# Patient Record
Sex: MALE | Race: BLACK OR AFRICAN AMERICAN | NOT HISPANIC OR LATINO | Employment: OTHER | URBAN - METROPOLITAN AREA
[De-identification: names, ages, dates, MRNs, and addresses within clinical notes are randomized per-mention and may not be internally consistent; named-entity substitution may affect disease eponyms.]

---

## 2017-01-11 DIAGNOSIS — N18.9 CHRONIC KIDNEY DISEASE: ICD-10-CM

## 2017-01-12 DIAGNOSIS — M10.059 IDIOPATHIC GOUT OF HIP, UNSPECIFIED CHRONICITY, UNSPECIFIED LATERALITY: ICD-10-CM

## 2017-01-13 DIAGNOSIS — E11.9 TYPE 2 DIABETES MELLITUS WITHOUT COMPLICATION: ICD-10-CM

## 2017-01-13 DIAGNOSIS — N18.9 CHRONIC KIDNEY DISEASE: ICD-10-CM

## 2017-01-13 RX ORDER — SODIUM BICARBONATE 650 MG/1
TABLET ORAL
Qty: 90 TABLET | Refills: 0 | OUTPATIENT
Start: 2017-01-13

## 2017-01-13 RX ORDER — MAGNESIUM OXIDE 400 MG
TABLET ORAL
Qty: 90 TABLET | Refills: 0 | Status: SHIPPED | OUTPATIENT
Start: 2017-01-13 | End: 2017-01-18 | Stop reason: SDUPTHER

## 2017-01-13 RX ORDER — SODIUM BICARBONATE 650 MG/1
TABLET ORAL
Qty: 90 TABLET | Refills: 0 | Status: SHIPPED | OUTPATIENT
Start: 2017-01-13 | End: 2017-01-18 | Stop reason: SDUPTHER

## 2017-01-13 RX ORDER — ALLOPURINOL 100 MG/1
TABLET ORAL
Qty: 90 TABLET | Refills: 0 | Status: SHIPPED | OUTPATIENT
Start: 2017-01-13 | End: 2017-01-24

## 2017-01-16 ENCOUNTER — TELEPHONE (OUTPATIENT)
Dept: NEPHROLOGY | Facility: CLINIC | Age: 72
End: 2017-01-16

## 2017-01-16 RX ORDER — TAMSULOSIN HYDROCHLORIDE 0.4 MG/1
CAPSULE ORAL
Qty: 90 CAPSULE | Refills: 0 | OUTPATIENT
Start: 2017-01-16

## 2017-01-16 RX ORDER — BENAZEPRIL HYDROCHLORIDE 40 MG/1
TABLET ORAL
Qty: 30 TABLET | Refills: 0 | Status: SHIPPED | OUTPATIENT
Start: 2017-01-16 | End: 2017-02-06 | Stop reason: DRUGHIGH

## 2017-01-16 RX ORDER — TAMSULOSIN HYDROCHLORIDE 0.4 MG/1
CAPSULE ORAL
Qty: 30 CAPSULE | Refills: 0 | Status: SHIPPED | OUTPATIENT
Start: 2017-01-16 | End: 2017-01-24 | Stop reason: SDUPTHER

## 2017-01-16 RX ORDER — BENAZEPRIL HYDROCHLORIDE 40 MG/1
TABLET ORAL
Qty: 90 TABLET | Refills: 0 | OUTPATIENT
Start: 2017-01-16

## 2017-01-16 NOTE — TELEPHONE ENCOUNTER
Pt states that he needs to check his schedule and does not wish to make an appointment at this time. Pt has no further questions or concerns at this time.

## 2017-01-18 ENCOUNTER — LAB VISIT (OUTPATIENT)
Dept: LAB | Facility: HOSPITAL | Age: 72
End: 2017-01-18
Payer: MEDICARE

## 2017-01-18 ENCOUNTER — OFFICE VISIT (OUTPATIENT)
Dept: NEPHROLOGY | Facility: CLINIC | Age: 72
End: 2017-01-18
Payer: MEDICARE

## 2017-01-18 VITALS
DIASTOLIC BLOOD PRESSURE: 80 MMHG | HEART RATE: 66 BPM | HEIGHT: 74 IN | BODY MASS INDEX: 38.51 KG/M2 | OXYGEN SATURATION: 95 % | WEIGHT: 300.06 LBS | SYSTOLIC BLOOD PRESSURE: 158 MMHG

## 2017-01-18 DIAGNOSIS — N18.30 CHRONIC KIDNEY DISEASE, STAGE 3 (MODERATE): ICD-10-CM

## 2017-01-18 DIAGNOSIS — E11.8 TYPE 2 DIABETES MELLITUS WITH COMPLICATION, WITHOUT LONG-TERM CURRENT USE OF INSULIN: ICD-10-CM

## 2017-01-18 DIAGNOSIS — R80.9 PROTEINURIA: ICD-10-CM

## 2017-01-18 DIAGNOSIS — R80.9 PROTEINURIA, UNSPECIFIED TYPE: ICD-10-CM

## 2017-01-18 DIAGNOSIS — M10.059 IDIOPATHIC GOUT OF HIP, UNSPECIFIED CHRONICITY, UNSPECIFIED LATERALITY: ICD-10-CM

## 2017-01-18 DIAGNOSIS — N18.30 CKD (CHRONIC KIDNEY DISEASE) STAGE 3, GFR 30-59 ML/MIN: Primary | ICD-10-CM

## 2017-01-18 DIAGNOSIS — N20.0 KIDNEY CALCULI: ICD-10-CM

## 2017-01-18 DIAGNOSIS — I10 ESSENTIAL HYPERTENSION: ICD-10-CM

## 2017-01-18 LAB
CREAT 24H UR-MRATE: 71.5 MG/HR
CREAT UR-MCNC: 78 MG/DL
CREATININE, URINE (MG/SPEC): 1716 MG/SPEC
PROT 24H UR-MRATE: ABNORMAL MG/SPEC
PROT UR-MCNC: 478 MG/DL
URINE COLLECTION DURATION: 24 HR
URINE COLLECTION DURATION: 24 HR
URINE VOLUME: 2200 ML
URINE VOLUME: 2200 ML

## 2017-01-18 PROCEDURE — 99215 OFFICE O/P EST HI 40 MIN: CPT | Mod: S$PBB,,, | Performed by: INTERNAL MEDICINE

## 2017-01-18 PROCEDURE — 99213 OFFICE O/P EST LOW 20 MIN: CPT | Mod: PBBFAC | Performed by: INTERNAL MEDICINE

## 2017-01-18 PROCEDURE — 99999 PR PBB SHADOW E&M-EST. PATIENT-LVL III: CPT | Mod: PBBFAC,,, | Performed by: INTERNAL MEDICINE

## 2017-01-18 RX ORDER — SPIRONOLACTONE 25 MG/1
25 TABLET ORAL DAILY
Qty: 30 TABLET | Refills: 6 | Status: SHIPPED | OUTPATIENT
Start: 2017-01-18 | End: 2017-02-23 | Stop reason: SDUPTHER

## 2017-01-18 RX ORDER — LANOLIN ALCOHOL/MO/W.PET/CERES
1 CREAM (GRAM) TOPICAL EVERY MORNING
Qty: 90 TABLET | Refills: 5 | Status: SHIPPED | OUTPATIENT
Start: 2017-01-18 | End: 2017-02-23 | Stop reason: SDUPTHER

## 2017-01-18 RX ORDER — SODIUM BICARBONATE 650 MG/1
650 TABLET ORAL 2 TIMES DAILY
Qty: 90 TABLET | Refills: 3 | Status: SHIPPED | OUTPATIENT
Start: 2017-01-18 | End: 2017-01-24

## 2017-01-18 RX ORDER — ALLOPURINOL 100 MG/1
100 TABLET ORAL DAILY
Qty: 90 TABLET | Refills: 5 | Status: SHIPPED | OUTPATIENT
Start: 2017-01-18 | End: 2017-02-23 | Stop reason: SDUPTHER

## 2017-01-18 RX ORDER — SPIRONOLACTONE 25 MG/1
25 TABLET ORAL DAILY
Qty: 30 TABLET | Refills: 6 | Status: SHIPPED | OUTPATIENT
Start: 2017-01-18 | End: 2017-01-18 | Stop reason: SDUPTHER

## 2017-01-18 NOTE — PROGRESS NOTES
Subjective:       Patient ID: Vinnie Siegel is a 71 y.o. Black or  male who presents for follow-up evaluation of No chief complaint on file.    HPI This is a 71 -year-old -American male with   nephrolithiasis, hypertension and diabetes,ckd, proteinuria who is coming in for f/u of   Nephrolithiasis, ckd and proteinuria. No recent stones and denies any hematuria, dysuria, or flank pain. DM not well controlled. Bp's in the 140's-150's/ 80's but does not check regularly.    Creatinine stable.  Proteinuria high.     PAST MEDICAL HISTORY: Significant for hypertension, diabetes for several years,   and nephrolithiasis. The patient had first episode about three years ago and had to   have a subsequent procedure and second stone was in August 2012 and he   had left ureteroscopy for that. He states that he was never symptomatic with   either of the stones and was found on imaging.     SOCIAL HISTORY: He smokes less than a pack a day. Does not drink. He is a   retired .     FAMILY HISTORY: No family history of kidney stones or kidney problems.     Review of Systems   Constitutional: Negative for fatigue.   Eyes: Negative for discharge.   Respiratory: Negative for cough, shortness of breath and wheezing.    Cardiovascular: Negative for chest pain and palpitations.   Gastrointestinal: Negative for abdominal pain, diarrhea, nausea and vomiting.   Genitourinary: Negative for dysuria, frequency, hematuria and urgency.   Skin: Negative for color change and rash.   Psychiatric/Behavioral: Negative for confusion.   All other systems reviewed and are negative.      Objective:      Physical Exam   Constitutional: He is oriented to person, place, and time. He appears well-developed and well-nourished.   HENT:   Right Ear: External ear normal.   Left Ear: External ear normal.   Nose: Nose normal.   Mouth/Throat: Normal dentition.   Eyes: Conjunctivae, EOM and lids are normal. Pupils are equal, round,  and reactive to light.   Neck: Trachea normal. No thyroid mass present.   Cardiovascular: Normal rate and regular rhythm.  Exam reveals no friction rub.    No murmur heard.  No lower extremity edema   Pulmonary/Chest: Effort normal and breath sounds normal. No respiratory distress. He has no decreased breath sounds. He has no rales.   Abdominal: Soft. Bowel sounds are normal. He exhibits no mass. There is no tenderness. There is no rebound. No hernia.   Musculoskeletal: He exhibits edema.   Trace edema jose cruz.   Neurological: He is alert and oriented to person, place, and time.   Skin: Skin is warm and dry. No rash noted. No erythema.   Psychiatric: He has a normal mood and affect. Judgment normal. His mood appears not anxious. He does not exhibit a depressed mood.   Oriented to time, place and person.   Vitals reviewed.      Assessment:       No diagnosis found.    Plan:     Labs pending.     This is a 71 -year-old -American male with history of nephrolithiasis,   hypertension, diabetes, is coming in for f/u of nephrolithiasis, ckd, proteinuria.     2. Nephrolithiasis: His last 24-hour urine collection shows low volume but otherwise stable. He has calcium oxalate stones 100% in the shell consist of uric acid stones 75%, calcium oxalate 25%. He is instructed on conservative measures to decrease the risk of stone formation. He is instructed to hydrate well along with decreasing the animal protein intake and low-salt intake. On mag supplements. His citrate levels are good. On allopurinol and sodium bicarbonate for low PH. Last  US shows left 0.5 cm stone.  Seen in  Urology.  Has been hydrating better per the pt.  3. Hypertension. Blood pressures are stable for the most part.  Check it at home.  Call within a week with readings. On lasix.   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 CKD sec to DM and HTN.  Proteinuria sec to Dm and  HTN.  On benazepril.  Stressed importance of good control.  Sig  increase recently-over 8 gms and rechecked 24 hr urine.  Check serologies but a1c uncontrolled-f/u with pcp. Start spironolactone 25 mg qday. Aware of sideeffects.  Discussed possible biopsy and pt would like to hold off.   5. Acidosis:  sodium bicarb.     Return in 1-2 months with rfp, urine protein and urine ceratinine

## 2017-01-20 ENCOUNTER — TELEPHONE (OUTPATIENT)
Dept: NEPHROLOGY | Facility: CLINIC | Age: 72
End: 2017-01-20

## 2017-01-20 DIAGNOSIS — R80.9 PROTEINURIA, UNSPECIFIED TYPE: Primary | ICD-10-CM

## 2017-01-20 NOTE — TELEPHONE ENCOUNTER
Please let him know taht his creatinien is higher-he should hydrate well and repeat rfp later next week.  He cont's to leak 7 gms of protein in urine.  He should start taking the spironolactone I gave him this week and control his DM.

## 2017-01-23 ENCOUNTER — TELEPHONE (OUTPATIENT)
Dept: NEPHROLOGY | Facility: CLINIC | Age: 72
End: 2017-01-23

## 2017-01-24 ENCOUNTER — OFFICE VISIT (OUTPATIENT)
Dept: INTERNAL MEDICINE | Facility: CLINIC | Age: 72
End: 2017-01-24
Attending: FAMILY MEDICINE
Payer: MEDICARE

## 2017-01-24 VITALS
WEIGHT: 301.13 LBS | BODY MASS INDEX: 39.91 KG/M2 | HEART RATE: 66 BPM | HEIGHT: 73 IN | SYSTOLIC BLOOD PRESSURE: 210 MMHG | DIASTOLIC BLOOD PRESSURE: 98 MMHG

## 2017-01-24 DIAGNOSIS — E11.65 UNCONTROLLED TYPE 2 DIABETES MELLITUS WITH COMPLICATION, UNSPECIFIED LONG TERM INSULIN USE STATUS: Primary | ICD-10-CM

## 2017-01-24 DIAGNOSIS — E11.8 UNCONTROLLED TYPE 2 DIABETES MELLITUS WITH COMPLICATION, UNSPECIFIED LONG TERM INSULIN USE STATUS: Primary | ICD-10-CM

## 2017-01-24 DIAGNOSIS — E78.5 HYPERLIPIDEMIA, UNSPECIFIED HYPERLIPIDEMIA TYPE: ICD-10-CM

## 2017-01-24 DIAGNOSIS — E87.20 ACIDOSIS: ICD-10-CM

## 2017-01-24 DIAGNOSIS — E11.65 TYPE 2 DIABETES MELLITUS WITH HYPERGLYCEMIA, UNSPECIFIED LONG TERM INSULIN USE STATUS: ICD-10-CM

## 2017-01-24 DIAGNOSIS — I10 ESSENTIAL HYPERTENSION: ICD-10-CM

## 2017-01-24 DIAGNOSIS — M10.059 IDIOPATHIC GOUT OF HIP, UNSPECIFIED CHRONICITY, UNSPECIFIED LATERALITY: ICD-10-CM

## 2017-01-24 PROCEDURE — 99214 OFFICE O/P EST MOD 30 MIN: CPT | Mod: PBBFAC | Performed by: FAMILY MEDICINE

## 2017-01-24 PROCEDURE — 99214 OFFICE O/P EST MOD 30 MIN: CPT | Mod: S$PBB,,, | Performed by: FAMILY MEDICINE

## 2017-01-24 PROCEDURE — 99999 PR PBB SHADOW E&M-EST. PATIENT-LVL IV: CPT | Mod: PBBFAC,,, | Performed by: FAMILY MEDICINE

## 2017-01-24 RX ORDER — ALBUTEROL SULFATE 90 UG/1
1-2 AEROSOL, METERED RESPIRATORY (INHALATION) EVERY 6 HOURS PRN
Qty: 1 INHALER | Refills: 3 | Status: CANCELLED | OUTPATIENT
Start: 2017-01-24

## 2017-01-24 RX ORDER — TAMSULOSIN HYDROCHLORIDE 0.4 MG/1
1 CAPSULE ORAL EVERY MORNING
Qty: 90 CAPSULE | Refills: 1 | Status: SHIPPED | OUTPATIENT
Start: 2017-01-24 | End: 2017-08-13 | Stop reason: SDUPTHER

## 2017-01-24 RX ORDER — BENAZEPRIL HYDROCHLORIDE 40 MG/1
TABLET ORAL
Qty: 90 TABLET | Refills: 1 | Status: SHIPPED | OUTPATIENT
Start: 2017-01-24 | End: 2017-02-06 | Stop reason: DRUGHIGH

## 2017-01-24 RX ORDER — SPIRONOLACTONE 25 MG/1
25 TABLET ORAL DAILY
Qty: 30 TABLET | Refills: 6 | Status: CANCELLED | OUTPATIENT
Start: 2017-01-24 | End: 2018-01-24

## 2017-01-24 RX ORDER — ALLOPURINOL 100 MG/1
100 TABLET ORAL DAILY
Qty: 90 TABLET | Refills: 5 | Status: CANCELLED | OUTPATIENT
Start: 2017-01-24

## 2017-01-24 RX ORDER — SIMVASTATIN 40 MG/1
40 TABLET, FILM COATED ORAL NIGHTLY
Qty: 90 TABLET | Refills: 1 | Status: CANCELLED | OUTPATIENT
Start: 2017-01-24

## 2017-01-24 RX ORDER — CLONIDINE HYDROCHLORIDE 0.2 MG/1
0.2 TABLET ORAL
Status: COMPLETED | OUTPATIENT
Start: 2017-01-24 | End: 2017-01-24

## 2017-01-24 RX ORDER — AMLODIPINE BESYLATE 5 MG/1
5 TABLET ORAL DAILY
Qty: 90 TABLET | Refills: 0 | Status: SHIPPED | OUTPATIENT
Start: 2017-01-24 | End: 2017-07-20 | Stop reason: SDUPTHER

## 2017-01-24 RX ORDER — SODIUM BICARBONATE 650 MG/1
650 TABLET ORAL 2 TIMES DAILY
Qty: 90 TABLET | Refills: 1 | Status: CANCELLED | OUTPATIENT
Start: 2017-01-24

## 2017-01-24 RX ORDER — AMLODIPINE BESYLATE 5 MG/1
5 TABLET ORAL DAILY
Qty: 90 TABLET | Refills: 0 | Status: SHIPPED | OUTPATIENT
Start: 2017-01-24 | End: 2017-01-24 | Stop reason: SDUPTHER

## 2017-01-24 RX ORDER — LANOLIN ALCOHOL/MO/W.PET/CERES
1 CREAM (GRAM) TOPICAL EVERY MORNING
Qty: 90 TABLET | Refills: 1 | Status: CANCELLED | OUTPATIENT
Start: 2017-01-24

## 2017-01-24 RX ORDER — GLIPIZIDE 10 MG/1
10 TABLET ORAL
Qty: 270 TABLET | Refills: 1 | Status: CANCELLED | OUTPATIENT
Start: 2017-01-24 | End: 2018-01-24

## 2017-01-24 RX ADMIN — CLONIDINE HYDROCHLORIDE 0.2 MG: 0.2 TABLET ORAL at 11:01

## 2017-01-24 NOTE — MR AVS SNAPSHOT
[facility]  [address]  [place] [address]  Phone: [phone]  Fax: [phone]                  Vinnie Siegel   2017 9:40 AM   Office Visit    Description:  Male : 1945   Provider:  Janeth Walter MD   Department:  [facility]           Reason for Visit     Hypertension     Diabetes     Medication Refill           Diagnoses this Visit        Comments    Uncontrolled type 2 diabetes mellitus with complication, unspecified long term insulin use status    -  Primary     Acidosis         Hyperlipidemia, unspecified hyperlipidemia type         Type 2 diabetes mellitus with hyperglycemia, unspecified long term insulin use status         Idiopathic gout of hip, unspecified chronicity, unspecified laterality         Essential hypertension                To Do List           Future Appointments        Provider Department Dept Phone    2017 10:00 AM LAB, APPOINTMENT [place] [facility] [phone]    2017 10:00 AM LAB, [facility]POINT[place] [facility] [phone]    2017 10:05 AM SPECIMEN, MA[facility] [phone]    2017 10:30 AM Yisel Ramírez DO [facility] - Nephrology [phone]      Goals (5 Years of Data)     None       These Medications        Disp Refills Start End    tamsulosin (FLOMAX) 0.4 mg Cp24 90 capsule 1 2017     Take 1 capsule (0.4 mg total) by mouth every morning. - Oral    Pharmacy: [facility] [id number] [facility]  AT [facility] [address] Ph #: [phone]       benazepril (LOTENSIN) 40 MG tablet 90 tablet 1 2017     TK 1 T PO QD    [facility] [id number]  [address]  AT [facility] [address] Ph #: [phone]       amlodipine (NORVASC) 5 MG tablet 90 tablet 0 2017    Take 1 tablet (5 mg total) by mouth once daily. - Oral    Pharmacy:  "[facility] [id number]  [address] [place] [address] AT [facility] [address] Ph #: [phone]         [facility] On [facility]     [facility] On Call Nurse Care Line - 24/7 Assistance  Registered nurses in the [facility] On Call Center provide clinical advisement, health education, appointment booking, and other advisory services.  Call for this free service at [phone].             Medications           Message regarding Medications     Verify the changes and/or additions to your medication regime listed below are the same as discussed with your clinician today.  If any of these changes or additions are incorrect, please notify your healthcare provider.        START taking these NEW medications        Refills    amlodipine (NORVASC) 5 MG tablet 0    Sig: Take 1 tablet (5 mg total) by mouth once daily.    Class: Print    Route: Oral      CHANGE how you are taking these medications     Start Taking Instead of    tamsulosin (FLOMAX) 0.4 mg Cp24 tamsulosin (FLOMAX) 0.4 mg Cp24    Dosage:  Take 1 capsule (0.4 mg total) by mouth every morning. Dosage:  TAKE 1 CAPSULE BY MOUTH EVERY MORNING    Reason for Change:  Reorder            Verify that the below list of medications is an accurate representation of the medications you are currently taking.  If none reported, the list may be blank. If incorrect, please contact your healthcare provider. Carry this list with you in case of emergency.           Current Medications     albuterol 90 mcg/actuation inhaler Inhale 1-2 puffs into the lungs every 6 (six) hours as needed for Wheezing or Shortness of Breath.    allopurinol (ZYLOPRIM) 100 MG tablet Take 1 tablet (100 mg total) by mouth once daily.    amlodipine (NORVASC) 5 MG tablet Take 1 tablet (5 mg total) by mouth once daily.    BD ULTRA-FINE VANDANA PEN NEEDLES 32 gauge x 5/32" Ndle 1 each by Misc.(Non-Drug; Combo Route) route once daily.    benazepril (LOTENSIN) 40 MG tablet TAKE 1 TABLET BY MOUTH EVERY DAY    " "benazepril (LOTENSIN) 40 MG tablet TK 1 T PO QD    blood-glucose meter kit Use as instructed    fluticasone (FLONASE) 50 mcg/actuation nasal spray 2 sprays by Each Nare route once daily.    FLUZONE HIGH-DOSE 2016-17, PF, 180 mcg/0.5 mL Syrg ADM 0.5ML IM UTD    glipiZIDE (GLUCOTROL) 10 MG tablet Take 1 tablet (10 mg total) by mouth 2 (two) times daily before meals.    INVOKANA 100 mg Tab TAKE 1 TABLET BY MOUTH EVERY DAY    liraglutide 0.6 mg/0.1 mL, 18 mg/3 mL, subq PNIJ (VICTOZA 2-ALBERT) 0.6 mg/0.1 mL (18 mg/3 mL) PnIj Inject 0.6 mg into the skin once daily.    magnesium oxide (MAGOX) 400 mg tablet Take 1 tablet (400 mg total) by mouth every morning.    prednisoLONE acetate (PRED FORTE) 1 % DrpS Place 1 drop into the right eye 3 (three) times daily.    simvastatin (ZOCOR) 40 MG tablet Take 1 tablet (40 mg total) by mouth every evening.    sodium bicarbonate 650 MG tablet Take 1 tablet (650 mg total) by mouth 2 (two) times daily.    sodium bicarbonate 650 MG tablet TAKE 1 TABLET BY MOUTH TWICE DAILY    spironolactone (ALDACTONE) 25 MG tablet Take 1 tablet (25 mg total) by mouth once daily.    tamsulosin (FLOMAX) 0.4 mg Cp24 Take 1 capsule (0.4 mg total) by mouth every morning.    timolol maleate 0.25% (TIMOPTIC) 0.25 % Drop Place 1 drop into the right eye 2 (two) times daily.           Clinical Reference Information           Vital Signs - Last Recorded  Most recent update: 1/24/2017 10:16 AM by Jessica Quiroga MA    BP Pulse Ht Wt BMI    (!) 210/98 (BP Location: Left arm, Patient Position: Sitting, BP Method: Manual) 66 6' 1" (1.854 m) (!) 136.6 kg (301 lb 2.4 oz) 39.73 kg/m2      Blood Pressure          Most Recent Value    BP  (!)  210/98      Allergies as of 1/24/2017     Metformin      Immunizations Administered on Date of Encounter - 1/24/2017     None      Orders Placed During Today's Visit      Normal Orders This Visit    Ambulatory Referral to Cardiology     Future Labs/Procedures Expected by Expires    " Hemoglobin A1c  1/24/2017 3/18/2018    Lipid panel  1/24/2017 3/18/2018    TSH  1/24/2017 3/25/2018      [facility] Sign-Up     Activating your [facility] account is as easy as 1-2-3!     1) Visit [email or web address], select Sign Up Now, enter this activation code and your date of birth, then select Next.  [email or web address]  Expires: 3/10/2017 10:54 AM      2) Create a username and password to use when you visit [facility] in the future and select a security question in case you lose your password and select Next.    3) Enter your e-mail address and click Sign Up!    Additional Information  If you have questions, please e-mail [email or web address] or call [phone] to talk to our [facility] staff. Remember, [facility] is NOT to be used for urgent needs. For medical emergencies, dial 911.         Instructions    Your test results will be communicated to you via: [facility], Telephone or Letter.  If you have not received your test results within one week. Please contact the clinic.         Smoking Cessation     If you would like to quit smoking:   You may be eligible for free services if you are a [place] resident and started smoking cigarettes before September 1, 1988.  Call the Smoking Cessation Trust (SCT) toll free at (562) 457-5706 or (680) 010-6366.   Call 2-800-QUIT-NOW if you do not meet the above criteria.

## 2017-01-24 NOTE — PROGRESS NOTES
Patient given two 0.1mg tablets of clonidine PO. Patient tolerated well. Patient discharged by PCP with no other questions or concerns.

## 2017-01-25 ENCOUNTER — TELEPHONE (OUTPATIENT)
Dept: NEPHROLOGY | Facility: CLINIC | Age: 72
End: 2017-01-25

## 2017-01-25 ENCOUNTER — LAB VISIT (OUTPATIENT)
Dept: LAB | Facility: HOSPITAL | Age: 72
End: 2017-01-25
Payer: MEDICARE

## 2017-01-25 DIAGNOSIS — E11.65 UNCONTROLLED TYPE 2 DIABETES MELLITUS WITH COMPLICATION, UNSPECIFIED LONG TERM INSULIN USE STATUS: ICD-10-CM

## 2017-01-25 DIAGNOSIS — R80.9 PROTEINURIA, UNSPECIFIED TYPE: ICD-10-CM

## 2017-01-25 DIAGNOSIS — E11.65 TYPE 2 DIABETES MELLITUS WITH HYPERGLYCEMIA, UNSPECIFIED LONG TERM INSULIN USE STATUS: ICD-10-CM

## 2017-01-25 DIAGNOSIS — E11.8 UNCONTROLLED TYPE 2 DIABETES MELLITUS WITH COMPLICATION, UNSPECIFIED LONG TERM INSULIN USE STATUS: ICD-10-CM

## 2017-01-25 LAB
ALBUMIN SERPL BCP-MCNC: 3.1 G/DL
ANION GAP SERPL CALC-SCNC: 9 MMOL/L
BUN SERPL-MCNC: 13 MG/DL
CALCIUM SERPL-MCNC: 9 MG/DL
CHLORIDE SERPL-SCNC: 103 MMOL/L
CHOLEST/HDLC SERPL: 2.7 {RATIO}
CO2 SERPL-SCNC: 27 MMOL/L
CREAT SERPL-MCNC: 1.8 MG/DL
EST. GFR  (AFRICAN AMERICAN): 43.1 ML/MIN/1.73 M^2
EST. GFR  (NON AFRICAN AMERICAN): 37.3 ML/MIN/1.73 M^2
ESTIMATED AVG GLUCOSE: 260 MG/DL
GLUCOSE SERPL-MCNC: 240 MG/DL
HBA1C MFR BLD HPLC: 10.7 %
HDL/CHOLESTEROL RATIO: 37.2 %
HDLC SERPL-MCNC: 164 MG/DL
HDLC SERPL-MCNC: 61 MG/DL
LDLC SERPL CALC-MCNC: 84.8 MG/DL
NONHDLC SERPL-MCNC: 103 MG/DL
PHOSPHATE SERPL-MCNC: 3 MG/DL
POTASSIUM SERPL-SCNC: 3.5 MMOL/L
SODIUM SERPL-SCNC: 139 MMOL/L
TRIGL SERPL-MCNC: 91 MG/DL
TSH SERPL DL<=0.005 MIU/L-ACNC: 1.09 UIU/ML

## 2017-01-25 PROCEDURE — 80061 LIPID PANEL: CPT

## 2017-01-25 PROCEDURE — 84443 ASSAY THYROID STIM HORMONE: CPT

## 2017-01-25 PROCEDURE — 36415 COLL VENOUS BLD VENIPUNCTURE: CPT

## 2017-01-25 PROCEDURE — 83036 HEMOGLOBIN GLYCOSYLATED A1C: CPT

## 2017-01-25 PROCEDURE — 80069 RENAL FUNCTION PANEL: CPT

## 2017-01-27 ENCOUNTER — TELEPHONE (OUTPATIENT)
Dept: INTERNAL MEDICINE | Facility: CLINIC | Age: 72
End: 2017-01-27

## 2017-01-27 NOTE — TELEPHONE ENCOUNTER
----- Message from Janeht Walter MD sent at 1/25/2017  2:02 PM CST -----  Improvement in a1c from 12 to 10, but still elevated and uncontrolled. Will need to see his endocrine doctor.  PV

## 2017-01-27 NOTE — TELEPHONE ENCOUNTER
Pt was notified of his test results and advised that per Dr. Walter needs to f/u with endocrine , unable to schedule anything sooner that march pt was place on the hold list. Spoke with endocrine  and the soonest was in march do to the high value nothing with the NP as well as I informed to the pt. Will mail the appointment for March. Will call pt back and inform.

## 2017-01-29 ENCOUNTER — TELEPHONE (OUTPATIENT)
Dept: NEPHROLOGY | Facility: CLINIC | Age: 72
End: 2017-01-29

## 2017-01-31 NOTE — PROGRESS NOTES
Subjective:       Patient ID: Vinnie Siegel is a 71 y.o. male.    Chief Complaint: Hypertension (elevated blood pressures reading); Diabetes; and Medication Refill    HPI Comments: Pt presents today for DM, HTN f/u. Per pt, he is trying his best to keep up with his DM. Feels that it is well controlled but aware that his glucose levels and a1c indicate otherwise. Pt also states that he needs to have meds refilled.  States that he took his BP meds this AM, but BP's are very high here in office. Amenable to med adjustments to better control his BP's.       Hypertension   Pertinent negatives include no chest pain, palpitations or shortness of breath.   Diabetes   Pertinent negatives for hypoglycemia include no dizziness. Pertinent negatives for diabetes include no chest pain and no weakness.   Medication Refill   Pertinent negatives include no chest pain or weakness.     Review of Systems   Constitutional: Negative for activity change and appetite change.   HENT: Negative.    Eyes: Negative for photophobia and visual disturbance.   Respiratory: Negative for chest tightness and shortness of breath.    Cardiovascular: Negative for chest pain, palpitations and leg swelling.   Neurological: Negative for dizziness, weakness and light-headedness.       Objective:      Physical Exam   Constitutional: He is oriented to person, place, and time. He appears well-developed and well-nourished.   HENT:   Head: Normocephalic and atraumatic.   Eyes: Conjunctivae and EOM are normal. Pupils are equal, round, and reactive to light. Right eye exhibits no discharge. Left eye exhibits no discharge. No scleral icterus.   Neck: Normal range of motion. Neck supple. No JVD present.   Cardiovascular: Normal rate, regular rhythm, normal heart sounds and intact distal pulses.    Pulmonary/Chest: Effort normal and breath sounds normal.   Musculoskeletal: Normal range of motion. He exhibits no edema or tenderness.   Neurological: He is alert and  oriented to person, place, and time. He has normal reflexes. No cranial nerve deficit. He exhibits normal muscle tone. Coordination normal.   Skin: Skin is warm and dry.   Psychiatric: He has a normal mood and affect. His behavior is normal. Judgment and thought content normal.       Assessment:       1. Uncontrolled type 2 diabetes mellitus with complication, unspecified long term insulin use status    2. Acidosis    3. Hyperlipidemia, unspecified hyperlipidemia type    4. Type 2 diabetes mellitus with hyperglycemia, unspecified long term insulin use status    5. Idiopathic gout of hip, unspecified chronicity, unspecified laterality    6. Essential hypertension        Plan:       HTN: uncontrolled. Clonidine one time although pt is aymptomatic  DM: followed by endocrine, uncontrolled  Uncontrolled type 2 diabetes mellitus with complication, unspecified long term insulin use status  -     Hemoglobin A1c; Future; Expected date: 1/24/17  -     Lipid panel; Future; Expected date: 1/24/17    Acidosis  -     tamsulosin (FLOMAX) 0.4 mg Cp24; Take 1 capsule (0.4 mg total) by mouth every morning.  Dispense: 90 capsule; Refill: 1    Hyperlipidemia, unspecified hyperlipidemia type    Type 2 diabetes mellitus with hyperglycemia, unspecified long term insulin use status  -     Hemoglobin A1c; Future; Expected date: 1/24/17  -     Lipid panel; Future; Expected date: 1/24/17  -     TSH; Future; Expected date: 1/24/17  -     Ambulatory Referral to Cardiology    Idiopathic gout of hip, unspecified chronicity, unspecified laterality    Essential hypertension  -     benazepril (LOTENSIN) 40 MG tablet; TK 1 T PO QD  Dispense: 90 tablet; Refill: 1  -     Ambulatory Referral to Cardiology  -     Discontinue: amlodipine (NORVASC) 5 MG tablet; Take 1 tablet (5 mg total) by mouth once daily.  Dispense: 90 tablet; Refill: 0  -     amlodipine (NORVASC) 5 MG tablet; Take 1 tablet (5 mg total) by mouth once daily.  Dispense: 90 tablet; Refill:  0  -     cloNIDine tablet 0.2 mg; Take 1 tablet (0.2 mg total) by mouth one time.    Other orders  -     Cancel: spironolactone (ALDACTONE) 25 MG tablet; Take 1 tablet (25 mg total) by mouth once daily.  Dispense: 30 tablet; Refill: 6  -     Cancel: sodium bicarbonate 650 MG tablet; Take 1 tablet (650 mg total) by mouth 2 (two) times daily.  Dispense: 90 tablet; Refill: 1  -     Cancel: simvastatin (ZOCOR) 40 MG tablet; Take 1 tablet (40 mg total) by mouth every evening.  Dispense: 90 tablet; Refill: 1  -     Cancel: magnesium oxide (MAGOX) 400 mg tablet; Take 1 tablet (400 mg total) by mouth every morning.  Dispense: 90 tablet; Refill: 1  -     Cancel: glipiZIDE (GLUCOTROL) 10 MG tablet; Take 1 tablet (10 mg total) by mouth 2 (two) times daily before meals.  Dispense: 270 tablet; Refill: 1  -     Cancel: allopurinol (ZYLOPRIM) 100 MG tablet; Take 1 tablet (100 mg total) by mouth once daily.  Dispense: 90 tablet; Refill: 5  -     Cancel: albuterol 90 mcg/actuation inhaler; Inhale 1-2 puffs into the lungs every 6 (six) hours as needed for Wheezing or Shortness of Breath.  Dispense: 1 Inhaler; Refill: 3  -     Cancel: Lipid panel; Future; Expected date: 1/24/17      RTC prn symptoms or q 4 weeks for BP follow up. Unclear if pt has been taking all of his meds. Have advised to resume all meds and followup with cards  ED prompts d/w pt  RTC after seeing cards for BP check

## 2017-02-06 ENCOUNTER — OFFICE VISIT (OUTPATIENT)
Dept: CARDIOLOGY | Facility: CLINIC | Age: 72
End: 2017-02-06
Payer: MEDICARE

## 2017-02-06 ENCOUNTER — HOSPITAL ENCOUNTER (OUTPATIENT)
Dept: CARDIOLOGY | Facility: CLINIC | Age: 72
Discharge: HOME | End: 2017-02-06
Payer: MEDICARE

## 2017-02-06 VITALS
SYSTOLIC BLOOD PRESSURE: 188 MMHG | HEART RATE: 80 BPM | WEIGHT: 296.94 LBS | DIASTOLIC BLOOD PRESSURE: 100 MMHG | BODY MASS INDEX: 38.11 KG/M2 | HEIGHT: 74 IN

## 2017-02-06 DIAGNOSIS — N18.30 CKD (CHRONIC KIDNEY DISEASE) STAGE 3, GFR 30-59 ML/MIN: ICD-10-CM

## 2017-02-06 DIAGNOSIS — E11.65 UNCONTROLLED TYPE 2 DIABETES MELLITUS WITH COMPLICATION, UNSPECIFIED LONG TERM INSULIN USE STATUS: ICD-10-CM

## 2017-02-06 DIAGNOSIS — E78.5 HYPERLIPIDEMIA, UNSPECIFIED HYPERLIPIDEMIA TYPE: ICD-10-CM

## 2017-02-06 DIAGNOSIS — I10 ESSENTIAL HYPERTENSION: Primary | ICD-10-CM

## 2017-02-06 DIAGNOSIS — Z87.898 HISTORY OF SNORING: ICD-10-CM

## 2017-02-06 DIAGNOSIS — R06.02 SHORTNESS OF BREATH ON EXERTION: ICD-10-CM

## 2017-02-06 DIAGNOSIS — E11.8 UNCONTROLLED TYPE 2 DIABETES MELLITUS WITH COMPLICATION, UNSPECIFIED LONG TERM INSULIN USE STATUS: ICD-10-CM

## 2017-02-06 DIAGNOSIS — I10 ESSENTIAL HYPERTENSION: ICD-10-CM

## 2017-02-06 DIAGNOSIS — Z72.0 TOBACCO ABUSE: Chronic | ICD-10-CM

## 2017-02-06 DIAGNOSIS — E66.9 OBESITY (BMI 30-39.9): ICD-10-CM

## 2017-02-06 PROCEDURE — 99999 PR PBB SHADOW E&M-EST. PATIENT-LVL V: CPT | Mod: PBBFAC,,, | Performed by: INTERNAL MEDICINE

## 2017-02-06 PROCEDURE — 99406 BEHAV CHNG SMOKING 3-10 MIN: CPT | Mod: S$PBB,,, | Performed by: INTERNAL MEDICINE

## 2017-02-06 PROCEDURE — 93010 ELECTROCARDIOGRAM REPORT: CPT | Mod: S$PBB,,, | Performed by: INTERNAL MEDICINE

## 2017-02-06 PROCEDURE — 93005 ELECTROCARDIOGRAM TRACING: CPT | Mod: PBBFAC | Performed by: INTERNAL MEDICINE

## 2017-02-06 PROCEDURE — 99204 OFFICE O/P NEW MOD 45 MIN: CPT | Mod: S$PBB,,, | Performed by: INTERNAL MEDICINE

## 2017-02-06 RX ORDER — ATORVASTATIN CALCIUM 40 MG/1
40 TABLET, FILM COATED ORAL DAILY
Qty: 90 TABLET | Refills: 3 | Status: SHIPPED | OUTPATIENT
Start: 2017-02-06 | End: 2017-03-30

## 2017-02-06 RX ORDER — CARVEDILOL 6.25 MG/1
6.25 TABLET ORAL 2 TIMES DAILY WITH MEALS
Qty: 60 TABLET | Refills: 11 | Status: SHIPPED | OUTPATIENT
Start: 2017-02-06 | End: 2017-04-06 | Stop reason: SDUPTHER

## 2017-02-06 RX ORDER — BENAZEPRIL HYDROCHLORIDE 20 MG/1
TABLET ORAL
Qty: 180 TABLET | Refills: 3 | Status: SHIPPED | OUTPATIENT
Start: 2017-02-06 | End: 2018-02-19

## 2017-02-06 NOTE — LETTER
February 6, 2017      Janeth Walter MD  [address]  [facility] [address]           [facility] - Cardiology  [address]  [place] [address]  Phone: [phone]          Patient: Vinnie Siegel   MR Number: [id number]   YOB: 1945   Date of Visit: 2/6/2017       Dear Dr. Janeth Walter:    Thank you for referring Vinnie Siegel to me for evaluation. Attached you will find relevant portions of my assessment and plan of care.    If you have questions, please do not hesitate to call me. I look forward to following Vinnie Siegel along with you.    Sincerely,    Jovany Ricardo MD    Enclosure  CC:  No Recipients    If you would like to receive this communication electronically, please contact [email or web address] or [phone] to request more information on [employer] access.    For providers and/or their staff who would like to refer a patient to [facility], please contact us through our one-stop-shop provider referral line, [facility] , at [phone].    If you feel you have received this communication in error or would no longer like to receive these types of communications, please e-mail [email or web address]

## 2017-02-06 NOTE — PROGRESS NOTES
Tobacco Use/Cessation:  I assessed Vinnie Siegel and discussed smoking cessation with him for 3-10 minutes. He  History   Smoking Status    Current Every Day Smoker    Packs/day: 0.50    Years: 45.00   Smokeless Tobacco    Never Used     Comment: says he could quit and will try

## 2017-02-06 NOTE — MR AVS SNAPSHOT
[facility] - Cardiology  [address]  [place] [address]  Phone: [phone]                  Vinnie Siegel   2017 10:00 AM   Office Visit    Description:  Male : 1945   Provider:  Jovany Ricardo MD   Department:  [facility]           Reason for Visit     Hyperlipidemia     Hypertension           Diagnoses this Visit        Comments    Essential hypertension    -  Primary     Hyperlipidemia, unspecified hyperlipidemia type         Shortness of breath on exertion         CKD (chronic kidney disease) stage 3, GFR 30-59 ml/min         Obesity (BMI 30-39.9)         Uncontrolled type 2 diabetes mellitus with complication, unspecified long term insulin use status         Tobacco abuse         History of snoring                To Do List           Future Appointments        Provider Department Dept Phone    2017 10:00 AM LAB, APPOINTMENT [place] [facility] [phone]    2017 10:05 AM SPECIMEN, MAIN [facility]AMPUS [facility] [phone]    2017 10:30 AM Yisel Ramírez DO [facility] Nephrology [phone]    3/30/2017 10:30 AM Nicole Cerda, NINO, NP [facility] - Endocrinology [phone]      Goals (5 Years of Data)     None      Follow-Up and Disposition     Return in about 6 weeks (around 3/20/2017).       These Medications        Disp Refills Start End    carvedilol (COREG) 6.25 MG tablet 60 tablet 11 2017    Take 1 tablet (6.25 mg total) by mouth 2 (two) times daily with meals. - Oral    Pharmacy: [email or web address] [address] [place] [address] Y[address]  [address]  AT [facility] [address] Ph #: [phone]       benazepril (LOTENSIN) 20 MG tablet 180 tablet 3 2017     TK 1  PO twice daily    Pharmacy: [id number]  Y[address]EZ  [address]  AT [facility] [address] Ph #: [phone]         [facility]     [facility] Care [facility] -  Assistance  Registered  "nurses in the [facility] provide clinical advisement, health education, appointment booking, and other advisory services.  Call for this free service at [phone].             Medications           Message regarding Medications     Verify the changes and/or additions to your medication regime listed below are the same as discussed with your clinician today.  If any of these changes or additions are incorrect, please notify your healthcare provider.        START taking these NEW medications        Refills    carvedilol (COREG) 6.25 MG tablet 11    Sig: Take 1 tablet (6.25 mg total) by mouth 2 (two) times daily with meals.    Class: Normal    Route: Oral      CHANGE how you are taking these medications     Start Taking Instead of    benazepril (LOTENSIN) 20 MG tablet benazepril (LOTENSIN) 40 MG tablet    Dosage:  TK 1  PO twice daily Dosage:  TK 1 T PO QD    Reason for Change:  Dose adjustment       STOP taking these medications     simvastatin (ZOCOR) 40 MG tablet Take 1 tablet (40 mg total) by mouth every evening.           Verify that the below list of medications is an accurate representation of the medications you are currently taking.  If none reported, the list may be blank. If incorrect, please contact your healthcare provider. Carry this list with you in case of emergency.           Current Medications     albuterol 90 mcg/actuation inhaler Inhale 1-2 puffs into the lungs every 6 (six) hours as needed for Wheezing or Shortness of Breath.    allopurinol (ZYLOPRIM) 100 MG tablet Take 1 tablet (100 mg total) by mouth once daily.    amlodipine (NORVASC) 5 MG tablet Take 1 tablet (5 mg total) by mouth once daily.    BD ULTRA-FINE VANDANA PEN NEEDLES 32 gauge x 5/32" Ndle 1 each by Misc.(Non-Drug; Combo Route) route once daily.    benazepril (LOTENSIN) 20 MG tablet TK 1  PO twice daily    blood-glucose meter kit Use as instructed    fluticasone (FLONASE) 50 mcg/actuation nasal spray 2 sprays by Each " "Nare route once daily.    FLUZONE HIGH-DOSE 2016-17, PF, 180 mcg/0.5 mL Syrg ADM 0.5ML IM UTD    glipiZIDE (GLUCOTROL) 10 MG tablet Take 1 tablet (10 mg total) by mouth 2 (two) times daily before meals.    INVOKANA 100 mg Tab TAKE 1 TABLET BY MOUTH EVERY DAY    magnesium oxide (MAGOX) 400 mg tablet Take 1 tablet (400 mg total) by mouth every morning.    prednisoLONE acetate (PRED FORTE) 1 % DrpS Place 1 drop into the right eye 3 (three) times daily.    sodium bicarbonate 650 MG tablet Take 1 tablet (650 mg total) by mouth 2 (two) times daily.    sodium bicarbonate 650 MG tablet TAKE 1 TABLET BY MOUTH TWICE DAILY    spironolactone (ALDACTONE) 25 MG tablet Take 1 tablet (25 mg total) by mouth once daily.    tamsulosin (FLOMAX) 0.4 mg Cp24 Take 1 capsule (0.4 mg total) by mouth every morning.    timolol maleate 0.25% (TIMOPTIC) 0.25 % Drop Place 1 drop into the right eye 2 (two) times daily.    carvedilol (COREG) 6.25 MG tablet Take 1 tablet (6.25 mg total) by mouth 2 (two) times daily with meals.    liraglutide 0.6 mg/0.1 mL, 18 mg/3 mL, subq PNIJ (VICTOZA 2-ALBERT) 0.6 mg/0.1 mL (18 mg/3 mL) PnIj Inject 0.6 mg into the skin once daily.           Clinical Reference Information           Your Vitals Were     BP Pulse Height Weight BMI    188/100 80 6' 2" (1.88 m) 134.7 kg (296 lb 15.4 oz) 38.13 kg/m2      Blood Pressure          Most Recent Value    Right Arm BP - Sitting  190/94    Left Arm BP - Sitting  188/100    BP  (!)  188/100      Allergies as of 2/6/2017     Metformin      Immunizations Administered on Date of Encounter - 2/6/2017     None      Orders Placed During Today's Visit      Normal Orders This Visit    Ambulatory consult for Sleep Study     [20226] [place] TOBACCO USE CESSATION INTERMEDIATE 3-10 MIN     Future Labs/Procedures Expected by Expires    Comprehensive metabolic panel  7/14/2017 (Approximate) 2/6/2018    Lipid panel  7/14/2017 4/7/2018    2D Echo w/ Color Flow Doppler  As directed 2/6/2018    EKG " 12-lead  As directed 2/6/2018      [facility] Sign-Up     Activating your [facility] account is as easy as 1-2-3!     1) Visit [email or web address], select Sign Up Now, enter this activation code and your date of birth, then select Next.  [email or web address]  Expires: 3/10/2017 10:54 AM      2) Create a username and password to use when you visit [facility] in the future and select a security question in case you lose your password and select Next.    3) Enter your e-mail address and click Sign Up!    Additional Information  If you have questions, please e-mail [email or web address] or call [phone] to talk to our [facility] staff. Remember, [facility] is NOT to be used for urgent needs. For medical emergencies, dial 911.         Instructions    Change Benazepril to 20 mg twice daily  Add carvedilol 6.25 mg twice daily to your regimen  Once out of Simvastatin, change to Atorvastatin 40 mg daily  Keep Sleep Center appointment once made   Strongly suggest you contact the Free Smoking Cessation Program.         Smoking Cessation     If you would like to quit smoking:   You may be eligible for free services if you are a [place] resident and started smoking cigarettes before September 1, 1988.  Call the Smoking Cessation Trust ([facility]) toll free at [phone] or ([phone].   Call 1-800-QUIT-NOW if you do not meet the above criteria.            Language Assistance Services     ATTENTION: Language assistance services are available, free of charge. Please call [phone].      ATENCIÓN: Si habla español, tiene a coello disposición servicios gratuitos de asistencia lingüística. Llame al [phone].     CHÚ Ý: N?u b?n nói Ti?ng Vi?t, có các d?ch v? h? tr? ngôn ng? mi?n phí dành cho b?n. G?i s? [phone].         [facility] - Card[facility] complies with applicable Federal civil rights laws and does not discriminate on the basis of race, color, national origin, age, disability, or sex.

## 2017-02-06 NOTE — PATIENT INSTRUCTIONS
Change Benazepril to 20 mg twice daily  Add carvedilol 6.25 mg twice daily to your regimen  Once out of Simvastatin, change to Atorvastatin 40 mg daily  Keep Sleep Center appointment once made   Strongly suggest you contact the Free Smoking Cessation Program.

## 2017-02-06 NOTE — PROGRESS NOTES
"Subjective:   Patient ID:  Vinnie Siegel is a 71 y.o. male who presents for evaluation of Hyperlipidemia and Hypertension      HPI:   Vinnie Siegel presents for assistance with difficult to control hypertension.  For the past few months the patient's hypertension has been more difficult to control. He has also noted ZEPEDA after walking 2 blocks. He has been on thiazide diuretics in the past and they get discontinued apparently for mild hyponatermia or worsening renal function. " Tries to watch sodium intake. Snores with some daytime sleepiness. Smokes a Pkg/day cigarettes. Vinnie Siegel has dyslipidemia  on moderate intensity statin therapy. Vinnie Siegel denies chest pain,  palpitations, presyncope , or syncope. .  Review of Systems   Constitution: Negative for weakness, malaise/fatigue, weight gain and weight loss.   HENT: Negative for headaches.    Eyes: Negative for blurred vision.   Cardiovascular: Positive for dyspnea on exertion and leg swelling. Negative for chest pain, claudication, cyanosis, irregular heartbeat, near-syncope, orthopnea, palpitations, paroxysmal nocturnal dyspnea and syncope.        Recent worsening edema   Respiratory: Positive for snoring. Negative for cough, shortness of breath and wheezing.    Musculoskeletal: Positive for gout and joint pain. Negative for falls and myalgias.   Gastrointestinal: Negative for abdominal pain, heartburn, nausea and vomiting.   Genitourinary: Positive for frequency. Negative for nocturia.   Neurological: Negative for brief paralysis, dizziness, focal weakness, numbness and paresthesias.   Psychiatric/Behavioral: Negative for altered mental status.       Current Outpatient Prescriptions   Medication Sig    albuterol 90 mcg/actuation inhaler Inhale 1-2 puffs into the lungs every 6 (six) hours as needed for Wheezing or Shortness of Breath.    allopurinol (ZYLOPRIM) 100 MG tablet Take 1 tablet (100 mg total) by mouth once daily.    amlodipine " "(NORVASC) 5 MG tablet Take 1 tablet (5 mg total) by mouth once daily.    BD ULTRA-FINE VANDANA PEN NEEDLES 32 gauge x 5/32" Ndle 1 each by Misc.(Non-Drug; Combo Route) route once daily.    benazepril (LOTENSIN) 20 MG tablet TK 1  PO twice daily    blood-glucose meter kit Use as instructed (Patient taking differently: 1 each by Misc.(Non-Drug; Combo Route) route once daily. Use as instructed)    fluticasone (FLONASE) 50 mcg/actuation nasal spray 2 sprays by Each Nare route once daily.    FLUZONE HIGH-DOSE 2016-17, PF, 180 mcg/0.5 mL Syrg ADM 0.5ML IM UTD    glipiZIDE (GLUCOTROL) 10 MG tablet Take 1 tablet (10 mg total) by mouth 2 (two) times daily before meals.    INVOKANA 100 mg Tab TAKE 1 TABLET BY MOUTH EVERY DAY    magnesium oxide (MAGOX) 400 mg tablet Take 1 tablet (400 mg total) by mouth every morning.    prednisoLONE acetate (PRED FORTE) 1 % DrpS Place 1 drop into the right eye 3 (three) times daily.    sodium bicarbonate 650 MG tablet Take 1 tablet (650 mg total) by mouth 2 (two) times daily.    sodium bicarbonate 650 MG tablet TAKE 1 TABLET BY MOUTH TWICE DAILY    spironolactone (ALDACTONE) 25 MG tablet Take 1 tablet (25 mg total) by mouth once daily.    tamsulosin (FLOMAX) 0.4 mg Cp24 Take 1 capsule (0.4 mg total) by mouth every morning.    timolol maleate 0.25% (TIMOPTIC) 0.25 % Drop Place 1 drop into the right eye 2 (two) times daily.    carvedilol (COREG) 6.25 MG tablet Take 1 tablet (6.25 mg total) by mouth 2 (two) times daily with meals.    liraglutide 0.6 mg/0.1 mL, 18 mg/3 mL, subq PNIJ (VICTOZA 2-ALBERT) 0.6 mg/0.1 mL (18 mg/3 mL) PnIj Inject 0.6 mg into the skin once daily.     No current facility-administered medications for this visit.      Objective:   Physical Exam   Constitutional: He is oriented to person, place, and time. He appears well-developed. No distress.   BP (!) 188/100  Pulse 80  Ht 6' 2" (1.88 m)  Wt 134.7 kg (296 lb 15.4 oz)  BMI 38.13 kg/m2  Central Obesity   HENT: "   Head: Normocephalic.   Right Ear: External ear normal.   Left Ear: External ear normal.   Eyes: EOM are normal. Pupils are equal, round, and reactive to light. No scleral icterus.   Neck: Neck supple. No JVD present. No thyromegaly present.   Cardiovascular: Normal rate, regular rhythm, normal heart sounds and intact distal pulses.  PMI is not displaced.  Exam reveals no gallop and no friction rub.    No murmur heard.  2+ bilateral pedal edema   Pulmonary/Chest: Effort normal and breath sounds normal. No respiratory distress. He has no wheezes. He has no rales.   Abdominal: Soft. He exhibits no distension. There is no hepatosplenomegaly. There is no tenderness.   Musculoskeletal: He exhibits no edema or tenderness.   Gait normal   Neurological: He is alert and oriented to person, place, and time.   Skin: Skin is warm and dry. No rash noted.   Psychiatric: He has a normal mood and affect. His behavior is normal.       Lab Results   Component Value Date     01/25/2017    K 3.5 01/25/2017     01/25/2017    CO2 27 01/25/2017    BUN 13 01/25/2017    CREATININE 1.8 (H) 01/25/2017     (H) 01/25/2017    HGBA1C 10.7 (H) 01/25/2017    AST 17 12/23/2015    ALT 22 12/23/2015    ALBUMIN 3.1 (L) 01/25/2017    PROT 6.4 12/23/2015    BILITOT 0.5 12/23/2015    WBC 8.22 12/23/2015    HGB 13.8 (L) 12/23/2015    HCT 41.8 12/23/2015    MCV 91 12/23/2015     12/23/2015    TSH 1.091 01/25/2017    CHOL 164 01/25/2017    HDL 61 01/25/2017    LDLCALC 84.8 01/25/2017    TRIG 91 01/25/2017       Assessment:     1. Essential hypertension : Not well controlled- multifactorial   2. Hyperlipidemia, unspecified hyperlipidemia type :  on moderate intensity statin therapy.   3. Shortness of breath on exertion : possible new systolic or diastolic dysfunction   4. CKD (chronic kidney disease) stage 3, GFR 30-59 ml/min    5. Obesity (BMI 30-39.9) : Central   6. Uncontrolled type 2 diabetes mellitus with complication,  unspecified long term insulin use status    7. Tobacco abuse : Ongoing contributing to #1   8. History of snoring : Possible TIM contributing to # 1       Plan:     Vinnie was seen today for hyperlipidemia and hypertension.    Diagnoses and all orders for this visit:    Essential hypertension  -     EKG 12-lead; Future  -     carvedilol (COREG) 6.25 MG tablet; Take 1 tablet (6.25 mg total) by mouth 2 (two) times daily with meals with planned titration.  -     benazepril (LOTENSIN) 20 MG tablet; TK 1  PO twice daily in place of one 40 mg daily  Has had negative renal artery scans in the past with one scheduled  Difficult to titrate up or switch calcium antagonist with edema  May need to add low dose Chlorthalidone in the future.  Hyperlipidemia, unspecified hyperlipidemia type  -     Comprehensive metabolic panel; Future; Expected date: 7/14/17  -     Lipid panel; Future; Expected date: 7/14/17  Switch Simvastatin to High intensity Atorvastatin   Shortness of breath on exertion  -     2D Echo w/ Color Flow Doppler; Future    CKD (chronic kidney disease) stage 3, GFR 30-59 ml/min    Obesity (BMI 30-39.9)    Uncontrolled type 2 diabetes mellitus with complication, unspecified long term insulin use status  Per PCP  Tobacco abuse  -     [85552] [place] TOBACCO USE CESSATION INTERMEDIATE 3-10 MIN  Patient counseled and given pamphlet for smoking cessation program  History of snoring  -     Ambulatory consult for Sleep Study

## 2017-02-07 ENCOUNTER — TELEPHONE (OUTPATIENT)
Dept: CARDIOLOGY | Facility: CLINIC | Age: 72
End: 2017-02-07

## 2017-02-07 ENCOUNTER — HOSPITAL ENCOUNTER (OUTPATIENT)
Dept: CARDIOLOGY | Facility: CLINIC | Age: 72
Discharge: HOME | End: 2017-02-07
Payer: MEDICARE

## 2017-02-07 DIAGNOSIS — I42.0 DILATED CARDIOMYOPATHY: ICD-10-CM

## 2017-02-07 DIAGNOSIS — R06.02 SHORTNESS OF BREATH ON EXERTION: ICD-10-CM

## 2017-02-07 LAB
AORTIC VALVE REGURGITATION: ABNORMAL
DIASTOLIC DYSFUNCTION: YES
RETIRED EF AND QEF - SEE NOTES: 43 (ref 55–65)

## 2017-02-07 PROCEDURE — 93306 TTE W/DOPPLER COMPLETE: CPT | Mod: PBBFAC | Performed by: INTERNAL MEDICINE

## 2017-02-07 RX ORDER — FUROSEMIDE 20 MG/1
TABLET ORAL
Qty: 80 TABLET | Refills: 11 | Status: SHIPPED | OUTPATIENT
Start: 2017-02-07 | End: 2018-01-23 | Stop reason: SDUPTHER

## 2017-02-07 NOTE — TELEPHONE ENCOUNTER
Vinnie Siegel called and the echocardiogram results discussed. EF has fallen since last echo. States he is on low dose furosemide therefore will increase until the patient loses 4-5 lbs then increase as needed for 3-4 lb gain .

## 2017-02-14 ENCOUNTER — LAB VISIT (OUTPATIENT)
Dept: LAB | Facility: HOSPITAL | Age: 72
End: 2017-02-14
Attending: INTERNAL MEDICINE
Payer: MEDICARE

## 2017-02-14 DIAGNOSIS — I10 ESSENTIAL HYPERTENSION: ICD-10-CM

## 2017-02-14 DIAGNOSIS — N18.30 CKD (CHRONIC KIDNEY DISEASE) STAGE 3, GFR 30-59 ML/MIN: ICD-10-CM

## 2017-02-14 DIAGNOSIS — E11.8 TYPE 2 DIABETES MELLITUS WITH COMPLICATION: ICD-10-CM

## 2017-02-14 DIAGNOSIS — E11.8 DIABETES MELLITUS TYPE 2 WITH COMPLICATIONS: ICD-10-CM

## 2017-02-14 DIAGNOSIS — R80.9 PROTEINURIA: ICD-10-CM

## 2017-02-14 LAB
25(OH)D3+25(OH)D2 SERPL-MCNC: 9 NG/ML
ALBUMIN SERPL BCP-MCNC: 3.2 G/DL
ANION GAP SERPL CALC-SCNC: 12 MMOL/L
BUN SERPL-MCNC: 15 MG/DL
CALCIUM SERPL-MCNC: 9.1 MG/DL
CHLORIDE SERPL-SCNC: 104 MMOL/L
CO2 SERPL-SCNC: 23 MMOL/L
CREAT SERPL-MCNC: 1.9 MG/DL
EST. GFR  (AFRICAN AMERICAN): 40.4 ML/MIN/1.73 M^2
EST. GFR  (NON AFRICAN AMERICAN): 34.9 ML/MIN/1.73 M^2
GLUCOSE SERPL-MCNC: 268 MG/DL
PHOSPHATE SERPL-MCNC: 2.9 MG/DL
POTASSIUM SERPL-SCNC: 4.2 MMOL/L
PTH-INTACT SERPL-MCNC: 139 PG/ML
SODIUM SERPL-SCNC: 139 MMOL/L

## 2017-02-14 PROCEDURE — 80069 RENAL FUNCTION PANEL: CPT

## 2017-02-14 PROCEDURE — 82306 VITAMIN D 25 HYDROXY: CPT

## 2017-02-14 PROCEDURE — 36415 COLL VENOUS BLD VENIPUNCTURE: CPT

## 2017-02-14 PROCEDURE — 83970 ASSAY OF PARATHORMONE: CPT

## 2017-02-16 ENCOUNTER — TELEPHONE (OUTPATIENT)
Dept: CARDIOLOGY | Facility: CLINIC | Age: 72
End: 2017-02-16

## 2017-02-16 NOTE — TELEPHONE ENCOUNTER
----- Message from Aliez Diggs sent at 2/16/2017  9:51 AM CST -----  Contact: Self   Pt would like to speak with nurse regarding clarity on apt for 07/14/17 pt stated he would like to speak with someone soon if possible no other information was given.    Pt can be reached at [phone] thanks

## 2017-02-21 ENCOUNTER — LAB VISIT (OUTPATIENT)
Dept: LAB | Facility: HOSPITAL | Age: 72
End: 2017-02-21
Attending: INTERNAL MEDICINE
Payer: MEDICARE

## 2017-02-21 DIAGNOSIS — I42.0 DILATED CARDIOMYOPATHY: ICD-10-CM

## 2017-02-21 LAB
ANION GAP SERPL CALC-SCNC: 10 MMOL/L
BUN SERPL-MCNC: 15 MG/DL
CALCIUM SERPL-MCNC: 9.2 MG/DL
CHLORIDE SERPL-SCNC: 104 MMOL/L
CO2 SERPL-SCNC: 24 MMOL/L
CREAT SERPL-MCNC: 2 MG/DL
EST. GFR  (AFRICAN AMERICAN): 38 ML/MIN/1.73 M^2
EST. GFR  (NON AFRICAN AMERICAN): 32.8 ML/MIN/1.73 M^2
GLUCOSE SERPL-MCNC: 278 MG/DL
POTASSIUM SERPL-SCNC: 4 MMOL/L
SODIUM SERPL-SCNC: 138 MMOL/L

## 2017-02-21 PROCEDURE — 36415 COLL VENOUS BLD VENIPUNCTURE: CPT

## 2017-02-21 PROCEDURE — 80048 BASIC METABOLIC PNL TOTAL CA: CPT

## 2017-02-22 ENCOUNTER — TELEPHONE (OUTPATIENT)
Dept: CARDIOLOGY | Facility: CLINIC | Age: 72
End: 2017-02-22

## 2017-02-22 NOTE — TELEPHONE ENCOUNTER
----- Message from Jovany Ricardo MD sent at 2/21/2017 12:36 PM CST -----  Please inform the patient that his lab is little changed from a week ago with kidney function near the same and blood sugar still elevated.

## 2017-02-23 ENCOUNTER — OFFICE VISIT (OUTPATIENT)
Dept: NEPHROLOGY | Facility: CLINIC | Age: 72
End: 2017-02-23
Payer: MEDICARE

## 2017-02-23 VITALS
HEIGHT: 74 IN | OXYGEN SATURATION: 98 % | HEART RATE: 75 BPM | BODY MASS INDEX: 36.9 KG/M2 | WEIGHT: 287.5 LBS | SYSTOLIC BLOOD PRESSURE: 152 MMHG | DIASTOLIC BLOOD PRESSURE: 90 MMHG

## 2017-02-23 DIAGNOSIS — M10.059 IDIOPATHIC GOUT OF HIP, UNSPECIFIED CHRONICITY, UNSPECIFIED LATERALITY: ICD-10-CM

## 2017-02-23 DIAGNOSIS — N25.0 RENAL OSTEODYSTROPHY: ICD-10-CM

## 2017-02-23 DIAGNOSIS — N18.30 CHRONIC KIDNEY DISEASE, STAGE 3 (MODERATE): ICD-10-CM

## 2017-02-23 DIAGNOSIS — R80.9 PROTEINURIA, UNSPECIFIED TYPE: Primary | ICD-10-CM

## 2017-02-23 DIAGNOSIS — D64.9 ANEMIA, UNSPECIFIED TYPE: ICD-10-CM

## 2017-02-23 PROCEDURE — 99215 OFFICE O/P EST HI 40 MIN: CPT | Mod: S$PBB,,, | Performed by: INTERNAL MEDICINE

## 2017-02-23 PROCEDURE — 99213 OFFICE O/P EST LOW 20 MIN: CPT | Mod: PBBFAC | Performed by: INTERNAL MEDICINE

## 2017-02-23 PROCEDURE — 99999 PR PBB SHADOW E&M-EST. PATIENT-LVL III: CPT | Mod: PBBFAC,,, | Performed by: INTERNAL MEDICINE

## 2017-02-23 RX ORDER — SPIRONOLACTONE 50 MG/1
50 TABLET, FILM COATED ORAL DAILY
Qty: 30 TABLET | Refills: 6 | Status: SHIPPED | OUTPATIENT
Start: 2017-02-23 | End: 2017-08-03 | Stop reason: SDUPTHER

## 2017-02-23 RX ORDER — SODIUM BICARBONATE 650 MG/1
650 TABLET ORAL 2 TIMES DAILY
Qty: 90 TABLET | Refills: 3 | Status: SHIPPED | OUTPATIENT
Start: 2017-02-23 | End: 2017-02-23 | Stop reason: SDUPTHER

## 2017-02-23 RX ORDER — LANOLIN ALCOHOL/MO/W.PET/CERES
1 CREAM (GRAM) TOPICAL EVERY MORNING
Qty: 90 TABLET | Refills: 5 | Status: SHIPPED | OUTPATIENT
Start: 2017-02-23 | End: 2018-07-30 | Stop reason: SDUPTHER

## 2017-02-23 RX ORDER — ALLOPURINOL 100 MG/1
100 TABLET ORAL DAILY
Qty: 90 TABLET | Refills: 5 | Status: SHIPPED | OUTPATIENT
Start: 2017-02-23 | End: 2018-05-26 | Stop reason: SDUPTHER

## 2017-02-23 RX ORDER — ERGOCALCIFEROL 1.25 MG/1
50000 CAPSULE ORAL
Qty: 10 CAPSULE | Refills: 0 | Status: SHIPPED | OUTPATIENT
Start: 2017-02-23 | End: 2017-07-06

## 2017-02-23 NOTE — PROGRESS NOTES
Subjective:       Patient ID: Vinnie Siegel is a 71 y.o. Black or  male who presents for follow-up evaluation of No chief complaint on file.    HPI This is a 71 -year-old -American male with   nephrolithiasis, hypertension and diabetes,ckd, proteinuria who is coming in for f/u of   Nephrolithiasis, ckd, HTN and proteinuria. No recent stones and denies any hematuria, dysuria, or flank pain. DM not well controlled. Bp's -does not check regularly.    Creatinine stable.  Proteinuria high.     PAST MEDICAL HISTORY: Significant for hypertension, diabetes for several years,   and nephrolithiasis. The patient had first episode about three years ago and had to   have a subsequent procedure and second stone was in August 2012 and he   had left ureteroscopy for that. He states that he was never symptomatic with   either of the stones and was found on imaging.     SOCIAL HISTORY: He smokes less than a pack a day. Does not drink. He is a   retired .     FAMILY HISTORY: No family history of kidney stones or kidney problems.     Review of Systems   Constitutional: Negative for fatigue.   Eyes: Negative for discharge.   Respiratory: Negative for cough, shortness of breath and wheezing.    Cardiovascular: Negative for chest pain and palpitations.   Gastrointestinal: Negative for abdominal pain, diarrhea, nausea and vomiting.   Genitourinary: Negative for dysuria, frequency, hematuria and urgency.   Skin: Negative for color change and rash.   Psychiatric/Behavioral: Negative for confusion.   All other systems reviewed and are negative.      Objective:      Physical Exam   Constitutional: He is oriented to person, place, and time. He appears well-developed and well-nourished.   HENT:   Right Ear: External ear normal.   Left Ear: External ear normal.   Nose: Nose normal.   Mouth/Throat: Normal dentition.   Eyes: Conjunctivae, EOM and lids are normal. Pupils are equal, round, and reactive to light.    Neck: Trachea normal. No thyroid mass present.   Cardiovascular: Normal rate and regular rhythm.  Exam reveals no friction rub.    No murmur heard.  No lower extremity edema   Pulmonary/Chest: Effort normal and breath sounds normal. No respiratory distress. He has no decreased breath sounds. He has no rales.   Abdominal: Soft. Bowel sounds are normal. He exhibits no mass. There is no tenderness. There is no rebound. No hernia.   Musculoskeletal: He exhibits edema.   Trace edema jose cruz.   Neurological: He is alert and oriented to person, place, and time.   Skin: Skin is warm and dry. No rash noted. No erythema.   Psychiatric: He has a normal mood and affect. Judgment normal. His mood appears not anxious. He does not exhibit a depressed mood.   Oriented to time, place and person.   Vitals reviewed.      Assessment:       No diagnosis found.    Plan:     Labs pending.     This is a 71 -year-old -American male with history of nephrolithiasis,   hypertension, diabetes, is coming in for f/u of nephrolithiasis, ckd, proteinuria.     1. CKD 3: creatinine of 2.0 with MDRD GFR of 38ml/min-with slow progression over the last year.  CKD sec to DM and HTN.   2.  Proteinuria:  sec to DM and  HTN.  On benazepril.  Stressed importance of good control.  Sig increase recently-over 7 gms and rechecked 24 hr urine.  Check serologies but a1c uncontrolled-f/u with pcp. On  spironolactone 25 mg qday. Aware of sideeffects.  Discussed possible biopsy and pt would like to hold off. UA with only 1-2 rbc's blood. Increase spironolactone to 50 mg for proteinuroa-low K potassium diet.   2. Nephrolithiasis: His last 24-hour urine collection shows low volume but otherwise stable. He has calcium oxalate stones 100% in the shell consist of uric acid stones 75%, calcium oxalate 25%. He is instructed on conservative measures to decrease the risk of stone formation. He is instructed to hydrate well along with decreasing the animal protein intake  and low-salt intake. On mag supplements. His citrate levels are good. On allopurinol and sodium bicarbonate for low PH. Last  US shows left 0.5 cm stone.  Seen in  Urology.  Has been hydrating better per the pt.  3. Hypertension. Blood pressures are stable for the most part.  Check it at home.  Call within a week with readings. On lasix.   4. Renal osteodystrophy:  Ca/phos stable along with PTH.  Low vit D -start ergocalcigerol.  5. Acidosis:  On sodium bicarb.     RTc in 2-3 months.

## 2017-03-21 ENCOUNTER — CLINICAL SUPPORT (OUTPATIENT)
Dept: CARDIOLOGY | Facility: CLINIC | Age: 72
End: 2017-03-21
Payer: MEDICARE

## 2017-03-21 DIAGNOSIS — I42.0 DILATED CARDIOMYOPATHY: ICD-10-CM

## 2017-03-21 LAB — DIASTOLIC DYSFUNCTION: NO

## 2017-03-21 PROCEDURE — 78492 MYOCRD IMG PET MLT RST&STRS: CPT | Mod: 26,S$PBB,, | Performed by: INTERNAL MEDICINE

## 2017-03-21 PROCEDURE — 99213 OFFICE O/P EST LOW 20 MIN: CPT | Mod: PBBFAC

## 2017-03-21 PROCEDURE — 93016 CV STRESS TEST SUPVJ ONLY: CPT | Mod: S$PBB,,, | Performed by: INTERNAL MEDICINE

## 2017-03-21 PROCEDURE — 93017 CV STRESS TEST TRACING ONLY: CPT | Mod: PBBFAC | Performed by: INTERNAL MEDICINE

## 2017-03-21 PROCEDURE — 99999 PR PBB SHADOW E&M-EST. PATIENT-LVL III: CPT | Mod: PBBFAC,,,

## 2017-03-21 PROCEDURE — 93018 CV STRESS TEST I&R ONLY: CPT | Mod: S$PBB,,, | Performed by: INTERNAL MEDICINE

## 2017-03-24 ENCOUNTER — TELEPHONE (OUTPATIENT)
Dept: CARDIOLOGY | Facility: CLINIC | Age: 72
End: 2017-03-24

## 2017-03-24 NOTE — TELEPHONE ENCOUNTER
----- Message from Jovany Ricardo MD sent at 3/21/2017 10:53 PM CDT -----  Please inform the patient the stress test is negative indicating he does not have significant heart artery blockage causing the decrease in heart function.

## 2017-03-30 ENCOUNTER — OFFICE VISIT (OUTPATIENT)
Dept: ENDOCRINOLOGY | Facility: CLINIC | Age: 72
End: 2017-03-30
Payer: MEDICARE

## 2017-03-30 VITALS
HEIGHT: 74 IN | DIASTOLIC BLOOD PRESSURE: 80 MMHG | HEART RATE: 66 BPM | BODY MASS INDEX: 36.24 KG/M2 | WEIGHT: 282.38 LBS | SYSTOLIC BLOOD PRESSURE: 140 MMHG

## 2017-03-30 DIAGNOSIS — E78.5 HYPERLIPIDEMIA, UNSPECIFIED HYPERLIPIDEMIA TYPE: ICD-10-CM

## 2017-03-30 DIAGNOSIS — E11.22 TYPE 2 DIABETES MELLITUS WITH STAGE 3 CHRONIC KIDNEY DISEASE, WITH LONG-TERM CURRENT USE OF INSULIN: ICD-10-CM

## 2017-03-30 DIAGNOSIS — N18.30 TYPE 2 DIABETES MELLITUS WITH STAGE 3 CHRONIC KIDNEY DISEASE, WITH LONG-TERM CURRENT USE OF INSULIN: ICD-10-CM

## 2017-03-30 DIAGNOSIS — I42.0 DILATED CARDIOMYOPATHY: ICD-10-CM

## 2017-03-30 DIAGNOSIS — Z79.4 TYPE 2 DIABETES MELLITUS WITH STAGE 3 CHRONIC KIDNEY DISEASE, WITH LONG-TERM CURRENT USE OF INSULIN: ICD-10-CM

## 2017-03-30 DIAGNOSIS — I10 ESSENTIAL HYPERTENSION: Primary | ICD-10-CM

## 2017-03-30 PROCEDURE — 99214 OFFICE O/P EST MOD 30 MIN: CPT | Mod: PBBFAC | Performed by: NURSE PRACTITIONER

## 2017-03-30 PROCEDURE — 99214 OFFICE O/P EST MOD 30 MIN: CPT | Mod: S$PBB,,, | Performed by: NURSE PRACTITIONER

## 2017-03-30 PROCEDURE — 99999 PR PBB SHADOW E&M-EST. PATIENT-LVL IV: CPT | Mod: PBBFAC,,, | Performed by: NURSE PRACTITIONER

## 2017-03-30 RX ORDER — INSULIN LISPRO 100 [IU]/ML
INJECTION, SOLUTION INTRAVENOUS; SUBCUTANEOUS
Qty: 15 ML | Refills: 12 | Status: SHIPPED | OUTPATIENT
Start: 2017-03-30 | End: 2017-04-19 | Stop reason: SDUPTHER

## 2017-03-30 RX ORDER — PEN NEEDLE, DIABETIC 30 GX3/16"
NEEDLE, DISPOSABLE MISCELLANEOUS
Qty: 150 EACH | Refills: 12 | Status: SHIPPED | OUTPATIENT
Start: 2017-03-30 | End: 2017-06-30 | Stop reason: SDUPTHER

## 2017-03-30 RX ORDER — INSULIN GLARGINE 100 [IU]/ML
18 INJECTION, SOLUTION SUBCUTANEOUS NIGHTLY
Qty: 15 ML | Refills: 12 | Status: SHIPPED | OUTPATIENT
Start: 2017-03-30 | End: 2017-04-19 | Stop reason: SDUPTHER

## 2017-03-30 NOTE — PROGRESS NOTES
"CC: This 71 y.o. male presents for management of Diabetes Mellitus     HPI: Mr. Siegel is a 70 year old gentleman with type 2 diabetes diagnosed 2010. He has never been hospitalized for his diabetes. Denies missed doses of medication. He is a poor historian.    No hypoglycemia    SMBG: He checks in the am "sometimes" 170-200   Exercise: no formal exercise, related to disk in his back      Eats 2 meals a day - has a snack in the afternoon " a sandwich"  Snacks "moderately" per patient on potato chips, sandwiches    Retired:  since 2008          CURRENT DM MEDS:  Glipizide 10 mg once daily.  Standards of Care:  Eye exam:  12/2016    ROS:   Gen: Appetite good,  weight stable, denies fatigue and weakness.  Skin: Skin is intact and heals well, no rashes, no hair changes  Eyes: Denies visual disturbances  Resp: no SOB or ZEPEDA, no cough  Cardiac: No palpitations, chest pain, no edema   GI: No nausea or vomiting, diarrhea, constipation, or abdominal pain.  /GYN: No nocturia, burning or pain.   MS/Neuro: Denies numbness/ tingling in BLE; Gait steady, speech clear  Psych: Denies drug/ETOH abuse, no hx of depression.  Other systems: negative.    PE:  GENERAL: Well developed, well nourished.  PSYCH: AAOx3, appropriate mood and affect, pleasant expression, conversant, appears relaxed, well groomed.   EYES: Conjunctiva, corneas clear  NECK: Supple, trachea midline,no thyromegaly or nodules .  CHEST: Resp even and unlabored, CTA bilateral.  CARDIAC: RRR, S1, S2 heard, no murmurs  ABDOMEN: Soft, non-tender, non-distended;    VASCULAR: DP pulses +2/4 bilaterally, no edema.  NEURO: Gait steady  SKINSkin warm and dry no acanthosis nigracans.  FEET:  Footware appropriate .     Hemoglobin A1C   Date Value Ref Range Status   01/25/2017 10.7 (H) 4.5 - 6.2 % Final     Comment:     According to ADA guidelines, hemoglobin A1C <7.0% represents  optimal control in non-pregnant diabetic patients.  Different  metrics may apply to " specific populations.   Standards of Medical Care in Diabetes - 2016.  For the purpose of screening for the presence of diabetes:  <5.7%     Consistent with the absence of diabetes  5.7-6.4%  Consistent with increasing risk for diabetes   (prediabetes)  >or=6.5%  Consistent with diabetes  Currently no consensus exists for use of hemoglobin A1C  for diagnosis of diabetes for children.     04/15/2016 12.1 (H) 4.5 - 6.2 % Final   01/08/2016 9.4 (H) 4.5 - 6.2 % Final      ASSESSMENT and PLAN:       1. T2DM with hyperglycemia, CKD 3 -Discussed A1c and BG goals.  Dm eduation for insulin start  Start lantus 18 u qhs, Humalog 5 u AC  Continue glipiizde   Check bg ac/hs  Needs new meter. Instructed to bring Rxs, current meter to DM edu appt  Also given logs   Instructed to monitor BG and bring meter/ log to every clinic visit.   - takes  ACEi, statin    2. HTN - controlled, continue meds as previously prescribed and monitor.     3. HLP - on statin therapy, he believes he is on simvastatin, LFTs WNL    4. Dialted CM  EF 40-45%- avoid metformin, follows w Dr Ricardo, stress test negative    5. Obesity-  Body mass index is 36.26 kg/(m^2).  Needs to cut back on snacking, discussed that a sandwich is a meal

## 2017-03-30 NOTE — MR AVS SNAPSHOT
"    [facility]  [address]  [facility] [address]  Phone: [phone]                  Vinnie Siegel   3/30/2017 10:30 AM   Office Visit    Description:  Male : 1945   Provider:  Nicole Cerda DNP, NP   Department:  [facility]           Reason for Visit     Diabetes Mellitus           Diagnoses this Visit        Comments    Type 2 diabetes mellitus with stage 3 chronic kidney disease, with long-term current use of insulin                To Do List           Future Appointments        Provider Department Dept Phone    4/3/2017 10:00 AM Deanna Capone RD [facility] - Diabetes Management [phone]    2017 10:30 AM Jovany Ricardo MD [facility] - Cardiology [phone]    2017 10:20 AM LAB, [place]POINT[place] [facility] [phone]    2017 10:30 AM SPECIMEN, MAIN CAMPUS [facility] [phone]    5/15/2017 9:30 AM Yisel Ramírez DO [facility] - Nephrology [phone]      Goals (5 Years of Data)     None       These Medications        Disp Refills Start End    insulin lispro (HUMALOG KWIKPEN) 100 unit/mL InPn pen 15 mL 12 3/30/2017     Takes 5 units with each meal    Pharmacy: [facility] [address] [place] [address] [address]  AT [facility] Ph #: [phone]       insulin glargine (LANTUS SOLOSTAR) 100 unit/mL (3 mL) InPn pen 15 mL 12 3/30/2017     Inject 18 Units into the skin every evening. - Subcutaneous    Pharmacy: [facility] [id number] AT [facility] [address] Ph #: [phone]       pen needle, diabetic 31 gauge x 5/16" Ndle 150 each 12 3/30/2017     Uses 4 daily    Pharmacy: [facility] [address] [place] [address] [address]  AT [facility] [address] Ph #: [phone]         [facility]     [facility] Care [facility] -  Assistance  Unless otherwise directed by your provider, please contact " "[facility], our nurse care line that is available for 24/7 assistance.     Registered nurses in the [facility] provide: appointment scheduling, clinical advisement, health education, and other advisory services.  Call: [phone] (toll free)               Medications           Message regarding Medications     Verify the changes and/or additions to your medication regime listed below are the same as discussed with your clinician today.  If any of these changes or additions are incorrect, please notify your healthcare provider.        START taking these NEW medications        Refills    insulin lispro (HUMALOG KWIKPEN) 100 unit/mL InPn pen 12    Sig: Takes 5 units with each meal    Class: Normal    insulin glargine (LANTUS SOLOSTAR) 100 unit/mL (3 mL) InPn pen 12    Sig: Inject 18 Units into the skin every evening.    Class: Normal    Route: Subcutaneous    pen needle, diabetic 31 gauge x 5/16" Ndle 12    Sig: Uses 4 daily    Class: Normal      STOP taking these medications     FLUZONE HIGH-DOSE 2016-17, PF, 180 mcg/0.5 mL Syrg ADM 0.5ML IM UTD    liraglutide 0.6 mg/0.1 mL, 18 mg/3 mL, subq PNIJ (VICTOZA 2-ALBERT) 0.6 mg/0.1 mL (18 mg/3 mL) PnIj Inject 0.6 mg into the skin once daily.    INVOKANA 100 mg Tab TAKE 1 TABLET BY MOUTH EVERY DAY    atorvastatin (LIPITOR) 40 MG tablet Take 1 tablet (40 mg total) by mouth once daily.    BD ULTRA-FINE VANDANA PEN NEEDLES 32 gauge x 5/32" Ndle 1 each by Misc.(Non-Drug; Combo Route) route once daily.           Verify that the below list of medications is an accurate representation of the medications you are currently taking.  If none reported, the list may be blank. If incorrect, please contact your healthcare provider. Carry this list with you in case of emergency.           Current Medications     albuterol 90 mcg/actuation inhaler Inhale 1-2 puffs into the lungs every 6 (six) hours as needed for Wheezing or Shortness of Breath.    allopurinol (ZYLOPRIM) 100 MG " "tablet Take 1 tablet (100 mg total) by mouth once daily.    amlodipine (NORVASC) 5 MG tablet Take 1 tablet (5 mg total) by mouth once daily.    benazepril (LOTENSIN) 20 MG tablet TK 1  PO twice daily    blood-glucose meter kit Use as instructed    carvedilol (COREG) 6.25 MG tablet Take 1 tablet (6.25 mg total) by mouth 2 (two) times daily with meals.    ergocalciferol (ERGOCALCIFEROL) 50,000 unit Cap Take 1 capsule (50,000 Units total) by mouth every 7 days.    fluticasone (FLONASE) 50 mcg/actuation nasal spray 2 sprays by Each Nare route once daily.    furosemide (LASIX) 20 MG tablet Take one tablet twice daily increasing to 2 tablets twice daily for 3-4 lb weight gain until resolved. ( Begin with 2 tablets twice daily until 3-4 lbs lost then back to 1 twice daily )    glipiZIDE (GLUCOTROL) 10 MG tablet Take 1 tablet (10 mg total) by mouth 2 (two) times daily before meals.    magnesium oxide (MAGOX) 400 mg tablet Take 1 tablet (400 mg total) by mouth every morning.    prednisoLONE acetate (PRED FORTE) 1 % DrpS Place 1 drop into the right eye 3 (three) times daily.    sodium bicarbonate 650 MG tablet Take 1 tablet (650 mg total) by mouth 2 (two) times daily.    spironolactone (ALDACTONE) 50 MG tablet Take 1 tablet (50 mg total) by mouth once daily.    tamsulosin (FLOMAX) 0.4 mg Cp24 Take 1 capsule (0.4 mg total) by mouth every morning.    timolol maleate 0.25% (TIMOPTIC) 0.25 % Drop Place 1 drop into the right eye 2 (two) times daily.    insulin glargine (LANTUS SOLOSTAR) 100 unit/mL (3 mL) InPn pen Inject 18 Units into the skin every evening.    insulin lispro (HUMALOG KWIKPEN) 100 unit/mL InPn pen Takes 5 units with each meal    pen needle, diabetic 31 gauge x 5/16" Ndle Uses 4 daily           Clinical Reference Information           Your Vitals Were     BP Pulse Height Weight BMI    140/80 (BP Location: Right arm, Patient Position: Sitting) 66 6' 2" (1.88 m) 128.1 kg (282 lb 6.4 oz) 36.26 kg/m2      Blood " Pressure          Most Recent Value    BP  (!)  140/80      Allergies as of 3/30/2017     Metformin      Immunizations Administered on Date of Encounter - 3/30/2017     None      Orders Placed During Today's Visit      Normal Orders This Visit    Ambulatory Referral to Diabetes Education     Future Labs/Procedures Expected by Expires    Hemoglobin A1c  6/30/2017 3/30/2018    Comprehensive metabolic panel  7/30/2017 3/30/2018      Smoking Cessation     If you would like to quit smoking:   You may be eligible for free services if you are a [place] resident and started smoking cigarettes before September 1, 1988.  Call the Smoking Cessation Trust (SCT) toll free at ([phone] [phone] or [phone] [phone].   Call 2-740-QUIT-NOW if you do not meet the above criteria.   Contact us via email: [email or web address]   View our website for more information: [email or web address]        Language Assistance Services     ATTENTION: Language assistance services are available, free of charge. Please call [phone].      ATENCIÓN: Si habla neeru, tiene a coello disposición servicios gratuitos de asistencia lingüística. Llame al [phone].     CHÚ Ý: N?u b?n nói Ti?ng Vi?t, có các d?ch v? h? tr? ngôn ng? mi?n phí dành cho b?n. G?i s? [phone].         [facility] complies with applicable Federal civil rights laws and does not discriminate on the basis of race, color, national origin, age, disability, or sex.

## 2017-03-31 ENCOUNTER — TELEPHONE (OUTPATIENT)
Dept: ENDOCRINOLOGY | Facility: CLINIC | Age: 72
End: 2017-03-31

## 2017-04-03 ENCOUNTER — TELEPHONE (OUTPATIENT)
Dept: PHARMACY | Facility: CLINIC | Age: 72
End: 2017-04-03

## 2017-04-03 ENCOUNTER — CLINICAL SUPPORT (OUTPATIENT)
Dept: DIABETES | Facility: CLINIC | Age: 72
End: 2017-04-03
Payer: MEDICARE

## 2017-04-03 DIAGNOSIS — Z79.4 TYPE 2 DIABETES MELLITUS WITH STAGE 3 CHRONIC KIDNEY DISEASE, WITH LONG-TERM CURRENT USE OF INSULIN: ICD-10-CM

## 2017-04-03 DIAGNOSIS — N18.30 TYPE 2 DIABETES MELLITUS WITH STAGE 3 CHRONIC KIDNEY DISEASE, WITH LONG-TERM CURRENT USE OF INSULIN: ICD-10-CM

## 2017-04-03 DIAGNOSIS — E11.22 TYPE 2 DIABETES MELLITUS WITH STAGE 3 CHRONIC KIDNEY DISEASE, WITHOUT LONG-TERM CURRENT USE OF INSULIN: Primary | ICD-10-CM

## 2017-04-03 DIAGNOSIS — N18.30 TYPE 2 DIABETES MELLITUS WITH STAGE 3 CHRONIC KIDNEY DISEASE, WITHOUT LONG-TERM CURRENT USE OF INSULIN: Primary | ICD-10-CM

## 2017-04-03 DIAGNOSIS — E11.22 TYPE 2 DIABETES MELLITUS WITH STAGE 3 CHRONIC KIDNEY DISEASE, WITH LONG-TERM CURRENT USE OF INSULIN: ICD-10-CM

## 2017-04-03 PROCEDURE — G0108 DIAB MANAGE TRN  PER INDIV: HCPCS | Mod: PBBFAC | Performed by: DIETITIAN, REGISTERED

## 2017-04-03 RX ORDER — LANCETS
1 EACH MISCELLANEOUS 4 TIMES DAILY
Qty: 150 EACH | Refills: 6 | Status: SHIPPED | OUTPATIENT
Start: 2017-04-03 | End: 2018-01-02 | Stop reason: SDUPTHER

## 2017-04-03 RX ORDER — INSULIN PUMP SYRINGE, 3 ML
EACH MISCELLANEOUS
Qty: 1 EACH | Refills: 0 | Status: SHIPPED | OUTPATIENT
Start: 2017-04-03 | End: 2019-06-13

## 2017-04-03 NOTE — PROGRESS NOTES
"Diabetes Education  Author: Deanna Capone RD  Date: 4/3/2017    Diabetes Education Visit  Diabetes Education Record Assessment/Progress: Initial    Diabetes Type  Diabetes Type : Type II    Diabetes History  Diabetes Diagnosis: 5-10 years    Nutrition  Meal Planning:  (briefly assessed today - will address more at later visit. Pt states that he usually skips lunch, but in further discussion he reveals that he has a "snack" of a snandwich or junk food sometimes - explained to his that this would be considered at meal in terms of giving meal time insulin)    Monitoring   Blood Glucose Logs: No (Not currently SMBG, pt states he was not aware that he was given an Rx for meter and testing supplies at last provider visit and meter at home is old and does not work - will send in new Rx for meter and testing supplies covered by pt insurance)      Current Diabetes Treatment   Current Treatment: Oral Medication, Insulin (Pt currently taking Glipizide 10 mg BID and will start Humalog 5 units ac and Lantus 18 units daily after training today)    Social History  Preferred Learning Method: Face to Face, Demonstration, Hands On, Reading Materials  Primary Support: Self      Barriers to Change  Barriers to Change: None  Learning Challenges : None    Readiness to Learn   Readiness to Learn : Acceptance      Diabetes Education Assessment/Progress  -Acute Complications (preventing, detecting, and treating acute complications): Discussion, Individual Session, Written Materials Provided, Instructed, Competent (verbalizes/demonstrates) (instructed on causes, s/s and proper tx of hypo with "rule of 15")  -Nutrition (Incorporating nutritional management into one's lifestyle): Discussion, Individual Session, Instructed, Competent (verbalizes/demonstrates) (Briefly discussed what is considered a meal and that some of his snacks are more like a meal and would require him to take his Humalog. Pt is scheduled for further nutrition " education at a later date)  -Medications (states correct name, dose, onset, peak, duration, side effects & timing of meds): Demonstration, Discussion, Return Demonstration, Individual Session, Written Materials Provided, Instructed, Competent (verbalizes/demonstrates) (trained on use of Humalog and Lantus pen. Discussed timing and MOA of both types of insulin and importance of taking Humalog with meals only  DIABETES EDUCATOR NOTE  INSULIN PEN INSTRUCTIONS    Patient is here in clinic for teaching on the Lantus Solostar Pen and Novolog FlexPen. Patient will be taking 18 units of Lantus Solostar Pen and 5 units ac of Novolog FlexPen. Discussed mechanism of action Lantus Solostar Pen and Novolog FlexPen, storage, site rotation, and disposal of pen needles. Demonstrated how to attach a new pen needle. Patient performed successful return demonstration, conduct a safety test, dial the prescribed dose, and remove the used needle. Patient declined to return demonstration.    Discussed signs and symptoms, causes and treatment of hypoglycemia. Covered blood sugar goals. Patient verbalized understanding of all instructions.)  -Monitoring (monitoring blood glucose/other parameters & using results): Discussion, Individual Session, Written Materials Provided, Instructed, Competent (verbalizes/demonstrates) (re-sent Rx for meter and testing supplies that is preferred by pt insurance to NP. Provided BG logs and instructed pt to SMBG ac/hs and to bring logs to all DM visits. Also provided addressed envalopes and asked pt to mail logs to provider in 2 weeks for)  -Goal Setting and Problem Solving (verbalizes behavior change strategies & sets realistic goals): Discussion, Individual Session, Competent (verbalizes/demonstrates)  -Behavior Change (developing personal strategies to health & behavior change): Discussion, Individual Session, Comnpetent (verbalizes/demonstrates)    Goals  Monitoring: Set (Pt will SMBG ac/hs and will send  logs as instructed and bring to nutrition edu visit)  Start Date: 04/03/17  Target Date: 04/03/17  Medications: Set (Pt will take insulin as prescribed)  Start Date: 04/03/17  Target Date: 07/03/17      Diabetes Care Plan/Intervention  Education Plan/Intervention: Individual Follow-Up DSMT, Endocrine Provider Visit Set Up      Education Units of Time   Time Spent: 45 min      Health Maintenance Topics with due status: Not Due       Topic Last Completion Date    Colonoscopy 08/23/2011    TETANUS VACCINE 04/13/2016    Eye Exam 12/13/2016    Foot Exam 01/24/2017    Lipid Panel 01/25/2017    Hemoglobin A1c 01/25/2017     Health Maintenance Due   Topic Date Due    Zoster Vaccine  08/16/2006

## 2017-04-06 ENCOUNTER — OFFICE VISIT (OUTPATIENT)
Dept: CARDIOLOGY | Facility: CLINIC | Age: 72
End: 2017-04-06
Payer: MEDICARE

## 2017-04-06 VITALS
SYSTOLIC BLOOD PRESSURE: 141 MMHG | WEIGHT: 283.94 LBS | BODY MASS INDEX: 36.44 KG/M2 | DIASTOLIC BLOOD PRESSURE: 69 MMHG | HEIGHT: 74 IN | HEART RATE: 72 BPM

## 2017-04-06 DIAGNOSIS — E78.5 HYPERLIPIDEMIA, UNSPECIFIED HYPERLIPIDEMIA TYPE: ICD-10-CM

## 2017-04-06 DIAGNOSIS — I42.0 DILATED CARDIOMYOPATHY: ICD-10-CM

## 2017-04-06 DIAGNOSIS — Z87.898 HISTORY OF SNORING: ICD-10-CM

## 2017-04-06 DIAGNOSIS — E66.9 OBESITY (BMI 30-39.9): ICD-10-CM

## 2017-04-06 DIAGNOSIS — Z72.0 TOBACCO ABUSE: Chronic | ICD-10-CM

## 2017-04-06 DIAGNOSIS — I10 ESSENTIAL HYPERTENSION: Primary | ICD-10-CM

## 2017-04-06 PROCEDURE — 99213 OFFICE O/P EST LOW 20 MIN: CPT | Mod: PBBFAC | Performed by: INTERNAL MEDICINE

## 2017-04-06 PROCEDURE — 99214 OFFICE O/P EST MOD 30 MIN: CPT | Mod: S$PBB,,, | Performed by: INTERNAL MEDICINE

## 2017-04-06 PROCEDURE — 99999 PR PBB SHADOW E&M-EST. PATIENT-LVL III: CPT | Mod: PBBFAC,,, | Performed by: INTERNAL MEDICINE

## 2017-04-06 RX ORDER — CARVEDILOL 12.5 MG/1
12.5 TABLET ORAL 2 TIMES DAILY WITH MEALS
Qty: 60 TABLET | Refills: 11 | Status: SHIPPED | OUTPATIENT
Start: 2017-04-06 | End: 2017-08-03 | Stop reason: SDUPTHER

## 2017-04-06 NOTE — PATIENT INSTRUCTIONS
Increase Carvedilol to 12.5 mg twice daily (n 2 of your current pills twice a day until gone then new prescription )

## 2017-04-06 NOTE — MR AVS SNAPSHOT
[facility]  [address]  [facility] [address]  Phone: [phone]                  Vinnie Siegel   2017 10:30 AM   Office Visit    Description:  Male : 1945   Provider:  Jovany Ricardo MD   Department:  [facility]           Reason for Visit     Hypertension                To Do List           Future Appointments        Provider Department Dept Phone    2017 10:20 AM LAB, APPOINT[place] [facility] [phone]    2017 10:30 AM SPECIMEN, MAIN CAMPUS [facility] [phone]    5/15/2017 9:30 AM Yisel Ramírez DO [facility] - Nephrology [phone]    2017 10:00 AM Morena Flores RD [facility] - Diabetes Management [phone]    2017 10:00 AM LAB, APPOINT[place] [facility] [phone]      Goals (5 Years of Data)     None      [facility]     [facility] Care [facility] -  Assistance  Unless otherwise directed by your provider, please contact [facility], our nurse care line that is available for  assistance.     Registered nurses in the [facility] provide: appointment scheduling, clinical advisement, health education, and other advisory services.  Call: [phone] (toll free)               Medications           Message regarding Medications     Verify the changes and/or additions to your medication regime listed below are the same as discussed with your clinician today.  If any of these changes or additions are incorrect, please notify your healthcare provider.             Verify that the below list of medications is an accurate representation of the medications you are currently taking.  If none reported, the list may be blank. If incorrect, please contact your healthcare provider. Carry this list with you in case of emergency.           Current Medications     albuterol 90 mcg/actuation inhaler Inhale 1-2 puffs into the lungs every  "6 (six) hours as needed for Wheezing or Shortness of Breath.    allopurinol (ZYLOPRIM) 100 MG tablet Take 1 tablet (100 mg total) by mouth once daily.    amlodipine (NORVASC) 5 MG tablet Take 1 tablet (5 mg total) by mouth once daily.    benazepril (LOTENSIN) 20 MG tablet TK 1  PO twice daily    blood sugar diagnostic Strp 1 strip by Misc.(Non-Drug; Combo Route) route 4 (four) times daily.    blood-glucose meter kit Use as instructed    carvedilol (COREG) 6.25 MG tablet Take 1 tablet (6.25 mg total) by mouth 2 (two) times daily with meals.    ergocalciferol (ERGOCALCIFEROL) 50,000 unit Cap Take 1 capsule (50,000 Units total) by mouth every 7 days.    fluticasone (FLONASE) 50 mcg/actuation nasal spray 2 sprays by Each Nare route once daily.    furosemide (LASIX) 20 MG tablet Take one tablet twice daily increasing to 2 tablets twice daily for 3-4 lb weight gain until resolved. ( Begin with 2 tablets twice daily until 3-4 lbs lost then back to 1 twice daily )    glipiZIDE (GLUCOTROL) 10 MG tablet Take 1 tablet (10 mg total) by mouth 2 (two) times daily before meals.    insulin glargine (LANTUS SOLOSTAR) 100 unit/mL (3 mL) InPn pen Inject 18 Units into the skin every evening.    insulin lispro (HUMALOG KWIKPEN) 100 unit/mL InPn pen Takes 5 units with each meal    lancets Misc 1 each by Misc.(Non-Drug; Combo Route) route 4 (four) times daily.    magnesium oxide (MAGOX) 400 mg tablet Take 1 tablet (400 mg total) by mouth every morning.    pen needle, diabetic 31 gauge x 5/16" Ndle Uses 4 daily    prednisoLONE acetate (PRED FORTE) 1 % DrpS Place 1 drop into the right eye 3 (three) times daily.    sodium bicarbonate 650 MG tablet Take 1 tablet (650 mg total) by mouth 2 (two) times daily.    spironolactone (ALDACTONE) 50 MG tablet Take 1 tablet (50 mg total) by mouth once daily.    tamsulosin (FLOMAX) 0.4 mg Cp24 Take 1 capsule (0.4 mg total) by mouth every morning.    timolol maleate 0.25% (TIMOPTIC) 0.25 % Drop Place 1 " "drop into the right eye 2 (two) times daily.           Clinical Reference Information           Your Vitals Were     BP Pulse Height Weight BMI    141/69 (BP Location: Left arm, Patient Position: Sitting, BP Method: Automatic) 72 6' 2" (1.88 m) 128.8 kg (283 lb 15.2 oz) 36.46 kg/m2      Blood Pressure          Most Recent Value    Right Arm BP - Sitting  139/70    Left Arm BP - Sitting  141/69    BP  (!)  141/69      Allergies as of 4/6/2017     Metformin      Immunizations Administered on Date of Encounter - 4/6/2017     None      Smoking Cessation     If you would like to quit smoking:   You may be eligible for free services if you are a [place] resident and started smoking cigarettes before September 1, 1988.  Call the Smoking Cessation Trust ([facility]) toll free at ([phone] or [phone].   Call 1-800-QUIT-NOW if you do not meet the above criteria.   Contact us via email: [email or web address]   View our website for more information: [email or web address]        Language Assistance Services     ATTENTION: Language assistance services are available, free of charge. Please call [phone].      ATENCIÓN: Si habla español, tiene a coello disposición servicios gratuitos de asistencia lingüística. Llame al [phone].     CHÚ Ý: N?u b?n nói Ti?ng Vi?t, có các d?ch v? h? tr? ngôn ng? mi?n phí dành cho b?n. G?i s? [phone].         [facility] complies with applicable Federal civil rights laws and does not discriminate on the basis of race, color, national origin, age, disability, or sex.        "

## 2017-04-06 NOTE — PROGRESS NOTES
Subjective:   Patient ID:  Vinnie Siegel is a 71 y.o. male who presents for follow-up of Hypertension (6 week f/u )      HPI:   Vinnie Siegel presents for follow up of Hypertension with BP better controlled on recent Clinic visits but still 140-150s/.  Recently found t have a NICM with EF 40s. Vinnie Siegel denies chest pain, shortness of breath, palpitations, presyncope , or syncope. Vinnie Siegel has dyslipidemia and is reported as being on moderate intensity statin therapy but currently not listed ( pt will be contacted having left ). He declined having a sleep evaluation. Vinnie Siegel continues to smoke not wishing to participate in the smoking cessation program..     Review of Systems   Constitution: Negative for weakness, malaise/fatigue, weight gain and weight loss.   HENT: Negative for headaches.    Eyes: Negative for blurred vision.   Cardiovascular: Negative for chest pain, claudication, cyanosis, dyspnea on exertion, irregular heartbeat, leg swelling, near-syncope, orthopnea, palpitations, paroxysmal nocturnal dyspnea and syncope.   Respiratory: Negative for cough, shortness of breath and wheezing.    Musculoskeletal: Positive for gout and joint pain. Negative for falls and myalgias.   Gastrointestinal: Negative for abdominal pain, heartburn, nausea and vomiting.   Genitourinary: Positive for frequency. Negative for nocturia.        CKD with recent creatinine 2   Neurological: Negative for brief paralysis, dizziness, focal weakness, numbness and paresthesias.   Psychiatric/Behavioral: Negative for altered mental status.       Current Outpatient Prescriptions   Medication Sig    albuterol 90 mcg/actuation inhaler Inhale 1-2 puffs into the lungs every 6 (six) hours as needed for Wheezing or Shortness of Breath.    allopurinol (ZYLOPRIM) 100 MG tablet Take 1 tablet (100 mg total) by mouth once daily.    amlodipine (NORVASC) 5 MG tablet Take 1 tablet (5 mg total) by mouth once daily.     "benazepril (LOTENSIN) 20 MG tablet TK 1  PO twice daily    blood sugar diagnostic Strp 1 strip by Misc.(Non-Drug; Combo Route) route 4 (four) times daily.    blood-glucose meter kit Use as instructed    carvedilol (COREG) 6.25 MG tablet Take 1 tablet (6.25 mg total) by mouth 2 (two) times daily with meals.    ergocalciferol (ERGOCALCIFEROL) 50,000 unit Cap Take 1 capsule (50,000 Units total) by mouth every 7 days.    fluticasone (FLONASE) 50 mcg/actuation nasal spray 2 sprays by Each Nare route once daily.    furosemide (LASIX) 20 MG tablet Take one tablet twice daily increasing to 2 tablets twice daily for 3-4 lb weight gain until resolved. ( Begin with 2 tablets twice daily until 3-4 lbs lost then back to 1 twice daily )    glipiZIDE (GLUCOTROL) 10 MG tablet Take 1 tablet (10 mg total) by mouth 2 (two) times daily before meals.    insulin glargine (LANTUS SOLOSTAR) 100 unit/mL (3 mL) InPn pen Inject 18 Units into the skin every evening.    insulin lispro (HUMALOG KWIKPEN) 100 unit/mL InPn pen Takes 5 units with each meal    lancets Misc 1 each by Misc.(Non-Drug; Combo Route) route 4 (four) times daily.    magnesium oxide (MAGOX) 400 mg tablet Take 1 tablet (400 mg total) by mouth every morning.    pen needle, diabetic 31 gauge x 5/16" Ndle Uses 4 daily    prednisoLONE acetate (PRED FORTE) 1 % DrpS Place 1 drop into the right eye 3 (three) times daily.    sodium bicarbonate 650 MG tablet Take 1 tablet (650 mg total) by mouth 2 (two) times daily.    spironolactone (ALDACTONE) 50 MG tablet Take 1 tablet (50 mg total) by mouth once daily.    tamsulosin (FLOMAX) 0.4 mg Cp24 Take 1 capsule (0.4 mg total) by mouth every morning.    timolol maleate 0.25% (TIMOPTIC) 0.25 % Drop Place 1 drop into the right eye 2 (two) times daily.     No current facility-administered medications for this visit.      Objective:   Physical Exam   Constitutional: He is oriented to person, place, and time. He appears " "well-developed. No distress.   BP (!) 141/69 (BP Location: Left arm, Patient Position: Sitting, BP Method: Automatic)  Pulse 72  Ht 6' 2" (1.88 m)  Wt 128.8 kg (283 lb 15.2 oz)  BMI 36.46 kg/m2   HENT:   Head: Normocephalic.   Right Ear: External ear normal.   Left Ear: External ear normal.   Eyes: EOM are normal. Pupils are equal, round, and reactive to light. No scleral icterus.   Neck: Neck supple. No JVD present. No thyromegaly present.   Cardiovascular: Normal rate, regular rhythm, normal heart sounds and intact distal pulses.  PMI is not displaced.  Exam reveals no gallop and no friction rub.    No murmur heard.  Trace pedal edema bilateral   Pulmonary/Chest: Effort normal and breath sounds normal. No respiratory distress. He has no wheezes. He has no rales.   Abdominal: Soft. He exhibits no distension. There is no hepatosplenomegaly. There is no tenderness.   Musculoskeletal: He exhibits no edema or tenderness.   Gait normal   Neurological: He is alert and oriented to person, place, and time.   Skin: Skin is warm and dry. No rash noted.   Psychiatric: He has a normal mood and affect. His behavior is normal.       Lab Results   Component Value Date     02/21/2017    K 4.0 02/21/2017     02/21/2017    CO2 24 02/21/2017    BUN 15 02/21/2017    CREATININE 2.0 (H) 02/21/2017     (H) 02/21/2017    HGBA1C 10.7 (H) 01/25/2017    AST 17 12/23/2015    ALT 22 12/23/2015    ALBUMIN 3.2 (L) 02/14/2017    PROT 6.4 12/23/2015    BILITOT 0.5 12/23/2015    WBC 8.22 12/23/2015    HGB 13.8 (L) 12/23/2015    HCT 41.8 12/23/2015    MCV 91 12/23/2015     12/23/2015    TSH 1.091 01/25/2017    CHOL 164 01/25/2017    HDL 61 01/25/2017    LDLCALC 84.8 01/25/2017    TRIG 91 01/25/2017       Assessment:     1. Essential hypertension : Better control   2. Dilated cardiomyopathy : mild   3. Hyperlipidemia, unspecified hyperlipidemia type : ? Taking a statin   4. History of snoring    5. Obesity (BMI 30-39.9) " : improved   6. Tobacco abuse : ongoing       Plan:     Vinnie was seen today for hypertension.    Diagnoses and all orders for this visit:    Essential hypertension  -     carvedilol (COREG) 12.5 MG tablet; Take 1 tablet (12.5 mg total) by mouth 2 (two) times daily with meals ( an increase ).    Dilated cardiomyopathy  See above  Hyperlipidemia, unspecified hyperlipidemia type  Will contact the patient r.e. medication  History of snoring  Does not wish sleep evaluation  Obesity (BMI 30-39.9)  Continue efforts  Tobacco abuse  Not ready to quit

## 2017-04-07 RX ORDER — ATORVASTATIN CALCIUM 40 MG/1
40 TABLET, FILM COATED ORAL DAILY
COMMUNITY
End: 2017-04-07 | Stop reason: SDUPTHER

## 2017-04-07 RX ORDER — ATORVASTATIN CALCIUM 40 MG/1
40 TABLET, FILM COATED ORAL DAILY
Qty: 30 TABLET | Refills: 6 | Status: SHIPPED | OUTPATIENT
Start: 2017-04-07 | End: 2018-04-04 | Stop reason: SDUPTHER

## 2017-04-11 ENCOUNTER — TELEPHONE (OUTPATIENT)
Dept: DIABETES | Facility: CLINIC | Age: 72
End: 2017-04-11

## 2017-04-19 ENCOUNTER — TELEPHONE (OUTPATIENT)
Dept: ENDOCRINOLOGY | Facility: CLINIC | Age: 72
End: 2017-04-19

## 2017-04-19 DIAGNOSIS — N18.30 TYPE 2 DIABETES MELLITUS WITH STAGE 3 CHRONIC KIDNEY DISEASE, WITH LONG-TERM CURRENT USE OF INSULIN: Primary | ICD-10-CM

## 2017-04-19 DIAGNOSIS — Z79.4 TYPE 2 DIABETES MELLITUS WITH STAGE 3 CHRONIC KIDNEY DISEASE, WITH LONG-TERM CURRENT USE OF INSULIN: Primary | ICD-10-CM

## 2017-04-19 DIAGNOSIS — E11.22 TYPE 2 DIABETES MELLITUS WITH STAGE 3 CHRONIC KIDNEY DISEASE, WITH LONG-TERM CURRENT USE OF INSULIN: Primary | ICD-10-CM

## 2017-04-19 RX ORDER — INSULIN GLARGINE 100 [IU]/ML
30 INJECTION, SOLUTION SUBCUTANEOUS NIGHTLY
Qty: 15 ML | Refills: 12
Start: 2017-04-19 | End: 2017-05-03 | Stop reason: SDUPTHER

## 2017-04-19 RX ORDER — INSULIN LISPRO 100 [IU]/ML
INJECTION, SOLUTION INTRAVENOUS; SUBCUTANEOUS
Qty: 15 ML | Refills: 12
Start: 2017-04-19 | End: 2017-05-03 | Stop reason: SDUPTHER

## 2017-04-19 NOTE — TELEPHONE ENCOUNTER
Spoke to patient and let him know that bg log received and Nicole Cerda looked over you BG and said  Bg globally elevated  Stop glipizide  Increase Lantus to 30 units qam, Increase Humalog to 10 units AC  BG log in 1 week for further insulin titration

## 2017-04-19 NOTE — TELEPHONE ENCOUNTER
bg log received  Bg globally elevated  Stop glipizide  Increase Lantus to 30 units qam, Increase Humalog to 10 units AC  BG log in 1 week for further insulin titration

## 2017-05-03 ENCOUNTER — TELEPHONE (OUTPATIENT)
Dept: NEPHROLOGY | Facility: CLINIC | Age: 72
End: 2017-05-03

## 2017-05-03 ENCOUNTER — TELEPHONE (OUTPATIENT)
Dept: ENDOCRINOLOGY | Facility: HOSPITAL | Age: 72
End: 2017-05-03

## 2017-05-03 DIAGNOSIS — N18.30 CHRONIC KIDNEY DISEASE, STAGE 3 (MODERATE): Primary | ICD-10-CM

## 2017-05-03 RX ORDER — INSULIN GLARGINE 100 [IU]/ML
36 INJECTION, SOLUTION SUBCUTANEOUS NIGHTLY
Qty: 15 ML | Refills: 12 | Status: SHIPPED | OUTPATIENT
Start: 2017-05-03 | End: 2017-05-31 | Stop reason: SDUPTHER

## 2017-05-03 RX ORDER — INSULIN LISPRO 100 [IU]/ML
INJECTION, SOLUTION INTRAVENOUS; SUBCUTANEOUS
Qty: 15 ML | Refills: 12 | Status: SHIPPED | OUTPATIENT
Start: 2017-05-03 | End: 2017-05-31 | Stop reason: SDUPTHER

## 2017-05-03 NOTE — TELEPHONE ENCOUNTER
bg log received  bg continues to improve   Increase lantus to 36 units qhs, Humalog to 12 u AC

## 2017-05-04 NOTE — TELEPHONE ENCOUNTER
Spoke to patient and let him know that Shaheed has received BG logs and,  bg continues to improve   Increase lantus to 36 units qhs, Humalog to 12 u AC

## 2017-05-08 ENCOUNTER — TELEPHONE (OUTPATIENT)
Dept: NEPHROLOGY | Facility: CLINIC | Age: 72
End: 2017-05-08

## 2017-05-08 ENCOUNTER — LAB VISIT (OUTPATIENT)
Dept: LAB | Facility: HOSPITAL | Age: 72
End: 2017-05-08
Payer: MEDICARE

## 2017-05-08 DIAGNOSIS — R80.9 PROTEINURIA: ICD-10-CM

## 2017-05-08 DIAGNOSIS — R80.9 PROTEINURIA, UNSPECIFIED TYPE: Primary | ICD-10-CM

## 2017-05-08 DIAGNOSIS — E11.8 TYPE 2 DIABETES MELLITUS WITH COMPLICATION: ICD-10-CM

## 2017-05-08 DIAGNOSIS — I10 ESSENTIAL HYPERTENSION: ICD-10-CM

## 2017-05-08 DIAGNOSIS — N18.30 CKD (CHRONIC KIDNEY DISEASE) STAGE 3, GFR 30-59 ML/MIN: ICD-10-CM

## 2017-05-08 LAB
CREAT UR-MCNC: 111 MG/DL
PROT UR-MCNC: 244 MG/DL
PROT/CREAT RATIO, UR: 2.2

## 2017-05-08 PROCEDURE — 84156 ASSAY OF PROTEIN URINE: CPT

## 2017-05-08 NOTE — TELEPHONE ENCOUNTER
Please ask him to hydrate well if he is not as his creatinine is  slightly higher  Urine protein looks much better-i would like to repeat it again before his appoint to see if it's accurate.

## 2017-05-12 RX ORDER — SIMVASTATIN 40 MG/1
TABLET, FILM COATED ORAL
Qty: 90 TABLET | Refills: 0 | Status: SHIPPED | OUTPATIENT
Start: 2017-05-12 | End: 2017-08-03 | Stop reason: SDUPTHER

## 2017-05-15 ENCOUNTER — LAB VISIT (OUTPATIENT)
Dept: LAB | Facility: HOSPITAL | Age: 72
End: 2017-05-15
Attending: INTERNAL MEDICINE
Payer: MEDICARE

## 2017-05-15 ENCOUNTER — TELEPHONE (OUTPATIENT)
Dept: NEPHROLOGY | Facility: CLINIC | Age: 72
End: 2017-05-15

## 2017-05-15 ENCOUNTER — OFFICE VISIT (OUTPATIENT)
Dept: NEPHROLOGY | Facility: CLINIC | Age: 72
End: 2017-05-15
Payer: MEDICARE

## 2017-05-15 ENCOUNTER — TELEPHONE (OUTPATIENT)
Dept: ENDOCRINOLOGY | Facility: CLINIC | Age: 72
End: 2017-05-15

## 2017-05-15 VITALS
BODY MASS INDEX: 37.12 KG/M2 | WEIGHT: 289.25 LBS | HEART RATE: 56 BPM | HEIGHT: 74 IN | DIASTOLIC BLOOD PRESSURE: 70 MMHG | OXYGEN SATURATION: 98 % | SYSTOLIC BLOOD PRESSURE: 136 MMHG

## 2017-05-15 DIAGNOSIS — N18.30 CHRONIC KIDNEY DISEASE, STAGE 3 (MODERATE): ICD-10-CM

## 2017-05-15 DIAGNOSIS — R80.9 PROTEINURIA, UNSPECIFIED TYPE: ICD-10-CM

## 2017-05-15 DIAGNOSIS — E11.8 TYPE 2 DIABETES MELLITUS WITH COMPLICATION, UNSPECIFIED LONG TERM INSULIN USE STATUS: ICD-10-CM

## 2017-05-15 DIAGNOSIS — I10 ESSENTIAL HYPERTENSION: ICD-10-CM

## 2017-05-15 DIAGNOSIS — N18.30 CHRONIC KIDNEY DISEASE, STAGE 3 (MODERATE): Primary | ICD-10-CM

## 2017-05-15 LAB
CREAT UR-MCNC: 94 MG/DL
PROT UR-MCNC: 222 MG/DL
PROT/CREAT RATIO, UR: 2.36

## 2017-05-15 PROCEDURE — 99214 OFFICE O/P EST MOD 30 MIN: CPT | Mod: S$PBB,,, | Performed by: INTERNAL MEDICINE

## 2017-05-15 PROCEDURE — 99999 PR PBB SHADOW E&M-EST. PATIENT-LVL III: CPT | Mod: PBBFAC,,, | Performed by: INTERNAL MEDICINE

## 2017-05-15 PROCEDURE — 82570 ASSAY OF URINE CREATININE: CPT

## 2017-05-15 NOTE — TELEPHONE ENCOUNTER
----- Message from Sara Velásquez sent at 5/12/2017  4:27 PM CDT -----  Contact: [facility] [phone]  Bhavya is requesting a progress note or lab results to send to Medicare.     [facility] [address] [place] [address] [address] AT [facility] OF [address]   [phone] (Phone)  [phone] (Fax)

## 2017-05-15 NOTE — PROGRESS NOTES
Subjective:       Patient ID: Vinnie Siegel is a 71 y.o. Black or  male who presents for follow-up evaluation of No chief complaint on file.    HPI This is a 71 -year-old -American male with   nephrolithiasis, hypertension and diabetes,ckd, proteinuria who is coming in for f/u of   Nephrolithiasis, ckd, HTN and proteinuria. No recent stones and denies any hematuria, dysuria, or flank pain. DM not well controlled-recently started on insulin 2 months ago. Bp's -does not check regularly but stable today.    Creatinine stable.  Has Proteinuria.     PAST MEDICAL HISTORY: Significant for hypertension, diabetes for several years,   and nephrolithiasis. The patient had first episode about three years ago and had to   have a subsequent procedure and second stone was in August 2012 and he   had left ureteroscopy for that. He states that he was never symptomatic with   either of the stones and was found on imaging.     SOCIAL HISTORY: He smokes less than a pack a day. Does not drink. He is a   retired .     FAMILY HISTORY: No family history of kidney stones or kidney problems.     Review of Systems   Constitutional: Negative for fatigue.   Eyes: Negative for discharge.   Respiratory: Negative for cough, shortness of breath and wheezing.    Cardiovascular: Negative for chest pain and palpitations.   Gastrointestinal: Negative for abdominal pain, diarrhea, nausea and vomiting.   Genitourinary: Negative for dysuria, frequency, hematuria and urgency.   Skin: Negative for color change and rash.   Psychiatric/Behavioral: Negative for confusion.   All other systems reviewed and are negative.      Objective:      Physical Exam   Constitutional: He is oriented to person, place, and time. He appears well-developed and well-nourished.   HENT:   Right Ear: External ear normal.   Left Ear: External ear normal.   Nose: Nose normal.   Mouth/Throat: Normal dentition.   Eyes: Conjunctivae, EOM and lids are  normal. Pupils are equal, round, and reactive to light.   Neck: Trachea normal. No thyroid mass present.   Cardiovascular: Normal rate and regular rhythm.  Exam reveals no friction rub.    No murmur heard.  No lower extremity edema   Pulmonary/Chest: Effort normal and breath sounds normal. No respiratory distress. He has no decreased breath sounds. He has no rales.   Abdominal: Soft. Bowel sounds are normal. He exhibits no mass. There is no tenderness. There is no rebound. No hernia.   Musculoskeletal: He exhibits no edema.   Neurological: He is alert and oriented to person, place, and time.   Skin: Skin is warm and dry. No rash noted. No erythema.   Psychiatric: He has a normal mood and affect. Judgment normal. His mood appears not anxious. He does not exhibit a depressed mood.   Oriented to time, place and person.   Vitals reviewed.      Assessment:       No diagnosis found.    Plan:         This is a 71 -year-old -American male with history of nephrolithiasis,   hypertension, diabetes, is coming in for f/u of nephrolithiasis, ckd, proteinuria.     1. CKD 3: creatinine of 2.3 with MDRD GFR of 32ml/min-with slow progression over the last year.  CKD sec to DM and HTN. Hydrate well.   2.  Proteinuria:  sec to DM and  HTN.  On benazepril.  Stressed importance of good control.  Sig increase recently-over 7 gms and rechecked 24 hr urine. Recent decrease to 2+ -repeat.  Serologies neg  but a1c uncontrolled-f/u with pcp. On  spironolactone 25 mg qday. Aware of sideeffects.  Discussed possible biopsy and pt would like to hold off. UA with only 1-2 rbc's blood. On spironolactone to 50 mg for proteinuria-low K potassium diet emphasized.   2. Nephrolithiasis: His last 24-hour urine collection shows low volume but otherwise stable. He has calcium oxalate stones 100% in the shell consist of uric acid stones 75%, calcium oxalate 25%. He is instructed on conservative measures to decrease the risk of stone formation. He is  instructed to hydrate well along with decreasing the animal protein intake and low-salt intake. On mag supplements. His citrate levels are good. On allopurinol and sodium bicarbonate for low PH. Last  US shows left 0.5 cm stone.  Seen in  Urology.  Has been hydrating better per the pt. Repeat urorisk.  3. Hypertension. Blood pressures are stable for the most part.  Check it at home.  Call within a week with readings.   4. Renal osteodystrophy:  Ca/phos stable along with PTH.   5. Acidosis:  On sodium bicarb.   6. Anemia: hgb and iron stable.     RTc in 2-3 months.

## 2017-05-15 NOTE — TELEPHONE ENCOUNTER
Please let him that his kidney function did improve. His creatinine is 2.0 from 2.3. Urine pending. Thanks.          Informed pt of results and pt verbalized understanding

## 2017-05-15 NOTE — TELEPHONE ENCOUNTER
Protein better at 2 grams same as last time-please inform pt.    Informed pt of results/pt verbalized understanding

## 2017-05-15 NOTE — TELEPHONE ENCOUNTER
Please let him that his kidney function did improve.  His creatinine is 2.0 from 2.3.  Urine pending. Thanks.

## 2017-05-16 ENCOUNTER — TELEPHONE (OUTPATIENT)
Dept: NEPHROLOGY | Facility: CLINIC | Age: 72
End: 2017-05-16

## 2017-05-16 NOTE — TELEPHONE ENCOUNTER
Please let the pharmacy know that he has been on the spironolactone 50mg and his potassium's have been stable and he has been instructed to follow low K diet.

## 2017-05-16 NOTE — TELEPHONE ENCOUNTER
----- Message from Shilpa Wolf MA sent at 5/16/2017  2:16 PM CDT -----  Bhavya called & stated that spironolactone 50mg elevates pt's potassium. Please advise.    Bhavya [phone]

## 2017-05-18 ENCOUNTER — OFFICE VISIT (OUTPATIENT)
Dept: OPTOMETRY | Facility: CLINIC | Age: 72
End: 2017-05-18
Payer: MEDICARE

## 2017-05-18 DIAGNOSIS — H40.022 OPEN ANGLE WITH BORDERLINE FINDINGS, HIGH RISK, LEFT: ICD-10-CM

## 2017-05-18 DIAGNOSIS — H40.1113 PRIMARY OPEN ANGLE GLAUCOMA OF RIGHT EYE, SEVERE STAGE: Primary | ICD-10-CM

## 2017-05-18 PROCEDURE — 92012 INTRM OPH EXAM EST PATIENT: CPT | Mod: S$PBB,,, | Performed by: OPTOMETRIST

## 2017-05-18 PROCEDURE — 99212 OFFICE O/P EST SF 10 MIN: CPT | Mod: PBBFAC | Performed by: OPTOMETRIST

## 2017-05-18 PROCEDURE — 99999 PR PBB SHADOW E&M-EST. PATIENT-LVL II: CPT | Mod: PBBFAC,,, | Performed by: OPTOMETRIST

## 2017-05-18 RX ORDER — TIMOLOL MALEATE 2.5 MG/ML
1 SOLUTION/ DROPS OPHTHALMIC 2 TIMES DAILY
Qty: 15 ML | Refills: 6 | Status: SHIPPED | OUTPATIENT
Start: 2017-05-18 | End: 2017-09-19 | Stop reason: SDUPTHER

## 2017-05-18 NOTE — PROGRESS NOTES
HPI     Last Eye Exam 12/13/2016 [facility]  Patient here for 4 month IOP Check OU.  Pt states no change in vision since last visit.    Eye Drops:Timolol 0.25% BID OD        Last edited by Stephanie Kaur MA on 5/18/2017  9:29 AM. HPI     Last Eye Exam 12/13/2016 [facility]  Patient here for 4 month IOP Check OU.  Pt states no change in vision since last visit.    Eye Drops:Timolol 0.25% BID OD        Last edited by Stephanie Karu MA on 5/18/2017  9:29 AM.         Assessment /Plan     For exam results, see Encounter Report.    Primary open angle glaucoma of right eye, severe stage  -     timolol maleate 0.25% (TIMOPTIC) 0.25 % Drop; Place 1 drop into the right eye 2 (two) times daily.  Dispense: 15 mL; Refill: 6    Open angle with borderline findings, high risk, left  -     timolol maleate 0.25% (TIMOPTIC) 0.25 % Drop; Place 1 drop into the right eye 2 (two) times daily.  Dispense: 15 mL; Refill: 6            1-2.  Continue Timolol twice daily OD only.  Eye pressure stable OU.    HVF: 9/18/16  OCT: 9/18/16  DFE: 5/20/16  Photos: 9/18/12  Gonio: 9/8/16  Pachy: 564 OD, 566 OS  Initial IOPs: 16 OD, 18 OS  MHx: DM, HTN  FHx: none          RTC 4 months iop check, dfe, and photos.

## 2017-05-25 ENCOUNTER — CLINICAL SUPPORT (OUTPATIENT)
Dept: DIABETES | Facility: CLINIC | Age: 72
End: 2017-05-25
Payer: MEDICARE

## 2017-05-25 ENCOUNTER — LAB VISIT (OUTPATIENT)
Dept: LAB | Facility: HOSPITAL | Age: 72
End: 2017-05-25
Payer: MEDICARE

## 2017-05-25 DIAGNOSIS — R80.9 PROTEINURIA, UNSPECIFIED TYPE: ICD-10-CM

## 2017-05-25 DIAGNOSIS — E11.8 TYPE 2 DIABETES MELLITUS WITH COMPLICATION, UNSPECIFIED LONG TERM INSULIN USE STATUS: ICD-10-CM

## 2017-05-25 DIAGNOSIS — I10 ESSENTIAL HYPERTENSION: ICD-10-CM

## 2017-05-25 DIAGNOSIS — Z79.4 TYPE 2 DIABETES MELLITUS WITH STAGE 3 CHRONIC KIDNEY DISEASE, WITH LONG-TERM CURRENT USE OF INSULIN: ICD-10-CM

## 2017-05-25 DIAGNOSIS — N18.30 TYPE 2 DIABETES MELLITUS WITH STAGE 3 CHRONIC KIDNEY DISEASE, WITH LONG-TERM CURRENT USE OF INSULIN: ICD-10-CM

## 2017-05-25 DIAGNOSIS — E11.22 TYPE 2 DIABETES MELLITUS WITH STAGE 3 CHRONIC KIDNEY DISEASE, WITH LONG-TERM CURRENT USE OF INSULIN: ICD-10-CM

## 2017-05-25 DIAGNOSIS — N18.30 CHRONIC KIDNEY DISEASE, STAGE 3 (MODERATE): ICD-10-CM

## 2017-05-25 PROCEDURE — G0108 DIAB MANAGE TRN  PER INDIV: HCPCS | Mod: PBBFAC | Performed by: DIETITIAN, REGISTERED

## 2017-05-25 NOTE — PROGRESS NOTES
"Diabetes Education  Author: Morena Flores RD  Date: 5/25/2017    Diabetes Education Visit  Diabetes Education Record Assessment/Progress: Comprehensive/Group    Diabetes Type  Diabetes Type : Type II         Nutrition  Meal Planning: 3 meals per day, drinks regular soda (3 regular sodas daily)  Meal Plan 24 Hour Recall - Breakfast: grits, eggs, ibrahim OR sausage biscuit  Meal Plan 24 Hour Recall - Lunch: sometimes skips, sometimes meat, starch, vegetables  Meal Plan 24 Hour Recall - Dinner: spaghetti, meatballs, garlic [place]yan bread    Monitoring   Self Monitoring : SMBG 4 times daily, AC/HS - did not bring log today - reports BG mostly in 150s-170s, once or twice in 200s  Blood Glucose Logs: No    Exercise   Exercise Type:  (limited d/t back pain)    Current Diabetes Treatment   Current Treatment: Insulin (Humalog 12 units TID before each meal, Lantus 36 units every evening)    Social History  Preferred Learning Method: Face to Face, Reading Materials  Primary Support: Self                             Barriers to Change  Barriers to Change: None  Learning Challenges : None    Readiness to Learn   Readiness to Learn : Acceptance    Cultural Influences  Cultural Influences: No    Diabetes Education Assessment/Progress    Acute Complications (preventing, detecting, and treating acute complications): Discussion, Instructed, Written Materials Provided, Competent (verbalizes/demonstrates), Individual Session (Reviewed causes, s/s, and treatment of hypoglycemia with "rule of 15." Pt denies any hypos but asked what would happen if takes Humalog without eating.)    Nutrition (Incorporating nutritional management into one's lifestyle): Discussion, Instructed, Written Materials Provided, Individual Session, Competent (verbalizes/demonstrates) (Reviewed sources of CHO, portion control and serving sizes, reading labels, timing/spacing of meals, and rec'd eating pattern with 45-60g CHO/meal, 3 meals daily. Stressed avoiding " sugary beverages and discussed non-CHO alternatives. )    Medications (states correct name, dose, onset, peak, duration, side effects & timing of meds): Discussion, Instructed, Competent (verbalizes/demonstrates), Written Materials Provided, Individual Session (Reviewed timing, dosage, and MOA of DM meds. Pt is fairly new to MDI. Verbalized appropriate timing of prandial and basal insulin, site rotation and pen use. Is sometimes injecting into front of shoulder or front thigh. Explained insulin absorbs properly in fat tissue - back of arm (not front) and outer thigh. Verbalized understanding and acceptance. )    Monitoring (monitoring blood glucose/other parameters & using results): Discussion, Instructed, Competent (verbalizes/demonstrates), Written Materials Provided, Individual Session (Reviewed SMBG 4 times daily AC/HS, keep log and mail log in 2 weeks for NP to review and titration of insulin if needed. Provided logs and envelops. Reviewed goal BG readings. Encouraged always bring log to DM appts.)    Goal Setting and Problem Solving (verbalizes behavior change strategies & sets realistic goals): Discussion, Instructed    Behavior Change (developing personal strategies to health & behavior change): Discussion, Instructed    Goals  Healthy Eating: Set  Start Date: 05/25/17  Target Date: 08/25/17  Monitoring: % Met  Met Percentage : 50% (SMBG but did not bring log to visit today)  Medications: % Met  Met Percentage : 100%         Diabetes Care Plan/Intervention  Education Plan/Intervention: Individual Follow-Up DSMT, Other (Discussed scheduling next DE appt. Pt verbalized needs to check schedule at home. Provided contact information to call when ready to schedule. )    Diabetes Meal Plan  Carbohydrate Per Meal: 45-60g  Carbohydrate Per Snack :  (0-5g CHO)    Education Units of Time   Time Spent: 60 min      Health Maintenance Topics with due status: Not Due       Topic Last Completion Date    Colonoscopy  08/23/2011    TETANUS VACCINE 04/13/2016    Influenza Vaccine 12/13/2016    Foot Exam 01/24/2017    Lipid Panel 01/25/2017    Eye Exam 05/18/2017     Health Maintenance Due   Topic Date Due    Zoster Vaccine  08/16/2006    Hemoglobin A1c  04/25/2017

## 2017-05-31 ENCOUNTER — TELEPHONE (OUTPATIENT)
Dept: ENDOCRINOLOGY | Facility: CLINIC | Age: 72
End: 2017-05-31

## 2017-05-31 LAB — STONE ANALYSIS-IMP: NORMAL

## 2017-05-31 RX ORDER — INSULIN GLARGINE 100 [IU]/ML
40 INJECTION, SOLUTION SUBCUTANEOUS NIGHTLY
Qty: 15 ML | Refills: 12
Start: 2017-05-31 | End: 2017-07-06 | Stop reason: SDUPTHER

## 2017-05-31 RX ORDER — INSULIN LISPRO 100 [IU]/ML
INJECTION, SOLUTION INTRAVENOUS; SUBCUTANEOUS
Qty: 15 ML | Refills: 12
Start: 2017-05-31 | End: 2017-07-06 | Stop reason: SDUPTHER

## 2017-05-31 NOTE — TELEPHONE ENCOUNTER
Spoke to patient and let him know that MACY Davise has  Review his BG log   Overall better but has room for improvement  Change Humalog to 14-12-12  Having after dinner snack? He said yes sometimes,   If having after dinner snack Administer 3 units of Humalog with the snack  Increase lantus to 40 u qhs  Check a few bedtime blood sugars  Log in 2 weeks for review

## 2017-05-31 NOTE — TELEPHONE ENCOUNTER
BG log received  Overall better but has room for improvement  Change Humalog to 14-12-12  Having after dinner snack???  If having after dinner snack Administer 3 units of Humalog with the snack  Increase lantus to 40 u qhs  Check a few bedtime blood sugars  Log in 2 weeks for review

## 2017-06-16 ENCOUNTER — TELEPHONE (OUTPATIENT)
Dept: NEPHROLOGY | Facility: CLINIC | Age: 72
End: 2017-06-16

## 2017-06-16 NOTE — TELEPHONE ENCOUNTER
Please let him know that his 24 hr stone collection showed high sodium and high oxalate.  Please ask him to eat low salt diet and to follow low oxalate diet.  Please go over low oxalate diet with him and mail handout.  Thanks.

## 2017-06-20 ENCOUNTER — TELEPHONE (OUTPATIENT)
Dept: NEPHROLOGY | Facility: CLINIC | Age: 72
End: 2017-06-20

## 2017-06-30 RX ORDER — PEN NEEDLE, DIABETIC 30 GX3/16"
NEEDLE, DISPOSABLE MISCELLANEOUS
Qty: 350 EACH | Refills: 3 | Status: SHIPPED | OUTPATIENT
Start: 2017-06-30 | End: 2018-06-05

## 2017-07-06 ENCOUNTER — OFFICE VISIT (OUTPATIENT)
Dept: ENDOCRINOLOGY | Facility: CLINIC | Age: 72
End: 2017-07-06
Payer: MEDICARE

## 2017-07-06 ENCOUNTER — LAB VISIT (OUTPATIENT)
Dept: LAB | Facility: HOSPITAL | Age: 72
End: 2017-07-06
Attending: INTERNAL MEDICINE
Payer: MEDICARE

## 2017-07-06 VITALS
SYSTOLIC BLOOD PRESSURE: 128 MMHG | DIASTOLIC BLOOD PRESSURE: 64 MMHG | WEIGHT: 294 LBS | BODY MASS INDEX: 37.73 KG/M2 | HEIGHT: 74 IN | HEART RATE: 69 BPM

## 2017-07-06 DIAGNOSIS — E66.9 OBESITY (BMI 30-39.9): ICD-10-CM

## 2017-07-06 DIAGNOSIS — E11.22 TYPE 2 DIABETES MELLITUS WITH STAGE 3 CHRONIC KIDNEY DISEASE, WITH LONG-TERM CURRENT USE OF INSULIN: ICD-10-CM

## 2017-07-06 DIAGNOSIS — I42.0 DILATED CARDIOMYOPATHY: ICD-10-CM

## 2017-07-06 DIAGNOSIS — Z79.4 TYPE 2 DIABETES MELLITUS WITH STAGE 3 CHRONIC KIDNEY DISEASE, WITH LONG-TERM CURRENT USE OF INSULIN: Primary | ICD-10-CM

## 2017-07-06 DIAGNOSIS — E11.22 TYPE 2 DIABETES MELLITUS WITH STAGE 3 CHRONIC KIDNEY DISEASE, WITH LONG-TERM CURRENT USE OF INSULIN: Primary | ICD-10-CM

## 2017-07-06 DIAGNOSIS — N18.30 TYPE 2 DIABETES MELLITUS WITH STAGE 3 CHRONIC KIDNEY DISEASE, WITH LONG-TERM CURRENT USE OF INSULIN: ICD-10-CM

## 2017-07-06 DIAGNOSIS — I10 ESSENTIAL HYPERTENSION: ICD-10-CM

## 2017-07-06 DIAGNOSIS — N18.30 TYPE 2 DIABETES MELLITUS WITH STAGE 3 CHRONIC KIDNEY DISEASE, WITH LONG-TERM CURRENT USE OF INSULIN: Primary | ICD-10-CM

## 2017-07-06 DIAGNOSIS — E78.5 HYPERLIPIDEMIA, UNSPECIFIED HYPERLIPIDEMIA TYPE: ICD-10-CM

## 2017-07-06 DIAGNOSIS — Z79.4 TYPE 2 DIABETES MELLITUS WITH STAGE 3 CHRONIC KIDNEY DISEASE, WITH LONG-TERM CURRENT USE OF INSULIN: ICD-10-CM

## 2017-07-06 LAB
ALBUMIN SERPL BCP-MCNC: 3.4 G/DL
ALBUMIN SERPL BCP-MCNC: 3.4 G/DL
ALP SERPL-CCNC: 83 U/L
ALP SERPL-CCNC: 83 U/L
ALT SERPL W/O P-5'-P-CCNC: 36 U/L
ALT SERPL W/O P-5'-P-CCNC: 36 U/L
ANION GAP SERPL CALC-SCNC: 14 MMOL/L
ANION GAP SERPL CALC-SCNC: 14 MMOL/L
AST SERPL-CCNC: 21 U/L
AST SERPL-CCNC: 21 U/L
BILIRUB SERPL-MCNC: 0.8 MG/DL
BILIRUB SERPL-MCNC: 0.8 MG/DL
BUN SERPL-MCNC: 27 MG/DL
BUN SERPL-MCNC: 27 MG/DL
CALCIUM SERPL-MCNC: 9.1 MG/DL
CALCIUM SERPL-MCNC: 9.1 MG/DL
CHLORIDE SERPL-SCNC: 105 MMOL/L
CHLORIDE SERPL-SCNC: 105 MMOL/L
CHOLEST/HDLC SERPL: 2.8 {RATIO}
CO2 SERPL-SCNC: 19 MMOL/L
CO2 SERPL-SCNC: 19 MMOL/L
CREAT SERPL-MCNC: 2.1 MG/DL
CREAT SERPL-MCNC: 2.1 MG/DL
EST. GFR  (AFRICAN AMERICAN): 35.8 ML/MIN/1.73 M^2
EST. GFR  (AFRICAN AMERICAN): 35.8 ML/MIN/1.73 M^2
EST. GFR  (NON AFRICAN AMERICAN): 31 ML/MIN/1.73 M^2
EST. GFR  (NON AFRICAN AMERICAN): 31 ML/MIN/1.73 M^2
ESTIMATED AVG GLUCOSE: 220 MG/DL
GLUCOSE SERPL-MCNC: 197 MG/DL
GLUCOSE SERPL-MCNC: 197 MG/DL
HBA1C MFR BLD HPLC: 9.3 %
HDL/CHOLESTEROL RATIO: 35.6 %
HDLC SERPL-MCNC: 135 MG/DL
HDLC SERPL-MCNC: 48 MG/DL
LDLC SERPL CALC-MCNC: 69.2 MG/DL
NONHDLC SERPL-MCNC: 87 MG/DL
POTASSIUM SERPL-SCNC: 4.4 MMOL/L
POTASSIUM SERPL-SCNC: 4.4 MMOL/L
PROT SERPL-MCNC: 6.9 G/DL
PROT SERPL-MCNC: 6.9 G/DL
SODIUM SERPL-SCNC: 138 MMOL/L
SODIUM SERPL-SCNC: 138 MMOL/L
TRIGL SERPL-MCNC: 89 MG/DL

## 2017-07-06 PROCEDURE — 99214 OFFICE O/P EST MOD 30 MIN: CPT | Mod: S$PBB,,, | Performed by: NURSE PRACTITIONER

## 2017-07-06 PROCEDURE — 3066F NEPHROPATHY DOC TX: CPT | Mod: ,,, | Performed by: NURSE PRACTITIONER

## 2017-07-06 PROCEDURE — 1126F AMNT PAIN NOTED NONE PRSNT: CPT | Mod: ,,, | Performed by: NURSE PRACTITIONER

## 2017-07-06 PROCEDURE — 1159F MED LIST DOCD IN RCRD: CPT | Mod: ,,, | Performed by: NURSE PRACTITIONER

## 2017-07-06 PROCEDURE — 3046F HEMOGLOBIN A1C LEVEL >9.0%: CPT | Mod: ,,, | Performed by: NURSE PRACTITIONER

## 2017-07-06 PROCEDURE — 99999 PR PBB SHADOW E&M-EST. PATIENT-LVL III: CPT | Mod: PBBFAC,,, | Performed by: NURSE PRACTITIONER

## 2017-07-06 PROCEDURE — 99213 OFFICE O/P EST LOW 20 MIN: CPT | Mod: PBBFAC | Performed by: NURSE PRACTITIONER

## 2017-07-06 RX ORDER — SPIRONOLACTONE 25 MG/1
TABLET ORAL
COMMUNITY
Start: 2017-04-14 | End: 2017-08-03

## 2017-07-06 RX ORDER — INSULIN GLARGINE 100 [IU]/ML
44 INJECTION, SOLUTION SUBCUTANEOUS NIGHTLY
Qty: 15 ML | Refills: 12 | Status: SHIPPED | OUTPATIENT
Start: 2017-07-06 | End: 2018-02-21 | Stop reason: SDUPTHER

## 2017-07-06 RX ORDER — CHLORZOXAZONE 500 MG/1
500 TABLET ORAL 3 TIMES DAILY
Refills: 1 | COMMUNITY
Start: 2017-04-03 | End: 2018-10-03 | Stop reason: SDUPTHER

## 2017-07-06 RX ORDER — INSULIN LISPRO 100 [IU]/ML
INJECTION, SOLUTION INTRAVENOUS; SUBCUTANEOUS
Qty: 15 ML | Refills: 12 | Status: SHIPPED | OUTPATIENT
Start: 2017-07-06 | End: 2017-12-28 | Stop reason: SDUPTHER

## 2017-07-06 NOTE — PROGRESS NOTES
CC: This 71 y.o. male presents for management of Diabetes Mellitus     HPI: Mr. Siegel is a 70 year old gentleman with type 2 diabetes diagnosed 2010. He has never been hospitalized for his diabetes. Denies missed doses of medication.       Since last visit he has mailed log and insulin has been titrated frequently  SMBG:  Checks bg ac  Brings complete logs          No hypoglycemia  Exercise: no formal exercise, related to disk in his back      Eats 3 meals a day, will sometimes snack in-between meals- rare chips and candy  Bedtime snack- yogurt    Retired:  since 2008        [employer] True Metrix meter 2017  DM education completed     CURRENT DM MEDS:  Humalog 14-12-12, Lantus 40 u qhs, glipizde     Standards of Care:  Eye exam:  12/2016    ROS:   Gen: Appetite good,  weight stable, denies fatigue and weakness.  Skin: Skin is intact and heals well, no rashes, no hair changes  Eyes: Denies visual disturbances  Resp: no SOB or ZEPEDA, no cough  Cardiac: No palpitations, chest pain, no edema   GI: No nausea or vomiting, diarrhea, constipation, or abdominal pain.  /GYN: No nocturia, burning or pain.   MS/Neuro: Denies numbness/ tingling in BLE; Gait steady, speech clear  Psych: Denies drug/ETOH abuse, no hx of depression.  Other systems: negative.    PE:  GENERAL: Well developed, well nourished.  PSYCH: AAOx3, appropriate mood and affect, pleasant expression, conversant, appears relaxed, well groomed.   EYES: Conjunctiva, corneas clear  NECK: Supple, trachea midline,no thyromegaly or nodules .  CHEST: Resp even and unlabored, CTA bilateral.  CARDIAC: RRR, S1, S2 heard, no murmurs  ABDOMEN: Soft, non-tender, non-distended    VASCULAR: DP pulses +2/4 bilaterally, no edema.  NEURO: Gait steady  SKIN: Skin warm and dry no acanthosis nigracans.  FEET:  Footware appropriate .     Hemoglobin A1C   Date Value Ref Range Status   01/25/2017 10.7 (H) 4.5 - 6.2 % Final     Comment:     According to ADA guidelines,  hemoglobin A1C <7.0% represents  optimal control in non-pregnant diabetic patients.  Different  metrics may apply to specific populations.   Standards of Medical Care in Diabetes - 2016.  For the purpose of screening for the presence of diabetes:  <5.7%     Consistent with the absence of diabetes  5.7-6.4%  Consistent with increasing risk for diabetes   (prediabetes)  >or=6.5%  Consistent with diabetes  Currently no consensus exists for use of hemoglobin A1C  for diagnosis of diabetes for children.     04/15/2016 12.1 (H) 4.5 - 6.2 % Final   01/08/2016 9.4 (H) 4.5 - 6.2 % Final      ASSESSMENT and PLAN:     1. T2DM with hyperglycemia, CKD 3  Discussed A1C goal of 7.5% related to age and renal function  Labs pending  Increase lantus 44 u qhs; Humalog 14 u AC Check bg ac/hs  D/c glipzide       Instructed to monitor BG and bring meter/ log to every clinic visit.   - takes  ACEi, statin    2. HTN - controlled, continue meds as previously prescribed and monitor.     3. HLP - on statin therapy, labs pending     4. Dialted CM  EF 40-45%- avoid metformin, follows w Dr Ricardo    5. Obesity-  Body mass index is 37.75 kg/m².  Needs to cut back on snacking

## 2017-07-07 ENCOUNTER — TELEPHONE (OUTPATIENT)
Dept: CARDIOLOGY | Facility: CLINIC | Age: 72
End: 2017-07-07

## 2017-07-07 NOTE — TELEPHONE ENCOUNTER
----- Message from Jovany Ricardo MD sent at 7/6/2017 11:38 PM CDT -----  Please inform the patient that his kidney function is little changed and his cholesterol levels is better. Continue current regimen

## 2017-07-11 ENCOUNTER — TELEPHONE (OUTPATIENT)
Dept: UROLOGY | Facility: CLINIC | Age: 72
End: 2017-07-11

## 2017-07-11 NOTE — TELEPHONE ENCOUNTER
----- Message from Elma Cavazos sent at 7/11/2017  3:55 PM CDT -----  Contact: Self  Good afternoon,     Pt is requesting an appt due to recall (first available 09/05/17). I informed the pt the appt needs to be on or around 08/30/17 and he stated he needed to be seen sooner.    Pt can be reached at [phone].    Thank you!

## 2017-07-18 ENCOUNTER — TELEPHONE (OUTPATIENT)
Dept: ENDOCRINOLOGY | Facility: HOSPITAL | Age: 72
End: 2017-07-18

## 2017-07-20 DIAGNOSIS — I10 ESSENTIAL HYPERTENSION: ICD-10-CM

## 2017-07-20 RX ORDER — AMLODIPINE BESYLATE 5 MG/1
TABLET ORAL
Qty: 90 TABLET | Refills: 0 | Status: SHIPPED | OUTPATIENT
Start: 2017-07-20 | End: 2017-10-24 | Stop reason: SDUPTHER

## 2017-07-24 RX ORDER — SPIRONOLACTONE 25 MG/1
TABLET ORAL
Qty: 90 TABLET | Refills: 3 | Status: SHIPPED | OUTPATIENT
Start: 2017-07-24 | End: 2018-02-16 | Stop reason: SDUPTHER

## 2017-07-25 ENCOUNTER — TELEPHONE (OUTPATIENT)
Dept: ENDOCRINOLOGY | Facility: HOSPITAL | Age: 72
End: 2017-07-25

## 2017-07-25 NOTE — TELEPHONE ENCOUNTER
bg log received  bg mostly at goal   If A1C and bg readings do not correlate at next visit will order CGMS    Please notify patient no changes to insulin doses   bg mostly at goal without hypoglycemia

## 2017-07-25 NOTE — TELEPHONE ENCOUNTER
Left message for patient that Nicole Cerda looked over his BG logs no changes to insulin doses   bg mostly at goal without hypoglycemia

## 2017-08-03 ENCOUNTER — OFFICE VISIT (OUTPATIENT)
Dept: CARDIOLOGY | Facility: CLINIC | Age: 72
End: 2017-08-03
Payer: MEDICARE

## 2017-08-03 VITALS
HEIGHT: 74 IN | SYSTOLIC BLOOD PRESSURE: 142 MMHG | BODY MASS INDEX: 38.45 KG/M2 | WEIGHT: 299.63 LBS | DIASTOLIC BLOOD PRESSURE: 60 MMHG | HEART RATE: 74 BPM

## 2017-08-03 DIAGNOSIS — E78.5 HYPERLIPIDEMIA, UNSPECIFIED HYPERLIPIDEMIA TYPE: ICD-10-CM

## 2017-08-03 DIAGNOSIS — E66.9 OBESITY (BMI 30-39.9): ICD-10-CM

## 2017-08-03 DIAGNOSIS — I10 ESSENTIAL HYPERTENSION: Primary | ICD-10-CM

## 2017-08-03 DIAGNOSIS — Z72.0 TOBACCO ABUSE: Chronic | ICD-10-CM

## 2017-08-03 DIAGNOSIS — N18.30 CKD (CHRONIC KIDNEY DISEASE) STAGE 3, GFR 30-59 ML/MIN: ICD-10-CM

## 2017-08-03 DIAGNOSIS — I42.0 DILATED CARDIOMYOPATHY: ICD-10-CM

## 2017-08-03 PROCEDURE — 3008F BODY MASS INDEX DOCD: CPT | Mod: ,,, | Performed by: INTERNAL MEDICINE

## 2017-08-03 PROCEDURE — 99214 OFFICE O/P EST MOD 30 MIN: CPT | Mod: PBBFAC | Performed by: INTERNAL MEDICINE

## 2017-08-03 PROCEDURE — 1126F AMNT PAIN NOTED NONE PRSNT: CPT | Mod: ,,, | Performed by: INTERNAL MEDICINE

## 2017-08-03 PROCEDURE — 99214 OFFICE O/P EST MOD 30 MIN: CPT | Mod: S$PBB,,, | Performed by: INTERNAL MEDICINE

## 2017-08-03 PROCEDURE — 1159F MED LIST DOCD IN RCRD: CPT | Mod: ,,, | Performed by: INTERNAL MEDICINE

## 2017-08-03 PROCEDURE — 99999 PR PBB SHADOW E&M-EST. PATIENT-LVL IV: CPT | Mod: PBBFAC,,, | Performed by: INTERNAL MEDICINE

## 2017-08-03 RX ORDER — CARVEDILOL 25 MG/1
25 TABLET ORAL 2 TIMES DAILY WITH MEALS
Qty: 60 TABLET | Refills: 11 | Status: SHIPPED | OUTPATIENT
Start: 2017-08-03 | End: 2018-09-04 | Stop reason: SDUPTHER

## 2017-08-03 NOTE — PROGRESS NOTES
Subjective:   Patient ID:  Vinnie Siegel is a 71 y.o. male who presents for follow-up of Hypertension      HPI:   Vinnie Siegel presents for follow up of hypertension and dilated NICM. His BP is mildly elevated with the patient not checking at home. He has a mild CM with . He is unable to exercise due to back pain. Vinnie Siegel denies chest pain, shortness of breath, palpitations, presyncope , or syncope. Vinnie Siegel has dyslipidemia at LDL goal but is raking both Simvastatin and Atorvastatin. Vinnie Siegel chronic kidney disease that has worsened. Vinnie Siegel continues to smoke but may be more receptive to attempting to quit.  Review of Systems   Constitution: Negative for weakness, malaise/fatigue, weight gain and weight loss.   HENT: Negative for headaches.    Eyes: Negative for blurred vision.   Cardiovascular: Negative for chest pain, claudication, cyanosis, dyspnea on exertion, irregular heartbeat, leg swelling, near-syncope, orthopnea, palpitations, paroxysmal nocturnal dyspnea and syncope.   Respiratory: Positive for snoring. Negative for cough, shortness of breath and wheezing.    Musculoskeletal: Positive for back pain. Negative for falls and myalgias.   Gastrointestinal: Negative for abdominal pain, heartburn, nausea and vomiting.   Genitourinary: Positive for nocturia.   Neurological: Negative for brief paralysis, dizziness, focal weakness, numbness and paresthesias.   Psychiatric/Behavioral: Negative for altered mental status.       Current Outpatient Prescriptions   Medication Sig    albuterol 90 mcg/actuation inhaler Inhale 1-2 puffs into the lungs every 6 (six) hours as needed for Wheezing or Shortness of Breath.    allopurinol (ZYLOPRIM) 100 MG tablet Take 1 tablet (100 mg total) by mouth once daily.    amlodipine (NORVASC) 5 MG tablet TAKE 1 TABLET BY MOUTH EVERY DAY    atorvastatin (LIPITOR) 40 MG tablet Take 1 tablet (40 mg total) by mouth once daily.     "benazepril (LOTENSIN) 20 MG tablet TK 1  PO twice daily    blood sugar diagnostic Strp 1 strip by Misc.(Non-Drug; Combo Route) route 4 (four) times daily.    blood-glucose meter kit Use as instructed    carvedilol (COREG) 25 MG tablet Take 1 tablet (25 mg total) by mouth 2 (two) times daily with meals.    chlorzoxazone (PARAFON FORTE) 500 mg Tab Take 500 mg by mouth 3 (three) times daily.    fluticasone (FLONASE) 50 mcg/actuation nasal spray 2 sprays by Each Nare route once daily.    furosemide (LASIX) 20 MG tablet Take one tablet twice daily increasing to 2 tablets twice daily for 3-4 lb weight gain until resolved. ( Begin with 2 tablets twice daily until 3-4 lbs lost then back to 1 twice daily )    insulin glargine (LANTUS SOLOSTAR) 100 unit/mL (3 mL) InPn pen Inject 44 Units into the skin every evening.    insulin lispro (HUMALOG KWIKPEN) 100 unit/mL InPn pen 14 u AC    lancets Misc 1 each by Misc.(Non-Drug; Combo Route) route 4 (four) times daily.    magnesium oxide (MAGOX) 400 mg tablet Take 1 tablet (400 mg total) by mouth every morning.    pen needle, diabetic (BD INSULIN PEN NEEDLE UF SHORT) 31 gauge x 5/16" Ndle To use 4 times daily with insulin injections    prednisoLONE acetate (PRED FORTE) 1 % DrpS Place 1 drop into the right eye 3 (three) times daily.    sodium bicarbonate 650 MG tablet Take 1 tablet (650 mg total) by mouth 2 (two) times daily.    spironolactone (ALDACTONE) 25 MG tablet TAKE 1 TABLET(25 MG) BY MOUTH EVERY DAY    tamsulosin (FLOMAX) 0.4 mg Cp24 Take 1 capsule (0.4 mg total) by mouth every morning.    timolol maleate 0.25% (TIMOPTIC) 0.25 % Drop Place 1 drop into the right eye 2 (two) times daily.     No current facility-administered medications for this visit.      Objective:   Physical Exam   Constitutional: He is oriented to person, place, and time. He appears well-developed. No distress.   BP (!) 142/60   Pulse 74   Ht 6' 2" (1.88 m)   Wt 135.9 kg (299 lb 9.7 oz)   " BMI 38.47 kg/m²    HENT:   Head: Normocephalic.   Right Ear: External ear normal.   Left Ear: External ear normal.   Eyes: EOM are normal. Pupils are equal, round, and reactive to light. No scleral icterus.   Neck: Neck supple. No JVD present. No thyromegaly present.   Cardiovascular: Normal rate, regular rhythm and intact distal pulses.  PMI is not displaced.  Exam reveals no gallop and no friction rub.    Murmur heard.   Harsh crescendo-decrescendo midsystolic murmur is present with a grade of 2/6   Pulmonary/Chest: Effort normal and breath sounds normal. No respiratory distress. He has no wheezes. He has no rales.   Abdominal: Soft. He exhibits no distension. There is no hepatosplenomegaly. There is no tenderness.   Musculoskeletal: He exhibits no edema or tenderness.   Gait normal   Neurological: He is alert and oriented to person, place, and time.   Skin: Skin is warm and dry. No rash noted.   Psychiatric: He has a normal mood and affect. His behavior is normal.       Lab Results   Component Value Date     07/06/2017     07/06/2017    K 4.4 07/06/2017    K 4.4 07/06/2017     07/06/2017     07/06/2017    CO2 19 (L) 07/06/2017    CO2 19 (L) 07/06/2017    BUN 27 (H) 07/06/2017    BUN 27 (H) 07/06/2017    CREATININE 2.1 (H) 07/06/2017    CREATININE 2.1 (H) 07/06/2017     (H) 07/06/2017     (H) 07/06/2017    HGBA1C 9.3 (H) 07/06/2017    AST 21 07/06/2017    AST 21 07/06/2017    ALT 36 07/06/2017    ALT 36 07/06/2017    ALBUMIN 3.4 (L) 07/06/2017    ALBUMIN 3.4 (L) 07/06/2017    PROT 6.9 07/06/2017    PROT 6.9 07/06/2017    BILITOT 0.8 07/06/2017    BILITOT 0.8 07/06/2017    WBC 8.22 12/23/2015    HGB 14.3 05/08/2017    HCT 41.2 05/08/2017    MCV 91 12/23/2015     12/23/2015    TSH 1.091 01/25/2017    CHOL 135 07/06/2017    HDL 48 07/06/2017    LDLCALC 69.2 07/06/2017    TRIG 89 07/06/2017       Assessment:     1. Essential hypertension : Mild elevation   2. Dilated  cardiomyopathy : Mild - compensated   3. Hyperlipidemia, unspecified hyperlipidemia type : At goal but on 2 statins   4. Tobacco abuse : ongoing   5. Obesity (BMI 30-39.9): worsening    6. CKD (chronic kidney disease) stage 3, GFR 30-59 ml/min : Worse       Plan:     Vinnie was seen today for hypertension.    Diagnoses and all orders for this visit:    Essential hypertension  -     carvedilol (COREG) 25 MG tablet; Take 1 tablet (25 mg total) by mouth 2 (two) times daily with meals ( an increase).    Dilated cardiomyopathy  -     carvedilol (COREG) 25 MG tablet; Take 1 tablet (25 mg total) by mouth 2 (two) times daily with meals.    Hyperlipidemia, unspecified hyperlipidemia type  -     Lipid panel; Future; Expected date: 10/02/2017  Stop Simvastatin  Tobacco abuse  -     Ambulatory referral to Smoking Cessation Program    Obesity (BMI 30-39.9)  To see Endo in the near future  CKD (chronic kidney disease) stage 3, GFR 30-59 ml/min  Will defer to Endo r.e. Nephrology referral.

## 2017-08-13 DIAGNOSIS — E87.20 ACIDOSIS: ICD-10-CM

## 2017-08-14 ENCOUNTER — HOSPITAL ENCOUNTER (OUTPATIENT)
Dept: RADIOLOGY | Facility: HOSPITAL | Age: 72
Discharge: HOME | End: 2017-08-14
Attending: UROLOGY
Payer: MEDICARE

## 2017-08-14 DIAGNOSIS — N20.0 CALCULUS OF BOTH KIDNEYS: ICD-10-CM

## 2017-08-14 PROCEDURE — 76770 US EXAM ABDO BACK WALL COMP: CPT | Mod: 26,GC,, | Performed by: RADIOLOGY

## 2017-08-14 PROCEDURE — 76770 US EXAM ABDO BACK WALL COMP: CPT | Mod: TC

## 2017-08-14 PROCEDURE — 74000 XR ABDOMEN AP 1 VIEW: CPT | Mod: 26,,, | Performed by: RADIOLOGY

## 2017-08-14 PROCEDURE — 74000 XR ABDOMEN AP 1 VIEW: CPT | Mod: TC

## 2017-08-14 RX ORDER — TAMSULOSIN HYDROCHLORIDE 0.4 MG/1
CAPSULE ORAL
Qty: 90 CAPSULE | Refills: 0 | Status: SHIPPED | OUTPATIENT
Start: 2017-08-14 | End: 2017-11-15 | Stop reason: SDUPTHER

## 2017-08-17 ENCOUNTER — TELEPHONE (OUTPATIENT)
Dept: ENDOCRINOLOGY | Facility: HOSPITAL | Age: 72
End: 2017-08-17

## 2017-08-17 ENCOUNTER — TELEPHONE (OUTPATIENT)
Dept: UROLOGY | Facility: CLINIC | Age: 72
End: 2017-08-17

## 2017-08-17 NOTE — TELEPHONE ENCOUNTER
Spoke to patient and let him know that Nicole Cerda looked over your BG logs and they look great , no changes needed.

## 2017-08-22 ENCOUNTER — OFFICE VISIT (OUTPATIENT)
Dept: UROLOGY | Facility: CLINIC | Age: 72
End: 2017-08-22
Payer: MEDICARE

## 2017-08-22 VITALS
HEART RATE: 55 BPM | HEIGHT: 74 IN | SYSTOLIC BLOOD PRESSURE: 150 MMHG | BODY MASS INDEX: 38.4 KG/M2 | WEIGHT: 299.19 LBS | DIASTOLIC BLOOD PRESSURE: 72 MMHG

## 2017-08-22 DIAGNOSIS — N52.9 ERECTILE DYSFUNCTION, UNSPECIFIED ERECTILE DYSFUNCTION TYPE: ICD-10-CM

## 2017-08-22 DIAGNOSIS — I10 ESSENTIAL HYPERTENSION: ICD-10-CM

## 2017-08-22 DIAGNOSIS — N18.30 TYPE 2 DIABETES MELLITUS WITH STAGE 3 CHRONIC KIDNEY DISEASE, WITH LONG-TERM CURRENT USE OF INSULIN: ICD-10-CM

## 2017-08-22 DIAGNOSIS — N18.30 CKD (CHRONIC KIDNEY DISEASE) STAGE 3, GFR 30-59 ML/MIN: ICD-10-CM

## 2017-08-22 DIAGNOSIS — Z79.4 TYPE 2 DIABETES MELLITUS WITH STAGE 3 CHRONIC KIDNEY DISEASE, WITH LONG-TERM CURRENT USE OF INSULIN: ICD-10-CM

## 2017-08-22 DIAGNOSIS — N20.0 CALCULUS OF BOTH KIDNEYS: Primary | ICD-10-CM

## 2017-08-22 DIAGNOSIS — R80.9 PROTEINURIA, UNSPECIFIED TYPE: ICD-10-CM

## 2017-08-22 DIAGNOSIS — E11.22 TYPE 2 DIABETES MELLITUS WITH STAGE 3 CHRONIC KIDNEY DISEASE, WITH LONG-TERM CURRENT USE OF INSULIN: ICD-10-CM

## 2017-08-22 DIAGNOSIS — I42.0 DILATED CARDIOMYOPATHY: ICD-10-CM

## 2017-08-22 LAB
BILIRUB SERPL-MCNC: NORMAL MG/DL
BLOOD URINE, POC: NORMAL
COLOR, POC UA: NORMAL
GLUCOSE UR QL STRIP: NORMAL
KETONES UR QL STRIP: NORMAL
LEUKOCYTE ESTERASE URINE, POC: NORMAL
NITRITE, POC UA: NORMAL
PH, POC UA: 5
PROTEIN, POC: NORMAL
SPECIFIC GRAVITY, POC UA: 1.01
UROBILINOGEN, POC UA: NORMAL

## 2017-08-22 PROCEDURE — 99999 PR PBB SHADOW E&M-EST. PATIENT-LVL III: CPT | Mod: PBBFAC,,, | Performed by: UROLOGY

## 2017-08-22 PROCEDURE — 99213 OFFICE O/P EST LOW 20 MIN: CPT | Mod: PBBFAC | Performed by: UROLOGY

## 2017-08-22 PROCEDURE — 3077F SYST BP >= 140 MM HG: CPT | Mod: ,,, | Performed by: UROLOGY

## 2017-08-22 PROCEDURE — 3078F DIAST BP <80 MM HG: CPT | Mod: ,,, | Performed by: UROLOGY

## 2017-08-22 PROCEDURE — 81001 URINALYSIS AUTO W/SCOPE: CPT | Mod: PBBFAC | Performed by: UROLOGY

## 2017-08-22 PROCEDURE — 3066F NEPHROPATHY DOC TX: CPT | Mod: ,,, | Performed by: UROLOGY

## 2017-08-22 PROCEDURE — 1159F MED LIST DOCD IN RCRD: CPT | Mod: ,,, | Performed by: UROLOGY

## 2017-08-22 PROCEDURE — 99214 OFFICE O/P EST MOD 30 MIN: CPT | Mod: S$PBB,,, | Performed by: UROLOGY

## 2017-08-22 PROCEDURE — 1126F AMNT PAIN NOTED NONE PRSNT: CPT | Mod: ,,, | Performed by: UROLOGY

## 2017-08-22 PROCEDURE — 3046F HEMOGLOBIN A1C LEVEL >9.0%: CPT | Mod: ,,, | Performed by: UROLOGY

## 2017-08-22 NOTE — PROGRESS NOTES
Subjective:       Patient ID: Vinnie Siegel is a 72 y.o. male.    Chief Complaint: Nephrolithiasis    HPI Comments: Vinnie Siegel is a 72 y.o. Male with kidney stones. He has a history of ureteroscopy and ESWL. It has been a couple of years since we have had active stones.   He had abnormal metabolic workup and was sent to nephrology.   He is here for follow up with a KUB. KUB shows no stones 8/11/15. Films personally reviewed.   He is on allopurinol, sodium bicard and mag oxide. All given by Dr. Ramírez.    Last 24 hour urine showed elevated sodium in the urine. 7/13    Ultrasound and kub 8/14/17 showed no stones.     Some urinary frequency, but he has increased his water intake and is on HCTZ.  Has urinary urgency once a day.  No LUTS.  Nocturia 1-2 times.  No gross hematuria.     He is followed by endocrine for his diabetes and in cardiology for his cardiomyopathy and hypertension.    He is still smoking.   He is not exercising because of back problems and he says he doesn't know how to swim.    Lab Results   Component Value Date    PSA 0.91 08/09/2016    PSA 1.1 08/25/2015    PSA 0.80 04/02/2014    PSA 0.85 08/20/2013    PSA 1.14 05/17/2012    PSA 1.1 05/26/2011    PSA 1.4 04/12/2010    PSA 0.97 12/01/2008     Past Medical History:   Diagnosis Date    Cataract     Chronic kidney disease     Colon polyp     Diabetes mellitus     Diabetes mellitus type II     Glaucoma 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 open angle     Hyperlipidemia     Hypertension     Kidney stone     Seasonal allergies 6/24/2014       Past Surgical History:   Procedure Laterality Date    CATARACT EXTRACTION W/  INTRAOCULAR LENS IMPLANT Right 04/04/16    Dr Meehan    COLONOSCOPY W/ BIOPSIES      EYE SURGERY      HEMORRHOID SURGERY      Dr. [facility]    lateral internal anal sphincterotomy  12/13/13    Dr. root [facility]    URETERAL STENT PLACEMENT         Family History   Problem Relation Age of Onset    Diabetes Brother      type 1    Hypertension  Brother     Stroke Brother        Social History     Social History    Marital status: Single     Spouse name: N/A    Number of children: N/A    Years of education: N/A     Occupational History    Not on file.     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       Allergies:  Review of patient's allergies indicates no known allergies.    Medications:    Current Outpatient Prescriptions:     albuterol 90 mcg/actuation inhaler, Inhale 1-2 puffs into the lungs every 6 (six) hours as needed for Wheezing or Shortness of Breath., Disp: 1 Inhaler, Rfl: 0    allopurinol (ZYLOPRIM) 100 MG tablet, Take 1 tablet (100 mg total) by mouth once daily., Disp: 90 tablet, Rfl: 5    amlodipine (NORVASC) 5 MG tablet, TAKE 1 TABLET BY MOUTH EVERY DAY, Disp: 90 tablet, Rfl: 0    atorvastatin (LIPITOR) 40 MG tablet, Take 1 tablet (40 mg total) by mouth once daily., Disp: 30 tablet, Rfl: 6    benazepril (LOTENSIN) 20 MG tablet, TK 1  PO twice daily, Disp: 180 tablet, Rfl: 3    blood sugar diagnostic Strp, 1 strip by Misc.(Non-Drug; Combo Route) route 4 (four) times daily., Disp: 150 strip, Rfl: 6    blood-glucose meter kit, Use as instructed, Disp: 1 each, Rfl: 0    carvedilol (COREG) 25 MG tablet, Take 1 tablet (25 mg total) by mouth 2 (two) times daily with meals., Disp: 60 tablet, Rfl: 11    chlorzoxazone (PARAFON FORTE) 500 mg Tab, Take 500 mg by mouth 3 (three) times daily., Disp: , Rfl: 1    fluticasone (FLONASE) 50 mcg/actuation nasal spray, 2 sprays by Each Nare route once daily., Disp: 1 Bottle, Rfl: 5     "furosemide (LASIX) 20 MG tablet, Take one tablet twice daily increasing to 2 tablets twice daily for 3-4 lb weight gain until resolved. ( Begin with 2 tablets twice daily until 3-4 lbs lost then back to 1 twice daily ), Disp: 80 tablet, Rfl: 11    insulin glargine (LANTUS SOLOSTAR) 100 unit/mL (3 mL) InPn pen, Inject 44 Units into the skin every evening., Disp: 15 mL, Rfl: 12    insulin lispro (HUMALOG KWIKPEN) 100 unit/mL InPn pen, 14 u AC, Disp: 15 mL, Rfl: 12    lancets Misc, 1 each by Misc.(Non-Drug; Combo Route) route 4 (four) times daily., Disp: 150 each, Rfl: 6    magnesium oxide (MAGOX) 400 mg tablet, Take 1 tablet (400 mg total) by mouth every morning., Disp: 90 tablet, Rfl: 5    pen needle, diabetic (BD INSULIN PEN NEEDLE UF SHORT) 31 gauge x 5/16" Ndle, To use 4 times daily with insulin injections, Disp: 350 each, Rfl: 3    prednisoLONE acetate (PRED FORTE) 1 % DrpS, Place 1 drop into the right eye 3 (three) times daily., Disp: 10 mL, Rfl: 3    sodium bicarbonate 650 MG tablet, Take 1 tablet (650 mg total) by mouth 2 (two) times daily., Disp: 90 tablet, Rfl: 6    spironolactone (ALDACTONE) 25 MG tablet, TAKE 1 TABLET(25 MG) BY MOUTH EVERY DAY, Disp: 90 tablet, Rfl: 3    tamsulosin (FLOMAX) 0.4 mg Cp24, TAKE 1 CAPSULE BY MOUTH EVERY MORNING, Disp: 90 capsule, Rfl: 0    timolol maleate 0.25% (TIMOPTIC) 0.25 % Drop, Place 1 drop into the right eye 2 (two) times daily., Disp: 15 mL, Rfl: 6      Review of Systems   Constitutional: Negative for fever, chills and unexpected weight change.   HENT: Negative for hearing loss.    Eyes: Negative for visual disturbance.   Respiratory: Negative for shortness of breath.    Cardiovascular: Negative for chest pain and leg swelling.   Gastrointestinal: Negative for nausea, vomiting, abdominal pain and diarrhea.   Genitourinary: Negative for dysuria, urgency, frequency and hematuria.   Musculoskeletal: Negative for arthralgias.   Skin: Negative for rash. "   Neurological: Negative for seizures and weakness.   Hematological: Does not bruise/bleed easily.   Psychiatric/Behavioral: Negative for confusion.       Objective:      Physical Exam   Constitutional: He is oriented to person, place, and time. He appears well-developed and well-nourished.   HENT:   Head: Normocephalic and atraumatic.   Eyes: No scleral icterus.   Neck: Neck supple.   Cardiovascular: Normal rate and regular rhythm.    Pulmonary/Chest: Effort normal. No respiratory distress.   Abdominal:  Hernia confirmed negative in the right inguinal area and confirmed negative in the left inguinal area.        obese   Genitourinary: Testes normal and penis normal. Rectal exam shows no hemorroids. . Uncircumcised. Some smegma. No phimosis. Normal perineum.       Prostate was smooth without nodularity. No rectal masses 40 grams. Normal SV. No rectal masses.  No external hemorrhoids.   Musculoskeletal: He exhibits no tenderness.   Neurological: He is alert and oriented to person, place, and time.   Skin: Skin is warm. No rash noted.   Psychiatric: He has a normal mood and affect.     urine dip clear. PH 5, protein 2+  Assessment:       1. Kidney stone     2. Obesity  3. CKD   4. Hypertension  5. Proteinuria  Plan:     F/u 1 year with ultrasound   No further psa testing.   Continue to follow up with nephrology for proteinuria, CKD.  Recommend smoking cessation.  Good diabetic control.   Continue flomax.   Push fluids and limit salt intake.  Weight loss. Patient really needs to work on this. We discussed getting in the pool for exercise with his back injury.  Imaging all personally reviewed.   Testosterone - patient wants to know. Some ED.

## 2017-08-31 ENCOUNTER — TELEPHONE (OUTPATIENT)
Dept: ENDOCRINOLOGY | Facility: CLINIC | Age: 72
End: 2017-08-31

## 2017-08-31 NOTE — TELEPHONE ENCOUNTER
bg log received  Mostly ay goal- no changes  He is welcome to send bg as needed- we will only call for changes in dosing moving forward

## 2017-09-19 ENCOUNTER — OFFICE VISIT (OUTPATIENT)
Dept: OPTOMETRY | Facility: CLINIC | Age: 72
End: 2017-09-19
Payer: MEDICARE

## 2017-09-19 DIAGNOSIS — H40.1113 PRIMARY OPEN ANGLE GLAUCOMA OF RIGHT EYE, SEVERE STAGE: Primary | ICD-10-CM

## 2017-09-19 DIAGNOSIS — H40.022 OPEN ANGLE WITH BORDERLINE FINDINGS, HIGH RISK, LEFT: ICD-10-CM

## 2017-09-19 DIAGNOSIS — E11.9 TYPE 2 DIABETES MELLITUS WITHOUT OPHTHALMIC MANIFESTATIONS: ICD-10-CM

## 2017-09-19 DIAGNOSIS — H52.4 PRESBYOPIA: ICD-10-CM

## 2017-09-19 DIAGNOSIS — H25.12 NUCLEAR SCLEROSIS, LEFT: ICD-10-CM

## 2017-09-19 PROCEDURE — 99212 OFFICE O/P EST SF 10 MIN: CPT | Mod: PBBFAC | Performed by: OPTOMETRIST

## 2017-09-19 PROCEDURE — 92250 FUNDUS PHOTOGRAPHY W/I&R: CPT | Mod: PBBFAC | Performed by: OPTOMETRIST

## 2017-09-19 PROCEDURE — 92014 COMPRE OPH EXAM EST PT 1/>: CPT | Mod: S$PBB,,, | Performed by: OPTOMETRIST

## 2017-09-19 PROCEDURE — 99999 PR PBB SHADOW E&M-EST. PATIENT-LVL II: CPT | Mod: PBBFAC,,, | Performed by: OPTOMETRIST

## 2017-09-19 RX ORDER — TIMOLOL MALEATE 2.5 MG/ML
1 SOLUTION/ DROPS OPHTHALMIC 2 TIMES DAILY
Qty: 15 ML | Refills: 6 | Status: SHIPPED | OUTPATIENT
Start: 2017-09-19 | End: 2018-01-23 | Stop reason: SDUPTHER

## 2017-09-19 NOTE — PROGRESS NOTES
HPI     Last eye exam was 5/18/17 with Dr. Manriquez.  Patient states no vision changes since last exam. Glasses seem to be   ok-wants to wait on updating them until next exam.  Patient denies diplopia, headaches, flashes/floaters, and pain.    Timolol BID OD    Last edited by Laura Linda on 9/19/2017  9:45 AM. (History)            Assessment /Plan     For exam results, see Encounter Report.    Primary open angle glaucoma of right eye, severe stage    Open angle with borderline findings, high risk, left            1-2.  Continue Timolol twice daily OD only.  Eye pressure stable OU.    HVF: 9/18/16  OCT: 9/18/16  DFE: 9/19/17  Photos: 9/19/17  Gonio: 9/8/16  Pachy: 564 OD, 566 OS  Initial IOPs: 16 OD, 18 OS  MHx: DM, HTN  FHx: none  3.  No retinopathy--monitor yearly.  BS control.  4.  Educated on cataracts and affects on vision.  Monitor.  5.  Continue w/ current rx                    RTC 4 months iop check, hvf, and oct.

## 2017-09-20 ENCOUNTER — TELEPHONE (OUTPATIENT)
Dept: ENDOCRINOLOGY | Facility: CLINIC | Age: 72
End: 2017-09-20

## 2017-10-03 ENCOUNTER — TELEPHONE (OUTPATIENT)
Dept: ENDOCRINOLOGY | Facility: CLINIC | Age: 72
End: 2017-10-03

## 2017-10-03 DIAGNOSIS — N18.30 TYPE 2 DIABETES MELLITUS WITH STAGE 3 CHRONIC KIDNEY DISEASE, WITHOUT LONG-TERM CURRENT USE OF INSULIN: ICD-10-CM

## 2017-10-03 DIAGNOSIS — E11.22 TYPE 2 DIABETES MELLITUS WITH STAGE 3 CHRONIC KIDNEY DISEASE, WITHOUT LONG-TERM CURRENT USE OF INSULIN: ICD-10-CM

## 2017-10-03 RX ORDER — CALCIUM CITRATE/VITAMIN D3 200MG-6.25
TABLET ORAL
Qty: 400 STRIP | Refills: 0 | Status: SHIPPED | OUTPATIENT
Start: 2017-10-03 | End: 2018-01-02

## 2017-10-03 NOTE — TELEPHONE ENCOUNTER
Spoke to patient and let him know that Nicole Cerda received your BG logs,   Readings at goal-  no changes

## 2017-10-24 DIAGNOSIS — I10 ESSENTIAL HYPERTENSION: ICD-10-CM

## 2017-10-24 RX ORDER — AMLODIPINE BESYLATE 5 MG/1
TABLET ORAL
Qty: 90 TABLET | Refills: 2 | Status: SHIPPED | OUTPATIENT
Start: 2017-10-24 | End: 2018-02-08 | Stop reason: ALTCHOICE

## 2017-10-25 ENCOUNTER — LAB VISIT (OUTPATIENT)
Dept: LAB | Facility: HOSPITAL | Age: 72
End: 2017-10-25
Payer: MEDICARE

## 2017-10-25 ENCOUNTER — OFFICE VISIT (OUTPATIENT)
Dept: ENDOCRINOLOGY | Facility: CLINIC | Age: 72
End: 2017-10-25
Payer: MEDICARE

## 2017-10-25 VITALS
WEIGHT: 300.5 LBS | HEIGHT: 74 IN | DIASTOLIC BLOOD PRESSURE: 84 MMHG | HEART RATE: 88 BPM | BODY MASS INDEX: 38.56 KG/M2 | SYSTOLIC BLOOD PRESSURE: 143 MMHG

## 2017-10-25 DIAGNOSIS — E66.9 OBESITY (BMI 30-39.9): ICD-10-CM

## 2017-10-25 DIAGNOSIS — I10 ESSENTIAL HYPERTENSION: ICD-10-CM

## 2017-10-25 DIAGNOSIS — N18.30 CKD (CHRONIC KIDNEY DISEASE) STAGE 3, GFR 30-59 ML/MIN: ICD-10-CM

## 2017-10-25 DIAGNOSIS — Z79.4 TYPE 2 DIABETES MELLITUS WITH STAGE 3 CHRONIC KIDNEY DISEASE, WITH LONG-TERM CURRENT USE OF INSULIN: Primary | ICD-10-CM

## 2017-10-25 DIAGNOSIS — E78.5 HYPERLIPIDEMIA, UNSPECIFIED HYPERLIPIDEMIA TYPE: ICD-10-CM

## 2017-10-25 DIAGNOSIS — Z79.4 TYPE 2 DIABETES MELLITUS WITH STAGE 3 CHRONIC KIDNEY DISEASE, WITH LONG-TERM CURRENT USE OF INSULIN: ICD-10-CM

## 2017-10-25 DIAGNOSIS — E11.22 TYPE 2 DIABETES MELLITUS WITH STAGE 3 CHRONIC KIDNEY DISEASE, WITH LONG-TERM CURRENT USE OF INSULIN: ICD-10-CM

## 2017-10-25 DIAGNOSIS — N18.30 TYPE 2 DIABETES MELLITUS WITH STAGE 3 CHRONIC KIDNEY DISEASE, WITH LONG-TERM CURRENT USE OF INSULIN: ICD-10-CM

## 2017-10-25 DIAGNOSIS — N18.30 TYPE 2 DIABETES MELLITUS WITH STAGE 3 CHRONIC KIDNEY DISEASE, WITH LONG-TERM CURRENT USE OF INSULIN: Primary | ICD-10-CM

## 2017-10-25 DIAGNOSIS — E11.22 TYPE 2 DIABETES MELLITUS WITH STAGE 3 CHRONIC KIDNEY DISEASE, WITH LONG-TERM CURRENT USE OF INSULIN: Primary | ICD-10-CM

## 2017-10-25 DIAGNOSIS — I42.0 DILATED CARDIOMYOPATHY: ICD-10-CM

## 2017-10-25 LAB
CHOLEST SERPL-MCNC: 131 MG/DL
CHOLEST/HDLC SERPL: 3 {RATIO}
ESTIMATED AVG GLUCOSE: 183 MG/DL
HBA1C MFR BLD HPLC: 8 %
HDLC SERPL-MCNC: 43 MG/DL
HDLC SERPL: 32.8 %
LDLC SERPL CALC-MCNC: 68.6 MG/DL
NONHDLC SERPL-MCNC: 88 MG/DL
TRIGL SERPL-MCNC: 97 MG/DL

## 2017-10-25 PROCEDURE — 36415 COLL VENOUS BLD VENIPUNCTURE: CPT

## 2017-10-25 PROCEDURE — 99214 OFFICE O/P EST MOD 30 MIN: CPT | Mod: S$PBB,,, | Performed by: NURSE PRACTITIONER

## 2017-10-25 PROCEDURE — 99214 OFFICE O/P EST MOD 30 MIN: CPT | Mod: PBBFAC | Performed by: NURSE PRACTITIONER

## 2017-10-25 PROCEDURE — 99999 PR PBB SHADOW E&M-EST. PATIENT-LVL IV: CPT | Mod: PBBFAC,,, | Performed by: NURSE PRACTITIONER

## 2017-10-25 PROCEDURE — 80061 LIPID PANEL: CPT

## 2017-10-25 PROCEDURE — 83036 HEMOGLOBIN GLYCOSYLATED A1C: CPT

## 2017-10-25 NOTE — PROGRESS NOTES
CC: This 72 y.o. male presents for management of diabetes mellitus     HPI: Mr. Siegel is a 70 year old gentleman with type 2 diabetes diagnosed 2010. He has never been hospitalized for his diabetes. Denies missed doses of medication.       No logs today but mails every 2 weeks  Bg have been great 100-140s mostly   No hypoglycemia  Exercise: no formal exercise, related to disk in his back      Eats 3 meals a day, will sometimes snack in-between meals- rare chips and candy  Bedtime snack- yogurt    Retired:  since 2008        [employer] True Met[employer] meter 2017  DM education completed     CURRENT DM MEDS:  Humalog 14u AC, Lantus 44 u [facility]      Standards of Care:  Eye exam:  9/2017 no retinopathy     ROS:   Gen: Appetite good,  weight stable, denies fatigue and weakness.  Skin: Skin is intact and heals well, no rashes   Eyes: Denies visual disturbances  Resp: no SOB or ZEPEDA, no cough  Cardiac: No palpitations, chest pain, no edema   GI: No nausea or vomiting, diarrhea, constipation, or abdominal pain.  /GYN: 1-2+ nocturia, burning or pain.   MS/Neuro: Denies numbness/ tingling in BLE; Gait steady, speech clear  Psych: Denies drug/ETOH abuse, no hx of depression.  Other systems: negative.    PE:  GENERAL: Well developed, well nourished.  PSYCH: AAOx3, appropriate mood and affect, pleasant expression, conversant, appears relaxed, well groomed.   EYES: Conjunctiva, corneas clear  NECK: Supple, trachea midline,no thyromegaly or nodules .  CHEST: Resp even and unlabored, CTA bilateral.  CARDIAC: RRR, S1, S2 heard, no murmurs  ABDOMEN: Soft, non-tender, non-distended    VASCULAR: DP pulses +2/4 bilaterally, no edema.  NEURO: Gait steady  SKIN: Skin warm and dry no acanthosis nigracans.  FEET:  Footware appropriate .     Hemoglobin A1C   Date Value Ref Range Status   07/06/2017 9.3 (H) 4.0 - 5.6 % Final     Comment:     According to ADA guidelines, hemoglobin A1c <7.0% represents  optimal control in non-pregnant  diabetic patients. Different  metrics may apply to specific patient populations.   Standards of Medical Care in Diabetes-2016.  For the purpose of screening for the presence of diabetes:  <5.7%     Consistent with the absence of diabetes  5.7-6.4%  Consistent with increasing risk for diabetes   (prediabetes)  >or=6.5%  Consistent with diabetes  Currently, no consensus exists for use of hemoglobin A1c  for diagnosis of diabetes for children.  This Hemoglobin A1c assay has significant interference with fetal   hemoglobin   (HbF). The results are invalid for patients with abnormal amounts of   HbF,   including those with known Hereditary Persistence   of Fetal Hemoglobin. Heterozygous hemoglobin variants (HbAS, HbAC,   HbAD, HbAE, HbA2) do not significantly interfere with this assay;   however, presence of multiple variants in a sample may impact the %   interference.     01/25/2017 10.7 (H) 4.5 - 6.2 % Final     Comment:     According to ADA guidelines, hemoglobin A1C <7.0% represents  optimal control in non-pregnant diabetic patients.  Different  metrics may apply to specific populations.   Standards of Medical Care in Diabetes - 2016.  For the purpose of screening for the presence of diabetes:  <5.7%     Consistent with the absence of diabetes  5.7-6.4%  Consistent with increasing risk for diabetes   (prediabetes)  >or=6.5%  Consistent with diabetes  Currently no consensus exists for use of hemoglobin A1C  for diagnosis of diabetes for children.     04/15/2016 12.1 (H) 4.5 - 6.2 % Final      ASSESSMENT and PLAN:     1. T2DM with hyperglycemia, CKD 3  Discussed A1C goal of 7.5% related to age and renal function  Labs pending  Continue lantus 44 u qhs; Humalog 14 u AC   Check bg ac/hs   Instructed to monitor BG and bring meter/ log to every clinic visit.   - takes  ACEi, statin    2. HTN - controlled, continue meds as previously prescribed and monitor.     3. HLP - on statin therapy, controlled    4. Dialted CM  EF  40-45%- avoid metformin, follows [facility] Ricardo    5. Obesity- Body mass index is 38.58 kg/m².   Watch portions sizes, 10-15% weight loss may help DM control

## 2017-10-30 ENCOUNTER — TELEPHONE (OUTPATIENT)
Dept: CARDIOLOGY | Facility: CLINIC | Age: 72
End: 2017-10-30

## 2017-10-30 NOTE — TELEPHONE ENCOUNTER
----- Message from Jovany Ricardo MD sent at 10/29/2017  9:58 AM CDT -----  Please inform the patient cholesterol readings are good. Continue current regimen

## 2017-11-15 DIAGNOSIS — E87.20 ACIDOSIS: ICD-10-CM

## 2017-11-15 RX ORDER — TAMSULOSIN HYDROCHLORIDE 0.4 MG/1
CAPSULE ORAL
Qty: 30 CAPSULE | Refills: 0 | Status: SHIPPED | OUTPATIENT
Start: 2017-11-15 | End: 2017-12-13 | Stop reason: SDUPTHER

## 2017-11-15 RX ORDER — TAMSULOSIN HYDROCHLORIDE 0.4 MG/1
CAPSULE ORAL
Qty: 90 CAPSULE | Refills: 0 | OUTPATIENT
Start: 2017-11-15

## 2017-12-07 ENCOUNTER — TELEPHONE (OUTPATIENT)
Dept: ENDOCRINOLOGY | Facility: CLINIC | Age: 72
End: 2017-12-07

## 2017-12-07 NOTE — TELEPHONE ENCOUNTER
Spoke to patient and let him know that he is going to [facility] now due to Nicole Cerda is going to move to the [place] [facility]. He wanted to make sure it was ok that he still send in his BG logs even though it has Nicole Cerda name on them, I told him yes that would still be ok we will still take care of him. He said ok then he will be happy to see [facility].

## 2017-12-07 NOTE — TELEPHONE ENCOUNTER
----- Message from Deanna Monica sent at 12/7/2017 12:18 PM CST -----  Contact: Vinnie   tel:   [phone]  Pt.says he would like to have a return call from your office telling him why and what is the reason you cancelled his appt. And moved him to February.

## 2017-12-13 DIAGNOSIS — E87.20 ACIDOSIS: ICD-10-CM

## 2017-12-13 RX ORDER — TAMSULOSIN HYDROCHLORIDE 0.4 MG/1
CAPSULE ORAL
Qty: 90 CAPSULE | Refills: 0 | Status: SHIPPED | OUTPATIENT
Start: 2017-12-13 | End: 2018-04-04 | Stop reason: SDUPTHER

## 2017-12-28 ENCOUNTER — TELEPHONE (OUTPATIENT)
Dept: ENDOCRINOLOGY | Facility: CLINIC | Age: 72
End: 2017-12-28

## 2017-12-28 DIAGNOSIS — Z79.4 TYPE 2 DIABETES MELLITUS WITH STAGE 3 CHRONIC KIDNEY DISEASE, WITH LONG-TERM CURRENT USE OF INSULIN: Primary | ICD-10-CM

## 2017-12-28 DIAGNOSIS — N18.30 TYPE 2 DIABETES MELLITUS WITH STAGE 3 CHRONIC KIDNEY DISEASE, WITH LONG-TERM CURRENT USE OF INSULIN: Primary | ICD-10-CM

## 2017-12-28 DIAGNOSIS — E11.22 TYPE 2 DIABETES MELLITUS WITH STAGE 3 CHRONIC KIDNEY DISEASE, WITH LONG-TERM CURRENT USE OF INSULIN: Primary | ICD-10-CM

## 2017-12-28 RX ORDER — INSULIN LISPRO 100 [IU]/ML
15 INJECTION, SOLUTION INTRAVENOUS; SUBCUTANEOUS
Qty: 15 ML | Refills: 12
Start: 2017-12-28 | End: 2018-02-21 | Stop reason: SDUPTHER

## 2017-12-28 NOTE — TELEPHONE ENCOUNTER
Please contact patient and notify him that I received his BG logs and recommend that he increase his Humalog to 15 units before meals. Send logs to jemma in 1 month    Thanks

## 2017-12-28 NOTE — TELEPHONE ENCOUNTER
Spoke to patient and let him know that Elli Lvey NP received his BG logs and recommend that he increase his Humalog to 15 units before meals. Send logs to jemma in 1 month

## 2018-01-02 DIAGNOSIS — E11.22 TYPE 2 DIABETES MELLITUS WITH STAGE 3 CHRONIC KIDNEY DISEASE, WITHOUT LONG-TERM CURRENT USE OF INSULIN: ICD-10-CM

## 2018-01-02 DIAGNOSIS — N18.30 TYPE 2 DIABETES MELLITUS WITH STAGE 3 CHRONIC KIDNEY DISEASE, WITHOUT LONG-TERM CURRENT USE OF INSULIN: ICD-10-CM

## 2018-01-02 RX ORDER — PEN NEEDLE, DIABETIC 30 GX3/16"
NEEDLE, DISPOSABLE MISCELLANEOUS
Qty: 400 EACH | Refills: 3 | Status: SHIPPED | OUTPATIENT
Start: 2018-01-02 | End: 2018-06-05 | Stop reason: SDUPTHER

## 2018-01-02 RX ORDER — CALCIUM CITRATE/VITAMIN D3 200MG-6.25
TABLET ORAL
Qty: 400 STRIP | Refills: 3 | Status: SHIPPED | OUTPATIENT
Start: 2018-01-02 | End: 2018-06-05 | Stop reason: SDUPTHER

## 2018-01-02 RX ORDER — LANCETS 33 GAUGE
EACH MISCELLANEOUS
Qty: 400 EACH | Refills: 3 | Status: SHIPPED | OUTPATIENT
Start: 2018-01-02 | End: 2018-06-05 | Stop reason: SDUPTHER

## 2018-01-05 ENCOUNTER — PATIENT OUTREACH (OUTPATIENT)
Dept: DIABETES | Facility: CLINIC | Age: 73
End: 2018-01-05

## 2018-01-08 DIAGNOSIS — J30.2 SEASONAL ALLERGIES: ICD-10-CM

## 2018-01-08 RX ORDER — FLUTICASONE PROPIONATE 50 MCG
SPRAY, SUSPENSION (ML) NASAL
Refills: 0 | OUTPATIENT
Start: 2018-01-08

## 2018-01-08 RX ORDER — FLUTICASONE PROPIONATE 50 MCG
SPRAY, SUSPENSION (ML) NASAL
Qty: 1 BOTTLE | Refills: 0 | Status: SHIPPED | OUTPATIENT
Start: 2018-01-08 | End: 2018-06-13 | Stop reason: SDUPTHER

## 2018-01-08 NOTE — PROGRESS NOTES
BG logs have been received by NP and dose adjustment made to prandial insulin. Will be following [facility] moving forward.

## 2018-01-23 ENCOUNTER — CLINICAL SUPPORT (OUTPATIENT)
Dept: OPHTHALMOLOGY | Facility: CLINIC | Age: 73
End: 2018-01-23
Payer: MEDICARE

## 2018-01-23 ENCOUNTER — OFFICE VISIT (OUTPATIENT)
Dept: OPTOMETRY | Facility: CLINIC | Age: 73
End: 2018-01-23
Payer: MEDICARE

## 2018-01-23 DIAGNOSIS — H40.022 OPEN ANGLE WITH BORDERLINE FINDINGS, HIGH RISK, LEFT: ICD-10-CM

## 2018-01-23 DIAGNOSIS — H40.1113 PRIMARY OPEN ANGLE GLAUCOMA OF RIGHT EYE, SEVERE STAGE: Primary | ICD-10-CM

## 2018-01-23 DIAGNOSIS — I42.0 DILATED CARDIOMYOPATHY: ICD-10-CM

## 2018-01-23 DIAGNOSIS — H40.1113 PRIMARY OPEN ANGLE GLAUCOMA OF RIGHT EYE, SEVERE STAGE: ICD-10-CM

## 2018-01-23 PROCEDURE — 99212 OFFICE O/P EST SF 10 MIN: CPT | Mod: PBBFAC | Performed by: OPTOMETRIST

## 2018-01-23 PROCEDURE — 92012 INTRM OPH EXAM EST PATIENT: CPT | Mod: S$PBB,,, | Performed by: OPTOMETRIST

## 2018-01-23 PROCEDURE — 99999 PR PBB SHADOW E&M-EST. PATIENT-LVL II: CPT | Mod: PBBFAC,,, | Performed by: OPTOMETRIST

## 2018-01-23 PROCEDURE — 92083 EXTENDED VISUAL FIELD XM: CPT | Mod: 26,S$PBB,, | Performed by: OPTOMETRIST

## 2018-01-23 PROCEDURE — 92133 CPTRZD OPH DX IMG PST SGM ON: CPT | Mod: PBBFAC | Performed by: OPTOMETRIST

## 2018-01-23 PROCEDURE — 92083 EXTENDED VISUAL FIELD XM: CPT | Mod: PBBFAC

## 2018-01-23 RX ORDER — TIMOLOL MALEATE 2.5 MG/ML
1 SOLUTION/ DROPS OPHTHALMIC 2 TIMES DAILY
Qty: 15 ML | Refills: 6 | Status: SHIPPED | OUTPATIENT
Start: 2018-01-23 | End: 2019-03-28 | Stop reason: SDUPTHER

## 2018-01-23 RX ORDER — FUROSEMIDE 20 MG/1
TABLET ORAL
Qty: 360 TABLET | Refills: 3 | Status: SHIPPED | OUTPATIENT
Start: 2018-01-23 | End: 2018-06-01 | Stop reason: SDUPTHER

## 2018-01-23 RX ORDER — HYDROCODONE BITARTRATE AND ACETAMINOPHEN 7.5; 325 MG/1; MG/1
TABLET ORAL
Refills: 0 | Status: ON HOLD | COMMUNITY
Start: 2018-01-10 | End: 2020-07-13 | Stop reason: SDUPTHER

## 2018-01-23 RX ORDER — AMOXICILLIN 500 MG/1
500 TABLET, FILM COATED ORAL EVERY 12 HOURS
Qty: 20 TABLET | Refills: 0 | Status: SHIPPED | OUTPATIENT
Start: 2018-01-23 | End: 2018-02-02

## 2018-01-23 NOTE — PROGRESS NOTES
HPI     Patient's last dilated exam was: 9/19/2017    Pt here for 3 month iop check, HVF and OCT. Pt denies changes in vision.   Patient denies flashes, floaters, pain and double vision. Does not check   blood sugar at home. Would like a written rx for his Timolol.    Pt gtts: Timolol OD BID  Hemoglobin A1C       Date                     Value               Ref Range             Status                10/25/2017               8.0 (H)             4.0 - 5.6 %           Final                 07/06/2017               9.3 (H)             4.0 - 5.6 %           Final                 01/25/2017               10.7 (H)            4.5 - 6.2 %           Final                     Last edited by Judy Manriquez, OD on 1/24/2018  8:25 AM. (History)            Assessment /Plan     For exam results, see Encounter Report.    Primary open angle glaucoma of right eye, severe stage  -     timolol maleate 0.25% (TIMOPTIC) 0.25 % Drop; Place 1 drop into the right eye 2 (two) times daily.  Dispense: 15 mL; Refill: 6  -     amoxicillin (AMOXIL) 500 MG Tab; Take 1 tablet (500 mg total) by mouth every 12 (twelve) hours.  Dispense: 20 tablet; Refill: 0  -     OCT - Optic Nerve    Open angle with borderline findings, high risk, left  -     timolol maleate 0.25% (TIMOPTIC) 0.25 % Drop; Place 1 drop into the right eye 2 (two) times daily.  Dispense: 15 mL; Refill: 6  -     amoxicillin (AMOXIL) 500 MG Tab; Take 1 tablet (500 mg total) by mouth every 12 (twelve) hours.  Dispense: 20 tablet; Refill: 0  -     OCT - Optic Nerve            1-2.  Continue Timolol twice daily OD only.  Eye pressure and testing stable OU.  Patient does not recall having trauma to right eye.  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 Started patient on Latanoprost in 2013 but patient had side effects.  HVF: 1/23/18  OCT: 1/23/18  DFE: 9/19/17  Photos: 9/19/17  Gonio: 9/8/16  Pachy: 564 OD, 566 OS  Initial IOPs: 19 OD, 15 OS(Exam 2012-no drops)  MHx: DM, HTN  FHx:  none                      RTC 4 months iop check.

## 2018-02-08 ENCOUNTER — OFFICE VISIT (OUTPATIENT)
Dept: CARDIOLOGY | Facility: CLINIC | Age: 73
End: 2018-02-08
Payer: MEDICARE

## 2018-02-08 VITALS
BODY MASS INDEX: 40.43 KG/M2 | HEART RATE: 60 BPM | SYSTOLIC BLOOD PRESSURE: 177 MMHG | DIASTOLIC BLOOD PRESSURE: 77 MMHG | HEIGHT: 74 IN | WEIGHT: 315 LBS

## 2018-02-08 DIAGNOSIS — I10 ESSENTIAL HYPERTENSION: Primary | ICD-10-CM

## 2018-02-08 DIAGNOSIS — E66.9 OBESITY (BMI 30-39.9): ICD-10-CM

## 2018-02-08 DIAGNOSIS — E78.5 HYPERLIPIDEMIA, UNSPECIFIED HYPERLIPIDEMIA TYPE: ICD-10-CM

## 2018-02-08 DIAGNOSIS — I42.0 DILATED CARDIOMYOPATHY: ICD-10-CM

## 2018-02-08 DIAGNOSIS — Z72.0 TOBACCO ABUSE: Chronic | ICD-10-CM

## 2018-02-08 DIAGNOSIS — N18.30 CKD (CHRONIC KIDNEY DISEASE) STAGE 3, GFR 30-59 ML/MIN: ICD-10-CM

## 2018-02-08 PROCEDURE — 99214 OFFICE O/P EST MOD 30 MIN: CPT | Mod: 25,S$PBB,, | Performed by: INTERNAL MEDICINE

## 2018-02-08 PROCEDURE — 1159F MED LIST DOCD IN RCRD: CPT | Mod: ,,, | Performed by: INTERNAL MEDICINE

## 2018-02-08 PROCEDURE — 99213 OFFICE O/P EST LOW 20 MIN: CPT | Mod: PBBFAC | Performed by: INTERNAL MEDICINE

## 2018-02-08 PROCEDURE — 1126F AMNT PAIN NOTED NONE PRSNT: CPT | Mod: ,,, | Performed by: INTERNAL MEDICINE

## 2018-02-08 PROCEDURE — 99999 PR PBB SHADOW E&M-EST. PATIENT-LVL III: CPT | Mod: PBBFAC,,, | Performed by: INTERNAL MEDICINE

## 2018-02-08 RX ORDER — NIFEDIPINE 90 MG/1
90 TABLET, EXTENDED RELEASE ORAL DAILY
Qty: 90 TABLET | Refills: 3 | Status: SHIPPED | OUTPATIENT
Start: 2018-02-08 | End: 2018-04-04 | Stop reason: SDUPTHER

## 2018-02-08 NOTE — PROGRESS NOTES
Subjective:   Patient ID:  Vinnie Siegel is a 72 y.o. male who presents for follow-up of Essential hypertension (3 months fu)      HPI:   Vinnie Siegel presents for follow up of hypertension and mild NICM. BP is elevated with the patient stating he has been taking his medications. Has had URI sxs for the past 8-9 days. Vinnie Siegel continues to smoke but less. He has gained considerable weight. Has deferred evaluation for TIM. Has some ZEPEDA but Vinnie Siegel denies chest pain, palpitations, presyncope , or syncope. Vinnie Siegel has dyslipidemia  on high intensity statin At LDL goal. Vinnie Siegel chronic kidney disease and did not see the Nephrologist..   Review of Systems   Constitution: Positive for weight gain. Negative for weakness, malaise/fatigue and weight loss.   HENT: Positive for congestion.    Eyes: Negative for blurred vision.   Cardiovascular: Negative for chest pain, claudication, cyanosis, dyspnea on exertion, irregular heartbeat, leg swelling, near-syncope, orthopnea, palpitations, paroxysmal nocturnal dyspnea and syncope.   Respiratory: Positive for cough and snoring. Negative for shortness of breath and wheezing.    Musculoskeletal: Positive for back pain. Negative for falls and myalgias.   Gastrointestinal: Negative for abdominal pain, heartburn, nausea and vomiting.   Genitourinary: Positive for nocturia.   Neurological: Negative for brief paralysis, dizziness, focal weakness, headaches, numbness and paresthesias.   Psychiatric/Behavioral: Negative for altered mental status.       Current Outpatient Prescriptions   Medication Sig    albuterol 90 mcg/actuation inhaler Inhale 1-2 puffs into the lungs every 6 (six) hours as needed for Wheezing or Shortness of Breath.    allopurinol (ZYLOPRIM) 100 MG tablet Take 1 tablet (100 mg total) by mouth once daily.    atorvastatin (LIPITOR) 40 MG tablet Take 1 tablet (40 mg total) by mouth once daily.    benazepril (LOTENSIN) 20 MG  "tablet TK 1  PO twice daily    blood-glucose meter kit Use as instructed    carvedilol (COREG) 25 MG tablet Take 1 tablet (25 mg total) by mouth 2 (two) times daily with meals.    chlorzoxazone (PARAFON FORTE) 500 mg Tab Take 500 mg by mouth 3 (three) times daily.    fluticasone (FLONASE) 50 mcg/actuation nasal spray SPRAY TWICE IN EACH NOSTRIL ONCE DAILY    furosemide (LASIX) 20 MG tablet TAKE 1 TABLET BY MOUTH TWICE DAILY INCREASING TO 2 TABLETS TWICE DAILY FOR 3-4 LBS WEIGHT GAIN UNTIL RESOLVED.    hydrocodone-acetaminophen 7.5-325mg (NORCO) 7.5-325 mg per tablet TK 1 T PO  TID PRN P    insulin glargine (LANTUS SOLOSTAR) 100 unit/mL (3 mL) InPn pen Inject 44 Units into the skin every evening.    insulin lispro (HUMALOG KWIKPEN) 100 unit/mL InPn pen Inject 15 Units into the skin 3 (three) times daily with meals.    magnesium oxide (MAGOX) 400 mg tablet Take 1 tablet (400 mg total) by mouth every morning.    NIFEdipine (PROCARDIA-XL) 90 MG (OSM) 24 hr tablet Take 1 tablet (90 mg total) by mouth once daily.    pen needle, diabetic (BD INSULIN PEN NEEDLE UF SHORT) 31 gauge x 5/16" Ndle To use 4 times daily with insulin injections    pen needle, diabetic (BD INSULIN PEN NEEDLE UF SHORT) 31 gauge x 5/16" Ndle Uses 4 daily    sodium bicarbonate 650 MG tablet Take 1 tablet (650 mg total) by mouth 2 (two) times daily.    spironolactone (ALDACTONE) 25 MG tablet TAKE 1 TABLET(25 MG) BY MOUTH EVERY DAY    tamsulosin (FLOMAX) 0.4 mg Cp24 TAKE 1 CAPSULE BY MOUTH EVERY MORNING    timolol maleate 0.25% (TIMOPTIC) 0.25 % Drop Place 1 drop into the right eye 2 (two) times daily.    TRUE METRIX GLUCOSE TEST STRIP Strp TEST BLOOD SUGAR FOUR TIMES DAILY    TRUEPLUS LANCETS 33 gauge Misc TEST BLOOD SUGAR FOUR TIMES DAILY     No current facility-administered medications for this visit.      Objective:   Physical Exam   Constitutional: He is oriented to person, place, and time. No distress.   BP (!) 177/77 (BP " "Location: Left arm, Patient Position: Sitting, BP Method: X-Large (Automatic))   Pulse 60   Ht 6' 2" (1.88 m)   Wt (!) 144.1 kg (317 lb 10.9 oz)   BMI 40.79 kg/m²    HENT:   Head: Normocephalic.   Right Ear: External ear normal.   Left Ear: External ear normal.   Nose: Nose normal.   Eyes: Conjunctivae are normal. Pupils are equal, round, and reactive to light. No scleral icterus.   Neck: Neck supple. No JVD present. No thyromegaly present.   Cardiovascular: Normal rate, regular rhythm and intact distal pulses.  PMI is not displaced.  Exam reveals no gallop and no friction rub.    Murmur heard.   Harsh crescendo-decrescendo systolic murmur is present with a grade of 2/6  at the upper right sternal border, upper left sternal border Trace bilateral edema.  Pulmonary/Chest: Effort normal and breath sounds normal. No respiratory distress. He has no wheezes. He has no rales.   Abdominal: Soft. He exhibits no distension. There is no hepatosplenomegaly. There is no tenderness.   Musculoskeletal: He exhibits no edema or tenderness.   Gait normal   Neurological: He is alert and oriented to person, place, and time. No cranial nerve deficit.   Skin: Skin is warm and dry. No rash noted. He is not diaphoretic.   Psychiatric: He has a normal mood and affect. His behavior is normal.       Lab Results   Component Value Date     07/06/2017     07/06/2017    K 4.4 07/06/2017    K 4.4 07/06/2017     07/06/2017     07/06/2017    CO2 19 (L) 07/06/2017    CO2 19 (L) 07/06/2017    BUN 27 (H) 07/06/2017    BUN 27 (H) 07/06/2017    CREATININE 2.1 (H) 07/06/2017    CREATININE 2.1 (H) 07/06/2017     (H) 07/06/2017     (H) 07/06/2017    HGBA1C 8.0 (H) 10/25/2017    AST 21 07/06/2017    AST 21 07/06/2017    ALT 36 07/06/2017    ALT 36 07/06/2017    ALBUMIN 3.4 (L) 07/06/2017    ALBUMIN 3.4 (L) 07/06/2017    PROT 6.9 07/06/2017    PROT 6.9 07/06/2017    BILITOT 0.8 07/06/2017    BILITOT 0.8 07/06/2017    " WBC 8.22 12/23/2015    HGB 14.3 05/08/2017    HCT 41.2 05/08/2017    MCV 91 12/23/2015     12/23/2015    TSH 1.091 01/25/2017    CHOL 131 10/25/2017    HDL 43 10/25/2017    LDLCALC 68.6 10/25/2017    TRIG 97 10/25/2017       Assessment:     1. Essential hypertension : Not well controlled   2. Dilated cardiomyopathy : Stable-compensated   3. Hyperlipidemia, unspecified hyperlipidemia type    4. Obesity (BMI 40.9): Worsening   5. Tobacco abuse : Ongoing   6. CKD (chronic kidney disease) stage 3, GFR 30-59 ml/min        Plan:     Vinnie was seen today for essential hypertension.    Diagnoses and all orders for this visit:    Essential hypertension  -     NIFEdipine (PROCARDIA-XL) 90 MG (OSM) 24 hr tablet; Take 1 tablet (90 mg total) by mouth once daily.    Dilated cardiomyopathy  Continue current regimen  Daily weights emphasized once again with loop diuretic adjustment per short term weight gain  Hyperlipidemia, unspecified hyperlipidemia type  .Mediteranian diet recommended with Na+ restriction  Obesity (BMI 30-39.9)  Discussed CHO restriction along with above  Tobacco abuse  Not interested in the smoking cessation program  CKD (chronic kidney disease) stage 3, GFR 30-59 ml/min  -     Basic metabolic panel; Future; Expected date: 03/08/2018

## 2018-02-09 NOTE — PROGRESS NOTES
Patient, Vinnie Siegel (MRN #[id number]), presented with a recorded BMI of 40.79 kg/m^2 consistent with the definition of morbid obesity (ICD-10 E66.01). The patient's morbid obesity was monitored, evaluated, addressed and/or treated. This addendum to the medical record is made on 02/08/2018.

## 2018-02-16 RX ORDER — SPIRONOLACTONE 25 MG/1
TABLET ORAL
Qty: 30 TABLET | Refills: 3 | Status: SHIPPED | OUTPATIENT
Start: 2018-02-16 | End: 2018-04-04 | Stop reason: SDUPTHER

## 2018-02-16 RX ORDER — BENAZEPRIL HYDROCHLORIDE 40 MG/1
TABLET ORAL
Qty: 30 TABLET | Refills: 0 | OUTPATIENT
Start: 2018-02-16

## 2018-02-19 ENCOUNTER — TELEPHONE (OUTPATIENT)
Dept: CARDIOLOGY | Facility: CLINIC | Age: 73
End: 2018-02-19

## 2018-02-19 ENCOUNTER — LAB VISIT (OUTPATIENT)
Dept: LAB | Facility: HOSPITAL | Age: 73
End: 2018-02-19
Payer: MEDICARE

## 2018-02-19 DIAGNOSIS — E11.22 TYPE 2 DIABETES MELLITUS WITH STAGE 3 CHRONIC KIDNEY DISEASE, WITH LONG-TERM CURRENT USE OF INSULIN: ICD-10-CM

## 2018-02-19 DIAGNOSIS — N18.30 TYPE 2 DIABETES MELLITUS WITH STAGE 3 CHRONIC KIDNEY DISEASE, WITH LONG-TERM CURRENT USE OF INSULIN: ICD-10-CM

## 2018-02-19 DIAGNOSIS — Z79.4 TYPE 2 DIABETES MELLITUS WITH STAGE 3 CHRONIC KIDNEY DISEASE, WITH LONG-TERM CURRENT USE OF INSULIN: ICD-10-CM

## 2018-02-19 DIAGNOSIS — I10 ESSENTIAL HYPERTENSION: ICD-10-CM

## 2018-02-19 LAB
ESTIMATED AVG GLUCOSE: 171 MG/DL
HBA1C MFR BLD HPLC: 7.6 %

## 2018-02-19 PROCEDURE — 36415 COLL VENOUS BLD VENIPUNCTURE: CPT

## 2018-02-19 PROCEDURE — 83036 HEMOGLOBIN GLYCOSYLATED A1C: CPT

## 2018-02-19 RX ORDER — BENAZEPRIL HYDROCHLORIDE 40 MG/1
TABLET ORAL
Qty: 90 TABLET | Refills: 0 | Status: SHIPPED | OUTPATIENT
Start: 2018-02-19 | End: 2018-02-19

## 2018-02-19 RX ORDER — BENAZEPRIL HYDROCHLORIDE 20 MG/1
TABLET ORAL
Qty: 180 TABLET | Refills: 0 | Status: SHIPPED | OUTPATIENT
Start: 2018-02-19 | End: 2018-04-04 | Stop reason: SDUPTHER

## 2018-02-20 NOTE — TELEPHONE ENCOUNTER
----- Message from Lucinda Gardiner MA sent at 2/19/2018  1:28 PM CST -----  Contact: Patient      ----- Message -----  From: Fiorella Naranjo  Sent: 2/19/2018  11:52 AM  To: [facility] Staff    The Pt is calling to speak to someone regarding the dosage of Benazepril (LOTENSIN) 20 MG tablet. Please call him back @ [phone]. Thanks, Fiorella

## 2018-02-20 NOTE — TELEPHONE ENCOUNTER
The pt said that he is confused b/c  sent in a Rx for Benazepril 40 mg TAKE 1 TAB QD and  sent one in for Benazepril 20 mg take 1 tab BID to [facility].The pt said he would rather stay on the Rx  had originally Rx'd,20 mg take 1 tablet BID.He asked htat I contact [facility] and let them know.Spoke to the [facility] Pharmacist and she said that they did receive both Rx's.Asked her to cancel the Rx for 40 mg take 1 tab QD and asked her to fill the one  sent as the pt requested.Pharmacist verbalized understanding of these instructions.

## 2018-02-21 ENCOUNTER — OFFICE VISIT (OUTPATIENT)
Dept: ENDOCRINOLOGY | Facility: CLINIC | Age: 73
End: 2018-02-21
Payer: MEDICARE

## 2018-02-21 VITALS
HEART RATE: 64 BPM | HEIGHT: 74 IN | BODY MASS INDEX: 40.23 KG/M2 | SYSTOLIC BLOOD PRESSURE: 138 MMHG | DIASTOLIC BLOOD PRESSURE: 68 MMHG | WEIGHT: 313.5 LBS

## 2018-02-21 DIAGNOSIS — E66.9 OBESITY (BMI 30-39.9): ICD-10-CM

## 2018-02-21 DIAGNOSIS — Z72.0 TOBACCO ABUSE: Chronic | ICD-10-CM

## 2018-02-21 DIAGNOSIS — E11.22 TYPE 2 DIABETES MELLITUS WITH STAGE 3 CHRONIC KIDNEY DISEASE, WITH LONG-TERM CURRENT USE OF INSULIN: Primary | ICD-10-CM

## 2018-02-21 DIAGNOSIS — E78.5 HYPERLIPIDEMIA, UNSPECIFIED HYPERLIPIDEMIA TYPE: ICD-10-CM

## 2018-02-21 DIAGNOSIS — N18.30 CKD (CHRONIC KIDNEY DISEASE) STAGE 3, GFR 30-59 ML/MIN: ICD-10-CM

## 2018-02-21 DIAGNOSIS — Z79.4 TYPE 2 DIABETES MELLITUS WITH STAGE 3 CHRONIC KIDNEY DISEASE, WITH LONG-TERM CURRENT USE OF INSULIN: Primary | ICD-10-CM

## 2018-02-21 DIAGNOSIS — I42.0 DILATED CARDIOMYOPATHY: ICD-10-CM

## 2018-02-21 DIAGNOSIS — I10 ESSENTIAL HYPERTENSION: ICD-10-CM

## 2018-02-21 DIAGNOSIS — N18.30 TYPE 2 DIABETES MELLITUS WITH STAGE 3 CHRONIC KIDNEY DISEASE, WITH LONG-TERM CURRENT USE OF INSULIN: Primary | ICD-10-CM

## 2018-02-21 PROCEDURE — 99214 OFFICE O/P EST MOD 30 MIN: CPT | Mod: PBBFAC | Performed by: NURSE PRACTITIONER

## 2018-02-21 PROCEDURE — 99214 OFFICE O/P EST MOD 30 MIN: CPT | Mod: S$PBB,,, | Performed by: NURSE PRACTITIONER

## 2018-02-21 PROCEDURE — 1159F MED LIST DOCD IN RCRD: CPT | Mod: ,,, | Performed by: NURSE PRACTITIONER

## 2018-02-21 PROCEDURE — 1126F AMNT PAIN NOTED NONE PRSNT: CPT | Mod: ,,, | Performed by: NURSE PRACTITIONER

## 2018-02-21 PROCEDURE — 99999 PR PBB SHADOW E&M-EST. PATIENT-LVL IV: CPT | Mod: PBBFAC,,, | Performed by: NURSE PRACTITIONER

## 2018-02-21 RX ORDER — INSULIN LISPRO 100 [IU]/ML
INJECTION, SOLUTION INTRAVENOUS; SUBCUTANEOUS
Qty: 15 ML | Refills: 12
Start: 2018-02-21 | End: 2018-06-05 | Stop reason: SDUPTHER

## 2018-02-21 RX ORDER — INSULIN GLARGINE 100 [IU]/ML
40 INJECTION, SOLUTION SUBCUTANEOUS NIGHTLY
Qty: 15 ML | Refills: 12
Start: 2018-02-21 | End: 2018-06-05 | Stop reason: SDUPTHER

## 2018-02-21 NOTE — PATIENT INSTRUCTIONS
Snacks can be an important part of a balanced, healthy meal plan. They allow you to eat more frequently, feeling full and satisfied throughout the day. Also, they allow you to spread carbohydrates evenly, which may stabilize blood sugars.  Plus, snacks are enjoyable!     The amount of carbohydrate needed at snacks varies. Generally, about 15-30 grams of carbohydrate per snack is recommended.  Below you will find some tasty treats.       0-5 gm carb   Crystal Light   Vitamin Water Zero   Herbal tea, unsweetened   2 tsp peanut butter on celery   1./2 cup sugar-free jell-o   1 sugar-free popsicle   ¼ cup blueberries   8oz Blue Norah unsweetened almond milk   5 baby carrots & celery sticks, cucumbers, bell peppers dipped in ¼ cup salsa, 2Tbsp light ranch dressing or 2Tbsp plain Greek yogurt   10 Goldfish crackers   ½ oz low-fat cheese or string cheese   1 closed handful of nuts, unsalted   1 Tbsp of sunflower seeds, unsalted   1 cup Smart Pop popcorn   1 whole grain brown rice cake        15 gm carb   1 small piece of fruit or ½ banana or 1/2 cup lite canned fruit   3 bibi cracker squares   3 cups Smart Pop popcorn, top spray butter, Ray lite salt or cinnamon and Truvia   5 Vanilla Wafers   ½ cup low fat, no added sugar ice cream or frozen yogurt (Blue bell, Blue Bunny, Weight Watchers, Skinny Cow)   ½ turkey, ham, or chicken sandwich   ½ c fruit with ½ c Cottage cheese   4-6 unsalted wheat crackers with 1 oz low fat cheese or 1 tbsp peanut butter    30-45 goldfish crackers (depending on flavor)    7-8 Orthodox mini brown rice cakes (caramel, apple cinnamon, chocolate)    12 Orthodox mini brown rice cakes (cheddar, bbq, ranch)    1/3 cup hummus dip with raw veg   1/2 whole wheat nehemiah, 1Tbsp hummus   Mini Pizza (1/2 whole wheat English muffin, low-fat  cheese, tomato sauce)   100 calorie snack pack (Oreo, Chips Ahoy, Ritz Mix, Baked Cheetos)   4-6 oz. light or Greek Style yogurt  (Alyse Bear, Davion, [facility])   ½ cup sugar-free pudding     6 in. wheat tortilla or nehemiah oven toasted chips (topped with spray butter flavoring, cinnamon, Truvia OR spray butter, garlic powder, chili powder)    18 BBQ Popchips (available at Target, Whole Foods, Fresh Market)                   Diabetes Support Group Meetings         Date: Topic:   February 8 Health Promotion/Cooking Demo   March 8 Taking Care of Your Kidneys   April 12 Taking Care of Your Feet   May 10 Ease Your Mind with Diabetes   Kayy 14 Summer Treats/Cooking Demo   July 12 Super Market Sweep   August 9 Taking Care of Your Eyes   Sept 13 Technology/ADA updates   October 11 Recipes & Treats/Cooking Demo   November 8 Heart Health/Pump it up!   December 13 Year-End Close Out                Meetings are held in the [facility] (A) of the [facility] located at [address] [place] [address]. Please call [phone] for additional information.    Free service, offered every 2nd Thursday of every month, except in August! Family members and/or friends are welcome as well!  Support group is for patients with type 1 or type 2 diabetes.    From 3:30p to 4:30p

## 2018-02-21 NOTE — PROGRESS NOTES
CC: This 72 y.o. male presents for management of diabetes mellitus     HPI: Mr. Siegel is a 70 year old gentleman with type 2 diabetes diagnosed 2010. He has never been hospitalized for his diabetes. Denies missed doses of medication.    Last seen by MACY Cerda DNP and is now being seen by me for the first time.  a1c has improved.  Lab Results   Component Value Date    HGBA1C 7.6 (H) 02/19/2018     FBG 130s-150s, occ 80-90s   Pre lunch 80s-100s (low)  Pre dinner 130s-150s    No hypoglycemia ---80s-90s lowest-rarely  Exercise: no formal exercise, related to disk in his back      Eats 3 meals a day, will sometimes snack in-between meals- rare chips and candy  Bedtime snack- yogurt    Retired:  since 2008        [employer] True Metrix meter 2017  DM education completed     CURRENT DM MEDS:  Humalog 15 u AC, Lantus 44 u [facility]      Standards of Care:  Eye exam:  9/2017 no retinopathy     ROS:   Gen: Appetite good,  weight stable, denies fatigue and weakness.  Skin: Skin is intact and heals well, no rashes   Eyes: Denies visual disturbances  Resp: no SOB or ZEPEDA, no cough  Cardiac: No palpitations, chest pain, no edema   GI: No nausea or vomiting, diarrhea, constipation, or abdominal pain.  /GYN: 1-2+ nocturia, burning or pain.   MS/Neuro: Denies numbness/ tingling in BLE; Gait steady, speech clear  Psych: Denies drug/ETOH abuse, no hx of depression.  Other systems: negative.    PE:  GENERAL: Well developed, well nourished.  PSYCH: AAOx3, appropriate mood and affect, pleasant expression, conversant, appears relaxed, well groomed.   EYES: Conjunctiva, corneas clear  NECK: Supple, trachea midline,no thyromegaly or nodules .  CHEST: Resp even and unlabored, CTA bilateral.  CARDIAC: RRR, S1, S2 heard, no murmurs  ABDOMEN: Soft, non-tender, non-distended    VASCULAR: DP pulses +2/4 bilaterally, no edema.  NEURO: Gait steady  SKIN: Skin warm and dry no acanthosis nigracans.  FEET:  Footware appropriate .      Hemoglobin A1C   Date Value Ref Range Status   02/19/2018 7.6 (H) 4.0 - 5.6 % Final     Comment:     According to ADA guidelines, hemoglobin A1c <7.0% represents  optimal control in non-pregnant diabetic patients. Different  metrics may apply to specific patient populations.   Standards of Medical Care in Diabetes-2016.  For the purpose of screening for the presence of diabetes:  <5.7%     Consistent with the absence of diabetes  5.7-6.4%  Consistent with increasing risk for diabetes   (prediabetes)  >or=6.5%  Consistent with diabetes  Currently, no consensus exists for use of hemoglobin A1c  for diagnosis of diabetes for children.  This Hemoglobin A1c assay has significant interference with fetal   hemoglobin   (HbF). The results are invalid for patients with abnormal amounts of   HbF,   including those with known Hereditary Persistence   of Fetal Hemoglobin. Heterozygous hemoglobin variants (HbAS, HbAC,   HbAD, HbAE, HbA2) do not significantly interfere with this assay;   however, presence of multiple variants in a sample may impact the %   interference.     10/25/2017 8.0 (H) 4.0 - 5.6 % Final     Comment:     According to ADA guidelines, hemoglobin A1c <7.0% represents  optimal control in non-pregnant diabetic patients. Different  metrics may apply to specific patient populations.   Standards of Medical Care in Diabetes-2016.  For the purpose of screening for the presence of diabetes:  <5.7%     Consistent with the absence of diabetes  5.7-6.4%  Consistent with increasing risk for diabetes   (prediabetes)  >or=6.5%  Consistent with diabetes  Currently, no consensus exists for use of hemoglobin A1c  for diagnosis of diabetes for children.  This Hemoglobin A1c assay has significant interference with fetal   hemoglobin   (HbF). The results are invalid for patients with abnormal amounts of   HbF,   including those with known Hereditary Persistence   of Fetal Hemoglobin. Heterozygous hemoglobin variants (HbAS,  HbAC,   HbAD, HbAE, HbA2) do not significantly interfere with this assay;   however, presence of multiple variants in a sample may impact the %   interference.     07/06/2017 9.3 (H) 4.0 - 5.6 % Final     Comment:     According to ADA guidelines, hemoglobin A1c <7.0% represents  optimal control in non-pregnant diabetic patients. Different  metrics may apply to specific patient populations.   Standards of Medical Care in Diabetes-2016.  For the purpose of screening for the presence of diabetes:  <5.7%     Consistent with the absence of diabetes  5.7-6.4%  Consistent with increasing risk for diabetes   (prediabetes)  >or=6.5%  Consistent with diabetes  Currently, no consensus exists for use of hemoglobin A1c  for diagnosis of diabetes for children.  This Hemoglobin A1c assay has significant interference with fetal   hemoglobin   (HbF). The results are invalid for patients with abnormal amounts of   HbF,   including those with known Hereditary Persistence   of Fetal Hemoglobin. Heterozygous hemoglobin variants (HbAS, HbAC,   HbAD, HbAE, HbA2) do not significantly interfere with this assay;   however, presence of multiple variants in a sample may impact the %   interference.        ASSESSMENT and PLAN:     1. T2DM with hyperglycemia, CKD 3  Discussed A1C goal of 7.5% related to age and renal function  Labs pending  Change lantus 40 u qhs; Humalog 14-14-12 w/ scale 180-230+2, 231-280+4, 281-330+6.   Check bg ac/hs   Instructed to monitor BG and bring meter/ log to every clinic visit.   - takes  ACEi, statin    -book given on carbs/recipes, support group, contact info given as well.    - discussed glp1a -victoza 1.2 mg daily  - no h/o pancreatitis or med thyroid ca     2. HTN - controlled, continue meds as previously prescribed and monitor.     3. HLP - on statin therapy, controlled    4. Dialted CM  EF 40-45%- avoid metformin, follows w Dr Ricardo    5. Obesity- Body mass index is 40.25 kg/m².   Watch portions sizes, 10-15%  weight loss may help DM control     6. Tobacco use- 3/4 pk a day, discussed smoking cessation

## 2018-03-07 DIAGNOSIS — E87.20 ACIDOSIS: ICD-10-CM

## 2018-03-08 ENCOUNTER — LAB VISIT (OUTPATIENT)
Dept: LAB | Facility: HOSPITAL | Age: 73
End: 2018-03-08
Attending: INTERNAL MEDICINE
Payer: MEDICARE

## 2018-03-08 ENCOUNTER — TELEPHONE (OUTPATIENT)
Dept: CARDIOLOGY | Facility: CLINIC | Age: 73
End: 2018-03-08

## 2018-03-08 DIAGNOSIS — N18.30 CKD (CHRONIC KIDNEY DISEASE) STAGE 3, GFR 30-59 ML/MIN: ICD-10-CM

## 2018-03-08 LAB
ANION GAP SERPL CALC-SCNC: 11 MMOL/L
BUN SERPL-MCNC: 27 MG/DL
CALCIUM SERPL-MCNC: 9.2 MG/DL
CHLORIDE SERPL-SCNC: 107 MMOL/L
CO2 SERPL-SCNC: 22 MMOL/L
CREAT SERPL-MCNC: 2.4 MG/DL
EST. GFR  (AFRICAN AMERICAN): 30.2 ML/MIN/1.73 M^2
EST. GFR  (NON AFRICAN AMERICAN): 26.2 ML/MIN/1.73 M^2
GLUCOSE SERPL-MCNC: 132 MG/DL
POTASSIUM SERPL-SCNC: 4.5 MMOL/L
SODIUM SERPL-SCNC: 140 MMOL/L

## 2018-03-08 PROCEDURE — 80048 BASIC METABOLIC PNL TOTAL CA: CPT

## 2018-03-08 PROCEDURE — 36415 COLL VENOUS BLD VENIPUNCTURE: CPT

## 2018-03-08 RX ORDER — SODIUM BICARBONATE 650 MG/1
TABLET ORAL
Qty: 360 TABLET | Refills: 0 | Status: SHIPPED | OUTPATIENT
Start: 2018-03-08 | End: 2018-09-04 | Stop reason: SDUPTHER

## 2018-03-08 NOTE — TELEPHONE ENCOUNTER
----- Message from Jovany Ricardo MD sent at 3/8/2018 11:27 AM CST -----  Please inform the patient that his kidney function is slightly worse since last check 8 months ago. Suggest follow up appointment with his Nephrologist. Let me know once patient contacted.

## 2018-03-14 ENCOUNTER — PATIENT OUTREACH (OUTPATIENT)
Dept: DIABETES | Facility: CLINIC | Age: 73
End: 2018-03-14

## 2018-03-14 DIAGNOSIS — J30.2 SEASONAL ALLERGIES: ICD-10-CM

## 2018-03-14 RX ORDER — FLUTICASONE PROPIONATE 50 MCG
SPRAY, SUSPENSION (ML) NASAL
Refills: 0 | OUTPATIENT
Start: 2018-03-14

## 2018-03-27 DIAGNOSIS — E87.20 ACIDOSIS: ICD-10-CM

## 2018-03-27 RX ORDER — TAMSULOSIN HYDROCHLORIDE 0.4 MG/1
CAPSULE ORAL
Qty: 90 CAPSULE | Refills: 0 | OUTPATIENT
Start: 2018-03-27

## 2018-03-28 NOTE — TELEPHONE ENCOUNTER
Spoke with  in regards to why his prescription request was denied . Mr. Verduzco was informed that it's been a year since last seen. And that he should have returned in 4 week for a blood pressure follow which was missed . Explained that he needs to be seen and that today was Dr. Walter's last day in office until next week or that he can be seen by any of the covering physician's. Mr. Siegel declined a sooner appointment this week request next week 4/4/18 will mail reminder. conversation was understood and it ended.

## 2018-03-28 NOTE — TELEPHONE ENCOUNTER
----- Message from Marilyn Gomez sent at 3/28/2018 11:19 AM CDT -----  Contact: self  _x  1st Request  _  2nd Request  _  3rd Request    Who: pt    Why: pt is calling to speak with a nurse in regards to medication.. Please advise    What Number to Call Back: [phone]    When to Expect a call back: (Before the end of the day)   -- if call after 3:00 call back will be tomorrow.

## 2018-04-04 ENCOUNTER — OFFICE VISIT (OUTPATIENT)
Dept: INTERNAL MEDICINE | Facility: CLINIC | Age: 73
End: 2018-04-04
Attending: FAMILY MEDICINE
Payer: MEDICARE

## 2018-04-04 VITALS
DIASTOLIC BLOOD PRESSURE: 70 MMHG | HEART RATE: 57 BPM | SYSTOLIC BLOOD PRESSURE: 138 MMHG | HEIGHT: 73 IN | BODY MASS INDEX: 41.61 KG/M2 | WEIGHT: 313.94 LBS

## 2018-04-04 DIAGNOSIS — E87.20 ACIDOSIS: ICD-10-CM

## 2018-04-04 DIAGNOSIS — Z00.00 ENCOUNTER FOR MEDICARE ANNUAL WELLNESS EXAM: ICD-10-CM

## 2018-04-04 DIAGNOSIS — I10 ESSENTIAL HYPERTENSION: Primary | ICD-10-CM

## 2018-04-04 DIAGNOSIS — Z12.5 ENCOUNTER FOR SCREENING FOR MALIGNANT NEOPLASM OF PROSTATE: ICD-10-CM

## 2018-04-04 DIAGNOSIS — I42.0 DILATED CARDIOMYOPATHY: ICD-10-CM

## 2018-04-04 DIAGNOSIS — E87.20 ACIDOSIS: Primary | ICD-10-CM

## 2018-04-04 PROCEDURE — 99213 OFFICE O/P EST LOW 20 MIN: CPT | Mod: PBBFAC | Performed by: FAMILY MEDICINE

## 2018-04-04 PROCEDURE — 99214 OFFICE O/P EST MOD 30 MIN: CPT | Mod: S$PBB,,, | Performed by: FAMILY MEDICINE

## 2018-04-04 PROCEDURE — 99999 PR PBB SHADOW E&M-EST. PATIENT-LVL III: CPT | Mod: PBBFAC,,, | Performed by: FAMILY MEDICINE

## 2018-04-04 RX ORDER — FUROSEMIDE 20 MG/1
TABLET ORAL
Qty: 360 TABLET | Refills: 1 | Status: CANCELLED | OUTPATIENT
Start: 2018-04-04

## 2018-04-04 RX ORDER — SPIRONOLACTONE 25 MG/1
25 TABLET ORAL DAILY
Qty: 30 TABLET | Refills: 3 | Status: SHIPPED | OUTPATIENT
Start: 2018-04-04 | End: 2018-10-03 | Stop reason: SDUPTHER

## 2018-04-04 RX ORDER — NIFEDIPINE 90 MG/1
90 TABLET, EXTENDED RELEASE ORAL DAILY
Qty: 90 TABLET | Refills: 1 | Status: SHIPPED | OUTPATIENT
Start: 2018-04-04 | End: 2018-10-03 | Stop reason: SDUPTHER

## 2018-04-04 RX ORDER — TAMSULOSIN HYDROCHLORIDE 0.4 MG/1
1 CAPSULE ORAL EVERY MORNING
Qty: 90 CAPSULE | Refills: 1 | Status: CANCELLED | OUTPATIENT
Start: 2018-04-04

## 2018-04-04 RX ORDER — BENAZEPRIL HYDROCHLORIDE 20 MG/1
20 TABLET ORAL 2 TIMES DAILY
Qty: 180 TABLET | Refills: 1 | Status: SHIPPED | OUTPATIENT
Start: 2018-04-04 | End: 2018-10-03 | Stop reason: SDUPTHER

## 2018-04-04 RX ORDER — ATORVASTATIN CALCIUM 40 MG/1
40 TABLET, FILM COATED ORAL DAILY
Qty: 90 TABLET | Refills: 1 | Status: SHIPPED | OUTPATIENT
Start: 2018-04-04 | End: 2018-06-05 | Stop reason: SDUPTHER

## 2018-04-04 NOTE — TELEPHONE ENCOUNTER
----- Message from Penny Latham sent at 4/4/2018 12:37 PM CDT -----  Contact: Self            Name of Who is Calling: IMER ISRAEL [[id number]]      What is the request in detail: Pt is requesting to speak to the clinical team and did not provide details.      Can the clinic reply by [facility]: No      What [facility] to [facility] Back [facility]: [phone]

## 2018-04-05 RX ORDER — TAMSULOSIN HYDROCHLORIDE 0.4 MG/1
1 CAPSULE ORAL EVERY MORNING
Qty: 90 CAPSULE | Refills: 0 | Status: SHIPPED | OUTPATIENT
Start: 2018-04-05 | End: 2018-07-05 | Stop reason: SDUPTHER

## 2018-04-09 NOTE — PROGRESS NOTES
Subjective:       Patient ID: Vinnie Siegel is a 72 y.o. male.    Chief Complaint: Medicare AWV and Medication Refill    Pt presents today for DM, HTN f/u. Per pt, he is trying his best to keep up with his DM. Feels that it is well controlled but aware that his glucose levels and a1c indicate otherwise. Pt also states that he needs to have meds refilled. Is followed by endo  States that he took his BP meds this AM. Being followed by cards as well       Hypertension   Pertinent negatives include no chest pain, palpitations or shortness of breath.   Diabetes   Pertinent negatives for hypoglycemia include no dizziness. Pertinent negatives for diabetes include no chest pain and no weakness.   Medication Refill   Pertinent negatives include no chest pain or weakness.     Review of Systems   Constitutional: Negative for activity change and appetite change.   HENT: Negative.    Eyes: Negative for photophobia and visual disturbance.   Respiratory: Negative for chest tightness and shortness of breath.    Cardiovascular: Negative for chest pain, palpitations and leg swelling.   Neurological: Negative for dizziness, weakness and light-headedness.       Objective:      Physical Exam   Constitutional: He is oriented to person, place, and time. He appears well-developed and well-nourished.   HENT:   Head: Normocephalic and atraumatic.   Eyes: Conjunctivae and EOM are normal. Pupils are equal, round, and reactive to light. Right eye exhibits no discharge. Left eye exhibits no discharge. No scleral icterus.   Neck: Normal range of motion. Neck supple. No JVD present.   Cardiovascular: Normal rate, regular rhythm, normal heart sounds and intact distal pulses.    Pulmonary/Chest: Effort normal and breath sounds normal.   Musculoskeletal: Normal range of motion. He exhibits no edema or tenderness.   Neurological: He is alert and oriented to person, place, and time. He has normal reflexes. No cranial nerve deficit. He exhibits  normal muscle tone. Coordination normal.   Skin: Skin is warm and dry.   Psychiatric: He has a normal mood and affect. His behavior is normal. Judgment and thought content normal.       Assessment:       1. Encounter for Medicare annual wellness exam    2. Acidosis    3. Essential hypertension    4. Dilated cardiomyopathy    5. Encounter for screening for malignant neoplasm of prostate         Plan:       HTN: controlled.   DM: followed by endocrine, uncontrolled  Encounter for Medicare annual wellness exam  -     CBC auto differential; Future; Expected date: 04/04/2018  -     Lipid panel; Future; Expected date: 04/04/2018  -     PSA, Screening; Future; Expected date: 04/04/2018  -     TSH; Future; Expected date: 04/04/2018    Acidosis    Essential hypertension  -     CBC auto differential; Future; Expected date: 04/04/2018  -     Lipid panel; Future; Expected date: 04/04/2018  -     PSA, Screening; Future; Expected date: 04/04/2018  -     TSH; Future; Expected date: 04/04/2018  -     NIFEdipine (PROCARDIA-XL) 90 MG (OSM) 24 hr tablet; Take 1 tablet (90 mg total) by mouth once daily.  Dispense: 90 tablet; Refill: 1  -     benazepril (LOTENSIN) 20 MG tablet; Take 1 tablet (20 mg total) by mouth 2 (two) times daily.  Dispense: 180 tablet; Refill: 1    Dilated cardiomyopathy  -     CBC auto differential; Future; Expected date: 04/04/2018  -     Lipid panel; Future; Expected date: 04/04/2018  -     PSA, Screening; Future; Expected date: 04/04/2018  -     TSH; Future; Expected date: 04/04/2018    Encounter for screening for malignant neoplasm of prostate   -     PSA, Screening; Future; Expected date: 04/04/2018    Other orders  -     Cancel: Comprehensive metabolic panel; Future; Expected date: 04/04/2018  -     Cancel: Hepatitis C antibody; Future; Expected date: 04/04/2018  -     Cancel: tamsulosin (FLOMAX) 0.4 mg Cp24; Take 1 capsule (0.4 mg total) by mouth every morning.  Dispense: 90 capsule; Refill: 1  -      spironolactone (ALDACTONE) 25 MG tablet; Take 1 tablet (25 mg total) by mouth once daily.  Dispense: 30 tablet; Refill: 3  -     Cancel: furosemide (LASIX) 20 MG tablet; TAKE 1 TABLET BY MOUTH TWICE DAILY INCREASING TO 2 TABLETS TWICE DAILY FOR 3-4 LBS WEIGHT GAIN UNTIL RESOLVED.  Dispense: 360 tablet; Refill: 1  -     atorvastatin (LIPITOR) 40 MG tablet; Take 1 tablet (40 mg total) by mouth once daily.  Dispense: 90 tablet; Refill: 1      RTC prn symptoms or6 mos

## 2018-04-11 ENCOUNTER — PATIENT OUTREACH (OUTPATIENT)
Dept: DIABETES | Facility: CLINIC | Age: 73
End: 2018-04-11

## 2018-04-11 NOTE — PROGRESS NOTES
Logs reviewed. BG look  Great. Spoke with patient encouraged to continue what he is doing. Does not need to send in additional logs but bring with him to his upcoming endo follow up appt in June.

## 2018-04-23 RX ORDER — ATORVASTATIN CALCIUM 40 MG/1
TABLET, FILM COATED ORAL
Qty: 30 TABLET | Refills: 4 | Status: SHIPPED | OUTPATIENT
Start: 2018-04-23 | End: 2018-06-05

## 2018-05-26 DIAGNOSIS — M10.059 IDIOPATHIC GOUT OF HIP, UNSPECIFIED CHRONICITY, UNSPECIFIED LATERALITY: ICD-10-CM

## 2018-05-28 RX ORDER — ALLOPURINOL 100 MG/1
TABLET ORAL
Qty: 90 TABLET | Refills: 0 | Status: SHIPPED | OUTPATIENT
Start: 2018-05-28 | End: 2018-09-04 | Stop reason: SDUPTHER

## 2018-05-29 ENCOUNTER — LAB VISIT (OUTPATIENT)
Dept: LAB | Facility: HOSPITAL | Age: 73
End: 2018-05-29
Payer: MEDICARE

## 2018-05-29 DIAGNOSIS — Z79.4 TYPE 2 DIABETES MELLITUS WITH STAGE 3 CHRONIC KIDNEY DISEASE, WITH LONG-TERM CURRENT USE OF INSULIN: ICD-10-CM

## 2018-05-29 DIAGNOSIS — E11.22 TYPE 2 DIABETES MELLITUS WITH STAGE 3 CHRONIC KIDNEY DISEASE, WITH LONG-TERM CURRENT USE OF INSULIN: ICD-10-CM

## 2018-05-29 DIAGNOSIS — N18.30 TYPE 2 DIABETES MELLITUS WITH STAGE 3 CHRONIC KIDNEY DISEASE, WITH LONG-TERM CURRENT USE OF INSULIN: ICD-10-CM

## 2018-05-29 LAB
ALBUMIN SERPL BCP-MCNC: 3.3 G/DL
ALP SERPL-CCNC: 84 U/L
ALT SERPL W/O P-5'-P-CCNC: 25 U/L
ANION GAP SERPL CALC-SCNC: 8 MMOL/L
AST SERPL-CCNC: 17 U/L
BILIRUB SERPL-MCNC: 0.5 MG/DL
BUN SERPL-MCNC: 22 MG/DL
CALCIUM SERPL-MCNC: 8.7 MG/DL
CHLORIDE SERPL-SCNC: 109 MMOL/L
CO2 SERPL-SCNC: 22 MMOL/L
CREAT SERPL-MCNC: 2.5 MG/DL
EST. GFR  (AFRICAN AMERICAN): 28.8 ML/MIN/1.73 M^2
EST. GFR  (NON AFRICAN AMERICAN): 24.9 ML/MIN/1.73 M^2
ESTIMATED AVG GLUCOSE: 160 MG/DL
GLUCOSE SERPL-MCNC: 162 MG/DL
HBA1C MFR BLD HPLC: 7.2 %
POTASSIUM SERPL-SCNC: 4.1 MMOL/L
PROT SERPL-MCNC: 6.7 G/DL
SODIUM SERPL-SCNC: 139 MMOL/L

## 2018-05-29 PROCEDURE — 83036 HEMOGLOBIN GLYCOSYLATED A1C: CPT

## 2018-05-29 PROCEDURE — 36415 COLL VENOUS BLD VENIPUNCTURE: CPT

## 2018-05-29 PROCEDURE — 80053 COMPREHEN METABOLIC PANEL: CPT

## 2018-06-01 DIAGNOSIS — I42.0 DILATED CARDIOMYOPATHY: ICD-10-CM

## 2018-06-01 RX ORDER — FUROSEMIDE 20 MG/1
TABLET ORAL
Qty: 360 TABLET | Refills: 3 | Status: SHIPPED | OUTPATIENT
Start: 2018-06-01 | End: 2018-10-03 | Stop reason: SDUPTHER

## 2018-06-04 NOTE — PROGRESS NOTES
CC: This 72 y.o. male presents for management of diabetes mellitus     HPI: Mr. Siegel is a 70 year old gentleman with type 2 diabetes diagnosed 2010. He has never been hospitalized for his diabetes. Denies missed doses of medication.    Pt was last seen by me in 2/2018 and is now being seen by me again today.  a1c has improved.  Lab Results   Component Value Date    HGBA1C 7.2 (H) 05/29/2018     No hypoglycemia ---80s-90s lowest-rarely                          Exercise: no formal exercise, related to disk in his back      Eats 3 meals a day, will sometimes snack in-between meals- rare chips and candy  Bedtime snack- yogurt    Retired:  since 2008        ShowEvidence True Metrix meter 2017  DM education completed     CURRENT DM MEDS:  Humalog 14-14-12 u w/ breakfast lunch dinner, Lantus 40 u \[facility] [place] [facility]\""      Standards of Care:  Eye exam:  9/2017 no retinopathy     ROS:   Gen: Appetite good,  weight stable, denies fatigue and weakness.  Skin: Skin is intact and heals well, no rashes   Eyes: Denies visual disturbances  Resp: no SOB or ZEPEDA, no cough  Cardiac: No palpitations, chest pain, no edema   GI: No nausea or vomiting, diarrhea, constipation, or abdominal pain.  /GYN: 1-2+ nocturia, burning or pain.   MS/Neuro: Denies numbness/ tingling in BLE; Gait steady, speech clear  Psych: Denies drug/ETOH abuse, no hx of depression.  Other systems: negative.    PE:  GENERAL: Well developed, well nourished.  PSYCH: AAOx3, appropriate mood and affect, pleasant expression, conversant, appears relaxed, well groomed.   EYES: Conjunctiva, corneas clear  NECK: Supple, trachea midline,no thyromegaly or nodules .  CHEST: Resp even and unlabored, CTA bilateral.  CARDIAC: RRR, S1, S2 heard, no murmurs  ABDOMEN: Soft, non-tender, non-distended    VASCULAR: DP pulses +2/4 bilaterally, no edema.  NEURO: Gait steady  SKIN: Skin warm and dry no acanthosis nigracans.  FEET:  Footware appropriate .     Hemoglobin A1C   Date Value Ref  Range Status   05/29/2018 7.2 (H) 4.0 - 5.6 % Final     Comment:     According to ADA guidelines, hemoglobin A1c <7.0% represents  optimal control in non-pregnant diabetic patients. Different  metrics may apply to specific patient populations.   Standards of Medical Care in Diabetes-2016.  For the purpose of screening for the presence of diabetes:  <5.7%     Consistent with the absence of diabetes  5.7-6.4%  Consistent with increasing risk for diabetes   (prediabetes)  >or=6.5%  Consistent with diabetes  Currently, no consensus exists for use of hemoglobin A1c  for diagnosis of diabetes for children.  This Hemoglobin A1c assay has significant interference with fetal   hemoglobin   (HbF). The results are invalid for patients with abnormal amounts of   HbF,   including those with known Hereditary Persistence   of Fetal Hemoglobin. Heterozygous hemoglobin variants (HbAS, HbAC,   HbAD, HbAE, HbA2) do not significantly interfere with this assay;   however, presence of multiple variants in a sample may impact the %   interference.     02/19/2018 7.6 (H) 4.0 - 5.6 % Final     Comment:     According to ADA guidelines, hemoglobin A1c <7.0% represents  optimal control in non-pregnant diabetic patients. Different  metrics may apply to specific patient populations.   Standards of Medical Care in Diabetes-2016.  For the purpose of screening for the presence of diabetes:  <5.7%     Consistent with the absence of diabetes  5.7-6.4%  Consistent with increasing risk for diabetes   (prediabetes)  >or=6.5%  Consistent with diabetes  Currently, no consensus exists for use of hemoglobin A1c  for diagnosis of diabetes for children.  This Hemoglobin A1c assay has significant interference with fetal   hemoglobin   (HbF). The results are invalid for patients with abnormal amounts of   HbF,   including those with known Hereditary Persistence   of Fetal Hemoglobin. Heterozygous hemoglobin variants (HbAS, HbAC,   HbAD, HbAE, HbA2) do not  significantly interfere with this assay;   however, presence of multiple variants in a sample may impact the %   interference.     10/25/2017 8.0 (H) 4.0 - 5.6 % Final     Comment:     According to ADA guidelines, hemoglobin A1c <7.0% represents  optimal control in non-pregnant diabetic patients. Different  metrics may apply to specific patient populations.   Standards of Medical Care in Diabetes-2016.  For the purpose of screening for the presence of diabetes:  <5.7%     Consistent with the absence of diabetes  5.7-6.4%  Consistent with increasing risk for diabetes   (prediabetes)  >or=6.5%  Consistent with diabetes  Currently, no consensus exists for use of hemoglobin A1c  for diagnosis of diabetes for children.  This Hemoglobin A1c assay has significant interference with fetal   hemoglobin   (HbF). The results are invalid for patients with abnormal amounts of   HbF,   including those with known Hereditary Persistence   of Fetal Hemoglobin. Heterozygous hemoglobin variants (HbAS, HbAC,   HbAD, HbAE, HbA2) do not significantly interfere with this assay;   however, presence of multiple variants in a sample may impact the %   interference.        ASSESSMENT and PLAN:  1. Type 2 diabetes mellitus with stage 3 chronic kidney disease, with long-term current use of insulin  Hemoglobin A1c next time  F/u in 3 mos  Continue regimen above.  a1c goal less than 7.5%       2. Obesity (BMI 30-39.9)  Body mass index is 42.03 kg/m². may increase insulin resistance.  Limited w/ exercise r/t back  Chair exercises for at least 15 mins x 2 for 3-4 days a week recommended   3. CKD (chronic kidney disease) stage 3, GFR 30-59 ml/min  Stable  Avoid hypoglycemia   4. Essential hypertension  Controlled, continue med(s)   5. Hyperlipidemia, unspecified hyperlipidemia type  Lab Results   Component Value Date    LDLCALC 68.6 10/25/2017     At goal   6. Tobacco abuse  Discussed cessation   7. Proteinuria, unspecified type  See above   8.  Nuclear sclerosis of left eye  F/u with ophthalmology   9. Dilated cardiomyopathy  Avoid hypoglycemia   10. Type 2 diabetes mellitus with stage 3 chronic kidney disease, without long-term current use of insulin  TRUE METRIX GLUCOSE TEST STRIP Strp-printed rx  See above    TRUEPLUS LANCETS 33 gauge Misc-printed rx

## 2018-06-05 ENCOUNTER — OFFICE VISIT (OUTPATIENT)
Dept: ENDOCRINOLOGY | Facility: CLINIC | Age: 73
End: 2018-06-05
Payer: MEDICARE

## 2018-06-05 VITALS
HEIGHT: 73 IN | HEART RATE: 66 BPM | BODY MASS INDEX: 41.75 KG/M2 | SYSTOLIC BLOOD PRESSURE: 140 MMHG | WEIGHT: 315 LBS | DIASTOLIC BLOOD PRESSURE: 78 MMHG | RESPIRATION RATE: 16 BRPM

## 2018-06-05 DIAGNOSIS — Z79.4 TYPE 2 DIABETES MELLITUS WITH STAGE 3 CHRONIC KIDNEY DISEASE, WITH LONG-TERM CURRENT USE OF INSULIN: Primary | ICD-10-CM

## 2018-06-05 DIAGNOSIS — H25.12 NUCLEAR SCLEROSIS OF LEFT EYE: ICD-10-CM

## 2018-06-05 DIAGNOSIS — N18.30 TYPE 2 DIABETES MELLITUS WITH STAGE 3 CHRONIC KIDNEY DISEASE, WITH LONG-TERM CURRENT USE OF INSULIN: Primary | ICD-10-CM

## 2018-06-05 DIAGNOSIS — I42.0 DILATED CARDIOMYOPATHY: ICD-10-CM

## 2018-06-05 DIAGNOSIS — E11.22 TYPE 2 DIABETES MELLITUS WITH STAGE 3 CHRONIC KIDNEY DISEASE, WITH LONG-TERM CURRENT USE OF INSULIN: Primary | ICD-10-CM

## 2018-06-05 DIAGNOSIS — Z72.0 TOBACCO ABUSE: Chronic | ICD-10-CM

## 2018-06-05 DIAGNOSIS — N18.30 TYPE 2 DIABETES MELLITUS WITH STAGE 3 CHRONIC KIDNEY DISEASE, WITHOUT LONG-TERM CURRENT USE OF INSULIN: ICD-10-CM

## 2018-06-05 DIAGNOSIS — E11.22 TYPE 2 DIABETES MELLITUS WITH STAGE 3 CHRONIC KIDNEY DISEASE, WITHOUT LONG-TERM CURRENT USE OF INSULIN: ICD-10-CM

## 2018-06-05 DIAGNOSIS — I10 ESSENTIAL HYPERTENSION: ICD-10-CM

## 2018-06-05 DIAGNOSIS — R80.9 PROTEINURIA, UNSPECIFIED TYPE: ICD-10-CM

## 2018-06-05 DIAGNOSIS — E78.5 HYPERLIPIDEMIA, UNSPECIFIED HYPERLIPIDEMIA TYPE: ICD-10-CM

## 2018-06-05 DIAGNOSIS — N18.30 CKD (CHRONIC KIDNEY DISEASE) STAGE 3, GFR 30-59 ML/MIN: ICD-10-CM

## 2018-06-05 DIAGNOSIS — E66.9 OBESITY (BMI 30-39.9): ICD-10-CM

## 2018-06-05 PROCEDURE — 99214 OFFICE O/P EST MOD 30 MIN: CPT | Mod: S$PBB,,, | Performed by: NURSE PRACTITIONER

## 2018-06-05 PROCEDURE — 99215 OFFICE O/P EST HI 40 MIN: CPT | Mod: PBBFAC | Performed by: NURSE PRACTITIONER

## 2018-06-05 PROCEDURE — 99999 PR PBB SHADOW E&M-EST. PATIENT-LVL V: CPT | Mod: PBBFAC,,, | Performed by: NURSE PRACTITIONER

## 2018-06-05 RX ORDER — INSULIN LISPRO 100 [IU]/ML
INJECTION, SOLUTION INTRAVENOUS; SUBCUTANEOUS
Qty: 15 ML | Refills: 12 | Status: SHIPPED | OUTPATIENT
Start: 2018-06-05 | End: 2018-09-11

## 2018-06-05 RX ORDER — INSULIN GLARGINE 100 [IU]/ML
40 INJECTION, SOLUTION SUBCUTANEOUS NIGHTLY
Qty: 15 ML | Refills: 12 | Status: SHIPPED | OUTPATIENT
Start: 2018-06-05 | End: 2018-09-11

## 2018-06-05 RX ORDER — ATORVASTATIN CALCIUM 40 MG/1
40 TABLET, FILM COATED ORAL DAILY
Qty: 90 TABLET | Refills: 3 | Status: SHIPPED | OUTPATIENT
Start: 2018-06-05 | End: 2018-10-03 | Stop reason: SDUPTHER

## 2018-06-05 RX ORDER — PEN NEEDLE, DIABETIC 30 GX3/16"
NEEDLE, DISPOSABLE MISCELLANEOUS
Qty: 400 EACH | Refills: 3 | Status: SHIPPED | OUTPATIENT
Start: 2018-06-05 | End: 2019-02-05

## 2018-06-05 RX ORDER — CALCIUM CITRATE/VITAMIN D3 200MG-6.25
TABLET ORAL
Qty: 400 STRIP | Refills: 3 | Status: SHIPPED | OUTPATIENT
Start: 2018-06-05 | End: 2019-05-24 | Stop reason: SDUPTHER

## 2018-06-05 RX ORDER — LANCETS 33 GAUGE
1 EACH MISCELLANEOUS 4 TIMES DAILY
Qty: 400 EACH | Refills: 3 | Status: SHIPPED | OUTPATIENT
Start: 2018-06-05 | End: 2019-06-13

## 2018-06-05 NOTE — PATIENT INSTRUCTIONS
Snacks can be an important part of a balanced, healthy meal plan. They allow you to eat more frequently, feeling full and satisfied throughout the day. Also, they allow you to spread carbohydrates evenly, which may stabilize blood sugars.  Plus, snacks are enjoyable!     The amount of carbohydrate needed at snacks varies. Generally, about 15-30 grams of carbohydrate per snack is recommended.  Below you will find some tasty treats.       0-5 gm carb   Crystal Light   Vitamin Water Zero   Herbal tea, unsweetened   2 tsp peanut butter on celery   1./2 cup sugar-free jell-o   1 sugar-free popsicle   ¼ cup blueberries   8oz Blue Norah unsweetened almond milk   5 baby carrots & celery sticks, cucumbers, bell peppers dipped in ¼ cup salsa, 2Tbsp light ranch dressing or 2Tbsp plain Greek yogurt   10 Goldfish crackers   ½ oz low-fat cheese or string cheese   1 closed handful of nuts, unsalted   1 Tbsp of sunflower seeds, unsalted   1 cup Smart Pop popcorn   1 whole grain brown rice cake        15 gm carb   1 small piece of fruit or ½ banana or 1/2 cup lite canned fruit   3 bibi cracker squares   3 cups Smart Pop popcorn, top spray butter, Ray lite salt or cinnamon and Truvia   5 Vanilla Wafers   ½ cup low fat, no added sugar ice cream or frozen yogurt (Blue bell, Blue Bunny, Weight Watchers, Skinny Cow)   ½ turkey, ham, or chicken sandwich   ½ c fruit with ½ c Cottage cheese   4-6 unsalted wheat crackers with 1 oz low fat cheese or 1 tbsp peanut butter    30-45 goldfish crackers (depending on flavor)    7-8 Zoroastrian mini brown rice cakes (caramel, apple cinnamon, chocolate)    12 Zoroastrian mini brown rice cakes (cheddar, bbq, ranch)    1/3 cup hummus dip with raw veg   1/2 whole wheat nehemiah, 1Tbsp hummus   Mini Pizza (1/2 whole wheat English muffin, low-fat  cheese, tomato sauce)   100 calorie snack pack (Oreo, Chips Ahoy, Ritz Mix, Baked Cheetos)   4-6 oz. light or Greek Style yogurt  (Alyse Bear, [place], [facility])   ½ cup sugar-free pudding     6 in. wheat tortilla or nehemiah oven toasted chips (topped with spray butter flavoring, cinnamon, Truvia OR spray butter, garlic powder, chili powder)    18 BBQ Popchips (available at [employer], Whole Foods, Fresh Market)                 Diabetes Support Group Meetings         Date: Topic:   February 8 Health Promotion/Cooking Demo   March 8 Taking Care of Your Kidneys   April 12 Taking Care of Your Feet   May 10 Ease Your Mind with Diabetes   Kyay 14 Summer Treats/Cooking Demo   July 12 Super Market Sweep   August 9 Taking Care of Your Eyes   Sept 13 Technology/ADA updates   October 11 Recipes & Treats/Cooking Demo   November 8 Heart Health/Pump it up!   December 13 Year-End Close Out        Meetings are held in the [facility] (A) of the [facility] located at [address] [place] [address]. Please call [phone] for additional information.    Free service, offered every 2nd Thursday of every month! Family members and/or friends are welcome as well!  Support group is for patients with type 1 or type 2 diabetes.    From 3:30p to 4:30p

## 2018-06-08 ENCOUNTER — OFFICE VISIT (OUTPATIENT)
Dept: OPTOMETRY | Facility: CLINIC | Age: 73
End: 2018-06-08
Payer: MEDICARE

## 2018-06-08 DIAGNOSIS — H40.022 OPEN ANGLE WITH BORDERLINE FINDINGS, HIGH RISK, LEFT: ICD-10-CM

## 2018-06-08 DIAGNOSIS — H40.1113 PRIMARY OPEN ANGLE GLAUCOMA OF RIGHT EYE, SEVERE STAGE: Primary | ICD-10-CM

## 2018-06-08 PROCEDURE — 99999 PR PBB SHADOW E&M-EST. PATIENT-LVL II: CPT | Mod: PBBFAC,,, | Performed by: OPTOMETRIST

## 2018-06-08 PROCEDURE — 99212 OFFICE O/P EST SF 10 MIN: CPT | Mod: PBBFAC | Performed by: OPTOMETRIST

## 2018-06-08 PROCEDURE — 92012 INTRM OPH EXAM EST PATIENT: CPT | Mod: S$PBB,,, | Performed by: OPTOMETRIST

## 2018-06-08 NOTE — PROGRESS NOTES
HPI     Glaucoma    Additional comments: 5 month IOP ck           Comments   Last eye exam was 1/23/18 with Dr. Manriquez.  Patient states no changes since last exam.  Patient denies diplopia, headaches, flashes/floaters, and pain.    Timolol BID OD       Last edited by Laura Linda on 6/8/2018 10:08 AM. (History)            Assessment /Plan     For exam results, see Encounter Report.    Primary open angle glaucoma of right eye, severe stage    Open angle with borderline findings, high risk, left            1-2.  Continue Timolol twice daily OD only.  Eye pressure and testing stable OU.  Patient does not recall having trauma to right eye.  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 Started patient on Latanoprost in 2013 but patient had side effects.  HVF: 1/23/18  OCT: 1/23/18  DFE: 9/19/17  Photos: 9/19/17  Gonio: 9/8/16  Pachy: 564 OD, 566 OS  Initial IOPs: 19 OD, 15 OS(Exam 2012-no drops)  MHx: DM, HTN  FHx: none                      RTC 4 months iop check and dfe.

## 2018-06-13 DIAGNOSIS — J30.2 SEASONAL ALLERGIES: ICD-10-CM

## 2018-06-13 RX ORDER — FLUTICASONE PROPIONATE 50 MCG
SPRAY, SUSPENSION (ML) NASAL
Qty: 16 ML | Refills: 0 | Status: SHIPPED | OUTPATIENT
Start: 2018-06-13 | End: 2018-07-30 | Stop reason: SDUPTHER

## 2018-07-03 ENCOUNTER — TELEPHONE (OUTPATIENT)
Dept: UROLOGY | Facility: CLINIC | Age: 73
End: 2018-07-03

## 2018-07-03 DIAGNOSIS — E87.20 ACIDOSIS: ICD-10-CM

## 2018-07-03 DIAGNOSIS — N20.0 KIDNEY STONES: Primary | ICD-10-CM

## 2018-07-03 RX ORDER — TAMSULOSIN HYDROCHLORIDE 0.4 MG/1
1 CAPSULE ORAL EVERY MORNING
Qty: 90 CAPSULE | Refills: 3 | Status: CANCELLED | OUTPATIENT
Start: 2018-07-03

## 2018-07-03 NOTE — TELEPHONE ENCOUNTER
----- Message from Lizy Baptiste LPN sent at 7/3/2018  7:53 AM CDT -----   Ultrasound order ?   ----- Message -----  From: Delma Phillip LPN  Sent: 7/2/2018   3:20 PM  To: Lizy Baptiste LPN        ----- Message -----  From: Megha Mathur  Sent: 7/2/2018   9:55 AM  To: [facility] Staff    Patient Requesting Order    Order Needed: u/s  Communication Preference: [phone]  Additional Information:  Pt's rtc states u/s needed. No orders are in.

## 2018-07-05 DIAGNOSIS — E87.20 ACIDOSIS: ICD-10-CM

## 2018-07-05 RX ORDER — TAMSULOSIN HYDROCHLORIDE 0.4 MG/1
1 CAPSULE ORAL EVERY MORNING
Qty: 90 CAPSULE | Refills: 0 | Status: SHIPPED | OUTPATIENT
Start: 2018-07-05 | End: 2018-10-01 | Stop reason: SDUPTHER

## 2018-07-05 NOTE — TELEPHONE ENCOUNTER
----- Message from Marilyn Gomez sent at 7/3/2018  3:45 PM CDT -----  Contact: pt  Can the clinic reply in [facility]: no    Please refill the medication(s) listed below. Please call the patient when the prescription(s) is ready for  at this phone number      [phone]    Medication #1 tamsulosin (FLOMAX) 0.4 mg Cp24  Medication #2    Preferred Pharmacy:[facility] [address] [place] [address] [place] [address] [address]  AT [facility]

## 2018-07-26 ENCOUNTER — OFFICE VISIT (OUTPATIENT)
Dept: CARDIOLOGY | Facility: CLINIC | Age: 73
End: 2018-07-26
Payer: MEDICARE

## 2018-07-26 VITALS
BODY MASS INDEX: 40.43 KG/M2 | WEIGHT: 315 LBS | HEIGHT: 74 IN | DIASTOLIC BLOOD PRESSURE: 73 MMHG | SYSTOLIC BLOOD PRESSURE: 155 MMHG | HEART RATE: 62 BPM

## 2018-07-26 DIAGNOSIS — E78.5 HYPERLIPIDEMIA, UNSPECIFIED HYPERLIPIDEMIA TYPE: ICD-10-CM

## 2018-07-26 DIAGNOSIS — E66.9 OBESITY (BMI 30-39.9): ICD-10-CM

## 2018-07-26 DIAGNOSIS — I10 ESSENTIAL HYPERTENSION: Primary | ICD-10-CM

## 2018-07-26 DIAGNOSIS — N18.30 CKD (CHRONIC KIDNEY DISEASE) STAGE 3, GFR 30-59 ML/MIN: ICD-10-CM

## 2018-07-26 DIAGNOSIS — Z72.0 TOBACCO ABUSE: Chronic | ICD-10-CM

## 2018-07-26 DIAGNOSIS — I42.0 DILATED CARDIOMYOPATHY: ICD-10-CM

## 2018-07-26 PROCEDURE — 99999 PR PBB SHADOW E&M-EST. PATIENT-LVL III: CPT | Mod: PBBFAC,,, | Performed by: INTERNAL MEDICINE

## 2018-07-26 PROCEDURE — 99214 OFFICE O/P EST MOD 30 MIN: CPT | Mod: S$PBB,,, | Performed by: INTERNAL MEDICINE

## 2018-07-26 PROCEDURE — 99213 OFFICE O/P EST LOW 20 MIN: CPT | Mod: PBBFAC | Performed by: INTERNAL MEDICINE

## 2018-07-26 NOTE — PROGRESS NOTES
Subjective:   Patient ID:  Vinnie Siegel is a 72 y.o. male who presents for follow-up of Essential hypertension (6 months fu)      HPI:   Vinnie Siegel presents for follow up of hypertension and mild dilated cardiomyopathy. Vinnie Siegel continues to smoke. He has had more ZEPEDA recently with a 6 lb weight gain since last seen. BP elevated but did not as yet take his medication. Vinnie Siegel denies chest pain, palpitations, presyncope , or syncope. Vinnie Siegel has dyslipidemia  on high intensity statin. Vinnie Siegel chronic kidney disease now stage IV.    Review of Systems   Constitution: Positive for weight gain. Negative for weakness, malaise/fatigue and weight loss.   Eyes: Negative for blurred vision.   Cardiovascular: Positive for dyspnea on exertion. Negative for chest pain, claudication, cyanosis, irregular heartbeat, leg swelling, near-syncope, orthopnea, palpitations, paroxysmal nocturnal dyspnea and syncope.   Respiratory: Negative for cough, shortness of breath and wheezing.    Musculoskeletal: Positive for back pain and joint pain. Negative for falls and myalgias.   Gastrointestinal: Negative for abdominal pain, heartburn, nausea and vomiting.   Genitourinary: Negative for nocturia.   Neurological: Negative for brief paralysis, dizziness, focal weakness, headaches, numbness and paresthesias.   Psychiatric/Behavioral: Negative for altered mental status.       Current Outpatient Prescriptions   Medication Sig    albuterol 90 mcg/actuation inhaler Inhale 1-2 puffs into the lungs every 6 (six) hours as needed for Wheezing or Shortness of Breath.    allopurinol (ZYLOPRIM) 100 MG tablet TAKE 1 TABLET BY MOUTH ONCE DAILY    atorvastatin (LIPITOR) 40 MG tablet Take 1 tablet (40 mg total) by mouth once daily.    benazepril (LOTENSIN) 20 MG tablet Take 1 tablet (20 mg total) by mouth 2 (two) times daily.    blood-glucose meter kit Use as instructed    carvedilol (COREG) 25 MG tablet Take  "1 tablet (25 mg total) by mouth 2 (two) times daily with meals.    chlorzoxazone (PARAFON FORTE) 500 mg Tab Take 500 mg by mouth 3 (three) times daily.    fluticasone (FLONASE) 50 mcg/actuation nasal spray SPRAY TWICE IN EACH NOSTRIL ONCE DAILY    furosemide (LASIX) 20 MG tablet TAKE 1 TABLET BY MOUTH TWICE DAILY INCREASING TO 2 TABLETS TWICE DAILY FOR 3-4 LBS WEIGHT GAIN UNTIL RESOLVED.    hydrocodone-acetaminophen 7.5-325mg (NORCO) 7.5-325 mg per tablet TK 1 T PO  TID PRN P    insulin glargine (LANTUS SOLOSTAR U-100 INSULIN) 100 unit/mL (3 mL) InPn pen Inject 40 Units into the skin every evening.    insulin lispro (HUMALOG KWIKPEN INSULIN) 100 unit/mL InPn pen Inject 14 units w/ breakfast, lunch, 12 units w/ dinner plus scale 150-200+2, 201-250+4, 251-300+6, 301-350+8.    magnesium oxide (MAGOX) 400 mg tablet Take 1 tablet (400 mg total) by mouth every morning.    NIFEdipine (PROCARDIA-XL) 90 MG (OSM) 24 hr tablet Take 1 tablet (90 mg total) by mouth once daily.    pen needle, diabetic (BD ULTRA-FINE SHORT PEN NEEDLE) 31 gauge x 5/16" Ndle Uses 4 daily, 90 day supply.    sodium bicarbonate 650 MG tablet TAKE 1 TABLET BY MOUTH TWICE DAILY    spironolactone (ALDACTONE) 25 MG tablet Take 1 tablet (25 mg total) by mouth once daily.    tamsulosin (FLOMAX) 0.4 mg Cp24 Take 1 capsule (0.4 mg total) by mouth every morning.    timolol maleate 0.25% (TIMOPTIC) 0.25 % Drop Place 1 drop into the right eye 2 (two) times daily.    TRUE METRIX GLUCOSE TEST STRIP Strp Blood glucose checks 4 times a day.    TRUEPLUS LANCETS 33 gauge Misc Inject 1 lancet into the skin 4 (four) times daily.     No current facility-administered medications for this visit.      Objective:   Physical Exam   Constitutional: He is oriented to person, place, and time. He appears well-developed. No distress.   BP (!) 155/73 (BP Location: Left arm, Patient Position: Sitting, BP Method: X-Large (Automatic))   Pulse 62   Ht 6' 2" (1.88 m)   " Wt (!) 146.6 kg (323 lb 3.1 oz)   BMI 41.50 kg/m²    HENT:   Head: Normocephalic.   Right Ear: External ear normal.   Left Ear: External ear normal.   Eyes: EOM are normal. Pupils are equal, round, and reactive to light. No scleral icterus.   Neck: Neck supple. No JVD present. No thyromegaly present.   Cardiovascular: Normal rate, regular rhythm and intact distal pulses.  PMI is not displaced.  Exam reveals no gallop and no friction rub.    Murmur heard.   Medium-pitched midsystolic murmur is present with a grade of 2/6  at the upper right sternal border, upper left sternal border 2+ bilateral pedal edema  Pulmonary/Chest: Effort normal and breath sounds normal. No respiratory distress. He has no wheezes. He has no rales.   Abdominal: Soft. He exhibits no distension. There is no hepatosplenomegaly. There is no tenderness.   Musculoskeletal: He exhibits no edema or tenderness.   Gait normal   Neurological: He is alert and oriented to person, place, and time.   Skin: Skin is warm and dry. No rash noted.   Psychiatric: He has a normal mood and affect. His behavior is normal.       Lab Results   Component Value Date     05/29/2018    K 4.1 05/29/2018     05/29/2018    CO2 22 (L) 05/29/2018    BUN 22 05/29/2018    CREATININE 2.5 (H) 05/29/2018     (H) 05/29/2018    HGBA1C 7.2 (H) 05/29/2018    AST 17 05/29/2018    ALT 25 05/29/2018    ALBUMIN 3.3 (L) 05/29/2018    PROT 6.7 05/29/2018    BILITOT 0.5 05/29/2018    WBC 8.22 12/23/2015    HGB 14.3 05/08/2017    HCT 41.2 05/08/2017    MCV 91 12/23/2015     12/23/2015    TSH 1.091 01/25/2017    CHOL 131 10/25/2017    HDL 43 10/25/2017    LDLCALC 68.6 10/25/2017    TRIG 97 10/25/2017       Assessment:     1. Essential hypertension : Elevation but has not taken medications as yet   2. Dilated cardiomyopathy: Evidence of mild decompensation    3. Hyperlipidemia, unspecified hyperlipidemia type :  on high intensity statin   4. Obesity (BMI 30-39.9) :  Worsening   5. Tobacco abuse : Ongoing   6. CKD (chronic kidney disease) stage 3, GFR 30-59 ml/min        Plan:     Vinnie was seen today for essential hypertension.    Diagnoses and all orders for this visit:    Essential hypertension  Continue current regimen    Dilated cardiomyopathy  To double furosemide until he has lost 4 lbs then back to original dose doubling for short term weight gain  Hyperlipidemia, unspecified hyperlipidemia type  Continue current regimen    Obesity (BMI 30-39.9)  Mediterranean Diet recommended with carbohydrate and sodium  restriction  Tobacco abuse  Given pamphlet for Smoking Cessation Program  CKD (chronic kidney disease) stage 3, GFR 30-59 ml/min

## 2018-07-30 DIAGNOSIS — J30.2 SEASONAL ALLERGIES: ICD-10-CM

## 2018-07-30 RX ORDER — FLUTICASONE PROPIONATE 50 MCG
SPRAY, SUSPENSION (ML) NASAL
Qty: 16 ML | Refills: 0 | Status: SHIPPED | OUTPATIENT
Start: 2018-07-30 | End: 2018-10-01 | Stop reason: SDUPTHER

## 2018-07-30 RX ORDER — LANOLIN ALCOHOL/MO/W.PET/CERES
CREAM (GRAM) TOPICAL
Qty: 90 TABLET | Refills: 0 | Status: SHIPPED | OUTPATIENT
Start: 2018-07-30 | End: 2018-10-03 | Stop reason: SDUPTHER

## 2018-08-23 ENCOUNTER — HOSPITAL ENCOUNTER (OUTPATIENT)
Dept: RADIOLOGY | Facility: HOSPITAL | Age: 73
Discharge: HOME | End: 2018-08-23
Attending: UROLOGY
Payer: MEDICARE

## 2018-08-23 DIAGNOSIS — N20.0 KIDNEY STONES: ICD-10-CM

## 2018-08-23 DIAGNOSIS — N18.4 CKD (CHRONIC KIDNEY DISEASE) STAGE 4, GFR 15-29 ML/MIN: Primary | ICD-10-CM

## 2018-08-23 PROCEDURE — 76770 US EXAM ABDO BACK WALL COMP: CPT | Mod: 26,,, | Performed by: RADIOLOGY

## 2018-08-23 PROCEDURE — 76770 US EXAM ABDO BACK WALL COMP: CPT | Mod: TC

## 2018-08-24 ENCOUNTER — TELEPHONE (OUTPATIENT)
Dept: UROLOGY | Facility: CLINIC | Age: 73
End: 2018-08-24

## 2018-08-24 NOTE — TELEPHONE ENCOUNTER
----- Message from Nara Thompson MD sent at 8/23/2018  4:21 PM CDT -----  No stones. Shows evidence of chronic kidney disease.

## 2018-08-24 NOTE — TELEPHONE ENCOUNTER
Pt notified of US and testosterone lab results. Verbalized understanding. Reminded of appt for 8/28.

## 2018-08-27 ENCOUNTER — TELEPHONE (OUTPATIENT)
Dept: NEPHROLOGY | Facility: CLINIC | Age: 73
End: 2018-08-27

## 2018-08-28 ENCOUNTER — OFFICE VISIT (OUTPATIENT)
Dept: UROLOGY | Facility: CLINIC | Age: 73
End: 2018-08-28
Payer: MEDICARE

## 2018-08-28 VITALS
DIASTOLIC BLOOD PRESSURE: 75 MMHG | WEIGHT: 315 LBS | BODY MASS INDEX: 40.43 KG/M2 | HEIGHT: 74 IN | HEART RATE: 64 BPM | SYSTOLIC BLOOD PRESSURE: 130 MMHG

## 2018-08-28 DIAGNOSIS — N18.4 CKD (CHRONIC KIDNEY DISEASE) STAGE 4, GFR 15-29 ML/MIN: ICD-10-CM

## 2018-08-28 DIAGNOSIS — E11.22 TYPE 2 DIABETES MELLITUS WITH STAGE 3 CHRONIC KIDNEY DISEASE, WITH LONG-TERM CURRENT USE OF INSULIN: ICD-10-CM

## 2018-08-28 DIAGNOSIS — Z72.0 TOBACCO ABUSE: Primary | Chronic | ICD-10-CM

## 2018-08-28 DIAGNOSIS — I42.0 DILATED CARDIOMYOPATHY: ICD-10-CM

## 2018-08-28 DIAGNOSIS — E66.9 OBESITY (BMI 30-39.9): ICD-10-CM

## 2018-08-28 DIAGNOSIS — N20.0 CALCULUS OF BOTH KIDNEYS: ICD-10-CM

## 2018-08-28 DIAGNOSIS — N18.30 TYPE 2 DIABETES MELLITUS WITH STAGE 3 CHRONIC KIDNEY DISEASE, WITH LONG-TERM CURRENT USE OF INSULIN: ICD-10-CM

## 2018-08-28 DIAGNOSIS — Z79.4 TYPE 2 DIABETES MELLITUS WITH STAGE 3 CHRONIC KIDNEY DISEASE, WITH LONG-TERM CURRENT USE OF INSULIN: ICD-10-CM

## 2018-08-28 DIAGNOSIS — I10 ESSENTIAL HYPERTENSION: ICD-10-CM

## 2018-08-28 PROCEDURE — 99999 PR PBB SHADOW E&M-EST. PATIENT-LVL III: CPT | Mod: PBBFAC,,, | Performed by: UROLOGY

## 2018-08-28 PROCEDURE — 99213 OFFICE O/P EST LOW 20 MIN: CPT | Mod: PBBFAC | Performed by: UROLOGY

## 2018-08-28 PROCEDURE — 99214 OFFICE O/P EST MOD 30 MIN: CPT | Mod: S$PBB,,, | Performed by: UROLOGY

## 2018-08-28 NOTE — PROGRESS NOTES
Subjective:       Patient ID: Vinnie Siegel is a 73 y.o. male.    Chief Complaint: Nephrolithiasis    HPI Comments: Vinnie Siegel is a 73 y.o. Male with kidney stones. He has a history of ureteroscopy and ESWL. It has been a couple of years since we have had active stones.   He had abnormal metabolic workup and was sent to nephrology.   He is here for follow up with an ultrasound.  Films personally reviewed.   He is on allopurinol, sodium bicard and mag oxide. All given by Dr. Ramírez.  He has stage IV CKD. He has a follow up appt coming up.    Last 24 hour urine showed elevated sodium in the urine. 7/13    Ultrasound and kub 8/14/17 showed no stones.     He has BPH. He on flomax.   Some urinary frequency, but he has increased his water intake and is on spironolactone and lasix x 2.   He has cardiomyopathy.   No LUTS.  Nocturia 1-2 times.  No gross hematuria.     He is followed by endocrine for his diabetes and in cardiology for his cardiomyopathy and hypertension.    He is still smoking.   He is not exercising because of back problems and he says he doesn't know how to swim.    Testosterone in 400s.    Lab Results   Component Value Date    PSA 0.91 08/09/2016    PSA 1.1 08/25/2015    PSA 0.80 04/02/2014    PSA 0.85 08/20/2013    PSA 1.14 05/17/2012    PSA 1.1 05/26/2011    PSA 1.4 04/12/2010    PSA 0.97 12/01/2008     Past Medical History:   Diagnosis Date    Cataract     Chronic kidney disease     Colon polyp     Diabetes mellitus     Diabetes mellitus type II     Glaucoma 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 open angle     Hyperlipidemia     Hypertension     Kidney stone     Seasonal allergies 6/24/2014       Past Surgical History:   Procedure Laterality Date    CATARACT EXTRACTION W/  INTRAOCULAR LENS IMPLANT Right 04/04/16    Dr Meehan    COLONOSCOPY W/ BIOPSIES      EYE SURGERY      [place] [facility] [place] sphincterotomy  12/13/13    [place] [facility] [place]    URETERAL STENT  PLACEMENT         Family History   Problem Relation Age of Onset    Diabetes Brother         type 1    Hypertension Brother     Stroke Brother        Social History     Socioeconomic History    Marital status: Single     Spouse name: Not on file    Number of children: Not on file    Years of education: Not on file    Highest education level: Not on file   Social Needs    Financial resource strain: Not on file    Food insecurity - worry: Not on file    Food insecurity - inability: Not on file    Transportation needs - medical: Not on file    Transportation needs - non-medical: Not on file   Occupational History    Not on file   Tobacco Use    Smoking status: Current Every Day Smoker     Packs/day: 0.50     Years: 45.00     Pack years: 22.50    Smokeless tobacco: Never Used    Tobacco comment: says he could quit and will try    Substance and Sexual Activity    Alcohol use: Yes     Comment: rarely    Drug use: No    Sexual activity: Yes     Comment: single, retired , no kids   Other Topics Concern    Not on file   Social History Narrative    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       Allergies:  Patient has no known allergies.    Medications:    Current Outpatient Medications:     albuterol 90 mcg/actuation inhaler, Inhale 1-2 puffs into the lungs every 6 (six) hours as needed for Wheezing or Shortness of Breath., Disp: 1 Inhaler, Rfl: 0    allopurinol (ZYLOPRIM) 100 MG tablet, TAKE 1 TABLET BY MOUTH ONCE DAILY, Disp: 90 tablet, Rfl: 0    atorvastatin (LIPITOR) 40 MG tablet, Take 1 tablet (40 mg total) by mouth once daily., Disp: 90 tablet, Rfl: 3    benazepril (LOTENSIN) 20 MG tablet, Take 1 tablet (20 mg total) by mouth 2 (two) times daily., Disp: 180 tablet, Rfl: 1    chlorzoxazone (PARAFON FORTE) 500 mg Tab, Take 500 mg by mouth 3 (three) times daily., Disp: , Rfl: 1    fluticasone (FLONASE) 50 mcg/actuation nasal spray, SPRAY TWICE IN EACH NOSTRIL ONCE  "DAILY, Disp: 16 mL, Rfl: 0    furosemide (LASIX) 20 MG tablet, TAKE 1 TABLET BY MOUTH TWICE DAILY INCREASING TO 2 TABLETS TWICE DAILY FOR 3-4 LBS WEIGHT GAIN UNTIL RESOLVED., Disp: 360 tablet, Rfl: 3    hydrocodone-acetaminophen 7.5-325mg (NORCO) 7.5-325 mg per tablet, TK 1 T PO  TID PRN P, Disp: , Rfl: 0    insulin glargine (LANTUS SOLOSTAR U-100 INSULIN) 100 unit/mL (3 mL) InPn pen, Inject 40 Units into the skin every evening., Disp: 15 mL, Rfl: 12    insulin lispro (HUMALOG KWIKPEN INSULIN) 100 unit/mL InPn pen, Inject 14 units w/ breakfast, lunch, 12 units w/ dinner plus scale 150-200+2, 201-250+4, 251-300+6, 301-350+8., Disp: 15 mL, Rfl: 12    magnesium oxide (MAG-OX) 400 mg tablet, TAKE 1 TABLET BY MOUTH EVERY MORNING, Disp: 90 tablet, Rfl: 0    NIFEdipine (PROCARDIA-XL) 90 MG (OSM) 24 hr tablet, Take 1 tablet (90 mg total) by mouth once daily., Disp: 90 tablet, Rfl: 1    pen needle, diabetic (BD ULTRA-FINE SHORT PEN NEEDLE) 31 gauge x 5/16" Ndle, Uses 4 daily, 90 day supply., Disp: 400 each, Rfl: 3    sodium bicarbonate 650 MG tablet, TAKE 1 TABLET BY MOUTH TWICE DAILY, Disp: 360 tablet, Rfl: 0    spironolactone (ALDACTONE) 25 MG tablet, Take 1 tablet (25 mg total) by mouth once daily., Disp: 30 tablet, Rfl: 3    tamsulosin (FLOMAX) 0.4 mg Cp24, Take 1 capsule (0.4 mg total) by mouth every morning., Disp: 90 capsule, Rfl: 0    timolol maleate 0.25% (TIMOPTIC) 0.25 % Drop, Place 1 drop into the right eye 2 (two) times daily., Disp: 15 mL, Rfl: 6    TRUE METRIX GLUCOSE TEST STRIP Strp, Blood glucose checks 4 times a day., Disp: 400 strip, Rfl: 3    TRUEPLUS LANCETS 33 gauge Misc, Inject 1 lancet into the skin 4 (four) times daily., Disp: 400 each, Rfl: 3    blood-glucose meter kit, Use as instructed, Disp: 1 each, Rfl: 0    carvedilol (COREG) 25 MG tablet, Take 1 tablet (25 mg total) by mouth 2 (two) times daily with meals., Disp: 60 tablet, Rfl: 11      Review of Systems   Constitutional: " Negative for fever, chills and unexpected weight change.   HENT: Negative for hearing loss.    Eyes: Negative for visual disturbance.   Respiratory: some shortness of breath.   Cardiovascular: Negative for chest pain and leg swelling.   Gastrointestinal: Negative for nausea, vomiting, abdominal pain and diarrhea.   Genitourinary: Negative for dysuria, urgency, frequency and hematuria.   Musculoskeletal: Negative for arthralgias.   Skin: Negative for rash.   Neurological: Negative for seizures and weakness.   Hematological: Does not bruise/bleed easily.   Psychiatric/Behavioral: Negative for confusion.       Objective:      Physical Exam   Constitutional: He is oriented to person, place, and time. He appears well-developed and well-nourished.   HENT:   Head: Normocephalic and atraumatic.   Eyes: No scleral icterus.   Neck: Neck supple.   Cardiovascular: Normal rate and regular rhythm.    Pulmonary/Chest: Effort normal. No respiratory distress.   Abdominal:  Hernia confirmed negative in the right inguinal area and confirmed negative in the left inguinal area.        obese   Genitourinary: Testes normal and penis normal. Rectal exam shows no hemorroids. . Uncircumcised. Some smegma. No phimosis. Normal perineum.       Prostate was smooth without nodularity. No rectal masses 40 grams. Normal SV. No rectal masses.  No external hemorrhoids.   Musculoskeletal: He exhibits no tenderness.   Neurological: He is alert and oriented to person, place, and time.   Skin: Skin is warm. No rash noted.   Psychiatric: He has a normal mood and affect.     urine dip clear except 3+ protein  Assessment:   Tobacco abuse    Dilated cardiomyopathy    Type 2 diabetes mellitus with stage 3 chronic kidney disease, with long-term current use of insulin    Essential hypertension    Obesity (BMI 30-39.9)    CKD (chronic kidney disease) stage 4, GFR 15-29 ml/min    Calculus of both kidneys  -     US Kidney; Future    Plan:     F/u 1 year with  ultrasound.   No further psa testing.   Continue to follow up with nephrology for proteinuria, CKD.  Recommend smoking cessation.  Good diabetic control.   Continue flomax.   Push fluids and limit salt intake.  Weight loss. Patient really needs to work on this. We discussed getting in the pool for exercise with his back injury.  Imaging all personally reviewed.

## 2018-09-04 ENCOUNTER — LAB VISIT (OUTPATIENT)
Dept: LAB | Facility: HOSPITAL | Age: 73
End: 2018-09-04
Attending: NURSE PRACTITIONER
Payer: MEDICARE

## 2018-09-04 DIAGNOSIS — I42.0 DILATED CARDIOMYOPATHY: ICD-10-CM

## 2018-09-04 DIAGNOSIS — I10 ESSENTIAL HYPERTENSION: ICD-10-CM

## 2018-09-04 DIAGNOSIS — Z79.4 TYPE 2 DIABETES MELLITUS WITH STAGE 3 CHRONIC KIDNEY DISEASE, WITH LONG-TERM CURRENT USE OF INSULIN: ICD-10-CM

## 2018-09-04 DIAGNOSIS — E11.22 TYPE 2 DIABETES MELLITUS WITH STAGE 3 CHRONIC KIDNEY DISEASE, WITH LONG-TERM CURRENT USE OF INSULIN: ICD-10-CM

## 2018-09-04 DIAGNOSIS — M10.059 IDIOPATHIC GOUT OF HIP, UNSPECIFIED CHRONICITY, UNSPECIFIED LATERALITY: ICD-10-CM

## 2018-09-04 DIAGNOSIS — N18.30 TYPE 2 DIABETES MELLITUS WITH STAGE 3 CHRONIC KIDNEY DISEASE, WITH LONG-TERM CURRENT USE OF INSULIN: ICD-10-CM

## 2018-09-04 DIAGNOSIS — E87.20 ACIDOSIS: ICD-10-CM

## 2018-09-04 LAB
ESTIMATED AVG GLUCOSE: 154 MG/DL
HBA1C MFR BLD HPLC: 7 %

## 2018-09-04 PROCEDURE — 36415 COLL VENOUS BLD VENIPUNCTURE: CPT

## 2018-09-04 PROCEDURE — 83036 HEMOGLOBIN GLYCOSYLATED A1C: CPT

## 2018-09-04 RX ORDER — CARVEDILOL 25 MG/1
TABLET ORAL
Qty: 60 TABLET | Refills: 6 | Status: SHIPPED | OUTPATIENT
Start: 2018-09-04 | End: 2018-10-03 | Stop reason: SDUPTHER

## 2018-09-04 RX ORDER — ALLOPURINOL 100 MG/1
TABLET ORAL
Qty: 90 TABLET | Refills: 0 | Status: SHIPPED | OUTPATIENT
Start: 2018-09-04 | End: 2018-10-03 | Stop reason: SDUPTHER

## 2018-09-04 RX ORDER — SODIUM BICARBONATE 650 MG/1
TABLET ORAL
Qty: 360 TABLET | Refills: 0 | Status: SHIPPED | OUTPATIENT
Start: 2018-09-04 | End: 2018-10-03 | Stop reason: SDUPTHER

## 2018-09-11 ENCOUNTER — OFFICE VISIT (OUTPATIENT)
Dept: ENDOCRINOLOGY | Facility: CLINIC | Age: 73
End: 2018-09-11
Payer: MEDICARE

## 2018-09-11 VITALS
HEIGHT: 74 IN | SYSTOLIC BLOOD PRESSURE: 130 MMHG | HEART RATE: 73 BPM | WEIGHT: 315 LBS | DIASTOLIC BLOOD PRESSURE: 68 MMHG | BODY MASS INDEX: 40.43 KG/M2

## 2018-09-11 DIAGNOSIS — N18.30 TYPE 2 DIABETES MELLITUS WITH STAGE 3 CHRONIC KIDNEY DISEASE, WITH LONG-TERM CURRENT USE OF INSULIN: Primary | ICD-10-CM

## 2018-09-11 DIAGNOSIS — I42.0 DILATED CARDIOMYOPATHY: ICD-10-CM

## 2018-09-11 DIAGNOSIS — E66.9 OBESITY (BMI 30-39.9): ICD-10-CM

## 2018-09-11 DIAGNOSIS — Z79.4 TYPE 2 DIABETES MELLITUS WITH STAGE 3 CHRONIC KIDNEY DISEASE, WITH LONG-TERM CURRENT USE OF INSULIN: Primary | ICD-10-CM

## 2018-09-11 DIAGNOSIS — E11.22 TYPE 2 DIABETES MELLITUS WITH STAGE 3 CHRONIC KIDNEY DISEASE, WITH LONG-TERM CURRENT USE OF INSULIN: Primary | ICD-10-CM

## 2018-09-11 DIAGNOSIS — I10 ESSENTIAL HYPERTENSION: ICD-10-CM

## 2018-09-11 DIAGNOSIS — E78.5 HYPERLIPIDEMIA, UNSPECIFIED HYPERLIPIDEMIA TYPE: ICD-10-CM

## 2018-09-11 DIAGNOSIS — N18.4 CKD (CHRONIC KIDNEY DISEASE) STAGE 4, GFR 15-29 ML/MIN: ICD-10-CM

## 2018-09-11 DIAGNOSIS — G62.9 PERIPHERAL POLYNEUROPATHY: ICD-10-CM

## 2018-09-11 PROCEDURE — 99215 OFFICE O/P EST HI 40 MIN: CPT | Mod: PBBFAC | Performed by: NURSE PRACTITIONER

## 2018-09-11 PROCEDURE — 99999 PR PBB SHADOW E&M-EST. PATIENT-LVL V: CPT | Mod: PBBFAC,,, | Performed by: NURSE PRACTITIONER

## 2018-09-11 PROCEDURE — 99214 OFFICE O/P EST MOD 30 MIN: CPT | Mod: S$PBB,,, | Performed by: NURSE PRACTITIONER

## 2018-09-11 RX ORDER — INSULIN GLARGINE 100 [IU]/ML
35 INJECTION, SOLUTION SUBCUTANEOUS NIGHTLY
Qty: 15 ML | Refills: 12
Start: 2018-09-11 | End: 2018-12-20

## 2018-09-11 RX ORDER — INSULIN LISPRO 100 [IU]/ML
INJECTION, SOLUTION INTRAVENOUS; SUBCUTANEOUS
Qty: 15 ML | Refills: 12
Start: 2018-09-11 | End: 2018-12-20

## 2018-09-11 NOTE — PROGRESS NOTES
CC: This 73 y.o. male presents for management of diabetes mellitus     HPI: Mr. Siegel is a 70 year old gentleman with type 2 diabetes diagnosed 2010. He has never been hospitalized for his diabetes. Denies missed doses of medication.    Pt was last seen by me in 6/2018 and is now being seen by me again today.  a1c has improved.  Lab Results   Component Value Date    HGBA1C 7.0 (H) 09/04/2018     No hypoglycemia ---80s-90s lowest-rarely  BG monitoring max 4 times a day.      Exercise: no formal exercise, related to disk in his back      Eats 3 meals a day, will sometimes snack in-between meals- rare chips and candy  Bedtime snack- yogurt    Retired:  since 2008        [employer] True Metrix meter 2017  DM education completed     CURRENT DM MEDS:  Humalog 14-14-12 u w/ breakfast lunch dinner, Lantus 40 u qhs      Lowest 77 mg/dl at lunch time   Breakfast---sausage biscuit, 1/2 cup of coffee or milk  Sometimes an omelet                                       Standards of Care:  Eye exam:  6/2018 no retinopathy     ROS:   Gen: Appetite good,  +weight gain #8 since summer 2018, denies fatigue and weakness.  Skin: Skin is intact and heals well, no rashes   Eyes: Denies visual disturbances  Resp: no SOB, + ZEPEDA, no cough  Cardiac: No palpitations, chest pain, no edema   GI: No nausea or vomiting, diarrhea, constipation, or abdominal pain.  /GYN: 1-2+ nocturia, burning or pain.   MS/Neuro: Denies numbness/ tingling in BLE; Gait steady, speech clear  Psych: Denies drug/ETOH abuse, no hx of depression.  Other systems: negative.    PE:  GENERAL: Well developed, well nourished.  PSYCH: AAOx3, appropriate mood and affect, pleasant expression, conversant, appears relaxed, well groomed.   EYES: Conjunctiva, corneas clear  NECK: Supple, trachea midline,no thyromegaly or nodules .  CHEST: Resp even and unlabored, CTA bilateral.  CARDIAC: RRR, S1, S2 heard, no murmurs  ABDOMEN: Soft, non-tender, non-distended     VASCULAR: DP pulses +2/4 bilaterally, no edema.  NEURO: Gait steady  SKIN: Skin warm and dry no acanthosis nigracans.  FEET:  Footware appropriate    Protective Sensation (w/ 10 gram monofilament):  Right: Decreased  Left: Decreased    Visual Inspection:  Callus -  Bilateral, Dry Skin -  Bilateral and Onychomycosis -  Bilateral   Great toes-calluses  Heels-calluses    Pedal Pulses:   Right: Diminshed  Left: Diminshed    Posterior tibialis:   Right:Diminshed  Left: Diminshed       Hemoglobin A1C   Date Value Ref Range Status   09/04/2018 7.0 (H) 4.0 - 5.6 % Final     Comment:     ADA Screening Guidelines:  5.7-6.4%  Consistent with prediabetes  >or=6.5%  Consistent with diabetes  High levels of fetal hemoglobin interfere with the HbA1C  assay. Heterozygous hemoglobin variants (HbS, HgC, etc)do  not significantly interfere with this assay.   However, presence of multiple variants may affect accuracy.     05/29/2018 7.2 (H) 4.0 - 5.6 % Final     Comment:     According to ADA guidelines, hemoglobin A1c <7.0% represents  optimal control in non-pregnant diabetic patients. Different  metrics may apply to specific patient populations.   Standards of Medical Care in Diabetes-2016.  For the purpose of screening for the presence of diabetes:  <5.7%     Consistent with the absence of diabetes  5.7-6.4%  Consistent with increasing risk for diabetes   (prediabetes)  >or=6.5%  Consistent with diabetes  Currently, no consensus exists for use of hemoglobin A1c  for diagnosis of diabetes for children.  This Hemoglobin A1c assay has significant interference with fetal   hemoglobin   (HbF). The results are invalid for patients with abnormal amounts of   HbF,   including those with known Hereditary Persistence   of Fetal Hemoglobin. Heterozygous hemoglobin variants (HbAS, HbAC,   HbAD, HbAE, HbA2) do not significantly interfere with this assay;   however, presence of multiple variants in a sample may impact the %   interference.      02/19/2018 7.6 (H) 4.0 - 5.6 % Final     Comment:     According to ADA guidelines, hemoglobin A1c <7.0% represents  optimal control in non-pregnant diabetic patients. Different  metrics may apply to specific patient populations.   Standards of Medical Care in Diabetes-2016.  For the purpose of screening for the presence of diabetes:  <5.7%     Consistent with the absence of diabetes  5.7-6.4%  Consistent with increasing risk for diabetes   (prediabetes)  >or=6.5%  Consistent with diabetes  Currently, no consensus exists for use of hemoglobin A1c  for diagnosis of diabetes for children.  This Hemoglobin A1c assay has significant interference with fetal   hemoglobin   (HbF). The results are invalid for patients with abnormal amounts of   HbF,   including those with known Hereditary Persistence   of Fetal Hemoglobin. Heterozygous hemoglobin variants (HbAS, HbAC,   HbAD, HbAE, HbA2) do not significantly interfere with this assay;   however, presence of multiple variants in a sample may impact the %   interference.        ASSESSMENT and PLAN:  1. Type 2 diabetes mellitus with stage 3 chronic kidney disease, with long-term current use of insulin  Hemoglobin A1c , lipid next time  F/u in 3 mos  Cut back lantus to 35 units at night  Decrease humalog to 10-12-12 units ac w/ scale 150-200+2, etc.  Add trulicity 0.75 mg weekly.   a1c goal less than 7.5%  a1c at goal  Freestyle elissa-CCS form  DE for start         2. Obesity (BMI 30-39.9)  Body mass index is 41.92 kg/m². may increase insulin resistance.  Limited w/ exercise r/t back  Chair exercises for at least 15 mins x 2 for 3-4 days a week recommended   3. CKD (chronic kidney disease) stage 3, GFR 30-59 ml/min  Stable  Avoid hypoglycemia   4. Essential hypertension  Controlled, continue med(s)   5. Hyperlipidemia, unspecified hyperlipidemia type  Lab Results   Component Value Date    LDLCALC 68.6 10/25/2017     At goal  Repeat next time   On atorvastatin 40 mg nightly    6. Tobacco abuse  Discussed cessation   7. Proteinuria, unspecified type  See above   8. Nuclear sclerosis of left eye  F/u with ophthalmology   9. Dilated cardiomyopathy  Avoid hypoglycemia   10. Type 2 diabetes mellitus with stage 3 chronic kidney disease, without long-term current use of insulin  Avoid insulin stacking, hypoglycemia

## 2018-09-11 NOTE — PATIENT INSTRUCTIONS
Snacks can be an important part of a balanced, healthy meal plan. They allow you to eat more frequently, feeling full and satisfied throughout the day. Also, they allow you to spread carbohydrates evenly, which may stabilize blood sugars.  Plus, snacks are enjoyable!     The amount of carbohydrate needed at snacks varies. Generally, about 15-30 grams of carbohydrate per snack is recommended.  Below you will find some tasty treats.       0-5 gm carb   Crystal Light   Vitamin Water Zero   Herbal tea, unsweetened   2 tsp peanut butter on celery   1./2 cup sugar-free jell-o   1 sugar-free popsicle   ¼ cup blueberries   8oz Blue Norah unsweetened almond milk   5 baby carrots & celery sticks, cucumbers, bell peppers dipped in ¼ cup salsa, 2Tbsp light ranch dressing or 2Tbsp plain Greek yogurt   10 Goldfish crackers   ½ oz low-fat cheese or string cheese   1 closed handful of nuts, unsalted   1 Tbsp of sunflower seeds, unsalted   1 cup Smart Pop popcorn   1 whole grain brown rice cake        15 gm carb   1 small piece of fruit or ½ banana or 1/2 cup lite canned fruit   3 bibi cracker squares   3 cups Smart Pop popcorn, top spray butter, Ray lite salt or cinnamon and Truvia   5 Vanilla Wafers   ½ cup low fat, no added sugar ice cream or frozen yogurt (Blue bell, Blue Bunny, Weight Watchers, Skinny Cow)   ½ turkey, ham, or chicken sandwich   ½ c fruit with ½ c Cottage cheese   4-6 unsalted wheat crackers with 1 oz low fat cheese or 1 tbsp peanut butter    30-45 goldfish crackers (depending on flavor)    7-8 Worship mini brown rice cakes (caramel, apple cinnamon, chocolate)    12 Worship mini brown rice cakes (cheddar, bbq, ranch)    1/3 cup hummus dip with raw veg   1/2 whole wheat nehemiah, 1Tbsp hummus   Mini Pizza (1/2 whole wheat English muffin, low-fat  cheese, tomato sauce)   100 calorie snack pack (Oreo, Chips Ahoy, Ritz Mix, Baked Cheetos)   4-6 oz. light or Greek Style yogurt  (Chema, Alyse, Davion, [facility])   ½ cup sugar-free pudding     6 in. wheat tortilla or nehemiah oven toasted chips (topped with spray butter flavoring, cinnamon, Truvia OR spray butter, garlic powder, chili powder)    18 BBQ Popchips (available at [employer], Whole Foods, Fresh Market)                   Diabetes Support Group Meetings         Date: Topic:   February 8 Health Promotion/Cooking Demo   March 8 Taking Care of Your Kidneys   April 12 Taking Care of Your Feet   May 10 Ease Your Mind with Diabetes   Kayy 14 Summer Treats/Cooking Demo   July 12 Super Market Sweep   August 9 Taking Care of Your Eyes   Sept 13 Technology/ADA updates   October 11 Recipes & Treats/Cooking Demo   November 8 Heart Health/Pump it up!   December 13 Year-End Close Out        Meetings are held in the [facility] (A) of the [facility] located at [address] [place] [address]. Please call [phone] for additional information.    Free service, offered every 2nd Thursday of every month! Family members and/or friends are welcome as well!  Support group is for patients with type 1 or type 2 diabetes.    From 3:30p to 4:30p        Dulaglutide injection  What is this medicine?  DULAGLUTIDE (DOO la GLOO tide) is used to improve blood sugar control in adults with type 2 diabetes. This medicine may be used with other oral diabetes medicines.  How should I use this medicine?  This medicine is for injection under the skin of your upper leg (thigh), stomach area, or upper arm. It is usually given once every week (every 7 days). You will be taught how to prepare and give this medicine. Use exactly as directed. Take your medicine at regular intervals. Do not take it more often than directed.  If you use this medicine with insulin, you should inject this medicine and the insulin separately. Do not mix them together. Do not give the injections right next to each other. Change (rotate) injection  sites with each injection.  It is important that you put your used needles and syringes in a special sharps container. Do not put them in a trash can. If you do not have a sharps container, call your pharmacist or healthcare provider to get one.  A special MedGuide will be given to you by the pharmacist with each prescription and refill. Be sure to read this information carefully each time.  Talk to your pediatrician regarding the use of this medicine in children. Special care may be needed.  What side effects may I notice from receiving this medicine?  Side effects that you should report to your doctor or health care professional as soon as possible:  · allergic reactions like skin rash, itching or hives, swelling of the face, lips, or tongue  · breathing problems  · signs and symptoms of low blood sugar such as feeling anxious, confusion, dizziness, increased hunger, unusually weak or tired, sweating, shakiness, cold, irritable, headache, blurred vision, fast heartbeat, loss of consciousness  · unusual stomach upset or pain  · vomiting  Side effects that usually do not require medical attention (Report these to your doctor or health care professional if they continue or are bothersome.):diarrhea  · heartburn  · loss of appetite  · nausea  · pain, redness, or irritation at site where injected  What may interact with this medicine?  Do not take this medicine with any of the following medications:  · gatifloxacin  Many medications may cause changes in blood sugar, these include:  · alcohol containing beverages  · aspirin and aspirin-like drugs  · chloramphenicol  · chromium  · diuretics  · female hormones, such as estrogens or progestins, birth control pills  · heart medicines  · isoniazid  · male hormones or anabolic steroids  · medications for weight loss  · medicines for allergies, asthma, cold, or cough  · medicines for mental problems  · medicines called MAO inhibitors - Nardil, Parnate, Marplan,  Eldepryl  · niacin  · NSAIDS, such as ibuprofen  · pentamidine  · phenytoin  · probenecid  · quinolone antibiotics such as ciprofloxacin, levofloxacin, ofloxacin  · some herbal dietary supplements  · steroid medicines such as prednisone or cortisone  · thyroid hormonesSome medications can hide the warning symptoms of low blood sugar (hypoglycemia). You may need to monitor your blood sugar more closely if you are taking one of these medications. These include:  · beta-blockers, often used for high blood pressure or heart problems (examples include atenolol, metoprolol, propranolol)  · clonidine  · guanethidine  · reserpine  What if I miss a dose?  If you miss a dose, take it as soon as you can within 3 days after the missed dose. Then take your next dose at your regular weekly time. If it has been longer than 3 days after the missed dose, do not take the missed dose. Take the next dose at your regular time. Do not take double or extra doses. If you have questions about a missed dose, contact your health care provider for advice.  Where should I keep my medicine?  Keep out of the reach of children.  Store this medicine in a refrigerator between 2 and 8 degrees C (36 and 46 degrees F). Do not freeze or use if the medicine has been frozen. Protect from light and excessive heat. Each single-dose pen or prefilled syringe can be kept at room temperature, not to exceed 30 degrees C (86 degrees F) for a total of 14 days, if needed. Store in the carton until use. Throw away any unused medicine after the expiration date.  What should I tell my health care provider before I take this medicine?  They need to know if you have any of these conditions:  · endocrine tumors (MEN 2) or if someone in your family had these tumors  · history of pancreatitis  · kidney disease  · liver disease  · stomach problems  · thyroid cancer or if someone in your family had thyroid cancer  · an unusual or allergic reaction to dulaglutide, other  medicines, foods, dyes, or preservatives  · pregnant or trying to get pregnant  · breast-feeding  What should I watch for while using this medicine?  Visit your doctor or health care professional for regular checks on your progress.  A test called the HbA1C (A1C) will be monitored. This is a simple blood test. It measures your blood sugar control over the last 2 to 3 months. You will receive this test every 3 to 6 months.  Learn how to check your blood sugar. Learn the symptoms of low and high blood sugar and how to manage them.  Always carry a quick-source of sugar with you in case you have symptoms of low blood sugar. Examples include hard sugar candy or glucose tablets. Make sure others know that you can choke if you eat or drink when you develop serious symptoms of low blood sugar, such as seizures or unconsciousness. They must get medical help at once.  Tell your doctor or health care professional if you have high blood sugar. You might need to change the dose of your medicine. If you are sick or exercising more than usual, you might need to change the dose of your medicine.  Do not skip meals. Ask your doctor or health care professional if you should avoid alcohol. Many nonprescription cough and cold products contain sugar or alcohol. These can affect blood sugar.  Wear a medical ID bracelet or chain, and carry a card that describes your disease and details of your medicine and dosage times.  NOTE:This sheet is a summary. It may not cover all possible information. If you have questions about this medicine, talk to your doctor, pharmacist, or health care provider. Copyright© 2017 [employer]

## 2018-09-17 ENCOUNTER — PATIENT OUTREACH (OUTPATIENT)
Dept: DIABETES | Facility: CLINIC | Age: 73
End: 2018-09-17

## 2018-09-17 NOTE — PROGRESS NOTES
Faxed signed CGM order, face sheet, insurance information and chart notes to [facility] at [phone].

## 2018-09-26 ENCOUNTER — OFFICE VISIT (OUTPATIENT)
Dept: CARDIOLOGY | Facility: CLINIC | Age: 73
End: 2018-09-26
Payer: MEDICARE

## 2018-09-26 VITALS
BODY MASS INDEX: 40.43 KG/M2 | HEART RATE: 60 BPM | WEIGHT: 315 LBS | HEIGHT: 74 IN | DIASTOLIC BLOOD PRESSURE: 68 MMHG | SYSTOLIC BLOOD PRESSURE: 140 MMHG

## 2018-09-26 DIAGNOSIS — E78.5 HYPERLIPIDEMIA, UNSPECIFIED HYPERLIPIDEMIA TYPE: ICD-10-CM

## 2018-09-26 DIAGNOSIS — I42.0 DILATED CARDIOMYOPATHY: ICD-10-CM

## 2018-09-26 DIAGNOSIS — E66.9 OBESITY (BMI 30-39.9): ICD-10-CM

## 2018-09-26 DIAGNOSIS — I10 ESSENTIAL HYPERTENSION: Primary | ICD-10-CM

## 2018-09-26 DIAGNOSIS — N18.4 CKD (CHRONIC KIDNEY DISEASE) STAGE 4, GFR 15-29 ML/MIN: ICD-10-CM

## 2018-09-26 DIAGNOSIS — Z72.0 TOBACCO ABUSE: Chronic | ICD-10-CM

## 2018-09-26 PROCEDURE — 99214 OFFICE O/P EST MOD 30 MIN: CPT | Mod: S$PBB,,, | Performed by: INTERNAL MEDICINE

## 2018-09-26 PROCEDURE — 99999 PR PBB SHADOW E&M-EST. PATIENT-LVL III: CPT | Mod: PBBFAC,,, | Performed by: INTERNAL MEDICINE

## 2018-09-26 PROCEDURE — 99213 OFFICE O/P EST LOW 20 MIN: CPT | Mod: PBBFAC | Performed by: INTERNAL MEDICINE

## 2018-09-26 NOTE — PROGRESS NOTES
Subjective:   Patient ID:  Vinnie Siegel is a 73 y.o. male who presents for follow-up of Essential hypertension (2 months fu)      HPI:   Vinnie Siegel presents for follow up of hypertension and mild dilated CM. He continues to weigh himself and understands taking additional diuretic for short term weight gain. Per other Clinic visits, BP is adequately controlled. Has usual ZEPEDA but stopped smoking 3 days ago. Vinnie Siegel denies chest pain,palpitations, presyncope , or syncope. Vinnie Siegel chronic kidney disease now stage IV awaiting a return appointment from Nephrology. Vinnie Siegel has dyslipidemia  on high intensity statin.  Review of Systems   Constitution: Negative for weakness, malaise/fatigue, weight gain and weight loss.   Eyes: Negative for blurred vision.   Cardiovascular: Positive for dyspnea on exertion. Negative for chest pain, claudication, cyanosis, irregular heartbeat, leg swelling, near-syncope, orthopnea, palpitations, paroxysmal nocturnal dyspnea and syncope.   Respiratory: Negative for cough, shortness of breath and wheezing.    Musculoskeletal: Positive for back pain and joint pain. Negative for falls and myalgias.   Gastrointestinal: Negative for abdominal pain, heartburn, nausea and vomiting.   Genitourinary: Negative for nocturia.   Neurological: Negative for brief paralysis, dizziness, focal weakness, headaches, numbness and paresthesias.   Psychiatric/Behavioral: Negative for altered mental status.       Current Outpatient Medications   Medication Sig    albuterol 90 mcg/actuation inhaler Inhale 1-2 puffs into the lungs every 6 (six) hours as needed for Wheezing or Shortness of Breath.    allopurinol (ZYLOPRIM) 100 MG tablet TAKE 1 TABLET BY MOUTH ONCE DAILY    atorvastatin (LIPITOR) 40 MG tablet Take 1 tablet (40 mg total) by mouth once daily.    benazepril (LOTENSIN) 20 MG tablet Take 1 tablet (20 mg total) by mouth 2 (two) times daily.    blood-glucose meter kit  "Use as instructed    carvedilol (COREG) 25 MG tablet TAKE 1 TABLET(25 MG) BY MOUTH TWICE DAILY WITH MEALS    chlorzoxazone (PARAFON FORTE) 500 mg Tab Take 500 mg by mouth 3 (three) times daily.    dulaglutide (TRULICITY) 0.75 mg/0.5 mL PnIj Inject 0.5 mLs (0.75 mg total) into the skin once a week.    fluticasone (FLONASE) 50 mcg/actuation nasal spray SPRAY TWICE IN EACH NOSTRIL ONCE DAILY    furosemide (LASIX) 20 MG tablet TAKE 1 TABLET BY MOUTH TWICE DAILY INCREASING TO 2 TABLETS TWICE DAILY FOR 3-4 LBS WEIGHT GAIN UNTIL RESOLVED.    hydrocodone-acetaminophen 7.5-325mg (NORCO) 7.5-325 mg per tablet TK 1 T PO  TID PRN P    insulin glargine (LANTUS SOLOSTAR U-100 INSULIN) 100 unit/mL (3 mL) InPn pen Inject 35 Units into the skin every evening.    insulin lispro (HUMALOG KWIKPEN INSULIN) 100 unit/mL InPn pen Inject 10 units w/ breakfast, 12 units w/ lunch and dinner plus scale 150-200+2, 201-250+4, 251-300+6, 301-350+8.    magnesium oxide (MAG-OX) 400 mg tablet TAKE 1 TABLET BY MOUTH EVERY MORNING    NIFEdipine (PROCARDIA-XL) 90 MG (OSM) 24 hr tablet Take 1 tablet (90 mg total) by mouth once daily.    pen needle, diabetic (BD ULTRA-FINE SHORT PEN NEEDLE) 31 gauge x 5/16" Ndle Uses 4 daily, 90 day supply.    sodium bicarbonate 650 MG tablet TAKE 1 TABLET BY MOUTH TWICE DAILY    spironolactone (ALDACTONE) 25 MG tablet Take 1 tablet (25 mg total) by mouth once daily.    tamsulosin (FLOMAX) 0.4 mg Cp24 Take 1 capsule (0.4 mg total) by mouth every morning.    timolol maleate 0.25% (TIMOPTIC) 0.25 % Drop Place 1 drop into the right eye 2 (two) times daily.    TRUE METRIX GLUCOSE TEST STRIP Strp Blood glucose checks 4 times a day.    TRUEPLUS LANCETS 33 gauge Misc Inject 1 lancet into the skin 4 (four) times daily.     No current facility-administered medications for this visit.      Objective:   Physical Exam   Constitutional: He is oriented to person, place, and time. He appears well-developed. No " "distress.   BP (!) 140/68 (BP Location: Left arm, Patient Position: Sitting, BP Method: X-Large (Automatic))   Pulse 60   Ht 6' 2" (1.88 m)   Wt (!) 146.2 kg (322 lb 5 oz)   BMI 41.38 kg/m²    HENT:   Head: Normocephalic.   Right Ear: External ear normal.   Left Ear: External ear normal.   Eyes: EOM are normal. Pupils are equal, round, and reactive to light. No scleral icterus.   Neck: Neck supple. No JVD present. No thyromegaly present.   Cardiovascular: Normal rate, regular rhythm and intact distal pulses. PMI is not displaced. Exam reveals no gallop and no friction rub.   Murmur heard.   Medium-pitched midsystolic murmur is present with a grade of 2/6 at the upper left sternal border.  1-2+ pedal edema with no JVD   Pulmonary/Chest: Effort normal and breath sounds normal. No respiratory distress. He has no wheezes. He has no rales.   Abdominal: Soft. He exhibits no distension. There is no hepatosplenomegaly. There is no tenderness.   Musculoskeletal: He exhibits no edema or tenderness.   Gait normal   Neurological: He is alert and oriented to person, place, and time.   Skin: Skin is warm and dry. No rash noted.   Psychiatric: He has a normal mood and affect. His behavior is normal.       Lab Results   Component Value Date     08/23/2018    K 4.4 08/23/2018     08/23/2018    CO2 24 08/23/2018    BUN 39 (H) 08/23/2018    CREATININE 2.9 (H) 08/23/2018     (H) 08/23/2018    HGBA1C 7.0 (H) 09/04/2018    AST 17 05/29/2018    ALT 25 05/29/2018    ALBUMIN 3.3 (L) 05/29/2018    PROT 6.7 05/29/2018    BILITOT 0.5 05/29/2018    WBC 8.22 12/23/2015    HGB 14.3 05/08/2017    HCT 41.2 05/08/2017    MCV 91 12/23/2015     12/23/2015    TSH 1.091 01/25/2017    CHOL 131 10/25/2017    HDL 43 10/25/2017    LDLCALC 68.6 10/25/2017    TRIG 97 10/25/2017       Assessment:     1. Essential hypertension : Adequate control   2. Dilated cardiomyopathy : Mild with pedal edema but no other evidence for volume " overload   3. Hyperlipidemia, unspecified hyperlipidemia type At LDL goal  on high intensity statin   4. Obesity (BMI 30-39.9) : Stable   5. Tobacco abuse : Quit 3 days ago   6. CKD (chronic kidney disease) stage 4, GFR 15-29 ml/min: Worsening        Plan:     Vinnie was seen today for essential hypertension.    Diagnoses and all orders for this visit:    Essential hypertension  Continue current regimen+    Dilated cardiomyopathy  Continue current regimen    Hyperlipidemia, unspecified hyperlipidemia type  Continue current regimen    Obesity (BMI 30-39.9)    Tobacco abuse  Encouraged permanent abstinence   CKD (chronic kidney disease) stage 4, GFR 15-29 ml/min  Pursue Nephrology follow up

## 2018-09-26 NOTE — PATIENT INSTRUCTIONS
Continue current regimen including daily weights and adjust Furosemide dose per weight gain as we discussed.  Make sure you gert an appointment with the Nephrologist. Call if you don't hear from them.

## 2018-10-01 DIAGNOSIS — J30.2 SEASONAL ALLERGIES: ICD-10-CM

## 2018-10-01 DIAGNOSIS — E87.20 ACIDOSIS: ICD-10-CM

## 2018-10-01 RX ORDER — FLUTICASONE PROPIONATE 50 MCG
SPRAY, SUSPENSION (ML) NASAL
Qty: 16 ML | Refills: 0 | Status: SHIPPED | OUTPATIENT
Start: 2018-10-01 | End: 2018-10-01 | Stop reason: SDUPTHER

## 2018-10-01 RX ORDER — TAMSULOSIN HYDROCHLORIDE 0.4 MG/1
CAPSULE ORAL
Qty: 90 CAPSULE | Refills: 0 | Status: SHIPPED | OUTPATIENT
Start: 2018-10-01 | End: 2018-10-03 | Stop reason: SDUPTHER

## 2018-10-02 RX ORDER — FLUTICASONE PROPIONATE 50 MCG
SPRAY, SUSPENSION (ML) NASAL
Qty: 48 ML | Refills: 0 | Status: SHIPPED | OUTPATIENT
Start: 2018-10-02 | End: 2018-10-03 | Stop reason: SDUPTHER

## 2018-10-03 ENCOUNTER — OFFICE VISIT (OUTPATIENT)
Dept: PRIMARY CARE CLINIC | Facility: CLINIC | Age: 73
End: 2018-10-03
Attending: FAMILY MEDICINE
Payer: MEDICARE

## 2018-10-03 VITALS
HEIGHT: 72 IN | WEIGHT: 315 LBS | DIASTOLIC BLOOD PRESSURE: 64 MMHG | SYSTOLIC BLOOD PRESSURE: 140 MMHG | HEART RATE: 57 BPM | BODY MASS INDEX: 42.66 KG/M2

## 2018-10-03 DIAGNOSIS — R06.02 SHORTNESS OF BREATH ON EXERTION: ICD-10-CM

## 2018-10-03 DIAGNOSIS — Z79.4 TYPE 2 DIABETES MELLITUS WITH STAGE 3 CHRONIC KIDNEY DISEASE, WITH LONG-TERM CURRENT USE OF INSULIN: ICD-10-CM

## 2018-10-03 DIAGNOSIS — I42.0 DILATED CARDIOMYOPATHY: ICD-10-CM

## 2018-10-03 DIAGNOSIS — E66.01 MORBID OBESITY: ICD-10-CM

## 2018-10-03 DIAGNOSIS — E87.20 ACIDOSIS: ICD-10-CM

## 2018-10-03 DIAGNOSIS — J30.2 SEASONAL ALLERGIES: ICD-10-CM

## 2018-10-03 DIAGNOSIS — M10.059 IDIOPATHIC GOUT OF HIP, UNSPECIFIED CHRONICITY, UNSPECIFIED LATERALITY: ICD-10-CM

## 2018-10-03 DIAGNOSIS — Z72.0 TOBACCO ABUSE: Chronic | ICD-10-CM

## 2018-10-03 DIAGNOSIS — E11.22 TYPE 2 DIABETES MELLITUS WITH STAGE 3 CHRONIC KIDNEY DISEASE, WITH LONG-TERM CURRENT USE OF INSULIN: ICD-10-CM

## 2018-10-03 DIAGNOSIS — I10 ESSENTIAL HYPERTENSION: ICD-10-CM

## 2018-10-03 DIAGNOSIS — E78.5 HYPERLIPIDEMIA, UNSPECIFIED HYPERLIPIDEMIA TYPE: ICD-10-CM

## 2018-10-03 DIAGNOSIS — F17.210 CIGARETTE SMOKER: Primary | ICD-10-CM

## 2018-10-03 DIAGNOSIS — N18.4 CKD (CHRONIC KIDNEY DISEASE) STAGE 4, GFR 15-29 ML/MIN: ICD-10-CM

## 2018-10-03 DIAGNOSIS — N18.30 TYPE 2 DIABETES MELLITUS WITH STAGE 3 CHRONIC KIDNEY DISEASE, WITH LONG-TERM CURRENT USE OF INSULIN: ICD-10-CM

## 2018-10-03 PROCEDURE — 99214 OFFICE O/P EST MOD 30 MIN: CPT | Mod: S$PBB,,, | Performed by: FAMILY MEDICINE

## 2018-10-03 PROCEDURE — 99214 OFFICE O/P EST MOD 30 MIN: CPT | Mod: PBBFAC,PN | Performed by: FAMILY MEDICINE

## 2018-10-03 PROCEDURE — 99999 PR PBB SHADOW E&M-EST. PATIENT-LVL IV: CPT | Mod: PBBFAC,,, | Performed by: FAMILY MEDICINE

## 2018-10-03 RX ORDER — SODIUM BICARBONATE 650 MG/1
650 TABLET ORAL 2 TIMES DAILY
Qty: 360 TABLET | Refills: 1 | Status: SHIPPED | OUTPATIENT
Start: 2018-10-03 | End: 2019-10-08

## 2018-10-03 RX ORDER — NIFEDIPINE 90 MG/1
90 TABLET, EXTENDED RELEASE ORAL DAILY
Qty: 90 TABLET | Refills: 1 | Status: SHIPPED | OUTPATIENT
Start: 2018-10-03 | End: 2019-04-09 | Stop reason: SDUPTHER

## 2018-10-03 RX ORDER — CARVEDILOL 25 MG/1
TABLET ORAL
Qty: 180 TABLET | Refills: 1 | Status: SHIPPED | OUTPATIENT
Start: 2018-10-03 | End: 2019-04-09 | Stop reason: SDUPTHER

## 2018-10-03 RX ORDER — TAMSULOSIN HYDROCHLORIDE 0.4 MG/1
1 CAPSULE ORAL EVERY MORNING
Qty: 90 CAPSULE | Refills: 1 | Status: SHIPPED | OUTPATIENT
Start: 2018-10-03 | End: 2019-04-09 | Stop reason: SDUPTHER

## 2018-10-03 RX ORDER — SPIRONOLACTONE 25 MG/1
25 TABLET ORAL DAILY
Qty: 90 TABLET | Refills: 3 | Status: SHIPPED | OUTPATIENT
Start: 2018-10-03 | End: 2019-04-09 | Stop reason: SDUPTHER

## 2018-10-03 RX ORDER — BENAZEPRIL HYDROCHLORIDE 20 MG/1
20 TABLET ORAL 2 TIMES DAILY
Qty: 180 TABLET | Refills: 1 | Status: SHIPPED | OUTPATIENT
Start: 2018-10-03 | End: 2019-03-25

## 2018-10-03 RX ORDER — FLUTICASONE PROPIONATE 50 MCG
2 SPRAY, SUSPENSION (ML) NASAL DAILY
Qty: 48 ML | Refills: 3 | Status: SHIPPED | OUTPATIENT
Start: 2018-10-03 | End: 2019-04-09 | Stop reason: SDUPTHER

## 2018-10-03 RX ORDER — LANOLIN ALCOHOL/MO/W.PET/CERES
1 CREAM (GRAM) TOPICAL EVERY MORNING
Qty: 90 TABLET | Refills: 1 | Status: SHIPPED | OUTPATIENT
Start: 2018-10-03 | End: 2019-04-15 | Stop reason: SDUPTHER

## 2018-10-03 RX ORDER — CHLORZOXAZONE 500 MG/1
500 TABLET ORAL 3 TIMES DAILY
Qty: 90 TABLET | Refills: 1 | Status: SHIPPED | OUTPATIENT
Start: 2018-10-03 | End: 2019-04-09 | Stop reason: SDUPTHER

## 2018-10-03 RX ORDER — ALLOPURINOL 100 MG/1
100 TABLET ORAL DAILY
Qty: 90 TABLET | Refills: 1 | Status: SHIPPED | OUTPATIENT
Start: 2018-10-03 | End: 2019-04-09 | Stop reason: SDUPTHER

## 2018-10-03 RX ORDER — FUROSEMIDE 20 MG/1
TABLET ORAL
Qty: 360 TABLET | Refills: 1 | Status: SHIPPED | OUTPATIENT
Start: 2018-10-03 | End: 2019-10-22 | Stop reason: SDUPTHER

## 2018-10-03 RX ORDER — ATORVASTATIN CALCIUM 40 MG/1
40 TABLET, FILM COATED ORAL DAILY
Qty: 90 TABLET | Refills: 3 | Status: SHIPPED | OUTPATIENT
Start: 2018-10-03 | End: 2019-04-09 | Stop reason: SDUPTHER

## 2018-10-03 RX ORDER — ALBUTEROL SULFATE 90 UG/1
1-2 AEROSOL, METERED RESPIRATORY (INHALATION) EVERY 6 HOURS PRN
Qty: 2 INHALER | Refills: 6 | Status: SHIPPED | OUTPATIENT
Start: 2018-10-03 | End: 2019-04-09 | Stop reason: SDUPTHER

## 2018-10-09 PROBLEM — E66.01 MORBID OBESITY: Status: ACTIVE | Noted: 2018-10-09

## 2018-10-09 NOTE — PROGRESS NOTES
Subjective:       Patient ID: Vinnie Siegel is a 73 y.o. male.    Chief Complaint: Hypertension; Hyperlipidemia; and Medication Refill    Pt presents today for DM, HTN f/u. Per pt, he is trying his best to keep up with his DM. Feels that it is well controlled but aware that his glucose levels and a1c indicate otherwise. Pt also states that he needs to have meds refilled. Is followed by endo  States that he took his BP meds this AM. Being followed by cards as well   Pt also needs shingles vaccine    Does need appt with nephro but states that the order in in progress      Medication Refill   Pertinent negatives include no chest pain or weakness.   Hypertension   Pertinent negatives include no chest pain, palpitations or shortness of breath.   Diabetes   Pertinent negatives for hypoglycemia include no dizziness. Pertinent negatives for diabetes include no chest pain and no weakness.   Hyperlipidemia   Pertinent negatives include no chest pain or shortness of breath.     Review of Systems   Constitutional: Negative for activity change and appetite change.   HENT: Negative.    Eyes: Negative for photophobia and visual disturbance.   Respiratory: Negative for chest tightness and shortness of breath.    Cardiovascular: Negative for chest pain, palpitations and leg swelling.   Neurological: Negative for dizziness, weakness and light-headedness.   All other systems reviewed and are negative.      Objective:      Physical Exam   Constitutional: He is oriented to person, place, and time. He appears well-developed and well-nourished.   HENT:   Head: Normocephalic and atraumatic.   Eyes: Conjunctivae and EOM are normal. Pupils are equal, round, and reactive to light. Right eye exhibits no discharge. Left eye exhibits no discharge. No scleral icterus.   Neck: Normal range of motion. Neck supple. No JVD present.   Cardiovascular: Normal rate, regular rhythm, normal heart sounds and intact distal pulses.   Pulmonary/Chest:  Effort normal and breath sounds normal.   Musculoskeletal: Normal range of motion. He exhibits no edema or tenderness.   Neurological: He is alert and oriented to person, place, and time. He has normal reflexes. No cranial nerve deficit. He exhibits normal muscle tone. Coordination normal.   Skin: Skin is warm and dry.   Psychiatric: He has a normal mood and affect. His behavior is normal. Judgment and thought content normal.       Assessment:       1. Cigarette smoker    2. Acidosis    3. Essential hypertension    4. Dilated cardiomyopathy    5. Seasonal allergies    6. Idiopathic gout of hip, unspecified chronicity, unspecified laterality        Plan:       HTN: controlled.   DM: followed by endocrine, uncontrolled  Pt advised to quit smoking, he will try again wants referral to [facility]  Cigarette smoker  -     Ambulatory referral to Smoking Cessation Program    Acidosis  -     tamsulosin (FLOMAX) 0.4 mg Cap; Take 1 capsule (0.4 mg total) by mouth every morning.  Dispense: 90 capsule; Refill: 1  -     sodium bicarbonate 650 MG tablet; Take 1 tablet (650 mg total) by mouth 2 (two) times daily.  Dispense: 360 tablet; Refill: 1  -     chlorzoxazone (PARAFON FORTE) 500 mg Tab; Take 1 tablet (500 mg total) by mouth 3 (three) times daily.  Dispense: 90 tablet; Refill: 1    Essential hypertension  -     NIFEdipine (PROCARDIA-XL) 90 MG (OSM) 24 hr tablet; Take 1 tablet (90 mg total) by mouth once daily.  Dispense: 90 tablet; Refill: 1  -     carvedilol (COREG) 25 MG tablet; TAKE 1 TABLET(25 MG) BY MOUTH TWICE DAILY WITH MEALS  Dispense: 180 tablet; Refill: 1  -     benazepril (LOTENSIN) 20 MG tablet; Take 1 tablet (20 mg total) by mouth 2 (two) times daily.  Dispense: 180 tablet; Refill: 1  -     atorvastatin (LIPITOR) 40 MG tablet; Take 1 tablet (40 mg total) by mouth once daily.  Dispense: 90 tablet; Refill: 3    Dilated cardiomyopathy  -     spironolactone (ALDACTONE) 25 MG tablet; Take 1 tablet (25 mg total) by mouth  once daily.  Dispense: 90 tablet; Refill: 3  -     furosemide (LASIX) 20 MG tablet; TAKE 1 TABLET BY MOUTH TWICE DAILY INCREASING TO 2 TABLETS TWICE DAILY FOR 3-4 LBS WEIGHT GAIN UNTIL RESOLVED.  Dispense: 360 tablet; Refill: 1  -     carvedilol (COREG) 25 MG tablet; TAKE 1 TABLET(25 MG) BY MOUTH TWICE DAILY WITH MEALS  Dispense: 180 tablet; Refill: 1  -     atorvastatin (LIPITOR) 40 MG tablet; Take 1 tablet (40 mg total) by mouth once daily.  Dispense: 90 tablet; Refill: 3    Seasonal allergies  -     magnesium oxide (MAG-OX) 400 mg tablet; Take 1 tablet (400 mg total) by mouth every morning.  Dispense: 90 tablet; Refill: 1  -     fluticasone (FLONASE) 50 mcg/actuation nasal spray; 2 sprays (100 mcg total) by Each Nare route once daily.  Dispense: 48 mL; Refill: 3  -     albuterol (PROVENTIL/VENTOLIN HFA) 90 mcg/actuation inhaler; Inhale 1-2 puffs into the lungs every 6 (six) hours as needed for Wheezing or Shortness of Breath.  Dispense: 2 Inhaler; Refill: 6    Idiopathic gout of hip, unspecified chronicity, unspecified laterality  -     allopurinol (ZYLOPRIM) 100 MG tablet; Take 1 tablet (100 mg total) by mouth once daily.  Dispense: 90 tablet; Refill: 1      RTC prn symptoms or 6 mos  Pt will obtain nephro appt as advised by all of his doctors.  ED prompts d/w pt

## 2018-10-26 ENCOUNTER — TELEPHONE (OUTPATIENT)
Dept: ENDOCRINOLOGY | Facility: CLINIC | Age: 73
End: 2018-10-26

## 2018-10-26 NOTE — TELEPHONE ENCOUNTER
----- Message from Yulisa Rich sent at 10/26/2018  1:13 PM CDT -----  Contact: Lesly with [employer] is faxing over a CMN form over to be filled out before the patient can get his testing supplies.  Call Back#[phone]  Fax#[phone]  Thanks

## 2018-11-01 ENCOUNTER — TELEPHONE (OUTPATIENT)
Dept: ENDOCRINOLOGY | Facility: CLINIC | Age: 73
End: 2018-11-01

## 2018-11-01 NOTE — TELEPHONE ENCOUNTER
----- Message from Sara Velásquez sent at 11/1/2018  9:20 AM CDT -----  Contact: Self [phone]  PT called to check status of CMN for test strips.     He is also requesting a call at [phone].    ..  [facility] [id number] - [place] - [address]  AT [facility] [address]  [address] [place] [address]  Phone: [phone] Fax: [phone]

## 2018-11-05 ENCOUNTER — TELEPHONE (OUTPATIENT)
Dept: ENDOCRINOLOGY | Facility: CLINIC | Age: 73
End: 2018-11-05

## 2018-11-05 NOTE — TELEPHONE ENCOUNTER
----- Message from Charissa Torres sent at 11/5/2018  1:06 PM CST -----  Contact: Self  He called wants to let you know [facility] did fill his prescription on Friday but I had left early.    Thanks

## 2018-12-13 ENCOUNTER — LAB VISIT (OUTPATIENT)
Dept: LAB | Facility: HOSPITAL | Age: 73
End: 2018-12-13
Payer: MEDICARE

## 2018-12-13 DIAGNOSIS — E11.22 TYPE 2 DIABETES MELLITUS WITH STAGE 3 CHRONIC KIDNEY DISEASE, WITH LONG-TERM CURRENT USE OF INSULIN: ICD-10-CM

## 2018-12-13 DIAGNOSIS — N18.30 TYPE 2 DIABETES MELLITUS WITH STAGE 3 CHRONIC KIDNEY DISEASE, WITH LONG-TERM CURRENT USE OF INSULIN: ICD-10-CM

## 2018-12-13 DIAGNOSIS — E78.5 HYPERLIPIDEMIA, UNSPECIFIED HYPERLIPIDEMIA TYPE: ICD-10-CM

## 2018-12-13 DIAGNOSIS — Z79.4 TYPE 2 DIABETES MELLITUS WITH STAGE 3 CHRONIC KIDNEY DISEASE, WITH LONG-TERM CURRENT USE OF INSULIN: ICD-10-CM

## 2018-12-13 LAB
CHOLEST SERPL-MCNC: 123 MG/DL
CHOLEST/HDLC SERPL: 3 {RATIO}
ESTIMATED AVG GLUCOSE: 140 MG/DL
HBA1C MFR BLD HPLC: 6.5 %
HDLC SERPL-MCNC: 41 MG/DL
HDLC SERPL: 33.3 %
LDLC SERPL CALC-MCNC: 52.6 MG/DL
NONHDLC SERPL-MCNC: 82 MG/DL
TRIGL SERPL-MCNC: 147 MG/DL

## 2018-12-13 PROCEDURE — 83036 HEMOGLOBIN GLYCOSYLATED A1C: CPT

## 2018-12-13 PROCEDURE — 80061 LIPID PANEL: CPT

## 2018-12-13 PROCEDURE — 36415 COLL VENOUS BLD VENIPUNCTURE: CPT

## 2018-12-20 ENCOUNTER — OFFICE VISIT (OUTPATIENT)
Dept: ENDOCRINOLOGY | Facility: CLINIC | Age: 73
End: 2018-12-20
Payer: MEDICARE

## 2018-12-20 VITALS
HEART RATE: 80 BPM | HEIGHT: 72 IN | WEIGHT: 315 LBS | DIASTOLIC BLOOD PRESSURE: 71 MMHG | SYSTOLIC BLOOD PRESSURE: 130 MMHG | BODY MASS INDEX: 42.66 KG/M2

## 2018-12-20 DIAGNOSIS — E66.01 MORBID OBESITY: ICD-10-CM

## 2018-12-20 DIAGNOSIS — E11.22 TYPE 2 DIABETES MELLITUS WITH STAGE 3 CHRONIC KIDNEY DISEASE, WITH LONG-TERM CURRENT USE OF INSULIN: Primary | ICD-10-CM

## 2018-12-20 DIAGNOSIS — I10 ESSENTIAL HYPERTENSION: ICD-10-CM

## 2018-12-20 DIAGNOSIS — Z72.0 TOBACCO ABUSE: Chronic | ICD-10-CM

## 2018-12-20 DIAGNOSIS — E78.5 HYPERLIPIDEMIA, UNSPECIFIED HYPERLIPIDEMIA TYPE: ICD-10-CM

## 2018-12-20 DIAGNOSIS — Z79.4 TYPE 2 DIABETES MELLITUS WITH STAGE 3 CHRONIC KIDNEY DISEASE, WITH LONG-TERM CURRENT USE OF INSULIN: Primary | ICD-10-CM

## 2018-12-20 DIAGNOSIS — N18.30 TYPE 2 DIABETES MELLITUS WITH STAGE 3 CHRONIC KIDNEY DISEASE, WITH LONG-TERM CURRENT USE OF INSULIN: Primary | ICD-10-CM

## 2018-12-20 PROCEDURE — 99214 OFFICE O/P EST MOD 30 MIN: CPT | Mod: S$PBB,,, | Performed by: NURSE PRACTITIONER

## 2018-12-20 PROCEDURE — 99999 PR PBB SHADOW E&M-EST. PATIENT-LVL V: CPT | Mod: PBBFAC,,, | Performed by: NURSE PRACTITIONER

## 2018-12-20 PROCEDURE — 99215 OFFICE O/P EST HI 40 MIN: CPT | Mod: PBBFAC | Performed by: NURSE PRACTITIONER

## 2018-12-20 RX ORDER — INSULIN GLARGINE 100 [IU]/ML
32 INJECTION, SOLUTION SUBCUTANEOUS NIGHTLY
Qty: 15 ML | Refills: 12
Start: 2018-12-20 | End: 2019-05-24 | Stop reason: SDUPTHER

## 2018-12-20 RX ORDER — INSULIN LISPRO 100 [IU]/ML
INJECTION, SOLUTION INTRAVENOUS; SUBCUTANEOUS
Qty: 15 ML | Refills: 12
Start: 2018-12-20 | End: 2019-06-27 | Stop reason: SDUPTHER

## 2018-12-20 NOTE — PROGRESS NOTES
CC: This 73 y.o. male presents for management of diabetes mellitus     HPI: Mr. Siegel is a 70 year old gentleman with type 2 diabetes diagnosed 2010. He has never been hospitalized for his diabetes. Denies missed doses of medication.    Pt was last seen by me in Sept 2018 and is now being seen by me again today.  a1c has improved. Went from 7% to 6.5%.  Pt received freestyle elissa, but was skeptical about using device.  He sent it back, continues to write on his logs.     Lab Results   Component Value Date    HGBA1C 6.5 (H) 12/13/2018     Mild hypoglycemia 70s here and there  BG monitoring max 4 times a day.                                    Exercise: no formal exercise, related to disk in his back      Eats 3 meals a day, will sometimes snack in-between meals- rare chips and candy  Bedtime snack- yogurt    Retired:  since 2008        [employer] True Metrix meter 2017  DM education completed     CURRENT DM MEDS:  Humalog 14-14-12 u w/ breakfast lunch dinner, Lantus 40 u qhs      Lowest 77 mg/dl at lunch time   Breakfast---sausage biscuit, 1/2 cup of coffee or milk  Sometimes an omelet                                       Standards of Care:  Eye exam:  6/2018 no retinopathy   Diabetes Management Status    Statin: Taking  ACE/ARB: Taking    Screening or Prevention Patient's value Goal Complete/Controlled?   HgA1C Testing and Control   Lab Results   Component Value Date    HGBA1C 6.5 (H) 12/13/2018      Annually/Less than 8% Yes   Lipid profile : 12/13/2018 Annually Yes   LDL control Lab Results   Component Value Date    LDLCALC 52.6 (L) 12/13/2018    Annually/Less than 100 mg/dl  Yes   Nephropathy screening Lab Results   Component Value Date    LABMICR 133.0 09/24/2014     Lab Results   Component Value Date    PROTEINUA 3+ (A) 02/14/2017    Annually No   Blood pressure BP Readings from Last 1 Encounters:   12/20/18 130/71    Less than 140/90 Yes   Dilated retinal exam : 06/08/2018 Annually Yes   Foot  exam   : 09/11/2018 Annually Yes         ROS:   Gen: Appetite good,  +weight loss #4 since sept 2018, denies fatigue and weakness.  Skin: Skin is intact and heals well, no rashes   Eyes: Denies visual disturbances  Resp: no SOB, + ZEPEDA, no cough  Cardiac: No palpitations, chest pain, no edema   GI: No nausea or vomiting, diarrhea, constipation, or abdominal pain.  /GYN: 1-2+ nocturia, burning or pain.   MS/Neuro: Denies numbness/ tingling in BLE; Gait steady, speech clear  Psych: Denies drug/ETOH abuse, no hx of depression.  Other systems: negative.    PE:  GENERAL: Well developed, well nourished.  PSYCH: AAOx3, appropriate mood and affect, pleasant expression, conversant, appears relaxed, well groomed.   EYES: Conjunctiva, corneas clear  NECK: Supple, trachea midline,no thyromegaly or nodules .  CHEST: Resp even and unlabored  CARDIAC: RRR  ABDOMEN: Soft, non-tender, non-distended    VASCULAR: no edema.  NEURO: Gait steady  SKIN: Skin warm and dry no acanthosis nigracans.  FEET:  Footware appropriate     Hemoglobin A1C   Date Value Ref Range Status   12/13/2018 6.5 (H) 4.0 - 5.6 % Final     Comment:     ADA Screening Guidelines:  5.7-6.4%  Consistent with prediabetes  >or=6.5%  Consistent with diabetes  High levels of fetal hemoglobin interfere with the HbA1C  assay. Heterozygous hemoglobin variants (HbS, HgC, etc)do  not significantly interfere with this assay.   However, presence of multiple variants may affect accuracy.     09/04/2018 7.0 (H) 4.0 - 5.6 % Final     Comment:     ADA Screening Guidelines:  5.7-6.4%  Consistent with prediabetes  >or=6.5%  Consistent with diabetes  High levels of fetal hemoglobin interfere with the HbA1C  assay. Heterozygous hemoglobin variants (HbS, HgC, etc)do  not significantly interfere with this assay.   However, presence of multiple variants may affect accuracy.     05/29/2018 7.2 (H) 4.0 - 5.6 % Final     Comment:     According to ADA guidelines, hemoglobin A1c <7.0%  represents  optimal control in non-pregnant diabetic patients. Different  metrics may apply to specific patient populations.   Standards of Medical Care in Diabetes-2016.  For the purpose of screening for the presence of diabetes:  <5.7%     Consistent with the absence of diabetes  5.7-6.4%  Consistent with increasing risk for diabetes   (prediabetes)  >or=6.5%  Consistent with diabetes  Currently, no consensus exists for use of hemoglobin A1c  for diagnosis of diabetes for children.  This Hemoglobin A1c assay has significant interference with fetal   hemoglobin   (HbF). The results are invalid for patients with abnormal amounts of   HbF,   including those with known Hereditary Persistence   of Fetal Hemoglobin. Heterozygous hemoglobin variants (HbAS, HbAC,   HbAD, HbAE, HbA2) do not significantly interfere with this assay;   however, presence of multiple variants in a sample may impact the %   interference.        ASSESSMENT and PLAN:  1. Type 2 diabetes mellitus with stage 3 chronic kidney disease, with long-term current use of insulin  Hemoglobin A1c next time  F/u in 4 mos      TSH next time     Microalbumin/creatinine urine ratio next time     Comprehensive metabolic panel next time   a1c goal less than 7%  Increase trulicity 1.5 mg weekly    2. Essential hypertension  Microalbumin/creatinine urine ratio next time   Controlled, continue med(s)   3. Hyperlipidemia, unspecified hyperlipidemia type  Lab Results   Component Value Date    LDLCALC 52.6 (L) 12/13/2018     At goal    4. Morbid obesity  Body mass index is 43.68 kg/m². may increase insulin resistance.   Encourage exercise 3 times a week  30 mins per session    5. Tobacco abuse  Discussed cessation

## 2018-12-20 NOTE — PATIENT INSTRUCTIONS
Snacks can be an important part of a balanced, healthy meal plan. They allow you to eat more frequently, feeling full and satisfied throughout the day. Also, they allow you to spread carbohydrates evenly, which may stabilize blood sugars.  Plus, snacks are enjoyable!     The amount of carbohydrate needed at snacks varies. Generally, about 15-30 grams of carbohydrate per snack is recommended.  Below you will find some tasty treats.       0-5 gm carb   Crystal Light   Vitamin Water Zero   Herbal tea, unsweetened   2 tsp peanut butter on celery   1./2 cup sugar-free jell-o   1 sugar-free popsicle   ¼ cup blueberries   8oz Blue Norah unsweetened almond milk   5 baby carrots & celery sticks, cucumbers, bell peppers dipped in ¼ cup salsa, 2Tbsp light ranch dressing or 2Tbsp plain Greek yogurt   10 Goldfish crackers   ½ oz low-fat cheese or string cheese   1 closed handful of nuts, unsalted   1 Tbsp of sunflower seeds, unsalted   1 cup Smart Pop popcorn   1 whole grain brown rice cake        15 gm carb   1 small piece of fruit or ½ banana or 1/2 cup lite canned fruit   3 bibi cracker squares   3 cups Smart Pop popcorn, top spray butter, Ray lite salt or cinnamon and Truvia   5 Vanilla Wafers   ½ cup low fat, no added sugar ice cream or frozen yogurt (Blue bell, Blue Bunny, Weight Watchers, Skinny Cow)   ½ turkey, ham, or chicken sandwich   ½ c fruit with ½ c Cottage cheese   4-6 unsalted wheat crackers with 1 oz low fat cheese or 1 tbsp peanut butter    30-45 goldfish crackers (depending on flavor)    7-8 Faith mini brown rice cakes (caramel, apple cinnamon, chocolate)    12 Faith mini brown rice cakes (cheddar, bbq, ranch)    1/3 cup hummus dip with raw veg   1/2 whole wheat nehemiah, 1Tbsp hummus   Mini Pizza (1/2 whole wheat English muffin, low-fat  cheese, tomato sauce)   100 calorie snack pack (Oreo, Chips Ahoy, Ritz Mix, Baked Cheetos)   4-6 oz. light or Greek Style yogurt  (Chema, Alyse, Davion, [facility] [place])   ½ cup sugar-free pudding     6 in. wheat tortilla or nehemiah oven toasted chips (topped with spray butter flavoring, cinnamon, Truvia OR spray butter, garlic powder, chili powder)    18 BBQ Popchips (available at Target, Whole Foods, Fresh Market)                   Diabetes Support Group Meetings         Date: Topic:   February 14 Eat Fit JUAN/Health Promotion   March 14 Taking Care of Your Smile   April 11 Spring into Healthy Eating/Cooking Demo   May 9 Ease Your Mind with Diabetes   Kayy 13 Summer Treats/Cooking Demo   July 11 Eat Fit JUAN/Super Market Sweep   August 8 Taking Care of Your Eyes and Feet   Sept 12 Technology/ADA updates   October 10 Recipes & Treats/Cooking Demo   November 14 Heart Health/Pump it up!   December 12 Year-End Close Out        Meetings are held in the [facility] (A) of the [facility] located at [address] [place] [address]. Please call [phone] for additional information.    Free service, offered every 2nd Thursday of every month! Family members and/or friends are welcome as well!  Support group is for patients with type 1 or type 2 diabetes.    From 3:30p to 4:30p

## 2019-01-30 DIAGNOSIS — E87.20 ACIDOSIS: ICD-10-CM

## 2019-01-30 RX ORDER — SODIUM BICARBONATE 650 MG/1
TABLET ORAL
Qty: 360 TABLET | Refills: 0 | Status: SHIPPED | OUTPATIENT
Start: 2019-01-30 | End: 2019-10-22 | Stop reason: SDUPTHER

## 2019-02-04 ENCOUNTER — TELEPHONE (OUTPATIENT)
Dept: ENDOCRINOLOGY | Facility: CLINIC | Age: 74
End: 2019-02-04

## 2019-02-04 NOTE — TELEPHONE ENCOUNTER
"----- Message from Sara Velásquez sent at 2/4/2019  1:35 PM CST -----  Contact: Self [phone]  .Medication question / problems.  PT needs a prior authorization completed for pen needle, diabetic (BD ULTRA-FINE SHORT PEN NEEDLE) 31 gauge x 5/16" Ndle    ..  [facility] [address] [place] [address] [place] [facility] [address]  AT [facility] [address]  [address] [place] [address]  Phone: [phone] Fax: [phone]  PT is requesting a call once done.      "

## 2019-02-05 RX ORDER — PEN NEEDLE, DIABETIC 30 GX3/16"
NEEDLE, DISPOSABLE MISCELLANEOUS
Qty: 150 EACH | Refills: 4 | Status: SHIPPED | OUTPATIENT
Start: 2019-02-05 | End: 2019-06-13 | Stop reason: SDUPTHER

## 2019-03-25 ENCOUNTER — OFFICE VISIT (OUTPATIENT)
Dept: CARDIOLOGY | Facility: CLINIC | Age: 74
End: 2019-03-25
Payer: MEDICARE

## 2019-03-25 VITALS
SYSTOLIC BLOOD PRESSURE: 119 MMHG | WEIGHT: 310.44 LBS | HEART RATE: 74 BPM | DIASTOLIC BLOOD PRESSURE: 60 MMHG | HEIGHT: 74 IN | BODY MASS INDEX: 39.84 KG/M2

## 2019-03-25 DIAGNOSIS — E66.01 MORBID OBESITY: ICD-10-CM

## 2019-03-25 DIAGNOSIS — E78.5 HYPERLIPIDEMIA, UNSPECIFIED HYPERLIPIDEMIA TYPE: ICD-10-CM

## 2019-03-25 DIAGNOSIS — I42.0 DILATED CARDIOMYOPATHY: Primary | ICD-10-CM

## 2019-03-25 DIAGNOSIS — Z72.0 TOBACCO ABUSE: Chronic | ICD-10-CM

## 2019-03-25 DIAGNOSIS — N18.4 CKD (CHRONIC KIDNEY DISEASE) STAGE 4, GFR 15-29 ML/MIN: ICD-10-CM

## 2019-03-25 DIAGNOSIS — I10 ESSENTIAL HYPERTENSION: ICD-10-CM

## 2019-03-25 PROCEDURE — 99214 OFFICE O/P EST MOD 30 MIN: CPT | Mod: S$PBB,,, | Performed by: INTERNAL MEDICINE

## 2019-03-25 PROCEDURE — 99214 PR OFFICE/OUTPT VISIT, EST, LEVL IV, 30-39 MIN: ICD-10-PCS | Mod: S$PBB,,, | Performed by: INTERNAL MEDICINE

## 2019-03-25 PROCEDURE — 99213 OFFICE O/P EST LOW 20 MIN: CPT | Mod: PBBFAC | Performed by: INTERNAL MEDICINE

## 2019-03-25 PROCEDURE — 99999 PR PBB SHADOW E&M-EST. PATIENT-LVL III: ICD-10-PCS | Mod: PBBFAC,,, | Performed by: INTERNAL MEDICINE

## 2019-03-25 PROCEDURE — 99999 PR PBB SHADOW E&M-EST. PATIENT-LVL III: CPT | Mod: PBBFAC,,, | Performed by: INTERNAL MEDICINE

## 2019-03-25 RX ORDER — IRBESARTAN 150 MG/1
150 TABLET ORAL NIGHTLY
Qty: 90 TABLET | Refills: 3 | Status: SHIPPED | OUTPATIENT
Start: 2019-03-25 | End: 2019-10-22 | Stop reason: SDUPTHER

## 2019-03-25 NOTE — PATIENT INSTRUCTIONS
Once out, change Benazepril to Irbesartan 150 mg daily  Suggest you contact the Smoking Cessation Program  Contact me if you decide on the Digital Blood Pressure Monitoring program.

## 2019-03-25 NOTE — PROGRESS NOTES
Subjective:   Patient ID:  Vinnie Siegel is a 73 y.o. male who presents for follow-up of Essential hypertension      HPI:   Vinnie Siegel presents for follow up of a dilated cardiomyopathy.Vinnie Siegel has hypertension with good control. Has no more than his usual ZEPEDA but admits he is very sedentary due to back pain. Has lost weight. Vinnie Siegel denies chest pain, palpitations, presyncope , or syncope. Vinnie Siegel has dyslipidemia  on high intensity statin. He resumed smoking after stopping 2 weeks. Vinnie Siegel chronic kidney disease that has been stable.  Review of Systems   Constitution: Positive for weight loss. Negative for malaise/fatigue and weight gain.   Eyes: Negative for blurred vision.   Cardiovascular: Positive for dyspnea on exertion. Negative for chest pain, claudication, cyanosis, irregular heartbeat, leg swelling, near-syncope, orthopnea, palpitations, paroxysmal nocturnal dyspnea and syncope.   Respiratory: Negative for cough, shortness of breath and wheezing.    Musculoskeletal: Positive for back pain. Negative for falls and myalgias.   Gastrointestinal: Positive for constipation. Negative for abdominal pain, heartburn, nausea and vomiting.   Genitourinary: Negative for nocturia.   Neurological: Negative for brief paralysis, dizziness, focal weakness, headaches, numbness, paresthesias and weakness.   Psychiatric/Behavioral: Negative for altered mental status.       Current Outpatient Medications   Medication Sig    albuterol (PROVENTIL/VENTOLIN HFA) 90 mcg/actuation inhaler Inhale 1-2 puffs into the lungs every 6 (six) hours as needed for Wheezing or Shortness of Breath.    allopurinol (ZYLOPRIM) 100 MG tablet Take 1 tablet (100 mg total) by mouth once daily.    atorvastatin (LIPITOR) 40 MG tablet Take 1 tablet (40 mg total) by mouth once daily.    carvedilol (COREG) 25 MG tablet TAKE 1 TABLET(25 MG) BY MOUTH TWICE DAILY WITH MEALS    chlorzoxazone (PARAFON FORTE) 500  "mg Tab Take 1 tablet (500 mg total) by mouth 3 (three) times daily.    dulaglutide (TRULICITY) 1.5 mg/0.5 mL PnIj Inject 1.5 mg weekly.    fluticasone (FLONASE) 50 mcg/actuation nasal spray 2 sprays (100 mcg total) by Each Nare route once daily.    furosemide (LASIX) 20 MG tablet TAKE 1 TABLET BY MOUTH TWICE DAILY INCREASING TO 2 TABLETS TWICE DAILY FOR 3-4 LBS WEIGHT GAIN UNTIL RESOLVED.    hydrocodone-acetaminophen 7.5-325mg (NORCO) 7.5-325 mg per tablet TK 1 T PO  TID PRN P    insulin (LANTUS SOLOSTAR U-100 INSULIN) glargine 100 units/mL (3mL) SubQ pen Inject 32 Units into the skin every evening.    insulin lispro (HUMALOG KWIKPEN INSULIN) 100 unit/mL pen Inject 10 units w/ meals plus scale 150-200+2, 201-250+4, 251-300+6, 301-350+8.    magnesium oxide (MAG-OX) 400 mg tablet Take 1 tablet (400 mg total) by mouth every morning.    NIFEdipine (PROCARDIA-XL) 90 MG (OSM) 24 hr tablet Take 1 tablet (90 mg total) by mouth once daily.    pen needle, diabetic 31 gauge x 5/16" Ndle Generic pen needles needed. Uses 4 times a day.    sodium bicarbonate 650 MG tablet Take 1 tablet (650 mg total) by mouth 2 (two) times daily.    spironolactone (ALDACTONE) 25 MG tablet Take 1 tablet (25 mg total) by mouth once daily.    tamsulosin (FLOMAX) 0.4 mg Cap Take 1 capsule (0.4 mg total) by mouth every morning.    TRUE METRIX GLUCOSE TEST STRIP Strp Blood glucose checks 4 times a day.    TRUEPLUS LANCETS 33 gauge Misc Inject 1 lancet into the skin 4 (four) times daily.    blood-glucose meter kit Use as instructed    irbesartan (AVAPRO) 150 MG tablet Take 1 tablet (150 mg total) by mouth every evening.    sodium bicarbonate 650 MG tablet TAKE 1 TABLET BY MOUTH TWICE DAILY    timolol maleate 0.25% (TIMOPTIC) 0.25 % Drop Place 1 drop into the right eye 2 (two) times daily.     No current facility-administered medications for this visit.      Objective:   Physical Exam   Constitutional: He is oriented to person, place, " "and time. He appears well-developed. No distress.   /60 (BP Location: Left arm, Patient Position: Sitting, BP Method: X-Large (Automatic))   Pulse 74   Ht 6' 2" (1.88 m)   Wt (!) 140.8 kg (310 lb 6.5 oz)   BMI 39.85 kg/m²    HENT:   Head: Normocephalic.   Right Ear: External ear normal.   Left Ear: External ear normal.   Eyes: Pupils are equal, round, and reactive to light. EOM are normal. No scleral icterus.   Neck: Neck supple. No JVD present. No thyromegaly present.   Cardiovascular: Normal rate, regular rhythm and intact distal pulses. PMI is not displaced. Exam reveals no gallop and no friction rub.   Murmur heard.   Medium-pitched midsystolic murmur is present with a grade of 1/6 at the upper left sternal border.  1+ bilateral pedal edema   Pulmonary/Chest: Effort normal and breath sounds normal. No respiratory distress. He has no wheezes. He has no rales.   Abdominal: Soft. He exhibits no distension. There is no hepatosplenomegaly. There is no tenderness.   Musculoskeletal: He exhibits no edema or tenderness.   Gait normal   Neurological: He is alert and oriented to person, place, and time.   Skin: Skin is warm and dry. No rash noted.   Psychiatric: He has a normal mood and affect. His behavior is normal.       Lab Results   Component Value Date     08/23/2018    K 4.4 08/23/2018     08/23/2018    CO2 24 08/23/2018    BUN 39 (H) 08/23/2018    CREATININE 2.9 (H) 08/23/2018     (H) 08/23/2018    HGBA1C 6.5 (H) 12/13/2018    AST 17 05/29/2018    ALT 25 05/29/2018    ALBUMIN 3.3 (L) 05/29/2018    PROT 6.7 05/29/2018    BILITOT 0.5 05/29/2018    WBC 8.22 12/23/2015    HGB 14.3 05/08/2017    HCT 41.2 05/08/2017    MCV 91 12/23/2015     12/23/2015    TSH 1.091 01/25/2017    CHOL 123 12/13/2018    HDL 41 12/13/2018    LDLCALC 52.6 (L) 12/13/2018    TRIG 147 12/13/2018       Assessment:     1. Dilated cardiomyopathy : Stable- compendated   2. Essential hypertension : Good control. "   3. Hyperlipidemia, unspecified hyperlipidemia type:  on high intensity statin At LDL goal    4. Tobacco abuse: resumed     5. CKD (chronic kidney disease) stage 4, GFR 15-29 ml/min    6. Morbid obesity : lost weight       Plan:     Vinnie was seen today for essential hypertension.    Diagnoses and all orders for this visit:    Dilated cardiomyopathy    Essential hypertension  -     irbesartan (AVAPRO) 150 MG tablet; Take 1 tablet (150 mg total) by mouth every evening in place of Benazepril .  Offered Digital monitoring program but he deferred at this time  Hyperlipidemia, unspecified hyperlipidemia type  Continue current regimen    Tobacco abuse  Offered Smoking Cessation program  CKD (chronic kidney disease) stage 4, GFR 15-29 ml/min    Morbid obesity  Encouraged continues weight loss

## 2019-03-26 ENCOUNTER — PATIENT OUTREACH (OUTPATIENT)
Dept: ADMINISTRATIVE | Facility: HOSPITAL | Age: 74
End: 2019-03-26

## 2019-03-28 DIAGNOSIS — H40.022 OPEN ANGLE WITH BORDERLINE FINDINGS, HIGH RISK, LEFT: ICD-10-CM

## 2019-03-28 DIAGNOSIS — H40.1113 PRIMARY OPEN ANGLE GLAUCOMA OF RIGHT EYE, SEVERE STAGE: ICD-10-CM

## 2019-03-29 RX ORDER — TIMOLOL MALEATE 2.5 MG/ML
SOLUTION/ DROPS OPHTHALMIC
Qty: 10 ML | Refills: 0 | Status: SHIPPED | OUTPATIENT
Start: 2019-03-29 | End: 2019-08-11 | Stop reason: SDUPTHER

## 2019-04-09 ENCOUNTER — OFFICE VISIT (OUTPATIENT)
Dept: PRIMARY CARE CLINIC | Facility: CLINIC | Age: 74
End: 2019-04-09
Attending: FAMILY MEDICINE
Payer: MEDICARE

## 2019-04-09 VITALS
DIASTOLIC BLOOD PRESSURE: 62 MMHG | HEART RATE: 70 BPM | WEIGHT: 315 LBS | SYSTOLIC BLOOD PRESSURE: 123 MMHG | BODY MASS INDEX: 40.43 KG/M2 | OXYGEN SATURATION: 97 % | HEIGHT: 74 IN

## 2019-04-09 DIAGNOSIS — E11.22 TYPE 2 DIABETES MELLITUS WITH STAGE 3 CHRONIC KIDNEY DISEASE, WITH LONG-TERM CURRENT USE OF INSULIN: Primary | ICD-10-CM

## 2019-04-09 DIAGNOSIS — J30.2 SEASONAL ALLERGIES: ICD-10-CM

## 2019-04-09 DIAGNOSIS — I42.0 DILATED CARDIOMYOPATHY: ICD-10-CM

## 2019-04-09 DIAGNOSIS — I10 ESSENTIAL HYPERTENSION: ICD-10-CM

## 2019-04-09 DIAGNOSIS — M10.059 IDIOPATHIC GOUT OF HIP, UNSPECIFIED CHRONICITY, UNSPECIFIED LATERALITY: ICD-10-CM

## 2019-04-09 DIAGNOSIS — R80.9 PROTEINURIA, UNSPECIFIED TYPE: ICD-10-CM

## 2019-04-09 DIAGNOSIS — Z79.4 TYPE 2 DIABETES MELLITUS WITH STAGE 3 CHRONIC KIDNEY DISEASE, WITH LONG-TERM CURRENT USE OF INSULIN: Primary | ICD-10-CM

## 2019-04-09 DIAGNOSIS — E78.5 HYPERLIPIDEMIA, UNSPECIFIED HYPERLIPIDEMIA TYPE: ICD-10-CM

## 2019-04-09 DIAGNOSIS — N18.4 CKD (CHRONIC KIDNEY DISEASE) STAGE 4, GFR 15-29 ML/MIN: ICD-10-CM

## 2019-04-09 DIAGNOSIS — N18.30 TYPE 2 DIABETES MELLITUS WITH STAGE 3 CHRONIC KIDNEY DISEASE, WITH LONG-TERM CURRENT USE OF INSULIN: Primary | ICD-10-CM

## 2019-04-09 DIAGNOSIS — E87.20 ACIDOSIS: ICD-10-CM

## 2019-04-09 PROCEDURE — 99999 PR PBB SHADOW E&M-EST. PATIENT-LVL III: ICD-10-PCS | Mod: PBBFAC,,, | Performed by: FAMILY MEDICINE

## 2019-04-09 PROCEDURE — 99999 PR PBB SHADOW E&M-EST. PATIENT-LVL III: CPT | Mod: PBBFAC,,, | Performed by: FAMILY MEDICINE

## 2019-04-09 PROCEDURE — 99213 OFFICE O/P EST LOW 20 MIN: CPT | Mod: PBBFAC,PN | Performed by: FAMILY MEDICINE

## 2019-04-09 PROCEDURE — 99214 OFFICE O/P EST MOD 30 MIN: CPT | Mod: S$PBB,,, | Performed by: FAMILY MEDICINE

## 2019-04-09 PROCEDURE — 99214 PR OFFICE/OUTPT VISIT, EST, LEVL IV, 30-39 MIN: ICD-10-PCS | Mod: S$PBB,,, | Performed by: FAMILY MEDICINE

## 2019-04-09 RX ORDER — CARVEDILOL 25 MG/1
TABLET ORAL
Qty: 180 TABLET | Refills: 1 | Status: SHIPPED | OUTPATIENT
Start: 2019-04-09 | End: 2019-10-08

## 2019-04-09 RX ORDER — TAMSULOSIN HYDROCHLORIDE 0.4 MG/1
1 CAPSULE ORAL EVERY MORNING
Qty: 90 CAPSULE | Refills: 1 | Status: SHIPPED | OUTPATIENT
Start: 2019-04-09 | End: 2019-11-12 | Stop reason: SDUPTHER

## 2019-04-09 RX ORDER — CHLORZOXAZONE 500 MG/1
500 TABLET ORAL 3 TIMES DAILY
Qty: 90 TABLET | Refills: 1 | Status: ON HOLD | OUTPATIENT
Start: 2019-04-09 | End: 2020-07-13 | Stop reason: HOSPADM

## 2019-04-09 RX ORDER — ALBUTEROL SULFATE 90 UG/1
1-2 AEROSOL, METERED RESPIRATORY (INHALATION) EVERY 6 HOURS PRN
Qty: 2 INHALER | Refills: 6 | Status: ON HOLD | OUTPATIENT
Start: 2019-04-09 | End: 2020-07-13 | Stop reason: HOSPADM

## 2019-04-09 RX ORDER — NIFEDIPINE 90 MG/1
90 TABLET, EXTENDED RELEASE ORAL DAILY
Qty: 90 TABLET | Refills: 1 | Status: SHIPPED | OUTPATIENT
Start: 2019-04-09 | End: 2019-10-22 | Stop reason: SDUPTHER

## 2019-04-09 RX ORDER — SPIRONOLACTONE 25 MG/1
25 TABLET ORAL DAILY
Qty: 90 TABLET | Refills: 3 | Status: SHIPPED | OUTPATIENT
Start: 2019-04-09 | End: 2019-10-22 | Stop reason: SDUPTHER

## 2019-04-09 RX ORDER — FLUTICASONE PROPIONATE 50 MCG
2 SPRAY, SUSPENSION (ML) NASAL DAILY
Qty: 48 ML | Refills: 3 | Status: SHIPPED | OUTPATIENT
Start: 2019-04-09 | End: 2019-11-12 | Stop reason: SDUPTHER

## 2019-04-09 RX ORDER — ALLOPURINOL 100 MG/1
100 TABLET ORAL DAILY
Qty: 90 TABLET | Refills: 1 | Status: SHIPPED | OUTPATIENT
Start: 2019-04-09 | End: 2019-10-21 | Stop reason: SDUPTHER

## 2019-04-09 RX ORDER — ATORVASTATIN CALCIUM 40 MG/1
40 TABLET, FILM COATED ORAL DAILY
Qty: 90 TABLET | Refills: 3 | Status: SHIPPED | OUTPATIENT
Start: 2019-04-09 | End: 2019-10-22 | Stop reason: SDUPTHER

## 2019-04-09 NOTE — PROGRESS NOTES
Subjective:       Patient ID: Vinnie Siegel is a 73 y.o. male.    Chief Complaint: Annual Exam    Pt presents today for DM, HTN f/u and general medical wellness exam. Per pt, he is trying his best to keep up with his DM. Feels that it is well controlled but aware that his glucose levels and a1c indicate otherwise. Pt also states that he needs to have meds refilled. Is followed by endo  States that he took his BP meds this AM. Being followed by [facility] as well. Had appt recently with [facility]    cscope last done 2011. Pt states that they will call him when ready for repeat    Medication Refill   Pertinent negatives include no chest pain or weakness.   Hypertension   Pertinent negatives include no chest pain, palpitations or shortness of breath.   Diabetes   Pertinent negatives for hypoglycemia include no dizziness. Pertinent negatives for diabetes include no chest pain and no weakness.     Review of Systems   Constitutional: Negative for activity change and appetite change.   HENT: Negative.    Eyes: Negative for photophobia and visual disturbance.   Respiratory: Negative for chest tightness and shortness of breath.    Cardiovascular: Negative for chest pain, palpitations and leg swelling.   Neurological: Negative for dizziness, weakness and light-headedness.   All other systems reviewed and are negative.      Objective:      Physical Exam   Constitutional: He is oriented to person, place, and time. He appears well-developed and well-nourished. No distress.   HENT:   Head: Normocephalic and atraumatic.   Right Ear: External ear normal.   Left Ear: External ear normal.   Mouth/Throat: Oropharynx is clear and moist. No oropharyngeal exudate.   Eyes: Pupils are equal, round, and reactive to light. Conjunctivae and EOM are normal. Right eye exhibits no discharge. Left eye exhibits no discharge. No scleral icterus.   Neck: Normal range of motion. Neck supple. No JVD present. No thyromegaly present.   Cardiovascular: Normal  rate, regular rhythm, normal heart sounds and intact distal pulses.   No murmur heard.  Pulmonary/Chest: Effort normal and breath sounds normal. No respiratory distress. He has no wheezes. He has no rales. He exhibits no tenderness.   Abdominal: Soft. Bowel sounds are normal. He exhibits no distension and no mass. There is no tenderness. There is no rebound and no guarding.   Musculoskeletal: Normal range of motion. He exhibits no edema or tenderness.   Lymphadenopathy:     He has no cervical adenopathy.   Neurological: He is alert and oriented to person, place, and time. He has normal reflexes. He displays normal reflexes. No cranial nerve deficit. He exhibits normal muscle tone. Coordination normal.   Skin: Skin is warm and dry. He is not diaphoretic. No erythema. No pallor.   Psychiatric: He has a normal mood and affect. His behavior is normal. Judgment and thought content normal.       Assessment:       1. Type 2 diabetes mellitus with stage 3 chronic kidney disease, with long-term current use of insulin    2. Essential hypertension    3. Dilated cardiomyopathy    4. Acidosis    5. Idiopathic gout of hip, unspecified chronicity, unspecified laterality    6. Seasonal allergies    7. Proteinuria, unspecified type    8. CKD (chronic kidney disease) stage 4, GFR 15-29 ml/min    9. Hyperlipidemia, unspecified hyperlipidemia type        Plan:       HTN: controlled.   DM: followed by endocrine, uncontrolled  Type 2 diabetes mellitus with stage 3 chronic kidney disease, with long-term current use of insulin    Essential hypertension  -     carvedilol (COREG) 25 MG tablet; TAKE 1 TABLET(25 MG) BY MOUTH TWICE DAILY WITH MEALS  Dispense: 180 tablet; Refill: 1  -     atorvastatin (LIPITOR) 40 MG tablet; Take 1 tablet (40 mg total) by mouth once daily.  Dispense: 90 tablet; Refill: 3  -     NIFEdipine (PROCARDIA-XL) 90 MG (OSM) 24 hr tablet; Take 1 tablet (90 mg total) by mouth once daily.  Dispense: 90 tablet; Refill:  1    Dilated cardiomyopathy  -     carvedilol (COREG) 25 MG tablet; TAKE 1 TABLET(25 MG) BY MOUTH TWICE DAILY WITH MEALS  Dispense: 180 tablet; Refill: 1  -     albuterol (PROVENTIL/VENTOLIN HFA) 90 mcg/actuation inhaler; Inhale 1-2 puffs into the lungs every 6 (six) hours as needed for Wheezing or Shortness of Breath.  Dispense: 2 Inhaler; Refill: 6  -     fluticasone (FLONASE) 50 mcg/actuation nasal spray; 2 sprays (100 mcg total) by Each Nare route once daily.  Dispense: 48 mL; Refill: 3  -     spironolactone (ALDACTONE) 25 MG tablet; Take 1 tablet (25 mg total) by mouth once daily.  Dispense: 90 tablet; Refill: 3  -     atorvastatin (LIPITOR) 40 MG tablet; Take 1 tablet (40 mg total) by mouth once daily.  Dispense: 90 tablet; Refill: 3    Acidosis  -     tamsulosin (FLOMAX) 0.4 mg Cap; Take 1 capsule (0.4 mg total) by mouth every morning.  Dispense: 90 capsule; Refill: 1  -     chlorzoxazone (PARAFON FORTE) 500 mg Tab; Take 1 tablet (500 mg total) by mouth 3 (three) times daily.  Dispense: 90 tablet; Refill: 1    Idiopathic gout of hip, unspecified chronicity, unspecified laterality  -     allopurinol (ZYLOPRIM) 100 MG tablet; Take 1 tablet (100 mg total) by mouth once daily.  Dispense: 90 tablet; Refill: 1    Seasonal allergies  -     albuterol (PROVENTIL/VENTOLIN HFA) 90 mcg/actuation inhaler; Inhale 1-2 puffs into the lungs every 6 (six) hours as needed for Wheezing or Shortness of Breath.  Dispense: 2 Inhaler; Refill: 6  -     fluticasone (FLONASE) 50 mcg/actuation nasal spray; 2 sprays (100 mcg total) by Each Nare route once daily.  Dispense: 48 mL; Refill: 3    Proteinuria, unspecified type    CKD (chronic kidney disease) stage 4, GFR 15-29 ml/min    Hyperlipidemia, unspecified hyperlipidemia type  -     atorvastatin (LIPITOR) 40 MG tablet; Take 1 tablet (40 mg total) by mouth once daily.  Dispense: 90 tablet; Refill: 3      RTC prn symptoms or 6 mos

## 2019-04-15 ENCOUNTER — TELEPHONE (OUTPATIENT)
Dept: PRIMARY CARE CLINIC | Facility: CLINIC | Age: 74
End: 2019-04-15

## 2019-04-15 DIAGNOSIS — J30.2 SEASONAL ALLERGIES: ICD-10-CM

## 2019-04-15 RX ORDER — LANOLIN ALCOHOL/MO/W.PET/CERES
1 CREAM (GRAM) TOPICAL EVERY MORNING
Qty: 90 TABLET | Refills: 1 | Status: SHIPPED | OUTPATIENT
Start: 2019-04-15 | End: 2019-10-23 | Stop reason: SDUPTHER

## 2019-04-15 NOTE — TELEPHONE ENCOUNTER
Questions addressed for pt. Will RF magox and parafon only to be taken as needed(muscle relaxant)  PV

## 2019-04-15 NOTE — TELEPHONE ENCOUNTER
----- Message from Alana Mancini sent at 4/15/2019  8:35 AM CDT -----  Contact: IMER ISRAEL [[id number]]  Name of Who is Calling: IMER ISRAEL [[id number]]      What is the request in detail:   Patient called requesting a call.  Patient refused to disclose as to what his call pertains to.   Please give a call back at your earliest convenience.     THANKS!      Can the clinic reply by [facility]: NO      Number to Call Back: IMER ISRAEL / ph# [phone]

## 2019-04-29 DIAGNOSIS — I10 ESSENTIAL HYPERTENSION: ICD-10-CM

## 2019-04-29 DIAGNOSIS — I42.0 DILATED CARDIOMYOPATHY: ICD-10-CM

## 2019-04-30 RX ORDER — CARVEDILOL 25 MG/1
TABLET ORAL
Qty: 60 TABLET | Refills: 0 | Status: SHIPPED | OUTPATIENT
Start: 2019-04-30 | End: 2019-10-22 | Stop reason: SDUPTHER

## 2019-05-23 ENCOUNTER — TELEPHONE (OUTPATIENT)
Dept: ENDOCRINOLOGY | Facility: CLINIC | Age: 74
End: 2019-05-23

## 2019-05-23 NOTE — TELEPHONE ENCOUNTER
----- Message from Abel Santiago MA sent at 5/23/2019 10:28 AM CDT -----  Contact: [facility]  Pharmacy called regarding filling out a Cmn form  for the above pt.    [facility] can be reached at [phone].      Thanks

## 2019-05-24 DIAGNOSIS — E11.22 TYPE 2 DIABETES MELLITUS WITH STAGE 3 CHRONIC KIDNEY DISEASE, WITHOUT LONG-TERM CURRENT USE OF INSULIN: ICD-10-CM

## 2019-05-24 DIAGNOSIS — N18.30 TYPE 2 DIABETES MELLITUS WITH STAGE 3 CHRONIC KIDNEY DISEASE, WITHOUT LONG-TERM CURRENT USE OF INSULIN: ICD-10-CM

## 2019-05-24 RX ORDER — INSULIN GLARGINE 100 [IU]/ML
32 INJECTION, SOLUTION SUBCUTANEOUS NIGHTLY
Qty: 15 ML | Refills: 5
Start: 2019-05-24 | End: 2019-06-27 | Stop reason: SDUPTHER

## 2019-05-24 RX ORDER — CALCIUM CITRATE/VITAMIN D3 200MG-6.25
TABLET ORAL
Qty: 400 STRIP | Refills: 3 | Status: SHIPPED | OUTPATIENT
Start: 2019-05-24 | End: 2019-06-13

## 2019-05-24 NOTE — TELEPHONE ENCOUNTER
----- Message from Isidra Katz sent at 5/24/2019 10:20 AM CDT -----  Contact: PT Refill Request  insulin (LANTUS SOLOSTAR U-100 INSULIN) glargine 100 units/mL (3mL) SubQ pen  Inject 32 Units into the skin every evening. - Subcutaneous  15 mL    dulaglutide (TRULICITY) 1.5 mg/0.5 mL PnIj   Inject 1.5 mg weekly.  4 Syringe    TRUE METRIX GLUCOSE TEST STRIP Strp  Blood glucose checks 4 times a day.  400 strip    [facility] [address] [place] [address] [address]  AT [facility] [address] [phone] (Phone)     Callback: [phone]

## 2019-05-27 ENCOUNTER — TELEPHONE (OUTPATIENT)
Dept: ENDOCRINOLOGY | Facility: CLINIC | Age: 74
End: 2019-05-27

## 2019-05-27 NOTE — TELEPHONE ENCOUNTER
----- Message from Shantelle Hameed sent at 5/27/2019  8:52 AM CDT -----  Contact:   Patient   [phone]  Needs Advice    Reason for call:   Authorization          Communication Preference:  Phone     Additional Information:  Pt called stating that he still has not received his medication TRULICITY) 1.5 mg/0.5 mL PnIj /   insulin (LANTUS SOLOSTAR  ...   Pharmacy  Requesting an prior authorization

## 2019-05-28 ENCOUNTER — TELEPHONE (OUTPATIENT)
Dept: ENDOCRINOLOGY | Facility: CLINIC | Age: 74
End: 2019-05-28

## 2019-05-28 NOTE — TELEPHONE ENCOUNTER
----- Message from Lili Silva sent at 5/28/2019  9:32 AM CDT -----  Contact: self [phone]  ..Needs Advice    Reason for call:        Communication Preference:phone     Additional Information:pt states he haven't heard anything regarding his P.A its been 5 days now please call back to discuss

## 2019-05-31 ENCOUNTER — TELEPHONE (OUTPATIENT)
Dept: ENDOCRINOLOGY | Facility: CLINIC | Age: 74
End: 2019-05-31

## 2019-06-13 ENCOUNTER — OFFICE VISIT (OUTPATIENT)
Dept: ENDOCRINOLOGY | Facility: CLINIC | Age: 74
End: 2019-06-13
Payer: MEDICARE

## 2019-06-13 ENCOUNTER — TELEPHONE (OUTPATIENT)
Dept: ENDOCRINOLOGY | Facility: CLINIC | Age: 74
End: 2019-06-13

## 2019-06-13 VITALS
BODY MASS INDEX: 39.1 KG/M2 | SYSTOLIC BLOOD PRESSURE: 133 MMHG | WEIGHT: 304.69 LBS | HEIGHT: 74 IN | DIASTOLIC BLOOD PRESSURE: 68 MMHG | HEART RATE: 76 BPM

## 2019-06-13 DIAGNOSIS — E11.22 TYPE 2 DIABETES MELLITUS WITH STAGE 3 CHRONIC KIDNEY DISEASE, WITHOUT LONG-TERM CURRENT USE OF INSULIN: Primary | ICD-10-CM

## 2019-06-13 DIAGNOSIS — N18.4 CKD (CHRONIC KIDNEY DISEASE) STAGE 4, GFR 15-29 ML/MIN: ICD-10-CM

## 2019-06-13 DIAGNOSIS — E11.22 TYPE 2 DIABETES MELLITUS WITH STAGE 3 CHRONIC KIDNEY DISEASE, WITH LONG-TERM CURRENT USE OF INSULIN: ICD-10-CM

## 2019-06-13 DIAGNOSIS — Z72.0 TOBACCO ABUSE: Chronic | ICD-10-CM

## 2019-06-13 DIAGNOSIS — I42.0 DILATED CARDIOMYOPATHY: ICD-10-CM

## 2019-06-13 DIAGNOSIS — I10 ESSENTIAL HYPERTENSION: ICD-10-CM

## 2019-06-13 DIAGNOSIS — E66.01 MORBID OBESITY: ICD-10-CM

## 2019-06-13 DIAGNOSIS — N18.30 TYPE 2 DIABETES MELLITUS WITH STAGE 3 CHRONIC KIDNEY DISEASE, WITH LONG-TERM CURRENT USE OF INSULIN: ICD-10-CM

## 2019-06-13 DIAGNOSIS — H25.12 NUCLEAR SCLEROSIS OF LEFT EYE: ICD-10-CM

## 2019-06-13 DIAGNOSIS — N18.30 TYPE 2 DIABETES MELLITUS WITH STAGE 3 CHRONIC KIDNEY DISEASE, WITHOUT LONG-TERM CURRENT USE OF INSULIN: Primary | ICD-10-CM

## 2019-06-13 DIAGNOSIS — Z79.4 TYPE 2 DIABETES MELLITUS WITH STAGE 3 CHRONIC KIDNEY DISEASE, WITH LONG-TERM CURRENT USE OF INSULIN: ICD-10-CM

## 2019-06-13 DIAGNOSIS — E78.5 HYPERLIPIDEMIA, UNSPECIFIED HYPERLIPIDEMIA TYPE: ICD-10-CM

## 2019-06-13 PROCEDURE — 99215 OFFICE O/P EST HI 40 MIN: CPT | Mod: PBBFAC | Performed by: NURSE PRACTITIONER

## 2019-06-13 PROCEDURE — 99999 PR PBB SHADOW E&M-EST. PATIENT-LVL V: ICD-10-PCS | Mod: PBBFAC,,, | Performed by: NURSE PRACTITIONER

## 2019-06-13 PROCEDURE — 99999 PR PBB SHADOW E&M-EST. PATIENT-LVL V: CPT | Mod: PBBFAC,,, | Performed by: NURSE PRACTITIONER

## 2019-06-13 PROCEDURE — 99214 PR OFFICE/OUTPT VISIT, EST, LEVL IV, 30-39 MIN: ICD-10-PCS | Mod: S$PBB,,, | Performed by: NURSE PRACTITIONER

## 2019-06-13 PROCEDURE — 99214 OFFICE O/P EST MOD 30 MIN: CPT | Mod: S$PBB,,, | Performed by: NURSE PRACTITIONER

## 2019-06-13 RX ORDER — INSULIN PUMP SYRINGE, 3 ML
EACH MISCELLANEOUS
Qty: 1 EACH | Refills: 0 | Status: SHIPPED | OUTPATIENT
Start: 2019-06-13 | End: 2019-06-13 | Stop reason: SDUPTHER

## 2019-06-13 RX ORDER — PEN NEEDLE, DIABETIC 30 GX3/16"
NEEDLE, DISPOSABLE MISCELLANEOUS
Qty: 400 EACH | Refills: 3 | Status: SHIPPED | OUTPATIENT
Start: 2019-06-13 | End: 2020-06-04

## 2019-06-13 RX ORDER — LANCETS
EACH MISCELLANEOUS
Qty: 400 EACH | Refills: 3 | Status: SHIPPED | OUTPATIENT
Start: 2019-06-13 | End: 2020-10-14

## 2019-06-13 RX ORDER — LANCETS
EACH MISCELLANEOUS
Qty: 300 EACH | Refills: 3 | Status: SHIPPED | OUTPATIENT
Start: 2019-06-13 | End: 2019-06-13 | Stop reason: SDUPTHER

## 2019-06-13 RX ORDER — INSULIN PUMP SYRINGE, 3 ML
EACH MISCELLANEOUS
Qty: 1 EACH | Refills: 0 | Status: SHIPPED | OUTPATIENT
Start: 2019-06-13 | End: 2020-10-14

## 2019-06-13 NOTE — PROGRESS NOTES
CC: This 73 y.o. male presents for management of diabetes mellitus     HPI: Mr. Siegel is a 70 year old gentleman with type 2 diabetes diagnosed 2010. He has never been hospitalized for his diabetes. Denies missed doses of medication.    Pt was last seen by me in December 2018 and is now being seen by me again today.  a1c needed today     Lab Results   Component Value Date    HGBA1C 6.5 (H) 12/13/2018     Mild hypoglycemia 70s here and there  BG monitoring max 4 times a day.  Out of testing supplies r/t CMN issue w/ [facility]    Exercise: no formal exercise, related to disk in his back      Eats 3 meals a day, will sometimes snack in-between meals- rare chips and candy  Bedtime snack- yogurt    Retired:  since 2008        CURRENT DM MEDS:  Humalog 10 units w/ breakfast lunch dinner, Lantus 32 u qhs, trulicity 1.5 mg weekly   BG 74-150s    Standards of Care:  Diabetes Management Status    Statin: Taking  ACE/ARB: Taking    Screening or Prevention Patient's value Goal Complete/Controlled?   HgA1C Testing and Control   Lab Results   Component Value Date    HGBA1C 6.5 (H) 12/13/2018      Annually/Less than 8% Yes   Lipid profile : 12/13/2018 Annually Yes   LDL control Lab Results   Component Value Date    LDLCALC 52.6 (L) 12/13/2018    Annually/Less than 100 mg/dl  Yes   Nephropathy screening Lab Results   Component Value Date    LABMICR 133.0 09/24/2014     Lab Results   Component Value Date    PROTEINUA 3+ (A) 02/14/2017    Annually No   Blood pressure BP Readings from Last 1 Encounters:   06/13/19 133/68    Less than 140/90 Yes   Dilated retinal exam : 06/08/2018 Annually Yes   Foot exam   : 09/11/2018 Annually Yes         ROS:   Gen: Appetite good,  +weight loss #4 since sept 2018, denies fatigue and weakness.  Skin: Skin is intact and heals well, no rashes   Eyes: Denies visual disturbances  Resp: no SOB, + ZEPEDA, no cough  Cardiac: No palpitations, chest pain, no edema   GI: No nausea or vomiting,  diarrhea, constipation, or abdominal pain.  /GYN: 1-2+ nocturia, burning or pain.   MS/Neuro: Denies numbness/ tingling in BLE; Gait steady, speech clear  Psych: Denies drug/ETOH abuse, no hx of depression.  Other systems: negative.    PE:  GENERAL: Well developed, well nourished.  PSYCH: AAOx3, appropriate mood and affect, pleasant expression, conversant, appears relaxed, well groomed.   EYES: Conjunctiva, corneas clear  NECK: Supple, trachea midline,no thyromegaly or nodules .  CHEST: Resp even and unlabored  CARDIAC: RRR  ABDOMEN: Soft, non-tender, non-distended    VASCULAR: no edema.  NEURO: Gait steady  SKIN: Skin warm and dry no acanthosis nigracans.  FEET:  Footware appropriate     Hemoglobin A1C   Date Value Ref Range Status   12/13/2018 6.5 (H) 4.0 - 5.6 % Final     Comment:     ADA Screening Guidelines:  5.7-6.4%  Consistent with prediabetes  >or=6.5%  Consistent with diabetes  High levels of fetal hemoglobin interfere with the HbA1C  assay. Heterozygous hemoglobin variants (HbS, HgC, etc)do  not significantly interfere with this assay.   However, presence of multiple variants may affect accuracy.     09/04/2018 7.0 (H) 4.0 - 5.6 % Final     Comment:     ADA Screening Guidelines:  5.7-6.4%  Consistent with prediabetes  >or=6.5%  Consistent with diabetes  High levels of fetal hemoglobin interfere with the HbA1C  assay. Heterozygous hemoglobin variants (HbS, HgC, etc)do  not significantly interfere with this assay.   However, presence of multiple variants may affect accuracy.     05/29/2018 7.2 (H) 4.0 - 5.6 % Final     Comment:     According to ADA guidelines, hemoglobin A1c <7.0% represents  optimal control in non-pregnant diabetic patients. Different  metrics may apply to specific patient populations.   Standards of Medical Care in Diabetes-2016.  For the purpose of screening for the presence of diabetes:  <5.7%     Consistent with the absence of diabetes  5.7-6.4%  Consistent with increasing risk for  diabetes   (prediabetes)  >or=6.5%  Consistent with diabetes  Currently, no consensus exists for use of hemoglobin A1c  for diagnosis of diabetes for children.  This Hemoglobin A1c assay has significant interference with fetal   hemoglobin   (HbF). The results are invalid for patients with abnormal amounts of   HbF,   including those with known Hereditary Persistence   of Fetal Hemoglobin. Heterozygous hemoglobin variants (HbAS, HbAC,   HbAD, HbAE, HbA2) do not significantly interfere with this assay;   however, presence of multiple variants in a sample may impact the %   interference.        ASSESSMENT and PLAN:  1. Type 2 diabetes mellitus with stage 3 chronic kidney disease, without long-term current use of insulin  Hemoglobin A1c    Hemoglobin A1c    Comprehensive metabolic panel    Microalbumin/creatinine urine ratio    TSH   2. CKD (chronic kidney disease) stage 4, GFR 15-29 ml/min     3. Dilated cardiomyopathy     4. Hyperlipidemia, unspecified hyperlipidemia type     5. Essential hypertension     6. Morbid obesity     7. Nuclear sclerosis of left eye     8. Tobacco abuse     9. Type 2 diabetes mellitus with stage 3 chronic kidney disease, with long-term current use of insulin         1. F/u in 3 mos  Continue regimen  Pt is having issues w/ cost of trulicity   Sent message to pt assistance  Pt will omit trulicity r/t cost  May need to increase by 2-4 units for both insulins  [facility]  2. Avoid hypoglycemia and insulin stacking  3. Avoid hypoglycemia  4. On statin  Lab Results   Component Value Date    LDLCALC 52.6 (L) 12/13/2018     At goal  5. Controlled, continue med(s)  6. Body mass index is 39.12 kg/m². may increase insulin resistance  7. F/u with ophthalmology   8. Discussed cessation   9. See above

## 2019-06-13 NOTE — PATIENT INSTRUCTIONS
Snacks can be an important part of a balanced, healthy meal plan. They allow you to eat more frequently, feeling full and satisfied throughout the day. Also, they allow you to spread carbohydrates evenly, which may stabilize blood sugars.  Plus, snacks are enjoyable!     The amount of carbohydrate needed at snacks varies. Generally, about 15-30 grams of carbohydrate per snack is recommended.  Below you will find some tasty treats.       0-5 gm carb   Crystal Light   Vitamin Water Zero   Herbal tea, unsweetened   2 tsp peanut butter on celery   1./2 cup sugar-free jell-o   1 sugar-free popsicle   ¼ cup blueberries   8oz Blue Norah unsweetened almond milk   5 baby carrots & celery sticks, cucumbers, bell peppers dipped in ¼ cup salsa, 2Tbsp light ranch dressing or 2Tbsp plain Greek yogurt   10 Goldfish crackers   ½ oz low-fat cheese or string cheese   1 closed handful of nuts, unsalted   1 Tbsp of sunflower seeds, unsalted   1 cup Smart Pop popcorn   1 whole grain brown rice cake        15 gm carb   1 small piece of fruit or ½ banana or 1/2 cup lite canned fruit   3 bibi cracker squares   3 cups Smart Pop popcorn, top spray butter, Ray lite salt or cinnamon and Truvia   5 Vanilla Wafers   ½ cup low fat, no added sugar ice cream or frozen yogurt (Blue bell, Blue Bunny, Weight Watchers, Skinny Cow)   ½ turkey, ham, or chicken sandwich   ½ c fruit with ½ c Cottage cheese   4-6 unsalted wheat crackers with 1 oz low fat cheese or 1 tbsp peanut butter    30-45 goldfish crackers (depending on flavor)    7-8 Sabianism mini brown rice cakes (caramel, apple cinnamon, chocolate)    12 Sabianism mini brown rice cakes (cheddar, bbq, ranch)    1/3 cup hummus dip with raw veg   1/2 whole wheat nehemiah, 1Tbsp hummus   Mini Pizza (1/2 whole wheat English muffin, low-fat  cheese, tomato sauce)   100 calorie snack pack (Oreo, Chips Ahoy, Ritz Mix, Baked Cheetos)   4-6 oz. light or Greek Style yogurt  (Chema, Alyse, Davion, [facility] [place])   ½ cup sugar-free pudding     6 in. wheat tortilla or nehemiah oven toasted chips (topped with spray butter flavoring, cinnamon, Truvia OR spray butter, garlic powder, chili powder)    18 BBQ Popchips (available at [employer], Whole Foods, Fresh Market)                   Diabetes Support Group Meetings         Date: Topic:   February 14 Eat Fit JUAN/Health Promotion   March 14 Taking Care of Your Smile   April 11 Spring into Healthy Eating/Cooking Demo   May 9 Ease Your Mind with Diabetes   Kayy 13 Summer Treats/Cooking Demo   July 11 Eat Fit JUAN/Super Market Sweep   August 8 Taking Care of Your Eyes and Feet   Sept 12 Technology/ADA updates   October 10 Recipes & Treats/Cooking Demo   November 14 Heart Health/Pump it up!   December 12 Year-End Close Out        Meetings are held in the [facility] (A) of the [facility] located at [address] [place] [address]. Please call [phone] for additional information.    Free service, offered every 2nd Thursday of every month! Family members and/or friends are welcome as well!  Support group is for patients with type 1 or type 2 diabetes.    From 3:30p to 4:30p

## 2019-06-18 ENCOUNTER — LAB VISIT (OUTPATIENT)
Dept: LAB | Facility: HOSPITAL | Age: 74
End: 2019-06-18
Attending: NURSE PRACTITIONER
Payer: MEDICARE

## 2019-06-18 ENCOUNTER — TELEPHONE (OUTPATIENT)
Dept: ENDOCRINOLOGY | Facility: CLINIC | Age: 74
End: 2019-06-18

## 2019-06-18 DIAGNOSIS — N18.30 TYPE 2 DIABETES MELLITUS WITH STAGE 3 CHRONIC KIDNEY DISEASE, WITHOUT LONG-TERM CURRENT USE OF INSULIN: ICD-10-CM

## 2019-06-18 DIAGNOSIS — E11.22 TYPE 2 DIABETES MELLITUS WITH STAGE 3 CHRONIC KIDNEY DISEASE, WITHOUT LONG-TERM CURRENT USE OF INSULIN: ICD-10-CM

## 2019-06-18 DIAGNOSIS — N18.4 CKD (CHRONIC KIDNEY DISEASE) STAGE 4, GFR 15-29 ML/MIN: Primary | ICD-10-CM

## 2019-06-18 LAB
ALBUMIN SERPL BCP-MCNC: 3.8 G/DL (ref 3.5–5.2)
ALP SERPL-CCNC: 94 U/L (ref 55–135)
ALT SERPL W/O P-5'-P-CCNC: 38 U/L (ref 10–44)
ANION GAP SERPL CALC-SCNC: 13 MMOL/L (ref 8–16)
AST SERPL-CCNC: 20 U/L (ref 10–40)
BILIRUB SERPL-MCNC: 0.6 MG/DL (ref 0.1–1)
BUN SERPL-MCNC: 26 MG/DL (ref 8–23)
CALCIUM SERPL-MCNC: 9.3 MG/DL (ref 8.7–10.5)
CHLORIDE SERPL-SCNC: 108 MMOL/L (ref 95–110)
CO2 SERPL-SCNC: 20 MMOL/L (ref 23–29)
CREAT SERPL-MCNC: 2.7 MG/DL (ref 0.5–1.4)
EST. GFR  (AFRICAN AMERICAN): 26 ML/MIN/1.73 M^2
EST. GFR  (NON AFRICAN AMERICAN): 23 ML/MIN/1.73 M^2
ESTIMATED AVG GLUCOSE: 134 MG/DL (ref 68–131)
GLUCOSE SERPL-MCNC: 107 MG/DL (ref 70–110)
HBA1C MFR BLD HPLC: 6.3 % (ref 4–5.6)
POTASSIUM SERPL-SCNC: 3.9 MMOL/L (ref 3.5–5.1)
PROT SERPL-MCNC: 7.6 G/DL (ref 6–8.4)
SODIUM SERPL-SCNC: 141 MMOL/L (ref 136–145)
TSH SERPL DL<=0.005 MIU/L-ACNC: 1.14 UIU/ML (ref 0.4–4)

## 2019-06-18 PROCEDURE — 83036 HEMOGLOBIN GLYCOSYLATED A1C: CPT

## 2019-06-18 PROCEDURE — 84443 ASSAY THYROID STIM HORMONE: CPT

## 2019-06-18 PROCEDURE — 36415 COLL VENOUS BLD VENIPUNCTURE: CPT

## 2019-06-18 PROCEDURE — 80053 COMPREHEN METABOLIC PANEL: CPT

## 2019-06-27 RX ORDER — INSULIN GLARGINE 100 [IU]/ML
INJECTION, SOLUTION SUBCUTANEOUS
Qty: 15 ML | Refills: 6 | Status: SHIPPED | OUTPATIENT
Start: 2019-06-27 | End: 2019-12-05

## 2019-06-27 RX ORDER — INSULIN LISPRO 100 [IU]/ML
INJECTION, SOLUTION INTRAVENOUS; SUBCUTANEOUS
Qty: 15 ML | Refills: 6 | Status: SHIPPED | OUTPATIENT
Start: 2019-06-27 | End: 2019-12-05

## 2019-08-11 DIAGNOSIS — H40.022 OPEN ANGLE WITH BORDERLINE FINDINGS, HIGH RISK, LEFT: ICD-10-CM

## 2019-08-11 DIAGNOSIS — H40.1113 PRIMARY OPEN ANGLE GLAUCOMA OF RIGHT EYE, SEVERE STAGE: ICD-10-CM

## 2019-08-12 RX ORDER — TIMOLOL MALEATE 2.5 MG/ML
SOLUTION/ DROPS OPHTHALMIC
Qty: 10 ML | Refills: 0 | Status: SHIPPED | OUTPATIENT
Start: 2019-08-12 | End: 2019-10-21 | Stop reason: SDUPTHER

## 2019-08-27 ENCOUNTER — HOSPITAL ENCOUNTER (OUTPATIENT)
Dept: RADIOLOGY | Facility: HOSPITAL | Age: 74
Discharge: HOME | End: 2019-08-27
Attending: UROLOGY
Payer: MEDICARE

## 2019-08-27 ENCOUNTER — OFFICE VISIT (OUTPATIENT)
Dept: UROLOGY | Facility: CLINIC | Age: 74
End: 2019-08-27
Payer: MEDICARE

## 2019-08-27 VITALS
HEIGHT: 74 IN | WEIGHT: 312.63 LBS | SYSTOLIC BLOOD PRESSURE: 141 MMHG | HEART RATE: 60 BPM | BODY MASS INDEX: 40.12 KG/M2 | DIASTOLIC BLOOD PRESSURE: 68 MMHG

## 2019-08-27 DIAGNOSIS — Z72.0 TOBACCO ABUSE: Chronic | ICD-10-CM

## 2019-08-27 DIAGNOSIS — N20.0 CALCULUS OF BOTH KIDNEYS: ICD-10-CM

## 2019-08-27 DIAGNOSIS — Z79.4 TYPE 2 DIABETES MELLITUS WITH STAGE 3 CHRONIC KIDNEY DISEASE, WITH LONG-TERM CURRENT USE OF INSULIN: ICD-10-CM

## 2019-08-27 DIAGNOSIS — J30.2 SEASONAL ALLERGIES: ICD-10-CM

## 2019-08-27 DIAGNOSIS — E66.01 MORBID OBESITY: ICD-10-CM

## 2019-08-27 DIAGNOSIS — N20.0 CALCULUS OF BOTH KIDNEYS: Primary | ICD-10-CM

## 2019-08-27 DIAGNOSIS — N18.30 TYPE 2 DIABETES MELLITUS WITH STAGE 3 CHRONIC KIDNEY DISEASE, WITH LONG-TERM CURRENT USE OF INSULIN: ICD-10-CM

## 2019-08-27 DIAGNOSIS — E11.22 TYPE 2 DIABETES MELLITUS WITH STAGE 3 CHRONIC KIDNEY DISEASE, WITH LONG-TERM CURRENT USE OF INSULIN: ICD-10-CM

## 2019-08-27 DIAGNOSIS — N18.4 CKD (CHRONIC KIDNEY DISEASE) STAGE 4, GFR 15-29 ML/MIN: ICD-10-CM

## 2019-08-27 PROCEDURE — 76770 US EXAM ABDO BACK WALL COMP: CPT | Mod: TC

## 2019-08-27 PROCEDURE — 76770 US KIDNEY: ICD-10-PCS | Mod: 26,,, | Performed by: RADIOLOGY

## 2019-08-27 PROCEDURE — 99214 PR OFFICE/OUTPT VISIT, EST, LEVL IV, 30-39 MIN: ICD-10-PCS | Mod: S$PBB,,, | Performed by: UROLOGY

## 2019-08-27 PROCEDURE — 99214 OFFICE O/P EST MOD 30 MIN: CPT | Mod: S$PBB,,, | Performed by: UROLOGY

## 2019-08-27 PROCEDURE — 99213 OFFICE O/P EST LOW 20 MIN: CPT | Mod: PBBFAC,25 | Performed by: UROLOGY

## 2019-08-27 PROCEDURE — 76770 US EXAM ABDO BACK WALL COMP: CPT | Mod: 26,,, | Performed by: RADIOLOGY

## 2019-08-27 PROCEDURE — 99999 PR PBB SHADOW E&M-EST. PATIENT-LVL III: ICD-10-PCS | Mod: PBBFAC,,, | Performed by: UROLOGY

## 2019-08-27 PROCEDURE — 99999 PR PBB SHADOW E&M-EST. PATIENT-LVL III: CPT | Mod: PBBFAC,,, | Performed by: UROLOGY

## 2019-08-27 NOTE — PROGRESS NOTES
Subjective:       Patient ID: Vinnie Siegel is a 74 y.o. male.    Chief Complaint: Nephrolithiasis    HPI Comments: Vinnie Siegel is a 74 y.o. Male with kidney stones. He has a history of ureteroscopy and ESWL. It has been a couple of years since we have had active stones.     Renal ultrasound 8/27/19 - possible 3mm stone, simple renal cysts, personally reviewed.     He had abnormal metabolic workup and was sent to nephrology.   He is here for follow up with an ultrasound.  Films personally reviewed.   He is on allopurinol, sodium bicard and mag oxide. All given by Dr. Ramírez.  He has stage IV CKD. He has a follow up appt coming up.    Last 24 hour urine showed elevated sodium in the urine. 7/13    He has BPH. He on flomax.   Some urinary frequency, but he has increased his water intake and is on spironolactone and lasix x 2.  3-4 times a day.   He has cardiomyopathy.   No LUTS.  Nocturia 1-2 times.  No gross hematuria.     He is followed by endocrine for his diabetes and in cardiology for his cardiomyopathy and hypertension.    He is still smoking.  He is not trying to quit.   He is not exercising because of back problems and he says he doesn't know how to swim.    Testosterone in 400s.      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 OR 2ND FLR    HEMORRHOID SURGERY      Dr. Root [facility] [place]    INSERTION-INTRAOCULAR LENS (IOL) Right 4/4/2016    Per[facility] at [facility] OR    lateral internal anal sphincterotomy  12/13/13    Dr. root [facility] [place]    PHACOEMULSIFICATION-ASPIRATION-CATARACT Right 4/4/2016    Per[facility] at [facility] OR    SPHINCTEROTOMY, ANAL, INTERNAL, LATERAL N/A 12/13/2013    Performed by Lizandro [facility] at [facility] OR 2ND FLR    URETERAL STENT PLACEMENT         Family History   Problem Relation Age of Onset    Diabetes Brother         type 1    Hypertension Brother     Stroke Brother        Social History     Socioeconomic History    Marital status: Single     Spouse name: Not on file    Number of children: Not on file    Years of education: Not on file    Highest education level: Not on file   Occupational History    Not on file   Social Needs    Financial resource strain: Not on file    Food insecurity:     Worry: Not on file     Inability: Not on file    Transportation needs:     Medical: Not on file     Non-medical: Not on file   Tobacco Use    Smoking status: Current Every Day Smoker     Packs/day: 0.50     Years: 45.00     Pack years: 22.50    Smokeless tobacco: Never Used    Tobacco comment: says he could quit and will try    Substance and Sexual Activity    Alcohol use: Yes     Comment: rarely    Drug use: No    Sexual activity: Yes     Comment: single, retired , no kids   Lifestyle    Physical activity:     Days per week: Not on file     Minutes per session: Not on file    Stress: Not on file   Relationships    Social connections:     Talks on phone: Not on file     Gets together: Not on file     Attends Orthodox service: Not on file     Active member of club or organization: Not on file     Attends meetings of clubs or organizations: Not on file     Relationship status: Not on file   Other Topics Concern    Not on file   Social  History [employer]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       Allergies:  Patient has no known allergies.    Medications:    Current Outpatient Medications:     albuterol (PROVENTIL/VENTOLIN HFA) 90 mcg/actuation inhaler, Inhale 1-2 puffs into the lungs every 6 (six) hours as needed for Wheezing or Shortness of Breath., Disp: 2 Inhaler, Rfl: 6    allopurinol (ZYLOPRIM) 100 MG tablet, Take 1 tablet (100 mg total) by mouth once daily., Disp: 90 tablet, Rfl: 1    atorvastatin (LIPITOR) 40 MG tablet, Take 1 tablet (40 mg total) by mouth once daily., Disp: 90 tablet, Rfl: 3    blood sugar diagnostic Strp, To check BG 4  times daily, to use with insurance preferred meter, e 11.65, Disp: 400 each, Rfl: 3    blood-glucose meter kit, To check BG 3 times daily, to use with insurance preferred meter, e 11.65, Disp: 1 each, Rfl: 0    carvedilol (COREG) 25 MG tablet, TAKE 1 TABLET(25 MG) BY MOUTH TWICE DAILY WITH MEALS, Disp: 180 tablet, Rfl: 1    carvedilol (COREG) 25 MG tablet, TAKE 1 TABLET(25 MG) BY MOUTH TWICE DAILY WITH MEALS, Disp: 60 tablet, Rfl: 0    chlorzoxazone (PARAFON FORTE) 500 mg Tab, Take 1 tablet (500 mg total) by mouth 3 (three) times daily., Disp: 90 tablet, Rfl: 1    dulaglutide (TRULICITY) 1.5 mg/0.5 mL PnIj, Inject 1.5 mg weekly., Disp: 4 Syringe, Rfl: 6    fluticasone (FLONASE) 50 mcg/actuation nasal spray, 2 sprays (100 mcg total) by Each Nare route once daily., Disp: 48 mL, Rfl: 3    furosemide (LASIX) 20 MG tablet, TAKE 1 TABLET BY MOUTH TWICE DAILY INCREASING TO 2 TABLETS TWICE DAILY FOR 3-4 LBS WEIGHT GAIN UNTIL RESOLVED., Disp: 360 tablet, Rfl: 1    hydrocodone-acetaminophen 7.5-325mg (NORCO) 7.5-325 mg per tablet, TK 1 T PO  TID PRN P, Disp: , Rfl: 0    insulin lispro (HUMALOG KWIKPEN INSULIN) 100 unit/mL pen, INJECT 14 UNITS UNDER THE SKIN WITH BREAKFAST AND LUNCH AND 12 UNITS UNDER THE SKIN WITH DINNER PLUS SCALE, Disp: 15 mL, Rfl: 6    irbesartan (AVAPRO)  "150 MG tablet, Take 1 tablet (150 mg total) by mouth every evening., Disp: 90 tablet, Rfl: 3    lancets Misc, To check BG 4  times daily, to use with insurance preferred meter, e 11 .65, Disp: 400 each, Rfl: 3    LANTUS SOLOSTAR U-100 INSULIN glargine 100 units/mL (3mL) SubQ pen, INJECT 40 UNITS UNDER THE SKIN EVERY EVENING, Disp: 15 mL, Rfl: 6    magnesium oxide (MAG-OX) 400 mg (241.3 mg magnesium) tablet, Take 1 tablet (400 mg total) by mouth every morning., Disp: 90 tablet, Rfl: 1    NIFEdipine (PROCARDIA-XL) 90 MG (OSM) 24 hr tablet, Take 1 tablet (90 mg total) by mouth once daily., Disp: 90 tablet, Rfl: 1    pen needle, diabetic 31 gauge x 5/16" Ndle, Generic pen needles needed. Uses 4 times a day., Disp: 400 each, Rfl: 3    sodium bicarbonate 650 MG tablet, TAKE 1 TABLET BY MOUTH TWICE DAILY, Disp: 360 tablet, Rfl: 0    spironolactone (ALDACTONE) 25 MG tablet, Take 1 tablet (25 mg total) by mouth once daily., Disp: 90 tablet, Rfl: 3    tamsulosin (FLOMAX) 0.4 mg Cap, Take 1 capsule (0.4 mg total) by mouth every morning., Disp: 90 capsule, Rfl: 1    timolol maleate 0.25% (TIMOPTIC) 0.25 % Drop, INSTILL ONE DROP INTO THE RIGHT EYE TWICE DAILY, Disp: 10 mL, Rfl: 0    sodium bicarbonate 650 MG tablet, Take 1 tablet (650 mg total) by mouth 2 (two) times daily., Disp: 360 tablet, Rfl: 1      Review of Systems   Constitutional: Negative for fever, chills and unexpected weight change.   HENT: Negative for hearing loss.    Eyes: Negative for visual disturbance.   Respiratory: some shortness of breath.   Cardiovascular: Negative for chest pain and leg swelling.   Gastrointestinal: Negative for nausea, vomiting, abdominal pain and diarrhea.   Genitourinary: Negative for dysuria, urgency, frequency and hematuria.   Musculoskeletal: Negative for arthralgias.   Skin: Negative for rash.   Neurological: Negative for seizures and weakness.   Hematological: Does not bruise/bleed easily.   Psychiatric/Behavioral: " Negative for confusion.       Objective:      Physical Exam   Constitutional: He is oriented to person, place, and time. He appears well-developed and well-nourished.   HENT:   Head: Normocephalic and atraumatic.   Eyes: No scleral icterus.   Neck: Neck supple.   Cardiovascular: Normal rate and regular rhythm.    Pulmonary/Chest: Effort normal. No respiratory distress.   Abdominal:  Hernia confirmed negative in the right inguinal area and confirmed negative in the left inguinal area.        obese   Genitourinary: Testes normal and penis normal. Rectal exam shows no hemorroids. . Uncircumcised. Some smegma. No phimosis. Normal perineum.       Prostate was smooth without nodularity. No rectal masses 40 grams. Normal SV. No rectal masses.  No external hemorrhoids.   Musculoskeletal: He exhibits no tenderness.   Neurological: He is alert and oriented to person, place, and time.   Skin: Skin is warm. No rash noted.   Psychiatric: He has a normal mood and affect.     urine dip clear except 3+ protein  Assessment:   Tobacco abuse    Dilated cardiomyopathy    Type 2 diabetes mellitus with stage 3 chronic kidney disease, with long-term current use of insulin    Essential hypertension    Obesity (BMI 30-39.9)    CKD (chronic kidney disease) stage 4, GFR 15-29 ml/min    Calculus of both kidneys  -     US Kidney; Future    Plan:     F/u with renal ultrasound and kub in 6 months.   kub today.   Needs f/u with Yang.  No further psa testing.   Continue to follow up with nephrology for proteinuria, CKD.  Last seen in 2017.   Recommend smoking cessation.  Good diabetic control.   Continue flomax.   Push fluids and limit salt intake.  Weight loss. Patient really needs to work on this. We discussed getting in the pool for exercise with his back injury.  Imaging all personally reviewed.

## 2019-08-29 ENCOUNTER — HOSPITAL ENCOUNTER (OUTPATIENT)
Dept: RADIOLOGY | Facility: HOSPITAL | Age: 74
Discharge: HOME | End: 2019-08-29
Attending: UROLOGY
Payer: MEDICARE

## 2019-08-29 DIAGNOSIS — N20.0 CALCULUS OF BOTH KIDNEYS: ICD-10-CM

## 2019-08-29 PROCEDURE — 74018 RADEX ABDOMEN 1 VIEW: CPT | Mod: 26,,, | Performed by: RADIOLOGY

## 2019-08-29 PROCEDURE — 74018 XR ABDOMEN AP 1 VIEW: ICD-10-PCS | Mod: 26,,, | Performed by: RADIOLOGY

## 2019-08-29 PROCEDURE — 74018 RADEX ABDOMEN 1 VIEW: CPT | Mod: TC

## 2019-09-06 ENCOUNTER — TELEPHONE (OUTPATIENT)
Dept: UROLOGY | Facility: CLINIC | Age: 74
End: 2019-09-06

## 2019-10-07 ENCOUNTER — PATIENT OUTREACH (OUTPATIENT)
Dept: ADMINISTRATIVE | Facility: OTHER | Age: 74
End: 2019-10-07

## 2019-10-08 ENCOUNTER — OFFICE VISIT (OUTPATIENT)
Dept: CARDIOLOGY | Facility: CLINIC | Age: 74
End: 2019-10-08
Payer: MEDICARE

## 2019-10-08 VITALS
HEIGHT: 74 IN | DIASTOLIC BLOOD PRESSURE: 64 MMHG | HEART RATE: 57 BPM | SYSTOLIC BLOOD PRESSURE: 132 MMHG | BODY MASS INDEX: 40.43 KG/M2 | WEIGHT: 315 LBS

## 2019-10-08 DIAGNOSIS — E66.01 MORBID OBESITY: ICD-10-CM

## 2019-10-08 DIAGNOSIS — Z72.0 TOBACCO ABUSE: Chronic | ICD-10-CM

## 2019-10-08 DIAGNOSIS — I10 ESSENTIAL HYPERTENSION: ICD-10-CM

## 2019-10-08 DIAGNOSIS — N18.4 CKD (CHRONIC KIDNEY DISEASE) STAGE 4, GFR 15-29 ML/MIN: ICD-10-CM

## 2019-10-08 DIAGNOSIS — E78.5 HYPERLIPIDEMIA, UNSPECIFIED HYPERLIPIDEMIA TYPE: ICD-10-CM

## 2019-10-08 DIAGNOSIS — I42.0 DILATED CARDIOMYOPATHY: Primary | ICD-10-CM

## 2019-10-08 PROCEDURE — 99213 OFFICE O/P EST LOW 20 MIN: CPT | Mod: PBBFAC,25 | Performed by: INTERNAL MEDICINE

## 2019-10-08 PROCEDURE — 99999 PR PBB SHADOW E&M-EST. PATIENT-LVL III: ICD-10-PCS | Mod: PBBFAC,,, | Performed by: INTERNAL MEDICINE

## 2019-10-08 PROCEDURE — 99214 PR OFFICE/OUTPT VISIT, EST, LEVL IV, 30-39 MIN: ICD-10-PCS | Mod: S$PBB,,, | Performed by: INTERNAL MEDICINE

## 2019-10-08 PROCEDURE — 99999 PR PBB SHADOW E&M-EST. PATIENT-LVL III: CPT | Mod: PBBFAC,,, | Performed by: INTERNAL MEDICINE

## 2019-10-08 PROCEDURE — 93010 EKG 12-LEAD: ICD-10-PCS | Mod: S$PBB,,, | Performed by: INTERNAL MEDICINE

## 2019-10-08 PROCEDURE — 99214 OFFICE O/P EST MOD 30 MIN: CPT | Mod: S$PBB,,, | Performed by: INTERNAL MEDICINE

## 2019-10-08 PROCEDURE — 93005 ELECTROCARDIOGRAM TRACING: CPT | Mod: PBBFAC | Performed by: INTERNAL MEDICINE

## 2019-10-08 PROCEDURE — 93010 ELECTROCARDIOGRAM REPORT: CPT | Mod: S$PBB,,, | Performed by: INTERNAL MEDICINE

## 2019-10-08 NOTE — PROGRESS NOTES
Subjective:   Patient ID:  Vinnie Siegel is a 74 y.o. male who presents for follow-up of Essential hypertension (6 months fu)      HPI:   Vinnie Siegel presents for follow up of a dilated cardiomyopathy.Vinnie Siegel has hypertension with adequate control. Continues to have the usual amount of SOB being very sedentary due to back issues. Vinnie Siegel continues to smoke but less. Has gained weight. .Vinnie Siegel denies chest pain,  palpitations, presyncope , or syncope. Vinnie Siegel has dyslipidemia  on high intensity statin At LDL goal.Vinnie Siegel chronic kidney disease that has worsened last check  Review of Systems   Constitution: Positive for weight gain. Negative for malaise/fatigue and weight loss.   Eyes: Negative for blurred vision.   Cardiovascular: Positive for dyspnea on exertion. Negative for chest pain, claudication, cyanosis, irregular heartbeat, leg swelling, near-syncope, orthopnea, palpitations, paroxysmal nocturnal dyspnea and syncope.   Respiratory: Negative for cough, shortness of breath and wheezing.    Musculoskeletal: Positive for back pain. Negative for falls and myalgias.   Gastrointestinal: Positive for constipation. Negative for abdominal pain, heartburn, nausea and vomiting.   Genitourinary: Negative for nocturia.   Neurological: Negative for brief paralysis, dizziness, focal weakness, headaches, numbness, paresthesias and weakness.   Psychiatric/Behavioral: Negative for altered mental status.       Current Outpatient Medications   Medication Sig    albuterol (PROVENTIL/VENTOLIN HFA) 90 mcg/actuation inhaler Inhale 1-2 puffs into the lungs every 6 (six) hours as needed for Wheezing or Shortness of Breath.    allopurinol (ZYLOPRIM) 100 MG tablet Take 1 tablet (100 mg total) by mouth once daily.    atorvastatin (LIPITOR) 40 MG tablet Take 1 tablet (40 mg total) by mouth once daily.    blood sugar diagnostic Strp To check BG 4  times daily, to use with insurance  "preferred meter, e 11.65    blood-glucose meter kit To check BG 3 times daily, to use with insurance preferred meter, e 11.65    carvedilol (COREG) 25 MG tablet TAKE 1 TABLET(25 MG) BY MOUTH TWICE DAILY WITH MEALS    chlorzoxazone (PARAFON FORTE) 500 mg Tab Take 1 tablet (500 mg total) by mouth 3 (three) times daily.    dulaglutide (TRULICITY) 1.5 mg/0.5 mL PnIj Inject 1.5 mg weekly.    fluticasone (FLONASE) 50 mcg/actuation nasal spray 2 sprays (100 mcg total) by Each Nare route once daily.    furosemide (LASIX) 20 MG tablet TAKE 1 TABLET BY MOUTH TWICE DAILY INCREASING TO 2 TABLETS TWICE DAILY FOR 3-4 LBS WEIGHT GAIN UNTIL RESOLVED.    hydrocodone-acetaminophen 7.5-325mg (NORCO) 7.5-325 mg per tablet TK 1 T PO  TID PRN P    insulin lispro (HUMALOG KWIKPEN INSULIN) 100 unit/mL pen INJECT 14 UNITS UNDER THE SKIN WITH BREAKFAST AND LUNCH AND 12 UNITS UNDER THE SKIN WITH DINNER PLUS SCALE    irbesartan (AVAPRO) 150 MG tablet Take 1 tablet (150 mg total) by mouth every evening.    lancets Misc To check BG 4  times daily, to use with insurance preferred meter, e 11 .65    LANTUS SOLOSTAR U-100 INSULIN glargine 100 units/mL (3mL) SubQ pen INJECT 40 UNITS UNDER THE SKIN EVERY EVENING    magnesium oxide (MAG-OX) 400 mg (241.3 mg magnesium) tablet Take 1 tablet (400 mg total) by mouth every morning.    NIFEdipine (PROCARDIA-XL) 90 MG (OSM) 24 hr tablet Take 1 tablet (90 mg total) by mouth once daily.    pen needle, diabetic 31 gauge x 5/16" Ndle Generic pen needles needed. Uses 4 times a day.    sodium bicarbonate 650 MG tablet TAKE 1 TABLET BY MOUTH TWICE DAILY    spironolactone (ALDACTONE) 25 MG tablet Take 1 tablet (25 mg total) by mouth once daily.    tamsulosin (FLOMAX) 0.4 mg Cap Take 1 capsule (0.4 mg total) by mouth every morning.    timolol maleate 0.25% (TIMOPTIC) 0.25 % Drop INSTILL ONE DROP INTO THE RIGHT EYE TWICE DAILY     No current facility-administered medications for this visit.  " "    Objective:   Physical Exam   Constitutional: He is oriented to person, place, and time. He appears well-developed. No distress.   /64 (BP Location: Left arm, Patient Position: Sitting, BP Method: X-Large (Automatic))   Pulse (!) 57   Ht 6' 2" (1.88 m)   Wt (!) 144.3 kg (318 lb 2 oz)   BMI 40.84 kg/m²    HENT:   Head: Normocephalic.   Right Ear: External ear normal.   Left Ear: External ear normal.   Eyes: Pupils are equal, round, and reactive to light. EOM are normal. No scleral icterus.   Neck: Neck supple. No JVD present. No thyromegaly present.   Cardiovascular: Normal rate, regular rhythm and intact distal pulses. PMI is not displaced. Exam reveals no gallop and no friction rub.   Murmur heard.   Medium-pitched midsystolic murmur is present with a grade of 2/6 at the upper right sternal border, upper left sternal border and lower left sternal border.  1+bilateral pedal  No JVD   Pulmonary/Chest: Effort normal and breath sounds normal. No respiratory distress. He has no wheezes. He has no rales.   Abdominal: Soft. He exhibits no distension. There is no hepatosplenomegaly. There is no tenderness.   Musculoskeletal: He exhibits no edema or tenderness.   Gait normal   Neurological: He is alert and oriented to person, place, and time.   Skin: Skin is warm and dry. No rash noted.   Psychiatric: He has a normal mood and affect. His behavior is normal.       Lab Results   Component Value Date     06/18/2019    K 3.9 06/18/2019     06/18/2019    CO2 20 (L) 06/18/2019    BUN 26 (H) 06/18/2019    CREATININE 2.7 (H) 06/18/2019     06/18/2019    HGBA1C 6.3 (H) 06/18/2019    AST 20 06/18/2019    ALT 38 06/18/2019    ALBUMIN 3.8 06/18/2019    PROT 7.6 06/18/2019    BILITOT 0.6 06/18/2019    WBC 8.22 12/23/2015    HGB 14.3 05/08/2017    HCT 41.2 05/08/2017    MCV 91 12/23/2015     12/23/2015    TSH 1.138 06/18/2019    CHOL 123 12/13/2018    HDL 41 12/13/2018    LDLCALC 52.6 (L) 12/13/2018 "    TRIG 147 12/13/2018       Assessment:     1. Dilated cardiomyopathy : Currently compensated   2. Essential hypertension : Adequate control   3. Hyperlipidemia, unspecified hyperlipidemia type : At LDL goal   4. Morbid obesity : Worsening   5. Tobacco abuse : Ongoing   6. CKD (chronic kidney disease) stage 4, GFR 15-29 ml/min : Worsening       Plan:     Vinnie was seen today for essential hypertension.    Diagnoses and all orders for this visit:    Dilated cardiomyopathy  -     EKG 12-lead; Future  Daily weights emphasized with adjustment per short term weight gain  Essential hypertension  Continue current regimen    Hyperlipidemia, unspecified hyperlipidemia type  Continue current regimen    Morbid obesity  Made aware of weight gain  Tobacco abuse  Suggest again total abstinence   CKD (chronic kidney disease) stage 4, GFR 15-29 ml/min

## 2019-10-08 NOTE — PATIENT INSTRUCTIONS
Weigh daily and double your Furosemide dose  for 3-4 lb weight gain over a 2-3 day period or 5 pounds in a week until the weight has resolved then return to original dose. Repeat as necessary..

## 2019-10-21 DIAGNOSIS — H40.022 OPEN ANGLE WITH BORDERLINE FINDINGS, HIGH RISK, LEFT: ICD-10-CM

## 2019-10-21 DIAGNOSIS — H40.1113 PRIMARY OPEN ANGLE GLAUCOMA OF RIGHT EYE, SEVERE STAGE: ICD-10-CM

## 2019-10-21 DIAGNOSIS — M10.059 IDIOPATHIC GOUT OF HIP, UNSPECIFIED CHRONICITY, UNSPECIFIED LATERALITY: Primary | ICD-10-CM

## 2019-10-21 RX ORDER — ALLOPURINOL 100 MG/1
100 TABLET ORAL DAILY
Qty: 30 TABLET | Refills: 0 | Status: SHIPPED | OUTPATIENT
Start: 2019-10-21 | End: 2019-10-22 | Stop reason: SDUPTHER

## 2019-10-22 DIAGNOSIS — E78.5 HYPERLIPIDEMIA, UNSPECIFIED HYPERLIPIDEMIA TYPE: ICD-10-CM

## 2019-10-22 DIAGNOSIS — I42.0 DILATED CARDIOMYOPATHY: ICD-10-CM

## 2019-10-22 DIAGNOSIS — M10.059 IDIOPATHIC GOUT OF HIP, UNSPECIFIED CHRONICITY, UNSPECIFIED LATERALITY: ICD-10-CM

## 2019-10-22 DIAGNOSIS — E87.20 ACIDOSIS: ICD-10-CM

## 2019-10-22 DIAGNOSIS — I10 ESSENTIAL HYPERTENSION: ICD-10-CM

## 2019-10-22 DIAGNOSIS — J30.2 SEASONAL ALLERGIES: ICD-10-CM

## 2019-10-22 RX ORDER — ATORVASTATIN CALCIUM 40 MG/1
40 TABLET, FILM COATED ORAL DAILY
Qty: 90 TABLET | Refills: 0 | Status: SHIPPED | OUTPATIENT
Start: 2019-10-22 | End: 2019-11-12 | Stop reason: SDUPTHER

## 2019-10-22 RX ORDER — FUROSEMIDE 20 MG/1
TABLET ORAL
Qty: 180 TABLET | Refills: 0 | Status: SHIPPED | OUTPATIENT
Start: 2019-10-22 | End: 2019-11-12 | Stop reason: SDUPTHER

## 2019-10-22 RX ORDER — IRBESARTAN 150 MG/1
150 TABLET ORAL NIGHTLY
Qty: 90 TABLET | Refills: 0 | Status: SHIPPED | OUTPATIENT
Start: 2019-10-22 | End: 2019-11-12 | Stop reason: SDUPTHER

## 2019-10-22 RX ORDER — ALLOPURINOL 100 MG/1
100 TABLET ORAL DAILY
Qty: 90 TABLET | Refills: 0 | Status: SHIPPED | OUTPATIENT
Start: 2019-10-22 | End: 2019-11-12 | Stop reason: SDUPTHER

## 2019-10-22 RX ORDER — ALLOPURINOL 100 MG/1
TABLET ORAL
Qty: 90 TABLET | Refills: 0 | Status: SHIPPED | OUTPATIENT
Start: 2019-10-22 | End: 2019-10-22 | Stop reason: SDUPTHER

## 2019-10-22 RX ORDER — NIFEDIPINE 90 MG/1
90 TABLET, EXTENDED RELEASE ORAL DAILY
Qty: 90 TABLET | Refills: 0 | Status: SHIPPED | OUTPATIENT
Start: 2019-10-22 | End: 2019-11-12 | Stop reason: SDUPTHER

## 2019-10-22 RX ORDER — SPIRONOLACTONE 25 MG/1
25 TABLET ORAL DAILY
Qty: 90 TABLET | Refills: 0 | Status: SHIPPED | OUTPATIENT
Start: 2019-10-22 | End: 2019-11-12 | Stop reason: SDUPTHER

## 2019-10-22 RX ORDER — CARVEDILOL 25 MG/1
TABLET ORAL
Qty: 180 TABLET | Refills: 0 | Status: SHIPPED | OUTPATIENT
Start: 2019-10-22 | End: 2019-11-12 | Stop reason: SDUPTHER

## 2019-10-22 NOTE — TELEPHONE ENCOUNTER
Spoke with  and scheduled a sooner appointment than December. Last appointment was in April and was due back in October . Scheduled for November 12, 9:20. I will mail a reminder.conversation was understood and it ended.

## 2019-10-23 RX ORDER — LANOLIN ALCOHOL/MO/W.PET/CERES
1 CREAM (GRAM) TOPICAL EVERY MORNING
Qty: 90 TABLET | Refills: 0 | Status: SHIPPED | OUTPATIENT
Start: 2019-10-23 | End: 2019-11-12 | Stop reason: SDUPTHER

## 2019-10-23 RX ORDER — SODIUM BICARBONATE 650 MG/1
TABLET ORAL
Qty: 360 TABLET | Refills: 0 | Status: SHIPPED | OUTPATIENT
Start: 2019-10-23 | End: 2019-10-23 | Stop reason: SDUPTHER

## 2019-10-23 RX ORDER — SODIUM BICARBONATE 650 MG/1
650 TABLET ORAL 2 TIMES DAILY
Qty: 180 TABLET | Refills: 0 | Status: SHIPPED | OUTPATIENT
Start: 2019-10-23 | End: 2019-11-12 | Stop reason: SDUPTHER

## 2019-10-24 RX ORDER — TIMOLOL MALEATE 2.5 MG/ML
SOLUTION/ DROPS OPHTHALMIC
Qty: 10 ML | Refills: 0 | Status: SHIPPED | OUTPATIENT
Start: 2019-10-24 | End: 2019-11-29 | Stop reason: SDUPTHER

## 2019-10-29 ENCOUNTER — PATIENT OUTREACH (OUTPATIENT)
Dept: ADMINISTRATIVE | Facility: HOSPITAL | Age: 74
End: 2019-10-29

## 2019-10-29 DIAGNOSIS — Z79.4 TYPE 2 DIABETES MELLITUS WITH OTHER DIABETIC KIDNEY COMPLICATION, WITH LONG-TERM CURRENT USE OF INSULIN: Primary | ICD-10-CM

## 2019-10-29 DIAGNOSIS — E11.29 TYPE 2 DIABETES MELLITUS WITH OTHER DIABETIC KIDNEY COMPLICATION, WITH LONG-TERM CURRENT USE OF INSULIN: Primary | ICD-10-CM

## 2019-11-12 ENCOUNTER — OFFICE VISIT (OUTPATIENT)
Dept: PRIMARY CARE CLINIC | Facility: CLINIC | Age: 74
End: 2019-11-12
Attending: FAMILY MEDICINE
Payer: MEDICARE

## 2019-11-12 VITALS
HEART RATE: 60 BPM | BODY MASS INDEX: 40.43 KG/M2 | DIASTOLIC BLOOD PRESSURE: 66 MMHG | SYSTOLIC BLOOD PRESSURE: 134 MMHG | WEIGHT: 315 LBS | HEIGHT: 74 IN

## 2019-11-12 DIAGNOSIS — E11.22 TYPE 2 DIABETES MELLITUS WITH STAGE 3 CHRONIC KIDNEY DISEASE, WITH LONG-TERM CURRENT USE OF INSULIN: Primary | ICD-10-CM

## 2019-11-12 DIAGNOSIS — N18.30 TYPE 2 DIABETES MELLITUS WITH STAGE 3 CHRONIC KIDNEY DISEASE, WITH LONG-TERM CURRENT USE OF INSULIN: Primary | ICD-10-CM

## 2019-11-12 DIAGNOSIS — Z72.0 TOBACCO ABUSE: Chronic | ICD-10-CM

## 2019-11-12 DIAGNOSIS — I42.0 DILATED CARDIOMYOPATHY: ICD-10-CM

## 2019-11-12 DIAGNOSIS — I10 ESSENTIAL HYPERTENSION: ICD-10-CM

## 2019-11-12 DIAGNOSIS — M10.059 IDIOPATHIC GOUT OF HIP, UNSPECIFIED CHRONICITY, UNSPECIFIED LATERALITY: ICD-10-CM

## 2019-11-12 DIAGNOSIS — E78.5 HYPERLIPIDEMIA, UNSPECIFIED HYPERLIPIDEMIA TYPE: ICD-10-CM

## 2019-11-12 DIAGNOSIS — E87.20 ACIDOSIS: ICD-10-CM

## 2019-11-12 DIAGNOSIS — J30.2 SEASONAL ALLERGIES: ICD-10-CM

## 2019-11-12 DIAGNOSIS — Z79.4 TYPE 2 DIABETES MELLITUS WITH STAGE 3 CHRONIC KIDNEY DISEASE, WITH LONG-TERM CURRENT USE OF INSULIN: Primary | ICD-10-CM

## 2019-11-12 PROCEDURE — 99999 PR PBB SHADOW E&M-EST. PATIENT-LVL III: CPT | Mod: PBBFAC,,, | Performed by: FAMILY MEDICINE

## 2019-11-12 PROCEDURE — 99999 PR PBB SHADOW E&M-EST. PATIENT-LVL III: ICD-10-PCS | Mod: PBBFAC,,, | Performed by: FAMILY MEDICINE

## 2019-11-12 PROCEDURE — 99214 OFFICE O/P EST MOD 30 MIN: CPT | Mod: S$PBB,,, | Performed by: FAMILY MEDICINE

## 2019-11-12 PROCEDURE — 99214 PR OFFICE/OUTPT VISIT, EST, LEVL IV, 30-39 MIN: ICD-10-PCS | Mod: S$PBB,,, | Performed by: FAMILY MEDICINE

## 2019-11-12 PROCEDURE — 99213 OFFICE O/P EST LOW 20 MIN: CPT | Mod: PBBFAC,PN | Performed by: FAMILY MEDICINE

## 2019-11-12 RX ORDER — ATORVASTATIN CALCIUM 40 MG/1
40 TABLET, FILM COATED ORAL DAILY
Qty: 90 TABLET | Refills: 1 | Status: SHIPPED | OUTPATIENT
Start: 2019-11-12 | End: 2020-08-18 | Stop reason: SDUPTHER

## 2019-11-12 RX ORDER — SPIRONOLACTONE 25 MG/1
25 TABLET ORAL DAILY
Qty: 90 TABLET | Refills: 1 | Status: SHIPPED | OUTPATIENT
Start: 2019-11-12 | End: 2020-04-22 | Stop reason: SDUPTHER

## 2019-11-12 RX ORDER — FLUTICASONE PROPIONATE 50 MCG
2 SPRAY, SUSPENSION (ML) NASAL DAILY
Qty: 48 ML | Refills: 3 | Status: SHIPPED | OUTPATIENT
Start: 2019-11-12 | End: 2020-11-17 | Stop reason: SDUPTHER

## 2019-11-12 RX ORDER — CARVEDILOL 25 MG/1
TABLET ORAL
Qty: 180 TABLET | Refills: 1 | Status: SHIPPED | OUTPATIENT
Start: 2019-11-12 | End: 2020-08-18 | Stop reason: SDUPTHER

## 2019-11-12 RX ORDER — NIFEDIPINE 90 MG/1
90 TABLET, EXTENDED RELEASE ORAL DAILY
Qty: 90 TABLET | Refills: 1 | Status: ON HOLD | OUTPATIENT
Start: 2019-11-12 | End: 2020-07-13 | Stop reason: HOSPADM

## 2019-11-12 RX ORDER — SODIUM BICARBONATE 650 MG/1
650 TABLET ORAL 2 TIMES DAILY
Qty: 180 TABLET | Refills: 1 | Status: ON HOLD | OUTPATIENT
Start: 2019-11-12 | End: 2020-07-13 | Stop reason: HOSPADM

## 2019-11-12 RX ORDER — LANOLIN ALCOHOL/MO/W.PET/CERES
1 CREAM (GRAM) TOPICAL EVERY MORNING
Qty: 90 TABLET | Refills: 1 | Status: SHIPPED | OUTPATIENT
Start: 2019-11-12 | End: 2020-04-22 | Stop reason: SDUPTHER

## 2019-11-12 RX ORDER — ALLOPURINOL 100 MG/1
100 TABLET ORAL DAILY
Qty: 90 TABLET | Refills: 1 | Status: ON HOLD | OUTPATIENT
Start: 2019-11-12 | End: 2020-07-15 | Stop reason: HOSPADM

## 2019-11-12 RX ORDER — IRBESARTAN 150 MG/1
75 TABLET ORAL NIGHTLY
Qty: 90 TABLET | Refills: 1 | Status: ON HOLD | OUTPATIENT
Start: 2019-11-12 | End: 2020-07-13 | Stop reason: HOSPADM

## 2019-11-12 RX ORDER — FUROSEMIDE 20 MG/1
TABLET ORAL
Qty: 180 TABLET | Refills: 1 | Status: ON HOLD | OUTPATIENT
Start: 2019-11-12 | End: 2020-07-13 | Stop reason: HOSPADM

## 2019-11-12 RX ORDER — TAMSULOSIN HYDROCHLORIDE 0.4 MG/1
1 CAPSULE ORAL EVERY MORNING
Qty: 90 CAPSULE | Refills: 1 | Status: SHIPPED | OUTPATIENT
Start: 2019-11-12 | End: 2019-11-22 | Stop reason: SDUPTHER

## 2019-11-12 NOTE — PATIENT INSTRUCTIONS
Your test results will be communicated to you via: [facility], Telephone or Letter.  If you have not received your test results within one week. Please contact the clinic.    [facility] policy,requires the patient to take your blood pressure medication two hours prior to coming to your scheduled appointment.  If at that time your blood pressure is elevated you will have to return within 2 -4 weeks for a nurse blood pressure follow up until blood pressure is control. And the provider clear you.    Thanks for choosing [facility].

## 2019-11-12 NOTE — PROGRESS NOTES
Subjective:       Patient ID: Vinnie Siegel is a 74 y.o. male.    Chief Complaint: Hypertension; Hyperlipidemia; and Medication Refill    Pt presents today for DM, HTN f/u. Per pt, he is trying his best to keep up with his DM. Feels that it is well controlled but aware that his glucose levels and a1c indicate otherwise. Pt also states that he needs to have meds refilled. Is followed by endo  States that he took his BP meds this AM. Being followed by cards as well   States that he cannot exercise due to his back and DJD  Also states that he will do eye appt in Dec and has endo appt next week    Medication Refill   Pertinent negatives include no chest pain or weakness.   Hypertension   Pertinent negatives include no chest pain, palpitations or shortness of breath.   Diabetes   Pertinent negatives for hypoglycemia include no dizziness. Pertinent negatives for diabetes include no chest pain and no weakness.   Hyperlipidemia   Pertinent negatives include no chest pain or shortness of breath.     Review of Systems   Constitutional: Negative for activity change and appetite change.   HENT: Negative.    Eyes: Negative for photophobia and visual disturbance.   Respiratory: Negative for chest tightness and shortness of breath.    Cardiovascular: Negative for chest pain, palpitations and leg swelling.   Neurological: Negative for dizziness, weakness and light-headedness.   All other systems reviewed and are negative.      Objective:      Physical Exam   Constitutional: He is oriented to person, place, and time. He appears well-developed and well-nourished.   HENT:   Head: Normocephalic and atraumatic.   Eyes: Pupils are equal, round, and reactive to light. Conjunctivae and EOM are normal. Right eye exhibits no discharge. Left eye exhibits no discharge. No scleral icterus.   Neck: Normal range of motion. Neck supple. No JVD present.   Cardiovascular: Normal rate, regular rhythm, normal heart sounds and intact distal pulses.    Pulmonary/Chest: Effort normal and breath sounds normal. No stridor. No respiratory distress. He has no wheezes. He has no rales. He exhibits no tenderness.   Abdominal: Soft. Bowel sounds are normal. He exhibits no distension and no mass. There is no tenderness. There is no rebound and no guarding. No hernia.   Musculoskeletal: Normal range of motion. He exhibits no edema or tenderness.   Neurological: He is alert and oriented to person, place, and time. He has normal reflexes. No cranial nerve deficit. He exhibits normal muscle tone. Coordination normal.   Skin: Skin is warm and dry.   Psychiatric: He has a normal mood and affect. His behavior is normal. Judgment and thought content normal.       Assessment:       1. Type 2 diabetes mellitus with stage 3 chronic kidney disease, with long-term current use of insulin    2. Acidosis    3. Dilated cardiomyopathy    4. Essential hypertension    5. Seasonal allergies    6. Hyperlipidemia, unspecified hyperlipidemia type    7. Idiopathic gout of hip, unspecified chronicity, unspecified laterality    8. Tobacco abuse        Plan:       HTN: controlled.   DM: followed by endocrine, uncontrolled  Type 2 diabetes mellitus with stage 3 chronic kidney disease, with long-term current use of insulin    Acidosis  -     tamsulosin (FLOMAX) 0.4 mg Cap; Take 1 capsule (0.4 mg total) by mouth every morning.  Dispense: 90 capsule; Refill: 1  -     sodium bicarbonate 650 MG tablet; Take 1 tablet (650 mg total) by mouth 2 (two) times daily.  Dispense: 180 tablet; Refill: 1    Dilated cardiomyopathy  -     spironolactone (ALDACTONE) 25 MG tablet; Take 1 tablet (25 mg total) by mouth once daily.  Dispense: 90 tablet; Refill: 1  -     furosemide (LASIX) 20 MG tablet; TAKE 1 TABLET BY MOUTH TWICE DAILY INCREASING TO 2 TABLETS TWICE DAILY FOR 3-4 LBS WEIGHT GAIN UNTIL RESOLVED.  Dispense: 180 tablet; Refill: 1  -     fluticasone propionate (FLONASE) 50 mcg/actuation nasal spray; 2 sprays  (100 mcg total) by Each Nostril route once daily.  Dispense: 48 mL; Refill: 3  -     carvedilol (COREG) 25 MG tablet; TAKE 1 TABLET(25 MG) BY MOUTH TWICE DAILY WITH MEALS  Dispense: 180 tablet; Refill: 1  -     atorvastatin (LIPITOR) 40 MG tablet; Take 1 tablet (40 mg total) by mouth once daily.  Dispense: 90 tablet; Refill: 1    Essential hypertension  -     NIFEdipine (PROCARDIA-XL) 90 MG (OSM) 24 hr tablet; Take 1 tablet (90 mg total) by mouth once daily.  Dispense: 90 tablet; Refill: 1  -     irbesartan (AVAPRO) 150 MG tablet; Take 0.5 tablets (75 mg total) by mouth every evening.  Dispense: 90 tablet; Refill: 1  -     carvedilol (COREG) 25 MG tablet; TAKE 1 TABLET(25 MG) BY MOUTH TWICE DAILY WITH MEALS  Dispense: 180 tablet; Refill: 1  -     atorvastatin (LIPITOR) 40 MG tablet; Take 1 tablet (40 mg total) by mouth once daily.  Dispense: 90 tablet; Refill: 1    Seasonal allergies  -     magnesium oxide (MAG-OX) 400 mg (241.3 mg magnesium) tablet; Take 1 tablet (400 mg total) by mouth every morning.  Dispense: 90 tablet; Refill: 1  -     fluticasone propionate (FLONASE) 50 mcg/actuation nasal spray; 2 sprays (100 mcg total) by Each Nostril route once daily.  Dispense: 48 mL; Refill: 3    Hyperlipidemia, unspecified hyperlipidemia type  -     atorvastatin (LIPITOR) 40 MG tablet; Take 1 tablet (40 mg total) by mouth once daily.  Dispense: 90 tablet; Refill: 1    Idiopathic gout of hip, unspecified chronicity, unspecified laterality  -     allopurinol (ZYLOPRIM) 100 MG tablet; Take 1 tablet (100 mg total) by mouth once daily.  Dispense: 90 tablet; Refill: 1    Tobacco abuse      RTC prn symptoms or 6 mos

## 2019-11-22 DIAGNOSIS — E87.20 ACIDOSIS: ICD-10-CM

## 2019-11-22 RX ORDER — TAMSULOSIN HYDROCHLORIDE 0.4 MG/1
1 CAPSULE ORAL EVERY MORNING
Qty: 90 CAPSULE | Refills: 1 | Status: SHIPPED | OUTPATIENT
Start: 2019-11-22 | End: 2020-11-17 | Stop reason: SDUPTHER

## 2019-11-27 ENCOUNTER — PATIENT OUTREACH (OUTPATIENT)
Dept: ADMINISTRATIVE | Facility: OTHER | Age: 74
End: 2019-11-27

## 2019-11-29 ENCOUNTER — OFFICE VISIT (OUTPATIENT)
Dept: OPTOMETRY | Facility: CLINIC | Age: 74
End: 2019-11-29
Payer: MEDICARE

## 2019-11-29 ENCOUNTER — CLINICAL SUPPORT (OUTPATIENT)
Dept: OPHTHALMOLOGY | Facility: CLINIC | Age: 74
End: 2019-11-29
Payer: MEDICARE

## 2019-11-29 DIAGNOSIS — H25.12 NUCLEAR SCLEROSIS OF LEFT EYE: ICD-10-CM

## 2019-11-29 DIAGNOSIS — H40.1113 PRIMARY OPEN ANGLE GLAUCOMA OF RIGHT EYE, SEVERE STAGE: Primary | ICD-10-CM

## 2019-11-29 DIAGNOSIS — E11.9 TYPE 2 DIABETES MELLITUS WITHOUT OPHTHALMIC MANIFESTATIONS: ICD-10-CM

## 2019-11-29 DIAGNOSIS — H40.022 OPEN ANGLE WITH BORDERLINE FINDINGS, HIGH RISK, LEFT: ICD-10-CM

## 2019-11-29 DIAGNOSIS — H52.4 PRESBYOPIA: ICD-10-CM

## 2019-11-29 PROCEDURE — 92133 POSTERIOR SEGMENT OCT OPTIC NERVE(OCULAR COHERENCE TOMOGRAPHY) - OU - BOTH EYES: ICD-10-PCS | Mod: 26,S$PBB,, | Performed by: OPTOMETRIST

## 2019-11-29 PROCEDURE — 99999 PR PBB SHADOW E&M-EST. PATIENT-LVL II: CPT | Mod: PBBFAC,,, | Performed by: OPTOMETRIST

## 2019-11-29 PROCEDURE — 92014 PR EYE EXAM, EST PATIENT,COMPREHESV: ICD-10-PCS | Mod: S$PBB,,, | Performed by: OPTOMETRIST

## 2019-11-29 PROCEDURE — 99212 OFFICE O/P EST SF 10 MIN: CPT | Mod: PBBFAC,25 | Performed by: OPTOMETRIST

## 2019-11-29 PROCEDURE — 99999 PR PBB SHADOW E&M-EST. PATIENT-LVL II: ICD-10-PCS | Mod: PBBFAC,,, | Performed by: OPTOMETRIST

## 2019-11-29 PROCEDURE — 92083 EXTENDED VISUAL FIELD XM: CPT | Mod: PBBFAC | Performed by: OPTOMETRIST

## 2019-11-29 PROCEDURE — 92083 HUMPHREY VISUAL FIELD - OU - BOTH EYES: ICD-10-PCS | Mod: 26,S$PBB,, | Performed by: OPTOMETRIST

## 2019-11-29 PROCEDURE — 92014 COMPRE OPH EXAM EST PT 1/>: CPT | Mod: S$PBB,,, | Performed by: OPTOMETRIST

## 2019-11-29 PROCEDURE — 92133 CPTRZD OPH DX IMG PST SGM ON: CPT | Mod: PBBFAC | Performed by: OPTOMETRIST

## 2019-11-29 RX ORDER — TIMOLOL MALEATE 2.5 MG/ML
1 SOLUTION/ DROPS OPHTHALMIC 2 TIMES DAILY
Qty: 10 ML | Refills: 11 | Status: SHIPPED | OUTPATIENT
Start: 2019-11-29 | End: 2020-09-02 | Stop reason: RX

## 2019-11-29 NOTE — PROGRESS NOTES
HPI     TATI:06/2018  Glasses? Yes   Contacts? no  H/o eye surgery, injections or laser: Cat sx OD   H/o eye injury: no  Known eye conditions? Glaucoma OU   Family h/o eye conditions? no  Eye gtts?Timolol BID OU pt uses drops 7/7days     (-) Flashes (-) Floaters (-) Mucous   (-) Tearing (-) Itching (-) Burning   (-) Headaches (-) Eye Pain/discomfort (-) Irritation   (-) Redness (-) Double vision (-) Blurry vision    Diabetic? (+)  A1c?  (Hemoglobin A1C       Date                     Value               Ref Range             Status                06/18/2019               6.3 (H)             4.0 - 5.6 %           Final              Comment:    ADA Screening Guidelines:  5.7-6.4%  Consistent with   prediabetes  >or=6.5%  Consistent with diabetes  High levels of fetal   hemoglobin interfere with the HbA1C  assay. Heterozygous hemoglobin   variants (HbS, HgC, etc)do  not significantly interfere with this assay.     However, presence of multiple variants may affect accuracy.         12/13/2018               6.5 (H)             4.0 - 5.6 %           Final              Comment:    ADA Screening Guidelines:  5.7-6.4%  Consistent with   prediabetes  >or=6.5%  Consistent with diabetes  High levels of fetal   hemoglobin interfere with the HbA1C  assay. Heterozygous hemoglobin   variants (HbS, HgC, etc)do  not significantly interfere with this assay.     However, presence of multiple variants may affect accuracy.         09/04/2018               7.0 (H)             4.0 - 5.6 %           Final              Comment:    ADA Screening Guidelines:  5.7-6.4%  Consistent with   prediabetes  >or=6.5%  Consistent with diabetes  High levels of fetal   hemoglobin interfere with the HbA1C  assay. Heterozygous hemoglobin   variants (HbS, HgC, etc)do  not significantly interfere with this assay.     However, presence of multiple variants may affect accuracy.    ----------)        Last edited by Luz Maria Cash on 11/29/2019   9:55 AM. (History)            Assessment /Plan     For exam results, see Encounter Report.    Primary open angle glaucoma of right eye, severe stage  -     [place] Visual Field - OU - Extended - Both Eyes  -     OCT - Optic Nerve  -     timolol maleate 0.25% (TIMOPTIC) 0.25 % Drop; Place 1 drop into both eyes 2 (two) times daily.  Dispense: 10 mL; Refill: 11    Open angle with borderline findings, high risk, left  -     [place] Visual [place] - OU - Extended - Both Eyes  -     OCT - Optic Nerve  -     timolol maleate 0.25% (TIMOPTIC) 0.25 % Drop; Place 1 drop into both eyes 2 (two) times daily.  Dispense: 10 mL; Refill: 11    Type 2 diabetes mellitus without ophthalmic manifestations    Nuclear sclerosis of left eye    Presbyopia            1-2.  Continue Timolol twice daily OD-refills given.  Eye pressure and testing stable OU.  Patient does not recall having trauma to right eye.  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 Started patient on Latanoprost in 2013 but patient had side effects.  HVF: 11/29/19  OCT: 11/29/19  DFE: 11/29/19  Photos: 9/19/17  Gonio: 9/8/16  Pachy: 564 OD, 566 OS  Initial IOPs: 19 OD, 15 OS(Exam 2012-no drops)  MHx: DM, HTN  FHx: none  3.  No retinopathy--monitor yearly.  BS control.  4.  Educated on cataracts and affects on vision.  Patient ok with vision for now.  Monitor.  5.  Continue w/ current rx                          RTC 4 months iop check.

## 2019-12-05 ENCOUNTER — LAB VISIT (OUTPATIENT)
Dept: LAB | Facility: HOSPITAL | Age: 74
End: 2019-12-05
Payer: MEDICARE

## 2019-12-05 ENCOUNTER — OFFICE VISIT (OUTPATIENT)
Dept: INTERNAL MEDICINE | Facility: CLINIC | Age: 74
End: 2019-12-05
Payer: MEDICARE

## 2019-12-05 VITALS
BODY MASS INDEX: 40.43 KG/M2 | HEIGHT: 74 IN | WEIGHT: 315 LBS | SYSTOLIC BLOOD PRESSURE: 138 MMHG | DIASTOLIC BLOOD PRESSURE: 76 MMHG

## 2019-12-05 DIAGNOSIS — E11.22 TYPE 2 DIABETES MELLITUS WITH STAGE 3 CHRONIC KIDNEY DISEASE, WITH LONG-TERM CURRENT USE OF INSULIN: Primary | ICD-10-CM

## 2019-12-05 DIAGNOSIS — N18.4 CKD (CHRONIC KIDNEY DISEASE) STAGE 4, GFR 15-29 ML/MIN: ICD-10-CM

## 2019-12-05 DIAGNOSIS — Z79.4 TYPE 2 DIABETES MELLITUS WITH STAGE 3 CHRONIC KIDNEY DISEASE, WITH LONG-TERM CURRENT USE OF INSULIN: Primary | ICD-10-CM

## 2019-12-05 DIAGNOSIS — E78.5 HYPERLIPIDEMIA, UNSPECIFIED HYPERLIPIDEMIA TYPE: ICD-10-CM

## 2019-12-05 DIAGNOSIS — E11.22 TYPE 2 DIABETES MELLITUS WITH STAGE 3 CHRONIC KIDNEY DISEASE, WITH LONG-TERM CURRENT USE OF INSULIN: ICD-10-CM

## 2019-12-05 DIAGNOSIS — I42.0 DILATED CARDIOMYOPATHY: ICD-10-CM

## 2019-12-05 DIAGNOSIS — Z72.0 TOBACCO ABUSE: Chronic | ICD-10-CM

## 2019-12-05 DIAGNOSIS — I10 ESSENTIAL HYPERTENSION: ICD-10-CM

## 2019-12-05 DIAGNOSIS — Z79.4 TYPE 2 DIABETES MELLITUS WITH STAGE 3 CHRONIC KIDNEY DISEASE, WITH LONG-TERM CURRENT USE OF INSULIN: ICD-10-CM

## 2019-12-05 DIAGNOSIS — N18.30 TYPE 2 DIABETES MELLITUS WITH STAGE 3 CHRONIC KIDNEY DISEASE, WITH LONG-TERM CURRENT USE OF INSULIN: Primary | ICD-10-CM

## 2019-12-05 DIAGNOSIS — N18.30 TYPE 2 DIABETES MELLITUS WITH STAGE 3 CHRONIC KIDNEY DISEASE, WITH LONG-TERM CURRENT USE OF INSULIN: ICD-10-CM

## 2019-12-05 PROBLEM — E66.813 OBESITY, CLASS III, BMI 40-49.9 (MORBID OBESITY): Status: ACTIVE | Noted: 2018-10-09

## 2019-12-05 LAB
ESTIMATED AVG GLUCOSE: 174 MG/DL (ref 68–131)
HBA1C MFR BLD HPLC: 7.7 % (ref 4–5.6)

## 2019-12-05 PROCEDURE — 1159F MED LIST DOCD IN RCRD: CPT | Mod: ,,, | Performed by: NURSE PRACTITIONER

## 2019-12-05 PROCEDURE — 1159F PR MEDICATION LIST DOCUMENTED IN MEDICAL RECORD: ICD-10-PCS | Mod: ,,, | Performed by: NURSE PRACTITIONER

## 2019-12-05 PROCEDURE — 36415 COLL VENOUS BLD VENIPUNCTURE: CPT

## 2019-12-05 PROCEDURE — 99999 PR PBB SHADOW E&M-EST. PATIENT-LVL IV: ICD-10-PCS | Mod: PBBFAC,,, | Performed by: NURSE PRACTITIONER

## 2019-12-05 PROCEDURE — 99214 PR OFFICE/OUTPT VISIT, EST, LEVL IV, 30-39 MIN: ICD-10-PCS | Mod: S$PBB,,, | Performed by: NURSE PRACTITIONER

## 2019-12-05 PROCEDURE — 99214 OFFICE O/P EST MOD 30 MIN: CPT | Mod: PBBFAC | Performed by: NURSE PRACTITIONER

## 2019-12-05 PROCEDURE — 99999 PR PBB SHADOW E&M-EST. PATIENT-LVL IV: CPT | Mod: PBBFAC,,, | Performed by: NURSE PRACTITIONER

## 2019-12-05 PROCEDURE — 99214 OFFICE O/P EST MOD 30 MIN: CPT | Mod: S$PBB,,, | Performed by: NURSE PRACTITIONER

## 2019-12-05 PROCEDURE — 83036 HEMOGLOBIN GLYCOSYLATED A1C: CPT

## 2019-12-05 RX ORDER — INSULIN LISPRO 100 [IU]/ML
INJECTION, SOLUTION INTRAVENOUS; SUBCUTANEOUS
Qty: 15 ML | Refills: 6
Start: 2019-12-05 | End: 2020-03-27 | Stop reason: SDUPTHER

## 2019-12-05 RX ORDER — INSULIN GLARGINE 100 [IU]/ML
32 INJECTION, SOLUTION SUBCUTANEOUS NIGHTLY
Qty: 15 ML | Refills: 6
Start: 2019-12-05 | End: 2020-03-27 | Stop reason: SDUPTHER

## 2019-12-05 NOTE — PATIENT INSTRUCTIONS
Snacks can be an important part of a balanced, healthy meal plan. They allow you to eat more frequently, feeling full and satisfied throughout the day. Also, they allow you to spread carbohydrates evenly, which may stabilize blood sugars.  Plus, snacks are enjoyable!     The amount of carbohydrate needed at snacks varies. Generally, about 15-30 grams of carbohydrate per snack is recommended.  Below you will find some tasty treats.       0-5 gm carb   Crystal Light   Vitamin Water Zero   Herbal tea, unsweetened   2 tsp peanut butter on celery   1./2 cup sugar-free jell-o   1 sugar-free popsicle   ¼ cup blueberries   8oz Blue Norah unsweetened almond milk   5 baby carrots & celery sticks, cucumbers, bell peppers dipped in ¼ cup salsa, 2Tbsp light ranch dressing or 2Tbsp plain Greek yogurt   10 Goldfish crackers   ½ oz low-fat cheese or string cheese   1 closed handful of nuts, unsalted   1 Tbsp of sunflower seeds, unsalted   1 cup Smart Pop popcorn   1 whole grain brown rice cake        15 gm carb   1 small piece of fruit or ½ banana or 1/2 cup lite canned fruit   3 bibi cracker squares   3 cups Smart Pop popcorn, top spray butter, Ray lite salt or cinnamon and Truvia   5 Vanilla Wafers   ½ cup low fat, no added sugar ice cream or frozen yogurt (Blue bell, Blue Bunny, Weight Watchers, Skinny Cow)   ½ turkey, ham, or chicken sandwich   ½ c fruit with ½ c Cottage cheese   4-6 unsalted wheat crackers with 1 oz low fat cheese or 1 tbsp peanut butter    30-45 goldfish crackers (depending on flavor)    7-8 [employer] mini brown rice cakes (caramel, apple cinnamon, chocolate)    12 [employer] mini brown rice cakes (cheddar, bbq, ranch)    1/3 cup hummus dip with raw veg   1/2 whole wheat nehemiah, 1Tbsp hummus   Mini Pizza (1/2 whole wheat English muffin, low-fat  cheese, tomato sauce)   100 calorie snack pack (Oreo, Chips Ahoy, Ritz Mix, Baked Cheetos)   4-6 oz. light or Greek Style yogurt  (Chema, Alyse, Davion, [facility])   ½ cup sugar-free pudding     6 in. wheat tortilla or nehemiah oven toasted chips (topped with spray butter flavoring, cinnamon, Truvia OR spray butter, garlic powder, chili powder)    18 BBQ Popchips (available at Target, Whole Foods, Fresh Market)                   Diabetes Support Group Meetings         Date: Topic:   February 14 Eat Fit JUAN/Health Promotion   March 14 Taking Care of Your Smile   April 11 Spring into Healthy Eating/Cooking Demo   May 9 Ease Your Mind with Diabetes   Kayy 13 Summer Treats/Cooking Demo   July 11 Eat Fit JUAN/Super Market Sweep   August 8 Taking Care of Your Eyes and Feet   Sept 12 Technology/ADA updates   October 10 Recipes & Treats/Cooking Demo   November 14 Heart Health/Pump it up!   December 12 Year-End Close Out        Meetings are held in the [facility] (A) of the [facility] located at [address] [place] [address]. Please call [phone] for additional information.    Free service, offered every 2nd Thursday of every month! Family members and/or friends are welcome as well!  Support group is for patients with type 1 or type 2 diabetes.    From 3:30p to 4:30p

## 2019-12-05 NOTE — PROGRESS NOTES
CC: This 74 y.o. male presents for management of diabetes mellitus     HPI: Mr. Siegel is a 70 year old gentleman with type 2 diabetes diagnosed 2010. He has never been hospitalized for his diabetes. Denies missed doses of medication.   Has h/o PAC, RBBB, NS, obesity, CKD4, tobacco use    Pt was last seen by me in June 2019 and is now being seen by me again today.  a1c needed today      Lab Results   Component Value Date    HGBA1C 6.3 (H) 06/18/2019     Mild hypoglycemia 70s here and there  BG monitoring max 4 times a day.  Out of testing supplies r/t CMN issue w/ [facility]    Exercise: no formal exercise, related to disk in his back      Eats 3 meals a day, will sometimes snack in-between meals- rare chips and candy  Bedtime snack- yogurt    Retired:  since 2008        CURRENT DM MEDS:  Humalog 10 units w/ breakfast lunch dinner, Lantus 32 u qhs, trulicity 1.5 mg weekly -off did not see a big difference with numbers/worried about price point                                                     Standards of Care:  Diabetes Management Status    Statin: Taking  ACE/ARB: Taking    Screening or Prevention Patient's value Goal Complete/Controlled?   HgA1C Testing and Control   Lab Results   Component Value Date    HGBA1C 6.3 (H) 06/18/2019      Annually/Less than 8% Yes   Lipid profile : 12/13/2018 Annually Yes   LDL control Lab Results   Component Value Date    LDLCALC 52.6 (L) 12/13/2018    Annually/Less than 100 mg/dl  Yes   Nephropathy screening Lab Results   Component Value Date    LABMICR 155.0 06/18/2019     Lab Results   Component Value Date    PROTEINUA 3+ (A) 02/14/2017    Annually No   Blood pressure BP Readings from Last 1 Encounters:   12/05/19 138/76    Less than 140/90 Yes   Dilated retinal exam : 11/29/2019 Annually Yes   Foot exam   : 09/11/2018 Annually Yes     ROS:   Gen: Appetite good,  +weight loss 11# since 10/2018, denies fatigue and weakness.  Skin: Skin is intact and heals well, no  rashes   Eyes: Denies visual disturbances  Resp: no SOB, + ZEPEDA, no cough  Cardiac: No palpitations, chest pain, no edema   GI: No nausea or vomiting, diarrhea, constipation, or abdominal pain.  /GYN: 1-2+ nocturia, burning or pain.   MS/Neuro: Denies numbness/ tingling in BLE; Gait steady, speech clear  Psych: Denies drug/ETOH abuse, no hx of depression.  Other systems: negative.    PE:  GENERAL: Well developed, well nourished.  PSYCH: AAOx3, appropriate mood and affect, pleasant expression, conversant, appears relaxed, well groomed.   EYES: Conjunctiva, corneas clear  NECK: Supple, trachea midline,no thyromegaly or nodules .  CHEST: Resp even and unlabored  CARDIAC: RRR  ABDOMEN: Soft, non-tender, non-distended    VASCULAR: no edema.  NEURO: Gait steady  SKIN: Skin warm and dry no acanthosis nigracans.  FEET:  Footware appropriate     Hemoglobin A1C   Date Value Ref Range Status   06/18/2019 6.3 (H) 4.0 - 5.6 % Final     Comment:     ADA Screening Guidelines:  5.7-6.4%  Consistent with prediabetes  >or=6.5%  Consistent with diabetes  High levels of fetal hemoglobin interfere with the HbA1C  assay. Heterozygous hemoglobin variants (HbS, HgC, etc)do  not significantly interfere with this assay.   However, presence of multiple variants may affect accuracy.     12/13/2018 6.5 (H) 4.0 - 5.6 % Final     Comment:     ADA Screening Guidelines:  5.7-6.4%  Consistent with prediabetes  >or=6.5%  Consistent with diabetes  High levels of fetal hemoglobin interfere with the HbA1C  assay. Heterozygous hemoglobin variants (HbS, HgC, etc)do  not significantly interfere with this assay.   However, presence of multiple variants may affect accuracy.     09/04/2018 7.0 (H) 4.0 - 5.6 % Final     Comment:     ADA Screening Guidelines:  5.7-6.4%  Consistent with prediabetes  >or=6.5%  Consistent with diabetes  High levels of fetal hemoglobin interfere with the HbA1C  assay. Heterozygous hemoglobin variants (HbS, HgC, etc)do  not  significantly interfere with this assay.   However, presence of multiple variants may affect accuracy.        ASSESSMENT and PLAN:    1. Type 2 diabetes mellitus with stage 3 chronic kidney disease, with long-term current use of insulin  Hemoglobin A1c    Hemoglobin A1c   2. CKD (chronic kidney disease) stage 4, GFR 15-29 ml/min     3. Dilated cardiomyopathy     4. Essential hypertension     5. Hyperlipidemia, unspecified hyperlipidemia type  Lipid panel   6. Tobacco abuse       1. F/u in 4 mos w/ me   New contact info given  a1c goal less than 7%  Continue regimen  Logs given   2. F/u with nephrology   3. Avoid hypoglycemia   4. Controlled, continue med (s)   5.   Lab Results   Component Value Date    LDLCALC 52.6 (L) 12/13/2018     At goal   6. Discussed cessation

## 2019-12-06 ENCOUNTER — TELEPHONE (OUTPATIENT)
Dept: INTERNAL MEDICINE | Facility: CLINIC | Age: 74
End: 2019-12-06

## 2019-12-06 NOTE — TELEPHONE ENCOUNTER
Let pt know about insulin changes. Pt verbalized understanding    . ----- Message from MACK Wood FNP sent at 12/5/2019  5:20 PM CST -----  a1c 7.7  Go up with meal insulin 12 units  Long acting to 36 units at night  Please call and let pt know  Thanks  IB

## 2020-03-27 RX ORDER — INSULIN GLARGINE 100 [IU]/ML
32 INJECTION, SOLUTION SUBCUTANEOUS NIGHTLY
Qty: 15 ML | Refills: 6 | Status: SHIPPED | OUTPATIENT
Start: 2020-03-27 | End: 2020-04-06 | Stop reason: SDUPTHER

## 2020-03-27 RX ORDER — INSULIN LISPRO 100 [IU]/ML
INJECTION, SOLUTION INTRAVENOUS; SUBCUTANEOUS
Qty: 15 ML | Refills: 6 | Status: SHIPPED | OUTPATIENT
Start: 2020-03-27 | End: 2020-04-06 | Stop reason: SDUPTHER

## 2020-04-02 ENCOUNTER — TELEPHONE (OUTPATIENT)
Dept: INTERNAL MEDICINE | Facility: CLINIC | Age: 75
End: 2020-04-02

## 2020-04-02 NOTE — TELEPHONE ENCOUNTER
----- Message from Jocelin Amezcua sent at 4/2/2020 12:21 PM CDT -----  Contact: [phone]  Patient has questiions about his medciation Lantus and Humalog

## 2020-04-02 NOTE — TELEPHONE ENCOUNTER
Spoke to patient and states that the instructions on Lantus and Humalog is incorrect---he states that the Lantus was increased to 36 units and Humalog was increased to 12 units---pls correct and mail another script to patient---thanks

## 2020-04-06 RX ORDER — INSULIN GLARGINE 100 [IU]/ML
36 INJECTION, SOLUTION SUBCUTANEOUS NIGHTLY
Qty: 15 ML | Refills: 6 | Status: SHIPPED | OUTPATIENT
Start: 2020-04-06 | End: 2020-04-09 | Stop reason: SDUPTHER

## 2020-04-06 RX ORDER — INSULIN LISPRO 100 [IU]/ML
INJECTION, SOLUTION INTRAVENOUS; SUBCUTANEOUS
Qty: 15 ML | Refills: 6 | Status: SHIPPED | OUTPATIENT
Start: 2020-04-06 | End: 2020-04-09 | Stop reason: SDUPTHER

## 2020-04-09 ENCOUNTER — OFFICE VISIT (OUTPATIENT)
Dept: INTERNAL MEDICINE | Facility: CLINIC | Age: 75
End: 2020-04-09
Payer: MEDICARE

## 2020-04-09 DIAGNOSIS — N18.4 CKD (CHRONIC KIDNEY DISEASE) STAGE 4, GFR 15-29 ML/MIN: ICD-10-CM

## 2020-04-09 DIAGNOSIS — N18.30 TYPE 2 DIABETES MELLITUS WITH STAGE 3 CHRONIC KIDNEY DISEASE, WITH LONG-TERM CURRENT USE OF INSULIN: Primary | ICD-10-CM

## 2020-04-09 DIAGNOSIS — Z79.4 TYPE 2 DIABETES MELLITUS WITH STAGE 3 CHRONIC KIDNEY DISEASE, WITH LONG-TERM CURRENT USE OF INSULIN: Primary | ICD-10-CM

## 2020-04-09 DIAGNOSIS — E66.01 OBESITY, CLASS III, BMI 40-49.9 (MORBID OBESITY): ICD-10-CM

## 2020-04-09 DIAGNOSIS — E11.22 TYPE 2 DIABETES MELLITUS WITH STAGE 3 CHRONIC KIDNEY DISEASE, WITH LONG-TERM CURRENT USE OF INSULIN: Primary | ICD-10-CM

## 2020-04-09 PROCEDURE — 99442 PR PHYSICIAN TELEPHONE EVALUATION 11-20 MIN: ICD-10-PCS | Mod: 95,,, | Performed by: NURSE PRACTITIONER

## 2020-04-09 PROCEDURE — 99442 PR PHYSICIAN TELEPHONE EVALUATION 11-20 MIN: CPT | Mod: 95,,, | Performed by: NURSE PRACTITIONER

## 2020-04-09 RX ORDER — INSULIN GLARGINE 100 [IU]/ML
36 INJECTION, SOLUTION SUBCUTANEOUS NIGHTLY
Qty: 15 ML | Refills: 6 | Status: SHIPPED | OUTPATIENT
Start: 2020-04-09 | End: 2020-10-27

## 2020-04-09 RX ORDER — INSULIN LISPRO 100 [IU]/ML
INJECTION, SOLUTION INTRAVENOUS; SUBCUTANEOUS
Qty: 15 ML | Refills: 6 | Status: SHIPPED | OUTPATIENT
Start: 2020-04-09 | End: 2020-04-27

## 2020-04-09 NOTE — PROGRESS NOTES
The patient location is: home  The chief complaint leading to consultation is: type 2 dm   Visit type: Virtual visit with synchronous audio  Total time spent with patient: 15 mins   Each patient to whom he or she provides medical services by telemedicine is:  (1) informed of the relationship between the physician and patient and the respective role of any other health care provider with respect to management of the patient; and (2) notified that he or she may decline to receive medical services by telemedicine and may withdraw from such care at any time.    Notes:   Lab Results   Component Value Date    HGBA1C 7.7 (H) 12/05/2019     a1c needed in 6 weeks  a1c in 4 mos     Doing well with sugars   BG mostly under 180 mg/dl   Highest 200 mg/dl     Needs more logs     Staying home mostly     Send prescriptions via mail

## 2020-04-09 NOTE — PATIENT INSTRUCTIONS
Snacks can be an important part of a balanced, healthy meal plan. They allow you to eat more frequently, feeling full and satisfied throughout the day. Also, they allow you to spread carbohydrates evenly, which may stabilize blood sugars.  Plus, snacks are enjoyable!     The amount of carbohydrate needed at snacks varies. Generally, about 15-30 grams of carbohydrate per snack is recommended.  Below you will find some tasty treats.       0-5 gm carb   Crystal Light   Vitamin Water Zero   Herbal tea, unsweetened   2 tsp peanut butter on celery   1./2 cup sugar-free jell-o   1 sugar-free popsicle   ¼ cup blueberries   8oz Blue Norah unsweetened almond milk   5 baby carrots & celery sticks, cucumbers, bell peppers dipped in ¼ cup salsa, 2Tbsp light ranch dressing or 2Tbsp plain Greek yogurt   10 Goldfish crackers   ½ oz low-fat cheese or string cheese   1 closed handful of nuts, unsalted   1 Tbsp of sunflower seeds, unsalted   1 cup Smart Pop popcorn   1 whole grain brown rice cake        15 gm carb   1 small piece of fruit or ½ banana or 1/2 cup lite canned fruit   3 bibi cracker squares   3 cups Smart Pop popcorn, top spray butter, Ray lite salt or cinnamon and Truvia   5 Vanilla Wafers   ½ cup low fat, no added sugar ice cream or frozen yogurt (Blue bell, Blue Bunny, Weight Watchers, Skinny Cow)   ½ turkey, ham, or chicken sandwich   ½ c fruit with ½ c Cottage cheese   4-6 unsalted wheat crackers with 1 oz low fat cheese or 1 tbsp peanut butter    30-45 goldfish crackers (depending on flavor)    7-8 [employer]ian mini brown rice cakes (caramel, apple cinnamon, chocolate)    12 [employer]ian mini brown rice cakes (cheddar, bbq, ranch)    1/3 cup hummus dip with raw veg   1/2 whole wheat nehemiah, 1Tbsp hummus   Mini Pizza (1/2 whole wheat English muffin, low-fat  cheese, tomato sauce)   100 calorie snack pack (Oreo, Chips Ahoy, Ritz Mix, Baked Cheetos)   4-6 oz. light or Greek Style yogurt  (Chema, Alyse, [facility])   ½ cup sugar-free pudding     6 in. wheat tortilla or nehemiah oven toasted chips (topped with spray butter flavoring, cinnamon, Truvia OR spray butter, garlic powder, chili powder)    18 BBQ Popchips (available at Target, Whole Foods, Fresh Market)

## 2020-04-22 DIAGNOSIS — I42.0 DILATED CARDIOMYOPATHY: ICD-10-CM

## 2020-04-22 DIAGNOSIS — J30.2 SEASONAL ALLERGIES: ICD-10-CM

## 2020-04-22 DIAGNOSIS — I10 ESSENTIAL HYPERTENSION: Primary | ICD-10-CM

## 2020-04-22 RX ORDER — LANOLIN ALCOHOL/MO/W.PET/CERES
1 CREAM (GRAM) TOPICAL EVERY MORNING
Qty: 90 TABLET | Refills: 0 | Status: SHIPPED | OUTPATIENT
Start: 2020-04-22 | End: 2020-11-17 | Stop reason: SDUPTHER

## 2020-04-22 RX ORDER — SPIRONOLACTONE 25 MG/1
25 TABLET ORAL DAILY
Qty: 90 TABLET | Refills: 0 | Status: ON HOLD | OUTPATIENT
Start: 2020-04-22 | End: 2020-07-13 | Stop reason: HOSPADM

## 2020-04-22 NOTE — TELEPHONE ENCOUNTER
Informed pt that rx was sent to be filled at: [id number] - [place] - [address]  AT [facility] [address]. Informed pt that he will need to be seen in the office before future refills can be sent in. Pt verbalized understanding and states that he will call back to make an appt.

## 2020-04-27 RX ORDER — INSULIN LISPRO 100 [IU]/ML
INJECTION, SOLUTION INTRAVENOUS; SUBCUTANEOUS
Qty: 15 ML | Refills: 6 | Status: ON HOLD | OUTPATIENT
Start: 2020-04-27 | End: 2020-07-13 | Stop reason: SDUPTHER

## 2020-04-29 ENCOUNTER — TELEPHONE (OUTPATIENT)
Dept: INTERNAL MEDICINE | Facility: CLINIC | Age: 75
End: 2020-04-29

## 2020-04-29 ENCOUNTER — TELEPHONE (OUTPATIENT)
Dept: PRIMARY CARE CLINIC | Facility: CLINIC | Age: 75
End: 2020-04-29

## 2020-04-29 NOTE — TELEPHONE ENCOUNTER
----- Message from Anaid Lora sent at 4/29/2020  1:58 PM CDT -----  Contact: IMER ISRAEL [[id number]]  Type:  Patient Returning Call    Who Called: IMER ISRAEL [[id number]]    Who Left Message for Patient: Gregorio    Does the patient know what this is regarding?: no    Best Call Back Number: [phone]    Additional Information:

## 2020-04-29 NOTE — TELEPHONE ENCOUNTER
----- Message from Bettina Loja sent at 4/29/2020  1:43 PM CDT -----  Contact: Pt    Name of Who is Calling:IEMR ISRAEL [[id number]]    What is the request in detail: Pt would like a call back regarding scheduling an appointment Please contact to further discuss and advise      Can the clinic reply by [facility]: no    What Number to Call Back if not in [place]: [phone]

## 2020-04-29 NOTE — TELEPHONE ENCOUNTER
----- Message from Anaid Lora sent at 4/29/2020  2:23 PM CDT -----  Contact: IMER ISRAEL [[id number]]  Type:  Patient Returning Call    Who Called:  IMER ISRAEL [[id number]]    Who Left Message for Patient: Gregorio    Does the patient know what this is regarding?: yes    Best Call Back Number: [phone]    Additional Information:

## 2020-05-21 ENCOUNTER — LAB VISIT (OUTPATIENT)
Dept: LAB | Facility: HOSPITAL | Age: 75
End: 2020-05-21
Attending: NURSE PRACTITIONER
Payer: MEDICARE

## 2020-05-21 DIAGNOSIS — Z79.4 TYPE 2 DIABETES MELLITUS WITH STAGE 3 CHRONIC KIDNEY DISEASE, WITH LONG-TERM CURRENT USE OF INSULIN: ICD-10-CM

## 2020-05-21 DIAGNOSIS — N18.30 TYPE 2 DIABETES MELLITUS WITH STAGE 3 CHRONIC KIDNEY DISEASE, WITH LONG-TERM CURRENT USE OF INSULIN: ICD-10-CM

## 2020-05-21 DIAGNOSIS — E11.22 TYPE 2 DIABETES MELLITUS WITH STAGE 3 CHRONIC KIDNEY DISEASE, WITH LONG-TERM CURRENT USE OF INSULIN: ICD-10-CM

## 2020-05-21 LAB
ESTIMATED AVG GLUCOSE: 166 MG/DL (ref 68–131)
HBA1C MFR BLD HPLC: 7.4 % (ref 4–5.6)

## 2020-05-21 PROCEDURE — 36415 COLL VENOUS BLD VENIPUNCTURE: CPT

## 2020-05-21 PROCEDURE — 83036 HEMOGLOBIN GLYCOSYLATED A1C: CPT

## 2020-06-01 ENCOUNTER — OFFICE VISIT (OUTPATIENT)
Dept: PRIMARY CARE CLINIC | Facility: CLINIC | Age: 75
End: 2020-06-01
Attending: FAMILY MEDICINE
Payer: MEDICARE

## 2020-06-01 DIAGNOSIS — E11.22 TYPE 2 DIABETES MELLITUS WITH STAGE 3 CHRONIC KIDNEY DISEASE, WITH LONG-TERM CURRENT USE OF INSULIN: Primary | ICD-10-CM

## 2020-06-01 DIAGNOSIS — R06.02 SOB (SHORTNESS OF BREATH): ICD-10-CM

## 2020-06-01 DIAGNOSIS — N18.4 CKD (CHRONIC KIDNEY DISEASE) STAGE 4, GFR 15-29 ML/MIN: ICD-10-CM

## 2020-06-01 DIAGNOSIS — R06.02 SHORTNESS OF BREATH ON EXERTION: ICD-10-CM

## 2020-06-01 DIAGNOSIS — Z72.0 TOBACCO ABUSE: Chronic | ICD-10-CM

## 2020-06-01 DIAGNOSIS — Z79.4 TYPE 2 DIABETES MELLITUS WITH STAGE 3 CHRONIC KIDNEY DISEASE, WITH LONG-TERM CURRENT USE OF INSULIN: Primary | ICD-10-CM

## 2020-06-01 DIAGNOSIS — I10 ESSENTIAL HYPERTENSION: ICD-10-CM

## 2020-06-01 DIAGNOSIS — N18.30 TYPE 2 DIABETES MELLITUS WITH STAGE 3 CHRONIC KIDNEY DISEASE, WITH LONG-TERM CURRENT USE OF INSULIN: Primary | ICD-10-CM

## 2020-06-01 DIAGNOSIS — I42.0 DILATED CARDIOMYOPATHY: ICD-10-CM

## 2020-06-01 PROCEDURE — 99442 PR PHYSICIAN TELEPHONE EVALUATION 11-20 MIN: ICD-10-PCS | Mod: 95,,, | Performed by: FAMILY MEDICINE

## 2020-06-01 PROCEDURE — 99442 PR PHYSICIAN TELEPHONE EVALUATION 11-20 MIN: CPT | Mod: 95,,, | Performed by: FAMILY MEDICINE

## 2020-06-02 ENCOUNTER — TELEPHONE (OUTPATIENT)
Dept: PRIMARY CARE CLINIC | Facility: CLINIC | Age: 75
End: 2020-06-02

## 2020-06-02 DIAGNOSIS — R06.02 SOB (SHORTNESS OF BREATH): ICD-10-CM

## 2020-06-02 DIAGNOSIS — Z01.84 IMMUNITY STATUS TESTING: Primary | ICD-10-CM

## 2020-06-02 NOTE — TELEPHONE ENCOUNTER
----- Message from Janeth Walter MD sent at 6/1/2020  5:14 PM CDT -----  Please help pt make pulm appt, micah  PV

## 2020-06-02 NOTE — TELEPHONE ENCOUNTER
Spoke with Mr. Siegel and schedule his COVID lab test for June 26,2020 10:00 . Will mail a reminder. Conversation was understood and it ended.

## 2020-06-02 NOTE — TELEPHONE ENCOUNTER
----- Message from Goldy Silva sent at 6/2/2020 11:52 AM CDT -----  Contact: Patient    Who: Vinnie Siegel     Why: Patient is schedule for consultation with Pulmonary per referral.  Pulmonary is requiring COVID test 48 hours prior to appointment.  Please call the patient and help him schedule required testing.     What Number to Call Back: [phone]

## 2020-06-02 NOTE — TELEPHONE ENCOUNTER
Spoke with Mr. Siegel and rescheduled lab appointment for June 29, 2020 10:00 instead of June 26. Conversation was understood and it ended.

## 2020-06-02 NOTE — TELEPHONE ENCOUNTER
----- Message from Stephani Oconnor sent at 6/2/2020  3:20 PM CDT -----  Contact: IMER ISRAEL [[id number]]  Name of Who is Calling: IMER ISRAEL [[id number]]      What is the request in detail: Pt is calling to speak to staff in regards to a phone call.... Please call to further assist .       Can the clinic reply by [facility]: N      What Number to Call Back if not in [place]: [phone]

## 2020-06-22 NOTE — PROGRESS NOTES
Established Patient - Audio Only Telehealth Visit     The patient location is: HOME  The chief complaint leading to consultation is: HTN, DM, MED REFILLS  Visit type: Virtual visit with audio only (telephone)  Total time spent with patient: 15 mins       The reason for the audio only service rather than synchronous audio and video virtual visit was related to technical difficulties or patient preference/necessity.     Each patient to whom I provide medical services by telemedicine is:  (1) informed of the relationship between the physician and patient and the respective role of any other health care provider with respect to management of the patient; and (2) notified that they may decline to receive medical services by telemedicine and may withdraw from such care at any time. Patient verbally consented to receive this service via voice-only telephone call.       HPI: pt presents today for f/u on DM, HTN, CHF, COPD. Pt overall feels well and is staying away from most places due to COVID     Assessment and plan:    SOB (shortness of breath)  -     Ambulatory referral/consult to Pulmonology; Future; Expected date: 06/08/2020    all meds needs to be printed for pt when he needs and pt will    Is supposed to f/u with pulm and has not, referral placed    RTC with me 6 mos                            This service was not originating from a related E/M service provided within the previous 7 days nor will  to an E/M service or procedure within the next 24 hours or my soonest available appointment.  Prevailing standard of care was able to be met in this audio-only visit.

## 2020-06-26 ENCOUNTER — TELEPHONE (OUTPATIENT)
Dept: PULMONOLOGY | Facility: CLINIC | Age: 75
End: 2020-06-26

## 2020-06-26 DIAGNOSIS — R06.02 SOB (SHORTNESS OF BREATH): Primary | ICD-10-CM

## 2020-06-27 ENCOUNTER — HOSPITAL ENCOUNTER (INPATIENT)
Facility: OTHER | Age: 75
LOS: 19 days | Discharge: NURSING, REHAB OR LONG-TERM CARE | DRG: 853 | End: 2020-07-16
Attending: EMERGENCY MEDICINE | Admitting: INTERNAL MEDICINE
Payer: MEDICARE

## 2020-06-27 DIAGNOSIS — E87.20 METABOLIC ACIDOSIS, NORMAL ANION GAP (NAG): ICD-10-CM

## 2020-06-27 DIAGNOSIS — E87.70 VOLUME OVERLOAD: ICD-10-CM

## 2020-06-27 DIAGNOSIS — I42.0 DILATED CARDIOMYOPATHY: ICD-10-CM

## 2020-06-27 DIAGNOSIS — E11.00 TYPE 2 DIABETES MELLITUS WITH HYPEROSMOLAR NONKETOTIC HYPERGLYCEMIA: ICD-10-CM

## 2020-06-27 DIAGNOSIS — K92.2 GIB (GASTROINTESTINAL BLEEDING): ICD-10-CM

## 2020-06-27 DIAGNOSIS — J96.90 RESPIRATORY FAILURE, UNSPECIFIED CHRONICITY, UNSPECIFIED WHETHER WITH HYPOXIA OR HYPERCAPNIA: ICD-10-CM

## 2020-06-27 DIAGNOSIS — I10 ESSENTIAL HYPERTENSION: ICD-10-CM

## 2020-06-27 DIAGNOSIS — G93.41 ENCEPHALOPATHY, METABOLIC: ICD-10-CM

## 2020-06-27 DIAGNOSIS — N18.4 TYPE 2 DIABETES MELLITUS WITH STAGE 4 CHRONIC KIDNEY DISEASE, WITH LONG-TERM CURRENT USE OF INSULIN: Chronic | ICD-10-CM

## 2020-06-27 DIAGNOSIS — J96.01 ACUTE RESPIRATORY FAILURE WITH HYPOXIA: ICD-10-CM

## 2020-06-27 DIAGNOSIS — Z79.4 TYPE 2 DIABETES MELLITUS WITH STAGE 4 CHRONIC KIDNEY DISEASE, WITH LONG-TERM CURRENT USE OF INSULIN: Chronic | ICD-10-CM

## 2020-06-27 DIAGNOSIS — Z97.8 ENDOTRACHEAL TUBE PRESENT: ICD-10-CM

## 2020-06-27 DIAGNOSIS — N18.9 ACUTE RENAL FAILURE SUPERIMPOSED ON CHRONIC KIDNEY DISEASE, UNSPECIFIED CKD STAGE, UNSPECIFIED ACUTE RENAL FAILURE TYPE: ICD-10-CM

## 2020-06-27 DIAGNOSIS — J30.2 SEASONAL ALLERGIES: ICD-10-CM

## 2020-06-27 DIAGNOSIS — E11.11 DIABETIC KETOACIDOSIS WITH COMA ASSOCIATED WITH TYPE 2 DIABETES MELLITUS: ICD-10-CM

## 2020-06-27 DIAGNOSIS — D62 ACUTE BLOOD LOSS ANEMIA: Primary | ICD-10-CM

## 2020-06-27 DIAGNOSIS — N17.9 ACUTE RENAL FAILURE SUPERIMPOSED ON CHRONIC KIDNEY DISEASE, UNSPECIFIED CKD STAGE, UNSPECIFIED ACUTE RENAL FAILURE TYPE: ICD-10-CM

## 2020-06-27 DIAGNOSIS — E11.65 TYPE 2 DIABETES MELLITUS WITH HYPERGLYCEMIA: ICD-10-CM

## 2020-06-27 DIAGNOSIS — K62.5 RECTAL BLEEDING: ICD-10-CM

## 2020-06-27 DIAGNOSIS — E11.22 TYPE 2 DIABETES MELLITUS WITH STAGE 4 CHRONIC KIDNEY DISEASE, WITH LONG-TERM CURRENT USE OF INSULIN: Chronic | ICD-10-CM

## 2020-06-27 DIAGNOSIS — R42 DIZZINESS: ICD-10-CM

## 2020-06-27 PROBLEM — R31.9 HEMATURIA: Status: ACTIVE | Noted: 2020-06-27

## 2020-06-27 PROBLEM — E11.10 DIABETIC KETOACIDOSIS WITHOUT COMA ASSOCIATED WITH TYPE 2 DIABETES MELLITUS: Status: ACTIVE | Noted: 2020-06-27

## 2020-06-27 PROBLEM — A41.9 SEPSIS: Status: ACTIVE | Noted: 2020-06-27

## 2020-06-27 PROBLEM — R79.89 ELEVATED TROPONIN: Status: ACTIVE | Noted: 2020-06-27

## 2020-06-27 PROBLEM — N39.0 UTI (URINARY TRACT INFECTION): Status: ACTIVE | Noted: 2020-06-27

## 2020-06-27 PROBLEM — I16.1 HYPERTENSIVE EMERGENCY: Status: ACTIVE | Noted: 2020-06-27

## 2020-06-27 PROBLEM — J96.00 ACUTE RESPIRATORY FAILURE: Status: ACTIVE | Noted: 2020-06-27

## 2020-06-27 LAB
ABO + RH BLD: NORMAL
ALBUMIN SERPL BCP-MCNC: 1.7 G/DL (ref 3.5–5.2)
ALLENS TEST: ABNORMAL
ALP SERPL-CCNC: 81 U/L (ref 55–135)
ALT SERPL W/O P-5'-P-CCNC: 56 U/L (ref 10–44)
ANION GAP SERPL CALC-SCNC: 14 MMOL/L (ref 8–16)
AST SERPL-CCNC: 26 U/L (ref 10–40)
BACTERIA #/AREA URNS HPF: ABNORMAL /HPF
BASOPHILS # BLD AUTO: ABNORMAL K/UL (ref 0–0.2)
BASOPHILS NFR BLD: 0 % (ref 0–1.9)
BILIRUB SERPL-MCNC: 0.3 MG/DL (ref 0.1–1)
BILIRUB UR QL STRIP: NEGATIVE
BLD GP AB SCN CELLS X3 SERPL QL: NORMAL
BUN SERPL-MCNC: 107 MG/DL (ref 8–23)
CALCIUM SERPL-MCNC: 7.3 MG/DL (ref 8.7–10.5)
CHLORIDE SERPL-SCNC: 105 MMOL/L (ref 95–110)
CLARITY UR: ABNORMAL
CO2 SERPL-SCNC: 8 MMOL/L (ref 23–29)
COLOR UR: YELLOW
CREAT SERPL-MCNC: 4.2 MG/DL (ref 0.5–1.4)
DELSYS: ABNORMAL
DIFFERENTIAL METHOD: ABNORMAL
EOSINOPHIL # BLD AUTO: ABNORMAL K/UL (ref 0–0.5)
EOSINOPHIL NFR BLD: 0 % (ref 0–8)
ERYTHROCYTE [DISTWIDTH] IN BLOOD BY AUTOMATED COUNT: 15.4 % (ref 11.5–14.5)
ERYTHROCYTE [SEDIMENTATION RATE] IN BLOOD BY WESTERGREN METHOD: 30 MM/H
EST. GFR  (AFRICAN AMERICAN): 15 ML/MIN/1.73 M^2
EST. GFR  (NON AFRICAN AMERICAN): 13 ML/MIN/1.73 M^2
FIO2: 100
GLUCOSE SERPL-MCNC: 795 MG/DL (ref 70–110)
GLUCOSE UR QL STRIP: ABNORMAL
HCO3 UR-SCNC: 11.7 MMOL/L (ref 24–28)
HCT VFR BLD AUTO: 19.4 % (ref 40–54)
HGB BLD-MCNC: 6.4 G/DL (ref 14–18)
HGB UR QL STRIP: ABNORMAL
HYALINE CASTS #/AREA URNS LPF: 0 /LPF
IMM GRANULOCYTES # BLD AUTO: ABNORMAL K/UL (ref 0–0.04)
IMM GRANULOCYTES NFR BLD AUTO: ABNORMAL % (ref 0–0.5)
KETONES UR QL STRIP: NEGATIVE
LACTATE SERPL-SCNC: 2.4 MMOL/L (ref 0.5–2.2)
LEUKOCYTE ESTERASE UR QL STRIP: ABNORMAL
LIPASE SERPL-CCNC: 120 U/L (ref 4–60)
LYMPHOCYTES # BLD AUTO: ABNORMAL K/UL (ref 1–4.8)
LYMPHOCYTES NFR BLD: 4 % (ref 18–48)
MAGNESIUM SERPL-MCNC: 2.1 MG/DL (ref 1.6–2.6)
MCH RBC QN AUTO: 30.5 PG (ref 27–31)
MCHC RBC AUTO-ENTMCNC: 33 G/DL (ref 32–36)
MCV RBC AUTO: 92 FL (ref 82–98)
MICROSCOPIC COMMENT: ABNORMAL
MODE: ABNORMAL
MONOCYTES # BLD AUTO: ABNORMAL K/UL (ref 0.3–1)
MONOCYTES NFR BLD: 2 % (ref 4–15)
NEUTROPHILS NFR BLD: 94 % (ref 38–73)
NITRITE UR QL STRIP: NEGATIVE
NRBC BLD-RTO: 0 /100 WBC
PCO2 BLDA: 31.3 MMHG (ref 35–45)
PEEP: 5
PH SMN: 7.18 [PH] (ref 7.35–7.45)
PH UR STRIP: 6 [PH] (ref 5–8)
PLATELET # BLD AUTO: 268 K/UL (ref 150–350)
PMV BLD AUTO: 10.6 FL (ref 9.2–12.9)
PO2 BLDA: 377 MMHG (ref 80–100)
POC BE: -17 MMOL/L
POC SATURATED O2: 100 % (ref 95–100)
POTASSIUM SERPL-SCNC: 5.2 MMOL/L (ref 3.5–5.1)
PROT SERPL-MCNC: 5.1 G/DL (ref 6–8.4)
PROT UR QL STRIP: ABNORMAL
RBC # BLD AUTO: 2.1 M/UL (ref 4.6–6.2)
RBC #/AREA URNS HPF: >100 /HPF (ref 0–4)
SAMPLE: ABNORMAL
SARS-COV-2 RDRP RESP QL NAA+PROBE: NEGATIVE
SITE: ABNORMAL
SODIUM SERPL-SCNC: 127 MMOL/L (ref 136–145)
SP GR UR STRIP: 1.01 (ref 1–1.03)
SP02: 100
TROPONIN I SERPL DL<=0.01 NG/ML-MCNC: 0.04 NG/ML (ref 0–0.03)
URN SPEC COLLECT METH UR: ABNORMAL
UROBILINOGEN UR STRIP-ACNC: NEGATIVE EU/DL
VT: 400
WBC # BLD AUTO: 35.04 K/UL (ref 3.9–12.7)
WBC #/AREA URNS HPF: >100 /HPF (ref 0–5)
YEAST URNS QL MICRO: ABNORMAL

## 2020-06-27 PROCEDURE — 82272 OCCULT BLD FECES 1-3 TESTS: CPT

## 2020-06-27 PROCEDURE — 99223 1ST HOSP IP/OBS HIGH 75: CPT | Mod: ,,, | Performed by: PHYSICIAN ASSISTANT

## 2020-06-27 PROCEDURE — 87186 SC STD MICRODIL/AGAR DIL: CPT

## 2020-06-27 PROCEDURE — 83540 ASSAY OF IRON: CPT

## 2020-06-27 PROCEDURE — 87088 URINE BACTERIA CULTURE: CPT

## 2020-06-27 PROCEDURE — 86901 BLOOD TYPING SEROLOGIC RH(D): CPT

## 2020-06-27 PROCEDURE — 80053 COMPREHEN METABOLIC PANEL: CPT

## 2020-06-27 PROCEDURE — 36430 TRANSFUSION BLD/BLD COMPNT: CPT

## 2020-06-27 PROCEDURE — 96361 HYDRATE IV INFUSION ADD-ON: CPT

## 2020-06-27 PROCEDURE — 83690 ASSAY OF LIPASE: CPT

## 2020-06-27 PROCEDURE — 31500 INSERT EMERGENCY AIRWAY: CPT

## 2020-06-27 PROCEDURE — 36600 WITHDRAWAL OF ARTERIAL BLOOD: CPT

## 2020-06-27 PROCEDURE — 36415 COLL VENOUS BLD VENIPUNCTURE: CPT

## 2020-06-27 PROCEDURE — 93010 EKG 12-LEAD: ICD-10-PCS | Mod: ,,, | Performed by: INTERNAL MEDICINE

## 2020-06-27 PROCEDURE — C9113 INJ PANTOPRAZOLE SODIUM, VIA: HCPCS | Performed by: EMERGENCY MEDICINE

## 2020-06-27 PROCEDURE — 85027 COMPLETE CBC AUTOMATED: CPT

## 2020-06-27 PROCEDURE — 86920 COMPATIBILITY TEST SPIN: CPT

## 2020-06-27 PROCEDURE — 36556 INSERT NON-TUNNEL CV CATH: CPT

## 2020-06-27 PROCEDURE — 82803 BLOOD GASES ANY COMBINATION: CPT

## 2020-06-27 PROCEDURE — P9016 RBC LEUKOCYTES REDUCED: HCPCS

## 2020-06-27 PROCEDURE — 83605 ASSAY OF LACTIC ACID: CPT

## 2020-06-27 PROCEDURE — 99900035 HC TECH TIME PER 15 MIN (STAT)

## 2020-06-27 PROCEDURE — 87086 URINE CULTURE/COLONY COUNT: CPT

## 2020-06-27 PROCEDURE — 96375 TX/PRO/DX INJ NEW DRUG ADDON: CPT

## 2020-06-27 PROCEDURE — 96374 THER/PROPH/DIAG INJ IV PUSH: CPT

## 2020-06-27 PROCEDURE — 82746 ASSAY OF FOLIC ACID SERUM: CPT

## 2020-06-27 PROCEDURE — 93010 ELECTROCARDIOGRAM REPORT: CPT | Mod: ,,, | Performed by: INTERNAL MEDICINE

## 2020-06-27 PROCEDURE — 81000 URINALYSIS NONAUTO W/SCOPE: CPT

## 2020-06-27 PROCEDURE — U0002 COVID-19 LAB TEST NON-CDC: HCPCS

## 2020-06-27 PROCEDURE — 63600175 PHARM REV CODE 636 W HCPCS: Performed by: EMERGENCY MEDICINE

## 2020-06-27 PROCEDURE — 80048 BASIC METABOLIC PNL TOTAL CA: CPT

## 2020-06-27 PROCEDURE — 82607 VITAMIN B-12: CPT

## 2020-06-27 PROCEDURE — 82728 ASSAY OF FERRITIN: CPT

## 2020-06-27 PROCEDURE — 12000002 HC ACUTE/MED SURGE SEMI-PRIVATE ROOM

## 2020-06-27 PROCEDURE — 84484 ASSAY OF TROPONIN QUANT: CPT

## 2020-06-27 PROCEDURE — 94761 N-INVAS EAR/PLS OXIMETRY MLT: CPT

## 2020-06-27 PROCEDURE — 85007 BL SMEAR W/DIFF WBC COUNT: CPT

## 2020-06-27 PROCEDURE — 99223 PR INITIAL HOSPITAL CARE,LEVL III: ICD-10-PCS | Mod: ,,, | Performed by: PHYSICIAN ASSISTANT

## 2020-06-27 PROCEDURE — 94002 VENT MGMT INPAT INIT DAY: CPT

## 2020-06-27 PROCEDURE — 83735 ASSAY OF MAGNESIUM: CPT

## 2020-06-27 PROCEDURE — 27000221 HC OXYGEN, UP TO 24 HOURS

## 2020-06-27 PROCEDURE — 87077 CULTURE AEROBIC IDENTIFY: CPT

## 2020-06-27 PROCEDURE — 99291 CRITICAL CARE FIRST HOUR: CPT | Mod: 25

## 2020-06-27 PROCEDURE — 25000003 PHARM REV CODE 250: Performed by: EMERGENCY MEDICINE

## 2020-06-27 PROCEDURE — 93005 ELECTROCARDIOGRAM TRACING: CPT

## 2020-06-27 RX ORDER — LABETALOL HYDROCHLORIDE 5 MG/ML
10 INJECTION, SOLUTION INTRAVENOUS
Status: DISCONTINUED | OUTPATIENT
Start: 2020-06-27 | End: 2020-06-28

## 2020-06-27 RX ORDER — INDOMETHACIN 25 MG/1
50 CAPSULE ORAL
Status: COMPLETED | OUTPATIENT
Start: 2020-06-27 | End: 2020-06-27

## 2020-06-27 RX ORDER — HYDROCODONE BITARTRATE AND ACETAMINOPHEN 500; 5 MG/1; MG/1
TABLET ORAL
Status: DISCONTINUED | OUTPATIENT
Start: 2020-06-27 | End: 2020-07-07

## 2020-06-27 RX ORDER — ROCURONIUM BROMIDE 10 MG/ML
100 INJECTION, SOLUTION INTRAVENOUS ONCE
Status: COMPLETED | OUTPATIENT
Start: 2020-06-27 | End: 2020-06-27

## 2020-06-27 RX ORDER — PANTOPRAZOLE SODIUM 40 MG/10ML
40 INJECTION, POWDER, LYOPHILIZED, FOR SOLUTION INTRAVENOUS
Status: COMPLETED | OUTPATIENT
Start: 2020-06-27 | End: 2020-06-27

## 2020-06-27 RX ORDER — ETOMIDATE 2 MG/ML
20 INJECTION INTRAVENOUS
Status: COMPLETED | OUTPATIENT
Start: 2020-06-27 | End: 2020-06-27

## 2020-06-27 RX ORDER — PROPOFOL 10 MG/ML
10 INJECTION, EMULSION INTRAVENOUS ONCE
Status: COMPLETED | OUTPATIENT
Start: 2020-06-27 | End: 2020-06-27

## 2020-06-27 RX ORDER — CEFEPIME HYDROCHLORIDE 1 G/50ML
2 INJECTION, SOLUTION INTRAVENOUS
Status: COMPLETED | OUTPATIENT
Start: 2020-06-27 | End: 2020-06-27

## 2020-06-27 RX ADMIN — SODIUM CHLORIDE 1000 ML: 0.9 INJECTION, SOLUTION INTRAVENOUS at 09:06

## 2020-06-27 RX ADMIN — CEFEPIME HYDROCHLORIDE 2 G: 2 INJECTION, SOLUTION INTRAVENOUS at 10:06

## 2020-06-27 RX ADMIN — SODIUM CHLORIDE 1000 ML: 0.9 INJECTION, SOLUTION INTRAVENOUS at 11:06

## 2020-06-27 RX ADMIN — ROCURONIUM BROMIDE 100 MG: 10 INJECTION, SOLUTION INTRAVENOUS at 10:06

## 2020-06-27 RX ADMIN — PANTOPRAZOLE SODIUM 40 MG: 40 INJECTION, POWDER, LYOPHILIZED, FOR SOLUTION INTRAVENOUS at 11:06

## 2020-06-27 RX ADMIN — DEXTROSE 8 MG/HR: 50 INJECTION, SOLUTION INTRAVENOUS at 11:06

## 2020-06-27 RX ADMIN — SODIUM CHLORIDE 8 UNITS/HR: 9 INJECTION, SOLUTION INTRAVENOUS at 10:06

## 2020-06-27 RX ADMIN — ETOMIDATE 20 MG: 2 INJECTION INTRAVENOUS at 09:06

## 2020-06-27 RX ADMIN — SODIUM BICARBONATE 50 MEQ: 84 INJECTION, SOLUTION INTRAVENOUS at 10:06

## 2020-06-27 RX ADMIN — PROPOFOL 10 MCG/KG/MIN: 10 INJECTION, EMULSION INTRAVENOUS at 10:06

## 2020-06-28 PROBLEM — E66.813 OBESITY, CLASS III, BMI 40-49.9 (MORBID OBESITY): Chronic | Status: ACTIVE | Noted: 2018-10-09

## 2020-06-28 PROBLEM — N18.30 TYPE 2 DIABETES MELLITUS WITH STAGE 3 CHRONIC KIDNEY DISEASE, WITH LONG-TERM CURRENT USE OF INSULIN: Chronic | Status: ACTIVE | Noted: 2017-03-30

## 2020-06-28 PROBLEM — E87.20 METABOLIC ACIDOSIS, NORMAL ANION GAP (NAG): Status: ACTIVE | Noted: 2020-06-28

## 2020-06-28 PROBLEM — E11.22 TYPE 2 DIABETES MELLITUS WITH STAGE 3 CHRONIC KIDNEY DISEASE, WITH LONG-TERM CURRENT USE OF INSULIN: Chronic | Status: ACTIVE | Noted: 2017-03-30

## 2020-06-28 PROBLEM — Z79.4 TYPE 2 DIABETES MELLITUS WITH STAGE 3 CHRONIC KIDNEY DISEASE, WITH LONG-TERM CURRENT USE OF INSULIN: Chronic | Status: ACTIVE | Noted: 2017-03-30

## 2020-06-28 PROBLEM — E66.01 OBESITY, CLASS III, BMI 40-49.9 (MORBID OBESITY): Chronic | Status: ACTIVE | Noted: 2018-10-09

## 2020-06-28 PROBLEM — E11.00 TYPE 2 DIABETES MELLITUS WITH HYPEROSMOLAR NONKETOTIC HYPERGLYCEMIA: Status: ACTIVE | Noted: 2020-06-27

## 2020-06-28 PROBLEM — G93.41 ENCEPHALOPATHY, METABOLIC: Status: ACTIVE | Noted: 2020-06-28

## 2020-06-28 PROBLEM — N18.4 TYPE 2 DIABETES MELLITUS WITH STAGE 4 CHRONIC KIDNEY DISEASE, WITH LONG-TERM CURRENT USE OF INSULIN: Chronic | Status: ACTIVE | Noted: 2017-03-30

## 2020-06-28 PROBLEM — I42.0 DILATED CARDIOMYOPATHY: Chronic | Status: ACTIVE | Noted: 2017-02-07

## 2020-06-28 PROBLEM — E11.65 TYPE 2 DIABETES MELLITUS WITH HYPERGLYCEMIA: Chronic | Status: ACTIVE | Noted: 2020-06-27

## 2020-06-28 LAB
ALLENS TEST: ABNORMAL
ALLENS TEST: ABNORMAL
ANION GAP SERPL CALC-SCNC: 10 MMOL/L (ref 8–16)
ANION GAP SERPL CALC-SCNC: 10 MMOL/L (ref 8–16)
ANION GAP SERPL CALC-SCNC: 11 MMOL/L (ref 8–16)
ANION GAP SERPL CALC-SCNC: 11 MMOL/L (ref 8–16)
ANION GAP SERPL CALC-SCNC: 12 MMOL/L (ref 8–16)
ANION GAP SERPL CALC-SCNC: 12 MMOL/L (ref 8–16)
ANION GAP SERPL CALC-SCNC: 9 MMOL/L (ref 8–16)
B-OH-BUTYR BLD STRIP-SCNC: 0.7 MMOL/L (ref 0–0.5)
BASOPHILS # BLD AUTO: ABNORMAL K/UL (ref 0–0.2)
BASOPHILS # BLD AUTO: ABNORMAL K/UL (ref 0–0.2)
BASOPHILS NFR BLD: 0 % (ref 0–1.9)
BLD PROD TYP BPU: NORMAL
BLOOD UNIT EXPIRATION DATE: NORMAL
BLOOD UNIT TYPE CODE: 6200
BLOOD UNIT TYPE: NORMAL
BUN SERPL-MCNC: 105 MG/DL (ref 8–23)
BUN SERPL-MCNC: 111 MG/DL (ref 8–23)
BUN SERPL-MCNC: 111 MG/DL (ref 8–23)
BUN SERPL-MCNC: 114 MG/DL (ref 8–23)
BUN SERPL-MCNC: 120 MG/DL (ref 8–23)
BUN SERPL-MCNC: 124 MG/DL (ref 8–23)
BUN SERPL-MCNC: 127 MG/DL (ref 8–23)
CA-I BLDV-SCNC: 1.07 MMOL/L (ref 1.06–1.42)
CALCIUM SERPL-MCNC: 6.2 MG/DL (ref 8.7–10.5)
CALCIUM SERPL-MCNC: 6.4 MG/DL (ref 8.7–10.5)
CALCIUM SERPL-MCNC: 6.9 MG/DL (ref 8.7–10.5)
CALCIUM SERPL-MCNC: 7 MG/DL (ref 8.7–10.5)
CALCIUM SERPL-MCNC: 7 MG/DL (ref 8.7–10.5)
CALCIUM SERPL-MCNC: 7.1 MG/DL (ref 8.7–10.5)
CALCIUM SERPL-MCNC: 7.1 MG/DL (ref 8.7–10.5)
CHLORIDE SERPL-SCNC: 106 MMOL/L (ref 95–110)
CHLORIDE SERPL-SCNC: 107 MMOL/L (ref 95–110)
CHLORIDE SERPL-SCNC: 109 MMOL/L (ref 95–110)
CHLORIDE SERPL-SCNC: 110 MMOL/L (ref 95–110)
CHLORIDE SERPL-SCNC: 110 MMOL/L (ref 95–110)
CHLORIDE SERPL-SCNC: 111 MMOL/L (ref 95–110)
CHLORIDE SERPL-SCNC: 113 MMOL/L (ref 95–110)
CHLORIDE UR-SCNC: <20 MMOL/L (ref 25–200)
CO2 SERPL-SCNC: 11 MMOL/L (ref 23–29)
CO2 SERPL-SCNC: 11 MMOL/L (ref 23–29)
CO2 SERPL-SCNC: 12 MMOL/L (ref 23–29)
CO2 SERPL-SCNC: 14 MMOL/L (ref 23–29)
CO2 SERPL-SCNC: 9 MMOL/L (ref 23–29)
CODING SYSTEM: NORMAL
CREAT SERPL-MCNC: 4 MG/DL (ref 0.5–1.4)
CREAT SERPL-MCNC: 4.4 MG/DL (ref 0.5–1.4)
CREAT SERPL-MCNC: 4.6 MG/DL (ref 0.5–1.4)
CREAT UR-MCNC: 63.2 MG/DL (ref 23–375)
DELSYS: ABNORMAL
DELSYS: ABNORMAL
DIFFERENTIAL METHOD: ABNORMAL
DISPENSE STATUS: NORMAL
DOHLE BOD BLD QL SMEAR: PRESENT
EOSINOPHIL # BLD AUTO: ABNORMAL K/UL (ref 0–0.5)
EOSINOPHIL # BLD AUTO: ABNORMAL K/UL (ref 0–0.5)
EOSINOPHIL NFR BLD: 0 % (ref 0–8)
ERYTHROCYTE [DISTWIDTH] IN BLOOD BY AUTOMATED COUNT: 15.1 % (ref 11.5–14.5)
ERYTHROCYTE [DISTWIDTH] IN BLOOD BY AUTOMATED COUNT: 15.5 % (ref 11.5–14.5)
ERYTHROCYTE [DISTWIDTH] IN BLOOD BY AUTOMATED COUNT: 15.5 % (ref 11.5–14.5)
ERYTHROCYTE [SEDIMENTATION RATE] IN BLOOD BY WESTERGREN METHOD: 26 MM/H
ERYTHROCYTE [SEDIMENTATION RATE] IN BLOOD BY WESTERGREN METHOD: 30 MM/H
EST. GFR  (AFRICAN AMERICAN): 14 ML/MIN/1.73 M^2
EST. GFR  (AFRICAN AMERICAN): 16 ML/MIN/1.73 M^2
EST. GFR  (NON AFRICAN AMERICAN): 12 ML/MIN/1.73 M^2
EST. GFR  (NON AFRICAN AMERICAN): 14 ML/MIN/1.73 M^2
FERRITIN SERPL-MCNC: 757 NG/ML (ref 20–300)
FIO2: 21
FIO2: 30
FOLATE SERPL-MCNC: 4.9 NG/ML (ref 4–24)
GLUCOSE SERPL-MCNC: 430 MG/DL (ref 70–110)
GLUCOSE SERPL-MCNC: 434 MG/DL (ref 70–110)
GLUCOSE SERPL-MCNC: 454 MG/DL (ref 70–110)
GLUCOSE SERPL-MCNC: 486 MG/DL (ref 70–110)
GLUCOSE SERPL-MCNC: 741 MG/DL (ref 70–110)
GLUCOSE SERPL-MCNC: 761 MG/DL (ref 70–110)
GLUCOSE SERPL-MCNC: 790 MG/DL (ref 70–110)
HCO3 UR-SCNC: 11.9 MMOL/L (ref 24–28)
HCO3 UR-SCNC: 12.2 MMOL/L (ref 24–28)
HCT VFR BLD AUTO: 18.5 % (ref 40–54)
HCT VFR BLD AUTO: 20.3 % (ref 40–54)
HCT VFR BLD AUTO: 22.8 % (ref 40–54)
HGB BLD-MCNC: 6.4 G/DL (ref 14–18)
HGB BLD-MCNC: 7 G/DL (ref 14–18)
HGB BLD-MCNC: 7.6 G/DL (ref 14–18)
IMM GRANULOCYTES # BLD AUTO: ABNORMAL K/UL (ref 0–0.04)
IMM GRANULOCYTES NFR BLD AUTO: ABNORMAL % (ref 0–0.5)
INR PPP: 1 (ref 0.8–1.2)
IRON SERPL-MCNC: 41 UG/DL (ref 45–160)
LACTATE SERPL-SCNC: 1.7 MMOL/L (ref 0.5–2.2)
LYMPHOCYTES # BLD AUTO: ABNORMAL K/UL (ref 1–4.8)
LYMPHOCYTES # BLD AUTO: ABNORMAL K/UL (ref 1–4.8)
LYMPHOCYTES NFR BLD: 1 % (ref 18–48)
LYMPHOCYTES NFR BLD: 4 % (ref 18–48)
LYMPHOCYTES NFR BLD: 7 % (ref 18–48)
MCH RBC QN AUTO: 30 PG (ref 27–31)
MCH RBC QN AUTO: 30.3 PG (ref 27–31)
MCH RBC QN AUTO: 30.4 PG (ref 27–31)
MCHC RBC AUTO-ENTMCNC: 33.3 G/DL (ref 32–36)
MCHC RBC AUTO-ENTMCNC: 34.5 G/DL (ref 32–36)
MCHC RBC AUTO-ENTMCNC: 34.6 G/DL (ref 32–36)
MCV RBC AUTO: 88 FL (ref 82–98)
MCV RBC AUTO: 88 FL (ref 82–98)
MCV RBC AUTO: 90 FL (ref 82–98)
MIN VOL: 13.6
MIN VOL: 14.2
MODE: ABNORMAL
MODE: ABNORMAL
MONOCYTES # BLD AUTO: ABNORMAL K/UL (ref 0.3–1)
MONOCYTES # BLD AUTO: ABNORMAL K/UL (ref 0.3–1)
MONOCYTES NFR BLD: 0 % (ref 4–15)
MONOCYTES NFR BLD: 2 % (ref 4–15)
MONOCYTES NFR BLD: 4 % (ref 4–15)
NEUTROPHILS NFR BLD: 89 % (ref 38–73)
NEUTROPHILS NFR BLD: 94 % (ref 38–73)
NEUTROPHILS NFR BLD: 97 % (ref 38–73)
NEUTS BAND NFR BLD MANUAL: 2 %
NRBC BLD-RTO: 0 /100 WBC
NUM UNITS TRANS PACKED RBC: NORMAL
OSMOLALITY UR: 355 MOSM/KG (ref 50–1200)
OVALOCYTES BLD QL SMEAR: ABNORMAL
PCO2 BLDA: 24.4 MMHG (ref 35–45)
PCO2 BLDA: 29.6 MMHG (ref 35–45)
PEEP: 5
PEEP: 5
PH SMN: 7.21 [PH] (ref 7.35–7.45)
PH SMN: 7.31 [PH] (ref 7.35–7.45)
PHOSPHATE SERPL-MCNC: 4 MG/DL (ref 2.7–4.5)
PIP: 14
PIP: 20
PLATELET # BLD AUTO: 191 K/UL (ref 150–350)
PLATELET # BLD AUTO: 197 K/UL (ref 150–350)
PLATELET # BLD AUTO: 206 K/UL (ref 150–350)
PLATELET BLD QL SMEAR: ABNORMAL
PLATELET BLD QL SMEAR: ABNORMAL
PMV BLD AUTO: 10.4 FL (ref 9.2–12.9)
PMV BLD AUTO: 10.5 FL (ref 9.2–12.9)
PMV BLD AUTO: 10.6 FL (ref 9.2–12.9)
PO2 BLDA: 104 MMHG (ref 80–100)
PO2 BLDA: 78 MMHG (ref 80–100)
POC BE: -14 MMOL/L
POC BE: -16 MMOL/L
POC SATURATED O2: 95 % (ref 95–100)
POC SATURATED O2: 97 % (ref 95–100)
POCT GLUCOSE: 341 MG/DL (ref 70–110)
POCT GLUCOSE: 352 MG/DL (ref 70–110)
POCT GLUCOSE: 356 MG/DL (ref 70–110)
POCT GLUCOSE: 370 MG/DL (ref 70–110)
POCT GLUCOSE: 383 MG/DL (ref 70–110)
POCT GLUCOSE: 388 MG/DL (ref 70–110)
POCT GLUCOSE: 392 MG/DL (ref 70–110)
POCT GLUCOSE: 427 MG/DL (ref 70–110)
POCT GLUCOSE: 444 MG/DL (ref 70–110)
POCT GLUCOSE: 463 MG/DL (ref 70–110)
POCT GLUCOSE: 471 MG/DL (ref 70–110)
POCT GLUCOSE: 497 MG/DL (ref 70–110)
POIKILOCYTOSIS BLD QL SMEAR: SLIGHT
POTASSIUM SERPL-SCNC: 4.2 MMOL/L (ref 3.5–5.1)
POTASSIUM SERPL-SCNC: 4.2 MMOL/L (ref 3.5–5.1)
POTASSIUM SERPL-SCNC: 4.3 MMOL/L (ref 3.5–5.1)
POTASSIUM SERPL-SCNC: 4.5 MMOL/L (ref 3.5–5.1)
POTASSIUM SERPL-SCNC: 4.8 MMOL/L (ref 3.5–5.1)
POTASSIUM SERPL-SCNC: 5.2 MMOL/L (ref 3.5–5.1)
POTASSIUM SERPL-SCNC: 5.7 MMOL/L (ref 3.5–5.1)
POTASSIUM UR-SCNC: 17 MMOL/L (ref 15–95)
PROT UR-MCNC: 73 MG/DL (ref 0–15)
PROT/CREAT UR: 1.16 MG/G{CREAT} (ref 0–0.2)
PROTHROMBIN TIME: 11.1 SEC (ref 9–12.5)
RBC # BLD AUTO: 2.11 M/UL (ref 4.6–6.2)
RBC # BLD AUTO: 2.3 M/UL (ref 4.6–6.2)
RBC # BLD AUTO: 2.53 M/UL (ref 4.6–6.2)
SAMPLE: ABNORMAL
SAMPLE: ABNORMAL
SATURATED IRON: 23 % (ref 20–50)
SITE: ABNORMAL
SITE: ABNORMAL
SODIUM SERPL-SCNC: 129 MMOL/L (ref 136–145)
SODIUM SERPL-SCNC: 133 MMOL/L (ref 136–145)
SODIUM SERPL-SCNC: 133 MMOL/L (ref 136–145)
SODIUM SERPL-SCNC: 134 MMOL/L (ref 136–145)
SODIUM SERPL-SCNC: 135 MMOL/L (ref 136–145)
SODIUM UR-SCNC: 25 MMOL/L (ref 20–250)
SODIUM UR-SCNC: 29 MMOL/L (ref 20–250)
SP02: 100
SP02: 99
TOTAL IRON BINDING CAPACITY: 175 UG/DL (ref 250–450)
TOXIC GRANULES BLD QL SMEAR: PRESENT
TOXIC GRANULES BLD QL SMEAR: PRESENT
TRANSFERRIN SERPL-MCNC: 118 MG/DL (ref 200–375)
TROPONIN I SERPL DL<=0.01 NG/ML-MCNC: 0.05 NG/ML (ref 0–0.03)
TROPONIN I SERPL DL<=0.01 NG/ML-MCNC: 0.06 NG/ML (ref 0–0.03)
URATE SERPL-MCNC: 8 MG/DL (ref 3.4–7)
UUN UR-MCNC: 521 MG/DL (ref 140–1050)
VIT B12 SERPL-MCNC: 651 PG/ML (ref 210–950)
VT: 400
VT: 500
WBC # BLD AUTO: 28.33 K/UL (ref 3.9–12.7)
WBC # BLD AUTO: 30.89 K/UL (ref 3.9–12.7)
WBC # BLD AUTO: 34.66 K/UL (ref 3.9–12.7)

## 2020-06-28 PROCEDURE — 25000242 PHARM REV CODE 250 ALT 637 W/ HCPCS: Performed by: HOSPITALIST

## 2020-06-28 PROCEDURE — 84484 ASSAY OF TROPONIN QUANT: CPT

## 2020-06-28 PROCEDURE — C9113 INJ PANTOPRAZOLE SODIUM, VIA: HCPCS | Performed by: HOSPITALIST

## 2020-06-28 PROCEDURE — 82010 KETONE BODYS QUAN: CPT

## 2020-06-28 PROCEDURE — 84300 ASSAY OF URINE SODIUM: CPT | Mod: 91

## 2020-06-28 PROCEDURE — 25000003 PHARM REV CODE 250: Performed by: PHYSICIAN ASSISTANT

## 2020-06-28 PROCEDURE — 82803 BLOOD GASES ANY COMBINATION: CPT

## 2020-06-28 PROCEDURE — 87147 CULTURE TYPE IMMUNOLOGIC: CPT

## 2020-06-28 PROCEDURE — P9016 RBC LEUKOCYTES REDUCED: HCPCS

## 2020-06-28 PROCEDURE — 99292 PR CRITICAL CARE, ADDL 30 MIN: ICD-10-PCS | Mod: ,,, | Performed by: INTERNAL MEDICINE

## 2020-06-28 PROCEDURE — 84540 ASSAY OF URINE/UREA-N: CPT

## 2020-06-28 PROCEDURE — 83605 ASSAY OF LACTIC ACID: CPT

## 2020-06-28 PROCEDURE — 63600175 PHARM REV CODE 636 W HCPCS: Performed by: HOSPITALIST

## 2020-06-28 PROCEDURE — 36430 TRANSFUSION BLD/BLD COMPNT: CPT

## 2020-06-28 PROCEDURE — 82330 ASSAY OF CALCIUM: CPT

## 2020-06-28 PROCEDURE — 84300 ASSAY OF URINE SODIUM: CPT

## 2020-06-28 PROCEDURE — 63600175 PHARM REV CODE 636 W HCPCS: Performed by: PHYSICIAN ASSISTANT

## 2020-06-28 PROCEDURE — 20000000 HC ICU ROOM

## 2020-06-28 PROCEDURE — 84550 ASSAY OF BLOOD/URIC ACID: CPT

## 2020-06-28 PROCEDURE — 87070 CULTURE OTHR SPECIMN AEROBIC: CPT

## 2020-06-28 PROCEDURE — 99900035 HC TECH TIME PER 15 MIN (STAT)

## 2020-06-28 PROCEDURE — 85027 COMPLETE CBC AUTOMATED: CPT | Mod: 91

## 2020-06-28 PROCEDURE — 85610 PROTHROMBIN TIME: CPT

## 2020-06-28 PROCEDURE — C9113 INJ PANTOPRAZOLE SODIUM, VIA: HCPCS | Performed by: PHYSICIAN ASSISTANT

## 2020-06-28 PROCEDURE — 99291 PR CRITICAL CARE, E/M 30-74 MINUTES: ICD-10-PCS | Mod: ,,, | Performed by: INTERNAL MEDICINE

## 2020-06-28 PROCEDURE — 84133 ASSAY OF URINE POTASSIUM: CPT

## 2020-06-28 PROCEDURE — 36600 WITHDRAWAL OF ARTERIAL BLOOD: CPT

## 2020-06-28 PROCEDURE — 25000003 PHARM REV CODE 250: Performed by: INTERNAL MEDICINE

## 2020-06-28 PROCEDURE — 87205 SMEAR GRAM STAIN: CPT

## 2020-06-28 PROCEDURE — 87491 CHLMYD TRACH DNA AMP PROBE: CPT

## 2020-06-28 PROCEDURE — 99291 PR CRITICAL CARE, E/M 30-74 MINUTES: ICD-10-PCS | Mod: GC,,, | Performed by: INTERNAL MEDICINE

## 2020-06-28 PROCEDURE — 99900026 HC AIRWAY MAINTENANCE (STAT)

## 2020-06-28 PROCEDURE — 94640 AIRWAY INHALATION TREATMENT: CPT

## 2020-06-28 PROCEDURE — 99291 CRITICAL CARE FIRST HOUR: CPT | Mod: ,,, | Performed by: INTERNAL MEDICINE

## 2020-06-28 PROCEDURE — 27000221 HC OXYGEN, UP TO 24 HOURS

## 2020-06-28 PROCEDURE — 84100 ASSAY OF PHOSPHORUS: CPT

## 2020-06-28 PROCEDURE — 99292 CRITICAL CARE ADDL 30 MIN: CPT | Mod: ,,, | Performed by: INTERNAL MEDICINE

## 2020-06-28 PROCEDURE — 80048 BASIC METABOLIC PNL TOTAL CA: CPT | Mod: 91

## 2020-06-28 PROCEDURE — 99291 CRITICAL CARE FIRST HOUR: CPT | Mod: GC,,, | Performed by: INTERNAL MEDICINE

## 2020-06-28 PROCEDURE — 85007 BL SMEAR W/DIFF WBC COUNT: CPT | Mod: 91

## 2020-06-28 PROCEDURE — 82436 ASSAY OF URINE CHLORIDE: CPT

## 2020-06-28 PROCEDURE — 63600175 PHARM REV CODE 636 W HCPCS: Performed by: INTERNAL MEDICINE

## 2020-06-28 PROCEDURE — 94761 N-INVAS EAR/PLS OXIMETRY MLT: CPT

## 2020-06-28 PROCEDURE — 82570 ASSAY OF URINE CREATININE: CPT

## 2020-06-28 PROCEDURE — 83935 ASSAY OF URINE OSMOLALITY: CPT

## 2020-06-28 PROCEDURE — 25000003 PHARM REV CODE 250: Performed by: HOSPITALIST

## 2020-06-28 RX ORDER — CARVEDILOL 12.5 MG/1
25 TABLET ORAL 2 TIMES DAILY WITH MEALS
Status: DISCONTINUED | OUTPATIENT
Start: 2020-06-29 | End: 2020-06-28

## 2020-06-28 RX ORDER — IRBESARTAN 75 MG/1
75 TABLET ORAL NIGHTLY
Status: DISCONTINUED | OUTPATIENT
Start: 2020-06-28 | End: 2020-06-28

## 2020-06-28 RX ORDER — CEFEPIME HYDROCHLORIDE 1 G/50ML
1 INJECTION, SOLUTION INTRAVENOUS
Status: DISCONTINUED | OUTPATIENT
Start: 2020-06-28 | End: 2020-06-28

## 2020-06-28 RX ORDER — PROPOFOL 10 MG/ML
5 INJECTION, EMULSION INTRAVENOUS CONTINUOUS
Status: DISCONTINUED | OUTPATIENT
Start: 2020-06-28 | End: 2020-07-02

## 2020-06-28 RX ORDER — PANTOPRAZOLE SODIUM 40 MG/10ML
40 INJECTION, POWDER, LYOPHILIZED, FOR SOLUTION INTRAVENOUS 2 TIMES DAILY
Status: DISCONTINUED | OUTPATIENT
Start: 2020-06-28 | End: 2020-06-28

## 2020-06-28 RX ORDER — DEXTROSE MONOHYDRATE AND SODIUM CHLORIDE 5; .9 G/100ML; G/100ML
INJECTION, SOLUTION INTRAVENOUS CONTINUOUS
Status: DISCONTINUED | OUTPATIENT
Start: 2020-06-28 | End: 2020-06-28

## 2020-06-28 RX ORDER — SODIUM CHLORIDE 0.9 % (FLUSH) 0.9 %
10 SYRINGE (ML) INJECTION
Status: DISCONTINUED | OUTPATIENT
Start: 2020-06-28 | End: 2020-07-16 | Stop reason: HOSPADM

## 2020-06-28 RX ORDER — DEXMEDETOMIDINE HYDROCHLORIDE 4 UG/ML
0.2 INJECTION, SOLUTION INTRAVENOUS CONTINUOUS
Status: DISCONTINUED | OUTPATIENT
Start: 2020-06-28 | End: 2020-07-02

## 2020-06-28 RX ORDER — NIFEDIPINE 30 MG/1
90 TABLET, EXTENDED RELEASE ORAL DAILY
Status: DISCONTINUED | OUTPATIENT
Start: 2020-06-28 | End: 2020-06-28

## 2020-06-28 RX ORDER — IPRATROPIUM BROMIDE AND ALBUTEROL SULFATE 2.5; .5 MG/3ML; MG/3ML
3 SOLUTION RESPIRATORY (INHALATION) EVERY 6 HOURS
Status: DISCONTINUED | OUTPATIENT
Start: 2020-06-28 | End: 2020-07-02

## 2020-06-28 RX ORDER — SODIUM CHLORIDE 0.9 % (FLUSH) 0.9 %
10 SYRINGE (ML) INJECTION
Status: DISCONTINUED | OUTPATIENT
Start: 2020-06-28 | End: 2020-06-28

## 2020-06-28 RX ORDER — PANTOPRAZOLE SODIUM 40 MG/10ML
40 INJECTION, POWDER, LYOPHILIZED, FOR SOLUTION INTRAVENOUS EVERY 12 HOURS
Status: DISCONTINUED | OUTPATIENT
Start: 2020-06-28 | End: 2020-07-04

## 2020-06-28 RX ORDER — TIMOLOL MALEATE 2.5 MG/ML
1 SOLUTION/ DROPS OPHTHALMIC 2 TIMES DAILY
Status: DISCONTINUED | OUTPATIENT
Start: 2020-06-28 | End: 2020-07-16 | Stop reason: HOSPADM

## 2020-06-28 RX ORDER — ACETAMINOPHEN 325 MG/1
650 TABLET ORAL EVERY 4 HOURS PRN
Status: DISCONTINUED | OUTPATIENT
Start: 2020-06-28 | End: 2020-07-16 | Stop reason: HOSPADM

## 2020-06-28 RX ORDER — SODIUM CHLORIDE 9 MG/ML
INJECTION, SOLUTION INTRAVENOUS CONTINUOUS
Status: DISCONTINUED | OUTPATIENT
Start: 2020-06-28 | End: 2020-06-28

## 2020-06-28 RX ORDER — ONDANSETRON 2 MG/ML
4 INJECTION INTRAMUSCULAR; INTRAVENOUS EVERY 6 HOURS PRN
Status: DISCONTINUED | OUTPATIENT
Start: 2020-06-28 | End: 2020-07-16 | Stop reason: HOSPADM

## 2020-06-28 RX ADMIN — IPRATROPIUM BROMIDE AND ALBUTEROL SULFATE 3 ML: .5; 3 SOLUTION RESPIRATORY (INHALATION) at 12:06

## 2020-06-28 RX ADMIN — DEXMEDETOMIDINE HYDROCHLORIDE 0.1 MCG/KG/HR: 4 INJECTION, SOLUTION INTRAVENOUS at 07:06

## 2020-06-28 RX ADMIN — PANTOPRAZOLE SODIUM 40 MG: 40 INJECTION, POWDER, LYOPHILIZED, FOR SOLUTION INTRAVENOUS at 08:06

## 2020-06-28 RX ADMIN — SODIUM BICARBONATE: 84 INJECTION, SOLUTION INTRAVENOUS at 07:06

## 2020-06-28 RX ADMIN — PROPOFOL 20 MCG/KG/MIN: 10 INJECTION, EMULSION INTRAVENOUS at 01:06

## 2020-06-28 RX ADMIN — DEXTROSE 8 MG/HR: 50 INJECTION, SOLUTION INTRAVENOUS at 01:06

## 2020-06-28 RX ADMIN — CEFEPIME 1 G: 1 INJECTION, POWDER, FOR SOLUTION INTRAMUSCULAR; INTRAVENOUS at 09:06

## 2020-06-28 RX ADMIN — TIMOLOL MALEATE 1 DROP: 2.5 SOLUTION/ DROPS OPHTHALMIC at 09:06

## 2020-06-28 RX ADMIN — PANTOPRAZOLE SODIUM 40 MG: 40 INJECTION, POWDER, LYOPHILIZED, FOR SOLUTION INTRAVENOUS at 11:06

## 2020-06-28 RX ADMIN — SODIUM CHLORIDE: 0.9 INJECTION, SOLUTION INTRAVENOUS at 01:06

## 2020-06-28 RX ADMIN — INSULIN HUMAN 10 UNITS: 100 INJECTION, SOLUTION PARENTERAL at 06:06

## 2020-06-28 RX ADMIN — DEXMEDETOMIDINE HYDROCHLORIDE 0.2 MCG/KG/HR: 4 INJECTION, SOLUTION INTRAVENOUS at 11:06

## 2020-06-28 RX ADMIN — IPRATROPIUM BROMIDE AND ALBUTEROL SULFATE 3 ML: .5; 3 SOLUTION RESPIRATORY (INHALATION) at 07:06

## 2020-06-28 RX ADMIN — SODIUM CHLORIDE 8 UNITS/HR: 9 INJECTION, SOLUTION INTRAVENOUS at 12:06

## 2020-06-28 RX ADMIN — SODIUM CHLORIDE 10 UNITS/HR: 9 INJECTION, SOLUTION INTRAVENOUS at 12:06

## 2020-06-28 RX ADMIN — VANCOMYCIN HYDROCHLORIDE 2500 MG: 1 INJECTION, POWDER, LYOPHILIZED, FOR SOLUTION INTRAVENOUS at 09:06

## 2020-06-28 RX ADMIN — SODIUM BICARBONATE: 84 INJECTION, SOLUTION INTRAVENOUS at 11:06

## 2020-06-28 RX ADMIN — SODIUM CHLORIDE 10 UNITS/HR: 9 INJECTION, SOLUTION INTRAVENOUS at 06:06

## 2020-06-28 RX ADMIN — DEXTROSE 8 MG/HR: 50 INJECTION, SOLUTION INTRAVENOUS at 07:06

## 2020-06-28 RX ADMIN — PROPOFOL 10 MCG/KG/MIN: 10 INJECTION, EMULSION INTRAVENOUS at 07:06

## 2020-06-28 NOTE — ASSESSMENT & PLAN NOTE
- Initial presentation most consistent with HHS with significant glucose elevation, no significant anion gap, minimal beta hydroxybutyrate elevation.  - Metabolic encephalopathy likely related to the same with concurrent acute hypoxemic respiratory failure.  - Continuing insulin gtt for time being.  - Treat for potential etiologies as under sepsis.

## 2020-06-28 NOTE — ASSESSMENT & PLAN NOTE
- holding atorvastatin 2/2 ADONAY   - last lipid panel:     Results for IMER ISRAEL (MRN [id number]) as of 6/27/2020 23:22   Ref. Range 12/13/2018 08:40   Cholesterol Latest Ref Range: 120 - 199 mg/dL 123   HDL Latest Ref Range: 40 - 75 mg/dL 41   Hdl/Cholesterol Ratio Latest Ref Range: 20.0 - 50.0 % 33.3   LDL Cholesterol External Latest Ref Range: 63.0 - 159.0 mg/dL 52.6 (L)   Non-HDL Cholesterol Latest Units: mg/dL 82   Total Cholesterol/HDL Ratio Latest Ref Range: 2.0 - 5.0  3.0   Triglycerides Latest Ref Range: 30 - 150 mg/dL 147

## 2020-06-28 NOTE — ASSESSMENT & PLAN NOTE
- No reported chest discomfort at home, EKG in ED with sinus tach / RBBB.  - Suspect likely demand ischemia with acute bleed, HHS; ADONAY likely contributing as well.  - Monitor.

## 2020-06-28 NOTE — NURSING
Spoke with eICU physician, will continue insulin rate at 8 units/H due to two consecutive glucose readings of >500 since patient arrived in ICU and unable to follow actual attached DKA algorithm based on these readings.

## 2020-06-28 NOTE — ASSESSMENT & PLAN NOTE
- Suspect HHS with encephalopathy and GI losses causing acidosis contributing.  - Wean vent support as feasible and potential SBTs after improvement in mental status.  - Appreciate critical care assistance.

## 2020-06-28 NOTE — PROGRESS NOTES
Patient received on charted settings.  All alarms are set and audible.  Ambu bag and mask @ [facility].  Will continue to monitor.

## 2020-06-28 NOTE — CONSULTS
[facility]  Pulmonology  Consult Note    Patient Name: Vinnie Siegel  MRN: [id number]  Admission Date: 6/27/2020  Hospital Length of Stay: 1 days  Code Status: Full Code  Attending Physician: NYDIA Gomez MD  Primary Care Provider: Janeth Walter MD   Principal Problem: Sepsis    Inpatient consult to Pulmonary Critical Care  Consult performed by: Phu Justice MD  Consult ordered by: Sofi Shah PA-C        Subjective:     HPI:  74yoM with hx of HFrEF, tobacco use, CKD, DM2 who presents with lethargy and melena/hematochezia.  Patient is presently intubated so history is obtained from his fiance.  She reports that a week ago he began to report feeling weak and suffered from nausea and watery diarrhea. Minimal vomiting.  A few days later she noticed that his stools had blood in them and then progressed to being black in color.  He became progressively weak and lost his appetite.  No history of GIB.  No OAC at home, just 81mg of ASA.  He was not taking his insulin at home because he was not eating.  In the ED he was encephalopathic and exhibited Kussmaul respirations.  Diagnosed w/ DKA and was intubated for progressive encephalopathy and respiratory distress.  He is markedly hyperglycemic and his pH was 7.17 but his BHB was only 0.7.      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 Root [facility]    lateral internal anal sphincterotomy  12/13/13    Dr. root [facility]    URETERAL STENT PLACEMENT         Review of patient's allergies indicates:  No  Known Allergies    Family History     Problem Relation (Age of Onset)    Diabetes Brother    Hypertension Brother    Stroke Brother        Tobacco Use    Smoking status: Current Every Day Smoker     Packs/day: 0.50     Years: 45.00     Pack years: 22.50    Smokeless tobacco: Never Used    Tobacco comment: says he could quit and will try    Substance and Sexual Activity    Alcohol use: Yes     Comment: rarely    Drug use: No    Sexual activity: Yes     Comment: single, retired , no kids         Review of Systems   Unable to obtain.    Objective:     Vital Signs (Most Recent):  Temp: 97.8 °F (36.6 °C) (06/28/20 0715)  Pulse: 84 (06/28/20 0915)  Resp: (!) 24 (06/28/20 0915)  BP: (!) 97/55 (06/28/20 0915)  SpO2: 100 % (06/28/20 0915) Vital Signs (24h Range):  Temp:  [97.7 °F (36.5 °C)-98.7 °F (37.1 °C)] 97.8 °F (36.6 °C)  Pulse:  [] 84  Resp:  [0-37] 24  SpO2:  [100 %] 100 %  BP: ()/(51-93) 97/55     Weight: (!) 144.5 kg (318 lb 9 oz)  Body mass index is 40.9 kg/m².      Intake/Output Summary (Last 24 hours) at 6/28/2020 1035  Last data filed at 6/28/2020 0614  Gross per 24 hour   Intake 1710.7 ml   Output 140 ml   Net 1570.7 ml       Physical Exam  General: Intubated, sedated  HEENT: ETT in place, NC/AT  CV: RRR, no MRG  Pulm: Rhonchi bilaterally, no wheeze  Abd: Soft, NT  Ext: Pitting edema of BLE    Vents:  Vent Mode: A/C (06/28/20 0812)  Ventilator Initiated: Yes (06/27/20 2216)  Set Rate: 30 BPM (06/28/20 0812)  Vt Set: 400 mL (06/28/20 0812)  Pressure Support: 0 cmH20 (06/28/20 0812)  PEEP/CPAP: 5 cmH20 (06/28/20 0812)  Oxygen Concentration (%): 21 (06/28/20 0915)  Peak Airway Pressure: 21 cmH2O (06/28/20 0812)  Plateau Pressure: 0 cmH20 (06/28/20 0812)  Total Ve: 12.6 mL (06/28/20 0812)  F/VT Ratio<105 (RSBI): (!) 67.72 (06/28/20 0812)    Lines/Drains/Airways     Central Venous Catheter Line            Percutaneous Central Line Insertion/Assessment - Triple Lumen  06/27/20 2300 right  femoral less than 1 day          Drain                 NG/OG Tube 06/27/20 2258 orogastric 16 Fr. Center mouth less than 1 day         Urethral Catheter 06/27/20 2258 Non-latex 16 Fr. less than 1 day          Airway                 Airway - Non-Surgical 06/27/20 2205 Endotracheal Tube less than 1 day          Peripheral Intravenous Line                 Peripheral IV - Single Lumen 06/27/20 2047 18 G Right Antecubital less than 1 day         Peripheral IV - Single Lumen 06/27/20 2127 20 G Left Forearm less than 1 day                Significant Labs:    CBC/Anemia Profile:  Recent Labs   Lab 06/27/20 2111 06/27/20  2331 06/28/20  0506   WBC 35.04*  --  30.89*   HGB 6.4*  --  7.6*   HCT 19.4*  --  22.8*     --  206   MCV 92  --  90   RDW 15.4*  --  15.1*   IRON 41*  --   --    FERRITIN 757*  --   --    FOLATE  --  4.9  --    [id number]  --  651  --         Chemistries:  Recent Labs   Lab 06/27/20 2111 06/28/20  0058 06/28/20  0506 06/28/20  0927   *   < > 129* 129* 133*   K 5.2*   < > 5.7* 4.8 4.3      < > 106 107 113*   CO2 8*   < > 11* 12* 11*   *   < > 111* 114* 111*   CREATININE 4.2*   < > 4.4* 4.4* 4.4*   CALCIUM 7.3*   < > 7.0* 7.0* 6.4*   ALBUMIN 1.7*  --   --   --   --    PROT 5.1*  --   --   --   --    BILITOT 0.3  --   --   --   --    ALKPHOS 81  --   --   --   --    ALT 56*  --   --   --   --    AST 26  --   --   --   --    MG 2.1  --   --   --   --    PHOS  --   --   --  4.0  --     < > = values in this interval not displayed.        All pertinent labs within the past 24 hours have been reviewed.    Significant Imaging:   I have reviewed and interpreted all pertinent imaging results/findings within the past 24 hours.    Assessment/Plan:     74yoM with hx of HFrEF, tobacco use, CKD, DM2 who presents with lethargy and melena/hematochezia.  DKA initially thought to be the etiology for his acidemia but review of the chart makes this less likely.  Minimally elevated BHB, minimally  elevated lactate and a near normal AG.  Patient has a largely non-gap acidosis that is likely driven his GI bicarbonate losses and worsening renal failure.  Markedly hyperglycemic and encephalopathic so HHS would be a consistent diagnosis.  Not on any oral AC but presents w/ GIB.  Unclear hx of use of NSAIDs chronically.      1. Neuro:  · Encephalopathy, Metabolic  · Sedation   · Intubated for worsening mentation, likely multifactorial from possible HHS, potential septic encephalopathy, and progressive acidemia  · Propofol and Precedex for sedation, titrate for a RASS of 0 to -2  · Daily SAT/SBT  2. Cardiovascular:  · Dilated CM w/ HFrEF  · Holding BB/ARB given borderline BP w/ GIB  · Has some evidence of total body fluid excess but in the context of hemorrhage diuretic therapy is not presently indicated (he is on 21% FiO2) and otherwise does not appear to be suffering from any meaningful consequences from his slight volume excess  3. Pulmonary:  · Acute Hypoxic Respiratory Failure  · Chronic Cough   · Likely chronic bronchitis  · Weaned down to room air on the ventilator, work of breathing likely a consequence of profound NG acidemia  · Continue to support w/ LPV @ 6cc/kg w/ goal Pplat of < 30  · Daily SAT/SBT  · Can likely be liberated from the ventilator once acidemia corrected  · Purulent sputum, no obvious infiltrate on CXR, on broad spectrum abx that can be de-escalated based on culture data (blood cultures were not collected prior to initiation of abx last night, have elected not to pursue collecting them now)  4.  FEN/GI:  · Acute GIB  · Acute Gastroenteritis  · Nutrition   · Has both melena and hematochezia - Hgb was > 13 three years ago and was < 7 at the time of presentation  · Transfuse to maintain Hgb > 7    · GI consulted - recommend PPI BID  · Checking INR to assess for any contribution from coagulopathy  · Unclear etiology for his diarrhea, no diarrhea here yet - if he has episodes of diarrhea here  would check C diff - IBD is on the differential, but history not strongly suggestive of this, GI following  · If he can't be liberated from the ventilator tmw, would favor initiation of TF (at least trophic)  ·   5. Renal:  · ADONAY on CKD  · Non-gap Metabolic Acidosis  · UOP fair thus far - Cr > 4 from baseline of ~2.5  · NGMA is likely secondary to GI bicarb loss and subsequent renal failure - bicarb gtt started - monitor I/Os closely as he can easily become hypervolemic  · Minimize extra IVF  6. Heme/ID:  · Bronchitis (Likely chronic)  · UTI  · Acute Blood Loss Anemia  · On broad spectrum abx, continue and follow cultures  · BCx not collected prior to initiation of abx and likely to be low yield at this juncture  · De-escalate abx based on sputum and urine culture  7. Endocrine:  · HHS  · IDDM-2  · Marked hyperglycemia w/ encephalopathy  · Bicarb gtt will be in D5, titrate insulin gtt down  · Transition to subcutaneous insulin most likely best served when he is getting enteral nutrition - caution with dosing given ADONAY on CKD  8. PPx/Dispo:  ? Nutrition: NPO   ? Stress Ulcer Pxx: PPI BID  ? DVT Ppx: SCDs  ? Head of Bed: @ 30 degrees  ? Daily Sedation Interruption  ? Dispo:Continue ICU care      Thank you for your consult. I will follow-up with patient. Please contact us if you have any additional questions.     Phu Justice MD  Pulmonology  [facility]

## 2020-06-28 NOTE — ASSESSMENT & PLAN NOTE
- Sepsis with concurrent HHS with several potential sources: UA suggestive of UTI with >100 RBCs and WBCs and purulent appearance, CXR with LLL infiltrate and copious sputum production.  - Continuing broad spectrum antibiotics with cefepime 1g IV q24hr, vancomycin IV with pharmacy dosing consult.  - Urine, blood, sputum cultures in process. Check urine gonorrhea/chlamydia.

## 2020-06-28 NOTE — PROGRESS NOTES
[facility] Hospital Medicine  Progress Note    Patient Name: Vinnie Siegel  MRN: [id number]  Patient Class: IP- Inpatient   Admission Date: 6/27/2020  Length of Stay: 1 days  Attending Physician: NYDIA Gomez MD  Primary Care Provider: Janeth Walter MD        Subjective:     Principal Problem:Type 2 diabetes mellitus with hyperosmolar nonketotic hyperglycemia        HPI:  Mr. Vinnie Siegel is a 74 y.o. male, with PMH of IDDM-2, CKD-IV, dilated cardiomyopathy, HTN, HLD, gout, obesity, who presented to [facility] ED via EMS on 6/27/20 with rectal bleeding x 2 days. His family called EMS after he became lethargic earlier this evening. Per his fiancee, he has been passing dark blood stools, and has been noting generalized weakness x 1 week. Additionally, he has been having elevated blood glucose readings x 1 day. In the ED, he was found to be in DKA, with a GI bleed. H&H were 6.4 & 19.4 and he was transfused 2 units PRBCs. He was started on an insulin drip and a Protonix drip. He had worsening respiratory and mental status while in the ED, and was intubated for airway protection. He was admitted to inpatient status to the ICU.     Overview/Hospital Course:  No notes on file    Interval History: No acute events since admission. Discussed with Vaibhav Nair/Vinh.    Review of Systems   Unable to perform ROS: Intubated     Objective:     Vital Signs (Most Recent):  Temp: 97.6 °F (36.4 °C) (06/28/20 1345)  Pulse: 72 (06/28/20 1345)  Resp: (!) 27 (06/28/20 1345)  BP: (!) 100/59 (06/28/20 1345)  SpO2: 99 % (06/28/20 1345) Vital Signs (24h Range):  Temp:  [97.6 °F (36.4 °C)-98.7 °F (37.1 °C)] 97.6 °F (36.4 °C)  Pulse:  [] 72  Resp:  [0-37] 27  SpO2:  [99 %-100 %] 99 %  BP: ()/(51-93) 100/59     Weight: (!) 144.5 kg (318 lb 9 oz)  Body mass index is 40.9 kg/m².    Intake/Output Summary (Last 24 hours) at 6/28/2020 1501  Last data filed at 6/28/2020 1356  Gross per 24 hour   Intake 2819.53 ml    Output 460 ml   Net 2359.53 ml      Physical Exam  HENT:      Head: Normocephalic and atraumatic.   Eyes:      General:         Right eye: No discharge.         Left eye: No discharge.      Conjunctiva/sclera: Conjunctivae normal.   Cardiovascular:      Rate and Rhythm: Normal rate.      Pulses: Normal pulses.   Pulmonary:      Comments: Intubated. Crackles, mechanical breath sounds bilaterally.  Abdominal:      Palpations: Abdomen is soft.      Tenderness: There is no abdominal tenderness.   Musculoskeletal:         General: No tenderness.      Right lower leg: Edema (2+) present.      Left lower leg: Edema (2+) present.   Skin:     General: Skin is warm and dry.   Neurological:      Comments: RASS -3, CAM-ICU N/A       Significant Labs:   CBC:  Recent Labs   Lab 06/27/20 2111 06/28/20  0506 06/28/20  1253   WBC 35.04* 30.89* 28.33*   HGB 6.4* 7.6* 7.0*   HCT 19.4* 22.8* 20.3*    206 197   GRAN 94.0* 89.0* 94.0*   LYMPH 4.0*  CANCELED 7.0* 4.0*  CANCELED   MONO 2.0*  CANCELED 4.0 2.0*  CANCELED   EOS CANCELED  --  CANCELED   BASO CANCELED  --  CANCELED      CMP:  Recent Labs   Lab 06/27/20 2111 06/28/20  0506 06/28/20  0927 06/28/20  1253   *   < > 129* 133* 133*   K 5.2*   < > 4.8 4.3 4.5      < > 107 113* 111*   CO2 8*   < > 12* 11* 12*   *   < > 114* 111* 120*   CREATININE 4.2*   < > 4.4* 4.4* 4.6*   *   < > 741* 486* 454*   CALCIUM 7.3*   < > 7.0* 6.4* 7.1*   MG 2.1  --   --   --   --    PHOS  --   --  4.0  --   --    ALKPHOS 81  --   --   --   --    AST 26  --   --   --   --    ALT 56*  --   --   --   --    BILITOT 0.3  --   --   --   --    PROT 5.1*  --   --   --   --    ALBUMIN 1.7*  --   --   --   --    ANIONGAP 14   < > 10 9 10    < > = values in this interval not displayed.      Significant Imaging:   No new imaging this morning.      Assessment/Plan:      * Type 2 diabetes mellitus with hyperosmolar nonketotic hyperglycemia  - Initial presentation most consistent  with HHS with significant glucose elevation, no significant anion gap, minimal beta hydroxybutyrate elevation.  - Metabolic encephalopathy likely related to the same with concurrent acute hypoxemic respiratory failure.  - Continuing insulin gtt for time being.  - Treat for potential etiologies as under sepsis.    Encephalopathy, metabolic  - As under HHS.    Hypertensive emergency  - Initial BPs significantly elevated; now running low to normal with sedation on vent.  - Monitor.    Elevated troponin  - No reported chest discomfort at home, EKG in ED with sinus tach / RBBB.  - Suspect likely demand ischemia with acute bleed, HHS; ADONAY likely contributing as well.  - Monitor.    Acute renal failure superimposed on stage 4 chronic kidney disease  - Multifactorial with likely acute blood loss anemia, GI losses contributing.  - Baseline Cr ~2.4-2.7.  - FENa suggestive of intrinsic source, concurrent non-gap metabolic acidosis.  - Bicarbonate gtt, consult nephrology for further evaluation/management recommendations.    Sepsis  - Sepsis with concurrent HHS with several potential sources: UA suggestive of UTI with >100 RBCs and WBCs and purulent appearance, CXR with LLL infiltrate and copious sputum production.  - Continuing broad spectrum antibiotics with cefepime 1g IV q24hr, vancomycin IV with pharmacy dosing consult.  - Urine, blood, sputum cultures in process. Check urine gonorrhea/chlamydia.    Hematuria  - Likely related to suspected infection.  - Defer urology consult for time being.    UTI (urinary tract infection)  - As under sepsis.    Acute blood loss anemia  - As above.    Acute respiratory failure  - Suspect HHS with encephalopathy and GI losses causing acidosis contributing.  - Wean vent support as feasible and potential SBTs after improvement in mental status.  - Appreciate critical care assistance.    GIB (gastrointestinal bleeding)  - Two day history of dark blood per rectum and patient's fiancee reports  progressive loose stool at home with eventual blood noted.  - s/p 2U pRBCs in ED; trend Hgb and transfuse PRN Hgb < 7.  - Continuing pantoprazole as 40mg IV BID.  - GI consulted; appreciate assistance.    Obesity, Class III, BMI 40-49.9 (morbid obesity)  - Dietary counseling after improvement from critical condition.    Dilated cardiomyopathy  - Volume overloaded; diuretics held with ADONAY.  - As under ADONAY.    CKD (chronic kidney disease) stage 4, GFR 15-29 ml/min  - As under ADONAY.    Hyperlipidemia  - Atorvastatin held with ADONAY.    Essential hypertension  - Hold home antihypertensives in setting of lower blood pressures, ADONAY.    VTE Risk Mitigation (From admission, onward)         Ordered     IP VTE HIGH RISK PATIENT  Once      06/28/20 0139     Reason for No Pharmacological VTE Prophylaxis  Once     Question:  Reasons:  Answer:  Active Bleeding    06/28/20 0139     Place sequential compression device  Until discontinued      06/28/20 0036              Critical due to GI bleed, HHS.    Critical care time spent on the evaluation and treatment of severe organ dysfunction, review of pertinent labs and imaging studies, discussions with consulting providers and discussions with patient/family: 105 minutes.     KRISTEN Gomez MD  Department of Hospital Medicine   [facility]

## 2020-06-28 NOTE — ASSESSMENT & PLAN NOTE
- suspect 2/2 combined effects of GI blood losses causing acute anemia and acidosis from DKA   - intubated in ED  - continue on mechanical vent  - PCC consulted

## 2020-06-28 NOTE — SUBJECTIVE & OBJECTIVE
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 Root [facility] [place]    lateral internal anal sphincterotomy  12/13/13    Dr. root [facility] [place]    URETERAL STENT PLACEMENT         Review of patient's allergies indicates:  No Known Allergies    No current facility-administered medications on file prior to encounter.      Current Outpatient Medications on File Prior to Encounter   Medication Sig    albuterol (PROVENTIL/VENTOLIN HFA) 90 mcg/actuation inhaler Inhale 1-2 puffs into the lungs every 6 (six) hours as needed for Wheezing or Shortness of Breath.    allopurinol (ZYLOPRIM) 100 MG tablet Take 1 tablet (100 mg total) by mouth once daily.    atorvastatin (LIPITOR) 40 MG tablet Take 1 tablet (40 mg total) by mouth once daily.    blood sugar diagnostic Strp To check BG 4  times daily, to use with insurance preferred meter, e 11.65    blood-glucose meter kit To check BG 3 times daily, to use with insurance preferred meter, e 11.65    carvedilol (COREG) 25 MG tablet TAKE 1 TABLET(25 MG) BY MOUTH TWICE DAILY WITH MEALS    chlorzoxazone (PARAFON FORTE) 500 mg Tab Take 1 tablet (500 mg total) by mouth 3 (three) times daily.    fluticasone propionate (FLONASE) 50 mcg/actuation nasal spray 2 sprays (100 mcg total) by Each Nostril route once daily.    furosemide (LASIX) 20 MG tablet TAKE 1 TABLET BY MOUTH TWICE DAILY INCREASING TO 2 TABLETS TWICE DAILY FOR 3-4 LBS WEIGHT GAIN UNTIL RESOLVED.    hydrocodone-acetaminophen 7.5-325mg (NORCO) 7.5-325 mg per tablet TK 1 T PO  TID PRN P     "insulin (LANTUS SOLOSTAR U-100 INSULIN) glargine 100 units/mL (3mL) SubQ pen Inject 36 Units into the skin every evening.    insulin lispro (HUMALOG KWIKPEN INSULIN) 100 unit/mL pen INJECT 14 UNITS UNDER THE SKIN WITH BREAKFAST AND LUNCH AND 12 UNITS UNDER THE SKIN WITH DINNER PLUS SCALE    irbesartan (AVAPRO) 150 MG tablet Take 0.5 tablets (75 mg total) by mouth every evening.    lancets Misc To check BG 4  times daily, to use with insurance preferred meter, e 11 .65    magnesium oxide (MAG-OX) 400 mg (241.3 mg magnesium) tablet Take 1 tablet (400 mg total) by mouth every morning.    NIFEdipine (PROCARDIA-XL) 90 MG (OSM) 24 hr tablet Take 1 tablet (90 mg total) by mouth once daily.    pen needle, diabetic (BD ULTRA-FINE SHORT PEN NEEDLE) 31 gauge x 5/16" Ndle USE FOUR TIMES DAILY    sodium bicarbonate 650 MG tablet Take 1 tablet (650 mg total) by mouth 2 (two) times daily.    spironolactone (ALDACTONE) 25 MG tablet Take 1 tablet (25 mg total) by mouth once daily.    tamsulosin (FLOMAX) 0.4 mg Cap Take 1 capsule (0.4 mg total) by mouth every morning.    timolol maleate 0.25% (TIMOPTIC) 0.25 % Drop Place 1 drop into both eyes 2 (two) times daily.    TRUEPLUS LANCETS 33 gauge Misc TEST FOUR TIMES DAILY     Family History     Problem Relation (Age of Onset)    Diabetes Brother    Hypertension Brother    Stroke Brother        Tobacco Use    Smoking status: Current Every Day Smoker     Packs/day: 0.50     Years: 45.00     Pack years: 22.50    Smokeless tobacco: Never Used    Tobacco comment: says he could quit and will try    Substance and Sexual Activity    Alcohol use: Yes     Comment: rarely    Drug use: No    Sexual activity: Yes     Comment: single, retired , no kids     Review of Systems   Unable to perform ROS: Intubated     Objective:     Vital Signs (Most Recent):  Temp: 98.2 °F (36.8 °C) (06/28/20 0055)  Pulse: (!) 122 (06/28/20 0015)  Resp: (!) 25 (06/28/20 0015)  BP: (!) 128/59 " (06/28/20 0015)  SpO2: 100 % (06/28/20 0015) Vital Signs (24h Range):  Temp:  [97.7 °F (36.5 °C)-98.7 °F (37.1 °C)] 98.2 °F (36.8 °C)  Pulse:  [115-131] 122  Resp:  [19-37] 25  SpO2:  [100 %] 100 %  BP: (128-226)/(58-93) 128/59     Weight: (!) 140.6 kg (310 lb)  Body mass index is 39.8 kg/m².    Physical Exam  Vitals signs and nursing note reviewed.   Constitutional:       General: He is not in acute distress.     Appearance: He is well-developed. He is obese. He is not ill-appearing, toxic-appearing or diaphoretic.   HENT:      Head: Normocephalic and atraumatic.      Right Ear: External ear normal.      Left Ear: External ear normal.   Eyes:      General: No scleral icterus.     Conjunctiva/sclera: Conjunctivae normal.      Pupils: Pupils are equal, round, and reactive to light.   Neck:      Vascular: No JVD.      Trachea: No tracheal deviation.   Cardiovascular:      Rate and Rhythm: Regular rhythm. Tachycardia present.      Heart sounds: Normal heart sounds. No murmur. No gallop.    Pulmonary:      Effort: Pulmonary effort is normal. No respiratory distress.      Breath sounds: Normal breath sounds. No stridor. No wheezing or rales.      Comments: Intubated during exam. Mechanical breath sounds heard.   Abdominal:      General: Bowel sounds are normal. There is no distension.      Palpations: Abdomen is soft. There is no mass.      Tenderness: There is no abdominal tenderness. There is no guarding.      Comments: Obese abdomen. Hypoactive bowel sounds.    Skin:     General: Skin is warm and dry.      Capillary Refill: Capillary refill takes 2 to 3 seconds.   Neurological:      Mental Status: He is alert.      Motor: No abnormal muscle tone.      Comments: Sedated on propofol. Responds to pain.            CRANIAL NERVES     CN III, IV, VI   Pupils are equal, round, and reactive to light.       Significant Labs:   BMP:   Recent Labs   Lab 06/27/20  2111   *   *   K 5.2*      CO2 8*   *    CREATININE 4.2*   CALCIUM 7.3*   MG 2.1     CBC:   Recent Labs   Lab 06/27/20 2111   WBC 35.04*   HGB 6.4*   HCT 19.4*        CMP:   Recent Labs   Lab 06/27/20 2111   *   K 5.2*      CO2 8*   *   *   CREATININE 4.2*   CALCIUM 7.3*   PROT 5.1*   ALBUMIN 1.7*   BILITOT 0.3   ALKPHOS 81   AST 26   ALT 56*   ANIONGAP 14   EGFRNONAA 13*     Urine Culture: No results for input(s): LABURIN in the last 48 hours.  Urine Studies:   Recent Labs   Lab 06/27/20 2114   COLORU Yellow   APPEARANCEUA Cloudy*   PHUR 6.0   SPECGRAV 1.015   PROTEINUA 1+*   GLUCUA 3+*   KETONESU Negative   BILIRUBINUA Negative   OCCULTUA 3+*   NITRITE Negative   UROBILINOGEN Negative   LEUKOCYTESUR 2+*   RBCUA >100*   WBCUA >100*   BACTERIA Moderate*   HYALINECASTS 0     All pertinent labs within the past 24 hours have been reviewed.    Significant Imaging: I have reviewed all pertinent imaging results/findings within the past 24 hours.   Imaging Results          X-Ray Chest AP Portable (Final result)  Result time 06/27/20 22:38:02    Final result by Scott Felix MD (06/27/20 22:38:02)                 Impression:      Satisfactory position of endotracheal tube and enteric tube below the left hemidiaphragm.    Airspace opacity in the left lung base, suggestive of pneumonitis.      Electronically signed by: Scott Felix MD  Date:    06/27/2020  Time:    22:38             Narrative:    EXAMINATION:  XR CHEST AP PORTABLE    CLINICAL HISTORY:  Presence of other specified devices    TECHNIQUE:  Single frontal view of the chest was performed.    COMPARISON:  12/23/2015.    FINDINGS:  There is satisfactory position of the tracheal tube tip 6.5 cm from the jazmyn.  The enteric tube extends below the hemidiaphragm with the tip not visualized. Monitoring EKG leads are present.    There is stable enlargement of the cardiomediastinal silhouette.  The hemidiaphragms are unremarkable.  There is no evidence of free air the  hemidiaphragms.  There are no pleural effusions.  There is no evidence of a pneumothorax.  There is no evidence of pneumomediastinum.  There is an airspace opacity left lung base.  There are degenerative changes in the osseous structures.

## 2020-06-28 NOTE — H&P
[facility] Hospital Medicine  History & Physical    Patient Name: Vinnie Siegel  MRN: [id number]  Admission Date: 6/27/2020  Attending Physician: NYDIA Gomez MD   Primary Care Provider: Janeth Walter MD         Patient information was obtained from patient, past medical records and ER records.     Subjective:     Principal Problem:GIB (gastrointestinal bleeding)    Chief Complaint:   Chief Complaint   Patient presents with    Rectal Bleeding     x 2 days, lethargic x 1 week but dark blood noted to stool per wife.         HPI: Mr. Vinnie Siegel is a 74 y.o. male, with PMH of IDDM-2, CKD-IV, dilated cardiomyopathy, HTN, HLD, gout, obesity, who presented to [facility] ED via EMS on 6/27/20 with rectal bleeding x 2 days. His family called EMS after he became lethargic earlier this evening. Per his fiancee, he has been passing dark blood stools, and has been noting generalized weakness x 1 week. Additionally, he has been having elevated blood glucose readings x 1 day. In the ED, he was found to be in DKA, with a GI bleed. H&H were 6.4 & 19.4 and he was transfused 2 units PRBCs. He was started on an insulin drip and a Protonix drip. He had worsening respiratory and mental status while in the ED, and was intubated for airway protection. He was admitted to inpatient status to the ICU.     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 Root [facility] [place]    lateral internal anal sphincterotomy  12/13/13    Dr. root [facility] [place]    URETERAL STENT PLACEMENT         Review of  patient's allergies indicates:  No Known Allergies    No current facility-administered medications on file prior to encounter.      Current Outpatient Medications on File Prior to Encounter   Medication Sig    albuterol (PROVENTIL/VENTOLIN HFA) 90 mcg/actuation inhaler Inhale 1-2 puffs into the lungs every 6 (six) hours as needed for Wheezing or Shortness of Breath.    allopurinol (ZYLOPRIM) 100 MG tablet Take 1 tablet (100 mg total) by mouth once daily.    atorvastatin (LIPITOR) 40 MG tablet Take 1 tablet (40 mg total) by mouth once daily.    blood sugar diagnostic Strp To check BG 4  times daily, to use with insurance preferred meter, e 11.65    blood-glucose meter kit To check BG 3 times daily, to use with insurance preferred meter, e 11.65    carvedilol (COREG) 25 MG tablet TAKE 1 TABLET(25 MG) BY MOUTH TWICE DAILY WITH MEALS    chlorzoxazone (PARAFON FORTE) 500 mg Tab Take 1 tablet (500 mg total) by mouth 3 (three) times daily.    fluticasone propionate (FLONASE) 50 mcg/actuation nasal spray 2 sprays (100 mcg total) by Each Nostril route once daily.    furosemide (LASIX) 20 MG tablet TAKE 1 TABLET BY MOUTH TWICE DAILY INCREASING TO 2 TABLETS TWICE DAILY FOR 3-4 LBS WEIGHT GAIN UNTIL RESOLVED.    hydrocodone-acetaminophen 7.5-325mg (NORCO) 7.5-325 mg per tablet TK 1 T PO  TID PRN P    insulin (LANTUS SOLOSTAR U-100 INSULIN) glargine 100 units/mL (3mL) SubQ pen Inject 36 Units into the skin every evening.    insulin lispro (HUMALOG KWIKPEN INSULIN) 100 unit/mL pen INJECT 14 UNITS UNDER THE SKIN WITH BREAKFAST AND LUNCH AND 12 UNITS UNDER THE SKIN WITH DINNER PLUS SCALE    irbesartan (AVAPRO) 150 MG tablet Take 0.5 tablets (75 mg total) by mouth every evening.    lancets Misc To check BG 4  times daily, to use with insurance preferred meter, e 11 .65    magnesium oxide (MAG-OX) 400 mg (241.3 mg magnesium) tablet Take 1 tablet (400 mg total) by mouth every morning.    NIFEdipine (PROCARDIA-XL) 90 MG  "(OSM) 24 hr tablet Take 1 tablet (90 mg total) by mouth once daily.    pen needle, diabetic (BD ULTRA-FINE SHORT PEN NEEDLE) 31 gauge x 5/16" Ndle USE FOUR TIMES DAILY    sodium bicarbonate 650 MG tablet Take 1 tablet (650 mg total) by mouth 2 (two) times daily.    spironolactone (ALDACTONE) 25 MG tablet Take 1 tablet (25 mg total) by mouth once daily.    tamsulosin (FLOMAX) 0.4 mg Cap Take 1 capsule (0.4 mg total) by mouth every morning.    timolol maleate 0.25% (TIMOPTIC) 0.25 % Drop Place 1 drop into both eyes 2 (two) times daily.    TRUEPLUS LANCETS 33 gauge Misc TEST FOUR TIMES DAILY     Family History     Problem Relation (Age of Onset)    Diabetes Brother    Hypertension Brother    Stroke Brother        Tobacco Use    Smoking status: Current Every Day Smoker     Packs/day: 0.50     Years: 45.00     Pack years: 22.50    Smokeless tobacco: Never Used    Tobacco comment: says he could quit and will try    Substance and Sexual Activity    Alcohol use: Yes     Comment: rarely    Drug use: No    Sexual activity: Yes     Comment: single, retired , no kids     Review of Systems   Unable to perform ROS: Intubated     Objective:     Vital Signs (Most Recent):  Temp: 98.2 °F (36.8 °C) (06/28/20 0055)  Pulse: (!) 122 (06/28/20 0015)  Resp: (!) 25 (06/28/20 0015)  BP: (!) 128/59 (06/28/20 0015)  SpO2: 100 % (06/28/20 0015) Vital Signs (24h Range):  Temp:  [97.7 °F (36.5 °C)-98.7 °F (37.1 °C)] 98.2 °F (36.8 °C)  Pulse:  [115-131] 122  Resp:  [19-37] 25  SpO2:  [100 %] 100 %  BP: (128-226)/(58-93) 128/59     Weight: (!) 140.6 kg (310 lb)  Body mass index is 39.8 kg/m².    Physical Exam  Vitals signs and nursing note reviewed.   Constitutional:       General: He is not in acute distress.     Appearance: He is well-developed. He is obese. He is not ill-appearing, toxic-appearing or diaphoretic.   HENT:      Head: Normocephalic and atraumatic.      Right Ear: External ear normal.      Left Ear: External ear " normal.   Eyes:      General: No scleral icterus.     Conjunctiva/sclera: Conjunctivae normal.      Pupils: Pupils are equal, round, and reactive to light.   Neck:      Vascular: No JVD.      Trachea: No tracheal deviation.   Cardiovascular:      Rate and Rhythm: Regular rhythm. Tachycardia present.      Heart sounds: Normal heart sounds. No murmur. No gallop.    Pulmonary:      Effort: Pulmonary effort is normal. No respiratory distress.      Breath sounds: Normal breath sounds. No stridor. No wheezing or rales.      Comments: Intubated during exam. Mechanical breath sounds heard.   Abdominal:      General: Bowel sounds are normal. There is no distension.      Palpations: Abdomen is soft. There is no mass.      Tenderness: There is no abdominal tenderness. There is no guarding.      Comments: Obese abdomen. Hypoactive bowel sounds.    Skin:     General: Skin is warm and dry.      Capillary Refill: Capillary refill takes 2 to 3 seconds.   Neurological:      Mental Status: He is alert.      Motor: No abnormal muscle tone.      Comments: Sedated on propofol. Responds to pain.            CRANIAL NERVES     CN III, IV, VI   Pupils are equal, round, and reactive to light.       Significant Labs:   BMP:   Recent Labs   Lab 06/27/20 2111   *   *   K 5.2*      CO2 8*   *   CREATININE 4.2*   CALCIUM 7.3*   MG 2.1     CBC:   Recent Labs   Lab 06/27/20 2111   WBC 35.04*   HGB 6.4*   HCT 19.4*        CMP:   Recent Labs   Lab 06/27/20 2111   *   K 5.2*      CO2 8*   *   *   CREATININE 4.2*   CALCIUM 7.3*   PROT 5.1*   ALBUMIN 1.7*   BILITOT 0.3   ALKPHOS 81   AST 26   ALT 56*   ANIONGAP 14   EGFRNONAA 13*     Urine Culture: No results for input(s): LABURIN in the last 48 hours.  Urine Studies:   Recent Labs   Lab 06/27/20 2114   COLORU Yellow   APPEARANCEUA Cloudy*   PHUR 6.0   SPECGRAV 1.015   PROTEINUA 1+*   GLUCUA 3+*   KETONESU Negative   BILIRUBINUA Negative    OCCULTUA 3+*   NITRITE Negative   UROBILINOGEN Negative   LEUKOCYTESUR 2+*   RBCUA >100*   WBCUA >100*   BACTERIA Moderate*   HYALINECASTS 0     All pertinent labs within the past 24 hours have been reviewed.    Significant Imaging: I have reviewed all pertinent imaging results/findings within the past 24 hours.   Imaging Results          X-Ray Chest AP Portable (Final result)  Result time 06/27/20 22:38:02    Final result by Scott Felix MD (06/27/20 22:38:02)                 Impression:      Satisfactory position of endotracheal tube and enteric tube below the left hemidiaphragm.    Airspace opacity in the left lung base, suggestive of pneumonitis.      Electronically signed by: Scott Felix MD  Date:    06/27/2020  Time:    22:38             Narrative:    EXAMINATION:  XR CHEST AP PORTABLE    CLINICAL HISTORY:  Presence of other specified devices    TECHNIQUE:  Single frontal view of the chest was performed.    COMPARISON:  12/23/2015.    FINDINGS:  There is satisfactory position of the tracheal tube tip 6.5 cm from the jazmyn.  The enteric tube extends below the hemidiaphragm with the tip not visualized. Monitoring EKG leads are present.    There is stable enlargement of the cardiomediastinal silhouette.  The hemidiaphragms are unremarkable.  There is no evidence of free air the hemidiaphragms.  There are no pleural effusions.  There is no evidence of a pneumothorax.  There is no evidence of pneumomediastinum.  There is an airspace opacity left lung base.  There are degenerative changes in the osseous structures.                                 Assessment/Plan:     * GIB (gastrointestinal bleeding)  - Mr. Vinnie Siegel presents with dark blood per rectum x 2 days   - he is found to have GI bleed upon exam  - H&H low at 6.4 & 19.4, and transfusion of 2 units ordered in ED   - GI bleed pathways initiated   - GI consulted   - continue PPI drip      Sepsis  - 2/2 UTI   - started on cefepime in ED, continue   -  UC pending   - perera in place       Acute respiratory failure  - suspect 2/2 combined effects of GI blood losses causing acute anemia and acidosis from DKA   - intubated in ED  - continue on mechanical vent  - PCC consulted       Acute blood loss anemia  - 2/2 GI bleed combined with likely anemia of chronic disease as patient has CKD-IV   - iron studies ordered to eval for further causes   - transfusion of 2 units PRBCs in progress   - monitor CBC per GI bleed pathway order set     Diabetic ketoacidosis without coma associated with type 2 diabetes mellitus  - elevated glucose readings per family   - DKA without elevated anion gap in ED   - started on insulin drip, continue   - last A1C:   Lab Results   Component Value Date    HGBA1C 7.4 (H) 05/21/2020    - A1C pending for AM  - SSI with [id number] [id number]         Acute renal failure superimposed on stage 4 chronic kidney disease  - suspect multi factorial and 2/2 GI blood losses, dehydration  - urine studies ordered   - continue fluids per DKA pathways   - avoid nephrotoxins, renally dose meds   - monitor AM labs     Hypertensive emergency  - s/p labetalol in ED with good BP control   - highest , now 86  - no further anti-hypertensive meds at present  - monitor       Elevated troponin  - suspect 2/2 worsening renal function, blood losses,   - no reports of chest pain per ED note   - monitor [id number]       UTI (urinary tract infection)  - no reports of UTI symptoms per ED note  - started on cefepime in ED, continue w/ renal dosing   - UC pending       Hematuria  - unclear duration  - Urology consulted       Dilated cardiomyopathy  - diuretics held 2/2 ADONAY      Hyperlipidemia  - holding atorvastatin 2/2 ADONAY   - last lipid panel:     Results for [facility] (MRN [id number]) as of 6/27/2020 23:22   Ref. Range 12/13/2018 08:40   Cholesterol Latest Ref Range: 120 - 199 mg/dL 123   HDL Latest Ref Range: 40 - 75 mg/dL 41   Hdl/Cholesterol Ratio Latest Ref Range:  20.0 - 50.0 % 33.3   LDL Cholesterol External Latest Ref Range: 63.0 - 159.0 mg/dL 52.6 (L)   Non-HDL Cholesterol Latest Units: mg/dL 82   Total Cholesterol/HDL Ratio Latest Ref Range: 2.0 - 5.0  3.0   Triglycerides Latest Ref Range: 30 - 150 mg/dL 147     VTE Risk Mitigation (From admission, onward)         Ordered     IP VTE HIGH RISK PATIENT  Once      06/28/20 0139     Reason for No Pharmacological VTE Prophylaxis  Once     Question:  Reasons:  Answer:  Active Bleeding    06/28/20 0139     Place sequential compression device  Until discontinued      06/28/20 0036                   Sofi Shah PA-C  Department of Hospital Medicine   [facility]

## 2020-06-28 NOTE — ASSESSMENT & PLAN NOTE
- no reports of UTI symptoms per ED note  - started on cefepime in ED, continue w/ renal dosing   - UC pending

## 2020-06-28 NOTE — ASSESSMENT & PLAN NOTE
- suspect multi factorial and 2/2 GI blood losses, dehydration  - urine studies ordered   - continue fluids per DKA pathways   - avoid nephrotoxins, renally dose meds   - monitor AM labs

## 2020-06-28 NOTE — CONSULTS
Gastroenterology Consult    6/28/2020 11:06 AM    Patient Name: Vinnie Siegel  MRN: [id number]  Admission Date: 6/27/2020  Hospital Length of Stay: 1 days  Code Status: Full Code   Primary Care Physician: Janeth Walter MD  Principal Problem:Sepsis  Consulting Physician: Inpatient consult to Gastroenterology  Consult performed by: Yara Sher MD  Consult ordered by: Sofi Shah PA-C        Reason for consultation: GI bleed    HPI:  Vinnie Siegel is a 74 y.o. male with a history of HTN, DM2, HLD, kidney stones, CKD, cataracts and glaucoma who presented with weakness and lethargy over the last week.  He has also had dark red blood in his stool for the last 2 days.  Patient was intubated while in the ED due to increased work of breathing, so history was obtained from chart review and discussion with ICU nurse.  He was found to be in DKA and was put on an insulin gtt in the ICU.  He was anemic and given 2 units pRBCs with appropriate response.  Per chart review, he had a colonoscopy in 2011, but results are not available.  He has not had any blood or stool since admission to the ICU.  OG tube was hooked up to suction while I was at the bedside with only a small amount of clear yellow fluid that came out.    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 [facility] [place]    lateral internal anal sphincterotomy  12/13/13    Dr. [facility] [place]    URETERAL STENT PLACEMENT         Social History     Tobacco Use    Smoking status: Current Every Day Smoker     Packs/day: 0.50      Years: 45.00     Pack years: 22.50    Smokeless tobacco: Never Used    Tobacco comment: says he could quit and will try    Substance Use Topics    Alcohol use: Yes     Comment: rarely    Drug use: No        Family History   Problem Relation Age of Onset    Diabetes Brother         type 1    Hypertension Brother     Stroke Brother          Review of patient's allergies indicates:  No Known Allergies      Current Facility-Administered Medications:     0.9%  NaCl infusion (for blood administration), , Intravenous, Q24H PRN, Sofi Shah PA-C    acetaminophen tablet 650 mg, 650 mg, Oral, Q4H PRN, Sofi Shah PA-C    albuterol-ipratropium 2.5 mg-0.5 mg/3 mL nebulizer solution 3 mL, 3 mL, Nebulization, Q6H, Phu Justice MD    ceFEPIme (MAXIPIME) 1 g in sodium chloride 0.9% 50 mL IVPB, 1 g, Intravenous, Q24H, NYDIA Gomez MD    dexmedetomidine (PRECEDEX) 400mcg/100mL 0.9% NaCL infusion, 0.2 mcg/kg/hr, Intravenous, Continuous, Phu Justice MD, Last Rate: 7.2 mL/hr at 06/28/20 1100, 0.2 mcg/kg/hr at 06/28/20 1100    dextrose 50% injection 12.5 g, 12.5 g, Intravenous, PRN, Sofi Shah PA-C    dextrose 50% injection 25 g, 25 g, Intravenous, PRN, Sofi Shah PA-C    insulin regular 100 Units in sodium chloride 0.9% 100 mL infusion, 8 Units/hr, Intravenous, Continuous, Sofi Shah PA-C, Last Rate: 10 mL/hr at 06/28/20 0653, 10 Units/hr at 06/28/20 0653    ondansetron injection 4 mg, 4 mg, Intravenous, Q6H PRN, Sofi Shah PA-C    pantoprazole injection 40 mg, 40 mg, Intravenous, Q12H, Phu Justice MD, 40 mg at 06/28/20 1101    propofol (DIPRIVAN) 10 mg/mL infusion, 5 mcg/kg/min, Intravenous, Continuous, Sofi M. Pablo, PA-C, Last Rate: 8.4 mL/hr at 06/28/20 0750, 10 mcg/kg/min at 06/28/20 0750    sodium bicarbonate 1 mEq/mL (8.4 %) 150 mEq in dextrose 5 % 1,000 mL infusion, , Intravenous, Continuous, D. Darrius Gomez MD, Last Rate: 100  "mL/hr at 06/28/20 1100    sodium chloride 0.9% flush 10 mL, 10 mL, Intravenous, PRN, Sofi Shah PA-C    timolol maleate 0.25% ophthalmic solution 1 drop, 1 drop, Both Eyes, BID, Sofi Shah PA-C    vancomycin (VANCOCIN) 2,500 mg in sodium chloride 0.9% 500 mL IVPB, 2,500 mg, Intravenous, Once, NYDIA Gomez MD, 2,500 mg at 06/28/20 0917    Pharmacy to dose Vancomycin consult, , , Once **AND** vancomycin - pharmacy to dose, , Intravenous, pharmacy to manage frequency, NYDIA Gomez MD    Review of Systems   Unable to perform ROS: Intubated       BP (!) 97/55   Pulse 84   Temp 97.8 °F (36.6 °C) (Axillary)   Resp (!) 24   Ht 6' 2" (1.88 m)   Wt (!) 144.5 kg (318 lb 9 oz)   SpO2 100%   BMI 40.90 kg/m²   Physical Exam  Vitals signs reviewed.   Constitutional:       General: He is not in acute distress.     Appearance: He is well-developed. He is obese. He is ill-appearing.   HENT:      Head: Normocephalic and atraumatic.      Right Ear: External ear normal.      Left Ear: External ear normal.      Nose: Nose normal.      Mouth/Throat:      Mouth: Mucous membranes are moist.      Pharynx: Oropharynx is clear.   Eyes:      General: No scleral icterus.     Conjunctiva/sclera: Conjunctivae normal.   Cardiovascular:      Rate and Rhythm: Normal rate and regular rhythm.      Heart sounds: Normal heart sounds. No murmur.   Pulmonary:      Effort: No respiratory distress.      Breath sounds: Rhonchi present.      Comments: intubated  Abdominal:      General: Bowel sounds are normal. There is no distension.      Palpations: Abdomen is soft.      Tenderness: There is no abdominal tenderness. There is no guarding or rebound.      Comments: OG tube with clear yellow fluid - no blood   Musculoskeletal: Normal range of motion.      Right lower leg: Edema present.      Left lower leg: Edema present.   Skin:     General: Skin is warm and dry.      Findings: No rash.   Neurological:      General: No " focal deficit present.      Comments: Intubated, sedated but able to squeeze my hand, follow some commands         Labs:  Lab Results   Component Value Date/Time    WBC 30.89 (H) 06/28/2020 05:06 AM    HGB 7.6 (L) 06/28/2020 05:06 AM    HCT 22.8 (L) 06/28/2020 05:06 AM     06/28/2020 05:06 AM    MCV 90 06/28/2020 05:06 AM     (L) 06/28/2020 09:27 AM    K 4.3 06/28/2020 09:27 AM     (H) 06/28/2020 09:27 AM    CO2 11 (L) 06/28/2020 09:27 AM     (H) 06/28/2020 09:27 AM    CREATININE 4.4 (H) 06/28/2020 09:27 AM     (HH) 06/28/2020 09:27 AM    CALCIUM 6.4 (LL) 06/28/2020 09:27 AM    MG 2.1 06/27/2020 09:11 PM    PHOS 4.0 06/28/2020 05:06 AM   ]  Lab Results   Component Value Date/Time    PROT 5.1 (L) 06/27/2020 09:11 PM    ALBUMIN 1.7 (L) 06/27/2020 09:11 PM    BILITOT 0.3 06/27/2020 09:11 PM    BILIDIR 0.3 05/17/2012 10:02 AM    AST 26 06/27/2020 09:11 PM    ALT 56 (H) 06/27/2020 09:11 PM    ALKPHOS 81 06/27/2020 09:11 PM   ]    Imaging and Procedures:  I personally reviewed the imaging/procedures below.  CXR 6/27/20:  Satisfactory position of endotracheal tube and enteric tube below the left hemidiaphragm.  Airspace opacity in the left lung base, suggestive of pneumonitis.    Assessment:  Vinnie Siegel is a 74 y.o. male with a history of HTN, DM2, HLD, kidney stones, CKD, cataracts and glaucoma who presented with weakness and lethargy over the last week. He was found to be in DKA and was intubated for increased work of breathing.  GI consulted for dark red stools over the last 2 days.    Plan:  - NPO  - PPI gtt  - serial H/H, transfuse if < 7  - possible EGD tomorrow if DKA improving/stable for anesthesia - will reassess in AM (may be easier to get it done prior to extubation)    Yara Sher MD

## 2020-06-28 NOTE — NURSING
Sofi TITUS updated regarding pt glucose still remains >500, instructed to continue current insulin rate.

## 2020-06-28 NOTE — SUBJECTIVE & OBJECTIVE
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 [facility] [place]    lateral internal anal sphincterotomy  12/13/13    Dr. root [facility] [place]    URETERAL STENT PLACEMENT         Review of patient's allergies indicates:  No Known Allergies    Family History     Problem Relation (Age of Onset)    Diabetes Brother    Hypertension Brother    Stroke Brother        Tobacco Use    Smoking status: Current Every Day Smoker     Packs/day: 0.50     Years: 45.00     Pack years: 22.50    Smokeless tobacco: Never Used    Tobacco comment: says he could quit and will try    Substance and Sexual Activity    Alcohol use: Yes     Comment: rarely    Drug use: No    Sexual activity: Yes     Comment: single, retired , no kids         Review of Systems   Unable to obtain.    Objective:     Vital Signs (Most Recent):  Temp: 97.8 °F (36.6 °C) (06/28/20 0715)  Pulse: 84 (06/28/20 0915)  Resp: (!) 24 (06/28/20 0915)  BP: (!) 97/55 (06/28/20 0915)  SpO2: 100 % (06/28/20 0915) Vital Signs (24h Range):  Temp:  [97.7 °F (36.5 °C)-98.7 °F (37.1 °C)] 97.8 °F (36.6 °C)  Pulse:  [] 84  Resp:  [0-37] 24  SpO2:  [100 %] 100 %  BP: ()/(51-93) 97/55     Weight: (!) 144.5 kg (318 lb 9 oz)  Body mass index is 40.9 kg/m².      Intake/Output Summary (Last 24 hours) at 6/28/2020 1035  Last data filed at 6/28/2020 0614  Gross per 24 hour   Intake 1710.7 ml   Output 140 ml   Net 1570.7 ml       Physical Exam  General: Intubated, sedated  HEENT: ETT in place, NC/AT  CV: RRR, no MRG  Pulm: Rhonchi bilaterally,  no wheeze  Abd: Soft, NT  Ext: Pitting edema of BLE    Vents:  Vent Mode: A/C (06/28/20 0812)  Ventilator Initiated: Yes (06/27/20 2216)  Set Rate: 30 BPM (06/28/20 0812)  Vt Set: 400 mL (06/28/20 0812)  Pressure Support: 0 cmH20 (06/28/20 0812)  PEEP/CPAP: 5 cmH20 (06/28/20 0812)  Oxygen Concentration (%): 21 (06/28/20 0915)  Peak Airway Pressure: 21 cmH2O (06/28/20 0812)  Plateau Pressure: 0 cmH20 (06/28/20 0812)  Total Ve: 12.6 mL (06/28/20 0812)  F/VT Ratio<105 (RSBI): (!) 67.72 (06/28/20 0812)    Lines/Drains/Airways     Central Venous Catheter Line            Percutaneous Central Line Insertion/Assessment - Triple Lumen  06/27/20 2300 right femoral less than 1 day          Drain                 NG/OG Tube 06/27/20 2258 orogastric 16 Fr. Center mouth less than 1 day         Urethral Catheter 06/27/20 2258 Non-latex 16 Fr. less than 1 day          Airway                 Airway - Non-Surgical 06/27/20 2205 Endotracheal Tube less than 1 day          Peripheral Intravenous Line                 Peripheral IV - Single Lumen 06/27/20 2047 18 G Right Antecubital less than 1 day         Peripheral IV - Single Lumen 06/27/20 2127 20 G Left Forearm less than 1 day                Significant Labs:    CBC/Anemia Profile:  Recent Labs   Lab 06/27/20 2111 06/27/20  2331 06/28/20  0506   WBC 35.04*  --  30.89*   HGB 6.4*  --  7.6*   HCT 19.4*  --  22.8*     --  206   MCV 92  --  90   RDW 15.4*  --  15.1*   IRON 41*  --   --    FERRITIN 757*  --   --    FOLATE  --  4.9  --    [id number]  --  651  --         Chemistries:  Recent Labs   Lab 06/27/20 2111 06/28/20  0058 06/28/20  0506 06/28/20  0927   *   < > 129* 129* 133*   K 5.2*   < > 5.7* 4.8 4.3      < > 106 107 113*   CO2 8*   < > 11* 12* 11*   *   < > 111* 114* 111*   CREATININE 4.2*   < > 4.4* 4.4* 4.4*   CALCIUM 7.3*   < > 7.0* 7.0* 6.4*   ALBUMIN 1.7*  --   --   --   --    PROT 5.1*  --   --   --   --    BILITOT 0.3  --   --   --   --     ALKPHOS 81  --   --   --   --    ALT 56*  --   --   --   --    AST 26  --   --   --   --    MG 2.1  --   --   --   --    PHOS  --   --   --  4.0  --     < > = values in this interval not displayed.        All pertinent labs within the past 24 hours have been reviewed.    Significant Imaging:   I have reviewed and interpreted all pertinent imaging results/findings within the past 24 hours.

## 2020-06-28 NOTE — ASSESSMENT & PLAN NOTE
- Multifactorial with likely acute blood loss anemia, GI losses contributing.  - Baseline Cr ~2.4-2.7.  - FENa suggestive of intrinsic source, concurrent non-gap metabolic acidosis.  - Bicarbonate gtt, consult nephrology for further evaluation/management recommendations.

## 2020-06-28 NOTE — EICU
BF > 500 x 2 consecutive readings. Continue DKA protocol with same Insulin infusion rate at 8 Units/h now.   D/w RN    6 am  Called about 3 last FSBG> 700, pt still on 8 Units of Insulin/h per protocol.  10 Units of Regular Insulin iv x 1, increased qtt rate to 10 Uniths/h.   D/w RN

## 2020-06-28 NOTE — HPI
Mr. Vinnie Siegel is a 74 y.o. male, with PMH of IDDM-2, CKD-IV, dilated cardiomyopathy, HTN, HLD, gout, obesity, who [place] [facility] [place]27[place]20 with rectal bleeding x 2 days. His family called EMS after he became lethargic earlier this evening. Per his fiancee, he has been passing dark blood stools, and has been noting generalized weakness x 1 week. Additionally, he has been having elevated blood glucose readings x 1 day. In the ED, he was found to be in DKA, with a GI bleed. H&H were 6.4 & 19.4 and he was transfused 2 units PRBCs. He was started on an insulin drip and a Protonix drip. He had worsening respiratory and mental status while in the ED, and was intubated for airway protection. He was admitted to inpatient status to the ICU.

## 2020-06-28 NOTE — SUBJECTIVE & OBJECTIVE
Interval History: No acute events since admission. Discussed with Vaibhav Nair/Vinh.    Review of Systems   Unable to perform ROS: Intubated     Objective:     Vital Signs (Most Recent):  Temp: 97.6 °F (36.4 °C) (06/28/20 1345)  Pulse: 72 (06/28/20 1345)  Resp: (!) 27 (06/28/20 1345)  BP: (!) 100/59 (06/28/20 1345)  SpO2: 99 % (06/28/20 1345) Vital Signs (24h Range):  Temp:  [97.6 °F (36.4 °C)-98.7 °F (37.1 °C)] 97.6 °F (36.4 °C)  Pulse:  [] 72  Resp:  [0-37] 27  SpO2:  [99 %-100 %] 99 %  BP: ()/(51-93) 100/59     Weight: (!) 144.5 kg (318 lb 9 oz)  Body mass index is 40.9 kg/m².    Intake/Output Summary (Last 24 hours) at 6/28/2020 1501  Last data filed at 6/28/2020 1356  Gross per 24 hour   Intake 2819.53 ml   Output 460 ml   Net 2359.53 ml      Physical Exam  HENT:      Head: Normocephalic and atraumatic.   Eyes:      General:         Right eye: No discharge.         Left eye: No discharge.      Conjunctiva/sclera: Conjunctivae normal.   Cardiovascular:      Rate and Rhythm: Normal rate.      Pulses: Normal pulses.   Pulmonary:      Comments: Intubated. Crackles, mechanical breath sounds bilaterally.  Abdominal:      Palpations: Abdomen is soft.      Tenderness: There is no abdominal tenderness.   Musculoskeletal:         General: No tenderness.      Right lower leg: Edema (2+) present.      Left lower leg: Edema (2+) present.   Skin:     General: Skin is warm and dry.   Neurological:      Comments: RASS -3, CAM-ICU N/A       Significant Labs:   CBC:  Recent Labs   Lab 06/27/20  2111 06/28/20  0506 06/28/20  1253   WBC 35.04* 30.89* 28.33*   HGB 6.4* 7.6* 7.0*   HCT 19.4* 22.8* 20.3*    206 197   GRAN 94.0* 89.0* 94.0*   LYMPH 4.0*  CANCELED 7.0* 4.0*  CANCELED   MONO 2.0*  CANCELED 4.0 2.0*  CANCELED   EOS CANCELED  --  CANCELED   BASO CANCELED  --  CANCELED      CMP:  Recent Labs   Lab 06/27/20  2111  06/28/20  0506 06/28/20  0927 06/28/20  1253   *   < > 129* 133* 133*   K 5.2*   <  > 4.8 4.3 4.5      < > 107 113* 111*   CO2 8*   < > 12* 11* 12*   *   < > 114* 111* 120*   CREATININE 4.2*   < > 4.4* 4.4* 4.6*   *   < > 741* 486* 454*   CALCIUM 7.3*   < > 7.0* 6.4* 7.1*   MG 2.1  --   --   --   --    PHOS  --   --  4.0  --   --    ALKPHOS 81  --   --   --   --    AST 26  --   --   --   --    ALT 56*  --   --   --   --    BILITOT 0.3  --   --   --   --    PROT 5.1*  --   --   --   --    ALBUMIN 1.7*  --   --   --   --    ANIONGAP 14   < > 10 9 10    < > = values in this interval not displayed.      Significant Imaging:   No new imaging this morning.

## 2020-06-28 NOTE — ED PROVIDER NOTES
Encounter Date: 6/27/2020    SCRIBE #1 NOTE: I, Lucia Escobar, am scribing for, and in the presence of, Dr. Garcia.       History     Chief Complaint   Patient presents with    Rectal Bleeding     x 2 days, lethargic x 1 week but dark blood noted to stool per wife.      Time seen by provider: 9:02 PM    This is a 74 y.o. male who presents with complaint of generalized weakness for the past week. Per EMS, patient has had dark stools for the past two days and lethargy for the past week. History is limited due to patient condition.    Additional history obtained in speaking with the patient's brother and fiancee, he has had elevated blood sugars since yesterday, and had onset of dark rectal bleeding since last night.    The history is provided by the EMS personnel and medical records. The history is limited by the condition of the patient.     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 Root [facility] [place]    lateral internal anal sphincterotomy  12/13/13    Dr. root [facility] [place]    URETERAL STENT PLACEMENT       Family History   Problem Relation Age of Onset    Diabetes Brother         type 1    Hypertension Brother     Stroke Brother      Social History     Tobacco Use    Smoking status: Current Every Day Smoker     Packs/day: 0.50     Years: 45.00     Pack years: 22.50    Smokeless tobacco: Never Used    Tobacco comment: says he could quit and will try    Substance Use Topics    Alcohol use: Yes     Comment:  rarely    Drug use: No     Review of Systems   Unable to perform ROS: Acuity of condition       Physical Exam     Initial Vitals [06/27/20 2047]   BP Pulse Resp Temp SpO2   136/63 (!) 122 19 98.7 °F (37.1 °C) 100 %      MAP       --         Physical Exam    Nursing note and vitals reviewed.  Constitutional: He appears well-developed and well-nourished. He appears distressed.   Ill appearing. Obese.   HENT:   Head: Normocephalic and atraumatic.   Dry mucous membranes.   Eyes: EOM are normal. Pupils are equal, round, and reactive to light.   Pale conjunctivae.   Neck: Normal range of motion.   Cardiovascular: Regular rhythm and normal heart sounds. Tachycardia present.    Pulmonary/Chest: Effort normal. Tachypnea noted. No respiratory distress.   Increased work of breathing.    Abdominal: Normal appearance. He exhibits distension. There is no guarding.   Genitourinary: Rectum:      Guaiac result positive.   Guaiac positive stool. : Acceptable.   Genitourinary Comments: Dark red blood per rectum.     Musculoskeletal: Normal range of motion. Edema present.      Comments: 1+ pitting edema to bilateral LE.   Neurological: He is alert. No sensory deficit.   Skin: No rash noted.   Psychiatric: He has a normal mood and affect. His behavior is normal. Thought content normal.         ED Course   Critical Care    Date/Time: 6/27/2020 9:39 PM  Performed by: Haylie Garcia MD  Authorized by: Haylie Garcia MD   Total critical care time (exclusive of procedural time) : 60 minutes  Critical care was necessary to treat or prevent imminent or life-threatening deterioration of the following conditions: shock.  Critical care was time spent personally by me on the following activities: evaluation of patient's response to treatment, examination of patient, obtaining history from patient or surrogate, ordering and performing treatments and interventions, ordering and review of laboratory studies, ordering and review  of radiographic studies, pulse oximetry, re-evaluation of patient's condition and review of old charts.    Central Line    Date/Time: 7/1/2020 12:35 AM  Performed by: Haylie Garcia MD  Consent Done: Emergent Situation  Indications: med administration and vascular access  Preparation: skin prepped with ChloraPrep  Skin prep agent dried: skin prep agent completely dried prior to procedure  Hand hygiene: hand hygiene performed prior to central venous catheter insertion  Location details: right femoral  Site selection rationale: obese neck, emergent need  Catheter type: triple lumen  Ultrasound guidance: no  Number of attempts: 1  Post-procedure: line sutured,  chlorhexidine patch,  sterile dressing applied and blood return through all ports    Intubation    Date/Time: 7/1/2020 12:36 AM  Performed by: Haylie Garcia MD  Authorized by: Triny Dill MD   Indications: respiratory distress  Intubation method: fiberoptic oral  Patient status: paralyzed (RSI)  Preoxygenation: nasal cannula and BVM  Sedatives: etomidate  Paralytic: rocuronium  Laryngoscope size: Mac 4  Tube size: 7.5 mm  Tube type: cuffed  Number of attempts: 1  Cricoid pressure: no  Cords visualized: yes  Post-procedure assessment: chest rise and CO2 detector  Breath sounds: equal and equal and absent over the epigastrium  Cuff inflated: yes  Tube secured with: ETT crews  Chest x-ray interpreted by me and radiologist.  Chest x-ray findings: endotracheal tube in appropriate position  Complications: No        Labs Reviewed   CBC W/ AUTO DIFFERENTIAL - Abnormal; Notable for the following components:       Result Value    WBC 35.04 (*)     RBC 2.10 (*)     Hemoglobin 6.4 (*)     Hematocrit 19.4 (*)     RDW 15.4 (*)     Gran% 94.0 (*)     Lymph% 4.0 (*)     Mono% 2.0 (*)     All other components within normal limits    Narrative:      HCT  critical result(s) called and verbal readback obtained from   Romie Lopez RN by WALTER 06/27/2020 21:30   COMPREHENSIVE  METABOLIC PANEL - Abnormal; Notable for the following components:    Sodium 127 (*)     Potassium 5.2 (*)     CO2 8 (*)     Glucose 795 (*)     BUN, Bld 107 (*)     Creatinine 4.2 (*)     Calcium 7.3 (*)     Total Protein 5.1 (*)     Albumin 1.7 (*)     ALT 56 (*)     eGFR if  15 (*)     eGFR if non  13 (*)     All other components within normal limits    Narrative:        critical result(s) called and verbal readback obtained from   Alexandra Lockhatr RN by [facility] 06/27/2020 22:12   LIPASE - Abnormal; Notable for the following components:    Lipase 120 (*)     All other components within normal limits   TROPONIN I - Abnormal; Notable for the following components:    Troponin I 0.043 (*)     All other components within normal limits   URINALYSIS, REFLEX TO URINE CULTURE - Abnormal; Notable for the following components:    Appearance, UA Cloudy (*)     Protein, UA 1+ (*)     Glucose, UA 3+ (*)     Occult Blood UA 3+ (*)     Leukocytes, UA 2+ (*)     All other components within normal limits    Narrative:     Preferred Collection Type->Urine, Clean Catch  Specimen Source->Urine   URINALYSIS MICROSCOPIC - Abnormal; Notable for the following components:    RBC, UA >100 (*)     WBC, UA >100 (*)     Bacteria Moderate (*)     All other components within normal limits    Narrative:     Preferred Collection Type->Urine, Clean Catch  Specimen Source->Urine   CULTURE, URINE   MAGNESIUM   SARS-COV-2 RNA AMPLIFICATION, QUAL   FERRITIN   FOLATE   IRON AND TIBC   VITAMIN B12   FERRITIN   IRON AND TIBC   TYPE & SCREEN   POCT GLUCOSE MONITORING CONTINUOUS   PREPARE RBC SOFT     EKG Readings: (Independently Interpreted)   Initial Reading: No STEMI.   Sinus tachycardia, heart rate 121, incomplete right bundle branch block, no STEMI       Imaging Results          X-Ray Chest AP Portable (In process)  Result time 06/27/20 22:38:04                 Medical Decision Making:   History:   Old Medical Records: I decided  to obtain old medical records.  Initial Assessment:   Urgent evaluation of 74-year-old gentleman with diabetes brought in given concern for rectal bleeding, reportedly confused.  On examination patient is obviously ill, tachycardic, pale conjunctiva, melena present on rectal exam.  Glucose greater than 500.  Will plan to perform thorough evaluation, likely concern for need of blood transfusion for given lower GI bleed.  Will admit IV fluids, labs, admit to the ICU.  Independently Interpreted Test(s):   I have ordered and independently interpreted X-rays - see prior notes.  I have ordered and independently interpreted EKG Reading(s) - see prior notes  Clinical Tests:   Lab Tests: Ordered and Reviewed  Radiological Study: Ordered and Reviewed  Medical Tests: Ordered and Reviewed  ED Management:  Labs notable for a UTI, acute anemia, leukocytosis, severe hyper glycemia with concern for ADONAY, DKA, elevated trop- asa held given gi bleed.     Decision made to intubate the patient as he was tachypneic, and becoming more lethargic, concern for fatigue, and impending respiratory failure.  Will treat as dka with insulin drip, transfuse given acute anemia in setting of gi bleed- placed on ppi drip, and admit to the ICU for above issues/ multisystem organ failure.   Discussed with family- brother and fiance made aware of critical condition and admission to the ICU.             Scribe Attestation:   Scribe #1: I performed the above scribed service and the documentation accurately describes the services I performed. I attest to the accuracy of the note.    Attending Attestation:           Physician Attestation for Scribe:  Physician Attestation Statement for Scribe #1: I, Dr. Garcia, reviewed documentation, as scribed by Lucia Escobar in my presence, and it is both accurate and complete.                 ED Course as of Jul 01 0034   Sat Jun 27, 2020   2133 Call made to patient's wife- notes recently elevated blood sugar, and  rectal bleeding since last night. Verbal consent obtained after Steven Michelle     [DM]   2310 WBC, UA(!): >100 [DM]   2310 Potassium(!): 5.2 [DM]   2310 Glucose(!!): 795 [DM]   2310 Creatinine(!): 4.2 [DM]   2310 Hemoglobin(!): 6.4 [DM]   2310 WBC(!): 35.04 [DM]      ED Course User Index  [DM] Haylie Garcia MD                Clinical Impression:     1. Acute blood loss anemia    2. Dizziness    3. Rectal bleeding    4. Endotracheal tube present    5. GIB (gastrointestinal bleeding)    6. Diabetic ketoacidosis with coma associated with type 2 diabetes mellitus    7. Acute renal failure superimposed on chronic kidney disease, unspecified CKD stage, unspecified acute renal failure type    8. Respiratory failure, unspecified chronicity, unspecified whether with hypoxia or hypercapnia            Disposition:   Disposition: Admitted  Condition: Critical     ED Disposition Condition    Admit                           Haylie Garcia MD  06/27/20 2240       Haylie Garcia MD  07/01/20 0038

## 2020-06-28 NOTE — ASSESSMENT & PLAN NOTE
- elevated glucose readings per family   - DKA without elevated anion gap in ED   - started on insulin drip, continue   - last A1C:   Lab Results   Component Value Date    HGBA1C 7.4 (H) 05/21/2020    - A1C pending for AM  - SSI with accuchekcs [id number]

## 2020-06-28 NOTE — PLAN OF CARE
Pt received from ED intubated and on mechanical ventilation. ETT secured at 24cm at the teeth. Pt placed on ventilator, settings per flow sheet. Suction and Ambu at bedside. Will continue to monitor.

## 2020-06-28 NOTE — ASSESSMENT & PLAN NOTE
- 2/2 GI bleed combined with likely anemia of chronic disease as patient has CKD-IV   - iron studies ordered to eval for further causes   - transfusion of 2 units PRBCs in progress   - monitor CBC per GI bleed pathway order set

## 2020-06-28 NOTE — PROGRESS NOTES
Pharmacokinetic Initial Assessment: IV Vancomycin    Assessment/Plan:    Initiate intravenous vancomycin with loading dose of 2500 mg once with subsequent doses when random concentrations are less than 20 mcg/mL  Desired empiric serum trough concentration is 10 to 20 mcg/mL  Draw vancomycin random level on 6/29 at 430.  Pharmacy will continue to follow and monitor vancomycin.      Please contact pharmacy at extension [phone] with any questions regarding this assessment.     Thank you for the consult,   Karolyn Morales       Patient brief summary:  Vinnie Siegel is a 74 y.o. male initiated on antimicrobial therapy with IV Vancomycin for treatment of suspected bacteremia    Drug Allergies:   Review of patient's allergies indicates:  No Known Allergies    Actual Body Weight:   144.5kg    Renal Function:   Estimated Creatinine Clearance: 22.7 mL/min (A) (based on SCr of 4.4 mg/dL (H)).,     Dialysis Method (if applicable):  N/A    CBC (last 72 hours):  Recent Labs   Lab Result Units 06/27/20 2111 06/28/20  0506   WBC K/uL 35.04* 30.89*   Hemoglobin g/dL 6.4* 7.6*   Hematocrit % 19.4* 22.8*   Platelets K/uL 268 206   Gran% % 94.0* 89.0*   Lymph% % 4.0* 7.0*   Mono% % 2.0* 4.0   Eosinophil% % 0.0 0.0   Basophil% % 0.0 0.0   Differential Method  Manual Manual       Metabolic Panel (last 72 hours):  Recent Labs   Lab Result Units 06/27/20 2111 06/27/20 2114 06/27/20  2331 06/28/20  0020 06/28/20  0058 06/28/20  0506   Sodium mmol/L 127*  --  129*  --  129* 129*   Sodium Urine Random mmol/L  --   --   --  25  --   --    Potassium mmol/L 5.2*  --  5.2*  --  5.7* 4.8   Chloride mmol/L 105  --  109  --  106 107   CO2 mmol/L 8*  --  9*  --  11* 12*   Glucose mg/dL 795*  --  790*  --  761* 741*   Glucose, UA   --  3+*  --   --   --   --    BUN, Bld mg/dL 107*  --  105*  --  111* 114*   Creatinine mg/dL 4.2*  --  4.0*  --  4.4* 4.4*   Creatinine, Random Ur mg/dL  --   --   --  63.2  --   --    Albumin g/dL 1.7*  --   --   --   --    --    Total Bilirubin mg/dL 0.3  --   --   --   --   --    Alkaline Phosphatase U/L 81  --   --   --   --   --    AST U/L 26  --   --   --   --   --    ALT U/L 56*  --   --   --   --   --    Magnesium mg/dL 2.1  --   --   --   --   --    Phosphorus mg/dL  --   --   --   --   --  4.0       Drug levels (last 3 results):  No results for input(s): VANCOMYCINRA, VANCOMYCINPE, VANCOMYCINTR in the last 72 hours.    Microbiologic Results:  Microbiology Results (last 7 days)       Procedure Component Value Units Date/Time    Urine culture [[id number]] Collected: 06/27/20 2114    Order Status: No result Specimen: Urine Updated: 06/27/20 2134

## 2020-06-28 NOTE — ASSESSMENT & PLAN NOTE
- Initial BPs significantly elevated; now running low to normal with sedation on vent.  - Monitor.

## 2020-06-28 NOTE — ASSESSMENT & PLAN NOTE
- Mr. Vinnie Siegel presents with dark blood per rectum x 2 days   - he is found to have GI bleed upon exam  - H&H low at 6.4 & 19.4, and transfusion of 2 units ordered in ED   - GI bleed pathways initiated   - GI consulted   - continue PPI drip

## 2020-06-28 NOTE — ASSESSMENT & PLAN NOTE
- Two day history of dark blood per rectum and patient's fiancee reports progressive loose stool at home with eventual blood noted.  - s/p 2U pRBCs in ED; trend Hgb and transfuse PRN Hgb < 7.  - Continuing pantoprazole as 40mg IV BID.  - GI consulted; appreciate assistance.

## 2020-06-28 NOTE — HPI
74yoM with hx of HFrEF, tobacco use, CKD, DM2 who presents with lethargy and melena/hematochezia.  Patient is presently intubated so history is obtained from his fiance.  She reports that a week ago he began to report feeling weak and suffered from nausea and watery diarrhea. Minimal vomiting.  A few days later she noticed that his stools had blood in them and then progressed to being black in color.  He became progressively weak and lost his appetite.  No history of GIB.  No OAC at home, just 81mg of ASA.  He was not taking his insulin at home because he was not eating.  In the ED he was encephalopathic and exhibited Kussmaul respirations.  Diagnosed w/ DKA and was intubated for progressive encephalopathy and respiratory distress.  He is markedly hyperglycemic and his pH was 7.17 but his BHB was only 0.7.

## 2020-06-28 NOTE — ASSESSMENT & PLAN NOTE
- s/p labetalol in ED with good BP control   - highest , now 86  - no further anti-hypertensive meds at present  - monitor

## 2020-06-29 ENCOUNTER — ANESTHESIA (OUTPATIENT)
Dept: ENDOSCOPY | Facility: OTHER | Age: 75
DRG: 853 | End: 2020-06-29
Payer: MEDICARE

## 2020-06-29 ENCOUNTER — ANESTHESIA EVENT (OUTPATIENT)
Dept: ENDOSCOPY | Facility: OTHER | Age: 75
DRG: 853 | End: 2020-06-29
Payer: MEDICARE

## 2020-06-29 LAB
25(OH)D3+25(OH)D2 SERPL-MCNC: 7 NG/ML (ref 30–96)
ALLENS TEST: ABNORMAL
ANION GAP SERPL CALC-SCNC: 13 MMOL/L (ref 8–16)
ANION GAP SERPL CALC-SCNC: 13 MMOL/L (ref 8–16)
ANION GAP SERPL CALC-SCNC: 14 MMOL/L (ref 8–16)
ANION GAP SERPL CALC-SCNC: 14 MMOL/L (ref 8–16)
BASOPHILS # BLD AUTO: 0.09 K/UL (ref 0–0.2)
BASOPHILS NFR BLD: 0.2 % (ref 0–1.9)
BUN SERPL-MCNC: 113 MG/DL (ref 8–23)
BUN SERPL-MCNC: 113 MG/DL (ref 8–23)
BUN SERPL-MCNC: 118 MG/DL (ref 8–23)
BUN SERPL-MCNC: 119 MG/DL (ref 8–23)
C TRACH DNA SPEC QL NAA+PROBE: NOT DETECTED
C3 SERPL-MCNC: 99 MG/DL (ref 50–180)
C4 SERPL-MCNC: 28 MG/DL (ref 11–44)
CALCIUM SERPL-MCNC: 7.2 MG/DL (ref 8.7–10.5)
CALCIUM SERPL-MCNC: 7.3 MG/DL (ref 8.7–10.5)
CALCIUM SERPL-MCNC: 7.4 MG/DL (ref 8.7–10.5)
CALCIUM SERPL-MCNC: 7.5 MG/DL (ref 8.7–10.5)
CHLORIDE SERPL-SCNC: 110 MMOL/L (ref 95–110)
CHLORIDE SERPL-SCNC: 111 MMOL/L (ref 95–110)
CO2 SERPL-SCNC: 13 MMOL/L (ref 23–29)
CO2 SERPL-SCNC: 14 MMOL/L (ref 23–29)
CO2 SERPL-SCNC: 16 MMOL/L (ref 23–29)
CO2 SERPL-SCNC: 18 MMOL/L (ref 23–29)
CREAT SERPL-MCNC: 4.1 MG/DL (ref 0.5–1.4)
CREAT SERPL-MCNC: 4.3 MG/DL (ref 0.5–1.4)
CREAT SERPL-MCNC: 4.5 MG/DL (ref 0.5–1.4)
CREAT SERPL-MCNC: 4.6 MG/DL (ref 0.5–1.4)
CREAT UR-MCNC: 60.7 MG/DL (ref 23–375)
CREAT UR-MCNC: 60.7 MG/DL (ref 23–375)
DELSYS: ABNORMAL
DIFFERENTIAL METHOD: ABNORMAL
EOSINOPHIL # BLD AUTO: 0 K/UL (ref 0–0.5)
EOSINOPHIL NFR BLD: 0 % (ref 0–8)
EOSINOPHIL URNS QL WRIGHT STN: NORMAL
ERYTHROCYTE [DISTWIDTH] IN BLOOD BY AUTOMATED COUNT: 15 % (ref 11.5–14.5)
ERYTHROCYTE [SEDIMENTATION RATE] IN BLOOD BY WESTERGREN METHOD: 26 MM/H
EST. GFR  (AFRICAN AMERICAN): 14 ML/MIN/1.73 M^2
EST. GFR  (AFRICAN AMERICAN): 14 ML/MIN/1.73 M^2
EST. GFR  (AFRICAN AMERICAN): 15 ML/MIN/1.73 M^2
EST. GFR  (AFRICAN AMERICAN): 16 ML/MIN/1.73 M^2
EST. GFR  (NON AFRICAN AMERICAN): 12 ML/MIN/1.73 M^2
EST. GFR  (NON AFRICAN AMERICAN): 12 ML/MIN/1.73 M^2
EST. GFR  (NON AFRICAN AMERICAN): 13 ML/MIN/1.73 M^2
EST. GFR  (NON AFRICAN AMERICAN): 14 ML/MIN/1.73 M^2
ESTIMATED AVG GLUCOSE: 177 MG/DL (ref 68–131)
FIO2: 21
GLUCOSE SERPL-MCNC: 179 MG/DL (ref 70–110)
GLUCOSE SERPL-MCNC: 200 MG/DL (ref 70–110)
GLUCOSE SERPL-MCNC: 216 MG/DL (ref 70–110)
GLUCOSE SERPL-MCNC: 247 MG/DL (ref 70–110)
HBA1C MFR BLD HPLC: 7.8 % (ref 4–5.6)
HCO3 UR-SCNC: 18 MMOL/L (ref 24–28)
HCT VFR BLD AUTO: 24.4 % (ref 40–54)
HGB BLD-MCNC: 8.9 G/DL (ref 14–18)
HIV 1+2 AB+HIV1 P24 AG SERPL QL IA: NEGATIVE
IMM GRANULOCYTES # BLD AUTO: 1.43 K/UL (ref 0–0.04)
IMM GRANULOCYTES NFR BLD AUTO: 3.5 % (ref 0–0.5)
LACTATE SERPL-SCNC: 3.5 MMOL/L (ref 0.5–2.2)
LYMPHOCYTES # BLD AUTO: 2 K/UL (ref 1–4.8)
LYMPHOCYTES NFR BLD: 4.8 % (ref 18–48)
MCH RBC QN AUTO: 31.2 PG (ref 27–31)
MCHC RBC AUTO-ENTMCNC: 36.5 G/DL (ref 32–36)
MCV RBC AUTO: 86 FL (ref 82–98)
MIN VOL: 13.3
MODE: ABNORMAL
MONOCYTES # BLD AUTO: 2.5 K/UL (ref 0.3–1)
MONOCYTES NFR BLD: 6 % (ref 4–15)
N GONORRHOEA DNA SPEC QL NAA+PROBE: NOT DETECTED
NEUTROPHILS # BLD AUTO: 34.6 K/UL (ref 1.8–7.7)
NEUTROPHILS NFR BLD: 85.5 % (ref 38–73)
NRBC BLD-RTO: 0 /100 WBC
OVALOCYTES BLD QL SMEAR: ABNORMAL
PCO2 BLDA: 25.1 MMHG (ref 35–45)
PEEP: 5
PH SMN: 7.46 [PH] (ref 7.35–7.45)
PHOSPHATE SERPL-MCNC: 2.9 MG/DL (ref 2.7–4.5)
PIP: 24
PLATELET # BLD AUTO: 220 K/UL (ref 150–350)
PMV BLD AUTO: 10.9 FL (ref 9.2–12.9)
PO2 BLDA: 66 MMHG (ref 80–100)
POC BE: -6 MMOL/L
POC SATURATED O2: 94 % (ref 95–100)
POCT GLUCOSE: 172 MG/DL (ref 70–110)
POCT GLUCOSE: 174 MG/DL (ref 70–110)
POCT GLUCOSE: 175 MG/DL (ref 70–110)
POCT GLUCOSE: 177 MG/DL (ref 70–110)
POCT GLUCOSE: 177 MG/DL (ref 70–110)
POCT GLUCOSE: 182 MG/DL (ref 70–110)
POCT GLUCOSE: 182 MG/DL (ref 70–110)
POCT GLUCOSE: 186 MG/DL (ref 70–110)
POCT GLUCOSE: 190 MG/DL (ref 70–110)
POCT GLUCOSE: 190 MG/DL (ref 70–110)
POCT GLUCOSE: 192 MG/DL (ref 70–110)
POCT GLUCOSE: 192 MG/DL (ref 70–110)
POCT GLUCOSE: 194 MG/DL (ref 70–110)
POCT GLUCOSE: 196 MG/DL (ref 70–110)
POCT GLUCOSE: 197 MG/DL (ref 70–110)
POCT GLUCOSE: 202 MG/DL (ref 70–110)
POCT GLUCOSE: 221 MG/DL (ref 70–110)
POCT GLUCOSE: 240 MG/DL (ref 70–110)
POCT GLUCOSE: 255 MG/DL (ref 70–110)
POCT GLUCOSE: 280 MG/DL (ref 70–110)
POCT GLUCOSE: 285 MG/DL (ref 70–110)
POCT GLUCOSE: 354 MG/DL (ref 70–110)
POCT GLUCOSE: 386 MG/DL (ref 70–110)
POTASSIUM SERPL-SCNC: 3.4 MMOL/L (ref 3.5–5.1)
POTASSIUM SERPL-SCNC: 3.4 MMOL/L (ref 3.5–5.1)
POTASSIUM SERPL-SCNC: 3.5 MMOL/L (ref 3.5–5.1)
POTASSIUM SERPL-SCNC: 3.8 MMOL/L (ref 3.5–5.1)
PROCALCITONIN SERPL IA-MCNC: 10.79 NG/ML
PROT UR-MCNC: 41 MG/DL (ref 0–15)
PROT/CREAT UR: 0.68 MG/G{CREAT} (ref 0–0.2)
PTH-INTACT SERPL-MCNC: 162.5 PG/ML (ref 9–77)
RBC # BLD AUTO: 2.85 M/UL (ref 4.6–6.2)
RPR SER QL: NORMAL
SAMPLE: ABNORMAL
SITE: ABNORMAL
SODIUM SERPL-SCNC: 137 MMOL/L (ref 136–145)
SODIUM SERPL-SCNC: 138 MMOL/L (ref 136–145)
SODIUM SERPL-SCNC: 140 MMOL/L (ref 136–145)
SODIUM SERPL-SCNC: 141 MMOL/L (ref 136–145)
SODIUM UR-SCNC: 24 MMOL/L (ref 20–250)
SP02: 99
TOXIC GRANULES BLD QL SMEAR: PRESENT
URATE SERPL-MCNC: 8.6 MG/DL (ref 3.4–7)
VANCOMYCIN SERPL-MCNC: 20.2 UG/ML
VT: 500
WBC # BLD AUTO: 40.51 K/UL (ref 3.9–12.7)

## 2020-06-29 PROCEDURE — 86160 COMPLEMENT ANTIGEN: CPT

## 2020-06-29 PROCEDURE — 99291 CRITICAL CARE FIRST HOUR: CPT | Mod: GC,,, | Performed by: INTERNAL MEDICINE

## 2020-06-29 PROCEDURE — A4216 STERILE WATER/SALINE, 10 ML: HCPCS | Performed by: NURSE ANESTHETIST, CERTIFIED REGISTERED

## 2020-06-29 PROCEDURE — 36430 TRANSFUSION BLD/BLD COMPNT: CPT

## 2020-06-29 PROCEDURE — 99900035 HC TECH TIME PER 15 MIN (STAT)

## 2020-06-29 PROCEDURE — 83605 ASSAY OF LACTIC ACID: CPT

## 2020-06-29 PROCEDURE — 83036 HEMOGLOBIN GLYCOSYLATED A1C: CPT

## 2020-06-29 PROCEDURE — 99900026 HC AIRWAY MAINTENANCE (STAT)

## 2020-06-29 PROCEDURE — 86060 ANTISTREPTOLYSIN O TITER: CPT

## 2020-06-29 PROCEDURE — 80048 BASIC METABOLIC PNL TOTAL CA: CPT | Mod: 91

## 2020-06-29 PROCEDURE — 25000242 PHARM REV CODE 250 ALT 637 W/ HCPCS: Performed by: HOSPITALIST

## 2020-06-29 PROCEDURE — 87205 SMEAR GRAM STAIN: CPT

## 2020-06-29 PROCEDURE — 84300 ASSAY OF URINE SODIUM: CPT

## 2020-06-29 PROCEDURE — 84550 ASSAY OF BLOOD/URIC ACID: CPT

## 2020-06-29 PROCEDURE — 83970 ASSAY OF PARATHORMONE: CPT

## 2020-06-29 PROCEDURE — 25000003 PHARM REV CODE 250: Performed by: HOSPITALIST

## 2020-06-29 PROCEDURE — 84165 PROTEIN E-PHORESIS SERUM: CPT

## 2020-06-29 PROCEDURE — 25000003 PHARM REV CODE 250: Performed by: NURSE PRACTITIONER

## 2020-06-29 PROCEDURE — 37000008 HC ANESTHESIA 1ST 15 MINUTES: Performed by: INTERNAL MEDICINE

## 2020-06-29 PROCEDURE — 97802 MEDICAL NUTRITION INDIV IN: CPT

## 2020-06-29 PROCEDURE — 94003 VENT MGMT INPAT SUBQ DAY: CPT

## 2020-06-29 PROCEDURE — 63600175 PHARM REV CODE 636 W HCPCS: Performed by: NURSE PRACTITIONER

## 2020-06-29 PROCEDURE — 36600 WITHDRAWAL OF ARTERIAL BLOOD: CPT

## 2020-06-29 PROCEDURE — 86039 ANTINUCLEAR ANTIBODIES (ANA): CPT

## 2020-06-29 PROCEDURE — 20000000 HC ICU ROOM

## 2020-06-29 PROCEDURE — 86235 NUCLEAR ANTIGEN ANTIBODY: CPT | Mod: 59

## 2020-06-29 PROCEDURE — 99223 PR INITIAL HOSPITAL CARE,LEVL III: ICD-10-PCS | Mod: ,,, | Performed by: INTERNAL MEDICINE

## 2020-06-29 PROCEDURE — 63600175 PHARM REV CODE 636 W HCPCS: Performed by: INTERNAL MEDICINE

## 2020-06-29 PROCEDURE — 63600175 PHARM REV CODE 636 W HCPCS: Performed by: PHYSICIAN ASSISTANT

## 2020-06-29 PROCEDURE — 63600175 PHARM REV CODE 636 W HCPCS: Performed by: HOSPITALIST

## 2020-06-29 PROCEDURE — 87040 BLOOD CULTURE FOR BACTERIA: CPT

## 2020-06-29 PROCEDURE — 84100 ASSAY OF PHOSPHORUS: CPT

## 2020-06-29 PROCEDURE — 84165 PROTEIN E-PHORESIS SERUM: CPT | Mod: 26,,, | Performed by: PATHOLOGY

## 2020-06-29 PROCEDURE — 80074 ACUTE HEPATITIS PANEL: CPT

## 2020-06-29 PROCEDURE — 86592 SYPHILIS TEST NON-TREP QUAL: CPT

## 2020-06-29 PROCEDURE — 25000003 PHARM REV CODE 250: Performed by: PHYSICIAN ASSISTANT

## 2020-06-29 PROCEDURE — C9113 INJ PANTOPRAZOLE SODIUM, VIA: HCPCS | Performed by: HOSPITALIST

## 2020-06-29 PROCEDURE — 25000003 PHARM REV CODE 250: Performed by: INTERNAL MEDICINE

## 2020-06-29 PROCEDURE — 99291 PR CRITICAL CARE, E/M 30-74 MINUTES: ICD-10-PCS | Mod: ,,, | Performed by: INTERNAL MEDICINE

## 2020-06-29 PROCEDURE — 84156 ASSAY OF PROTEIN URINE: CPT

## 2020-06-29 PROCEDURE — 84145 PROCALCITONIN (PCT): CPT

## 2020-06-29 PROCEDURE — 86255 FLUORESCENT ANTIBODY SCREEN: CPT | Mod: 91

## 2020-06-29 PROCEDURE — 43235 EGD DIAGNOSTIC BRUSH WASH: CPT | Performed by: INTERNAL MEDICINE

## 2020-06-29 PROCEDURE — 80048 BASIC METABOLIC PNL TOTAL CA: CPT

## 2020-06-29 PROCEDURE — 82803 BLOOD GASES ANY COMBINATION: CPT

## 2020-06-29 PROCEDURE — 86703 HIV-1/HIV-2 1 RESULT ANTBDY: CPT

## 2020-06-29 PROCEDURE — 94640 AIRWAY INHALATION TREATMENT: CPT

## 2020-06-29 PROCEDURE — 99223 1ST HOSP IP/OBS HIGH 75: CPT | Mod: ,,, | Performed by: INTERNAL MEDICINE

## 2020-06-29 PROCEDURE — 82306 VITAMIN D 25 HYDROXY: CPT

## 2020-06-29 PROCEDURE — 25000003 PHARM REV CODE 250: Performed by: NURSE ANESTHETIST, CERTIFIED REGISTERED

## 2020-06-29 PROCEDURE — 85025 COMPLETE CBC W/AUTO DIFF WBC: CPT

## 2020-06-29 PROCEDURE — 99291 CRITICAL CARE FIRST HOUR: CPT | Mod: ,,, | Performed by: INTERNAL MEDICINE

## 2020-06-29 PROCEDURE — 84165 PATHOLOGIST INTERPRETATION SPE: ICD-10-PCS | Mod: 26,,, | Performed by: PATHOLOGY

## 2020-06-29 PROCEDURE — 80202 ASSAY OF VANCOMYCIN: CPT

## 2020-06-29 PROCEDURE — 86038 ANTINUCLEAR ANTIBODIES: CPT

## 2020-06-29 PROCEDURE — 86160 COMPLEMENT ANTIGEN: CPT | Mod: 59

## 2020-06-29 PROCEDURE — 36415 COLL VENOUS BLD VENIPUNCTURE: CPT

## 2020-06-29 PROCEDURE — 99291 PR CRITICAL CARE, E/M 30-74 MINUTES: ICD-10-PCS | Mod: GC,,, | Performed by: INTERNAL MEDICINE

## 2020-06-29 PROCEDURE — 37000009 HC ANESTHESIA EA ADD 15 MINS: Performed by: INTERNAL MEDICINE

## 2020-06-29 RX ORDER — POTASSIUM CHLORIDE 7.45 MG/ML
10 INJECTION INTRAVENOUS ONCE
Status: COMPLETED | OUTPATIENT
Start: 2020-06-29 | End: 2020-06-29

## 2020-06-29 RX ADMIN — SODIUM CHLORIDE 6.25 UNITS/HR: 9 INJECTION, SOLUTION INTRAVENOUS at 10:06

## 2020-06-29 RX ADMIN — IPRATROPIUM BROMIDE AND ALBUTEROL SULFATE 3 ML: .5; 3 SOLUTION RESPIRATORY (INHALATION) at 06:06

## 2020-06-29 RX ADMIN — IPRATROPIUM BROMIDE AND ALBUTEROL SULFATE 3 ML: .5; 3 SOLUTION RESPIRATORY (INHALATION) at 01:06

## 2020-06-29 RX ADMIN — SODIUM CHLORIDE 0.2 MCG/KG/HR: 9 INJECTION, SOLUTION INTRAMUSCULAR; INTRAVENOUS; SUBCUTANEOUS at 09:06

## 2020-06-29 RX ADMIN — SODIUM BICARBONATE: 84 INJECTION, SOLUTION INTRAVENOUS at 09:06

## 2020-06-29 RX ADMIN — CEFEPIME 2 G: 2 INJECTION, POWDER, FOR SOLUTION INTRAVENOUS at 09:06

## 2020-06-29 RX ADMIN — VANCOMYCIN HYDROCHLORIDE 2000 MG: 1 INJECTION, POWDER, LYOPHILIZED, FOR SOLUTION INTRAVENOUS at 07:06

## 2020-06-29 RX ADMIN — DEXMEDETOMIDINE HYDROCHLORIDE 0.4 MCG/KG/HR: 4 INJECTION, SOLUTION INTRAVENOUS at 09:06

## 2020-06-29 RX ADMIN — PANTOPRAZOLE SODIUM 40 MG: 40 INJECTION, POWDER, LYOPHILIZED, FOR SOLUTION INTRAVENOUS at 09:06

## 2020-06-29 RX ADMIN — SODIUM BICARBONATE: 84 INJECTION, SOLUTION INTRAVENOUS at 08:06

## 2020-06-29 RX ADMIN — PANTOPRAZOLE SODIUM 40 MG: 40 INJECTION, POWDER, LYOPHILIZED, FOR SOLUTION INTRAVENOUS at 10:06

## 2020-06-29 RX ADMIN — SODIUM BICARBONATE: 84 INJECTION, SOLUTION INTRAVENOUS at 01:06

## 2020-06-29 RX ADMIN — SODIUM CHLORIDE 27 UNITS/HR: 9 INJECTION, SOLUTION INTRAVENOUS at 01:06

## 2020-06-29 RX ADMIN — ALTEPLASE 2 MG: 2.2 INJECTION, POWDER, LYOPHILIZED, FOR SOLUTION INTRAVENOUS at 04:06

## 2020-06-29 RX ADMIN — IPRATROPIUM BROMIDE AND ALBUTEROL SULFATE 3 ML: .5; 3 SOLUTION RESPIRATORY (INHALATION) at 07:06

## 2020-06-29 RX ADMIN — TIMOLOL MALEATE 1 DROP: 2.5 SOLUTION/ DROPS OPHTHALMIC at 09:06

## 2020-06-29 RX ADMIN — POTASSIUM CHLORIDE 10 MEQ: 7.46 INJECTION, SOLUTION INTRAVENOUS at 10:06

## 2020-06-29 RX ADMIN — DEXMEDETOMIDINE HYDROCHLORIDE 0.2 MCG/KG/HR: 4 INJECTION, SOLUTION INTRAVENOUS at 06:06

## 2020-06-29 RX ADMIN — TIMOLOL MALEATE 1 DROP: 2.5 SOLUTION/ DROPS OPHTHALMIC at 10:06

## 2020-06-29 NOTE — SUBJECTIVE & OBJECTIVE
Interval History: No acute events overnight. Glucoses improving; still significant gap and low HCO3.    Review of Systems   Unable to perform ROS: Intubated     Objective:     Vital Signs (Most Recent):  Temp: 98.6 °F (37 °C) (06/29/20 0745)  Pulse: 66 (06/29/20 1331)  Resp: (!) 22 (06/29/20 1331)  BP: 129/60 (06/29/20 1331)  SpO2: 97 % (06/29/20 1331) Vital Signs (24h Range):  Temp:  [98 °F (36.7 °C)-98.8 °F (37.1 °C)] 98.6 °F (37 °C)  Pulse:  [66-82] 66  Resp:  [21-33] 22  SpO2:  [97 %-100 %] 97 %  BP: ()/(50-67) 129/60     Weight: (!) 144.5 kg (318 lb 9 oz)  Body mass index is 40.9 kg/m².    Intake/Output Summary (Last 24 hours) at 6/29/2020 1417  Last data filed at 6/29/2020 1309  Gross per 24 hour   Intake 2350.2 ml   Output 2120 ml   Net 230.2 ml      Physical Exam  Vitals signs and nursing note reviewed.   HENT:      Head: Normocephalic and atraumatic.   Eyes:      General:         Right eye: No discharge.         Left eye: No discharge.      Conjunctiva/sclera: Conjunctivae normal.   Cardiovascular:      Rate and Rhythm: Normal rate.      Pulses: Normal pulses.   Pulmonary:      Comments: Intubated. Crackles, mechanical breath sounds bilaterally.  Abdominal:      Palpations: Abdomen is soft.      Tenderness: There is no abdominal tenderness.   Musculoskeletal:         General: No tenderness.      Right lower leg: Edema (3+) present.      Left lower leg: Edema (3+) present.   Skin:     General: Skin is warm and dry.   Neurological:      Comments: RASS -3, CAM-ICU N/A       Significant Labs:   CBC:  Recent Labs   Lab 06/28/20  1253 06/28/20  2045 06/29/20  0450   WBC 28.33* 34.66* 40.51*   HGB 7.0* 6.4* 8.9*   HCT 20.3* 18.5* 24.4*    191 220   GRAN 94.0* 97.0* 85.5*  34.6*   LYMPH 4.0*  CANCELED 1.0*  CANCELED 4.8*  2.0   MONO 2.0*  CANCELED 0.0*  CANCELED 6.0  2.5*   EOS CANCELED CANCELED 0.0   BASO CANCELED CANCELED 0.09      CMP:  Recent Labs   Lab 06/27/20  2111  06/28/20  0502   06/29/20  0307 06/29/20  0450 06/29/20  0843 06/29/20  1205   *   < > 129*   < > 137 138 140 141   K 5.2*   < > 4.8   < > 3.8 3.4* 3.4* 3.5      < > 107   < > 111* 110 110 110   CO2 8*   < > 12*   < > 13* 14* 16* 18*   *   < > 114*   < > 113* 113* 118* 119*   CREATININE 4.2*   < > 4.4*   < > 4.6* 4.5* 4.3* 4.1*   *   < > 741*   < > 247* 216* 200* 179*   CALCIUM 7.3*   < > 7.0*   < > 7.3* 7.4* 7.2* 7.5*   MG 2.1  --   --   --   --   --   --   --    PHOS  --   --  4.0  --  2.9  --   --   --    ALKPHOS 81  --   --   --   --   --   --   --    AST 26  --   --   --   --   --   --   --    ALT 56*  --   --   --   --   --   --   --    BILITOT 0.3  --   --   --   --   --   --   --    PROT 5.1*  --   --   --   --   --   --   --    ALBUMIN 1.7*  --   --   --   --   --   --   --    ANIONGAP 14   < > 10   < > 13 14 14 13    < > = values in this interval not displayed.      Significant Imaging:   US Retroperitoneal 06/28/20:  Right kidney: The right kidney measures 11.4 cm. Cortical thinning is present.  There is loss of corticomedullary distinction. Resistive index measures 0.87.  No mass. No renal stone. No hydronephrosis. Left kidney: The left kidney measures 12 cm. There is significant cortical atrophy.  There is loss of corticomedullary distinction. Resistive index could not obtain secondary to patient's inability to breath hold.  No mass. No renal stone. No hydronephrosis. The bladder is partially distended at the time of scanning and has an unremarkable appearance. Findings are compatible with chronic medical renal disease and not significantly changed from the previous exam

## 2020-06-29 NOTE — NURSING
Patient had one dark bloody BM. Flexiseal now in place. Patient stable. PRBC on hand incase drop in H&H.

## 2020-06-29 NOTE — NURSING
eicu md updated regarding catheter status, still unable to get blood return after 1 dose of cath yani. instructed to leave it for now and wait for morning md if want to do another dose of cathflo.

## 2020-06-29 NOTE — PLAN OF CARE
Pt on mechanical vent with documented settings.  No wean at this time pt having procedure today.  Will continue to monitor pt.

## 2020-06-29 NOTE — PLAN OF CARE
Patient received and remained on the mechanical ventilator with no change in respiratory status, will continue to monitor.

## 2020-06-29 NOTE — PROGRESS NOTES
"Nephrology  Progress Note    Admit Date: 6/27/2020   LOS: 2 days     SUBJECTIVE:     Follow-up For:  Type 2 diabetes mellitus with hyperosmolar nonketotic hyperglycemia    Interval History:     Remains sedated on vent.  Insulin drip/bicarb drip continue.  Discussed with team at bedside.  Labs reviewed.      Review of Systems:    Unable due to vent    OBJECTIVE:     Vital Signs Range (Last 24H):  BP (!) 161/67   Pulse 72   Temp 98.6 °F (37 °C) (Axillary)   Resp (!) 25   Ht 6' 2" (1.88 m)   Wt (!) 144.5 kg (318 lb 9 oz)   SpO2 100%   BMI 40.90 kg/m²     Temp:  [97.6 °F (36.4 °C)-98.8 °F (37.1 °C)]   Pulse:  [66-82]   Resp:  [24-33]   BP: ()/(50-67)   SpO2:  [97 %-100 %]     I & O (Last 24H):    Intake/Output Summary (Last 24 hours) at 6/29/2020 0917  Last data filed at 6/29/2020 0859  Gross per 24 hour   Intake 2748.61 ml   Output 1990 ml   Net 758.61 ml       Physical Exam:  General appearance: Well developed, well nourished, intubated, sedated, obese  Eyes:  Conjunctivae/corneas clear. PERRL.  Lungs: vented with few rales.   Heart: Regular rate and rhythm, S1, S2 normal, no murmur, rub or beatrice.  Abdomen: Soft, non-tender non-distended; bowel sounds normal; no masses,  no organomegaly, obese, rectal tube.   Extremities: No cyanosis or clubbing. 3+ edema.    Skin: Skin color, texture, turgor normal. No rashes or lesions  Neurologic: No focal numbness or weakness   Mario  Right groin TLC (positional)    Laboratory Data:  Recent Labs   Lab 06/29/20  0450   WBC 40.51*   RBC 2.85*   HGB 8.9*   HCT 24.4*      MCV 86   MCH 31.2*   MCHC 36.5*       BMP:   Recent Labs   Lab 06/27/20  2111  06/29/20  0450   *   < > 216*   *   < > 138   K 5.2*   < > 3.4*      < > 110   CO2 8*   < > 14*   *   < > 113*   CREATININE 4.2*   < > 4.5*   CALCIUM 7.3*   < > 7.4*   MG 2.1  --   --     < > = values in this interval not displayed.     Lab Results   Component Value Date    CALCIUM 7.4 (L) " 06/29/2020    PHOS 2.9 06/29/2020       Lab Results   Component Value Date    .5 (H) 06/29/2020    CALCIUM 7.4 (L) 06/29/2020    CAION 1.07 06/28/2020    PHOS 2.9 06/29/2020       Lab Results   Component Value Date    URICACID 8.6 (H) 06/29/2020       BNP  No results for input(s): BNP, BNPTRIAGEBLO in the last 168 hours.    Medications:  Medication list was reviewed and changes noted under Assessment/Plan.    Diagnostic Results:        ASSESSMENT/PLAN:     75 YO male with DM, HTN, CHF with Dilated CM, COPD, CKD 4, now with GIB and Hyperosmolar nonketotic hyperglycemia, resp failure prompting intubation to protect airway, severe anemia with Hgb 6.4, elevated BUN due to GIB, hypotension, and ADONAY     HHS  -Agree with bicarb drip for acidosis and volume depletion resulting from hyperglycemia but cut down on volume due to edema  -Insulin drip as now  -Poss UTI versus Pna driving this - agree with Vanc and Cefepime  -defer     Non-oliguric ADONAY on CKD 4  -Baseline creat over past year 2.5-2.9 with history of proteinuria  -Creat 4.4 on admit and remains 4.4-4.6  -UOP picking up but lacking clearance  -Prot/creat ratio 1 gram  -Expect that severe anemia and hypotension etiology for ADONAY  -Doesn't appear he has seen nephrologist so ordered full workup  -Normally prefer no PPI but with GIB this okay  -No nephrotoxins.  -Holding all BP meds given hypotension  -with current trends and volume overload suspect he will need RRT in the next day or so.    -discussed with CCT and will need trialysis placed.   -Renally dose meds, avoid nephrotoxins, and monitor I/O's closely.       GIB with severe anemia  -Agree with transfusions  -mildly iron deficient but would not give IV iron for now  -GI consulted - appears to be UGIB and EGD this am.  -Protonix drip as now     DM  -Last HgA1c in December 2019 7.7  -Defer to primary team     Sepsis:  -cultured and continue antibx for now.   -lactic acid noted.     Total critical care time  (exclusive of procedural time) : 60 minutes  Critical care was necessary to treat or prevent imminent or life-threatening deterioration of the following conditions: HNNK, ADONAY.     Critical care was time spent personally by me on the following activities: development of treatment plan with patient or surrogate, discussions with consultants, interpretation of cardiac output measurements, examination of patient, ordering and performing treatments and interventions, evaluation of patient's response to treatment, obtaining history from patient or surrogate, ordering and review of laboratory studies, ordering and review of radiographic studies, re-evaluation of patient's condition, pulse oximetry and blood draw for specimens

## 2020-06-29 NOTE — PLAN OF CARE
Recommendations    1. Recommend Novasource @ 45ml/hr. +200 ml FWF q4hrs.   To provide 2160kcals(95%EEN), 98g protein(85%EPN), and 744 ml water.    2. If TPN is needed, recommend Clinimix 5/15 @70 ml/hr  To provide 1693 kcals(75% EEN), 84 g AA(73% EPN), and 252 g dextrose.    3. Daily weighs.     4. When medically able ADAT to Renal, Diabetic(texture per SLP.)    Goals: 1. Initiate Nutrition by RD follow up.  Nutrition Goal Status: new  Communication of RD Recs: reviewed with RN(POC)

## 2020-06-29 NOTE — CONSULTS
[facility]  Infectious Disease  Consult Note    Patient Name: Vinnie Siegel  MRN: [id number]  Admission Date: 6/27/2020  Hospital Length of Stay: 2 days  Attending Physician: NYDIA Gomez MD  Primary Care Provider: Janeth Walter MD     Isolation Status: No active isolations    Patient information was obtained from patient and records    Inpatient consult to Infectious Diseases  Consult performed by: Samanta Cesar MD  Consult ordered by: NYDIA Gomez MD        Assessment/Plan:     Sepsis  74M with h/o DM, CKD, HTN, obesity admitted 6/27 with rectal bleeding and lethargy. found to be in DKA, with a GI bleed. He had worsening respiratory and mental status while in the ED, and was intubated for airway protection. noted to have cloudy urine with some purulence from urethra. Also with thick greenish secretions. Afebrile. WBC 40k. UA >100wbc, culture + e.coli, sensitivities pending. procalcitonin elevated 11. G/C, RPR, HIV negative. Renal ultrasound- medical renal disease. resp culture rare GPC, mod wbc, in process. Clinically improving.     Agree with current antibiotics vanc/cefepime for complicated UTI/pyleonephritis and pneumonia. F/u final cultures. If MRSA doesn't end up growing from sputum culture, would stop vancomycin in next day or two when extubated. Would wait to de-escalate the cefepime when the e.coli susceptibilities return. Would plan on treating for 14 days total antibiotics. Hopefully can de-escalate to ceftriaxone while inpatient and po cipro on discharge.           Thank you for your consult. I will continue to follow    Samanta Cesar MD  Infectious Disease  [facility]    Subjective:     Principal Problem: Type 2 diabetes mellitus with hyperosmolar nonketotic hyperglycemia    HPI: 74M with h/o DM, CKD, HTN, obesity admitted 6/27 with rectal bleeding and lethargy. Pt currently intubated and no family at bedside so history obtained by chart review. Pt  reportedly was diffusely lethargic prior to admit. Also reported some lower abdominal pain and frequency.     In the ED, he was found to be in DKA, with a GI bleed. He had worsening respiratory and mental status while in the ED, and was intubated for airway protection. He was admitted to inpatient status to the ICU. noted to have cloudy urine with some purulence from urethra. Also with thick greenish secretions. Afebrile. Sedation being weened and pt alert and awake on ventilator    UA >100wbc, culture + e.coli, sensitivities pending  procalcitonin elevated 11  G/C, RPR, HIV negative  Renal ultrasound- medical renal disease  resp culture rare GPC, mod wbc, in process    Currently on vanc/cefepime. ID c/f abx recs        Review of Systems   Unable to perform ROS: Intubated     Objective:     Vital Signs (Most Recent):  Temp: 98.2 °F (36.8 °C) (06/29/20 1415)  Pulse: 72 (06/29/20 1710)  Resp: 20 (06/29/20 1710)  BP: (!) 141/65 (06/29/20 1415)  SpO2: 99 % (06/29/20 1710) Vital Signs (24h Range):  Temp:  [98 °F (36.7 °C)-98.8 °F (37.1 °C)] 98.2 °F (36.8 °C)  Pulse:  [60-80] 72  Resp:  [20-33] 20  SpO2:  [97 %-100 %] 99 %  BP: ()/(53-67) 141/65     Weight: (!) 144.5 kg (318 lb 9 oz)  Body mass index is 40.9 kg/m².    Intake/Output Summary (Last 24 hours) at 6/29/2020 1710  Last data filed at 6/29/2020 1445  Gross per 24 hour   Intake 2350.2 ml   Output 2280 ml   Net 70.2 ml      Physical Exam  Vitals signs and nursing note reviewed.   HENT:      Head: Normocephalic and atraumatic.   Eyes:      General:         Right eye: No discharge.         Left eye: No discharge.      Conjunctiva/sclera: Conjunctivae normal.   Cardiovascular:      Rate and Rhythm: Normal rate.      Pulses: Normal pulses.   Pulmonary:      Comments: Intubated. Crackles, mechanical breath sounds bilaterally.  Abdominal:      Palpations: Abdomen is soft.      Tenderness: There is no abdominal tenderness.   Musculoskeletal:         General: No  tenderness.      Right lower leg: Edema (3+) present.      Left lower leg: Edema (3+) present.      Comments: No ulcers on feet   Skin:     General: Skin is warm and dry.   Neurological:      Comments: Intubated on vent. Awake and alert       Significant Labs:   CBC:  Recent Labs   Lab 06/28/20  1253 06/28/20  2045 06/29/20  0450   WBC 28.33* 34.66* 40.51*   HGB 7.0* 6.4* 8.9*   HCT 20.3* 18.5* 24.4*    191 220   GRAN 94.0* 97.0* 85.5*  34.6*   LYMPH 4.0*  CANCELED 1.0*  CANCELED 4.8*  2.0   MONO 2.0*  CANCELED 0.0*  CANCELED 6.0  2.5*   EOS CANCELED CANCELED 0.0   BASO CANCELED CANCELED 0.09      CMP:  Recent Labs   Lab 06/27/20  2111  06/28/20  0506  06/29/20  0307 06/29/20  0450 06/29/20  0843 06/29/20  1205   *   < > 129*   < > 137 138 140 141   K 5.2*   < > 4.8   < > 3.8 3.4* 3.4* 3.5      < > 107   < > 111* 110 110 110   CO2 8*   < > 12*   < > 13* 14* 16* 18*   *   < > 114*   < > 113* 113* 118* 119*   CREATININE 4.2*   < > 4.4*   < > 4.6* 4.5* 4.3* 4.1*   *   < > 741*   < > 247* 216* 200* 179*   CALCIUM 7.3*   < > 7.0*   < > 7.3* 7.4* 7.2* 7.5*   MG 2.1  --   --   --   --   --   --   --    PHOS  --   --  4.0  --  2.9  --   --   --    ALKPHOS 81  --   --   --   --   --   --   --    AST 26  --   --   --   --   --   --   --    ALT 56*  --   --   --   --   --   --   --    BILITOT 0.3  --   --   --   --   --   --   --    PROT 5.1*  --   --   --   --   --   --   --    ALBUMIN 1.7*  --   --   --   --   --   --   --    ANIONGAP 14   < > 10   < > 13 14 14 13    < > = values in this interval not displayed.      Significant Imaging:   US Retroperitoneal 06/28/20:  Right kidney: The right kidney measures 11.4 cm. Cortical thinning is present.  There is loss of corticomedullary distinction. Resistive index measures 0.87.  No mass. No renal stone. No hydronephrosis. Left kidney: The left kidney measures 12 cm. There is significant cortical atrophy.  There is loss of corticomedullary  distinction. Resistive index could not obtain secondary to patient's inability to breath hold.  No mass. No renal stone. No hydronephrosis. The bladder is partially distended at the time of scanning and has an unremarkable appearance. Findings are compatible with chronic medical renal disease and not significantly changed from the previous exam

## 2020-06-29 NOTE — ASSESSMENT & PLAN NOTE
- Two day history of dark blood per rectum and patient's fiancee reports progressive loose stool at home with eventual blood noted.  - s/p 2U pRBCs in ED 06/27 and 1U pRBCs 06/28; trend Hgb and transfuse PRN Hgb < 7.  - Continuing pantoprazole 40mg IV BID.  - GI consulted; to EGD today.

## 2020-06-29 NOTE — SUBJECTIVE & OBJECTIVE
Interval History: remains hemodynamically stable on ventilator    Objective:     Vital Signs (Most Recent):  Temp: 98.6 °F (37 °C) (06/29/20 0745)  Pulse: 70 (06/29/20 1115)  Resp: (!) 21 (06/29/20 1115)  BP: (!) 156/67 (06/29/20 0845)  SpO2: 98 % (06/29/20 1115) Vital Signs (24h Range):  Temp:  [97.6 °F (36.4 °C)-98.8 °F (37.1 °C)] 98.6 °F (37 °C)  Pulse:  [66-82] 70  Resp:  [21-33] 21  SpO2:  [97 %-100 %] 98 %  BP: ()/(50-67) 156/67     Weight: (!) 144.5 kg (318 lb 9 oz)  Body mass index is 40.9 kg/m².      Intake/Output Summary (Last 24 hours) at 6/29/2020 1233  Last data filed at 6/29/2020 1059  Gross per 24 hour   Intake 2748.61 ml   Output 1990 ml   Net 758.61 ml       Physical Exam  Vitals signs and nursing note reviewed.   Constitutional:       General: He is not in acute distress.     Appearance: He is obese. He is not ill-appearing, toxic-appearing or diaphoretic.   HENT:      Head: Normocephalic and atraumatic.      Nose: No congestion or rhinorrhea.      Mouth/Throat:      Mouth: Mucous membranes are moist.      Pharynx: Oropharynx is clear.      Comments: ETT in place  Eyes:      General: No scleral icterus.     Conjunctiva/sclera: Conjunctivae normal.   Cardiovascular:      Rate and Rhythm: Normal rate and regular rhythm.      Heart sounds: No murmur.   Pulmonary:      Effort: No respiratory distress.      Breath sounds: No wheezing, rhonchi or rales.      Comments: Mechanical breath sounds bilaterally  Abdominal:      General: There is no distension.      Palpations: Abdomen is soft.   Musculoskeletal:      Right lower leg: Edema present.      Left lower leg: Edema present.   Skin:     General: Skin is warm and dry.      Findings: No rash.      Comments: Right groin CVC in place         Vents:  Vent Mode: A/C (06/29/20 1115)  Ventilator Initiated: Yes (06/27/20 2216)  Set Rate: 22 BPM (06/29/20 1115)  Vt Set: 500 mL (06/29/20 1115)  Pressure Support: 0 cmH20 (06/29/20 1115)  PEEP/CPAP: 5 cmH20  (06/29/20 1115)  Oxygen Concentration (%): 21 (06/29/20 1115)  Peak Airway Pressure: 19 cmH2O (06/29/20 1115)  Plateau Pressure: 0 cmH20 (06/29/20 1115)  Total Ve: 12.5 mL (06/29/20 1115)  F/VT Ratio<105 (RSBI): (!) 54.55 (06/29/20 1115)    Lines/Drains/Airways     Central Venous Catheter Line            Percutaneous Central Line Insertion/Assessment - Triple Lumen  06/27/20 2300 right femoral 1 day          Drain                 NG/OG Tube 06/27/20 2258 orogastric 16 Fr. Center mouth 1 day         Urethral Catheter 06/27/20 2258 Non-latex 16 Fr. 1 day         Rectal Tube 06/28/20 1808 less than 1 day          Airway                 Airway - Non-Surgical 06/27/20 2205 Endotracheal Tube 1 day          Peripheral Intravenous Line                 Peripheral IV - Single Lumen 06/27/20 2047 18 G Right Antecubital 1 day         Peripheral IV - Single Lumen 06/27/20 2127 20 G Left Forearm 1 day                Significant Labs:    CBC/Anemia Profile:  Recent Labs   Lab 06/27/20  2111 06/27/20  2331  06/28/20  1253 06/28/20  2045 06/29/20  0450   WBC 35.04*  --    < > 28.33* 34.66* 40.51*   HGB 6.4*  --    < > 7.0* 6.4* 8.9*   HCT 19.4*  --    < > 20.3* 18.5* 24.4*     --    < > 197 191 220   MCV 92  --    < > 88 88 86   RDW 15.4*  --    < > 15.5* 15.5* 15.0*   IRON 41*  --   --   --   --   --    FERRITIN 757*  --   --   --   --   --    FOLATE  --  4.9  --   --   --   --    HQDXYSAB94  --  651  --   --   --   --     < > = values in this interval not displayed.        Chemistries:  Recent Labs   Lab 06/27/20  2111  06/28/20  0506  06/29/20  0307 06/29/20  0450 06/29/20  0843   *   < > 129*   < > 137 138 140   K 5.2*   < > 4.8   < > 3.8 3.4* 3.4*      < > 107   < > 111* 110 110   CO2 8*   < > 12*   < > 13* 14* 16*   *   < > 114*   < > 113* 113* 118*   CREATININE 4.2*   < > 4.4*   < > 4.6* 4.5* 4.3*   CALCIUM 7.3*   < > 7.0*   < > 7.3* 7.4* 7.2*   ALBUMIN 1.7*  --   --   --   --   --   --    PROT 5.1*   --   --   --   --   --   --    BILITOT 0.3  --   --   --   --   --   --    ALKPHOS 81  --   --   --   --   --   --    ALT 56*  --   --   --   --   --   --    AST 26  --   --   --   --   --   --    MG 2.1  --   --   --   --   --   --    PHOS  --   --  4.0  --  2.9  --   --     < > = values in this interval not displayed.       All pertinent labs within the past 24 hours have been reviewed.    Significant Imaging:  I have reviewed and interpreted all pertinent imaging results/findings within the past 24 hours.

## 2020-06-29 NOTE — EICU
Hgb 6.4 < 7.0, no active bleeding per RN report.  On Vent PEEP 5, fio2 21%, creatinine 4.6, pt is on making urine, UO picked up, /63 Transfuse 1 Unit of PRBC.    3.56 am  Cathflow 2 mg x 1 ordered for clogged CVL port.

## 2020-06-29 NOTE — PROGRESS NOTES
Pharmacokinetic Assessment Follow Up: IV Vancomycin    Vancomycin serum concentration assessment(s):    The trough level was drawn correctly and can be used to guide therapy at this time. The measurement is within the desired definitive target range of 10 to 20 mcg/mL.    Vancomycin Regimen Plan:  Dose Vancomycin 15 mg/kg IVPB x1 in NS.   Re-dose when the random level is less than 20 mcg/mL, next level to be drawn at 0430 on 6/30/2020    Drug levels (last 3 results):  Recent Labs   Lab Result Units 06/29/20  0307   Vancomycin, Random ug/mL 20.2       Pharmacy will continue to follow and monitor vancomycin.    Please contact pharmacy at extension [phone] for questions regarding this assessment.    Thank you for the consult,   Ny Proctor       Patient brief summary:  Vinnie Siegel is a 74 y.o. male initiated on antimicrobial therapy with IV Vancomycin for treatment of bacteremia    The patient's current regimen is Pulse Dosing.    Drug Allergies:   Review of patient's allergies indicates:  No Known Allergies    Actual Body Weight:   144.5 kg    Renal Function:   Estimated Creatinine Clearance: 22.1 mL/min (A) (based on SCr of 4.5 mg/dL (H)).,     Dialysis Method (if applicable):  N/A    CBC (last 72 hours):  Recent Labs   Lab Result Units 06/27/20  2111 06/28/20  0506 06/28/20  1253 06/28/20  2045 06/29/20  0450   WBC K/uL 35.04* 30.89* 28.33* 34.66* 40.51*   Hemoglobin g/dL 6.4* 7.6* 7.0* 6.4* 8.9*   Hematocrit % 19.4* 22.8* 20.3* 18.5* 24.4*   Platelets K/uL 268 206 197 191 220   Gran% % 94.0* 89.0* 94.0* 97.0* 85.5*   Lymph% % 4.0* 7.0* 4.0* 1.0* 4.8*   Mono% % 2.0* 4.0 2.0* 0.0* 6.0   Eosinophil% % 0.0 0.0 0.0 0.0 0.0   Basophil% % 0.0 0.0 0.0 0.0 0.2   Differential Method  Manual Manual Manual Manual Automated       Metabolic Panel (last 72 hours):  Recent Labs   Lab Result Units 06/27/20  2111 06/27/20  2114 06/27/20  2331 06/28/20  0020 06/28/20  0058 06/28/20  0506 06/28/20  0847 06/28/20  0927  06/28/20  1253 06/28/20  1726 06/28/20  2045 06/29/20  0010 06/29/20  0307 06/29/20  0450   Sodium mmol/L 127*  --  129*  --  129* 129*  --  133* 133* 135* 134*  --  137 138   Sodium Urine Random mmol/L  --   --   --  25  --   --  29  --   --   --   --  24  --   --    Potassium mmol/L 5.2*  --  5.2*  --  5.7* 4.8  --  4.3 4.5 4.2 4.2  --  3.8 3.4*   Potassium Urine Random mmol/L  --   --   --   --   --   --  17  --   --   --   --   --   --   --    Chloride mmol/L 105  --  109  --  106 107  --  113* 111* 110 110  --  111* 110   Chloride, Rand Ur mmol/L  --   --   --   --   --   --  <20*  --   --   --   --   --   --   --    CO2 mmol/L 8*  --  9*  --  11* 12*  --  11* 12* 14* 12*  --  13* 14*   Glucose mg/dL 795*  --  790*  --  761* 741*  --  486* 454* 434* 430*  --  247* 216*   Glucose, UA   --  3+*  --   --   --   --   --   --   --   --   --   --   --   --    BUN, Bld mg/dL 107*  --  105*  --  111* 114*  --  111* 120* 124* 127*  --  113* 113*   Creatinine mg/dL 4.2*  --  4.0*  --  4.4* 4.4*  --  4.4* 4.6* 4.6* 4.6*  --  4.6* 4.5*   Creatinine, Random Ur mg/dL  --   --   --  63.2  --   --   --   --   --   --   --  60.7  60.7  --   --    Albumin g/dL 1.7*  --   --   --   --   --   --   --   --   --   --   --   --   --    Total Bilirubin mg/dL 0.3  --   --   --   --   --   --   --   --   --   --   --   --   --    Alkaline Phosphatase U/L 81  --   --   --   --   --   --   --   --   --   --   --   --   --    AST U/L 26  --   --   --   --   --   --   --   --   --   --   --   --   --    ALT U/L 56*  --   --   --   --   --   --   --   --   --   --   --   --   --    Magnesium mg/dL 2.1  --   --   --   --   --   --   --   --   --   --   --   --   --    Phosphorus mg/dL  --   --   --   --   --  4.0  --   --   --   --   --   --  2.9  --        Vancomycin Administrations:  vancomycin given in the last 96 hours                     vancomycin (VANCOCIN) 2,500 mg in sodium chloride 0.9% 500 mL IVPB (mg) 2,500 mg New Bag 06/28/20  0917                    Microbiologic Results:  Microbiology Results (last 7 days)       Procedure Component Value Units Date/Time    Culture, Respiratory with Gram Stain [[id number]] Collected: 06/28/20 0830    Order Status: Completed Specimen: Respiratory from Tracheal Aspirate Updated: 06/29/20 0421     Gram Stain (Respiratory) <10 epithelial cells per low power field.     Gram Stain (Respiratory) Moderate WBC's     Gram Stain (Respiratory) Rare Gram positive cocci    C. trachomatis/N. gonorrhoeae by AMP DNA [facility]; Urine [[id number]] Collected: 06/28/20 0847    Order Status: Sent Specimen: Genital Updated: 06/28/20 2241    Urine culture [[id number]] Collected: 06/27/20 2114    Order Status: No result Specimen: Urine Updated: 06/27/20 2134

## 2020-06-29 NOTE — TRANSFER OF CARE
"Anesthesia Transfer of Care Note    Patient: Vinnie Siegel    Procedure(s) Performed: Procedure(s) (LRB):  EGD (ESOPHAGOGASTRODUODENOSCOPY) (N/A)    Patient location: ICU    Anesthesia Type: MAC    Transport from OR: Upon arrival to PACU/ICU, patient attached to ventilator and auscultated to confirm bilateral breath sounds and adequate TV    Post pain: adequate analgesia    Post assessment: no apparent anesthetic complications    Post vital signs: stable    Level of consciousness: sedated    Nausea/Vomiting: no nausea/vomiting    Complications: none    Transfer of care protocol was followedComments: Pt remaining in ICU intubated following procedure      Last vitals:   Visit Vitals  BP (!) 156/67   Pulse 70   Temp 37 °C (98.6 °F) (Axillary)   Resp (!) 25   Ht 6' 2" (1.88 m)   Wt (!) 144.5 kg (318 lb 9 oz)   SpO2 99%   BMI 40.90 kg/m²     "

## 2020-06-29 NOTE — ANESTHESIA PREPROCEDURE EVALUATION
06/29/2020  Vinnie Siegel is a 74 y.o., male.    Anesthesia Evaluation    I have reviewed the Patient Summary Reports.    I have reviewed the Nursing Notes.    I have reviewed the Medications.     Review of Systems  Anesthesia Hx:  No problems with previous Anesthesia  Denies Family Hx of Anesthesia complications.   Denies Personal Hx of Anesthesia complications.   Hematology/Oncology:         -- Anemia: Hematology Comments: Received 2 units yesterday   Cardiovascular:   Hypertension Dilated cardiomyopathy   Pulmonary:   On Vent   Renal/:   Chronic Renal Disease, CRI    Endocrine:   Diabetes        Physical Exam  General:  Morbid Obesity    Airway/Jaw/Neck:  Airway Findings: Pre-Existing Airway Tube(s): Oral Endotracheal tube     Dental:  Dental Findings: In tact        Mental Status:  Mental Status Findings:  Lethargic         Anesthesia Plan  Type of Anesthesia, risks & benefits discussed:  Anesthesia Type:  general  Patient's Preference:   Intra-op Monitoring Plan: standard ASA monitors  Intra-op Monitoring Plan Comments:   Post Op Pain Control Plan:   Post Op Pain Control Plan Comments:   Induction:   IV  Beta Blocker:         Informed Consent: Patient understands risks and agrees with Anesthesia plan.  Questions answered. Anesthesia consent signed with patient representative.  ASA Score: 3     Day of Surgery Review of History & Physical:    H&P update referred to the surgeon.     Anesthesia Plan Notes: Plan to use Precedex drip while pt is intubated    Consent over phone with brother-nurse witnessed        Ready For Surgery From Anesthesia Perspective.

## 2020-06-29 NOTE — PROGRESS NOTES
[facility] Hospital Medicine  Progress Note    Patient Name: Vinnie Siegel  MRN: [id number]  Patient Class: IP- Inpatient   Admission Date: 6/27/2020  Length of Stay: 2 days  Attending Physician: NYDIA Gomez MD  Primary Care Provider: Janeth Walter MD        Subjective:     Principal Problem:Type 2 diabetes mellitus with hyperosmolar nonketotic hyperglycemia        HPI:  Mr. Vinnie Siegel is a 74 y.o. male, with PMH of IDDM-2, CKD-IV, dilated cardiomyopathy, HTN, HLD, gout, obesity, who presented to [facility] ED via EMS on 6/27/20 with rectal bleeding x 2 days. His family called EMS after he became lethargic earlier this evening. Per his fiancee, he has been passing dark blood stools, and has been noting generalized weakness x 1 week. Additionally, he has been having elevated blood glucose readings x 1 day. In the ED, he was found to be in DKA, with a GI bleed. H&H were 6.4 & 19.4 and he was transfused 2 units PRBCs. He was started on an insulin drip and a Protonix drip. He had worsening respiratory and mental status while in the ED, and was intubated for airway protection. He was admitted to inpatient status to the ICU.     Overview/Hospital Course:  Admitted to ICU with HHS, encephalopathy, GI bleed, ADONAY, respiratory failure and sepsis. Suspected infection from urinary vs lung sources. Critical care and nephrology consulted and broad antibiotics continued.    Interval History: No acute events overnight. Glucoses improving; still significant gap and low HCO3.    Review of Systems   Unable to perform ROS: Intubated     Objective:     Vital Signs (Most Recent):  Temp: 98.6 °F (37 °C) (06/29/20 0745)  Pulse: 66 (06/29/20 1331)  Resp: (!) 22 (06/29/20 1331)  BP: 129/60 (06/29/20 1331)  SpO2: 97 % (06/29/20 1331) Vital Signs (24h Range):  Temp:  [98 °F (36.7 °C)-98.8 °F (37.1 °C)] 98.6 °F (37 °C)  Pulse:  [66-82] 66  Resp:  [21-33] 22  SpO2:  [97 %-100 %] 97 %  BP: ()/(50-67) 129/60      Weight: (!) 144.5 kg (318 lb 9 oz)  Body mass index is 40.9 kg/m².    Intake/Output Summary (Last 24 hours) at 6/29/2020 1417  Last data filed at 6/29/2020 1309  Gross per 24 hour   Intake 2350.2 ml   Output 2120 ml   Net 230.2 ml      Physical Exam  Vitals signs and nursing note reviewed.   HENT:      Head: Normocephalic and atraumatic.   Eyes:      General:         Right eye: No discharge.         Left eye: No discharge.      Conjunctiva/sclera: Conjunctivae normal.   Cardiovascular:      Rate and Rhythm: Normal rate.      Pulses: Normal pulses.   Pulmonary:      Comments: Intubated. Crackles, mechanical breath sounds bilaterally.  Abdominal:      Palpations: Abdomen is soft.      Tenderness: There is no abdominal tenderness.   Musculoskeletal:         General: No tenderness.      Right lower leg: Edema (3+) present.      Left lower leg: Edema (3+) present.   Skin:     General: Skin is warm and dry.   Neurological:      Comments: RASS -3, CAM-ICU N/A       Significant Labs:   CBC:  Recent Labs   Lab 06/28/20  1253 06/28/20  2045 06/29/20  0450   WBC 28.33* 34.66* 40.51*   HGB 7.0* 6.4* 8.9*   HCT 20.3* 18.5* 24.4*    191 220   GRAN 94.0* 97.0* 85.5*  34.6*   LYMPH 4.0*  CANCELED 1.0*  CANCELED 4.8*  2.0   MONO 2.0*  CANCELED 0.0*  CANCELED 6.0  2.5*   EOS CANCELED CANCELED 0.0   BASO CANCELED CANCELED 0.09      CMP:  Recent Labs   Lab 06/27/20  2111  06/28/20  0506  06/29/20  0307 06/29/20  0450 06/29/20  0843 06/29/20  1205   *   < > 129*   < > 137 138 140 141   K 5.2*   < > 4.8   < > 3.8 3.4* 3.4* 3.5      < > 107   < > 111* 110 110 110   CO2 8*   < > 12*   < > 13* 14* 16* 18*   *   < > 114*   < > 113* 113* 118* 119*   CREATININE 4.2*   < > 4.4*   < > 4.6* 4.5* 4.3* 4.1*   *   < > 741*   < > 247* 216* 200* 179*   CALCIUM 7.3*   < > 7.0*   < > 7.3* 7.4* 7.2* 7.5*   MG 2.1  --   --   --   --   --   --   --    PHOS  --   --  4.0  --  2.9  --   --   --    ALKPHOS 81  --    --   --   --   --   --   --    AST 26  --   --   --   --   --   --   --    ALT 56*  --   --   --   --   --   --   --    BILITOT 0.3  --   --   --   --   --   --   --    PROT 5.1*  --   --   --   --   --   --   --    ALBUMIN 1.7*  --   --   --   --   --   --   --    ANIONGAP 14   < > 10   < > 13 14 14 13    < > = values in this interval not displayed.      Significant Imaging:   US Retroperitoneal 06/28/20:  Right kidney: The right kidney measures 11.4 cm. Cortical thinning is present.  There is loss of corticomedullary distinction. Resistive index measures 0.87.  No mass. No renal stone. No hydronephrosis. Left kidney: The left kidney measures 12 cm. There is significant cortical atrophy.  There is loss of corticomedullary distinction. Resistive index could not obtain secondary to patient's inability to breath hold.  No mass. No renal stone. No hydronephrosis. The bladder is partially distended at the time of scanning and has an unremarkable appearance. Findings are compatible with chronic medical renal disease and not significantly changed from the previous exam      Assessment/Plan:      * Type 2 diabetes mellitus with hyperosmolar nonketotic hyperglycemia  - Initial presentation most consistent with HHS with significant glucose elevation, no significant anion gap, minimal beta hydroxybutyrate elevation.  - Metabolic encephalopathy likely related to the same with concurrent acute hypoxemic respiratory failure.  - Continuing insulin gtt for time being.  - Treat for potential etiologies as under sepsis.    Encephalopathy, metabolic  - As under HHS.    Hypertensive emergency  - Initial BPs significantly elevated; now running low to normal with sedation on vent.  - Monitor.    Elevated troponin  - No reported chest discomfort at home, EKG in ED with sinus tach / RBBB.  - Suspect likely demand ischemia with acute bleed, HHS; ADONAY likely contributing as well.  - Monitor.    Acute renal failure superimposed on stage 4  chronic kidney disease  - Multifactorial with likely acute blood loss anemia, GI losses contributing.  - Baseline Cr ~2.4-2.7.  - FENa suggestive of intrinsic source, concurrent non-gap metabolic acidosis.  - Continuing bicarb gtt.  - Appreciate nephrology assistance.    Sepsis  - Sepsis with concurrent HHS with several potential sources: UA suggestive of UTI with >100 RBCs and WBCs and purulent appearance, CXR with LLL infiltrate and copious sputum production.  - Continuing broad spectrum antibiotics with cefepime at 2g IV q24hr, vancomycin IV with pharmacy dosing consult.  - Urine, blood, sputum, gonorrhea/chlamydia cultures in process.    Hematuria  - Likely related to suspected infection.  - Appears improved.    UTI (urinary tract infection)  - As under sepsis.    Acute blood loss anemia  - As above.    Acute respiratory failure  - Suspect HHS with encephalopathy and GI losses causing acidosis contributing.  - Wean vent support as feasible and potential SBTs after improvement in mental status.  - Appreciate critical care assistance.    GIB (gastrointestinal bleeding)  - Two day history of dark blood per rectum and patient's fiancee reports progressive loose stool at home with eventual blood noted.  - s/p 2U pRBCs in ED 06/27 and 1U pRBCs 06/28; trend Hgb and transfuse PRN Hgb < 7.  - Continuing pantoprazole 40mg IV BID.  - GI consulted; to EGD today.    Obesity, Class III, BMI 40-49.9 (morbid obesity)  - Dietary counseling after improvement from critical condition.    Dilated cardiomyopathy  - Volume overloaded; diuretics held with ADONAY.  - As under ADONAY.    CKD (chronic kidney disease) stage 4, GFR 15-29 ml/min  - As under ADONAY.    Hyperlipidemia  - Atorvastatin held with ADONAY.    Essential hypertension  - Hold home antihypertensives in setting of lower blood pressures, ADONAY.    VTE Risk Mitigation (From admission, onward)         Ordered     IP VTE HIGH RISK PATIENT  Once      06/28/20 3443     Reason for No  Pharmacological VTE Prophylaxis  Once     Question:  Reasons:  Answer:  Active Bleeding    06/28/20 0139     Place sequential compression device  Until discontinued      06/28/20 0036              Critical due to HHS, ADONAY.    Critical care time spent on the evaluation and treatment of severe organ dysfunction, review of pertinent labs and imaging studies, discussions with consulting providers and discussions with patient/family: 45 minutes.     KRISTEN Gomez MD  Department of Hospital Medicine   [facility]

## 2020-06-29 NOTE — ASSESSMENT & PLAN NOTE
74M with h/o DM, CKD, HTN, obesity admitted 6/27 with rectal bleeding and lethargy. found to be in DKA, with a GI bleed. He had worsening respiratory and mental status while in the ED, and was intubated for airway protection. noted to have cloudy urine with some purulence from urethra. Also with thick greenish secretions. Afebrile. WBC 40k. UA >100wbc, culture + e.coli, sensitivities pending. procalcitonin elevated 11. G/C, RPR, HIV negative. Renal ultrasound- medical renal disease. resp culture rare GPC, mod wbc, in process. Clinically improving.     Agree with current antibiotics vanc/cefepime for complicated UTI/pyleonephritis and pneumonia. F/u final cultures. If MRSA doesn't end up growing from sputum culture, would stop vancomycin in next day or two when extubated. Would wait to de-escalate the cefepime when the e.coli susceptibilities return. Would plan on treating for 14 days total antibiotics. Hopefully can de-escalate to ceftriaxone while inpatient and po cipro on discharge.

## 2020-06-29 NOTE — CONSULTS
REASON FOR CONSULTATION: ADONAY on CKD4       HPI:74 y.o. male retired  admitted 6/27/2020 with 2 day history of rectal bleeding, history of HTN, gout, nephrolithiasis, severe morbid obesity, IDDM, CKD 4.  Worsening resp status and mental status changes in ED and was subsequently intubated.  Labs revealed severe acidosis,  hyperosmolar nonketotic hyperglycemia with no AG, minimal BHB elevation.  BP's also significantly elevated prior to intubation but are now normotensive.  Labs revealed probable UTI with some hematuria, anemia likely of CKD with concurrent Acute blood loss from GIB.  Case discussed at length with Dr. Gomez and ICU team.     REVIEW OF SYSTEMS:  Unable to obtain given patient is intubated.    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 Root [facility] [place] [facility]    lateral internal anal sphincterotomy  12/13/13    Dr. root [facility] [place] [facility]    URETERAL STENT PLACEMENT         Review of patient's allergies indicates:  No Known Allergies    Medications Prior to Admission   Medication Sig Dispense Refill Last Dose    albuterol (PROVENTIL/VENTOLIN HFA) 90 mcg/actuation inhaler Inhale 1-2 puffs into the lungs every 6 (six) hours as needed for Wheezing or Shortness of Breath. 2 Inhaler 6     allopurinol (ZYLOPRIM) 100 MG tablet Take 1 tablet (100 mg total) by mouth once daily. 90 tablet 1     atorvastatin (LIPITOR) 40 MG tablet Take 1 tablet (40 mg total) by mouth once daily. 90 tablet 1     blood sugar diagnostic Strp To check BG 4  times daily, to use with insurance  "preferred meter, e 11.65 400 each 3     blood-glucose meter kit To check BG 3 times daily, to use with insurance preferred meter, e 11.65 1 each 0     carvedilol (COREG) 25 MG tablet TAKE 1 TABLET(25 MG) BY MOUTH TWICE DAILY WITH MEALS 180 tablet 1     chlorzoxazone (PARAFON FORTE) 500 mg Tab Take 1 tablet (500 mg total) by mouth 3 (three) times daily. 90 tablet 1     fluticasone propionate (FLONASE) 50 mcg/actuation nasal spray 2 sprays (100 mcg total) by Each Nostril route once daily. 48 mL 3     furosemide (LASIX) 20 MG tablet TAKE 1 TABLET BY MOUTH TWICE DAILY INCREASING TO 2 TABLETS TWICE DAILY FOR 3-4 LBS WEIGHT GAIN UNTIL RESOLVED. 180 tablet 1     hydrocodone-acetaminophen 7.5-325mg (NORCO) 7.5-325 mg per tablet TK 1 T PO  TID PRN P  0     insulin (LANTUS SOLOSTAR U-100 INSULIN) glargine 100 units/mL (3mL) SubQ pen Inject 36 Units into the skin every evening. 15 mL 6     insulin lispro (HUMALOG KWIKPEN INSULIN) 100 unit/mL pen INJECT 14 UNITS UNDER THE SKIN WITH BREAKFAST AND LUNCH AND 12 UNITS UNDER THE SKIN WITH DINNER PLUS SCALE 15 mL 6     irbesartan (AVAPRO) 150 MG tablet Take 0.5 tablets (75 mg total) by mouth every evening. 90 tablet 1     lancets Misc To check BG 4  times daily, to use with insurance preferred meter, e 11 .65 400 each 3     magnesium oxide (MAG-OX) 400 mg (241.3 mg magnesium) tablet Take 1 tablet (400 mg total) by mouth every morning. 90 tablet 0     NIFEdipine (PROCARDIA-XL) 90 MG (OSM) 24 hr tablet Take 1 tablet (90 mg total) by mouth once daily. 90 tablet 1     pen needle, diabetic (BD ULTRA-FINE SHORT PEN NEEDLE) 31 gauge x 5/16" Ndle USE FOUR TIMES DAILY 400 each 3     sodium bicarbonate 650 MG tablet Take 1 tablet (650 mg total) by mouth 2 (two) times daily. 180 tablet 1     spironolactone (ALDACTONE) 25 MG tablet Take 1 tablet (25 mg total) by mouth once daily. 90 tablet 0     tamsulosin (FLOMAX) 0.4 mg Cap Take 1 capsule (0.4 mg total) by mouth every morning. " 90 capsule 1     timolol maleate 0.25% (TIMOPTIC) 0.25 % Drop Place 1 drop into both eyes 2 (two) times daily. 10 mL 11     TRUEPLUS LANCETS 33 gauge Misc TEST FOUR TIMES DAILY 400 each 3        Current Facility-Administered Medications   Medication Dose Route Frequency Provider Last Rate Last Dose    0.9%  NaCl infusion (for blood administration)   Intravenous Q24H PRN Sofi Shah PA-C        acetaminophen tablet 650 mg  650 mg Oral Q4H PRN Sofi Shah PA-C        albuterol-ipratropium 2.5 mg-0.5 mg/3 mL nebulizer solution 3 mL  3 mL Nebulization Q6H Phu Justice MD   3 mL at 06/28/20 1955    ceFEPIme (MAXIPIME) 1 g in sodium chloride 0.9% 50 mL IVPB  1 g Intravenous Q24H NYDIA Gomez  mL/hr at 06/28/20 2111 1 g at 06/28/20 2111    dexmedetomidine (PRECEDEX) 400mcg/100mL 0.9% NaCL infusion  0.2 mcg/kg/hr Intravenous Continuous Phu Justice MD 10.8 mL/hr at 06/28/20 2158 0.3 mcg/kg/hr at 06/28/20 2158    dextrose 50% injection 12.5 g  12.5 g Intravenous PRN Sofi Shah PA-C        dextrose 50% injection 25 g  25 g Intravenous PRN Sofi Shah PA-C        insulin regular 100 Units in sodium chloride 0.9% 100 mL infusion  8 Units/hr Intravenous Continuous Sofi Shah PA-C 5 mL/hr at 06/28/20 1908 5 Units/hr at 06/28/20 1908    ondansetron injection 4 mg  4 mg Intravenous Q6H PRN Sofi Shah PA-C        pantoprazole injection 40 mg  40 mg Intravenous Q12H Phu Justice MD   40 mg at 06/28/20 2045    propofol (DIPRIVAN) 10 mg/mL infusion  5 mcg/kg/min Intravenous Continuous Sofi Shah PA-C   Stopped at 06/28/20 1100    sodium bicarbonate 1 mEq/mL (8.4 %) 150 mEq in dextrose 5 % 1,000 mL infusion   Intravenous Continuous NYDIA Gomez  mL/hr at 06/28/20 1911      sodium chloride 0.9% flush 10 mL  10 mL Intravenous PRN Sofi Shah PA-C        timolol maleate 0.25% ophthalmic solution 1 drop  1 drop Both  Eyes BID Sofi Shah PA-C   1 drop at 06/28/20 2111    vancomycin - pharmacy to dose   Intravenous pharmacy to manage frequency NYDIA Gomez MD           Social History     Socioeconomic History    Marital status: Single     Spouse name: Not on file    Number of children: Not on file    Years of education: Not on file    Highest education level: Not on file   Occupational History    Not on file   Social Needs    Financial resource strain: Not on file    Food insecurity     Worry: Not on file     Inability: Not on file    Transportation needs     Medical: Not on file     Non-medical: Not on file   Tobacco Use    Smoking status: Current Every Day Smoker     Packs/day: 0.50     Years: 45.00     Pack years: 22.50    Smokeless tobacco: Never Used    Tobacco comment: says he could quit and will try    Substance and Sexual Activity    Alcohol use: Yes     Comment: rarely    Drug use: No    Sexual activity: Yes     Comment: single, retired , no kids   Lifestyle    Physical activity     Days per week: Not on file     Minutes per session: Not on file    Stress: Not on file   Relationships    Social connections     Talks on phone: Not on file     Gets together: Not on file     Attends Oriental orthodox service: Not on file     Active member of club or organization: Not on file     Attends meetings of clubs or organizations: Not on file     Relationship status: Not on file   Other Topics Concern    Not on file   Social History Narrative    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       Family History   Problem Relation Age of Onset    Diabetes Brother         type 1    Hypertension Brother     Stroke Brother        Temp:  [97.6 °F (36.4 °C)-98.3 °F (36.8 °C)] 98.1 °F (36.7 °C)  Pulse:  [] 72  Resp:  [0-33] 26  SpO2:  [98 %-100 %] 100 %  BP: ()/(50-72) 117/58      PHYSICAL EXAM:    GEN:  Intubated, sedated, morbidly obese BM  HEENT:  NCAT, BETHEL, No  conjunctivitis, normal scler, No LAD, Nml JVD, no carotid bruits  CV:  RRR no MRG  Lungs:  CTAb, mild anterior rhonchi,no  wheeze, crackles, normal excursion  Abdomen: Obese, soft, NTND, no fluid wave, no HSM, NABS, large black volume of stool noted  Ext:  No CC, normal DP pulses bilat 2+ LE edema bilaterally to knees  Neuro:  Non-Focal, EOMI, gait not assessed  Skin:  No rash, excoriation, petchiae    Labs:Reviewed  EKG RESULTS: Reviewed  CXR: Reviewed    ASSESSMENT AND PLAN:    73 YO male with DM, HTN, CHF with Dilated CM, COPD, CKD 4, now with GIB and Hyperosmolar nonketotic hyperglycemia, resp failure prompting intubation to protect airway, severe anemia with Hgb 6.4, elevated BUN due to GIB, hypotension, and ADONAY    HHS  -Agree with bicarb drip for acidosis and volume depletion resulting from hyperglycemia  -would not treat mild hyperkalemia given will resolve with tx of acidosis  -Insulin drip as now  -Poss UTI versus Pna driving this - agree with Vanc and Cefepime    Non-oliguric ADONAY on CKD 4  -Baseline creat over past year 2.5-2.9 with history of proteinuria  -Creat 4.4 on admit and remains 4.4-4.6  - ml last 24 hours  -Prot/creat ratio 1 gram  -Expect that severe anemia and hypotension etiology for ADONAY  -Doesn't appear he has seen nephrologist will order full workup  -Normally prefer no PPI but with GIB this okay  -No nephrotoxins.  -Holding all BP meds given hypotension    GIB with severe anemia  -Agree with transfusion  -mildly iron deficient but would not give IV iron for now  -GI consulted - appears to be UGIB  -Protonix drip as now    DM  -Last HgA1c in December 2019 7.7  -Defer to primary team    Thanks for consult will follow closely

## 2020-06-29 NOTE — ASSESSMENT & PLAN NOTE
- Sepsis with concurrent HHS with several potential sources: UA suggestive of UTI with >100 RBCs and WBCs and purulent appearance, CXR with LLL infiltrate and copious sputum production.  - Continuing broad spectrum antibiotics with cefepime at 2g IV q24hr, vancomycin IV with pharmacy dosing consult.  - Urine, blood, sputum, gonorrhea/chlamydia cultures in process.

## 2020-06-29 NOTE — HPI
74M with h/o DM, CKD, HTN, obesity admitted 6/27 with rectal bleeding and lethargy. Pt currently intubated and no family at bedside so history obtained by chart review. Pt reportedly was diffusely lethargic prior to admit. Also reported some lower abdominal pain and frequency.     In the ED, he was found to be in DKA, with a GI bleed. He had worsening respiratory and mental status while in the ED, and was intubated for airway protection. He was admitted to inpatient status to the ICU. noted to have cloudy urine with some purulence from urethra. Also with thick greenish secretions. Afebrile. Sedation being weened and pt alert and awake on ventilator    UA >100wbc, culture + e.coli, sensitivities pending  procalcitonin elevated 11  G/C, RPR, HIV negative  Renal ultrasound- medical renal disease  resp culture rare GPC, mod wbc, in process    Currently on vanc/cefepime. ID c/f abx recs

## 2020-06-29 NOTE — ASSESSMENT & PLAN NOTE
- Multifactorial with likely acute blood loss anemia, GI losses contributing.  - Baseline Cr ~2.4-2.7.  - FENa suggestive of intrinsic source, concurrent non-gap metabolic acidosis.  - Continuing bicarb gtt.  - Appreciate nephrology assistance.

## 2020-06-29 NOTE — EICU
Intervention Initiated From:  Bedside via phone call     Char Communicated with Bedside Nurse regarding:  Other --Able to flush but unable to obtain any blood return from Rt femoral TLC, order for cathflo requested.      Doctor Notified:  Yes--Dr Maldonado via jabber and then MD discussed issue w/ bedside over the phone.

## 2020-06-29 NOTE — HOSPITAL COURSE
Admitted to ICU with HHS, encephalopathy, GI bleed, ADONAY, respiratory failure and sepsis. Sepsis was due to E. Coli UTI and Strep agalactiae pneumonia. Critical care and nephrology consulted and broad antibiotics continued. GI was consulted and he had EGD on 6/29 which showed normal esophagus and duodenum with OG tube trauma to mid gastric body but no source of bleeding was identified. He was extubated on 6/30. He attempted bowel prep for colonoscopy but was unable to tolerate; colonoscopy was pushed back. He had slow improvement in ADONAY as well as improvement in sepsis with antibiotics. ID was consulted and recommended cefazolin. He was found to have a large posterior neck abscess so general surgery was consulted and he had I&D. He was transferred out of ICU on 7/3. He was scheduled to have colonoscopy on 7/6 but was unable to tolerate bowel prep again so it was canceled. Cultures from neck wound demonstrated finegoldia and he was started on a course of metronidazole as he still had drainage; discussed with general surgery, but given draining adequately they did not pursue further I&D. PT/OT continued to evaluate him and recommended SNF placement. He is stable and ready for discharge to SNF today.

## 2020-06-29 NOTE — SUBJECTIVE & OBJECTIVE
Review of Systems   Unable to perform ROS: Intubated     Objective:     Vital Signs (Most Recent):  Temp: 98.2 °F (36.8 °C) (06/29/20 1415)  Pulse: 72 (06/29/20 1710)  Resp: 20 (06/29/20 1710)  BP: (!) 141/65 (06/29/20 1415)  SpO2: 99 % (06/29/20 1710) Vital Signs (24h Range):  Temp:  [98 °F (36.7 °C)-98.8 °F (37.1 °C)] 98.2 °F (36.8 °C)  Pulse:  [60-80] 72  Resp:  [20-33] 20  SpO2:  [97 %-100 %] 99 %  BP: ()/(53-67) 141/65     Weight: (!) 144.5 kg (318 lb 9 oz)  Body mass index is 40.9 kg/m².    Intake/Output Summary (Last 24 hours) at 6/29/2020 1710  Last data filed at 6/29/2020 1445  Gross per 24 hour   Intake 2350.2 ml   Output 2280 ml   Net 70.2 ml      Physical Exam  Vitals signs and nursing note reviewed.   HENT:      Head: Normocephalic and atraumatic.   Eyes:      General:         Right eye: No discharge.         Left eye: No discharge.      Conjunctiva/sclera: Conjunctivae normal.   Cardiovascular:      Rate and Rhythm: Normal rate.      Pulses: Normal pulses.   Pulmonary:      Comments: Intubated. Crackles, mechanical breath sounds bilaterally.  Abdominal:      Palpations: Abdomen is soft.      Tenderness: There is no abdominal tenderness.   Musculoskeletal:         General: No tenderness.      Right lower leg: Edema (3+) present.      Left lower leg: Edema (3+) present.      Comments: No ulcers on feet   Skin:     General: Skin is warm and dry.   Neurological:      Comments: Intubated on vent. Awake and alert       Significant Labs:   CBC:  Recent Labs   Lab 06/28/20  1253 06/28/20  2045 06/29/20  0450   WBC 28.33* 34.66* 40.51*   HGB 7.0* 6.4* 8.9*   HCT 20.3* 18.5* 24.4*    191 220   GRAN 94.0* 97.0* 85.5*  34.6*   LYMPH 4.0*  CANCELED 1.0*  CANCELED 4.8*  2.0   MONO 2.0*  CANCELED 0.0*  CANCELED 6.0  2.5*   EOS CANCELED CANCELED 0.0   BASO CANCELED CANCELED 0.09      CMP:  Recent Labs   Lab 06/27/20  2111  06/28/20  0506  06/29/20  0307 06/29/20  0450 06/29/20  0843  06/29/20  1205   *   < > 129*   < > 137 138 140 141   K 5.2*   < > 4.8   < > 3.8 3.4* 3.4* 3.5      < > 107   < > 111* 110 110 110   CO2 8*   < > 12*   < > 13* 14* 16* 18*   *   < > 114*   < > 113* 113* 118* 119*   CREATININE 4.2*   < > 4.4*   < > 4.6* 4.5* 4.3* 4.1*   *   < > 741*   < > 247* 216* 200* 179*   CALCIUM 7.3*   < > 7.0*   < > 7.3* 7.4* 7.2* 7.5*   MG 2.1  --   --   --   --   --   --   --    PHOS  --   --  4.0  --  2.9  --   --   --    ALKPHOS 81  --   --   --   --   --   --   --    AST 26  --   --   --   --   --   --   --    ALT 56*  --   --   --   --   --   --   --    BILITOT 0.3  --   --   --   --   --   --   --    PROT 5.1*  --   --   --   --   --   --   --    ALBUMIN 1.7*  --   --   --   --   --   --   --    ANIONGAP 14   < > 10   < > 13 14 14 13    < > = values in this interval not displayed.      Significant Imaging:   US Retroperitoneal 06/28/20:  Right kidney: The right kidney measures 11.4 cm. Cortical thinning is present.  There is loss of corticomedullary distinction. Resistive index measures 0.87.  No mass. No renal stone. No hydronephrosis. Left kidney: The left kidney measures 12 cm. There is significant cortical atrophy.  There is loss of corticomedullary distinction. Resistive index could not obtain secondary to patient's inability to breath hold.  No mass. No renal stone. No hydronephrosis. The bladder is partially distended at the time of scanning and has an unremarkable appearance. Findings are compatible with chronic medical renal disease and not significantly changed from the previous exam

## 2020-06-29 NOTE — NURSING
Unable to get blood return from femoral tlc for labs, but was able to flush without difficulty. Will switch to lab collect for now. 1 u prbc transfused without blood transfusion reaction.   0300 was only able to get cbc and bmp.  0315 lab called that cbc got hemolyzed and have to recollect.  eicu updated regarding femoral tlc. Awaiting orders.

## 2020-06-29 NOTE — PROGRESS NOTES
EGD today.  See Provation for full report.    Findings:  -Normal esophagus  -OG tube trauma to the mid body  -Normal duodenum    Plan:  -BID Pantoprazole  -Monitor for ongoing GI blood loss.  Nothing since admission    Will follow.

## 2020-06-29 NOTE — ANESTHESIA POSTPROCEDURE EVALUATION
Anesthesia Post Evaluation    Patient: Vinnie Siegel    Procedure(s) Performed: Procedure(s) (LRB):  EGD (ESOPHAGOGASTRODUODENOSCOPY) (N/A)    Final Anesthesia Type: general    Patient location during evaluation: ICU  Patient participation: No - Unable to Participate, Intubation  Level of consciousness: sedated  Post-procedure vital signs: reviewed and stable  Pain management: adequate  Airway patency: patent    PONV status at discharge: No PONV  Anesthetic complications: no      Cardiovascular status: blood pressure returned to baseline  Respiratory status: room air and intubated  Hydration status: euvolemic  Follow-up not needed.          Vitals Value Taken Time   /65 06/29/20 0946   Temp 37 °C (98.6 °F) 06/29/20 0745   Pulse 74 06/29/20 0955   Resp 20 06/29/20 0955   SpO2 98 % 06/29/20 0955   Vitals shown include unvalidated device data.      No case tracking events are documented in the log.      Pain/Felicity Score: No data recorded

## 2020-06-29 NOTE — CONSULTS
"  [facility]  Adult Nutrition  Consult Note    SUMMARY     Recommendations    1. Recommend Novasource @ 45ml/hr. +200 ml FWF q4hrs.   To provide 2160kcals(95%EEN), 98g protein(85%EPN), and 744 ml water.    2. If TPN is needed, recommend Clinimix 5/15 @70 ml/hr  To provide 1693 kcals(75% EEN), 84 g AA(73% EPN), and 252 g dextrose.    3. Daily weighs.     4. When medically able ADAT to Renal, Diabetic(texture per SLP.)    Goals: 1. Initiate Nutrition by RD follow up.  Nutrition Goal Status: new  Communication of RD Recs: reviewed with RN(POC)    Reason for Assessment    Reason For Assessment: consult  Diagnosis: (Type 2 diabetes mellitus with hyperosmolar nonketotic hyperglycemia)  Relevant Medical History: DM, HTN, CKD, HLD  Interdisciplinary Rounds: attended  General Information Comments: 6.29.20- Pt is a 74 year old male with GIB (gastrointestinal bleeding). Pt currently NPO. Pt intubated and sedated at this time. Diabetic diet education handouts left at bedside. Will give diabetic education when medially able.   lbs, no significant change in weight. Pt going for EGD today. Will continue to monitor.   NFPE 6.29.20- Pt nourished.   Nutrition Discharge Planning: Adequate nutrition to meet needs via Renal, diabetic diet.    Nutrition Risk Screen    Nutrition Risk Screen: other (see comments)(pt intubated and sedated)    Nutrition/Diet History    Spiritual, Cultural Beliefs, Sabianism Practices, Values that Affect Care: no    Anthropometrics    Temp: 98.6 °F (37 °C)  Height Method: Stated  Height: 6' 2" (188 cm)  Height (inches): 74 in  Weight Method: Bed Scale  Weight: (!) 144.5 kg (318 lb 9 oz)  Weight (lb): (!) 318.57 lb  Ideal Body Weight (IBW), Male: 190 lb  % Ideal Body Weight, Male (lb): 167.67 %  BMI (Calculated): 40.9     Lab/Procedures/Meds    Pertinent Labs Reviewed: reviewed  Pertinent Labs Comments: K 3.4, , Cr 4.5  Pertinent Medications Reviewed: reviewed  Pertinent " Medications Comments: Pantoprazole, Insulin, Vanc    Estimated/Assessed Needs    Weight Used For Calorie Calculations: (!) 144.5 kg (318 lb 9 oz)  Energy Calorie Requirements (kcal): 2,259.75 kcals/day  Energy Need Method: Mario Alberto state  Protein Requirements: 115.84 g/day(0.8 g/kg)  Weight Used For Protein Calculations: (!) 144.5 kg (318 lb 9 oz)  Fluid Requirements (mL): 1mL/kcal or per MD  Estimated Fluid Requirement Method: RDA Method  CHO Requirement: 282 g CHO    Nutrition Prescription Ordered    Current Diet Order: NPO    Evaluation of Received Nutrient/Fluid Intake    Kcals from Profofol: 446 kcals  Energy Calories Required: not meeting needs  Protein Required: not meeting needs  Fluid Required: not meeting needs  Comments: LBM 6.28.20  % Intake of Estimated Energy Needs: 0 - 25 %  % Meal Intake: NPO    Nutrition Risk    Level of Risk/Frequency of Follow-up: moderate     Assessment and Plan    GIB (gastrointestinal bleeding)  Nutrition Problem  Inadequate energy intake     Related to (etiology):   Inability to consume sufficient energy     Signs and Symptoms (as evidenced by):   NPO     Interventions/Recommendations (treatment strategy):  Collaboration of nutrition care w/ other providers      Nutrition Diagnosis Status:   New    Monitor and Evaluation    Food and Nutrient Intake: food and beverage intake, energy intake  Food and Nutrient Adminstration: diet order  Knowledge/Beliefs/Attitudes: food and nutrition knowledge/skill  Physical Activity and Function: nutrition-related ADLs and IADLs  Anthropometric Measurements: weight  Biochemical Data, Medical Tests and Procedures: electrolyte and renal panel, glucose/endocrine profile  Nutrition-Focused Physical Findings: overall appearance     Malnutrition Assessment     Dakota Score: 13  Orbital Region (Subcutaneous Fat Loss): well nourished  Upper Arm Region (Subcutaneous Fat Loss): well nourished  Thoracic and Lumbar Region: well nourished   Temple Region  (Muscle Loss): well nourished  Clavicle Bone Region (Muscle Loss): well nourished  Clavicle and Acromion Bone Region (Muscle Loss): well nourished  Scapular Bone Region (Muscle Loss): well nourished  Dorsal Hand (Muscle Loss): well nourished  Patellar Region (Muscle Loss): well nourished  Anterior Thigh Region (Muscle Loss): well nourished  Posterior Calf Region (Muscle Loss): well nourished   Edema (Fluid Accumulation): 1-->trace        Nutrition Follow-Up    RD Follow-up?: Yes

## 2020-06-29 NOTE — ASSESSMENT & PLAN NOTE
Nutrition Problem  Inadequate energy intake     Related to (etiology):   Inability to consume sufficient energy     Signs and Symptoms (as evidenced by):   NPO     Interventions/Recommendations (treatment strategy):  Collaboration of nutrition care w/ other providers      Nutrition Diagnosis Status:   New

## 2020-06-29 NOTE — PROGRESS NOTES
[facility]  Pulmonology  Progress Note    Patient Name: Vinnie Siegel  MRN: [id number]  Admission Date: 6/27/2020  Hospital Length of Stay: 2 days  Code Status: Full Code  Attending Provider: NYDIA Gomez MD  Primary Care Provider: Janeth Walter MD   Principal Problem: Type 2 diabetes mellitus with hyperosmolar nonketotic hyperglycemia    Subjective:     Interval History: remains hemodynamically stable on ventilator    Objective:     Vital Signs (Most Recent):  Temp: 98.6 °F (37 °C) (06/29/20 0745)  Pulse: 70 (06/29/20 1115)  Resp: (!) 21 (06/29/20 1115)  BP: (!) 156/67 (06/29/20 0845)  SpO2: 98 % (06/29/20 1115) Vital Signs (24h Range):  Temp:  [97.6 °F (36.4 °C)-98.8 °F (37.1 °C)] 98.6 °F (37 °C)  Pulse:  [66-82] 70  Resp:  [21-33] 21  SpO2:  [97 %-100 %] 98 %  BP: ()/(50-67) 156/67     Weight: (!) 144.5 kg (318 lb 9 oz)  Body mass index is 40.9 kg/m².      Intake/Output Summary (Last 24 hours) at 6/29/2020 1233  Last data filed at 6/29/2020 1059  Gross per 24 hour   Intake 2748.61 ml   Output 1990 ml   Net 758.61 ml       Physical Exam  Vitals signs and nursing note reviewed.   Constitutional:       General: He is not in acute distress.     Appearance: He is obese. He is not ill-appearing, toxic-appearing or diaphoretic.   HENT:      Head: Normocephalic and atraumatic.      Nose: No congestion or rhinorrhea.      Mouth/Throat:      Mouth: Mucous membranes are moist.      Pharynx: Oropharynx is clear.      Comments: ETT in place  Eyes:      General: No scleral icterus.     Conjunctiva/sclera: Conjunctivae normal.   Cardiovascular:      Rate and Rhythm: Normal rate and regular rhythm.      Heart sounds: No murmur.   Pulmonary:      Effort: No respiratory distress.      Breath sounds: No wheezing, rhonchi or rales.      Comments: Mechanical breath sounds bilaterally  Abdominal:      General: There is no distension.      Palpations: Abdomen is soft.   Musculoskeletal:      Right lower  leg: Edema present.      Left lower leg: Edema present.   Skin:     General: Skin is warm and dry.      Findings: No rash.      Comments: Right groin CVC in place         Vents:  Vent Mode: A/C (06/29/20 1115)  Ventilator Initiated: Yes (06/27/20 2216)  Set Rate: 22 BPM (06/29/20 1115)  Vt Set: 500 mL (06/29/20 1115)  Pressure Support: 0 cmH20 (06/29/20 1115)  PEEP/CPAP: 5 cmH20 (06/29/20 1115)  Oxygen Concentration (%): 21 (06/29/20 1115)  Peak Airway Pressure: 19 cmH2O (06/29/20 1115)  Plateau Pressure: 0 cmH20 (06/29/20 1115)  Total Ve: 12.5 mL (06/29/20 1115)  F/VT Ratio<105 (RSBI): (!) 54.55 (06/29/20 1115)    Lines/Drains/Airways     Central Venous Catheter Line            Percutaneous Central Line Insertion/Assessment - Triple Lumen  06/27/20 2300 right femoral 1 day          Drain                 NG/OG Tube 06/27/20 2258 orogastric 16 Fr. Center mouth 1 day         Urethral Catheter 06/27/20 2258 Non-latex 16 Fr. 1 day         Rectal Tube 06/28/20 1808 less than 1 day          Airway                 Airway - Non-Surgical 06/27/20 2205 Endotracheal Tube 1 day          Peripheral Intravenous Line                 Peripheral IV - Single Lumen 06/27/20 2047 18 G Right Antecubital 1 day         Peripheral IV - Single Lumen 06/27/20 2127 20 G Left Forearm 1 day                Significant Labs:    CBC/Anemia Profile:  Recent Labs   Lab 06/27/20  2111 06/27/20  2331  06/28/20  1253 06/28/20  2045 06/29/20  0450   WBC 35.04*  --    < > 28.33* 34.66* 40.51*   HGB 6.4*  --    < > 7.0* 6.4* 8.9*   HCT 19.4*  --    < > 20.3* 18.5* 24.4*     --    < > 197 191 220   MCV 92  --    < > 88 88 86   RDW 15.4*  --    < > 15.5* 15.5* 15.0*   IRON 41*  --   --   --   --   --    FERRITIN 757*  --   --   --   --   --    FOLATE  --  4.9  --   --   --   --    [id number]  --  651  --   --   --   --     < > = values in this interval not displayed.        Chemistries:  Recent Labs   Lab 06/27/20  7026  06/28/20  3856   06/29/20  0307 06/29/20  0450 06/29/20  0843   *   < > 129*   < > 137 138 140   K 5.2*   < > 4.8   < > 3.8 3.4* 3.4*      < > 107   < > 111* 110 110   CO2 8*   < > 12*   < > 13* 14* 16*   *   < > 114*   < > 113* 113* 118*   CREATININE 4.2*   < > 4.4*   < > 4.6* 4.5* 4.3*   CALCIUM 7.3*   < > 7.0*   < > 7.3* 7.4* 7.2*   ALBUMIN 1.7*  --   --   --   --   --   --    PROT 5.1*  --   --   --   --   --   --    BILITOT 0.3  --   --   --   --   --   --    ALKPHOS 81  --   --   --   --   --   --    ALT 56*  --   --   --   --   --   --    AST 26  --   --   --   --   --   --    MG 2.1  --   --   --   --   --   --    PHOS  --   --  4.0  --  2.9  --   --     < > = values in this interval not displayed.       All pertinent labs within the past 24 hours have been reviewed.    Significant Imaging:  I have reviewed and interpreted all pertinent imaging results/findings within the past 24 hours.    Assessment/Plan:     Neurologic:  · Metabolic Encephalopathy  · Sedation  · Intubated for worsening mentation, likely multifactorial 2/2  HHS, potential septic encephalopathy, and progressive acidemia  · Now off Propofol and on Precedex for sedation, titrate for a RASS of 0 to -2.  Daily SAT/SBT    Cardiovascular:  · Chronic Systolic Heart Failure  · Holding BB/ARB given borderline BP in the setting of acute GIB. Resume when appropriate   · Appears somewhat hypervolemic but in the setting of acute GIB, defer diuretic therapy at this time, as this does not appear to be meaningfully affecting his clinical trajectory    Pulmonary:  · Acute Hypoxemic Respiratory Failure  · Likely Chronic Bronchitis  · Remains on 21% FiO2 on vent. Continue LPV (6cc/kg w/ goal Pplat of < 30). Daily SAT/SBT  · Defer extubation this morning due to planned EGD and persistent acidemia  · Purulent sputum but no obvious infiltrate on CXR. Currently on broad spectrum abx but can likely be de-escalated. See below    FEN/GI:  · Acute GIB  · Acute  Gastroenteritis  · Both melena and hematochezia with baseline Hgb >13 (x3y ago). Hgb <7 at time of presentation, transfuse for Hgb <7    · GI consulted. S/p EGD with no obvious source of bleeding  · Unclear etiology for his diarrhea with no diarrhea here. If diarrhea persists, would check C diff  · Nutrition  · Defer TF pending likely extubation tomorrow    Renal:  · ADONAY on CKD  · Non-gap Metabolic Acidosis  · UOP improving but poor quality. Cr continuing to rise  · NGMA 2/2 GI bicarb loss and subsequent renal failure. Now on bicarb gtt  · Nephology consulted. Will likely need RRT. Maintain strict I/O    Heme:  · Acute Blood Loss Anemia  · GI studies and recs as noted above. Transfuse Hgb <7    Infectious:  · UTI  · Currently on broad spectrum ABx. E coli growing in UCx. BCx pending    Endocrine:  · HHS  · IDDM-2  · Marked hyperglycemia w/ encephalopathy. Now on insulin and bicarb gtt  · Transition to SQ insulin once tolerating feeds. Renally dose insulin     GI PPx: BID PPI per GI  VTE PPx: SCDs    Disposition: continue ICU care pending clinical improvement         Erik Campa MD  Pulmonology  [facility]

## 2020-06-30 PROBLEM — E11.22 TYPE 2 DIABETES MELLITUS WITH STAGE 4 CHRONIC KIDNEY DISEASE, WITH LONG-TERM CURRENT USE OF INSULIN: Chronic | Status: RESOLVED | Noted: 2017-03-30 | Resolved: 2020-06-30

## 2020-06-30 PROBLEM — Z79.4 TYPE 2 DIABETES MELLITUS WITH STAGE 4 CHRONIC KIDNEY DISEASE, WITH LONG-TERM CURRENT USE OF INSULIN: Chronic | Status: RESOLVED | Noted: 2017-03-30 | Resolved: 2020-06-30

## 2020-06-30 PROBLEM — R31.9 HEMATURIA: Status: RESOLVED | Noted: 2020-06-27 | Resolved: 2020-06-30

## 2020-06-30 PROBLEM — N18.4 TYPE 2 DIABETES MELLITUS WITH STAGE 4 CHRONIC KIDNEY DISEASE, WITH LONG-TERM CURRENT USE OF INSULIN: Chronic | Status: RESOLVED | Noted: 2017-03-30 | Resolved: 2020-06-30

## 2020-06-30 PROBLEM — R65.20 SEVERE SEPSIS: Status: ACTIVE | Noted: 2020-06-27

## 2020-06-30 PROBLEM — I16.1 HYPERTENSIVE EMERGENCY: Status: RESOLVED | Noted: 2020-06-27 | Resolved: 2020-06-30

## 2020-06-30 PROBLEM — E87.20 METABOLIC ACIDOSIS, NORMAL ANION GAP (NAG): Status: RESOLVED | Noted: 2020-06-28 | Resolved: 2020-06-30

## 2020-06-30 PROBLEM — J18.9 LLL PNEUMONIA: Status: ACTIVE | Noted: 2020-06-30

## 2020-06-30 LAB
ALBUMIN SERPL ELPH-MCNC: 2.11 G/DL (ref 3.35–5.55)
ALPHA1 GLOB SERPL ELPH-MCNC: 0.64 G/DL (ref 0.17–0.41)
ALPHA2 GLOB SERPL ELPH-MCNC: 0.84 G/DL (ref 0.43–0.99)
ANA PATTERN 1: NORMAL
ANA SER QL IF: POSITIVE
ANA TITR SER IF: NORMAL {TITER}
ANION GAP SERPL CALC-SCNC: 11 MMOL/L (ref 8–16)
ANION GAP SERPL CALC-SCNC: 13 MMOL/L (ref 8–16)
ANION GAP SERPL CALC-SCNC: 14 MMOL/L (ref 8–16)
ANISOCYTOSIS BLD QL SMEAR: SLIGHT
AORTIC ROOT ANNULUS: 2.79 CM
AV INDEX (PROSTH): 0.87
AV MEAN GRADIENT: 2 MMHG
AV PEAK GRADIENT: 6 MMHG
AV VALVE AREA: 3.91 CM2
AV VELOCITY RATIO: 0.74
B-GLOBULIN SERPL ELPH-MCNC: 0.67 G/DL (ref 0.5–1.1)
BACTERIA UR CULT: ABNORMAL
BASOPHILS # BLD AUTO: 0.03 K/UL (ref 0–0.2)
BASOPHILS # BLD AUTO: ABNORMAL K/UL (ref 0–0.2)
BASOPHILS NFR BLD: 0 % (ref 0–1.9)
BASOPHILS NFR BLD: 0.1 % (ref 0–1.9)
BLD PROD TYP BPU: NORMAL
BLOOD UNIT EXPIRATION DATE: NORMAL
BLOOD UNIT TYPE CODE: 6200
BLOOD UNIT TYPE: NORMAL
BSA FOR ECHO PROCEDURE: 2.74 M2
BUN SERPL-MCNC: 102 MG/DL (ref 8–23)
BUN SERPL-MCNC: 102 MG/DL (ref 8–23)
BUN SERPL-MCNC: 109 MG/DL (ref 8–23)
CALCIUM SERPL-MCNC: 7.2 MG/DL (ref 8.7–10.5)
CALCIUM SERPL-MCNC: 7.3 MG/DL (ref 8.7–10.5)
CALCIUM SERPL-MCNC: 7.3 MG/DL (ref 8.7–10.5)
CHLORIDE SERPL-SCNC: 109 MMOL/L (ref 95–110)
CHLORIDE SERPL-SCNC: 110 MMOL/L (ref 95–110)
CHLORIDE SERPL-SCNC: 111 MMOL/L (ref 95–110)
CO2 SERPL-SCNC: 21 MMOL/L (ref 23–29)
CODING SYSTEM: NORMAL
CREAT SERPL-MCNC: 3.5 MG/DL (ref 0.5–1.4)
CREAT SERPL-MCNC: 3.7 MG/DL (ref 0.5–1.4)
CREAT SERPL-MCNC: 3.7 MG/DL (ref 0.5–1.4)
CV ECHO LV RWT: 0.49 CM
DIFFERENTIAL METHOD: ABNORMAL
DIFFERENTIAL METHOD: ABNORMAL
DISPENSE STATUS: NORMAL
DOP CALC AO PEAK VEL: 1.21 M/S
DOP CALC AO VTI: 22.62 CM
DOP CALC LVOT AREA: 4.5 CM2
DOP CALC LVOT DIAMETER: 2.4 CM
DOP CALC LVOT PEAK VEL: 0.9 M/S
DOP CALC LVOT STROKE VOLUME: 88.53 CM3
DOP CALCLVOT PEAK VEL VTI: 19.58 CM
E WAVE DECELERATION TIME: 331.13 MSEC
E/A RATIO: 1.34
ECHO LV POSTERIOR WALL: 1.42 CM (ref 0.6–1.1)
EOSINOPHIL # BLD AUTO: 0 K/UL (ref 0–0.5)
EOSINOPHIL # BLD AUTO: ABNORMAL K/UL (ref 0–0.5)
EOSINOPHIL NFR BLD: 0 % (ref 0–8)
EOSINOPHIL NFR BLD: 0.1 % (ref 0–8)
ERYTHROCYTE [DISTWIDTH] IN BLOOD BY AUTOMATED COUNT: 14.9 % (ref 11.5–14.5)
ERYTHROCYTE [DISTWIDTH] IN BLOOD BY AUTOMATED COUNT: 15.2 % (ref 11.5–14.5)
EST. GFR  (AFRICAN AMERICAN): 18 ML/MIN/1.73 M^2
EST. GFR  (AFRICAN AMERICAN): 18 ML/MIN/1.73 M^2
EST. GFR  (AFRICAN AMERICAN): 19 ML/MIN/1.73 M^2
EST. GFR  (NON AFRICAN AMERICAN): 15 ML/MIN/1.73 M^2
EST. GFR  (NON AFRICAN AMERICAN): 15 ML/MIN/1.73 M^2
EST. GFR  (NON AFRICAN AMERICAN): 16 ML/MIN/1.73 M^2
FRACTIONAL SHORTENING: 35 % (ref 28–44)
GAMMA GLOB SERPL ELPH-MCNC: 0.84 G/DL (ref 0.67–1.58)
GLUCOSE SERPL-MCNC: 152 MG/DL (ref 70–110)
GLUCOSE SERPL-MCNC: 171 MG/DL (ref 70–110)
GLUCOSE SERPL-MCNC: 184 MG/DL (ref 70–110)
HCT VFR BLD AUTO: 19.2 % (ref 40–54)
HCT VFR BLD AUTO: 19.4 % (ref 40–54)
HGB BLD-MCNC: 6.9 G/DL (ref 14–18)
HGB BLD-MCNC: 7 G/DL (ref 14–18)
HYPOCHROMIA BLD QL SMEAR: ABNORMAL
IMM GRANULOCYTES # BLD AUTO: 0.84 K/UL (ref 0–0.04)
IMM GRANULOCYTES # BLD AUTO: ABNORMAL K/UL (ref 0–0.04)
IMM GRANULOCYTES NFR BLD AUTO: 3 % (ref 0–0.5)
IMM GRANULOCYTES NFR BLD AUTO: ABNORMAL % (ref 0–0.5)
INTERVENTRICULAR SEPTUM: 1.45 CM (ref 0.6–1.1)
LA MAJOR: 6.49 CM
LA MINOR: 6.74 CM
LA WIDTH: 3.8 CM
LACTATE SERPL-SCNC: 2.7 MMOL/L (ref 0.5–2.2)
LACTATE SERPL-SCNC: 2.9 MMOL/L (ref 0.5–2.2)
LEFT ATRIUM SIZE: 3.15 CM
LEFT ATRIUM VOLUME INDEX: 25.4 ML/M2
LEFT ATRIUM VOLUME: 67.28 CM3
LEFT INTERNAL DIMENSION IN SYSTOLE: 3.79 CM (ref 2.1–4)
LEFT VENTRICLE DIASTOLIC VOLUME INDEX: 63.71 ML/M2
LEFT VENTRICLE DIASTOLIC VOLUME: 168.6 ML
LEFT VENTRICLE MASS INDEX: 145 G/M2
LEFT VENTRICLE SYSTOLIC VOLUME INDEX: 23.3 ML/M2
LEFT VENTRICLE SYSTOLIC VOLUME: 61.63 ML
LEFT VENTRICULAR INTERNAL DIMENSION IN DIASTOLE: 5.83 CM (ref 3.5–6)
LEFT VENTRICULAR MASS: 383.61 G
LYMPHOCYTES # BLD AUTO: 1.7 K/UL (ref 1–4.8)
LYMPHOCYTES # BLD AUTO: ABNORMAL K/UL (ref 1–4.8)
LYMPHOCYTES NFR BLD: 6 % (ref 18–48)
LYMPHOCYTES NFR BLD: 6.1 % (ref 18–48)
MCH RBC QN AUTO: 30.8 PG (ref 27–31)
MCH RBC QN AUTO: 31.3 PG (ref 27–31)
MCHC RBC AUTO-ENTMCNC: 35.9 G/DL (ref 32–36)
MCHC RBC AUTO-ENTMCNC: 36.1 G/DL (ref 32–36)
MCV RBC AUTO: 86 FL (ref 82–98)
MCV RBC AUTO: 87 FL (ref 82–98)
MONOCYTES # BLD AUTO: 1.4 K/UL (ref 0.3–1)
MONOCYTES # BLD AUTO: ABNORMAL K/UL (ref 0.3–1)
MONOCYTES NFR BLD: 4 % (ref 4–15)
MONOCYTES NFR BLD: 4.9 % (ref 4–15)
MV PEAK A VEL: 0.58 M/S
MV PEAK E VEL: 0.78 M/S
MV STENOSIS PRESSURE HALF TIME: 96.03 MS
MV VALVE AREA P 1/2 METHOD: 2.29 CM2
NEUTROPHILS # BLD AUTO: 23.9 K/UL (ref 1.8–7.7)
NEUTROPHILS NFR BLD: 85.8 % (ref 38–73)
NEUTROPHILS NFR BLD: 87 % (ref 38–73)
NEUTS BAND NFR BLD MANUAL: 3 %
NRBC BLD-RTO: 0 /100 WBC
NRBC BLD-RTO: 0 /100 WBC
PATHOLOGIST INTERPRETATION SPE: NORMAL
PHOSPHATE SERPL-MCNC: 2.5 MG/DL (ref 2.7–4.5)
PHOSPHATE SERPL-MCNC: 3.3 MG/DL (ref 2.7–4.5)
PISA TR MAX VEL: 1.77 M/S
PLATELET # BLD AUTO: 214 K/UL (ref 150–350)
PLATELET # BLD AUTO: 219 K/UL (ref 150–350)
PLATELET BLD QL SMEAR: ABNORMAL
PMV BLD AUTO: 10.2 FL (ref 9.2–12.9)
PMV BLD AUTO: 10.4 FL (ref 9.2–12.9)
POCT GLUCOSE: 147 MG/DL (ref 70–110)
POCT GLUCOSE: 150 MG/DL (ref 70–110)
POCT GLUCOSE: 160 MG/DL (ref 70–110)
POCT GLUCOSE: 167 MG/DL (ref 70–110)
POCT GLUCOSE: 171 MG/DL (ref 70–110)
POCT GLUCOSE: 175 MG/DL (ref 70–110)
POCT GLUCOSE: 180 MG/DL (ref 70–110)
POCT GLUCOSE: 180 MG/DL (ref 70–110)
POCT GLUCOSE: 181 MG/DL (ref 70–110)
POCT GLUCOSE: 184 MG/DL (ref 70–110)
POCT GLUCOSE: 190 MG/DL (ref 70–110)
POCT GLUCOSE: 203 MG/DL (ref 70–110)
POCT GLUCOSE: 208 MG/DL (ref 70–110)
POCT GLUCOSE: 232 MG/DL (ref 70–110)
POCT GLUCOSE: 233 MG/DL (ref 70–110)
POCT GLUCOSE: 243 MG/DL (ref 70–110)
POCT GLUCOSE: 249 MG/DL (ref 70–110)
POCT GLUCOSE: 256 MG/DL (ref 70–110)
POCT GLUCOSE: 263 MG/DL (ref 70–110)
POCT GLUCOSE: 263 MG/DL (ref 70–110)
POCT GLUCOSE: 272 MG/DL (ref 70–110)
POCT GLUCOSE: 285 MG/DL (ref 70–110)
POCT GLUCOSE: 293 MG/DL (ref 70–110)
POCT GLUCOSE: 362 MG/DL (ref 70–110)
POCT GLUCOSE: >500 MG/DL (ref 70–110)
POIKILOCYTOSIS BLD QL SMEAR: SLIGHT
POLYCHROMASIA BLD QL SMEAR: ABNORMAL
POTASSIUM SERPL-SCNC: 3.3 MMOL/L (ref 3.5–5.1)
POTASSIUM SERPL-SCNC: 3.5 MMOL/L (ref 3.5–5.1)
POTASSIUM SERPL-SCNC: 3.5 MMOL/L (ref 3.5–5.1)
PROCALCITONIN SERPL IA-MCNC: 5.67 NG/ML
PROT 24H UR-MRATE: 768 MG/SPEC (ref 0–100)
PROT SERPL-MCNC: 5.1 G/DL (ref 6–8.4)
PROT UR-MCNC: 32 MG/DL (ref 0–15)
PV PEAK VELOCITY: 1.02 CM/S
RA MAJOR: 7.16 CM
RA PRESSURE: 3 MMHG
RA WIDTH: 4.68 CM
RBC # BLD AUTO: 2.24 M/UL (ref 4.6–6.2)
RBC # BLD AUTO: 2.24 M/UL (ref 4.6–6.2)
SINUS: 3.15 CM
SODIUM SERPL-SCNC: 142 MMOL/L (ref 136–145)
SODIUM SERPL-SCNC: 144 MMOL/L (ref 136–145)
SODIUM SERPL-SCNC: 145 MMOL/L (ref 136–145)
STJ: 2.55 CM
TOXIC GRANULES BLD QL SMEAR: PRESENT
TOXIC GRANULES BLD QL SMEAR: PRESENT
TR MAX PG: 13 MMHG
TRANS ERYTHROCYTES VOL PATIENT: NORMAL ML
TV REST PULMONARY ARTERY PRESSURE: 16 MMHG
URINE COLLECTION DURATION: 24 HR
URINE VOLUME: 2400 ML
VANCOMYCIN SERPL-MCNC: 31.3 UG/ML
WBC # BLD AUTO: 27.9 K/UL (ref 3.9–12.7)
WBC # BLD AUTO: 30.26 K/UL (ref 3.9–12.7)

## 2020-06-30 PROCEDURE — 94761 N-INVAS EAR/PLS OXIMETRY MLT: CPT

## 2020-06-30 PROCEDURE — 63600175 PHARM REV CODE 636 W HCPCS: Performed by: PHYSICIAN ASSISTANT

## 2020-06-30 PROCEDURE — 25000003 PHARM REV CODE 250: Performed by: HOSPITALIST

## 2020-06-30 PROCEDURE — 84166 PATHOLOGIST INTERPRETATION UPE: ICD-10-PCS | Mod: 26,,, | Performed by: PATHOLOGY

## 2020-06-30 PROCEDURE — 99233 SBSQ HOSP IP/OBS HIGH 50: CPT | Mod: GC,,, | Performed by: INTERNAL MEDICINE

## 2020-06-30 PROCEDURE — 36415 COLL VENOUS BLD VENIPUNCTURE: CPT

## 2020-06-30 PROCEDURE — 84166 PROTEIN E-PHORESIS/URINE/CSF: CPT

## 2020-06-30 PROCEDURE — P9021 RED BLOOD CELLS UNIT: HCPCS

## 2020-06-30 PROCEDURE — C9113 INJ PANTOPRAZOLE SODIUM, VIA: HCPCS | Performed by: HOSPITALIST

## 2020-06-30 PROCEDURE — 99900035 HC TECH TIME PER 15 MIN (STAT)

## 2020-06-30 PROCEDURE — 84166 PROTEIN E-PHORESIS/URINE/CSF: CPT | Mod: 26,,, | Performed by: PATHOLOGY

## 2020-06-30 PROCEDURE — 27000221 HC OXYGEN, UP TO 24 HOURS

## 2020-06-30 PROCEDURE — 99900026 HC AIRWAY MAINTENANCE (STAT)

## 2020-06-30 PROCEDURE — 85025 COMPLETE CBC W/AUTO DIFF WBC: CPT

## 2020-06-30 PROCEDURE — 80048 BASIC METABOLIC PNL TOTAL CA: CPT | Mod: 91

## 2020-06-30 PROCEDURE — 25000003 PHARM REV CODE 250: Performed by: NURSE PRACTITIONER

## 2020-06-30 PROCEDURE — 25000003 PHARM REV CODE 250: Performed by: INTERNAL MEDICINE

## 2020-06-30 PROCEDURE — 63600175 PHARM REV CODE 636 W HCPCS: Performed by: INTERNAL MEDICINE

## 2020-06-30 PROCEDURE — 80202 ASSAY OF VANCOMYCIN: CPT

## 2020-06-30 PROCEDURE — 94640 AIRWAY INHALATION TREATMENT: CPT

## 2020-06-30 PROCEDURE — 27201040 HC RC 50 FILTER

## 2020-06-30 PROCEDURE — 85027 COMPLETE CBC AUTOMATED: CPT

## 2020-06-30 PROCEDURE — 83605 ASSAY OF LACTIC ACID: CPT

## 2020-06-30 PROCEDURE — 84100 ASSAY OF PHOSPHORUS: CPT

## 2020-06-30 PROCEDURE — 85007 BL SMEAR W/DIFF WBC COUNT: CPT

## 2020-06-30 PROCEDURE — 25000242 PHARM REV CODE 250 ALT 637 W/ HCPCS: Performed by: HOSPITALIST

## 2020-06-30 PROCEDURE — 84156 ASSAY OF PROTEIN URINE: CPT

## 2020-06-30 PROCEDURE — 99233 PR SUBSEQUENT HOSPITAL CARE,LEVL III: ICD-10-PCS | Mod: ,,, | Performed by: INTERNAL MEDICINE

## 2020-06-30 PROCEDURE — 63600175 PHARM REV CODE 636 W HCPCS: Performed by: CLINIC/CENTER

## 2020-06-30 PROCEDURE — 99233 PR SUBSEQUENT HOSPITAL CARE,LEVL III: ICD-10-PCS | Mod: GC,,, | Performed by: INTERNAL MEDICINE

## 2020-06-30 PROCEDURE — 63600175 PHARM REV CODE 636 W HCPCS: Performed by: HOSPITALIST

## 2020-06-30 PROCEDURE — 94003 VENT MGMT INPAT SUBQ DAY: CPT

## 2020-06-30 PROCEDURE — 83605 ASSAY OF LACTIC ACID: CPT | Mod: 91

## 2020-06-30 PROCEDURE — 84145 PROCALCITONIN (PCT): CPT

## 2020-06-30 PROCEDURE — 25000003 PHARM REV CODE 250: Performed by: PHYSICIAN ASSISTANT

## 2020-06-30 PROCEDURE — 20000000 HC ICU ROOM

## 2020-06-30 PROCEDURE — 63600175 PHARM REV CODE 636 W HCPCS: Performed by: NURSE PRACTITIONER

## 2020-06-30 PROCEDURE — 84100 ASSAY OF PHOSPHORUS: CPT | Mod: 91

## 2020-06-30 PROCEDURE — 80048 BASIC METABOLIC PNL TOTAL CA: CPT

## 2020-06-30 PROCEDURE — 99233 SBSQ HOSP IP/OBS HIGH 50: CPT | Mod: ,,, | Performed by: INTERNAL MEDICINE

## 2020-06-30 RX ORDER — POTASSIUM CHLORIDE 7.45 MG/ML
10 INJECTION INTRAVENOUS ONCE
Status: COMPLETED | OUTPATIENT
Start: 2020-06-30 | End: 2020-06-30

## 2020-06-30 RX ORDER — PARICALCITOL 5 UG/ML
2 INJECTION, SOLUTION INTRAVENOUS
Status: DISCONTINUED | OUTPATIENT
Start: 2020-06-30 | End: 2020-07-01

## 2020-06-30 RX ORDER — CEFAZOLIN SODIUM 1 G/3ML
2 INJECTION, POWDER, FOR SOLUTION INTRAMUSCULAR; INTRAVENOUS
Status: DISCONTINUED | OUTPATIENT
Start: 2020-06-30 | End: 2020-07-01

## 2020-06-30 RX ORDER — CEFAZOLIN SODIUM 2 G/50ML
2 SOLUTION INTRAVENOUS
Status: DISCONTINUED | OUTPATIENT
Start: 2020-06-30 | End: 2020-06-30

## 2020-06-30 RX ORDER — FENTANYL CITRATE 50 UG/ML
25 INJECTION, SOLUTION INTRAMUSCULAR; INTRAVENOUS EVERY 8 HOURS PRN
Status: DISCONTINUED | OUTPATIENT
Start: 2020-06-30 | End: 2020-06-30

## 2020-06-30 RX ORDER — HYDROCODONE BITARTRATE AND ACETAMINOPHEN 500; 5 MG/1; MG/1
TABLET ORAL
Status: DISCONTINUED | OUTPATIENT
Start: 2020-06-30 | End: 2020-07-07

## 2020-06-30 RX ORDER — METOPROLOL TARTRATE 1 MG/ML
5 INJECTION, SOLUTION INTRAVENOUS ONCE
Status: COMPLETED | OUTPATIENT
Start: 2020-06-30 | End: 2020-06-30

## 2020-06-30 RX ORDER — FENTANYL CITRATE 50 UG/ML
25 INJECTION, SOLUTION INTRAMUSCULAR; INTRAVENOUS EVERY 8 HOURS PRN
Status: DISCONTINUED | OUTPATIENT
Start: 2020-06-30 | End: 2020-07-01

## 2020-06-30 RX ORDER — HYDRALAZINE HYDROCHLORIDE 20 MG/ML
10 INJECTION INTRAMUSCULAR; INTRAVENOUS EVERY 6 HOURS PRN
Status: DISCONTINUED | OUTPATIENT
Start: 2020-06-30 | End: 2020-06-30

## 2020-06-30 RX ORDER — HYDRALAZINE HYDROCHLORIDE 20 MG/ML
10 INJECTION INTRAMUSCULAR; INTRAVENOUS EVERY 4 HOURS PRN
Status: DISCONTINUED | OUTPATIENT
Start: 2020-06-30 | End: 2020-07-16 | Stop reason: HOSPADM

## 2020-06-30 RX ADMIN — IPRATROPIUM BROMIDE AND ALBUTEROL SULFATE 3 ML: .5; 3 SOLUTION RESPIRATORY (INHALATION) at 07:06

## 2020-06-30 RX ADMIN — HYDRALAZINE HYDROCHLORIDE 10 MG: 20 INJECTION INTRAMUSCULAR; INTRAVENOUS at 11:06

## 2020-06-30 RX ADMIN — IPRATROPIUM BROMIDE AND ALBUTEROL SULFATE 3 ML: .5; 3 SOLUTION RESPIRATORY (INHALATION) at 01:06

## 2020-06-30 RX ADMIN — SODIUM CHLORIDE 6.25 UNITS/HR: 9 INJECTION, SOLUTION INTRAVENOUS at 01:06

## 2020-06-30 RX ADMIN — CEFAZOLIN 2 G: 330 INJECTION, POWDER, FOR SOLUTION INTRAMUSCULAR; INTRAVENOUS at 05:06

## 2020-06-30 RX ADMIN — FENTANYL CITRATE 25 MCG: 50 INJECTION, SOLUTION INTRAMUSCULAR; INTRAVENOUS at 09:06

## 2020-06-30 RX ADMIN — IPRATROPIUM BROMIDE AND ALBUTEROL SULFATE 3 ML: .5; 3 SOLUTION RESPIRATORY (INHALATION) at 06:06

## 2020-06-30 RX ADMIN — POTASSIUM CHLORIDE 10 MEQ: 7.46 INJECTION, SOLUTION INTRAVENOUS at 04:06

## 2020-06-30 RX ADMIN — PARICALCITOL 2 MCG: 5 INJECTION, SOLUTION INTRAVENOUS at 08:06

## 2020-06-30 RX ADMIN — PANTOPRAZOLE SODIUM 40 MG: 40 INJECTION, POWDER, LYOPHILIZED, FOR SOLUTION INTRAVENOUS at 08:06

## 2020-06-30 RX ADMIN — PANTOPRAZOLE SODIUM 40 MG: 40 INJECTION, POWDER, LYOPHILIZED, FOR SOLUTION INTRAVENOUS at 09:06

## 2020-06-30 RX ADMIN — TIMOLOL MALEATE 1 DROP: 2.5 SOLUTION/ DROPS OPHTHALMIC at 09:06

## 2020-06-30 RX ADMIN — TIMOLOL MALEATE 1 DROP: 2.5 SOLUTION/ DROPS OPHTHALMIC at 08:06

## 2020-06-30 RX ADMIN — DEXMEDETOMIDINE HYDROCHLORIDE 0.4 MCG/KG/HR: 4 INJECTION, SOLUTION INTRAVENOUS at 03:06

## 2020-06-30 RX ADMIN — POTASSIUM CHLORIDE 10 MEQ: 7.46 INJECTION, SOLUTION INTRAVENOUS at 08:06

## 2020-06-30 RX ADMIN — SODIUM CHLORIDE 6.25 UNITS/HR: 9 INJECTION, SOLUTION INTRAVENOUS at 05:06

## 2020-06-30 RX ADMIN — POTASSIUM PHOSPHATE, MONOBASIC POTASSIUM PHOSPHATE, DIBASIC: 224; 236 INJECTION, SOLUTION, CONCENTRATE INTRAVENOUS at 05:06

## 2020-06-30 RX ADMIN — METOPROLOL TARTRATE 5 MG: 1 INJECTION, SOLUTION INTRAVENOUS at 08:06

## 2020-06-30 RX ADMIN — HYDRALAZINE HYDROCHLORIDE 10 MG: 20 INJECTION INTRAMUSCULAR; INTRAVENOUS at 06:06

## 2020-06-30 RX ADMIN — SODIUM BICARBONATE: 84 INJECTION, SOLUTION INTRAVENOUS at 11:06

## 2020-06-30 NOTE — PLAN OF CARE
Pts VS stable throughout night. Afebrile. Normotensive. HR NSR to sinus shekhar when asleep. OETT to vent; no resp issues. Pt remains sedated via precedex, but will awake to voice/stimulation; follows commands and nods appropriately to questions. Bilateral soft wrist restraints with current order; no injuries noted. Mario in place; good UOP. Flexiseal rectal tube in place; minimal output; brown, liquid stool with slight blood tinge. H/H low this AM, will transfuse 1 unit PRBC per eICU MD order once unit ready. Will continue to monitor.

## 2020-06-30 NOTE — PROGRESS NOTES
Pharmacist Renal Dose Adjustment Note    Vinnie Siegel is a 74 y.o. male being treated with the medication Cefazolin.    Patient Data:    Vital Signs (Most Recent):  Temp: 98.9 °F (37.2 °C) (06/30/20 1505)  Pulse: 68 (06/30/20 1505)  Resp: 18 (06/30/20 1505)  BP: (!) 177/74 (06/30/20 1505)  SpO2: 97 % (06/30/20 1505)   Vital Signs (72h Range):  Temp:  [97.6 °F (36.4 °C)-98.9 °F (37.2 °C)]   Pulse:  []   Resp:  [0-37]   BP: ()/(50-93)   SpO2:  [94 %-100 %]      Recent Labs   Lab 06/30/20  0147 06/30/20  0558 06/30/20  1155   CREATININE 3.7* 3.7* 3.5*     Serum creatinine: 3.5 mg/dL (H) 06/30/20 1155  Estimated creatinine clearance: 28.5 mL/min (A)    Medication:Cefazolin dose: 2 g frequency q8 hours will be changed to medication:Cefazolin dose:2 g frequency:q12 hours.     Pharmacist's Name: Ny Proctor  Pharmacist's Extension: [phone]

## 2020-06-30 NOTE — NURSING
Hospital medicine notified regarding Tube feed order clarification. Order for NG insertion and to start trickle feeds if able to pass NGT. Will attempt and monitor.

## 2020-06-30 NOTE — PLAN OF CARE
Brief ID note:     Pt not seen today,but chart reviewed. S/p extubation. UCx with pan-susceptible e.coli. resp culture with strep. De-escalate to cefazolin. Stop vancomycin. Plan for 2 weeks total antibiotics. Planning on transitioning to po antibiotics on discharge.     Samanta Cesar MD  Infectious disease

## 2020-06-30 NOTE — NURSING
Attempts to pass NGT unsuccessful. Pt became extremely agitated; easily arousable despite precedex gtt. Will notify hospital medicine and continue to monitor.

## 2020-06-30 NOTE — PROGRESS NOTES
[facility]  Pulmonology  Progress Note    Patient Name: Vinnie Siegel  MRN: [id number]  Admission Date: 6/27/2020  Hospital Length of Stay: 3 days  Code Status: Full Code  Attending Provider: Triyn Dill MD  Primary Care Provider: Janeth Walter MD   Principal Problem: Type 2 diabetes mellitus with hyperosmolar nonketotic hyperglycemia    Subjective:     Interval History: Doing well since extubation with improved renal function    Objective:     Vital Signs (Most Recent):  Temp: 98.3 °F (36.8 °C) (06/30/20 0900)  Pulse: 68 (06/30/20 1121)  Resp: 18 (06/30/20 1121)  BP: (!) 186/75 (06/30/20 1121)  SpO2: 99 % (06/30/20 1121) Vital Signs (24h Range):  Temp:  [98 °F (36.7 °C)-98.6 °F (37 °C)] 98.3 °F (36.8 °C)  Pulse:  [54-88] 68  Resp:  [15-25] 18  SpO2:  [94 %-100 %] 99 %  BP: (129-186)/(58-76) 186/75     Weight: (!) 144.5 kg (318 lb 9 oz)  Body mass index is 40.9 kg/m².      Intake/Output Summary (Last 24 hours) at 6/30/2020 1136  Last data filed at 6/30/2020 1000  Gross per 24 hour   Intake 2987.8 ml   Output 2735 ml   Net 252.8 ml       Physical Exam  Vitals signs and nursing note reviewed.   Constitutional:       General: He is not in acute distress.     Appearance: He is obese. He is not ill-appearing, toxic-appearing or diaphoretic.   HENT:      Head: Normocephalic and atraumatic.      Nose: No congestion or rhinorrhea.      Mouth/Throat:      Mouth: Mucous membranes are moist.      Pharynx: Oropharynx is clear.      Comments: NC in place  Eyes:      General: No scleral icterus.     Conjunctiva/sclera: Conjunctivae normal.   Cardiovascular:      Rate and Rhythm: Normal rate and regular rhythm.      Heart sounds: No murmur.   Pulmonary:      Effort: No respiratory distress.      Breath sounds: No wheezing, rhonchi or rales.   Abdominal:      General: There is no distension.      Palpations: Abdomen is soft.   Musculoskeletal:      Right lower leg: Edema present.      Left lower leg: Edema  present.   Skin:     General: Skin is warm and dry.      Findings: No rash.      Comments: Right groin CVC in place         Vents:  Vent Mode: Spont (06/30/20 0855)  Ventilator Initiated: Yes (06/27/20 2216)  Set Rate: 0 BPM (06/30/20 0855)  Vt Set: 500 mL (06/30/20 0855)  Pressure Support: 5 cmH20 (06/30/20 0855)  PEEP/CPAP: 5 cmH20 (06/30/20 0855)  Oxygen Concentration (%): 21 (06/30/20 1000)  Peak Airway Pressure: 10 cmH2O (06/30/20 0855)  Plateau Pressure: 0 cmH20 (06/30/20 0855)  Total Ve: 12.3 mL (06/30/20 0855)  F/VT Ratio<105 (RSBI): (!) 23.66 (06/30/20 0737)    Lines/Drains/Airways     Central Venous Catheter Line            Percutaneous Central Line Insertion/Assessment - Triple Lumen  06/27/20 2300 right femoral 2 days          Drain                 Urethral Catheter 06/27/20 2258 Non-latex 16 Fr. 2 days         Rectal Tube 06/28/20 1808 1 day          Airway                 Airway - Non-Surgical 06/27/20 2205 Endotracheal Tube 2 days          Peripheral Intravenous Line                 Peripheral IV - Single Lumen 06/27/20 2047 18 G Right Antecubital 2 days         Peripheral IV - Single Lumen 06/27/20 2127 20 G Left Forearm 2 days                Significant Labs:    CBC/Anemia Profile:  Recent Labs   Lab 06/29/20  0450 06/30/20  0147 06/30/20  0336   WBC 40.51* 30.26* 27.90*   HGB 8.9* 7.0* 6.9*   HCT 24.4* 19.4* 19.2*    214 219   MCV 86 87 86   RDW 15.0* 15.2* 14.9*        Chemistries:  Recent Labs   Lab 06/29/20  0307  06/29/20  1205 06/30/20  0147 06/30/20  0558      < > 141 142 145   K 3.8   < > 3.5 3.3* 3.5   *   < > 110 110 111*   CO2 13*   < > 18* 21* 21*   *   < > 119* 109* 102*   CREATININE 4.6*   < > 4.1* 3.7* 3.7*   CALCIUM 7.3*   < > 7.5* 7.3* 7.2*   PHOS 2.9  --   --  2.5*  --     < > = values in this interval not displayed.       All pertinent labs within the past 24 hours have been reviewed.    Significant Imaging:  I have reviewed and interpreted all pertinent  imaging results/findings within the past 24 hours.    Assessment/Plan:     Neurologic:  · Metabolic Encephalopathy  · Intubated for worsening mentation, likely multifactorial 2/2 HHS, potential septic encephalopathy, and progressive acidemia  · Now extubated and following simple commands  · Critical Illness Myopathy  · Needs aggressive PT/OT     Cardiovascular:  · Chronic Systolic Heart Failure  · Holding BB/ARB given borderline BP in the setting of acute GIB. Resume when appropriate   · Appears somewhat hypervolemic but in the setting of acute GIB, defer diuretic therapy at this time, as this does not appear to be meaningfully affecting his clinical trajectory     Pulmonary:  · Acute Hypoxemic Respiratory Failure  · Likely Chronic Bronchitis  · Now extubated to NC. RCx now growing GBS. On Vanc and Cefepime as noted below     FEN/GI:  · Acute GIB  · Acute Gastroenteritis  · Both melena and hematochezia with baseline Hgb >13 (x3y ago). Hgb <7 at time of presentation, transfuse for Hgb <7    · GI consulted. S/p EGD with no obvious source of bleeding  · Unclear etiology for his diarrhea with no diarrhea here. If diarrhea persists, would check C diff  · Nutrition  · Bedside swallow eval pending     Renal:  · ADONAY on CKD  · Non-gap Metabolic Acidosis  · UOP now dramatically improved with interval decrease in Cr  · NGMA 2/2 GI bicarb loss and subsequent renal failure. Now on bicarb gtt  · Nephology consulted. Defer CRRT in light of improved UOP.  Maintain strict I/O     Hematology:  · Acute Blood Loss Anemia  · GI studies and recs as noted above. Would like hemolysis labs but transfused this AM. Transfuse Hgb <7     Infectious:  · E coli Urinary tract Infection  · GBS pneumonia  · ID consulted.  Currently on Vanc and Cefepime. Blood Cx NGTD  · De-escalate ABx regimen once BCx data returns     Endocrine:  · HHS  · IDDM-2  · Marked hyperglycemia w/ encephalopathy. Now on insulin and bicarb gtt  · Transition to SQ insulin  once tolerating diet. Renally dose insulin      GI PPx: BID PPI per GI  VTE PPx: SCDs     Disposition: Improved clinical trajectory. Continue ICU level care       Erik Campa MD  Pulmonology  [facility]

## 2020-06-30 NOTE — PROGRESS NOTES
"Nephrology  Progress Note    Admit Date: 6/27/2020   LOS: 3 days     SUBJECTIVE:     Follow-up For:  Type 2 diabetes mellitus with hyperosmolar nonketotic hyperglycemia    Interval History:     Dark stool noted in rectal tube.  H/H dropping and getting PRBC's this am.  More alert on vent but not following commands.    Creat and UOP improving.      Review of Systems:    Unable due to vent    OBJECTIVE:     Vital Signs Range (Last 24H):  BP (!) 162/67   Pulse 71   Temp 98.3 °F (36.8 °C) (Axillary)   Resp (!) 22   Ht 6' 2" (1.88 m)   Wt (!) 144.5 kg (318 lb 9 oz)   SpO2 97%   BMI 40.90 kg/m²     Temp:  [98 °F (36.7 °C)-98.6 °F (37 °C)]   Pulse:  [54-84]   Resp:  [19-25]   BP: (128-166)/(58-72)   SpO2:  [94 %-100 %]     I & O (Last 24H):    Intake/Output Summary (Last 24 hours) at 6/30/2020 0758  Last data filed at 6/30/2020 0600  Gross per 24 hour   Intake 2490.22 ml   Output 2660 ml   Net -169.78 ml       Physical Exam:  General appearance: Well developed, well nourished, intubated, obese  Eyes:  Conjunctivae/corneas clear. PERRL.  Lungs: vented with few rales.   Heart: Regular rate and rhythm, S1, S2 normal, no murmur, rub or beatrice.  Abdomen: Soft, non-tender non-distended; bowel sounds normal; no masses,  no organomegaly, obese, rectal tube dark stool.   Extremities: No cyanosis or clubbing. 3+ edema.    Skin: Skin color, texture, turgor normal. No rashes or lesions  Neurologic: No focal numbness or weakness   Mario  Right groin TLC (positional)    Laboratory Data:  Recent Labs   Lab 06/30/20  0336   WBC 27.90*   RBC 2.24*   HGB 6.9*   HCT 19.2*      MCV 86   MCH 30.8   MCHC 35.9       BMP:   Recent Labs   Lab 06/27/20  2111  06/30/20  0558   *   < > 152*   *   < > 145   K 5.2*   < > 3.5      < > 111*   CO2 8*   < > 21*   *   < > 102*   CREATININE 4.2*   < > 3.7*   CALCIUM 7.3*   < > 7.2*   MG 2.1  --   --     < > = values in this interval not displayed.     Lab Results "   Component Value Date    CALCIUM 7.2 (L) 06/30/2020    PHOS 2.5 (L) 06/30/2020       Lab Results   Component Value Date    .5 (H) 06/29/2020    CALCIUM 7.2 (L) 06/30/2020    CAION 1.07 06/28/2020    PHOS 2.5 (L) 06/30/2020       Lab Results   Component Value Date    URICACID 8.6 (H) 06/29/2020       BNP  No results for input(s): BNP, BNPTRIAGEBLO in the last 168 hours.    Medications:  Medication list was reviewed and changes noted under Assessment/Plan.    Diagnostic Results:        ASSESSMENT/PLAN:     73 YO male with DM, HTN, CHF with Dilated CM, COPD, CKD 4, now with GIB and Hyperosmolar nonketotic hyperglycemia, resp failure prompting intubation to protect airway, severe anemia with Hgb 6.4, elevated BUN due to GIB, hypotension, and ADONAY     HHS  -Insulin drip as now  -UTI noted.  -defer     Non-oliguric ADONAY on CKD 4  -Baseline creat over past year 2.5-2.9 with history of proteinuria  -Creat 4.4 on admit and slowly improving to 3.7  -UOP picking up but lacking clearance with GI bleed  -Prot/creat ratio 1 gram  -Expect that severe anemia and hypotension etiology for ADONAY  -Doesn't appear he has seen nephrologist so ordered full workup  -Normally prefer no PPI but with GIB this okay  -No nephrotoxins.  -no immediate need for RRT as UOP picking up and lytes stabilizing.   -replace lytes PRN.  -wean off bicarb drip.     -Renally dose meds, avoid nephrotoxins, and monitor I/O's closely.       GIB with severe anemia  -Agree with transfusions  -mildly iron deficient but would not give IV iron for now  -GI consulted - EGD noted.   -Protonix as now     DM  -Last HgA1c in December 2019 7.7  -Defer to primary team     Sepsis from UTI +/- other:  -cultured and continue antibx for now.   -lactic acid, procal, and WBC's improving.     Vit D Def with mild HPTH:  -start ergo once able to use gut.  -zemplar Q48 IV for now.     Total critical care time (exclusive of procedural time) : 45 minutes  Critical care was  necessary to treat or prevent imminent or life-threatening deterioration of the following conditions: HNNK, ADONAY, sepsis, GIB.     Critical care was time spent personally by me on the following activities: development of treatment plan with patient or surrogate, discussions with consultants, interpretation of cardiac output measurements, examination of patient, ordering and performing treatments and interventions, evaluation of patient's response to treatment, obtaining history from patient or surrogate, ordering and review of laboratory studies, ordering and review of radiographic studies, re-evaluation of patient's condition, pulse oximetry and blood draw for specimens

## 2020-06-30 NOTE — NURSING
Pt extubated to NC, tolerated well. Coughing frequently, productive. Good urine output. Hydralazine given for elevated BP. No output from rectal tube this shift. Remains on Insulin and Na Bicarb infusions. Will continue to monitor.

## 2020-06-30 NOTE — SUBJECTIVE & OBJECTIVE
Interval History: No acute events overnight. Glucoses stable, bicarb improving. Currently on SBT, following commands. Dark blood noted in rectal tube. PRBC ordered today.    Review of Systems   Unable to perform ROS: Intubated     Objective:     Vital Signs (Most Recent):  Temp: 98.3 °F (36.8 °C) (06/30/20 0900)  Pulse: 60 (06/30/20 0900)  Resp: 15 (06/30/20 0900)  BP: (!) 169/74 (06/30/20 0900)  SpO2: 99 % (06/30/20 0900) Vital Signs (24h Range):  Temp:  [98 °F (36.7 °C)-98.6 °F (37 °C)] 98.3 °F (36.8 °C)  Pulse:  [54-84] 60  Resp:  [15-25] 15  SpO2:  [94 %-100 %] 99 %  BP: (128-176)/(58-76) 169/74     Weight: (!) 144.5 kg (318 lb 9 oz)  Body mass index is 40.9 kg/m².    Intake/Output Summary (Last 24 hours) at 6/30/2020 0941  Last data filed at 6/30/2020 0900  Gross per 24 hour   Intake 2829.8 ml   Output 2710 ml   Net 119.8 ml      Physical Exam  Vitals signs and nursing note reviewed.   Constitutional:       General: He is not in acute distress.     Appearance: Normal appearance.   Cardiovascular:      Rate and Rhythm: Normal rate and regular rhythm.      Pulses: Normal pulses.   Pulmonary:      Effort: No respiratory distress.      Breath sounds: Rales present.      Comments: Intubated. Crackles, mechanical breath sounds bilaterally.  Abdominal:      Palpations: Abdomen is soft.      Tenderness: There is no abdominal tenderness.   Musculoskeletal:         General: No tenderness.      Right lower leg: Edema (3+) present.      Left lower leg: Edema (3+) present.      Comments: LUE edema   Skin:     General: Skin is warm and dry.   Neurological:      Comments: Awakens to voice. Following commands       Significant Labs:   CBC:  Recent Labs   Lab 06/29/20  0450 06/30/20  0147 06/30/20  0336   WBC 40.51* 30.26* 27.90*   HGB 8.9* 7.0* 6.9*   HCT 24.4* 19.4* 19.2*    214 219   GRAN 85.5*  34.6* 87.0* 85.8*  23.9*   LYMPH 4.8*  2.0 6.0*  CANCELED 6.1*  1.7   MONO 6.0  2.5* 4.0  CANCELED 4.9  1.4*   EOS  0.0 CANCELED 0.0   BASO 0.09 CANCELED 0.03      CMP:  Recent Labs   Lab 06/27/20  2111  06/28/20  0506  06/29/20  0307  06/29/20  1205 06/30/20  0147 06/30/20  0558   *   < > 129*   < > 137   < > 141 142 145   K 5.2*   < > 4.8   < > 3.8   < > 3.5 3.3* 3.5      < > 107   < > 111*   < > 110 110 111*   CO2 8*   < > 12*   < > 13*   < > 18* 21* 21*   *   < > 114*   < > 113*   < > 119* 109* 102*   CREATININE 4.2*   < > 4.4*   < > 4.6*   < > 4.1* 3.7* 3.7*   *   < > 741*   < > 247*   < > 179* 171* 152*   CALCIUM 7.3*   < > 7.0*   < > 7.3*   < > 7.5* 7.3* 7.2*   MG 2.1  --   --   --   --   --   --   --   --    PHOS  --   --  4.0  --  2.9  --   --  2.5*  --    ALKPHOS 81  --   --   --   --   --   --   --   --    AST 26  --   --   --   --   --   --   --   --    ALT 56*  --   --   --   --   --   --   --   --    BILITOT 0.3  --   --   --   --   --   --   --   --    PROT 5.1*  --   --   --   --   --   --   --   --    ALBUMIN 1.7*  --   --   --   --   --   --   --   --    ANIONGAP 14   < > 10   < > 13   < > 13 11 13    < > = values in this interval not displayed.      Significant Imaging:   No new imaging today

## 2020-06-30 NOTE — EICU
Rounding (Video Assessment):  No    Intervention Initiated From:  Bedside    Char Communicated with Bedside Nurse regarding:  Other    Nurse Notified:  Yes    Doctor Notified:  Yes    Comments: bedside nurse called for restraint order renewal. Request passed to Dr. Hedrick, who states he will check on patient, and renew restraints.

## 2020-06-30 NOTE — PROGRESS NOTES
Bilateral soft wrist restraints ordered for pt safety per RN request. Video review done; d/w RN   LA 3.5 - recheck ordered    0217 Labs pending ;Hb 7- recheck; no obvious bleeding per RN      0356 Hb pending  K, Phos low  replete- CKD - watch levels closely   LA still high but lower- recheck later  D/w RN    0566 Recheck Hb low- give PRBC

## 2020-06-30 NOTE — PROGRESS NOTES
Pharmacokinetic Assessment Follow Up: IV Vancomycin    Vancomycin serum concentration assessment(s):    The random level was drawn ~3 hours early but can be used to guide therapy at this time. The measurement is above the desired definitive target range of 10 to 20 mcg/mL.    Vancomycin Regimen Plan:    Re-dose when the random level is less than 20 mcg/mL, next level to be drawn at 0430 on 7/1/2020    Drug levels (last 3 results):  Recent Labs   Lab Result Units 06/29/20  0307 06/30/20  0147   Vancomycin, Random ug/mL 20.2 31.3       Pharmacy will continue to follow and monitor vancomycin.    Please contact pharmacy at [phone] for questions regarding this assessment.    Thank you for the consult,   Debra North       Patient brief summary:  Vinnie Siegel is a 74 y.o. male initiated on antimicrobial therapy with IV Vancomycin for treatment of bacteremia    The patient's current regimen is pulse dosing    Drug Allergies:   Review of patient's allergies indicates:  No Known Allergies    Actual Body Weight:   144.5 kg    Renal Function:   Estimated Creatinine Clearance: 26.9 mL/min (A) (based on SCr of 3.7 mg/dL (H)).,     Dialysis Method (if applicable):  N/A    CBC (last 72 hours):  Recent Labs   Lab Result Units 06/27/20  2111 06/28/20  0506 06/28/20  1253 06/28/20  2045 06/29/20  0450 06/29/20  0843 06/30/20  0147 06/30/20  0336   WBC K/uL 35.04* 30.89* 28.33* 34.66* 40.51*  --  30.26* 27.90*   Hemoglobin g/dL 6.4* 7.6* 7.0* 6.4* 8.9*  --  7.0* 6.9*   Hemoglobin A1C %  --   --   --   --   --  7.8*  --   --    Hematocrit % 19.4* 22.8* 20.3* 18.5* 24.4*  --  19.4* 19.2*   Platelets K/uL 268 206 197 191 220  --  214 219   Gran% % 94.0* 89.0* 94.0* 97.0* 85.5*  --  87.0* 85.8*   Lymph% % 4.0* 7.0* 4.0* 1.0* 4.8*  --  6.0* 6.1*   Mono% % 2.0* 4.0 2.0* 0.0* 6.0  --  4.0 4.9   Eosinophil% % 0.0 0.0 0.0 0.0 0.0  --  0.0 0.1   Basophil% % 0.0 0.0 0.0 0.0 0.2  --  0.0 0.1   Differential Method  Manual Manual Manual  Manual Automated  --  Manual Automated       Metabolic Panel (last 72 hours):  Recent Labs   Lab Result Units 06/27/20  2111 06/27/20  2114 06/27/20  2331 06/28/20  0020 06/28/20  0058 06/28/20  0506 06/28/20  0847 06/28/20  0927 06/28/20  1253 06/28/20  1726 06/28/20  2045 06/29/20  0010 06/29/20  0307 06/29/20  0450 06/29/20  0843 06/29/20  1205 06/30/20  0147 06/30/20  0558   Sodium mmol/L 127*  --  129*  --  129* 129*  --  133* 133* 135* 134*  --  137 138 140 141 142 145   Sodium Urine Random mmol/L  --   --   --  25  --   --  29  --   --   --   --  24  --   --   --   --   --   --    Potassium mmol/L 5.2*  --  5.2*  --  5.7* 4.8  --  4.3 4.5 4.2 4.2  --  3.8 3.4* 3.4* 3.5 3.3* 3.5   Potassium Urine Random mmol/L  --   --   --   --   --   --  17  --   --   --   --   --   --   --   --   --   --   --    Chloride mmol/L 105  --  109  --  106 107  --  113* 111* 110 110  --  111* 110 110 110 110 111*   Chloride, Rand Ur mmol/L  --   --   --   --   --   --  <20*  --   --   --   --   --   --   --   --   --   --   --    CO2 mmol/L 8*  --  9*  --  11* 12*  --  11* 12* 14* 12*  --  13* 14* 16* 18* 21* 21*   Glucose mg/dL 795*  --  790*  --  761* 741*  --  486* 454* 434* 430*  --  247* 216* 200* 179* 171* 152*   Glucose, UA   --  3+*  --   --   --   --   --   --   --   --   --   --   --   --   --   --   --   --    BUN, Bld mg/dL 107*  --  105*  --  111* 114*  --  111* 120* 124* 127*  --  113* 113* 118* 119* 109* 102*   Creatinine mg/dL 4.2*  --  4.0*  --  4.4* 4.4*  --  4.4* 4.6* 4.6* 4.6*  --  4.6* 4.5* 4.3* 4.1* 3.7* 3.7*   Creatinine, Random Ur mg/dL  --   --   --  63.2  --   --   --   --   --   --   --  60.7  60.7  --   --   --   --   --   --    Albumin g/dL 1.7*  --   --   --   --   --   --   --   --   --   --   --   --   --   --   --   --   --    Total Bilirubin mg/dL 0.3  --   --   --   --   --   --   --   --   --   --   --   --   --   --   --   --   --    Alkaline Phosphatase U/L 81  --   --   --   --   --   --    --   --   --   --   --   --   --   --   --   --   --    AST U/L 26  --   --   --   --   --   --   --   --   --   --   --   --   --   --   --   --   --    ALT U/L 56*  --   --   --   --   --   --   --   --   --   --   --   --   --   --   --   --   --    Magnesium mg/dL 2.1  --   --   --   --   --   --   --   --   --   --   --   --   --   --   --   --   --    Phosphorus mg/dL  --   --   --   --   --  4.0  --   --   --   --   --   --  2.9  --   --   --  2.5*  --        Vancomycin Administrations:  vancomycin given in the last 96 hours                     vancomycin (VANCOCIN) 2,000 mg in sodium chloride 0.9% 500 mL IVPB (mg) 2,000 mg New Bag 06/29/20 0748    vancomycin (VANCOCIN) 2,500 mg in sodium chloride 0.9% 500 mL IVPB (mg) 2,500 mg New Bag 06/28/20 0917                    Microbiologic Results:  Microbiology Results (last 7 days)       Procedure Component Value Units Date/Time    Culture, Respiratory with Gram Stain [[id number]]  (Abnormal) Collected: 06/28/20 0830    Order Status: Completed Specimen: Respiratory from Tracheal Aspirate Updated: 06/30/20 0900     Respiratory Culture STREPTOCOCCUS AGALACTIAE (GROUP B)  Moderate  Beta-hemolytic streptococci are routinely susceptible to   penicillins,cephalosporins and carbapenems.  Susceptibility testing not routinely performed       Gram Stain (Respiratory) <10 epithelial cells per low power field.     Gram Stain (Respiratory) Moderate WBC's     Gram Stain (Respiratory) Rare Gram positive cocci    Blood culture [[id number]] Collected: 06/29/20 0843    Order Status: Completed Specimen: Blood from Antecubital, Left Updated: 06/29/20 1915     Blood Culture, Routine No Growth to date    Blood culture [[id number]] Collected: 06/29/20 0843    Order Status: Completed Specimen: Blood from Antecubital, Right Updated: 06/29/20 1915     Blood Culture, Routine No Growth to date    C. trachomatis/N. gonorrhoeae by AMP DNA [facility]; Urine [[id number]] Collected: 06/28/20 0816     Order Status: Completed Specimen: Genital Updated: 06/29/20 1634     Chlamydia, Amplified DNA Not Detected     N gonorrhoeae, amplified DNA Not Detected    Narrative:      Sources by Resulting Lab:->[facility]    Urine culture [[id number]]  (Abnormal) Collected: 06/27/20 2114    Order Status: Completed Specimen: Urine Updated: 06/29/20 0924     Urine Culture, Routine PRESUMPTIVE E COLI  >100,000 cfu/ml  Identification and susceptibility pending      Narrative:      Preferred Collection Type->Urine, Clean Catch  Specimen Source->Urine

## 2020-06-30 NOTE — ASSESSMENT & PLAN NOTE
- Suspect HHS with encephalopathy and GI losses causing acidosis contributing.  - Appreciate critical care assistance. Undergoing SBT today

## 2020-06-30 NOTE — PHYSICIAN QUERY
"PT Name: Vinnie Siegel  MR #: [id number]     ELECTROLYTE CLARIFICATION      CDS/: Jake Wu RN               Contact information:   [email or web address]      This form is a permanent document in the medical record.    Query Date: June 30, 2020    By submitting this query, we are merely seeking further clarification of documentation to reflect the severity of illness of your patient. Please utilize your independent clinical judgment when addressing the question(s) below.    The Medical Record reflects the following:     Indicators   Supporting Clinical Findings Location in Medical Record   x Lab Value(s)         6/29 [place] 3.4  6/30 [place] 3.3         Labs                Labs    "     x Treatment/Medication -sodium chloride 0.9% 250 mL with potassium phosphate 15 mmol infusion; given 6/30    - sodium chloride 0.9% bolus 1,000 mL; given 6/27@ 2134, @2313       - potassium chloride 10 mEq in 100 mL IVPB; given 6/29, 6/30 (2 doses)    Insulin regular  IV; given 6/28   MAR      MAR    MAR    MAR     x Other    Principal Problem:Type 2 diabetes mellitus with hyperosmolar nonketotic hyperglycemia   6/28 PN, Hospital medicine     Provider, please specify the diagnosis or diagnoses that correspond(s) to the above indicators. Cody all that apply:    [   ] Hypokalemia   [ x  ] Hyperkalemia   [   ] Hyponatremia   [   ] Pseudohyponatremia   [   ] Other electrolyte disturbance (please specify): __________   [   ]  Clinically Undetermined       Please document in your progress notes daily for the duration of treatment until resolved, and include in your discharge summary.  "

## 2020-06-30 NOTE — ASSESSMENT & PLAN NOTE
- Two day history of dark blood per rectum and patient's fiancee reports progressive loose stool at home with blood noted.  - s/p 2U pRBCs in ED 06/27 and 1U pRBCs 06/28; trend Hgb and transfuse PRN Hgb < 7.  - Continuing pantoprazole 40mg IV BID.  - GI following  - EGD 6/29 with normal esophagus, OG tube trauma to the mid body and normal duodenum  - Dark blood noted in rectal tube (6/30). Transfusing 1 unit PRBC today  - May need colonoscopy for further evaluation of GIB

## 2020-06-30 NOTE — NURSING
Minal HERNANDEZ notified of low H/H lab values on recheck; order to transfuse 1 unit PRBCs. Notified blood bank of order, blood bank will notify when blood is ready. Pts VS otherwise stable, will continue to monitor.

## 2020-06-30 NOTE — PROGRESS NOTES
Pt received intubated and on the ventilator this AM. Pt placed on SBT @ 0835;pt did well. Vitals WNL. No distress noted. Pt was extubated to 2LNC;SPO2 100%. Pt tolerated it well. Will continue to monitor.

## 2020-06-30 NOTE — ASSESSMENT & PLAN NOTE
- Multifactorial with likely acute blood loss anemia, GI losses contributing.  - Baseline Cr ~2.4-2.7.  - FENa suggestive of intrinsic source, concurrent non-gap metabolic acidosis.  - Continuing bicarb gtt  - Good UOP. Cr stable overnight  - Appreciate nephrology assistance.

## 2020-06-30 NOTE — ASSESSMENT & PLAN NOTE
- Presentation most consistent with HHS with significant glucose elevation, no significant anion gap, minimal BHB elevation.  - Metabolic encephalopathy likely related to the same with concurrent acute hypoxemic respiratory failure.  - Continuing insulin gtt for time being - NGT unable to be placed for feeds, possible extubation today  - Treat for potential etiologies as under sepsis.

## 2020-06-30 NOTE — PROGRESS NOTES
Therapy with Vancomycin complete and/or consult discontinued by provider.  Pharmacy will sign off, please re-consult as needed.      Thank you, Ny Proctor, Pharm.D.

## 2020-06-30 NOTE — [employer] PATIENT INSTRUCTIONS
Discharge Summary/Instructions after an Endoscopic Procedure  Patient Name: Vinnie Siegel  Patient MRN: [id number]  Patient YOB: 1945 Monday, June 29, 2020  Ravi Leone MD  RESTRICTIONS:  During your procedure today, you received medications for sedation.  These   medications may affect your judgment, balance and coordination.  Therefore,   for 24 hours, you have the following restrictions:   - DO NOT drive a car, operate machinery, make legal/financial decisions,   sign important papers or drink alcohol.    ACTIVITY:  Today: no heavy lifting, straining or running due to procedural   sedation/anesthesia.  The following day: return to full activity including work.  DIET:  Eat and drink normally unless instructed otherwise.     TREATMENT FOR COMMON SIDE EFFECTS:  - Mild abdominal pain, nausea, belching, bloating or excessive gas:  rest,   eat lightly and use a heating pad.  - Sore Throat: treat with throat lozenges and/or gargle with warm salt   water.  - Because air was used during the procedure, expelling large amounts of air   from your rectum or belching is normal.  - If a bowel prep was taken, you may not have a bowel movement for 1-3 days.    This is normal.  SYMPTOMS TO WATCH FOR AND REPORT TO YOUR PHYSICIAN:  1. Abdominal pain or bloating, other than gas cramps.  2. Chest pain.  3. Back pain.  4. Signs of infection such as: chills or fever occurring within 24 hours   after the procedure.  5. Rectal bleeding, which would show as bright red, maroon, or black stools.   (A tablespoon of blood from the rectum is not serious, especially if   hemorrhoids are present.)  6. Vomiting.  7. Weakness or dizziness.  GO DIRECTLY TO THE NEAREST EMERGENCY ROOM IF YOU HAVE ANY OF THE FOLLOWING:      Difficulty breathing              Chills and/or fever over 101 F   Persistent vomiting and/or vomiting blood   Severe abdominal pain   Severe chest pain   Black, tarry stools   Bleeding- more than one  tablespoon   Any other symptom or condition that you feel may need urgent attention  Your doctor recommends these additional instructions:  If any biopsies were taken, your doctors clinic will contact you in 1 to 2   weeks with any results.  - Return patient to ICU for ongoing care.   - NPO.   - Continue present medications.   - The findings and recommendations were discussed with the patient's primary   physician.  For questions, problems or results please call your physician - Ravi Leone MD at Work:  [phone].  [facility], EMERGENCY ROOM PHONE NUMBER: [phone], [facility] [place]   [phone].  IF A COMPLICATION OR EMERGENCY SITUATION ARISES AND YOU ARE UNABLE TO REACH   YOUR PHYSICIAN - GO DIRECTLY TO THE EMERGENCY ROOM.  MD Ravi Tao MD  6/29/2020 10:13:12 AM  This report has been verified and signed electronically.  [employer]

## 2020-06-30 NOTE — ASSESSMENT & PLAN NOTE
- Sepsis with concurrent HHS with several potential sources: UA suggestive of UTI with >100 RBCs and WBCs and purulent appearance, CXR with LLL infiltrate and copious sputum production.  - Urine culture with presumptive E. Coli. Respiratory culture pending, gram stain with GPC   - Continuing broad spectrum antibiotics with cefepime at 2g IV q24hr, vancomycin IV with pharmacy dosing consult.  - LA improving 3.5 -> 2.7. WBC down trending 40 -> 27

## 2020-06-30 NOTE — PLAN OF CARE
Pt remains intubated and on mechanical ventilation. Ambu and mask at bedside. Tx given Q6 and oral care done.

## 2020-06-30 NOTE — ASSESSMENT & PLAN NOTE
- Urine culture with presumptive E. Coli  - Continue empiric Cefepime pending sensitivities   - Appreciate ID input

## 2020-06-30 NOTE — PROGRESS NOTES
Gastroenterology Progress Note    Active Hospital Problems    LLL pneumonia      Encephalopathy, metabolic      GIB (gastrointestinal bleeding)      Acute respiratory failure      *Type 2 diabetes mellitus with hyperosmolar nonketotic hyperglycemia      Acute blood loss anemia      UTI (urinary tract infection)      Severe sepsis      Acute renal failure superimposed on stage 4 chronic kidney disease      Elevated troponin      Obesity, Class III, BMI 40-49.9 (morbid obesity)      Dilated cardiomyopathy      CKD (chronic kidney disease) stage 4, GFR 15-29 ml/min      Hyperlipidemia      Essential hypertension        Subjective:  Patient seen and examined.  The patient continued to need transfusion overnight with evidence of dark blood in the rectal tube this AM.  He was extubated after passing his SBT.    Reviews of Systems:  General:  Negative for fever or chills  Cardiovascular:  Negative for chest pain, shortness of breath, palpitations    Physical Exam    Vitals:  Vital Signs (Most Recent):  Temp: 98.8 °F (37.1 °C) (06/30/20 1105)  Pulse: 70 (06/30/20 1358)  Resp: 18 (06/30/20 1358)  BP: (!) 166/72 (06/30/20 1300)  SpO2: 98 % (06/30/20 1358) Vital Signs (24h Range):  Temp:  [98 °F (36.7 °C)-98.8 °F (37.1 °C)] 98.8 °F (37.1 °C)  Pulse:  [54-88] 70  Resp:  [15-25] 18  SpO2:  [94 %-100 %] 98 %  BP: (131-186)/(58-76) 166/72     GEN: Well developed, well nourished in no apparent distress   HENT: Normocephalic, anicteric sclera   Cardiovascular: Regular rate and rhythm. No murmurs appreciated.   Chest: Non-labored respirations. Breath sounds equal   Abdomen: soft, NTND, no masses, bowel sounds present  Psych: Appropriate mood and affect.   Extermities: No C/C/E. 2+ dorsalis pedis pulses bilaterally  Skin: No new visible or palpable lesions.      Medications/Infusions:  Current Facility-Administered Medications   Medication Dose Route Frequency Provider Last Rate Last Dose    0.9%  NaCl infusion (for blood  administration)   Intravenous Q24H PRN Sofi Shah PA-C        0.9%  NaCl infusion (for blood administration)   Intravenous Q24H PRN Peyman Hedrick MD        acetaminophen tablet 650 mg  650 mg Oral Q4H PRN Sofi Shah PA-C        albuterol-ipratropium 2.5 mg-0.5 mg/3 mL nebulizer solution 3 mL  3 mL Nebulization Q6H Phu Justice MD   3 mL at 06/30/20 1358    ceFEPIme (MAXIPIME) 2 g in sodium chloride 0.9% 50 mL IVPB  2 g Intravenous Q24H NYDIA Gomez  mL/hr at 06/29/20 2117 2 g at 06/29/20 2117    dexmedetomidine (PRECEDEX) 400mcg/100mL 0.9% NaCL infusion  0.2 mcg/kg/hr Intravenous Continuous Phu Justice MD   Stopped at 06/30/20 1000    dextrose 50% injection 12.5 g  12.5 g Intravenous PRN Sofi Shah PA-C        dextrose 50% injection 25 g  25 g Intravenous PRN Sofi Shah PA-C        insulin regular 100 Units in sodium chloride 0.9% 100 mL infusion  8 Units/hr Intravenous Continuous Sofi Shah PA-C 8 mL/hr at 06/30/20 1406 8 Units/hr at 06/30/20 1406    ondansetron injection 4 mg  4 mg Intravenous Q6H PRN Sofi Shah PA-C        pantoprazole injection 40 mg  40 mg Intravenous Q12H Phu Justice MD   40 mg at 06/30/20 0849    paricalcitoL injection 2 mcg  2 mcg Intravenous Q48H Lizandro Pierce NP   2 mcg at 06/30/20 0849    propofol (DIPRIVAN) 10 mg/mL infusion  5 mcg/kg/min Intravenous Continuous Sofi Shah PA-C   Stopped at 06/28/20 1100    sodium bicarbonate 1 mEq/mL (8.4 %) 150 mEq in dextrose 5 % 1,000 mL infusion   Intravenous Continuous Lizandro Pierce NP 50 mL/hr at 06/30/20 1156      sodium chloride 0.9% flush 10 mL  10 mL Intravenous PRN Sofi Shah PA-C        timolol maleate 0.25% ophthalmic solution 1 drop  1 drop Both Eyes BID Sofi Shah PA-C   1 drop at 06/30/20 0849    vancomycin - pharmacy to dose   Intravenous pharmacy to manage frequency NYDIA Gomez MD            Intake and Output:    Intake/Output Summary (Last 24 hours) at 6/30/2020 1408  Last data filed at 6/30/2020 1105  Gross per 24 hour   Intake 2987.8 ml   Output 2720 ml   Net 267.8 ml       Labs:    Results for VINNIE SIEGEL (MRN [id number]) as of 6/30/2020 14:11   Ref. Range 6/28/2020 20:45 6/29/2020 04:50 6/30/2020 01:47 6/30/2020 03:36   Hemoglobin Latest Ref Range: 14.0 - 18.0 g/dL 6.4 (L) 8.9 (L) 7.0 (L) 6.9 (L)   Hematocrit Latest Ref Range: 40.0 - 54.0 % 18.5 (LL) 24.4 (L) 19.4 (LL) 19.2 (LL)   Results for VINNIE SIEGEL (MRN [id number]) as of 6/30/2020 14:11   Ref. Range 6/30/2020 11:55   Sodium Latest Ref Range: 136 - 145 mmol/L 144   Potassium Latest Ref Range: 3.5 - 5.1 mmol/L 3.5   Chloride Latest Ref Range: 95 - 110 mmol/L 109   CO2 Latest Ref Range: 23 - 29 mmol/L 21 (L)   Anion Gap Latest Ref Range: 8 - 16 mmol/L 14   BUN, Bld Latest Ref Range: 8 - 23 mg/dL 102 (H)   Creatinine Latest Ref Range: 0.5 - 1.4 mg/dL 3.5 (H)   eGFR if non African American Latest Ref Range: >60 mL/min/1.73 m^2 16 (A)   eGFR if African American Latest Ref Range: >60 mL/min/1.73 m^2 19 (A)   Glucose Latest Ref Range: 70 - 110 mg/dL 184 (H)   Calcium Latest Ref Range: 8.7 - 10.5 mg/dL 7.3 (L)   Phosphorus Latest Ref Range: 2.7 - 4.5 mg/dL 3.3       Imaging and other studies:    No new    Assessment:    Vinnie Siegel is a 74 y.o. male with a history of HTN, DM2, HLD, kidney stones, CKD, cataracts and glaucoma who presented with weakness and lethargy over the last week. He was found to be in DKA and was intubated for increased work of breathing.  GI consulted for dark red stools over the last 2 days.  EGD 6/29/20 with evidence of NG tube trauma, no active bleeding.  Continued to require transfusion overnight.    Plan:    1.  Daily Pantoprazole  2.  Trend Hb, plan to transfuse per primary team.  3.  Would like to plan colonoscopy on Thursday.  Just extubated this afternoon, unsure of his ability to prep appropriately at  this time.    Case discussed with Dr. Dill

## 2020-06-30 NOTE — PROGRESS NOTES
[facility] Hospital Medicine  Progress Note    Patient Name: Vinnie Siegel  MRN: [id number]  Patient Class: IP- Inpatient   Admission Date: 6/27/2020  Length of Stay: 3 days  Attending Physician: Triny Dill MD  Primary Care Provider: Janeth Walter MD        Subjective:     Principal Problem:Type 2 diabetes mellitus with hyperosmolar nonketotic hyperglycemia        HPI:  Mr. Vinnie Siegel is a 74 y.o. male, with PMH of IDDM-2, CKD-IV, dilated cardiomyopathy, HTN, HLD, gout, obesity, who presented to [facility] ED via EMS on 6/27/20 with rectal bleeding x 2 days. His family called EMS after he became lethargic earlier this evening. Per his fiancee, he has been passing dark blood stools, and has been noting generalized weakness x 1 week. Additionally, he has been having elevated blood glucose readings x 1 day. In the ED, he was found to be in DKA, with a GI bleed. H&H were 6.4 & 19.4 and he was transfused 2 units PRBCs. He was started on an insulin drip and a Protonix drip. He had worsening respiratory and mental status while in the ED, and was intubated for airway protection. He was admitted to inpatient status to the ICU.     Overview/Hospital Course:  Admitted to ICU with HHS, encephalopathy, GI bleed, ADONAY, respiratory failure and sepsis. Suspected infection from urinary plus lung sources. Critical care and nephrology consulted and broad antibiotics continued. He had EGD on 6/29 which showed normal esophagus and duodenum with OG tube trauma to mid gastric body but no source of bleeding was identified.    Interval History: No acute events overnight. Glucoses stable, bicarb improving. Currently on SBT, following commands. Dark blood noted in rectal tube. PRBC ordered today.    Review of Systems   Unable to perform ROS: Intubated     Objective:     Vital Signs (Most Recent):  Temp: 98.3 °F (36.8 °C) (06/30/20 0900)  Pulse: 60 (06/30/20 0900)  Resp: 15 (06/30/20 0900)  BP: (!) 169/74 (06/30/20  0900)  SpO2: 99 % (06/30/20 0900) Vital Signs (24h Range):  Temp:  [98 °F (36.7 °C)-98.6 °F (37 °C)] 98.3 °F (36.8 °C)  Pulse:  [54-84] 60  Resp:  [15-25] 15  SpO2:  [94 %-100 %] 99 %  BP: (128-176)/(58-76) 169/74     Weight: (!) 144.5 kg (318 lb 9 oz)  Body mass index is 40.9 kg/m².    Intake/Output Summary (Last 24 hours) at 6/30/2020 0941  Last data filed at 6/30/2020 0900  Gross per 24 hour   Intake 2829.8 ml   Output 2710 ml   Net 119.8 ml      Physical Exam  Vitals signs and nursing note reviewed.   Constitutional:       General: He is not in acute distress.     Appearance: Normal appearance.   Cardiovascular:      Rate and Rhythm: Normal rate and regular rhythm.      Pulses: Normal pulses.   Pulmonary:      Effort: No respiratory distress.      Breath sounds: Rales present.      Comments: Intubated. Crackles, mechanical breath sounds bilaterally.  Abdominal:      Palpations: Abdomen is soft.      Tenderness: There is no abdominal tenderness.   Musculoskeletal:         General: No tenderness.      Right lower leg: Edema (3+) present.      Left lower leg: Edema (3+) present.      Comments: LUE edema   Skin:     General: Skin is warm and dry.   Neurological:      Comments: Awakens to voice. Following commands       Significant Labs:   CBC:  Recent Labs   Lab 06/29/20  0450 06/30/20  0147 06/30/20  0336   WBC 40.51* 30.26* 27.90*   HGB 8.9* 7.0* 6.9*   HCT 24.4* 19.4* 19.2*    214 219   GRAN 85.5*  34.6* 87.0* 85.8*  23.9*   LYMPH 4.8*  2.0 6.0*  CANCELED 6.1*  1.7   MONO 6.0  2.5* 4.0  CANCELED 4.9  1.4*   EOS 0.0 CANCELED 0.0   BASO 0.09 CANCELED 0.03      CMP:  Recent Labs   Lab 06/27/20  2111  06/28/20  0506  06/29/20  0307  06/29/20  1205 06/30/20  0147 06/30/20  0558   *   < > 129*   < > 137   < > 141 142 145   K 5.2*   < > 4.8   < > 3.8   < > 3.5 3.3* 3.5      < > 107   < > 111*   < > 110 110 111*   CO2 8*   < > 12*   < > 13*   < > 18* 21* 21*   *   < > 114*   < > 113*   <  > 119* 109* 102*   CREATININE 4.2*   < > 4.4*   < > 4.6*   < > 4.1* 3.7* 3.7*   *   < > 741*   < > 247*   < > 179* 171* 152*   CALCIUM 7.3*   < > 7.0*   < > 7.3*   < > 7.5* 7.3* 7.2*   MG 2.1  --   --   --   --   --   --   --   --    PHOS  --   --  4.0  --  2.9  --   --  2.5*  --    ALKPHOS 81  --   --   --   --   --   --   --   --    AST 26  --   --   --   --   --   --   --   --    ALT 56*  --   --   --   --   --   --   --   --    BILITOT 0.3  --   --   --   --   --   --   --   --    PROT 5.1*  --   --   --   --   --   --   --   --    ALBUMIN 1.7*  --   --   --   --   --   --   --   --    ANIONGAP 14   < > 10   < > 13   < > 13 11 13    < > = values in this interval not displayed.      Significant Imaging:   No new imaging today      Assessment/Plan:      * Type 2 diabetes mellitus with hyperosmolar nonketotic hyperglycemia  - Presentation most consistent with HHS with significant glucose elevation, no significant anion gap, minimal BHB elevation.  - Metabolic encephalopathy likely related to the same with concurrent acute hypoxemic respiratory failure.  - Continuing insulin gtt for time being - NGT unable to be placed for feeds, possible extubation today  - Treat for potential etiologies as under sepsis.    LLL pneumonia  - CXR with LLL opacity   - Respiratory culture pending. Continue broad spectrum abx  - Will stop Vanc if no Staph isolated      Encephalopathy, metabolic  - Seems to be improving  - As under HHS.    Elevated troponin  - No reported chest discomfort at home, EKG in ED with sinus tach / RBBB.  - Suspect likely demand ischemia with acute bleed, HHS; ADONAY likely contributing as well.  - Monitor.    Acute renal failure superimposed on stage 4 chronic kidney disease  - Multifactorial with likely acute blood loss anemia, GI losses contributing.  - Baseline Cr ~2.4-2.7.  - FENa suggestive of intrinsic source, concurrent non-gap metabolic acidosis.  - Continuing bicarb gtt  - Good UOP. Cr stable  overnight  - Appreciate nephrology assistance.    Severe sepsis  - Sepsis with concurrent HHS with several potential sources: UA suggestive of UTI with >100 RBCs and WBCs and purulent appearance, CXR with LLL infiltrate and copious sputum production.  - Urine culture with presumptive E. Coli. Respiratory culture pending, gram stain with GPC   - Continuing broad spectrum antibiotics with cefepime at 2g IV q24hr, vancomycin IV with pharmacy dosing consult.  - LA improving 3.5 -> 2.7. WBC down trending 40 -> 27    UTI (urinary tract infection)  - Urine culture with presumptive E. Coli  - Continue empiric Cefepime pending sensitivities   - Appreciate ID input    Acute blood loss anemia  - Hb 7 -> 6.9  - s/p 3 units PRBC so far. Getting 4th unit today for drop in Hb    Acute respiratory failure  - Suspect HHS with encephalopathy and GI losses causing acidosis contributing.  - Appreciate critical care assistance. Undergoing SBT today    GIB (gastrointestinal bleeding)  - Two day history of dark blood per rectum and patient's fiancee reports progressive loose stool at home with blood noted.  - s/p 2U pRBCs in ED 06/27 and 1U pRBCs 06/28; trend Hgb and transfuse PRN Hgb < 7.  - Continuing pantoprazole 40mg IV BID.  - GI following  - EGD 6/29 with normal esophagus, OG tube trauma to the mid body and normal duodenum  - Dark blood noted in rectal tube (6/30). Transfusing 1 unit PRBC today  - May need colonoscopy for further evaluation of GIB    Obesity, Class III, BMI 40-49.9 (morbid obesity)  - Dietary counseling after improvement from critical condition.    Dilated cardiomyopathy  - Volume overloaded; diuretics held with ADONAY.  - As under ADONAY.    CKD (chronic kidney disease) stage 4, GFR 15-29 ml/min  - As under ADONAY.    Hyperlipidemia  - Atorvastatin held with ADONAY.    Essential hypertension  - Low BP has improved  - Home meds held for now      VTE Risk Mitigation (From admission, onward)         Ordered     IP VTE HIGH RISK  PATIENT  Once      06/28/20 0139     Reason for No Pharmacological VTE Prophylaxis  Once     Question:  Reasons:  Answer:  Active Bleeding    06/28/20 0139     Place sequential compression device  Until discontinued      06/28/20 0036                      Triny Dill MD  Department of Hospital Medicine   [facility]

## 2020-06-30 NOTE — SUBJECTIVE & OBJECTIVE
Interval History: remains hemodynamically stable on ventilator    Objective:     Vital Signs (Most Recent):  Temp: 98.3 °F (36.8 °C) (06/30/20 0900)  Pulse: 68 (06/30/20 1121)  Resp: 18 (06/30/20 1121)  BP: (!) 186/75 (06/30/20 1121)  SpO2: 99 % (06/30/20 1121) Vital Signs (24h Range):  Temp:  [98 °F (36.7 °C)-98.6 °F (37 °C)] 98.3 °F (36.8 °C)  Pulse:  [54-88] 68  Resp:  [15-25] 18  SpO2:  [94 %-100 %] 99 %  BP: (129-186)/(58-76) 186/75     Weight: (!) 144.5 kg (318 lb 9 oz)  Body mass index is 40.9 kg/m².      Intake/Output Summary (Last 24 hours) at 6/30/2020 1136  Last data filed at 6/30/2020 1000  Gross per 24 hour   Intake 2987.8 ml   Output 2735 ml   Net 252.8 ml       Physical Exam  Vitals signs and nursing note reviewed.   Constitutional:       General: He is not in acute distress.     Appearance: He is obese. He is not ill-appearing, toxic-appearing or diaphoretic.   HENT:      Head: Normocephalic and atraumatic.      Nose: No congestion or rhinorrhea.      Mouth/Throat:      Mouth: Mucous membranes are moist.      Pharynx: Oropharynx is clear.      Comments: NC in place  Eyes:      General: No scleral icterus.     Conjunctiva/sclera: Conjunctivae normal.   Cardiovascular:      Rate and Rhythm: Normal rate and regular rhythm.      Heart sounds: No murmur.   Pulmonary:      Effort: No respiratory distress.      Breath sounds: No wheezing, rhonchi or rales.   Abdominal:      General: There is no distension.      Palpations: Abdomen is soft.   Musculoskeletal:      Right lower leg: Edema present.      Left lower leg: Edema present.   Skin:     General: Skin is warm and dry.      Findings: No rash.      Comments: Right groin CVC in place         Vents:  Vent Mode: Spont (06/30/20 0855)  Ventilator Initiated: Yes (06/27/20 2216)  Set Rate: 0 BPM (06/30/20 0855)  Vt Set: 500 mL (06/30/20 0855)  Pressure Support: 5 cmH20 (06/30/20 0855)  PEEP/CPAP: 5 cmH20 (06/30/20 0855)  Oxygen Concentration (%): 21 (06/30/20  1000)  Peak Airway Pressure: 10 cmH2O (06/30/20 0855)  Plateau Pressure: 0 cmH20 (06/30/20 0855)  Total Ve: 12.3 mL (06/30/20 0855)  F/VT Ratio<105 (RSBI): (!) 23.66 (06/30/20 0737)    Lines/Drains/Airways     Central Venous Catheter Line            Percutaneous Central Line Insertion/Assessment - Triple Lumen  06/27/20 2300 right femoral 2 days          Drain                 Urethral Catheter 06/27/20 2258 Non-latex 16 Fr. 2 days         Rectal Tube 06/28/20 1808 1 day          Airway                 Airway - Non-Surgical 06/27/20 2205 Endotracheal Tube 2 days          Peripheral Intravenous Line                 Peripheral IV - Single Lumen 06/27/20 2047 18 G Right Antecubital 2 days         Peripheral IV - Single Lumen 06/27/20 2127 20 G Left Forearm 2 days                Significant Labs:    CBC/Anemia Profile:  Recent Labs   Lab 06/29/20  0450 06/30/20  0147 06/30/20  0336   WBC 40.51* 30.26* 27.90*   HGB 8.9* 7.0* 6.9*   HCT 24.4* 19.4* 19.2*    214 219   MCV 86 87 86   RDW 15.0* 15.2* 14.9*        Chemistries:  Recent Labs   Lab 06/29/20  0307  06/29/20  1205 06/30/20  0147 06/30/20  0558      < > 141 142 145   K 3.8   < > 3.5 3.3* 3.5   *   < > 110 110 111*   CO2 13*   < > 18* 21* 21*   *   < > 119* 109* 102*   CREATININE 4.6*   < > 4.1* 3.7* 3.7*   CALCIUM 7.3*   < > 7.5* 7.3* 7.2*   PHOS 2.9  --   --  2.5*  --     < > = values in this interval not displayed.       All pertinent labs within the past 24 hours have been reviewed.    Significant Imaging:  I have reviewed and interpreted all pertinent imaging results/findings within the past 24 hours.

## 2020-07-01 LAB
ANCA AB TITR SER IF: NORMAL TITER
ANION GAP SERPL CALC-SCNC: 11 MMOL/L (ref 8–16)
ANION GAP SERPL CALC-SCNC: 12 MMOL/L (ref 8–16)
ANION GAP SERPL CALC-SCNC: 13 MMOL/L (ref 8–16)
ANION GAP SERPL CALC-SCNC: 13 MMOL/L (ref 8–16)
ASO AB SERPL-ACNC: <14 IU/ML
BACTERIA SPEC AEROBE CULT: ABNORMAL
BACTERIA SPEC AEROBE CULT: ABNORMAL
BASOPHILS # BLD AUTO: 0.03 K/UL (ref 0–0.2)
BASOPHILS NFR BLD: 0.1 % (ref 0–1.9)
BUN SERPL-MCNC: 78 MG/DL (ref 8–23)
BUN SERPL-MCNC: 82 MG/DL (ref 8–23)
BUN SERPL-MCNC: 83 MG/DL (ref 8–23)
BUN SERPL-MCNC: 92 MG/DL (ref 8–23)
CALCIUM SERPL-MCNC: 7.3 MG/DL (ref 8.7–10.5)
CALCIUM SERPL-MCNC: 7.4 MG/DL (ref 8.7–10.5)
CHLORIDE SERPL-SCNC: 111 MMOL/L (ref 95–110)
CHLORIDE SERPL-SCNC: 114 MMOL/L (ref 95–110)
CHLORIDE SERPL-SCNC: 115 MMOL/L (ref 95–110)
CHLORIDE SERPL-SCNC: 116 MMOL/L (ref 95–110)
CO2 SERPL-SCNC: 22 MMOL/L (ref 23–29)
CO2 SERPL-SCNC: 22 MMOL/L (ref 23–29)
CO2 SERPL-SCNC: 23 MMOL/L (ref 23–29)
CO2 SERPL-SCNC: 24 MMOL/L (ref 23–29)
CREAT SERPL-MCNC: 3.3 MG/DL (ref 0.5–1.4)
CREAT SERPL-MCNC: 3.3 MG/DL (ref 0.5–1.4)
CREAT SERPL-MCNC: 3.4 MG/DL (ref 0.5–1.4)
CREAT SERPL-MCNC: 3.4 MG/DL (ref 0.5–1.4)
DIFFERENTIAL METHOD: ABNORMAL
EOSINOPHIL # BLD AUTO: 0 K/UL (ref 0–0.5)
EOSINOPHIL NFR BLD: 0.1 % (ref 0–8)
ERYTHROCYTE [DISTWIDTH] IN BLOOD BY AUTOMATED COUNT: 15.1 % (ref 11.5–14.5)
EST. GFR  (AFRICAN AMERICAN): 20 ML/MIN/1.73 M^2
EST. GFR  (NON AFRICAN AMERICAN): 17 ML/MIN/1.73 M^2
EST. GFR  (NON AFRICAN AMERICAN): 17 ML/MIN/1.73 M^2
EST. GFR  (NON AFRICAN AMERICAN): 18 ML/MIN/1.73 M^2
EST. GFR  (NON AFRICAN AMERICAN): 18 ML/MIN/1.73 M^2
GLUCOSE SERPL-MCNC: 206 MG/DL (ref 70–110)
GLUCOSE SERPL-MCNC: 250 MG/DL (ref 70–110)
GLUCOSE SERPL-MCNC: 266 MG/DL (ref 70–110)
GLUCOSE SERPL-MCNC: 273 MG/DL (ref 70–110)
GRAM STN SPEC: ABNORMAL
HCT VFR BLD AUTO: 24 % (ref 40–54)
HGB BLD-MCNC: 8.3 G/DL (ref 14–18)
IMM GRANULOCYTES # BLD AUTO: 0.51 K/UL (ref 0–0.04)
IMM GRANULOCYTES NFR BLD AUTO: 2.2 % (ref 0–0.5)
LYMPHOCYTES # BLD AUTO: 1.3 K/UL (ref 1–4.8)
LYMPHOCYTES NFR BLD: 5.7 % (ref 18–48)
MAGNESIUM SERPL-MCNC: 1.9 MG/DL (ref 1.6–2.6)
MCH RBC QN AUTO: 30.5 PG (ref 27–31)
MCHC RBC AUTO-ENTMCNC: 34.6 G/DL (ref 32–36)
MCV RBC AUTO: 88 FL (ref 82–98)
MONOCYTES # BLD AUTO: 1.2 K/UL (ref 0.3–1)
MONOCYTES NFR BLD: 5.2 % (ref 4–15)
NEUTROPHILS # BLD AUTO: 19.9 K/UL (ref 1.8–7.7)
NEUTROPHILS NFR BLD: 86.7 % (ref 38–73)
NRBC BLD-RTO: 1 /100 WBC
P-ANCA TITR SER IF: NORMAL TITER
PHOSPHATE SERPL-MCNC: 2.9 MG/DL (ref 2.7–4.5)
PLATELET # BLD AUTO: 218 K/UL (ref 150–350)
PMV BLD AUTO: 9.9 FL (ref 9.2–12.9)
POCT GLUCOSE: 187 MG/DL (ref 70–110)
POCT GLUCOSE: 211 MG/DL (ref 70–110)
POCT GLUCOSE: 214 MG/DL (ref 70–110)
POCT GLUCOSE: 228 MG/DL (ref 70–110)
POCT GLUCOSE: 230 MG/DL (ref 70–110)
POCT GLUCOSE: 233 MG/DL (ref 70–110)
POCT GLUCOSE: 234 MG/DL (ref 70–110)
POCT GLUCOSE: 244 MG/DL (ref 70–110)
POCT GLUCOSE: 245 MG/DL (ref 70–110)
POCT GLUCOSE: 245 MG/DL (ref 70–110)
POCT GLUCOSE: 264 MG/DL (ref 70–110)
POCT GLUCOSE: 296 MG/DL (ref 70–110)
POTASSIUM SERPL-SCNC: 3.4 MMOL/L (ref 3.5–5.1)
POTASSIUM SERPL-SCNC: 3.5 MMOL/L (ref 3.5–5.1)
POTASSIUM SERPL-SCNC: 3.6 MMOL/L (ref 3.5–5.1)
POTASSIUM SERPL-SCNC: 3.7 MMOL/L (ref 3.5–5.1)
PROCALCITONIN SERPL IA-MCNC: 3.52 NG/ML
RBC # BLD AUTO: 2.72 M/UL (ref 4.6–6.2)
SODIUM SERPL-SCNC: 146 MMOL/L (ref 136–145)
SODIUM SERPL-SCNC: 149 MMOL/L (ref 136–145)
SODIUM SERPL-SCNC: 150 MMOL/L (ref 136–145)
SODIUM SERPL-SCNC: 151 MMOL/L (ref 136–145)
WBC # BLD AUTO: 22.95 K/UL (ref 3.9–12.7)

## 2020-07-01 PROCEDURE — C9113 INJ PANTOPRAZOLE SODIUM, VIA: HCPCS | Performed by: HOSPITALIST

## 2020-07-01 PROCEDURE — 20000000 HC ICU ROOM

## 2020-07-01 PROCEDURE — 84100 ASSAY OF PHOSPHORUS: CPT

## 2020-07-01 PROCEDURE — 99233 PR SUBSEQUENT HOSPITAL CARE,LEVL III: ICD-10-PCS | Mod: GC,,, | Performed by: INTERNAL MEDICINE

## 2020-07-01 PROCEDURE — 99233 PR SUBSEQUENT HOSPITAL CARE,LEVL III: ICD-10-PCS | Mod: ,,, | Performed by: INTERNAL MEDICINE

## 2020-07-01 PROCEDURE — 25000242 PHARM REV CODE 250 ALT 637 W/ HCPCS: Performed by: HOSPITALIST

## 2020-07-01 PROCEDURE — 99233 SBSQ HOSP IP/OBS HIGH 50: CPT | Mod: ,,, | Performed by: INTERNAL MEDICINE

## 2020-07-01 PROCEDURE — 83735 ASSAY OF MAGNESIUM: CPT

## 2020-07-01 PROCEDURE — 87075 CULTR BACTERIA EXCEPT BLOOD: CPT

## 2020-07-01 PROCEDURE — 97163 PT EVAL HIGH COMPLEX 45 MIN: CPT

## 2020-07-01 PROCEDURE — 63600175 PHARM REV CODE 636 W HCPCS: Performed by: NURSE PRACTITIONER

## 2020-07-01 PROCEDURE — 87070 CULTURE OTHR SPECIMN AEROBIC: CPT

## 2020-07-01 PROCEDURE — 94761 N-INVAS EAR/PLS OXIMETRY MLT: CPT

## 2020-07-01 PROCEDURE — 63600175 PHARM REV CODE 636 W HCPCS: Performed by: HOSPITALIST

## 2020-07-01 PROCEDURE — 63600175 PHARM REV CODE 636 W HCPCS: Performed by: CLINIC/CENTER

## 2020-07-01 PROCEDURE — 84145 PROCALCITONIN (PCT): CPT

## 2020-07-01 PROCEDURE — 87076 CULTURE ANAEROBE IDENT EACH: CPT

## 2020-07-01 PROCEDURE — 94640 AIRWAY INHALATION TREATMENT: CPT

## 2020-07-01 PROCEDURE — 25000003 PHARM REV CODE 250: Performed by: INTERNAL MEDICINE

## 2020-07-01 PROCEDURE — 36415 COLL VENOUS BLD VENIPUNCTURE: CPT

## 2020-07-01 PROCEDURE — 25000003 PHARM REV CODE 250: Performed by: NURSE PRACTITIONER

## 2020-07-01 PROCEDURE — 80048 BASIC METABOLIC PNL TOTAL CA: CPT | Mod: 91

## 2020-07-01 PROCEDURE — 99233 SBSQ HOSP IP/OBS HIGH 50: CPT | Mod: GC,,, | Performed by: INTERNAL MEDICINE

## 2020-07-01 PROCEDURE — 97803 MED NUTRITION INDIV SUBSEQ: CPT

## 2020-07-01 PROCEDURE — C9399 UNCLASSIFIED DRUGS OR BIOLOG: HCPCS | Performed by: INTERNAL MEDICINE

## 2020-07-01 PROCEDURE — 97530 THERAPEUTIC ACTIVITIES: CPT

## 2020-07-01 PROCEDURE — 63600175 PHARM REV CODE 636 W HCPCS: Performed by: INTERNAL MEDICINE

## 2020-07-01 PROCEDURE — 85025 COMPLETE CBC W/AUTO DIFF WBC: CPT

## 2020-07-01 RX ORDER — CALCITRIOL 0.25 UG/1
0.25 CAPSULE ORAL
Status: DISCONTINUED | OUTPATIENT
Start: 2020-07-01 | End: 2020-07-04

## 2020-07-01 RX ORDER — IBUPROFEN 200 MG
24 TABLET ORAL
Status: DISCONTINUED | OUTPATIENT
Start: 2020-07-01 | End: 2020-07-10

## 2020-07-01 RX ORDER — TAMSULOSIN HYDROCHLORIDE 0.4 MG/1
0.4 CAPSULE ORAL DAILY
Status: DISCONTINUED | OUTPATIENT
Start: 2020-07-01 | End: 2020-07-16 | Stop reason: HOSPADM

## 2020-07-01 RX ORDER — FENTANYL CITRATE 50 UG/ML
50 INJECTION, SOLUTION INTRAMUSCULAR; INTRAVENOUS EVERY 4 HOURS PRN
Status: DISCONTINUED | OUTPATIENT
Start: 2020-07-01 | End: 2020-07-16 | Stop reason: HOSPADM

## 2020-07-01 RX ORDER — POLYETHYLENE GLYCOL 3350, SODIUM SULFATE ANHYDROUS, SODIUM BICARBONATE, SODIUM CHLORIDE, POTASSIUM CHLORIDE 236; 22.74; 6.74; 5.86; 2.97 G/4L; G/4L; G/4L; G/4L; G/4L
4000 POWDER, FOR SOLUTION ORAL ONCE
Status: COMPLETED | OUTPATIENT
Start: 2020-07-01 | End: 2020-07-01

## 2020-07-01 RX ORDER — POTASSIUM CHLORIDE 7.45 MG/ML
10 INJECTION INTRAVENOUS ONCE
Status: COMPLETED | OUTPATIENT
Start: 2020-07-01 | End: 2020-07-01

## 2020-07-01 RX ORDER — GLUCAGON 1 MG
1 KIT INJECTION
Status: DISCONTINUED | OUTPATIENT
Start: 2020-07-01 | End: 2020-07-10

## 2020-07-01 RX ORDER — BISACODYL 5 MG
10 TABLET, DELAYED RELEASE (ENTERIC COATED) ORAL ONCE
Status: COMPLETED | OUTPATIENT
Start: 2020-07-01 | End: 2020-07-01

## 2020-07-01 RX ORDER — INSULIN ASPART 100 [IU]/ML
5 INJECTION, SOLUTION INTRAVENOUS; SUBCUTANEOUS
Status: DISCONTINUED | OUTPATIENT
Start: 2020-07-01 | End: 2020-07-03

## 2020-07-01 RX ORDER — CEFAZOLIN SODIUM 1 G/3ML
2 INJECTION, POWDER, FOR SOLUTION INTRAMUSCULAR; INTRAVENOUS
Status: DISCONTINUED | OUTPATIENT
Start: 2020-07-01 | End: 2020-07-04 | Stop reason: SDUPTHER

## 2020-07-01 RX ORDER — CARVEDILOL 12.5 MG/1
12.5 TABLET ORAL 2 TIMES DAILY
Status: DISCONTINUED | OUTPATIENT
Start: 2020-07-01 | End: 2020-07-02

## 2020-07-01 RX ORDER — NIFEDIPINE 30 MG/1
30 TABLET, EXTENDED RELEASE ORAL DAILY
Status: DISCONTINUED | OUTPATIENT
Start: 2020-07-01 | End: 2020-07-02

## 2020-07-01 RX ORDER — INSULIN ASPART 100 [IU]/ML
0-5 INJECTION, SOLUTION INTRAVENOUS; SUBCUTANEOUS
Status: DISCONTINUED | OUTPATIENT
Start: 2020-07-01 | End: 2020-07-10

## 2020-07-01 RX ORDER — IBUPROFEN 200 MG
16 TABLET ORAL
Status: DISCONTINUED | OUTPATIENT
Start: 2020-07-01 | End: 2020-07-10

## 2020-07-01 RX ADMIN — CARVEDILOL 12.5 MG: 12.5 TABLET, FILM COATED ORAL at 08:07

## 2020-07-01 RX ADMIN — IPRATROPIUM BROMIDE AND ALBUTEROL SULFATE 3 ML: .5; 3 SOLUTION RESPIRATORY (INHALATION) at 08:07

## 2020-07-01 RX ADMIN — NIFEDIPINE 30 MG: 30 TABLET, FILM COATED, EXTENDED RELEASE ORAL at 09:07

## 2020-07-01 RX ADMIN — CALCITRIOL CAPSULES 0.25 MCG 0.25 MCG: 0.25 CAPSULE ORAL at 09:07

## 2020-07-01 RX ADMIN — TAMSULOSIN HYDROCHLORIDE 0.4 MG: 0.4 CAPSULE ORAL at 09:07

## 2020-07-01 RX ADMIN — BISACODYL 10 MG: 5 TABLET, COATED ORAL at 05:07

## 2020-07-01 RX ADMIN — HYDRALAZINE HYDROCHLORIDE 10 MG: 20 INJECTION INTRAMUSCULAR; INTRAVENOUS at 03:07

## 2020-07-01 RX ADMIN — PANTOPRAZOLE SODIUM 40 MG: 40 INJECTION, POWDER, LYOPHILIZED, FOR SOLUTION INTRAVENOUS at 09:07

## 2020-07-01 RX ADMIN — CEFAZOLIN 2 G: 330 INJECTION, POWDER, FOR SOLUTION INTRAMUSCULAR; INTRAVENOUS at 04:07

## 2020-07-01 RX ADMIN — INSULIN DETEMIR 20 UNITS: 100 INJECTION, SOLUTION SUBCUTANEOUS at 11:07

## 2020-07-01 RX ADMIN — TIMOLOL MALEATE 1 DROP: 2.5 SOLUTION/ DROPS OPHTHALMIC at 10:07

## 2020-07-01 RX ADMIN — INSULIN ASPART 5 UNITS: 100 INJECTION, SOLUTION INTRAVENOUS; SUBCUTANEOUS at 12:07

## 2020-07-01 RX ADMIN — CEFAZOLIN 2 G: 330 INJECTION, POWDER, FOR SOLUTION INTRAMUSCULAR; INTRAVENOUS at 08:07

## 2020-07-01 RX ADMIN — POLYETHYLENE GLYCOL 3350, SODIUM SULFATE ANHYDROUS, SODIUM BICARBONATE, SODIUM CHLORIDE, POTASSIUM CHLORIDE 4000 ML: 236; 22.74; 6.74; 5.86; 2.97 POWDER, FOR SOLUTION ORAL at 05:07

## 2020-07-01 RX ADMIN — INSULIN ASPART 2 UNITS: 100 INJECTION, SOLUTION INTRAVENOUS; SUBCUTANEOUS at 05:07

## 2020-07-01 RX ADMIN — INSULIN ASPART 1 UNITS: 100 INJECTION, SOLUTION INTRAVENOUS; SUBCUTANEOUS at 09:07

## 2020-07-01 RX ADMIN — IPRATROPIUM BROMIDE AND ALBUTEROL SULFATE 3 ML: .5; 3 SOLUTION RESPIRATORY (INHALATION) at 01:07

## 2020-07-01 RX ADMIN — INSULIN ASPART 5 UNITS: 100 INJECTION, SOLUTION INTRAVENOUS; SUBCUTANEOUS at 05:07

## 2020-07-01 RX ADMIN — CEFAZOLIN 2 G: 330 INJECTION, POWDER, FOR SOLUTION INTRAMUSCULAR; INTRAVENOUS at 12:07

## 2020-07-01 RX ADMIN — IPRATROPIUM BROMIDE AND ALBUTEROL SULFATE 3 ML: .5; 3 SOLUTION RESPIRATORY (INHALATION) at 07:07

## 2020-07-01 RX ADMIN — EPOETIN ALFA-EPBX 20000 UNITS: 10000 INJECTION, SOLUTION INTRAVENOUS; SUBCUTANEOUS at 09:07

## 2020-07-01 RX ADMIN — POTASSIUM CHLORIDE 10 MEQ: 7.46 INJECTION, SOLUTION INTRAVENOUS at 09:07

## 2020-07-01 RX ADMIN — IPRATROPIUM BROMIDE AND ALBUTEROL SULFATE 3 ML: .5; 3 SOLUTION RESPIRATORY (INHALATION) at 12:07

## 2020-07-01 RX ADMIN — TIMOLOL MALEATE 1 DROP: 2.5 SOLUTION/ DROPS OPHTHALMIC at 08:07

## 2020-07-01 RX ADMIN — PANTOPRAZOLE SODIUM 40 MG: 40 INJECTION, POWDER, LYOPHILIZED, FOR SOLUTION INTRAVENOUS at 08:07

## 2020-07-01 NOTE — ASSESSMENT & PLAN NOTE
- CXR with LLL opacity   - Respiratory culture with Strep agalactiae. Continue Cefazolin per ID recs

## 2020-07-01 NOTE — PROGRESS NOTES
Gastroenterology Progress Note    Active Hospital Problems    LLL pneumonia      Encephalopathy, metabolic      GIB (gastrointestinal bleeding)      Acute respiratory failure      *Type 2 diabetes mellitus with hyperosmolar nonketotic hyperglycemia      Acute blood loss anemia      UTI (urinary tract infection)      Severe sepsis      Acute renal failure superimposed on stage 4 chronic kidney disease      Elevated troponin      Obesity, Class III, BMI 40-49.9 (morbid obesity)      Dilated cardiomyopathy      CKD (chronic kidney disease) stage 4, GFR 15-29 ml/min      Hyperlipidemia      Essential hypertension        Subjective:  Patient seen and examined.  The patient remains stable this AM.  Remains weak. No further overt GI blood loss.  Hb stable.    Reviews of Systems:  General:  Negative for fever or chills  Cardiovascular:  Negative for chest pain, shortness of breath, palpitations    Physical Exam    Vitals:  Vital Signs (Most Recent):  Temp: 98.5 °F (36.9 °C) (07/01/20 0705)  Pulse: (!) 112 (07/01/20 0803)  Resp: 20 (07/01/20 0803)  BP: (!) 185/77 (07/01/20 0705)  SpO2: 97 % (07/01/20 0803) Vital Signs (24h Range):  Temp:  [98 °F (36.7 °C)-98.9 °F (37.2 °C)] 98.5 °F (36.9 °C)  Pulse:  [] 112  Resp:  [15-39] 20  SpO2:  [93 %-100 %] 97 %  BP: (145-207)/(63-84) 185/77     GEN: Well developed, well nourished in no apparent distress   HENT: Normocephalic, anicteric sclera   Cardiovascular: Tachy. No murmurs appreciated.   Chest: Non-labored respirations. Breath sounds equal   Abdomen: soft, obese, NTND  Psych: Appropriate mood and affect.   Extermities: No C/C.  3+ edema.   Skin: No new visible or palpable lesions.      Medications/Infusions:  Current Facility-Administered Medications   Medication Dose Route Frequency Provider Last Rate Last Dose    0.9%  NaCl infusion (for blood administration)   Intravenous Q24H PRN Sofi Shah PA-C        0.9%  NaCl infusion (for blood administration)    Intravenous Q24H PRN Peyman Hedrick MD        acetaminophen tablet 650 mg  650 mg Oral Q4H PRN Sofi Shah PA-C        albuterol-ipratropium 2.5 mg-0.5 mg/3 mL nebulizer solution 3 mL  3 mL Nebulization Q6H Phu Justice MD   3 mL at 07/01/20 0803    calcitRIOL capsule 0.25 mcg  0.25 mcg Oral Every Mon, Wed, Fri Lizandro Pierce NP   0.25 mcg at 07/01/20 0905    carvediloL tablet 12.5 mg  12.5 mg Oral BID Lizandro Pierce NP   12.5 mg at 07/01/20 0845    ceFAZolin injection 2 g  2 g Intravenous Q8H Triny Dill MD        dexmedetomidine (PRECEDEX) 400mcg/100mL 0.9% NaCL infusion  0.2 mcg/kg/hr Intravenous Continuous Phu Justice MD   Stopped at 06/30/20 1000    dextrose 50% injection 12.5 g  12.5 g Intravenous PRN Sofi Shah PA-C        dextrose 50% injection 25 g  25 g Intravenous PRN Sofi Shah PA-C        fentaNYL injection 50 mcg  50 mcg Intravenous Q4H PRN Guy Gonzales MD        hydrALAZINE injection 10 mg  10 mg Intravenous Q4H PRN Guy Gonzales MD   10 mg at 07/01/20 0344    insulin regular 100 Units in sodium chloride 0.9% 100 mL infusion  8 Units/hr Intravenous Continuous Sofi Shah PA-C 8 mL/hr at 07/01/20 0818 8 Units/hr at 07/01/20 0818    NIFEdipine 24 hr tablet 30 mg  30 mg Oral Daily Lizandro Pierce NP   30 mg at 07/01/20 0905    ondansetron injection 4 mg  4 mg Intravenous Q6H PRN Sofi Shah PA-C        pantoprazole injection 40 mg  40 mg Intravenous Q12H Phu Justice MD   40 mg at 07/01/20 0904    propofol (DIPRIVAN) 10 mg/mL infusion  5 mcg/kg/min Intravenous Continuous Sofi Shah PA-C   Stopped at 06/28/20 1100    sodium chloride 0.9% flush 10 mL  10 mL Intravenous PRN Sofi Shah PA-C        tamsulosin 24 hr capsule 0.4 mg  0.4 mg Oral Daily Lizandro Pierce NP   0.4 mg at 07/01/20 0905    timolol maleate 0.25% ophthalmic solution 1 drop  1 drop Both Eyes BID Sofi ANDERSEN  JESSICA Shah   1 drop at 06/30/20 2125       Intake and Output:    Intake/Output Summary (Last 24 hours) at 7/1/2020 0931  Last data filed at 7/1/2020 0536  Gross per 24 hour   Intake 1372.88 ml   Output 3070 ml   Net -1697.12 ml       Labs:  Results for VINNIE SIEGEL (MRN [id number]) as of 7/1/2020 09:33   Ref. Range 7/1/2020 05:51   WBC Latest Ref Range: 3.90 - 12.70 K/uL 22.95 (H)   RBC Latest Ref Range: 4.60 - 6.20 M/uL 2.72 (L)   Hemoglobin Latest Ref Range: 14.0 - 18.0 g/dL 8.3 (L)   Hematocrit Latest Ref Range: 40.0 - 54.0 % 24.0 (L)   MCV Latest Ref Range: 82 - 98 fL 88   MCH Latest Ref Range: 27.0 - 31.0 pg 30.5   MCHC Latest Ref Range: 32.0 - 36.0 g/dL 34.6   RDW Latest Ref Range: 11.5 - 14.5 % 15.1 (H)   Platelets Latest Ref Range: 150 - 350 K/uL 218         Imaging and other studies:    No new    Assessment:  Vinnie Siegel is a 74 y.o. male with a history of HTN, DM2, HLD, kidney stones, CKD, cataracts and glaucoma who presented with weakness and lethargy over the last week. He was found to be in DKA and was intubated for increased work of breathing.  GI consulted for dark red stools over the last 2 days.  EGD 6/29/20 with evidence of NG tube trauma, no active bleeding.  No overt GI bleeding overnight.  Hb stable this AM.      Plan:    1.  Daily Pantoprazole  2.  Continue to trend Hb, transfuse per primary team  3.  Would like to plan colonoscopy, patient remains weak/deconditioned.  Concerned he will be unable to tolerate bowel prep.  Will discuss Dr. Dill.

## 2020-07-01 NOTE — PLAN OF CARE
Problem: Physical Therapy Goal  Goal: Physical Therapy Goal  Description: Goals to be met by: 20    Patient will increase functional independence with mobility by performin. Supine to sit with min A.  2. Sit to supine with min A.   3. Rolling R and L with min A.   4. Sitting at edge of bed >5 minutes with min A.   5. PT will assess sit<>stand.   Outcome: Ongoing, Progressing     Patient evaluated by PT and goals established. Patient with stable vital signs throughout session, endorses back pain that increases with attempted sitting at EOB. Despite 3x attempts with RN assistance, pt unable to achieve full seated position EOB before forcefully requesting return to bed. Required totalA with bed in trendelenburg to return to appropriate supine position. PT will continue to follow and progress as tolerated. Patient would benefit from pre-medication to allow for full participation in therapeutic activities. Rec for d/c pending progress, anticipate need for post acute placement for further therapy services. Please see progress note for detailed plan of care and recommendations.     Siderails have been padded with blankets per tech.

## 2020-07-01 NOTE — SUBJECTIVE & OBJECTIVE
Interval History: doing well since extubation. No complaints this AM    Objective:     Vital Signs (Most Recent):  Temp: 98.5 °F (36.9 °C) (07/01/20 0705)  Pulse: (!) 112 (07/01/20 0803)  Resp: 20 (07/01/20 0803)  BP: (!) 185/77 (07/01/20 0705)  SpO2: 97 % (07/01/20 0803) Vital Signs (24h Range):  Temp:  [98 °F (36.7 °C)-98.9 °F (37.2 °C)] 98.5 °F (36.9 °C)  Pulse:  [] 112  Resp:  [15-39] 20  SpO2:  [93 %-100 %] 97 %  BP: (145-207)/(63-84) 185/77     Weight: (!) 144.5 kg (318 lb 9 oz)  Body mass index is 40.9 kg/m².      Intake/Output Summary (Last 24 hours) at 7/1/2020 0944  Last data filed at 7/1/2020 0536  Gross per 24 hour   Intake 1372.88 ml   Output 3070 ml   Net -1697.12 ml       Physical Exam  Vitals signs and nursing note reviewed.   Constitutional:       General: He is not in acute distress.     Appearance: He is obese. He is not ill-appearing, toxic-appearing or diaphoretic.   HENT:      Head: Normocephalic and atraumatic.      Nose: No congestion or rhinorrhea.      Mouth/Throat:      Mouth: Mucous membranes are moist.      Pharynx: Oropharynx is clear.      Comments: NC in place  Eyes:      General: No scleral icterus.     Conjunctiva/sclera: Conjunctivae normal.   Cardiovascular:      Rate and Rhythm: Normal rate and regular rhythm.      Heart sounds: No murmur.   Pulmonary:      Effort: No respiratory distress.      Breath sounds: No wheezing, rhonchi or rales.   Abdominal:      General: There is no distension.      Palpations: Abdomen is soft.   Musculoskeletal:      Right lower leg: Edema present.      Left lower leg: Edema present.   Skin:     General: Skin is warm and dry.      Findings: No rash.      Comments: Right groin CVC in place         Vents:  Vent Mode: Spont (06/30/20 0855)  Ventilator Initiated: Yes (06/27/20 6426)  Set Rate: 0 BPM (06/30/20 0855)  Vt Set: 500 mL (06/30/20 0855)  Pressure Support: 5 cmH20 (06/30/20 0855)  PEEP/CPAP: 5 cmH20 (06/30/20 0855)  Oxygen Concentration  (%): 21 (06/30/20 1000)  Peak Airway Pressure: 10 cmH2O (06/30/20 0855)  Plateau Pressure: 0 cmH20 (06/30/20 0855)  Total Ve: 12.3 mL (06/30/20 0855)  F/VT Ratio<105 (RSBI): (!) 23.66 (06/30/20 0737)    Lines/Drains/Airways     Central Venous Catheter Line            Percutaneous Central Line Insertion/Assessment - Triple Lumen  06/27/20 2300 right femoral 3 days          Drain                 Urethral Catheter 06/27/20 2258 Non-latex 16 Fr. 3 days         Rectal Tube 06/28/20 1808 2 days          Airway                 Airway - Non-Surgical 06/27/20 2205 Endotracheal Tube 3 days          Peripheral Intravenous Line                 Peripheral IV - Single Lumen 06/27/20 2047 18 G Right Antecubital 3 days         Peripheral IV - Single Lumen 06/27/20 2127 20 G Left Forearm 3 days                Significant Labs:    CBC/Anemia Profile:  Recent Labs   Lab 06/30/20  0147 06/30/20  0336 07/01/20  0551   WBC 30.26* 27.90* 22.95*   HGB 7.0* 6.9* 8.3*   HCT 19.4* 19.2* 24.0*    219 218   MCV 87 86 88   RDW 15.2* 14.9* 15.1*        Chemistries:  Recent Labs   Lab 06/30/20  0147  06/30/20  1155 06/30/20  2339 07/01/20  0550 07/01/20  0551      < > 144 146*  --  149*   K 3.3*   < > 3.5 3.6  --  3.4*      < > 109 111*  --  114*   CO2 21*   < > 21* 24  --  22*   *   < > 102* 92*  --  83*   CREATININE 3.7*   < > 3.5* 3.4*  --  3.3*   CALCIUM 7.3*   < > 7.3* 7.3*  --  7.3*   MG  --   --   --   --  1.9  --    PHOS 2.5*  --  3.3  --  2.9  --     < > = values in this interval not displayed.       All pertinent labs within the past 24 hours have been reviewed.    Significant Imaging:  I have reviewed and interpreted all pertinent imaging results/findings within the past 24 hours.

## 2020-07-01 NOTE — ASSESSMENT & PLAN NOTE
- Sepsis with concurrent HHS with several potential sources: UA suggestive of UTI with >100 RBCs and WBCs and purulent appearance, CXR with LLL infiltrate and copious sputum production.  - Urine culture with E. Coli. Respiratory culture with Group B Strep   - Continuing Cefazolin per ID recs  - LA improving 3.5 -> 2.7. WBC down trending 40 -> 22 and Procal 10 -> 3

## 2020-07-01 NOTE — CONSULTS
[facility]  Infectious Disease  Consult Note    Patient Name: Vinnie Siegel  MRN: [id number]  Admission Date: 6/27/2020  Hospital Length of Stay: 4 days  Attending Physician: Triny Dill MD  Primary Care Provider: Janeth Walter MD     Isolation Status: No active isolations    Patient information was obtained from patient and ER records.      Consults  Assessment/Plan:     Severe sepsis  74M with h/o DM, CKD, HTN, obesity admitted 6/27 with rectal bleeding and lethargy. Also with urinary freq/urgency. found to be in DKA, with a GI bleed. He had worsening respiratory and mental status while in the ED, and was intubated for airway protection. noted to have cloudy urine with some purulence from urethra. Also with thick greenish secretions. Afebrile. WBC 40k. UA >100wbc, culture + e.coli, pan sensitive. procalcitonin elevated 11. G/C, RPR, HIV negative. Renal ultrasound- medical renal disease. resp culture rare GPC, mod wbc, strep. Now extubated. On cefazolin. suspect source of infection UTI. Note pt has prolonged qtc 502    Recommendations:   - continue IV cefazolin. Would plan on 10-14 day course of antibiotics (until at least 7/6). Can switch to ceftriaxone on discharge for ease of once daily dosing, if he leaves prior to completion of antibiotic therapy. Unfortunately would not use quinolones with prolonged qtc and gfr 20 so nervous about using bactrim. Also patient morbidly obese with bmi 41 and I worry that other po antibiotics would have suboptimal bioavailability     Discharge antibiotics:   Ceftriaxone 2gm iv daily     End date of IV antibiotics: 7/10/20    Weekly outpatient laboratory on Monday or Tuesday while on IV antibiotics.    CBC   CMP   ESR and CRP    Fax laboratory results to [facility] at [phone] with attn: Dr Cesar    If patient goes home with iv antibiotics; Prior to discharge, please ensure the patient's follow-up has been scheduled.  If there is still no  follow-up scheduled in Infectious Diseases clinic, please send an EPIC message to Kecia Garcia LPN in Infectious Diseases.            Thank you for your consult. I will sign off. Please contact us if you have any additional questions.    Samanta Cesar MD  Infectious Disease  [facility]    Subjective:     Principal Problem: Type 2 diabetes mellitus with hyperosmolar nonketotic hyperglycemia    HPI: 74M with h/o DM, CKD, HTN, obesity admitted 6/27 with rectal bleeding and lethargy. Pt currently intubated and no family at bedside so history obtained by chart review. Pt reportedly was diffusely lethargic prior to admit. Also reported some lower abdominal pain and frequency.     In the ED, he was found to be in DKA, with a GI bleed. He had worsening respiratory and mental status while in the ED, and was intubated for airway protection. He was admitted to inpatient status to the ICU. noted to have cloudy urine with some purulence from urethra. Also with thick greenish secretions. Afebrile. Sedation being weened and pt alert and awake on ventilator    UA >100wbc, culture + e.coli, sensitivities pending  procalcitonin elevated 11  G/C, RPR, HIV negative  Renal ultrasound- medical renal disease  resp culture rare GPC, mod wbc, in process    Currently on vanc/cefepime. ID c/f abx recs      Interval history: pt extubated. Reports had been having urinary symptoms prior to arrival for about a week. Reports mild cough, but minimal respiratory symptoms. Still in icu.       Review of Systems   Constitutional: Negative for chills and fever.   Respiratory: Positive for cough. Negative for shortness of breath.    Gastrointestinal: Positive for blood in stool. Negative for abdominal pain.   Genitourinary: Positive for dysuria and frequency.   Neurological: Negative for headaches.   Psychiatric/Behavioral:        Recently extubated      Objective:     Vital Signs (Most Recent):  Temp: 98.5 °F (36.9 °C) (07/01/20  0705)  Pulse: 78 (07/01/20 1358)  Resp: (!) 21 (07/01/20 1358)  BP: (!) 161/70 (07/01/20 1100)  SpO2: (!) 94 % (07/01/20 1358) Vital Signs (24h Range):  Temp:  [98 °F (36.7 °C)-98.5 °F (36.9 °C)] 98.5 °F (36.9 °C)  Pulse:  [] 78  Resp:  [18-39] 21  SpO2:  [93 %-100 %] 94 %  BP: (161-207)/(63-84) 161/70     Weight: (!) 144.5 kg (318 lb 9 oz)  Body mass index is 40.83 kg/m².    Intake/Output Summary (Last 24 hours) at 7/1/2020 1613  Last data filed at 7/1/2020 0536  Gross per 24 hour   Intake 1314.88 ml   Output 2055 ml   Net -740.12 ml      Physical Exam  Vitals signs and nursing note reviewed.   HENT:      Head: Normocephalic and atraumatic.   Eyes:      General:         Right eye: No discharge.         Left eye: No discharge.      Conjunctiva/sclera: Conjunctivae normal.   Cardiovascular:      Rate and Rhythm: Normal rate.      Pulses: Normal pulses.      Heart sounds: No murmur.   Pulmonary:      Effort: Pulmonary effort is normal.      Breath sounds: Normal breath sounds.      Comments: extubated  Abdominal:      Palpations: Abdomen is soft.      Tenderness: There is no abdominal tenderness. There is no right CVA tenderness or left CVA tenderness.   Musculoskeletal:         General: No tenderness.      Right lower leg: Edema (3+) present.      Left lower leg: Edema (3+) present.      Comments: No ulcers on feet   Skin:     General: Skin is warm and dry.   Neurological:      General: No focal deficit present.      Mental Status: He is oriented to person, place, and time.   Psychiatric:         Mood and Affect: Mood normal.       Significant Labs:   CBC:  Recent Labs   Lab 06/30/20  0147 06/30/20  0336 07/01/20  0551   WBC 30.26* 27.90* 22.95*   HGB 7.0* 6.9* 8.3*   HCT 19.4* 19.2* 24.0*    219 218   GRAN 87.0* 85.8*  23.9* 86.7*  19.9*   LYMPH 6.0*  CANCELED 6.1*  1.7 5.7*  1.3   MONO 4.0  CANCELED 4.9  1.4* 5.2  1.2*   EOS CANCELED 0.0 0.0   BASO CANCELED 0.03 0.03      CMP:  Recent Labs    Lab 06/30/20  0147  06/30/20  1155 06/30/20  2339 07/01/20  0550 07/01/20  0551 07/01/20  1255      < > 144 146*  --  149* 150*   K 3.3*   < > 3.5 3.6  --  3.4* 3.5      < > 109 111*  --  114* 115*   CO2 21*   < > 21* 24  --  22* 23   *   < > 102* 92*  --  83* 82*   CREATININE 3.7*   < > 3.5* 3.4*  --  3.3* 3.4*   *   < > 184* 250*  --  206* 266*   CALCIUM 7.3*   < > 7.3* 7.3*  --  7.3* 7.3*   MG  --   --   --   --  1.9  --   --    PHOS 2.5*  --  3.3  --  2.9  --   --    ANIONGAP 11   < > 14 11  --  13 12    < > = values in this interval not displayed.      Significant Imaging:   US Retroperitoneal 06/28/20:  Right kidney: The right kidney measures 11.4 cm. Cortical thinning is present.  There is loss of corticomedullary distinction. Resistive index measures 0.87.  No mass. No renal stone. No hydronephrosis. Left kidney: The left kidney measures 12 cm. There is significant cortical atrophy.  There is loss of corticomedullary distinction. Resistive index could not obtain secondary to patient's inability to breath hold.  No mass. No renal stone. No hydronephrosis. The bladder is partially distended at the time of scanning and has an unremarkable appearance. Findings are compatible with chronic medical renal disease and not significantly changed from the previous exam

## 2020-07-01 NOTE — PLAN OF CARE
eICU notified re: uncontrolled HTN but doc is reluctant to prescribe more antihypertensives. States he will increase frequency of prn fentanyl for pain.

## 2020-07-01 NOTE — PROGRESS NOTES
Pharmacist Renal Dose Adjustment Note    Vinnie Siegel is a 74 y.o. male being treated with the medication cefazoin    Patient Data:    Vital Signs (Most Recent):  Temp: 98.5 °F (36.9 °C) (07/01/20 0705)  Pulse: 102 (07/01/20 0705)  Resp: (!) 22 (07/01/20 0705)  BP: (!) 185/77 (07/01/20 0705)  SpO2: 96 % (07/01/20 0705)   Vital Signs (72h Range):  Temp:  [97.6 °F (36.4 °C)-98.9 °F (37.2 °C)]   Pulse:  []   Resp:  [15-39]   BP: ()/(50-84)   SpO2:  [93 %-100 %]      Recent Labs   Lab 06/30/20  1155 06/30/20  2339 07/01/20  0551   CREATININE 3.5* 3.4* 3.3*     Serum creatinine: 3.3 mg/dL (H) 07/01/20 0551  Estimated creatinine clearance: 30.2 mL/min (A)     Medication Dose Route Frequency   Previous Order Cefazolin 2gm IV Q12h   New Order Cefazolin 2gm IV Q8h     Renal dose adjustments performed as noted above.  We will continue monitoring and adjusting as necessary.    Pharmacist: VIKASH MAIER  [phone]

## 2020-07-01 NOTE — PROGRESS NOTES
[facility] Hospital Medicine  Progress Note    Patient Name: Vinnie Siegel  MRN: [id number]  Patient Class: IP- Inpatient   Admission Date: 6/27/2020  Length of Stay: 4 days  Attending Physician: Triny Dill MD  Primary Care Provider: Janeth Walter MD        Subjective:     Principal Problem:Type 2 diabetes mellitus with hyperosmolar nonketotic hyperglycemia        HPI:  Mr. Vinnie Siegel is a 74 y.o. male, with PMH of IDDM-2, CKD-IV, dilated cardiomyopathy, HTN, HLD, gout, obesity, who [place] [facility] [place]27/20 with rectal bleeding x 2 days. His family called EMS after he became lethargic earlier this evening. Per his fiancee, he has been passing dark blood stools, and has been noting generalized weakness x 1 week. Additionally, he has been having elevated blood glucose readings x 1 day. In the ED, he was found to be in DKA, with a GI bleed. H&H were 6.4 & 19.4 and he was transfused 2 units PRBCs. He was started on an insulin drip and a Protonix drip. He had worsening respiratory and mental status while in the ED, and was intubated for airway protection. He was admitted to inpatient status to the ICU.     Overview/Hospital Course:  Admitted to ICU with HHS, encephalopathy, GI bleed, ADONAY, respiratory failure and sepsis. Suspected infection from urinary plus lung sources. Critical care and nephrology consulted and broad antibiotics continued. He had EGD on 6/29 which showed normal esophagus and duodenum with OG tube trauma to mid gastric body but no source of bleeding was identified. He was extubated on 6/30.     Interval History: No acute events overnight. Glucoses stable, bicarb improving. Extubated 6/30 and remains stable on room air. He is awake, alert and answering question appropriately. He complains of rectal pain but otherwise no complaints.    Review of Systems   Constitutional: Negative for chills and fever.   Respiratory: Negative for shortness of breath.     Cardiovascular: Negative for chest pain.   Gastrointestinal: Positive for rectal pain. Negative for nausea and vomiting.     Objective:     Vital Signs (Most Recent):  Temp: 98.5 °F (36.9 °C) (07/01/20 0705)  Pulse: (!) 112 (07/01/20 0803)  Resp: 20 (07/01/20 0803)  BP: (!) 185/77 (07/01/20 0705)  SpO2: 97 % (07/01/20 0803) Vital Signs (24h Range):  Temp:  [98 °F (36.7 °C)-98.9 °F (37.2 °C)] 98.5 °F (36.9 °C)  Pulse:  [] 112  Resp:  [15-39] 20  SpO2:  [93 %-100 %] 97 %  BP: (145-207)/(63-84) 185/77     Weight: (!) 144.5 kg (318 lb 9 oz)  Body mass index is 40.83 kg/m².    Intake/Output Summary (Last 24 hours) at 7/1/2020 1047  Last data filed at 7/1/2020 0536  Gross per 24 hour   Intake 1314.88 ml   Output 2820 ml   Net -1505.12 ml      Physical Exam  Vitals signs and nursing note reviewed.   Constitutional:       General: He is not in acute distress.     Appearance: Normal appearance.   Cardiovascular:      Rate and Rhythm: Normal rate and regular rhythm.      Pulses: Normal pulses.   Pulmonary:      Effort: No respiratory distress.      Breath sounds: Rales present.   Abdominal:      Palpations: Abdomen is soft.      Tenderness: There is no abdominal tenderness.   Musculoskeletal:         General: No tenderness.      Right lower leg: Edema present.      Left lower leg: Edema present.      Comments: LUE edema   Skin:     General: Skin is warm and dry.   Neurological:      Comments: Drowsy but easily awakens to voice.        Significant Labs:   CBC:  Recent Labs   Lab 06/30/20  0147 06/30/20  0336 07/01/20  0551   WBC 30.26* 27.90* 22.95*   HGB 7.0* 6.9* 8.3*   HCT 19.4* 19.2* 24.0*    219 218   GRAN 87.0* 85.8*  23.9* 86.7*  19.9*   LYMPH 6.0*  CANCELED 6.1*  1.7 5.7*  1.3   MONO 4.0  CANCELED 4.9  1.4* 5.2  1.2*   EOS CANCELED 0.0 0.0   BASO CANCELED 0.03 0.03      CMP:  Recent Labs   Lab 06/30/20  0147  06/30/20  1155 06/30/20  2339 07/01/20  0550 07/01/20  0551      < > 144 146*  --   149*   K 3.3*   < > 3.5 3.6  --  3.4*      < > 109 111*  --  114*   CO2 21*   < > 21* 24  --  22*   *   < > 102* 92*  --  83*   CREATININE 3.7*   < > 3.5* 3.4*  --  3.3*   *   < > 184* 250*  --  206*   CALCIUM 7.3*   < > 7.3* 7.3*  --  7.3*   MG  --   --   --   --  1.9  --    PHOS 2.5*  --  3.3  --  2.9  --    ANIONGAP 11   < > 14 11  --  13    < > = values in this interval not displayed.      Significant Imaging:   No new imaging today      Assessment/Plan:      * Type 2 diabetes mellitus with hyperosmolar nonketotic hyperglycemia  - Presentation most consistent with HHS with significant glucose elevation, no significant anion gap, minimal BHB elevation.  - Metabolic encephalopathy likely related to the same with concurrent acute hypoxemic respiratory failure.  - HHS resolved  - Tolerating liquids  - Stop insulin gtt. Transition to Levemir 20 units qd, Aspart 5 units TIDWM and SSI  - Titrate as PO intake improves    LLL pneumonia  - CXR with LLL opacity   - Respiratory culture with Strep agalactiae. Continue Cefazolin per ID recs      Encephalopathy, metabolic  - Improving  - As under HHS.    Elevated troponin  - No reported chest discomfort at home, EKG in ED with sinus tach / RBBB.  - Suspect likely demand ischemia with acute bleed, HHS; ADONAY likely contributing as well.  - Monitor.    Acute renal failure superimposed on stage 4 chronic kidney disease  - Multifactorial with likely acute blood loss anemia, GI losses contributing.  - Baseline Cr ~2.4-2.7.  - FENa suggestive of intrinsic source  - Good UOP. Cr relatively unchanged  - Appreciate nephrology assistance.    Severe sepsis  - Sepsis with concurrent HHS with several potential sources: UA suggestive of UTI with >100 RBCs and WBCs and purulent appearance, CXR with LLL infiltrate and copious sputum production.  - Urine culture with E. Coli. Respiratory culture with Group B Strep   - Continuing Cefazolin per ID recs  - LA improving 3.5 ->  2.7. WBC down trending 40 -> 22 and Procal 10 -> 3    UTI (urinary tract infection)  - Urine culture with E. Coli  - Continue Cefazolin per ID. Plan to complete 2 weeks total    Acute blood loss anemia  - Hb stable overnight. No further bleeding  - s/p 4 units PRBC so far.     Acute respiratory failure  - Suspect HHS with encephalopathy and GI losses causing acidosis contributing.  - Appreciate critical care assistance.   - Extubated 6/30    GIB (gastrointestinal bleeding)  - Two day history of dark blood per rectum and patient's fiancee reports progressive loose stool at home with blood noted.  - s/p 2U pRBCs in ED 06/27, 1U pRBCs 06/28, 1U pRBC 6/30; trend Hgb and transfuse PRN Hgb < 7.  - Continuing pantoprazole 40mg IV BID  - No bleeding overnight. Hb stable  - GI following  - EGD 6/29 with normal esophagus, OG tube trauma to the mid body and normal duodenum  - Discussed with Dr. Leone. If able to tolerate liquids today then can plan for bowel prep and colonoscopy tomorrow    Obesity, Class III, BMI 40-49.9 (morbid obesity)  - Dietary counseling after improvement from critical condition.    Dilated cardiomyopathy  - Volume overloaded; diuretics held with ADONAY.  - As under ADONAY.    CKD (chronic kidney disease) stage 4, GFR 15-29 ml/min  - As under ADONAY.    Hyperlipidemia  - Atorvastatin held with ADONAY.    Essential hypertension  - Low BP resolved  - Started coreg 12.5 mg BID and nifedipine 30 mg qd      VTE Risk Mitigation (From admission, onward)         Ordered     IP VTE HIGH RISK PATIENT  Once      06/28/20 0139     Reason for No Pharmacological VTE Prophylaxis  Once     Question:  Reasons:  Answer:  Active Bleeding    06/28/20 0139     Place sequential compression device  Until discontinued      06/28/20 0036                      Triny Dill MD  Department of Hospital Medicine   [facility]

## 2020-07-01 NOTE — PROGRESS NOTES
"Nephrology  Progress Note    Admit Date: 6/27/2020   LOS: 4 days     SUBJECTIVE:     Follow-up For:  Type 2 diabetes mellitus with hyperosmolar nonketotic hyperglycemia    Interval History:     Extubated and doing ok so far.  Very weak.  UOP and Creat continue to improve.  BP trends noted.        Review of Systems:    No c/o this am.  "I'm thirsty"    OBJECTIVE:     Vital Signs Range (Last 24H):  BP (!) 185/77   Pulse 102   Temp 98.5 °F (36.9 °C) (Oral)   Resp (!) 22   Ht 6' 2" (1.88 m)   Wt (!) 144.5 kg (318 lb 9 oz)   SpO2 96%   BMI 40.90 kg/m²     Temp:  [98 °F (36.7 °C)-98.9 °F (37.2 °C)]   Pulse:  []   Resp:  [15-39]   BP: (145-207)/(63-84)   SpO2:  [93 %-100 %]     I & O (Last 24H):    Intake/Output Summary (Last 24 hours) at 7/1/2020 0741  Last data filed at 7/1/2020 0536  Gross per 24 hour   Intake 1812.46 ml   Output 3245 ml   Net -1432.54 ml       Physical Exam:  General appearance: Well developed, well nourished, weak, obese  Eyes:  Conjunctivae/corneas clear. PERRL.  Lungs: diminished.    Heart: Tachy-Regular rate and rhythm, S1, S2 normal, no murmur, rub or beatrice.  Abdomen: Soft, non-tender non-distended; bowel sounds normal; no masses,  no organomegaly, obese, rectal tube dark stool.   Extremities: No cyanosis or clubbing. 3+ edema.    Skin: Skin color, texture, turgor normal. No rashes or lesions  Neurologic: No focal numbness or weakness   Mario  Right groin TLC (positional)    Laboratory Data:  Recent Labs   Lab 07/01/20  0551   WBC 22.95*   RBC 2.72*   HGB 8.3*   HCT 24.0*      MCV 88   MCH 30.5   MCHC 34.6       BMP:   Recent Labs   Lab 06/27/20  2111  07/01/20  0551   *   < > 206*   *   < > 149*   K 5.2*   < > 3.4*      < > 114*   CO2 8*   < > 22*   *   < > 83*   CREATININE 4.2*   < > 3.3*   CALCIUM 7.3*   < > 7.3*   MG 2.1  --   --     < > = values in this interval not displayed.     Lab Results   Component Value Date    CALCIUM 7.3 (L) 07/01/2020 "    PHOS 2.9 07/01/2020       Lab Results   Component Value Date    .5 (H) 06/29/2020    CALCIUM 7.3 (L) 07/01/2020    CAION 1.07 06/28/2020    PHOS 2.9 07/01/2020       Lab Results   Component Value Date    URICACID 8.6 (H) 06/29/2020       BNP  No results for input(s): BNP, BNPTRIAGEBLO in the last 168 hours.    Medications:  Medication list was reviewed and changes noted under Assessment/Plan.    Diagnostic Results:        ASSESSMENT/PLAN:     73 YO male with DM, HTN, CHF with Dilated CM, COPD, CKD 4, now with GIB and Hyperosmolar nonketotic hyperglycemia, resp failure prompting intubation to protect airway, severe anemia with Hgb 6.4, elevated BUN due to GIB, hypotension, and ADONAY     HHS  -Insulin drip as now  -UTI noted.  -defer  -glucose improving     Slowly resolving Non-oliguric ADONAY on CKD 4  -Baseline creat over past year 2.5-2.9 with history of proteinuria  -Creat 4.4 on admit and slowly improving to 3.3  -UOP picking up but lacking clearance with GI bleed  -Prot/creat ratio 1 gram  -Expect that severe anemia and hypotension etiology for ADONAY  -Doesn't appear he has seen nephrologist so ordered full workup and mildly elevated ELSA with negative reflex.  -Normally prefer no PPI but with GIB this okay  -No nephrotoxins.  -replace lytes PRN.  -Renally dose meds, avoid nephrotoxins, and monitor I/O's closely.       GIB with severe anemia  -Agree with transfusions prn  -dose Epo.  -mildly iron deficient but would not give IV iron for now  -GI consulted - EGD noted.   -Protonix as now     DM  -Last HgA1c in December 2019 7.7  -Defer to primary team  -as above     Sepsis from UTI +/- other:  -cultured and continue antibx for now.   -lactic acid, procal, and WBC's improving.     Vit D Def with mild HPTH:  -started ergo and calcitriol.      Hypernatremia:  -encourage water intake today.      HTN:  -start BB/CCB.  -no ACE/ARB for now.       Total critical care time (exclusive of procedural time) : 45  minutes  Critical care was necessary to treat or prevent imminent or life-threatening deterioration of the following conditions: HNNK, ADONAY, sepsis, GIB, HTN.     Critical care was time spent personally by me on the following activities: development of treatment plan with patient or surrogate, discussions with consultants, interpretation of cardiac output measurements, examination of patient, ordering and performing treatments and interventions, evaluation of patient's response to treatment, obtaining history from patient or surrogate, ordering and review of laboratory studies, ordering and review of radiographic studies, re-evaluation of patient's condition, pulse oximetry and blood draw for specimens

## 2020-07-01 NOTE — ASSESSMENT & PLAN NOTE
74M with h/o DM, CKD, HTN, obesity admitted 6/27 with rectal bleeding and lethargy. Also with urinary freq/urgency. found to be in DKA, with a GI bleed. He had worsening respiratory and mental status while in the ED, and was intubated for airway protection. noted to have cloudy urine with some purulence from urethra. Also with thick greenish secretions. Afebrile. WBC 40k. UA >100wbc, culture + e.coli, pan sensitive. procalcitonin elevated 11. G/C, RPR, HIV negative. Renal ultrasound- medical renal disease. resp culture rare GPC, mod wbc, strep. Now extubated. On cefazolin. suspect source of infection UTI. Note pt has prolonged qtc 502    Recommendations:   - continue IV cefazolin. Would plan on 10-14 day course of antibiotics (until at least 7/6). Can switch to ceftriaxone on discharge for ease of once daily dosing, if he leaves prior to completion of antibiotic therapy. Unfortunately would not use quinolones with prolonged qtc and gfr 20 so nervous about using bactrim. Also patient morbidly obese with bmi 41 and I worry that other po antibiotics would have suboptimal bioavailability     Discharge antibiotics:   Ceftriaxone 2gm iv daily     End date of IV antibiotics: 7/10/20    Weekly outpatient laboratory on Monday or Tuesday while on IV antibiotics.    CBC   CMP   ESR and CRP    Fax laboratory results to [facility] at [phone] with attn: Dr Cesar    If patient goes home with iv antibiotics; Prior to discharge, please ensure the patient's follow-up has been scheduled.  If there is still no follow-up scheduled in Infectious Diseases clinic, please send an EPIC message to Kecia Garcia LPN in Infectious Diseases.

## 2020-07-01 NOTE — PT/OT/SLP EVAL
Physical Therapy Evaluation and Treatment    Patient Name:  Vinnie Siegel   MRN:  [id number]    Recommendations:     Discharge Recommendations:  (TBD, anticipate post acute placement for therapy services)   Discharge Equipment Recommendations: (TBD at next level of care/pending progress)   Barriers to discharge: Inaccessible home, Decreased caregiver support and current level of dependence on caregivers    Assessment:     Vinnie Siegel is a 74 y.o. male admitted with a medical diagnosis of Type 2 diabetes mellitus with hyperosmolar nonketotic hyperglycemia with hospital course significant for intubation for acute respiratory failure requiring intubation 6/27-6/30 and severe sepsis with several potential sources including UTI. Pmhx significant for CKD, DM, HTN, and dilated cardiomyopathy.  He presents with the following impairments/functional limitations:  weakness, impaired sensation, impaired endurance, impaired self care skills, impaired functional mobilty, gait instability, impaired balance, decreased coordination, decreased upper extremity function, decreased lower extremity function, decreased safety awareness, pain, abnormal tone, decreased ROM, impaired skin, edema.    Patient evaluated by PT and goals established. Patient with stable vital signs throughout session, endorses back pain that increases with attempted sitting at EOB. Despite 3x attempts with RN assistance, pt unable to achieve full seated position EOB before forcefully requesting return to bed. Required totalA with bed in trendelenburg to return to appropriate supine position. PT will continue to follow and progress as tolerated. Patient would benefit from pre-medication to allow for full participation in therapeutic activities. Rec for d/c pending progress, anticipate need for post acute placement for further therapy services.    Rehab Prognosis: Good; patient would benefit from acute skilled PT services to address these deficits and reach  "maximum level of function.    Recent Surgery: Procedure(s) (LRB):  EGD (ESOPHAGOGASTRODUODENOSCOPY) (N/A) 2 Days Post-Op    Plan:     During this hospitalization, patient to be seen 6 x/week to address the identified rehab impairments via therapeutic activities, therapeutic exercises, neuromuscular re-education, gait training and progress toward the following goals:    · Plan of Care Expires:  07/31/20    Subjective     Chief Complaint: Back pain and rectal pain; "bad, bad, bad stuff happened to me"  Patient/Family Comments/goals: Goal to return to home and to be able to walk more than 20 ft without becoming SOB; Patient agreeable to evaluation.  Pain/Comfort:  · Pain Rating 1: (Back and rectal pain, does not rate)  · Pain Addressed 1: Reposition, Distraction, Cessation of Activity, Nurse notified  · Pain Rating Post-Intervention 1: (Increased back pain with attempted mobility, returned to baseline at end of session)    Patients cultural, spiritual, Christian conflicts given the current situation: no(none verbalized)    Living Environment:  · Pt lives with his friend (listed in EMR as ash) in a single story home with 5 steps to enter and unilateral handrail(s).   · Upon discharge, patient will have assistance from his friend, unclear if she is able to provide 24/7 assistance.  Prior level of function:  · Ambulation: Mod(I) with use of quad cane for limited household ambulation  · SOB with gait >20 ft  · ADL's: Required assistance from friend  · IADLs: Friend did cooking and cleaning at home  · Reports enjoys reading newspapers and watching TV  · Equipment used at home: cane, quad.    Objective:     Communicated with SHMUEL Velásquez prior to session.  Patient found HOB elevated with peripheral IV, bowel management system, PICC line, blood pressure cuff, perera catheter, pulse ox (continuous)  upon PT entry to room.    General Precautions: Standard, diabetic, fall, respiratory, NPO   Orthopedic Precautions:N/A   Braces: " N/A     Patient donned red socks for EOB mobility.    Exams:  · Cognition:   · Patient is oriented to x4.  · Pt follows approximately 100% of one step commands.    · Mood: Emotionally labile including crying 3x during session and yelling at therapist during bed mobility due to pain  · Safety Awareness: Imapired  · Musculoskeletal:  · BMI: 41  · Posture:  Forward head, rounded shoulders, increased lumbar lordosis  · LE ROM/Strength:   · R ROM: Limited knee/hip flexion due to edema and large abdomen  · L ROM: Limited knee/hip flexion due to edema and large abdomen  · R Strength:   · Hip flexion: 2/5  · Knee extension: 2/5  · Dorsiflexion: 4/5   · L Strength:   · Hip flexion: 2/5  · Knee extension: 2/5  · Dorsiflexion: 4/5   · Neuromuscular:  · Sensation: Impaired to light touch bilateral LEs, able to pressure pressure.   · Tone/Reflexes: No impairments identified with functional mobility. No formal testing performed.   · Some clonus noted with PF, none elicited with quick DF  · Coordination:  · Thumb to fingers: Slow but accurate (B)  · Balance:   · Static sitting: Requires totalA at trunk, unable to achieve full upright sitting  · Visual-vestibular: No impairments identified with functional mobility. No formal testing performed.  · Integument: Visible skin intact and wound noted draining pus at posterior head while performing mobility  · Cardiopulmonary:  · O2 Requirements: RA  · Vital signs:    · Pre(HOB 30 deg elevated): /77 HR 89 SpO2 96%  · During(HOb elevated 60 deg): /73 HR 87 SpO2 94%  · Post(HOb elevated after trial of sitting): /63 HR 78 SpO2 97%  · Edema: 2+ pitting edema BLE, notable edematous hands (L>R)      Functional Status Score ICU (FSS ICU)  Lucita M, Jose KS, Lainey GONZALEZ, et al. Functional Status Score for the ICU: An International Clinimetric Analysis of Validity, Responsiveness, and Minimal Important Difference. Crit Care Med. 2016;44(12):e3054-u0824.  Doi:[id number]/[facility].[id number]    Activity Score   Preambulation Categories    Rolling 2   Supine to Sit Transfer 1   Unsupported Sitting 0   Ambulation categories    Sit to Stand Transfer 0   Ambulation 0   Cumulative Score 3/35   Score less than or equal to 16 indicates probable need for post acute care.              Functional Mobility:  · Bed Mobility:     · Bridging: total assistance and of 2 persons  · Bed in trendelenburg  · Supine to Sit: maximal assistance and of 2 persons  · Trial 3x, pull to sitting 2x and rotating hips 1x  · Pt with intolerable back pain with attempted sitting  · Sit to Supine: total assistance and of 2 persons    Therapeutic Activities and Exercises:  ·  Pt instructed on how to perform supine therapeutic exercises including ankle pumps, glute sets, finger flexion/extension, forearm rotation (for edema control) x 5 reps with verbal and tactile cues.   PT educated patient re:   PT plan of care/role of PT  Importance of OOB mobility  Pt verbalized understanding       AM-PAC 6 CLICK MOBILITY  Total Score:8     Patient left HOB elevated with all lines intact, call button in reach and RN Christen present.    GOALS:   Multidisciplinary Problems     Physical Therapy Goals        Problem: Physical Therapy Goal    Goal Priority Disciplines Outcome Goal Variances Interventions   Physical Therapy Goal     PT, PT/OT Ongoing, Progressing     Description: Goals to be met by: 20    Patient will increase functional independence with mobility by performin. Supine to sit with min A.  2. Sit to supine with min A.   3. Rolling R and L with min A.   4. Sitting at edge of bed >5 minutes with min A.   5. PT will assess sit<>stand.                    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 Root [facility]    lateral internal anal sphincterotomy  12/13/13    Dr. root [facility]    URETERAL STENT PLACEMENT         Time Tracking:     PT Received On: 07/01/20  PT Start Time: 1510     PT Stop Time: 1544  PT Total Time (min): 34 min     Billable Minutes: Evaluation 25 and Therapeutic Activity 9      Pita Quinn, PT  07/01/2020

## 2020-07-01 NOTE — SUBJECTIVE & OBJECTIVE
Interval History: No acute events overnight. Glucoses stable, bicarb improving. Extubated 6/30 and remains stable on room air. He is awake, alert and answering question appropriately. He complains of rectal pain but otherwise no complaints.    Review of Systems   Constitutional: Negative for chills and fever.   Respiratory: Negative for shortness of breath.    Cardiovascular: Negative for chest pain.   Gastrointestinal: Positive for rectal pain. Negative for nausea and vomiting.     Objective:     Vital Signs (Most Recent):  Temp: 98.5 °F (36.9 °C) (07/01/20 0705)  Pulse: (!) 112 (07/01/20 0803)  Resp: 20 (07/01/20 0803)  BP: (!) 185/77 (07/01/20 0705)  SpO2: 97 % (07/01/20 0803) Vital Signs (24h Range):  Temp:  [98 °F (36.7 °C)-98.9 °F (37.2 °C)] 98.5 °F (36.9 °C)  Pulse:  [] 112  Resp:  [15-39] 20  SpO2:  [93 %-100 %] 97 %  BP: (145-207)/(63-84) 185/77     Weight: (!) 144.5 kg (318 lb 9 oz)  Body mass index is 40.83 kg/m².    Intake/Output Summary (Last 24 hours) at 7/1/2020 1047  Last data filed at 7/1/2020 0536  Gross per 24 hour   Intake 1314.88 ml   Output 2820 ml   Net -1505.12 ml      Physical Exam  Vitals signs and nursing note reviewed.   Constitutional:       General: He is not in acute distress.     Appearance: Normal appearance.   Cardiovascular:      Rate and Rhythm: Normal rate and regular rhythm.      Pulses: Normal pulses.   Pulmonary:      Effort: No respiratory distress.      Breath sounds: Rales present.   Abdominal:      Palpations: Abdomen is soft.      Tenderness: There is no abdominal tenderness.   Musculoskeletal:         General: No tenderness.      Right lower leg: Edema present.      Left lower leg: Edema present.      Comments: LUE edema   Skin:     General: Skin is warm and dry.   Neurological:      Comments: Drowsy but easily awakens to voice.        Significant Labs:   CBC:  Recent Labs   Lab 06/30/20  0147 06/30/20  0336 07/01/20  0551   WBC 30.26* 27.90* 22.95*   HGB 7.0* 6.9*  8.3*   HCT 19.4* 19.2* 24.0*    219 218   GRAN 87.0* 85.8*  23.9* 86.7*  19.9*   LYMPH 6.0*  CANCELED 6.1*  1.7 5.7*  1.3   MONO 4.0  CANCELED 4.9  1.4* 5.2  1.2*   EOS CANCELED 0.0 0.0   BASO CANCELED 0.03 0.03      CMP:  Recent Labs   Lab 06/30/20  0147  06/30/20  1155 06/30/20  2339 07/01/20  0550 07/01/20  0551      < > 144 146*  --  149*   K 3.3*   < > 3.5 3.6  --  3.4*      < > 109 111*  --  114*   CO2 21*   < > 21* 24  --  22*   *   < > 102* 92*  --  83*   CREATININE 3.7*   < > 3.5* 3.4*  --  3.3*   *   < > 184* 250*  --  206*   CALCIUM 7.3*   < > 7.3* 7.3*  --  7.3*   MG  --   --   --   --  1.9  --    PHOS 2.5*  --  3.3  --  2.9  --    ANIONGAP 11   < > 14 11  --  13    < > = values in this interval not displayed.      Significant Imaging:   No new imaging today

## 2020-07-01 NOTE — ASSESSMENT & PLAN NOTE
- Multifactorial with likely acute blood loss anemia, GI losses contributing.  - Baseline Cr ~2.4-2.7.  - FENa suggestive of intrinsic source  - Good UOP. Cr relatively unchanged  - Appreciate nephrology assistance.

## 2020-07-01 NOTE — EICU
NaHCO3 drip stopped due to:    Na increasing to 146  HCO3 increasing to 24  PH 7.46    Also BP increasing to 195/81. Seems to be pain related. Will increase frequency of FNT prn

## 2020-07-01 NOTE — PROGRESS NOTES
[facility]  Pulmonology  Progress Note    Patient Name: Vinnie Siegel  MRN: [id number]  Admission Date: 6/27/2020  Hospital Length of Stay: 4 days  Code Status: Full Code  Attending Provider: Triny Dill MD  Primary Care Provider: Janeth Walter MD   Principal Problem: Type 2 diabetes mellitus with hyperosmolar nonketotic hyperglycemia    Subjective:     Interval History: doing well since extubation. No complaints this AM    Objective:     Vital Signs (Most Recent):  Temp: 98.5 °F (36.9 °C) (07/01/20 0705)  Pulse: (!) 112 (07/01/20 0803)  Resp: 20 (07/01/20 0803)  BP: (!) 185/77 (07/01/20 0705)  SpO2: 97 % (07/01/20 0803) Vital Signs (24h Range):  Temp:  [98 °F (36.7 °C)-98.9 °F (37.2 °C)] 98.5 °F (36.9 °C)  Pulse:  [] 112  Resp:  [15-39] 20  SpO2:  [93 %-100 %] 97 %  BP: (145-207)/(63-84) 185/77     Weight: (!) 144.5 kg (318 lb 9 oz)  Body mass index is 40.9 kg/m².      Intake/Output Summary (Last 24 hours) at 7/1/2020 0938  Last data filed at 7/1/2020 0536  Gross per 24 hour   Intake 1372.88 ml   Output 3070 ml   Net -1697.12 ml       Physical Exam  Vitals signs and nursing note reviewed.   Constitutional:       General: He is not in acute distress.     Appearance: He is obese. He is not ill-appearing, toxic-appearing or diaphoretic.   HENT:      Head: Normocephalic and atraumatic.      Nose: No congestion or rhinorrhea.      Mouth/Throat:      Mouth: Mucous membranes are moist.      Pharynx: Oropharynx is clear.      Comments: NC in place  Eyes:      General: No scleral icterus.     Conjunctiva/sclera: Conjunctivae normal.   Cardiovascular:      Rate and Rhythm: Normal rate and regular rhythm.      Heart sounds: No murmur.   Pulmonary:      Effort: No respiratory distress.      Breath sounds: No wheezing, rhonchi or rales.   Abdominal:      General: There is no distension.      Palpations: Abdomen is soft.   Musculoskeletal:      Right lower leg: Edema present.      Left lower leg:  Edema present.   Skin:     General: Skin is warm and dry.      Findings: No rash.      Comments: Right groin CVC in place         Vents:  Vent Mode: Spont (06/30/20 0855)  Ventilator Initiated: Yes (06/27/20 2216)  Set Rate: 0 BPM (06/30/20 0855)  Vt Set: 500 mL (06/30/20 0855)  Pressure Support: 5 cmH20 (06/30/20 0855)  PEEP/CPAP: 5 cmH20 (06/30/20 0855)  Oxygen Concentration (%): 21 (06/30/20 1000)  Peak Airway Pressure: 10 cmH2O (06/30/20 0855)  Plateau Pressure: 0 cmH20 (06/30/20 0855)  Total Ve: 12.3 mL (06/30/20 0855)  F/VT Ratio<105 (RSBI): (!) 23.66 (06/30/20 0737)    Lines/Drains/Airways     Central Venous Catheter Line            Percutaneous Central Line Insertion/Assessment - Triple Lumen  06/27/20 2300 right femoral 3 days          Drain                 Urethral Catheter 06/27/20 2258 Non-latex 16 Fr. 3 days         Rectal Tube 06/28/20 1808 2 days          Airway                 Airway - Non-Surgical 06/27/20 2205 Endotracheal Tube 3 days          Peripheral Intravenous Line                 Peripheral IV - Single Lumen 06/27/20 2047 18 G Right Antecubital 3 days         Peripheral IV - Single Lumen 06/27/20 2127 20 G Left Forearm 3 days                Significant Labs:    CBC/Anemia Profile:  Recent Labs   Lab 06/30/20  0147 06/30/20  0336 07/01/20  0551   WBC 30.26* 27.90* 22.95*   HGB 7.0* 6.9* 8.3*   HCT 19.4* 19.2* 24.0*    219 218   MCV 87 86 88   RDW 15.2* 14.9* 15.1*        Chemistries:  Recent Labs   Lab 06/30/20  0147  06/30/20  1155 06/30/20  2339 07/01/20  0550 07/01/20  0551      < > 144 146*  --  149*   K 3.3*   < > 3.5 3.6  --  3.4*      < > 109 111*  --  114*   CO2 21*   < > 21* 24  --  22*   *   < > 102* 92*  --  83*   CREATININE 3.7*   < > 3.5* 3.4*  --  3.3*   CALCIUM 7.3*   < > 7.3* 7.3*  --  7.3*   MG  --   --   --   --  1.9  --    PHOS 2.5*  --  3.3  --  2.9  --     < > = values in this interval not displayed.       All pertinent labs within the past 24  hours have been reviewed.    Significant Imaging:  I have reviewed and interpreted all pertinent imaging results/findings within the past 24 hours.    Assessment/Plan:     Neurologic:  · Metabolic Encephalopathy, RESOLVED  · Intubated for worsening mentation, likely multifactorial 2/2 HHS, potential septic encephalopathy, and progressive acidemia  · Now extubated and following commands. No focal deficits  · Critical Illness Myopathy  · Needs aggressive PT/OT     Cardiovascular:  · Chronic Systolic Heart Failure  · Holding BB/ARB in the setting of acute GIB. Resume BB following CSC tomorrow   · Appears somewhat hypervolemic but in the setting of acute GIB, defer diuretic therapy at this time, as this does not appear to be meaningfully affecting his clinical trajectory     Pulmonary:  · Acute Hypoxemic Respiratory Failure  · Likely Chronic Bronchitis  · Now extubated to NC. RCx now growing GBS. On Vanc and Cefepime as noted below     FEN/GI:  · Acute GIB  · Acute Gastroenteritis  · Both melena and hematochezia with baseline Hgb >13 (x3y ago). Hgb <7 at time of presentation, transfuse for Hgb <7    · GI consulted. S/p EGD with no obvious source of bleeding. CSC planned for tomorrow  · Nutrition  · Passed bedside swallow eval. Needs formal SLP eval     Renal:  · ADONAY on CKD  · Non-gap Metabolic Acidosis  · UOP now dramatically improved with interval decrease in Cr  · NGMA 2/2 GI bicarb loss and subsequent renal failure. Now off bicarb gtt  · Nephology consulted. Defer CRRT in light of improved UOP.  Maintain strict I/O     Hematology:  · Acute Blood Loss Anemia  · GI studies and recs as noted above. Would like hemolysis labs but transfused. Transfuse Hgb <7     Infectious:  · E coli Urinary tract Infection  · GBS pneumonia  · ID consulted.  Currently on Vanc and Cefepime. Blood Cx NGTD  · De-escalate ABx regimen once BCx data returns     Endocrine:  · HHS  · IDDM-2  · Marked hyperglycemia w/ encephalopathy. Now on  insulin gtt  · Transition to SQ insulin once tolerating diet. Renally dose insulin      GI PPx: BID PPI per GI  VTE PPx: SCDs     Disposition: Improving clinical trajectory. Continue ICU level care     Erik Campa MD  Pulmonology  [facility]

## 2020-07-01 NOTE — SUBJECTIVE & OBJECTIVE
Interval history: pt extubated. Reports had been having urinary symptoms prior to arrival for about a week. Reports mild cough, but minimal respiratory symptoms. Still in icu.       Review of Systems   Constitutional: Negative for chills and fever.   Respiratory: Positive for cough. Negative for shortness of breath.    Gastrointestinal: Positive for blood in stool. Negative for abdominal pain.   Genitourinary: Positive for dysuria and frequency.   Neurological: Negative for headaches.   Psychiatric/Behavioral:        Recently extubated      Objective:     Vital Signs (Most Recent):  Temp: 98.5 °F (36.9 °C) (07/01/20 0705)  Pulse: 78 (07/01/20 1358)  Resp: (!) 21 (07/01/20 1358)  BP: (!) 161/70 (07/01/20 1100)  SpO2: (!) 94 % (07/01/20 1358) Vital Signs (24h Range):  Temp:  [98 °F (36.7 °C)-98.5 °F (36.9 °C)] 98.5 °F (36.9 °C)  Pulse:  [] 78  Resp:  [18-39] 21  SpO2:  [93 %-100 %] 94 %  BP: (161-207)/(63-84) 161/70     Weight: (!) 144.5 kg (318 lb 9 oz)  Body mass index is 40.83 kg/m².    Intake/Output Summary (Last 24 hours) at 7/1/2020 1613  Last data filed at 7/1/2020 0536  Gross per 24 hour   Intake 1314.88 ml   Output 2055 ml   Net -740.12 ml      Physical Exam  Vitals signs and nursing note reviewed.   HENT:      Head: Normocephalic and atraumatic.   Eyes:      General:         Right eye: No discharge.         Left eye: No discharge.      Conjunctiva/sclera: Conjunctivae normal.   Cardiovascular:      Rate and Rhythm: Normal rate.      Pulses: Normal pulses.      Heart sounds: No murmur.   Pulmonary:      Effort: Pulmonary effort is normal.      Breath sounds: Normal breath sounds.      Comments: extubated  Abdominal:      Palpations: Abdomen is soft.      Tenderness: There is no abdominal tenderness. There is no right CVA tenderness or left CVA tenderness.   Musculoskeletal:         General: No tenderness.      Right lower leg: Edema (3+) present.      Left lower leg: Edema (3+) present.      Comments:  No ulcers on feet   Skin:     General: Skin is warm and dry.   Neurological:      General: No focal deficit present.      Mental Status: He is oriented to person, place, and time.   Psychiatric:         Mood and Affect: Mood normal.       Significant Labs:   CBC:  Recent Labs   Lab 06/30/20  0147 06/30/20  0336 07/01/20  0551   WBC 30.26* 27.90* 22.95*   HGB 7.0* 6.9* 8.3*   HCT 19.4* 19.2* 24.0*    219 218   GRAN 87.0* 85.8*  23.9* 86.7*  19.9*   LYMPH 6.0*  CANCELED 6.1*  1.7 5.7*  1.3   MONO 4.0  CANCELED 4.9  1.4* 5.2  1.2*   EOS CANCELED 0.0 0.0   BASO CANCELED 0.03 0.03      CMP:  Recent Labs   Lab 06/30/20  0147  06/30/20  1155 06/30/20  2339 07/01/20  0550 07/01/20  0551 07/01/20  1255      < > 144 146*  --  149* 150*   K 3.3*   < > 3.5 3.6  --  3.4* 3.5      < > 109 111*  --  114* 115*   CO2 21*   < > 21* 24  --  22* 23   *   < > 102* 92*  --  83* 82*   CREATININE 3.7*   < > 3.5* 3.4*  --  3.3* 3.4*   *   < > 184* 250*  --  206* 266*   CALCIUM 7.3*   < > 7.3* 7.3*  --  7.3* 7.3*   MG  --   --   --   --  1.9  --   --    PHOS 2.5*  --  3.3  --  2.9  --   --    ANIONGAP 11   < > 14 11  --  13 12    < > = values in this interval not displayed.      Significant Imaging:   US Retroperitoneal 06/28/20:  Right kidney: The right kidney measures 11.4 cm. Cortical thinning is present.  There is loss of corticomedullary distinction. Resistive index measures 0.87.  No mass. No renal stone. No hydronephrosis. Left kidney: The left kidney measures 12 cm. There is significant cortical atrophy.  There is loss of corticomedullary distinction. Resistive index could not obtain secondary to patient's inability to breath hold.  No mass. No renal stone. No hydronephrosis. The bladder is partially distended at the time of scanning and has an unremarkable appearance. Findings are compatible with chronic medical renal disease and not significantly changed from the previous exam

## 2020-07-01 NOTE — PROGRESS NOTES
Called to bedside by RN for evaluation of neck wound with drainage. There is a 6-7 cm area on posterior neck that is indurated and tender. There is superficial skin tear with serous drainage. Will order soft tissue neck CT to evaluate for underlying abscess.

## 2020-07-01 NOTE — ASSESSMENT & PLAN NOTE
- Presentation most consistent with HHS with significant glucose elevation, no significant anion gap, minimal BHB elevation.  - Metabolic encephalopathy likely related to the same with concurrent acute hypoxemic respiratory failure.  - HHS resolved  - Tolerating liquids  - Stop insulin gtt. Transition to Levemir 20 units qd, Aspart 5 units TIDWM and SSI  - Titrate as PO intake improves

## 2020-07-01 NOTE — PLAN OF CARE
Patient tearful, refusing BMP at this time. States he wants a break from being stuck. Nursing tried troubleshooting femoral line for blood draw but was unsuccessful.

## 2020-07-01 NOTE — PLAN OF CARE
Recommendations    1. Recommend Novasource @ 45ml/hr. +200 ml FWF q4hrs.  To provide 2160kcals(95%EEN), 98g protein(85%EPN), and 744 ml water.    2. If TPN is needed, recommend Clinimix 5/15 @70 ml/hr  To provide 1693 kcals(75% EEN), 84 g AA(73% EPN), and 252 g dextrose.    Goals: 1. Initiate Nutrition by RD follow up.  Nutrition Goal Status: goal met  Communication of RD Recs: (POC)

## 2020-07-01 NOTE — ASSESSMENT & PLAN NOTE
- Suspect HHS with encephalopathy and GI losses causing acidosis contributing.  - Appreciate critical care assistance.   - Extubated 6/30

## 2020-07-01 NOTE — PROGRESS NOTES
"[facility]  Adult Nutrition  Progress Note    SUMMARY     Recommendations    1. Recommend Novasource @ 45ml/hr. +200 ml FWF q4hrs.  To provide 2160kcals(95%EEN), 98g protein(85%EPN), and 744 ml water.    2. If TPN is needed, recommend Clinimix 5/15 @70 ml/hr  To provide 1693 kcals(75% EEN), 84 g AA(73% EPN), and 252 g dextrose.    Goals: 1. Initiate Nutrition by RD follow up.  Nutrition Goal Status: goal met  Communication of RD Recs: (POC)    Reason for Assessment    Reason For Assessment: RD follow-up  Diagnosis: (Type 2 diabetes mellitus with hyperosmolar nonketotic hyperglycemia)  Relevant Medical History: DM, HTN, CKD, HLD  Interdisciplinary Rounds: did not attend  General Information Comments:   6.29.20- Pt is a 74 year old male with GIB (gastrointestinal bleeding). Pt currently NPO. Pt intubated and sedated at this time. Diabetic diet education handouts left at bedside. Will give diabetic education when medially able.   lbs, no significant change in weight. Pt going for EGD today. Will continue to monitor.   NFPE 6.29.20- Pt nourished.   7.1.20- Pt in now on RA. No new weight. Mild edema noted. TF of diabetisource at 10ml/hr. Recommending Novasource due to high levels of renal labs.   Nutrition Discharge Planning: Adequate nutrition to meet needs via Renal, diabetic diet.    Nutrition Risk Screen    Nutrition Risk Screen: other (see comments)(pt intubated and sedated)    Nutrition/Diet History    Spiritual, Cultural Beliefs, Druze Practices, Values that Affect Care: no    Anthropometrics    Temp: 98.5 °F (36.9 °C)  Height Method: Stated  Height: 6' 2" (188 cm)  Height (inches): 74 in  Weight Method: Bed Scale  Weight: (!) 144.5 kg (318 lb 9 oz)  Weight (lb): (!) 318.57 lb  Ideal Body Weight (IBW), Male: 190 lb  % Ideal Body Weight, Male (lb): 167.67 %  BMI (Calculated): 40.9     Lab/Procedures/Meds    Pertinent Labs Reviewed: reviewed  Pertinent Labs Comments: Chloride 111, K " 3.4, Na 149, BUN 83, A1C 7.8, Cr 3.3, Glucose 206  Pertinent Medications Reviewed: reviewed  Pertinent Medications Comments: Pantoprazole, Insulin    Estimated/Assessed Needs    Weight Used For Calorie Calculations: (!) 144.2 kg (317 lb 14.5 oz)  Energy Calorie Requirements (kcal): 2018 kcals/day(14kcal/kg due to obesity)  Energy Need Method: Kcal/kg  Protein Requirements: 115.6 g/day(0.8 g/kg CKD)  Weight Used For Protein Calculations: (!) 144.2 kg (317 lb 14.5 oz)  Fluid Requirements (mL): 1mL/kcal or per MD  Estimated Fluid Requirement Method: RDA Method  RDA Method (mL): 2018  CHO Requirement: 282 g CHO    Nutrition Prescription Ordered    Current Diet Order: NPO  Current Nutrition Support Formula Ordered: Diabetisjessica BURCIAGA  Current Nutrition Support Rate Ordered: 10 (ml)    Evaluation of Received Nutrient/Fluid Intake    Enteral Calories (kcal): 288  Enteral Protein (gm): 14.5  Enteral (Free Water) Fluid (mL): 196  Oral Calories (kcal): 449  Energy Calories Required: not meeting needs  Protein Required: not meeting needs  Fluid Required: not meeting needs  Comments: LBM 6.29.20  % Intake of Estimated Energy Needs: 0 - 25 %  % Meal Intake: NPO    Nutrition Risk    Level of Risk/Frequency of Follow-up: moderate     Assessment and Plan    GIB (gastrointestinal bleeding)  Nutrition Problem  Inadequate energy intake     Related to (etiology):   Inability to consume sufficient energy     Signs and Symptoms (as evidenced by):   NPO     Interventions/Recommendations (treatment strategy):  Collaboration of nutrition care w/ other providers      Nutrition Diagnosis Status:   Ongoing     Monitor and Evaluation    Food and Nutrient Intake: food and beverage intake, energy intake  Food and Nutrient Adminstration: diet order  Knowledge/Beliefs/Attitudes: food and nutrition knowledge/skill  Physical Activity and Function: nutrition-related ADLs and IADLs  Anthropometric Measurements: weight  Biochemical Data, Medical Tests and  Procedures: electrolyte and renal panel, glucose/endocrine profile  Nutrition-Focused Physical Findings: overall appearance     Malnutrition Assessment     Dakota Score: 14  Orbital Region (Subcutaneous Fat Loss): well nourished  Upper Arm Region (Subcutaneous Fat Loss): well nourished  Thoracic and Lumbar Region: well nourished   [place] Region (Muscle Loss): well nourished  Clavicle Bone Region (Muscle Loss): well nourished  Clavicle and Acromion Bone Region (Muscle Loss): well nourished  Scapular Bone Region (Muscle Loss): well nourished  Dorsal Hand (Muscle Loss): well nourished  Patellar Region (Muscle Loss): well nourished  Anterior Thigh Region (Muscle Loss): well nourished  Posterior Calf Region (Muscle Loss): well nourished   Edema (Fluid Accumulation): 2-->mild        Nutrition Follow-Up    RD Follow-up?: Yes

## 2020-07-01 NOTE — ASSESSMENT & PLAN NOTE
- Two day history of dark blood per rectum and patient's fiancee reports progressive loose stool at home with blood noted.  - s/p 2U pRBCs in ED 06/27, 1U pRBCs 06/28, 1U pRBC 6/30; trend Hgb and transfuse PRN Hgb < 7.  - Continuing pantoprazole 40mg IV BID  - No bleeding overnight. Hb stable  - GI following  - EGD 6/29 with normal esophagus, OG tube trauma to the mid body and normal duodenum  - Discussed with Dr. Leone. If able to tolerate liquids today then can plan for bowel prep and colonoscopy tomorrow

## 2020-07-02 PROBLEM — L02.11 ABSCESS OF NECK: Status: ACTIVE | Noted: 2020-07-02

## 2020-07-02 PROBLEM — R79.89 ELEVATED TROPONIN: Status: RESOLVED | Noted: 2020-06-27 | Resolved: 2020-07-02

## 2020-07-02 PROBLEM — E87.0 HYPERNATREMIA: Status: ACTIVE | Noted: 2020-07-02

## 2020-07-02 PROBLEM — J96.01 ACUTE RESPIRATORY FAILURE WITH HYPOXIA: Status: ACTIVE | Noted: 2020-06-27

## 2020-07-02 PROBLEM — J96.00 ACUTE RESPIRATORY FAILURE: Status: RESOLVED | Noted: 2020-06-27 | Resolved: 2020-07-02

## 2020-07-02 LAB
ANION GAP SERPL CALC-SCNC: 13 MMOL/L (ref 8–16)
ANTI SM ANTIBODY: 0.08 RATIO (ref 0–0.99)
ANTI SM/RNP ANTIBODY: 0.07 RATIO (ref 0–0.99)
ANTI-SM INTERPRETATION: NEGATIVE
ANTI-SM/RNP INTERPRETATION: NEGATIVE
ANTI-SSA ANTIBODY: 0.08 RATIO (ref 0–0.99)
ANTI-SSA INTERPRETATION: NEGATIVE
ANTI-SSB ANTIBODY: 0.07 RATIO (ref 0–0.99)
ANTI-SSB INTERPRETATION: NEGATIVE
BASOPHILS # BLD AUTO: 0.02 K/UL (ref 0–0.2)
BASOPHILS NFR BLD: 0.1 % (ref 0–1.9)
BUN SERPL-MCNC: 71 MG/DL (ref 8–23)
BUN SERPL-MCNC: 73 MG/DL (ref 8–23)
BUN SERPL-MCNC: 74 MG/DL (ref 8–23)
CALCIUM SERPL-MCNC: 7.4 MG/DL (ref 8.7–10.5)
CHLORIDE SERPL-SCNC: 117 MMOL/L (ref 95–110)
CHLORIDE SERPL-SCNC: 117 MMOL/L (ref 95–110)
CHLORIDE SERPL-SCNC: 118 MMOL/L (ref 95–110)
CO2 SERPL-SCNC: 22 MMOL/L (ref 23–29)
CO2 SERPL-SCNC: 23 MMOL/L (ref 23–29)
CO2 SERPL-SCNC: 23 MMOL/L (ref 23–29)
CREAT SERPL-MCNC: 3.2 MG/DL (ref 0.5–1.4)
DIFFERENTIAL METHOD: ABNORMAL
DSDNA AB SER-ACNC: NORMAL [IU]/ML
EOSINOPHIL # BLD AUTO: 0.1 K/UL (ref 0–0.5)
EOSINOPHIL NFR BLD: 0.3 % (ref 0–8)
ERYTHROCYTE [DISTWIDTH] IN BLOOD BY AUTOMATED COUNT: 15.2 % (ref 11.5–14.5)
EST. GFR  (AFRICAN AMERICAN): 21 ML/MIN/1.73 M^2
EST. GFR  (NON AFRICAN AMERICAN): 18 ML/MIN/1.73 M^2
GLUCOSE SERPL-MCNC: 279 MG/DL (ref 70–110)
GLUCOSE SERPL-MCNC: 282 MG/DL (ref 70–110)
GLUCOSE SERPL-MCNC: 288 MG/DL (ref 70–110)
HAV IGM SERPL QL IA: NEGATIVE
HBV CORE IGM SERPL QL IA: NEGATIVE
HBV SURFACE AG SERPL QL IA: NORMAL
HCT VFR BLD AUTO: 23.8 % (ref 40–54)
HCV AB SERPL QL IA: NEGATIVE
HGB BLD-MCNC: 8.2 G/DL (ref 14–18)
IMM GRANULOCYTES # BLD AUTO: 0.31 K/UL (ref 0–0.04)
IMM GRANULOCYTES NFR BLD AUTO: 1.9 % (ref 0–0.5)
LYMPHOCYTES # BLD AUTO: 1.4 K/UL (ref 1–4.8)
LYMPHOCYTES NFR BLD: 8.3 % (ref 18–48)
MCH RBC QN AUTO: 31.3 PG (ref 27–31)
MCHC RBC AUTO-ENTMCNC: 34.5 G/DL (ref 32–36)
MCV RBC AUTO: 91 FL (ref 82–98)
MONOCYTES # BLD AUTO: 0.9 K/UL (ref 0.3–1)
MONOCYTES NFR BLD: 5.5 % (ref 4–15)
NEUTROPHILS # BLD AUTO: 13.8 K/UL (ref 1.8–7.7)
NEUTROPHILS NFR BLD: 83.9 % (ref 38–73)
NRBC BLD-RTO: 1 /100 WBC
PATHOLOGIST INTERPRETATION UPE: NORMAL
PHOSPHATE SERPL-MCNC: 3.2 MG/DL (ref 2.7–4.5)
PLATELET # BLD AUTO: 217 K/UL (ref 150–350)
PMV BLD AUTO: 9.9 FL (ref 9.2–12.9)
POCT GLUCOSE: 219 MG/DL (ref 70–110)
POCT GLUCOSE: 226 MG/DL (ref 70–110)
POCT GLUCOSE: 280 MG/DL (ref 70–110)
POCT GLUCOSE: 308 MG/DL (ref 70–110)
POTASSIUM SERPL-SCNC: 3.4 MMOL/L (ref 3.5–5.1)
POTASSIUM SERPL-SCNC: 3.5 MMOL/L (ref 3.5–5.1)
POTASSIUM SERPL-SCNC: 3.8 MMOL/L (ref 3.5–5.1)
PROT PATTERN UR ELPH-IMP: NORMAL
RBC # BLD AUTO: 2.62 M/UL (ref 4.6–6.2)
SARS-COV-2 RDRP RESP QL NAA+PROBE: NEGATIVE
SODIUM SERPL-SCNC: 152 MMOL/L (ref 136–145)
SODIUM SERPL-SCNC: 153 MMOL/L (ref 136–145)
SODIUM SERPL-SCNC: 154 MMOL/L (ref 136–145)
WBC # BLD AUTO: 16.45 K/UL (ref 3.9–12.7)

## 2020-07-02 PROCEDURE — 25000003 PHARM REV CODE 250: Performed by: NURSE PRACTITIONER

## 2020-07-02 PROCEDURE — 25000003 PHARM REV CODE 250: Performed by: INTERNAL MEDICINE

## 2020-07-02 PROCEDURE — 20000000 HC ICU ROOM

## 2020-07-02 PROCEDURE — 99232 SBSQ HOSP IP/OBS MODERATE 35: CPT | Mod: GC,,, | Performed by: INTERNAL MEDICINE

## 2020-07-02 PROCEDURE — 99233 PR SUBSEQUENT HOSPITAL CARE,LEVL III: ICD-10-PCS | Mod: ,,, | Performed by: INTERNAL MEDICINE

## 2020-07-02 PROCEDURE — 63600175 PHARM REV CODE 636 W HCPCS: Performed by: INTERNAL MEDICINE

## 2020-07-02 PROCEDURE — 63600175 PHARM REV CODE 636 W HCPCS: Performed by: CLINIC/CENTER

## 2020-07-02 PROCEDURE — 36415 COLL VENOUS BLD VENIPUNCTURE: CPT

## 2020-07-02 PROCEDURE — C9113 INJ PANTOPRAZOLE SODIUM, VIA: HCPCS | Performed by: HOSPITALIST

## 2020-07-02 PROCEDURE — 99233 SBSQ HOSP IP/OBS HIGH 50: CPT | Mod: ,,, | Performed by: INTERNAL MEDICINE

## 2020-07-02 PROCEDURE — 25000003 PHARM REV CODE 250: Performed by: SPECIALIST

## 2020-07-02 PROCEDURE — 63600175 PHARM REV CODE 636 W HCPCS: Performed by: NURSE PRACTITIONER

## 2020-07-02 PROCEDURE — 80048 BASIC METABOLIC PNL TOTAL CA: CPT

## 2020-07-02 PROCEDURE — 99232 PR SUBSEQUENT HOSPITAL CARE,LEVL II: ICD-10-PCS | Mod: GC,,, | Performed by: INTERNAL MEDICINE

## 2020-07-02 PROCEDURE — 94761 N-INVAS EAR/PLS OXIMETRY MLT: CPT

## 2020-07-02 PROCEDURE — 63600175 PHARM REV CODE 636 W HCPCS: Performed by: HOSPITALIST

## 2020-07-02 PROCEDURE — 84100 ASSAY OF PHOSPHORUS: CPT

## 2020-07-02 PROCEDURE — 99900035 HC TECH TIME PER 15 MIN (STAT)

## 2020-07-02 PROCEDURE — U0002 COVID-19 LAB TEST NON-CDC: HCPCS

## 2020-07-02 PROCEDURE — 85025 COMPLETE CBC W/AUTO DIFF WBC: CPT

## 2020-07-02 PROCEDURE — 97166 OT EVAL MOD COMPLEX 45 MIN: CPT

## 2020-07-02 PROCEDURE — 97530 THERAPEUTIC ACTIVITIES: CPT

## 2020-07-02 PROCEDURE — 25000242 PHARM REV CODE 250 ALT 637 W/ HCPCS: Performed by: HOSPITALIST

## 2020-07-02 PROCEDURE — 94640 AIRWAY INHALATION TREATMENT: CPT

## 2020-07-02 PROCEDURE — 80048 BASIC METABOLIC PNL TOTAL CA: CPT | Mod: 91

## 2020-07-02 RX ORDER — POTASSIUM CHLORIDE 14.9 MG/ML
20 INJECTION INTRAVENOUS ONCE
Status: COMPLETED | OUTPATIENT
Start: 2020-07-02 | End: 2020-07-02

## 2020-07-02 RX ORDER — DEXTROSE MONOHYDRATE 50 MG/ML
INJECTION, SOLUTION INTRAVENOUS CONTINUOUS
Status: DISCONTINUED | OUTPATIENT
Start: 2020-07-02 | End: 2020-07-03

## 2020-07-02 RX ORDER — NIFEDIPINE 30 MG/1
60 TABLET, EXTENDED RELEASE ORAL DAILY
Status: DISCONTINUED | OUTPATIENT
Start: 2020-07-02 | End: 2020-07-06

## 2020-07-02 RX ORDER — POLYETHYLENE GLYCOL 3350, SODIUM SULFATE ANHYDROUS, SODIUM BICARBONATE, SODIUM CHLORIDE, POTASSIUM CHLORIDE 236; 22.74; 6.74; 5.86; 2.97 G/4L; G/4L; G/4L; G/4L; G/4L
2000 POWDER, FOR SOLUTION ORAL ONCE
Status: DISCONTINUED | OUTPATIENT
Start: 2020-07-02 | End: 2020-07-05 | Stop reason: SDUPTHER

## 2020-07-02 RX ORDER — CARVEDILOL 12.5 MG/1
25 TABLET ORAL 2 TIMES DAILY
Status: DISCONTINUED | OUTPATIENT
Start: 2020-07-02 | End: 2020-07-03

## 2020-07-02 RX ORDER — IPRATROPIUM BROMIDE AND ALBUTEROL SULFATE 2.5; .5 MG/3ML; MG/3ML
3 SOLUTION RESPIRATORY (INHALATION) EVERY 6 HOURS PRN
Status: DISCONTINUED | OUTPATIENT
Start: 2020-07-02 | End: 2020-07-16 | Stop reason: HOSPADM

## 2020-07-02 RX ORDER — LIDOCAINE HYDROCHLORIDE AND EPINEPHRINE 10; 10 MG/ML; UG/ML
1 INJECTION, SOLUTION INFILTRATION; PERINEURAL ONCE
Status: COMPLETED | OUTPATIENT
Start: 2020-07-02 | End: 2020-07-02

## 2020-07-02 RX ADMIN — IPRATROPIUM BROMIDE AND ALBUTEROL SULFATE 3 ML: .5; 3 SOLUTION RESPIRATORY (INHALATION) at 07:07

## 2020-07-02 RX ADMIN — HYDRALAZINE HYDROCHLORIDE 10 MG: 20 INJECTION INTRAMUSCULAR; INTRAVENOUS at 04:07

## 2020-07-02 RX ADMIN — PANTOPRAZOLE SODIUM 40 MG: 40 INJECTION, POWDER, LYOPHILIZED, FOR SOLUTION INTRAVENOUS at 08:07

## 2020-07-02 RX ADMIN — NIFEDIPINE 60 MG: 30 TABLET, FILM COATED, EXTENDED RELEASE ORAL at 08:07

## 2020-07-02 RX ADMIN — INSULIN ASPART 5 UNITS: 100 INJECTION, SOLUTION INTRAVENOUS; SUBCUTANEOUS at 08:07

## 2020-07-02 RX ADMIN — INSULIN ASPART 4 UNITS: 100 INJECTION, SOLUTION INTRAVENOUS; SUBCUTANEOUS at 08:07

## 2020-07-02 RX ADMIN — INSULIN ASPART 5 UNITS: 100 INJECTION, SOLUTION INTRAVENOUS; SUBCUTANEOUS at 11:07

## 2020-07-02 RX ADMIN — CARVEDILOL 25 MG: 12.5 TABLET, FILM COATED ORAL at 09:07

## 2020-07-02 RX ADMIN — POTASSIUM CHLORIDE 20 MEQ: 14.9 INJECTION, SOLUTION INTRAVENOUS at 08:07

## 2020-07-02 RX ADMIN — CEFAZOLIN 2 G: 330 INJECTION, POWDER, FOR SOLUTION INTRAMUSCULAR; INTRAVENOUS at 04:07

## 2020-07-02 RX ADMIN — CARVEDILOL 25 MG: 12.5 TABLET, FILM COATED ORAL at 08:07

## 2020-07-02 RX ADMIN — IPRATROPIUM BROMIDE AND ALBUTEROL SULFATE 3 ML: .5; 3 SOLUTION RESPIRATORY (INHALATION) at 01:07

## 2020-07-02 RX ADMIN — INSULIN ASPART 5 UNITS: 100 INJECTION, SOLUTION INTRAVENOUS; SUBCUTANEOUS at 05:07

## 2020-07-02 RX ADMIN — LIDOCAINE HYDROCHLORIDE,EPINEPHRINE BITARTRATE 1 ML: 10; .01 INJECTION, SOLUTION INFILTRATION; PERINEURAL at 06:07

## 2020-07-02 RX ADMIN — CEFAZOLIN 2 G: 330 INJECTION, POWDER, FOR SOLUTION INTRAMUSCULAR; INTRAVENOUS at 09:07

## 2020-07-02 RX ADMIN — PANTOPRAZOLE SODIUM 40 MG: 40 INJECTION, POWDER, LYOPHILIZED, FOR SOLUTION INTRAVENOUS at 09:07

## 2020-07-02 RX ADMIN — FENTANYL CITRATE 50 MCG: 50 INJECTION, SOLUTION INTRAMUSCULAR; INTRAVENOUS at 04:07

## 2020-07-02 RX ADMIN — TIMOLOL MALEATE 1 DROP: 2.5 SOLUTION/ DROPS OPHTHALMIC at 08:07

## 2020-07-02 RX ADMIN — CEFAZOLIN 2 G: 330 INJECTION, POWDER, FOR SOLUTION INTRAMUSCULAR; INTRAVENOUS at 11:07

## 2020-07-02 RX ADMIN — TAMSULOSIN HYDROCHLORIDE 0.4 MG: 0.4 CAPSULE ORAL at 08:07

## 2020-07-02 RX ADMIN — DEXTROSE: 5 SOLUTION INTRAVENOUS at 08:07

## 2020-07-02 RX ADMIN — TIMOLOL MALEATE 1 DROP: 2.5 SOLUTION/ DROPS OPHTHALMIC at 09:07

## 2020-07-02 RX ADMIN — INSULIN ASPART 1 UNITS: 100 INJECTION, SOLUTION INTRAVENOUS; SUBCUTANEOUS at 09:07

## 2020-07-02 RX ADMIN — INSULIN ASPART 2 UNITS: 100 INJECTION, SOLUTION INTRAVENOUS; SUBCUTANEOUS at 05:07

## 2020-07-02 RX ADMIN — INSULIN ASPART 3 UNITS: 100 INJECTION, SOLUTION INTRAVENOUS; SUBCUTANEOUS at 11:07

## 2020-07-02 NOTE — PROGRESS NOTES
"Nephrology  Progress Note    Admit Date: 6/27/2020   LOS: 5 days     SUBJECTIVE:     Follow-up For:  Type 2 diabetes mellitus with hyperosmolar nonketotic hyperglycemia    Interval History:     Continues with dark liquid stool.  Getting bowel prep for GI procedure.  Discussed with team.  Labs noted.        Review of Systems:    No c/o this am.  "I'm thirsty"    OBJECTIVE:     Vital Signs Range (Last 24H):  BP (!) 182/77   Pulse 83   Temp 98.6 °F (37 °C) (Oral)   Resp 18   Ht 6' 2" (1.88 m)   Wt (!) 144.5 kg (318 lb 9 oz)   SpO2 98%   BMI 40.83 kg/m²     Temp:  [98.1 °F (36.7 °C)-98.6 °F (37 °C)]   Pulse:  []   Resp:  [15-29]   BP: (148-183)/(63-78)   SpO2:  [93 %-98 %]     I & O (Last 24H):    Intake/Output Summary (Last 24 hours) at 7/2/2020 0757  Last data filed at 7/2/2020 0700  Gross per 24 hour   Intake 700 ml   Output 3365 ml   Net -2665 ml       Physical Exam:  General appearance: Well developed, well nourished, weak, obese  Eyes:  Conjunctivae/corneas clear. PERRL.  Lungs: diminished.    Heart: Tachy-Regular rate and rhythm, S1, S2 normal, no murmur, rub or beatrice.  Abdomen: Soft, non-tender non-distended; bowel sounds normal; no masses,  no organomegaly, obese, rectal tube dark stool.   Extremities: No cyanosis or clubbing. 2-3+ edema.    Skin: Skin color, texture, turgor normal. No rashes or lesions  Neurologic: No focal numbness or weakness   Mario  Right groin TLC (positional)    Laboratory Data:  Recent Labs   Lab 07/02/20  0454   WBC 16.45*   RBC 2.62*   HGB 8.2*   HCT 23.8*      MCV 91   MCH 31.3*   MCHC 34.5       BMP:   Recent Labs   Lab 07/01/20  0550  07/02/20  0454   GLU  --    < > 288*   NA  --    < > 153*   K  --    < > 3.4*   CL  --    < > 117*   CO2  --    < > 23   BUN  --    < > 73*   CREATININE  --    < > 3.2*   CALCIUM  --    < > 7.4*   MG 1.9  --   --     < > = values in this interval not displayed.     Lab Results   Component Value Date    CALCIUM 7.4 (L) 07/02/2020 "    PHOS 3.2 07/02/2020       Lab Results   Component Value Date    .5 (H) 06/29/2020    CALCIUM 7.4 (L) 07/02/2020    CAION 1.07 06/28/2020    PHOS 3.2 07/02/2020       Lab Results   Component Value Date    URICACID 8.6 (H) 06/29/2020       BNP  No results for input(s): BNP, BNPTRIAGEBLO in the last 168 hours.    Medications:  Medication list was reviewed and changes noted under Assessment/Plan.    Diagnostic Results:        ASSESSMENT/PLAN:     75 YO male with DM, HTN, CHF with Dilated CM, COPD, CKD 4, now with GIB and Hyperosmolar nonketotic hyperglycemia, resp failure prompting intubation to protect airway, severe anemia with Hgb 6.4, elevated BUN due to GIB, hypotension, and ADONAY     HHS  -s/p Insulin drip and now on basal/prandial.    -UTI noted.  -defer  -glucose labile.     Slowly resolving Non-oliguric ADONAY on CKD 4  -Baseline creat over past year 2.5-2.9 with history of proteinuria  -Creat 4.4 on admit and slowly improving to 3.2  -UOP picking up but lacking clearance with GI bleed  -Prot/creat ratio 1 gram  -Expect that severe anemia and hypotension etiology for ADONAY  -Doesn't appear he has seen nephrologist so ordered full workup and mildly elevated ELSA with negative reflex.  -Normally prefer no PPI but with GIB this okay  -No nephrotoxins.  -replace lytes PRN.  -Renally dose meds, avoid nephrotoxins, and monitor I/O's closely.       GIB with severe anemia  -Agree with transfusions prn  -dose Epo.  -mildly iron deficient but would not give IV iron for now  -GI consulted - EGD noted.   Plans for colon.   -Protonix as now     DM  -Last HgA1c in December 2019 7.7  -Defer to primary team  -as above     Sepsis from UTI +/- other:  -cultured and continue antibx for now.   -lactic acid, procal, and WBC's improving.     Vit D Def with mild HPTH:  -started ergo and calcitriol.      Hypernatremia:  -encourage water intake today but since will be NPO will start low volume D5 to help since trends worsening.  May  need to place back on insulin drip pending glucose trends.      HTN:  -started BB/CCB.  Hydralazine prn.   -no ACE/ARB for now.       Total critical care time (exclusive of procedural time) : 45 minutes  Critical care was necessary to treat or prevent imminent or life-threatening deterioration of the following conditions: HNNK, ADONAY, sepsis, GIB, HTN.     Critical care was time spent personally by me on the following activities: development of treatment plan with patient or surrogate, discussions with consultants, interpretation of cardiac output measurements, examination of patient, ordering and performing treatments and interventions, evaluation of patient's response to treatment, obtaining history from patient or surrogate, ordering and review of laboratory studies, ordering and review of radiographic studies, re-evaluation of patient's condition, pulse oximetry and blood draw for specimens

## 2020-07-02 NOTE — ASSESSMENT & PLAN NOTE
- Soft tissue neck CT with possible abscess L posterior neck  - General surgery consulted for possible I&D  - Wound care consulted, discussed with Meliss  - Await cultures

## 2020-07-02 NOTE — PT/OT/SLP PROGRESS
Physical Therapy Treatment    Patient Name:  Vinnie Siegel   MRN:  [id number]    Recommendations:     Discharge Recommendations:  (TBD pending progress)   Discharge Equipment Recommendations: (TBD pending progress)   Barriers to discharge: current functional level    Assessment:     Vinnie Siegel is a 74 y.o. male admitted with a medical diagnosis of Type 2 diabetes mellitus with hyperosmolar nonketotic hyperglycemia.  He presents with the following impairments/functional limitations:  weakness, impaired endurance, impaired self care skills, impaired functional mobilty, impaired balance, decreased lower extremity function, decreased upper extremity function, decreased safety awareness, pain, edema, impaired cardiopulmonary response to activity.    Pt tolerated treatment well with no adverse reactions. Pt is progressing toward meeting goals. Pt performed supine > sit with max A x 2 people and sit > supine with total A x 2 people. Pt sat EOB x 15 min with total A for balance (patient leaning onto R side and supporting trunk on bed while sitting). Demonstrated improved activity tolerance. Pt continues to be limited by pain. PT will continue to follow and progress goals as tolerated. Discharge recommendations TBD pending progress with therapy.    Rehab Prognosis: Fair; patient would benefit from acute skilled PT services to address these deficits and reach maximum level of function.    Recent Surgery: Procedure(s) (LRB):  EGD (ESOPHAGOGASTRODUODENOSCOPY) (N/A) 3 Days Post-Op    Plan:     During this hospitalization, patient to be seen (4-5x/wk) to address the identified rehab impairments via therapeutic activities, therapeutic exercises, neuromuscular re-education, wheelchair management/training and progress toward the following goals:    · Plan of Care Expires:  07/31/20    Subjective     Chief Complaint: Pain in neck and bilateral legs  Patient/Family Comments/goals: No goal stated.  Pain/Comfort:  · Pain Rating 1:  (C/o neck & bilateral lower leg pain. Did not rate.)  · Pain Addressed 1: Distraction, Reposition, Cessation of Activity  · Pain Rating Post-Intervention 1: (C/o neck & bilateral lower leg pain. Did not rate.)      Objective:     Communicated with RN (Christen) prior to session.  Patient found supine with blood pressure cuff, bowel management system, bed alarm, perera catheter, peripheral IV, PICC line, pulse ox (continuous), telemetry upon PT entry to room.     General Precautions: Standard, diabetic, fall, respiratory, NPO   Orthopedic Precautions:N/A   Braces: N/A     Functional Mobility:  · Bed Mobility:     · Scooting: total assistance x 3 people via drawsheet  · Supine to Sit: maximal assistance x 2 people  · Sit to Supine: total assistance x 2 people  · Balance: Sat EOB x 15 min with total A for balance  · HOB elevated and patient leaning against bed on R to support trunk while sitting.  · Unable to achieve fully upright posture.      AM-PAC 6 CLICK MOBILITY  Turning over in bed (including adjusting bedclothes, sheets and blankets)?: 2  Sitting down on and standing up from a chair with arms (e.g., wheelchair, bedside commode, etc.): 1  Moving from lying on back to sitting on the side of the bed?: 2  Moving to and from a bed to a chair (including a wheelchair)?: 1  Need to walk in hospital room?: 1  Climbing 3-5 steps with a railing?: 1  Basic Mobility Total Score: 8       Therapeutic Activities and Exercises:   Bed mobility and balance training as described above.    Patient left supine with all lines intact and call button in reach..    GOALS:   Multidisciplinary Problems     Physical Therapy Goals        Problem: Physical Therapy Goal    Goal Priority Disciplines Outcome Goal Variances Interventions   Physical Therapy Goal     PT, PT/OT Ongoing, Progressing     Description: Goals to be met by: 20    Patient will increase functional independence with mobility by performin. Supine to sit with min  A.  2. Sit to supine with min A.   3. Rolling R and L with min A.   4. Sitting at edge of bed >5 minutes with min A.   5. PT will assess sit<>stand.                    Time Tracking:     PT Received On: 07/02/20  PT Start Time: 0945     PT Stop Time: 1013  PT Total Time (min): 28 min     Overlap with OT for portions of session due to complex nature of patient and for safety with mobility to decrease fall risk for patient and caregiver injury requiring two skilled therapists to provide interventions.     Billable Minutes: Therapeutic Activity 28    Treatment Type: Treatment  PT/PTA: PT     PTA Visit Number: 0     Nathaly Rouse, PT  07/02/2020

## 2020-07-02 NOTE — PT/OT/SLP EVAL
Occupational Therapy   Evaluation    Name: Vinnie Siegel  MRN: [id number]  Admitting Diagnosis:  Type 2 diabetes mellitus with hyperosmolar nonketotic hyperglycemia 3 Days Post-Op    Recommendations:     Discharge Recommendations: other (see comments)(TBD, will require continued therapy s/p acute care)  Discharge Equipment Recommendations:  other (see comments)(TBD, pending progress but anticipate RW, w/c, 3-in-1, TTB)  Barriers to discharge:  Inaccessible home environment    Assessment:     Vinnie Siegel is a 74 y.o. male with a medical diagnosis of Type 2 diabetes mellitus with hyperosmolar nonketotic hyperglycemia.  He presents with the following performance deficits affecting function: weakness, gait instability, decreased upper extremity function, decreased ROM, impaired endurance, impaired balance, decreased lower extremity function, decreased safety awareness, impaired fine motor, impaired self care skills, pain, impaired skin, impaired functional mobilty, edema.      Initial OT evaluation completed, with treatment to follow.  Patient agreeable to tasks, required rest breaks to complete.  Tolerated sitting EOB approx 10-15 min, but utilized HOB to rest (R) side of trunk.  Extremities with significant edema and pain posterior neck and (B) LEs.  Will require continued therapy services s/p acute care stay to restore functioning.  Continue OT services to maximize patient functioning.     Rehab Prognosis: Fair; patient would benefit from acute skilled OT services to address these deficits and reach maximum level of function.       Plan:     Patient to be seen 4 x/week, 5 x/week(increasing as patient's tolerance improves) to address the above listed problems via self-care/home management, therapeutic activities, therapeutic exercises  · Plan of Care Expires: 07/30/20  · Plan of Care Reviewed with: patient    Subjective     Chief Complaint: Pain  Patient/Family Comments/goals: Improve    Occupational  Profile:  Living Environment: Patient lives with friend in one story home with 5 steps to enter and HR.  Previous level of function: Friend assists patient with ADL tasks, mobility with use of QC.  Roles and Routines: Friend, enjoys television and reading newpaper.  Equipment Used at Home:  cane, quad  Assistance upon Discharge: Uncertain the level of assist that friend is able to provide.    Pain/Comfort:  · Pain Rating 1: other (see comments)(unable to rate, by with significant complaint)  · Location - Orientation 1: midline  · Location 1: neck  · Pain Addressed 1: Reposition, Distraction, Other (see comments)(wound care present and addressing)  · Pain Rating Post-Intervention 1: other (see comments)(patient with no change)  · Pain Rating 2: other (see comments)(significant c/o, no rating)  · Location - Side 2: Bilateral  · Location - Orientation 2: lower  · Location 2: leg  · Pain Addressed 2: Reposition, Distraction  · Pain Rating Post-Intervention 2: 0/10(at rest)    Patients cultural, spiritual, Evangelical conflicts given the current situation: no    Objective:     Communicated with: SHMUEL Velásquez prior to session.  Patient found HOB elevated with bed alarm, blood pressure cuff, bowel management system, perera catheter, pressure relief boots, peripheral IV, pulse ox (continuous), telemetry upon OT entry to room.    General Precautions: Standard, diabetic, fall, NPO   Orthopedic Precautions:N/A   Braces: N/A     Occupational Performance:    Bed Mobility:    · Patient completed Scooting/Bridging with maximal assistance  · Patient completed Supine to Sit with maximal assistance and 2 persons  · Patient completed Sit to Supine with total assistance and 2 persons    Functional Mobility/Transfers:  · NT  · Functional Mobility: NT    Activities of Daily Living:  · Upper Body Dressing: total assistance gown  · Lower Body Dressing: total assistance non skid socks  · Toileting: total assistance (use of BMS and  sheldon    Cognitive/Visual Perceptual:  Cognitive/Psychosocial Skills:     -       Oriented to: Person and Place   -       Follows Commands/attention:Follows one-step commands  -       Communication: soft spoken  -       Memory: No overt deficits noted, will monitor  -       Safety awareness/insight to disability: impaired   -       Mood/Affect/Coping skills/emotional control: Appropriate to situation and Cooperative  Visual/Perceptual:      -glasses     Physical Exam:  Balance:    -       sitting Total assist resting (R) side against elevated HOB  Postural examination/scapula alignment:    -       Rounded shoulders  Skin integrity: Wound posterior neck  Edema:  Moderate (L) U/LE, Severe (R) UE and Pitting (R) LE  Sensation:    -       Intact  light/touch (B) UEs  Dominant hand:    -       (R)  Upper Extremity Range of Motion:     -       Right Upper Extremity: AAROM WFL  -       Left Upper Extremity: AAROM with slight deficit at shoulder ranges  Upper Extremity Strength:    -       Right Upper Extremity: grossly 3-/5  -       Left Upper Extremity: grossly 3-/5   Strength:    -       Right Upper Extremity: Fair  -       Left Upper Extremity: Fair  Gross motor coordination:   WFL    AMPAC 6 Click ADL:  AMPAC Total Score: 7    Treatment & Education:  Educated on role of OT, POC, bed mobility tech and need to attempt EOB sitting to improve strength and overall health.  Verbalized and demonstrated understanding.  Education:    Patient left HOB elevated with all lines intact, call button in reach, bed alarm on and RN Chari notified    GOALS:   Multidisciplinary Problems     Occupational Therapy Goals        Problem: Occupational Therapy Goal    Goal Priority Disciplines Outcome Interventions   Occupational Therapy Goal     OT, PT/OT Ongoing, Progressing    Description: Goals to be met by: 07/16/2020     Patient will increase functional independence with ADLs by performing:    UE Dressing with Moderate  Assistance.  Grooming while EOB with Moderate Assistance.  Sitting at edge of bed x15 minutes with Moderate Assistance.  Rolling to Bilateral with Moderate Assistance.   Supine to sit with Moderate Assistance.  Increased functional strength to WFL for ADL tasks, functional transfers and bed mobility.  Assess functional transfers.                     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 Root [facility]    lateral internal anal sphincterotomy  12/13/13    Dr. root [facility]    URETERAL STENT PLACEMENT         Time Tracking:     OT Date of Treatment: 07/02/20  OT Start Time: 0946  OT Stop Time: 1013  OT Total Time (min): 27 min    Billable Minutes:Evaluation 27 (co-treat with PT for mobility)    KRISTINE Courtney  7/2/2020

## 2020-07-02 NOTE — PLAN OF CARE
Pts VS stable throughout night. Afebrile. Room air; no resp distress noted; pt does easily become SOB upon exertion. Mario in place; good UOP. Rectal tube remains in place; dark brown/dark red drainage. H/H stable this AM. C/O pain managed with PRN fent IVP, see MAR. Pt currently resting. NPO since midnight but still trying to finish bowel prep. Will continue to monitor.

## 2020-07-02 NOTE — ASSESSMENT & PLAN NOTE
- HHS resolved  - Tolerating liquids  - Hyperglycemic, started on D5 for hypernatremia  - If glucose remains high, will restart insulin gtt  - Levemir increased to 30 units qd, Aspart 5 units TIDWM and SSI

## 2020-07-02 NOTE — ASSESSMENT & PLAN NOTE
- Continues with dark stools but Hb stable  - s/p 2U pRBCs 06/27, 1U pRBCs 06/28, 1U pRBC 6/30  - Continue to trend Hgb and transfuse PRN Hgb < 7.  - Continuing pantoprazole 40mg IV BID  - GI following  - EGD 6/29 with normal esophagus, OG tube trauma to the mid body and normal duodenum  - Unable to complete bowel prep. Plan for colonoscopy tomorrow

## 2020-07-02 NOTE — SUBJECTIVE & OBJECTIVE
Interval History: Unable to complete bowel prep for c-scope today. Otherwise doing well    Objective:     Vital Signs (Most Recent):  Temp: 98.6 °F (37 °C) (07/02/20 0715)  Pulse: 76 (07/02/20 1000)  Resp: (!) 28 (07/02/20 1000)  BP: (!) 156/70 (07/02/20 1000)  SpO2: 97 % (07/02/20 1000) Vital Signs (24h Range):  Temp:  [98.1 °F (36.7 °C)-98.6 °F (37 °C)] 98.6 °F (37 °C)  Pulse:  [] 76  Resp:  [15-28] 28  SpO2:  [93 %-98 %] 97 %  BP: (148-187)/(63-79) 156/70     Weight: (!) 144.5 kg (318 lb 9 oz)  Body mass index is 40.83 kg/m².      Intake/Output Summary (Last 24 hours) at 7/2/2020 1114  Last data filed at 7/2/2020 0700  Gross per 24 hour   Intake 700 ml   Output 3365 ml   Net -2665 ml       Physical Exam  Vitals signs and nursing note reviewed.   Constitutional:       General: He is not in acute distress.     Appearance: He is obese. He is not ill-appearing, toxic-appearing or diaphoretic.   HENT:      Head: Normocephalic and atraumatic.      Nose: No congestion or rhinorrhea.      Mouth/Throat:      Mouth: Mucous membranes are moist.      Pharynx: Oropharynx is clear.   Eyes:      General: No scleral icterus.     Conjunctiva/sclera: Conjunctivae normal.   Cardiovascular:      Rate and Rhythm: Normal rate and regular rhythm.      Heart sounds: No murmur.   Pulmonary:      Effort: No respiratory distress.      Breath sounds: No wheezing, rhonchi or rales.   Abdominal:      General: There is no distension.      Palpations: Abdomen is soft.   Musculoskeletal:      Right lower leg: Edema present.      Left lower leg: Edema present.   Skin:     General: Skin is warm and dry.      Findings: No rash.      Comments: Right groin CVC in place         Vents:  Vent Mode: Spont (06/30/20 0855)  Ventilator Initiated: Yes (06/27/20 2216)  Set Rate: 0 BPM (06/30/20 0855)  Vt Set: 500 mL (06/30/20 0855)  Pressure Support: 5 cmH20 (06/30/20 0855)  PEEP/CPAP: 5 cmH20 (06/30/20 0855)  Oxygen Concentration (%): 21 (06/30/20  1000)  Peak Airway Pressure: 10 cmH2O (06/30/20 0855)  Plateau Pressure: 0 cmH20 (06/30/20 0855)  Total Ve: 12.3 mL (06/30/20 0855)  F/VT Ratio<105 (RSBI): (!) 23.66 (06/30/20 0737)    Lines/Drains/Airways     Central Venous Catheter Line            Percutaneous Central Line Insertion/Assessment - Triple Lumen  06/27/20 2300 right femoral 4 days          Drain                 Urethral Catheter 06/27/20 2258 Non-latex 16 Fr. 4 days         Rectal Tube 06/28/20 1808 3 days          Peripheral Intravenous Line                 Peripheral IV - Single Lumen 06/27/20 2047 18 G Right Antecubital 4 days         Peripheral IV - Single Lumen 06/27/20 2127 20 G Left Forearm 4 days                Significant Labs:    CBC/Anemia Profile:  Recent Labs   Lab 07/01/20  0551 07/02/20  0454   WBC 22.95* 16.45*   HGB 8.3* 8.2*   HCT 24.0* 23.8*    217   MCV 88 91   RDW 15.1* 15.2*        Chemistries:  Recent Labs   Lab 06/30/20  1155  07/01/20  0550  07/01/20  1821 07/02/20  0006 07/02/20  0454      < >  --    < > 151* 152* 153*   K 3.5   < >  --    < > 3.7 3.5 3.4*      < >  --    < > 116* 117* 117*   CO2 21*   < >  --    < > 22* 22* 23   *   < >  --    < > 78* 74* 73*   CREATININE 3.5*   < >  --    < > 3.3* 3.2* 3.2*   CALCIUM 7.3*   < >  --    < > 7.4* 7.4* 7.4*   MG  --   --  1.9  --   --   --   --    PHOS 3.3  --  2.9  --   --   --  3.2    < > = values in this interval not displayed.       All pertinent labs within the past 24 hours have been reviewed.    Significant Imaging:  I have reviewed and interpreted all pertinent imaging results/findings within the past 24 hours.

## 2020-07-02 NOTE — PLAN OF CARE
Problem: Physical Therapy Goal  Goal: Physical Therapy Goal  Description: Goals to be met by: 20    Patient will increase functional independence with mobility by performin. Supine to sit with min A.  2. Sit to supine with min A.   3. Rolling R and L with min A.   4. Sitting at edge of bed >5 minutes with min A.   5. PT will assess sit<>stand.   Outcome: Ongoing, Progressing     Pt tolerated treatment well with no adverse reactions. Pt is progressing toward meeting goals. Pt performed supine > sit with max A x 2 people and sit > supine with total A x 2 people. Pt sat EOB x 15 min with total A for balance (patient leaning onto R side and supporting trunk on bed while sitting). Demonstrated improved activity tolerance. Pt continues to be limited by pain. PT will continue to follow and progress goals as tolerated. Discharge recommendations TBD pending progress with therapy.

## 2020-07-02 NOTE — PROGRESS NOTES
Chief Complaint / Reason for Consult: GI bleed    ROS: Pt had dyspnea with prep- he was only able to drink  1/2 and output is still dark. No abd pain. No n/v    Patient Vitals for the past 24 hrs:   BP Temp Temp src Pulse Resp SpO2 Height   07/02/20 0728 -- -- -- 83 18 98 % --   07/02/20 0715 -- 98.6 °F (37 °C) Oral 88 19 95 % --   07/02/20 0700 (!) 182/77 -- -- 84 18 96 % --   07/02/20 0600 (!) 181/77 -- -- 84 19 96 % --   07/02/20 0504 -- -- -- 70 15 95 % --   07/02/20 0500 (!) 167/72 -- -- 74 16 96 % --   07/02/20 0434 -- -- -- -- 18 -- --   07/02/20 0430 (!) 181/74 -- -- 84 18 96 % --   07/02/20 0400 (!) 181/77 -- -- 80 18 96 % --   07/02/20 0301 (!) 176/74 98.1 °F (36.7 °C) Oral 78 18 96 % --   07/02/20 0200 (!) 178/74 -- -- 76 20 95 % --   07/02/20 0117 -- -- -- 78 (!) 21 97 % --   07/02/20 0114 (!) 168/72 -- -- 78 (!) 23 96 % --   07/02/20 0100 -- -- -- 78 (!) 24 (!) 94 % --   07/02/20 0000 (!) 175/76 -- -- 80 (!) 22 97 % --   07/01/20 2301 (!) 161/72 98.6 °F (37 °C) Oral 82 (!) 22 96 % --   07/01/20 2200 (!) 169/78 -- -- 82 20 96 % --   07/01/20 2130 (!) 164/72 -- -- 82 19 96 % --   07/01/20 2105 -- -- -- 72 18 95 % --   07/01/20 2100 (!) 164/70 -- -- 78 18 95 % --   07/01/20 2030 (!) 162/72 -- -- 100 (!) 22 96 % --   07/01/20 2000 (!) 158/68 -- -- 88 (!) 23 96 % --   07/01/20 1959 -- -- -- 90 (!) 24 (!) 94 % --   07/01/20 1930 (!) 156/70 -- -- 94 (!) 26 96 % --   07/01/20 1909 (!) 151/68 98.6 °F (37 °C) Oral 92 (!) 25 95 % --   07/01/20 1900 -- -- -- 88 (!) 23 95 % --   07/01/20 1600 (!) 157/67 -- -- 86 (!) 25 96 % --   07/01/20 1545 (!) 148/63 -- -- 84 (!) 28 96 % --   07/01/20 1530 (!) 183/73 98.2 °F (36.8 °C) Oral 84 (!) 27 (!) 93 % --   07/01/20 1515 (!) 172/77 -- -- -- -- -- --   07/01/20 1500 (!) 172/74 -- -- -- -- -- --   07/01/20 1400 (!) 161/70 -- -- 80 (!) 21 (!) 94 % --   07/01/20 1358 -- -- -- 78 (!) 21 (!) 94 % --   07/01/20 1300 (!) 164/71 -- -- 84 (!) 23 96 % --   07/01/20 1200 (!) 175/77 --  "-- 84 20 96 % --   07/01/20 1115 -- 98.1 °F (36.7 °C) Oral -- -- -- --   07/01/20 1100 (!) 161/70 -- -- 86 (!) 23 96 % --   07/01/20 1022 -- -- -- -- -- -- 6' 2" (1.88 m)   07/01/20 1000 (!) 176/73 -- -- (!) 112 (!) 29 96 % --   07/01/20 0900 (!) 177/75 -- -- 102 (!) 24 (!) 94 % --   07/01/20 0803 -- -- -- (!) 112 20 97 % --       Physical Exam:  Gen - Well developed, well nourished, breathing treatment  HEENT - Anicteric  CV - S1, S2, no murmurs/rubs  Lungs -crackles B  Abd - Soft, NT, ND, normal BS's, no HSM.  Ext - LE edema  Neuro - No asterixis    Labs:  Recent Labs   Lab 07/02/20  0454   WBC 16.45*   RBC 2.62*   HGB 8.2*   HCT 23.8*      MCV 91   MCH 31.3*   MCHC 34.5     Recent Labs   Lab 07/02/20  0454   CALCIUM 7.4*   *   K 3.4*   CO2 23   *   BUN 73*   CREATININE 3.2*     No results for input(s): PT, INR, APTT in the last 24 hours.        Assessment:    Vinnie Siegel is a 74 y.o. male with a history of HTN, DM2, HLD, kidney stones, CKD, cataracts and glaucoma who presented with weakness and lethargy over the last week. He was found to be in DKA and was intubated for increased work of breathing.  GI consulted for dark red stools over the last 2 days.  EGD 6/29/20 with evidence of NG tube trauma, no active bleeding.  No overt GI bleeding overnight.  Hb stable this AM.    Recommendations:  1. Continue prep today  2. Clear liquids  3. Plan colonoscopy tomorrow  "

## 2020-07-02 NOTE — ASSESSMENT & PLAN NOTE
- Worse today. Due to decreased intake  - Na 153  - D5 started since he will be NPO tonight  - Monitor

## 2020-07-02 NOTE — ASSESSMENT & PLAN NOTE
- Due to UTI and LLL infiltrate   - Urine culture with E. Coli. Respiratory culture with Group B Strep   - WBC down trending 40 -> 16 and Procal 10 -> 3  - Continuing Cefazolin per ID recs until 7/10/20

## 2020-07-02 NOTE — ASSESSMENT & PLAN NOTE
- Multifactorial with likely acute blood loss anemia, GI losses contributing.  - Baseline Cr ~2.4-2.7.  - FENa suggestive of intrinsic source  - Good UOP. Cr slowly improving 4.4 -> 3.2  - Appreciate nephrology assistance.

## 2020-07-02 NOTE — SUBJECTIVE & OBJECTIVE
Interval History: Pt unable to complete bowel prep overnight. He continues to have dark stools. Colonoscopy delayed until tomorrow for continued bowel prep. Placed on D5 by nephrology for hypernatremia, glucose trending up. He complains of neck pain but otherwise okay.    Review of Systems   Constitutional: Negative for chills and fever.   Respiratory: Negative for shortness of breath.    Cardiovascular: Negative for chest pain.   Gastrointestinal: Negative for nausea and vomiting.   Musculoskeletal: Positive for neck pain.     Objective:     Vital Signs (Most Recent):  Temp: 98.6 °F (37 °C) (07/02/20 0715)  Pulse: 76 (07/02/20 1000)  Resp: (!) 28 (07/02/20 1000)  BP: (!) 156/70 (07/02/20 1000)  SpO2: 97 % (07/02/20 1000) Vital Signs (24h Range):  Temp:  [98.1 °F (36.7 °C)-98.6 °F (37 °C)] 98.6 °F (37 °C)  Pulse:  [] 76  Resp:  [15-28] 28  SpO2:  [93 %-98 %] 97 %  BP: (148-187)/(63-79) 156/70     Weight: (!) 144.5 kg (318 lb 9 oz)  Body mass index is 40.83 kg/m².    Intake/Output Summary (Last 24 hours) at 7/2/2020 1135  Last data filed at 7/2/2020 0700  Gross per 24 hour   Intake 700 ml   Output 3365 ml   Net -2665 ml      Physical Exam  Vitals signs and nursing note reviewed.   Constitutional:       General: He is not in acute distress.     Appearance: Normal appearance.   Cardiovascular:      Rate and Rhythm: Normal rate and regular rhythm.      Pulses: Normal pulses.   Pulmonary:      Effort: No respiratory distress.      Breath sounds: Rales present.   Abdominal:      Palpations: Abdomen is soft.      Tenderness: There is no abdominal tenderness.   Musculoskeletal:         General: No tenderness.      Right lower leg: Edema present.      Left lower leg: Edema present.      Comments: LUE edema   Skin:     General: Skin is warm and dry.      Comments: 6-7 cm abscess on posterior neck with purulent drainage   Neurological:      Mental Status: He is alert and oriented to person, place, and time.        Significant Labs:   CBC:  Recent Labs   Lab 06/30/20  0336 07/01/20  0551 07/02/20  0454   WBC 27.90* 22.95* 16.45*   HGB 6.9* 8.3* 8.2*   HCT 19.2* 24.0* 23.8*    218 217   GRAN 85.8*  23.9* 86.7*  19.9* 83.9*  13.8*   LYMPH 6.1*  1.7 5.7*  1.3 8.3*  1.4   MONO 4.9  1.4* 5.2  1.2* 5.5  0.9   EOS 0.0 0.0 0.1   BASO 0.03 0.03 0.02      CMP:  Recent Labs   Lab 06/30/20  1155  07/01/20  0550  07/01/20  1821 07/02/20  0006 07/02/20  0454      < >  --    < > 151* 152* 153*   K 3.5   < >  --    < > 3.7 3.5 3.4*      < >  --    < > 116* 117* 117*   CO2 21*   < >  --    < > 22* 22* 23   *   < >  --    < > 78* 74* 73*   CREATININE 3.5*   < >  --    < > 3.3* 3.2* 3.2*   *   < >  --    < > 273* 279* 288*   CALCIUM 7.3*   < >  --    < > 7.4* 7.4* 7.4*   MG  --   --  1.9  --   --   --   --    PHOS 3.3  --  2.9  --   --   --  3.2   ANIONGAP 14   < >  --    < > 13 13 13    < > = values in this interval not displayed.      Significant Imaging:   Imaging personally reviewed    Soft tissue neck CT - 3.5 x 2.0 cm fluid collection within the subcutaneous soft tissues of the left posterior neck with surrounding edematous changes within the subcutaneous fat.  Findings are worrisome for possible abscess formation.

## 2020-07-02 NOTE — PLAN OF CARE
Problem: Occupational Therapy Goal  Goal: Occupational Therapy Goal  Description: Goals to be met by: 07/16/2020     Patient will increase functional independence with ADLs by performing:    UE Dressing with Moderate Assistance.  Grooming while EOB with Moderate Assistance.  Sitting at edge of bed x15 minutes with Moderate Assistance.  Rolling to Bilateral with Moderate Assistance.   Supine to sit with Moderate Assistance.  Increased functional strength to WFL for ADL tasks, functional transfers and bed mobility.  Assess functional transfers.    Outcome: Ongoing, Progressing  Initial OT evaluation completed, with treatment to follow.  Patient agreeable to tasks, required rest breaks to complete.  Tolerated sitting EOB approx 10-15 min, but utilized HOB to rest (R) side of trunk.  Extremities with significant edema and pain posterior neck and (B) LEs.  Will require continued therapy services s/p acute care stay to restore functioning.  Continue OT services to maximize patient functioning.

## 2020-07-02 NOTE — NURSING
Spoke with endo nurse; update given. Nurse advised to not give anymore bowel prep this morning; and to consult with MD on ordering rapid covid test. Will continue to monitor.

## 2020-07-02 NOTE — PROGRESS NOTES
[facility] Hospital Medicine  Progress Note    Patient Name: Vinnie Siegel  MRN: [id number]  Patient Class: IP- Inpatient   Admission Date: 6/27/2020  Length of Stay: 5 days  Attending Physician: Triny Dill MD  Primary Care Provider: Janeth Walter MD        Subjective:     Principal Problem:Type 2 diabetes mellitus with hyperosmolar nonketotic hyperglycemia        HPI:  Mr. Vinnie Siegel is a 74 y.o. male, with PMH of IDDM-2, CKD-IV, dilated cardiomyopathy, HTN, HLD, gout, obesity, who presented to [facility] ED via EMS on 6/27/20 with rectal bleeding x 2 days. His family called EMS after he became lethargic earlier this evening. Per his fiancee, he has been passing dark blood stools, and has been noting generalized weakness x 1 week. Additionally, he has been having elevated blood glucose readings x 1 day. In the ED, he was found to be in DKA, with a GI bleed. H&H were 6.4 & 19.4 and he was transfused 2 units PRBCs. He was started on an insulin drip and a Protonix drip. He had worsening respiratory and mental status while in the ED, and was intubated for airway protection. He was admitted to inpatient status to the ICU.     Overview/Hospital Course:  Admitted to ICU with HHS, encephalopathy, GI bleed, ADONAY, respiratory failure and sepsis. Sepsis was due to E. Coli UTI and Strep agalactiae pneumonia. Critical care and nephrology consulted and broad antibiotics continued. GI was consulted and he had EGD on 6/29 which showed normal esophagus and duodenum with OG tube trauma to mid gastric body but no source of bleeding was identified. He was extubated on 6/30. He attempted bowel prep for colonoscopy but was unable to tolerate; colonoscopy was pushed back. He had slow improvement in ADONAY as well as improvement in sepsis with antibiotics. ID was consulted and recommended cefazolin. He was found to have a large posterior neck abscess so general surgery was consulted.    Interval History: Pt unable to  complete bowel prep overnight. He continues to have dark stools. Colonoscopy delayed until tomorrow for continued bowel prep. Placed on D5 by nephrology for hypernatremia, glucose trending up. He complains of neck pain but otherwise okay.    Review of Systems   Constitutional: Negative for chills and fever.   Respiratory: Negative for shortness of breath.    Cardiovascular: Negative for chest pain.   Gastrointestinal: Negative for nausea and vomiting.   Musculoskeletal: Positive for neck pain.     Objective:     Vital Signs (Most Recent):  Temp: 98.6 °F (37 °C) (07/02/20 0715)  Pulse: 76 (07/02/20 1000)  Resp: (!) 28 (07/02/20 1000)  BP: (!) 156/70 (07/02/20 1000)  SpO2: 97 % (07/02/20 1000) Vital Signs (24h Range):  Temp:  [98.1 °F (36.7 °C)-98.6 °F (37 °C)] 98.6 °F (37 °C)  Pulse:  [] 76  Resp:  [15-28] 28  SpO2:  [93 %-98 %] 97 %  BP: (148-187)/(63-79) 156/70     Weight: (!) 144.5 kg (318 lb 9 oz)  Body mass index is 40.83 kg/m².    Intake/Output Summary (Last 24 hours) at 7/2/2020 1135  Last data filed at 7/2/2020 0700  Gross per 24 hour   Intake 700 ml   Output 3365 ml   Net -2665 ml      Physical Exam  Vitals signs and nursing note reviewed.   Constitutional:       General: He is not in acute distress.     Appearance: Normal appearance.   Cardiovascular:      Rate and Rhythm: Normal rate and regular rhythm.      Pulses: Normal pulses.   Pulmonary:      Effort: No respiratory distress.      Breath sounds: Rales present.   Abdominal:      Palpations: Abdomen is soft.      Tenderness: There is no abdominal tenderness.   Musculoskeletal:         General: No tenderness.      Right lower leg: Edema present.      Left lower leg: Edema present.      Comments: LUE edema   Skin:     General: Skin is warm and dry.      Comments: 6-7 cm abscess on posterior neck with purulent drainage   Neurological:      Mental Status: He is alert and oriented to person, place, and time.       Significant Labs:   CBC:  Recent Labs    Lab 06/30/20  0336 07/01/20  0551 07/02/20  0454   WBC 27.90* 22.95* 16.45*   HGB 6.9* 8.3* 8.2*   HCT 19.2* 24.0* 23.8*    218 217   GRAN 85.8*  23.9* 86.7*  19.9* 83.9*  13.8*   LYMPH 6.1*  1.7 5.7*  1.3 8.3*  1.4   MONO 4.9  1.4* 5.2  1.2* 5.5  0.9   EOS 0.0 0.0 0.1   BASO 0.03 0.03 0.02      CMP:  Recent Labs   Lab 06/30/20  1155  07/01/20  0550  07/01/20  1821 07/02/20  0006 07/02/20  0454      < >  --    < > 151* 152* 153*   K 3.5   < >  --    < > 3.7 3.5 3.4*      < >  --    < > 116* 117* 117*   CO2 21*   < >  --    < > 22* 22* 23   *   < >  --    < > 78* 74* 73*   CREATININE 3.5*   < >  --    < > 3.3* 3.2* 3.2*   *   < >  --    < > 273* 279* 288*   CALCIUM 7.3*   < >  --    < > 7.4* 7.4* 7.4*   MG  --   --  1.9  --   --   --   --    PHOS 3.3  --  2.9  --   --   --  3.2   ANIONGAP 14   < >  --    < > 13 13 13    < > = values in this interval not displayed.      Significant Imaging:   Imaging personally reviewed    Soft tissue neck CT - 3.5 x 2.0 cm fluid collection within the subcutaneous soft tissues of the left posterior neck with surrounding edematous changes within the subcutaneous fat.  Findings are worrisome for possible abscess formation.      Assessment/Plan:      * Type 2 diabetes mellitus with hyperosmolar nonketotic hyperglycemia  - HHS resolved  - Tolerating liquids  - Hyperglycemic, started on D5 for hypernatremia  - If glucose remains high, will restart insulin gtt  - Levemir increased to 30 units qd, Aspart 5 units TIDWM and SSI    Abscess of neck  - Soft tissue neck CT with possible abscess L posterior neck  - General surgery consulted for possible I&D  - Wound care consulted, discussed with Faviola  - Await cultures      Hypernatremia  - Worse today. Due to decreased intake  - Na 153  - D5 started since he will be NPO tonight  - Monitor    LLL pneumonia  - CXR with LLL opacity   - Respiratory culture with Strep agalactiae. Continue Cefazolin per ID  recs      Encephalopathy, metabolic  - Improving  - As under HHS.    Acute renal failure superimposed on stage 4 chronic kidney disease  - Multifactorial with likely acute blood loss anemia, GI losses contributing.  - Baseline Cr ~2.4-2.7.  - FENa suggestive of intrinsic source  - Good UOP. Cr slowly improving 4.4 -> 3.2  - Appreciate nephrology assistance.    Severe sepsis  - Due to UTI and LLL infiltrate   - Urine culture with E. Coli. Respiratory culture with Group B Strep   - WBC down trending 40 -> 16 and Procal 10 -> 3  - Continuing Cefazolin per ID recs until 7/10/20      UTI (urinary tract infection)  - Urine culture with E. Coli  - Continue Cefazolin per ID. Plan to complete 2 weeks total    Acute blood loss anemia  - 2/2 GI bleed  - s/p 4 units PRBC  - Hb stable overnight, monitor    GIB (gastrointestinal bleeding)  - Continues with dark stools but Hb stable  - s/p 2U pRBCs 06/27, 1U pRBCs 06/28, 1U pRBC 6/30  - Continue to trend Hgb and transfuse PRN Hgb < 7.  - Continuing pantoprazole 40mg IV BID  - GI following  - EGD 6/29 with normal esophagus, OG tube trauma to the mid body and normal duodenum  - Unable to complete bowel prep. Plan for colonoscopy tomorrow    Obesity, Class III, BMI 40-49.9 (morbid obesity)  - Dietary counseling after improvement from critical condition.    Dilated cardiomyopathy  - Volume overloaded; diuretics held with ADONAY.  - As under ADONAY.    CKD (chronic kidney disease) stage 4, GFR 15-29 ml/min  - As under ADONAY.    Hyperlipidemia  - Atorvastatin held with ADONAY.    Essential hypertension  - BP remains elevated  - Increase coreg to 25 mg BID. Continue nifedipine 30 mg qd      VTE Risk Mitigation (From admission, onward)         Ordered     IP VTE HIGH RISK PATIENT  Once      06/28/20 0139     Reason for No Pharmacological VTE Prophylaxis  Once     Question:  Reasons:  Answer:  Active Bleeding    06/28/20 0139     Place sequential compression device  Until discontinued      06/28/20  0036                      Triny Dill MD  Department of Hospital Medicine   [facility]

## 2020-07-02 NOTE — PROGRESS NOTES
[facility]  Pulmonology  Progress Note    Patient Name: Vinnie Siegel  MRN: [id number]  Admission Date: 6/27/2020  Hospital Length of Stay: 5 days  Code Status: Full Code  Attending Provider: Triny Dill MD  Primary Care Provider: Janeth Walter MD   Principal Problem: Type 2 diabetes mellitus with hyperosmolar nonketotic hyperglycemia    Subjective:     Interval History: Unable to complete bowel prep for c-scope today. Otherwise doing well    Objective:     Vital Signs (Most Recent):  Temp: 98.6 °F (37 °C) (07/02/20 0715)  Pulse: 76 (07/02/20 1000)  Resp: (!) 28 (07/02/20 1000)  BP: (!) 156/70 (07/02/20 1000)  SpO2: 97 % (07/02/20 1000) Vital Signs (24h Range):  Temp:  [98.1 °F (36.7 °C)-98.6 °F (37 °C)] 98.6 °F (37 °C)  Pulse:  [] 76  Resp:  [15-28] 28  SpO2:  [93 %-98 %] 97 %  BP: (148-187)/(63-79) 156/70     Weight: (!) 144.5 kg (318 lb 9 oz)  Body mass index is 40.83 kg/m².      Intake/Output Summary (Last 24 hours) at 7/2/2020 1114  Last data filed at 7/2/2020 0700  Gross per 24 hour   Intake 700 ml   Output 3365 ml   Net -2665 ml       Physical Exam  Vitals signs and nursing note reviewed.   Constitutional:       General: He is not in acute distress.     Appearance: He is obese. He is not ill-appearing, toxic-appearing or diaphoretic.   HENT:      Head: Normocephalic and atraumatic.      Nose: No congestion or rhinorrhea.      Mouth/Throat:      Mouth: Mucous membranes are moist.      Pharynx: Oropharynx is clear.      Area of induration with central fluctuance on left posterior neck with overlying bandage  Eyes:      General: No scleral icterus.     Conjunctiva/sclera: Conjunctivae normal.   Cardiovascular:      Rate and Rhythm: Normal rate and regular rhythm.      Heart sounds: No murmur.   Pulmonary:      Effort: No respiratory distress.      Breath sounds: No wheezing, rhonchi or rales.   Abdominal:      General: There is no distension.      Palpations: Abdomen is soft.    Musculoskeletal:      Right lower leg: Edema present.      Left lower leg: Edema present.   Skin:     General: Skin is warm and dry.      Findings: No rash.      Comments: Right groin CVC in place         Vents:  Vent Mode: Spont (06/30/20 0855)  Ventilator Initiated: Yes (06/27/20 2216)  Set Rate: 0 BPM (06/30/20 0855)  Vt Set: 500 mL (06/30/20 0855)  Pressure Support: 5 cmH20 (06/30/20 0855)  PEEP/CPAP: 5 cmH20 (06/30/20 0855)  Oxygen Concentration (%): 21 (06/30/20 1000)  Peak Airway Pressure: 10 cmH2O (06/30/20 0855)  Plateau Pressure: 0 cmH20 (06/30/20 0855)  Total Ve: 12.3 mL (06/30/20 0855)  F/VT Ratio<105 (RSBI): (!) 23.66 (06/30/20 0737)    Lines/Drains/Airways     Central Venous Catheter Line            Percutaneous Central Line Insertion/Assessment - Triple Lumen  06/27/20 2300 right femoral 4 days          Drain                 Urethral Catheter 06/27/20 2258 Non-latex 16 Fr. 4 days         Rectal Tube 06/28/20 1808 3 days          Peripheral Intravenous Line                 Peripheral IV - Single Lumen 06/27/20 2047 18 G Right Antecubital 4 days         Peripheral IV - Single Lumen 06/27/20 2127 20 G Left Forearm 4 days                Significant Labs:    CBC/Anemia Profile:  Recent Labs   Lab 07/01/20  0551 07/02/20  0454   WBC 22.95* 16.45*   HGB 8.3* 8.2*   HCT 24.0* 23.8*    217   MCV 88 91   RDW 15.1* 15.2*        Chemistries:  Recent Labs   Lab 06/30/20  1155  07/01/20  0550  07/01/20  1821 07/02/20  0006 07/02/20  0454      < >  --    < > 151* 152* 153*   K 3.5   < >  --    < > 3.7 3.5 3.4*      < >  --    < > 116* 117* 117*   CO2 21*   < >  --    < > 22* 22* 23   *   < >  --    < > 78* 74* 73*   CREATININE 3.5*   < >  --    < > 3.3* 3.2* 3.2*   CALCIUM 7.3*   < >  --    < > 7.4* 7.4* 7.4*   MG  --   --  1.9  --   --   --   --    PHOS 3.3  --  2.9  --   --   --  3.2    < > = values in this interval not displayed.       All pertinent labs within the past 24 hours have  been reviewed.    Significant Imaging:  I have reviewed and interpreted all pertinent imaging results/findings within the past 24 hours.    Assessment/Plan:     Neurologic:  · Metabolic Encephalopathy, RESOLVED  · Intubated for worsening mentation, likely multifactorial 2/2 HHS, potential septic encephalopathy, and progressive acidemia  · Now extubated and following commands. No focal deficits  · Critical Illness Myopathy  · Needs aggressive PT/OT     Cardiovascular:  · Chronic Systolic Heart Failure  Holding ARB but now back on BB and CCB. Resume GDMT once appropriate     Pulmonary:  · Acute Hypoxemic Respiratory Failure  · Likely Chronic Bronchitis  · Now extubated to NC. RCx now growing GBS. On Vanc and Cefepime as noted below     FEN/GI:  · Acute GIB  · Acute Gastroenteritis  · Both melena and hematochezia with baseline Hgb >13 (x3y ago). Hgb <7 at time of presentation, transfuse for Hgb <7    · GI consulted. S/p EGD with no obvious source of bleeding. Unable to complete prep so CSC planned for tomorrow  · Nutrition  · Passed bedside swallow eval. Needs formal SLP eval     Renal:  · ADONAY on CKD  · Non-gap Metabolic Acidosis  · UOP now dramatically improved with interval decrease in Cr  · NGMA 2/2 GI bicarb loss and subsequent renal failure. Now off bicarb gtt  · Nephology consulted. Defer CRRT in light of improved UOP.  Maintain strict I/O     Hematology:  · Acute Blood Loss Anemia  · GI studies and recs as noted above. Would like hemolysis labs but transfused. Transfuse Hgb <7     Infectious:  · E coli Urinary tract Infection  · GBS pneumonia  · Posterior Neck Abscess  · ID consulted.  now on Cefazolin with transition to Rocephin at discharge. Blood Cx NGTD  · Per nurse, planning for I&D of neck abscess with surgery today     Endocrine:  · HHS  · IDDM-2  · Marked hyperglycemia w/ encephalopathy. Now back on insulin gtt due to hypernatremia and need for D5 gtt  · Encourage free water intake although planned  procedures requiring NPO status complicate this  · Transition to SQ insulin once tolerating diet. Renally dose insulin      GI PPx: BID PPI per GI  VTE PPx: SCDs     Disposition: Improving clinical trajectory. Continue ICU level care     Erik Campa MD  Pulmonology  [facility]

## 2020-07-02 NOTE — PROGRESS NOTES
Consulted to see for posterior neck wound  74 y.o. male, with PMH of IDDM-2, CKD-IV, dilated cardiomyopathy, HTN, HLD, gout, obesity, who presented to [facility] ED via EMS on 6/27/20 with rectal bleeding x 2 days. His family called EMS after he became lethargic earlier this evening. Per his fiancee, he has been passing dark blood stools, and has been noting generalized weakness x 1 week. Additionally, he has been having elevated blood glucose readings x 1 day. In the ED, he was found to be in DKA, with a GI bleed. H&H were 6.4 & 19.4 and he was transfused 2 units PRBCs. He was started on an insulin drip and a Protonix drip. He had worsening respiratory and mental status while in the ED, and was intubated for airway protection. Admitted to ICU on 6/28/2020 with HHS, encephalopathy, GI bleed, ADONAY, respiratory failure and sepsis.    Left posterior neck wound 3.5x4x0.1cm covered by grey/tan nonviable tissue and yellow slough. Unable to see base of the wound due to presence of necrotic tissue , but wound is full thickness. During cleansing noted smaller 0.3x0.5x0.1cm medial slough filled ulceration oozing tan purulence. Able to express a small amount to purulent exudate but patient was unable to tolerate due to pain . Noted surrounding tissue is indurated and he is c/o pain upon light palpation or gentle cleansing.Call placed to Dr Dlil to discuss. MD ordered a CT of the neck yesterday and findings of3.5 x 2.0 cm fluid collection within the subcutaneous soft tissues of the left posterior neck with surrounding edematous changes within the subcutaneous fat.  Findings are worrisome for possible abscess formation.General surgery consulted .  Wound appears to be hospital acquired.    Recommendations: clean area with wound cleanser, apply layer of Triad hydrophilic wound dressing to necrotic tissue and cover with Aquacel foam dressing.             07/02/20 1100        Altered Skin Integrity 07/02/20 1000 Left posterior Neck  Ulceration Full thickness tissue loss. Base is covered by slough and/or eschar in the wound bed   Date First Assessed/Time First Assessed: 07/02/20 1000   Altered Skin Integrity Present on Admission: suspected hospital acquired  Side: Left  Orientation: posterior  Location: Neck  Is this injury device related?: (c) Other (see comments)  Primary Wo...   Wound Image    Description of Altered Skin Integrity Full thickness tissue loss. Base is covered by slough and/or eschar in the wound bed   Dressing Appearance Open to air;Moist drainage;No dressing   Drainage Amount Moderate   Drainage Characteristics/Odor Creamy;Tan;Purulent;No odor   Appearance Tan;Yellow;Necrotic;Slough;Moist   Tissue loss description Full thickness   Black (%), Wound Tissue Color 60 %  (grey)   Yellow (%), Wound Tissue Color 40 %   Periwound Area Edematous;Warm;Swelling   Wound Edges Open   Wound Length (cm) 3.5 cm   Wound Width (cm) 4 cm   Wound Depth (cm) 0.1 cm   Wound Volume (cm^3) 1.4 cm^3   Wound Surface Area (cm^2) 14 cm^2   Care Cleansed with:;Wound cleanser;Applied:  (Triad hydrophillic wound dressing)   Dressing Applied;Foam     Faviola Johnson RN CWON

## 2020-07-03 PROBLEM — R14.0 ABDOMINAL DISTENSION: Status: ACTIVE | Noted: 2020-07-03

## 2020-07-03 PROBLEM — G93.41 ENCEPHALOPATHY, METABOLIC: Status: RESOLVED | Noted: 2020-06-28 | Resolved: 2020-07-03

## 2020-07-03 LAB
ALBUMIN SERPL BCP-MCNC: 1.7 G/DL (ref 3.5–5.2)
ANION GAP SERPL CALC-SCNC: 14 MMOL/L (ref 8–16)
BASOPHILS # BLD AUTO: 0.02 K/UL (ref 0–0.2)
BASOPHILS NFR BLD: 0.2 % (ref 0–1.9)
BNP SERPL-MCNC: 171 PG/ML (ref 0–99)
BUN SERPL-MCNC: 64 MG/DL (ref 8–23)
CALCIUM SERPL-MCNC: 7.4 MG/DL (ref 8.7–10.5)
CHLORIDE SERPL-SCNC: 116 MMOL/L (ref 95–110)
CO2 SERPL-SCNC: 21 MMOL/L (ref 23–29)
CREAT SERPL-MCNC: 3.2 MG/DL (ref 0.5–1.4)
DIFFERENTIAL METHOD: ABNORMAL
EOSINOPHIL # BLD AUTO: 0.1 K/UL (ref 0–0.5)
EOSINOPHIL NFR BLD: 0.7 % (ref 0–8)
ERYTHROCYTE [DISTWIDTH] IN BLOOD BY AUTOMATED COUNT: 15.7 % (ref 11.5–14.5)
EST. GFR  (AFRICAN AMERICAN): 21 ML/MIN/1.73 M^2
EST. GFR  (NON AFRICAN AMERICAN): 18 ML/MIN/1.73 M^2
GLUCOSE SERPL-MCNC: 260 MG/DL (ref 70–110)
HCT VFR BLD AUTO: 26.4 % (ref 40–54)
HGB BLD-MCNC: 8.4 G/DL (ref 14–18)
IMM GRANULOCYTES # BLD AUTO: 0.21 K/UL (ref 0–0.04)
IMM GRANULOCYTES NFR BLD AUTO: 1.8 % (ref 0–0.5)
LYMPHOCYTES # BLD AUTO: 1.3 K/UL (ref 1–4.8)
LYMPHOCYTES NFR BLD: 11 % (ref 18–48)
MCH RBC QN AUTO: 30 PG (ref 27–31)
MCHC RBC AUTO-ENTMCNC: 31.8 G/DL (ref 32–36)
MCV RBC AUTO: 94 FL (ref 82–98)
MONOCYTES # BLD AUTO: 0.8 K/UL (ref 0.3–1)
MONOCYTES NFR BLD: 6.3 % (ref 4–15)
NEUTROPHILS # BLD AUTO: 9.5 K/UL (ref 1.8–7.7)
NEUTROPHILS NFR BLD: 80 % (ref 38–73)
NRBC BLD-RTO: 3 /100 WBC
PHOSPHATE SERPL-MCNC: 3.6 MG/DL (ref 2.7–4.5)
PHOSPHATE SERPL-MCNC: 3.6 MG/DL (ref 2.7–4.5)
PLATELET # BLD AUTO: 222 K/UL (ref 150–350)
PMV BLD AUTO: 10 FL (ref 9.2–12.9)
POCT GLUCOSE: 229 MG/DL (ref 70–110)
POCT GLUCOSE: 252 MG/DL (ref 70–110)
POCT GLUCOSE: 273 MG/DL (ref 70–110)
POCT GLUCOSE: 317 MG/DL (ref 70–110)
POTASSIUM SERPL-SCNC: 3.4 MMOL/L (ref 3.5–5.1)
RBC # BLD AUTO: 2.8 M/UL (ref 4.6–6.2)
SODIUM SERPL-SCNC: 151 MMOL/L (ref 136–145)
WBC # BLD AUTO: 11.91 K/UL (ref 3.9–12.7)

## 2020-07-03 PROCEDURE — 99233 SBSQ HOSP IP/OBS HIGH 50: CPT | Mod: ,,, | Performed by: INTERNAL MEDICINE

## 2020-07-03 PROCEDURE — 25000003 PHARM REV CODE 250: Performed by: NURSE PRACTITIONER

## 2020-07-03 PROCEDURE — C9113 INJ PANTOPRAZOLE SODIUM, VIA: HCPCS | Performed by: HOSPITALIST

## 2020-07-03 PROCEDURE — 63600175 PHARM REV CODE 636 W HCPCS: Performed by: PHYSICIAN ASSISTANT

## 2020-07-03 PROCEDURE — 63600175 PHARM REV CODE 636 W HCPCS: Performed by: INTERNAL MEDICINE

## 2020-07-03 PROCEDURE — 99900035 HC TECH TIME PER 15 MIN (STAT)

## 2020-07-03 PROCEDURE — 99233 PR SUBSEQUENT HOSPITAL CARE,LEVL III: ICD-10-PCS | Mod: ,,, | Performed by: INTERNAL MEDICINE

## 2020-07-03 PROCEDURE — 85025 COMPLETE CBC W/AUTO DIFF WBC: CPT

## 2020-07-03 PROCEDURE — 63600175 PHARM REV CODE 636 W HCPCS: Performed by: HOSPITALIST

## 2020-07-03 PROCEDURE — 11000001 HC ACUTE MED/SURG PRIVATE ROOM

## 2020-07-03 PROCEDURE — 83880 ASSAY OF NATRIURETIC PEPTIDE: CPT

## 2020-07-03 PROCEDURE — 80069 RENAL FUNCTION PANEL: CPT

## 2020-07-03 PROCEDURE — 94761 N-INVAS EAR/PLS OXIMETRY MLT: CPT

## 2020-07-03 PROCEDURE — 63600175 PHARM REV CODE 636 W HCPCS: Performed by: NURSE PRACTITIONER

## 2020-07-03 PROCEDURE — 99232 PR SUBSEQUENT HOSPITAL CARE,LEVL II: ICD-10-PCS | Mod: GC,,, | Performed by: INTERNAL MEDICINE

## 2020-07-03 PROCEDURE — 25000003 PHARM REV CODE 250: Performed by: INTERNAL MEDICINE

## 2020-07-03 PROCEDURE — 36415 COLL VENOUS BLD VENIPUNCTURE: CPT

## 2020-07-03 PROCEDURE — 99232 SBSQ HOSP IP/OBS MODERATE 35: CPT | Mod: GC,,, | Performed by: INTERNAL MEDICINE

## 2020-07-03 RX ORDER — CARVEDILOL 12.5 MG/1
12.5 TABLET ORAL 2 TIMES DAILY
Status: DISCONTINUED | OUTPATIENT
Start: 2020-07-03 | End: 2020-07-15

## 2020-07-03 RX ORDER — INSULIN ASPART 100 [IU]/ML
8 INJECTION, SOLUTION INTRAVENOUS; SUBCUTANEOUS
Status: DISCONTINUED | OUTPATIENT
Start: 2020-07-03 | End: 2020-07-04

## 2020-07-03 RX ORDER — SIMETHICONE 80 MG
1 TABLET,CHEWABLE ORAL ONCE
Status: COMPLETED | OUTPATIENT
Start: 2020-07-03 | End: 2020-07-03

## 2020-07-03 RX ORDER — SIMETHICONE 80 MG
1 TABLET,CHEWABLE ORAL 3 TIMES DAILY PRN
Status: DISCONTINUED | OUTPATIENT
Start: 2020-07-03 | End: 2020-07-16 | Stop reason: HOSPADM

## 2020-07-03 RX ADMIN — INSULIN ASPART 3 UNITS: 100 INJECTION, SOLUTION INTRAVENOUS; SUBCUTANEOUS at 06:07

## 2020-07-03 RX ADMIN — CEFAZOLIN 2 G: 330 INJECTION, POWDER, FOR SOLUTION INTRAMUSCULAR; INTRAVENOUS at 04:07

## 2020-07-03 RX ADMIN — CARVEDILOL 12.5 MG: 12.5 TABLET, FILM COATED ORAL at 09:07

## 2020-07-03 RX ADMIN — POTASSIUM CHLORIDE: 2 INJECTION, SOLUTION, CONCENTRATE INTRAVENOUS at 07:07

## 2020-07-03 RX ADMIN — INSULIN ASPART 8 UNITS: 100 INJECTION, SOLUTION INTRAVENOUS; SUBCUTANEOUS at 11:07

## 2020-07-03 RX ADMIN — INSULIN ASPART 4 UNITS: 100 INJECTION, SOLUTION INTRAVENOUS; SUBCUTANEOUS at 07:07

## 2020-07-03 RX ADMIN — CARVEDILOL 12.5 MG: 12.5 TABLET, FILM COATED ORAL at 08:07

## 2020-07-03 RX ADMIN — SIMETHICONE CHEW TAB 80 MG 80 MG: 80 TABLET ORAL at 11:07

## 2020-07-03 RX ADMIN — PANTOPRAZOLE SODIUM 40 MG: 40 INJECTION, POWDER, LYOPHILIZED, FOR SOLUTION INTRAVENOUS at 09:07

## 2020-07-03 RX ADMIN — CEFAZOLIN 2 G: 330 INJECTION, POWDER, FOR SOLUTION INTRAMUSCULAR; INTRAVENOUS at 12:07

## 2020-07-03 RX ADMIN — ONDANSETRON 4 MG: 2 INJECTION INTRAMUSCULAR; INTRAVENOUS at 04:07

## 2020-07-03 RX ADMIN — PANTOPRAZOLE SODIUM 40 MG: 40 INJECTION, POWDER, LYOPHILIZED, FOR SOLUTION INTRAVENOUS at 08:07

## 2020-07-03 RX ADMIN — SIMETHICONE CHEW TAB 80 MG 80 MG: 80 TABLET ORAL at 05:07

## 2020-07-03 RX ADMIN — CALCITRIOL CAPSULES 0.25 MCG 0.25 MCG: 0.25 CAPSULE ORAL at 08:07

## 2020-07-03 RX ADMIN — INSULIN ASPART 5 UNITS: 100 INJECTION, SOLUTION INTRAVENOUS; SUBCUTANEOUS at 07:07

## 2020-07-03 RX ADMIN — TAMSULOSIN HYDROCHLORIDE 0.4 MG: 0.4 CAPSULE ORAL at 08:07

## 2020-07-03 RX ADMIN — NIFEDIPINE 60 MG: 30 TABLET, FILM COATED, EXTENDED RELEASE ORAL at 08:07

## 2020-07-03 RX ADMIN — INSULIN ASPART 3 UNITS: 100 INJECTION, SOLUTION INTRAVENOUS; SUBCUTANEOUS at 11:07

## 2020-07-03 RX ADMIN — TIMOLOL MALEATE 1 DROP: 2.5 SOLUTION/ DROPS OPHTHALMIC at 09:07

## 2020-07-03 RX ADMIN — TIMOLOL MALEATE 1 DROP: 2.5 SOLUTION/ DROPS OPHTHALMIC at 08:07

## 2020-07-03 RX ADMIN — INSULIN ASPART 8 UNITS: 100 INJECTION, SOLUTION INTRAVENOUS; SUBCUTANEOUS at 06:07

## 2020-07-03 NOTE — PLAN OF CARE
Patient in no apparent distress. Sat's  94 % on room air. PRN treatments not needed . Will continue to monitor.

## 2020-07-03 NOTE — ASSESSMENT & PLAN NOTE
- Worsening distention today with high pitched bowel sounds  - Check KUB to r/o early obstruction

## 2020-07-03 NOTE — ASSESSMENT & PLAN NOTE
- 2/2 GI bleed  - s/p 4 units PRBC  - Hb stable overnight, 8.2 -> 8.4. Monitor for active bleeding

## 2020-07-03 NOTE — PROGRESS NOTES
[facility]  Pulmonology  Progress Note    Patient Name: Vinnie Siegel  MRN: [id number]  Admission Date: 6/27/2020  Hospital Length of Stay: 6 days  Code Status: Full Code  Attending Provider: Triny Dill MD  Primary Care Provider: Janeth Walter MD   Principal Problem: Type 2 diabetes mellitus with hyperosmolar nonketotic hyperglycemia    Subjective:     Interval History: Did well overnight with no acute hemodynamic or respiratory issues. Remains off insulin gtt    Objective:     Vital Signs (Most Recent):  Temp: 98.2 °F (36.8 °C) (07/03/20 1110)  Pulse: 70 (07/03/20 1100)  Resp: (!) 21 (07/03/20 1100)  BP: (!) 159/65 (07/03/20 1100)  SpO2: 96 % (07/03/20 1100) Vital Signs (24h Range):  Temp:  [97.8 °F (36.6 °C)-98.2 °F (36.8 °C)] 98.2 °F (36.8 °C)  Pulse:  [60-74] 70  Resp:  [11-21] 21  SpO2:  [92 %-100 %] 96 %  BP: (142-176)/(61-74) 159/65     Weight: (!) 144.5 kg (318 lb 9 oz)  Body mass index is 40.83 kg/m².      Intake/Output Summary (Last 24 hours) at 7/3/2020 1203  Last data filed at 7/3/2020 0800  Gross per 24 hour   Intake 844.58 ml   Output 2135 ml   Net -1290.42 ml       Physical Exam  Vitals signs and nursing note reviewed.   Constitutional:       General: He is not in acute distress.     Appearance: He is obese. He is not ill-appearing, toxic-appearing or diaphoretic.   HENT:      Head: Normocephalic and atraumatic.      Nose: No congestion or rhinorrhea.      Mouth/Throat:      Mouth: Mucous membranes are moist.      Pharynx: Oropharynx is clear.   Eyes:      General: No scleral icterus.     Conjunctiva/sclera: Conjunctivae normal.   Cardiovascular:      Rate and Rhythm: Normal rate and regular rhythm.      Heart sounds: No murmur.   Pulmonary:      Effort: No respiratory distress.      Breath sounds: No wheezing, rhonchi or rales.   Abdominal:      General: There is no distension.      Palpations: Abdomen is soft.   Musculoskeletal:      Right lower leg: Edema present.      Left  lower leg: Edema present.   Skin:     General: Skin is warm and dry.      Findings: No rash.         Vents:  Vent Mode: Spont (06/30/20 0855)  Ventilator Initiated: Yes (06/27/20 2216)  Set Rate: 0 BPM (06/30/20 0855)  Vt Set: 500 mL (06/30/20 0855)  Pressure Support: 5 cmH20 (06/30/20 0855)  PEEP/CPAP: 5 cmH20 (06/30/20 0855)  Oxygen Concentration (%): 21 (06/30/20 1000)  Peak Airway Pressure: 10 cmH2O (06/30/20 0855)  Plateau Pressure: 0 cmH20 (06/30/20 0855)  Total Ve: 12.3 mL (06/30/20 0855)  F/VT Ratio<105 (RSBI): (!) 23.66 (06/30/20 0737)    Lines/Drains/Airways     Central Venous Catheter Line            Percutaneous Central Line Insertion/Assessment - Triple Lumen  06/27/20 2300 right femoral 5 days          Drain                 Urethral Catheter 06/27/20 2258 Non-latex 16 Fr. 5 days         Rectal Tube 06/28/20 1808 4 days          Peripheral Intravenous Line                 Peripheral IV - Single Lumen 06/27/20 2127 20 G Left Forearm 5 days                Significant Labs:    CBC/Anemia Profile:  Recent Labs   Lab 07/02/20  0454 07/03/20  0511   WBC 16.45* 11.91   HGB 8.2* 8.4*   HCT 23.8* 26.4*    222   MCV 91 94   RDW 15.2* 15.7*        Chemistries:  Recent Labs   Lab 07/02/20  0454 07/02/20  1152 07/03/20  0511   * 154* 151*   K 3.4* 3.8 3.4*   * 118* 116*   CO2 23 23 21*   BUN 73* 71* 64*   CREATININE 3.2* 3.2* 3.2*   CALCIUM 7.4* 7.4* 7.4*   ALBUMIN  --   --  1.7*   PHOS 3.2  --  3.6  3.6       All pertinent labs within the past 24 hours have been reviewed.    Significant Imaging:  I have reviewed and interpreted all pertinent imaging results/findings within the past 24 hours.    Assessment/Plan:     Neurologic:  · Metabolic Encephalopathy, RESOLVED  · Intubated for worsening mentation, likely multifactorial 2/2 HHS, potential septic encephalopathy, and progressive acidemia  · Now extubated and following commands. No focal deficits  · Critical Illness Myopathy  · Needs aggressive  PT/OT     Cardiovascular:  · Chronic Systolic Heart Failure  · Holding ARB but now back on BB and CCB. Resume GDMT once appropriate     Pulmonary:  · Acute Hypoxemic Respiratory Failure  · Likely Chronic Bronchitis  · Now extubated and on RA. RCx with GBS. Now on Cefazolin     FEN/GI:  · Acute GIB  · Acute Gastroenteritis  · Both melena and hematochezia with baseline Hgb >13 (x3y ago). Hgb <7 at time of presentation, transfuse for Hgb <7    · GI consulted. S/p EGD with no obvious source of bleeding. Hgb stable over past couple of days. Unable to complete prep  · Nutrition  · Passed bedside swallow eval. Needs formal SLP eval     Renal:  · ADONAY on CKD  · Non-gap Metabolic Acidosis  · UOP now dramatically improved with interval decrease in Cr  · NGMA 2/2 GI bicarb loss and subsequent renal failure. Now off bicarb gtt  · Nephology consulted. Defer CRRT in light of improved UOP.  Maintain strict I/O     Hematology:  · Acute Blood Loss Anemia  · GI studies and recs as noted above. Would like hemolysis labs but transfused. Transfuse Hgb <7     Infectious:  · E coli Urinary tract Infection  · GBS pneumonia  · Posterior Neck Abscess  · ID consulted.  Now on Cefazolin with transition to Rocephin at discharge. Blood Cx NGTD  · S/p bedside I&D of neck abscess     Endocrine:  · HHS  · IDDM-2  · Marked hyperglycemia w/ encephalopathy. Now on D5 gtt due to hypernatremia.  · Add and titrate SQ insulin to goal .  Renally dose insulin      GI PPx: BID PPI per GI  VTE PPx: SCDs     Disposition: stable for transfer to floor    Thank you for the consult. No further recommendations. Signing off. Please feel free to contact us with any question or concerns regarding the care of this patient.       Erik Campa MD  Pulmonology  [facility]

## 2020-07-03 NOTE — CONSULTS
74-year-old male admitted with sepsis secondary to urinary tract infection.  Patient noted to have tender, fluctuant area along left posterior neck.  CT scan performed today showed 3 cm abscess on the left side of the posterior neck.    Informed consent obtained  1% lidocaine, sterile prep incision and drainage of abscess approximately 2-3 cc purulent material expressed.

## 2020-07-03 NOTE — PLAN OF CARE
Pts VS stable throughout night. Afebrile. Room air; no resp distress noted. Mario in place; good UOP. Rectal tube remains in place; dark brown/dark red drainage. Clear liquid diet well tolerated. Will continue to monitor.

## 2020-07-03 NOTE — PROGRESS NOTES
"Nephrology  Progress Note    Admit Date: 6/27/2020   LOS: 6 days     SUBJECTIVE:     Follow-up For:  Type 2 diabetes mellitus with hyperosmolar nonketotic hyperglycemia    Interval History:     Events noted.  Left neck abscess (I&D) by Dr. Basilio.  Labs continue to slowly improve and UOP excellent.  No c/o this am except "I'm thirsty".  Discussed plan with RN.     Review of Systems:    No c/o this am.      OBJECTIVE:     Vital Signs Range (Last 24H):  BP (!) 156/70   Pulse 62   Temp 97.8 °F (36.6 °C) (Oral)   Resp 15   Ht 6' 2" (1.88 m)   Wt (!) 144.5 kg (318 lb 9 oz)   SpO2 (!) 92%   BMI 40.83 kg/m²     Temp:  [97.8 °F (36.6 °C)-98.6 °F (37 °C)]   Pulse:  [62-88]   Resp:  [15-28]   BP: (133-187)/(61-79)   SpO2:  [92 %-100 %]     I & O (Last 24H):    Intake/Output Summary (Last 24 hours) at 7/3/2020 0712  Last data filed at 7/3/2020 0600  Gross per 24 hour   Intake 810.83 ml   Output 2660 ml   Net -1849.17 ml       Physical Exam:  General appearance: Well developed, well nourished, weak, obese  Eyes:  Conjunctivae/corneas clear. PERRL.  Lungs: diminished.    Heart: Regular rate and rhythm, S1, S2 normal, no murmur, rub or beatrice.  Abdomen: Soft, non-tender non-distended; bowel sounds normal; no masses,  no organomegaly, obese, rectal tube dark stool.   Extremities: No cyanosis or clubbing. 2-3+ edema.  Left neck abscess noted.   Skin: Skin color, texture, turgor normal. No rashes or lesions  Neurologic: No focal numbness or weakness   Mario  Right groin TLC (positional)    Laboratory Data:  Recent Labs   Lab 07/03/20  0511   WBC 11.91   RBC 2.80*   HGB 8.4*   HCT 26.4*      MCV 94   MCH 30.0   MCHC 31.8*       BMP:   Recent Labs   Lab 07/01/20  0550  07/03/20  0511   GLU  --    < > 260*   NA  --    < > 151*   K  --    < > 3.4*   CL  --    < > 116*   CO2  --    < > 21*   BUN  --    < > 64*   CREATININE  --    < > 3.2*   CALCIUM  --    < > 7.4*   MG 1.9  --   --     < > = values in this interval not " displayed.     Lab Results   Component Value Date    CALCIUM 7.4 (L) 07/03/2020    PHOS 3.6 07/03/2020    PHOS 3.6 07/03/2020       Lab Results   Component Value Date    .5 (H) 06/29/2020    CALCIUM 7.4 (L) 07/03/2020    CAION 1.07 06/28/2020    PHOS 3.6 07/03/2020    PHOS 3.6 07/03/2020       Lab Results   Component Value Date    URICACID 8.6 (H) 06/29/2020       BNP  Recent Labs   Lab 07/03/20  0511   *       Medications:  Medication list was reviewed and changes noted under Assessment/Plan.    Diagnostic Results:        ASSESSMENT/PLAN:     75 YO male with DM, HTN, CHF with Dilated CM, COPD, CKD 4, now with GIB and Hyperosmolar nonketotic hyperglycemia, resp failure prompting intubation to protect airway, severe anemia with Hgb 6.4, elevated BUN due to GIB, hypotension, and ADONAY     HHS  -s/p Insulin drip and now on basal/prandial.    -UTI/abscess noted.  -defer  -glucose labile.     Slowly resolving Non-oliguric ADONAY on CKD 4  -Baseline creat over past year 2.5-2.9 with history of proteinuria  -Creat 4.4 on admit and slowly improving to 3.2  -UOP picking up but lacking clearance with GI bleed  -Prot/creat ratio 1 gram  -Expect that severe anemia and hypotension etiology for ADONAY  -Doesn't appear he has seen nephrologist so ordered full workup and mildly elevated ELSA with negative reflex only positive finding so far.  -Normally prefer no PPI but with GIB this okay  -No nephrotoxins.  -replace lytes PRN.  -Renally dose meds, avoid nephrotoxins, and monitor I/O's closely.       GIB with severe anemia  -Agree with transfusions prn  -dosed Epo.  -mildly iron deficient but would not give IV iron for now  -GI consulted - EGD noted.   Plans for colon on hold.   -Protonix as now     DM  -Last HgA1c in December 2019 7.7  -Defer to primary team  -as above     Sepsis from UTI/neck abscess:  -cultured and continue antibx for now.   -lactic acid, procal, and WBC's improving.     Vit D Def with mild HPTH:  -started  ergo and calcitriol.      Hypernatremia:  -encourage water intake and continue low volume D5.  Trends slowly improving.      HTN:  -started BB/CCB.  Hydralazine prn.   -no ACE/ARB for now.       Total critical care time (exclusive of procedural time) : 30 minutes  Critical care was necessary to treat or prevent imminent or life-threatening deterioration of the following conditions: HNNK, ADONAY, sepsis, GIB, HTN.     Critical care was time spent personally by me on the following activities: development of treatment plan with patient or surrogate, discussions with consultants, interpretation of cardiac output measurements, examination of patient, ordering and performing treatments and interventions, evaluation of patient's response to treatment, obtaining history from patient or surrogate, ordering and review of laboratory studies, ordering and review of radiographic studies, re-evaluation of patient's condition, pulse oximetry and blood draw for specimens

## 2020-07-03 NOTE — ASSESSMENT & PLAN NOTE
- Continues with dark stools but Hb stable  - s/p 2U pRBCs 06/27, 1U pRBCs 06/28, 1U pRBC 6/30  - Continue to trend Hgb and transfuse PRN Hgb < 7.  - Continuing pantoprazole 40mg IV BID  - GI following  - EGD 6/29 with normal esophagus, OG tube trauma to the mid body and normal duodenum  - Unable to complete bowel prep so colonoscopy will not be done today

## 2020-07-03 NOTE — SUBJECTIVE & OBJECTIVE
Interval History: No acute events overnight. Colonoscopy was planned for today but has been cancelled. He had I&D of posterior neck abscess yesterday. Today he complains of abdominal discomfort and gas. Denies nausea/vomiting.    Review of Systems   Constitutional: Negative for chills and fever.   Respiratory: Negative for shortness of breath.    Cardiovascular: Negative for chest pain.   Gastrointestinal: Positive for abdominal distention and abdominal pain. Negative for nausea and vomiting.     Objective:     Vital Signs (Most Recent):  Temp: 97.9 °F (36.6 °C) (07/03/20 0715)  Pulse: 60 (07/03/20 0826)  Resp: 14 (07/03/20 0826)  BP: (!) 150/67 (07/03/20 0800)  SpO2: (!) 94 % (07/03/20 0826) Vital Signs (24h Range):  Temp:  [97.8 °F (36.6 °C)-98.5 °F (36.9 °C)] 97.9 °F (36.6 °C)  Pulse:  [60-76] 60  Resp:  [14-28] 14  SpO2:  [92 %-100 %] 94 %  BP: (133-176)/(61-74) 150/67     Weight: (!) 144.5 kg (318 lb 9 oz)  Body mass index is 40.83 kg/m².    Intake/Output Summary (Last 24 hours) at 7/3/2020 0909  Last data filed at 7/3/2020 0800  Gross per 24 hour   Intake 844.58 ml   Output 2885 ml   Net -2040.42 ml      Physical Exam  Vitals signs and nursing note reviewed.   Constitutional:       General: He is not in acute distress.     Appearance: Normal appearance.   Cardiovascular:      Rate and Rhythm: Normal rate and regular rhythm.      Pulses: Normal pulses.   Pulmonary:      Effort: No respiratory distress.      Comments: Decreased at the bases  Abdominal:      General: There is distension.      Tenderness: There is no abdominal tenderness.      Comments: Hypoactive, high-pitched bowel sounds   Musculoskeletal:         General: No tenderness.      Right lower leg: No edema.      Left lower leg: No edema.      Comments: LUE edema   Skin:     General: Skin is warm and dry.      Comments: L posterior neck abscess s/p I&D, purulent drainage noted   Neurological:      Mental Status: He is alert and oriented to person,  place, and time.       Significant Labs:   CBC:  Recent Labs   Lab 07/01/20  0551 07/02/20  0454 07/03/20  0511   WBC 22.95* 16.45* 11.91   HGB 8.3* 8.2* 8.4*   HCT 24.0* 23.8* 26.4*    217 222   GRAN 86.7*  19.9* 83.9*  13.8* 80.0*  9.5*   LYMPH 5.7*  1.3 8.3*  1.4 11.0*  1.3   MONO 5.2  1.2* 5.5  0.9 6.3  0.8   EOS 0.0 0.1 0.1   BASO 0.03 0.02 0.02      CMP:  Recent Labs   Lab 07/01/20  0550  07/02/20  0454 07/02/20  1152 07/03/20  0511   NA  --    < > 153* 154* 151*   K  --    < > 3.4* 3.8 3.4*   CL  --    < > 117* 118* 116*   CO2  --    < > 23 23 21*   BUN  --    < > 73* 71* 64*   CREATININE  --    < > 3.2* 3.2* 3.2*   GLU  --    < > 288* 282* 260*   CALCIUM  --    < > 7.4* 7.4* 7.4*   MG 1.9  --   --   --   --    PHOS 2.9  --  3.2  --  3.6  3.6   ALBUMIN  --   --   --   --  1.7*   ANIONGAP  --    < > 13 13 14    < > = values in this interval not displayed.      Significant Imaging:   No new imaging today

## 2020-07-03 NOTE — SUBJECTIVE & OBJECTIVE
Interval History: Did well overnight with no acute hemodynamic or respiratory issues. Remains off insulin gtt    Objective:     Vital Signs (Most Recent):  Temp: 98.2 °F (36.8 °C) (07/03/20 1110)  Pulse: 70 (07/03/20 1100)  Resp: (!) 21 (07/03/20 1100)  BP: (!) 159/65 (07/03/20 1100)  SpO2: 96 % (07/03/20 1100) Vital Signs (24h Range):  Temp:  [97.8 °F (36.6 °C)-98.2 °F (36.8 °C)] 98.2 °F (36.8 °C)  Pulse:  [60-74] 70  Resp:  [11-21] 21  SpO2:  [92 %-100 %] 96 %  BP: (142-176)/(61-74) 159/65     Weight: (!) 144.5 kg (318 lb 9 oz)  Body mass index is 40.83 kg/m².      Intake/Output Summary (Last 24 hours) at 7/3/2020 1203  Last data filed at 7/3/2020 0800  Gross per 24 hour   Intake 844.58 ml   Output 2135 ml   Net -1290.42 ml       Physical Exam  Vitals signs and nursing note reviewed.   Constitutional:       General: He is not in acute distress.     Appearance: He is obese. He is not ill-appearing, toxic-appearing or diaphoretic.   HENT:      Head: Normocephalic and atraumatic.      Nose: No congestion or rhinorrhea.      Mouth/Throat:      Mouth: Mucous membranes are moist.      Pharynx: Oropharynx is clear.   Eyes:      General: No scleral icterus.     Conjunctiva/sclera: Conjunctivae normal.   Cardiovascular:      Rate and Rhythm: Normal rate and regular rhythm.      Heart sounds: No murmur.   Pulmonary:      Effort: No respiratory distress.      Breath sounds: No wheezing, rhonchi or rales.   Abdominal:      General: There is no distension.      Palpations: Abdomen is soft.   Musculoskeletal:      Right lower leg: Edema present.      Left lower leg: Edema present.   Skin:     General: Skin is warm and dry.      Findings: No rash.         Vents:  Vent Mode: Spont (06/30/20 0855)  Ventilator Initiated: Yes (06/27/20 2216)  Set Rate: 0 BPM (06/30/20 0855)  Vt Set: 500 mL (06/30/20 0855)  Pressure Support: 5 cmH20 (06/30/20 0855)  PEEP/CPAP: 5 cmH20 (06/30/20 0855)  Oxygen Concentration (%): 21 (06/30/20  1000)  Peak Airway Pressure: 10 cmH2O (06/30/20 0855)  Plateau Pressure: 0 cmH20 (06/30/20 0855)  Total Ve: 12.3 mL (06/30/20 0855)  F/VT Ratio<105 (RSBI): (!) 23.66 (06/30/20 0737)    Lines/Drains/Airways     Central Venous Catheter Line            Percutaneous Central Line Insertion/Assessment - Triple Lumen  06/27/20 2300 right femoral 5 days          Drain                 Urethral Catheter 06/27/20 2258 Non-latex 16 Fr. 5 days         Rectal Tube 06/28/20 1808 4 days          Peripheral Intravenous Line                 Peripheral IV - Single Lumen 06/27/20 2127 20 G Left Forearm 5 days                Significant Labs:    CBC/Anemia Profile:  Recent Labs   Lab 07/02/20  0454 07/03/20  0511   WBC 16.45* 11.91   HGB 8.2* 8.4*   HCT 23.8* 26.4*    222   MCV 91 94   RDW 15.2* 15.7*        Chemistries:  Recent Labs   Lab 07/02/20  0454 07/02/20  1152 07/03/20  0511   * 154* 151*   K 3.4* 3.8 3.4*   * 118* 116*   CO2 23 23 21*   BUN 73* 71* 64*   CREATININE 3.2* 3.2* 3.2*   CALCIUM 7.4* 7.4* 7.4*   ALBUMIN  --   --  1.7*   PHOS 3.2  --  3.6  3.6       All pertinent labs within the past 24 hours have been reviewed.    Significant Imaging:  I have reviewed and interpreted all pertinent imaging results/findings within the past 24 hours.

## 2020-07-03 NOTE — NURSING
bedside performing incision and drainage of posterior neck abscess. Well tolerated. Dressed with gauze pads/tape per MD. Will monitor.

## 2020-07-03 NOTE — ASSESSMENT & PLAN NOTE
- Soft tissue neck CT with possible abscess L posterior neck  - General surgery consulted. Had I&D 7/2  - Cultures pending

## 2020-07-03 NOTE — NURSING TRANSFER
Nursing Transfer Note      7/3/2020     Transfer To: 319    Transfer via bed    Transfer with cardiac monitoring    Transported by RN and transport    Medicines sent: yes    Chart send with patient: Yes    Notified: friend    Patient reassessed at: 7/3/20     Upon arrival to floor: Receiving RN @ bedside, patient oriented to room, call bell in reach and bed in lowest position, no distress noted.

## 2020-07-03 NOTE — NURSING
Spoke with Dr. Man concerning bowel prep and scope procedure; MD clarified that pt will not be undergoing procedure tomorrow, and does not have to drink GoLytely bowel prep. Will continue to monitor.

## 2020-07-03 NOTE — ASSESSMENT & PLAN NOTE
- Resolved  - Due to UTI and LLL infiltrate   - Urine culture with E. Coli. Respiratory culture with Group B Strep   - WBC down 40 -> 11 and Procal 10 -> 3  - Continuing Cefazolin per ID recs until 7/10/20

## 2020-07-03 NOTE — ASSESSMENT & PLAN NOTE
- Multifactorial with likely acute blood loss anemia, GI losses contributing.  - Baseline Cr ~2.4-2.7.  - FENa suggestive of intrinsic source  - Good UOP. Cr slowly improving 4.4 -> 3.2  - Appreciate nephrology assistance. Discussed with Lizandro Pierce today

## 2020-07-03 NOTE — ASSESSMENT & PLAN NOTE
- HHS resolved  - Tolerating liquids. Advance diet if KUB okay  - Levemir increased to 35 units qd, Aspart increased to 8 units TIDWM and SSI

## 2020-07-03 NOTE — PROGRESS NOTES
[facility] Hospital Medicine  Progress Note    Patient Name: Vinnie Siegel  MRN: [id number]  Patient Class: IP- Inpatient   Admission Date: 6/27/2020  Length of Stay: 6 days  Attending Physician: Triny Dill MD  Primary Care Provider: Janeth Walter MD        Subjective:     Principal Problem:Type 2 diabetes mellitus with hyperosmolar nonketotic hyperglycemia        HPI:  Mr. Vinnie Siegel is a 74 y.o. male, with PMH of IDDM-2, CKD-IV, dilated cardiomyopathy, HTN, HLD, gout, obesity, who presented to [facility] ED via EMS on 6/27/20 with rectal bleeding x 2 days. His family called EMS after he became lethargic earlier this evening. Per his fiancee, he has been passing dark blood stools, and has been noting generalized weakness x 1 week. Additionally, he has been having elevated blood glucose readings x 1 day. In the ED, he was found to be in DKA, with a GI bleed. H&H were 6.4 & 19.4 and he was transfused 2 units PRBCs. He was started on an insulin drip and a Protonix drip. He had worsening respiratory and mental status while in the ED, and was intubated for airway protection. He was admitted to inpatient status to the ICU.     Overview/Hospital Course:  Admitted to ICU with HHS, encephalopathy, GI bleed, ADONAY, respiratory failure and sepsis. Sepsis was due to E. Coli UTI and Strep agalactiae pneumonia. Critical care and nephrology consulted and broad antibiotics continued. GI was consulted and he had EGD on 6/29 which showed normal esophagus and duodenum with OG tube trauma to mid gastric body but no source of bleeding was identified. He was extubated on 6/30. He attempted bowel prep for colonoscopy but was unable to tolerate; colonoscopy was pushed back. He had slow improvement in ADONAY as well as improvement in sepsis with antibiotics. ID was consulted and recommended cefazolin. He was found to have a large posterior neck abscess so general surgery was consulted.    Interval History: No acute  events overnight. Colonoscopy was planned for today but has been cancelled. He had I&D of posterior neck abscess yesterday. Today he complains of abdominal discomfort and gas. Denies nausea/vomiting.    Review of Systems   Constitutional: Negative for chills and fever.   Respiratory: Negative for shortness of breath.    Cardiovascular: Negative for chest pain.   Gastrointestinal: Positive for abdominal distention and abdominal pain. Negative for nausea and vomiting.     Objective:     Vital Signs (Most Recent):  Temp: 97.9 °F (36.6 °C) (07/03/20 0715)  Pulse: 60 (07/03/20 0826)  Resp: 14 (07/03/20 0826)  BP: (!) 150/67 (07/03/20 0800)  SpO2: (!) 94 % (07/03/20 0826) Vital Signs (24h Range):  Temp:  [97.8 °F (36.6 °C)-98.5 °F (36.9 °C)] 97.9 °F (36.6 °C)  Pulse:  [60-76] 60  Resp:  [14-28] 14  SpO2:  [92 %-100 %] 94 %  BP: (133-176)/(61-74) 150/67     Weight: (!) 144.5 kg (318 lb 9 oz)  Body mass index is 40.83 kg/m².    Intake/Output Summary (Last 24 hours) at 7/3/2020 0909  Last data filed at 7/3/2020 0800  Gross per 24 hour   Intake 844.58 ml   Output 2885 ml   Net -2040.42 ml      Physical Exam  Vitals signs and nursing note reviewed.   Constitutional:       General: He is not in acute distress.     Appearance: Normal appearance.   Cardiovascular:      Rate and Rhythm: Normal rate and regular rhythm.      Pulses: Normal pulses.   Pulmonary:      Effort: No respiratory distress.      Comments: Decreased at the bases  Abdominal:      General: There is distension.      Tenderness: There is no abdominal tenderness.      Comments: Hypoactive, high-pitched bowel sounds   Musculoskeletal:         General: No tenderness.      Right lower leg: No edema.      Left lower leg: No edema.      Comments: LUE edema   Skin:     General: Skin is warm and dry.      Comments: L posterior neck abscess s/p I&D, purulent drainage noted   Neurological:      Mental Status: He is alert and oriented to person, place, and time.        Significant Labs:   CBC:  Recent Labs   Lab 07/01/20  0551 07/02/20  0454 07/03/20  0511   WBC 22.95* 16.45* 11.91   HGB 8.3* 8.2* 8.4*   HCT 24.0* 23.8* 26.4*    217 222   GRAN 86.7*  19.9* 83.9*  13.8* 80.0*  9.5*   LYMPH 5.7*  1.3 8.3*  1.4 11.0*  1.3   MONO 5.2  1.2* 5.5  0.9 6.3  0.8   EOS 0.0 0.1 0.1   BASO 0.03 0.02 0.02      CMP:  Recent Labs   Lab 07/01/20  0550  07/02/20  0454 07/02/20  1152 07/03/20  0511   NA  --    < > 153* 154* 151*   K  --    < > 3.4* 3.8 3.4*   CL  --    < > 117* 118* 116*   CO2  --    < > 23 23 21*   BUN  --    < > 73* 71* 64*   CREATININE  --    < > 3.2* 3.2* 3.2*   GLU  --    < > 288* 282* 260*   CALCIUM  --    < > 7.4* 7.4* 7.4*   MG 1.9  --   --   --   --    PHOS 2.9  --  3.2  --  3.6  3.6   ALBUMIN  --   --   --   --  1.7*   ANIONGAP  --    < > 13 13 14    < > = values in this interval not displayed.      Significant Imaging:   No new imaging today      Assessment/Plan:      * Type 2 diabetes mellitus with hyperosmolar nonketotic hyperglycemia  - HHS resolved  - Tolerating liquids. Advance diet if KUB okay  - Levemir increased to 35 units qd, Aspart increased to 8 units TIDWM and SSI    Abdominal distension  - Worsening distention today with high pitched bowel sounds  - Check KUB to r/o early obstruction      Abscess of neck  - Soft tissue neck CT with possible abscess L posterior neck  - General surgery consulted. Had I&D 7/2  - Cultures pending      Hypernatremia  - Improving  - Na 154->151  - Continue D5 until PO intake improves  - Monitor    LLL pneumonia  - CXR with LLL opacity   - Respiratory culture with Strep agalactiae. Continue Cefazolin per ID recs      Acute renal failure superimposed on stage 4 chronic kidney disease  - Multifactorial with likely acute blood loss anemia, GI losses contributing.  - Baseline Cr ~2.4-2.7.  - FENa suggestive of intrinsic source  - Good UOP. Cr slowly improving 4.4 -> 3.2  - Appreciate nephrology assistance.  Discussed with Lizandro Pierce today    Severe sepsis  - Resolved  - Due to UTI and LLL infiltrate   - Urine culture with E. Coli. Respiratory culture with Group B Strep   - WBC down 40 -> 11 and Procal 10 -> 3  - Continuing Cefazolin per ID recs until 7/10/20      UTI (urinary tract infection)  - Urine culture with E. Coli  - Continue Cefazolin per ID. End date: 7/10/20    Acute blood loss anemia  - 2/2 GI bleed  - s/p 4 units PRBC  - Hb stable overnight, 8.2 -> 8.4. Monitor for active bleeding    Acute respiratory failure with hypoxia  - Suspect HHS with encephalopathy and GI losses causing acidosis contributing.  - Appreciate critical care assistance.   - Extubated 6/30    GIB (gastrointestinal bleeding)  - Continues with dark stools but Hb stable  - s/p 2U pRBCs 06/27, 1U pRBCs 06/28, 1U pRBC 6/30  - Continue to trend Hgb and transfuse PRN Hgb < 7.  - Continuing pantoprazole 40mg IV BID  - GI following  - EGD 6/29 with normal esophagus, OG tube trauma to the mid body and normal duodenum  - Unable to complete bowel prep so colonoscopy will not be done today    Obesity, Class III, BMI 40-49.9 (morbid obesity)  - Dietary counseling after improvement from critical condition.    Dilated cardiomyopathy  - Volume status improving; diuretics held with ADONAY.  - As under ADONAY.    CKD (chronic kidney disease) stage 4, GFR 15-29 ml/min  - As under ADONAY.    Essential hypertension  - BP Improved  - Continue coreg to 12.5 mg BID and nifedipine 30 mg qd      VTE Risk Mitigation (From admission, onward)         Ordered     IP VTE HIGH RISK PATIENT  Once      06/28/20 0139     Reason for No Pharmacological VTE Prophylaxis  Once     Question:  Reasons:  Answer:  Active Bleeding    06/28/20 0139     Place sequential compression device  Until discontinued      06/28/20 0036                      Triny Dill MD  Department of Hospital Medicine   [facility]

## 2020-07-03 NOTE — PROGRESS NOTES
Subjective:       Patient ID: Vinnie Siegel is a 74 y.o. male.    Chief Complaint:  Rectal Bleeding (x 2 days, lethargic x 1 week but dark blood noted to stool per wife. )       History of Present Illness  Pt denies any n/v    Review of Systems  Cardiovascular: negative      Objective:     Vitals:    07/03/20 0800 07/03/20 0826 07/03/20 0900 07/03/20 1000   BP: (!) 150/67  (!) 164/71 (!) 156/66   BP Location: Left arm      Patient Position: Lying      Pulse: 60 60 66 62   Resp: 14 14 17 11   Temp:       TempSrc:       SpO2: 95% (!) 94% 96% (!) 94%   Weight:       Height:          Abd soft mild distension    Data Review   Recent Results (from the past 336 hour(s))   CBC auto differential    Collection Time: 07/03/20  5:11 AM   Result Value Ref Range    WBC 11.91 3.90 - 12.70 K/uL    Hemoglobin 8.4 (L) 14.0 - 18.0 g/dL    Hematocrit 26.4 (L) 40.0 - 54.0 %    Platelets 222 150 - 350 K/uL   CBC auto differential    Collection Time: 07/02/20  4:54 AM   Result Value Ref Range    WBC 16.45 (H) 3.90 - 12.70 K/uL    Hemoglobin 8.2 (L) 14.0 - 18.0 g/dL    Hematocrit 23.8 (L) 40.0 - 54.0 %    Platelets 217 150 - 350 K/uL   CBC auto differential    Collection Time: 07/01/20  5:51 AM   Result Value Ref Range    WBC 22.95 (H) 3.90 - 12.70 K/uL    Hemoglobin 8.3 (L) 14.0 - 18.0 g/dL    Hematocrit 24.0 (L) 40.0 - 54.0 %    Platelets 218 150 - 350 K/uL      Recent Labs   Lab 06/28/20  1615   INR 1.0     Recent Labs   Lab 06/27/20  2111  07/02/20  0454 07/02/20  1152 07/03/20  0511   *   < > 153* 154* 151*   K 5.2*   < > 3.4* 3.8 3.4*      < > 117* 118* 116*   CO2 8*   < > 23 23 21*   *   < > 73* 71* 64*   CREATININE 4.2*   < > 3.2* 3.2* 3.2*   CALCIUM 7.3*   < > 7.4* 7.4* 7.4*   PROT 5.1*  --   --   --   --    BILITOT 0.3  --   --   --   --    ALKPHOS 81  --   --   --   --    ALT 56*  --   --   --   --    AST 26  --   --   --   --     < > = values in this interval not displayed.      Lab Results   Component  Value Date    HEPAIGM Negative 06/29/2020    HEPBIGM Negative 06/29/2020    HEPCAB Negative 06/29/2020    No results found for: AFP   No results found for: CEA   Recent Labs   Lab 06/28/20  1615   INR 1.0        Assessment:     1. Acute blood loss anemia    2. Dizziness    3. Rectal bleeding    4. Endotracheal tube present    5. GIB (gastrointestinal bleeding)    6. Diabetic ketoacidosis with coma associated with type 2 diabetes mellitus    7. Acute renal failure superimposed on chronic kidney disease, unspecified CKD stage, unspecified acute renal failure type    8. Respiratory failure, unspecified chronicity, unspecified whether with hypoxia or hypercapnia    9. Type 2 diabetes mellitus with hyperosmolar nonketotic hyperglycemia    10. Acute respiratory failure with hypoxia    11. Encephalopathy, metabolic    12. Metabolic acidosis, normal anion gap (NAG)    13. Type 2 diabetes mellitus with stage 4 chronic kidney disease, with long-term current use of insulin    14. Volume overload        Plan:     1) monitor h/h closely  2) Chek KUB   3) ? Colon on monday

## 2020-07-03 NOTE — PROGRESS NOTES
Postop day 1.  Status post I and D of posterior neck abscess  Wounds draining purulent material as expected  Continue local care

## 2020-07-04 PROBLEM — J96.01 ACUTE RESPIRATORY FAILURE WITH HYPOXIA: Status: RESOLVED | Noted: 2020-06-27 | Resolved: 2020-07-04

## 2020-07-04 PROBLEM — A41.9 SEVERE SEPSIS: Status: RESOLVED | Noted: 2020-06-27 | Resolved: 2020-07-04

## 2020-07-04 PROBLEM — E11.65 TYPE 2 DIABETES MELLITUS WITH HYPERGLYCEMIA: Status: ACTIVE | Noted: 2020-06-27

## 2020-07-04 PROBLEM — R53.81 DEBILITY: Status: ACTIVE | Noted: 2020-07-04

## 2020-07-04 PROBLEM — R65.20 SEVERE SEPSIS: Status: RESOLVED | Noted: 2020-06-27 | Resolved: 2020-07-04

## 2020-07-04 LAB
ALBUMIN SERPL BCP-MCNC: 1.8 G/DL (ref 3.5–5.2)
ANION GAP SERPL CALC-SCNC: 13 MMOL/L (ref 8–16)
BACTERIA BLD CULT: NORMAL
BACTERIA BLD CULT: NORMAL
BACTERIA SPEC AEROBE CULT: NORMAL
BACTERIA SPEC AEROBE CULT: NORMAL
BASOPHILS # BLD AUTO: 0.01 K/UL (ref 0–0.2)
BASOPHILS NFR BLD: 0.1 % (ref 0–1.9)
BUN SERPL-MCNC: 59 MG/DL (ref 8–23)
CALCIUM SERPL-MCNC: 7.2 MG/DL (ref 8.7–10.5)
CHLORIDE SERPL-SCNC: 111 MMOL/L (ref 95–110)
CO2 SERPL-SCNC: 20 MMOL/L (ref 23–29)
CREAT SERPL-MCNC: 3.4 MG/DL (ref 0.5–1.4)
DIFFERENTIAL METHOD: ABNORMAL
EOSINOPHIL # BLD AUTO: 0.1 K/UL (ref 0–0.5)
EOSINOPHIL NFR BLD: 0.7 % (ref 0–8)
ERYTHROCYTE [DISTWIDTH] IN BLOOD BY AUTOMATED COUNT: 15.8 % (ref 11.5–14.5)
EST. GFR  (AFRICAN AMERICAN): 20 ML/MIN/1.73 M^2
EST. GFR  (NON AFRICAN AMERICAN): 17 ML/MIN/1.73 M^2
GLUCOSE SERPL-MCNC: 257 MG/DL (ref 70–110)
HCT VFR BLD AUTO: 25.7 % (ref 40–54)
HGB BLD-MCNC: 8 G/DL (ref 14–18)
IMM GRANULOCYTES # BLD AUTO: 0.1 K/UL (ref 0–0.04)
IMM GRANULOCYTES NFR BLD AUTO: 0.9 % (ref 0–0.5)
LYMPHOCYTES # BLD AUTO: 1.4 K/UL (ref 1–4.8)
LYMPHOCYTES NFR BLD: 13.1 % (ref 18–48)
MCH RBC QN AUTO: 29.3 PG (ref 27–31)
MCHC RBC AUTO-ENTMCNC: 31.1 G/DL (ref 32–36)
MCV RBC AUTO: 94 FL (ref 82–98)
MONOCYTES # BLD AUTO: 0.6 K/UL (ref 0.3–1)
MONOCYTES NFR BLD: 5.9 % (ref 4–15)
NEUTROPHILS # BLD AUTO: 8.5 K/UL (ref 1.8–7.7)
NEUTROPHILS NFR BLD: 79.3 % (ref 38–73)
NRBC BLD-RTO: 1 /100 WBC
PHOSPHATE SERPL-MCNC: 4.6 MG/DL (ref 2.7–4.5)
PHOSPHATE SERPL-MCNC: 4.6 MG/DL (ref 2.7–4.5)
PLATELET # BLD AUTO: 202 K/UL (ref 150–350)
PMV BLD AUTO: 10 FL (ref 9.2–12.9)
POCT GLUCOSE: 195 MG/DL (ref 70–110)
POCT GLUCOSE: 216 MG/DL (ref 70–110)
POCT GLUCOSE: 235 MG/DL (ref 70–110)
POCT GLUCOSE: 239 MG/DL (ref 70–110)
POTASSIUM SERPL-SCNC: 3.2 MMOL/L (ref 3.5–5.1)
RBC # BLD AUTO: 2.73 M/UL (ref 4.6–6.2)
SODIUM SERPL-SCNC: 144 MMOL/L (ref 136–145)
WBC # BLD AUTO: 10.76 K/UL (ref 3.9–12.7)

## 2020-07-04 PROCEDURE — 97110 THERAPEUTIC EXERCISES: CPT

## 2020-07-04 PROCEDURE — 11000001 HC ACUTE MED/SURG PRIVATE ROOM

## 2020-07-04 PROCEDURE — 63600175 PHARM REV CODE 636 W HCPCS: Performed by: INTERNAL MEDICINE

## 2020-07-04 PROCEDURE — 36415 COLL VENOUS BLD VENIPUNCTURE: CPT

## 2020-07-04 PROCEDURE — 25000003 PHARM REV CODE 250: Performed by: INTERNAL MEDICINE

## 2020-07-04 PROCEDURE — 80069 RENAL FUNCTION PANEL: CPT

## 2020-07-04 PROCEDURE — 99900035 HC TECH TIME PER 15 MIN (STAT)

## 2020-07-04 PROCEDURE — C9113 INJ PANTOPRAZOLE SODIUM, VIA: HCPCS | Performed by: INTERNAL MEDICINE

## 2020-07-04 PROCEDURE — 99233 PR SUBSEQUENT HOSPITAL CARE,LEVL III: ICD-10-PCS | Mod: ,,, | Performed by: INTERNAL MEDICINE

## 2020-07-04 PROCEDURE — 99233 SBSQ HOSP IP/OBS HIGH 50: CPT | Mod: ,,, | Performed by: INTERNAL MEDICINE

## 2020-07-04 PROCEDURE — 97530 THERAPEUTIC ACTIVITIES: CPT

## 2020-07-04 PROCEDURE — 97530 THERAPEUTIC ACTIVITIES: CPT | Mod: CQ

## 2020-07-04 PROCEDURE — 85025 COMPLETE CBC W/AUTO DIFF WBC: CPT

## 2020-07-04 PROCEDURE — 94761 N-INVAS EAR/PLS OXIMETRY MLT: CPT

## 2020-07-04 RX ORDER — CEFAZOLIN SODIUM 2 G/50ML
2 SOLUTION INTRAVENOUS
Status: COMPLETED | OUTPATIENT
Start: 2020-07-04 | End: 2020-07-11

## 2020-07-04 RX ORDER — POTASSIUM CHLORIDE 20 MEQ/1
20 TABLET, EXTENDED RELEASE ORAL ONCE
Status: COMPLETED | OUTPATIENT
Start: 2020-07-04 | End: 2020-07-04

## 2020-07-04 RX ORDER — CHOLECALCIFEROL (VITAMIN D3) 25 MCG
1000 TABLET ORAL DAILY
Status: DISCONTINUED | OUTPATIENT
Start: 2020-07-04 | End: 2020-07-16 | Stop reason: HOSPADM

## 2020-07-04 RX ORDER — INSULIN ASPART 100 [IU]/ML
12 INJECTION, SOLUTION INTRAVENOUS; SUBCUTANEOUS
Status: DISCONTINUED | OUTPATIENT
Start: 2020-07-04 | End: 2020-07-10

## 2020-07-04 RX ORDER — PANTOPRAZOLE SODIUM 40 MG/1
40 TABLET, DELAYED RELEASE ORAL DAILY
Status: DISCONTINUED | OUTPATIENT
Start: 2020-07-05 | End: 2020-07-16 | Stop reason: HOSPADM

## 2020-07-04 RX ADMIN — CARVEDILOL 12.5 MG: 12.5 TABLET, FILM COATED ORAL at 09:07

## 2020-07-04 RX ADMIN — INSULIN ASPART 8 UNITS: 100 INJECTION, SOLUTION INTRAVENOUS; SUBCUTANEOUS at 12:07

## 2020-07-04 RX ADMIN — TIMOLOL MALEATE 1 DROP: 2.5 SOLUTION/ DROPS OPHTHALMIC at 09:07

## 2020-07-04 RX ADMIN — POTASSIUM CHLORIDE 20 MEQ: 1500 TABLET, EXTENDED RELEASE ORAL at 11:07

## 2020-07-04 RX ADMIN — NIFEDIPINE 60 MG: 30 TABLET, FILM COATED, EXTENDED RELEASE ORAL at 09:07

## 2020-07-04 RX ADMIN — INSULIN ASPART 2 UNITS: 100 INJECTION, SOLUTION INTRAVENOUS; SUBCUTANEOUS at 06:07

## 2020-07-04 RX ADMIN — MELATONIN 1000 UNITS: at 01:07

## 2020-07-04 RX ADMIN — CEFAZOLIN SODIUM 2 G: 2 SOLUTION INTRAVENOUS at 06:07

## 2020-07-04 RX ADMIN — INSULIN DETEMIR 35 UNITS: 100 INJECTION, SOLUTION SUBCUTANEOUS at 09:07

## 2020-07-04 RX ADMIN — INSULIN ASPART 2 UNITS: 100 INJECTION, SOLUTION INTRAVENOUS; SUBCUTANEOUS at 09:07

## 2020-07-04 RX ADMIN — PANTOPRAZOLE SODIUM 40 MG: 40 INJECTION, POWDER, LYOPHILIZED, FOR SOLUTION INTRAVENOUS at 09:07

## 2020-07-04 RX ADMIN — INSULIN ASPART 2 UNITS: 100 INJECTION, SOLUTION INTRAVENOUS; SUBCUTANEOUS at 12:07

## 2020-07-04 RX ADMIN — CEFAZOLIN SODIUM 2 G: 2 SOLUTION INTRAVENOUS at 11:07

## 2020-07-04 RX ADMIN — INSULIN ASPART 8 UNITS: 100 INJECTION, SOLUTION INTRAVENOUS; SUBCUTANEOUS at 09:07

## 2020-07-04 RX ADMIN — TAMSULOSIN HYDROCHLORIDE 0.4 MG: 0.4 CAPSULE ORAL at 09:07

## 2020-07-04 RX ADMIN — INSULIN ASPART 12 UNITS: 100 INJECTION, SOLUTION INTRAVENOUS; SUBCUTANEOUS at 06:07

## 2020-07-04 RX ADMIN — CEFAZOLIN 2 G: 330 INJECTION, POWDER, FOR SOLUTION INTRAMUSCULAR; INTRAVENOUS at 12:07

## 2020-07-04 NOTE — PLAN OF CARE
No change in patient respiratory status. No prn aerosol tx needed this shift. Will continue to monitor.

## 2020-07-04 NOTE — ASSESSMENT & PLAN NOTE
- Multifactorial with likely acute blood loss anemia, GI losses contributing.  - Baseline Cr ~2.4-2.7.  - FENa suggestive of intrinsic source  - Good UOP. Cr seems to be stabilizing around 3.2-3.4  - Nephrology following, appreciate assistance.

## 2020-07-04 NOTE — ASSESSMENT & PLAN NOTE
- Soft tissue neck CT with abscess L posterior neck  - General surgery following, s/p I&D 7/2  - Cultures pending  - Improving on cefazolin so will continue

## 2020-07-04 NOTE — PROGRESS NOTES
"Nephrology  Progress Note    Admit Date: 6/27/2020   LOS: 7 days     SUBJECTIVE:     Follow-up For:  Type 2 diabetes mellitus with hyperglycemia    Interval History:     No c/o this am. He is more comfortable. Left neck dressed. Wife at bedside.     Review of Systems:    No c/o this am. Good UOP.    OBJECTIVE:     Vital Signs Range (Last 24H):  BP (!) 172/74 (BP Location: Left arm, Patient Position: Lying)   Pulse 66   Temp 97.6 °F (36.4 °C) (Oral)   Resp 18   Ht 6' 2" (1.88 m)   Wt (!) 144.5 kg (318 lb 9 oz)   SpO2 97%   BMI 40.83 kg/m²     Temp:  [97.6 °F (36.4 °C)-98.4 °F (36.9 °C)]   Pulse:  [58-78]   Resp:  [14-20]   BP: (133-172)/(60-74)   SpO2:  [92 %-98 %]     I & O (Last 24H):    Intake/Output Summary (Last 24 hours) at 7/4/2020 1256  Last data filed at 7/4/2020 1144  Gross per 24 hour   Intake 1892.33 ml   Output 1150 ml   Net 742.33 ml       Physical Exam:  General appearance: Well developed, well nourished, weak, obese  Eyes:  Conjunctivae/corneas clear. PERRL.  Neck: left neck wound CDI  Lungs: diminished.    Heart: Regular rate and rhythm, S1, S2 normal, no murmur, rub or beatrice.  Abdomen: Soft, non-tender non-distended; bowel sounds normal; no masses,  no organomegaly, obese, rectal tube dark stool.   Extremities: No cyanosis or clubbing. 2-3+ edema.  Left neck abscess noted.   Skin: Skin color, texture, turgor normal. No rashes or lesions  Neurologic: No focal numbness or weakness   Mario  Right groin TLC (positional)    Laboratory Data:  Recent Labs   Lab 07/04/20  1023   WBC 10.76   RBC 2.73*   HGB 8.0*   HCT 25.7*      MCV 94   MCH 29.3   MCHC 31.1*       BMP:   Recent Labs   Lab 07/01/20  0550  07/04/20  1023   GLU  --    < > 257*   NA  --    < > 144   K  --    < > 3.2*   CL  --    < > 111*   CO2  --    < > 20*   BUN  --    < > 59*   CREATININE  --    < > 3.4*   CALCIUM  --    < > 7.2*   MG 1.9  --   --     < > = values in this interval not displayed.     Lab Results   Component " Value Date    CALCIUM 7.2 (L) 07/04/2020    PHOS 4.6 (H) 07/04/2020    PHOS 4.6 (H) 07/04/2020       Lab Results   Component Value Date    .5 (H) 06/29/2020    CALCIUM 7.2 (L) 07/04/2020    CAION 1.07 06/28/2020    PHOS 4.6 (H) 07/04/2020    PHOS 4.6 (H) 07/04/2020       Lab Results   Component Value Date    URICACID 8.6 (H) 06/29/2020       BNP  Recent Labs   Lab 07/03/20  0511   *       Medications:  Medication list was reviewed and changes noted under Assessment/Plan.    Diagnostic Results:        ASSESSMENT/PLAN:     75 YO male with DM, HTN, CHF with Dilated CM, COPD, CKD 4, now with GIB and Hyperosmolar nonketotic hyperglycemia, resp failure prompting intubation to protect airway, severe anemia with Hgb 6.4, elevated BUN due to GIB, hypotension, and ADONAY     HHS resolving  -s/p Insulin drip and now on basal/prandial.    -UTI/abscess noted.  -defer  -glucose labile.     Slowly resolving Non-oliguric ADONAY on CKD 4/ Hypokalemia  -Baseline creat over past year 2.5-2.9 with history of proteinuria  -Creat 4.4 on admit and slowly improving to 3.4  -UOP picking up but lacking clearance with GI bleed  -Prot/creat ratio 1 gram  -Expect that severe anemia and hypotension etiology for ADONAY  -Doesn't appear he has seen nephrologist so ordered full workup and mildly elevated ELSA with negative reflex only positive finding so far.  -Normally prefer no PPI but with GIB this okay  -No nephrotoxins.  -replace lytes PRN.  -Renally dose meds, avoid nephrotoxins, and monitor I/O's closely.       GIB with severe anemia  -Agree with transfusions prn  -dosed Epo.  -mildly iron deficient but would not give IV iron for now  -GI consulted - EGD noted.   Plans for colon on hold.   -Protonix as now     DM  -Last HgA1c in December 2019 7.7  -Defer to primary team  -as above     Sepsis from UTI/neck abscess:  -cultured and continue antibx for now.   -lactic acid, procal, and WBC's improving.     Vit D Def with mild HPTH/ Hx Calcium  Oxalate Nephrolithiasis:  -Started on calcitriol 0.25mcg 3x/week, but will stop for now.  -However, Vit D 25 extremely low, but Hx Nephrolithiasis.  -Would change to low dose Cholecalciferol 1,000 units daily and will need to be monitored closely with 24 hr urine Calcium.       Resolved Hypernatremia:  -encourage water intake and stop volume D5.  Trends improved.      HTN:  -started BB/CCB.  Hydralazine prn.   -no ACE/ARB for now.

## 2020-07-04 NOTE — PLAN OF CARE
Pt alert throughout evening, sleeping intermittently.  Pt c/o leg and rectal pain, relieved by repositioning.    D5 with 20 K infusing.  PICC to R groin site intact.  No lumens have blood return, one occluded.    Rectal tube in place, draining brown stool.  Mario catheter in place.  Catheter care complete.    Pt SR with RBBB on telemetry.    Dressing to R neck dry and intact.

## 2020-07-04 NOTE — PT/OT/SLP PROGRESS
Occupational Therapy   Treatment    Name: Vinnie Siegel  MRN: [id number]  Admitting Diagnosis:  Type 2 diabetes mellitus with hyperosmolar nonketotic hyperglycemia  5 Days Post-Op    Recommendations:     Discharge Recommendations: (TBD)  Discharge Equipment Recommendations:  (TBD)  Barriers to discharge:  Inaccessible home environment, Decreased caregiver support    Assessment:     Vinnie Siegel is a 74 y.o. male with a medical diagnosis of Type 2 diabetes mellitus with hyperosmolar nonketotic hyperglycemia.  He presents with back pain and dizziness with EOB activity.. Performance deficits affecting function are weakness, impaired endurance, impaired self care skills, impaired functional mobilty, gait instability, impaired balance, decreased upper extremity function, decreased lower extremity function, decreased safety awareness, pain, decreased ROM, impaired coordination, impaired skin, edema.  Bed positioning changed x2 with pt Mod A >Total A x2 for bed mobility.  He sat EOB 12 minutes with active movement in place to relieve back pain (sitting balance with UE support Mod A>Min A), with UE ROM exercises performed in bed.  Continuation of OT treatment is needed to maximize patient function while in the hospital.    Rehab Prognosis:  Good; patient would benefit from acute skilled OT services to address these deficits and reach maximum level of function.       Plan:     Patient to be seen 5 x/week to address the above listed problems via self-care/home management, therapeutic activities, therapeutic exercises  · Plan of Care Expires: 07/30/20  · Plan of Care Reviewed with: patient    Subjective     Pain/Comfort:  · Pain Rating 1: 8/10  · Location - Orientation 1: lower  · Location 1: back(and posterior neck)  · Pain Addressed 1: Pre-medicate for activity, Reposition, Cessation of Activity, Distraction, Nurse notified  · Pain Rating Post-Intervention 1: 8/10    Objective:     Communicated with: patient and his  nurse prior to session.  Patient found right sidelying with bed alarm, perera catheter, peripheral IV, telemetry, PICC line upon OT entry to room.  He was agreeable to OT treatment with PT joining session, as 2 person assist needed for mobility tasks.    General Precautions: Standard, diabetic, fall   Orthopedic Precautions:N/A   Braces: N/A     Occupational Performance:     Bed Mobility:  (pt repositioning in bed x2)  · Total A roll back>Left  · Mod A roll Left side>back  · Mod A supine>sit EOB  · Total A (Total x1 / Min x1) sir>supine    Functional MobilityTransfers:  · None    Activities of Daily Living:  · None    AMPAC 6 Click ADL: 7    Treatment & Education:  I(shifted weight in sitting UE support on bed or on therapists arms during 12 minute sitting EOB.  Pt reported continued low back pain and onset of dizziness with sitting EOB.    UE exercise: UE AAROM (shoulder flexion-extension, horizontal ABD-ADD, IR-ER 12 reps each BUE) pt supine in bed    Patient left supine with all lines intact, call button in reach, bed alarm on and nurse presentEducation:      GOALS:   Multidisciplinary Problems     Occupational Therapy Goals        Problem: Occupational Therapy Goal    Goal Priority Disciplines Outcome Interventions   Occupational Therapy Goal     OT, PT/OT Ongoing, Progressing    Description: Goals to be met by: 07/16/2020     Patient will increase functional independence with ADLs by performing:    UE Dressing with Moderate Assistance.  Grooming while EOB with Moderate Assistance.  Sitting at edge of bed x15 minutes with Moderate Assistance.  Rolling to Bilateral with Minimum Assistance.   Supine to sit with Moderate Assistance.Increased functional strength to WFL for ADL tasks, functional transfers and bed mobility.  Assess functional transfers.    COMPLETED GOALS  Supine to sit with Moderate Assistance.(MET 7/4/20)                       Time Tracking:     OT Date of Treatment: 07/04/20    Time overlap for PT  and OT sessions,  2 person skilled assist    OT Start Time: 1110                         Needed for most tasks  OT Stop Time: 1147  OT Total Time (min): 37 min    Billable Minutes:Therapeutic Activity 15  Therapeutic Exercise 8    Geovanna Drummond, Herminio, LOTR  7/4/2020

## 2020-07-04 NOTE — SUBJECTIVE & OBJECTIVE
Interval History: No acute events overnight. He complains of abdominal distension that improves when he passes flatus. Eating okay without N/V. No bleeding noted overnight.    Review of Systems   Constitutional: Negative for chills and fever.   Respiratory: Negative for shortness of breath.    Cardiovascular: Negative for chest pain.   Gastrointestinal: Positive for abdominal distention and abdominal pain. Negative for nausea and vomiting.     Objective:     Vital Signs (Most Recent):  Temp: 97.6 °F (36.4 °C) (07/04/20 1212)  Pulse: 66 (07/04/20 1212)  Resp: 18 (07/04/20 1212)  BP: (!) 172/74 (07/04/20 1212)  SpO2: 97 % (07/04/20 1212) Vital Signs (24h Range):  Temp:  [97.6 °F (36.4 °C)-98.4 °F (36.9 °C)] 97.6 °F (36.4 °C)  Pulse:  [58-78] 66  Resp:  [14-20] 18  SpO2:  [92 %-98 %] 97 %  BP: (133-172)/(60-74) 172/74     Weight: (!) 144.5 kg (318 lb 9 oz)  Body mass index is 40.83 kg/m².    Intake/Output Summary (Last 24 hours) at 7/4/2020 1226  Last data filed at 7/4/2020 1144  Gross per 24 hour   Intake 1892.33 ml   Output 1150 ml   Net 742.33 ml      Physical Exam  Vitals signs and nursing note reviewed.   Constitutional:       General: He is not in acute distress.     Appearance: Normal appearance.   Cardiovascular:      Rate and Rhythm: Normal rate and regular rhythm.      Pulses: Normal pulses.   Pulmonary:      Effort: No respiratory distress.      Comments: Decreased at the bases  Abdominal:      General: There is distension.      Tenderness: There is no abdominal tenderness.      Comments: Hypoactive bowel sounds, tympanic on percussion   Musculoskeletal:         General: No tenderness.      Right lower leg: No edema.      Left lower leg: No edema.   Skin:     General: Skin is warm and dry.      Comments: L posterior neck abscess s/p I&D, purulent drainage noted   Neurological:      Mental Status: He is alert and oriented to person, place, and time.       Significant Labs:   CBC:  Recent Labs   Lab  07/02/20  0454 07/03/20  0511 07/04/20  1023   WBC 16.45* 11.91 10.76   HGB 8.2* 8.4* 8.0*   HCT 23.8* 26.4* 25.7*    222 202   GRAN 83.9*  13.8* 80.0*  9.5* 79.3*  8.5*   LYMPH 8.3*  1.4 11.0*  1.3 13.1*  1.4   MONO 5.5  0.9 6.3  0.8 5.9  0.6   EOS 0.1 0.1 0.1   BASO 0.02 0.02 0.01      CMP:  Recent Labs   Lab 07/02/20  0454 07/02/20  1152 07/03/20  0511 07/04/20  1023   * 154* 151* 144   K 3.4* 3.8 3.4* 3.2*   * 118* 116* 111*   CO2 23 23 21* 20*   BUN 73* 71* 64* 59*   CREATININE 3.2* 3.2* 3.2* 3.4*   * 282* 260* 257*   CALCIUM 7.4* 7.4* 7.4* 7.2*   PHOS 3.2  --  3.6  3.6 4.6*  4.6*   ALBUMIN  --   --  1.7* 1.8*   ANIONGAP 13 13 14 13      Significant Imaging:   No new imaging today

## 2020-07-04 NOTE — PLAN OF CARE
Patient received on unit from ICU at approximately 1730 with transport attendant and 2 nurses accompanying. POC reviewed with patient and brother at bedside. AAOx4, in no acute distress. VSS on room air. BG monitoring continues and insulin given per order. Flexiseal in place to rectum with dark loose stools noted. Mario catheter in place draining yellow urine. Assist x2-3 to turn and reposition in bed. Suction at bedside for PRN oral use. Dressing to neck C/D/I. IV fluids infusing without difficulty via patent peripheral IV. Appetite poor for dinner; fluids encouraged. All safety precautions in place, call bell in reach. Hourly rounding completed

## 2020-07-04 NOTE — ASSESSMENT & PLAN NOTE
- KUB with diffuse gaseous distension without obstruction  - Simethicone PRN  - Needs to increase mobility as able

## 2020-07-04 NOTE — PLAN OF CARE
Problem: Occupational Therapy Goal  Goal: Occupational Therapy Goal  Description: Goals to be met by: 07/16/2020     Patient will increase functional independence with ADLs by performing:    UE Dressing with Moderate Assistance.  Grooming while EOB with Moderate Assistance.  Sitting at edge of bed x15 minutes with Moderate Assistance.  Rolling to Bilateral with Minimum Assistance.   Supine to sit with Moderate Assistance.Increased functional strength to WFL for ADL tasks, functional transfers and bed mobility.  Assess functional transfers.    COMPLETED GOALS  Supine to sit with Moderate Assistance.(MET 7/4/20)      Outcome: Ongoing, Progressing   Back and neck pain continue limit tolerance for mobility tasks.  Bed positioning changed x2 with pt Mod A >Total A x2 for bed mobility.  He sat EOB 12 minutes with active movement in place to relieve back pain (sitting balance with UE support Mod A>Min A), with UE ROM exercises performed in bed.  Geovanna Drummond, Herminio, LOTR 7/4/2020

## 2020-07-04 NOTE — PROGRESS NOTES
[facility] Hospital Medicine  Progress Note    Patient Name: Vinnie Siegel  MRN: [id number]  Patient Class: IP- Inpatient   Admission Date: 6/27/2020  Length of Stay: 7 days  Attending Physician: Triny Dill MD  Primary Care Provider: Janeth Walter MD        Subjective:     Principal Problem:Type 2 diabetes mellitus with hyperglycemia        HPI:  Mr. Vinnie Siegel is a 74 y.o. male, with PMH of IDDM-2, CKD-IV, dilated cardiomyopathy, HTN, HLD, gout, obesity, who presented to [facility] ED via EMS on 6/27/20 with rectal bleeding x 2 days. His family called EMS after he became lethargic earlier this evening. Per his fiancee, he has been passing dark blood stools, and has been noting generalized weakness x 1 week. Additionally, he has been having elevated blood glucose readings x 1 day. In the ED, he was found to be in DKA, with a GI bleed. H&H were 6.4 & 19.4 and he was transfused 2 units PRBCs. He was started on an insulin drip and a Protonix drip. He had worsening respiratory and mental status while in the ED, and was intubated for airway protection. He was admitted to inpatient status to the ICU.     Overview/Hospital Course:  Admitted to ICU with HHS, encephalopathy, GI bleed, ADONAY, respiratory failure and sepsis. Sepsis was due to E. Coli UTI and Strep agalactiae pneumonia. Critical care and nephrology consulted and broad antibiotics continued. GI was consulted and he had EGD on 6/29 which showed normal esophagus and duodenum with OG tube trauma to mid gastric body but no source of bleeding was identified. He was extubated on 6/30. He attempted bowel prep for colonoscopy but was unable to tolerate; colonoscopy was pushed back. He had slow improvement in ADONAY as well as improvement in sepsis with antibiotics. ID was consulted and recommended cefazolin. He was found to have a large posterior neck abscess so general surgery was consulted and he had I&D. He was transferred out of ICU on  7/3.    Interval History: No acute events overnight. He complains of abdominal distension that improves when he passes flatus. Eating okay without N/V. No bleeding noted overnight.    Review of Systems   Constitutional: Negative for chills and fever.   Respiratory: Negative for shortness of breath.    Cardiovascular: Negative for chest pain.   Gastrointestinal: Positive for abdominal distention and abdominal pain. Negative for nausea and vomiting.     Objective:     Vital Signs (Most Recent):  Temp: 97.6 °F (36.4 °C) (07/04/20 1212)  Pulse: 66 (07/04/20 1212)  Resp: 18 (07/04/20 1212)  BP: (!) 172/74 (07/04/20 1212)  SpO2: 97 % (07/04/20 1212) Vital Signs (24h Range):  Temp:  [97.6 °F (36.4 °C)-98.4 °F (36.9 °C)] 97.6 °F (36.4 °C)  Pulse:  [58-78] 66  Resp:  [14-20] 18  SpO2:  [92 %-98 %] 97 %  BP: (133-172)/(60-74) 172/74     Weight: (!) 144.5 kg (318 lb 9 oz)  Body mass index is 40.83 kg/m².    Intake/Output Summary (Last 24 hours) at 7/4/2020 1226  Last data filed at 7/4/2020 1144  Gross per 24 hour   Intake 1892.33 ml   Output 1150 ml   Net 742.33 ml      Physical Exam  Vitals signs and nursing note reviewed.   Constitutional:       General: He is not in acute distress.     Appearance: Normal appearance.   Cardiovascular:      Rate and Rhythm: Normal rate and regular rhythm.      Pulses: Normal pulses.   Pulmonary:      Effort: No respiratory distress.      Comments: Decreased at the bases  Abdominal:      General: There is distension.      Tenderness: There is no abdominal tenderness.      Comments: Hypoactive bowel sounds, tympanic on percussion   Musculoskeletal:         General: No tenderness.      Right lower leg: No edema.      Left lower leg: No edema.   Skin:     General: Skin is warm and dry.      Comments: L posterior neck abscess s/p I&D, purulent drainage noted   Neurological:      Mental Status: He is alert and oriented to person, place, and time.       Significant Labs:   CBC:  Recent Labs   Lab  07/02/20  0454 07/03/20  0511 07/04/20  1023   WBC 16.45* 11.91 10.76   HGB 8.2* 8.4* 8.0*   HCT 23.8* 26.4* 25.7*    222 202   GRAN 83.9*  13.8* 80.0*  9.5* 79.3*  8.5*   LYMPH 8.3*  1.4 11.0*  1.3 13.1*  1.4   MONO 5.5  0.9 6.3  0.8 5.9  0.6   EOS 0.1 0.1 0.1   BASO 0.02 0.02 0.01      CMP:  Recent Labs   Lab 07/02/20  0454 07/02/20  1152 07/03/20  0511 07/04/20  1023   * 154* 151* 144   K 3.4* 3.8 3.4* 3.2*   * 118* 116* 111*   CO2 23 23 21* 20*   BUN 73* 71* 64* 59*   CREATININE 3.2* 3.2* 3.2* 3.4*   * 282* 260* 257*   CALCIUM 7.4* 7.4* 7.4* 7.2*   PHOS 3.2  --  3.6  3.6 4.6*  4.6*   ALBUMIN  --   --  1.7* 1.8*   ANIONGAP 13 13 14 13      Significant Imaging:   No new imaging today      Assessment/Plan:      * Type 2 diabetes mellitus with hyperglycemia  - HHS resolved  - Tolerating consistent carb diet  - Glucose remains elevated  - Continue Levemir 35 units qd, Aspart increased to 12 units TIDWM   - Continue SSI    Abdominal distension  - KUB with diffuse gaseous distension without obstruction  - Simethicone PRN  - Needs to increase mobility as able      Abscess of neck  - Soft tissue neck CT with abscess L posterior neck  - General surgery following, s/p I&D 7/2  - Cultures pending  - Improving on cefazolin so will continue      Hypernatremia  - Resolved  - Stop IVF. Encourage PO intake  - Monitor    LLL pneumonia  - CXR with LLL opacity   - Respiratory culture with Strep agalactiae. Continue Cefazolin per ID recs      Acute renal failure superimposed on stage 4 chronic kidney disease  - Multifactorial with likely acute blood loss anemia, GI losses contributing.  - Baseline Cr ~2.4-2.7.  - FENa suggestive of intrinsic source  - Good UOP. Cr seems to be stabilizing around 3.2-3.4  - Nephrology following, appreciate assistance.     UTI (urinary tract infection)  - Urine culture with E. Coli  - Continue Cefazolin per ID. End date: 7/10/20    Acute blood loss anemia  - 2/2 GI  bleed  - s/p 4 units PRBC  - Hb stable. Monitor for active bleeding    GIB (gastrointestinal bleeding)  - No bleeding noted overnight. Hb stable  - s/p 2U pRBCs 06/27, 1U pRBCs 06/28, 1U pRBC 6/30  - Continue to trend Hgb and transfuse PRN Hgb < 7.  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    Dilated cardiomyopathy  - Volume status improving; diuretics held with ADONAY.  - As under ADONAY.    CKD (chronic kidney disease) stage 4, GFR 15-29 ml/min  - As under ADONAY.    Essential hypertension  - BP Improved  - Continue coreg to 12.5 mg BID and nifedipine 30 mg qd    Debility       - Multifactorial       - Continue PT/OT       - Suspect he will require SNF placement prior to discharge home    VTE Risk Mitigation (From admission, onward)         Ordered     IP VTE HIGH RISK PATIENT  Once      06/28/20 0139     Reason for No Pharmacological VTE Prophylaxis  Once     Question:  Reasons:  Answer:  Active Bleeding    06/28/20 0139     Place sequential compression device  Until discontinued      06/28/20 0036              Picc not flushing. Will remove      Triny Dill MD  Department of Hospital Medicine   [facility]

## 2020-07-04 NOTE — ASSESSMENT & PLAN NOTE
- No bleeding noted overnight. Hb stable  - s/p 2U pRBCs 06/27, 1U pRBCs 06/28, 1U pRBC 6/30  - Continue to trend Hgb and transfuse PRN Hgb < 7.  - Continuing pantoprazole 40mg PO qd  - GI following  - EGD 6/29 with normal esophagus, OG tube trauma to the mid body and normal duodenum  - Unable to complete bowel prep so colonoscopy put on hold

## 2020-07-04 NOTE — ASSESSMENT & PLAN NOTE
- HHS resolved  - Tolerating consistent carb diet  - Glucose remains elevated  - Continue Levemir 35 units qd, Aspart increased to 12 units TIDWM   - Continue SSI

## 2020-07-04 NOTE — PT/OT/SLP PROGRESS
Physical Therapy Treatment    Patient Name:  Vinnie Siegel   MRN:  [id number]    Recommendations:     Discharge Recommendations:  (TBD)   Discharge Equipment Recommendations: (TBD)   Barriers to discharge: Decreased caregiver support    Assessment:     Vinnie Siegel is a 74 y.o. male admitted with a medical diagnosis of Type 2 diabetes mellitus with hyperglycemia.  He presents with the following impairments/functional limitations:  weakness, impaired endurance, impaired self care skills, impaired functional mobilty, gait instability, impaired balance, decreased upper extremity function, decreased lower extremity function, pain, edema ; pt with improved mobility today, dec. Assistance req'd for bed mobility, though pt needing lots of encouragement to participate, c/o back pain and wanting to lie back down.    Rehab Prognosis: Good; patient would benefit from acute skilled PT services to address these deficits and reach maximum level of function.    Recent Surgery: Procedure(s) (LRB):  EGD (ESOPHAGOGASTRODUODENOSCOPY) (N/A) 5 Days Post-Op    Plan:     During this hospitalization, patient to be seen (4-5x/week) to address the identified rehab impairments via therapeutic activities, therapeutic exercises, neuromuscular re-education, wheelchair management/training and progress toward the following goals:    · Plan of Care Expires:  07/31/20    Subjective     Chief Complaint: back pain  Patient/Family Comments/goals: pt c/o some dizziness and back pain when sitting up EOB. Requesting to lie down. Brea present for session, reports she has back issues as well.  Pain/Comfort:  · Pain Rating 1: 8/10  · Location - Orientation 1: lower  · Location 1: back  · Pain Addressed 1: Reposition, Distraction, Cessation of Activity, Nurse notified  · Pain Rating Post-Intervention 1: 8/10      Objective:     Communicated with nurse prior to session.  Patient found HOB elevated with bed alarm, bowel management system, perera catheter,  peripheral IV, telemetry([employer] monitoring) upon PT entry to room.     General Precautions: Standard, diabetic, fall   Orthopedic Precautions:N/A   Braces: N/A     Functional Mobility:  · Bed Mobility:     · Rolling Left:  moderate assistance  · Supine to Sit: moderate assistance, of 1 persons and HOB elevated with use of bedrail  · Sit to Supine: moderate assistance and at trunk and tot A at LE's      AM-PAC 6 CLICK MOBILITY  Turning over in bed (including adjusting bedclothes, sheets and blankets)?: 2  Sitting down on and standing up from a chair with arms (e.g., wheelchair, bedside commode, etc.): 1  Moving from lying on back to sitting on the side of the bed?: 2  Moving to and from a bed to a chair (including a wheelchair)?: 1  Need to walk in hospital room?: 1  Climbing 3-5 steps with a railing?: 1  Basic Mobility Total Score: 8       Therapeutic Activities and Exercises:   pt sat up EOB x 12' with mostly Lyric, occ modA for balance.   perf'd seated LAQ's, AAROM, x 5 ea. Supine AP's, and heelsildes x 5 ea.   OT present and working on UE ex's and rocking motion to help with back pain.     Patient left HOB elevated with all lines intact, call button in reach, bed alarm on, nurse notified and fiance present..    GOALS:   Multidisciplinary Problems     Physical Therapy Goals        Problem: Physical Therapy Goal    Goal Priority Disciplines Outcome Goal Variances Interventions   Physical Therapy Goal     PT, PT/OT Ongoing, Progressing     Description: Goals to be met by: 20    Patient will increase functional independence with mobility by performin. Supine to sit with min A.  2. Sit to supine with min A.   3. Rolling R and L with min A.   4. Sitting at edge of bed >5 minutes with min A.   5. PT will assess sit<>stand.                    Time Tracking:     PT Received On: 20  PT Start Time: 1127     PT Stop Time: 1147  PT Total Time (min): 20 min     Billable Minutes: Therapeutic Activity 15 (co-tx  with OT 5 min no charge)    Treatment Type: Treatment  PT/PTA: PTA     PTA Visit Number: 1     Maria G Delgado, PTA  07/04/2020

## 2020-07-04 NOTE — PLAN OF CARE
Pt resting comfortably.  No complaints of pain during shift.  Dressing change performed by charge RN.  Fluids discontinued.  PICC removed.  Mario and rectal tube in place.  Small amount of leakage.  Pt cleaned and skin protectant applied.  Good appetite.  CBG monitored and insulin given as ordered.  Family at bedside during shift.  Safety measures in place.  Purposeful hourly rounding completed.  Will continue to monitor.

## 2020-07-04 NOTE — PLAN OF CARE
Problem: Physical Therapy Goal  Goal: Physical Therapy Goal  Description: Goals to be met by: 20    Patient will increase functional independence with mobility by performin. Supine to sit with min A.  2. Sit to supine with min A.   3. Rolling R and L with min A.   4. Sitting at edge of bed >5 minutes with min A.   5. PT will assess sit<>stand.   Outcome: Ongoing, Progressing   Pt with improved bed mobility today, sup to sit modA with HOB up high and pt using railing , sat up EOB x 12' with min/modA for balance. Pt mostly c/o 8/10 back pain today, some c/o's dizziness as well. D/c Recommendation TBD.

## 2020-07-05 PROBLEM — E87.0 HYPERNATREMIA: Status: RESOLVED | Noted: 2020-07-02 | Resolved: 2020-07-05

## 2020-07-05 LAB
ALBUMIN SERPL BCP-MCNC: 1.8 G/DL (ref 3.5–5.2)
ANION GAP SERPL CALC-SCNC: 12 MMOL/L (ref 8–16)
BASOPHILS # BLD AUTO: 0.02 K/UL (ref 0–0.2)
BASOPHILS NFR BLD: 0.2 % (ref 0–1.9)
BUN SERPL-MCNC: 55 MG/DL (ref 8–23)
CALCIUM SERPL-MCNC: 7.1 MG/DL (ref 8.7–10.5)
CHLORIDE SERPL-SCNC: 112 MMOL/L (ref 95–110)
CO2 SERPL-SCNC: 21 MMOL/L (ref 23–29)
CREAT SERPL-MCNC: 3.1 MG/DL (ref 0.5–1.4)
DIFFERENTIAL METHOD: ABNORMAL
EOSINOPHIL # BLD AUTO: 0.1 K/UL (ref 0–0.5)
EOSINOPHIL NFR BLD: 0.9 % (ref 0–8)
ERYTHROCYTE [DISTWIDTH] IN BLOOD BY AUTOMATED COUNT: 15.8 % (ref 11.5–14.5)
EST. GFR  (AFRICAN AMERICAN): 22 ML/MIN/1.73 M^2
EST. GFR  (NON AFRICAN AMERICAN): 19 ML/MIN/1.73 M^2
GLUCOSE SERPL-MCNC: 83 MG/DL (ref 70–110)
HCT VFR BLD AUTO: 23.8 % (ref 40–54)
HGB BLD-MCNC: 7.6 G/DL (ref 14–18)
IMM GRANULOCYTES # BLD AUTO: 0.1 K/UL (ref 0–0.04)
IMM GRANULOCYTES NFR BLD AUTO: 1 % (ref 0–0.5)
LYMPHOCYTES # BLD AUTO: 1.7 K/UL (ref 1–4.8)
LYMPHOCYTES NFR BLD: 16.7 % (ref 18–48)
MCH RBC QN AUTO: 29.9 PG (ref 27–31)
MCHC RBC AUTO-ENTMCNC: 31.9 G/DL (ref 32–36)
MCV RBC AUTO: 94 FL (ref 82–98)
MONOCYTES # BLD AUTO: 0.6 K/UL (ref 0.3–1)
MONOCYTES NFR BLD: 5.7 % (ref 4–15)
NEUTROPHILS # BLD AUTO: 7.5 K/UL (ref 1.8–7.7)
NEUTROPHILS NFR BLD: 75.5 % (ref 38–73)
NRBC BLD-RTO: 0 /100 WBC
PHOSPHATE SERPL-MCNC: 4.1 MG/DL (ref 2.7–4.5)
PHOSPHATE SERPL-MCNC: 4.1 MG/DL (ref 2.7–4.5)
PLATELET # BLD AUTO: 202 K/UL (ref 150–350)
PMV BLD AUTO: 10.5 FL (ref 9.2–12.9)
POCT GLUCOSE: 132 MG/DL (ref 70–110)
POCT GLUCOSE: 368 MG/DL (ref 70–110)
POCT GLUCOSE: 66 MG/DL (ref 70–110)
POCT GLUCOSE: 99 MG/DL (ref 70–110)
POTASSIUM SERPL-SCNC: 3.1 MMOL/L (ref 3.5–5.1)
RBC # BLD AUTO: 2.54 M/UL (ref 4.6–6.2)
SODIUM SERPL-SCNC: 145 MMOL/L (ref 136–145)
WBC # BLD AUTO: 9.89 K/UL (ref 3.9–12.7)

## 2020-07-05 PROCEDURE — 99233 PR SUBSEQUENT HOSPITAL CARE,LEVL III: ICD-10-PCS | Mod: ,,, | Performed by: INTERNAL MEDICINE

## 2020-07-05 PROCEDURE — 80069 RENAL FUNCTION PANEL: CPT

## 2020-07-05 PROCEDURE — 36415 COLL VENOUS BLD VENIPUNCTURE: CPT

## 2020-07-05 PROCEDURE — 94761 N-INVAS EAR/PLS OXIMETRY MLT: CPT

## 2020-07-05 PROCEDURE — 25000003 PHARM REV CODE 250: Performed by: INTERNAL MEDICINE

## 2020-07-05 PROCEDURE — 63600175 PHARM REV CODE 636 W HCPCS: Performed by: INTERNAL MEDICINE

## 2020-07-05 PROCEDURE — 11000001 HC ACUTE MED/SURG PRIVATE ROOM

## 2020-07-05 PROCEDURE — 85025 COMPLETE CBC W/AUTO DIFF WBC: CPT

## 2020-07-05 PROCEDURE — 99900035 HC TECH TIME PER 15 MIN (STAT)

## 2020-07-05 PROCEDURE — 99233 SBSQ HOSP IP/OBS HIGH 50: CPT | Mod: ,,, | Performed by: INTERNAL MEDICINE

## 2020-07-05 RX ORDER — POTASSIUM CHLORIDE 750 MG/1
30 TABLET, EXTENDED RELEASE ORAL ONCE
Status: COMPLETED | OUTPATIENT
Start: 2020-07-05 | End: 2020-07-05

## 2020-07-05 RX ORDER — POLYETHYLENE GLYCOL 3350, SODIUM SULFATE ANHYDROUS, SODIUM BICARBONATE, SODIUM CHLORIDE, POTASSIUM CHLORIDE 236; 22.74; 6.74; 5.86; 2.97 G/4L; G/4L; G/4L; G/4L; G/4L
4000 POWDER, FOR SOLUTION ORAL ONCE
Status: COMPLETED | OUTPATIENT
Start: 2020-07-05 | End: 2020-07-05

## 2020-07-05 RX ORDER — POLYETHYLENE GLYCOL 3350, SODIUM SULFATE ANHYDROUS, SODIUM BICARBONATE, SODIUM CHLORIDE, POTASSIUM CHLORIDE 236; 22.74; 6.74; 5.86; 2.97 G/4L; G/4L; G/4L; G/4L; G/4L
4000 POWDER, FOR SOLUTION ORAL ONCE
Status: DISCONTINUED | OUTPATIENT
Start: 2020-07-05 | End: 2020-07-05

## 2020-07-05 RX ADMIN — TIMOLOL MALEATE 1 DROP: 2.5 SOLUTION/ DROPS OPHTHALMIC at 09:07

## 2020-07-05 RX ADMIN — PANTOPRAZOLE SODIUM 40 MG: 40 TABLET, DELAYED RELEASE ORAL at 09:07

## 2020-07-05 RX ADMIN — CARVEDILOL 12.5 MG: 12.5 TABLET, FILM COATED ORAL at 09:07

## 2020-07-05 RX ADMIN — SIMETHICONE CHEW TAB 80 MG 80 MG: 80 TABLET ORAL at 11:07

## 2020-07-05 RX ADMIN — TAMSULOSIN HYDROCHLORIDE 0.4 MG: 0.4 CAPSULE ORAL at 09:07

## 2020-07-05 RX ADMIN — POTASSIUM CHLORIDE 30 MEQ: 750 TABLET, EXTENDED RELEASE ORAL at 09:07

## 2020-07-05 RX ADMIN — INSULIN ASPART 12 UNITS: 100 INJECTION, SOLUTION INTRAVENOUS; SUBCUTANEOUS at 09:07

## 2020-07-05 RX ADMIN — CEFAZOLIN SODIUM 2 G: 2 SOLUTION INTRAVENOUS at 05:07

## 2020-07-05 RX ADMIN — INSULIN ASPART 12 UNITS: 100 INJECTION, SOLUTION INTRAVENOUS; SUBCUTANEOUS at 12:07

## 2020-07-05 RX ADMIN — CEFAZOLIN SODIUM 2 G: 2 SOLUTION INTRAVENOUS at 09:07

## 2020-07-05 RX ADMIN — CEFAZOLIN SODIUM 2 G: 2 SOLUTION INTRAVENOUS at 02:07

## 2020-07-05 RX ADMIN — INSULIN ASPART 3 UNITS: 100 INJECTION, SOLUTION INTRAVENOUS; SUBCUTANEOUS at 10:07

## 2020-07-05 RX ADMIN — INSULIN DETEMIR 35 UNITS: 100 INJECTION, SOLUTION SUBCUTANEOUS at 09:07

## 2020-07-05 RX ADMIN — POLYETHYLENE GLYCOL 3350, SODIUM SULFATE ANHYDROUS, SODIUM BICARBONATE, SODIUM CHLORIDE, POTASSIUM CHLORIDE 4000 ML: 236; 22.74; 6.74; 5.86; 2.97 POWDER, FOR SOLUTION ORAL at 09:07

## 2020-07-05 RX ADMIN — NIFEDIPINE 60 MG: 30 TABLET, FILM COATED, EXTENDED RELEASE ORAL at 09:07

## 2020-07-05 RX ADMIN — MELATONIN 1000 UNITS: at 09:07

## 2020-07-05 NOTE — ASSESSMENT & PLAN NOTE
- Multifactorial with likely acute blood loss anemia, GI losses contributing.  - Baseline Cr ~2.4-2.7.  - FENa suggestive of intrinsic source  - Good UOP. Cr down trending, 3.1 today  - Nephrology following, appreciate assistance.

## 2020-07-05 NOTE — PROGRESS NOTES
RN found that pt had expelled rectal tube.  Trace BM on pad.  Pt cleaned with bath wipes and Triad paste applied to rectum.

## 2020-07-05 NOTE — ASSESSMENT & PLAN NOTE
- s/p 2U pRBCs 06/27, 1U pRBCs 06/28, 1U pRBC 6/30  - Hb down trending. Continue to trend Hb and transfuse PRN Hgb < 7.  - Continuing pantoprazole 40mg PO qd  - GI following  - EGD 6/29 with normal esophagus, OG tube trauma to the mid body and normal duodenum  - Plan for colonoscopy tomorrow

## 2020-07-05 NOTE — PLAN OF CARE
Pt resting comfortably.  Brother at bedside for several hours.  Pt showing signs of frustration and sadness.  Poor appetite.  Simethicone given x1.  Mario and Flexiseal in place.  Pt to begin bowel prep tonight for colonoscopy tomorrow.  Dressing on neck changed.  Dressing where PICC was removed 7/4 replaced.  Scant leakage observed.  Heels in moon boots.  Safety measures in place.  Purposeful hourly rounding completed.  Will continue to monitor.

## 2020-07-05 NOTE — PROGRESS NOTES
[facility] Hospital Medicine  Progress Note    Patient Name: Vinnie Siegel  MRN: [id number]  Patient Class: IP- Inpatient   Admission Date: 6/27/2020  Length of Stay: 8 days  Attending Physician: Triny Dill MD  Primary Care Provider: Janeth Walter MD        Subjective:     Principal Problem:Type 2 diabetes mellitus with hyperglycemia        HPI:  Mr. Vinnie Siegel is a 74 y.o. male, with PMH of IDDM-2, CKD-IV, dilated cardiomyopathy, HTN, HLD, gout, obesity, who presented to [facility] ED via EMS on 6/27/20 with rectal bleeding x 2 days. His family called EMS after he became lethargic earlier this evening. Per his fiancee, he has been passing dark blood stools, and has been noting generalized weakness x 1 week. Additionally, he has been having elevated blood glucose readings x 1 day. In the ED, he was found to be in DKA, with a GI bleed. H&H were 6.4 & 19.4 and he was transfused 2 units PRBCs. He was started on an insulin drip and a Protonix drip. He had worsening respiratory and mental status while in the ED, and was intubated for airway protection. He was admitted to inpatient status to the ICU.     Overview/Hospital Course:  Admitted to ICU with HHS, encephalopathy, GI bleed, ADONAY, respiratory failure and sepsis. Sepsis was due to E. Coli UTI and Strep agalactiae pneumonia. Critical care and nephrology consulted and broad antibiotics continued. GI was consulted and he had EGD on 6/29 which showed normal esophagus and duodenum with OG tube trauma to mid gastric body but no source of bleeding was identified. He was extubated on 6/30. He attempted bowel prep for colonoscopy but was unable to tolerate; colonoscopy was pushed back. He had slow improvement in ADONAY as well as improvement in sepsis with antibiotics. ID was consulted and recommended cefazolin. He was found to have a large posterior neck abscess so general surgery was consulted and he had I&D. He was transferred out of ICU on  7/3.    Interval History: No acute events overnight. Continues with abdominal distension and gas pains. No new complaints. Brother at bedside, updated on POC.    Review of Systems   Constitutional: Negative for chills and fever.   Respiratory: Negative for shortness of breath.    Cardiovascular: Negative for chest pain.   Gastrointestinal: Positive for abdominal distention and abdominal pain. Negative for nausea and vomiting.     Objective:     Vital Signs (Most Recent):  Temp: 98.3 °F (36.8 °C) (07/05/20 0823)  Pulse: 61 (07/05/20 1000)  Resp: 20 (07/05/20 0823)  BP: (!) 159/69 (07/05/20 0823)  SpO2: 97 % (07/05/20 0823) Vital Signs (24h Range):  Temp:  [97.6 °F (36.4 °C)-98.7 °F (37.1 °C)] 98.3 °F (36.8 °C)  Pulse:  [54-73] 61  Resp:  [18-22] 20  SpO2:  [95 %-98 %] 97 %  BP: (135-172)/(63-74) 159/69     Weight: (!) 144.5 kg (318 lb 9 oz)  Body mass index is 40.83 kg/m².    Intake/Output Summary (Last 24 hours) at 7/5/2020 1138  Last data filed at 7/5/2020 0700  Gross per 24 hour   Intake 1149 ml   Output 1630 ml   Net -481 ml      Physical Exam  Vitals signs and nursing note reviewed.   Constitutional:       General: He is not in acute distress.     Appearance: Normal appearance.   Cardiovascular:      Rate and Rhythm: Normal rate and regular rhythm.      Pulses: Normal pulses.   Pulmonary:      Effort: No respiratory distress.      Comments: Decreased at the bases  Abdominal:      General: There is distension.      Tenderness: There is no abdominal tenderness.      Comments: Hypoactive, high pitched bowel sounds   Musculoskeletal:         General: No tenderness.      Right lower leg: No edema.      Left lower leg: No edema.   Skin:     General: Skin is warm and dry.      Comments: L posterior neck abscess s/p I&D, purulent drainage noted   Neurological:      Mental Status: He is alert and oriented to person, place, and time.       Significant Labs:   CBC:  Recent Labs   Lab 07/03/20  0511 07/04/20  1023  07/05/20  0511   WBC 11.91 10.76 9.89   HGB 8.4* 8.0* 7.6*   HCT 26.4* 25.7* 23.8*    202 202   GRAN 80.0*  9.5* 79.3*  8.5* 75.5*  7.5   LYMPH 11.0*  1.3 13.1*  1.4 16.7*  1.7   MONO 6.3  0.8 5.9  0.6 5.7  0.6   EOS 0.1 0.1 0.1   BASO 0.02 0.01 0.02      CMP:  Recent Labs   Lab 07/03/20  0511 07/04/20  1023 07/05/20  0511   * 144 145   K 3.4* 3.2* 3.1*   * 111* 112*   CO2 21* 20* 21*   BUN 64* 59* 55*   CREATININE 3.2* 3.4* 3.1*   * 257* 83   CALCIUM 7.4* 7.2* 7.1*   PHOS 3.6  3.6 4.6*  4.6* 4.1  4.1   ALBUMIN 1.7* 1.8* 1.8*   ANIONGAP 14 13 12      Significant Imaging:   No new imaging today      Assessment/Plan:      * Type 2 diabetes mellitus with hyperglycemia  - HHS resolved  - Tolerating consistent carb diet  - Glucose improving  - Continue Levemir 35 units qd, Aspart 12 units TIDWM   - Continue SSI    Debility  - Continue PT/OT. Anticipate he will need placement on discharge      Abdominal distension  - KUB with diffuse gaseous distension without obstruction  - Simethicone PRN  - Needs to increase mobility as able      Abscess of neck  - Soft tissue neck CT with abscess L posterior neck  - General surgery following, s/p I&D 7/2  - Cultures pending  - Improving on cefazolin so will continue      LLL pneumonia  - CXR with LLL opacity   - Respiratory culture with Strep agalactiae. Continue Cefazolin per ID recs      Acute renal failure superimposed on stage 4 chronic kidney disease  - Multifactorial with likely acute blood loss anemia, GI losses contributing.  - Baseline Cr ~2.4-2.7.  - FENa suggestive of intrinsic source  - Good UOP. Cr down trending, 3.1 today  - Nephrology following, appreciate assistance.     UTI (urinary tract infection)  - Urine culture with E. Coli  - Continue Cefazolin per ID. End date: 7/10/20    Acute blood loss anemia  - 2/2 GI bleed  - s/p 4 units PRBC  - Hb down trending. Monitor    GIB (gastrointestinal bleeding)  - s/p 2U pRBCs 06/27, 1U  pRBCs 06/28, 1U pRBC 6/30  - Hb down trending. Continue to trend Hb and transfuse PRN Hgb < 7.  - Continuing pantoprazole 40mg PO qd  - GI following  - EGD 6/29 with normal esophagus, OG tube trauma to the mid body and normal duodenum  - Plan for colonoscopy tomorrow    Obesity, Class III, BMI 40-49.9 (morbid obesity)  - Dietary counseling after improvement from critical condition.    Dilated cardiomyopathy  - Volume status improving; diuretics held with ADONAY.  - As under ADONAY.    CKD (chronic kidney disease) stage 4, GFR 15-29 ml/min  - As under ADONAY.    Essential hypertension  - BP high  - Continue coreg to 12.5 mg BID. Nifedipine increased to 60 mg qd      VTE Risk Mitigation (From admission, onward)         Ordered     IP VTE HIGH RISK PATIENT  Once      06/28/20 0139     Reason for No Pharmacological VTE Prophylaxis  Once     Question:  Reasons:  Answer:  Active Bleeding    06/28/20 0139     Place sequential compression device  Until discontinued      06/28/20 0036                      Triny Dill MD  Department of Hospital Medicine   [facility]

## 2020-07-05 NOTE — SUBJECTIVE & OBJECTIVE
Interval History: No acute events overnight. Continues with abdominal distension and gas pains. No new complaints. Brother at bedside, updated on POC.    Review of Systems   Constitutional: Negative for chills and fever.   Respiratory: Negative for shortness of breath.    Cardiovascular: Negative for chest pain.   Gastrointestinal: Positive for abdominal distention and abdominal pain. Negative for nausea and vomiting.     Objective:     Vital Signs (Most Recent):  Temp: 98.3 °F (36.8 °C) (07/05/20 0823)  Pulse: 61 (07/05/20 1000)  Resp: 20 (07/05/20 0823)  BP: (!) 159/69 (07/05/20 0823)  SpO2: 97 % (07/05/20 0823) Vital Signs (24h Range):  Temp:  [97.6 °F (36.4 °C)-98.7 °F (37.1 °C)] 98.3 °F (36.8 °C)  Pulse:  [54-73] 61  Resp:  [18-22] 20  SpO2:  [95 %-98 %] 97 %  BP: (135-172)/(63-74) 159/69     Weight: (!) 144.5 kg (318 lb 9 oz)  Body mass index is 40.83 kg/m².    Intake/Output Summary (Last 24 hours) at 7/5/2020 1138  Last data filed at 7/5/2020 0700  Gross per 24 hour   Intake 1149 ml   Output 1630 ml   Net -481 ml      Physical Exam  Vitals signs and nursing note reviewed.   Constitutional:       General: He is not in acute distress.     Appearance: Normal appearance.   Cardiovascular:      Rate and Rhythm: Normal rate and regular rhythm.      Pulses: Normal pulses.   Pulmonary:      Effort: No respiratory distress.      Comments: Decreased at the bases  Abdominal:      General: There is distension.      Tenderness: There is no abdominal tenderness.      Comments: Hypoactive, high pitched bowel sounds   Musculoskeletal:         General: No tenderness.      Right lower leg: No edema.      Left lower leg: No edema.   Skin:     General: Skin is warm and dry.      Comments: L posterior neck abscess s/p I&D, purulent drainage noted   Neurological:      Mental Status: He is alert and oriented to person, place, and time.       Significant Labs:   CBC:  Recent Labs   Lab 07/03/20  0511 07/04/20  1023 07/05/20  0511    WBC 11.91 10.76 9.89   HGB 8.4* 8.0* 7.6*   HCT 26.4* 25.7* 23.8*    202 202   GRAN 80.0*  9.5* 79.3*  8.5* 75.5*  7.5   LYMPH 11.0*  1.3 13.1*  1.4 16.7*  1.7   MONO 6.3  0.8 5.9  0.6 5.7  0.6   EOS 0.1 0.1 0.1   BASO 0.02 0.01 0.02      CMP:  Recent Labs   Lab 07/03/20  0511 07/04/20  1023 07/05/20  0511   * 144 145   K 3.4* 3.2* 3.1*   * 111* 112*   CO2 21* 20* 21*   BUN 64* 59* 55*   CREATININE 3.2* 3.4* 3.1*   * 257* 83   CALCIUM 7.4* 7.2* 7.1*   PHOS 3.6  3.6 4.6*  4.6* 4.1  4.1   ALBUMIN 1.7* 1.8* 1.8*   ANIONGAP 14 13 12      Significant Imaging:   No new imaging today

## 2020-07-05 NOTE — PROGRESS NOTES
Subjective:       Patient ID: Vinnie Siegel is a 74 y.o. male.    Chief Complaint:  Rectal Bleeding (x 2 days, lethargic x 1 week but dark blood noted to stool per wife. )       History of Present Illness  Pt denies any overt gi bleed  Discussed colonoscopy with patient    Review of Systems  Respiratory: negative      Objective:     Vitals:    07/05/20 0600 07/05/20 0700 07/05/20 0753 07/05/20 0823   BP:    (!) 159/69   BP Location:    Left arm   Patient Position:    Lying   Pulse: 60 (!) 54 60 64   Resp:   18 20   Temp:    98.3 °F (36.8 °C)   TempSrc:    Oral   SpO2:   96% 97%   Weight:       Height:          Abdomen: soft, non-tender; bowel sounds normal; no masses,  no organomegaly    Data Review   Recent Results (from the past 336 hour(s))   CBC auto differential    Collection Time: 07/05/20  5:11 AM   Result Value Ref Range    WBC 9.89 3.90 - 12.70 K/uL    Hemoglobin 7.6 (L) 14.0 - 18.0 g/dL    Hematocrit 23.8 (L) 40.0 - 54.0 %    Platelets 202 150 - 350 K/uL   CBC auto differential    Collection Time: 07/04/20 10:23 AM   Result Value Ref Range    WBC 10.76 3.90 - 12.70 K/uL    Hemoglobin 8.0 (L) 14.0 - 18.0 g/dL    Hematocrit 25.7 (L) 40.0 - 54.0 %    Platelets 202 150 - 350 K/uL   CBC auto differential    Collection Time: 07/03/20  5:11 AM   Result Value Ref Range    WBC 11.91 3.90 - 12.70 K/uL    Hemoglobin 8.4 (L) 14.0 - 18.0 g/dL    Hematocrit 26.4 (L) 40.0 - 54.0 %    Platelets 222 150 - 350 K/uL      Recent Labs   Lab 06/28/20  1615   INR 1.0     Recent Labs   Lab 07/03/20  0511 07/04/20  1023 07/05/20  0511   * 144 145   K 3.4* 3.2* 3.1*   * 111* 112*   CO2 21* 20* 21*   BUN 64* 59* 55*   CREATININE 3.2* 3.4* 3.1*   CALCIUM 7.4* 7.2* 7.1*      Lab Results   Component Value Date    HEPAIGM Negative 06/29/2020    HEPBIGM Negative 06/29/2020    HEPCAB Negative 06/29/2020    No results found for: AFP   No results found for: CEA   Recent Labs   Lab 06/28/20  1615   INR 1.0        Assessment:      1. Acute blood loss anemia    2. Dizziness    3. Rectal bleeding    4. Endotracheal tube present    5. GIB (gastrointestinal bleeding)    6. Diabetic ketoacidosis with coma associated with type 2 diabetes mellitus    7. Acute renal failure superimposed on chronic kidney disease, unspecified CKD stage, unspecified acute renal failure type    8. Respiratory failure, unspecified chronicity, unspecified whether with hypoxia or hypercapnia    9. Type 2 diabetes mellitus with hyperosmolar nonketotic hyperglycemia    10. Acute respiratory failure with hypoxia    11. Encephalopathy, metabolic    12. Metabolic acidosis, normal anion gap (NAG)    13. Type 2 diabetes mellitus with stage 4 chronic kidney disease, with long-term current use of insulin    14. Volume overload        Plan:     1) serial h/h  2) prep for colon in AM

## 2020-07-05 NOTE — PLAN OF CARE
Patient in no apparent distress. Sat's  95 % on room air. PRN treatments not needed . Will continue to monitor.

## 2020-07-05 NOTE — PROGRESS NOTES
"Nephrology  Progress Note    Admit Date: 6/27/2020   LOS: 8 days     SUBJECTIVE:     Follow-up For:  Type 2 diabetes mellitus with hyperglycemia    Interval History:         Review of Systems:    Only c/o today is "gas". Good UOP. Neck is improving.     OBJECTIVE:     Vital Signs Range (Last 24H):  BP (!) 155/64 (BP Location: Left arm, Patient Position: Lying)   Pulse 60   Temp 98.1 °F (36.7 °C) (Oral)   Resp 20   Ht 6' 2" (1.88 m)   Wt (!) 144.5 kg (318 lb 9 oz)   SpO2 98%   BMI 40.83 kg/m²     Temp:  [97.8 °F (36.6 °C)-98.7 °F (37.1 °C)]   Pulse:  [54-72]   Resp:  [18-22]   BP: (135-159)/(63-72)   SpO2:  [95 %-98 %]     I & O (Last 24H):    Intake/Output Summary (Last 24 hours) at 7/5/2020 1325  Last data filed at 7/5/2020 1200  Gross per 24 hour   Intake 980 ml   Output 1630 ml   Net -650 ml       Physical Exam:  General appearance: Well developed, well nourished, weak, obese  Eyes:  Conjunctivae/corneas clear. PERRL.  Neck: left neck wound CDI  Lungs: diminished.    Heart: Regular rate and rhythm, S1, S2 normal, no murmur, rub or beatrice.  Abdomen: Soft, non-tender non-distended; bowel sounds normal; no masses,  no organomegaly, obese, rectal tube dark stool.   Extremities: No cyanosis or clubbing. 1-2+ edema.  Left neck abscess noted.   Skin: Skin color, texture, turgor normal. No rashes or lesions  Neurologic: No focal numbness or weakness   Mario  Right groin TLC (positional)    Laboratory Data:  Recent Labs   Lab 07/05/20  0511   WBC 9.89   RBC 2.54*   HGB 7.6*   HCT 23.8*      MCV 94   MCH 29.9   MCHC 31.9*       BMP:   Recent Labs   Lab 07/01/20  0550  07/05/20  0511   GLU  --    < > 83   NA  --    < > 145   K  --    < > 3.1*   CL  --    < > 112*   CO2  --    < > 21*   BUN  --    < > 55*   CREATININE  --    < > 3.1*   CALCIUM  --    < > 7.1*   MG 1.9  --   --     < > = values in this interval not displayed.     Lab Results   Component Value Date    CALCIUM 7.1 (L) 07/05/2020    PHOS 4.1 " 07/05/2020    PHOS 4.1 07/05/2020       Lab Results   Component Value Date    .5 (H) 06/29/2020    CALCIUM 7.1 (L) 07/05/2020    CAION 1.07 06/28/2020    PHOS 4.1 07/05/2020    PHOS 4.1 07/05/2020       Lab Results   Component Value Date    URICACID 8.6 (H) 06/29/2020       BNP  Recent Labs   Lab 07/03/20  0511   *       Medications:  Medication list was reviewed and changes noted under Assessment/Plan.    Diagnostic Results:        ASSESSMENT/PLAN:     73 YO male with DM, HTN, CHF with Dilated CM, COPD, CKD 4, now with GIB and Hyperosmolar nonketotic hyperglycemia, resp failure prompting intubation to protect airway, severe anemia with Hgb 6.4, elevated BUN due to GIB, hypotension, and ADONAY     HHS resolved  -s/p Insulin drip and now on basal/prandial.    -UTI/abscess noted.  -defer  -glucose labile.     Slowly resolving Non-oliguric ADONAY on CKD 4/ Hypokalemia  -Baseline creat over past year 2.5-2.9 with history of proteinuria  -Creat 4.4 on admit and slowly improving to 3.4>3.1  -UOP 1.5L   -Prot/creat ratio 1 gram  -Expect that severe anemia and hypotension etiology for ADONAY  -Doesn't appear he has seen nephrologist so ordered full workup and mildly elevated ELSA with negative reflex only positive finding so far.  -Normally prefer no PPI but with GIB this okay  -No nephrotoxins.  -replace lytes PRN.  -Renally dose meds, avoid nephrotoxins, and monitor I/O's closely.       GIB with severe anemia  -Agree with transfusions prn  -dosed Epo.  -mildly iron deficient but would not give IV iron for now  -GI consulted - EGD noted.  .   -Protonix as now   -Plans for colon in AM  -Hgb with slow trend down    DM  -Last HgA1c in December 2019 7.7  -Defer to primary team  -as above     Sepsis from UTI/neck abscess:  -cultured and continue antibx for now.   -Dr. Basilio following  -lactic acid, procal, and WBC's improving.     Vit D Def with mild HPTH/ Hx Calcium Oxalate Nephrolithiasis:  -Started on calcitriol 0.25mcg  3x/week, but will stop for now.  -However, Vit D 25 extremely low, but Hx Nephrolithiasis.  -Changed to low dose Cholecalciferol 1,000 units daily and will need to be monitored closely with 24 hr urine Calcium.       Resolved Hypernatremia:  -encourage water intake.    HTN:  -started BB/CCB.  Hydralazine prn.   -Bp was better, but up a bit today. Will monitor for now.  -no ACE/ARB for now.

## 2020-07-05 NOTE — PLAN OF CARE
Pt remained free of falls and injuries throughout shift. AAOx4. Pt calm and cooperative. Purposeful hourly rounding performed. Pt swallows meds whole. IV flushed and saline locked.  Patient bedfast. Mario catheter draining to gravity, clear yellow urine in drainage bag. Rectal tube in place, balloon inflated with 40ml sterile water, irrigated with 20 mL sterile water. Pt turned q2h using wedge. VSS on room air. Pt resting comfortably, denies pain, denies needs. Bed low and locked, AV[employer] monitoring in place, call light in reach. Side rails up x2. Will continue to monitor.

## 2020-07-05 NOTE — PROGRESS NOTES
DX-LGI bleed, infected cyst posterior neck  No abd complaints  Neck feels better    PE  Afebrile  VSS  Heent--cyst continues to drain and decompress,   Abd--soft, non tender    Lab  hct 23.8    Plan  colonoscopy

## 2020-07-05 NOTE — PROGRESS NOTES
Wound care performed x2 this morning.  Wound cleansed with sterile NS and patted dry with sterile gauze dressed with foam dressing.  First dressing became dislodged when moving patient.

## 2020-07-06 LAB
ALBUMIN SERPL BCP-MCNC: 1.8 G/DL (ref 3.5–5.2)
ANION GAP SERPL CALC-SCNC: 12 MMOL/L (ref 8–16)
BASOPHILS # BLD AUTO: 0.02 K/UL (ref 0–0.2)
BASOPHILS NFR BLD: 0.2 % (ref 0–1.9)
BUN SERPL-MCNC: 43 MG/DL (ref 8–23)
CALCIUM SERPL-MCNC: 7 MG/DL (ref 8.7–10.5)
CHLORIDE SERPL-SCNC: 110 MMOL/L (ref 95–110)
CO2 SERPL-SCNC: 19 MMOL/L (ref 23–29)
CREAT SERPL-MCNC: 2.7 MG/DL (ref 0.5–1.4)
DIFFERENTIAL METHOD: ABNORMAL
EOSINOPHIL # BLD AUTO: 0.1 K/UL (ref 0–0.5)
EOSINOPHIL NFR BLD: 0.8 % (ref 0–8)
ERYTHROCYTE [DISTWIDTH] IN BLOOD BY AUTOMATED COUNT: 15.8 % (ref 11.5–14.5)
EST. GFR  (AFRICAN AMERICAN): 26 ML/MIN/1.73 M^2
EST. GFR  (NON AFRICAN AMERICAN): 22 ML/MIN/1.73 M^2
GLUCOSE SERPL-MCNC: 101 MG/DL (ref 70–110)
HCT VFR BLD AUTO: 24.5 % (ref 40–54)
HGB BLD-MCNC: 7.7 G/DL (ref 14–18)
IMM GRANULOCYTES # BLD AUTO: 0.07 K/UL (ref 0–0.04)
IMM GRANULOCYTES NFR BLD AUTO: 0.8 % (ref 0–0.5)
LYMPHOCYTES # BLD AUTO: 1.8 K/UL (ref 1–4.8)
LYMPHOCYTES NFR BLD: 19.6 % (ref 18–48)
MCH RBC QN AUTO: 29.6 PG (ref 27–31)
MCHC RBC AUTO-ENTMCNC: 31.4 G/DL (ref 32–36)
MCV RBC AUTO: 94 FL (ref 82–98)
MONOCYTES # BLD AUTO: 0.5 K/UL (ref 0.3–1)
MONOCYTES NFR BLD: 5.5 % (ref 4–15)
NEUTROPHILS # BLD AUTO: 6.7 K/UL (ref 1.8–7.7)
NEUTROPHILS NFR BLD: 73.1 % (ref 38–73)
NRBC BLD-RTO: 0 /100 WBC
PHOSPHATE SERPL-MCNC: 3.5 MG/DL (ref 2.7–4.5)
PHOSPHATE SERPL-MCNC: 3.5 MG/DL (ref 2.7–4.5)
PLATELET # BLD AUTO: 201 K/UL (ref 150–350)
PMV BLD AUTO: 10.5 FL (ref 9.2–12.9)
POCT GLUCOSE: 107 MG/DL (ref 70–110)
POCT GLUCOSE: 156 MG/DL (ref 70–110)
POCT GLUCOSE: 200 MG/DL (ref 70–110)
POCT GLUCOSE: 67 MG/DL (ref 70–110)
POCT GLUCOSE: 97 MG/DL (ref 70–110)
POTASSIUM SERPL-SCNC: 3.4 MMOL/L (ref 3.5–5.1)
RBC # BLD AUTO: 2.6 M/UL (ref 4.6–6.2)
SODIUM SERPL-SCNC: 141 MMOL/L (ref 136–145)
WBC # BLD AUTO: 9.11 K/UL (ref 3.9–12.7)

## 2020-07-06 PROCEDURE — 25000003 PHARM REV CODE 250: Performed by: INTERNAL MEDICINE

## 2020-07-06 PROCEDURE — 36415 COLL VENOUS BLD VENIPUNCTURE: CPT

## 2020-07-06 PROCEDURE — 99232 SBSQ HOSP IP/OBS MODERATE 35: CPT | Mod: ,,, | Performed by: INTERNAL MEDICINE

## 2020-07-06 PROCEDURE — 99232 PR SUBSEQUENT HOSPITAL CARE,LEVL II: ICD-10-PCS | Mod: ,,, | Performed by: INTERNAL MEDICINE

## 2020-07-06 PROCEDURE — 94761 N-INVAS EAR/PLS OXIMETRY MLT: CPT

## 2020-07-06 PROCEDURE — 63600175 PHARM REV CODE 636 W HCPCS: Performed by: INTERNAL MEDICINE

## 2020-07-06 PROCEDURE — 97530 THERAPEUTIC ACTIVITIES: CPT

## 2020-07-06 PROCEDURE — 25000003 PHARM REV CODE 250: Performed by: NURSE PRACTITIONER

## 2020-07-06 PROCEDURE — 97535 SELF CARE MNGMENT TRAINING: CPT

## 2020-07-06 PROCEDURE — 80069 RENAL FUNCTION PANEL: CPT

## 2020-07-06 PROCEDURE — 97530 THERAPEUTIC ACTIVITIES: CPT | Mod: CQ

## 2020-07-06 PROCEDURE — 85025 COMPLETE CBC W/AUTO DIFF WBC: CPT

## 2020-07-06 PROCEDURE — 11000001 HC ACUTE MED/SURG PRIVATE ROOM

## 2020-07-06 RX ORDER — FUROSEMIDE 40 MG/1
40 TABLET ORAL EVERY OTHER DAY
Status: DISCONTINUED | OUTPATIENT
Start: 2020-07-07 | End: 2020-07-15

## 2020-07-06 RX ORDER — SODIUM BICARBONATE 650 MG/1
650 TABLET ORAL DAILY
Status: DISCONTINUED | OUTPATIENT
Start: 2020-07-06 | End: 2020-07-16 | Stop reason: HOSPADM

## 2020-07-06 RX ORDER — POTASSIUM CHLORIDE 20 MEQ/1
40 TABLET, EXTENDED RELEASE ORAL ONCE
Status: COMPLETED | OUTPATIENT
Start: 2020-07-06 | End: 2020-07-06

## 2020-07-06 RX ORDER — NIFEDIPINE 30 MG/1
90 TABLET, EXTENDED RELEASE ORAL DAILY
Status: DISCONTINUED | OUTPATIENT
Start: 2020-07-07 | End: 2020-07-10

## 2020-07-06 RX ADMIN — TIMOLOL MALEATE 1 DROP: 2.5 SOLUTION/ DROPS OPHTHALMIC at 10:07

## 2020-07-06 RX ADMIN — TAMSULOSIN HYDROCHLORIDE 0.4 MG: 0.4 CAPSULE ORAL at 10:07

## 2020-07-06 RX ADMIN — CEFAZOLIN SODIUM 2 G: 2 SOLUTION INTRAVENOUS at 10:07

## 2020-07-06 RX ADMIN — CEFAZOLIN SODIUM 2 G: 2 SOLUTION INTRAVENOUS at 05:07

## 2020-07-06 RX ADMIN — POTASSIUM CHLORIDE 40 MEQ: 1500 TABLET, EXTENDED RELEASE ORAL at 10:07

## 2020-07-06 RX ADMIN — INSULIN ASPART 12 UNITS: 100 INJECTION, SOLUTION INTRAVENOUS; SUBCUTANEOUS at 12:07

## 2020-07-06 RX ADMIN — CARVEDILOL 12.5 MG: 12.5 TABLET, FILM COATED ORAL at 09:07

## 2020-07-06 RX ADMIN — CARVEDILOL 12.5 MG: 12.5 TABLET, FILM COATED ORAL at 10:07

## 2020-07-06 RX ADMIN — CEFAZOLIN SODIUM 2 G: 2 SOLUTION INTRAVENOUS at 01:07

## 2020-07-06 RX ADMIN — SODIUM BICARBONATE 650 MG TABLET 650 MG: at 10:07

## 2020-07-06 RX ADMIN — PANTOPRAZOLE SODIUM 40 MG: 40 TABLET, DELAYED RELEASE ORAL at 10:07

## 2020-07-06 RX ADMIN — INSULIN ASPART 12 UNITS: 100 INJECTION, SOLUTION INTRAVENOUS; SUBCUTANEOUS at 10:07

## 2020-07-06 RX ADMIN — MELATONIN 1000 UNITS: at 10:07

## 2020-07-06 RX ADMIN — TIMOLOL MALEATE 1 DROP: 2.5 SOLUTION/ DROPS OPHTHALMIC at 09:07

## 2020-07-06 RX ADMIN — INSULIN ASPART 12 UNITS: 100 INJECTION, SOLUTION INTRAVENOUS; SUBCUTANEOUS at 05:07

## 2020-07-06 RX ADMIN — INSULIN DETEMIR 35 UNITS: 100 INJECTION, SOLUTION SUBCUTANEOUS at 10:07

## 2020-07-06 NOTE — PT/OT/SLP PROGRESS
Physical Therapy Treatment    Patient Name:  Vinnie Siegel   MRN:  [id number]    Recommendations:     Discharge Recommendations:  (TBD depending progress)   Discharge Equipment Recommendations: (TBA)   Barriers to discharge: Decreased caregiver support    Assessment:     Vinnie Siegel is a 74 y.o. male admitted with a medical diagnosis of Type 2 diabetes mellitus with hyperglycemia.  He presents with the following impairments/functional limitations:  weakness, impaired endurance, impaired self care skills, impaired functional mobilty, gait instability, impaired balance, decreased coordination, decreased upper extremity function, decreased lower extremity function, decreased safety awareness, impaired coordination, impaired cardiopulmonary response to activity ,  Pt presented w/ HOB elevated upon arrival of PT. Pt agreeable to PT. Pt roll R w/ CGA. Pt supine <> sit CGA. Pt sit <> Stand w/ RW and ModA x 2. Pt stood for 2' minutes w/ Rw and CGA..    Rehab Prognosis: Fair; patient would benefit from acute skilled PT services to address these deficits and reach maximum level of function.    Recent Surgery: Procedure(s) (LRB):  EGD (ESOPHAGOGASTRODUODENOSCOPY) (N/A) 7 Days Post-Op    Plan:     During this hospitalization, patient to be seen 5 x/week to address the identified rehab impairments via therapeutic activities, therapeutic exercises, neuromuscular re-education, wheelchair management/training and progress toward the following goals:    · Plan of Care Expires:  07/31/20    Subjective     Chief Complaint: none stated  Patient/Family Comments/goals: none stated  Pain/Comfort:  · Pain Rating 1: 0/10  · Pain Addressed 1: Reposition, Distraction, Cessation of Activity, Nurse notified  · Pain Rating Post-Intervention 1: 0/10      Objective:     Communicated with ns(Alexa) prior to session.  Patient found supine with bed alarm, perera catheter, peripheral IV, telemetry upon PT entry to room.     General Precautions:  Standard, diabetic, fall   Orthopedic Precautions:N/A   Braces: N/A     Functional Mobility:  · Transfers:     · Sit to Stand:  moderate assistance and of 2 persons with rolling walker  · Gait: pt stood for 2 Min CGA and RW      AM-PAC 6 CLICK MOBILITY  Turning over in bed (including adjusting bedclothes, sheets and blankets)?: 3  Sitting down on and standing up from a chair with arms (e.g., wheelchair, bedside commode, etc.): 1  Moving from lying on back to sitting on the side of the bed?: 2  Moving to and from a bed to a chair (including a wheelchair)?: 1  Need to walk in hospital room?: 1  Climbing 3-5 steps with a railing?: 1  Basic Mobility Total Score: 9       Therapeutic Activities and Exercises:  Pt improving on sit <> stand and sitting EOB    Patient left HOB elevated with all lines intact, call button in reach, bed alarm on, ns notified and girl friend present..    GOALS:   Multidisciplinary Problems     Physical Therapy Goals        Problem: Physical Therapy Goal    Goal Priority Disciplines Outcome Goal Variances Interventions   Physical Therapy Goal     PT, PT/OT Ongoing, Progressing     Description: Goals to be met by: 20    Patient will increase functional independence with mobility by performin. Supine to sit with min A.  2. Sit to supine with min A.   3. Rolling R and L with min A.   4. Sitting at edge of bed >5 minutes with min A.   5. PT will assess sit<>stand.                    Time Tracking:     PT Received On: 20  PT Start Time: 1337     PT Stop Time: 1400  PT Total Time (min): 23 min     Billable Minutes: Therapeutic Activity 23   Overlap with OT for portions of session due to complex nature of patient and for safety with mobility to decrease fall risk for patient and caregiver injury requiring to skilled therapists to provide interventions.       Treatment Type: Treatment  PT/PTA: PTA     PTA Visit Number: 2     Jesus Carmen, ANSLEY  2020

## 2020-07-06 NOTE — PLAN OF CARE
Problem: Physical Therapy Goal  Goal: Physical Therapy Goal  Description: Goals to be met by: 20    Patient will increase functional independence with mobility by performin. Supine to sit with min A.  2. Sit to supine with min A.   3. Rolling R and L with min A.   4. Sitting at edge of bed >5 minutes with min A.   5. PT will assess sit<>stand.   Outcome: Ongoing, Progressing   Pt presented w/ HOB elevated upon arrival of PT. Pt agreeable to PT. Pt roll R w/ CGA. Pt supine <> sit CGA. Pt sit <> Stand w/ RW and ModA x 2. Pt stood for 2' minutes w/ Rw and CGA.

## 2020-07-06 NOTE — PROGRESS NOTES
Follow up for left posterior neck wound  Area of concern was I&D'd last week by Dr Basilio. Remains painful with purulent exudate. Triad hydrophilic wound dressing in use . Feel debridement of nonviable tissue may be warranted.   Will discuss with medical team.      07/06/20 1600   Peripheral Neurovascular   VTE Required Core Measure Pharmacological prophylaxis initiated/maintained        Altered Skin Integrity 07/02/20 1000 Left posterior Neck Ulceration Full thickness tissue loss. Base is covered by slough and/or eschar in the wound bed   Date First Assessed/Time First Assessed: 07/02/20 1000   Altered Skin Integrity Present on Admission: suspected hospital acquired  Side: Left  Orientation: posterior  Location: Neck  Is this injury device related?: (c) Other (see comments)  Primary Wo...   Wound Image    Description of Altered Skin Integrity Full thickness tissue loss. Base is covered by slough and/or eschar in the wound bed   Dressing Appearance Intact   Drainage Amount Small   Drainage Characteristics/Odor Tan;Purulent;No odor   Appearance Tan;Necrotic;Slough;Moist   Tissue loss description Full thickness   Yellow (%), Wound Tissue Color 100 %  (tan nonviable slough)   Periwound Area Edematous   Wound Edges Open   Wound Length (cm) 3.5 cm   Wound Width (cm) 4 cm   Wound Depth (cm) 0.1 cm   Wound Volume (cm^3) 1.4 cm^3   Wound Surface Area (cm^2) 14 cm^2   Care Cleansed with:;Wound cleanser;Applied:;Skin Barrier  (triad)   Dressing Applied;Foam   Safety   Safety Precautions emergency equipment at bedside   Safety Management   Patient Rounds bed in low position;bed wheels locked;ID band on;call light in patient/parent reach;visualized patient   Safety Promotion/Fall Prevention Fall Risk reviewed with patient/family;Fall Risk signage in place;assistive device/personal item within reach;lighting adjusted;medications reviewed;nonskid shoes/socks when out of bed;instructed to call staff for mobility   Daily Care    Activity Management activity adjusted per tolerance   Positioning   Body Position side-lying, right   Head of Bed (HOB) HOB lowered   Positioning/Transfer Devices pillows     Faviola Johnson RN CWON

## 2020-07-06 NOTE — ASSESSMENT & PLAN NOTE
- Soft tissue neck CT with abscess L posterior neck  - General surgery following, s/p I&D 7/2  - Aerobic culture negative. Anaerobic culture pending  - Improving on cefazolin so will continue

## 2020-07-06 NOTE — ASSESSMENT & PLAN NOTE
- HHS resolved  - Tolerating consistent carb diet  - Glucose improving  - Continue Levemir 35 units qd, Aspart 12 units TIDWM   - Continue SSI

## 2020-07-06 NOTE — PLAN OF CARE
Pt. A+Ox4, on RA. Mario cath removed, replaced with condom cath. Last BM today 07/06/20. Diet advanced from NPO to Diabetic, fair appetite. BG checks, I/Os and purposeful hourly pt checks completed. Supplemental insulin provided. Tele shekhar with PVCs. Wound consult for L side of neck, new orders placed. New bandage placed in the morning. Will continue to monitor.

## 2020-07-06 NOTE — PLAN OF CARE
Problem: Occupational Therapy Goal  Goal: Occupational Therapy Goal  Description: Goals to be met by: 07/16/2020     Patient will increase functional independence with ADLs by performing:    UE Dressing with Moderate Assistance.  Grooming while EOB with Moderate Assistance.  Sitting at edge of bed x15 minutes with Moderate Assistance.  Rolling to Bilateral with Minimum Assistance.   Supine to sit with Moderate Assistance.Increased functional strength to WFL for ADL tasks, functional transfers and bed mobility.  Assess functional transfers.    COMPLETED GOALS  Supine to sit with Moderate Assistance.(MET 7/4/20)      Outcome: Ongoing, Progressing   Pt making steady progress towards goals, POC remains appropriate.  Pt with improving indep during mobility & UB ADLs, remains limited by tolerance.

## 2020-07-06 NOTE — ASSESSMENT & PLAN NOTE
- s/p 2U pRBCs 06/27, 1U pRBCs 06/28, 1U pRBC 6/30  - Continue to trend Hb and transfuse PRN Hgb < 7.  - Continuing pantoprazole 40mg PO qd  - GI following  - EGD 6/29 with normal esophagus, OG tube trauma to the mid body and normal duodenum  - Unable to tolerate bowel prep. Colonoscopy deferred since Hb stable. Can be done outpatient

## 2020-07-06 NOTE — SUBJECTIVE & OBJECTIVE
Interval History: No acute events overnight. Unable to tolerate bowel prep again so colonoscopy canceled. He denies abdominal pain but still distended. Tolerating diet.    Review of Systems   Constitutional: Negative for chills and fever.   Respiratory: Negative for shortness of breath.    Cardiovascular: Negative for chest pain.   Gastrointestinal: Positive for abdominal distention. Negative for abdominal pain, nausea and vomiting.     Objective:     Vital Signs (Most Recent):  Temp: 98.5 °F (36.9 °C) (07/06/20 1135)  Pulse: 82 (07/06/20 1200)  Resp: 20 (07/06/20 1135)  BP: (!) 148/66 (07/06/20 1135)  SpO2: (!) 94 % (07/06/20 1135) Vital Signs (24h Range):  Temp:  [97.6 °F (36.4 °C)-98.5 °F (36.9 °C)] 98.5 °F (36.9 °C)  Pulse:  [58-82] 82  Resp:  [18-20] 20  SpO2:  [94 %-98 %] 94 %  BP: (143-160)/(65-75) 148/66     Weight: (!) 144.5 kg (318 lb 9 oz)  Body mass index is 40.83 kg/m².    Intake/Output Summary (Last 24 hours) at 7/6/2020 1238  Last data filed at 7/6/2020 1050  Gross per 24 hour   Intake 2456 ml   Output 2551 ml   Net -95 ml      Physical Exam  Vitals signs and nursing note reviewed.   Constitutional:       General: He is not in acute distress.     Appearance: Normal appearance.   Cardiovascular:      Rate and Rhythm: Normal rate and regular rhythm.      Pulses: Normal pulses.   Pulmonary:      Effort: No respiratory distress.      Comments: Decreased at the bases  Abdominal:      General: There is distension.      Tenderness: There is no abdominal tenderness.      Comments: Hypoactive, high pitched bowel sounds   Musculoskeletal:         General: No tenderness.      Right lower leg: No edema.      Left lower leg: No edema.   Skin:     General: Skin is warm and dry.      Comments: L posterior neck abscess s/p I&D, purulent drainage noted   Neurological:      Mental Status: He is alert and oriented to person, place, and time.       Significant Labs:   CBC:  Recent Labs   Lab 07/04/20  1023 07/05/20  0511  07/06/20  0758   WBC 10.76 9.89 9.11   HGB 8.0* 7.6* 7.7*   HCT 25.7* 23.8* 24.5*    202 201   GRAN 79.3*  8.5* 75.5*  7.5 73.1*  6.7   LYMPH 13.1*  1.4 16.7*  1.7 19.6  1.8   MONO 5.9  0.6 5.7  0.6 5.5  0.5   EOS 0.1 0.1 0.1   BASO 0.01 0.02 0.02      CMP:  Recent Labs   Lab 07/04/20  1023 07/05/20  0511 07/06/20  0758    145 141   K 3.2* 3.1* 3.4*   * 112* 110   CO2 20* 21* 19*   BUN 59* 55* 43*   CREATININE 3.4* 3.1* 2.7*   * 83 101   CALCIUM 7.2* 7.1* 7.0*   PHOS 4.6*  4.6* 4.1  4.1 3.5  3.5   ALBUMIN 1.8* 1.8* 1.8*   ANIONGAP 13 12 12      Significant Imaging:   No new imaging today

## 2020-07-06 NOTE — PLAN OF CARE
"LMS Wmet with the patient and his significate other at the bedside. LMSW talked to the patient about SNF. Patient declined SNF stating "I do not want to go to a nursing home. I want to go home" Patients girl friend and brother will take care of him at home. Patient is willing to do HH or outpatient therapy.     CM team will continue to follow.     "

## 2020-07-06 NOTE — PROGRESS NOTES
"Nephrology  Progress Note    Admit Date: 6/27/2020   LOS: 9 days     SUBJECTIVE:     Follow-up For:  Type 2 diabetes mellitus with hyperglycemia    Interval History:     Doing better just having lots of gas.  Discussed with family at bedside.  No CP/SOB.  Labs continue to improve.      Review of Systems:    Only c/o today is "gas".     OBJECTIVE:     Vital Signs Range (Last 24H):  BP (!) 160/75 (BP Location: Right arm, Patient Position: Lying)   Pulse 61   Temp 98.4 °F (36.9 °C) (Oral)   Resp 20   Ht 6' 2" (1.88 m)   Wt (!) 144.5 kg (318 lb 9 oz)   SpO2 95%   BMI 40.83 kg/m²     Temp:  [97.6 °F (36.4 °C)-98.4 °F (36.9 °C)]   Pulse:  [58-62]   Resp:  [18-20]   BP: (143-160)/(64-75)   SpO2:  [95 %-98 %]     I & O (Last 24H):    Intake/Output Summary (Last 24 hours) at 7/6/2020 1008  Last data filed at 7/6/2020 0900  Gross per 24 hour   Intake 2636 ml   Output 2076 ml   Net 560 ml       Physical Exam:  General appearance: Well developed, well nourished, weak, obese  Eyes:  Conjunctivae/corneas clear. PERRL.  Neck: left neck wound CDI  Lungs: diminished.    Heart: Regular rate and rhythm, S1, S2 normal, no murmur, rub or beatrice.  Abdomen: Soft, non-tender non-distended; bowel sounds normal; no masses,  no organomegaly, obese, rectal tube dark stool.   Extremities: No cyanosis or clubbing. 2+ edema.  Left neck abscess noted.   Skin: Skin color, texture, turgor normal. No rashes or lesions  Neurologic: No focal numbness or weakness   Mario      Laboratory Data:  Recent Labs   Lab 07/06/20  0758   WBC 9.11   RBC 2.60*   HGB 7.7*   HCT 24.5*      MCV 94   MCH 29.6   MCHC 31.4*       BMP:   Recent Labs   Lab 07/01/20  0550  07/06/20  0758   GLU  --    < > 101   NA  --    < > 141   K  --    < > 3.4*   CL  --    < > 110   CO2  --    < > 19*   BUN  --    < > 43*   CREATININE  --    < > 2.7*   CALCIUM  --    < > 7.0*   MG 1.9  --   --     < > = values in this interval not displayed.     Lab Results   Component " Value Date    CALCIUM 7.0 (L) 07/06/2020    PHOS 3.5 07/06/2020    PHOS 3.5 07/06/2020       Lab Results   Component Value Date    .5 (H) 06/29/2020    CALCIUM 7.0 (L) 07/06/2020    CAION 1.07 06/28/2020    PHOS 3.5 07/06/2020    PHOS 3.5 07/06/2020       Lab Results   Component Value Date    URICACID 8.6 (H) 06/29/2020       BNP  Recent Labs   Lab 07/03/20  0511   *       Medications:  Medication list was reviewed and changes noted under Assessment/Plan.    Diagnostic Results:        ASSESSMENT/PLAN:     75 YO male with DM, HTN, CHF with Dilated CM, COPD, CKD 4, now with GIB and Hyperosmolar nonketotic hyperglycemia, resp failure prompting intubation to protect airway, severe anemia with Hgb 6.4, elevated BUN due to GIB, hypotension, and ADONAY     HHS resolved  -s/p Insulin drip and now on basal/prandial.    -UTI/abscess noted.  -defer  -glucose labile.     Slowly resolving Non-oliguric ADONAY on CKD 4/ Hypokalemia  -Baseline creat over past year 2.5-2.9 with history of proteinuria  -Creat 4.4 on admit and slowly improving  -UOP 2L   -Prot/creat ratio 1 gram  -Expect that severe anemia and hypotension etiology for ADONAY  -Doesn't appear he has seen nephrologist so ordered full workup and mildly elevated ELSA with negative reflex only positive finding so far.  -Normally prefer no PPI but with GIB this okay  -No nephrotoxins.  -replace lytes PRN.  -start low dose bicarb tabs.   -Renally dose meds, avoid nephrotoxins, and monitor I/O's closely.       GIB with severe anemia  -Agree with transfusions prn  -dosed Epo.  -mildly iron deficient but would not give IV iron for now  -GI consulted - EGD noted.  .   -Protonix as now  -unable to tolerated prep.    DM  -Last HgA1c in December 2019 7.7  -Defer to primary team  -as above     Sepsis from UTI/neck abscess:  -cultured and continue antibx for now.   -Dr. Basilio following  -lactic acid, procal, and WBC's improving.     Vit D Def with mild HPTH/ Hx Calcium Oxalate  Nephrolithiasis:  -Started on calcitriol 0.25mcg 3x/week, but will stop for now.  -However, Vit D 25 extremely low, but Hx Nephrolithiasis.  -Changed to low dose Cholecalciferol 1,000 units daily and will need to be monitored closely with 24 hr urine Calcium.       Resolved Hypernatremia:  -encourage water intake.    HTN:  -started BB/CCB.  Hydralazine prn.   -no ACE/ARB for now.       Dispo:  Await rehab.

## 2020-07-06 NOTE — PT/OT/SLP PROGRESS
Occupational Therapy   Treatment    Name: Vinnie Siegel  MRN: [id number]  Admitting Diagnosis:  Type 2 diabetes mellitus with hyperglycemia  7 Days Post-Op    Recommendations:     Discharge Recommendations: other (see comments)(TBD pending progress)  Discharge Equipment Recommendations:  other (see comments)(TBA)  Barriers to discharge:  Decreased caregiver support    Assessment:     Vinnie Siegel is a 74 y.o. male with a medical diagnosis of Type 2 diabetes mellitus with hyperglycemia.  He presents with the following Performance deficits affecting function: weakness, impaired endurance, impaired self care skills, impaired functional mobilty, gait instability, impaired balance, decreased coordination, decreased upper extremity function, decreased lower extremity function, decreased safety awareness, impaired coordination, impaired cardiopulmonary response to activity.      Pt making steady progress towards goals, POC remains appropriate.  Pt with improving indep during mobility & UB ADLs, remains limited by tolerance.     Rehab Prognosis:  Fair; patient would benefit from acute skilled OT services to address these deficits and reach maximum level of function.       Plan:     Patient to be seen 5 x/week to address the above listed problems via self-care/home management, therapeutic activities, therapeutic exercises  · Plan of Care Expires: 07/30/20  · Plan of Care Reviewed with: patient, significant other    Subjective     Pain/Comfort:  · Pain Rating 1: 0/10    Objective:     Communicated with: SHMUEL Liu prior to session.  Patient found right sidelying with peripheral IV, bed alarm, perera catheter, telemetry upon OT entry to room.    General Precautions: Standard, diabetic, fall   Orthopedic Precautions:N/A   Braces:       Occupational Performance:     Bed Mobility:    · Patient completed Scooting/Bridging with total assistance and 2  Persons to HOB  · Patient completed Supine to Sit with contact guard assistance  "and with side rail  · Patient completed Sit to Supine with stand by assistance     Functional Mobility/Transfers:  · Patient completed Sit <> Stand Transfer with moderate assistance  with  rolling walker   · Functional Mobility: DNT    Activities of Daily Living:  · Grooming: stand by assistance wash face seated EOB  · Upper Body Dressing: minimum assistance change gown seated EOB  · Lower Body Dressing: total assistance don socks EOB  · Toileting: dependence wally care with BM, perera in place      AMPAC 6 Click ADL: 13    Treatment & Education:  Bed mobility & ADLs performed as documented above. Pt able to tolerate EOB sitting ~12" with supervision prior to laying self back down d/t fatigue. Pt sat back up and able to perform sit<>stand t/f with wally care in standing.     Education provided on role of OT including safe performance of self-care tasks, mobility techniques, safe use of DME, and encouragement of mobilization during hospitalization to prevent/limit deconditioning as well as discharge recommendations     Patient left HOB elevated with all lines intact, call button in reach, bed alarm on and SO presentEducation:      GOALS:   Multidisciplinary Problems     Occupational Therapy Goals        Problem: Occupational Therapy Goal    Goal Priority Disciplines Outcome Interventions   Occupational Therapy Goal     OT, PT/OT Ongoing, Progressing    Description: Goals to be met by: 07/16/2020     Patient will increase functional independence with ADLs by performing:    UE Dressing with Moderate Assistance.  Grooming while EOB with Moderate Assistance.  Sitting at edge of bed x15 minutes with Moderate Assistance.  Rolling to Bilateral with Minimum Assistance.   Supine to sit with Moderate Assistance.Increased functional strength to WFL for ADL tasks, functional transfers and bed mobility.  Assess functional transfers.    COMPLETED GOALS  Supine to sit with Moderate Assistance.(MET 7/4/20)                       Time " Tracking:     OT Date of Treatment: 07/06/20  OT Start Time: 1336  OT Stop Time: 1402  OT Total Time (min): 26 min    Billable Minutes:Self Care/Home Management 16  Therapeutic Activity 10     Overlap with PT for portions of session due to complex nature of patient and for safety with mobility and performance of self-care tasks to decrease fall risk as well as patient and caregiver injury as pt requires two skilled therapists to provide interventions.       Nurys Shoemaker, OT  7/6/2020

## 2020-07-06 NOTE — PROGRESS NOTES
Patient with injury to skin integrity left posterior neck  Status post drainage of abscess  Wounds continue to drain, cellulitis resolving, area of eschar,

## 2020-07-06 NOTE — PLAN OF CARE
Patient on RA with sats as documented.  Pt on prn therapy and doesn't require respiratory tx at this time. Pt with no apparent distress noted. Will continue to monitor.

## 2020-07-06 NOTE — ASSESSMENT & PLAN NOTE
- Multifactorial with likely acute blood loss anemia, GI losses contributing.  - Baseline Cr ~2.4-2.7.  - FENa suggestive of intrinsic source  - Good UOP. Cr down trending, 2.7 today  - Nephrology following, appreciate assistance.

## 2020-07-06 NOTE — PLAN OF CARE
Pt had two dark brown liquid stools overnight. Remains free from fall, injury, skin breakdown. VSS on RA throughout the night. Pt re positioned throughout shift. Heels floated. Dsg on neck changed. R groin dsg CDI. Pt encouraged to finish GoLYTELY. Pt stated he could not finish drinking it. F/c draining cloudy yellow urine to gravity. Telesitter in place.Telemetry and blood glucose monitoring continued. PT denies pain. All alarms active and auduble.  Plan of care reviewed w/ pt and all questions answered. Bed locked and in lowest position. Call light w/I reach. No needs at this time. Purposeful hourly rounding. Will continue to monitor.

## 2020-07-06 NOTE — PROGRESS NOTES
"Gastroenterology Progress Note    Reason for Consult: GI bleed    Subjective:  Drank less than half of prep.  Still having brown liquid stool.  Feels a lot of gas and rumbling.  No N/V.  Does not want to continue drinking prep/do colon tomorrow.    ROS:  Gen: no F/C  CV: no CP/palpitations  Resp: no SOB/wheezing    Physical Exam  BP (!) 160/75 (BP Location: Right arm, Patient Position: Lying)   Pulse 68   Temp 98.4 °F (36.9 °C) (Oral)   Resp 20   Ht 6' 2" (1.88 m)   Wt (!) 144.5 kg (318 lb 9 oz)   SpO2 95%   BMI 40.83 kg/m²   General: Awake, alert ill appearing obese male in no apparent distress  HEENT: NC/AT, PERRL, EOMI, MMM  Neck: wound from I&D with dressing - clean  CV: RRR, without murmur, gallop, or rubs  Pulm: CTAB, no wheezing, rales, or rhonchi  GI: soft, non-tender, mild distension, +BS  MSK: no edema and normal ROM  Neuro: A&Ox3, moves all extremities equally, but generalized weakness, sensation grossly intact  Skin: no rashes or lesions  Psych: normal mood and affect    Medications/Infusions:  Current Facility-Administered Medications   Medication Dose Route Frequency Provider Last Rate Last Dose    0.9%  NaCl infusion (for blood administration)   Intravenous Q24H PRN Triny Dill MD        0.9%  NaCl infusion (for blood administration)   Intravenous Q24H PRN Triny Dill MD        acetaminophen tablet 650 mg  650 mg Oral Q4H PRN Triny Dill MD        albuterol-ipratropium 2.5 mg-0.5 mg/3 mL nebulizer solution 3 mL  3 mL Nebulization Q6H PRN Triny Dill MD        carvediloL tablet 12.5 mg  12.5 mg Oral BID Triny Dill MD   12.5 mg at 07/06/20 1033    cefazolin (ANCEF) 2 gram in dextrose 5% 50 mL IVPB (premix)  2 g Intravenous Q8H Triny Dill  mL/hr at 07/06/20 1034 2 g at 07/06/20 1034    dextrose 50% injection 12.5 g  12.5 g Intravenous PRN Triny Dill MD        dextrose 50% injection 25 g  25 g Intravenous PRN Triny Dill MD        fentaNYL injection " 50 mcg  50 mcg Intravenous Q4H PRN Triny Dill MD   50 mcg at 07/02/20 0434    [START ON 7/7/2020] furosemide tablet 40 mg  40 mg Oral Every other day Lizandro Pierce NP        glucagon (human recombinant) injection 1 mg  1 mg Intramuscular PRN Triny Dill MD        glucose chewable tablet 16 g  16 g Oral PRN Triny Dill MD        glucose chewable tablet 24 g  24 g Oral PRN Triny Dill MD        hydrALAZINE injection 10 mg  10 mg Intravenous Q4H PRN Triny Dill MD   10 mg at 07/02/20 0405    insulin aspart U-100 pen 0-5 Units  0-5 Units Subcutaneous QID (AC + HS) PRN Triny Dill MD   3 Units at 07/05/20 2249    insulin aspart U-100 pen 12 Units  12 Units Subcutaneous TIDWM Triny Dill MD   12 Units at 07/06/20 1036    insulin detemir U-100 pen 35 Units  35 Units Subcutaneous Daily Triny Dill MD   35 Units at 07/06/20 1034    [START ON 7/7/2020] NIFEdipine 24 hr tablet 90 mg  90 mg Oral Daily Lizandro Pierce NP        ondansetron injection 4 mg  4 mg Intravenous Q6H PRN rTiny Dill MD   4 mg at 07/03/20 0454    pantoprazole EC tablet 40 mg  40 mg Oral Daily Triny Dill MD   40 mg at 07/06/20 1033    simethicone chewable tablet 80 mg  1 tablet Oral TID PRN Triny Dill MD   80 mg at 07/05/20 1111    sodium bicarbonate tablet 650 mg  650 mg Oral Daily Lizandro Pierce NP   650 mg at 07/06/20 1033    sodium chloride 0.9% flush 10 mL  10 mL Intravenous PRN Triny Dill MD        tamsulosin 24 hr capsule 0.4 mg  0.4 mg Oral Daily Triny Dill MD   0.4 mg at 07/06/20 1033    timolol maleate 0.25% ophthalmic solution 1 drop  1 drop Both Eyes BID Triny Dill MD   1 drop at 07/06/20 1034    vitamin D 1000 units tablet 1,000 Units  1,000 Units Oral Daily Mona Acharya MD   1,000 Units at 07/06/20 1033       Intake and Output:    Intake/Output Summary (Last 24 hours) at 7/6/2020 1044  Last data filed at 7/6/2020 0900  Gross per 24 hour   Intake 5355  ml   Output 2076 ml   Net 560 ml       Labs:  Lab Results   Component Value Date/Time    WBC 9.11 07/06/2020 07:58 AM    HGB 7.7 (L) 07/06/2020 07:58 AM    HCT 24.5 (L) 07/06/2020 07:58 AM     07/06/2020 07:58 AM    MCV 94 07/06/2020 07:58 AM     07/06/2020 07:58 AM    K 3.4 (L) 07/06/2020 07:58 AM     07/06/2020 07:58 AM    CO2 19 (L) 07/06/2020 07:58 AM    BUN 43 (H) 07/06/2020 07:58 AM    CREATININE 2.7 (H) 07/06/2020 07:58 AM     07/06/2020 07:58 AM    CALCIUM 7.0 (L) 07/06/2020 07:58 AM    MG 1.9 07/01/2020 05:50 AM    PHOS 3.5 07/06/2020 07:58 AM    PHOS 3.5 07/06/2020 07:58 AM    INR 1.0 06/28/2020 04:15 PM   ]  Lab Results   Component Value Date/Time    PROT 5.1 (L) 06/27/2020 09:11 PM    ALBUMIN 1.8 (L) 07/06/2020 07:58 AM    BILITOT 0.3 06/27/2020 09:11 PM    BILIDIR 0.3 05/17/2012 10:02 AM    AST 26 06/27/2020 09:11 PM    ALT 56 (H) 06/27/2020 09:11 PM    ALKPHOS 81 06/27/2020 09:11 PM   ]    Imaging and Procedures:  Labs and imaging results were reviewed.  AXR 7/3/20:  Diffuse gaseous distension of the visualized intestinal loops, predominantly the colon.    Assessment:  Vinnie Siegel is a 74 y.o. male with a history of HTN, DM2, HLD, kidney stones, CKD, cataracts and glaucoma who presented with weakness and lethargy over the last week. He was found to be in DKA and was intubated for increased work of breathing.  Now extubated and on the floor, but debilitated.    Plan:  - unable to tolerate prep for colonoscopy - discussed with him trying to continue clears today and complete prep tonight for colonoscopy tomorrow morning vs. Deferring to the outpatient setting since his hemoglobin has remained stable over the last several days and he is having brown stool  and he much preferred to do this as an outpatient  - will arrange for clinic follow up to be scheduled as an outpatient (may be able to do a better job prepping if a smaller volume prep can be used and he is stronger/able  to get to the bathroom/a bedside commode himself)  - optimize nutrition/rehab efforts  - please call back if he has recurrent overt GI blood loss or acute drop in H/H so we can reassess need for inpatient colonoscopy (vs. CTA/IR angiography)    Yara Sher MD

## 2020-07-06 NOTE — PROGRESS NOTES
[facility] Hospital Medicine  Progress Note    Patient Name: Vinnie Siegel  MRN: [id number]  Patient Class: IP- Inpatient   Admission Date: 6/27/2020  Length of Stay: 9 days  Attending Physician: Triny Dill MD  Primary Care Provider: Janeth Walter MD        Subjective:     Principal Problem:Type 2 diabetes mellitus with hyperglycemia        HPI:  Mr. Vinnie Siegel is a 74 y.o. male, with PMH of IDDM-2, CKD-IV, dilated cardiomyopathy, HTN, HLD, gout, obesity, who presented to [facility] via EMS on 6/27/20 with rectal bleeding x 2 days. His family called EMS after he became lethargic earlier this evening. Per his fiancee, he has been passing dark blood stools, and has been noting generalized weakness x 1 week. Additionally, he has been having elevated blood glucose readings x 1 day. In the ED, he was found to be in DKA, with a GI bleed. H&H were 6.4 & 19.4 and he was transfused 2 units PRBCs. He was started on an insulin drip and a Protonix drip. He had worsening respiratory and mental status while in the ED, and was intubated for airway protection. He was admitted to inpatient status to the ICU.     Overview/Hospital Course:  Admitted to ICU with HHS, encephalopathy, GI bleed, ADONAY, respiratory failure and sepsis. Sepsis was due to E. Coli UTI and Strep agalactiae pneumonia. Critical care and nephrology consulted and broad antibiotics continued. GI was consulted and he had EGD on 6/29 which showed normal esophagus and duodenum with OG tube trauma to mid gastric body but no source of bleeding was identified. He was extubated on 6/30. He attempted bowel prep for colonoscopy but was unable to tolerate; colonoscopy was pushed back. He had slow improvement in ADONAY as well as improvement in sepsis with antibiotics. ID was consulted and recommended cefazolin. He was found to have a large posterior neck abscess so general surgery was consulted and he had I&D. He was transferred out of ICU on 7/3. He  was scheduled to have colonoscopy on 7/6 but was unable to tolerate bowel prep again so it was canceled.    Interval History: No acute events overnight. Unable to tolerate bowel prep again so colonoscopy canceled. He denies abdominal pain but still distended. Tolerating diet.    Review of Systems   Constitutional: Negative for chills and fever.   Respiratory: Negative for shortness of breath.    Cardiovascular: Negative for chest pain.   Gastrointestinal: Positive for abdominal distention. Negative for abdominal pain, nausea and vomiting.     Objective:     Vital Signs (Most Recent):  Temp: 98.5 °F (36.9 °C) (07/06/20 1135)  Pulse: 82 (07/06/20 1200)  Resp: 20 (07/06/20 1135)  BP: (!) 148/66 (07/06/20 1135)  SpO2: (!) 94 % (07/06/20 1135) Vital Signs (24h Range):  Temp:  [97.6 °F (36.4 °C)-98.5 °F (36.9 °C)] 98.5 °F (36.9 °C)  Pulse:  [58-82] 82  Resp:  [18-20] 20  SpO2:  [94 %-98 %] 94 %  BP: (143-160)/(65-75) 148/66     Weight: (!) 144.5 kg (318 lb 9 oz)  Body mass index is 40.83 kg/m².    Intake/Output Summary (Last 24 hours) at 7/6/2020 1238  Last data filed at 7/6/2020 1050  Gross per 24 hour   Intake 2456 ml   Output 2551 ml   Net -95 ml      Physical Exam  Vitals signs and nursing note reviewed.   Constitutional:       General: He is not in acute distress.     Appearance: Normal appearance.   Cardiovascular:      Rate and Rhythm: Normal rate and regular rhythm.      Pulses: Normal pulses.   Pulmonary:      Effort: No respiratory distress.      Comments: Decreased at the bases  Abdominal:      General: There is distension.      Tenderness: There is no abdominal tenderness.      Comments: Hypoactive, high pitched bowel sounds   Musculoskeletal:         General: No tenderness.      Right lower leg: No edema.      Left lower leg: No edema.   Skin:     General: Skin is warm and dry.      Comments: L posterior neck abscess s/p I&D, purulent drainage noted   Neurological:      Mental Status: He is alert and oriented  to person, place, and time.       Significant Labs:   CBC:  Recent Labs   Lab 07/04/20  1023 07/05/20  0511 07/06/20  0758   WBC 10.76 9.89 9.11   HGB 8.0* 7.6* 7.7*   HCT 25.7* 23.8* 24.5*    202 201   GRAN 79.3*  8.5* 75.5*  7.5 73.1*  6.7   LYMPH 13.1*  1.4 16.7*  1.7 19.6  1.8   MONO 5.9  0.6 5.7  0.6 5.5  0.5   EOS 0.1 0.1 0.1   BASO 0.01 0.02 0.02      CMP:  Recent Labs   Lab 07/04/20  1023 07/05/20  0511 07/06/20  0758    145 141   K 3.2* 3.1* 3.4*   * 112* 110   CO2 20* 21* 19*   BUN 59* 55* 43*   CREATININE 3.4* 3.1* 2.7*   * 83 101   CALCIUM 7.2* 7.1* 7.0*   PHOS 4.6*  4.6* 4.1  4.1 3.5  3.5   ALBUMIN 1.8* 1.8* 1.8*   ANIONGAP 13 12 12      Significant Imaging:   No new imaging today      Assessment/Plan:      * Type 2 diabetes mellitus with hyperglycemia  - HHS resolved  - Tolerating consistent carb diet  - Glucose improving  - Continue Levemir 35 units qd, Aspart 12 units TIDWM   - Continue SSI    Debility  - Continue PT/OT. Anticipate he will need placement on discharge      Abdominal distension  - KUB with diffuse gaseous distension without obstruction  - Simethicone PRN  - Needs to increase mobility as able      Abscess of neck  - Soft tissue neck CT with abscess L posterior neck  - General surgery following, s/p I&D 7/2  - Aerobic culture negative. Anaerobic culture pending  - Improving on cefazolin so will continue      LLL pneumonia  - CXR with LLL opacity   - Respiratory culture with Strep agalactiae. Continue Cefazolin per ID recs      Acute renal failure superimposed on stage 4 chronic kidney disease  - Multifactorial with likely acute blood loss anemia, GI losses contributing.  - Baseline Cr ~2.4-2.7.  - FENa suggestive of intrinsic source  - Good UOP. Cr down trending, 2.7 today  - Nephrology following, appreciate assistance.     UTI (urinary tract infection)  - Urine culture with E. Coli  - Continue Cefazolin per ID. End date: 7/10/20    Acute blood loss  anemia  - 2/2 GI bleed  - s/p 4 units PRBC  - Hb stable the past few days. Monitor    GIB (gastrointestinal bleeding)  - s/p 2U pRBCs 06/27, 1U pRBCs 06/28, 1U pRBC 6/30  - Continue to trend Hb and transfuse PRN Hgb < 7.  - Continuing pantoprazole 40mg PO qd  - GI following  - EGD 6/29 with normal esophagus, OG tube trauma to the mid body and normal duodenum  - Unable to tolerate bowel prep. Colonoscopy deferred since Hb stable. Can be done outpatient     Obesity, Class III, BMI 40-49.9 (morbid obesity)  - Dietary counseling after improvement from critical condition.    Dilated cardiomyopathy  - Volume status improving; diuretics held with ADONAY.  - As under ADONAY.    CKD (chronic kidney disease) stage 4, GFR 15-29 ml/min  - As under ADONAY.    Essential hypertension  - BP improving  - Continue coreg to 12.5 mg BID. Nifedipine increased to 90 mg qd      VTE Risk Mitigation (From admission, onward)         Ordered     IP VTE HIGH RISK PATIENT  Once      06/28/20 0139     Reason for No Pharmacological VTE Prophylaxis  Once     Question:  Reasons:  Answer:  Active Bleeding    06/28/20 0139     Place sequential compression device  Until discontinued      06/28/20 0036                      Triny Dill MD  Department of Hospital Medicine   [facility]

## 2020-07-07 LAB
ALBUMIN SERPL BCP-MCNC: 1.8 G/DL (ref 3.5–5.2)
ANION GAP SERPL CALC-SCNC: 12 MMOL/L (ref 8–16)
BACTERIA SPEC ANAEROBE CULT: ABNORMAL
BASOPHILS # BLD AUTO: 0.02 K/UL (ref 0–0.2)
BASOPHILS NFR BLD: 0.2 % (ref 0–1.9)
BUN SERPL-MCNC: 39 MG/DL (ref 8–23)
CALCIUM SERPL-MCNC: 6.9 MG/DL (ref 8.7–10.5)
CHLORIDE SERPL-SCNC: 111 MMOL/L (ref 95–110)
CO2 SERPL-SCNC: 20 MMOL/L (ref 23–29)
CREAT SERPL-MCNC: 2.6 MG/DL (ref 0.5–1.4)
DIFFERENTIAL METHOD: ABNORMAL
EOSINOPHIL # BLD AUTO: 0.1 K/UL (ref 0–0.5)
EOSINOPHIL NFR BLD: 0.9 % (ref 0–8)
ERYTHROCYTE [DISTWIDTH] IN BLOOD BY AUTOMATED COUNT: 15.9 % (ref 11.5–14.5)
EST. GFR  (AFRICAN AMERICAN): 27 ML/MIN/1.73 M^2
EST. GFR  (NON AFRICAN AMERICAN): 23 ML/MIN/1.73 M^2
GLUCOSE SERPL-MCNC: 96 MG/DL (ref 70–110)
HCT VFR BLD AUTO: 24 % (ref 40–54)
HGB BLD-MCNC: 7.5 G/DL (ref 14–18)
IMM GRANULOCYTES # BLD AUTO: 0.09 K/UL (ref 0–0.04)
IMM GRANULOCYTES NFR BLD AUTO: 1 % (ref 0–0.5)
LYMPHOCYTES # BLD AUTO: 1.4 K/UL (ref 1–4.8)
LYMPHOCYTES NFR BLD: 15.3 % (ref 18–48)
MCH RBC QN AUTO: 29.4 PG (ref 27–31)
MCHC RBC AUTO-ENTMCNC: 31.3 G/DL (ref 32–36)
MCV RBC AUTO: 94 FL (ref 82–98)
MONOCYTES # BLD AUTO: 0.6 K/UL (ref 0.3–1)
MONOCYTES NFR BLD: 6.4 % (ref 4–15)
NEUTROPHILS # BLD AUTO: 7.2 K/UL (ref 1.8–7.7)
NEUTROPHILS NFR BLD: 76.2 % (ref 38–73)
NRBC BLD-RTO: 0 /100 WBC
PHOSPHATE SERPL-MCNC: 3.1 MG/DL (ref 2.7–4.5)
PHOSPHATE SERPL-MCNC: 3.1 MG/DL (ref 2.7–4.5)
PLATELET # BLD AUTO: 204 K/UL (ref 150–350)
PMV BLD AUTO: 10.8 FL (ref 9.2–12.9)
POCT GLUCOSE: 129 MG/DL (ref 70–110)
POCT GLUCOSE: 190 MG/DL (ref 70–110)
POCT GLUCOSE: 197 MG/DL (ref 70–110)
POCT GLUCOSE: 254 MG/DL (ref 70–110)
POCT GLUCOSE: 62 MG/DL (ref 70–110)
POTASSIUM SERPL-SCNC: 3.7 MMOL/L (ref 3.5–5.1)
RBC # BLD AUTO: 2.55 M/UL (ref 4.6–6.2)
SODIUM SERPL-SCNC: 143 MMOL/L (ref 136–145)
WBC # BLD AUTO: 9.39 K/UL (ref 3.9–12.7)

## 2020-07-07 PROCEDURE — 80069 RENAL FUNCTION PANEL: CPT

## 2020-07-07 PROCEDURE — 97535 SELF CARE MNGMENT TRAINING: CPT

## 2020-07-07 PROCEDURE — 94761 N-INVAS EAR/PLS OXIMETRY MLT: CPT

## 2020-07-07 PROCEDURE — 25000003 PHARM REV CODE 250: Performed by: INTERNAL MEDICINE

## 2020-07-07 PROCEDURE — 99233 SBSQ HOSP IP/OBS HIGH 50: CPT | Mod: ,,, | Performed by: INTERNAL MEDICINE

## 2020-07-07 PROCEDURE — 25000003 PHARM REV CODE 250: Performed by: NURSE PRACTITIONER

## 2020-07-07 PROCEDURE — 99233 PR SUBSEQUENT HOSPITAL CARE,LEVL III: ICD-10-PCS | Mod: ,,, | Performed by: INTERNAL MEDICINE

## 2020-07-07 PROCEDURE — 85025 COMPLETE CBC W/AUTO DIFF WBC: CPT

## 2020-07-07 PROCEDURE — 36415 COLL VENOUS BLD VENIPUNCTURE: CPT

## 2020-07-07 PROCEDURE — 11000001 HC ACUTE MED/SURG PRIVATE ROOM

## 2020-07-07 PROCEDURE — 97530 THERAPEUTIC ACTIVITIES: CPT

## 2020-07-07 PROCEDURE — 63600175 PHARM REV CODE 636 W HCPCS: Performed by: INTERNAL MEDICINE

## 2020-07-07 RX ADMIN — CARVEDILOL 12.5 MG: 12.5 TABLET, FILM COATED ORAL at 09:07

## 2020-07-07 RX ADMIN — INSULIN ASPART 12 UNITS: 100 INJECTION, SOLUTION INTRAVENOUS; SUBCUTANEOUS at 09:07

## 2020-07-07 RX ADMIN — SODIUM BICARBONATE 650 MG TABLET 650 MG: at 09:07

## 2020-07-07 RX ADMIN — CEFAZOLIN SODIUM 2 G: 2 SOLUTION INTRAVENOUS at 05:07

## 2020-07-07 RX ADMIN — INSULIN DETEMIR 35 UNITS: 100 INJECTION, SOLUTION SUBCUTANEOUS at 10:07

## 2020-07-07 RX ADMIN — TIMOLOL MALEATE 1 DROP: 2.5 SOLUTION/ DROPS OPHTHALMIC at 10:07

## 2020-07-07 RX ADMIN — CEFAZOLIN SODIUM 2 G: 2 SOLUTION INTRAVENOUS at 02:07

## 2020-07-07 RX ADMIN — INSULIN ASPART 3 UNITS: 100 INJECTION, SOLUTION INTRAVENOUS; SUBCUTANEOUS at 12:07

## 2020-07-07 RX ADMIN — CEFAZOLIN SODIUM 2 G: 2 SOLUTION INTRAVENOUS at 09:07

## 2020-07-07 RX ADMIN — CARVEDILOL 12.5 MG: 12.5 TABLET, FILM COATED ORAL at 10:07

## 2020-07-07 RX ADMIN — TIMOLOL MALEATE 1 DROP: 2.5 SOLUTION/ DROPS OPHTHALMIC at 09:07

## 2020-07-07 RX ADMIN — NIFEDIPINE 90 MG: 30 TABLET, FILM COATED, EXTENDED RELEASE ORAL at 09:07

## 2020-07-07 RX ADMIN — MELATONIN 1000 UNITS: at 09:07

## 2020-07-07 RX ADMIN — FUROSEMIDE 40 MG: 40 TABLET ORAL at 09:07

## 2020-07-07 RX ADMIN — INSULIN DETEMIR 35 UNITS: 100 INJECTION, SOLUTION SUBCUTANEOUS at 09:07

## 2020-07-07 RX ADMIN — TAMSULOSIN HYDROCHLORIDE 0.4 MG: 0.4 CAPSULE ORAL at 09:07

## 2020-07-07 RX ADMIN — INSULIN ASPART 12 UNITS: 100 INJECTION, SOLUTION INTRAVENOUS; SUBCUTANEOUS at 12:07

## 2020-07-07 RX ADMIN — PANTOPRAZOLE SODIUM 40 MG: 40 TABLET, DELAYED RELEASE ORAL at 09:07

## 2020-07-07 NOTE — ASSESSMENT & PLAN NOTE
- KUB with diffuse gaseous distension without obstruction.  - Simethicone PRN.  - Continued PT/OT.

## 2020-07-07 NOTE — ASSESSMENT & PLAN NOTE
- s/p 2U pRBCs 06/27, 1U pRBCs 06/28, 1U pRBC 6/30  - Continue to trend Hgb and transfuse PRN Hgb < 7.  - Continuing pantoprazole 40mg PO daily.  - EGD 6/29 with normal esophagus, OG tube trauma to the mid body and normal duodenum.  - Unable to tolerate bowel prep. Colonoscopy deferred since Hgb stable, can be done outpatient.

## 2020-07-07 NOTE — PROGRESS NOTES
"Nephrology  Progress Note    Admit Date: 6/27/2020   LOS: 10 days     SUBJECTIVE:     Follow-up For:  Type 2 diabetes mellitus with hyperglycemia    Interval History:     Weak but no SOB/CP.  Labs and UOP continue to improve.  Discussed with RN.     Review of Systems:    Only c/o today is "gas".     OBJECTIVE:     Vital Signs Range (Last 24H):  BP (!) 144/66 (BP Location: Left arm, Patient Position: Lying)   Pulse 60   Temp 98 °F (36.7 °C) (Oral)   Resp 18   Ht 6' 2" (1.88 m)   Wt (!) 144.5 kg (318 lb 9 oz)   SpO2 97%   BMI 40.83 kg/m²     Temp:  [98 °F (36.7 °C)-98.9 °F (37.2 °C)]   Pulse:  []   Resp:  [18-20]   BP: (125-162)/(60-67)   SpO2:  [93 %-99 %]     I & O (Last 24H):    Intake/Output Summary (Last 24 hours) at 7/7/2020 0928  Last data filed at 7/7/2020 0400  Gross per 24 hour   Intake 550 ml   Output 1470 ml   Net -920 ml       Physical Exam:  General appearance: Well developed, well nourished, weak, obese  Eyes:  Conjunctivae/corneas clear. PERRL.  Neck: left neck wound CDI  Lungs: diminished.    Heart: Regular rate and rhythm, S1, S2 normal, no murmur, rub or beatrice.  Abdomen: Soft, non-tender non-distended; bowel sounds normal; no masses,  no organomegaly, obese, rectal tube dark stool.   Extremities: No cyanosis or clubbing. 2+ edema.  Left neck abscess noted.   Skin: Skin color, texture, turgor normal. No rashes or lesions  Neurologic: No focal numbness or weakness         Laboratory Data:  Recent Labs   Lab 07/07/20  0501   WBC 9.39   RBC 2.55*   HGB 7.5*   HCT 24.0*      MCV 94   MCH 29.4   MCHC 31.3*       BMP:   Recent Labs   Lab 07/01/20  0550  07/07/20  0501   GLU  --    < > 96   NA  --    < > 143   K  --    < > 3.7   CL  --    < > 111*   CO2  --    < > 20*   BUN  --    < > 39*   CREATININE  --    < > 2.6*   CALCIUM  --    < > 6.9*   MG 1.9  --   --     < > = values in this interval not displayed.     Lab Results   Component Value Date    CALCIUM 6.9 (LL) 07/07/2020    PHOS " 3.1 07/07/2020    PHOS 3.1 07/07/2020       Lab Results   Component Value Date    .5 (H) 06/29/2020    CALCIUM 6.9 (LL) 07/07/2020    CAION 1.07 06/28/2020    PHOS 3.1 07/07/2020    PHOS 3.1 07/07/2020       Lab Results   Component Value Date    URICACID 8.6 (H) 06/29/2020       BNP  Recent Labs   Lab 07/03/20  0511   *       Medications:  Medication list was reviewed and changes noted under Assessment/Plan.    Diagnostic Results:        ASSESSMENT/PLAN:     75 YO male with DM, HTN, CHF with Dilated CM, COPD, CKD 4, now with GIB and Hyperosmolar nonketotic hyperglycemia, resp failure prompting intubation to protect airway, severe anemia with Hgb 6.4, elevated BUN due to GIB, hypotension, and ADONAY     HHS resolved  -s/p Insulin drip and now on basal/prandial.    -UTI/abscess noted.  -defer  -glucose labile.     Resolved Non-oliguric ADONAY on CKD 4/ Hypokalemia  -Baseline creat over past year 2.5-2.9 with history of proteinuria  -Creat 4.4 on admit and slowly improving back to baseline  -UOP 2L   -Prot/creat ratio 1 gram  -Expect that severe anemia and hypotension etiology for ADONAY  -Doesn't appear he has seen nephrologist so ordered full workup and mildly elevated ELSA with negative reflex only positive finding so far.  -Normally prefer no PPI but with GIB this okay  -No nephrotoxins.  -replace lytes PRN.  -started low dose bicarb tabs.   -Renally dose meds, avoid nephrotoxins, and monitor I/O's closely.       GIB with severe anemia  -Agree with transfusions prn  -dosed Epo.  -mildly iron deficient but would not give IV iron for now  -GI consulted - EGD noted.  .   -Protonix as now  -unable to tolerated prep.    DM  -Last HgA1c in December 2019 7.7  -Defer to primary team  -as above     Sepsis from UTI/neck abscess:  -cultured and continue antibx for now.   -Dr. Basilio following  -lactic acid, procal, and WBC's improving.     Vit D Def with mild HPTH/ Hx Calcium Oxalate Nephrolithiasis:  -Started on  calcitriol 0.25mcg 3x/week, but stopped for now.  -However, Vit D 25 extremely low, but Hx Nephrolithiasis.  -Changed to low dose Cholecalciferol 1,000 units daily and will need to be monitored closely with 24 hr urine Calcium.       Resolved Hypernatremia:  -encourage water intake.    HTN:  -started BB/CCB.  Hydralazine prn.   -no ACE/ARB for now.       Dispo:  Await rehab.

## 2020-07-07 NOTE — ASSESSMENT & PLAN NOTE
- Soft tissue neck CT with abscess L posterior neck.  - General surgery following, s/p I&D 7/2  - Aerobic culture negative. Anaerobic culture showing finegoldia.  - Continuing cefazolin.

## 2020-07-07 NOTE — PROGRESS NOTES
[facility] Hospital Medicine  Progress Note    Patient Name: Vinnie Siegel  MRN: [id number]  Patient Class: IP- Inpatient   Admission Date: 6/27/2020  Length of Stay: 10 days  Attending Physician: NYDIA Gomez MD  Primary Care Provider: Janeth Walter MD        Subjective:     Principal Problem:Type 2 diabetes mellitus with hyperglycemia        HPI:  Mr. Vinnie Siegel is a 74 y.o. male, with PMH of IDDM-2, CKD-IV, dilated cardiomyopathy, HTN, HLD, gout, obesity, who [place] [facility] [place] on 6/27/20 with rectal bleeding x 2 days. His family called EMS after he became lethargic earlier this evening. Per his fiancee, he has been passing dark blood stools, and has been noting generalized weakness x 1 week. Additionally, he has been having elevated blood glucose readings x 1 day. In the ED, he was found to be in DKA, with a GI bleed. H&H were 6.4 & 19.4 and he was transfused 2 units PRBCs. He was started on an insulin drip and a Protonix drip. He had worsening respiratory and mental status while in the ED, and was intubated for airway protection. He was admitted to inpatient status to the ICU.     Overview/Hospital Course:  Admitted to ICU with HHS, encephalopathy, GI bleed, ADONAY, respiratory failure and sepsis. Sepsis was due to E. Coli UTI and Strep agalactiae pneumonia. Critical care and nephrology consulted and broad antibiotics continued. GI was consulted and he had EGD on 6/29 which showed normal esophagus and duodenum with OG tube trauma to mid gastric body but no source of bleeding was identified. He was extubated on 6/30. He attempted bowel prep for colonoscopy but was unable to tolerate; colonoscopy was pushed back. He had slow improvement in ADONAY as well as improvement in sepsis with antibiotics. ID was consulted and recommended cefazolin. He was found to have a large posterior neck abscess so general surgery was consulted and he had I&D. He was transferred out of ICU on 7/3. He  was scheduled to have colonoscopy on 7/6 but was unable to tolerate bowel prep again so it was canceled.    Interval History: No acute events overnight. Reports feeling overall improved, though some urinary retention.    Review of Systems   Constitutional: Negative for chills and fever.   Respiratory: Negative for shortness of breath.    Cardiovascular: Negative for chest pain.   Gastrointestinal: Positive for abdominal distention. Negative for abdominal pain, nausea and vomiting.     Objective:     Vital Signs (Most Recent):  Temp: 98 °F (36.7 °C) (07/07/20 1219)  Pulse: (!) 58 (07/07/20 1400)  Resp: 18 (07/07/20 0854)  BP: (!) 154/70 (07/07/20 1219)  SpO2: 99 % (07/07/20 1219) Vital Signs (24h Range):  Temp:  [98 °F (36.7 °C)-98.9 °F (37.2 °C)] 98 °F (36.7 °C)  Pulse:  [] 58  Resp:  [18-20] 18  SpO2:  [93 %-99 %] 99 %  BP: (125-162)/(60-70) 154/70     Weight: (!) 144.5 kg (318 lb 9 oz)  Body mass index is 40.83 kg/m².    Intake/Output Summary (Last 24 hours) at 7/7/2020 1448  Last data filed at 7/7/2020 1300  Gross per 24 hour   Intake 1270 ml   Output 1295 ml   Net -25 ml      Physical Exam  Vitals signs and nursing note reviewed.   Constitutional:       General: He is not in acute distress.     Appearance: Normal appearance.   Cardiovascular:      Rate and Rhythm: Normal rate and regular rhythm.      Pulses: Normal pulses.   Pulmonary:      Effort: No respiratory distress.      Comments: Decreased at the bases  Abdominal:      General: There is distension.      Tenderness: There is no abdominal tenderness.      Comments: Hypoactive, high pitched bowel sounds   Musculoskeletal:         General: No tenderness.      Right lower leg: No edema.      Left lower leg: No edema.   Skin:     General: Skin is warm and dry.      Comments: L posterior neck abscess s/p I&D, purulent drainage noted   Neurological:      Mental Status: He is alert and oriented to person, place, and time.       Significant Labs:    CBC:  Recent Labs   Lab 07/05/20  0511 07/06/20  0758 07/07/20  0501   WBC 9.89 9.11 9.39   HGB 7.6* 7.7* 7.5*   HCT 23.8* 24.5* 24.0*    201 204   GRAN 75.5*  7.5 73.1*  6.7 76.2*  7.2   LYMPH 16.7*  1.7 19.6  1.8 15.3*  1.4   MONO 5.7  0.6 5.5  0.5 6.4  0.6   EOS 0.1 0.1 0.1   BASO 0.02 0.02 0.02      CMP:  Recent Labs   Lab 07/05/20  0511 07/06/20  0758 07/07/20  0501    141 143   K 3.1* 3.4* 3.7   * 110 111*   CO2 21* 19* 20*   BUN 55* 43* 39*   CREATININE 3.1* 2.7* 2.6*   GLU 83 101 96   CALCIUM 7.1* 7.0* 6.9*   PHOS 4.1  4.1 3.5  3.5 3.1  3.1   ALBUMIN 1.8* 1.8* 1.8*   ANIONGAP 12 12 12      Significant Imaging:   No new imaging this morning.      Assessment/Plan:      * Type 2 diabetes mellitus with hyperglycemia  - HHS resolved.  - Glucose with some variability, but improving gradually.  - Continuing insulin detemir 35U subQ daily, insulin aspart 12U subQ TIDWM, low-dose sliding scale insulin aspart 0-5U subQ TIDWM PRN.    Debility  - Continuing PT/OT; anticipate likely SNF placement for discharge.    Abdominal distension  - KUB with diffuse gaseous distension without obstruction.  - Simethicone PRN.  - Continued PT/OT.    Abscess of neck  - Soft tissue neck CT with abscess L posterior neck.  - General surgery following, s/p I&D 7/2  - Aerobic culture negative. Anaerobic culture showing finegoldia.  - Continuing cefazolin.    LLL pneumonia  - CXR with LLL opacity .  - Respiratory culture with Strep agalactiae. Continue Cefazolin per ID recs.    Acute renal failure superimposed on stage 4 chronic kidney disease  - Multifactorial with likely acute blood loss anemia, GI losses contributing.  - Baseline Cr ~2.4-2.7.  - FENa suggestive of intrinsic source  - Good UOP. Cr down trending, 2.7 today  - Nephrology following, appreciate assistance.     UTI (urinary tract infection)  - Urine culture with E. Coli.  - Continue Cefazolin per ID. End date: 7/10/20.    Acute blood loss  anemia  - 2/2 GI bleed.  - s/p 4 units PRBC.  - Hgb stable the past few days. Monitor.    GIB (gastrointestinal bleeding)  - s/p 2U pRBCs 06/27, 1U pRBCs 06/28, 1U pRBC 6/30  - Continue to trend Hgb and transfuse PRN Hgb < 7.  - Continuing pantoprazole 40mg PO daily.  - EGD 6/29 with normal esophagus, OG tube trauma to the mid body and normal duodenum.  - Unable to tolerate bowel prep. Colonoscopy deferred since Hgb stable, can be done outpatient.    Obesity, Class III, BMI 40-49.9 (morbid obesity)  - Dietary counseling.    Dilated cardiomyopathy  - Volume status improving; diuretics held with ADONAY.  - As under ADONAY.    CKD (chronic kidney disease) stage 4, GFR 15-29 ml/min  - As under ADONAY.    Essential hypertension  - Continuing carvedilol 12.5mg PO BID, nifedipine 90mg PO daily.    VTE Risk Mitigation (From admission, onward)         Ordered     IP VTE HIGH RISK PATIENT  Once      06/28/20 0139     Reason for No Pharmacological VTE Prophylaxis  Once     Question:  Reasons:  Answer:  Active Bleeding    06/28/20 0139     Place sequential compression device  Until discontinued      06/28/20 0036                      KRISTEN Gomez MD  Department of Hospital Medicine   [facility]

## 2020-07-07 NOTE — PT/OT/SLP PROGRESS
Occupational Therapy      Patient Name:  Vinnie Siegel   MRN:  [id number]    Patient not seen today secondary to Patient fatigue(Fiance requested to allow him to sleep, as he was upset when awakened earlier). Will follow-up in the afternoon, if time permits.    KRISTINE Courtney  7/7/2020

## 2020-07-07 NOTE — ASSESSMENT & PLAN NOTE
- HHS resolved.  - Glucose with some variability, but improving gradually.  - Continuing insulin detemir 35U subQ daily, insulin aspart 12U subQ TIDWM, low-dose sliding scale insulin aspart 0-5U subQ TIDWM PRN.

## 2020-07-07 NOTE — PLAN OF CARE
Recommendations    1. If PO intake <50% of meals recommend boost glucose BID.     2. Weekly weights.    Goals: 1. Initiate Nutrition by RD follow up.  Nutrition Goal Status: goal met  Communication of RD Recs: (POC)

## 2020-07-07 NOTE — PLAN OF CARE
Problem: Occupational Therapy Goal  Goal: Occupational Therapy Goal  Description: Goals to be met by: 07/16/2020     Patient will increase functional independence with ADLs by performing:    REVISED UBD with SBA.  Grooming while EOB with Moderate Assistance.  Rolling to Bilateral with Minimum Assistance.   REVISED Supine to sit with Contact Guard Assistance.   REVISED Sit to/from stand with Mod assist of 1 person.  Increased functional strength to WFL for ADL tasks, functional transfers and bed mobility.      COMPLETED GOALS  Supine to sit with Moderate Assistance.(MET 7/4/20)  UE Dressing with Moderate Assistance.(MET 07/07/2020)  Assess functional transfers. (MET 07/07/2020)  Sitting at edge of bed x15 minutes with Moderate Assistance. (MET 07/07/2020)      Outcome: Ongoing, Progressing  Several goals met this session.  Tolerated increased time at EOB, decreased assist for bed mobility.  Did, however, require 2 skilled therapists for standing from elevated EOB and assist with RW management during side steps to HOB along EOB.  Highly recommend SNF vs LTAC prior to discharge home to restore PLOF.

## 2020-07-07 NOTE — SUBJECTIVE & OBJECTIVE
Interval History: No acute events overnight. Reports feeling overall improved, though some urinary retention.    Review of Systems   Constitutional: Negative for chills and fever.   Respiratory: Negative for shortness of breath.    Cardiovascular: Negative for chest pain.   Gastrointestinal: Positive for abdominal distention. Negative for abdominal pain, nausea and vomiting.     Objective:     Vital Signs (Most Recent):  Temp: 98 °F (36.7 °C) (07/07/20 1219)  Pulse: (!) 58 (07/07/20 1400)  Resp: 18 (07/07/20 0854)  BP: (!) 154/70 (07/07/20 1219)  SpO2: 99 % (07/07/20 1219) Vital Signs (24h Range):  Temp:  [98 °F (36.7 °C)-98.9 °F (37.2 °C)] 98 °F (36.7 °C)  Pulse:  [] 58  Resp:  [18-20] 18  SpO2:  [93 %-99 %] 99 %  BP: (125-162)/(60-70) 154/70     Weight: (!) 144.5 kg (318 lb 9 oz)  Body mass index is 40.83 kg/m².    Intake/Output Summary (Last 24 hours) at 7/7/2020 1448  Last data filed at 7/7/2020 1300  Gross per 24 hour   Intake 1270 ml   Output 1295 ml   Net -25 ml      Physical Exam  Vitals signs and nursing note reviewed.   Constitutional:       General: He is not in acute distress.     Appearance: Normal appearance.   Cardiovascular:      Rate and Rhythm: Normal rate and regular rhythm.      Pulses: Normal pulses.   Pulmonary:      Effort: No respiratory distress.      Comments: Decreased at the bases  Abdominal:      General: There is distension.      Tenderness: There is no abdominal tenderness.      Comments: Hypoactive, high pitched bowel sounds   Musculoskeletal:         General: No tenderness.      Right lower leg: No edema.      Left lower leg: No edema.   Skin:     General: Skin is warm and dry.      Comments: L posterior neck abscess s/p I&D, purulent drainage noted   Neurological:      Mental Status: He is alert and oriented to person, place, and time.       Significant Labs:   CBC:  Recent Labs   Lab 07/05/20  0511 07/06/20  0758 07/07/20  0501   WBC 9.89 9.11 9.39   HGB 7.6* 7.7* 7.5*   HCT  23.8* 24.5* 24.0*    201 204   GRAN 75.5*  7.5 73.1*  6.7 76.2*  7.2   LYMPH 16.7*  1.7 19.6  1.8 15.3*  1.4   MONO 5.7  0.6 5.5  0.5 6.4  0.6   EOS 0.1 0.1 0.1   BASO 0.02 0.02 0.02      CMP:  Recent Labs   Lab 07/05/20  0511 07/06/20  0758 07/07/20  0501    141 143   K 3.1* 3.4* 3.7   * 110 111*   CO2 21* 19* 20*   BUN 55* 43* 39*   CREATININE 3.1* 2.7* 2.6*   GLU 83 101 96   CALCIUM 7.1* 7.0* 6.9*   PHOS 4.1  4.1 3.5  3.5 3.1  3.1   ALBUMIN 1.8* 1.8* 1.8*   ANIONGAP 12 12 12      Significant Imaging:   No new imaging this morning.

## 2020-07-07 NOTE — PROGRESS NOTES
"[facility]  Adult Nutrition  Progress Note    SUMMARY     Recommendations    1. If PO intake <50% of meals recommend boost glucose BID.     2. Weekly weights.    Goals: 1. Initiate Nutrition by RD follow up.  Nutrition Goal Status: goal met  Communication of RD Recs: (POC)    Reason for Assessment    Reason For Assessment: RD follow-up  Diagnosis: (Type 2 diabetes mellitus with hyperosmolar nonketotic hyperglycemia)  Relevant Medical History: DM, HTN, CKD, HLD  Interdisciplinary Rounds: did not attend  General Information Comments:   6.29.20- Pt is a 74 year old male with GIB (gastrointestinal bleeding). Pt currently NPO. Pt intubated and sedated at this time. Diabetic diet education handouts left at bedside. Will give diabetic education when medially able.   lbs, no significant change in weight. Pt going for EGD today. Will continue to monitor.   NFPE 6.29.20- Pt nourished.   7.1.20- Pt in now on RA. No new weight. Mild edema noted. TF of diabetisource at 10ml/hr. Recommending Novasource due to high levels of renal labs.   7.7.20- Pt diet advanced to diabetic yesterday. No change in weight. PO intake 50% of meals.   Nutrition Discharge Planning: Adequate nutrition to meet needs via Renal, diabetic diet.    Nutrition Risk Screen    Nutrition Risk Screen: no indicators present    Nutrition/Diet History    Spiritual, Cultural Beliefs, Yarsani Practices, Values that Affect Care: no    Anthropometrics    Temp: 98 °F (36.7 °C)  Height Method: Stated  Height: 6' 2" (188 cm)  Height (inches): 74 in  Weight Method: Bed Scale  Weight: (!) 144.5 kg (318 lb 9 oz)  Weight (lb): (!) 318.57 lb  Ideal Body Weight (IBW), Male: 190 lb  % Ideal Body Weight, Male (lb): 167.67 %  BMI (Calculated): 40.9     Lab/Procedures/Meds    Pertinent Labs Reviewed: reviewed  Pertinent Labs Comments: Chloride 111, BUN 39, Cr 2.6, Ca 6.9  Pertinent Medications Reviewed: reviewed  Pertinent Medications Comments: " Furosemide, Insulin, Pantoprazole    Estimated/Assessed Needs    Weight Used For Calorie Calculations: (!) 144.2 kg (317 lb 14.5 oz)  Energy Calorie Requirements (kcal): 2018 kcals/day(14kcal/kg due to obesity)  Energy Need Method: Kcal/kg  Protein Requirements: 115.6 g/day(0.8 g/kg CKD)  Weight Used For Protein Calculations: (!) 144.2 kg (317 lb 14.5 oz)  Fluid Requirements (mL): 1mL/kcal or per MD  Estimated Fluid Requirement Method: RDA Method  RDA Method (mL): 2018  CHO Requirement: 282 g CHO    Nutrition Prescription Ordered    Current Diet Order: Diet diabetic [facility]; 2000 Calorie  Current Nutrition Support Formula Ordered: Diabetisource AC  Current Nutrition Support Rate Ordered: 10 (ml)    Evaluation of Received Nutrient/Fluid Intake    Enteral Calories (kcal): 288  Enteral Protein (gm): 14.5  Enteral (Free Water) Fluid (mL): 196  Oral Calories (kcal): 449  Energy Calories Required: not meeting needs  Protein Required: not meeting needs  Fluid Required: not meeting needs  Comments: LBM 7.6.20  % Intake of Estimated Energy Needs: 25 - 50 %  % Meal Intake: 25 - 50 %    Nutrition Risk    Level of Risk/Frequency of Follow-up: low     Assessment and Plan    GIB (gastrointestinal bleeding)  Nutrition Problem  Inadequate energy intake     Related to (etiology):   Inability to consume sufficient energy     Signs and Symptoms (as evidenced by):   NPO- Pt now on Diabetic diet with 50% PO intake     Interventions/Recommendations (treatment strategy):  Collaboration of nutrition care w/ other providers      Nutrition Diagnosis Status:        Ongoing     Monitor and Evaluation    Food and Nutrient Intake: food and beverage intake  Food and Nutrient Adminstration: diet order  Knowledge/Beliefs/Attitudes: food and nutrition knowledge/skill  Physical Activity and Function: nutrition-related ADLs and IADLs  Anthropometric Measurements: weight  Biochemical Data, Medical Tests and Procedures: electrolyte and renal  panel, glucose/endocrine profile  Nutrition-Focused Physical Findings: overall appearance     Malnutrition Assessment     Dakota Score: 15  Orbital Region (Subcutaneous Fat Loss): well nourished  Upper Arm Region (Subcutaneous Fat Loss): well nourished  Thoracic and Lumbar Region: well nourished   Catholic Region (Muscle Loss): well nourished  Clavicle Bone Region (Muscle Loss): well nourished  Clavicle and Acromion Bone Region (Muscle Loss): well nourished  Scapular Bone Region (Muscle Loss): well nourished  Dorsal Hand (Muscle Loss): well nourished  Patellar Region (Muscle Loss): well nourished  Anterior Thigh Region (Muscle Loss): well nourished  Posterior Calf Region (Muscle Loss): well nourished   Edema (Fluid Accumulation): 2-->mild        Nutrition Follow-Up    RD Follow-up?: Yes

## 2020-07-07 NOTE — PLAN OF CARE
Patient resting in NAD. VSS on RA. BG monitored. No SSI required this shift. Wound care to neck provided, dressing c/d/i. IV abx infused. Pt voiding per urinal. Safety measures maintained. [employer] camera on and verified. No needs expressed at this time. Pt instructed to use call light for assistance. Will continue to monitor.

## 2020-07-07 NOTE — PT/OT/SLP PROGRESS
"Physical Therapy Treatment    Patient Name:  Vinnie Siegel   MRN:  [id number]    Recommendations:     Discharge Recommendations:  nursing facility, skilled(if pt declines SNF secondary to "in NH", recommend LTAC)   Discharge Equipment Recommendations: 3-in-1 commode, walker, rolling, wheelchair, lift device, other (see comments)(TTB, wc with pressure releiving cushion)   Barriers to discharge: Inaccessible home, Decreased caregiver support and current level of dependence on caregivers    Assessment:     Vinnie Siegel is a 74 y.o. male admitted with a medical diagnosis of Type 2 diabetes mellitus with hyperglycemia with hospital course significant for intubation for acute respiratory failure requiring intubation 6/27-6/30 and severe sepsis with several potential sources including UTI. Pmhx significant for CKD, DM, HTN, and dilated cardiomyopathy.  He presents with the following impairments/functional limitations:  weakness, impaired endurance, impaired self care skills, impaired functional mobilty, gait instability, impaired balance, decreased upper extremity function, decreased lower extremity function, decreased safety awareness, decreased ROM, impaired skin, edema, impaired cardiopulmonary response to activity.    Patient has met 3 of 5 initial goals and goals revised to reflect increased independence with mobility. Patient continues to have limited endurance and requires x2 person assistance for OOB mobility. Performed sideways gait x2 ft with RW with assistance for RW management. Patient expresses eagerness to return home and to return to PLOF. Rec for d/c to SNF, if pt refuses would rec LTAC. If d/c to home, patient would at this time require javier lift and w/c with pressure relieving cushion; would be at high risk for falls and pressure injuries due to level of assistance required for OOB mobility.      Rehab Prognosis: Good; patient would benefit from acute skilled PT services to address these deficits and " "reach maximum level of function.    Recent Surgery: Procedure(s) (LRB):  EGD (ESOPHAGOGASTRODUODENOSCOPY) (N/A) 8 Days Post-Op    Plan:     During this hospitalization, patient to be seen 5 x/week to address the identified rehab impairments via gait training, therapeutic activities, therapeutic exercises, neuromuscular re-education, wheelchair management/training and progress toward the following goals:    · Plan of Care Expires:  07/31/20    Subjective     Chief Complaint: Feeling tired, "everyone is coming in here on a whine, do this and do that"  Patient/Family Comments/goals: Patient adamant on goal to return home, fiance pt at pain management clinic with planned epidural injection to L spine at end of month and is limited in her ability to physically assist pt; Patient agreeable to PT treatment.  Pain/Comfort:  · Pain Rating 1: 0/10  · Pain Rating Post-Intervention 1: 0/10      Objective:     Communicated with SHMUEL Liu prior to session.  Patient found right sidelying with peripheral IV, telemetry(pressure releiving cushion) upon PT entry to room.     General Precautions: Standard, diabetic, fall   Orthopedic Precautions:N/A   Braces: N/A     Patient donned yellow socks and gait belt for OOB mobility.    Functional Mobility:  · Bed Mobility:     · Supine to Sit: minimum assistance and of 2 persons - HOB elevated, cues for use of UE on hospital bed features  · Sit to Supine: contact guard assistance and of 2 persons  · Transfers:     · Sit to Stand: maximal assistance of 2 persons and moderate assistance of 2 persons with rolling walker  · 1x with maxA, progressed to modA with 2nd person  · Gait: x2 ft sideways gait with RW and CGA of 1 person and Lyric of  1 person  · Requires assistance for RW managament  · Balance: Static standing 2x15 sec with RW and CGA progressing to Lyric.   · Towards end of stance, pt endorsing dizziness and with postural instability requiring increased assistance  · PT assisted with " maintaining standing while OT assisted with hygiene tasks      AM-PAC 6 CLICK MOBILITY  Turning over in bed (including adjusting bedclothes, sheets and blankets)?: 3  Sitting down on and standing up from a chair with arms (e.g., wheelchair, bedside commode, etc.): 2  Moving from lying on back to sitting on the side of the bed?: 3  Moving to and from a bed to a chair (including a wheelchair)?: 2  Need to walk in hospital room?: 1  Climbing 3-5 steps with a railing?: 1  Basic Mobility Total Score: 12       Therapeutic Activities and Exercises:  ·  Gait, transfers, and mobility as above  PT educated patient and fiance re:   PT plan of care/role of PT  Importance of OOB mobility  Use of RW and hospital bed features  Discharge disposition  - need for continued therapy prior to transition home  Pt and fiance verbalized understanding   ·     Patient left right sidelying with all lines intact, call button in reach, RN Alexa notified, fiance present and pillow between LE..    GOALS:   Multidisciplinary Problems     Physical Therapy Goals        Problem: Physical Therapy Goal    Goal Priority Disciplines Outcome Goal Variances Interventions   Physical Therapy Goal     PT, PT/OT Ongoing, Progressing     Description: Goals to be met by: 20    Patient will increase functional independence with mobility by performin. Supine to sit with min A.   2. Sit to supine with min A. MET   3. Rolling R and L with min A.   4. Sitting at edge of bed >5 minutes with min A. MET   5. PT will assess sit<>stand. MET   6. Sit<>stand with RW with modA x1 person.  7. Gait x15 ft with RW and CGA.                   Time Tracking:     PT Received On: 20  PT Start Time: 1205     PT Stop Time: 1233  PT Total Time (min): 28 min     Overlap with OT for portions of session due to complex nature of patient and for safety with mobility to decrease fall risk for patient and caregiver injury requiring two skilled therapists to provide  interventions.     Billable Minutes: Therapeutic Activity 16    Treatment Type: Treatment  PT/PTA: PT     PTA Visit Number: 0     Pita Quinn PT  07/07/2020

## 2020-07-07 NOTE — PROGRESS NOTES
Wound care performed as ordered to L neck.  Wound cleaned with wound cleanser; Triad paste applied; foam dressing on top.

## 2020-07-07 NOTE — PLAN OF CARE
Problem: Physical Therapy Goal  Goal: Physical Therapy Goal  Description: Goals to be met by: 20    Patient will increase functional independence with mobility by performin. Supine to sit with min A.   2. Sit to supine with min A. MET   3. Rolling R and L with min A.   4. Sitting at edge of bed >5 minutes with min A. MET   5. PT will assess sit<>stand. MET   6. Sit<>stand with RW with modA x1 person.  7. Gait x15 ft with RW and CGA.  2020 1246 by Pita Quinn PT  Outcome: Ongoing, Progressing     Patient has met 3 of 5 initial goals and goals revised to reflect increased independence with mobility. Patient continues to have limited endurance and requires x2 person assistance for OOB mobility. Performed sideways gait x2 ft with RW with assistance for RW management. Patient expresses eagerness to return home and to return to PLOF. Rec for d/c to SNF, if pt refuses would rec LTAC. If d/c to home, patient would at this time require javier lift and w/c with pressure relieving cushion; would be at high risk for falls and pressure injuries due to level of assistance required for OOB mobility.

## 2020-07-07 NOTE — PT/OT/SLP PROGRESS
"Occupational Therapy   Treatment    Name: Vinnie Siegel  MRN: [id number]  Admitting Diagnosis:  Type 2 diabetes mellitus with hyperglycemia  8 Days Post-Op    Recommendations:     Discharge Recommendations: nursing facility, skilled(if pt declines SNF secondary to "in NH", recommend LTAC)  Discharge Equipment Recommendations:  3-in-1 commode, walker, rolling, wheelchair, other (see comments), lift device(TTB and javier)  Barriers to discharge:  Decreased caregiver support, Inaccessible home environment    Assessment:     Vinnie Siegel is a 74 y.o. male with a medical diagnosis of Type 2 diabetes mellitus with hyperglycemia.  He presents with the following performance deficits affecting function: weakness, impaired self care skills, impaired balance, decreased safety awareness, decreased ROM, impaired skin, impaired endurance, impaired functional mobilty, decreased upper extremity function, edema, gait instability, decreased lower extremity function.     Several goals met this session.  Tolerated increased time at EOB, decreased assist for bed mobility.  Did, however, require 2 skilled therapists for standing from elevated EOB and assist with RW management during side steps to HOB along EOB.  Highly recommend SNF vs LTAC prior to discharge home to restore PLOF.     Rehab Prognosis:  Good; patient would benefit from acute skilled OT services to address these deficits and reach maximum level of function.       Plan:     Patient to be seen 5 x/week to address the above listed problems via self-care/home management, therapeutic activities, therapeutic exercises  · Plan of Care Expires: 07/30/20  · Plan of Care Reviewed with: patient, significant other    Subjective     Pain/Comfort:  · Pain Rating 1: 0/10  · Pain Rating Post-Intervention 1: 0/10    Objective:     Communicated with: SHMUEL Liu prior to session.  Patient found right sidelying with peripheral IV, telemetry upon OT entry to room.    General Precautions: " Standard, diabetic, fall   Orthopedic Precautions:N/A   Braces: N/A     Occupational Performance:     Bed Mobility:    · Patient completed Scooting/Bridging with contact guard assistance  · Patient completed Supine to Sit with minimum assistance and with side rail  · Patient completed Sit to Supine with contact guard assistance and with side rail     Functional Mobility/Transfers:  · Patient completed Sit <> Stand Transfer with  maximal assistance and  2 persons, on second trial moderate assistance and  2 persons, with  rolling walker and elevated bed  · Functional Mobility: Min assist with RW for side steps along EOB to HOB    Activities of Daily Living:  · Bathing: moderate assistance for LB/wally care sponge bathing  · Upper Body Dressing: minimum assistance for doffing/donning gown seated at EOB      [facility] 6 Click ADL: 13    Treatment & Education:  Educated on need for continued engagement in therapy sessions to improve health and mobility for safe return home.  Verbalized understanding but will require reinforcement.    Patient left right sidelying with all lines intact, call button in reach, RN Alexa, Dr. Gomez notified and fiance presentEducation:      GOALS:   Multidisciplinary Problems     Occupational Therapy Goals        Problem: Occupational Therapy Goal    Goal Priority Disciplines Outcome Interventions   Occupational Therapy Goal     OT, PT/OT Ongoing, Progressing    Description: Goals to be met by: 07/16/2020     Patient will increase functional independence with ADLs by performing:    REVISED UBD with SBA.  Grooming while EOB with Moderate Assistance.  Rolling to Bilateral with Minimum Assistance.   REVISED Supine to sit with Contact Guard Assistance.   REVISED Sit to/from stand with Mod assist of 1 person.  Increased functional strength to WFL for ADL tasks, functional transfers and bed mobility.      COMPLETED GOALS  Supine to sit with Moderate Assistance.(MET 7/4/20)  UE Dressing with Moderate  Assistance.(MET 07/07/2020)  Assess functional transfers. (MET 07/07/2020)  Sitting at edge of bed x15 minutes with Moderate Assistance. (MET 07/07/2020)                       Time Tracking:     OT Date of Treatment: 07/07/20  OT Start Time: 1206  OT Stop Time: 1233  OT Total Time (min): 27 min    Billable Minutes:Self Care/Home Management (13 minutes, co-treated with PT for mobility)    KRISTINE Courtney  7/7/2020

## 2020-07-07 NOTE — ASSESSMENT & PLAN NOTE
- CXR with LLL opacity .  - Respiratory culture with Strep agalactiae. Continue Cefazolin per ID recs.

## 2020-07-08 LAB
ALBUMIN SERPL BCP-MCNC: 1.8 G/DL (ref 3.5–5.2)
ANION GAP SERPL CALC-SCNC: 9 MMOL/L (ref 8–16)
BASOPHILS # BLD AUTO: 0.02 K/UL (ref 0–0.2)
BASOPHILS NFR BLD: 0.2 % (ref 0–1.9)
BUN SERPL-MCNC: 32 MG/DL (ref 8–23)
CALCIUM SERPL-MCNC: 6.8 MG/DL (ref 8.7–10.5)
CHLORIDE SERPL-SCNC: 111 MMOL/L (ref 95–110)
CO2 SERPL-SCNC: 21 MMOL/L (ref 23–29)
CREAT SERPL-MCNC: 2.6 MG/DL (ref 0.5–1.4)
DIFFERENTIAL METHOD: ABNORMAL
EOSINOPHIL # BLD AUTO: 0.1 K/UL (ref 0–0.5)
EOSINOPHIL NFR BLD: 1.2 % (ref 0–8)
ERYTHROCYTE [DISTWIDTH] IN BLOOD BY AUTOMATED COUNT: 15.9 % (ref 11.5–14.5)
EST. GFR  (AFRICAN AMERICAN): 27 ML/MIN/1.73 M^2
EST. GFR  (NON AFRICAN AMERICAN): 23 ML/MIN/1.73 M^2
GLUCOSE SERPL-MCNC: 101 MG/DL (ref 70–110)
HCT VFR BLD AUTO: 23.5 % (ref 40–54)
HGB BLD-MCNC: 7.2 G/DL (ref 14–18)
IMM GRANULOCYTES # BLD AUTO: 0.09 K/UL (ref 0–0.04)
IMM GRANULOCYTES NFR BLD AUTO: 1 % (ref 0–0.5)
LYMPHOCYTES # BLD AUTO: 1.4 K/UL (ref 1–4.8)
LYMPHOCYTES NFR BLD: 15.3 % (ref 18–48)
MCH RBC QN AUTO: 29 PG (ref 27–31)
MCHC RBC AUTO-ENTMCNC: 30.6 G/DL (ref 32–36)
MCV RBC AUTO: 95 FL (ref 82–98)
MONOCYTES # BLD AUTO: 0.7 K/UL (ref 0.3–1)
MONOCYTES NFR BLD: 7.1 % (ref 4–15)
NEUTROPHILS # BLD AUTO: 7.1 K/UL (ref 1.8–7.7)
NEUTROPHILS NFR BLD: 75.2 % (ref 38–73)
NRBC BLD-RTO: 0 /100 WBC
PHOSPHATE SERPL-MCNC: 3.3 MG/DL (ref 2.7–4.5)
PHOSPHATE SERPL-MCNC: 3.3 MG/DL (ref 2.7–4.5)
PLATELET # BLD AUTO: 219 K/UL (ref 150–350)
PMV BLD AUTO: 10 FL (ref 9.2–12.9)
POCT GLUCOSE: 101 MG/DL (ref 70–110)
POCT GLUCOSE: 116 MG/DL (ref 70–110)
POCT GLUCOSE: 116 MG/DL (ref 70–110)
POCT GLUCOSE: 144 MG/DL (ref 70–110)
POCT GLUCOSE: 78 MG/DL (ref 70–110)
POTASSIUM SERPL-SCNC: 3.9 MMOL/L (ref 3.5–5.1)
RBC # BLD AUTO: 2.48 M/UL (ref 4.6–6.2)
SODIUM SERPL-SCNC: 141 MMOL/L (ref 136–145)
WBC # BLD AUTO: 9.44 K/UL (ref 3.9–12.7)

## 2020-07-08 PROCEDURE — 63600175 PHARM REV CODE 636 W HCPCS: Performed by: NURSE PRACTITIONER

## 2020-07-08 PROCEDURE — S0030 INJECTION, METRONIDAZOLE: HCPCS | Performed by: INTERNAL MEDICINE

## 2020-07-08 PROCEDURE — 97116 GAIT TRAINING THERAPY: CPT | Mod: CQ

## 2020-07-08 PROCEDURE — 97535 SELF CARE MNGMENT TRAINING: CPT

## 2020-07-08 PROCEDURE — 11000001 HC ACUTE MED/SURG PRIVATE ROOM

## 2020-07-08 PROCEDURE — 94640 AIRWAY INHALATION TREATMENT: CPT

## 2020-07-08 PROCEDURE — 25000003 PHARM REV CODE 250: Performed by: INTERNAL MEDICINE

## 2020-07-08 PROCEDURE — 36415 COLL VENOUS BLD VENIPUNCTURE: CPT

## 2020-07-08 PROCEDURE — 63600175 PHARM REV CODE 636 W HCPCS: Performed by: INTERNAL MEDICINE

## 2020-07-08 PROCEDURE — 97530 THERAPEUTIC ACTIVITIES: CPT | Mod: CQ

## 2020-07-08 PROCEDURE — 25000003 PHARM REV CODE 250: Performed by: NURSE PRACTITIONER

## 2020-07-08 PROCEDURE — 85025 COMPLETE CBC W/AUTO DIFF WBC: CPT

## 2020-07-08 PROCEDURE — 99900035 HC TECH TIME PER 15 MIN (STAT)

## 2020-07-08 PROCEDURE — 80069 RENAL FUNCTION PANEL: CPT

## 2020-07-08 PROCEDURE — 99233 PR SUBSEQUENT HOSPITAL CARE,LEVL III: ICD-10-PCS | Mod: ,,, | Performed by: INTERNAL MEDICINE

## 2020-07-08 PROCEDURE — 94761 N-INVAS EAR/PLS OXIMETRY MLT: CPT

## 2020-07-08 PROCEDURE — 99233 SBSQ HOSP IP/OBS HIGH 50: CPT | Mod: ,,, | Performed by: INTERNAL MEDICINE

## 2020-07-08 PROCEDURE — 25000242 PHARM REV CODE 250 ALT 637 W/ HCPCS: Performed by: INTERNAL MEDICINE

## 2020-07-08 RX ORDER — HYDROXYZINE HYDROCHLORIDE 25 MG/1
50 TABLET, FILM COATED ORAL NIGHTLY PRN
Status: DISCONTINUED | OUTPATIENT
Start: 2020-07-08 | End: 2020-07-10

## 2020-07-08 RX ORDER — METRONIDAZOLE 500 MG/100ML
500 INJECTION, SOLUTION INTRAVENOUS
Status: DISCONTINUED | OUTPATIENT
Start: 2020-07-08 | End: 2020-07-10

## 2020-07-08 RX ORDER — CALCIUM CARBONATE 200(500)MG
1000 TABLET,CHEWABLE ORAL 2 TIMES DAILY
Status: DISCONTINUED | OUTPATIENT
Start: 2020-07-08 | End: 2020-07-09

## 2020-07-08 RX ADMIN — EPOETIN ALFA-EPBX 20000 UNITS: 10000 INJECTION, SOLUTION INTRAVENOUS; SUBCUTANEOUS at 11:07

## 2020-07-08 RX ADMIN — INSULIN ASPART 12 UNITS: 100 INJECTION, SOLUTION INTRAVENOUS; SUBCUTANEOUS at 06:07

## 2020-07-08 RX ADMIN — CEFAZOLIN SODIUM 2 G: 2 SOLUTION INTRAVENOUS at 06:07

## 2020-07-08 RX ADMIN — CEFAZOLIN SODIUM 2 G: 2 SOLUTION INTRAVENOUS at 03:07

## 2020-07-08 RX ADMIN — PANTOPRAZOLE SODIUM 40 MG: 40 TABLET, DELAYED RELEASE ORAL at 09:07

## 2020-07-08 RX ADMIN — TIMOLOL MALEATE 1 DROP: 2.5 SOLUTION/ DROPS OPHTHALMIC at 09:07

## 2020-07-08 RX ADMIN — CALCIUM CARBONATE (ANTACID) CHEW TAB 500 MG 1000 MG: 500 CHEW TAB at 11:07

## 2020-07-08 RX ADMIN — CARVEDILOL 12.5 MG: 12.5 TABLET, FILM COATED ORAL at 09:07

## 2020-07-08 RX ADMIN — IPRATROPIUM BROMIDE AND ALBUTEROL SULFATE 3 ML: .5; 3 SOLUTION RESPIRATORY (INHALATION) at 09:07

## 2020-07-08 RX ADMIN — MELATONIN 1000 UNITS: at 09:07

## 2020-07-08 RX ADMIN — CEFAZOLIN SODIUM 2 G: 2 SOLUTION INTRAVENOUS at 09:07

## 2020-07-08 RX ADMIN — NIFEDIPINE 90 MG: 30 TABLET, FILM COATED, EXTENDED RELEASE ORAL at 09:07

## 2020-07-08 RX ADMIN — INSULIN ASPART 12 UNITS: 100 INJECTION, SOLUTION INTRAVENOUS; SUBCUTANEOUS at 09:07

## 2020-07-08 RX ADMIN — INSULIN DETEMIR 35 UNITS: 100 INJECTION, SOLUTION SUBCUTANEOUS at 09:07

## 2020-07-08 RX ADMIN — METRONIDAZOLE 500 MG: 500 INJECTION, SOLUTION INTRAVENOUS at 12:07

## 2020-07-08 RX ADMIN — CALCIUM CARBONATE (ANTACID) CHEW TAB 500 MG 1000 MG: 500 CHEW TAB at 09:07

## 2020-07-08 RX ADMIN — SODIUM BICARBONATE 650 MG TABLET 650 MG: at 09:07

## 2020-07-08 RX ADMIN — TAMSULOSIN HYDROCHLORIDE 0.4 MG: 0.4 CAPSULE ORAL at 09:07

## 2020-07-08 RX ADMIN — INSULIN ASPART 12 UNITS: 100 INJECTION, SOLUTION INTRAVENOUS; SUBCUTANEOUS at 12:07

## 2020-07-08 NOTE — PLAN OF CARE
Problem: Occupational Therapy Goal  Goal: Occupational Therapy Goal  Description: Goals to be met by: 07/16/2020     Patient will increase functional independence with ADLs by performing:    REVISED UBD with SBA.  Rolling to Bilateral with Minimum Assistance.   REVISED Supine to/from sit with Supervision  REVISED Sit to/from stand with Minimum assist of 1 person  NEW GOAL: Min assist SPT to/from BSC (NEW 07/08/2020)  Increased functional strength to WFL for ADL tasks, functional transfers and bed mobility.      COMPLETED GOALS  Supine to sit with Moderate Assistance.(MET 7/4/20)  UE Dressing with Moderate Assistance.(MET 07/07/2020)  Assess functional transfers. (MET 07/07/2020)  Sitting at edge of bed x15 minutes with Moderate Assistance. (MET 07/07/2020)  Grooming while EOB with Moderate Assistance.(MET 07/08/2020)  REVISED Supine to sit with Contact Guard Assistance. (MET 07/08/2020)  REVISED Sit to/from stand with Mod assist of 1 person. (MET 07/08/2020)          Outcome: Ongoing, Progressing  Continues to meet goals.  Continued improvement with bed mobility and functional transfers.  Motivated for session on this date.  Continue OT services to restore patient functioning.

## 2020-07-08 NOTE — PROGRESS NOTES
[facility] Hospital Medicine  Progress Note    Patient Name: Vinnie Siegel  MRN: [id number]  Patient Class: IP- Inpatient   Admission Date: 6/27/2020  Length of Stay: 11 days  Attending Physician: NYDIA Gomez MD  Primary Care Provider: Janeth Walter MD        Subjective:     Principal Problem:Type 2 diabetes mellitus with hyperglycemia        HPI:  Mr. Vinnie Siegel is a 74 y.o. male, with PMH of IDDM-2, CKD-IV, dilated cardiomyopathy, HTN, HLD, gout, obesity, who presented to [facility] ED via EMS on 6/27/20 with rectal bleeding x 2 days. His family called EMS after he became lethargic earlier this evening. Per his fiancee, he has been passing dark blood stools, and has been noting generalized weakness x 1 week. Additionally, he has been having elevated blood glucose readings x 1 day. In the ED, he was found to be in DKA, with a GI bleed. H&H were 6.4 & 19.4 and he was transfused 2 units PRBCs. He was started on an insulin drip and a Protonix drip. He had worsening respiratory and mental status while in the ED, and was intubated for airway protection. He was admitted to inpatient status to the ICU.     Overview/Hospital Course:  Admitted to ICU with HHS, encephalopathy, GI bleed, ADONAY, respiratory failure and sepsis. Sepsis was due to E. Coli UTI and Strep agalactiae pneumonia. Critical care and nephrology consulted and broad antibiotics continued. GI was consulted and he had EGD on 6/29 which showed normal esophagus and duodenum with OG tube trauma to mid gastric body but no source of bleeding was identified. He was extubated on 6/30. He attempted bowel prep for colonoscopy but was unable to tolerate; colonoscopy was pushed back. He had slow improvement in ADONAY as well as improvement in sepsis with antibiotics. ID was consulted and recommended cefazolin. He was found to have a large posterior neck abscess so general surgery was consulted and he had I&D. He was transferred out of ICU on 7/3. He  was scheduled to have colonoscopy on 7/6 but was unable to tolerate bowel prep again so it was canceled.    Interval History: No acute events overnight. Reports feeling overall improved, though some urinary retention.    Review of Systems   Constitutional: Negative for chills and fever.   Respiratory: Negative for shortness of breath.    Cardiovascular: Negative for chest pain.   Gastrointestinal: Positive for abdominal distention. Negative for abdominal pain, nausea and vomiting.     Objective:     Vital Signs (Most Recent):  Temp: 98.5 °F (36.9 °C) (07/08/20 1154)  Pulse: 64 (07/08/20 1600)  Resp: 16 (07/08/20 1154)  BP: (!) 156/67 (07/08/20 1154)  SpO2: 98 % (07/08/20 1154) Vital Signs (24h Range):  Temp:  [97.1 °F (36.2 °C)-98.6 °F (37 °C)] 98.5 °F (36.9 °C)  Pulse:  [55-80] 64  Resp:  [16-20] 16  SpO2:  [94 %-98 %] 98 %  BP: (113-156)/(56-67) 156/67     Weight: (!) 144.5 kg (318 lb 9 oz)  Body mass index is 40.83 kg/m².    Intake/Output Summary (Last 24 hours) at 7/8/2020 1658  Last data filed at 7/8/2020 0637  Gross per 24 hour   Intake 1150 ml   Output 1050 ml   Net 100 ml      Physical Exam  Vitals signs and nursing note reviewed.   Constitutional:       General: He is not in acute distress.     Appearance: Normal appearance.   Cardiovascular:      Rate and Rhythm: Normal rate and regular rhythm.      Pulses: Normal pulses.   Pulmonary:      Effort: No respiratory distress.      Comments: Decreased at the bases  Abdominal:      General: There is distension.      Tenderness: There is no abdominal tenderness.      Comments: Hypoactive, high pitched bowel sounds   Musculoskeletal:         General: No tenderness.      Right lower leg: No edema.      Left lower leg: No edema.   Skin:     General: Skin is warm and dry.      Comments: L posterior neck abscess s/p I&D, purulent drainage noted   Neurological:      Mental Status: He is alert and oriented to person, place, and time.       Significant Labs:    CBC:  Recent Labs   Lab 07/06/20  0758 07/07/20  0501 07/08/20  0528   WBC 9.11 9.39 9.44   HGB 7.7* 7.5* 7.2*   HCT 24.5* 24.0* 23.5*    204 219   GRAN 73.1*  6.7 76.2*  7.2 75.2*  7.1   LYMPH 19.6  1.8 15.3*  1.4 15.3*  1.4   MONO 5.5  0.5 6.4  0.6 7.1  0.7   EOS 0.1 0.1 0.1   BASO 0.02 0.02 0.02      CMP:  Recent Labs   Lab 07/06/20  0758 07/07/20  0501 07/08/20  0528    143 141   K 3.4* 3.7 3.9    111* 111*   CO2 19* 20* 21*   BUN 43* 39* 32*   CREATININE 2.7* 2.6* 2.6*    96 101   CALCIUM 7.0* 6.9* 6.8*   PHOS 3.5  3.5 3.1  3.1 3.3  3.3   ALBUMIN 1.8* 1.8* 1.8*   ANIONGAP 12 12 9      Significant Imaging:   No new imaging this morning.      Assessment/Plan:      * Type 2 diabetes mellitus with hyperglycemia  - HHS resolved.  - Glucose with some variability, but improving gradually.  - Continuing insulin detemir 35U subQ daily, insulin aspart 12U subQ TIDWM, low-dose sliding scale insulin aspart 0-5U subQ TIDWM PRN.    Debility  - Continuing PT/OT; anticipate likely SNF placement for discharge.    Abdominal distension  - KUB with diffuse gaseous distension without obstruction.  - Simethicone PRN.  - Continued PT/OT.    Abscess of neck  - Soft tissue neck CT with abscess L posterior neck.  - General surgery following, s/p I&D 7/2  - Aerobic culture negative. Anaerobic culture showing finegoldia.  - Continuing cefazolin, added metronidazole 500mg IV q8hr.    LLL pneumonia  - CXR with LLL opacity .  - Respiratory culture with Strep agalactiae. Continue Cefazolin per ID recs.    Acute renal failure superimposed on stage 4 chronic kidney disease  - Multifactorial with likely acute blood loss anemia, GI losses contributing.  - Baseline Cr ~2.4-2.7.  - FENa suggestive of intrinsic source  - Good UOP. Cr down trending, 2.7 today  - Nephrology following, appreciate assistance.     UTI (urinary tract infection)  - Urine culture with E. Coli.  - Continue Cefazolin per ID. End date:  7/10/20.    Acute blood loss anemia  - 2/2 GI bleed.  - s/p 4 units PRBC.  - Hgb stable the past few days. Monitor.    GIB (gastrointestinal bleeding)  - s/p 2U pRBCs 06/27, 1U pRBCs 06/28, 1U pRBC 6/30  - Continue to trend Hgb and transfuse PRN Hgb < 7.  - Continuing pantoprazole 40mg PO daily.  - EGD 6/29 with normal esophagus, OG tube trauma to the mid body and normal duodenum.  - Unable to tolerate bowel prep. Colonoscopy deferred since Hgb stable, can be done outpatient.    Obesity, Class III, BMI 40-49.9 (morbid obesity)  - Dietary counseling.    Dilated cardiomyopathy  - Volume status improving; diuretics held with ADONAY.  - As under ADONAY.    CKD (chronic kidney disease) stage 4, GFR 15-29 ml/min  - As under ADONAY.    Essential hypertension  - Continuing carvedilol 12.5mg PO BID, nifedipine 90mg PO daily.    VTE Risk Mitigation (From admission, onward)         Ordered     IP VTE HIGH RISK PATIENT  Once      06/28/20 0139     Reason for No Pharmacological VTE Prophylaxis  Once     Question:  Reasons:  Answer:  Active Bleeding    06/28/20 0139     Place sequential compression device  Until discontinued      06/28/20 0036                      KRISTEN Gomez MD  Department of Hospital Medicine   [facility]

## 2020-07-08 NOTE — PLAN OF CARE
POC reviewed with pt, pt verbalized understanding. Safety maintained throughout shift, bed locked and in lowest position, call light in reach, Side rails up X 2. Non skid sock on when OOB. Pt remained free of fall/trauma. Pt denies pain throughout shift. VSS, pt remained afebrile this shift. Pt worked with physical therapy this shift. Pt tolerating meals well, no reports of nausea and vomiting. IV abx  infused as ordered. Dressing change done to neck, foam dressing applied. POCT glucose checked, scheduled insulin administered. Telemetry placed, SB/NSR.  Will continue to monitor.

## 2020-07-08 NOTE — PLAN OF CARE
Plan of care reviewed with patient. Patient is disoriented to situation. VSS, on RA. No complaints of pain. Blood glucose monitored and insulin given according to MAR. Wound care provided, mepliex to neck CDI. Left arm with several scabs. Patient noted to have one scab which opened up. Area cleaned with wound cleanser, aquacel applied. Weight shift assistance provided to patient, heels elevated off bed. Patient is voiding per urinal. Patient states he spilled urinal once overnight, incontinence care provided. Purposeful rounding completed. All safety measures in place. Patient instructed to call staff for assistance.

## 2020-07-08 NOTE — PLAN OF CARE
Pt resting comfortably.  No complaints of pain during shift, but pt frustrated, stating he wants to go home.  Good appetite.  Pt using urinal.  Wound care performed as ordered.  Visitor in morning.  Blood glucose monitored; evening insulin dose held.  Safety measures in place.  Purposeful hourly rounding completed.  Will continue to monitor.

## 2020-07-08 NOTE — SUBJECTIVE & OBJECTIVE
Interval History: No acute events overnight. Reports feeling overall improved, though some urinary retention.    Review of Systems   Constitutional: Negative for chills and fever.   Respiratory: Negative for shortness of breath.    Cardiovascular: Negative for chest pain.   Gastrointestinal: Positive for abdominal distention. Negative for abdominal pain, nausea and vomiting.     Objective:     Vital Signs (Most Recent):  Temp: 98.5 °F (36.9 °C) (07/08/20 1154)  Pulse: 64 (07/08/20 1600)  Resp: 16 (07/08/20 1154)  BP: (!) 156/67 (07/08/20 1154)  SpO2: 98 % (07/08/20 1154) Vital Signs (24h Range):  Temp:  [97.1 °F (36.2 °C)-98.6 °F (37 °C)] 98.5 °F (36.9 °C)  Pulse:  [55-80] 64  Resp:  [16-20] 16  SpO2:  [94 %-98 %] 98 %  BP: (113-156)/(56-67) 156/67     Weight: (!) 144.5 kg (318 lb 9 oz)  Body mass index is 40.83 kg/m².    Intake/Output Summary (Last 24 hours) at 7/8/2020 1658  Last data filed at 7/8/2020 0637  Gross per 24 hour   Intake 1150 ml   Output 1050 ml   Net 100 ml      Physical Exam  Vitals signs and nursing note reviewed.   Constitutional:       General: He is not in acute distress.     Appearance: Normal appearance.   Cardiovascular:      Rate and Rhythm: Normal rate and regular rhythm.      Pulses: Normal pulses.   Pulmonary:      Effort: No respiratory distress.      Comments: Decreased at the bases  Abdominal:      General: There is distension.      Tenderness: There is no abdominal tenderness.      Comments: Hypoactive, high pitched bowel sounds   Musculoskeletal:         General: No tenderness.      Right lower leg: No edema.      Left lower leg: No edema.   Skin:     General: Skin is warm and dry.      Comments: L posterior neck abscess s/p I&D, purulent drainage noted   Neurological:      Mental Status: He is alert and oriented to person, place, and time.       Significant Labs:   CBC:  Recent Labs   Lab 07/06/20  0758 07/07/20  0501 07/08/20  0528   WBC 9.11 9.39 9.44   HGB 7.7* 7.5* 7.2*   HCT  24.5* 24.0* 23.5*    204 219   GRAN 73.1*  6.7 76.2*  7.2 75.2*  7.1   LYMPH 19.6  1.8 15.3*  1.4 15.3*  1.4   MONO 5.5  0.5 6.4  0.6 7.1  0.7   EOS 0.1 0.1 0.1   BASO 0.02 0.02 0.02      CMP:  Recent Labs   Lab 07/06/20  0758 07/07/20  0501 07/08/20  0528    143 141   K 3.4* 3.7 3.9    111* 111*   CO2 19* 20* 21*   BUN 43* 39* 32*   CREATININE 2.7* 2.6* 2.6*    96 101   CALCIUM 7.0* 6.9* 6.8*   PHOS 3.5  3.5 3.1  3.1 3.3  3.3   ALBUMIN 1.8* 1.8* 1.8*   ANIONGAP 12 12 9      Significant Imaging:   No new imaging this morning.

## 2020-07-08 NOTE — PT/OT/SLP PROGRESS
Physical Therapy Treatment    Patient Name:  Vinnie Siegel   MRN:  [id number]    Recommendations:     Discharge Recommendations:  nursing facility, skilled   Discharge Equipment Recommendations: 3-in-1 commode, walker, rolling, wheelchair, lift device   Barriers to discharge: Decreased caregiver support (fiance is unable to physically assist pt)    Assessment:     Vinnie Siegel is a 74 y.o. male admitted with a medical diagnosis of Type 2 diabetes mellitus with hyperglycemia.  He presents with the following impairments/functional limitations:  weakness, impaired endurance, impaired self care skills, impaired functional mobilty, impaired balance, decreased lower extremity function, decreased upper extremity function, decreased safety awareness, impaired cardiopulmonary response to activity ;pt with fair mobility, needs lots of encouragement to participate in his own care.    Rehab Prognosis: Good; patient would benefit from acute skilled PT services to address these deficits and reach maximum level of function.    Recent Surgery: Procedure(s) (LRB):  EGD (ESOPHAGOGASTRODUODENOSCOPY) (N/A) 9 Days Post-Op    Plan:     During this hospitalization, patient to be seen 5 x/week to address the identified rehab impairments via gait training, therapeutic activities, therapeutic exercises, neuromuscular re-education, wheelchair management/training and progress toward the following goals:    · Plan of Care Expires:  07/31/20    Subjective     Chief Complaint: none stated  Patient/Family Comments/goals: pt needing lots of encouragement to participate in session, grunting and pt seemed annoyed at therapist.   Pain/Comfort:  · Pain Rating 1: 0/10  · Pain Rating Post-Intervention 1: 0/10      Objective:     Communicated with nurse prior to session.  Patient found right sidelying with peripheral IV, telemetry, pt eating his lunch while hanging sideways over bedrails, upon PT entry to room.     General Precautions: Standard,  diabetic, fall   Orthopedic Precautions:N/A   Braces: N/A     Functional Mobility:  · Bed Mobility:     · Scooting: stand by assistance and VC's for technique on scooting up in bed, bed flat and pt using rails and LE's to assist.      AM-PAC 6 CLICK MOBILITY  Turning over in bed (including adjusting bedclothes, sheets and blankets)?: 3  Sitting down on and standing up from a chair with arms (e.g., wheelchair, bedside commode, etc.): 2  Moving from lying on back to sitting on the side of the bed?: 3  Moving to and from a bed to a chair (including a wheelchair)?: 2  Need to walk in hospital room?: 1  Climbing 3-5 steps with a railing?: 1  Basic Mobility Total Score: 12       Therapeutic Activities and Exercises:       Patient left HOB elevated with all lines intact, call button in reach, nurse notified and lunch tray present..    GOALS:   Multidisciplinary Problems     Physical Therapy Goals        Problem: Physical Therapy Goal    Goal Priority Disciplines Outcome Goal Variances Interventions   Physical Therapy Goal     PT, PT/OT Ongoing, Progressing     Description: Goals to be met by: 20    Patient will increase functional independence with mobility by performin. Supine to sit with min A.   2. Sit to supine with min A. MET /7  3. Rolling R and L with min A.   4. Sitting at edge of bed >5 minutes with min A. MET   5. PT will assess sit<>stand. MET   6. Sit<>stand with RW with modA x1 person.  7. Gait x15 ft with RW and CGA.                   Time Tracking:     PT Received On: 20  PT Start Time: 1142     PT Stop Time: 1150  PT Total Time (min): 8 min     Billable Minutes: Therapeutic Activity 8    Treatment Type: Treatment  PT/PTA: PTA     PTA Visit Number: 1     Maria G Delgado PTA  2020

## 2020-07-08 NOTE — PT/OT/SLP PROGRESS
Occupational Therapy   Treatment    Name: Vinnie Siegel  MRN: [id number]  Admitting Diagnosis:  Type 2 diabetes mellitus with hyperglycemia  9 Days Post-Op    Recommendations:     Discharge Recommendations: nursing facility, skilled  Discharge Equipment Recommendations:  3-in-1 commode, walker, rolling, wheelchair(all bariatric)  Barriers to discharge:  Decreased caregiver support, Inaccessible home environment    Assessment:     Vinnie Siegel is a 74 y.o. male with a medical diagnosis of Type 2 diabetes mellitus with hyperglycemia.  He presents with the following performance deficits affecting function: weakness, impaired self care skills, impaired balance, decreased coordination, decreased ROM, impaired skin, impaired endurance, impaired functional mobilty, decreased upper extremity function, edema, decreased lower extremity function, impaired cardiopulmonary response to activity, gait instability.     Continues to meet goals.  Continued improvement with bed mobility and functional transfers.  Motivated for session on this date.  Continue OT services to restore patient functioning.     Rehab Prognosis:  Good; patient would benefit from acute skilled OT services to address these deficits and reach maximum level of function.       Plan:     Patient to be seen 5 x/week to address the above listed problems via self-care/home management, therapeutic activities, therapeutic exercises  · Plan of Care Expires: 07/30/20  · Plan of Care Reviewed with: patient    Subjective     Pain/Comfort:  · Pain Rating 1: 0/10  · Pain Rating Post-Intervention 1: 0/10    Objective:     Communicated with: SHMUEL Gipson prior to session.  Patient found right sidelying with telemetry, peripheral IV upon OT entry to room.    General Precautions: Standard, diabetic, fall   Orthopedic Precautions:N/A   Braces: N/A     Occupational Performance:     Bed Mobility:    · Patient completed Scooting/Bridging with stand by assistance  · Patient  completed Supine to Sit with stand by assistance and with side rail  · Patient completed Sit to Supine with stand by assistance and with side rail     Functional Mobility/Transfers:  · Patient completed Sit <> Stand Transfer with minimum assistance and of 2 persons  with  rolling walker  on 2 trials  · Functional Mobility: Min assist with RW for 4 steps fwd, Mod assist for 4 steps rback to EOB    Activities of Daily Living:  · Grooming: stand by assistance seated at EOB  · Bathing: moderate assistance sponge bath at EOB  · Upper Body Dressing: stand by assistance donning gown at EOB  · Lower Body Dressing: total assistance (B) non skid socks      [facility] 6 Click ADL: 13    Treatment & Education:  Educated on plan for this session and next session, on continuing progress and hand placement for safe sit to/from stand using RW.  Verbalized understanding but will reinforce.    Patient left right sidelying with all lines intact, call button in reach, RN notified and fiance presentEducation:      GOALS:   Multidisciplinary Problems     Occupational Therapy Goals        Problem: Occupational Therapy Goal    Goal Priority Disciplines Outcome Interventions   Occupational Therapy Goal     OT, PT/OT Ongoing, Progressing    Description: Goals to be met by: 07/16/2020     Patient will increase functional independence with ADLs by performing:    REVISED UBD with SBA.  Grooming while EOB with Moderate Assistance.  Rolling to Bilateral with Minimum Assistance.   REVISED Supine to sit with Contact Guard Assistance.   REVISED Sit to/from stand with Mod assist of 1 person.  Increased functional strength to WFL for ADL tasks, functional transfers and bed mobility.      COMPLETED GOALS  Supine to sit with Moderate Assistance.(MET 7/4/20)  UE Dressing with Moderate Assistance.(MET 07/07/2020)  Assess functional transfers. (MET 07/07/2020)  Sitting at edge of bed x15 minutes with Moderate Assistance. (MET 07/07/2020)                        Time Tracking:     OT Date of Treatment: 07/08/20  OT Start Time: 1416  OT Stop Time: 1448  OT Total Time (min): 32 min    Billable Minutes:Self Care/Home Management 16 (co-treat with PTA for safe mobility)    KRISTINE Courtney  7/8/2020

## 2020-07-08 NOTE — PROGRESS NOTES
Pt received on RA. No PRN treatments were given and tolerated well. No changes were made. Will continue to monitor.

## 2020-07-08 NOTE — ASSESSMENT & PLAN NOTE
- Soft tissue neck CT with abscess L posterior neck.  - General surgery following, s/p I&D 7/2  - Aerobic culture negative. Anaerobic culture showing finegoldia.  - Continuing cefazolin, added metronidazole 500mg IV q8hr.

## 2020-07-08 NOTE — PROGRESS NOTES
"Nephrology  Progress Note    Admit Date: 6/27/2020   LOS: 11 days     SUBJECTIVE:     Follow-up For:  Type 2 diabetes mellitus with hyperglycemia    Interval History:     Continues to slowly improve.  No c/o this am.  Eager to go home.      Review of Systems:    No c/o.      OBJECTIVE:     Vital Signs Range (Last 24H):  BP (!) 145/65 (Patient Position: Lying)   Pulse 66   Temp 98.3 °F (36.8 °C) (Oral)   Resp 20   Ht 6' 2" (1.88 m)   Wt (!) 144.5 kg (318 lb 9 oz)   SpO2 97%   BMI 40.83 kg/m²     Temp:  [97.1 °F (36.2 °C)-98.6 °F (37 °C)]   Pulse:  [53-80]   Resp:  [16-20]   BP: (113-154)/(56-70)   SpO2:  [94 %-99 %]     I & O (Last 24H):    Intake/Output Summary (Last 24 hours) at 7/8/2020 0921  Last data filed at 7/8/2020 0637  Gross per 24 hour   Intake 1870 ml   Output 1350 ml   Net 520 ml       Physical Exam:  General appearance: Well developed, well nourished, weak, obese  Eyes:  Conjunctivae/corneas clear. PERRL.  Neck: left neck wound CDI  Lungs: diminished.    Heart: Regular rate and rhythm, S1, S2 normal, no murmur, rub or beatrice.  Abdomen: Soft, non-tender non-distended; bowel sounds normal; no masses,  no organomegaly, obese.   Extremities: No cyanosis or clubbing. 2+ edema.  Left neck abscess noted.   Skin: Skin color, texture, turgor normal. No rashes or lesions  Neurologic: No focal numbness or weakness         Laboratory Data:  Recent Labs   Lab 07/08/20  0528   WBC 9.44   RBC 2.48*   HGB 7.2*   HCT 23.5*      MCV 95   MCH 29.0   MCHC 30.6*       BMP:   Recent Labs   Lab 07/08/20  0528         K 3.9   *   CO2 21*   BUN 32*   CREATININE 2.6*   CALCIUM 6.8*     Lab Results   Component Value Date    CALCIUM 6.8 (LL) 07/08/2020    PHOS 3.3 07/08/2020    PHOS 3.3 07/08/2020       Lab Results   Component Value Date    .5 (H) 06/29/2020    CALCIUM 6.8 (LL) 07/08/2020    CAION 1.07 06/28/2020    PHOS 3.3 07/08/2020    PHOS 3.3 07/08/2020       Lab Results   Component " Value Date    URICACID 8.6 (H) 06/29/2020       BNP  Recent Labs   Lab 07/03/20  0511   *       Medications:  Medication list was reviewed and changes noted under Assessment/Plan.    Diagnostic Results:        ASSESSMENT/PLAN:     73 YO male with DM, HTN, CHF with Dilated CM, COPD, CKD 4, now with GIB and Hyperosmolar nonketotic hyperglycemia, resp failure prompting intubation to protect airway, severe anemia with Hgb 6.4, elevated BUN due to GIB, hypotension, and ADONAY     HHS resolved  -s/p Insulin drip and now on basal/prandial.    -UTI/abscess noted.  -defer  -glucose labile.     Resolved Non-oliguric ADONAY on CKD 4/ Hypokalemia  -Baseline creat over past year 2.5-2.9 with history of proteinuria  -Creat 4.4 on admit and slowly improving back to baseline  -UOP 2L   -Prot/creat ratio 1 gram  -Expect that severe anemia and hypotension etiology for ADONAY  -Doesn't appear he has seen nephrologist so ordered full workup and mildly elevated ELSA with negative reflex only positive finding so far.  -Normally prefer no PPI but with GIB this okay  -No nephrotoxins.  -replace lytes PRN.  -started low dose bicarb tabs.   -Renally dose meds, avoid nephrotoxins, and monitor I/O's closely.       GIB with severe anemia  -Agree with transfusions prn  -dosed Epo again.  -mildly iron deficient but would not give IV iron for now  -GI consulted - EGD noted.  .   -Protonix as now  -unable to tolerated prep.    DM  -Last HgA1c in December 2019 7.7  -Defer to primary team  -as above     Sepsis from UTI/neck abscess:  -cultured and continue antibx for now.   -Dr. Basilio following  -lactic acid, procal, and WBC's improving.     Vit D Def with mild HPTH/ Hx Calcium Oxalate Nephrolithiasis:  -Started on calcitriol 0.25mcg 3x/week, but stopped for now.  -However, Vit D 25 extremely low, but Hx Nephrolithiasis.  -Changed to low dose Cholecalciferol 1,000 units daily and will need to be monitored closely with 24 hr urine Calcium.   -low dose  tums.       Resolved Hypernatremia:  -encourage water intake.    HTN:  -started BB/CCB.  Hydralazine prn.   -no ACE/ARB for now.       Dispo:  Await rehab.

## 2020-07-08 NOTE — PT/OT/SLP PROGRESS
Physical Therapy Treatment    Patient Name:  Vinnie Siegel   MRN:  [id number]    Recommendations:     Discharge Recommendations:  nursing facility, skilled   Discharge Equipment Recommendations: 3-in-1 commode, walker, rolling, wheelchair(bariatric RW)   Barriers to discharge: Decreased caregiver support    Assessment:     Vinnie Siegel is a 74 y.o. male admitted with a medical diagnosis of Type 2 diabetes mellitus with hyperglycemia.  He presents with the following impairments/functional limitations:  weakness, impaired endurance, impaired self care skills, impaired functional mobilty, gait instability, impaired balance, decreased upper extremity function, decreased lower extremity function, decreased safety awareness, impaired skin, impaired cardiopulmonary response to activity ;pt with improved mobility today, dec assistance req'd for t/f's,and pt able to amb. 4 steps fw/bk with RW and Lyric x 2.    Rehab Prognosis: Good; patient would benefit from acute skilled PT services to address these deficits and reach maximum level of function.    Recent Surgery: Procedure(s) (LRB):  EGD (ESOPHAGOGASTRODUODENOSCOPY) (N/A) 9 Days Post-Op    Plan:     During this hospitalization, patient to be seen 5 x/week to address the identified rehab impairments via gait training, therapeutic activities, therapeutic exercises, neuromuscular re-education, wheelchair management/training and progress toward the following goals:    · Plan of Care Expires:  07/31/20    Subjective     Chief Complaint: fatigue  Patient/Family Comments/goals: pt agreeable to session, fiance present as well.  Pain/Comfort:  · Pain Rating 1: 0/10  · Pain Rating Post-Intervention 1: 0/10      Objective:     Communicated with nurse prior to session.  Patient found right sidelying with peripheral IV, telemetry upon PT entry to room.     General Precautions: Standard, diabetic, fall   Orthopedic Precautions:N/A   Braces: N/A     Functional Mobility:  · Bed  Mobility:     · Supine to Sit: stand by assistance and HOB partially up, pt using bedrail  · Sit to Supine: stand by assistance  · Transfers:     · Sit to Stand:  minimum assistance, of 2 persons and EOB slightly elevated with rolling walker  · Gait: amb'd 4 steps fw/bk with RW and  min.A x 2      AM-PAC 6 CLICK MOBILITY  Turning over in bed (including adjusting bedclothes, sheets and blankets)?: 3  Sitting down on and standing up from a chair with arms (e.g., wheelchair, bedside commode, etc.): 3  Moving from lying on back to sitting on the side of the bed?: 3  Moving to and from a bed to a chair (including a wheelchair)?: 3  Need to walk in hospital room?: 2  Climbing 3-5 steps with a railing?: 1  Basic Mobility Total Score: 15       Therapeutic Activities and Exercises:   pt stood with min.A x 2 with BRW, marching in place, and side steps with min.A x 2.  perf'd seated hip flex. And AP's x 5 reps ea.   OT present for ADL's at EOB.    Patient left HOB elevated with all lines intact, call button in reach, nurse notified and fiance present..    GOALS:   Multidisciplinary Problems     Physical Therapy Goals        Problem: Physical Therapy Goal    Goal Priority Disciplines Outcome Goal Variances Interventions   Physical Therapy Goal     PT, PT/OT Ongoing, Progressing     Description: Goals to be met by: 20    Patient will increase functional independence with mobility by performin. Supine to sit with min A.   2. Sit to supine with min A. MET   3. Rolling R and L with min A.   4. Sitting at edge of bed >5 minutes with min A. MET   5. PT will assess sit<>stand. MET   6. Sit<>stand with RW with modA x1 person.  7. Gait x15 ft with RW and CGA.                   Time Tracking:     PT Received On: 20  PT Start Time:      PT Stop Time: 1445  PT Total Time (min): 30 min     Billable Minutes: Gait Training 14 (co-tx with OT 16 min)    Treatment Type: Treatment  PT/PTA: PTA     PTA Visit Number:  1     Maria G Delgado, PTA  07/08/2020

## 2020-07-08 NOTE — PLAN OF CARE
Patient is agreeable to SNF, referral sent to [facility] currently reviewing patient clinicals

## 2020-07-09 LAB
ALBUMIN SERPL BCP-MCNC: 1.9 G/DL (ref 3.5–5.2)
ANION GAP SERPL CALC-SCNC: 11 MMOL/L (ref 8–16)
BASOPHILS # BLD AUTO: 0.03 K/UL (ref 0–0.2)
BASOPHILS NFR BLD: 0.3 % (ref 0–1.9)
BUN SERPL-MCNC: 27 MG/DL (ref 8–23)
CA-I BLDV-SCNC: 0.99 MMOL/L (ref 1.06–1.42)
CALCIUM SERPL-MCNC: 6.9 MG/DL (ref 8.7–10.5)
CHLORIDE SERPL-SCNC: 111 MMOL/L (ref 95–110)
CO2 SERPL-SCNC: 20 MMOL/L (ref 23–29)
CREAT SERPL-MCNC: 2.5 MG/DL (ref 0.5–1.4)
DIFFERENTIAL METHOD: ABNORMAL
EOSINOPHIL # BLD AUTO: 0.1 K/UL (ref 0–0.5)
EOSINOPHIL NFR BLD: 0.9 % (ref 0–8)
ERYTHROCYTE [DISTWIDTH] IN BLOOD BY AUTOMATED COUNT: 16 % (ref 11.5–14.5)
EST. GFR  (AFRICAN AMERICAN): 28 ML/MIN/1.73 M^2
EST. GFR  (NON AFRICAN AMERICAN): 25 ML/MIN/1.73 M^2
GLUCOSE SERPL-MCNC: 82 MG/DL (ref 70–110)
HCT VFR BLD AUTO: 23.7 % (ref 40–54)
HGB BLD-MCNC: 7.5 G/DL (ref 14–18)
IMM GRANULOCYTES # BLD AUTO: 0.07 K/UL (ref 0–0.04)
IMM GRANULOCYTES NFR BLD AUTO: 0.8 % (ref 0–0.5)
LYMPHOCYTES # BLD AUTO: 1.4 K/UL (ref 1–4.8)
LYMPHOCYTES NFR BLD: 15.9 % (ref 18–48)
MCH RBC QN AUTO: 30.2 PG (ref 27–31)
MCHC RBC AUTO-ENTMCNC: 31.6 G/DL (ref 32–36)
MCV RBC AUTO: 96 FL (ref 82–98)
MONOCYTES # BLD AUTO: 0.7 K/UL (ref 0.3–1)
MONOCYTES NFR BLD: 7.9 % (ref 4–15)
NEUTROPHILS # BLD AUTO: 6.5 K/UL (ref 1.8–7.7)
NEUTROPHILS NFR BLD: 74.2 % (ref 38–73)
NRBC BLD-RTO: 0 /100 WBC
PHOSPHATE SERPL-MCNC: 3.2 MG/DL (ref 2.7–4.5)
PHOSPHATE SERPL-MCNC: 3.2 MG/DL (ref 2.7–4.5)
PLATELET # BLD AUTO: 234 K/UL (ref 150–350)
PMV BLD AUTO: 10.2 FL (ref 9.2–12.9)
POCT GLUCOSE: 101 MG/DL (ref 70–110)
POCT GLUCOSE: 126 MG/DL (ref 70–110)
POCT GLUCOSE: 133 MG/DL (ref 70–110)
POCT GLUCOSE: 254 MG/DL (ref 70–110)
POTASSIUM SERPL-SCNC: 4 MMOL/L (ref 3.5–5.1)
RBC # BLD AUTO: 2.48 M/UL (ref 4.6–6.2)
SODIUM SERPL-SCNC: 142 MMOL/L (ref 136–145)
WBC # BLD AUTO: 8.74 K/UL (ref 3.9–12.7)

## 2020-07-09 PROCEDURE — 36415 COLL VENOUS BLD VENIPUNCTURE: CPT

## 2020-07-09 PROCEDURE — 99233 PR SUBSEQUENT HOSPITAL CARE,LEVL III: ICD-10-PCS | Mod: ,,, | Performed by: INTERNAL MEDICINE

## 2020-07-09 PROCEDURE — 97530 THERAPEUTIC ACTIVITIES: CPT | Mod: CQ

## 2020-07-09 PROCEDURE — 25000003 PHARM REV CODE 250: Performed by: INTERNAL MEDICINE

## 2020-07-09 PROCEDURE — 85025 COMPLETE CBC W/AUTO DIFF WBC: CPT

## 2020-07-09 PROCEDURE — 25000242 PHARM REV CODE 250 ALT 637 W/ HCPCS: Performed by: INTERNAL MEDICINE

## 2020-07-09 PROCEDURE — 94761 N-INVAS EAR/PLS OXIMETRY MLT: CPT

## 2020-07-09 PROCEDURE — 82330 ASSAY OF CALCIUM: CPT

## 2020-07-09 PROCEDURE — 99900035 HC TECH TIME PER 15 MIN (STAT)

## 2020-07-09 PROCEDURE — 99233 SBSQ HOSP IP/OBS HIGH 50: CPT | Mod: ,,, | Performed by: INTERNAL MEDICINE

## 2020-07-09 PROCEDURE — 63600175 PHARM REV CODE 636 W HCPCS: Performed by: INTERNAL MEDICINE

## 2020-07-09 PROCEDURE — S0030 INJECTION, METRONIDAZOLE: HCPCS | Performed by: INTERNAL MEDICINE

## 2020-07-09 PROCEDURE — 11000001 HC ACUTE MED/SURG PRIVATE ROOM

## 2020-07-09 PROCEDURE — 80069 RENAL FUNCTION PANEL: CPT

## 2020-07-09 PROCEDURE — 94640 AIRWAY INHALATION TREATMENT: CPT

## 2020-07-09 PROCEDURE — 25000003 PHARM REV CODE 250: Performed by: NURSE PRACTITIONER

## 2020-07-09 RX ORDER — POLYETHYLENE GLYCOL 3350 17 G/17G
17 POWDER, FOR SOLUTION ORAL 2 TIMES DAILY
Status: DISCONTINUED | OUTPATIENT
Start: 2020-07-09 | End: 2020-07-16 | Stop reason: HOSPADM

## 2020-07-09 RX ADMIN — TIMOLOL MALEATE 1 DROP: 2.5 SOLUTION/ DROPS OPHTHALMIC at 08:07

## 2020-07-09 RX ADMIN — SODIUM BICARBONATE 650 MG TABLET 650 MG: at 08:07

## 2020-07-09 RX ADMIN — CARVEDILOL 12.5 MG: 12.5 TABLET, FILM COATED ORAL at 08:07

## 2020-07-09 RX ADMIN — POLYETHYLENE GLYCOL 3350 17 G: 17 POWDER, FOR SOLUTION ORAL at 08:07

## 2020-07-09 RX ADMIN — FUROSEMIDE 40 MG: 40 TABLET ORAL at 08:07

## 2020-07-09 RX ADMIN — METRONIDAZOLE 500 MG: 500 INJECTION, SOLUTION INTRAVENOUS at 03:07

## 2020-07-09 RX ADMIN — TAMSULOSIN HYDROCHLORIDE 0.4 MG: 0.4 CAPSULE ORAL at 08:07

## 2020-07-09 RX ADMIN — HYDROXYZINE HYDROCHLORIDE 50 MG: 25 TABLET, FILM COATED ORAL at 10:07

## 2020-07-09 RX ADMIN — METRONIDAZOLE 500 MG: 500 INJECTION, SOLUTION INTRAVENOUS at 10:07

## 2020-07-09 RX ADMIN — CEFAZOLIN SODIUM 2 G: 2 SOLUTION INTRAVENOUS at 02:07

## 2020-07-09 RX ADMIN — MELATONIN 1000 UNITS: at 08:07

## 2020-07-09 RX ADMIN — IPRATROPIUM BROMIDE AND ALBUTEROL SULFATE 3 ML: .5; 3 SOLUTION RESPIRATORY (INHALATION) at 03:07

## 2020-07-09 RX ADMIN — NIFEDIPINE 90 MG: 30 TABLET, FILM COATED, EXTENDED RELEASE ORAL at 08:07

## 2020-07-09 RX ADMIN — CEFAZOLIN SODIUM 2 G: 2 SOLUTION INTRAVENOUS at 08:07

## 2020-07-09 RX ADMIN — CEFAZOLIN SODIUM 2 G: 2 SOLUTION INTRAVENOUS at 01:07

## 2020-07-09 RX ADMIN — PANTOPRAZOLE SODIUM 40 MG: 40 TABLET, DELAYED RELEASE ORAL at 08:07

## 2020-07-09 RX ADMIN — INSULIN ASPART 12 UNITS: 100 INJECTION, SOLUTION INTRAVENOUS; SUBCUTANEOUS at 06:07

## 2020-07-09 RX ADMIN — INSULIN ASPART 12 UNITS: 100 INJECTION, SOLUTION INTRAVENOUS; SUBCUTANEOUS at 01:07

## 2020-07-09 RX ADMIN — CALCIUM GLUCONATE 2 G: 98 INJECTION, SOLUTION INTRAVENOUS at 12:07

## 2020-07-09 RX ADMIN — INSULIN ASPART 3 UNITS: 100 INJECTION, SOLUTION INTRAVENOUS; SUBCUTANEOUS at 01:07

## 2020-07-09 RX ADMIN — METRONIDAZOLE 500 MG: 500 INJECTION, SOLUTION INTRAVENOUS at 01:07

## 2020-07-09 NOTE — PT/OT/SLP PROGRESS
"Physical Therapy Treatment    Patient Name:  Vinnie Siegel   MRN:  [id number]    Recommendations:     Discharge Recommendations:  nursing facility, skilled   Discharge Equipment Recommendations: 3-in-1 commode, walker, rolling, wheelchair(bariatric RW)   Barriers to discharge: Decreased caregiver support (pt's fiance reports she cannot assist pt 2/2 back issues herself)    Assessment:     Vinnie Siegel is a 74 y.o. male admitted with a medical diagnosis of Type 2 diabetes mellitus with hyperglycemia.  He presents with the following impairments/functional limitations:  weakness, impaired endurance, impaired self care skills, impaired functional mobilty, gait instability, impaired balance, decreased lower extremity function, decreased upper extremity function, decreased safety awareness, pain, impaired skin, edema, impaired cardiopulmonary response to activity ; pt with fair/good mobility today, though needing a lot of encouragement to participate today. Once pt agreed to sit on EOB, he then stated he wanted to try and stand.    Rehab Prognosis: Good and Fair; patient would benefit from acute skilled PT services to address these deficits and reach maximum level of function.    Recent Surgery: Procedure(s) (LRB):  EGD (ESOPHAGOGASTRODUODENOSCOPY) (N/A) 10 Days Post-Op    Plan:     During this hospitalization, patient to be seen 5 x/week to address the identified rehab impairments via gait training, therapeutic activities, therapeutic exercises, neuromuscular re-education, wheelchair management/training and progress toward the following goals:    · Plan of Care Expires:  07/31/20    Subjective     Chief Complaint: L groin pain, but pt later stated "it's not that bad".  Patient/Family Comments/goals: pt agreeable to session with encouragement.  Pain/Comfort:  · Pain Rating 1: (pt c/o L groin pain today, though did not rate.)  · Location - Side 1: Left  · Location 1: groin  · Pain Addressed 1: Reposition, " Distraction      Objective:     Communicated with nurse prior to session.  Patient found right sidelying with peripheral IV, telemetry upon PT entry to room.     General Precautions: Standard, diabetic, fall   Orthopedic Precautions:N/A   Braces: N/A     Functional Mobility:  · Bed Mobility:     · Supine to Sit: contact guard assistance, minimum assistance and and pt using bedrail  · Sit to Supine: stand by assistance  · Transfers:     · Sit to Stand:  contact guard assistance and minimum assistance with rolling walker  · Gait: pt able to take a few side steps and steps in place with BRW and min.A      AM-PAC 6 CLICK MOBILITY  Turning over in bed (including adjusting bedclothes, sheets and blankets)?: 3  Sitting down on and standing up from a chair with arms (e.g., wheelchair, bedside commode, etc.): 3  Moving from lying on back to sitting on the side of the bed?: 3  Moving to and from a bed to a chair (including a wheelchair)?: 3  Need to walk in hospital room?: 2  Climbing 3-5 steps with a railing?: 1  Basic Mobility Total Score: 15       Therapeutic Activities and Exercises:   pt perf'd AP's and LAQ's x 10 ea. Pt sat up EOB x 20 min. Total, linens and blue pads changed while pt standing.   Dressing on post cervical area loose and needing to be changed 2/2 drainage. Nsg. Notified.     Patient left right sidelying with all lines intact, call button in reach, bed alarm on and nurse notified..    GOALS:   Multidisciplinary Problems     Physical Therapy Goals        Problem: Physical Therapy Goal    Goal Priority Disciplines Outcome Goal Variances Interventions   Physical Therapy Goal     PT, PT/OT Ongoing, Progressing     Description: Goals to be met by: 20    Patient will increase functional independence with mobility by performin. Supine to sit with min A.   2. Sit to supine with min A. MET   3. Rolling R and L with min A.   4. Sitting at edge of bed >5 minutes with min A. MET   5. PT will assess  sit<>stand. MET 7/7  6. Sit<>stand with RW with modA x1 person.  7. Gait x15 ft with RW and CGA.                   Time Tracking:     PT Received On: 07/09/20  PT Start Time: 1500     PT Stop Time: 1525  PT Total Time (min): 25 min     Billable Minutes: Therapeutic Activity 25    Treatment Type: Treatment  PT/PTA: PTA     PTA Visit Number: 2     Maria G Delgado, PTA  07/09/2020

## 2020-07-09 NOTE — PLAN OF CARE
Problem: Physical Therapy Goal  Goal: Physical Therapy Goal  Description: Goals to be met by: 20    Patient will increase functional independence with mobility by performin. Supine to sit with min A.   2. Sit to supine with min A. MET   3. Rolling R and L with min A.   4. Sitting at edge of bed >5 minutes with min A. MET   5. PT will assess sit<>stand. MET   6. Sit<>stand with RW with modA x1 person.  7. Gait x15 ft with RW and CGA.  Outcome: Ongoing, Progressing   Pt sup to sit CGA/Lyric with pt using bedrail, sit to stand CGA/min.A with BRW, pt able to take a few side steps and steps in place with RW and CGA/min.A. recommend cont PT in SNF.

## 2020-07-09 NOTE — NURSING
Pt AAOx3, NAD noted. Pt complained of SOB with talking/excertion. Pt sats remained >95% on RA. Vitals monitored and stable. Glucose monitored and managed with insulin. Pt seen by PT during shift. Dressing on neck wound changed during shift, noted to have purulent drainage. Pt had a new IV placed, received antibiotics throughout shift, no adverse reactions noted. Pt currently in bed, safety measures implemented. Will continue to monitor.

## 2020-07-09 NOTE — PROGRESS NOTES
[facility] Hospital Medicine  Progress Note    Patient Name: Vinnie Siegel  MRN: [id number]  Patient Class: IP- Inpatient   Admission Date: 6/27/2020  Length of Stay: 12 days  Attending Physician: NYDIA Gomez MD  Primary Care Provider: Janeth Walter MD        Subjective:     Principal Problem:Type 2 diabetes mellitus with hyperglycemia        HPI:  Mr. Vinnie Siegel is a 74 y.o. male, with PMH of IDDM-2, CKD-IV, dilated cardiomyopathy, HTN, HLD, gout, obesity, who presented to [facility] ED via EMS on 6/27/20 with rectal bleeding x 2 days. His family called EMS after he became lethargic earlier this evening. Per his fiancee, he has been passing dark blood stools, and has been noting generalized weakness x 1 week. Additionally, he has been having elevated blood glucose readings x 1 day. In the ED, he was found to be in DKA, with a GI bleed. H&H were 6.4 & 19.4 and he was transfused 2 units PRBCs. He was started on an insulin drip and a Protonix drip. He had worsening respiratory and mental status while in the ED, and was intubated for airway protection. He was admitted to inpatient status to the ICU.     Overview/Hospital Course:  Admitted to ICU with HHS, encephalopathy, GI bleed, ADONAY, respiratory failure and sepsis. Sepsis was due to E. Coli UTI and Strep agalactiae pneumonia. Critical care and nephrology consulted and broad antibiotics continued. GI was consulted and he had EGD on 6/29 which showed normal esophagus and duodenum with OG tube trauma to mid gastric body but no source of bleeding was identified. He was extubated on 6/30. He attempted bowel prep for colonoscopy but was unable to tolerate; colonoscopy was pushed back. He had slow improvement in ADONAY as well as improvement in sepsis with antibiotics. ID was consulted and recommended cefazolin. He was found to have a large posterior neck abscess so general surgery was consulted and he had I&D. He was transferred out of ICU on 7/3. He  was scheduled to have colonoscopy on 7/6 but was unable to tolerate bowel prep again so it was canceled.    Interval History: No acute events overnight. Reports poor sleep, did not receive hydroxyzine for sleep, IV dislodged.     Review of Systems   Constitutional: Negative for chills and fever.   Respiratory: Negative for shortness of breath.    Cardiovascular: Negative for chest pain.   Gastrointestinal: Positive for abdominal distention. Negative for abdominal pain, nausea and vomiting.     Objective:     Vital Signs (Most Recent):  Temp: 98.3 °F (36.8 °C) (07/09/20 1733)  Pulse: 71 (07/09/20 1733)  Resp: 18 (07/09/20 1733)  BP: (!) 166/77 (07/09/20 1733)  SpO2: (!) 94 % (07/09/20 1733) Vital Signs (24h Range):  Temp:  [98.1 °F (36.7 °C)-98.5 °F (36.9 °C)] 98.3 °F (36.8 °C)  Pulse:  [60-71] 71  Resp:  [14-20] 18  SpO2:  [94 %-99 %] 94 %  BP: (126-166)/(63-77) 166/77     Weight: (!) 144.5 kg (318 lb 9 oz)  Body mass index is 40.83 kg/m².    Intake/Output Summary (Last 24 hours) at 7/9/2020 1916  Last data filed at 7/9/2020 1800  Gross per 24 hour   Intake 280 ml   Output 1575 ml   Net -1295 ml      Physical Exam  Vitals signs and nursing note reviewed.   Constitutional:       General: He is not in acute distress.     Appearance: Normal appearance.   Cardiovascular:      Rate and Rhythm: Normal rate and regular rhythm.      Pulses: Normal pulses.   Pulmonary:      Effort: No respiratory distress.      Comments: Decreased at the bases  Abdominal:      General: There is distension.      Tenderness: There is no abdominal tenderness.      Comments: Hypoactive, high pitched bowel sounds   Musculoskeletal:         General: No tenderness.      Right lower leg: No edema.      Left lower leg: No edema.   Skin:     General: Skin is warm and dry.      Comments: L posterior neck abscess s/p I&D, purulent drainage noted   Neurological:      Mental Status: He is alert and oriented to person, place, and time.       Significant  Labs:   CBC:  Recent Labs   Lab 07/07/20  0501 07/08/20  0528 07/09/20  0649   WBC 9.39 9.44 8.74   HGB 7.5* 7.2* 7.5*   HCT 24.0* 23.5* 23.7*    219 234   GRAN 76.2*  7.2 75.2*  7.1 74.2*  6.5   LYMPH 15.3*  1.4 15.3*  1.4 15.9*  1.4   MONO 6.4  0.6 7.1  0.7 7.9  0.7   EOS 0.1 0.1 0.1   BASO 0.02 0.02 0.03      CMP:  Recent Labs   Lab 07/07/20  0501 07/08/20  0528 07/09/20  0649    141 142   K 3.7 3.9 4.0   * 111* 111*   CO2 20* 21* 20*   BUN 39* 32* 27*   CREATININE 2.6* 2.6* 2.5*   GLU 96 101 82   CALCIUM 6.9* 6.8* 6.9*   PHOS 3.1  3.1 3.3  3.3 3.2  3.2   ALBUMIN 1.8* 1.8* 1.9*   ANIONGAP 12 9 11      Significant Imaging:   No new imaging this morning.      Assessment/Plan:      * Type 2 diabetes mellitus with hyperglycemia  - HHS resolved.  - Glucose with some variability, but improving gradually.  - Continuing insulin detemir 35U subQ daily, insulin aspart 12U subQ TIDWM, low-dose sliding scale insulin aspart 0-5U subQ TIDWM PRN.    Debility  - Continuing PT/OT; anticipate likely SNF placement for discharge.    Abdominal distension  - KUB with diffuse gaseous distension without obstruction.  - Simethicone PRN.  - Continued PT/OT.    Abscess of neck  - Soft tissue neck CT with abscess L posterior neck.  - General surgery following, s/p I&D 7/2  - Aerobic culture negative. Anaerobic culture showing finegoldia.  - Continuing cefazolin, metronidazole 500mg IV q8hr.    LLL pneumonia  - CXR with LLL opacity .  - Respiratory culture with Strep agalactiae. Continue Cefazolin per ID recs.    Acute renal failure superimposed on stage 4 chronic kidney disease  - Multifactorial with likely acute blood loss anemia, GI losses contributing.  - Baseline Cr ~2.4-2.7.  - FENa suggestive of intrinsic source  - Good UOP. Cr down trending, 2.7 today  - Nephrology following, appreciate assistance.     UTI (urinary tract infection)  - Urine culture with E. Coli.  - Continue Cefazolin per ID. End date:  7/10/20.    Acute blood loss anemia  - 2/2 GI bleed.  - s/p 4 units PRBC.  - Hgb stable the past few days. Monitor.    GIB (gastrointestinal bleeding)  - s/p 2U pRBCs 06/27, 1U pRBCs 06/28, 1U pRBC 6/30  - Continue to trend Hgb and transfuse PRN Hgb < 7.  - Continuing pantoprazole 40mg PO daily.  - EGD 6/29 with normal esophagus, OG tube trauma to the mid body and normal duodenum.  - Unable to tolerate bowel prep. Colonoscopy deferred since Hgb stable, can be done outpatient.    Obesity, Class III, BMI 40-49.9 (morbid obesity)  - Dietary counseling.    Dilated cardiomyopathy  - Volume status improving; diuretics held with ADONAY.  - As under ADONAY.    CKD (chronic kidney disease) stage 4, GFR 15-29 ml/min  - As under ADONAY.    Essential hypertension  - Continuing carvedilol 12.5mg PO BID, nifedipine 90mg PO daily.    VTE Risk Mitigation (From admission, onward)         Ordered     IP VTE HIGH RISK PATIENT  Once      06/28/20 0139     Reason for No Pharmacological VTE Prophylaxis  Once     Question:  Reasons:  Answer:  Active Bleeding    06/28/20 0139     Place sequential compression device  Until discontinued      06/28/20 0036                      KRISTEN Gomez MD  Department of Hospital Medicine   [facility]

## 2020-07-09 NOTE — PLAN OF CARE
Pt remained free of falls and injuries. AAOx4. Pt restless all through shift taking gown off and c/o being cold. Purposeful hourly rounding performed. Pt swallows meds whole. IV flushed and infusing metronidazole 500mg @ 100mL/hr. Left posterior neck wound cleansed with wound cleanser and dsg changed. Patient mostly bedfast, moderately impaired and SOB when moving in bed. Breathing treatments given per respiratory to manage c/o SOB. Turned q2h. VSS on room air. Telemetry maintained on NSR. Bed low and locked, call light in reach. Side rails up x2. Will continue to monitor.

## 2020-07-09 NOTE — PT/OT/SLP PROGRESS
Occupational Therapy  Not Seen Note    Patient Name:  Vinnie Siegel   MRN:  [id number]    OT treatment attempted.  Patient not seen today secondary to Patient fatigue. The patient's fiance reported that he was having difficulty emotionally and wanted to sleep.  Will follow-up later in the day patient willing..    Geovanna Drummond, ScD, LOTR  7/9/2020

## 2020-07-09 NOTE — PROGRESS NOTES
"Nephrology  Progress Note    Admit Date: 6/27/2020   LOS: 12 days     SUBJECTIVE:     Follow-up For:  Type 2 diabetes mellitus with hyperglycemia    Interval History:     Continues to slowly improve.  Eager to go home.  No CP/SOB.     Review of Systems:    No c/o.      OBJECTIVE:     Vital Signs Range (Last 24H):  BP (!) 149/70 (Patient Position: Lying)   Pulse 65   Temp 98.4 °F (36.9 °C) (Oral)   Resp 20   Ht 6' 2" (1.88 m)   Wt (!) 144.5 kg (318 lb 9 oz)   SpO2 97%   BMI 40.83 kg/m²     Temp:  [98.1 °F (36.7 °C)-98.5 °F (36.9 °C)]   Pulse:  [60-71]   Resp:  [14-20]   BP: (126-156)/(63-74)   SpO2:  [96 %-99 %]     I & O (Last 24H):    Intake/Output Summary (Last 24 hours) at 7/9/2020 0846  Last data filed at 7/9/2020 0500  Gross per 24 hour   Intake 1200 ml   Output 1575 ml   Net -375 ml       Physical Exam:  General appearance: Well developed, well nourished, weak, obese  Eyes:  Conjunctivae/corneas clear. PERRL.  Neck: left neck wound CDI  Lungs: diminished.    Heart: Regular rate and rhythm, S1, S2 normal, no murmur, rub or beatrice.  Abdomen: Soft, non-tender non-distended; bowel sounds normal; no masses,  no organomegaly, obese.   Extremities: No cyanosis or clubbing. 1-2+ edema.  Left neck abscess noted.   Skin: Skin color, texture, turgor normal. No rashes or lesions  Neurologic: No focal numbness or weakness         Laboratory Data:  Recent Labs   Lab 07/09/20  0649   WBC 8.74   RBC 2.48*   HGB 7.5*   HCT 23.7*      MCV 96   MCH 30.2   MCHC 31.6*       BMP:   Recent Labs   Lab 07/09/20  0649   GLU 82      K 4.0   *   CO2 20*   BUN 27*   CREATININE 2.5*   CALCIUM 6.9*     Lab Results   Component Value Date    CALCIUM 6.9 (LL) 07/09/2020    PHOS 3.2 07/09/2020    PHOS 3.2 07/09/2020       Lab Results   Component Value Date    .5 (H) 06/29/2020    CALCIUM 6.9 (LL) 07/09/2020    CAION 0.99 (L) 07/09/2020    PHOS 3.2 07/09/2020    PHOS 3.2 07/09/2020       Lab Results   Component " Value Date    URICACID 8.6 (H) 06/29/2020       BNP  Recent Labs   Lab 07/03/20  0511   *       Medications:  Medication list was reviewed and changes noted under Assessment/Plan.    Diagnostic Results:        ASSESSMENT/PLAN:     75 YO male with DM, HTN, CHF with Dilated CM, COPD, CKD 4, now with GIB and Hyperosmolar nonketotic hyperglycemia, resp failure prompting intubation to protect airway, severe anemia with Hgb 6.4, elevated BUN due to GIB, hypotension, and ADONAY     HHS resolved  -s/p Insulin drip and now on basal/prandial.    -UTI/abscess noted.  -defer  -glucose labile.     Resolved Non-oliguric ADONAY on CKD 4/ Hypokalemia  -Baseline creat over past year 2.5-2.9 with history of proteinuria  -Creat 4.4 on admit and slowly improving back to baseline  -UOP 2L   -Prot/creat ratio 1 gram  -Expect that severe anemia and hypotension etiology for ADONAY  -Doesn't appear he has seen nephrologist so ordered full workup and mildly elevated ELSA with negative reflex only positive finding so far.  -Normally prefer no PPI but with GIB this okay  -No nephrotoxins.  -replace lytes PRN.  -started low dose bicarb tabs.   -Renally dose meds, avoid nephrotoxins, and monitor I/O's closely.       GIB with severe anemia  -Agree with transfusions prn  -dosed Epo again and will need to continue as outpt per nephrology at [facility].  -mildly iron deficient but would not give IV iron for now  -GI consulted - EGD noted.  .   -Protonix as now  -unable to tolerated prep.    DM  -Last HgA1c in December 2019 7.7  -Defer to primary team  -as above     Sepsis from UTI/neck abscess:  -cultured and continue antibx for now.   -Dr. Basilio following  -lactic acid, procal, and WBC's improving.     Vit D Def with mild HPTH/ Hx Calcium Oxalate Nephrolithiasis:  -Started on calcitriol 0.25mcg 3x/week, but stopped for now.  -However, Vit D 25 extremely low, but Hx Nephrolithiasis.  -Changed to low dose Cholecalciferol 1,000 units daily and will need to be  monitored closely with 24 hr urine Calcium.   -low dose tums.       Resolved Hypernatremia:  -encourage water intake.    HTN:  -started BB/CCB.  Hydralazine prn.   -no ACE/ARB for now.       Dispo:  Stable from renal.  Nothing else to offer.  Will follow remotely.

## 2020-07-10 LAB
ALBUMIN SERPL BCP-MCNC: 2.1 G/DL (ref 3.5–5.2)
ANION GAP SERPL CALC-SCNC: 12 MMOL/L (ref 8–16)
BASOPHILS # BLD AUTO: 0.03 K/UL (ref 0–0.2)
BASOPHILS NFR BLD: 0.4 % (ref 0–1.9)
BUN SERPL-MCNC: 26 MG/DL (ref 8–23)
CA-I BLDV-SCNC: 1 MMOL/L (ref 1.06–1.42)
CALCIUM SERPL-MCNC: 7.3 MG/DL (ref 8.7–10.5)
CHLORIDE SERPL-SCNC: 111 MMOL/L (ref 95–110)
CO2 SERPL-SCNC: 19 MMOL/L (ref 23–29)
CREAT SERPL-MCNC: 2.5 MG/DL (ref 0.5–1.4)
DIFFERENTIAL METHOD: ABNORMAL
EOSINOPHIL # BLD AUTO: 0.1 K/UL (ref 0–0.5)
EOSINOPHIL NFR BLD: 1 % (ref 0–8)
ERYTHROCYTE [DISTWIDTH] IN BLOOD BY AUTOMATED COUNT: 16 % (ref 11.5–14.5)
EST. GFR  (AFRICAN AMERICAN): 28 ML/MIN/1.73 M^2
EST. GFR  (NON AFRICAN AMERICAN): 25 ML/MIN/1.73 M^2
GLUCOSE SERPL-MCNC: 142 MG/DL (ref 70–110)
HCT VFR BLD AUTO: 25.9 % (ref 40–54)
HGB BLD-MCNC: 7.9 G/DL (ref 14–18)
IMM GRANULOCYTES # BLD AUTO: 0.06 K/UL (ref 0–0.04)
IMM GRANULOCYTES NFR BLD AUTO: 0.7 % (ref 0–0.5)
LYMPHOCYTES # BLD AUTO: 1.4 K/UL (ref 1–4.8)
LYMPHOCYTES NFR BLD: 16.6 % (ref 18–48)
MCH RBC QN AUTO: 29.2 PG (ref 27–31)
MCHC RBC AUTO-ENTMCNC: 30.5 G/DL (ref 32–36)
MCV RBC AUTO: 96 FL (ref 82–98)
MONOCYTES # BLD AUTO: 0.6 K/UL (ref 0.3–1)
MONOCYTES NFR BLD: 7.3 % (ref 4–15)
NEUTROPHILS # BLD AUTO: 6.2 K/UL (ref 1.8–7.7)
NEUTROPHILS NFR BLD: 74 % (ref 38–73)
NRBC BLD-RTO: 0 /100 WBC
PHOSPHATE SERPL-MCNC: 3.7 MG/DL (ref 2.7–4.5)
PHOSPHATE SERPL-MCNC: 3.7 MG/DL (ref 2.7–4.5)
PLATELET # BLD AUTO: 271 K/UL (ref 150–350)
PMV BLD AUTO: 10 FL (ref 9.2–12.9)
POCT GLUCOSE: 177 MG/DL (ref 70–110)
POCT GLUCOSE: 185 MG/DL (ref 70–110)
POCT GLUCOSE: 207 MG/DL (ref 70–110)
POCT GLUCOSE: 79 MG/DL (ref 70–110)
POTASSIUM SERPL-SCNC: 4.4 MMOL/L (ref 3.5–5.1)
RBC # BLD AUTO: 2.71 M/UL (ref 4.6–6.2)
SODIUM SERPL-SCNC: 142 MMOL/L (ref 136–145)
WBC # BLD AUTO: 8.37 K/UL (ref 3.9–12.7)

## 2020-07-10 PROCEDURE — 99233 SBSQ HOSP IP/OBS HIGH 50: CPT | Mod: ,,, | Performed by: INTERNAL MEDICINE

## 2020-07-10 PROCEDURE — 25000003 PHARM REV CODE 250: Performed by: INTERNAL MEDICINE

## 2020-07-10 PROCEDURE — 63600175 PHARM REV CODE 636 W HCPCS: Performed by: INTERNAL MEDICINE

## 2020-07-10 PROCEDURE — 97116 GAIT TRAINING THERAPY: CPT | Mod: CQ

## 2020-07-10 PROCEDURE — 36415 COLL VENOUS BLD VENIPUNCTURE: CPT

## 2020-07-10 PROCEDURE — 94761 N-INVAS EAR/PLS OXIMETRY MLT: CPT

## 2020-07-10 PROCEDURE — 82330 ASSAY OF CALCIUM: CPT

## 2020-07-10 PROCEDURE — 63600175 PHARM REV CODE 636 W HCPCS: Performed by: NURSE PRACTITIONER

## 2020-07-10 PROCEDURE — 25000003 PHARM REV CODE 250: Performed by: NURSE PRACTITIONER

## 2020-07-10 PROCEDURE — 99233 PR SUBSEQUENT HOSPITAL CARE,LEVL III: ICD-10-PCS | Mod: ,,, | Performed by: INTERNAL MEDICINE

## 2020-07-10 PROCEDURE — 25000242 PHARM REV CODE 250 ALT 637 W/ HCPCS: Performed by: INTERNAL MEDICINE

## 2020-07-10 PROCEDURE — 97530 THERAPEUTIC ACTIVITIES: CPT

## 2020-07-10 PROCEDURE — 85025 COMPLETE CBC W/AUTO DIFF WBC: CPT

## 2020-07-10 PROCEDURE — 94640 AIRWAY INHALATION TREATMENT: CPT

## 2020-07-10 PROCEDURE — S0030 INJECTION, METRONIDAZOLE: HCPCS | Performed by: INTERNAL MEDICINE

## 2020-07-10 PROCEDURE — 11000001 HC ACUTE MED/SURG PRIVATE ROOM

## 2020-07-10 PROCEDURE — 80069 RENAL FUNCTION PANEL: CPT

## 2020-07-10 RX ORDER — METRONIDAZOLE 500 MG/1
500 TABLET ORAL EVERY 8 HOURS
Status: DISCONTINUED | OUTPATIENT
Start: 2020-07-10 | End: 2020-07-16 | Stop reason: HOSPADM

## 2020-07-10 RX ORDER — IBUPROFEN 200 MG
24 TABLET ORAL
Status: DISCONTINUED | OUTPATIENT
Start: 2020-07-10 | End: 2020-07-16 | Stop reason: HOSPADM

## 2020-07-10 RX ORDER — NIFEDIPINE 30 MG/1
60 TABLET, EXTENDED RELEASE ORAL 2 TIMES DAILY
Status: DISCONTINUED | OUTPATIENT
Start: 2020-07-10 | End: 2020-07-16 | Stop reason: HOSPADM

## 2020-07-10 RX ORDER — GLUCAGON 1 MG
1 KIT INJECTION
Status: DISCONTINUED | OUTPATIENT
Start: 2020-07-10 | End: 2020-07-16 | Stop reason: HOSPADM

## 2020-07-10 RX ORDER — ALPRAZOLAM 0.5 MG/1
1 TABLET ORAL NIGHTLY PRN
Status: DISCONTINUED | OUTPATIENT
Start: 2020-07-10 | End: 2020-07-11

## 2020-07-10 RX ORDER — IBUPROFEN 200 MG
16 TABLET ORAL
Status: DISCONTINUED | OUTPATIENT
Start: 2020-07-10 | End: 2020-07-16 | Stop reason: HOSPADM

## 2020-07-10 RX ORDER — INSULIN ASPART 100 [IU]/ML
0-5 INJECTION, SOLUTION INTRAVENOUS; SUBCUTANEOUS
Status: DISCONTINUED | OUTPATIENT
Start: 2020-07-10 | End: 2020-07-16 | Stop reason: HOSPADM

## 2020-07-10 RX ADMIN — SODIUM BICARBONATE 650 MG TABLET 650 MG: at 10:07

## 2020-07-10 RX ADMIN — CARVEDILOL 12.5 MG: 12.5 TABLET, FILM COATED ORAL at 08:07

## 2020-07-10 RX ADMIN — MELATONIN 1000 UNITS: at 10:07

## 2020-07-10 RX ADMIN — CEFAZOLIN SODIUM 2 G: 2 SOLUTION INTRAVENOUS at 01:07

## 2020-07-10 RX ADMIN — ALPRAZOLAM 1 MG: 0.5 TABLET ORAL at 08:07

## 2020-07-10 RX ADMIN — INSULIN ASPART 12 UNITS: 100 INJECTION, SOLUTION INTRAVENOUS; SUBCUTANEOUS at 10:07

## 2020-07-10 RX ADMIN — METRONIDAZOLE 500 MG: 500 TABLET ORAL at 01:07

## 2020-07-10 RX ADMIN — METRONIDAZOLE 500 MG: 500 INJECTION, SOLUTION INTRAVENOUS at 05:07

## 2020-07-10 RX ADMIN — CALCIUM GLUCONATE 2 G: 98 INJECTION, SOLUTION INTRAVENOUS at 10:07

## 2020-07-10 RX ADMIN — IPRATROPIUM BROMIDE AND ALBUTEROL SULFATE 3 ML: .5; 3 SOLUTION RESPIRATORY (INHALATION) at 11:07

## 2020-07-10 RX ADMIN — METRONIDAZOLE 500 MG: 500 TABLET ORAL at 09:07

## 2020-07-10 RX ADMIN — CARVEDILOL 12.5 MG: 12.5 TABLET, FILM COATED ORAL at 10:07

## 2020-07-10 RX ADMIN — INSULIN ASPART 12 UNITS: 100 INJECTION, SOLUTION INTRAVENOUS; SUBCUTANEOUS at 12:07

## 2020-07-10 RX ADMIN — NIFEDIPINE 90 MG: 30 TABLET, FILM COATED, EXTENDED RELEASE ORAL at 10:07

## 2020-07-10 RX ADMIN — TIMOLOL MALEATE 1 DROP: 2.5 SOLUTION/ DROPS OPHTHALMIC at 08:07

## 2020-07-10 RX ADMIN — CEFAZOLIN SODIUM 2 G: 2 SOLUTION INTRAVENOUS at 04:07

## 2020-07-10 RX ADMIN — NIFEDIPINE 60 MG: 30 TABLET, FILM COATED, EXTENDED RELEASE ORAL at 08:07

## 2020-07-10 RX ADMIN — IPRATROPIUM BROMIDE AND ALBUTEROL SULFATE 3 ML: .5; 3 SOLUTION RESPIRATORY (INHALATION) at 12:07

## 2020-07-10 RX ADMIN — POLYETHYLENE GLYCOL 3350 17 G: 17 POWDER, FOR SOLUTION ORAL at 10:07

## 2020-07-10 RX ADMIN — INSULIN DETEMIR 35 UNITS: 100 INJECTION, SOLUTION SUBCUTANEOUS at 10:07

## 2020-07-10 RX ADMIN — TAMSULOSIN HYDROCHLORIDE 0.4 MG: 0.4 CAPSULE ORAL at 10:07

## 2020-07-10 RX ADMIN — PANTOPRAZOLE SODIUM 40 MG: 40 TABLET, DELAYED RELEASE ORAL at 10:07

## 2020-07-10 RX ADMIN — CEFAZOLIN SODIUM 2 G: 2 SOLUTION INTRAVENOUS at 08:07

## 2020-07-10 RX ADMIN — IPRATROPIUM BROMIDE AND ALBUTEROL SULFATE 3 ML: .5; 3 SOLUTION RESPIRATORY (INHALATION) at 07:07

## 2020-07-10 RX ADMIN — TIMOLOL MALEATE 1 DROP: 2.5 SOLUTION/ DROPS OPHTHALMIC at 10:07

## 2020-07-10 NOTE — PLAN OF CARE
Problem: Occupational Therapy Goal  Goal: Occupational Therapy Goal  Description: Goals to be met by: 07/16/2020     Patient will increase functional independence with ADLs by performing:    REVISED UBD with SBA.  Grooming while EOB with Moderate Assistance.  Rolling to Bilateral with Minimum Assistance.   REVISED Supine to sit with Contact Guard Assistance.   REVISED Sit to/from stand with Mod assist of 1 person.  Increased functional strength to WFL for ADL tasks, functional transfers and bed mobility.      COMPLETED GOALS  Supine to sit with Moderate Assistance.(MET 7/4/20)  UE Dressing with Moderate Assistance.(MET 07/07/2020)  Assess functional transfers. (MET 07/07/2020)  Sitting at edge of bed x15 minutes with Moderate Assistance. (MET 07/07/2020)      Outcome: Ongoing, Progressing  Pt progressing towards goals. Endurance remains limited, limiting occupational performance. Cont OT POC

## 2020-07-10 NOTE — ASSESSMENT & PLAN NOTE
- Soft tissue neck CT with abscess L posterior neck.  - General surgery following, s/p I&D 7/2  - Aerobic culture negative. Anaerobic culture showing finegoldia.  - Continuing cefazolin, metronidazole 500mg IV q8hr.

## 2020-07-10 NOTE — PROGRESS NOTES
"Nephrology  Progress Note    Admit Date: 6/27/2020   LOS: 13 days     SUBJECTIVE:     Follow-up For:  Type 2 diabetes mellitus with hyperglycemia    Interval History:     Continues to slowly improve.  Begging to go home.  No CP/SOB.   Renal fxn and UOP stable and at baseline.    Review of Systems:    No c/o.      OBJECTIVE:     Vital Signs Range (Last 24H):  BP (!) 157/73 (BP Location: Right arm, Patient Position: Sitting)   Pulse 69   Temp 98.9 °F (37.2 °C) (Oral)   Resp 18   Ht 6' 2" (1.88 m)   Wt (!) 144.5 kg (318 lb 9 oz)   SpO2 (!) 93%   BMI 40.83 kg/m²     Temp:  [97.9 °F (36.6 °C)-98.9 °F (37.2 °C)]   Pulse:  [60-71]   Resp:  [18]   BP: (135-187)/(69-78)   SpO2:  [93 %-97 %]     I & O (Last 24H):    Intake/Output Summary (Last 24 hours) at 7/10/2020 0805  Last data filed at 7/10/2020 0148  Gross per 24 hour   Intake --   Output 1150 ml   Net -1150 ml       Physical Exam:  General appearance: Well developed, well nourished, weak, obese  Eyes:  Conjunctivae/corneas clear. PERRL.  Neck: left neck wound CDI  Lungs: diminished.    Heart: Regular rate and rhythm, S1, S2 normal, no murmur, rub or beatrice.  Abdomen: Soft, non-tender non-distended; bowel sounds normal; no masses,  no organomegaly, obese.   Extremities: No cyanosis or clubbing. 1-2+ edema.  Left neck abscess noted.   Skin: Skin color, texture, turgor normal. No rashes or lesions  Neurologic: No focal numbness or weakness         Laboratory Data:  Recent Labs   Lab 07/10/20  0447   WBC 8.37   RBC 2.71*   HGB 7.9*   HCT 25.9*      MCV 96   MCH 29.2   MCHC 30.5*       BMP:   Recent Labs   Lab 07/10/20  0447   *      K 4.4   *   CO2 19*   BUN 26*   CREATININE 2.5*   CALCIUM 7.3*     Lab Results   Component Value Date    CALCIUM 7.3 (L) 07/10/2020    PHOS 3.7 07/10/2020    PHOS 3.7 07/10/2020       Lab Results   Component Value Date    .5 (H) 06/29/2020    CALCIUM 7.3 (L) 07/10/2020    CAION 1.00 (L) 07/10/2020    PHOS " 3.7 07/10/2020    PHOS 3.7 07/10/2020       Lab Results   Component Value Date    URICACID 8.6 (H) 06/29/2020       BNP  No results for input(s): BNP, BNPTRIAGEBLO in the last 168 hours.    Medications:  Medication list was reviewed and changes noted under Assessment/Plan.    Diagnostic Results:        ASSESSMENT/PLAN:     73 YO male with DM, HTN, CHF with Dilated CM, COPD, CKD 4, now with GIB and Hyperosmolar nonketotic hyperglycemia, resp failure prompting intubation to protect airway, severe anemia with Hgb 6.4, elevated BUN due to GIB, hypotension, and ADONAY     HHS resolved  -s/p Insulin drip and now on basal/prandial.    -UTI/abscess noted.  -defer  -glucose labile.     Resolved Non-oliguric ADONAY on CKD 4/ Hypokalemia  -Baseline creat over past year 2.5-2.9 with history of proteinuria  -Creat 4.4 on admit and slowly improving back to baseline  -UOP 2L   -Prot/creat ratio 1 gram  -Expect that severe anemia and hypotension etiology for ADONAY  -Doesn't appear he has seen nephrologist in sometime so ordered full workup and mildly elevated ELSA with negative reflex only positive finding so far.  -Normally prefer no PPI but with GIB this okay  -No nephrotoxins.  -replace lytes PRN.  -started low dose bicarb tabs.   -Renally dose meds, avoid nephrotoxins, and monitor I/O's closely.       GIB with severe anemia  -Agree with transfusions prn  -dosed Epo again and will need to continue as outpt per nephrology at [facility] [place].  -mildly iron deficient but would not give IV iron for now  -GI consulted - EGD noted.  .   -Protonix as now  -unable to tolerated prep.    DM  -Last HgA1c in December 2019 7.7  -Defer to primary team  -as above     Sepsis from UTI/neck abscess:  -cultured and continue antibx for now.   -Dr. Basilio following  -lactic acid, procal, and WBC's improving.     Vit D Def with mild HPTH/ Hx Calcium Oxalate Nephrolithiasis:  -Started on calcitriol 0.25mcg 3x/week, but stopped for now.  -However, Vit D 25 extremely low,  but Hx Nephrolithiasis.  -Changed to low dose Cholecalciferol 1,000 units daily and will need to be monitored closely with 24 hr urine Calcium.   -low dose tums.       Resolved Hypernatremia:  -encourage water intake.    HTN:  -started BB/CCB.  Hydralazine prn.   -no ACE/ARB for now.       Dispo:  Stable from renal.  Nothing else to offer.  Will follow remotely.   Needs f/u with [facility] renal as he was previously followed.

## 2020-07-10 NOTE — PT/OT/SLP PROGRESS
Physical Therapy Treatment    Patient Name:  Vinnie Siegel   MRN:  [id number]    Recommendations:     Discharge Recommendations:  nursing facility, skilled   Discharge Equipment Recommendations: 3-in-1 commode, walker, rolling, wheelchair   Barriers to discharge: Decreased caregiver support    Assessment:     Vinnie Siegel is a 74 y.o. male admitted with a medical diagnosis of Type 2 diabetes mellitus with hyperglycemia.  He presents with the following impairments/functional limitations:  weakness, impaired endurance, impaired self care skills, impaired functional mobilty, gait instability, impaired balance, decreased lower extremity function, impaired cardiopulmonary response to activity ,  Pt presented w/ HOB elevated upon arrival of PT. Pt agreeable toPT. Pt sit <> stand w/ RW and CGA. PT ambld 40' w/ RW and CGA.    Rehab Prognosis: Good; patient would benefit from acute skilled PT services to address these deficits and reach maximum level of function.    Recent Surgery: Procedure(s) (LRB):  EGD (ESOPHAGOGASTRODUODENOSCOPY) (N/A) 11 Days Post-Op    Plan:     During this hospitalization, patient to be seen 5 x/week to address the identified rehab impairments via gait training, therapeutic activities, therapeutic exercises and progress toward the following goals:    · Plan of Care Expires:  07/31/20    Subjective     Chief Complaint: none stated  Patient/Family Comments/goals:  I don't want to go to a home  Pain/Comfort:  · Pain Rating 1: 0/10      Objective:     Communicated with ns(Jared) prior to session.  Patient found HOB elevated with peripheral IV upon PT entry to room.     General Precautions: Standard, diabetic, fall   Orthopedic Precautions:N/A   Braces: N/A     Functional Mobility:  · Transfers:     · Sit to Stand:  contact guard assistance with rolling walker  · Gait: 40' w/ RW and CGA      AM-PAC 6 CLICK MOBILITY  Turning over in bed (including adjusting bedclothes, sheets and blankets)?: 3  Sitting  down on and standing up from a chair with arms (e.g., wheelchair, bedside commode, etc.): 3  Moving from lying on back to sitting on the side of the bed?: 3  Moving to and from a bed to a chair (including a wheelchair)?: 3  Need to walk in hospital room?: 3  Climbing 3-5 steps with a railing?: 3  Basic Mobility Total Score: 18         Patient left HOB elevated with all lines intact, call button in reach, ns notified and brother present..    GOALS:   Multidisciplinary Problems     Physical Therapy Goals        Problem: Physical Therapy Goal    Goal Priority Disciplines Outcome Goal Variances Interventions   Physical Therapy Goal     PT, PT/OT Ongoing, Progressing     Description: Goals to be met by: 20    Patient will increase functional independence with mobility by performin. Supine to sit with min A.   2. Sit to supine with min A. MET   3. Rolling R and L with min A.   4. Sitting at edge of bed >5 minutes with min A. MET   5. PT will assess sit<>stand. MET   6. Sit<>stand with RW with modA x1 person.  7. Gait x15 ft with RW and CGA.                   Time Tracking:     PT Received On: 07/10/20  PT Start Time: 1327     PT Stop Time: 1358  PT Total Time (min): 31 min     Billable Minutes: Gait Training 31  Overlap with OT for portions of session due to complex nature of patient and for safety with mobility to decrease fall risk for patient and caregiver injury requiring to skilled therapists to provide interventions.       Treatment Type: Treatment  PT/PTA: PTA     PTA Visit Number: 3     Jesus Carmen, ANSLEY  07/10/2020

## 2020-07-10 NOTE — ASSESSMENT & PLAN NOTE
- Multifactorial with likely acute blood loss anemia, GI losses contributing.  - Baseline Cr ~2.4-2.7.  - FENa suggestive of intrinsic source.  - Back to baseline function. Appreciate nephrology assistance.

## 2020-07-10 NOTE — PT/OT/SLP PROGRESS
Occupational Therapy   Treatment    Name: Vinnie Siegel  MRN: [id number]  Admitting Diagnosis:  Type 2 diabetes mellitus with hyperglycemia  11 Days Post-Op    Recommendations:     Discharge Recommendations: nursing facility, skilled  Discharge Equipment Recommendations:  wheelchair, walker, rolling, 3-in-1 commode(SC vs TTB pending assessment of home bathing set up)  Barriers to discharge:       Assessment:     Vinnie Siegel is a 74 y.o. male with a medical diagnosis of Type 2 diabetes mellitus with hyperglycemia.  He presents with deconditioning, endurance limiting occupational performance. Performance deficits affecting function are weakness, impaired endurance, impaired self care skills, impaired balance, gait instability, impaired functional mobilty, decreased upper extremity function, decreased lower extremity function, impaired skin, impaired cardiopulmonary response to activity, pain.     Rehab Prognosis:  Good; patient would benefit from acute skilled OT services to address these deficits and reach maximum level of function.       Plan:     Patient to be seen 5 x/week to address the above listed problems via self-care/home management, therapeutic activities, therapeutic exercises  · Plan of Care Expires: 07/30/20  · Plan of Care Reviewed with: patient, sibling    Subjective     Pain/Comfort:  · Pain Rating 1: 0/10    Objective:     Communicated with: nsalejandro prior to session.  Patient found HOB elevated with peripheral IV, telemetry upon OT entry to room.    General Precautions: Standard, diabetic, fall   Orthopedic Precautions:N/A   Braces: N/A     Occupational Performance:     Bed Mobility:    · Patient completed Rolling/Turning to Right with minimum assistance  · Patient completed Scooting/Bridging with stand by assistance  · Patient completed Supine to Sit with contact guard assistance and minimum assistance  · Patient completed Sit to Supine with stand by assistance and contact guard assistance      Functional Mobility/Transfers:  · Patient completed Sit <> Stand Transfer with minimum assistance  with  rolling walker   Functional Mobility: Pt with fair dynamic seated and standing balance. Pt ambulated ~40 ft CGA with 2nd person for safety and IV management with use of RW with min v/c for RW management for proximity to RW to stay within boundaries of RW.     Activities of Daily Living:  · Toileting: total assistance from nsg staff for pericare after BM; pt soiled and declined assistance for pericare in stance stating that he was too fatigued to continue in stance.       [facility] 6 Click ADL: 13    Treatment & Education:  Pt educated on role of OT and POC.   Pt performing skills as listed above.   Pt offered completion of multiple ADLs such as washing face at sink and pericare in stance but pt declined all ADLs offered this date. Pt seated EOB ~10 minutes and declined to continue sitting EOB at end of therapy session. OT assisted to change dressing on pt's neck wound as Mepilex was falling off. Minimal drainage noted. Triplex applied per RN directive.     Patient left right sidelying with all lines intact, call button in reach, nsg notified and nsg presentEducation:      GOALS:   Multidisciplinary Problems     Occupational Therapy Goals        Problem: Occupational Therapy Goal    Goal Priority Disciplines Outcome Interventions   Occupational Therapy Goal     OT, PT/OT Ongoing, Progressing    Description: Goals to be met by: 07/16/2020     Patient will increase functional independence with ADLs by performing:    REVISED UBD with SBA.  Grooming while EOB with Moderate Assistance.  Rolling to Bilateral with Minimum Assistance.   REVISED Supine to sit with Contact Guard Assistance.   REVISED Sit to/from stand with Mod assist of 1 person.  Increased functional strength to WFL for ADL tasks, functional transfers and bed mobility.      COMPLETED GOALS  Supine to sit with Moderate Assistance.(MET 7/4/20)  UE Dressing  with Moderate Assistance.(MET 07/07/2020)  Assess functional transfers. (MET 07/07/2020)  Sitting at edge of bed x15 minutes with Moderate Assistance. (MET 07/07/2020)                       Time Tracking:     OT Date of Treatment: 07/10/20  OT Start Time: 1328  OT Stop Time: 1357  OT Total Time (min): 29 min    Billable Minutes:Therapeutic Activity 29 Co Tx PT    Samantha Chong OT  7/10/2020

## 2020-07-10 NOTE — ASSESSMENT & PLAN NOTE
- s/p 2U pRBCs 06/27, 1U pRBCs 06/28, 1U pRBC 6/30.  - Continue to trend Hgb and transfuse PRN Hgb < 7.  - Continuing pantoprazole 40mg PO daily.  - EGD 6/29 with normal esophagus, OG tube trauma to the mid body and normal duodenum.  - Unable to tolerate bowel prep. Colonoscopy deferred since Hgb stable, can be done outpatient.

## 2020-07-10 NOTE — ASSESSMENT & PLAN NOTE
- Soft tissue neck CT with abscess L posterior neck.  - General surgery following, s/p I&D 7/2  - Aerobic culture negative. Anaerobic culture showing finegoldia.  - Continuing cefazolin, metronidazole as 500mg PO q8hr. Will continue metronidazole for 10 day course to cover finegoldia.

## 2020-07-10 NOTE — SUBJECTIVE & OBJECTIVE
Interval History: No acute events overnight. Continued poor sleep overnight and extensively discussed his overnight concerns.      Review of Systems   Constitutional: Negative for chills and fever.   Respiratory: Negative for shortness of breath.    Cardiovascular: Negative for chest pain.   Gastrointestinal: Negative for abdominal pain, nausea and vomiting.     Objective:     Vital Signs (Most Recent):  Temp: 98.3 °F (36.8 °C) (07/10/20 1214)  Pulse: 64 (07/10/20 1214)  Resp: 18 (07/10/20 1214)  BP: (!) 169/70 (07/10/20 1214)  SpO2: 99 % (07/10/20 1214) Vital Signs (24h Range):  Temp:  [97.9 °F (36.6 °C)-98.9 °F (37.2 °C)] 98.3 °F (36.8 °C)  Pulse:  [60-77] 64  Resp:  [18] 18  SpO2:  [93 %-99 %] 99 %  BP: (157-187)/(70-81) 169/70     Weight: (!) 144.5 kg (318 lb 9 oz)  Body mass index is 40.83 kg/m².    Intake/Output Summary (Last 24 hours) at 7/10/2020 1542  Last data filed at 7/10/2020 1035  Gross per 24 hour   Intake --   Output 1475 ml   Net -1475 ml      Physical Exam  Vitals signs and nursing note reviewed.   Constitutional:       General: He is not in acute distress.     Appearance: Normal appearance.   HENT:      Head: Normocephalic and atraumatic.   Eyes:      General:         Right eye: No discharge.         Left eye: No discharge.      Conjunctiva/sclera: Conjunctivae normal.   Cardiovascular:      Rate and Rhythm: Normal rate and regular rhythm.      Pulses: Normal pulses.   Pulmonary:      Effort: No respiratory distress.      Comments: Decreased at the bases  Abdominal:      Palpations: Abdomen is soft.      Tenderness: There is no abdominal tenderness.   Musculoskeletal:         General: No tenderness.      Right lower leg: No edema.      Left lower leg: No edema.   Skin:     General: Skin is warm and dry.      Comments: L posterior neck abscess s/p I&D, purulent drainage noted   Neurological:      Mental Status: He is alert and oriented to person, place, and time.       Significant Labs:    CBC:  Recent Labs   Lab 07/08/20  0528 07/09/20  0649 07/10/20  0447   WBC 9.44 8.74 8.37   HGB 7.2* 7.5* 7.9*   HCT 23.5* 23.7* 25.9*    234 271   GRAN 75.2*  7.1 74.2*  6.5 74.0*  6.2   LYMPH 15.3*  1.4 15.9*  1.4 16.6*  1.4   MONO 7.1  0.7 7.9  0.7 7.3  0.6   EOS 0.1 0.1 0.1   BASO 0.02 0.03 0.03      CMP:  Recent Labs   Lab 07/08/20  0528 07/09/20  0649 07/10/20  0447    142 142   K 3.9 4.0 4.4   * 111* 111*   CO2 21* 20* 19*   BUN 32* 27* 26*   CREATININE 2.6* 2.5* 2.5*    82 142*   CALCIUM 6.8* 6.9* 7.3*   PHOS 3.3  3.3 3.2  3.2 3.7  3.7   ALBUMIN 1.8* 1.9* 2.1*   ANIONGAP 9 11 12      Significant Imaging:   No new imaging this morning.

## 2020-07-10 NOTE — PROGRESS NOTES
Pt refused care of any kind. Informed him I would just be taking vitals and he refused. Pt said to leave him alone, he didn't want anything done to him, and said we could not keep him against his will. Informed Dr. Gomez.

## 2020-07-10 NOTE — PLAN OF CARE
Problem: Physical Therapy Goal  Goal: Physical Therapy Goal  Description: Goals to be met by: 20    Patient will increase functional independence with mobility by performin. Supine to sit with min A.   2. Sit to supine with min A. MET   3. Rolling R and L with min A.   4. Sitting at edge of bed >5 minutes with min A. MET   5. PT will assess sit<>stand. MET   6. Sit<>stand with RW with modA x1 person.  7. Gait x15 ft with RW and CGA.  Outcome: Ongoing, Progressing   Pt presented w/ HOB elevated upon arrival of PT. Pt agreeable toPT. Pt sit <> stand w/ RW and CGA. PT ambld 40' w/ RW and CGA

## 2020-07-10 NOTE — PROGRESS NOTES
[facility] Hospital Medicine  Progress Note    Patient Name: Vinnie Siegel  MRN: [id number]  Patient Class: IP- Inpatient   Admission Date: 6/27/2020  Length of Stay: 13 days  Attending Physician: NYDIA Gomez MD  Primary Care Provider: Janeth Walter MD        Subjective:     Principal Problem:Type 2 diabetes mellitus with hyperglycemia        HPI:  Mr. Vinnie Siegel is a 74 y.o. male, with PMH of IDDM-2, CKD-IV, dilated cardiomyopathy, HTN, HLD, gout, obesity, who [place] [facility] [place] 6/27/20 with rectal bleeding x 2 days. His family called EMS after he became lethargic earlier this evening. Per his fiancee, he has been passing dark blood stools, and has been noting generalized weakness x 1 week. Additionally, he has been having elevated blood glucose readings x 1 day. In the ED, he was found to be in DKA, with a GI bleed. H&H were 6.4 & 19.4 and he was transfused 2 units PRBCs. He was started on an insulin drip and a Protonix drip. He had worsening respiratory and mental status while in the ED, and was intubated for airway protection. He was admitted to inpatient status to the ICU.     Overview/Hospital Course:  Admitted to ICU with HHS, encephalopathy, GI bleed, ADONAY, respiratory failure and sepsis. Sepsis was due to E. Coli UTI and Strep agalactiae pneumonia. Critical care and nephrology consulted and broad antibiotics continued. GI was consulted and he had EGD on 6/29 which showed normal esophagus and duodenum with OG tube trauma to mid gastric body but no source of bleeding was identified. He was extubated on 6/30. He attempted bowel prep for colonoscopy but was unable to tolerate; colonoscopy was pushed back. He had slow improvement in ADONAY as well as improvement in sepsis with antibiotics. ID was consulted and recommended cefazolin. He was found to have a large posterior neck abscess so general surgery was consulted and he had I&D. He was transferred out of ICU on 7/3. He  was scheduled to have colonoscopy on 7/6 but was unable to tolerate bowel prep again so it was canceled.    Interval History: No acute events overnight. Continued poor sleep overnight and extensively discussed his overnight concerns.      Review of Systems   Constitutional: Negative for chills and fever.   Respiratory: Negative for shortness of breath.    Cardiovascular: Negative for chest pain.   Gastrointestinal: Negative for abdominal pain, nausea and vomiting.     Objective:     Vital Signs (Most Recent):  Temp: 98.3 °F (36.8 °C) (07/10/20 1214)  Pulse: 64 (07/10/20 1214)  Resp: 18 (07/10/20 1214)  BP: (!) 169/70 (07/10/20 1214)  SpO2: 99 % (07/10/20 1214) Vital Signs (24h Range):  Temp:  [97.9 °F (36.6 °C)-98.9 °F (37.2 °C)] 98.3 °F (36.8 °C)  Pulse:  [60-77] 64  Resp:  [18] 18  SpO2:  [93 %-99 %] 99 %  BP: (157-187)/(70-81) 169/70     Weight: (!) 144.5 kg (318 lb 9 oz)  Body mass index is 40.83 kg/m².    Intake/Output Summary (Last 24 hours) at 7/10/2020 1542  Last data filed at 7/10/2020 1035  Gross per 24 hour   Intake --   Output 1475 ml   Net -1475 ml      Physical Exam  Vitals signs and nursing note reviewed.   Constitutional:       General: He is not in acute distress.     Appearance: Normal appearance.   HENT:      Head: Normocephalic and atraumatic.   Eyes:      General:         Right eye: No discharge.         Left eye: No discharge.      Conjunctiva/sclera: Conjunctivae normal.   Cardiovascular:      Rate and Rhythm: Normal rate and regular rhythm.      Pulses: Normal pulses.   Pulmonary:      Effort: No respiratory distress.      Comments: Decreased at the bases  Abdominal:      Palpations: Abdomen is soft.      Tenderness: There is no abdominal tenderness.   Musculoskeletal:         General: No tenderness.      Right lower leg: No edema.      Left lower leg: No edema.   Skin:     General: Skin is warm and dry.      Comments: L posterior neck abscess s/p I&D, purulent drainage noted   Neurological:       Mental Status: He is alert and oriented to person, place, and time.       Significant Labs:   CBC:  Recent Labs   Lab 07/08/20  0528 07/09/20  0649 07/10/20  0447   WBC 9.44 8.74 8.37   HGB 7.2* 7.5* 7.9*   HCT 23.5* 23.7* 25.9*    234 271   GRAN 75.2*  7.1 74.2*  6.5 74.0*  6.2   LYMPH 15.3*  1.4 15.9*  1.4 16.6*  1.4   MONO 7.1  0.7 7.9  0.7 7.3  0.6   EOS 0.1 0.1 0.1   BASO 0.02 0.03 0.03      CMP:  Recent Labs   Lab 07/08/20 0528 07/09/20  0649 07/10/20  0447    142 142   K 3.9 4.0 4.4   * 111* 111*   CO2 21* 20* 19*   BUN 32* 27* 26*   CREATININE 2.6* 2.5* 2.5*    82 142*   CALCIUM 6.8* 6.9* 7.3*   PHOS 3.3  3.3 3.2  3.2 3.7  3.7   ALBUMIN 1.8* 1.9* 2.1*   ANIONGAP 9 11 12      Significant Imaging:   No new imaging this morning.      Assessment/Plan:      * Type 2 diabetes mellitus with hyperglycemia  - HHS resolved.  - Glucose with some variability, but improving gradually.  - Continuing insulin detemir 35U subQ daily, insulin aspart 12U subQ TIDWM, low-dose sliding scale insulin aspart 0-5U subQ TIDWM PRN.    Debility  - Continuing PT/OT; anticipate likely SNF placement for discharge.    Abdominal distension  - KUB with diffuse gaseous distension without obstruction.  - Simethicone PRN.  - Continued PT/OT.    Abscess of neck  - Soft tissue neck CT with abscess L posterior neck.  - General surgery following, s/p I&D 7/2  - Aerobic culture negative. Anaerobic culture showing finegoldia.  - Continuing cefazolin, metronidazole as 500mg PO q8hr. Will continue metronidazole for 10 day course to cover finegoldia.    LLL pneumonia  - CXR with LLL opacity .  - Respiratory culture with Strep agalactiae. Continue Cefazolin per ID recs.    Acute renal failure superimposed on stage 4 chronic kidney disease  - Multifactorial with likely acute blood loss anemia, GI losses contributing.  - Baseline Cr ~2.4-2.7.  - FENa suggestive of intrinsic source.  - Back to baseline function.  Appreciate nephrology assistance.    UTI (urinary tract infection)  - Urine culture with E. Coli.  - Continue Cefazolin per ID. End date: 7/10/20.    Acute blood loss anemia  - 2/2 GI bleed.  - s/p 4 units PRBC.  - Hgb stable the past few days. Monitor.    GIB (gastrointestinal bleeding)  - s/p 2U pRBCs 06/27, 1U pRBCs 06/28, 1U pRBC 6/30.  - Continue to trend Hgb and transfuse PRN Hgb < 7.  - Continuing pantoprazole 40mg PO daily.  - EGD 6/29 with normal esophagus, OG tube trauma to the mid body and normal duodenum.  - Unable to tolerate bowel prep. Colonoscopy deferred since Hgb stable, can be done outpatient.    Obesity, Class III, BMI 40-49.9 (morbid obesity)  - Dietary counseling.    Dilated cardiomyopathy  - Appears euvolemic.  - Continue furosemide 40mg PO every other day.    CKD (chronic kidney disease) stage 4, GFR 15-29 ml/min  - As under ADONAY.    Essential hypertension  - Continuing carvedilol 12.5mg PO BID, nifedipine at 60mg PO BID.    VTE Risk Mitigation (From admission, onward)         Ordered     IP VTE HIGH RISK PATIENT  Once      06/28/20 0139     Reason for No Pharmacological VTE Prophylaxis  Once     Question:  Reasons:  Answer:  Active Bleeding    06/28/20 0139     Place sequential compression device  Until discontinued      06/28/20 0036                      KRISTEN Gomez MD  Department of Hospital Medicine   [facility]

## 2020-07-10 NOTE — SUBJECTIVE & OBJECTIVE
Interval History: No acute events overnight. Reports poor sleep, did not receive hydroxyzine for sleep, IV dislodged.     Review of Systems   Constitutional: Negative for chills and fever.   Respiratory: Negative for shortness of breath.    Cardiovascular: Negative for chest pain.   Gastrointestinal: Positive for abdominal distention. Negative for abdominal pain, nausea and vomiting.     Objective:     Vital Signs (Most Recent):  Temp: 98.3 °F (36.8 °C) (07/09/20 1733)  Pulse: 71 (07/09/20 1733)  Resp: 18 (07/09/20 1733)  BP: (!) 166/77 (07/09/20 1733)  SpO2: (!) 94 % (07/09/20 1733) Vital Signs (24h Range):  Temp:  [98.1 °F (36.7 °C)-98.5 °F (36.9 °C)] 98.3 °F (36.8 °C)  Pulse:  [60-71] 71  Resp:  [14-20] 18  SpO2:  [94 %-99 %] 94 %  BP: (126-166)/(63-77) 166/77     Weight: (!) 144.5 kg (318 lb 9 oz)  Body mass index is 40.83 kg/m².    Intake/Output Summary (Last 24 hours) at 7/9/2020 1916  Last data filed at 7/9/2020 1800  Gross per 24 hour   Intake 280 ml   Output 1575 ml   Net -1295 ml      Physical Exam  Vitals signs and nursing note reviewed.   Constitutional:       General: He is not in acute distress.     Appearance: Normal appearance.   Cardiovascular:      Rate and Rhythm: Normal rate and regular rhythm.      Pulses: Normal pulses.   Pulmonary:      Effort: No respiratory distress.      Comments: Decreased at the bases  Abdominal:      General: There is distension.      Tenderness: There is no abdominal tenderness.      Comments: Hypoactive, high pitched bowel sounds   Musculoskeletal:         General: No tenderness.      Right lower leg: No edema.      Left lower leg: No edema.   Skin:     General: Skin is warm and dry.      Comments: L posterior neck abscess s/p I&D, purulent drainage noted   Neurological:      Mental Status: He is alert and oriented to person, place, and time.       Significant Labs:   CBC:  Recent Labs   Lab 07/07/20  0501 07/08/20  0528 07/09/20  0649   WBC 9.39 9.44 8.74   HGB 7.5*  7.2* 7.5*   HCT 24.0* 23.5* 23.7*    219 234   GRAN 76.2*  7.2 75.2*  7.1 74.2*  6.5   LYMPH 15.3*  1.4 15.3*  1.4 15.9*  1.4   MONO 6.4  0.6 7.1  0.7 7.9  0.7   EOS 0.1 0.1 0.1   BASO 0.02 0.02 0.03      CMP:  Recent Labs   Lab 07/07/20  0501 07/08/20  0528 07/09/20  0649    141 142   K 3.7 3.9 4.0   * 111* 111*   CO2 20* 21* 20*   BUN 39* 32* 27*   CREATININE 2.6* 2.6* 2.5*   GLU 96 101 82   CALCIUM 6.9* 6.8* 6.9*   PHOS 3.1  3.1 3.3  3.3 3.2  3.2   ALBUMIN 1.8* 1.8* 1.9*   ANIONGAP 12 9 11      Significant Imaging:   No new imaging this morning.

## 2020-07-11 LAB
ALBUMIN SERPL BCP-MCNC: 1.9 G/DL (ref 3.5–5.2)
ANION GAP SERPL CALC-SCNC: 10 MMOL/L (ref 8–16)
BASOPHILS # BLD AUTO: 0.03 K/UL (ref 0–0.2)
BASOPHILS NFR BLD: 0.3 % (ref 0–1.9)
BUN SERPL-MCNC: 26 MG/DL (ref 8–23)
CALCIUM SERPL-MCNC: 7.3 MG/DL (ref 8.7–10.5)
CHLORIDE SERPL-SCNC: 110 MMOL/L (ref 95–110)
CO2 SERPL-SCNC: 20 MMOL/L (ref 23–29)
CREAT SERPL-MCNC: 2.8 MG/DL (ref 0.5–1.4)
DIFFERENTIAL METHOD: ABNORMAL
EOSINOPHIL # BLD AUTO: 0.1 K/UL (ref 0–0.5)
EOSINOPHIL NFR BLD: 1.3 % (ref 0–8)
ERYTHROCYTE [DISTWIDTH] IN BLOOD BY AUTOMATED COUNT: 15.6 % (ref 11.5–14.5)
EST. GFR  (AFRICAN AMERICAN): 25 ML/MIN/1.73 M^2
EST. GFR  (NON AFRICAN AMERICAN): 21 ML/MIN/1.73 M^2
GLUCOSE SERPL-MCNC: 170 MG/DL (ref 70–110)
HCT VFR BLD AUTO: 23.7 % (ref 40–54)
HGB BLD-MCNC: 7.2 G/DL (ref 14–18)
IMM GRANULOCYTES # BLD AUTO: 0.09 K/UL (ref 0–0.04)
IMM GRANULOCYTES NFR BLD AUTO: 1 % (ref 0–0.5)
LYMPHOCYTES # BLD AUTO: 1.3 K/UL (ref 1–4.8)
LYMPHOCYTES NFR BLD: 14.5 % (ref 18–48)
MCH RBC QN AUTO: 29.5 PG (ref 27–31)
MCHC RBC AUTO-ENTMCNC: 30.4 G/DL (ref 32–36)
MCV RBC AUTO: 97 FL (ref 82–98)
MONOCYTES # BLD AUTO: 0.9 K/UL (ref 0.3–1)
MONOCYTES NFR BLD: 10 % (ref 4–15)
NEUTROPHILS # BLD AUTO: 6.3 K/UL (ref 1.8–7.7)
NEUTROPHILS NFR BLD: 72.9 % (ref 38–73)
NRBC BLD-RTO: 0 /100 WBC
PHOSPHATE SERPL-MCNC: 3.5 MG/DL (ref 2.7–4.5)
PHOSPHATE SERPL-MCNC: 3.5 MG/DL (ref 2.7–4.5)
PLATELET # BLD AUTO: 272 K/UL (ref 150–350)
PMV BLD AUTO: 10.1 FL (ref 9.2–12.9)
POCT GLUCOSE: 138 MG/DL (ref 70–110)
POCT GLUCOSE: 157 MG/DL (ref 70–110)
POCT GLUCOSE: 182 MG/DL (ref 70–110)
POCT GLUCOSE: 250 MG/DL (ref 70–110)
POTASSIUM SERPL-SCNC: 5 MMOL/L (ref 3.5–5.1)
RBC # BLD AUTO: 2.44 M/UL (ref 4.6–6.2)
SODIUM SERPL-SCNC: 140 MMOL/L (ref 136–145)
WBC # BLD AUTO: 8.69 K/UL (ref 3.9–12.7)

## 2020-07-11 PROCEDURE — 25000003 PHARM REV CODE 250: Performed by: NURSE PRACTITIONER

## 2020-07-11 PROCEDURE — 25000003 PHARM REV CODE 250: Performed by: INTERNAL MEDICINE

## 2020-07-11 PROCEDURE — 94761 N-INVAS EAR/PLS OXIMETRY MLT: CPT

## 2020-07-11 PROCEDURE — 97116 GAIT TRAINING THERAPY: CPT | Mod: CQ

## 2020-07-11 PROCEDURE — 63600175 PHARM REV CODE 636 W HCPCS: Performed by: INTERNAL MEDICINE

## 2020-07-11 PROCEDURE — C9399 UNCLASSIFIED DRUGS OR BIOLOG: HCPCS | Performed by: INTERNAL MEDICINE

## 2020-07-11 PROCEDURE — 99233 SBSQ HOSP IP/OBS HIGH 50: CPT | Mod: ,,, | Performed by: INTERNAL MEDICINE

## 2020-07-11 PROCEDURE — 85025 COMPLETE CBC W/AUTO DIFF WBC: CPT

## 2020-07-11 PROCEDURE — 99900035 HC TECH TIME PER 15 MIN (STAT)

## 2020-07-11 PROCEDURE — 80069 RENAL FUNCTION PANEL: CPT

## 2020-07-11 PROCEDURE — 11000001 HC ACUTE MED/SURG PRIVATE ROOM

## 2020-07-11 PROCEDURE — 36415 COLL VENOUS BLD VENIPUNCTURE: CPT

## 2020-07-11 PROCEDURE — 99233 PR SUBSEQUENT HOSPITAL CARE,LEVL III: ICD-10-PCS | Mod: ,,, | Performed by: INTERNAL MEDICINE

## 2020-07-11 PROCEDURE — 97110 THERAPEUTIC EXERCISES: CPT | Mod: CQ

## 2020-07-11 RX ORDER — CHLORTHALIDONE 25 MG/1
25 TABLET ORAL ONCE
Status: COMPLETED | OUTPATIENT
Start: 2020-07-11 | End: 2020-07-11

## 2020-07-11 RX ORDER — ALPRAZOLAM 0.5 MG/1
2 TABLET ORAL NIGHTLY PRN
Status: DISCONTINUED | OUTPATIENT
Start: 2020-07-11 | End: 2020-07-16 | Stop reason: HOSPADM

## 2020-07-11 RX ORDER — INSULIN ASPART 100 [IU]/ML
12 INJECTION, SOLUTION INTRAVENOUS; SUBCUTANEOUS
Status: DISCONTINUED | OUTPATIENT
Start: 2020-07-11 | End: 2020-07-16 | Stop reason: HOSPADM

## 2020-07-11 RX ADMIN — CHLORTHALIDONE 25 MG: 25 TABLET ORAL at 12:07

## 2020-07-11 RX ADMIN — TIMOLOL MALEATE 1 DROP: 2.5 SOLUTION/ DROPS OPHTHALMIC at 09:07

## 2020-07-11 RX ADMIN — INSULIN ASPART 12 UNITS: 100 INJECTION, SOLUTION INTRAVENOUS; SUBCUTANEOUS at 05:07

## 2020-07-11 RX ADMIN — METRONIDAZOLE 500 MG: 500 TABLET ORAL at 09:07

## 2020-07-11 RX ADMIN — POLYETHYLENE GLYCOL 3350 17 G: 17 POWDER, FOR SOLUTION ORAL at 09:07

## 2020-07-11 RX ADMIN — NIFEDIPINE 60 MG: 30 TABLET, FILM COATED, EXTENDED RELEASE ORAL at 09:07

## 2020-07-11 RX ADMIN — METRONIDAZOLE 500 MG: 500 TABLET ORAL at 02:07

## 2020-07-11 RX ADMIN — INSULIN DETEMIR 35 UNITS: 100 INJECTION, SOLUTION SUBCUTANEOUS at 09:07

## 2020-07-11 RX ADMIN — TAMSULOSIN HYDROCHLORIDE 0.4 MG: 0.4 CAPSULE ORAL at 09:07

## 2020-07-11 RX ADMIN — ALPRAZOLAM 2 MG: 0.5 TABLET ORAL at 09:07

## 2020-07-11 RX ADMIN — CEFAZOLIN SODIUM 2 G: 2 SOLUTION INTRAVENOUS at 05:07

## 2020-07-11 RX ADMIN — METRONIDAZOLE 500 MG: 500 TABLET ORAL at 05:07

## 2020-07-11 RX ADMIN — SODIUM BICARBONATE 650 MG TABLET 650 MG: at 09:07

## 2020-07-11 RX ADMIN — INSULIN ASPART 2 UNITS: 100 INJECTION, SOLUTION INTRAVENOUS; SUBCUTANEOUS at 12:07

## 2020-07-11 RX ADMIN — MELATONIN 1000 UNITS: at 09:07

## 2020-07-11 RX ADMIN — CARVEDILOL 12.5 MG: 12.5 TABLET, FILM COATED ORAL at 09:07

## 2020-07-11 RX ADMIN — PANTOPRAZOLE SODIUM 40 MG: 40 TABLET, DELAYED RELEASE ORAL at 09:07

## 2020-07-11 RX ADMIN — FUROSEMIDE 40 MG: 40 TABLET ORAL at 09:07

## 2020-07-11 NOTE — PT/OT/SLP PROGRESS
Physical Therapy Treatment    Patient Name:  Vinnie Siegel   MRN:  [id number]    Recommendations:     Discharge Recommendations:  nursing facility, skilled   Discharge Equipment Recommendations: 3-in-1 commode, walker, rolling, wheelchair   Barriers to discharge: Decreased caregiver support    Assessment:     Vinnie Siegel is a 74 y.o. male admitted with a medical diagnosis of Type 2 diabetes mellitus with hyperglycemia.  He presents with the following impairments/functional limitations:  weakness, impaired endurance, impaired functional mobilty, impaired self care skills, gait instability, impaired balance, decreased upper extremity function, decreased lower extremity function, impaired skin, impaired cardiopulmonary response to activity ,  Pt presented sitting in bedside chair upon arrival of PT. Pt agreeable to PT. Pt sit <> stand w/ RW and CGA. PT amb'd 40' w/ RW and CGA..    Rehab Prognosis: Good; patient would benefit from acute skilled PT services to address these deficits and reach maximum level of function.    Recent Surgery: Procedure(s) (LRB):  EGD (ESOPHAGOGASTRODUODENOSCOPY) (N/A) 12 Days Post-Op    Plan:     During this hospitalization, patient to be seen 5 x/week to address the identified rehab impairments via gait training, therapeutic activities, therapeutic exercises and progress toward the following goals:    · Plan of Care Expires:  07/31/20    Subjective     Chief Complaint: none stated  Patient/Family Comments/goals: I am getting tired  Pain/Comfort:  · Pain Rating 1: 0/10  · Pain Addressed 2: Reposition, Distraction      Objective:     Communicated with ns(Yue) prior to session.  Patient found up in chair with peripheral IV, telemetry upon PT entry to room.     General Precautions: Standard, diabetic, fall   Orthopedic Precautions:N/A   Braces: N/A     Functional Mobility:  · Transfers:     · Sit to Stand:  contact guard assistance with rolling walker  · Gait: 40' w/ RW and  CGA      AM-PAC 6 CLICK MOBILITY  Turning over in bed (including adjusting bedclothes, sheets and blankets)?: 3  Sitting down on and standing up from a chair with arms (e.g., wheelchair, bedside commode, etc.): 3  Moving from lying on back to sitting on the side of the bed?: 3  Moving to and from a bed to a chair (including a wheelchair)?: 3  Need to walk in hospital room?: 3  Climbing 3-5 steps with a railing?: 3  Basic Mobility Total Score: 18       Therapeutic Activities and Exercises:   Pt performed seated therapeutic exercises including hip flexion, AP and LAQs x 10 reps with verbal and tactile cues.      Patient left up in chair with all lines intact, call button in reach and ns notified..    GOALS:   Multidisciplinary Problems     Physical Therapy Goals        Problem: Physical Therapy Goal    Goal Priority Disciplines Outcome Goal Variances Interventions   Physical Therapy Goal     PT, PT/OT Ongoing, Progressing     Description: Goals to be met by: 20    Patient will increase functional independence with mobility by performin. Supine to sit with min A.   2. Sit to supine with min A. MET   3. Rolling R and L with min A.   4. Sitting at edge of bed >5 minutes with min A. MET   5. PT will assess sit<>stand. MET   6. Sit<>stand with RW with modA x1 person.  7. Gait x15 ft with RW and CGA.                   Time Tracking:     PT Received On: 20  PT Start Time: 1027     PT Stop Time: 1055  PT Total Time (min): 28 min     Billable Minutes: Gait Training 14 and Therapeutic Exercise 14    Treatment Type: Treatment  PT/PTA: PTA     PTA Visit Number: 4     Jesus Carmen, PTA  2020

## 2020-07-11 NOTE — PROGRESS NOTES
"Nephrology  Progress Note    Admit Date: 6/27/2020   LOS: 14 days     SUBJECTIVE:     Follow-up For:  Type 2 diabetes mellitus with hyperglycemia    Interval History:     Continues to slowly improve.  Begging to go home.  No CP/SOB.   Renal fxn and UOP stable and at baseline.    Review of Systems:    No c/o.      OBJECTIVE:     Vital Signs Range (Last 24H):  BP (!) 158/71 (BP Location: Right arm, Patient Position: Sitting)   Pulse 69   Temp 98.3 °F (36.8 °C) (Oral)   Resp 16   Ht 6' 2" (1.88 m)   Wt (!) 144.5 kg (318 lb 9 oz)   SpO2 99%   BMI 40.83 kg/m²     Temp:  [98.3 °F (36.8 °C)-99.4 °F (37.4 °C)]   Pulse:  [64-75]   Resp:  [16-26]   BP: (134-169)/(63-98)   SpO2:  [91 %-100 %]     I & O (Last 24H):    Intake/Output Summary (Last 24 hours) at 7/11/2020 1003  Last data filed at 7/11/2020 0518  Gross per 24 hour   Intake 220 ml   Output 850 ml   Net -630 ml       Physical Exam:  General appearance: Well developed, well nourished, weak, obese  Eyes:  Conjunctivae/corneas clear. PERRL.  Neck: left neck wound CDI  Lungs: diminished.    Heart: Regular rate and rhythm, S1, S2 normal, no murmur, rub or beatrice.  Abdomen: Soft, non-tender non-distended; bowel sounds normal; no masses,  no organomegaly, obese.   Extremities: No cyanosis or clubbing. 1-2+ edema.  Left neck abscess noted.   Skin: Skin color, texture, turgor normal. No rashes or lesions  Neurologic: No focal numbness or weakness         Laboratory Data:  Recent Labs   Lab 07/11/20 0418   WBC 8.69   RBC 2.44*   HGB 7.2*   HCT 23.7*      MCV 97   MCH 29.5   MCHC 30.4*       BMP:   Recent Labs   Lab 07/11/20 0418   *      K 5.0      CO2 20*   BUN 26*   CREATININE 2.8*   CALCIUM 7.3*     Lab Results   Component Value Date    CALCIUM 7.3 (L) 07/11/2020    PHOS 3.5 07/11/2020    PHOS 3.5 07/11/2020       Lab Results   Component Value Date    .5 (H) 06/29/2020    CALCIUM 7.3 (L) 07/11/2020    CAION 1.00 (L) 07/10/2020    " PHOS 3.5 07/11/2020    PHOS 3.5 07/11/2020       Lab Results   Component Value Date    URICACID 8.6 (H) 06/29/2020       BNP  No results for input(s): BNP, BNPTRIAGEBLO in the last 168 hours.    Medications:  Medication list was reviewed and changes noted under Assessment/Plan.    Diagnostic Results:        ASSESSMENT/PLAN:     75 YO male with DM, HTN, CHF with Dilated CM, COPD, CKD 4, now with GIB and Hyperosmolar nonketotic hyperglycemia, resp failure prompting intubation to protect airway, severe anemia with Hgb 6.4, elevated BUN due to GIB, hypotension, and ADONAY     HHS resolved  -s/p Insulin drip and now on basal/prandial.    -UTI/abscess noted.  -defer  -glucose labile.     Resolved Non-oliguric ADONAY on CKD 4/ Hypokalemia  -Baseline creat over past year 2.5-2.9 with history of proteinuria  -Creat 4.4 on admit and slowly improving back to baseline  -Prot/creat ratio 1 gram  -suspect that severe anemia and hypotension was etiology for ADONAY  -Doesn't appear he has seen nephrologist in sometime so ordered full workup and mildly elevated ELSA with negative reflex only positive finding so far.  -Normally prefer no PPI but with GIB this okay  -No nephrotoxins.  -replace lytes PRN.  -started low dose bicarb tabs.   -Renally dose meds, avoid nephrotoxins, and monitor I/O's closely.       GIB with severe anemia  -Agree with transfusions prn  -dosed Epo again and will need to continue as outpt per [place] [facility]  -mildly iron deficient but would not give IV iron for now  -GI consulted - EGD noted.  .   -Protonix as now  -unable to tolerated prep.    DM  -Last HgA1c in December 2019 7.7  -Defer to primary team  -as above     Sepsis from UTI/neck abscess:  -cultured and continue antibx for now.   -Dr. Basilio following  -lactic acid, procal, and WBC's improving.     Vit D Def with mild HPTH/ Hx Calcium Oxalate Nephrolithiasis:  -Started on calcitriol 0.25mcg 3x/week, but stopped for now.  -However, Vit D 25 extremely low,  but Hx Nephrolithiasis.  -Changed to low dose Cholecalciferol 1,000 units daily and will need to be monitored closely with 24 hr urine Calcium.   -low dose tums.       Resolved Hypernatremia:  -encourage water intake.    HTN:  -started BB/CCB.  Hydralazine prn.   -no ACE/ARB for now.       Dispo:  Stable from renal.  Nothing else to offer.  Will follow remotely.   Needs f/u with [facility] renal as he was previously followed.

## 2020-07-11 NOTE — SUBJECTIVE & OBJECTIVE
Interval History: No acute events overnight. Feeling somewhat better this morning. Await SNF placement.     Review of Systems   Constitutional: Negative for chills and fever.   Respiratory: Negative for shortness of breath.    Cardiovascular: Negative for chest pain.   Gastrointestinal: Negative for abdominal pain, nausea and vomiting.     Objective:     Vital Signs (Most Recent):  Temp: 98.3 °F (36.8 °C) (07/11/20 0745)  Pulse: 69 (07/11/20 0745)  Resp: 16 (07/11/20 0745)  BP: (!) 158/71 (07/11/20 0745)  SpO2: 99 % (07/11/20 0752) Vital Signs (24h Range):  Temp:  [98.3 °F (36.8 °C)-99.4 °F (37.4 °C)] 98.3 °F (36.8 °C)  Pulse:  [64-75] 69  Resp:  [16-26] 16  SpO2:  [91 %-100 %] 99 %  BP: (134-169)/(63-98) 158/71     Weight: (!) 144.5 kg (318 lb 9 oz)  Body mass index is 40.83 kg/m².    Intake/Output Summary (Last 24 hours) at 7/11/2020 1155  Last data filed at 7/11/2020 0518  Gross per 24 hour   Intake 220 ml   Output 650 ml   Net -430 ml      Physical Exam  Vitals signs and nursing note reviewed.   Constitutional:       General: He is not in acute distress.     Appearance: Normal appearance.   HENT:      Head: Normocephalic and atraumatic.   Eyes:      General:         Right eye: No discharge.         Left eye: No discharge.      Conjunctiva/sclera: Conjunctivae normal.   Cardiovascular:      Rate and Rhythm: Normal rate and regular rhythm.      Pulses: Normal pulses.   Pulmonary:      Effort: No respiratory distress.      Comments: Decreased at the bases  Abdominal:      Palpations: Abdomen is soft.      Tenderness: There is no abdominal tenderness.   Musculoskeletal:         General: No tenderness.      Right lower leg: No edema.      Left lower leg: No edema.   Skin:     General: Skin is warm and dry.      Comments: L posterior neck abscess s/p I&D, purulent drainage noted   Neurological:      Mental Status: He is alert and oriented to person, place, and time.       Significant Labs:   CBC:  Recent Labs   Lab  07/09/20  0649 07/10/20  0447 07/11/20 0418   WBC 8.74 8.37 8.69   HGB 7.5* 7.9* 7.2*   HCT 23.7* 25.9* 23.7*    271 272   GRAN 74.2*  6.5 74.0*  6.2 72.9  6.3   LYMPH 15.9*  1.4 16.6*  1.4 14.5*  1.3   MONO 7.9  0.7 7.3  0.6 10.0  0.9   EOS 0.1 0.1 0.1   BASO 0.03 0.03 0.03      CMP:  Recent Labs   Lab 07/09/20  0649 07/10/20  0447 07/11/20 0418    142 140   K 4.0 4.4 5.0   * 111* 110   CO2 20* 19* 20*   BUN 27* 26* 26*   CREATININE 2.5* 2.5* 2.8*   GLU 82 142* 170*   CALCIUM 6.9* 7.3* 7.3*   PHOS 3.2  3.2 3.7  3.7 3.5  3.5   ALBUMIN 1.9* 2.1* 1.9*   ANIONGAP 11 12 10      Significant Imaging:   No new imaging this morning.

## 2020-07-11 NOTE — ASSESSMENT & PLAN NOTE
- CXR with LLL opacity .  - Respiratory culture with Strep agalactiae. Completed course of cefazolin.

## 2020-07-11 NOTE — PROGRESS NOTES
[facility] Hospital Medicine  Progress Note    Patient Name: Vinnie Siegel  MRN: [id number]  Patient Class: IP- Inpatient   Admission Date: 6/27/2020  Length of Stay: 14 days  Attending Physician: NYDIA Gomez MD  Primary Care Provider: Janeth Walter MD        Subjective:     Principal Problem:Type 2 diabetes mellitus with hyperglycemia        HPI:  Mr. Vinnie Siegel is a 74 y.o. male, with PMH of IDDM-2, CKD-IV, dilated cardiomyopathy, HTN, HLD, gout, obesity, who [place] to [facility] [place] via EMS on 6/27/20 with rectal bleeding x 2 days. His family called EMS after he became lethargic earlier this evening. Per his fiancee, he has been passing dark blood stools, and has been noting generalized weakness x 1 week. Additionally, he has been having elevated blood glucose readings x 1 day. In the ED, he was found to be in DKA, with a GI bleed. H&H were 6.4 & 19.4 and he was transfused 2 units PRBCs. He was started on an insulin drip and a Protonix drip. He had worsening respiratory and mental status while in the ED, and was intubated for airway protection. He was admitted to inpatient status to the ICU.     Overview/Hospital Course:  Admitted to ICU with HHS, encephalopathy, GI bleed, ADONAY, respiratory failure and sepsis. Sepsis was due to E. Coli UTI and Strep agalactiae pneumonia. Critical care and nephrology consulted and broad antibiotics continued. GI was consulted and he had EGD on 6/29 which showed normal esophagus and duodenum with OG tube trauma to mid gastric body but no source of bleeding was identified. He was extubated on 6/30. He attempted bowel prep for colonoscopy but was unable to tolerate; colonoscopy was pushed back. He had slow improvement in ADONAY as well as improvement in sepsis with antibiotics. ID was consulted and recommended cefazolin. He was found to have a large posterior neck abscess so general surgery was consulted and he had I&D. He was transferred out of ICU on 7/3. He  was scheduled to have colonoscopy on 7/6 but was unable to tolerate bowel prep again so it was canceled.    Interval History: No acute events overnight. Feeling somewhat better this morning. Await SNF placement.     Review of Systems   Constitutional: Negative for chills and fever.   Respiratory: Negative for shortness of breath.    Cardiovascular: Negative for chest pain.   Gastrointestinal: Negative for abdominal pain, nausea and vomiting.     Objective:     Vital Signs (Most Recent):  Temp: 98.3 °F (36.8 °C) (07/11/20 0745)  Pulse: 69 (07/11/20 0745)  Resp: 16 (07/11/20 0745)  BP: (!) 158/71 (07/11/20 0745)  SpO2: 99 % (07/11/20 0752) Vital Signs (24h Range):  Temp:  [98.3 °F (36.8 °C)-99.4 °F (37.4 °C)] 98.3 °F (36.8 °C)  Pulse:  [64-75] 69  Resp:  [16-26] 16  SpO2:  [91 %-100 %] 99 %  BP: (134-169)/(63-98) 158/71     Weight: (!) 144.5 kg (318 lb 9 oz)  Body mass index is 40.83 kg/m².    Intake/Output Summary (Last 24 hours) at 7/11/2020 1155  Last data filed at 7/11/2020 0518  Gross per 24 hour   Intake 220 ml   Output 650 ml   Net -430 ml      Physical Exam  Vitals signs and nursing note reviewed.   Constitutional:       General: He is not in acute distress.     Appearance: Normal appearance.   HENT:      Head: Normocephalic and atraumatic.   Eyes:      General:         Right eye: No discharge.         Left eye: No discharge.      Conjunctiva/sclera: Conjunctivae normal.   Cardiovascular:      Rate and Rhythm: Normal rate and regular rhythm.      Pulses: Normal pulses.   Pulmonary:      Effort: No respiratory distress.      Comments: Decreased at the bases  Abdominal:      Palpations: Abdomen is soft.      Tenderness: There is no abdominal tenderness.   Musculoskeletal:         General: No tenderness.      Right lower leg: No edema.      Left lower leg: No edema.   Skin:     General: Skin is warm and dry.      Comments: L posterior neck abscess s/p I&D, purulent drainage noted   Neurological:      Mental  Status: He is alert and oriented to person, place, and time.       Significant Labs:   CBC:  Recent Labs   Lab 07/09/20  0649 07/10/20  0447 07/11/20 0418   WBC 8.74 8.37 8.69   HGB 7.5* 7.9* 7.2*   HCT 23.7* 25.9* 23.7*    271 272   GRAN 74.2*  6.5 74.0*  6.2 72.9  6.3   LYMPH 15.9*  1.4 16.6*  1.4 14.5*  1.3   MONO 7.9  0.7 7.3  0.6 10.0  0.9   EOS 0.1 0.1 0.1   BASO 0.03 0.03 0.03      CMP:  Recent Labs   Lab 07/09/20  0649 07/10/20  0447 07/11/20  0418    142 140   K 4.0 4.4 5.0   * 111* 110   CO2 20* 19* 20*   BUN 27* 26* 26*   CREATININE 2.5* 2.5* 2.8*   GLU 82 142* 170*   CALCIUM 6.9* 7.3* 7.3*   PHOS 3.2  3.2 3.7  3.7 3.5  3.5   ALBUMIN 1.9* 2.1* 1.9*   ANIONGAP 11 12 10      Significant Imaging:   No new imaging this morning.      Assessment/Plan:      * Type 2 diabetes mellitus with hyperglycemia  - HHS resolved.  - Glucose with some variability, but improving gradually.  - Continuing insulin detemir 35U subQ daily, insulin aspart 12U subQ TIDWM, low-dose sliding scale insulin aspart 0-5U subQ TIDWM PRN.    Debility  - Continuing PT/OT; await SNF placement.    Abdominal distension  - KUB with diffuse gaseous distension without obstruction.  - Simethicone PRN.  - Continued PT/OT.    Abscess of neck  - Soft tissue neck CT with abscess L posterior neck.  - General surgery following, s/p I&D 7/2  - Aerobic culture negative. Anaerobic culture showing finegoldia.  - Continuing cefazolin, metronidazole as 500mg PO q8hr. Will continue metronidazole for 10 day course to cover finegoldia.    LLL pneumonia  - CXR with LLL opacity .  - Respiratory culture with Strep agalactiae. Completed course of cefazolin.    Acute renal failure superimposed on stage 4 chronic kidney disease  - Multifactorial with likely acute blood loss anemia, GI losses contributing.  - Baseline Cr ~2.4-2.7.  - FENa suggestive of intrinsic source.  - Back to baseline function. Appreciate nephrology  assistance.    UTI (urinary tract infection)  - Urine culture with E. Coli.  - Completed course of cefazolin per ID, end date: 7/10/20.    Acute blood loss anemia  - 2/2 GI bleed.  - s/p 4 units PRBC.  - Hgb stable the past few days. Monitor.    GIB (gastrointestinal bleeding)  - s/p 2U pRBCs 06/27, 1U pRBCs 06/28, 1U pRBC 6/30.  - Continue to trend Hgb and transfuse PRN Hgb < 7.  - Continuing pantoprazole 40mg PO daily.  - EGD 6/29 with normal esophagus, OG tube trauma to the mid body and normal duodenum.  - Unable to tolerate bowel prep. Colonoscopy deferred since Hgb stable, can be done outpatient.    Obesity, Class III, BMI 40-49.9 (morbid obesity)  - Dietary counseling.    Dilated cardiomyopathy  - Appears euvolemic.  - Continue furosemide 40mg PO every other day.    CKD (chronic kidney disease) stage 4, GFR 15-29 ml/min  - As under ADONAY.    Essential hypertension  - Continuing carvedilol 12.5mg PO BID, nifedipine at 60mg PO BID.    VTE Risk Mitigation (From admission, onward)         Ordered     IP VTE HIGH RISK PATIENT  Once      06/28/20 0139     Reason for No Pharmacological VTE Prophylaxis  Once     Question:  Reasons:  Answer:  Active Bleeding    06/28/20 0139     Place sequential compression device  Until discontinued      06/28/20 0036                      KRISTEN Gomez MD  Department of Hospital Medicine   [facility]

## 2020-07-11 NOTE — PLAN OF CARE
Pt A+Ox4, on RA. BG checks completed. Diet: Diabetic. Supplemental insulin provided. 22 g to the L wrist, saline locked. Last BM 07/10/20. Uses urinal. Pt denies pain. Abscess on posterior neck changed today @ 1400 with triad and foam. Walked across the room with OT/PT. Received resp. Therapy once during shift, O2sat 99 prior. I/Os and purposeful pt hourly roundings completed. Pt remained of falls, will continue to monitor.

## 2020-07-11 NOTE — PLAN OF CARE
POC reviewed with pt who verbalized understanding. AAOx4. VSS on RA. Remains free of falls and injury. No complaints of pain or nausea. BG monitored at bedtime, sugar was 207 and patient refused insulin. Voiding per urinal. No BM this shift. Neck dressing C/D/I. ABX given per MAR. Frequent rounds made for safety. No acute events. No distress noted. Will continue to monitor.

## 2020-07-11 NOTE — PLAN OF CARE
Problem: Physical Therapy Goal  Goal: Physical Therapy Goal  Description: Goals to be met by: 20    Patient will increase functional independence with mobility by performin. Supine to sit with min A.   2. Sit to supine with min A. MET   3. Rolling R and L with min A.   4. Sitting at edge of bed >5 minutes with min A. MET   5. PT will assess sit<>stand. MET   6. Sit<>stand with RW with modA x1 person.  7. Gait x15 ft with RW and CGA.  Outcome: Ongoing, Progressing   Pt presented sitting in bedside chair upon arrival of PT. Pt agreeable to PT. Pt sit <> stand w/ RW and CGA. PT amb'd 40' w/ RW and CGA.

## 2020-07-12 LAB
ALBUMIN SERPL BCP-MCNC: 2.2 G/DL (ref 3.5–5.2)
ANION GAP SERPL CALC-SCNC: 11 MMOL/L (ref 8–16)
BASOPHILS # BLD AUTO: 0.04 K/UL (ref 0–0.2)
BASOPHILS NFR BLD: 0.5 % (ref 0–1.9)
BUN SERPL-MCNC: 27 MG/DL (ref 8–23)
CALCIUM SERPL-MCNC: 7.6 MG/DL (ref 8.7–10.5)
CHLORIDE SERPL-SCNC: 111 MMOL/L (ref 95–110)
CO2 SERPL-SCNC: 21 MMOL/L (ref 23–29)
CREAT SERPL-MCNC: 2.7 MG/DL (ref 0.5–1.4)
DIFFERENTIAL METHOD: ABNORMAL
EOSINOPHIL # BLD AUTO: 0.1 K/UL (ref 0–0.5)
EOSINOPHIL NFR BLD: 1.5 % (ref 0–8)
ERYTHROCYTE [DISTWIDTH] IN BLOOD BY AUTOMATED COUNT: 16 % (ref 11.5–14.5)
EST. GFR  (AFRICAN AMERICAN): 26 ML/MIN/1.73 M^2
EST. GFR  (NON AFRICAN AMERICAN): 22 ML/MIN/1.73 M^2
GLUCOSE SERPL-MCNC: 144 MG/DL (ref 70–110)
HCT VFR BLD AUTO: 27.1 % (ref 40–54)
HGB BLD-MCNC: 8 G/DL (ref 14–18)
IMM GRANULOCYTES # BLD AUTO: 0.08 K/UL (ref 0–0.04)
IMM GRANULOCYTES NFR BLD AUTO: 0.9 % (ref 0–0.5)
LYMPHOCYTES # BLD AUTO: 1.7 K/UL (ref 1–4.8)
LYMPHOCYTES NFR BLD: 19.1 % (ref 18–48)
MCH RBC QN AUTO: 28.9 PG (ref 27–31)
MCHC RBC AUTO-ENTMCNC: 29.5 G/DL (ref 32–36)
MCV RBC AUTO: 98 FL (ref 82–98)
MONOCYTES # BLD AUTO: 0.8 K/UL (ref 0.3–1)
MONOCYTES NFR BLD: 8.6 % (ref 4–15)
NEUTROPHILS # BLD AUTO: 6.2 K/UL (ref 1.8–7.7)
NEUTROPHILS NFR BLD: 69.4 % (ref 38–73)
NRBC BLD-RTO: 0 /100 WBC
PHOSPHATE SERPL-MCNC: 3.8 MG/DL (ref 2.7–4.5)
PHOSPHATE SERPL-MCNC: 3.8 MG/DL (ref 2.7–4.5)
PLATELET # BLD AUTO: 305 K/UL (ref 150–350)
PMV BLD AUTO: 9.5 FL (ref 9.2–12.9)
POCT GLUCOSE: 164 MG/DL (ref 70–110)
POCT GLUCOSE: 170 MG/DL (ref 70–110)
POCT GLUCOSE: 183 MG/DL (ref 70–110)
POCT GLUCOSE: 214 MG/DL (ref 70–110)
POTASSIUM SERPL-SCNC: 4.6 MMOL/L (ref 3.5–5.1)
RBC # BLD AUTO: 2.77 M/UL (ref 4.6–6.2)
SODIUM SERPL-SCNC: 143 MMOL/L (ref 136–145)
WBC # BLD AUTO: 8.86 K/UL (ref 3.9–12.7)

## 2020-07-12 PROCEDURE — 11000001 HC ACUTE MED/SURG PRIVATE ROOM

## 2020-07-12 PROCEDURE — 25000003 PHARM REV CODE 250: Performed by: INTERNAL MEDICINE

## 2020-07-12 PROCEDURE — 36415 COLL VENOUS BLD VENIPUNCTURE: CPT

## 2020-07-12 PROCEDURE — 25000003 PHARM REV CODE 250: Performed by: NURSE PRACTITIONER

## 2020-07-12 PROCEDURE — 99233 SBSQ HOSP IP/OBS HIGH 50: CPT | Mod: ,,, | Performed by: INTERNAL MEDICINE

## 2020-07-12 PROCEDURE — 85025 COMPLETE CBC W/AUTO DIFF WBC: CPT

## 2020-07-12 PROCEDURE — 99233 PR SUBSEQUENT HOSPITAL CARE,LEVL III: ICD-10-PCS | Mod: ,,, | Performed by: INTERNAL MEDICINE

## 2020-07-12 PROCEDURE — 80069 RENAL FUNCTION PANEL: CPT

## 2020-07-12 PROCEDURE — 94761 N-INVAS EAR/PLS OXIMETRY MLT: CPT

## 2020-07-12 RX ADMIN — METRONIDAZOLE 500 MG: 500 TABLET ORAL at 01:07

## 2020-07-12 RX ADMIN — NIFEDIPINE 60 MG: 30 TABLET, FILM COATED, EXTENDED RELEASE ORAL at 10:07

## 2020-07-12 RX ADMIN — CARVEDILOL 12.5 MG: 12.5 TABLET, FILM COATED ORAL at 10:07

## 2020-07-12 RX ADMIN — TIMOLOL MALEATE 1 DROP: 2.5 SOLUTION/ DROPS OPHTHALMIC at 10:07

## 2020-07-12 RX ADMIN — INSULIN ASPART 12 UNITS: 100 INJECTION, SOLUTION INTRAVENOUS; SUBCUTANEOUS at 01:07

## 2020-07-12 RX ADMIN — INSULIN ASPART 12 UNITS: 100 INJECTION, SOLUTION INTRAVENOUS; SUBCUTANEOUS at 10:07

## 2020-07-12 RX ADMIN — METRONIDAZOLE 500 MG: 500 TABLET ORAL at 09:07

## 2020-07-12 RX ADMIN — METRONIDAZOLE 500 MG: 500 TABLET ORAL at 06:07

## 2020-07-12 RX ADMIN — PANTOPRAZOLE SODIUM 40 MG: 40 TABLET, DELAYED RELEASE ORAL at 10:07

## 2020-07-12 RX ADMIN — INSULIN ASPART 12 UNITS: 100 INJECTION, SOLUTION INTRAVENOUS; SUBCUTANEOUS at 05:07

## 2020-07-12 RX ADMIN — ALPRAZOLAM 2 MG: 0.5 TABLET ORAL at 07:07

## 2020-07-12 RX ADMIN — MELATONIN 1000 UNITS: at 10:07

## 2020-07-12 RX ADMIN — CARVEDILOL 12.5 MG: 12.5 TABLET, FILM COATED ORAL at 08:07

## 2020-07-12 RX ADMIN — SODIUM BICARBONATE 650 MG TABLET 650 MG: at 10:07

## 2020-07-12 RX ADMIN — INSULIN DETEMIR 35 UNITS: 100 INJECTION, SOLUTION SUBCUTANEOUS at 10:07

## 2020-07-12 RX ADMIN — NIFEDIPINE 60 MG: 30 TABLET, FILM COATED, EXTENDED RELEASE ORAL at 08:07

## 2020-07-12 RX ADMIN — TAMSULOSIN HYDROCHLORIDE 0.4 MG: 0.4 CAPSULE ORAL at 10:07

## 2020-07-12 RX ADMIN — INSULIN ASPART 2 UNITS: 100 INJECTION, SOLUTION INTRAVENOUS; SUBCUTANEOUS at 01:07

## 2020-07-12 NOTE — PROGRESS NOTES
"Nephrology  Progress Note    Admit Date: 6/27/2020   LOS: 15 days     SUBJECTIVE:     Follow-up For:  Type 2 diabetes mellitus with hyperglycemia    Interval History:     Continues to slowly improve. No CP/SOB.   Renal fxn and UOP stable and at baseline.    Review of Systems:    No c/o.      OBJECTIVE:     Vital Signs Range (Last 24H):  BP (!) 141/65 (BP Location: Right arm, Patient Position: Sitting)   Pulse 72   Temp 97.5 °F (36.4 °C) (Oral)   Resp 18   Ht 6' 2" (1.88 m)   Wt (!) 144.5 kg (318 lb 9 oz)   SpO2 (!) 94%   BMI 40.83 kg/m²     Temp:  [97.5 °F (36.4 °C)-98.7 °F (37.1 °C)]   Pulse:  [72-78]   Resp:  [18-20]   BP: (135-176)/(63-77)   SpO2:  [93 %-100 %]     I & O (Last 24H):    Intake/Output Summary (Last 24 hours) at 7/12/2020 0958  Last data filed at 7/12/2020 0641  Gross per 24 hour   Intake 240 ml   Output 1150 ml   Net -910 ml       Physical Exam:  General appearance: Well developed, well nourished, weak, obese  Eyes:  Conjunctivae/corneas clear. PERRL.  Neck: left neck wound CDI  Lungs: diminished.    Heart: Regular rate and rhythm, S1, S2 normal, no murmur, rub or beatrice.  Abdomen: Soft, non-tender non-distended; bowel sounds normal; no masses,  no organomegaly, obese.   Extremities: No cyanosis or clubbing. 1-2+ edema.  Left neck abscess noted.   Skin: Skin color, texture, turgor normal. No rashes or lesions  Neurologic: No focal numbness or weakness         Laboratory Data:  Recent Labs   Lab 07/12/20  0451   WBC 8.86   RBC 2.77*   HGB 8.0*   HCT 27.1*      MCV 98   MCH 28.9   MCHC 29.5*       BMP:   Recent Labs   Lab 07/12/20  0451   *      K 4.6   *   CO2 21*   BUN 27*   CREATININE 2.7*   CALCIUM 7.6*     Lab Results   Component Value Date    CALCIUM 7.6 (L) 07/12/2020    PHOS 3.8 07/12/2020    PHOS 3.8 07/12/2020       Lab Results   Component Value Date    .5 (H) 06/29/2020    CALCIUM 7.6 (L) 07/12/2020    CAION 1.00 (L) 07/10/2020    PHOS 3.8 07/12/2020 "    PHOS 3.8 07/12/2020       Lab Results   Component Value Date    URICACID 8.6 (H) 06/29/2020       BNP  No results for input(s): BNP, BNPTRIAGEBLO in the last 168 hours.    Medications:  Medication list was reviewed and changes noted under Assessment/Plan.    Diagnostic Results:        ASSESSMENT/PLAN:     75 YO male with DM, HTN, CHF with Dilated CM, COPD, CKD 4, now with GIB and Hyperosmolar nonketotic hyperglycemia, resp failure prompting intubation to protect airway, severe anemia with Hgb 6.4, elevated BUN due to GIB, hypotension, and ADONAY     HHS resolved  -s/p Insulin drip and now on basal/prandial.    -UTI/abscess noted.  -defer  -glucose labile.     Resolved Non-oliguric ADONAY on CKD 4/ Hypokalemia  -Baseline creat over past year 2.5-2.9 with history of proteinuria  -Creat 4.4 on admit and slowly improving back to baseline  -Prot/creat ratio 1 gram  -suspect that severe anemia and hypotension was etiology for ADONAY  -Doesn't appear he has seen nephrologist in sometime so ordered full workup and mildly elevated ELSA with negative reflex only positive finding so far.  -Normally prefer no PPI but with GIB this okay  -No nephrotoxins.  -replace lytes PRN.  -started low dose bicarb tabs with improvement.  -Renally dose meds, avoid nephrotoxins, and monitor I/O's closely.  Will discontinue daily labs     GIB with severe anemia  -Agree with transfusions prn  -dosed Epo again and will need to continue as outpt per nephrology at [facility].  -mildly iron deficient but would not give IV iron for now  -GI consulted - EGD noted.  .   -Protonix as now  -unable to tolerate prep.    DM  -Last HgA1c in December 2019 7.7  -Defer to primary team  -as above     Sepsis from UTI/neck abscess:  -cultured and continue antibx for now.   -Dr. Basilio following  -lactic acid, procal, and WBC's improving.     Vit D Def with mild HPTH/ Hx Calcium Oxalate Nephrolithiasis:  -Started on calcitriol 0.25mcg 3x/week, but stopped for now.  -However, Vit  D 25 extremely low, but Hx Nephrolithiasis.  -Changed to low dose Cholecalciferol 1,000 units daily and will need to be monitored closely with 24 hr urine Calcium.   -low dose tums.       Resolved Hypernatremia:  -encourage water intake.    HTN:  -started BB/CCB.  Hydralazine prn.   -no ACE/ARB for now.       Dispo:  Stable from renal.  Nothing else to offer.  Will follow remotely.   Needs f/u with [facility] renal as he was previously followed.

## 2020-07-12 NOTE — PROGRESS NOTES
[facility] Hospital Medicine  Progress Note    Patient Name: Vinnie Siegel  MRN: [id number]  Patient Class: IP- Inpatient   Admission Date: 6/27/2020  Length of Stay: 15 days  Attending Physician: NYDIA Gomez MD  Primary Care Provider: Janeth Walter MD        Subjective:     Principal Problem:Type 2 diabetes mellitus with hyperglycemia        HPI:  Mr. Vinnie Siegel is a 74 y.o. male, with PMH of IDDM-2, CKD-IV, dilated cardiomyopathy, HTN, HLD, gout, obesity, who presented to [facility] [place] [place] ED via EMS on 6/27/20 with rectal bleeding x 2 days. His family called EMS after he became lethargic earlier this evening. Per his fiancee, he has been passing dark blood stools, and has been noting generalized weakness x 1 week. Additionally, he has been having elevated blood glucose readings x 1 day. In the ED, he was found to be in DKA, with a GI bleed. H&H were 6.4 & 19.4 and he was transfused 2 units PRBCs. He was started on an insulin drip and a Protonix drip. He had worsening respiratory and mental status while in the ED, and was intubated for airway protection. He was admitted to inpatient status to the ICU.     Overview/Hospital Course:  Admitted to ICU with HHS, encephalopathy, GI bleed, ADONAY, respiratory failure and sepsis. Sepsis was due to E. Coli UTI and Strep agalactiae pneumonia. Critical care and nephrology consulted and broad antibiotics continued. GI was consulted and he had EGD on 6/29 which showed normal esophagus and duodenum with OG tube trauma to mid gastric body but no source of bleeding was identified. He was extubated on 6/30. He attempted bowel prep for colonoscopy but was unable to tolerate; colonoscopy was pushed back. He had slow improvement in ADONAY as well as improvement in sepsis with antibiotics. ID was consulted and recommended cefazolin. He was found to have a large posterior neck abscess so general surgery was consulted and he had I&D. He was transferred out of ICU on 7/3. He  was scheduled to have colonoscopy on 7/6 but was unable to tolerate bowel prep again so it was canceled.    Interval History: No acute events overnight. Feeling improved this morning, had a better night last night. Await SNF placement.     Review of Systems   Constitutional: Negative for chills and fever.   Respiratory: Negative for shortness of breath.    Cardiovascular: Negative for chest pain.   Gastrointestinal: Negative for abdominal pain, nausea and vomiting.     Objective:     Vital Signs (Most Recent):  Temp: 98.5 °F (36.9 °C) (07/12/20 1202)  Pulse: 77 (07/12/20 1202)  Resp: 20 (07/12/20 1202)  BP: (!) 149/78 (07/12/20 1202)  SpO2: (!) 93 % (07/12/20 1202) Vital Signs (24h Range):  Temp:  [97.5 °F (36.4 °C)-98.7 °F (37.1 °C)] 98.5 °F (36.9 °C)  Pulse:  [72-77] 77  Resp:  [18-20] 20  SpO2:  [93 %-100 %] 93 %  BP: (135-168)/(63-78) 149/78     Weight: (!) 144.5 kg (318 lb 9 oz)  Body mass index is 40.83 kg/m².    Intake/Output Summary (Last 24 hours) at 7/12/2020 1508  Last data filed at 7/12/2020 1045  Gross per 24 hour   Intake 476 ml   Output 1125 ml   Net -649 ml      Physical Exam  Vitals signs and nursing note reviewed.   Constitutional:       General: He is not in acute distress.     Appearance: Normal appearance.   HENT:      Head: Normocephalic and atraumatic.   Eyes:      General:         Right eye: No discharge.         Left eye: No discharge.      Conjunctiva/sclera: Conjunctivae normal.   Cardiovascular:      Rate and Rhythm: Normal rate and regular rhythm.      Pulses: Normal pulses.   Pulmonary:      Effort: No respiratory distress.      Comments: Decreased at the bases  Abdominal:      Palpations: Abdomen is soft.      Tenderness: There is no abdominal tenderness.   Musculoskeletal:         General: No tenderness.      Right lower leg: No edema.      Left lower leg: No edema.   Skin:     General: Skin is warm and dry.      Comments: L posterior neck abscess s/p I&D, purulent drainage noted    Neurological:      Mental Status: He is alert and oriented to person, place, and time.       Significant Labs:   CBC:  Recent Labs   Lab 07/10/20  0447 07/11/20  0418 07/12/20  0451   WBC 8.37 8.69 8.86   HGB 7.9* 7.2* 8.0*   HCT 25.9* 23.7* 27.1*    272 305   GRAN 74.0*  6.2 72.9  6.3 69.4  6.2   LYMPH 16.6*  1.4 14.5*  1.3 19.1  1.7   MONO 7.3  0.6 10.0  0.9 8.6  0.8   EOS 0.1 0.1 0.1   BASO 0.03 0.03 0.04      CMP:  Recent Labs   Lab 07/10/20  0447 07/11/20  0418 07/12/20  0451    140 143   K 4.4 5.0 4.6   * 110 111*   CO2 19* 20* 21*   BUN 26* 26* 27*   CREATININE 2.5* 2.8* 2.7*   * 170* 144*   CALCIUM 7.3* 7.3* 7.6*   PHOS 3.7  3.7 3.5  3.5 3.8  3.8   ALBUMIN 2.1* 1.9* 2.2*   ANIONGAP 12 10 11      Significant Imaging:   No new imaging this morning.      Assessment/Plan:      * Type 2 diabetes mellitus with hyperglycemia  - HHS resolved.  - Glucose with some variability, but improving gradually.  - Continuing insulin detemir 35U subQ daily, insulin aspart 12U subQ TIDWM, low-dose sliding scale insulin aspart 0-5U subQ TIDWM PRN.    Debility  - Continuing PT/OT; await SNF placement.    Abdominal distension  - KUB with diffuse gaseous distension without obstruction.  - Simethicone PRN.  - Continued PT/OT.    Abscess of neck  - Soft tissue neck CT with abscess L posterior neck.  - General surgery following, s/p I&D 7/2  - Aerobic culture negative. Anaerobic culture showing finegoldia.  - Continuing cefazolin, metronidazole as 500mg PO q8hr. Will continue metronidazole for 10 day course to cover finegoldia.    LLL pneumonia  - CXR with LLL opacity .  - Respiratory culture with Strep agalactiae. Completed course of cefazolin.    Acute renal failure superimposed on stage 4 chronic kidney disease  - Multifactorial with likely acute blood loss anemia, GI losses contributing.  - Baseline Cr ~2.4-2.7.  - FENa suggestive of intrinsic source.  - Back to baseline function. Appreciate  nephrology assistance.    UTI (urinary tract infection)  - Urine culture with E. Coli.  - Completed course of cefazolin per ID, end date: 7/10/20.    Acute blood loss anemia  - 2/2 GI bleed.  - s/p 4 units PRBC.  - Hgb stable the past few days. Monitor.    GIB (gastrointestinal bleeding)  - s/p 2U pRBCs 06/27, 1U pRBCs 06/28, 1U pRBC 6/30.  - Continue to trend Hgb and transfuse PRN Hgb < 7.  - Continuing pantoprazole 40mg PO daily.  - EGD 6/29 with normal esophagus, OG tube trauma to the mid body and normal duodenum.  - Unable to tolerate bowel prep. Colonoscopy deferred since Hgb stable, can be done outpatient.    Obesity, Class III, BMI 40-49.9 (morbid obesity)  - Dietary counseling.    Dilated cardiomyopathy  - Appears euvolemic.  - Continue furosemide 40mg PO every other day.    CKD (chronic kidney disease) stage 4, GFR 15-29 ml/min  - As under ADONAY.    Essential hypertension  - Continuing carvedilol 12.5mg PO BID, nifedipine at 60mg PO BID.    VTE Risk Mitigation (From admission, onward)         Ordered     IP VTE HIGH RISK PATIENT  Once      06/28/20 0139     Reason for No Pharmacological VTE Prophylaxis  Once     Question:  Reasons:  Answer:  Active Bleeding    06/28/20 0139     Place sequential compression device  Until discontinued      06/28/20 0036                      KRISTEN Gomez MD  Department of Hospital Medicine   [facility]

## 2020-07-12 NOTE — PLAN OF CARE
Bedtime blood sugar was 138. Patient had bibi crackers for a bedtime snack. Patient got short of breath attempting to use his urinal. 2 liters nasal cannula was placed on patient to alleviate shortness of breath. Left hand 22 gauge IV access intact. Urinal and call light within reach. Encouraged patient to call for any and all needs. Patient verbalized understanding of instructions. Will continue to monitor patient.

## 2020-07-13 ENCOUNTER — TELEPHONE (OUTPATIENT)
Dept: NEPHROLOGY | Facility: CLINIC | Age: 75
End: 2020-07-13

## 2020-07-13 LAB
POCT GLUCOSE: 155 MG/DL (ref 70–110)
POCT GLUCOSE: 156 MG/DL (ref 70–110)
POCT GLUCOSE: 188 MG/DL (ref 70–110)
POCT GLUCOSE: 255 MG/DL (ref 70–110)

## 2020-07-13 PROCEDURE — 25000003 PHARM REV CODE 250: Performed by: INTERNAL MEDICINE

## 2020-07-13 PROCEDURE — 99900035 HC TECH TIME PER 15 MIN (STAT)

## 2020-07-13 PROCEDURE — 99233 PR SUBSEQUENT HOSPITAL CARE,LEVL III: ICD-10-PCS | Mod: ,,, | Performed by: INTERNAL MEDICINE

## 2020-07-13 PROCEDURE — 11000001 HC ACUTE MED/SURG PRIVATE ROOM

## 2020-07-13 PROCEDURE — 63600175 PHARM REV CODE 636 W HCPCS: Performed by: INTERNAL MEDICINE

## 2020-07-13 PROCEDURE — 97116 GAIT TRAINING THERAPY: CPT | Mod: CQ

## 2020-07-13 PROCEDURE — 97110 THERAPEUTIC EXERCISES: CPT | Mod: CQ

## 2020-07-13 PROCEDURE — 94799 UNLISTED PULMONARY SVC/PX: CPT

## 2020-07-13 PROCEDURE — 25000003 PHARM REV CODE 250: Performed by: NURSE PRACTITIONER

## 2020-07-13 PROCEDURE — 99233 SBSQ HOSP IP/OBS HIGH 50: CPT | Mod: ,,, | Performed by: INTERNAL MEDICINE

## 2020-07-13 PROCEDURE — 97535 SELF CARE MNGMENT TRAINING: CPT

## 2020-07-13 PROCEDURE — 94761 N-INVAS EAR/PLS OXIMETRY MLT: CPT

## 2020-07-13 RX ORDER — HYDROCODONE BITARTRATE AND ACETAMINOPHEN 7.5; 325 MG/1; MG/1
1 TABLET ORAL EVERY 8 HOURS PRN
Qty: 18 TABLET | Refills: 0 | Status: SHIPPED | OUTPATIENT
Start: 2020-07-13 | End: 2020-07-15 | Stop reason: HOSPADM

## 2020-07-13 RX ORDER — INSULIN LISPRO 100 [IU]/ML
14 INJECTION, SOLUTION INTRAVENOUS; SUBCUTANEOUS
Qty: 15 ML | Refills: 0 | Status: SHIPPED | OUTPATIENT
Start: 2020-07-13 | End: 2020-10-27

## 2020-07-13 RX ORDER — SODIUM BICARBONATE 650 MG/1
650 TABLET ORAL DAILY
Qty: 30 TABLET | Refills: 0 | COMMUNITY
Start: 2020-07-14 | End: 2020-11-17 | Stop reason: SDUPTHER

## 2020-07-13 RX ORDER — FUROSEMIDE 40 MG/1
40 TABLET ORAL EVERY OTHER DAY
Qty: 15 TABLET | Refills: 0 | Status: SHIPPED | OUTPATIENT
Start: 2020-07-15 | End: 2020-07-15 | Stop reason: HOSPADM

## 2020-07-13 RX ORDER — PANTOPRAZOLE SODIUM 40 MG/1
40 TABLET, DELAYED RELEASE ORAL DAILY
Qty: 30 TABLET | Refills: 0 | Status: SHIPPED | OUTPATIENT
Start: 2020-07-14 | End: 2020-09-10 | Stop reason: SDUPTHER

## 2020-07-13 RX ORDER — NIFEDIPINE 60 MG/1
60 TABLET, EXTENDED RELEASE ORAL 2 TIMES DAILY
Qty: 60 TABLET | Refills: 0 | Status: SHIPPED | OUTPATIENT
Start: 2020-07-13 | End: 2020-08-26 | Stop reason: SDUPTHER

## 2020-07-13 RX ORDER — IPRATROPIUM BROMIDE AND ALBUTEROL SULFATE 2.5; .5 MG/3ML; MG/3ML
3 SOLUTION RESPIRATORY (INHALATION) EVERY 4 HOURS PRN
Qty: 1 BOX | Refills: 0 | Status: SHIPPED | OUTPATIENT
Start: 2020-07-13 | End: 2020-07-13 | Stop reason: HOSPADM

## 2020-07-13 RX ORDER — METRONIDAZOLE 500 MG/1
500 TABLET ORAL EVERY 8 HOURS
Qty: 27 TABLET | Refills: 0 | Status: SHIPPED | OUTPATIENT
Start: 2020-07-13 | End: 2020-07-22

## 2020-07-13 RX ORDER — CHOLECALCIFEROL (VITAMIN D3) 25 MCG
1000 TABLET ORAL DAILY
Qty: 30 TABLET | Refills: 0 | Status: SHIPPED | OUTPATIENT
Start: 2020-07-14 | End: 2021-08-18

## 2020-07-13 RX ADMIN — INSULIN DETEMIR 35 UNITS: 100 INJECTION, SOLUTION SUBCUTANEOUS at 08:07

## 2020-07-13 RX ADMIN — TIMOLOL MALEATE 1 DROP: 2.5 SOLUTION/ DROPS OPHTHALMIC at 09:07

## 2020-07-13 RX ADMIN — METRONIDAZOLE 500 MG: 500 TABLET ORAL at 09:07

## 2020-07-13 RX ADMIN — CARVEDILOL 12.5 MG: 12.5 TABLET, FILM COATED ORAL at 09:07

## 2020-07-13 RX ADMIN — TAMSULOSIN HYDROCHLORIDE 0.4 MG: 0.4 CAPSULE ORAL at 08:07

## 2020-07-13 RX ADMIN — CARVEDILOL 12.5 MG: 12.5 TABLET, FILM COATED ORAL at 08:07

## 2020-07-13 RX ADMIN — METRONIDAZOLE 500 MG: 500 TABLET ORAL at 03:07

## 2020-07-13 RX ADMIN — MELATONIN 1000 UNITS: at 08:07

## 2020-07-13 RX ADMIN — FUROSEMIDE 40 MG: 40 TABLET ORAL at 08:07

## 2020-07-13 RX ADMIN — INSULIN ASPART 12 UNITS: 100 INJECTION, SOLUTION INTRAVENOUS; SUBCUTANEOUS at 08:07

## 2020-07-13 RX ADMIN — SODIUM BICARBONATE 650 MG TABLET 650 MG: at 08:07

## 2020-07-13 RX ADMIN — METRONIDAZOLE 500 MG: 500 TABLET ORAL at 05:07

## 2020-07-13 RX ADMIN — INSULIN ASPART 12 UNITS: 100 INJECTION, SOLUTION INTRAVENOUS; SUBCUTANEOUS at 07:07

## 2020-07-13 RX ADMIN — NIFEDIPINE 60 MG: 30 TABLET, FILM COATED, EXTENDED RELEASE ORAL at 08:07

## 2020-07-13 RX ADMIN — INSULIN ASPART 12 UNITS: 100 INJECTION, SOLUTION INTRAVENOUS; SUBCUTANEOUS at 12:07

## 2020-07-13 RX ADMIN — PANTOPRAZOLE SODIUM 40 MG: 40 TABLET, DELAYED RELEASE ORAL at 08:07

## 2020-07-13 RX ADMIN — NIFEDIPINE 60 MG: 30 TABLET, FILM COATED, EXTENDED RELEASE ORAL at 09:07

## 2020-07-13 NOTE — PLAN OF CARE
Problem: Physical Therapy Goal  Goal: Physical Therapy Goal  Description: Goals to be met by: 20    Patient will increase functional independence with mobility by performin. Supine to sit with min A.   2. Sit to supine with min A. MET   3. Rolling R and L with min A.   4. Sitting at edge of bed >5 minutes with min A. MET   5. PT will assess sit<>stand. MET   6. Sit<>stand with RW with modA x1 person.  7. Gait x15 ft with RW and CGA.  Outcome: Ongoing, Progressing   Pt presented sitting in bedside chair upon arrival of PT. Pt agreeable to PT. Pt sit <> stand w/ No AD and CGA. Pt amb'd 40' w/ Jami and HHA.

## 2020-07-13 NOTE — PLAN OF CARE
Patient AOx4, VSS on RA. He sat up most of the morning in the recliner, and then transferred with standby assist to the bed after lunch. Accuchecks continued and supplemental insulin given as ordered. Patient's fiance assisted him onto a bedpan at one point and he had a regular BM. He has some trouble at times expressing his words, but had no complaints of SOB. He remained free of falls or injury during shift. Bed lowered and locked. Call light in reach. Purposeful rounding completed. Will continue to monitor.

## 2020-07-13 NOTE — TELEPHONE ENCOUNTER
Please arrange f/u in the next few weeks. He would be a new patient as he has not been seen since 2017.  He could see me or someone else based on availability.

## 2020-07-13 NOTE — ASSESSMENT & PLAN NOTE
- Soft tissue neck CT with abscess L posterior neck.  - General surgery following, s/p I&D 7/2  - Aerobic culture negative. Anaerobic culture showing finegoldia.  - Continuing cefazolin, metronidazole as 500mg PO q8hr. Will continue metronidazole for 14 day course to cover finegoldia.

## 2020-07-13 NOTE — NURSING
Patient laying in bed, resting. V/S WNL. Seen at intervals POC explained. In no apparent distress.

## 2020-07-13 NOTE — PT/OT/SLP PROGRESS
Physical Therapy Treatment    Patient Name:  Vinnie Siegel   MRN:  [id number]    Recommendations:     Discharge Recommendations:  nursing facility, skilled   Discharge Equipment Recommendations: 3-in-1 commode, walker, rolling, wheelchair   Barriers to discharge: Decreased caregiver support    Assessment:     Vinnie Siegel is a 74 y.o. male admitted with a medical diagnosis of Type 2 diabetes mellitus with hyperglycemia.  He presents with the following impairments/functional limitations:  weakness, impaired self care skills, impaired functional mobilty, gait instability, impaired balance, impaired cardiopulmonary response to activity, decreased lower extremity function, edema ,  Pt presented sitting in bedside chair upon arrival of PT. Pt agreeable to PT. Pt sit <> stand w/ No AD and CGA. Pt amb'd 40' w/ Jami and HHA..    Rehab Prognosis: Good; patient would benefit from acute skilled PT services to address these deficits and reach maximum level of function.    Recent Surgery: Procedure(s) (LRB):  EGD (ESOPHAGOGASTRODUODENOSCOPY) (N/A) 14 Days Post-Op    Plan:     During this hospitalization, patient to be seen 5 x/week to address the identified rehab impairments via gait training, therapeutic activities, therapeutic exercises and progress toward the following goals:    · Plan of Care Expires:  07/31/20    Subjective     Chief Complaint: none stated  Patient/Family Comments/goals: none stated  Pain/Comfort:  · Pain Rating 1: 0/10  · Location - Side 1: Left  · Location - Orientation 1: lower  · Pain Addressed 1: Reposition, Distraction  · Pain Rating Post-Intervention 1: 0/10      Objective:     Communicated with ns(prosper) prior to session.  Patient found up in chair with peripheral IV upon PT entry to room.     General Precautions: Standard, diabetic, fall   Orthopedic Precautions:N/A   Braces: N/A     Functional Mobility:  · Transfers:     · Sit to Stand:  contact guard assistance with no AD  · Gait: 40' w/  Jami and HHA      AM-PAC 6 CLICK MOBILITY  Turning over in bed (including adjusting bedclothes, sheets and blankets)?: 3  Sitting down on and standing up from a chair with arms (e.g., wheelchair, bedside commode, etc.): 3  Moving from lying on back to sitting on the side of the bed?: 3  Moving to and from a bed to a chair (including a wheelchair)?: 3  Need to walk in hospital room?: 3  Climbing 3-5 steps with a railing?: 3  Basic Mobility Total Score: 18       Therapeutic Activities and Exercises:   Pt performed seated therapeutic exercises including hip flexion and LAQs x 10 reps with verbal and tactile cues.      Patient left up in chair with all lines intact, call button in reach, ns notified and girl friend present..    GOALS:   Multidisciplinary Problems     Physical Therapy Goals        Problem: Physical Therapy Goal    Goal Priority Disciplines Outcome Goal Variances Interventions   Physical Therapy Goal     PT, PT/OT Ongoing, Progressing     Description: Goals to be met by: 20    Patient will increase functional independence with mobility by performin. Supine to sit with min A.   2. Sit to supine with min A. MET   3. Rolling R and L with min A.   4. Sitting at edge of bed >5 minutes with min A. MET   5. PT will assess sit<>stand. MET   6. Sit<>stand with RW with modA x1 person.  7. Gait x15 ft with RW and CGA.                   Time Tracking:     PT Received On: 20  PT Start Time: 1130     PT Stop Time: 1153  PT Total Time (min): 23 min     Billable Minutes: Gait Training 10 and Therapeutic Exercise 13    Treatment Type: Treatment  PT/PTA: PTA     PTA Visit Number: 5     Jesus Carmen, PTA  2020

## 2020-07-13 NOTE — PROGRESS NOTES
Follow up for left neck wound  Wound remains full thickness and slough filled. Able to express tan exudate from the site with gentle pressure.  Surrounding induration and erythema has diminished. continuing Triad to the site and covering the wound with a foam dressing.     07/13/20 1436        Altered Skin Integrity 07/02/20 1000 Left posterior Neck Ulceration Full thickness tissue loss. Base is covered by slough and/or eschar in the wound bed   Date First Assessed/Time First Assessed: 07/02/20 1000   Altered Skin Integrity Present on Admission: suspected hospital acquired  Side: Left  Orientation: posterior  Location: Neck  Is this injury device related?: (c) Other (see comments)  Primary Wo...   Wound Image     Description of Altered Skin Integrity Full thickness tissue loss. Base is covered by slough and/or eschar in the wound bed   Dressing Appearance Moist drainage;Intact   Drainage Amount Scant   Drainage Characteristics/Odor Tan;No odor   Appearance White;Yellow;Necrotic;Slough;Moist   Tissue loss description Full thickness   Yellow (%), Wound Tissue Color 100 %   Periwound Area Intact   Wound Edges Open   Wound Length (cm) 3.5 cm   Wound Width (cm) 4 cm   Wound Depth (cm) 0.1 cm   Wound Volume (cm^3) 1.4 cm^3   Wound Surface Area (cm^2) 14 cm^2   Care Cleansed with:;Wound cleanser;Applied:  (Triad)   Dressing Applied;Foam     Faviola Johnson RN CWON

## 2020-07-13 NOTE — SUBJECTIVE & OBJECTIVE
Interval History: No acute events overnight. Feeling well this morning. Intermittently reports some SOB, but saturations remain stable even during episodes. No other concerns.     Review of Systems   Constitutional: Negative for chills and fever.   Respiratory: Positive for shortness of breath. Negative for cough.    Cardiovascular: Negative for chest pain and palpitations.   Gastrointestinal: Negative for abdominal pain, nausea and vomiting.     Objective:     Vital Signs (Most Recent):  Temp: 97.5 °F (36.4 °C) (07/13/20 1221)  Pulse: 79 (07/13/20 1221)  Resp: 18 (07/13/20 1221)  BP: (!) 174/82 (07/13/20 1221)  SpO2: 97 % (07/13/20 1221) Vital Signs (24h Range):  Temp:  [97.5 °F (36.4 °C)-98.2 °F (36.8 °C)] 97.5 °F (36.4 °C)  Pulse:  [71-82] 79  Resp:  [17-22] 18  SpO2:  [93 %-98 %] 97 %  BP: (104-174)/(63-82) 174/82     Weight: (!) 144.5 kg (318 lb 9 oz)  Body mass index is 40.83 kg/m².    Intake/Output Summary (Last 24 hours) at 7/13/2020 1638  Last data filed at 7/13/2020 0400  Gross per 24 hour   Intake --   Output 650 ml   Net -650 ml      Physical Exam  Vitals signs and nursing note reviewed.   Constitutional:       General: He is not in acute distress.     Appearance: Normal appearance.   HENT:      Head: Normocephalic and atraumatic.   Eyes:      General:         Right eye: No discharge.         Left eye: No discharge.      Conjunctiva/sclera: Conjunctivae normal.   Cardiovascular:      Rate and Rhythm: Normal rate and regular rhythm.      Pulses: Normal pulses.   Pulmonary:      Effort: No respiratory distress.      Comments: Decreased at the bases  Abdominal:      Palpations: Abdomen is soft.      Tenderness: There is no abdominal tenderness.   Musculoskeletal:         General: No tenderness.      Right lower leg: No edema.      Left lower leg: No edema.   Skin:     General: Skin is warm and dry.      Comments: L posterior neck abscess s/p I&D, purulent drainage noted   Neurological:      Mental Status:  He is alert and oriented to person, place, and time.       Significant Labs:   CBC:  Recent Labs   Lab 07/11/20 0418 07/12/20  0451   WBC 8.69 8.86   HGB 7.2* 8.0*   HCT 23.7* 27.1*    305   GRAN 72.9  6.3 69.4  6.2   LYMPH 14.5*  1.3 19.1  1.7   MONO 10.0  0.9 8.6  0.8   EOS 0.1 0.1   BASO 0.03 0.04      CMP:  Recent Labs   Lab 07/11/20 0418 07/12/20 0451    143   K 5.0 4.6    111*   CO2 20* 21*   BUN 26* 27*   CREATININE 2.8* 2.7*   * 144*   CALCIUM 7.3* 7.6*   PHOS 3.5  3.5 3.8  3.8   ALBUMIN 1.9* 2.2*   ANIONGAP 10 11      Significant Imaging:   No new imaging this morning.

## 2020-07-13 NOTE — PLAN OF CARE
KEIRY received a call from [facility] patient has Medicare A&B from a a on the job accident filed with the [facility]. SNF will not get paid from medicare if accepted. [facility] will call the patient fiance to see if the case can be closed or if it is long term.     DELAY IN DISCHARGE

## 2020-07-13 NOTE — PROGRESS NOTES
"Nephrology  Progress Note    Admit Date: 6/27/2020   LOS: 16 days     SUBJECTIVE:     Follow-up For:  Type 2 diabetes mellitus with hyperglycemia    Interval History:     Discussed with Dr. Gomez.  Continues to slowly improve. No CP/SOB.   Renal fxn and UOP stable and at baseline.    Review of Systems:    No c/o.      OBJECTIVE:     Vital Signs Range (Last 24H):  BP (!) 163/76 (BP Location: Right arm, Patient Position: Sitting)   Pulse 82   Temp 97.6 °F (36.4 °C) (Oral)   Resp (!) 22   Ht 6' 2" (1.88 m)   Wt (!) 144.5 kg (318 lb 9 oz)   SpO2 (!) 93%   BMI 40.83 kg/m²     Temp:  [97.6 °F (36.4 °C)-98.6 °F (37 °C)]   Pulse:  []   Resp:  [17-22]   BP: (104-163)/(63-78)   SpO2:  [93 %-98 %]     I & O (Last 24H):    Intake/Output Summary (Last 24 hours) at 7/13/2020 0916  Last data filed at 7/13/2020 0400  Gross per 24 hour   Intake 236 ml   Output 875 ml   Net -639 ml       Physical Exam:  General appearance: Well developed, well nourished, weak, obese  Eyes:  Conjunctivae/corneas clear. PERRL.  Neck: left neck wound CDI  Lungs: diminished.    Heart: Regular rate and rhythm, S1, S2 normal, no murmur, rub or beatrice.  Abdomen: Soft, non-tender non-distended; bowel sounds normal; no masses,  no organomegaly, obese.   Extremities: No cyanosis or clubbing. 1-2+ edema.  Left neck abscess noted.   Skin: Skin color, texture, turgor normal. No rashes or lesions  Neurologic: No focal numbness or weakness         Laboratory Data:  No results for input(s): WBC, RBC, HGB, HCT, PLT, MCV, MCH, MCHC in the last 24 hours.    BMP:   Recent Labs   Lab 07/12/20  0451   *      K 4.6   *   CO2 21*   BUN 27*   CREATININE 2.7*   CALCIUM 7.6*     Lab Results   Component Value Date    CALCIUM 7.6 (L) 07/12/2020    PHOS 3.8 07/12/2020    PHOS 3.8 07/12/2020       Lab Results   Component Value Date    .5 (H) 06/29/2020    CALCIUM 7.6 (L) 07/12/2020    CAION 1.00 (L) 07/10/2020    PHOS 3.8 07/12/2020    PHOS " 3.8 07/12/2020       Lab Results   Component Value Date    URICACID 8.6 (H) 06/29/2020       BNP  No results for input(s): BNP, BNPTRIAGEBLO in the last 168 hours.    Medications:  Medication list was reviewed and changes noted under Assessment/Plan.    Diagnostic Results:        ASSESSMENT/PLAN:     75 YO male with DM, HTN, CHF with Dilated CM, COPD, CKD 4, now with GIB and Hyperosmolar nonketotic hyperglycemia, resp failure prompting intubation to protect airway, severe anemia with Hgb 6.4, elevated BUN due to GIB, hypotension, and ADONAY     HHS resolved  -s/p Insulin drip and now on basal/prandial.    -UTI/abscess noted.  -defer  -glucose labile.     Resolved Non-oliguric ADONAY on CKD 4/ Hypokalemia  -Baseline creat over past year 2.5-2.9 with history of proteinuria  -Creat 4.4 on admit and slowly improving back to baseline  -Prot/creat ratio 1 gram  -suspect that severe anemia and hypotension was etiology for ADONAY  -Doesn't appear he has seen nephrologist in sometime (formerly followed by [facility] [place] [facility]) so ordered full workup and mildly elevated ELSA with negative reflex only positive finding so far.  -Normally prefer no PPI but with GIB this okay  -No nephrotoxins.  -replace lytes PRN.  -started low dose bicarb tabs with improvement.  -Renally dose meds, avoid nephrotoxins, and monitor I/O's closely.  Will discontinue daily labs     GIB with severe anemia  -Agree with transfusions prn  -dosed Epo again and will need to continue as outpt per nephrology at [facility].  -mildly iron deficient but would not give IV iron for now  -GI consulted - EGD noted.  .   -Protonix as now  -unable to tolerate prep.    DM  -Last HgA1c in December 2019 7.7  -Defer to primary team  -as above     Sepsis from UTI/neck abscess:  -cultured and continue antibx for now.   -Dr. Basilio following  -lactic acid, procal, and WBC's improving.     Vit D Def with mild HPTH/ Hx Calcium Oxalate Nephrolithiasis:  -Started on calcitriol 0.25mcg 3x/week, but stopped for  now.  -However, Vit D 25 extremely low, but Hx Nephrolithiasis.  -Changed to low dose Cholecalciferol 1,000 units daily and will need to be monitored closely with 24 hr urine Calcium.   -low dose tums.       Resolved Hypernatremia:  -encourage water intake.    HTN:  -started BB/CCB.  Hydralazine prn.   -no ACE/ARB for now.       Dispo:  Stable from renal.  Nothing else to offer.  Needs f/u with [facility] as he was previously followed.

## 2020-07-13 NOTE — PROGRESS NOTES
[facility] Hospital Medicine  Progress Note    Patient Name: Vinnie Siegel  MRN: [id number]  Patient Class: IP- Inpatient   Admission Date: 6/27/2020  Length of Stay: 16 days  Attending Physician: NYDIA Gomez MD  Primary Care Provider: Janeth Walter MD        Subjective:     Principal Problem:Type 2 diabetes mellitus with hyperglycemia        HPI:  Mr. Vinnie Siegel is a 74 y.o. male, with PMH of IDDM-2, CKD-IV, dilated cardiomyopathy, HTN, HLD, gout, obesity, who presented to [facility] ED via EMS on 6/27/20 with rectal bleeding x 2 days. His family called EMS after he became lethargic earlier this evening. Per his fiancee, he has been passing dark blood stools, and has been noting generalized weakness x 1 week. Additionally, he has been having elevated blood glucose readings x 1 day. In the ED, he was found to be in DKA, with a GI bleed. H&H were 6.4 & 19.4 and he was transfused 2 units PRBCs. He was started on an insulin drip and a Protonix drip. He had worsening respiratory and mental status while in the ED, and was intubated for airway protection. He was admitted to inpatient status to the ICU.     Overview/Hospital Course:  Admitted to ICU with HHS, encephalopathy, GI bleed, ADONAY, respiratory failure and sepsis. Sepsis was due to E. Coli UTI and Strep agalactiae pneumonia. Critical care and nephrology consulted and broad antibiotics continued. GI was consulted and he had EGD on 6/29 which showed normal esophagus and duodenum with OG tube trauma to mid gastric body but no source of bleeding was identified. He was extubated on 6/30. He attempted bowel prep for colonoscopy but was unable to tolerate; colonoscopy was pushed back. He had slow improvement in ADONAY as well as improvement in sepsis with antibiotics. ID was consulted and recommended cefazolin. He was found to have a large posterior neck abscess so general surgery was consulted and he had I&D. He was transferred out of ICU on 7/3. He  was scheduled to have colonoscopy on 7/6 but was unable to tolerate bowel prep again so it was canceled. Cultures from neck wound demonstrated finegoldia and he was started on a course of metronidazole as he still had drainage; discussed with general surgery, but given draining adequately they did not pursue further I&D. PT/OT continued to evaluate him and recommended SNF placement.    Interval History: No acute events overnight. Feeling well this morning. Intermittently reports some SOB, but saturations remain stable even during episodes. No other concerns.     Review of Systems   Constitutional: Negative for chills and fever.   Respiratory: Positive for shortness of breath. Negative for cough.    Cardiovascular: Negative for chest pain and palpitations.   Gastrointestinal: Negative for abdominal pain, nausea and vomiting.     Objective:     Vital Signs (Most Recent):  Temp: 97.5 °F (36.4 °C) (07/13/20 1221)  Pulse: 79 (07/13/20 1221)  Resp: 18 (07/13/20 1221)  BP: (!) 174/82 (07/13/20 1221)  SpO2: 97 % (07/13/20 1221) Vital Signs (24h Range):  Temp:  [97.5 °F (36.4 °C)-98.2 °F (36.8 °C)] 97.5 °F (36.4 °C)  Pulse:  [71-82] 79  Resp:  [17-22] 18  SpO2:  [93 %-98 %] 97 %  BP: (104-174)/(63-82) 174/82     Weight: (!) 144.5 kg (318 lb 9 oz)  Body mass index is 40.83 kg/m².    Intake/Output Summary (Last 24 hours) at 7/13/2020 1638  Last data filed at 7/13/2020 0400  Gross per 24 hour   Intake --   Output 650 ml   Net -650 ml      Physical Exam  Vitals signs and nursing note reviewed.   Constitutional:       General: He is not in acute distress.     Appearance: Normal appearance.   HENT:      Head: Normocephalic and atraumatic.   Eyes:      General:         Right eye: No discharge.         Left eye: No discharge.      Conjunctiva/sclera: Conjunctivae normal.   Cardiovascular:      Rate and Rhythm: Normal rate and regular rhythm.      Pulses: Normal pulses.   Pulmonary:      Effort: No respiratory distress.       Comments: Decreased at the bases  Abdominal:      Palpations: Abdomen is soft.      Tenderness: There is no abdominal tenderness.   Musculoskeletal:         General: No tenderness.      Right lower leg: No edema.      Left lower leg: No edema.   Skin:     General: Skin is warm and dry.      Comments: L posterior neck abscess s/p I&D, purulent drainage noted   Neurological:      Mental Status: He is alert and oriented to person, place, and time.       Significant Labs:   CBC:  Recent Labs   Lab 07/11/20 0418 07/12/20  0451   WBC 8.69 8.86   HGB 7.2* 8.0*   HCT 23.7* 27.1*    305   GRAN 72.9  6.3 69.4  6.2   LYMPH 14.5*  1.3 19.1  1.7   MONO 10.0  0.9 8.6  0.8   EOS 0.1 0.1   BASO 0.03 0.04      CMP:  Recent Labs   Lab 07/11/20 0418 07/12/20  0451    143   K 5.0 4.6    111*   CO2 20* 21*   BUN 26* 27*   CREATININE 2.8* 2.7*   * 144*   CALCIUM 7.3* 7.6*   PHOS 3.5  3.5 3.8  3.8   ALBUMIN 1.9* 2.2*   ANIONGAP 10 11      Significant Imaging:   No new imaging this morning.      Assessment/Plan:      * Type 2 diabetes mellitus with hyperglycemia  - HHS resolved.  - Glucose with some variability, but improving gradually.  - Continuing insulin detemir 35U subQ daily, insulin aspart 12U subQ TIDWM, low-dose sliding scale insulin aspart 0-5U subQ TIDWM PRN.    Debility  - Continuing PT/OT; await SNF placement.    Abdominal distension  - KUB with diffuse gaseous distension without obstruction.  - Simethicone PRN.  - Continued PT/OT.    Abscess of neck  - Soft tissue neck CT with abscess L posterior neck.  - General surgery following, s/p I&D 7/2  - Aerobic culture negative. Anaerobic culture showing finegoldia.  - Continuing cefazolin, metronidazole as 500mg PO q8hr. Will continue metronidazole for 14 day course to cover finegoldia.    LLL pneumonia  - CXR with LLL opacity .  - Respiratory culture with Strep agalactiae. Completed course of cefazolin.    Acute renal failure superimposed on  stage 4 chronic kidney disease  - Multifactorial with likely acute blood loss anemia, GI losses contributing.  - Baseline Cr ~2.4-2.7.  - FENa suggestive of intrinsic source.  - Back to baseline function. Appreciate nephrology assistance.    UTI (urinary tract infection)  - Urine culture with E. Coli.  - Completed course of cefazolin per ID, end date: 7/10/20.    Acute blood loss anemia  - 2/2 GI bleed.  - s/p 4 units PRBC.  - Hgb stable the past few days. Monitor.    GIB (gastrointestinal bleeding)  - s/p 2U pRBCs 06/27, 1U pRBCs 06/28, 1U pRBC 6/30.  - Continue to trend Hgb and transfuse PRN Hgb < 7.  - Continuing pantoprazole 40mg PO daily.  - EGD 6/29 with normal esophagus, OG tube trauma to the mid body and normal duodenum.  - Unable to tolerate bowel prep. Colonoscopy deferred since Hgb stable, can be done outpatient.    Obesity, Class III, BMI 40-49.9 (morbid obesity)  - Dietary counseling.    Dilated cardiomyopathy  - Appears euvolemic.  - Continue furosemide 40mg PO every other day.    CKD (chronic kidney disease) stage 4, GFR 15-29 ml/min  - As under ADONAY.    Essential hypertension  - Continuing carvedilol 12.5mg PO BID, nifedipine 60mg PO BID.    VTE Risk Mitigation (From admission, onward)         Ordered     IP VTE HIGH RISK PATIENT  Once      06/28/20 0139     Reason for No Pharmacological VTE Prophylaxis  Once     Question:  Reasons:  Answer:  Active Bleeding    06/28/20 0139     Place sequential compression device  Until discontinued      06/28/20 0036                      D Darrius Gomez MD  Department of Hospital Medicine   [facility]

## 2020-07-13 NOTE — PT/OT/SLP PROGRESS
Occupational Therapy   Treatment    Name: Vinnie Siegel  MRN: [id number]  Admitting Diagnosis:  Type 2 diabetes mellitus with hyperglycemia  14 Days Post-Op    Recommendations:     Discharge Recommendations: nursing facility, skilled  Discharge Equipment Recommendations:  3-in-1 commode, walker, rolling, wheelchair, other (see comments)(TTB)  Barriers to discharge:  Decreased caregiver support, Inaccessible home environment    Assessment:     Vinnie Siegel is a 74 y.o. male with a medical diagnosis of Type 2 diabetes mellitus with hyperglycemia.  He presents with the following performance deficits affecting function: weakness, gait instability, decreased ROM, impaired cardiopulmonary response to activity, impaired endurance, impaired balance, decreased lower extremity function, decreased safety awareness, impaired self care skills, impaired functional mobilty, edema.     Continues to demonstrate improved task performance, though need for seated rest breaks remains essential.  Tolerated standing oral hygiene after seated rest break, though balance became increasingly compromised as patient fatigued.  Continue OT services to restore patient functioning.    Rehab Prognosis:  Good; patient would benefit from acute skilled OT services to address these deficits and reach maximum level of function.       Plan:     Patient to be seen 5 x/week to address the above listed problems via self-care/home management, therapeutic activities, therapeutic exercises  · Plan of Care Expires: 07/30/20  · Plan of Care Reviewed with: patient, significant other    Subjective     Pain/Comfort:  · Pain Rating 1: 0/10  · Pain Rating Post-Intervention 1: 0/10    Objective:     Communicated with: SHMUEL Grace prior to session.  Patient found up in chair with peripheral IV upon OT entry to room.    General Precautions: Standard, diabetic, fall   Orthopedic Precautions:N/A   Braces: N/A     Occupational Performance:     Bed Mobility:    · NT,  found UIC     Functional Mobility/Transfers:  · Patient completed Sit <> Stand Transfer with minimum assistance  with  rolling walker 3 trials from bedside chair  · Functional Mobility: CGA EOB to sink    Activities of Daily Living:  · Grooming: moderate assistance for standing balance during oral hygiene in standing at sink, resting UB on vanity  · Lower Body Dressing: total assistance non skid socks      AMPAC 6 Click ADL: 12    Treatment & Education:  Educated patient on informing staff of needed seated rest breaks, as this allows staff to assess patient awareness of functioning. Verbalized understanding.    Patient left up in chair with all lines intact, call button in reach and RN notifiedEducation:      GOALS:   Multidisciplinary Problems     Occupational Therapy Goals        Problem: Occupational Therapy Goal    Goal Priority Disciplines Outcome Interventions   Occupational Therapy Goal     OT, PT/OT Ongoing, Progressing    Description: Goals to be met by: 07/16/2020     Patient will increase functional independence with ADLs by performing:    REVISED UBD with SBA.  REVISED Complete 2 grooming tasks in standing at sink with Min assist  Rolling to Bilateral with Minimum Assistance.   REVISED Supine to sit with Contact Guard Assistance.   REVISED Sit to/from stand with CGA  Increased functional strength to WFL for ADL tasks, functional transfers and bed mobility.      COMPLETED GOALS  Supine to sit with Moderate Assistance.(MET 7/4/20)  UE Dressing with Moderate Assistance.(MET 07/07/2020)  Assess functional transfers. (MET 07/07/2020)  Sitting at edge of bed x15 minutes with Moderate Assistance. (MET 07/07/2020)  Grooming while EOB with Moderate Assistance. (MET 07/13/2020)  REVISED Sit to/from stand with Mod assist of 1 person.(MET 07/13/2020)                       Time Tracking:     OT Date of Treatment: 07/13/20  OT Start Time: 1030  OT Stop Time: 1054  OT Total Time (min): 24 min    Billable Minutes:Self  Care/Home Management 24    KRISTINE Courtney  7/13/2020

## 2020-07-13 NOTE — PLAN OF CARE
LMSW contacted [facility] about the patient. [facility] is not admitting any new patients at this time.     LMSW is denied by [employer].     LMSW contacted [facility] has questions about the patients weight and request additional clinicals.     Patient has strong will about where he wants to be go for rehab. LMSW will blast fax and meet with the patient this afternoon with a update.

## 2020-07-13 NOTE — PLAN OF CARE
Problem: Occupational Therapy Goal  Goal: Occupational Therapy Goal  Description: Goals to be met by: 07/16/2020     Patient will increase functional independence with ADLs by performing:    REVISED UBD with SBA.  REVISED Complete 2 grooming tasks in standing at sink with Min assist  Rolling to Bilateral with Minimum Assistance.   REVISED Supine to sit with Contact Guard Assistance.   REVISED Sit to/from stand with CGA  Increased functional strength to WFL for ADL tasks, functional transfers and bed mobility.      COMPLETED GOALS  Supine to sit with Moderate Assistance.(MET 7/4/20)  UE Dressing with Moderate Assistance.(MET 07/07/2020)  Assess functional transfers. (MET 07/07/2020)  Sitting at edge of bed x15 minutes with Moderate Assistance. (MET 07/07/2020)  Grooming while EOB with Moderate Assistance. (MET 07/13/2020)  REVISED Sit to/from stand with Mod assist of 1 person.(MET 07/13/2020)      Outcome: Ongoing, Progressing  Continues to demonstrate improved task performance, though need for seated rest breaks remains essential.  Tolerated standing oral hygiene, though balance became increasingly compromised as patient fatigued.  Continue OT services to restore patient functioning.

## 2020-07-13 NOTE — PLAN OF CARE
[facility]     Department of Hospital Medicine     [address] [place] [address]     [phone] after hours  [phone] fax       NURSING HOME ORDERS    07/13/2020    Admit to Nursing Home:  Skilled Bed                                               Diagnoses:  Active Hospital Problems    Diagnosis  POA    *Type 2 diabetes mellitus with hyperglycemia [E11.65]  Yes    Debility [R53.81]  Yes    Abdominal distension [R14.0]  No    Abscess of neck [L02.11]  Yes    LLL pneumonia [J18.1]  Yes    GIB (gastrointestinal bleeding) [K92.2]  Yes    Acute blood loss anemia [D62]  Yes    UTI (urinary tract infection) [N39.0]  Yes    Acute renal failure superimposed on stage 4 chronic kidney disease [N17.9, N18.4]  Yes    Obesity, Class III, BMI 40-49.9 (morbid obesity) [E66.01]  Yes     Chronic    Dilated cardiomyopathy [I42.0]  Yes     Chronic    CKD (chronic kidney disease) stage 4, GFR 15-29 ml/min [N18.4]  Yes     Chronic    Essential hypertension [I10]  Yes     Chronic     2008 negative nuclear stress        Resolved Hospital Problems    Diagnosis Date Resolved POA    Hypernatremia [E87.0] 07/05/2020 No    Endotracheal tube present [Z97.8] 06/30/2020 Yes    Encephalopathy, metabolic [G93.41] 07/03/2020 Yes    Metabolic acidosis, normal anion gap (NAG) [E87.2] 06/30/2020 Yes    Acute respiratory failure with hypoxia [J96.01] 07/04/2020 Yes    Hematuria [R31.9] 06/30/2020 Yes    Severe sepsis [A41.9, R65.20] 07/04/2020 Yes    Elevated troponin [R79.89] 07/02/2020 Yes    Hypertensive emergency [I16.1] 06/30/2020 Yes    Type 2 diabetes mellitus with stage 4 chronic kidney disease, with long-term current use of insulin [E11.22, N18.4, Z79.4] 06/30/2020 Not Applicable     Chronic    Hyperlipidemia [E78.5] 07/03/2020 Yes     Chronic       Patient is homebound due to:  Type 2 diabetes mellitus with hyperglycemia    Allergies:Review of patient's  allergies indicates:  No Known Allergies    Vitals:      Every shift (Skilled Nursing patients)    Diet: Diabetic, low sodium 2g     Activities:   - As per PT/OT recommendations    LABS:  Per facility protocol    Nursing Precautions:        - Fall precautions per nursing home protocol   - Decubitus precautions:        -  for positioning   - Pressure reducing foam mattress   - Turn patient every two hours. Use wedge pillows to anchor patient    CONSULTS:     Physical Therapy to evaluate and treat     Occupational Therapy to evaluate and treat     Speech Therapy  to evaluate and treat     Nutrition to evaluate and recommend diet    MISCELLANEOUS CARE:     Routine Skin for Bedridden Patients:  Apply moisture barrier cream to all skin folds and wet areas in perineal area daily and after baths and all bowel movements.    WOUND CARE:  Wound spray or saline for wound cleaning with all dressing changes.    All wounds to be measured with first dressing changes and every week.    Abscess s/p I&D: Location:  Posterior Neck           Apply the following to wound:             -  Irrigate with saline or wound spray          -  Collagenase (Santyl) daily, cover with Mepilex          -  ET Consult    DIABETES CARE:     Check blood sugar:       Fingerstick blood sugar AC and HS   Fingerstick blood sugar every 6 hours if unable to eat      Report CBG < 60 or > 400 to physician.                                          Insulin Sliding Scale          Glucose  Novolog Insulin Subcutaneous        0 - 60   Orange juice or glucose tablet, hold insulin      No insulin   201-250  2 units   251-300  4 units   301-350  6 units   351-400  8 units   >400   10 units then call physician      Medications: Discontinue all previous medication orders, if any. See new list below.     Vinnie Siegel   Home Medication Instructions GRACE:[id number]    Printed on:07/13/20 1017   Medication Information                       albuterol-ipratropium (DUO-NEB) 2.5 mg-0.5 mg/3 mL nebulizer solution  Take 3 mLs by nebulization every 4 (four) hours as needed for Wheezing or Shortness of Breath. Rescue             allopurinol (ZYLOPRIM) 100 MG tablet  Take 1 tablet (100 mg total) by mouth once daily.             atorvastatin (LIPITOR) 40 MG tablet  Take 1 tablet (40 mg total) by mouth once daily.             blood sugar diagnostic Strp  To check BG 4  times daily, to use with insurance preferred meter, e 11.65             blood-glucose meter kit  To check BG 3 times daily, to use with insurance preferred meter, e 11.65             carvedilol (COREG) 25 MG tablet  TAKE 1 TABLET(25 MG) BY MOUTH TWICE DAILY WITH MEALS             fluticasone propionate (FLONASE) 50 mcg/actuation nasal spray  2 sprays (100 mcg total) by Each Nostril route once daily.             furosemide (LASIX) 40 MG tablet  Take 1 tablet (40 mg total) by mouth every other day.             HYDROcodone-acetaminophen (NORCO) 7.5-325 mg per tablet  Take 1 tablet by mouth every 8 (eight) hours as needed for Pain.             insulin (LANTUS SOLOSTAR U-100 INSULIN) glargine 100 units/mL (3mL) SubQ pen  Inject 36 Units into the skin every evening.             insulin lispro (HUMALOG KWIKPEN INSULIN) 100 unit/mL pen  Inject 14 Units into the skin 3 (three) times daily with meals.             lancets Misc  To check BG 4  times daily, to use with insurance preferred meter, e 11 .65             magnesium oxide (MAG-OX) 400 mg (241.3 mg magnesium) tablet  Take 1 tablet (400 mg total) by mouth every morning.             metroNIDAZOLE (FLAGYL) 500 MG tablet  Take 1 tablet (500 mg total) by mouth every 8 (eight) hours. for 9 days             NIFEdipine (PROCARDIA-XL) 60 MG (OSM) 24 hr tablet  Take 1 tablet (60 mg total) by mouth 2 (two) times a day.             pantoprazole (PROTONIX) 40 MG tablet  Take 1 tablet (40 mg total) by mouth once daily.             pen needle, diabetic (BD  "ULTRA-FINE SHORT PEN NEEDLE) 31 gauge x 5/16" Ndle  USE FOUR TIMES DAILY             sodium bicarbonate 650 MG tablet  Take 1 tablet (650 mg total) by mouth once daily.             tamsulosin (FLOMAX) 0.4 mg Cap  Take 1 capsule (0.4 mg total) by mouth every morning.             timolol maleate 0.25% (TIMOPTIC) 0.25 % Drop  Place 1 drop into both eyes 2 (two) times daily.             TRUEPLUS LANCETS 33 gauge Misc  TEST FOUR TIMES DAILY             vitamin D (VITAMIN D3) 1000 units Tab  Take 1 tablet (1,000 Units total) by mouth once daily.               _________________________________  D Darrius Gomez MD  07/13/2020  "

## 2020-07-14 PROBLEM — N18.4 ACUTE RENAL FAILURE SUPERIMPOSED ON STAGE 4 CHRONIC KIDNEY DISEASE: Status: RESOLVED | Noted: 2020-06-27 | Resolved: 2020-07-14

## 2020-07-14 PROBLEM — J18.9 LLL PNEUMONIA: Status: RESOLVED | Noted: 2020-06-30 | Resolved: 2020-07-14

## 2020-07-14 PROBLEM — N17.9 ACUTE RENAL FAILURE SUPERIMPOSED ON STAGE 4 CHRONIC KIDNEY DISEASE: Status: RESOLVED | Noted: 2020-06-27 | Resolved: 2020-07-14

## 2020-07-14 PROBLEM — R14.0 ABDOMINAL DISTENSION: Status: RESOLVED | Noted: 2020-07-03 | Resolved: 2020-07-14

## 2020-07-14 PROBLEM — N39.0 UTI (URINARY TRACT INFECTION): Status: RESOLVED | Noted: 2020-06-27 | Resolved: 2020-07-14

## 2020-07-14 LAB
POCT GLUCOSE: 143 MG/DL (ref 70–110)
POCT GLUCOSE: 169 MG/DL (ref 70–110)
POCT GLUCOSE: 184 MG/DL (ref 70–110)
POCT GLUCOSE: 216 MG/DL (ref 70–110)

## 2020-07-14 PROCEDURE — 25000242 PHARM REV CODE 250 ALT 637 W/ HCPCS: Performed by: INTERNAL MEDICINE

## 2020-07-14 PROCEDURE — 25000003 PHARM REV CODE 250: Performed by: INTERNAL MEDICINE

## 2020-07-14 PROCEDURE — 94761 N-INVAS EAR/PLS OXIMETRY MLT: CPT

## 2020-07-14 PROCEDURE — 99232 SBSQ HOSP IP/OBS MODERATE 35: CPT | Mod: ,,, | Performed by: INTERNAL MEDICINE

## 2020-07-14 PROCEDURE — 99232 PR SUBSEQUENT HOSPITAL CARE,LEVL II: ICD-10-PCS | Mod: ,,, | Performed by: INTERNAL MEDICINE

## 2020-07-14 PROCEDURE — 97803 MED NUTRITION INDIV SUBSEQ: CPT

## 2020-07-14 PROCEDURE — 94799 UNLISTED PULMONARY SVC/PX: CPT

## 2020-07-14 PROCEDURE — 25000003 PHARM REV CODE 250: Performed by: NURSE PRACTITIONER

## 2020-07-14 PROCEDURE — 99900035 HC TECH TIME PER 15 MIN (STAT)

## 2020-07-14 PROCEDURE — 11000001 HC ACUTE MED/SURG PRIVATE ROOM

## 2020-07-14 PROCEDURE — 94640 AIRWAY INHALATION TREATMENT: CPT

## 2020-07-14 RX ADMIN — INSULIN DETEMIR 35 UNITS: 100 INJECTION, SOLUTION SUBCUTANEOUS at 09:07

## 2020-07-14 RX ADMIN — INSULIN ASPART 12 UNITS: 100 INJECTION, SOLUTION INTRAVENOUS; SUBCUTANEOUS at 07:07

## 2020-07-14 RX ADMIN — CARVEDILOL 12.5 MG: 12.5 TABLET, FILM COATED ORAL at 09:07

## 2020-07-14 RX ADMIN — NIFEDIPINE 60 MG: 30 TABLET, FILM COATED, EXTENDED RELEASE ORAL at 08:07

## 2020-07-14 RX ADMIN — MELATONIN 1000 UNITS: at 08:07

## 2020-07-14 RX ADMIN — INSULIN ASPART 2 UNITS: 100 INJECTION, SOLUTION INTRAVENOUS; SUBCUTANEOUS at 03:07

## 2020-07-14 RX ADMIN — INSULIN ASPART 12 UNITS: 100 INJECTION, SOLUTION INTRAVENOUS; SUBCUTANEOUS at 03:07

## 2020-07-14 RX ADMIN — METRONIDAZOLE 500 MG: 500 TABLET ORAL at 09:07

## 2020-07-14 RX ADMIN — ALPRAZOLAM 2 MG: 0.5 TABLET ORAL at 09:07

## 2020-07-14 RX ADMIN — METRONIDAZOLE 500 MG: 500 TABLET ORAL at 05:07

## 2020-07-14 RX ADMIN — IPRATROPIUM BROMIDE AND ALBUTEROL SULFATE 3 ML: .5; 3 SOLUTION RESPIRATORY (INHALATION) at 11:07

## 2020-07-14 RX ADMIN — METRONIDAZOLE 500 MG: 500 TABLET ORAL at 03:07

## 2020-07-14 RX ADMIN — PANTOPRAZOLE SODIUM 40 MG: 40 TABLET, DELAYED RELEASE ORAL at 08:07

## 2020-07-14 RX ADMIN — TAMSULOSIN HYDROCHLORIDE 0.4 MG: 0.4 CAPSULE ORAL at 08:07

## 2020-07-14 RX ADMIN — INSULIN ASPART 12 UNITS: 100 INJECTION, SOLUTION INTRAVENOUS; SUBCUTANEOUS at 09:07

## 2020-07-14 RX ADMIN — CARVEDILOL 12.5 MG: 12.5 TABLET, FILM COATED ORAL at 08:07

## 2020-07-14 RX ADMIN — NIFEDIPINE 60 MG: 30 TABLET, FILM COATED, EXTENDED RELEASE ORAL at 09:07

## 2020-07-14 RX ADMIN — SODIUM BICARBONATE 650 MG TABLET 650 MG: at 08:07

## 2020-07-14 RX ADMIN — TIMOLOL MALEATE 1 DROP: 2.5 SOLUTION/ DROPS OPHTHALMIC at 09:07

## 2020-07-14 NOTE — PROGRESS NOTES
"[facility]  Adult Nutrition  Progress Note    SUMMARY       Recommendations  1. If PO intake <50% of meals recommend boost glucose BID.   2. Weekly weights.  Goals: Initiate Nutrition by RD follow up.  Nutrition Goal Status: goal met  Communication of RD Recs: (POC)    Reason for Assessment  Reason For Assessment: RD follow-up  Diagnosis: (Type 2 diabetes mellitus with hyperosmolar nonketotic hyperglycemia)  Relevant Medical History: DM, HTN, CKD, HLD  Interdisciplinary Rounds: did not attend  General Information Comments:   6.29.20- Pt is a 74 year old male with GIB (gastrointestinal bleeding). Pt currently NPO. Pt intubated and sedated at this time. Diabetic diet education handouts left at bedside. Will give diabetic education when medially able. lbs, no significant change in weight. Pt going for EGD today. Will continue to monitor.  NFPE 6.29.20- Pt nourished.   7.1.20- Pt in now on RA. No new weight. Mild edema noted. TF of diabetisource at 10ml/hr. Recommending Novasource due to high levels of renal labs.   7.7.20- Pt diet advanced to diabetic yesterday. No change in weight. PO intake 50% of meals.   7.14.20- Pt with good appetite and intake consuming 75% of dinner last night. New wt needed. SW working on SNF placement.    Nutrition Discharge Planning: Adequate nutrition to meet needs via Renal, diabetic diet.    Nutrition Risk Screen  Nutrition Risk Screen: no indicators present    Nutrition/Diet History  Spiritual, Cultural Beliefs, Quaker Practices, Values that Affect Care: no  Factors Affecting Nutritional Intake: None identified at this time    Anthropometrics  Temp: 98.8 °F (37.1 °C)  Height Method: Stated  Height: 6' 2" (188 cm)  Height (inches): 74 in  Weight Method: Bed Scale  Weight: (!) 144.5 kg (318 lb 9 oz)  Weight (lb): (!) 318.57 lb  Ideal Body Weight (IBW), Male: 190 lb  % Ideal Body Weight, Male (lb): 167.67 %  BMI (Calculated): 40.9  BMI Grade: greater than 40 - " morbid obesity    Lab/Procedures/Meds  Pertinent Labs Reviewed: reviewed  Pertinent Labs Comments: Cl 111, BUN 27, Crea 2.7, GFR 26, Glu 144, Virgilio 7.6, Alb 2.2  Pertinent Medications Reviewed: reviewed  Pertinent Medications Comments: Furosemide, insulin, pantoprazole, polyethylene glycol, Vitamin D    Physical Findings/Assessment  Dakota score 20     Estimated/Assessed Needs  Weight Used For Calorie Calculations: (!) 144.2 kg (317 lb 14.5 oz)  Energy Calorie Requirements (kcal): 2018 kcals/day(14kcal/kg due to obesity)  Energy Need Method: Kcal/kg  Protein Requirements: 115.6 g/day(0.8 g/kg CKD)  Weight Used For Protein Calculations: (!) 144.2 kg (317 lb 14.5 oz)  Fluid Requirements (mL): 1mL/kcal or per MD  Estimated Fluid Requirement Method: RDA Method  RDA Method (mL): 2018  CHO Requirement: 282 g CHO    Nutrition Prescription Ordered  Current Diet Order: Diet diabetic [facility]; 2000 Calorie    Evaluation of Received Nutrient/Fluid Intake  Comments: LBM 7.13.20  % Intake of Estimated Energy Needs: 75 - 100 %  % Meal Intake: 75 - 100 %    Nutrition Risk  Level of Risk/Frequency of Follow-up: low     Assessment and Plan  GIB (gastrointestinal bleeding)  Nutrition Problem  Inadequate energy intake     Related to (etiology):   Inability to consume sufficient energy     Signs and Symptoms (as evidenced by):   NPO- Pt now on Diabetic diet with 50% PO intake     Interventions/Recommendations (treatment strategy):  Collaboration of nutrition care w/ other providers   Carbohydrate modified diet-2000 diabetic     Nutrition Diagnosis Status:        Resolved    Monitor and Evaluation  Food and Nutrient Intake: food and beverage intake  Food and Nutrient Adminstration: diet order  Knowledge/Beliefs/Attitudes: food and nutrition knowledge/skill  Physical Activity and Function: nutrition-related ADLs and IADLs  Anthropometric Measurements: weight  Biochemical Data, Medical Tests and Procedures: electrolyte and renal  panel, glucose/endocrine profile  Nutrition-Focused Physical Findings: overall appearance     Malnutrition Assessment  Orbital Region (Subcutaneous Fat Loss): well nourished  Upper Arm Region (Subcutaneous Fat Loss): well nourished  Thoracic and Lumbar Region: well nourished   Taoist Region (Muscle Loss): well nourished  Clavicle Bone Region (Muscle Loss): well nourished  Clavicle and Acromion Bone Region (Muscle Loss): well nourished  Scapular Bone Region (Muscle Loss): well nourished  Dorsal Hand (Muscle Loss): well nourished  Patellar Region (Muscle Loss): well nourished  Anterior Thigh Region (Muscle Loss): well nourished  Posterior Calf Region (Muscle Loss): well nourished   Edema (Fluid Accumulation): 2-->mild     Nutrition Follow-Up    RD Follow-up?: Yes

## 2020-07-14 NOTE — PT/OT/SLP PROGRESS
Occupational Therapy      Patient Name:  Vinnie Siegel   MRN:  [id number]    Patient not seen today secondary to Patient fatigue. Having bowel movement in [id number] after refusing therapy.  Assisted PCT with cleaning Pt. Along with replacing draw sheet and padding.  Will follow-up later as time permits.    Germaine Bradford, OT  7/14/2020

## 2020-07-14 NOTE — PT/OT/SLP PROGRESS
Physical Therapy      Patient Name:  Vinnie Siegel   MRN:  [id number]    Patient not seen today secondary to Patient ill (Comment)(pt stating he does feel well, unable to provide specifics as to what is bothering him. States he is unable to particpate in PT at this time.). Will follow-up 07/15/2020.    Shree Katz, PT

## 2020-07-14 NOTE — PLAN OF CARE
Pt remained free of falls and injuries throughout shift. AAOx4. Pt calm and cooperative. Purposeful hourly rounding performed. Pt swallows meds whole. IV flushed and saline locked. Dsg to left posterior neck CDI. Patient weak and working with PT/OT to increase mobility/ function, up in chair tonight for about an hour. Educated pt on IS use, pt achieved about 1000 mL level with several repetitions, then did not want to perform IS the rest of the shift. VSS on room air. Pt resting comfortably in bed, denies pain, denies SOB at this time. Bed low and locked, call light in reach. Side rails up x2. Will continue to monitor.

## 2020-07-14 NOTE — PROGRESS NOTES
[facility] Hospital Medicine  Progress Note    Patient Name: Vinnie Siegel  MRN: [id number]  Patient Class: IP- Inpatient   Admission Date: 6/27/2020  Length of Stay: 17 days  Attending Physician: Triny Dill MD  Primary Care Provider: Janeth Walter MD        Subjective:     Principal Problem:Type 2 diabetes mellitus with hyperglycemia        HPI:  Mr. Vinnie Siegel is a 74 y.o. male, with PMH of IDDM-2, CKD-IV, dilated cardiomyopathy, HTN, HLD, gout, obesity, who presented to [facility] ED via EMS on 6/27/20 with rectal bleeding x 2 days. His family called EMS after he became lethargic earlier this evening. Per his fiancee, he has been passing dark blood stools, and has been noting generalized weakness x 1 week. Additionally, he has been having elevated blood glucose readings x 1 day. In the ED, he was found to be in DKA, with a GI bleed. H&H were 6.4 & 19.4 and he was transfused 2 units PRBCs. He was started on an insulin drip and a Protonix drip. He had worsening respiratory and mental status while in the ED, and was intubated for airway protection. He was admitted to inpatient status to the ICU.     Overview/Hospital Course:  Admitted to ICU with HHS, encephalopathy, GI bleed, ADONAY, respiratory failure and sepsis. Sepsis was due to E. Coli UTI and Strep agalactiae pneumonia. Critical care and nephrology consulted and broad antibiotics continued. GI was consulted and he had EGD on 6/29 which showed normal esophagus and duodenum with OG tube trauma to mid gastric body but no source of bleeding was identified. He was extubated on 6/30. He attempted bowel prep for colonoscopy but was unable to tolerate; colonoscopy was pushed back. He had slow improvement in ADONAY as well as improvement in sepsis with antibiotics. ID was consulted and recommended cefazolin. He was found to have a large posterior neck abscess so general surgery was consulted and he had I&D. He was transferred out of ICU on 7/3. He  was scheduled to have colonoscopy on 7/6 but was unable to tolerate bowel prep again so it was canceled. Cultures from neck wound demonstrated finegoldia and he was started on a course of metronidazole as he still had drainage; discussed with general surgery, but given draining adequately they did not pursue further I&D. PT/OT continued to evaluate him and recommended SNF placement.    Interval History: No acute events overnight. Feeling well this morning. No complaints or concerns today.    Review of Systems   Constitutional: Negative for chills and fever.   Respiratory: Negative for cough and shortness of breath.    Cardiovascular: Negative for chest pain and palpitations.   Gastrointestinal: Negative for abdominal pain, nausea and vomiting.     Objective:     Vital Signs (Most Recent):  Temp: 98.8 °F (37.1 °C) (07/14/20 0858)  Pulse: 69 (07/14/20 0858)  Resp: (!) 22 (07/14/20 0858)  BP: (!) 162/74 (07/14/20 0858)  SpO2: 96 % (07/14/20 0858) Vital Signs (24h Range):  Temp:  [97.5 °F (36.4 °C)-98.8 °F (37.1 °C)] 98.8 °F (37.1 °C)  Pulse:  [68-79] 69  Resp:  [16-22] 22  SpO2:  [94 %-97 %] 96 %  BP: (135-174)/(63-82) 162/74     Weight: (!) 144.5 kg (318 lb 9 oz)  Body mass index is 40.83 kg/m².    Intake/Output Summary (Last 24 hours) at 7/14/2020 1103  Last data filed at 7/14/2020 0900  Gross per 24 hour   Intake 700 ml   Output 1700 ml   Net -1000 ml      Physical Exam  Vitals signs and nursing note reviewed.   Constitutional:       General: He is not in acute distress.     Appearance: Normal appearance.   Cardiovascular:      Rate and Rhythm: Normal rate and regular rhythm.   Pulmonary:      Effort: No respiratory distress.      Comments: Decreased at the bases  Abdominal:      General: Bowel sounds are normal.      Palpations: Abdomen is soft.      Tenderness: There is no abdominal tenderness.   Musculoskeletal:         General: No tenderness.      Right lower leg: No edema.      Left lower leg: No edema.    Skin:     General: Skin is warm and dry.      Comments: L posterior neck abscess s/p I&D, purulent drainage noted   Neurological:      Mental Status: He is alert and oriented to person, place, and time.       Significant Labs:   CBC:  Recent Labs   Lab 07/12/20  0451   WBC 8.86   HGB 8.0*   HCT 27.1*      GRAN 69.4  6.2   LYMPH 19.1  1.7   MONO 8.6  0.8   EOS 0.1   BASO 0.04      CMP:  Recent Labs   Lab 07/12/20  0451      K 4.6   *   CO2 21*   BUN 27*   CREATININE 2.7*   *   CALCIUM 7.6*   PHOS 3.8  3.8   ALBUMIN 2.2*   ANIONGAP 11      Significant Imaging:   No new imaging this morning.      Assessment/Plan:      * Type 2 diabetes mellitus with hyperglycemia  - HHS resolved.  - Glucose with some variability, but improving gradually.  - Continuing insulin detemir 35U subQ daily, aspart 12U subQ TIDWM, low-dose SSI aspart 0-5U subQ TIDWM PRN.    Debility  - Continuing PT/OT; await SNF placement.    Abscess of neck  - Soft tissue neck CT with abscess L posterior neck.  - General surgery consulted, s/p I&D 7/2  - Anaerobic culture showing Finegoldia.  - Continuing metronidazole 500mg PO q8hr. Will continue metronidazole for 14 day course to cover finegoldia.    Acute blood loss anemia  - 2/2 GI bleed.  - s/p 4 units PRBC.  - Hgb stable the past few days. Monitor.    GIB (gastrointestinal bleeding)  - s/p 2U pRBCs 06/27, 1U pRBCs 06/28, 1U pRBC 6/30.  - Continue to trend Hgb and transfuse PRN Hgb < 7.  - Continuing pantoprazole 40mg PO daily.  - EGD 6/29 with normal esophagus, OG tube trauma to the mid body and normal duodenum.  - Unable to tolerate bowel prep. Colonoscopy deferred since Hgb stable, can be done outpatient.    Obesity, Class III, BMI 40-49.9 (morbid obesity)  - Dietary counseling.    Dilated cardiomyopathy  - Appears euvolemic.  - Continue furosemide 40mg PO every other day.    CKD (chronic kidney disease) stage 4, GFR 15-29 ml/min  - ADONAY resolved  - Baseline Cr  ~2.4-2.7.  - Back to baseline function. Appreciate nephrology assistance.  - Monitor    Essential hypertension  - Stable  - Continuing carvedilol 12.5mg PO BID, nifedipine 60mg PO BID.      VTE Risk Mitigation (From admission, onward)         Ordered     IP VTE HIGH RISK PATIENT  Once      06/28/20 0139     Reason for No Pharmacological VTE Prophylaxis  Once     Question:  Reasons:  Answer:  Active Bleeding    06/28/20 0139     Place sequential compression device  Until discontinued      06/28/20 0036                      Triny Dill MD  Department of Hospital Medicine   [facility]

## 2020-07-14 NOTE — ASSESSMENT & PLAN NOTE
- ADONAY resolved  - Baseline Cr ~2.4-2.7.  - Back to baseline function. Appreciate nephrology assistance.  - Monitor

## 2020-07-14 NOTE — ASSESSMENT & PLAN NOTE
- HHS resolved.  - Glucose with some variability, but improving gradually.  - Continuing insulin detemir 35U subQ daily, aspart 12U subQ TIDWM, low-dose SSI aspart 0-5U subQ TIDWM PRN.

## 2020-07-14 NOTE — ASSESSMENT & PLAN NOTE
- Soft tissue neck CT with abscess L posterior neck.  - General surgery consulted, s/p I&D 7/2  - Anaerobic culture showing Finegoldia.  - Continuing metronidazole 500mg PO q8hr. Will continue metronidazole for 14 day course to cover finegoldia.

## 2020-07-14 NOTE — SUBJECTIVE & OBJECTIVE
Interval History: No acute events overnight. Feeling well this morning. No complaints or concerns today.    Review of Systems   Constitutional: Negative for chills and fever.   Respiratory: Negative for cough and shortness of breath.    Cardiovascular: Negative for chest pain and palpitations.   Gastrointestinal: Negative for abdominal pain, nausea and vomiting.     Objective:     Vital Signs (Most Recent):  Temp: 98.8 °F (37.1 °C) (07/14/20 0858)  Pulse: 69 (07/14/20 0858)  Resp: (!) 22 (07/14/20 0858)  BP: (!) 162/74 (07/14/20 0858)  SpO2: 96 % (07/14/20 0858) Vital Signs (24h Range):  Temp:  [97.5 °F (36.4 °C)-98.8 °F (37.1 °C)] 98.8 °F (37.1 °C)  Pulse:  [68-79] 69  Resp:  [16-22] 22  SpO2:  [94 %-97 %] 96 %  BP: (135-174)/(63-82) 162/74     Weight: (!) 144.5 kg (318 lb 9 oz)  Body mass index is 40.83 kg/m².    Intake/Output Summary (Last 24 hours) at 7/14/2020 1103  Last data filed at 7/14/2020 0900  Gross per 24 hour   Intake 700 ml   Output 1700 ml   Net -1000 ml      Physical Exam  Vitals signs and nursing note reviewed.   Constitutional:       General: He is not in acute distress.     Appearance: Normal appearance.   Cardiovascular:      Rate and Rhythm: Normal rate and regular rhythm.   Pulmonary:      Effort: No respiratory distress.      Comments: Decreased at the bases  Abdominal:      General: Bowel sounds are normal.      Palpations: Abdomen is soft.      Tenderness: There is no abdominal tenderness.   Musculoskeletal:         General: No tenderness.      Right lower leg: No edema.      Left lower leg: No edema.   Skin:     General: Skin is warm and dry.      Comments: L posterior neck abscess s/p I&D, purulent drainage noted   Neurological:      Mental Status: He is alert and oriented to person, place, and time.       Significant Labs:   CBC:  Recent Labs   Lab 07/12/20  0451   WBC 8.86   HGB 8.0*   HCT 27.1*      GRAN 69.4  6.2   LYMPH 19.1  1.7   MONO 8.6  0.8   EOS 0.1   BASO 0.04       CMP:  Recent Labs   Lab 07/12/20  0451      K 4.6   *   CO2 21*   BUN 27*   CREATININE 2.7*   *   CALCIUM 7.6*   PHOS 3.8  3.8   ALBUMIN 2.2*   ANIONGAP 11      Significant Imaging:   No new imaging this morning.

## 2020-07-15 LAB
ALBUMIN SERPL BCP-MCNC: 2.2 G/DL (ref 3.5–5.2)
ANION GAP SERPL CALC-SCNC: 10 MMOL/L (ref 8–16)
BUN SERPL-MCNC: 23 MG/DL (ref 8–23)
CALCIUM SERPL-MCNC: 7.4 MG/DL (ref 8.7–10.5)
CHLORIDE SERPL-SCNC: 112 MMOL/L (ref 95–110)
CO2 SERPL-SCNC: 21 MMOL/L (ref 23–29)
CREAT SERPL-MCNC: 2.6 MG/DL (ref 0.5–1.4)
ERYTHROCYTE [DISTWIDTH] IN BLOOD BY AUTOMATED COUNT: 16.7 % (ref 11.5–14.5)
EST. GFR  (AFRICAN AMERICAN): 27 ML/MIN/1.73 M^2
EST. GFR  (NON AFRICAN AMERICAN): 23 ML/MIN/1.73 M^2
GLUCOSE SERPL-MCNC: 139 MG/DL (ref 70–110)
HCT VFR BLD AUTO: 25.6 % (ref 40–54)
HGB BLD-MCNC: 7.5 G/DL (ref 14–18)
MCH RBC QN AUTO: 28.8 PG (ref 27–31)
MCHC RBC AUTO-ENTMCNC: 29.3 G/DL (ref 32–36)
MCV RBC AUTO: 99 FL (ref 82–98)
PHOSPHATE SERPL-MCNC: 3.4 MG/DL (ref 2.7–4.5)
PLATELET # BLD AUTO: 283 K/UL (ref 150–350)
PMV BLD AUTO: 9.9 FL (ref 9.2–12.9)
POCT GLUCOSE: 102 MG/DL (ref 70–110)
POCT GLUCOSE: 188 MG/DL (ref 70–110)
POCT GLUCOSE: 256 MG/DL (ref 70–110)
POCT GLUCOSE: 85 MG/DL (ref 70–110)
POTASSIUM SERPL-SCNC: 5.2 MMOL/L (ref 3.5–5.1)
RBC # BLD AUTO: 2.6 M/UL (ref 4.6–6.2)
SARS-COV-2 RDRP RESP QL NAA+PROBE: NEGATIVE
SODIUM SERPL-SCNC: 143 MMOL/L (ref 136–145)
WBC # BLD AUTO: 7 K/UL (ref 3.9–12.7)

## 2020-07-15 PROCEDURE — 25000242 PHARM REV CODE 250 ALT 637 W/ HCPCS: Performed by: INTERNAL MEDICINE

## 2020-07-15 PROCEDURE — 36415 COLL VENOUS BLD VENIPUNCTURE: CPT

## 2020-07-15 PROCEDURE — 99233 SBSQ HOSP IP/OBS HIGH 50: CPT | Mod: ,,, | Performed by: INTERNAL MEDICINE

## 2020-07-15 PROCEDURE — 80069 RENAL FUNCTION PANEL: CPT

## 2020-07-15 PROCEDURE — 25000003 PHARM REV CODE 250: Performed by: INTERNAL MEDICINE

## 2020-07-15 PROCEDURE — 25000003 PHARM REV CODE 250: Performed by: NURSE PRACTITIONER

## 2020-07-15 PROCEDURE — 94640 AIRWAY INHALATION TREATMENT: CPT

## 2020-07-15 PROCEDURE — 86580 TB INTRADERMAL TEST: CPT | Performed by: INTERNAL MEDICINE

## 2020-07-15 PROCEDURE — 63600175 PHARM REV CODE 636 W HCPCS: Performed by: NURSE PRACTITIONER

## 2020-07-15 PROCEDURE — U0002 COVID-19 LAB TEST NON-CDC: HCPCS

## 2020-07-15 PROCEDURE — 94761 N-INVAS EAR/PLS OXIMETRY MLT: CPT

## 2020-07-15 PROCEDURE — 11000001 HC ACUTE MED/SURG PRIVATE ROOM

## 2020-07-15 PROCEDURE — 97116 GAIT TRAINING THERAPY: CPT

## 2020-07-15 PROCEDURE — 99233 PR SUBSEQUENT HOSPITAL CARE,LEVL III: ICD-10-PCS | Mod: ,,, | Performed by: INTERNAL MEDICINE

## 2020-07-15 PROCEDURE — 99900035 HC TECH TIME PER 15 MIN (STAT)

## 2020-07-15 PROCEDURE — 85027 COMPLETE CBC AUTOMATED: CPT

## 2020-07-15 PROCEDURE — 30200315 PPD INTRADERMAL TEST REV CODE 302: Performed by: INTERNAL MEDICINE

## 2020-07-15 RX ORDER — FUROSEMIDE 40 MG/1
40 TABLET ORAL DAILY
Qty: 30 TABLET | Refills: 11
Start: 2020-07-16 | End: 2020-08-26 | Stop reason: SDUPTHER

## 2020-07-15 RX ORDER — CARVEDILOL 12.5 MG/1
25 TABLET ORAL 2 TIMES DAILY
Status: DISCONTINUED | OUTPATIENT
Start: 2020-07-15 | End: 2020-07-16 | Stop reason: HOSPADM

## 2020-07-15 RX ORDER — FUROSEMIDE 40 MG/1
40 TABLET ORAL DAILY
Status: DISCONTINUED | OUTPATIENT
Start: 2020-07-16 | End: 2020-07-16 | Stop reason: HOSPADM

## 2020-07-15 RX ADMIN — SODIUM BICARBONATE 650 MG TABLET 650 MG: at 08:07

## 2020-07-15 RX ADMIN — METRONIDAZOLE 500 MG: 500 TABLET ORAL at 05:07

## 2020-07-15 RX ADMIN — IPRATROPIUM BROMIDE AND ALBUTEROL SULFATE 3 ML: .5; 3 SOLUTION RESPIRATORY (INHALATION) at 05:07

## 2020-07-15 RX ADMIN — ALPRAZOLAM 2 MG: 0.5 TABLET ORAL at 09:07

## 2020-07-15 RX ADMIN — NIFEDIPINE 60 MG: 30 TABLET, FILM COATED, EXTENDED RELEASE ORAL at 09:07

## 2020-07-15 RX ADMIN — INSULIN ASPART 12 UNITS: 100 INJECTION, SOLUTION INTRAVENOUS; SUBCUTANEOUS at 05:07

## 2020-07-15 RX ADMIN — NIFEDIPINE 60 MG: 30 TABLET, FILM COATED, EXTENDED RELEASE ORAL at 08:07

## 2020-07-15 RX ADMIN — INSULIN ASPART 3 UNITS: 100 INJECTION, SOLUTION INTRAVENOUS; SUBCUTANEOUS at 12:07

## 2020-07-15 RX ADMIN — MELATONIN 1000 UNITS: at 08:07

## 2020-07-15 RX ADMIN — CARVEDILOL 12.5 MG: 12.5 TABLET, FILM COATED ORAL at 08:07

## 2020-07-15 RX ADMIN — METRONIDAZOLE 500 MG: 500 TABLET ORAL at 02:07

## 2020-07-15 RX ADMIN — TAMSULOSIN HYDROCHLORIDE 0.4 MG: 0.4 CAPSULE ORAL at 08:07

## 2020-07-15 RX ADMIN — TUBERCULIN PURIFIED PROTEIN DERIVATIVE 5 UNITS: 5 INJECTION, SOLUTION INTRADERMAL at 05:07

## 2020-07-15 RX ADMIN — INSULIN ASPART 12 UNITS: 100 INJECTION, SOLUTION INTRAVENOUS; SUBCUTANEOUS at 08:07

## 2020-07-15 RX ADMIN — FUROSEMIDE 40 MG: 40 TABLET ORAL at 08:07

## 2020-07-15 RX ADMIN — EPOETIN ALFA-EPBX 20000 UNITS: 10000 INJECTION, SOLUTION INTRAVENOUS; SUBCUTANEOUS at 08:07

## 2020-07-15 RX ADMIN — TIMOLOL MALEATE 1 DROP: 2.5 SOLUTION/ DROPS OPHTHALMIC at 09:07

## 2020-07-15 RX ADMIN — PANTOPRAZOLE SODIUM 40 MG: 40 TABLET, DELAYED RELEASE ORAL at 08:07

## 2020-07-15 RX ADMIN — METRONIDAZOLE 500 MG: 500 TABLET ORAL at 09:07

## 2020-07-15 RX ADMIN — INSULIN ASPART 12 UNITS: 100 INJECTION, SOLUTION INTRAVENOUS; SUBCUTANEOUS at 12:07

## 2020-07-15 RX ADMIN — INSULIN DETEMIR 35 UNITS: 100 INJECTION, SOLUTION SUBCUTANEOUS at 08:07

## 2020-07-15 RX ADMIN — CARVEDILOL 25 MG: 12.5 TABLET, FILM COATED ORAL at 09:07

## 2020-07-15 RX ADMIN — TIMOLOL MALEATE 1 DROP: 2.5 SOLUTION/ DROPS OPHTHALMIC at 08:07

## 2020-07-15 NOTE — PLAN OF CARE
Problem: Physical Therapy Goal  Goal: Physical Therapy Goal  Description: Goals to be met by: 20    Patient will increase functional independence with mobility by performin. Supine to sit with min A.   2. Sit to supine with min A. MET   3. Rolling R and L with min A.   4. Sitting at edge of bed >5 minutes with min A. MET   5. PT will assess sit<>stand. MET   6. Sit<>stand with RW with modA x1 person.  7. Gait x15 ft with RW and CGA.  Outcome: Ongoing, Not Progressing  Pt presented in [facility], agreeable to PT.  Transfer training sit>stand x 3 trials with min A, with emphasis on positioning and wt shift. Gait training x 30ft with RW and CGA, pt with significant wt bearing through RW, gait ended with pt c/o SOB, seated rest EOB pt recovered and deferred further activity due to fatigue.  Pt transferred to supine with SBA.

## 2020-07-15 NOTE — PLAN OF CARE
[facility]     Department of Hospital Medicine     [address] [place] [address]     [phone] after hours  [phone] fax       NURSING HOME ORDERS    07/16/2020    Admit to Nursing Home:  Skilled Bed                                                 Diagnoses:  Active Hospital Problems    Diagnosis  POA    *Type 2 diabetes mellitus with hyperglycemia [E11.65]  Yes    Debility [R53.81]  Yes    Abscess of neck [L02.11]  Yes    GIB (gastrointestinal bleeding) [K92.2]  Yes    Acute blood loss anemia [D62]  Yes    Obesity, Class III, BMI 40-49.9 (morbid obesity) [E66.01]  Yes     Chronic    Dilated cardiomyopathy [I42.0]  Yes     Chronic    CKD (chronic kidney disease) stage 4, GFR 15-29 ml/min [N18.4]  Yes     Chronic    Essential hypertension [I10]  Yes     Chronic     2008 negative nuclear stress        Resolved Hospital Problems    Diagnosis Date Resolved POA    Abdominal distension [R14.0] 07/14/2020 No    Hypernatremia [E87.0] 07/05/2020 No    LLL pneumonia [J18.1] 07/14/2020 Yes    Endotracheal tube present [Z97.8] 06/30/2020 Yes    Encephalopathy, metabolic [G93.41] 07/03/2020 Yes    Metabolic acidosis, normal anion gap (NAG) [E87.2] 06/30/2020 Yes    Acute respiratory failure with hypoxia [J96.01] 07/04/2020 Yes    UTI (urinary tract infection) [N39.0] 07/14/2020 Yes    Hematuria [R31.9] 06/30/2020 Yes    Severe sepsis [A41.9, R65.20] 07/04/2020 Yes    Acute renal failure superimposed on stage 4 chronic kidney disease [N17.9, N18.4] 07/14/2020 Yes    Elevated troponin [R79.89] 07/02/2020 Yes    Hypertensive emergency [I16.1] 06/30/2020 Yes    Type 2 diabetes mellitus with stage 4 chronic kidney disease, with long-term current use of insulin [E11.22, N18.4, Z79.4] 06/30/2020 Not Applicable     Chronic    Hyperlipidemia [E78.5] 07/03/2020 Yes     Chronic       Patient is homebound due to:  Type 2 diabetes mellitus with  hyperglycemia    Allergies:Review of patient's allergies indicates:  No Known Allergies    Vitals:       Every shift (Skilled Nursing patients)    Diet: Consistent carbohydrate   Supplement:  1 can every three times a day with meals                         Type:    Glucerna       Acitivities:     - Up in a chair each morning as tolerated   - Ambulate with assistance to bathroom   - Scheduled walks once each shift (every 8 hours)    LABS:  Per facility protocol    Nursing Precautions:      - Fall precautions per nursing home protocol   - Decubitus precautions:        -  for positioning   - Pressure reducing foam mattress   - Turn patient every two hours. Use wedge pillows to anchor patient    CONSULTS:      Physical Therapy to evaluate and treat     Occupational Therapy to evaluate and treat     Nutrition to evaluate and recommend diet      MISCELLANEOUS CARE:       Routine Skin for Bedridden Patients:  Apply moisture barrier cream to all    skin folds and wet areas in perineal area daily and after baths and                           all bowel movements.       Wound care:   1. Left posterior neck wound- clean with wound cleanser, apply Triad hydrophilic wound dressing to the wound base and cover with Aquacel foam dressing. May need to change daily as well as prn heavy exudate.  2. Nursing to incorporate pressure prevention interventions into care which may include:  SWEETIE mattress or EHOB waffle overlay when transferred to the floor,  heel protectors,turn q 2 hours, pillows/wedge for repositioning, blue pad to wick away moisture, HOB no higher than 30 degrees/knee gatch up unless otherwise indicated,  barrier cream/paste as needed for moisture control,  adequate calories.            DIABETES CARE:       Check blood sugar:      Fingerstick blood sugar AC and HS   Fingerstick blood sugar every 6 hours if unable to eat      Report CBG < 60 or > 400 to physician.                                          Insulin  Sliding Scale          Glucose  Novolog Insulin Subcutaneous        0 - 60   Orange juice or glucose tablet, hold insulin      No insulin   201-250  2 units   251-300  4 units   301-350  6 units   351-400  8 units   >400   10 units then call physician      Medications: Discontinue all previous medication orders, if any. See new list below.     Vinnie Siegel   Home Medication Instructions GRACE:[id number]    Printed on:07/15/20 4768   Medication Information                      atorvastatin (LIPITOR) 40 MG tablet  Take 1 tablet (40 mg total) by mouth once daily.             carvedilol (COREG) 25 MG tablet  TAKE 1 TABLET(25 MG) BY MOUTH TWICE DAILY WITH MEALS             fluticasone propionate (FLONASE) 50 mcg/actuation nasal spray  2 sprays (100 mcg total) by Each Nostril route once daily.             furosemide (LASIX) 40 MG tablet  Take 1 tablet (40 mg total) by mouth once daily.             insulin (LANTUS SOLOSTAR U-100 INSULIN) glargine 100 units/mL (3mL) SubQ pen  Inject 36 Units into the skin every evening.             insulin lispro (HUMALOG KWIKPEN INSULIN) 100 unit/mL pen  Inject 14 Units into the skin 3 (three) times daily with meals.             ipratropium-albuteroL (COMBIVENT)  mcg/actuation inhaler  Inhale 2 puffs into the lungs every 6 (six) hours as needed for Wheezing or Shortness of Breath. Rescue             magnesium oxide (MAG-OX) 400 mg (241.3 mg magnesium) tablet  Take 1 tablet (400 mg total) by mouth every morning.             metroNIDAZOLE (FLAGYL) 500 MG tablet  Take 1 tablet (500 mg total) by mouth every 8 (eight) hours. for 9 days             NIFEdipine (PROCARDIA-XL) 60 MG (OSM) 24 hr tablet  Take 1 tablet (60 mg total) by mouth 2 (two) times a day.             pantoprazole (PROTONIX) 40 MG tablet  Take 1 tablet (40 mg total) by mouth once daily.             sodium bicarbonate 650 MG tablet  Take 1 tablet (650 mg total) by mouth once daily.             tamsulosin (FLOMAX)  0.4 mg Cap  Take 1 capsule (0.4 mg total) by mouth every morning.             timolol maleate 0.25% (TIMOPTIC) 0.25 % Drop  Place 1 drop into both eyes 2 (two) times daily.             vitamin D (VITAMIN D3) 1000 units Tab  Take 1 tablet (1,000 Units total) by mouth once daily.                       _________________________________  Triny Dill MD  07/16/2020

## 2020-07-15 NOTE — SUBJECTIVE & OBJECTIVE
Interval History: No acute events overnight. Feeling well this morning. No complaints or concerns today.    Review of Systems   Constitutional: Negative for chills and fever.   Respiratory: Negative for cough and shortness of breath.    Cardiovascular: Negative for chest pain and palpitations.   Gastrointestinal: Negative for abdominal pain, nausea and vomiting.     Objective:     Vital Signs (Most Recent):  Temp: 98.3 °F (36.8 °C) (07/15/20 1149)  Pulse: 77 (07/15/20 1149)  Resp: 18 (07/15/20 1149)  BP: (!) 172/77 (07/15/20 1149)  SpO2: 95 % (07/15/20 1149) Vital Signs (24h Range):  Temp:  [97.8 °F (36.6 °C)-99.3 °F (37.4 °C)] 98.3 °F (36.8 °C)  Pulse:  [68-81] 77  Resp:  [16-23] 18  SpO2:  [93 %-96 %] 95 %  BP: (149-172)/(67-78) 172/77     Weight: (!) 144.5 kg (318 lb 9 oz)  Body mass index is 40.83 kg/m².    Intake/Output Summary (Last 24 hours) at 7/15/2020 1220  Last data filed at 7/15/2020 0539  Gross per 24 hour   Intake 240 ml   Output 1150 ml   Net -910 ml      Physical Exam  Vitals signs and nursing note reviewed.   Constitutional:       General: He is not in acute distress.     Appearance: Normal appearance.   Cardiovascular:      Rate and Rhythm: Normal rate and regular rhythm.   Pulmonary:      Effort: No respiratory distress.      Comments: Decreased at the bases  Abdominal:      General: Bowel sounds are normal.      Palpations: Abdomen is soft.      Tenderness: There is no abdominal tenderness.   Musculoskeletal:         General: No tenderness.      Right lower leg: No edema.      Left lower leg: No edema.   Skin:     General: Skin is warm and dry.      Comments: L posterior neck abscess s/p I&D, dressing in place   Neurological:      Mental Status: He is alert and oriented to person, place, and time.       Significant Labs:   CBC:  Recent Labs   Lab 07/15/20  0426   WBC 7.00   HGB 7.5*   HCT 25.6*         CMP:  Recent Labs   Lab 07/15/20  0426      K 5.2*   *   CO2 21*   BUN 23    CREATININE 2.6*   *   CALCIUM 7.4*   PHOS 3.4   ALBUMIN 2.2*   ANIONGAP 10      Significant Imaging:   No new imaging this morning.

## 2020-07-15 NOTE — PLAN OF CARE
3:09pm -LMSW sent orders to SNF.     3:30pm LMSW contacted [facility] and spoke to Diandra. Diandra states she received the patients orders, pasrr and chest x ray. Diandra states she will come to OB tomorrow morning to get the patient to sign his admission packet. Diandra states because of the timing she can not come to [facility] today for traffic and shift change. LMSW agreeable.     Patient needs a TB skin test. CM updated.

## 2020-07-15 NOTE — ASSESSMENT & PLAN NOTE
- Soft tissue neck CT with abscess L posterior neck.  - General surgery consulted, s/p I&D 7/2. Discussed with Dr. Basilio today, wound to be re-evaluated  - Anaerobic culture showing Finegoldia.  - Continuing metronidazole 500mg PO q8hr. Will continue metronidazole for 14 day course to cover finegoldia.

## 2020-07-15 NOTE — PLAN OF CARE
"Bedtime blood sugar was 143. Left hand 22 gauge IV access intact. Patient requested nasal spray for stuffiness. Notified Serina Gan NP and received an order for nasal spray.Awaiting nasal spray to be delivered to unit. Call light and urinal within reach. Bed alarm on and telesitter in room. Patient requested to have telesitter removed from his room because he said he's"tired of all that noise everytime I move." Discussed with patient about telesitter being in his room to prevent falls. Patient verbalized understanding of purpose of telesitter and said he will discuss with the doctor in the morning about "getting that thing taken out of my room." Encouraged patient to call for any and all needs. Patient verbalized understanding of instructions.Will continue to monitor patient.  "

## 2020-07-15 NOTE — PLAN OF CARE
Met with the patient at the bedside, his nurse present at the bedside.  Explained to the patient that we have an accepting facility; verbalized understanding and acceptance of admission to [facility]  [facility] may be able to come to facility to get admission documents signed; awaiting permission from her .  If not, anticipating sending over doc needed to finalized admission.   Requested covid testing- sent via [employer]. Updated Suzanna

## 2020-07-15 NOTE — PT/OT/SLP PROGRESS
Physical Therapy Treatment    Patient Name:  Vinnie Siegel   MRN:  [id number]    Recommendations:     Discharge Recommendations:  nursing facility, skilled   Discharge Equipment Recommendations: 3-in-1 commode, walker, rolling, wheelchair   Barriers to discharge: None    Assessment:     Vinnie Siegel is a 74 y.o. male admitted with a medical diagnosis of Type 2 diabetes mellitus with hyperglycemia.  He presents with the following impairments/functional limitations:  weakness, impaired endurance, impaired functional mobilty, gait instability, impaired balance, decreased lower extremity function, edema, impaired cardiopulmonary response to activity . Pt continues with functional mobility deficits and should benefit from continuing current PT POC to maximize I and safety decrease risk of further decline of injury.    Rehab Prognosis: Good; patient would benefit from acute skilled PT services to address these deficits and reach maximum level of function.    Recent Surgery: Procedure(s) (LRB):  EGD (ESOPHAGOGASTRODUODENOSCOPY) (N/A) 16 Days Post-Op    Plan:     During this hospitalization, patient to be seen 5 x/week to address the identified rehab impairments via gait training, therapeutic activities, therapeutic exercises and progress toward the following goals:    · Plan of Care Expires:  07/31/20    Subjective     Chief Complaint: SOB with activity  Patient/Family Comments/goals: to return home at higher level of function  Pain/Comfort:  · Pain Rating 1: 0/10  · Pain Rating Post-Intervention 1: 0/10      Objective:     Communicated with nurse,  prior to session.  Patient found up in chair with peripheral IV upon PT entry to room.     General Precautions: Standard, diabetic, fall   Orthopedic Precautions:N/A   Braces: N/A     Functional Mobility:  · Bed Mobility:     · Sit to Supine: stand by assistance  · Transfers:     · Sit to Stand:  minimum assistance with rolling walker  · Gait: x 30ft with RW and CGA, pt  with significant wt bearing through RW, gait ended with pt c/o SOB      AM-PAC 6 CLICK MOBILITY  Turning over in bed (including adjusting bedclothes, sheets and blankets)?: 4  Sitting down on and standing up from a chair with arms (e.g., wheelchair, bedside commode, etc.): 3  Moving from lying on back to sitting on the side of the bed?: 3  Moving to and from a bed to a chair (including a wheelchair)?: 3  Need to walk in hospital room?: 3  Climbing 3-5 steps with a railing?: 2  Basic Mobility Total Score: 18       Therapeutic Activities and Exercises:  Pt presented in BSCHair, agreeable to PT.  Transfer training sit>stand x 3 trials with min A, with emphasis on positioning and wt shift. Gait training x 30ft with RW and CGA, pt with significant wt bearing through RW, gait ended with pt c/o SOB, seated rest EOB pt recovered and deferred further activity due to fatigue.  Pt transferred to supine with SBA.    Patient left supine with all lines intact, call button in reach, nurse notified and SO present..    GOALS:   Multidisciplinary Problems     Physical Therapy Goals        Problem: Physical Therapy Goal    Goal Priority Disciplines Outcome Goal Variances Interventions   Physical Therapy Goal     PT, PT/OT Ongoing, Not Progressing     Description: Goals to be met by: 20    Patient will increase functional independence with mobility by performin. Supine to sit with min A.   2. Sit to supine with min A. MET   3. Rolling R and L with min A.   4. Sitting at edge of bed >5 minutes with min A. MET   5. PT will assess sit<>stand. MET   6. Sit<>stand with RW with modA x1 person.  7. Gait x15 ft with RW and CGA.                   Time Tracking:     PT Received On: 07/15/20  PT Start Time: 1142     PT Stop Time: 1158  PT Total Time (min): 16 min     Billable Minutes: Gait Training 16    Treatment Type: Treatment  PT/PTA: PT     PTA Visit Number: 0     Shree Katz, PT  07/15/2020

## 2020-07-15 NOTE — PLAN OF CARE
Patient stable. VSS. BS monitored and treated with insulin. Rounding completed. Denied needs. Call bell in reach. Side rails up X2. Bed locked, lowered. Safety maintained.

## 2020-07-15 NOTE — PT/OT/SLP PROGRESS
"Occupational Therapy      Patient Name:  Vinnie Siegel   MRN:  [id number]    Patient not seen today secondary to Patient fatigue.  Pt. Reporting "I worked with the genie already" referring to PT and stating, "I don't want to work.  I'm supposed to be going today."  Offered ADL tasks though Pt. With continued refusal.  Assisted Pt. With scooting up higher in bed.  Will follow-up as time permits.    Germaine Bradford, OT  7/15/2020  "

## 2020-07-15 NOTE — PLAN OF CARE
Patient in no apparent distress. Sat's  95 % on room air. IS done. PRN treatments not needed . 2316, shortness of breath noted. PRN treatment given.  Will continue to monitor.

## 2020-07-15 NOTE — ASSESSMENT & PLAN NOTE
- HHS resolved.  - Glucose with some variability, but overall stable  - Continuing insulin detemir 35U subQ daily, aspart 12U subQ TIDWM, low-dose SSI aspart 0-5U subQ TIDWM PRN.

## 2020-07-15 NOTE — PLAN OF CARE
Patient is accepted at [facility]. Per rocio CM Supervisor patient is agreeable to placement.     SNF requesting covid-19 test.     LMSW updated MD for order. MD agreeable.     CM team will continue to follow.

## 2020-07-15 NOTE — PROGRESS NOTES
[facility] Hospital Medicine  Progress Note    Patient Name: Vinnie Siegel  MRN: [id number]  Patient Class: IP- Inpatient   Admission Date: 6/27/2020  Length of Stay: 18 days  Attending Physician: Triny Dill MD  Primary Care Provider: Janeth Walter MD        Subjective:     Principal Problem:Type 2 diabetes mellitus with hyperglycemia        HPI:  Mr. Vinnie Siegel is a 74 y.o. male, with PMH of IDDM-2, CKD-IV, dilated cardiomyopathy, HTN, HLD, gout, obesity, who presented to [facility] ED via EMS on 6/27/20 with rectal bleeding x 2 days. His family called EMS after he became lethargic earlier this evening. Per his fiancee, he has been passing dark blood stools, and has been noting generalized weakness x 1 week. Additionally, he has been having elevated blood glucose readings x 1 day. In the ED, he was found to be in DKA, with a GI bleed. H&H were 6.4 & 19.4 and he was transfused 2 units PRBCs. He was started on an insulin drip and a Protonix drip. He had worsening respiratory and mental status while in the ED, and was intubated for airway protection. He was admitted to inpatient status to the ICU.     Overview/Hospital Course:  Admitted to ICU with HHS, encephalopathy, GI bleed, ADONAY, respiratory failure and sepsis. Sepsis was due to E. Coli UTI and Strep agalactiae pneumonia. Critical care and nephrology consulted and broad antibiotics continued. GI was consulted and he had EGD on 6/29 which showed normal esophagus and duodenum with OG tube trauma to mid gastric body but no source of bleeding was identified. He was extubated on 6/30. He attempted bowel prep for colonoscopy but was unable to tolerate; colonoscopy was pushed back. He had slow improvement in ADONAY as well as improvement in sepsis with antibiotics. ID was consulted and recommended cefazolin. He was found to have a large posterior neck abscess so general surgery was consulted and he had I&D. He was transferred out of ICU on 7/3. He  was scheduled to have colonoscopy on 7/6 but was unable to tolerate bowel prep again so it was canceled. Cultures from neck wound demonstrated finegoldia and he was started on a course of metronidazole as he still had drainage; discussed with general surgery, but given draining adequately they did not pursue further I&D. PT/OT continued to evaluate him and recommended SNF placement.    Interval History: No acute events overnight. Feeling well this morning. No complaints or concerns today.    Review of Systems   Constitutional: Negative for chills and fever.   Respiratory: Negative for cough and shortness of breath.    Cardiovascular: Negative for chest pain and palpitations.   Gastrointestinal: Negative for abdominal pain, nausea and vomiting.     Objective:     Vital Signs (Most Recent):  Temp: 98.3 °F (36.8 °C) (07/15/20 1149)  Pulse: 77 (07/15/20 1149)  Resp: 18 (07/15/20 1149)  BP: (!) 172/77 (07/15/20 1149)  SpO2: 95 % (07/15/20 1149) Vital Signs (24h Range):  Temp:  [97.8 °F (36.6 °C)-99.3 °F (37.4 °C)] 98.3 °F (36.8 °C)  Pulse:  [68-81] 77  Resp:  [16-23] 18  SpO2:  [93 %-96 %] 95 %  BP: (149-172)/(67-78) 172/77     Weight: (!) 144.5 kg (318 lb 9 oz)  Body mass index is 40.83 kg/m².    Intake/Output Summary (Last 24 hours) at 7/15/2020 1220  Last data filed at 7/15/2020 0539  Gross per 24 hour   Intake 240 ml   Output 1150 ml   Net -910 ml      Physical Exam  Vitals signs and nursing note reviewed.   Constitutional:       General: He is not in acute distress.     Appearance: Normal appearance.   Cardiovascular:      Rate and Rhythm: Normal rate and regular rhythm.   Pulmonary:      Effort: No respiratory distress.      Comments: Decreased at the bases  Abdominal:      General: Bowel sounds are normal.      Palpations: Abdomen is soft.      Tenderness: There is no abdominal tenderness.   Musculoskeletal:         General: No tenderness.      Right lower leg: No edema.      Left lower leg: No edema.   Skin:      General: Skin is warm and dry.      Comments: L posterior neck abscess s/p I&D, dressing in place   Neurological:      Mental Status: He is alert and oriented to person, place, and time.       Significant Labs:   CBC:  Recent Labs   Lab 07/15/20  0426   WBC 7.00   HGB 7.5*   HCT 25.6*         CMP:  Recent Labs   Lab 07/15/20  0426      K 5.2*   *   CO2 21*   BUN 23   CREATININE 2.6*   *   CALCIUM 7.4*   PHOS 3.4   ALBUMIN 2.2*   ANIONGAP 10      Significant Imaging:   No new imaging this morning.      Assessment/Plan:      * Type 2 diabetes mellitus with hyperglycemia  - HHS resolved.  - Glucose with some variability, but overall stable  - Continuing insulin detemir 35U subQ daily, aspart 12U subQ TIDWM, low-dose SSI aspart 0-5U subQ TIDWM PRN.    Debility  - Continuing PT/OT; await SNF placement.    Abscess of neck  - Soft tissue neck CT with abscess L posterior neck.  - General surgery consulted, s/p I&D 7/2. Discussed with Dr. Basilio today, wound to be re-evaluated  - Anaerobic culture showing Finegoldia.  - Continuing metronidazole 500mg PO q8hr. Will continue metronidazole for 14 day course to cover finegoldia.    Acute blood loss anemia  - 2/2 GI bleed.  - s/p 4 units PRBC.  - Hgb stable the past few days. Monitor.    GIB (gastrointestinal bleeding)  - s/p 2U pRBCs 06/27, 1U pRBCs 06/28, 1U pRBC 6/30.  - Continue to trend Hgb and transfuse PRN Hgb < 7.  - Continuing pantoprazole 40mg PO daily.  - EGD 6/29 with normal esophagus, OG tube trauma to the mid body and normal duodenum.  - Unable to tolerate bowel prep. Colonoscopy deferred since Hgb stable, can be done outpatient.    Obesity, Class III, BMI 40-49.9 (morbid obesity)  - Dietary counseling.    Dilated cardiomyopathy  - Appears euvolemic.  - Continue furosemide 40mg PO every other day.    CKD (chronic kidney disease) stage 4, GFR 15-29 ml/min  - ADONAY resolved  - Baseline Cr ~2.4-2.7.  - Back to baseline function. Appreciate  nephrology assistance.  - Monitor    Essential hypertension  - BP increasing  - Continuing nifedipine 60mg PO BID. Increase coreg to 25 mg BID      VTE Risk Mitigation (From admission, onward)         Ordered     IP VTE HIGH RISK PATIENT  Once      06/28/20 0139     Reason for No Pharmacological VTE Prophylaxis  Once     Question:  Reasons:  Answer:  Active Bleeding    06/28/20 0139     Place sequential compression device  Until discontinued      06/28/20 0036                      Triny Dill MD  Department of Hospital Medicine   [facility]

## 2020-07-15 NOTE — PROGRESS NOTES
Pt sitting up in the chair allowing left neck wound assessment. Noted new red, raised purulent lesion lateral to left neck wound. Able to express purulent tan exudate with palpation. Neck wound still covered with nonviable tissue. Dr Dill to ask for surgical evaluation.     07/15/20 1000   Pain/Comfort/Sleep   Preferred Pain Scale word (verbal rating pain scale)   Comfort/Acceptable Pain Level 0   Pain Rating: Rest 0 - no pain   Pain Rating: Activity 0 - no pain   POSS ([place]ero Opioid-Induced Sed Scale) 1 - Awake and alert   Sleep/Rest/Relaxation no problem identified   Cognitive   Level of Consciousness (AVPU) alert        Altered Skin Integrity 07/02/20 1000 Left posterior Neck Ulceration Full thickness tissue loss. Base is covered by slough and/or eschar in the wound bed   Date First Assessed/Time First Assessed: 07/02/20 1000   Altered Skin Integrity Present on Admission: suspected hospital acquired  Side: Left  Orientation: posterior  Location: Neck  Is this injury device related?: (c) Other (see comments)  Primary Wo...   Wound Image    Description of Altered Skin Integrity Full thickness tissue loss. Base is covered by slough and/or eschar in the wound bed   Dressing Appearance Open to air;No dressing  (wound dry , dessicated left ALTAF)   Drainage Amount None   Drainage Characteristics/Odor No odor   Appearance White;Tan;Necrotic;Slough;Dry  (adjacent new 0.5x1cm raised red pustula)   Tissue loss description Full thickness   Yellow (%), Wound Tissue Color 100 %  (white- tan slough)   Periwound Area Other (see comments)  (0.5x1cm red raised purulent pustule)   Wound Edges Open   Wound Length (cm) 4 cm   Wound Width (cm) 4.5 cm   Wound Depth (cm) 0.1 cm   Wound Volume (cm^3) 1.8 cm^3   Wound Surface Area (cm^2) 18 cm^2   Care Cleansed with:;Wound cleanser;Applied:  ([facility]ad)   Dressing Applied;Foam        Altered Skin Integrity 07/08/20 0453 Left lower Arm Skin Tear   Date First Assessed/Time First Assessed:  07/08/20 0453   Altered Skin Integrity Present on Admission: (c) suspected hospital acquired  Side: Left  Orientation: lower  Location: Arm  Primary Wound Type: Skin Tear   Wound Image    Dressing Appearance Clean;Intact   Drainage Amount None   Drainage Characteristics/Odor No odor   Appearance Purple  (irregular shaped discoloration, raised pink keloid scar)   Periwound Area Intact   Wound Length (cm) 2.5 cm   Wound Width (cm) 1 cm   Wound Surface Area (cm^2) 2.5 cm^2   Care Cleansed with:;Wound cleanser   Dressing Applied;Foam   Safety Management   Patient Rounds bed in low position;bed wheels locked;call light in patient/parent reach;clutter free environment maintained;ID band on;placement of personal items at bedside;toileting offered;visualized patient   Safety Promotion/Fall Prevention assistive device/personal item within reach;bed alarm set;commode/urinal/bedpan at bedside;diversional activities provided;Fall Risk reviewed with patient/family;Fall Risk signage in place;lighting adjusted;medications reviewed;nonskid shoes/socks when out of bed;side rails raised x 2;/camera at bedside;instructed to call staff for mobility   Daily Care   Activity Management up in chair - L3   Positioning   Body Position positioned/repositioned independently   Head of Bed (HOB) HOB elevated   Positioning/Transfer Devices pillows;in use     Faviola Johnson RN CWON

## 2020-07-16 VITALS
OXYGEN SATURATION: 100 % | BODY MASS INDEX: 40.43 KG/M2 | WEIGHT: 315 LBS | SYSTOLIC BLOOD PRESSURE: 140 MMHG | HEIGHT: 74 IN | TEMPERATURE: 98 F | DIASTOLIC BLOOD PRESSURE: 65 MMHG | HEART RATE: 65 BPM | RESPIRATION RATE: 18 BRPM

## 2020-07-16 LAB
POCT GLUCOSE: 135 MG/DL (ref 70–110)
POCT GLUCOSE: 175 MG/DL (ref 70–110)
POCT GLUCOSE: 291 MG/DL (ref 70–110)

## 2020-07-16 PROCEDURE — 25000003 PHARM REV CODE 250: Performed by: INTERNAL MEDICINE

## 2020-07-16 PROCEDURE — 99239 HOSP IP/OBS DSCHRG MGMT >30: CPT | Mod: ,,, | Performed by: INTERNAL MEDICINE

## 2020-07-16 PROCEDURE — 27000221 HC OXYGEN, UP TO 24 HOURS

## 2020-07-16 PROCEDURE — 99239 PR HOSPITAL DISCHARGE DAY,>30 MIN: ICD-10-PCS | Mod: ,,, | Performed by: INTERNAL MEDICINE

## 2020-07-16 PROCEDURE — 97110 THERAPEUTIC EXERCISES: CPT

## 2020-07-16 PROCEDURE — 94761 N-INVAS EAR/PLS OXIMETRY MLT: CPT

## 2020-07-16 PROCEDURE — 25000003 PHARM REV CODE 250: Performed by: NURSE PRACTITIONER

## 2020-07-16 RX ADMIN — METRONIDAZOLE 500 MG: 500 TABLET ORAL at 01:07

## 2020-07-16 RX ADMIN — METRONIDAZOLE 500 MG: 500 TABLET ORAL at 05:07

## 2020-07-16 RX ADMIN — SODIUM BICARBONATE 650 MG TABLET 650 MG: at 09:07

## 2020-07-16 RX ADMIN — PANTOPRAZOLE SODIUM 40 MG: 40 TABLET, DELAYED RELEASE ORAL at 09:07

## 2020-07-16 RX ADMIN — INSULIN ASPART 3 UNITS: 100 INJECTION, SOLUTION INTRAVENOUS; SUBCUTANEOUS at 01:07

## 2020-07-16 RX ADMIN — MELATONIN 1000 UNITS: at 09:07

## 2020-07-16 RX ADMIN — NIFEDIPINE 60 MG: 30 TABLET, FILM COATED, EXTENDED RELEASE ORAL at 09:07

## 2020-07-16 RX ADMIN — TIMOLOL MALEATE 1 DROP: 2.5 SOLUTION/ DROPS OPHTHALMIC at 09:07

## 2020-07-16 RX ADMIN — INSULIN DETEMIR 35 UNITS: 100 INJECTION, SOLUTION SUBCUTANEOUS at 09:07

## 2020-07-16 RX ADMIN — INSULIN ASPART 12 UNITS: 100 INJECTION, SOLUTION INTRAVENOUS; SUBCUTANEOUS at 09:07

## 2020-07-16 RX ADMIN — CARVEDILOL 25 MG: 12.5 TABLET, FILM COATED ORAL at 09:07

## 2020-07-16 RX ADMIN — INSULIN ASPART 12 UNITS: 100 INJECTION, SOLUTION INTRAVENOUS; SUBCUTANEOUS at 01:07

## 2020-07-16 RX ADMIN — FUROSEMIDE 40 MG: 40 TABLET ORAL at 09:07

## 2020-07-16 RX ADMIN — TAMSULOSIN HYDROCHLORIDE 0.4 MG: 0.4 CAPSULE ORAL at 09:07

## 2020-07-16 NOTE — PT/OT/SLP PROGRESS
"Occupational Therapy      Patient Name:  Vinnie Siegel   MRN:  [id number]    0906:  Patient not seen today secondary to Patient currently in with SNF representative with rep stating, "I need like 5 more minutes to go through these last 4 pages."  Will follow-up later this A.M.    0956:  Patient not seen today secondary to breakfast tray just arriving.  Pt. Received better positioning in prep for meal with BLE lowered, received assist to don socks as feet would be on floor, and food tray placed anteriorly with call light left in reach.  Will follow-up later as time permits.     Germaine Bradford, OT  7/16/2020  "

## 2020-07-16 NOTE — PLAN OF CARE
Pt remained free from falls or injuries this shift. Pt independent in repositioning. No skin breakdown noticed. Pt rested well through the night. Up in chair for comfort w assist. Pt gets SOB w minimal exertion, refused resp treatment. VSS

## 2020-07-16 NOTE — DISCHARGE SUMMARY
[facility] Hospital Medicine  Discharge Summary      Patient Name: Vinnie Siegel  MRN: [id number]  Admission Date: 6/27/2020  Hospital Length of Stay: 19 days  Discharge Date and Time:  07/16/2020 11:22 AM  Attending Physician: Triny Dill MD   Discharging Provider: Triny Dill MD  Primary Care Provider: Janeth Walter MD      HPI:   Mr. Vinnie Siegel is a 74 y.o. male, with PMH of IDDM-2, CKD-IV, dilated cardiomyopathy, HTN, HLD, gout, obesity, who presented to [facility] ED via EMS on 6/27/20 with rectal bleeding x 2 days. His family called EMS after he became lethargic earlier this evening. Per his fiancee, he has been passing dark blood stools, and has been noting generalized weakness x 1 week. Additionally, he has been having elevated blood glucose readings x 1 day. In the ED, he was found to be in DKA, with a GI bleed. H&H were 6.4 & 19.4 and he was transfused 2 units PRBCs. He was started on an insulin drip and a Protonix drip. He had worsening respiratory and mental status while in the ED, and was intubated for airway protection. He was admitted to inpatient status to the ICU.     Procedure(s) (LRB):  EGD (ESOPHAGOGASTRODUODENOSCOPY) (N/A)      Hospital Course:   Admitted to ICU with HHS, encephalopathy, GI bleed, ADONAY, respiratory failure and sepsis. Sepsis was due to E. Coli UTI and Strep agalactiae pneumonia. Critical care and nephrology consulted and broad antibiotics continued. GI was consulted and he had EGD on 6/29 which showed normal esophagus and duodenum with OG tube trauma to mid gastric body but no source of bleeding was identified. He was extubated on 6/30. He attempted bowel prep for colonoscopy but was unable to tolerate; colonoscopy was pushed back. He had slow improvement in ADONAY as well as improvement in sepsis with antibiotics. ID was consulted and recommended cefazolin. He was found to have a large posterior neck abscess so general surgery was consulted and he had  I&D. He was transferred out of ICU on 7/3. He was scheduled to have colonoscopy on 7/6 but was unable to tolerate bowel prep again so it was canceled. Cultures from neck wound demonstrated finegoldia and he was started on a course of metronidazole as he still had drainage; discussed with general surgery, but given draining adequately they did not pursue further I&D. PT/OT continued to evaluate him and recommended SNF placement. He is stable and ready for discharge to SNF today.     Consults:   Consults (From admission, onward)        Status Ordering Provider     Inpatient consult to Diabetes educator  Once     Provider:  (Not yet assigned)    Acknowledged TRE LAZO     Inpatient consult to Gastroenterology  Once     Provider:  Yara Sher MD    Completed JACE THAYER     Inpatient consult to General Surgery  Once     Provider:  Mark Basilio Jr., MD    Acknowledged TRE LAZO     Inpatient consult to Infectious Diseases  Once     Provider:  Samanta Cesar MD    Completed NYDIA SEVILLA     Inpatient consult to Nephrology-[facility]  Once     Provider:  Stephanie Ashley MD    Completed NYDIA SEVILLA     Inpatient consult to Pulmonary Critical Care  Once     Provider:  (Not yet assigned)    Completed JACE THAYER     Inpatient consult to Registered Dietitian/Nutritionist  Once     Provider:  (Not yet assigned)    Completed JACE THAYER     Pharmacy to renally dose medication  Once     Provider:  (Not yet assigned)    Acknowledged TRE LAZO          No new Assessment & Plan notes have been filed under this hospital service since the last note was generated.  Service: Hospital Medicine    Final Active Diagnoses:    Diagnosis Date Noted POA    PRINCIPAL PROBLEM:  Type 2 diabetes mellitus with hyperglycemia [E11.65] 06/27/2020 Yes    Debility [R53.81] 07/04/2020 Yes    Abscess of neck [L02.11] 07/02/2020 Yes    GIB (gastrointestinal bleeding) [K92.2] 06/27/2020 Yes    Acute  blood loss anemia [D62] 06/27/2020 Yes    Obesity, Class III, BMI 40-49.9 (morbid obesity) [E66.01] 10/09/2018 Yes     Chronic    Dilated cardiomyopathy [I42.0] 02/07/2017 Yes     Chronic    CKD (chronic kidney disease) stage 4, GFR 15-29 ml/min [N18.4] 03/25/2015 Yes     Chronic    Essential hypertension [I10] 07/10/2012 Yes     Chronic      Problems Resolved During this Admission:    Diagnosis Date Noted Date Resolved POA    Abdominal distension [R14.0] 07/03/2020 07/14/2020 No    Hypernatremia [E87.0] 07/02/2020 07/05/2020 No    LLL pneumonia [J18.1] 06/30/2020 07/14/2020 Yes    Endotracheal tube present [Z97.8]  06/30/2020 Yes    Encephalopathy, metabolic [G93.41] 06/28/2020 07/03/2020 Yes    Metabolic acidosis, normal anion gap (NAG) [E87.2] 06/28/2020 06/30/2020 Yes    Acute respiratory failure with hypoxia [J96.01] 06/27/2020 07/04/2020 Yes    UTI (urinary tract infection) [N39.0] 06/27/2020 07/14/2020 Yes    Hematuria [R31.9] 06/27/2020 06/30/2020 Yes    Severe sepsis [A41.9, R65.20] 06/27/2020 07/04/2020 Yes    Acute renal failure superimposed on stage 4 chronic kidney disease [N17.9, N18.4] 06/27/2020 07/14/2020 Yes    Elevated troponin [R79.89] 06/27/2020 07/02/2020 Yes    Hypertensive emergency [I16.1] 06/27/2020 06/30/2020 Yes    Type 2 diabetes mellitus with stage 4 chronic kidney disease, with long-term current use of insulin [E11.22, N18.4, Z79.4] 03/30/2017 06/30/2020 Not Applicable     Chronic    Hyperlipidemia [E78.5] 09/10/2012 07/03/2020 Yes     Chronic       Discharged Condition: good    Disposition: Skilled Nursing Facility    Follow Up:  Follow-up Information     Janeth Walter MD. Schedule an appointment as soon as possible for a visit in 2 weeks.    Specialty: Family Medicine  Why: hospital follow-up  Contact information:  [address] [address]  [phone]             Yara Sher MD In 1 month.    Specialty: Gastroenterology  Why:  outpatient f/u for colonoscopy  Contact information:  [address]  [address]  [facility] [address]  [phone]             Yisel Ramírez DO In 2 weeks.    Specialty: Nephrology  Why: hospital follow-up  Contact information:  [address]  [facility] [address]  [phone]                 Patient Instructions:      Diet diabetic     Vital signs per facility protocol     Intake and output per facility protocol     Skin assessment every shift      Notify Physician     Order Specific Question Answer Comments   Temperature (F) greater than 101    Systolic Blood Pressure SBP greater than or equal to 160    Systolic Blood Pressure SBP less than or equal to 90    Diastolic Blood Pressure DBP greater than or equal to 110    Diastolic Blood Pressure DBP less than or equal to 60    Pulse greater than or equal to 120    Pulse less than or equal to 50    Respirations Rate RR greater than or equal to 30    Respirations Rate RR less than or equal to 6    Urine output less than 60 over 2 hours   SPO2% less than 90      Full code     Pulse Oximetry       Significant Diagnostic Studies: Labs:   BMP:   Recent Labs   Lab 07/15/20  0426   *      K 5.2*   *   CO2 21*   BUN 23   CREATININE 2.6*   CALCIUM 7.4*   , CBC   Recent Labs   Lab 07/15/20  0426   WBC 7.00   HGB 7.5*   HCT 25.6*       and All labs within the past 24 hours have been reviewed    Pending Diagnostic Studies:     None         Medications:  Reconciled Home Medications:      Medication List      START taking these medications    ipratropium-albuteroL  mcg/actuation inhaler  Commonly known as: COMBIVENT  Inhale 2 puffs into the lungs every 6 (six) hours as needed for Wheezing or Shortness of Breath. Rescue     metroNIDAZOLE 500 MG tablet  Commonly known as: FLAGYL  Take 1 tablet (500 mg total) by mouth every 8 (eight) hours. for 9 days     pantoprazole 40 MG tablet  Commonly known as: PROTONIX  Take 1 tablet (40 mg total) by  mouth once daily.     vitamin D 1000 units Tab  Commonly known as: VITAMIN D3  Take 1 tablet (1,000 Units total) by mouth once daily.        CHANGE how you take these medications    furosemide 40 MG tablet  Commonly known as: LASIX  Take 1 tablet (40 mg total) by mouth once daily.  What changed:   · medication strength  · how much to take  · how to take this  · when to take this  · additional instructions     insulin lispro 100 unit/mL pen  Commonly known as: HumaLOG KwikPen Insulin  Inject 14 Units into the skin 3 (three) times daily with meals.  What changed:   · how much to take  · how to take this  · when to take this  · additional instructions     NIFEdipine 60 MG (OSM) 24 hr tablet  Commonly known as: PROCARDIA-XL  Take 1 tablet (60 mg total) by mouth 2 (two) times a day.  What changed:   · medication strength  · how much to take  · when to take this     sodium bicarbonate 650 MG tablet  Take 1 tablet (650 mg total) by mouth once daily.  What changed: when to take this        CONTINUE taking these medications    atorvastatin 40 MG tablet  Commonly known as: LIPITOR  Take 1 tablet (40 mg total) by mouth once daily.     blood sugar diagnostic Strp  To check BG 4  times daily, to use with insurance preferred meter, e 11.65     blood-glucose meter kit  To check BG 3 times daily, to use with insurance preferred meter, e 11.65     carvediloL 25 MG tablet  Commonly known as: COREG  TAKE 1 TABLET(25 MG) BY MOUTH TWICE DAILY WITH MEALS     fluticasone propionate 50 mcg/actuation nasal spray  Commonly known as: FLONASE  2 sprays (100 mcg total) by Each Nostril route once daily.     insulin glargine 100 units/mL (3mL) SubQ pen  Commonly known as: LANTUS SOLOSTAR U-100 INSULIN  Inject 36 Units into the skin every evening.     * lancets Misc  To check BG 4  times daily, to use with insurance preferred meter, e 11 .65     * TRUEPLUS LANCETS 33 gauge Misc  Generic drug: lancets  TEST FOUR TIMES DAILY     magnesium oxide 400  "mg (241.3 mg magnesium) tablet  Commonly known as: MAG-OX  Take 1 tablet (400 mg total) by mouth every morning.     pen needle, diabetic 31 gauge x 5/16" Ndle  Commonly known as: BD ULTRA-FINE SHORT PEN NEEDLE  USE FOUR TIMES DAILY     tamsulosin 0.4 mg Cap  Commonly known as: FLOMAX  Take 1 capsule (0.4 mg total) by mouth every morning.     timolol maleate 0.25% 0.25 % Drop  Commonly known as: TIMOPTIC  Place 1 drop into both eyes 2 (two) times daily.         * This list has 2 medication(s) that are the same as other medications prescribed for you. Read the directions carefully, and ask your doctor or other care provider to review them with you.            STOP taking these medications    albuterol 90 mcg/actuation inhaler  Commonly known as: PROVENTIL/VENTOLIN HFA     allopurinoL 100 MG tablet  Commonly known as: ZYLOPRIM     chlorzoxazone 500 mg Tab  Commonly known as: PARAFON FORTE     HYDROcodone-acetaminophen 7.5-325 mg per tablet  Commonly known as: NORCO     irbesartan 150 MG tablet  Commonly known as: AVAPRO     spironolactone 25 MG tablet  Commonly known as: ALDACTONE            Indwelling Lines/Drains at time of discharge:   Lines/Drains/Airways     None                 Time spent on the discharge of patient: 35 minutes  Patient was seen and examined on the date of discharge and determined to be suitable for discharge.         Triny Dill MD  Department of Hospital Medicine  [facility]  "

## 2020-07-16 NOTE — PLAN OF CARE
KEIRY spoke to badiel from [facility]. Admission paperwork is completed.     MD updated for discharge.

## 2020-07-16 NOTE — PLAN OF CARE
LMSW contacted patient flow center [id number] and spoke to [facility]. Transportation was arranged for 2:30 but weather has been bad. [facility] will follow up and call LMSW back.

## 2020-07-16 NOTE — NURSING
Patient stable. Discharge education provided. Verbalized understanding. Denies further needs or questions. IV removed. Tolerated well. Personal belongings gathered to be sent home with the patient. Transported via wheelchair. Safety maintained.

## 2020-07-16 NOTE — PLAN OF CARE
LMSW sent updated orders to SNF. LMSW called SNF and received call report. Call report to 200 pod nurse [phone]. Patient is going to room 204A. LMSW updated RN Debbie.    ADT 30 WC with 2 liters of oxygen ordered. [facility] uses [facility]. LMSW documented.     CM needs addressed for discharge.        07/16/20 1135   Final Note   Assessment Type Final Discharge Note   Anticipated Discharge Disposition SNF   What phone number can be called within the next 1-3 days to see how you are doing after discharge? [phone]   Right Care Referral Info   Post Acute Recommendation SNF / Sub-Acute Rehab   Referral Type SNF   Facility Name [facility]   Post-Acute Status   Post-Acute Placement Status Set-up Complete

## 2020-07-16 NOTE — PLAN OF CARE
Problem: Occupational Therapy Goal  Goal: Occupational Therapy Goal  Description: Goals to be met by: 07/16/2020     Patient will increase functional independence with ADLs by performing:    REVISED UBD with SBA.  REVISED Complete 2 grooming tasks in standing at sink with Min assist  Rolling to Bilateral with Minimum Assistance.   REVISED Supine to sit with Contact Guard Assistance.   REVISED Sit to/from stand with CGA  Increased functional strength to WFL for ADL tasks, functional transfers and bed mobility.      COMPLETED GOALS  Supine to sit with Moderate Assistance.(MET 7/4/20)  UE Dressing with Moderate Assistance.(MET 07/07/2020)  Assess functional transfers. (MET 07/07/2020)  Sitting at edge of bed x15 minutes with Moderate Assistance. (MET 07/07/2020)  Grooming while EOB with Moderate Assistance. (MET 07/13/2020)  REVISED Sit to/from stand with Mod assist of 1 person.(MET 07/13/2020)    Outcome: Ongoing, Progressing       Progressing towards goals.  Continued recommendation of post acute OT in SNF.  To benefit from continued acute care OT services to increase independence in self-care/functional transfers.  Continue POC.

## 2020-07-16 NOTE — PT/OT/SLP PROGRESS
"Occupational Therapy   Treatment    Name: Vinnie Siegel  MRN: [id number]  Admitting Diagnosis:  Type 2 diabetes mellitus with hyperglycemia  17 Days Post-Op    Recommendations:     Discharge Recommendations: nursing facility, skilled  Discharge Equipment Recommendations:  other (see comments)(defer to next level of care)  Barriers to discharge:  Other (Comment), Inaccessible home environment, Decreased caregiver support(current functional level)    Assessment:   Requiring increased encouragement for task participation.  Given options for ADL tasks and seated exercise with Pt. Agreeable to seated there-ex, but even then stating "I'm going to have therapy at the other place.  What do I need to do therapy here for?"  Required 1 to 1.5-minute rest breaks between each set with Pt. Breathing heavy after each requiring rest.  Also educated on self-pacing and deep breathing tech at this time along with call light use at very end of session.  Progressing towards goals.  Continued recommendation of post acute OT in SNF.  To benefit from continued acute care OT services to increase independence in self-care/functional transfers.  Continue POC.        Vinnie Siegel is a 74 y.o. male with a medical diagnosis of Type 2 diabetes mellitus with hyperglycemia.  He presents with below deficits decreasing independence in self-care/functional transfers. Performance deficits affecting function are weakness, impaired endurance, impaired self care skills, impaired functional mobilty, gait instability, impaired balance, decreased upper extremity function, decreased lower extremity function, decreased ROM, pain, decreased safety awareness, impaired cardiopulmonary response to activity.     Rehab Prognosis:  Good; patient would benefit from acute skilled OT services to address these deficits and reach maximum level of function.       Plan:     Patient to be seen 5 x/week to address the above listed problems via self-care/home management, " therapeutic activities, therapeutic exercises  · Plan of Care Expires: 07/30/20  · Plan of Care Reviewed with: patient    Subjective     Pain/Comfort:  · Pain Rating 1: 0/10  · Pain Rating Post-Intervention 1: 0/10    Objective:     Communicated with: Debbie FARRELL RN prior to session.  Patient found up in chair with peripheral IV, oxygen upon OT entry to room.    General Precautions: Standard, fall, diabetic   Orthopedic Precautions:N/A   Braces: N/A     AMPAC 6 Click ADL: 11    Treatment & Education:  -Educated on energy conservation strategies, call light use, and importance of activity  -Seated BUE there-ex for shoulder flex/ext to 90 degrees 16 X 3, forward punches 28 X 3, and shoulder horizontal ABD below 90 degrees 20 X 3; for BUE strengthening and improved endurance needed to increase independence with ADL/functional transfers    Patient left up in chair with all lines intact, call button in reach and nursing notifiedEducation:      GOALS:   Multidisciplinary Problems     Occupational Therapy Goals        Problem: Occupational Therapy Goal    Goal Priority Disciplines Outcome Interventions   Occupational Therapy Goal     OT, PT/OT Ongoing, Progressing    Description: Goals to be met by: 07/16/2020     Patient will increase functional independence with ADLs by performing:    REVISED UBD with SBA.  REVISED Complete 2 grooming tasks in standing at sink with Min assist  Rolling to Bilateral with Minimum Assistance.   REVISED Supine to sit with Contact Guard Assistance.   REVISED Sit to/from stand with CGA  Increased functional strength to WFL for ADL tasks, functional transfers and bed mobility.      COMPLETED GOALS  Supine to sit with Moderate Assistance.(MET 7/4/20)  UE Dressing with Moderate Assistance.(MET 07/07/2020)  Assess functional transfers. (MET 07/07/2020)  Sitting at edge of bed x15 minutes with Moderate Assistance. (MET 07/07/2020)  Grooming while EOB with Moderate Assistance. (MET  07/13/2020)  REVISED Sit to/from stand with Mod assist of 1 person.(MET 07/13/2020)                       Time Tracking:     OT Date of Treatment: 07/16/20  OT Start Time: 1150  OT Stop Time: 1213  OT Total Time (min): 23 min    Billable Minutes:Therapeutic Exercise 23    Germaine Bradford OT  7/16/2020

## 2020-07-17 ENCOUNTER — PATIENT OUTREACH (OUTPATIENT)
Dept: ADMINISTRATIVE | Facility: CLINIC | Age: 75
End: 2020-07-17

## 2020-07-27 ENCOUNTER — PES CALL (OUTPATIENT)
Dept: ADMINISTRATIVE | Facility: CLINIC | Age: 75
End: 2020-07-27

## 2020-08-06 ENCOUNTER — TELEPHONE (OUTPATIENT)
Dept: PRIMARY CARE CLINIC | Facility: CLINIC | Age: 75
End: 2020-08-06

## 2020-08-06 NOTE — TELEPHONE ENCOUNTER
----- Message from Sheree Saucedo sent at 8/6/2020 12:03 PM CDT -----  Regarding: Office # Request  Contact: STEVE Michaels RN   [facility]   CBN# [phone]     Nurse is requesting office number on behalf of patient.

## 2020-08-06 NOTE — TELEPHONE ENCOUNTER
Pt has started home health today with [facility]. If Dr. Walter would like to order anything please let them know.

## 2020-08-06 NOTE — TELEPHONE ENCOUNTER
----- Message from Sheree Saucedo sent at 8/6/2020 12:03 PM CDT -----  Regarding: Office # Request  Contact: STEVE Michaels RN   [facility]   CBN# [phone]     Nurse is requesting office number on behalf of patient.

## 2020-08-06 NOTE — TELEPHONE ENCOUNTER
----- Message from Sheree Saucedo sent at 8/6/2020 12:03 PM CDT -----  Regarding: Office # Request  Contact: STEVE Michaels RN   [facility]   CBN# [phone]     Nurse is requesting office number on behalf of patient.

## 2020-08-10 ENCOUNTER — LAB VISIT (OUTPATIENT)
Dept: LAB | Facility: HOSPITAL | Age: 75
End: 2020-08-10
Payer: MEDICARE

## 2020-08-10 DIAGNOSIS — E11.22 TYPE 2 DIABETES MELLITUS WITH STAGE 3 CHRONIC KIDNEY DISEASE, WITH LONG-TERM CURRENT USE OF INSULIN: ICD-10-CM

## 2020-08-10 DIAGNOSIS — N18.30 TYPE 2 DIABETES MELLITUS WITH STAGE 3 CHRONIC KIDNEY DISEASE, WITH LONG-TERM CURRENT USE OF INSULIN: ICD-10-CM

## 2020-08-10 DIAGNOSIS — Z79.4 TYPE 2 DIABETES MELLITUS WITH STAGE 3 CHRONIC KIDNEY DISEASE, WITH LONG-TERM CURRENT USE OF INSULIN: ICD-10-CM

## 2020-08-10 LAB
ESTIMATED AVG GLUCOSE: 123 MG/DL (ref 68–131)
HBA1C MFR BLD HPLC: 5.9 % (ref 4–5.6)

## 2020-08-10 PROCEDURE — 36415 COLL VENOUS BLD VENIPUNCTURE: CPT

## 2020-08-10 PROCEDURE — 83036 HEMOGLOBIN GLYCOSYLATED A1C: CPT

## 2020-08-17 ENCOUNTER — TELEPHONE (OUTPATIENT)
Dept: PRIMARY CARE CLINIC | Facility: CLINIC | Age: 75
End: 2020-08-17

## 2020-08-17 DIAGNOSIS — L08.9 WOUND INFECTION: Primary | ICD-10-CM

## 2020-08-17 DIAGNOSIS — T14.8XXA WOUND INFECTION: Primary | ICD-10-CM

## 2020-08-17 NOTE — TELEPHONE ENCOUNTER
Chris from [facility] states that the pt has a Wound on back of his neck that is hard to touch, raised, and looks in infected. The home health nurses have been trying to treat it but it is not getting any better and they recommend that a wound care nurse looks at it. [place] referral.

## 2020-08-17 NOTE — TELEPHONE ENCOUNTER
----- Message from Pauline Pak sent at 8/14/2020  1:21 PM CDT -----  Regarding: Call Back  Name of Who is Calling : Chris (Home Health)    Patient is requesting a call from staff in regards to getting patient a consult for wound care.....Please contact to further discuss and advise.    Can the clinic reply by [facility] : No     What Number to Call Back :  [phone]

## 2020-08-18 DIAGNOSIS — I10 ESSENTIAL HYPERTENSION: ICD-10-CM

## 2020-08-18 DIAGNOSIS — E78.5 HYPERLIPIDEMIA, UNSPECIFIED HYPERLIPIDEMIA TYPE: ICD-10-CM

## 2020-08-18 DIAGNOSIS — I42.0 DILATED CARDIOMYOPATHY: ICD-10-CM

## 2020-08-18 RX ORDER — ATORVASTATIN CALCIUM 40 MG/1
40 TABLET, FILM COATED ORAL DAILY
Qty: 90 TABLET | Refills: 0 | Status: SHIPPED | OUTPATIENT
Start: 2020-08-18 | End: 2020-11-17 | Stop reason: SDUPTHER

## 2020-08-18 RX ORDER — CARVEDILOL 25 MG/1
TABLET ORAL
Qty: 180 TABLET | Refills: 0 | Status: SHIPPED | OUTPATIENT
Start: 2020-08-18 | End: 2020-11-17 | Stop reason: SDUPTHER

## 2020-08-18 NOTE — TELEPHONE ENCOUNTER
Informed pt that Dr. Walter states that allopurinol and spironolactone was D/c in the hospital and that he should stop these medications. Pt verbalized understanding.

## 2020-08-18 NOTE — TELEPHONE ENCOUNTER
Pt was taken off these meds in July after his hospital stay. Please let pt know that and I will not RF them until he is seen in the  Office to discuss further. They were stopped prob due to adverse effects of med leading to his hospitalization.  Please inform.  PV

## 2020-08-18 NOTE — TELEPHONE ENCOUNTER
Informed pt that rx sent in.    Pt is now requesting refills for spironolactone and allopurinol. Both meds have not been filled in along time and are no longer active on his med card. Pt states that he has beentaking the medication. Please advise if you like to refill.

## 2020-08-20 ENCOUNTER — PATIENT OUTREACH (OUTPATIENT)
Dept: HOME HEALTH SERVICES | Facility: HOSPITAL | Age: 75
End: 2020-08-20

## 2020-08-20 NOTE — PATIENT INSTRUCTIONS
Hypoglycemia (Low Blood Sugar)     Fast-acting sugar includes a cup of nonfat milk.     Too little sugar (glucose) in your blood is called hypoglycemia or low blood sugar. Low blood sugar usually means anything lower than 70 mg/dL. Talk with your healthcare provider about your target range and what level is too low for you. Diabetes itself doesnt cause low blood sugar. But some of the treatments for diabetes, such as pills or insulin, may raise your risk for it. Low blood sugar may cause you to pass out or have a seizure. So always treat low blood sugar right away, but don't overeat.  Special note: Always carry a source of fast-acting sugar and a snack in case of hypoglycemia.   What you may notice  If you have low blood sugar, you may have one or more of these symptoms:  · Shakiness or dizziness  · Cold, clammy skin or sweating  · Feelings of hunger  · Headache  · Nervousness  · A hard, fast heartbeat  · Weakness  · Confusion or irritability  · Blurred vision  · Having nightmares or waking up confused or sweating  · Numbness or tingling in the lips or tongue  What you should do  Here are tips to follow if you have hypoglycemia:   · First check your blood sugar. If it is too low (out of your target range), eat or drink 15 to 20 grams of fast-acting sugar. This may be 3 to 4 glucose tablets, 4 ounces (half a cup) of fruit juice or regular (nondiet) soda, 8 ounces (1 cup) of fat-free milk, or 1 tablespoon of honey. Dont take more than this, or your blood sugar may go too high.  · Wait 15 minutes. Then recheck your blood sugar if you can.  · If your blood sugar is still too low, repeat the steps above and check your blood sugar again. If your blood sugar still has not returned to your target range, contact your healthcare provider or seek emergency care.  · Once your blood sugar returns to target range, eat a snack or meal.  Preventing low blood sugar  Things you can do include the following:   · If your condition  needs a strict treatment plan, eat your meals and snacks at the same times each day. Dont skip meals!  · If your treatment plan lets you change when you eat and what you eat, learn how to change the time and dose of your rapid-acting insulin to match this.   · Ask your healthcare provider if it is safe for you to drink alcohol. Never drink on an empty stomach.  · Take your medicine at the prescribed times.  · Always carry a source of fast-acting sugar and a snack when youre away from home.  Other things to do  Additional tips include the following:  · Carry a medical ID card, a compact USB drive, or wear a medical alert bracelet or necklace. It should say that you have diabetes. It should also say what to do if you pass out or have a seizure.  · Make sure your family, friends, and coworkers know the signs of low blood sugar. Tell them what to do if your blood sugar falls very low and you cant treat yourself.  · Keep a glucagon emergency kit handy. Be sure your family, friends, and coworkers know how and when to use it. Check it regularly and replace the glucagon before it expires.  · Talk with your health care team about other things you can do to prevent low blood sugar.     If you have unexplained hypoglycemia or hypoglycemia several times, call your healthcare provider.   Date Last Reviewed: 5/1/2016  © 5546-3848 [employer]. [address] [place] [address], [place] [address]. All rights reserved. This information is not intended as a substitute for professional medical care. Always follow your healthcare professional's instructions.

## 2020-08-21 DIAGNOSIS — N18.30 STAGE 3 CHRONIC KIDNEY DISEASE: Primary | ICD-10-CM

## 2020-08-25 ENCOUNTER — TELEPHONE (OUTPATIENT)
Dept: WOUND CARE | Facility: CLINIC | Age: 75
End: 2020-08-25

## 2020-08-25 NOTE — TELEPHONE ENCOUNTER
Returned call and spoke with patient, asked if he would like to come in tomorrow for 9am, refused - prefer an appointment next week.  Offered patient early morning on Monday and Tuesday prefers mid morning appointment.  Offered and accepted 11am on Thursday, 9/3/2020.  Instructed to report to 5th floor clinic [facility] location.

## 2020-08-25 NOTE — TELEPHONE ENCOUNTER
----- Message from Cyndi Ordoñez sent at 8/25/2020 11:40 AM CDT -----  Regarding: Shaila Gama   Reason:  Calling to get pt reschedule for appt that pt canceled. Please call       Communication:Vanesa Velásquez  [phone]

## 2020-08-26 ENCOUNTER — OFFICE VISIT (OUTPATIENT)
Dept: PRIMARY CARE CLINIC | Facility: CLINIC | Age: 75
End: 2020-08-26
Attending: FAMILY MEDICINE
Payer: MEDICARE

## 2020-08-26 VITALS
SYSTOLIC BLOOD PRESSURE: 122 MMHG | HEIGHT: 74 IN | DIASTOLIC BLOOD PRESSURE: 58 MMHG | BODY MASS INDEX: 40.43 KG/M2 | WEIGHT: 315 LBS | OXYGEN SATURATION: 98 % | HEART RATE: 82 BPM

## 2020-08-26 DIAGNOSIS — R06.02 SOB (SHORTNESS OF BREATH): ICD-10-CM

## 2020-08-26 DIAGNOSIS — I42.0 DILATED CARDIOMYOPATHY: Chronic | ICD-10-CM

## 2020-08-26 DIAGNOSIS — L02.11 ABSCESS OF NECK: ICD-10-CM

## 2020-08-26 DIAGNOSIS — E08.9 DIABETES MELLITUS DUE TO UNDERLYING CONDITION WITHOUT COMPLICATION, WITHOUT LONG-TERM CURRENT USE OF INSULIN: Primary | ICD-10-CM

## 2020-08-26 DIAGNOSIS — I10 ESSENTIAL HYPERTENSION: Chronic | ICD-10-CM

## 2020-08-26 DIAGNOSIS — E11.65 TYPE 2 DIABETES MELLITUS WITH HYPERGLYCEMIA, WITHOUT LONG-TERM CURRENT USE OF INSULIN: ICD-10-CM

## 2020-08-26 DIAGNOSIS — E66.01 OBESITY, CLASS III, BMI 40-49.9 (MORBID OBESITY): Chronic | ICD-10-CM

## 2020-08-26 DIAGNOSIS — N18.4 CKD (CHRONIC KIDNEY DISEASE) STAGE 4, GFR 15-29 ML/MIN: Chronic | ICD-10-CM

## 2020-08-26 PROCEDURE — 99999 PR PBB SHADOW E&M-EST. PATIENT-LVL IV: ICD-10-PCS | Mod: PBBFAC,,, | Performed by: FAMILY MEDICINE

## 2020-08-26 PROCEDURE — 99214 OFFICE O/P EST MOD 30 MIN: CPT | Mod: PBBFAC,PN | Performed by: FAMILY MEDICINE

## 2020-08-26 PROCEDURE — 99214 PR OFFICE/OUTPT VISIT, EST, LEVL IV, 30-39 MIN: ICD-10-PCS | Mod: S$PBB,,, | Performed by: FAMILY MEDICINE

## 2020-08-26 PROCEDURE — 99999 PR PBB SHADOW E&M-EST. PATIENT-LVL IV: CPT | Mod: PBBFAC,,, | Performed by: FAMILY MEDICINE

## 2020-08-26 PROCEDURE — 99214 OFFICE O/P EST MOD 30 MIN: CPT | Mod: S$PBB,,, | Performed by: FAMILY MEDICINE

## 2020-08-26 RX ORDER — NIFEDIPINE 60 MG/1
60 TABLET, EXTENDED RELEASE ORAL 2 TIMES DAILY
Qty: 180 TABLET | Refills: 1 | Status: SHIPPED | OUTPATIENT
Start: 2020-08-26 | End: 2020-09-08

## 2020-08-26 RX ORDER — FUROSEMIDE 40 MG/1
40 TABLET ORAL DAILY
Qty: 90 TABLET | Refills: 1
Start: 2020-08-26 | End: 2020-09-01 | Stop reason: SDUPTHER

## 2020-08-26 NOTE — PROGRESS NOTES
Subjective:       Patient ID: Vinnie Siegel is a 75 y.o. male.    Chief Complaint: Hospital Follow Up    Pt presents today for hospital follow up since admission for rectal bleeding. Pt states that bleeding has stopped and he had endoscope which was overall within normal with some gastritis noted. Pt also is due for cscope upon d/c from SNF and states that he has this scheduled.  Pt also is f/u on DM, HTN f/u. Per pt, he is trying his best to keep up with all of his appts and being compliant with his meds.     States that he presently is having home health as well as PT since d/c. Does feel deconditioned and wonders if he should have 02.   Has wound in back of neck and has appt w wound clinic 9/3. nephro on 9/8.    Wants his meds RF'd today exc for insulins      Hypertension  Associated symptoms include neck pain and shortness of breath.   Hyperlipidemia  Associated symptoms include shortness of breath.   Medication Refill  Associated symptoms include arthralgias, joint swelling and neck pain. Pertinent negatives include no abdominal pain, fatigue, numbness, rash or weakness.   Diabetes  Pertinent negatives for hypoglycemia include no dizziness, nervousness/anxiousness or pallor. Pertinent negatives for diabetes include no fatigue and no weakness.     Review of Systems   Constitutional: Positive for activity change. Negative for appetite change and fatigue.   HENT: Negative.    Eyes: Negative.    Respiratory: Positive for chest tightness and shortness of breath.    Cardiovascular: Positive for leg swelling.   Gastrointestinal: Negative.  Negative for abdominal pain.   Endocrine: Negative.    Genitourinary: Negative.  Negative for difficulty urinating, dysuria, frequency and urgency.   Musculoskeletal: Positive for arthralgias, back pain, gait problem, joint swelling and neck pain.   Skin: Positive for color change and wound. Negative for pallor and rash.   Allergic/Immunologic: Negative.    Neurological:  Negative for dizziness, weakness, light-headedness and numbness.   Hematological: Negative.    Psychiatric/Behavioral: Negative.  Negative for decreased concentration, dysphoric mood, self-injury, sleep disturbance and suicidal ideas. The patient is not nervous/anxious.    All other systems reviewed and are negative.      Objective:      Physical Exam  Constitutional:       General: He is not in acute distress.     Appearance: He is well-developed. He is obese. He is not ill-appearing or diaphoretic.   HENT:      Head: Normocephalic and atraumatic.      Right Ear: Tympanic membrane, ear canal and external ear normal.      Left Ear: Tympanic membrane, ear canal and external ear normal.      Nose: Nose normal.   Eyes:      General: No scleral icterus.        Right eye: No discharge.         Left eye: No discharge.      Conjunctiva/sclera: Conjunctivae normal.      Pupils: Pupils are equal, round, and reactive to light.   Neck:      Musculoskeletal: Normal range of motion and neck supple.      Vascular: No JVD.   Cardiovascular:      Rate and Rhythm: Normal rate and regular rhythm.      Pulses: Normal pulses.      Heart sounds: Normal heart sounds.   Pulmonary:      Effort: Pulmonary effort is normal. No respiratory distress.      Breath sounds: No stridor. Wheezing present. No rales.   Chest:      Chest wall: No tenderness.   Abdominal:      General: Bowel sounds are normal. There is no distension.      Palpations: Abdomen is soft. There is no mass.      Tenderness: There is no abdominal tenderness. There is no guarding or rebound.      Hernia: No hernia is present.   Musculoskeletal: Normal range of motion.         General: No tenderness.   Skin:     General: Skin is warm and dry.   Neurological:      Mental Status: He is alert and oriented to person, place, and time.      Cranial Nerves: No cranial nerve deficit.      Motor: No abnormal muscle tone.      Coordination: Coordination normal.      Deep Tendon Reflexes:  Reflexes are normal and symmetric.   Psychiatric:         Behavior: Behavior normal.         Thought Content: Thought content normal.         Judgment: Judgment normal.         Assessment:       1. Diabetes mellitus due to underlying condition without complication, without long-term current use of insulin    2. SOB (shortness of breath)        Plan:       HTN: controlled.   DM: followed by endocrine, controlled  Diabetes mellitus due to underlying condition without complication, without long-term current use of insulin  -     Lipid Panel; Future; Expected date: 08/26/2020    SOB (shortness of breath)  -     Ambulatory referral/consult to Pulmonology; Future; Expected date: 09/02/2020    pt is asking for oxygen tank and wants assessment and review of his COPD. PT STOPPED SMOKING. YAY!  RTC prn symptoms or 6 mos    Diabetes mellitus due to underlying condition without complication, without long-term current use of insulin  -     Lipid Panel; Future; Expected date: 08/26/2020    SOB (shortness of breath)  -     Ambulatory referral/consult to Pulmonology; Future; Expected date: 09/02/2020    Other orders  -     NIFEdipine (PROCARDIA-XL) 60 MG (OSM) 24 hr tablet; Take 1 tablet (60 mg total) by mouth 2 (two) times a day.  Dispense: 180 tablet; Refill: 1  -     furosemide (LASIX) 40 MG tablet; Take 1 tablet (40 mg total) by mouth once daily.  Dispense: 90 tablet; Refill: 1  -     ipratropium-albuteroL (COMBIVENT)  mcg/actuation inhaler; Inhale 2 puffs into the lungs every 6 (six) hours as needed for Wheezing or Shortness of Breath. Rescue  Dispense: 1 Package; Refill: 0      Meds rf'd for pt as per his preference for 90 days    Greater than 45 mins spent with pt; 50 % face to face

## 2020-09-01 DIAGNOSIS — I42.0 DILATED CARDIOMYOPATHY: ICD-10-CM

## 2020-09-01 DIAGNOSIS — R06.02 SOB (SHORTNESS OF BREATH): Primary | ICD-10-CM

## 2020-09-01 RX ORDER — FUROSEMIDE 40 MG/1
40 TABLET ORAL DAILY
Qty: 90 TABLET | Refills: 1 | Status: SHIPPED | OUTPATIENT
Start: 2020-09-01 | End: 2020-11-09 | Stop reason: DRUGHIGH

## 2020-09-02 ENCOUNTER — OFFICE VISIT (OUTPATIENT)
Dept: PRIMARY CARE CLINIC | Facility: CLINIC | Age: 75
End: 2020-09-02
Attending: FAMILY MEDICINE
Payer: MEDICARE

## 2020-09-02 ENCOUNTER — PATIENT OUTREACH (OUTPATIENT)
Dept: ADMINISTRATIVE | Facility: OTHER | Age: 75
End: 2020-09-02

## 2020-09-02 DIAGNOSIS — I10 ESSENTIAL HYPERTENSION: Chronic | ICD-10-CM

## 2020-09-02 DIAGNOSIS — G47.01 INSOMNIA DUE TO MEDICAL CONDITION: Primary | ICD-10-CM

## 2020-09-02 DIAGNOSIS — H40.1113 PRIMARY OPEN ANGLE GLAUCOMA OF RIGHT EYE, SEVERE STAGE: Primary | ICD-10-CM

## 2020-09-02 DIAGNOSIS — H40.022 OPEN ANGLE WITH BORDERLINE FINDINGS, HIGH RISK, LEFT: ICD-10-CM

## 2020-09-02 DIAGNOSIS — Z87.898 HISTORY OF SNORING: ICD-10-CM

## 2020-09-02 DIAGNOSIS — E66.01 OBESITY, CLASS III, BMI 40-49.9 (MORBID OBESITY): Chronic | ICD-10-CM

## 2020-09-02 PROCEDURE — 99441 PR PHYSICIAN TELEPHONE EVALUATION 5-10 MIN: ICD-10-PCS | Mod: 95,,, | Performed by: FAMILY MEDICINE

## 2020-09-02 PROCEDURE — 99441 PR PHYSICIAN TELEPHONE EVALUATION 5-10 MIN: CPT | Mod: 95,,, | Performed by: FAMILY MEDICINE

## 2020-09-02 RX ORDER — DORZOLAMIDE HYDROCHLORIDE AND TIMOLOL MALEATE 20; 5 MG/ML; MG/ML
1 SOLUTION/ DROPS OPHTHALMIC 2 TIMES DAILY
Qty: 10 ML | Refills: 6 | Status: SHIPPED | OUTPATIENT
Start: 2020-09-02 | End: 2020-11-05 | Stop reason: SDUPTHER

## 2020-09-02 NOTE — PROGRESS NOTES
Assessment /Plan     For exam results, see Encounter Report.    Primary open angle glaucoma of right eye, severe stage  -     dorzolamide-timolol 2-0.5% (COSOPT) 22.3-6.8 mg/mL ophthalmic solution; Place 1 drop into the right eye 2 (two) times daily.  Dispense: 10 mL; Refill: 6    Open angle with borderline findings, high risk, left  -     dorzolamide-timolol 2-0.5% (COSOPT) 22.3-6.8 mg/mL ophthalmic solution; Place 1 drop into the right eye 2 (two) times daily.  Dispense: 10 mL; Refill: 6            Timolol on backorder.

## 2020-09-03 ENCOUNTER — OFFICE VISIT (OUTPATIENT)
Dept: WOUND CARE | Facility: CLINIC | Age: 75
End: 2020-09-03
Payer: MEDICARE

## 2020-09-03 ENCOUNTER — TELEPHONE (OUTPATIENT)
Dept: PRIMARY CARE CLINIC | Facility: CLINIC | Age: 75
End: 2020-09-03

## 2020-09-03 VITALS
BODY MASS INDEX: 40.43 KG/M2 | WEIGHT: 315 LBS | TEMPERATURE: 99 F | HEIGHT: 74 IN | DIASTOLIC BLOOD PRESSURE: 70 MMHG | SYSTOLIC BLOOD PRESSURE: 152 MMHG | HEART RATE: 62 BPM

## 2020-09-03 DIAGNOSIS — L08.9 WOUND INFECTION: ICD-10-CM

## 2020-09-03 DIAGNOSIS — S11.90XA OPEN WOUND OF NECK, INITIAL ENCOUNTER: Primary | ICD-10-CM

## 2020-09-03 DIAGNOSIS — T14.8XXA WOUND INFECTION: ICD-10-CM

## 2020-09-03 PROCEDURE — 99213 PR OFFICE/OUTPT VISIT, EST, LEVL III, 20-29 MIN: ICD-10-PCS | Mod: 25,S$PBB,ICN, | Performed by: NURSE PRACTITIONER

## 2020-09-03 PROCEDURE — 99213 OFFICE O/P EST LOW 20 MIN: CPT | Mod: 25,S$PBB,ICN, | Performed by: NURSE PRACTITIONER

## 2020-09-03 PROCEDURE — 11042 DBRDMT SUBQ TIS 1ST 20SQCM/<: CPT | Mod: S$PBB,ICN,, | Performed by: NURSE PRACTITIONER

## 2020-09-03 PROCEDURE — 99215 OFFICE O/P EST HI 40 MIN: CPT | Mod: PBBFAC | Performed by: NURSE PRACTITIONER

## 2020-09-03 PROCEDURE — 11042 DEBRIDEMENT: ICD-10-PCS | Mod: S$PBB,ICN,, | Performed by: NURSE PRACTITIONER

## 2020-09-03 PROCEDURE — 99999 PR PBB SHADOW E&M-EST. PATIENT-LVL V: CPT | Mod: PBBFAC,,, | Performed by: NURSE PRACTITIONER

## 2020-09-03 PROCEDURE — 99999 PR PBB SHADOW E&M-EST. PATIENT-LVL V: ICD-10-PCS | Mod: PBBFAC,,, | Performed by: NURSE PRACTITIONER

## 2020-09-03 PROCEDURE — 11042 DBRDMT SUBQ TIS 1ST 20SQCM/<: CPT | Mod: PBBFAC | Performed by: NURSE PRACTITIONER

## 2020-09-03 NOTE — PATIENT INSTRUCTIONS
Clean wounds with mild soap and water and pat dry  Medihoney to wound and cover with mepore dressing  Do not get wound dressings wet, if wet remove soiled dressings and apply new dressings  Observe for signs and symptoms of infection and report symptoms to clinic  Skilled nursing visits 2 times per week    If home health does not supply medihoney:   May purchase medihoney at     [facility] located at [address] [place] [address].  The telephone number is [phone]     [facility] at [address] [place] [address], [phone]    [facility] at [address] [id number] [phone]

## 2020-09-03 NOTE — TELEPHONE ENCOUNTER
Patient was left a detailed message to return my call to discuss scheduling his appointment with Sleep Clinic.

## 2020-09-03 NOTE — LETTER
September 3, 2020      Janeth Walter MD  [address]   [address]  [facility] [address]           [facility] 5th Fl  [address]  [facility] [address]  Phone: [phone]          Patient: Vinnie Siegel   MR Number: [id number]   YOB: 1945   Date of Visit: 9/3/2020       Dear Dr. Janeth Walter:    Thank you for referring Vinnie Siegel to me for evaluation. Attached you will find relevant portions of my assessment and plan of care.    If you have questions, please do not hesitate to call me. I look forward to following Vinnie Siegel along with you.    Sincerely,    Susy Cespedes NP    Enclosure  CC:  No Recipients    If you would like to receive this communication electronically, please contact [email or web address] or [phone] to request more information on [employer] Link access.    For providers and/or their staff who would like to refer a patient to [facility], please contact us through our one-stop-shop provider referral line, Bebe Phillips, at [phone].    If you feel you have received this communication in error or would no longer like to receive these types of communications, please e-mail [email or web address]

## 2020-09-03 NOTE — PROGRESS NOTES
"Subjective:       Patient ID: Vinnie Siegel is a 75 y.o. male.    Chief Complaint: Wound Consult    HPI     75 y.o. male presents for evaluation and treatment of wound to posterior neck. The wound started as a "bump about 1 year ago" and never healed. He was in hospital for sepsis due to UTI and pneumonia.  He was discharged to SNF on 7/16/20.  During hospitalization he had abscess and had I&D per general surgery and treated with metronidazole for + culture of finegoldia.  He has [facility] home health. He is unsure what the current wound care is to wound.  Denies fever, chills, pain, erythema, warmth. C/o drainage at times.           Past Medical History:   Diagnosis Date    Cataract     Chronic kidney disease     Colon polyp     Diabetes mellitus     Diabetes mellitus type II     Glaucoma 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 open angle     Hyperlipidemia     Hypertension     Kidney stone     Seasonal allergies 6/24/2014     Past Surgical History:   Procedure Laterality Date    CATARACT EXTRACTION W/  INTRAOCULAR LENS IMPLANT Right 04/04/16    Dr Meehan    COLONOSCOPY W/ BIOPSIES      ESOPHAGOGASTRODUODENOSCOPY N/A 6/29/2020    Procedure: EGD (ESOPHAGOGASTRODUODENOSCOPY);  Surgeon: Ravi Leone MD;  Location: [facility];  Service: Endoscopy;  Laterality: N/A;    EYE SURGERY      HEMORRHOID SURGERY      [facility]    lateral internal anal sphincterotomy  12/13/13    [facility]    URETERAL STENT PLACEMENT           Review of Systems   Constitutional: Negative for activity change, chills, diaphoresis, fatigue and fever.   Respiratory: Negative for apnea, cough, chest tightness and shortness of breath.    Cardiovascular: Negative for chest pain, palpitations and leg swelling.   Musculoskeletal: Negative for gait problem and joint swelling.   Skin: Positive for wound. Negative for pallor and rash.   Neurological: Negative for syncope, weakness and numbness.   Psychiatric/Behavioral: Negative for agitation. The " patient is not nervous/anxious.    All other systems reviewed and are negative.          Objective:      Physical Exam  Vitals signs reviewed.   Constitutional:       General: He is not in acute distress.     Appearance: Normal appearance. He is well-developed.   HENT:      Head: Normocephalic.   Neck:      Musculoskeletal: Normal range of motion.   Cardiovascular:      Rate and Rhythm: Normal rate.      Pulses: Normal pulses.   Pulmonary:      Effort: Pulmonary effort is normal. No respiratory distress.   Musculoskeletal: Normal range of motion.         General: No swelling or tenderness.   Skin:     General: Skin is warm and dry.      Capillary Refill: Capillary refill takes less than 2 seconds.   Neurological:      Mental Status: He is alert and oriented to person, place, and time.   Psychiatric:         Behavior: Behavior normal.             Assessment:       1. Open wound of neck, initial encounter    2. Wound infection           Wound 09/03/20 1118  posterior Neck (Active)   09/03/20 1118    Pre-existing: Yes   Primary Wound Type:    Side:    Orientation: posterior   Location: Neck   Wound Number (optional):    Ankle-Brachial Index:    Pulses:    Removal Indication and Assessment:    Wound Outcome:    (Retired) Wound Type:    (Retired) Wound Length (cm):    (Retired) Wound Width (cm):    (Retired) Depth (cm):    Wound Description (Comments):    Removal Indications:    Wound Image   09/03/20 1118   Dressing Appearance Dry;Intact 09/03/20 1118   Drainage Amount Small 09/03/20 1118   Drainage Characteristics/Odor Serosanguineous 09/03/20 1118   Appearance Necrotic;Black 09/03/20 1118   Tissue loss description Full thickness 09/03/20 1118   Black (%), Wound Tissue Color 100 % 09/03/20 1118   Periwound Area Intact 09/03/20 1118   Wound Edges Undefined 09/03/20 1118   Wound Length (cm) 1.5 cm 09/03/20 1118   Wound Width (cm) 2 cm 09/03/20 1118   Wound Surface Area (cm^2) 3 cm^2 09/03/20 1118       Post debridement             Plan:       Wound debrided per debridement note, tolerated well  Medihoney to wound daily and cover with mepore dressing  Do not get wound dressings wet, if wet remove soiled dressings and apply new dressings  Observe for signs and symptoms of infection and report symptoms to clinic  F/U 2 weeks    [facility] wound care orders    Clean wounds with mild soap and water and pat dry  Medihoney to wound and cover with mepore dressing  Do not get wound dressings wet, if wet remove soiled dressings and apply new dressings  Observe for signs and symptoms of infection and report symptoms to clinic  Skilled nursing visits 2 times per week

## 2020-09-03 NOTE — PROCEDURES
"Debridement    Date/Time: 9/3/2020 11:00 AM  Performed by: Susy Cespedes NP  Authorized by: Susy Cespedes NP     Time out: Immediately prior to procedure a "time out" was called to verify the correct patient, procedure, equipment, support staff and site/side marked as required.    Consent Done?:  Yes (Verbal)    Wound Details:    Location:  Neck    Type of Debridement:  Excisional       Length (cm):  1.5       Area (sq cm):  3       Width (cm):  2       Percent Debrided (%):  100       Depth (cm):  0.5       Total Area Debrided (sq cm):  3    Depth of debridement:  Subcutaneous tissue    Tissue debrided:  Dermis, Epidermis and Subcutaneous    Devitalized tissue debrided:  Exudate, Necrotic/Eschar and Biofilm    Instruments:  Blade and Forceps    Bleeding:  Minimal  Hemostasis Achieved: Yes    Method Used:  Silver Nitrate  Patient tolerance:  Patient tolerated the procedure well with no immediate complications      "

## 2020-09-08 ENCOUNTER — OFFICE VISIT (OUTPATIENT)
Dept: NEPHROLOGY | Facility: CLINIC | Age: 75
End: 2020-09-08
Payer: MEDICARE

## 2020-09-08 ENCOUNTER — TELEPHONE (OUTPATIENT)
Dept: NEPHROLOGY | Facility: CLINIC | Age: 75
End: 2020-09-08

## 2020-09-08 ENCOUNTER — TELEPHONE (OUTPATIENT)
Dept: PULMONOLOGY | Facility: CLINIC | Age: 75
End: 2020-09-08

## 2020-09-08 VITALS
DIASTOLIC BLOOD PRESSURE: 72 MMHG | BODY MASS INDEX: 43.31 KG/M2 | OXYGEN SATURATION: 96 % | WEIGHT: 315 LBS | SYSTOLIC BLOOD PRESSURE: 144 MMHG | HEART RATE: 64 BPM

## 2020-09-08 DIAGNOSIS — R06.02 SOB (SHORTNESS OF BREATH): Primary | ICD-10-CM

## 2020-09-08 DIAGNOSIS — I10 HYPERTENSION, UNSPECIFIED TYPE: ICD-10-CM

## 2020-09-08 DIAGNOSIS — E11.65 TYPE 2 DIABETES MELLITUS WITH HYPERGLYCEMIA, WITHOUT LONG-TERM CURRENT USE OF INSULIN: ICD-10-CM

## 2020-09-08 DIAGNOSIS — N18.4 CKD (CHRONIC KIDNEY DISEASE) STAGE 4, GFR 15-29 ML/MIN: Primary | ICD-10-CM

## 2020-09-08 PROCEDURE — 99999 PR PBB SHADOW E&M-EST. PATIENT-LVL IV: CPT | Mod: PBBFAC,,, | Performed by: INTERNAL MEDICINE

## 2020-09-08 PROCEDURE — 99999 PR PBB SHADOW E&M-EST. PATIENT-LVL IV: ICD-10-PCS | Mod: PBBFAC,,, | Performed by: INTERNAL MEDICINE

## 2020-09-08 PROCEDURE — 99205 PR OFFICE/OUTPT VISIT, NEW, LEVL V, 60-74 MIN: ICD-10-PCS | Mod: S$PBB,,, | Performed by: INTERNAL MEDICINE

## 2020-09-08 PROCEDURE — 99205 OFFICE O/P NEW HI 60 MIN: CPT | Mod: S$PBB,,, | Performed by: INTERNAL MEDICINE

## 2020-09-08 PROCEDURE — 99214 OFFICE O/P EST MOD 30 MIN: CPT | Mod: PBBFAC | Performed by: INTERNAL MEDICINE

## 2020-09-08 RX ORDER — LOSARTAN POTASSIUM 100 MG/1
100 TABLET ORAL DAILY
Qty: 90 TABLET | Refills: 3 | Status: SHIPPED | OUTPATIENT
Start: 2020-09-08 | End: 2020-09-22

## 2020-09-08 NOTE — TELEPHONE ENCOUNTER
Pt. Given a low potassium and low oxalate food list. Given the order for a RFP to be done in 10 days. He will have his home health nurse do it. His labs received from [facility] collected on 9/2/20 faxed to [facility] records.

## 2020-09-08 NOTE — PROGRESS NOTES
Subjective:       Patient ID: Vinnie Siegel is a 75 y.o. Black or  male who presents for follow-up evaluation of Chronic Kidney Disease    HPI This is a 75 -year-old -American male with nephrolithiasis, hypertension and diabetes,ckd, proteinuria who is coming in for f/u of  Nephrolithiasis, ckd, HTN and proteinuria. No recent stones and denies any hematuria, dysuria, or flank pain. DM not well controlled in the past but much better at a1c of 5.9 recently . Bp's stable at home with home health checks but unable to recall numbers.    Creatinine stable.  Has Proteinuria. Admitted to ICU in June 2020 with HHS, encephalopathy, GI bleed, ADONAY, respiratory failure and sepsis. Sepsis was due to E. Coli UTI and Strep agalactiae pneumonia. Pt lost f/u and was last seen in 2017 in renal clinic. Denies NSAID's.     PAST MEDICAL HISTORY: Significant for hypertension, diabetes for several years,   and nephrolithiasis. The patient had first episode about several  years ago and had to   have a subsequent procedure and second stone was in August 2012 and he   had left ureteroscopy for that. He states that he was never symptomatic with   either of the stones and was found on imaging.     SOCIAL HISTORY: He used to smoke less than a pack a day and quit in June 2020. Does not drink. He is a   retired .     FAMILY HISTORY: No family history of kidney stones or kidney problems.     Review of Systems   Constitutional: Positive for fatigue.   Eyes: Negative for discharge.   Respiratory: Positive for shortness of breath. Negative for cough and wheezing.    Cardiovascular: Negative for chest pain and palpitations.   Gastrointestinal: Negative for abdominal pain, diarrhea, nausea and vomiting.   Genitourinary: Negative for dysuria, frequency, hematuria and urgency.   Skin: Negative for color change and rash.   Psychiatric/Behavioral: Negative for confusion.   All other systems reviewed and are negative.       Objective:    limited with pandemic.  +1 edema jose cruz LE.       Assessment:       No diagnosis found.    Plan:         This is a 75 -year-old -American male with history of nephrolithiasis,   hypertension, diabetes, is coming in for f/u of nephrolithiasis, ckd, proteinuria.     1. CKD 4: creatinine of 2.6 with MDRD GFR of 27 ml/min-with progression over the last 3 years. Prior to hospitalization, baseline appears to be 2.6-2.7 and had ADONAY with peak creatinine of  4's and currently back to 2.6.   CKD sec to DM and HTN. Hydrate well.   2.  Proteinuria:  sec to DM and  HTN.  On benazepril.  Stressed importance of good control.  Sig increase in 2017-over 7 gms and rechecked 24 hr urine and lost f/u.  Recent decrease to 1.5 gm.  Serologies neg  but a1c uncontrolled in the past but better now.  Off spironolactone since hopsitalization.  2. Nephrolithiasis: He had calcium oxalate stones 100% in the shell consist of uric acid stones 75%, calcium oxalate 25%. He is instructed on conservative measures to decrease the risk of stone formation. He is instructed to hydrate well along with decreasing the animal protein intake and low-salt intake. On mag supplements. His citrate levels are good. On allopurinol and sodium bicarbonate for low PH but off allopurinol since his recent hopsitalization. Last  US shows did not show stone.  Seen in  Urology in the past.  Has been hydrating better per the pt. Urorisk from 2017 showed high sodium and high oxalate.  Rec low salt diet and to follow low oxalate diet.   3. Hypertension. Blood pressures are stable  per pt.  Call  with readings. Switch nifedipine 60 mg bid to losaratn 100 mg for nephroprotection and proteinuria. Recheck rfp in 10 days.   4. Renal osteodystrophy:  Ca/phos stable.  PTH not done by lab.   5. Acidosis:  Stable on sodiumbicarbonate.   6. Anemia: hgb and iron stable.   Asked him to increase lasix bid for 3 days with LE edema and go back to qday.   RTc in 3  months.

## 2020-09-08 NOTE — TELEPHONE ENCOUNTER
Spoke with patient, informed him that I'm contacting him in regards to scheduling him a Covid Screening Test on 9/15/20. Patient verbalized that he understand and states that he is currently at a Dr appointment and he will contact the office to schedule Covid Screening Test when he leaves his Dr appointment. I verbalized to patient that I understand.

## 2020-09-10 DIAGNOSIS — K92.2 GASTROINTESTINAL HEMORRHAGE, UNSPECIFIED GASTROINTESTINAL HEMORRHAGE TYPE: Primary | ICD-10-CM

## 2020-09-10 RX ORDER — PANTOPRAZOLE SODIUM 40 MG/1
40 TABLET, DELAYED RELEASE ORAL DAILY
Qty: 30 TABLET | Refills: 2 | Status: SHIPPED | OUTPATIENT
Start: 2020-09-10 | End: 2020-12-14 | Stop reason: SDUPTHER

## 2020-09-10 NOTE — TELEPHONE ENCOUNTER
----- Message from Ni Kohler sent at 9/10/2020  9:56 AM CDT -----  Contact: Self [phone]  Type: RX Refill Request    Who Called: Self     Have you contacted your pharmacy:    Refill or New Rx: refill    RX Name and Strength: pantoprazole (PROTONIX) 40 MG tablet    Preferred Pharmacy with phone number:   [id number] - [place]  [address]  AT [facility] [address]  [address] [place] [address]  Phone: [phone] Fax: [phone]    Local or Mail Order: Local    Would the patient rather a call back or a response via [facility]? Call back    Best Call Back Number: [phone]

## 2020-09-11 ENCOUNTER — HOSPITAL ENCOUNTER (EMERGENCY)
Facility: OTHER | Age: 75
Discharge: HOME | End: 2020-09-11
Attending: EMERGENCY MEDICINE
Payer: MEDICARE

## 2020-09-11 VITALS
SYSTOLIC BLOOD PRESSURE: 208 MMHG | HEIGHT: 74 IN | DIASTOLIC BLOOD PRESSURE: 79 MMHG | RESPIRATION RATE: 16 BRPM | HEART RATE: 58 BPM | TEMPERATURE: 98 F | BODY MASS INDEX: 40.43 KG/M2 | OXYGEN SATURATION: 100 % | WEIGHT: 315 LBS

## 2020-09-11 DIAGNOSIS — N18.9 CHRONIC KIDNEY DISEASE, UNSPECIFIED CKD STAGE: ICD-10-CM

## 2020-09-11 DIAGNOSIS — R06.02 SHORTNESS OF BREATH: Primary | ICD-10-CM

## 2020-09-11 DIAGNOSIS — D64.9 ANEMIA, UNSPECIFIED TYPE: ICD-10-CM

## 2020-09-11 DIAGNOSIS — R60.0 BILATERAL LOWER EXTREMITY EDEMA: ICD-10-CM

## 2020-09-11 DIAGNOSIS — I73.9 PERIPHERAL VASCULAR DISEASE: ICD-10-CM

## 2020-09-11 DIAGNOSIS — I10 ESSENTIAL HYPERTENSION: ICD-10-CM

## 2020-09-11 LAB
ALBUMIN SERPL BCP-MCNC: 3.3 G/DL (ref 3.5–5.2)
ALP SERPL-CCNC: 67 U/L (ref 55–135)
ALT SERPL W/O P-5'-P-CCNC: 16 U/L (ref 10–44)
ANION GAP SERPL CALC-SCNC: 10 MMOL/L (ref 8–16)
AST SERPL-CCNC: 12 U/L (ref 10–40)
BASOPHILS # BLD AUTO: 0.04 K/UL (ref 0–0.2)
BASOPHILS NFR BLD: 0.5 % (ref 0–1.9)
BILIRUB SERPL-MCNC: 0.4 MG/DL (ref 0.1–1)
BNP SERPL-MCNC: 238 PG/ML (ref 0–99)
BUN SERPL-MCNC: 37 MG/DL (ref 8–23)
CALCIUM SERPL-MCNC: 8.2 MG/DL (ref 8.7–10.5)
CHLORIDE SERPL-SCNC: 110 MMOL/L (ref 95–110)
CO2 SERPL-SCNC: 20 MMOL/L (ref 23–29)
CREAT SERPL-MCNC: 2.8 MG/DL (ref 0.5–1.4)
CTP QC/QA: YES
DIFFERENTIAL METHOD: ABNORMAL
EOSINOPHIL # BLD AUTO: 0.3 K/UL (ref 0–0.5)
EOSINOPHIL NFR BLD: 3.5 % (ref 0–8)
ERYTHROCYTE [DISTWIDTH] IN BLOOD BY AUTOMATED COUNT: 15.1 % (ref 11.5–14.5)
EST. GFR  (AFRICAN AMERICAN): 25 ML/MIN/1.73 M^2
EST. GFR  (NON AFRICAN AMERICAN): 21 ML/MIN/1.73 M^2
GLUCOSE SERPL-MCNC: 150 MG/DL (ref 70–110)
HCT VFR BLD AUTO: 32.3 % (ref 40–54)
HGB BLD-MCNC: 10.2 G/DL (ref 14–18)
IMM GRANULOCYTES # BLD AUTO: 0.05 K/UL (ref 0–0.04)
IMM GRANULOCYTES NFR BLD AUTO: 0.6 % (ref 0–0.5)
LYMPHOCYTES # BLD AUTO: 1.8 K/UL (ref 1–4.8)
LYMPHOCYTES NFR BLD: 23.3 % (ref 18–48)
MCH RBC QN AUTO: 28.7 PG (ref 27–31)
MCHC RBC AUTO-ENTMCNC: 31.6 G/DL (ref 32–36)
MCV RBC AUTO: 91 FL (ref 82–98)
MONOCYTES # BLD AUTO: 0.8 K/UL (ref 0.3–1)
MONOCYTES NFR BLD: 10.1 % (ref 4–15)
NEUTROPHILS # BLD AUTO: 4.8 K/UL (ref 1.8–7.7)
NEUTROPHILS NFR BLD: 62 % (ref 38–73)
NRBC BLD-RTO: 0 /100 WBC
PLATELET # BLD AUTO: 177 K/UL (ref 150–350)
PMV BLD AUTO: 9 FL (ref 9.2–12.9)
POCT GLUCOSE: 134 MG/DL (ref 70–110)
POTASSIUM SERPL-SCNC: 4.1 MMOL/L (ref 3.5–5.1)
PROT SERPL-MCNC: 7.1 G/DL (ref 6–8.4)
RBC # BLD AUTO: 3.55 M/UL (ref 4.6–6.2)
SARS-COV-2 RDRP RESP QL NAA+PROBE: NEGATIVE
SODIUM SERPL-SCNC: 140 MMOL/L (ref 136–145)
TROPONIN I SERPL DL<=0.01 NG/ML-MCNC: 0.03 NG/ML (ref 0–0.03)
WBC # BLD AUTO: 7.73 K/UL (ref 3.9–12.7)

## 2020-09-11 PROCEDURE — 96375 TX/PRO/DX INJ NEW DRUG ADDON: CPT

## 2020-09-11 PROCEDURE — 99284 EMERGENCY DEPT VISIT MOD MDM: CPT | Mod: 25

## 2020-09-11 PROCEDURE — 84484 ASSAY OF TROPONIN QUANT: CPT

## 2020-09-11 PROCEDURE — 96374 THER/PROPH/DIAG INJ IV PUSH: CPT

## 2020-09-11 PROCEDURE — U0002 COVID-19 LAB TEST NON-CDC: HCPCS | Performed by: EMERGENCY MEDICINE

## 2020-09-11 PROCEDURE — 85025 COMPLETE CBC W/AUTO DIFF WBC: CPT

## 2020-09-11 PROCEDURE — 63600175 PHARM REV CODE 636 W HCPCS: Performed by: EMERGENCY MEDICINE

## 2020-09-11 PROCEDURE — 83880 ASSAY OF NATRIURETIC PEPTIDE: CPT

## 2020-09-11 PROCEDURE — 96376 TX/PRO/DX INJ SAME DRUG ADON: CPT

## 2020-09-11 PROCEDURE — 82962 GLUCOSE BLOOD TEST: CPT

## 2020-09-11 PROCEDURE — 80053 COMPREHEN METABOLIC PANEL: CPT

## 2020-09-11 RX ORDER — HYDRALAZINE HYDROCHLORIDE 20 MG/ML
5 INJECTION INTRAMUSCULAR; INTRAVENOUS
Status: COMPLETED | OUTPATIENT
Start: 2020-09-11 | End: 2020-09-11

## 2020-09-11 RX ORDER — FUROSEMIDE 10 MG/ML
40 INJECTION INTRAMUSCULAR; INTRAVENOUS
Status: COMPLETED | OUTPATIENT
Start: 2020-09-11 | End: 2020-09-11

## 2020-09-11 RX ADMIN — FUROSEMIDE 40 MG: 10 INJECTION, SOLUTION INTRAMUSCULAR; INTRAVENOUS at 05:09

## 2020-09-11 RX ADMIN — HYDRALAZINE HYDROCHLORIDE 5 MG: 20 INJECTION INTRAMUSCULAR; INTRAVENOUS at 05:09

## 2020-09-11 RX ADMIN — HYDRALAZINE HYDROCHLORIDE 5 MG: 20 INJECTION INTRAMUSCULAR; INTRAVENOUS at 07:09

## 2020-09-11 NOTE — ED NOTES
"75 y.o. male to ED with c.o. shortness of breath x 1 month with progressively worsening symptoms over the past week. Patient reports he was admitted here for DKA on 6/27 and was discharged 7/16 to a skilled nursing facility. Patient was discharged from the skilled nursing facility on 8/3. Patient reports he was on oxygen in the hospital and in the skilled nursing facility but was not discharged with home oxygen. Patient denies chest pain, denies n/v/d, denies cough. Patient had 3+ pitting edema noted to bilateral lower extremities. Abdomen rounded and distended. Patient O2 saturation 100% on room air. Patient hypertensive at 222/88. MD Wills aware and at bedside. VS otherwise stable. Patient tearful stating, "I am just going down hill, everything is going down hill." Patient awake, alert, and oriented x 4. No apparent distress noted. VS currently stable. Patient assisted onto stretcher and changed into a gown. Patient placed on cardiac monitor, continuous pulse oximetry and automatic blood pressure cuff. Bed placed in low locked position, side rails up x 2, call light is within reach of patient orientation to room and explanation of wait provided to patient, alarms set and turned on for monitor and pulse ox, awaiting MD evaluation and orders, will continue to monitor.  "

## 2020-09-11 NOTE — ED PROVIDER NOTES
Encounter Date: 9/11/2020    SCRIBE #1 NOTE: I, Leora Joy, am scribing for, and in the presence of, Dr. Wills.       History     Chief Complaint   Patient presents with    Shortness of Breath     SOB with BLE edema for the past month per EMS. Per homehealth nurse, he has been having edema with worsening of SOB since his last Discharge     Time seen by provider: 4:45 PM    This is a 75 y.o. male with hx of HTN, HLD, DM, and CKD who presents via EMS with complaint of shortness of breath. Patient was admitted with DKA from 6/28 to 7/16 and was sent to a rehab facility, which he was discharged from on 8/03. He reports he was on Oxygen at the hospital and rehab facility, but was not sent home with any Oxygen. He has had worsening shortness of breath and intermittent bilateral leg swelling since he was discharged last month with constant leg swelling for the past week. He was given Oxygen en route via EMS. He has difficulty sleeping, though denies this is due to shortness of breath or anxiety. He checks his blood glucose three times a day and notes it was 128 today. He is compliant with his medications, which he took today. He denies fever, congestion, cough, chest pain, constipation, diarrhea, or any urinary symptoms. He has had normal PO intake with no change in appetite. No known exposure to COVID. He smokes 1 PPD, but has not smoked in the last 3 months. He denies hx of CHF or sleep apnea. He has not used CPAP. This is the extent of the patient's complaints at this time.    The history is provided by the patient and medical records.     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 [facility]    lateral internal anal sphincterotomy  13    Dr. [place] [facility]    URETERAL STENT PLACEMENT       Family History   Problem Relation Age of Onset    Diabetes Brother         type 1    Hypertension Brother     Stroke Brother      Social History     Tobacco Use    Smoking status: Former Smoker     Packs/day: 0.50     Years: 45.00     Pack years: 22.50     Quit date: 2020     Years since quittin.2    Smokeless tobacco: Never Used    Tobacco comment: says he could quit and will try    Substance Use Topics    Alcohol use: Yes     Comment: rarely    Drug use: No     Review of Systems   Constitutional: Negative for appetite change, chills and fever.   HENT: Negative for congestion, rhinorrhea and sore throat.    Eyes: Negative for photophobia and redness.   Respiratory: Positive for shortness of breath. Negative for cough.    Cardiovascular: Positive for leg swelling. Negative for chest pain.   Gastrointestinal: Negative for abdominal pain, constipation, diarrhea, nausea and vomiting.   Genitourinary: Negative for decreased urine volume, difficulty urinating, dysuria, frequency, hematuria and urgency.   Musculoskeletal: Negative for back pain.   Skin: Negative for rash.   Neurological: Negative for weakness, light-headedness and headaches.   Psychiatric/Behavioral: Positive for sleep disturbance. Negative for confusion.       Physical Exam     Initial Vitals [20 1631]   BP Pulse Resp Temp SpO2   (!) 229/93 (!) 54 19 98.8 °F (37.1 °C) 100 %      MAP       --         Physical Exam    Nursing note and vitals  reviewed.  Constitutional: He appears well-developed and well-nourished. He is not diaphoretic. No distress.   Tearful.   HENT:   Head: Normocephalic and atraumatic.   Mouth/Throat: Oropharynx is clear and moist.   Moist mucus membranes. TMs clear and intact bilaterally.    Eyes: Conjunctivae and EOM are normal. Pupils are equal, round, and reactive to light.   Conjunctivae pink, clear, and intact.    Neck: Normal range of motion. Neck supple.   Cardiovascular: Normal rate, regular rhythm, S1 normal, S2 normal and normal heart sounds. Exam reveals no gallop and no friction rub.    No murmur heard.  Pulmonary/Chest: No respiratory distress. He has decreased breath sounds (bilaterally). He has no wheezes. He has no rhonchi. He has no rales. He exhibits no tenderness.   Lungs clear to auscultation bilaterally.    Abdominal: Soft. Bowel sounds are normal. There is no abdominal tenderness. There is no rebound and no guarding.   No audible bruits.    Musculoskeletal: Normal range of motion. Edema present. No tenderness.      Comments: 3+ pitting bilateral prepatellar edema. 1+ pitting edema to bilateral thigh.   Lymphadenopathy:     He has no cervical adenopathy.   Neurological: He is alert and oriented to person, place, and time.   Skin: Skin is warm and dry. Capillary refill takes less than 2 seconds.   Warm and dry. No skin tenting, rashes, or lesions.           ED Course   Procedures  Labs Reviewed   CBC W/ AUTO DIFFERENTIAL - Abnormal; Notable for the following components:       Result Value    RBC 3.55 (*)     Hemoglobin 10.2 (*)     Hematocrit 32.3 (*)     Mean Corpuscular Hemoglobin Conc 31.6 (*)     RDW 15.1 (*)     MPV 9.0 (*)     Immature Granulocytes 0.6 (*)     Immature Grans (Abs) 0.05 (*)     All other components within normal limits   COMPREHENSIVE METABOLIC PANEL - Abnormal; Notable for the following components:    CO2 20 (*)     Glucose 150 (*)     BUN, Bld 37 (*)     Creatinine 2.8 (*)     Calcium 8.2  (*)     Albumin 3.3 (*)     eGFR if  25 (*)     eGFR if non  21 (*)     All other components within normal limits   TROPONIN I - Abnormal; Notable for the following components:    Troponin I 0.027 (*)     All other components within normal limits   B-TYPE NATRIURETIC PEPTIDE - Abnormal; Notable for the following components:     (*)     All other components within normal limits   SARS-COV-2 RDRP GENE   POCT GLUCOSE MONITORING CONTINUOUS          Imaging Results          X-Ray Chest AP Portable (Final result)  Result time 09/11/20 17:36:52    Final result by Alie Kelly MD (09/11/20 17:36:52)                 Impression:      No acute cardiopulmonary process identified.      Electronically signed by: Alie Kelly MD  Date:    09/11/2020  Time:    17:36             Narrative:    EXAMINATION:  XR CHEST AP PORTABLE    CLINICAL HISTORY:  Shortness of breath    TECHNIQUE:  Single frontal view of the chest was performed.    COMPARISON:  06/27/2020.    FINDINGS:  Cardiac silhouette appears upper limits of normal but stable in size, noting magnification on this single AP portable view.  Lungs are symmetrically expanded.  No evidence of focal consolidative process, pneumothorax, or significant pleural effusion.  No acute osseous abnormality identified.                              X-Rays:   Independently Interpreted Readings:   Chest X-Ray: No enlarged cardiac silhouette. No consolidations. No pulmonary edema.     Medical Decision Making:   History:   Old Medical Records: I decided to obtain old medical records.  Independently Interpreted Test(s):   I have ordered and independently interpreted X-rays - see prior notes.  Clinical Tests:   Lab Tests: Reviewed  Radiological Study: Reviewed            Scribe Attestation:   Scribe #1: I performed the above scribed service and the documentation accurately describes the services I performed. I attest to the accuracy of the  note.    Attending Attestation:           Physician Attestation for Scribe:  Physician Attestation Statement for Scribe #1: I, Dr. Craft, reviewed documentation, as scribed by Leora Joy in my presence, and it is both accurate and complete.         Attending ED Notes:   Emergent evaluation a 75-year-old male with complaint of intermittent shortness of breath and intermittent bilateral lower extremity edema for the past 7-10 days.  Patient is afebrile, nontoxic-appearing stable vital signs except for elevation of blood pressure.  Patient is neurovascularly intact without focal neurologic deficits.  Patient has no elevation white blood cell count.  H&H 10.2 and 32.3.  On CMP BUN and creatinine are 37 and 2.8 with a calcium of 8.2.  Patient has a history of chronic kidney disease.  Albumin is 3.3.  Troponin is 0.027 which I suspect is from patient's chronic kidney disease.  Patient denies any chest pain.  BNP is 238.  No acute findings on chest x-ray.  Patient is extensively counseled on his diagnosis and treatment including all diagnostic, laboratory and physical exam findings.  The patient discharged good condition and directed follow-up with his PCP in the next 24-48 hours.                    Clinical Impression:     1. Shortness of breath    2. Essential hypertension    3. Anemia, unspecified type    4. Chronic kidney disease, unspecified CKD stage    5. Peripheral vascular disease    6. Bilateral lower extremity edema              ED Disposition Condition    Discharge Good        ED Prescriptions     None        Follow-up Information     Follow up With Specialties Details Why Contact Info    Janeth Walter MD Family Medicine In 2 days  [address] [address]  [phone]                                         Cortes Craft MD  09/11/20 2018

## 2020-09-15 ENCOUNTER — LAB VISIT (OUTPATIENT)
Dept: PRIMARY CARE CLINIC | Facility: CLINIC | Age: 75
End: 2020-09-15
Payer: MEDICARE

## 2020-09-15 ENCOUNTER — LAB VISIT (OUTPATIENT)
Dept: LAB | Facility: HOSPITAL | Age: 75
End: 2020-09-15
Attending: FAMILY MEDICINE
Payer: MEDICARE

## 2020-09-15 DIAGNOSIS — R06.02 SOB (SHORTNESS OF BREATH): ICD-10-CM

## 2020-09-15 DIAGNOSIS — Z01.84 IMMUNITY STATUS TESTING: ICD-10-CM

## 2020-09-15 LAB — SARS-COV-2 IGG SERPLBLD QL IA.RAPID: NEGATIVE

## 2020-09-15 PROCEDURE — 86769 SARS-COV-2 COVID-19 ANTIBODY: CPT

## 2020-09-15 PROCEDURE — U0003 INFECTIOUS AGENT DETECTION BY NUCLEIC ACID (DNA OR RNA); SEVERE ACUTE RESPIRATORY SYNDROME CORONAVIRUS 2 (SARS-COV-2) (CORONAVIRUS DISEASE [COVID-19]), AMPLIFIED PROBE TECHNIQUE, MAKING USE OF HIGH THROUGHPUT TECHNOLOGIES AS DESCRIBED BY CMS-2020-01-R: HCPCS

## 2020-09-15 PROCEDURE — 36415 COLL VENOUS BLD VENIPUNCTURE: CPT | Mod: PN

## 2020-09-16 ENCOUNTER — TELEPHONE (OUTPATIENT)
Dept: NEPHROLOGY | Facility: CLINIC | Age: 75
End: 2020-09-16

## 2020-09-16 ENCOUNTER — TELEPHONE (OUTPATIENT)
Dept: PRIMARY CARE CLINIC | Facility: CLINIC | Age: 75
End: 2020-09-16

## 2020-09-16 LAB — SARS-COV-2 RNA RESP QL NAA+PROBE: NOT DETECTED

## 2020-09-16 NOTE — TELEPHONE ENCOUNTER
----- Message from ARIELA Nielson sent at 9/15/2020  4:45 PM CDT -----  Please contact the patient and let them know that their labs were fine and do not require any change in treatment.

## 2020-09-16 NOTE — TELEPHONE ENCOUNTER
----- Message from Bambi Loja sent at 9/16/2020  9:18 AM CDT -----  Regarding: Lab Inquiry  Pt called requesting to speak with Physician's Nurse in regards to Lab order. Refused to provide further         [phone] (home)

## 2020-09-16 NOTE — TELEPHONE ENCOUNTER
----- Message from Janeth Walter MD sent at 9/15/2020  6:25 PM CDT -----  Neg COVID antibody  test. Pl let pt know  thks

## 2020-09-16 NOTE — TELEPHONE ENCOUNTER
----- Message from Janeth Walter MD sent at 9/16/2020 11:43 AM CDT -----  COVID Test neg-please let pt know-PV

## 2020-09-17 ENCOUNTER — HOSPITAL ENCOUNTER (OUTPATIENT)
Dept: PULMONOLOGY | Facility: CLINIC | Age: 75
Discharge: HOME | End: 2020-09-17
Payer: MEDICARE

## 2020-09-17 ENCOUNTER — TELEPHONE (OUTPATIENT)
Dept: NEPHROLOGY | Facility: CLINIC | Age: 75
End: 2020-09-17

## 2020-09-17 ENCOUNTER — OFFICE VISIT (OUTPATIENT)
Dept: WOUND CARE | Facility: CLINIC | Age: 75
End: 2020-09-17
Payer: MEDICARE

## 2020-09-17 ENCOUNTER — OFFICE VISIT (OUTPATIENT)
Dept: PULMONOLOGY | Facility: CLINIC | Age: 75
End: 2020-09-17
Payer: MEDICARE

## 2020-09-17 ENCOUNTER — TELEPHONE (OUTPATIENT)
Dept: NEPHROLOGY | Facility: HOSPITAL | Age: 75
End: 2020-09-17

## 2020-09-17 VITALS
HEART RATE: 51 BPM | WEIGHT: 315 LBS | SYSTOLIC BLOOD PRESSURE: 206 MMHG | HEIGHT: 74 IN | BODY MASS INDEX: 40.43 KG/M2 | DIASTOLIC BLOOD PRESSURE: 88 MMHG | TEMPERATURE: 99 F

## 2020-09-17 VITALS
HEART RATE: 54 BPM | SYSTOLIC BLOOD PRESSURE: 202 MMHG | BODY MASS INDEX: 40.43 KG/M2 | OXYGEN SATURATION: 99 % | HEIGHT: 74 IN | DIASTOLIC BLOOD PRESSURE: 90 MMHG | WEIGHT: 315 LBS

## 2020-09-17 DIAGNOSIS — E11.22 CKD STAGE 4 DUE TO TYPE 2 DIABETES MELLITUS: ICD-10-CM

## 2020-09-17 DIAGNOSIS — N18.4 TYPE 2 DIABETES MELLITUS WITH STAGE 4 CHRONIC KIDNEY DISEASE, UNSPECIFIED WHETHER LONG TERM INSULIN USE: ICD-10-CM

## 2020-09-17 DIAGNOSIS — I50.32 CHRONIC DIASTOLIC (CONGESTIVE) HEART FAILURE: Primary | ICD-10-CM

## 2020-09-17 DIAGNOSIS — S11.90XA OPEN WOUND OF NECK, INITIAL ENCOUNTER: Primary | ICD-10-CM

## 2020-09-17 DIAGNOSIS — E11.22 TYPE 2 DIABETES MELLITUS WITH STAGE 4 CHRONIC KIDNEY DISEASE, UNSPECIFIED WHETHER LONG TERM INSULIN USE: ICD-10-CM

## 2020-09-17 DIAGNOSIS — N18.4 CKD STAGE 4 DUE TO TYPE 2 DIABETES MELLITUS: ICD-10-CM

## 2020-09-17 DIAGNOSIS — E66.2 CLASS 3 OBESITY WITH ALVEOLAR HYPOVENTILATION, SERIOUS COMORBIDITY, AND BODY MASS INDEX (BMI) OF 40.0 TO 44.9 IN ADULT: ICD-10-CM

## 2020-09-17 DIAGNOSIS — R06.02 SOB (SHORTNESS OF BREATH): ICD-10-CM

## 2020-09-17 PROCEDURE — 99215 OFFICE O/P EST HI 40 MIN: CPT | Mod: PBBFAC,27,25 | Performed by: INTERNAL MEDICINE

## 2020-09-17 PROCEDURE — 94010 BREATHING CAPACITY TEST: ICD-10-PCS | Mod: 26,S$PBB,, | Performed by: INTERNAL MEDICINE

## 2020-09-17 PROCEDURE — 94727 GAS DIL/WSHOT DETER LNG VOL: CPT | Mod: 26,S$PBB,, | Performed by: INTERNAL MEDICINE

## 2020-09-17 PROCEDURE — 99215 OFFICE O/P EST HI 40 MIN: CPT | Mod: S$PBB,,, | Performed by: INTERNAL MEDICINE

## 2020-09-17 PROCEDURE — 94010 BREATHING CAPACITY TEST: CPT | Mod: PBBFAC | Performed by: INTERNAL MEDICINE

## 2020-09-17 PROCEDURE — 99215 OFFICE O/P EST HI 40 MIN: CPT | Mod: PBBFAC,25 | Performed by: NURSE PRACTITIONER

## 2020-09-17 PROCEDURE — 11042 DEBRIDEMENT: ICD-10-PCS | Mod: S$PBB,,, | Performed by: NURSE PRACTITIONER

## 2020-09-17 PROCEDURE — 94727 GAS DIL/WSHOT DETER LNG VOL: CPT | Mod: PBBFAC | Performed by: INTERNAL MEDICINE

## 2020-09-17 PROCEDURE — 99999 PR PBB SHADOW E&M-EST. PATIENT-LVL V: CPT | Mod: PBBFAC,,, | Performed by: NURSE PRACTITIONER

## 2020-09-17 PROCEDURE — 99999 PR PBB SHADOW E&M-EST. PATIENT-LVL V: ICD-10-PCS | Mod: PBBFAC,,, | Performed by: NURSE PRACTITIONER

## 2020-09-17 PROCEDURE — 94727 PR PULM FUNCTION TEST BY GAS: ICD-10-PCS | Mod: 26,S$PBB,, | Performed by: INTERNAL MEDICINE

## 2020-09-17 PROCEDURE — 94729 DIFFUSING CAPACITY: CPT | Mod: PBBFAC | Performed by: INTERNAL MEDICINE

## 2020-09-17 PROCEDURE — 11042 DBRDMT SUBQ TIS 1ST 20SQCM/<: CPT | Mod: S$PBB,,, | Performed by: NURSE PRACTITIONER

## 2020-09-17 PROCEDURE — 99215 PR OFFICE/OUTPT VISIT, EST, LEVL V, 40-54 MIN: ICD-10-PCS | Mod: S$PBB,,, | Performed by: INTERNAL MEDICINE

## 2020-09-17 PROCEDURE — 94729 DIFFUSING CAPACITY: CPT | Mod: 26,S$PBB,, | Performed by: INTERNAL MEDICINE

## 2020-09-17 PROCEDURE — 94729 PR C02/MEMBANE DIFFUSE CAPACITY: ICD-10-PCS | Mod: 26,S$PBB,, | Performed by: INTERNAL MEDICINE

## 2020-09-17 PROCEDURE — 99999 PR PBB SHADOW E&M-EST. PATIENT-LVL V: ICD-10-PCS | Mod: PBBFAC,,, | Performed by: INTERNAL MEDICINE

## 2020-09-17 PROCEDURE — 11042 DBRDMT SUBQ TIS 1ST 20SQCM/<: CPT | Mod: PBBFAC | Performed by: NURSE PRACTITIONER

## 2020-09-17 PROCEDURE — 99499 UNLISTED E&M SERVICE: CPT | Mod: S$PBB,,, | Performed by: NURSE PRACTITIONER

## 2020-09-17 PROCEDURE — 99999 PR PBB SHADOW E&M-EST. PATIENT-LVL V: CPT | Mod: PBBFAC,,, | Performed by: INTERNAL MEDICINE

## 2020-09-17 PROCEDURE — 99499 NO LOS: ICD-10-PCS | Mod: S$PBB,,, | Performed by: NURSE PRACTITIONER

## 2020-09-17 PROCEDURE — 94010 BREATHING CAPACITY TEST: CPT | Mod: 26,S$PBB,, | Performed by: INTERNAL MEDICINE

## 2020-09-17 NOTE — LETTER
September 18, 2020      Janeth Walter MD  [address]  [address]  [facility] [address]           [facility] [address]  [address]  [facility] [address]  Phone: [phone]          Patient: Vinnie Siegel   MR Number: [id number]   YOB: 1945   Date of Visit: 9/17/2020       Dear Dr. Janeth Walter:    Thank you for referring Vinnie Siegel to me for evaluation. Attached you will find relevant portions of my assessment and plan of care.    If you have questions, please do not hesitate to call me. I look forward to following Vinnie Siegel along with you.    Sincerely,    Carlitos Nair MD    Enclosure  CC:  No Recipients    If you would like to receive this communication electronically, please contact [email or web address] or [phone] to request more information on [employer] access.    For providers and/or their staff who would like to refer a patient to [facility], please contact us through our one-stop-shop provider referral line, [facility] at [phone].    If you feel you have received this communication in error or would no longer like to receive these types of communications, please e-mail [email or web address]

## 2020-09-17 NOTE — PROGRESS NOTES
"Subjective:       Patient ID: Vinnie Siegel is a 75 y.o. male.    Chief Complaint: Wound Check    HPI     75 y.o. male presents for evaluation and treatment of wound to posterior neck. The wound started as a "bump about 1 year ago" and never healed. He was in hospital for sepsis due to UTI and pneumonia.  He was discharged to SNF on 7/16/20.  During hospitalization he had abscess and had I&D per general surgery and treated with metronidazole for + culture of finegoldia.  He has [facility] home health. He is unsure what the current wound care is to wound.  Denies fever, chills, pain, erythema, warmth. C/o drainage at times.       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;  Service: Endoscopy;  Laterality: N/A;    EYE SURGERY      HEMORRHOID SURGERY      Dr. Root [facility]    lateral internal anal sphincterotomy  12/13/13    Dr. root [facility]    URETERAL STENT PLACEMENT           Review of Systems   Constitutional: Negative for activity change, chills, diaphoresis, fatigue and fever.   Respiratory: Negative for apnea, cough, chest tightness and shortness of breath.    Cardiovascular: Negative for chest pain, palpitations and leg swelling.   Musculoskeletal: Negative for gait problem and joint swelling.   Skin: Positive for wound. Negative for pallor and rash.   Neurological: Negative for syncope, weakness and numbness.   Psychiatric/Behavioral: Negative for agitation. The patient " is not nervous/anxious.    All other systems reviewed and are negative.          Objective:      Physical Exam  Vitals signs reviewed.   Constitutional:       General: He is not in acute distress.     Appearance: Normal appearance. He is well-developed.   HENT:      Head: Normocephalic.   Neck:      Musculoskeletal: Normal range of motion.   Cardiovascular:      Rate and Rhythm: Normal rate.      Pulses: Normal pulses.   Pulmonary:      Effort: Pulmonary effort is normal. No respiratory distress.   Musculoskeletal: Normal range of motion.         General: No swelling or tenderness.   Skin:     General: Skin is warm and dry.      Capillary Refill: Capillary refill takes less than 2 seconds.   Neurological:      Mental Status: He is alert and oriented to person, place, and time.   Psychiatric:         Behavior: Behavior normal.             Assessment:       1. Open wound of neck, initial encounter           Wound 09/03/20 1118  posterior Neck (Active)   09/03/20 1118    Pre-existing: Yes   Primary Wound Type:    Side:    Orientation: posterior   Location: Neck   Wound Number (optional):    Ankle-Brachial Index:    Pulses:    Removal Indication and Assessment:    Wound Outcome:    (Retired) Wound Type:    (Retired) Wound Length (cm):    (Retired) Wound Width (cm):    (Retired) Depth (cm):    Wound Description (Comments):    Removal Indications:    Wound Image   09/17/20 1141   Dressing Appearance Dry;Intact;Dried drainage 09/17/20 1141   Drainage Amount Moderate 09/17/20 1141   Drainage Characteristics/Odor Serosanguineous 09/17/20 1141   Appearance Granulating;Black;Necrotic;Slough 09/17/20 1141   Tissue loss description Full thickness 09/17/20 1141   Black (%), Wound Tissue Color 50 % 09/17/20 1141   Red (%), Wound Tissue Color 25 % 09/17/20 1141   Yellow (%), Wound Tissue Color 25 % 09/17/20 1141   Periwound Area Intact 09/17/20 1141   Wound Edges Undefined 09/17/20 1141   Wound Length (cm) 2 cm 09/17/20 1141    Wound Width (cm) 2.5 cm 09/17/20 1141   Wound Depth (cm) 0.3 cm 09/17/20 1141   Wound Volume (cm^3) 1.5 cm^3 09/17/20 1141   Wound Surface Area (cm^2) 5 cm^2 09/17/20 1141       Post debridement                Plan:       Wound debrided per debridement note, tolerated well  Medihoney and hydrofera blue ready to wound daily and cover with mepore dressing  Do not get wound dressings wet, if wet remove soiled dressings and apply new dressings  Observe for signs and symptoms of infection and report symptoms to clinic  F/U 2 weeks    [facility] orders    Clean wounds with mild soap and water and pat dry  Medihoney to wound base and cut hydrofera blue ready to fit wound and apply to wound and cover with mepore dressing  Do not get wound dressings wet, if wet remove soiled dressings and apply new dressings  Observe for signs and symptoms of infection and report symptoms to clinic  Skilled nursing visits 2 times per week

## 2020-09-17 NOTE — PROCEDURES
"Debridement    Date/Time: 9/17/2020 12:30 PM  Performed by: Susy Cespedes NP  Authorized by: Susy Cespedes NP     Time out: Immediately prior to procedure a "time out" was called to verify the correct patient, procedure, equipment, support staff and site/side marked as required.    Consent Done?:  Yes (Verbal)  Local anesthesia used?: No      Wound Details:    Location:  Neck    Type of Debridement:  Excisional       Length (cm):  2       Area (sq cm):  5       Width (cm):  2.5       Percent Debrided (%):  100       Depth (cm):  0.3       Total Area Debrided (sq cm):  5    Depth of debridement:  Subcutaneous tissue    Tissue debrided:  Dermis, Epidermis and Subcutaneous    Devitalized tissue debrided:  Biofilm, Clots, Exudate, Fibrin, Necrotic/Eschar and Slough    Instruments:  Curette, Forceps and Scissors    Bleeding:  Minimal  Hemostasis Achieved: Yes    Method Used:  Silver Nitrate  Patient tolerance:  Patient tolerated the procedure well with no immediate complications      "

## 2020-09-17 NOTE — PROGRESS NOTES
History & Physical  [facility]        SUBJECTIVE:     Chief Complaint:   Shortness of breath    History of Present Illness:  Vinnie Siegel is a 75 y.o. male who presents for evaluation of shortness of breath. He has been experiencing this symptom for several months. The dyspnea is constant & occurs with exertion. He denies any cough symptoms. He denies any chest pain. He notes orthopnea.    PMH is notable for CKD.    He is a former smoker. He quit in June. He denies any childhood asthma.    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 Root [facility]    lateral internal anal sphincterotomy  12/13/13    Dr. root [facility]    URETERAL STENT PLACEMENT       Family History   Problem Relation Age of Onset    Diabetes Brother         type 1    Hypertension Brother     Stroke Brother      Social History     Socioeconomic History    Marital status: Single     Spouse name: Not on file    Number of children: Not on file    Years of education: Not on file    Highest education level: Not on file   Occupational History    Not on file   Social Needs    Financial resource strain: Not on file    Food insecurity     Worry: Not on file     Inability: Not on file    Transportation needs     Medical: Not on file     Non-medical: Not  "on file   Tobacco Use    Smoking status: Former Smoker     Packs/day: 0.50     Years: 45.00     Pack years: 22.50     Quit date: 2020     Years since quittin.2    Smokeless tobacco: Never Used    Tobacco comment: says he could quit and will try    Substance and Sexual Activity    Alcohol use: Yes     Comment: rarely    Drug use: No    Sexual activity: Yes     Comment: single, retired , no kids   Lifestyle    Physical activity     Days per week: Not on file     Minutes per session: Not on file    Stress: Not on file   Relationships    Social connections     Talks on phone: Not on file     Gets together: Not on file     Attends [employer] service: Not on file     Active member of club or organization: Not on file     Attends meetings of clubs or organizations: Not on file     Relationship status: Not on file   Other Topics Concern    Not on file   Social History Narrative    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       Review of Systems:  CV: no syncope  ENT: no sore throat  Resp: per hpi  Eyes: no eye pain  Gastrointestinal: no nausea or vomiting  Integument/Breast: no rash  Musculoskeletal: no arthralgias  Neurological: no headaches  Behavioral/Psych: no confusion or depression  Heme: no bleeding      OBJECTIVE:     Vital Signs  Vitals:    20 1027   BP: (!) 202/90   BP Location: Left arm   Patient Position: Sitting   Pulse: (!) 54   SpO2: 99%   Weight: (!) 154.7 kg (341 lb)   Height: 6' 2" (1.88 m)     Body mass index is 43.78 kg/m².    Repeat BP: 190/80    Physical Exam:  General: no distress  Eyes:  conjunctivae/corneas clear  Nose: no discharge  Neck: trachea midline with no masses appreciated  Lungs:  normal respiratory effort, no wheezes, no rales  Heart: regular rate and rhythm and no murmur  Abdomen: non-distended  Extremities: no cyanosis, + severe dependent edema in legs, no clubbing  Skin: No rashes or lesions. good skin turgor  Neurologic: alert, " oriented, thought content appropriate    Laboratory:  Lab Results   Component Value Date    WBC 7.73 09/11/2020    HGB 10.2 (L) 09/11/2020    HCT 32.3 (L) 09/11/2020    MCV 91 09/11/2020     09/11/2020       Chest Imaging, My Impression:   CXR 9/2020: +cardiomegaly. No indication of parenchymal lung disease or focal infiltrate.  CT neck (some chest) 7/2020: normal through level of the jazmyn    Diagnostic Results:  PFT today: there is significant restriction. No obstruction. DLCO is significantly reduced though this normalizes when ratio'd for VA  Echo: diastolic heart failure & left ventricular hypertrophy    ASSESSMENT/PLAN:     1) Dyspnea. The factors contributing to Mr Siegel dyspnea include: diastolic heart failure, obesity with alveolar hypoventilation/restriction, & anemia related to CKD. That said, I encouraged the patient that I do not see any evidence of lung disease (including COPD). I congratulated him on smoking cessation & encouraged him to continue to abstain.  2) Acute on chronic diastolic CHF. Needs fluid off & better blood pressure control. Refer pt for follow up with cardiology.  3) Essential hypertension. Uncontrolled. Refer pt for follow up with cardiology.  4) CKD stage IV with DM2. Pt follows with nephrology. Appears fluid overloaded on exam today. Consider increasing lasix or starting hemodialysis for fluid management.  5) Obesity with alveolar hypoventilation. Counseled pt on weight loss. Additionally, I will refer to the registered dietitian.  6) Encounter for lung cancer screening. Pt qualifies for screening low-dose CT. However, I think it would be a distraction from the problems that take higher priority for his health right now. We will bring him back for LDCT when above problems have been stabilized.    Carlitos Nair MD  [facility]

## 2020-09-17 NOTE — TELEPHONE ENCOUNTER
His kidney function did go down after starting the losartan.  Please see if he can hydrate well and repeat rfp on Monday to see if we need to hold the med. tahnks.

## 2020-09-18 ENCOUNTER — HOSPITAL ENCOUNTER (EMERGENCY)
Facility: OTHER | Age: 75
Discharge: HOME | End: 2020-09-18
Attending: EMERGENCY MEDICINE
Payer: MEDICARE

## 2020-09-18 VITALS
TEMPERATURE: 99 F | SYSTOLIC BLOOD PRESSURE: 206 MMHG | HEIGHT: 74 IN | WEIGHT: 315 LBS | OXYGEN SATURATION: 100 % | RESPIRATION RATE: 18 BRPM | DIASTOLIC BLOOD PRESSURE: 88 MMHG | BODY MASS INDEX: 40.43 KG/M2 | HEART RATE: 61 BPM

## 2020-09-18 DIAGNOSIS — M25.561 ACUTE PAIN OF RIGHT KNEE: Primary | ICD-10-CM
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
## 2020-09-18 PROCEDURE — 25000003 PHARM REV CODE 250: Performed by: EMERGENCY MEDICINE

## 2020-09-18 PROCEDURE — 99283 EMERGENCY DEPT VISIT LOW MDM: CPT | Mod: 25

## 2020-09-18 RX ORDER — KETOROLAC TROMETHAMINE 30 MG/ML
30 INJECTION, SOLUTION INTRAMUSCULAR; INTRAVENOUS
Status: DISCONTINUED | OUTPATIENT
Start: 2020-09-18 | End: 2020-09-18

## 2020-09-18 RX ORDER — HYDROCODONE BITARTRATE AND ACETAMINOPHEN 5; 325 MG/1; MG/1
1 TABLET ORAL EVERY 4 HOURS PRN
Qty: 8 TABLET | Refills: 0 | Status: SHIPPED | OUTPATIENT
Start: 2020-09-18 | End: 2020-09-28

## 2020-09-18 RX ORDER — ACETAMINOPHEN 500 MG
1000 TABLET ORAL
Status: COMPLETED | OUTPATIENT
Start: 2020-09-18 | End: 2020-09-18

## 2020-09-18 RX ADMIN — ACETAMINOPHEN 1000 MG: 500 TABLET, FILM COATED ORAL at 02:09

## 2020-09-18 NOTE — ED NOTES
Pt presents to the ED w/ c/o right knee pain after tripping and falling yesterday afternoon.  Denies LOC, back pain or head trauma.

## 2020-09-18 NOTE — ED PROVIDER NOTES
Encounter Date: 9/18/2020    SCRIBE #1 NOTE: Dunia BARNES, am scribing for, and in the presence of, Dr. Ko.       History     Chief Complaint   Patient presents with    Fall     fall yesterday resulting in R knee pain     Time seen by provider: 1:22 PM    This is a 75 y.o. male, with a hx of HTN and diabetes, who presents with complaint of right knee pain s/p a fall yesterday afternoon. Pt states yesterday afternoon, he was trying to get up when he tripped over his slippers and fell face first. He states he felt his knee twist and was unable to bend the knee or bear weight. He states he did not take any pain medicine because he was unsure what he could take due to his diabetes. He denies LOC, back pain, or head trauma.    The history is provided by the patient and medical records.     Review of patient's allergies indicates:  No Known Allergies  Past Medical History:   Diagnosis Date    Cataract     Chronic kidney disease     Colon polyp     Diabetes mellitus     Diabetes mellitus type II     Glaucoma 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 open angle     Hyperlipidemia     Hypertension     Kidney stone     Seasonal allergies 6/24/2014     Past Surgical History:   Procedure Laterality Date    CATARACT EXTRACTION W/  INTRAOCULAR LENS IMPLANT Right 04/04/16    Dr Meehan    COLONOSCOPY W/ BIOPSIES      ESOPHAGOGASTRODUODENOSCOPY N/A 6/29/2020    Procedure: EGD (ESOPHAGOGASTRODUODENOSCOPY);  Surgeon: Ravi Leone MD;  [facility];  Service: Endoscopy;  Laterality: N/A;    EYE SURGERY      HEMORRHOID SURGERY      Dr. Root [facility]    lateral internal anal sphincterotomy  12/13/13    Dr. root [facility]    URETERAL STENT PLACEMENT       Family History   Problem Relation Age of Onset    Diabetes Brother         type 1    Hypertension Brother     Stroke Brother      Social History     Tobacco Use    Smoking status: Former Smoker     Packs/day: 0.50     Years: 45.00     Pack years: 22.50     Quit date:  2020     Years since quittin.2    Smokeless tobacco: Never Used    Tobacco comment: says he could quit and will try    Substance Use Topics    Alcohol use: Yes     Comment: rarely    Drug use: No     Review of Systems   Constitutional: Negative for chills and fever.   HENT: Negative for congestion, rhinorrhea and sore throat.    Eyes: Negative for visual disturbance.   Respiratory: Negative for cough and shortness of breath.    Cardiovascular: Negative for chest pain.   Gastrointestinal: Negative for abdominal pain, diarrhea, nausea and vomiting.   Genitourinary: Negative for dysuria.   Musculoskeletal: Positive for arthralgias (right knee). Negative for back pain.   Skin: Negative for rash.   Neurological: Negative for dizziness, syncope, weakness, light-headedness and headaches.   Psychiatric/Behavioral: Negative for confusion.       Physical Exam     Initial Vitals [20 1243]   BP Pulse Resp Temp SpO2   (!) 234/105 67 18 98.6 °F (37 °C) 100 %      MAP       --         Physical Exam    Nursing note and vitals reviewed.  Constitutional: He appears well-developed and well-nourished. He is not diaphoretic. No distress.   Obese.   HENT:   Head: Normocephalic and atraumatic.   Eyes: Right eye exhibits no discharge. Left eye exhibits no discharge.   Neck: Normal range of motion. Neck supple.   Pulmonary/Chest: No respiratory distress.   Musculoskeletal: Normal range of motion. Tenderness and edema present.      Comments: 2-3+ chronic lower extremity edema. TTP right patellar and fibula head.   Neurological: He is alert and oriented to person, place, and time. No sensory deficit.   Skin: Skin is warm and dry. No rash noted. No erythema.   Right knee abrasion.   Psychiatric: He has a normal mood and affect. His behavior is normal.         ED Course   Procedures  Labs Reviewed - No data to display       Imaging Results          X-Ray Knee 3 View Right (Final result)  Result time 20 14:00:54    Final  result by Sonny Ewing III, MD (09/18/20 14:00:54)                 Impression:      No acute process seen.      Electronically signed by: Sonny Ewing MD  Date:    09/18/2020  Time:    14:00             Narrative:    EXAMINATION:  XR KNEE 3 VIEW RIGHT    CLINICAL HISTORY:  Unspecified fall, initial encounter    FINDINGS:  Right: No fracture dislocation bone destruction seen.  No trauma seen.                              X-Rays:   Independently Interpreted Readings:   Other Readings:  Right knee: no fracture. No dislocation.    Medical Decision Making:   History:   Old Medical Records: I decided to obtain old medical records.  Initial Assessment:   75-year-old male with right knee pain with fall.  Will get x-ray to rule out fracture.  No history TA suggest a dislocation relocation.  Likely a strain.    The patient's blood pressure is also elevated here in the emergency department.  The patient has a history of hypertension and and this is likely long-standing uncontrolled hypertension.  Based on the patient's presentation today I do not see any signs of endorgan damage representing hypertensive emergency.  I do not think the patient's presentation necessitates further intervention in the Emergency Department to acutely decrease their blood pressure.  I have given the patient and/or their family specific return precautions and instructions to follow up with their regular doctor.       Independently Interpreted Test(s):   I have ordered and independently interpreted X-rays - see prior notes.  Clinical Tests:   Radiological Study: Ordered and Reviewed            Scribe Attestation:   Scribe #1: I performed the above scribed service and the documentation accurately describes the services I performed. I attest to the accuracy of the note.    Attending Attestation:           Physician Attestation for Scribe:  Physician Attestation Statement for Scribe #1: I, Dr. Ko, reviewed documentation, as scribed by  Dunia Corrales in my presence, and it is both accurate and complete.                 ED Course as of Sep 18 1423   Fri Sep 18, 2020   1417 X-ray shows no fracture.  I discussed the findings with the patient as well as his brother was at bedside.  Answered all questions.  Will place the patient in a knee brace or Ace wrap.  Will plan to discharge to follow up with primary care.  The patient already has home health.    Patient discharged home in stable condition. Diagnosis and treatment plan explained to patient and/or family member who is at bedside. I have answered all questions and the patient is satisfied with the plan of care. Strict return precautions given. The patient demonstrates understanding of the care plan. This is the extent to the patients complaints today here in the emergency department.    [SM]      ED Course User Index  [SM] Darrius Ko DO            Clinical Impression:     ICD-10-CM ICD-9-CM   1. Acute pain of right knee  M25.561 719.46   2. Fall  W19.XXXA E888.9                          ED Disposition Condition    Discharge Stable        ED Prescriptions     Medication Sig Dispense Start Date End Date Auth. Provider    HYDROcodone-acetaminophen (NORCO) 5-325 mg per tablet Take 1 tablet by mouth every 4 (four) hours as needed. 8 tablet 9/18/2020 9/28/2020 Darrius Ko DO        Follow-up Information     Follow up With Specialties Details Why Contact Info    [facility] In 2 weeks If symptoms worsen [address]  [facility] [address]  [phone]                                         Darrius Ko DO  09/18/20 1424

## 2020-09-21 ENCOUNTER — TELEPHONE (OUTPATIENT)
Dept: WOUND CARE | Facility: CLINIC | Age: 75
End: 2020-09-21

## 2020-09-21 ENCOUNTER — LAB VISIT (OUTPATIENT)
Dept: LAB | Facility: HOSPITAL | Age: 75
End: 2020-09-21
Payer: MEDICARE

## 2020-09-21 DIAGNOSIS — I10 HYPERTENSION, UNSPECIFIED TYPE: ICD-10-CM

## 2020-09-21 DIAGNOSIS — E11.65 TYPE 2 DIABETES MELLITUS WITH HYPERGLYCEMIA, WITHOUT LONG-TERM CURRENT USE OF INSULIN: ICD-10-CM

## 2020-09-21 DIAGNOSIS — N18.4 CKD (CHRONIC KIDNEY DISEASE) STAGE 4, GFR 15-29 ML/MIN: ICD-10-CM

## 2020-09-21 LAB
ALBUMIN SERPL BCP-MCNC: 3.2 G/DL (ref 3.5–5.2)
ANION GAP SERPL CALC-SCNC: 11 MMOL/L (ref 8–16)
BUN SERPL-MCNC: 45 MG/DL (ref 8–23)
CALCIUM SERPL-MCNC: 8.3 MG/DL (ref 8.7–10.5)
CHLORIDE SERPL-SCNC: 104 MMOL/L (ref 95–110)
CO2 SERPL-SCNC: 26 MMOL/L (ref 23–29)
CREAT SERPL-MCNC: 3.6 MG/DL (ref 0.5–1.4)
EST. GFR  (AFRICAN AMERICAN): 18.1 ML/MIN/1.73 M^2
EST. GFR  (NON AFRICAN AMERICAN): 15.7 ML/MIN/1.73 M^2
GLUCOSE SERPL-MCNC: 120 MG/DL (ref 70–110)
PHOSPHATE SERPL-MCNC: 4.2 MG/DL (ref 2.7–4.5)
POTASSIUM SERPL-SCNC: 4.1 MMOL/L (ref 3.5–5.1)
SODIUM SERPL-SCNC: 141 MMOL/L (ref 136–145)

## 2020-09-21 PROCEDURE — 80069 RENAL FUNCTION PANEL: CPT

## 2020-09-21 PROCEDURE — 36415 COLL VENOUS BLD VENIPUNCTURE: CPT | Mod: PN

## 2020-09-21 NOTE — TELEPHONE ENCOUNTER
----- Message from Lizandro Hyatt sent at 9/21/2020  1:32 PM CDT -----  Regarding: wound care orders sent over.        The Caller (Chris with [facility]) would like to receive a fax with the latest wound care orders from the appt on 9/17/20.      Fax # [phone]    Phone # [phone] (ask for Chris if you have any questions)

## 2020-09-22 ENCOUNTER — TELEPHONE (OUTPATIENT)
Dept: NEPHROLOGY | Facility: HOSPITAL | Age: 75
End: 2020-09-22

## 2020-09-22 ENCOUNTER — TELEPHONE (OUTPATIENT)
Dept: NEPHROLOGY | Facility: CLINIC | Age: 75
End: 2020-09-22

## 2020-09-22 RX ORDER — NIFEDIPINE 90 MG/1
90 TABLET, EXTENDED RELEASE ORAL DAILY
Qty: 30 TABLET | Refills: 4 | Status: SHIPPED | OUTPATIENT
Start: 2020-09-22 | End: 2021-02-10 | Stop reason: SDUPTHER

## 2020-09-22 NOTE — TELEPHONE ENCOUNTER
Preferred Name: Vinnie Siegel   Male, 75 y.o., 1945   Phone: [phone] (M)   PCP: Janeth Walter MD   Language: English   Need Interp: No   Allergies Last Reviewed: 09/22/20   Allergies:   No Known Allergies   Health Maintenance: Due   Primary Ins.: MEDICARE   MRN: [id number]   Pt Comm Pref: Patient Portal, Mail            Next Appt:   With Wound Care  10/01/2020 at 10:00 AM   My Sticky Note:   Specialty Comments:   Last Encounter Center: [facility]   Last Enc in Dept: None   Last Enc this Prov: 9/8/2020   Last Contact Department: [facility]                Patient Calls  Yisel Ramírez DO routed conversation to Darrell Morillo Staff 1 hour ago (1:05 PM)          Yisel Ramírez DO 1 hour ago (1:05 PM)     Please have him stop losartan as his kidney function is worse. Please have him get rfp in 10 days. He used to be on nifedipine. I sent a script for nifedipine 90 mg a day that he can start taking and monitor BP's.         Documentation                     Orders Placed This Encounter   Active    NIFEdipine (PROCARDIA-XL) 90 MG (OSM) 24 hr tablet Ordered On: 09/22/2020              Recent Patient Communication     Last Update Reason Specialty       Today -- Nephrology     [facility] Yisel Ramírez Closed     Today -- Wound Care     [facility] Susy Cespedes Closed     4 days ago -- Nephrology     [facility] Shamika Carty Closed     4 days ago -- Nephrology     [facility] Yisel Ramírez Closed     5 days ago -- Primary Care     [facility] Care Janeth Walter    There are additional recent communications with this patient. View the rest in Chart Review.

## 2020-09-22 NOTE — TELEPHONE ENCOUNTER
Please have him stop  losartan  as his kidney function is worse.  Please have him get rfp in 10 days. He used to be on nifedipine.  I sent a script for nifedipine 90 mg a day that he can start taking and monitor BP's.

## 2020-10-01 ENCOUNTER — TELEPHONE (OUTPATIENT)
Dept: NEPHROLOGY | Facility: HOSPITAL | Age: 75
End: 2020-10-01

## 2020-10-01 ENCOUNTER — OFFICE VISIT (OUTPATIENT)
Dept: WOUND CARE | Facility: CLINIC | Age: 75
End: 2020-10-01
Payer: MEDICARE

## 2020-10-01 ENCOUNTER — TELEPHONE (OUTPATIENT)
Dept: NEPHROLOGY | Facility: CLINIC | Age: 75
End: 2020-10-01

## 2020-10-01 ENCOUNTER — LAB VISIT (OUTPATIENT)
Dept: LAB | Facility: HOSPITAL | Age: 75
End: 2020-10-01
Payer: MEDICARE

## 2020-10-01 VITALS
HEIGHT: 74 IN | BODY MASS INDEX: 40.43 KG/M2 | SYSTOLIC BLOOD PRESSURE: 150 MMHG | WEIGHT: 315 LBS | HEART RATE: 57 BPM | DIASTOLIC BLOOD PRESSURE: 62 MMHG | TEMPERATURE: 99 F

## 2020-10-01 DIAGNOSIS — N18.30 STAGE 3 CHRONIC KIDNEY DISEASE: ICD-10-CM

## 2020-10-01 DIAGNOSIS — H40.022 OPEN ANGLE WITH BORDERLINE FINDINGS, HIGH RISK, LEFT: ICD-10-CM

## 2020-10-01 DIAGNOSIS — H40.1113 PRIMARY OPEN ANGLE GLAUCOMA OF RIGHT EYE, SEVERE STAGE: Primary | ICD-10-CM

## 2020-10-01 DIAGNOSIS — N18.4 CKD (CHRONIC KIDNEY DISEASE) STAGE 4, GFR 15-29 ML/MIN: Primary | ICD-10-CM

## 2020-10-01 DIAGNOSIS — S11.90XA OPEN WOUND OF NECK, INITIAL ENCOUNTER: Primary | ICD-10-CM

## 2020-10-01 LAB
ALBUMIN SERPL BCP-MCNC: 3 G/DL (ref 3.5–5.2)
ANION GAP SERPL CALC-SCNC: 13 MMOL/L (ref 8–16)
BUN SERPL-MCNC: 49 MG/DL (ref 8–23)
CALCIUM SERPL-MCNC: 8 MG/DL (ref 8.7–10.5)
CHLORIDE SERPL-SCNC: 106 MMOL/L (ref 95–110)
CO2 SERPL-SCNC: 24 MMOL/L (ref 23–29)
CREAT SERPL-MCNC: 4.2 MG/DL (ref 0.5–1.4)
EST. GFR  (AFRICAN AMERICAN): 15.1 ML/MIN/1.73 M^2
EST. GFR  (NON AFRICAN AMERICAN): 13 ML/MIN/1.73 M^2
GLUCOSE SERPL-MCNC: 124 MG/DL (ref 70–110)
PHOSPHATE SERPL-MCNC: 4.2 MG/DL (ref 2.7–4.5)
POTASSIUM SERPL-SCNC: 3.8 MMOL/L (ref 3.5–5.1)
SODIUM SERPL-SCNC: 143 MMOL/L (ref 136–145)

## 2020-10-01 PROCEDURE — 36415 COLL VENOUS BLD VENIPUNCTURE: CPT

## 2020-10-01 PROCEDURE — 80069 RENAL FUNCTION PANEL: CPT

## 2020-10-01 PROCEDURE — 99215 OFFICE O/P EST HI 40 MIN: CPT | Mod: PBBFAC,25 | Performed by: NURSE PRACTITIONER

## 2020-10-01 PROCEDURE — 99499 UNLISTED E&M SERVICE: CPT | Mod: S$PBB,,, | Performed by: NURSE PRACTITIONER

## 2020-10-01 PROCEDURE — 11042 DEBRIDEMENT: ICD-10-PCS | Mod: S$PBB,,, | Performed by: NURSE PRACTITIONER

## 2020-10-01 PROCEDURE — 99999 PR PBB SHADOW E&M-EST. PATIENT-LVL V: ICD-10-PCS | Mod: PBBFAC,,, | Performed by: NURSE PRACTITIONER

## 2020-10-01 PROCEDURE — 99499 NO LOS: ICD-10-PCS | Mod: S$PBB,,, | Performed by: NURSE PRACTITIONER

## 2020-10-01 PROCEDURE — 99999 PR PBB SHADOW E&M-EST. PATIENT-LVL V: CPT | Mod: PBBFAC,,, | Performed by: NURSE PRACTITIONER

## 2020-10-01 PROCEDURE — 11042 DBRDMT SUBQ TIS 1ST 20SQCM/<: CPT | Mod: S$PBB,,, | Performed by: NURSE PRACTITIONER

## 2020-10-01 PROCEDURE — 11042 DBRDMT SUBQ TIS 1ST 20SQCM/<: CPT | Mod: PBBFAC | Performed by: NURSE PRACTITIONER

## 2020-10-01 RX ORDER — DORZOLAMIDE HYDROCHLORIDE AND TIMOLOL MALEATE 20; 5 MG/ML; MG/ML
1 SOLUTION/ DROPS OPHTHALMIC 2 TIMES DAILY
Qty: 10 ML | Refills: 1 | Status: SHIPPED | OUTPATIENT
Start: 2020-10-01 | End: 2021-12-27 | Stop reason: ALTCHOICE

## 2020-10-01 NOTE — PROGRESS NOTES
Assessment /Plan     For exam results, see Encounter Report.    Primary open angle glaucoma of right eye, severe stage  -     dorzolamide-timolol 2-0.5% (COSOPT) 22.3-6.8 mg/mL ophthalmic solution; Place 1 drop into both eyes 2 (two) times daily.  Dispense: 10 mL; Refill: 1    Open angle with borderline findings, high risk, left  -     dorzolamide-timolol 2-0.5% (COSOPT) 22.3-6.8 mg/mL ophthalmic solution; Place 1 drop into both eyes 2 (two) times daily.  Dispense: 10 mL; Refill: 1          Timolol on back order

## 2020-10-01 NOTE — TELEPHONE ENCOUNTER
His kidney function is continuing to get worse.  He stopped losartan started nifedipine as previously instructed.  He is  hydrating well-denies  diahrrea or vomiting etc.  Please schedule and ultrasound today or tomorrow at the latest and rfp on Monday.  Hold lasix for 3 days.  Orders in. Bp's high in the 200 range about a week ago but much better in the 140's per the pt.  Denies NSAID's.  I Spoke to pt. He said he may not be able to do the US this week until Monday as he is unable to go. Stressed the importance.

## 2020-10-01 NOTE — PROCEDURES
"Debridement    Date/Time: 10/1/2020 10:00 AM  Performed by: Susy Cespedes NP  Authorized by: Susy Cespedes NP     Time out: Immediately prior to procedure a "time out" was called to verify the correct patient, procedure, equipment, support staff and site/side marked as required.    Consent Done?:  Yes (Verbal)  Local anesthesia used?: No      Wound Details:    Location:  Neck (posterior)    Type of Debridement:  Excisional       Length (cm):  1       Area (sq cm):  1.2       Width (cm):  1.2       Percent Debrided (%):  100       Depth (cm):  0.3       Total Area Debrided (sq cm):  1.2    Depth of debridement:  Subcutaneous tissue    Tissue debrided:  Dermis, Epidermis and Subcutaneous    Devitalized tissue debrided:  Biofilm, Exudate, Fibrin, Necrotic/Eschar and Slough    Instruments:  Curette    Bleeding:  Minimal  Hemostasis Achieved: Yes    Method Used:  Pressure and Alginate  Patient tolerance:  Patient tolerated the procedure well with no immediate complications      "

## 2020-10-06 ENCOUNTER — HOSPITAL ENCOUNTER (OUTPATIENT)
Dept: RADIOLOGY | Facility: HOSPITAL | Age: 75
Discharge: HOME | End: 2020-10-06
Attending: INTERNAL MEDICINE
Payer: MEDICARE

## 2020-10-06 DIAGNOSIS — N18.4 CKD (CHRONIC KIDNEY DISEASE) STAGE 4, GFR 15-29 ML/MIN: ICD-10-CM

## 2020-10-06 PROCEDURE — 76770 US RETROPERITONEAL COMPLETE: ICD-10-PCS | Mod: 26,,, | Performed by: RADIOLOGY

## 2020-10-06 PROCEDURE — 76770 US EXAM ABDO BACK WALL COMP: CPT | Mod: TC

## 2020-10-06 PROCEDURE — 76770 US EXAM ABDO BACK WALL COMP: CPT | Mod: 26,,, | Performed by: RADIOLOGY

## 2020-10-07 ENCOUNTER — TELEPHONE (OUTPATIENT)
Dept: NEPHROLOGY | Facility: CLINIC | Age: 75
End: 2020-10-07

## 2020-10-07 DIAGNOSIS — N18.4 CKD (CHRONIC KIDNEY DISEASE) STAGE 4, GFR 15-29 ML/MIN: Primary | ICD-10-CM

## 2020-10-07 NOTE — TELEPHONE ENCOUNTER
US stable.  Kidney function still low but atleast no big change from last labs on oct 1.  Please set him up for labs I ordered for f/u later in  next week to see if he is improving since his bp's are better and is off losartan.

## 2020-10-09 ENCOUNTER — TELEPHONE (OUTPATIENT)
Dept: NEPHROLOGY | Facility: CLINIC | Age: 75
End: 2020-10-09

## 2020-10-14 ENCOUNTER — TELEPHONE (OUTPATIENT)
Dept: INTERNAL MEDICINE | Facility: CLINIC | Age: 75
End: 2020-10-14

## 2020-10-14 RX ORDER — INSULIN PUMP SYRINGE, 3 ML
EACH MISCELLANEOUS
Qty: 1 EACH | Refills: 0 | Status: SHIPPED | OUTPATIENT
Start: 2020-10-14 | End: 2024-02-21

## 2020-10-14 RX ORDER — LANCETS
EACH MISCELLANEOUS
Qty: 400 EACH | Refills: 3 | Status: SHIPPED | OUTPATIENT
Start: 2020-10-14 | End: 2024-02-21

## 2020-10-14 NOTE — TELEPHONE ENCOUNTER
----- Message from Kavitha Ace sent at 10/14/2020 12:34 PM CDT -----  Contact: pharmacy/[facility]/[phone]  Pharmacy called in regards to checking the status of the pt Cmn form. Pharmacy would like a call back ASAP and the phone faxed back to them and not the insurance company/ fax number [phone]    Please advise

## 2020-10-14 NOTE — TELEPHONE ENCOUNTER
----- Message from Shamika Zavaleta sent at 10/14/2020  4:08 PM CDT -----  Regarding: Document  Contact: [facility] [phone]  Will refax    ---- Message from Kavitha Ace sent at 10/14/2020 12:34 PM CDT -----  Contact: pharmacy/[facility]/[phone]  Pharmacy called in regards to checking the status of the pt Cmn form. Pharmacy would like a call back ASAP and the phone faxed back to them and not the insurance company/ fax number [phone]     Please advise

## 2020-10-15 ENCOUNTER — TELEPHONE (OUTPATIENT)
Dept: NEPHROLOGY | Facility: HOSPITAL | Age: 75
End: 2020-10-15

## 2020-10-15 ENCOUNTER — TELEPHONE (OUTPATIENT)
Dept: NEPHROLOGY | Facility: CLINIC | Age: 75
End: 2020-10-15

## 2020-10-15 ENCOUNTER — OFFICE VISIT (OUTPATIENT)
Dept: WOUND CARE | Facility: CLINIC | Age: 75
End: 2020-10-15
Payer: MEDICARE

## 2020-10-15 ENCOUNTER — TELEPHONE (OUTPATIENT)
Dept: INTERNAL MEDICINE | Facility: CLINIC | Age: 75
End: 2020-10-15

## 2020-10-15 ENCOUNTER — LAB VISIT (OUTPATIENT)
Dept: LAB | Facility: HOSPITAL | Age: 75
End: 2020-10-15
Payer: MEDICARE

## 2020-10-15 VITALS
HEART RATE: 59 BPM | TEMPERATURE: 98 F | HEIGHT: 74 IN | WEIGHT: 315 LBS | BODY MASS INDEX: 40.43 KG/M2 | SYSTOLIC BLOOD PRESSURE: 170 MMHG | DIASTOLIC BLOOD PRESSURE: 71 MMHG

## 2020-10-15 DIAGNOSIS — S11.90XA OPEN WOUND OF NECK, INITIAL ENCOUNTER: Primary | ICD-10-CM

## 2020-10-15 DIAGNOSIS — N18.4 CKD (CHRONIC KIDNEY DISEASE) STAGE 4, GFR 15-29 ML/MIN: ICD-10-CM

## 2020-10-15 LAB
ALBUMIN SERPL BCP-MCNC: 3.3 G/DL (ref 3.5–5.2)
ANION GAP SERPL CALC-SCNC: 8 MMOL/L (ref 8–16)
BUN SERPL-MCNC: 57 MG/DL (ref 8–23)
CALCIUM SERPL-MCNC: 8.1 MG/DL (ref 8.7–10.5)
CHLORIDE SERPL-SCNC: 108 MMOL/L (ref 95–110)
CO2 SERPL-SCNC: 24 MMOL/L (ref 23–29)
CREAT SERPL-MCNC: 5 MG/DL (ref 0.5–1.4)
EST. GFR  (AFRICAN AMERICAN): 12.2 ML/MIN/1.73 M^2
EST. GFR  (NON AFRICAN AMERICAN): 10.5 ML/MIN/1.73 M^2
GLUCOSE SERPL-MCNC: 97 MG/DL (ref 70–110)
PHOSPHATE SERPL-MCNC: 4.5 MG/DL (ref 2.7–4.5)
POTASSIUM SERPL-SCNC: 4.6 MMOL/L (ref 3.5–5.1)
SODIUM SERPL-SCNC: 140 MMOL/L (ref 136–145)

## 2020-10-15 PROCEDURE — 99999 PR PBB SHADOW E&M-EST. PATIENT-LVL V: CPT | Mod: PBBFAC,,, | Performed by: NURSE PRACTITIONER

## 2020-10-15 PROCEDURE — 36415 COLL VENOUS BLD VENIPUNCTURE: CPT

## 2020-10-15 PROCEDURE — 80069 RENAL FUNCTION PANEL: CPT

## 2020-10-15 PROCEDURE — 99213 PR OFFICE/OUTPT VISIT, EST, LEVL III, 20-29 MIN: ICD-10-PCS | Mod: S$PBB,,, | Performed by: NURSE PRACTITIONER

## 2020-10-15 PROCEDURE — 99215 OFFICE O/P EST HI 40 MIN: CPT | Mod: PBBFAC | Performed by: NURSE PRACTITIONER

## 2020-10-15 PROCEDURE — 99999 PR PBB SHADOW E&M-EST. PATIENT-LVL V: ICD-10-PCS | Mod: PBBFAC,,, | Performed by: NURSE PRACTITIONER

## 2020-10-15 PROCEDURE — 99213 OFFICE O/P EST LOW 20 MIN: CPT | Mod: S$PBB,,, | Performed by: NURSE PRACTITIONER

## 2020-10-15 NOTE — PROGRESS NOTES
"Subjective:       Patient ID: Vinnie Siegel is a 75 y.o. male.    Chief Complaint: Wound Check    Wound Check         75 y.o. male presents for evaluation and treatment of wound to posterior neck. The wound started as a "bump about 1 year ago" and never healed. He was in hospital for sepsis due to UTI and pneumonia.  He was discharged to SNF on 7/16/20.  During hospitalization he had abscess and had I&D per general surgery and treated with metronidazole for + culture of finegoldia.  He has [facility] home health.  Denies fever, chills, pain, erythema, warmth, or drainage.     Past Medical History:   Diagnosis Date    Cataract     Chronic kidney disease     Colon polyp     Diabetes mellitus     Diabetes mellitus type II     Glaucoma 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 open angle     Hyperlipidemia     Hypertension     Kidney stone     Seasonal allergies 6/24/2014     Past Surgical History:   Procedure Laterality Date    CATARACT EXTRACTION W/  INTRAOCULAR LENS IMPLANT Right 04/04/16    Dr Meehan    COLONOSCOPY W/ BIOPSIES      ESOPHAGOGASTRODUODENOSCOPY N/A 6/29/2020    Procedure: EGD (ESOPHAGOGASTRODUODENOSCOPY);  Surgeon: Ravi Leone MD;  [facility]: [facility];  Service: Endoscopy;  Laterality: N/A;    EYE SURGERY      HEMORRHOID SURGERY      Dr. Root [facility]    lateral internal anal sphincterotomy  12/13/13    Dr. root [facility]    URETERAL STENT PLACEMENT           Review of Systems   Constitutional: Negative for activity change, chills, diaphoresis, fatigue and fever.   Respiratory: Negative for chest tightness and shortness of breath.    Cardiovascular: Negative for chest pain and palpitations.   Musculoskeletal: Positive for gait problem.   Skin: Negative for rash and wound.   All other systems reviewed and are negative.          Objective:      Physical Exam  Vitals signs reviewed.   Constitutional:       General: He is not in acute distress.     Appearance: Normal appearance. He is well-developed.   HENT:      " Head: Normocephalic.   Neck:      Musculoskeletal: Normal range of motion.   Cardiovascular:      Rate and Rhythm: Normal rate.      Pulses: Normal pulses.   Pulmonary:      Effort: Pulmonary effort is normal. No respiratory distress.   Musculoskeletal: Normal range of motion.         General: No swelling or tenderness.   Skin:     General: Skin is warm and dry.      Capillary Refill: Capillary refill takes less than 2 seconds.   Neurological:      Mental Status: He is alert and oriented to person, place, and time.   Psychiatric:         Behavior: Behavior normal.             Assessment:       1. Open wound of neck, initial encounter      [REMOVED]      Wound 09/03/20 1118  posterior Neck (Removed)   09/03/20 1118    Pre-existing: Yes   Primary Wound Type:    Side:    Orientation: posterior   Location: Neck   Wound Number (optional):    Ankle-Brachial Index:    Pulses:    Removal Indication and Assessment:    Wound Outcome: Healed   (Retired) Wound Type:    (Retired) Wound Length (cm):    (Retired) Wound Width (cm):    (Retired) Depth (cm):    Wound Description (Comments):    Removal Indications:    Removed 10/15/20 0946   Wound Image   10/15/20 0945   Appearance Epithelialization 10/15/20 0945                           Plan:       Wound is closed  RTC PRN    [facility] wound care orders    May d/c wound care orders

## 2020-10-15 NOTE — TELEPHONE ENCOUNTER
Please disregard the earlier message.  Please let him know that  his kidney function is continuing to decline. Please see if he can come in at 1:00 pm tomorrow to clinic.

## 2020-10-15 NOTE — TELEPHONE ENCOUNTER
----- Message from Valeria Arenas sent at 10/15/2020 10:42 AM CDT -----  Contact: Patient [phone]  Patient calling again regarding the CMN form for his diabetic supplies.    Please call and advise.    Thank You

## 2020-10-15 NOTE — TELEPHONE ENCOUNTER
Spoke to pt, he stated he was calling about his test strips. He would like for us to give his pharmacy a call.

## 2020-10-19 ENCOUNTER — TELEPHONE (OUTPATIENT)
Dept: NEPHROLOGY | Facility: CLINIC | Age: 75
End: 2020-10-19

## 2020-10-19 DIAGNOSIS — N18.4 STAGE 4 CHRONIC KIDNEY DISEASE: Primary | ICD-10-CM

## 2020-10-27 ENCOUNTER — TELEPHONE (OUTPATIENT)
Dept: INTERNAL MEDICINE | Facility: CLINIC | Age: 75
End: 2020-10-27

## 2020-10-27 DIAGNOSIS — E11.65 TYPE 2 DIABETES MELLITUS WITH HYPERGLYCEMIA, WITHOUT LONG-TERM CURRENT USE OF INSULIN: Primary | ICD-10-CM

## 2020-10-27 RX ORDER — INSULIN LISPRO 100 [IU]/ML
INJECTION, SOLUTION INTRAVENOUS; SUBCUTANEOUS
Qty: 15 ML | Refills: 6
Start: 2020-10-27 | End: 2021-04-28 | Stop reason: SDUPTHER

## 2020-10-27 RX ORDER — INSULIN GLARGINE 100 [IU]/ML
INJECTION, SOLUTION SUBCUTANEOUS
Qty: 15 ML | Refills: 6
Start: 2020-10-27 | End: 2021-04-28

## 2020-10-27 NOTE — TELEPHONE ENCOUNTER
Spoke to patient to inform him of message by Dariusz Torres----patient was not at home, will need to call back to give instructions so he can right this down

## 2020-10-27 NOTE — TELEPHONE ENCOUNTER
----- Message from Karly Mishra sent at 10/27/2020 11:21 AM CDT -----  Regarding: Advice  Contact: pt  Reason:Pt ask for a call  back pertaining to sugar levels he states.    Communication: [phone]

## 2020-10-27 NOTE — TELEPHONE ENCOUNTER
Spoke to patient and states for the last 10 days his sugars have been running low.   This morning it was 55, he drank/ate something and got it up to 70 about 1 1/2 hour later-----pls call patient about what he should do

## 2020-10-28 ENCOUNTER — TELEPHONE (OUTPATIENT)
Dept: INTERNAL MEDICINE | Facility: CLINIC | Age: 75
End: 2020-10-28

## 2020-10-28 NOTE — TELEPHONE ENCOUNTER
----- Message from MACK Wood, FNP sent at 10/27/2020  4:34 PM CDT -----  Regarding: spoke with patient, needs an appointment  A1c prior   F/u in next 2-3 weeks  Instructed pt to cut back meal insulin to 8 units ac  Lantus 28 units at night   Thanks  Dariusz   Call Thursday -- if still having hypoglycemia -pt verbalized understanding

## 2020-11-05 ENCOUNTER — TELEPHONE (OUTPATIENT)
Dept: NEPHROLOGY | Facility: CLINIC | Age: 75
End: 2020-11-05

## 2020-11-05 ENCOUNTER — LAB VISIT (OUTPATIENT)
Dept: LAB | Facility: HOSPITAL | Age: 75
End: 2020-11-05
Payer: MEDICARE

## 2020-11-05 ENCOUNTER — TELEPHONE (OUTPATIENT)
Dept: CARDIOLOGY | Facility: CLINIC | Age: 75
End: 2020-11-05

## 2020-11-05 ENCOUNTER — OFFICE VISIT (OUTPATIENT)
Dept: CARDIOLOGY | Facility: CLINIC | Age: 75
End: 2020-11-05
Payer: MEDICARE

## 2020-11-05 VITALS
HEART RATE: 58 BPM | SYSTOLIC BLOOD PRESSURE: 196 MMHG | BODY MASS INDEX: 40.43 KG/M2 | HEIGHT: 74 IN | DIASTOLIC BLOOD PRESSURE: 88 MMHG | WEIGHT: 315 LBS

## 2020-11-05 DIAGNOSIS — Z79.4 TYPE 2 DIABETES MELLITUS WITH STAGE 4 CHRONIC KIDNEY DISEASE, WITH LONG-TERM CURRENT USE OF INSULIN: Chronic | ICD-10-CM

## 2020-11-05 DIAGNOSIS — E66.01 OBESITY, CLASS III, BMI 40-49.9 (MORBID OBESITY): Chronic | ICD-10-CM

## 2020-11-05 DIAGNOSIS — I51.89 DIASTOLIC DYSFUNCTION: Primary | ICD-10-CM

## 2020-11-05 DIAGNOSIS — R00.1 BRADYCARDIA: ICD-10-CM

## 2020-11-05 DIAGNOSIS — N18.4 STAGE 4 CHRONIC KIDNEY DISEASE: ICD-10-CM

## 2020-11-05 DIAGNOSIS — N18.5 STAGE 5 CHRONIC KIDNEY DISEASE NOT ON CHRONIC DIALYSIS: ICD-10-CM

## 2020-11-05 DIAGNOSIS — E11.22 TYPE 2 DIABETES MELLITUS WITH STAGE 4 CHRONIC KIDNEY DISEASE, WITH LONG-TERM CURRENT USE OF INSULIN: Chronic | ICD-10-CM

## 2020-11-05 DIAGNOSIS — I10 ESSENTIAL HYPERTENSION: Chronic | ICD-10-CM

## 2020-11-05 DIAGNOSIS — E78.5 DYSLIPIDEMIA: ICD-10-CM

## 2020-11-05 DIAGNOSIS — N18.4 TYPE 2 DIABETES MELLITUS WITH STAGE 4 CHRONIC KIDNEY DISEASE, WITH LONG-TERM CURRENT USE OF INSULIN: Chronic | ICD-10-CM

## 2020-11-05 DIAGNOSIS — N18.30 STAGE 3 CHRONIC KIDNEY DISEASE: ICD-10-CM

## 2020-11-05 PROBLEM — I42.0 DILATED CARDIOMYOPATHY: Chronic | Status: RESOLVED | Noted: 2017-02-07 | Resolved: 2020-11-05

## 2020-11-05 LAB
25(OH)D3+25(OH)D2 SERPL-MCNC: 12 NG/ML (ref 30–96)
ALBUMIN SERPL BCP-MCNC: 3.3 G/DL (ref 3.5–5.2)
ANION GAP SERPL CALC-SCNC: 14 MMOL/L (ref 8–16)
BASOPHILS # BLD AUTO: 0.02 K/UL (ref 0–0.2)
BASOPHILS NFR BLD: 0.3 % (ref 0–1.9)
BUN SERPL-MCNC: 53 MG/DL (ref 8–23)
CALCIUM SERPL-MCNC: 7.1 MG/DL (ref 8.7–10.5)
CHLORIDE SERPL-SCNC: 108 MMOL/L (ref 95–110)
CO2 SERPL-SCNC: 23 MMOL/L (ref 23–29)
CREAT SERPL-MCNC: 6 MG/DL (ref 0.5–1.4)
DIFFERENTIAL METHOD: ABNORMAL
EOSINOPHIL # BLD AUTO: 0.2 K/UL (ref 0–0.5)
EOSINOPHIL NFR BLD: 2.5 % (ref 0–8)
ERYTHROCYTE [DISTWIDTH] IN BLOOD BY AUTOMATED COUNT: 15.9 % (ref 11.5–14.5)
EST. GFR  (AFRICAN AMERICAN): 9.8 ML/MIN/1.73 M^2
EST. GFR  (NON AFRICAN AMERICAN): 8.5 ML/MIN/1.73 M^2
GLUCOSE SERPL-MCNC: 100 MG/DL (ref 70–110)
HCT VFR BLD AUTO: 30.3 % (ref 40–54)
HGB BLD-MCNC: 9.1 G/DL (ref 14–18)
IMM GRANULOCYTES # BLD AUTO: 0.03 K/UL (ref 0–0.04)
IMM GRANULOCYTES NFR BLD AUTO: 0.4 % (ref 0–0.5)
LYMPHOCYTES # BLD AUTO: 1.4 K/UL (ref 1–4.8)
LYMPHOCYTES NFR BLD: 21.2 % (ref 18–48)
MCH RBC QN AUTO: 27.4 PG (ref 27–31)
MCHC RBC AUTO-ENTMCNC: 30 G/DL (ref 32–36)
MCV RBC AUTO: 91 FL (ref 82–98)
MONOCYTES # BLD AUTO: 0.6 K/UL (ref 0.3–1)
MONOCYTES NFR BLD: 8.9 % (ref 4–15)
NEUTROPHILS # BLD AUTO: 4.5 K/UL (ref 1.8–7.7)
NEUTROPHILS NFR BLD: 66.7 % (ref 38–73)
NRBC BLD-RTO: 0 /100 WBC
PHOSPHATE SERPL-MCNC: 4.6 MG/DL (ref 2.7–4.5)
PLATELET # BLD AUTO: 184 K/UL (ref 150–350)
PMV BLD AUTO: 9.9 FL (ref 9.2–12.9)
POTASSIUM SERPL-SCNC: 3.9 MMOL/L (ref 3.5–5.1)
PTH-INTACT SERPL-MCNC: 348 PG/ML (ref 9–77)
RBC # BLD AUTO: 3.32 M/UL (ref 4.6–6.2)
SODIUM SERPL-SCNC: 145 MMOL/L (ref 136–145)
T4 FREE SERPL-MCNC: 0.91 NG/DL (ref 0.71–1.51)
TSH SERPL DL<=0.005 MIU/L-ACNC: 1.38 UIU/ML (ref 0.4–4)
WBC # BLD AUTO: 6.73 K/UL (ref 3.9–12.7)

## 2020-11-05 PROCEDURE — 85025 COMPLETE CBC W/AUTO DIFF WBC: CPT

## 2020-11-05 PROCEDURE — 82306 VITAMIN D 25 HYDROXY: CPT

## 2020-11-05 PROCEDURE — 99999 PR PBB SHADOW E&M-EST. PATIENT-LVL V: ICD-10-PCS | Mod: PBBFAC,,, | Performed by: PHYSICIAN ASSISTANT

## 2020-11-05 PROCEDURE — 83970 ASSAY OF PARATHORMONE: CPT

## 2020-11-05 PROCEDURE — 84439 ASSAY OF FREE THYROXINE: CPT

## 2020-11-05 PROCEDURE — 99215 OFFICE O/P EST HI 40 MIN: CPT | Mod: PBBFAC | Performed by: PHYSICIAN ASSISTANT

## 2020-11-05 PROCEDURE — 99214 PR OFFICE/OUTPT VISIT, EST, LEVL IV, 30-39 MIN: ICD-10-PCS | Mod: S$PBB,,, | Performed by: PHYSICIAN ASSISTANT

## 2020-11-05 PROCEDURE — 93010 ELECTROCARDIOGRAM REPORT: CPT | Mod: S$PBB,,, | Performed by: INTERNAL MEDICINE

## 2020-11-05 PROCEDURE — 80069 RENAL FUNCTION PANEL: CPT

## 2020-11-05 PROCEDURE — 36415 COLL VENOUS BLD VENIPUNCTURE: CPT

## 2020-11-05 PROCEDURE — 99999 PR PBB SHADOW E&M-EST. PATIENT-LVL V: CPT | Mod: PBBFAC,,, | Performed by: PHYSICIAN ASSISTANT

## 2020-11-05 PROCEDURE — 84443 ASSAY THYROID STIM HORMONE: CPT

## 2020-11-05 PROCEDURE — 93005 ELECTROCARDIOGRAM TRACING: CPT | Mod: PBBFAC | Performed by: INTERNAL MEDICINE

## 2020-11-05 PROCEDURE — 99214 OFFICE O/P EST MOD 30 MIN: CPT | Mod: S$PBB,,, | Performed by: PHYSICIAN ASSISTANT

## 2020-11-05 PROCEDURE — 93010 EKG 12-LEAD: ICD-10-PCS | Mod: S$PBB,,, | Performed by: INTERNAL MEDICINE

## 2020-11-05 RX ORDER — HYDRALAZINE HYDROCHLORIDE 50 MG/1
50 TABLET, FILM COATED ORAL 3 TIMES DAILY
Qty: 90 TABLET | Refills: 11 | Status: SHIPPED | OUTPATIENT
Start: 2020-11-05 | End: 2021-04-13 | Stop reason: SDUPTHER

## 2020-11-05 NOTE — TELEPHONE ENCOUNTER
I called Ms. Siegel gave him the results of the Thyroid he voice his understanding.    JESSICA Redd Staff             Please let patient know that is thyroid function is normal

## 2020-11-05 NOTE — TELEPHONE ENCOUNTER
Preferred Name: Vinnie Siegel   Male, 75 y.o., 1945   Phone: [phone] (M)   PCP: Janeth Walter MD   Language: English   Need Interp: No   Allergies Last Reviewed: 11/05/20   Allergies:   No Known Allergies   Digital Medicine: None   Health Maintenance: Due   Primary Ins.: MEDICARE   MRN: [id number]   Pt Comm Pref: Patient Portal, Mail                  Next Appt:   With Nephrology (Yisel Ramírez DO)  11/09/2020 at 10:00 AM   My Sticky Note:   Specialty Comments:   Last Encounter Center: [facility]   Last Enc in Dept: 9/8/2020   Last Enc this Prov: 9/8/2020   Last Contact Department: [facility] NEPHROLOGY                Patient Calls  Yisel Ramírez DO routed conversation to Darrell Morillo Staff 21 minutes ago (3:12 PM)          Yisel Ramírez DO 21 minutes ago (3:12 PM)     Please make sure he comes for the appoint as his creatinine is worse from 3 weeks ago-hydrate well.        Documentation                              Recent Patient Communication     Last Update Reason Specialty       Today -- Nephrology     [facility] Nephrology Yisel Ramírez Closed     Today -- Cardiology     [facility] Cardiology Rowan Aguilar Closed     Today -- Nephrology     [facility] Nephrology Yisel Ramírez Closed     1 week ago -- Internal Medicine     [facility] Internal Medicine Dariusz Torres Closed     1 week ago -- Internal Medicine     [facility] Internal Medicine Dariusz Torres Closed    There are additional recent communications with this patient. View the rest in Chart Review.

## 2020-11-05 NOTE — PROGRESS NOTES
General Cardiology Clinic Note  Reason for Visit: Annual follow up  Last Clinic Visit: 10/8/2019 with Dr Ricardo     HPI:   Vinnie Siegel is a 75 y.o. male who presents for one year follow up.     HPI:   Vinnie Siegel presents for follow up of a dilated cardiomyopathy, since resolved. He was hospitalized 6/27-7/16 with rectal bleeding and anemia requiring transfusions, DKA, and sepsis secondary to UTI and pneumonia. He went into respiratory failure requiring intubation. Since then he has had worsening shortness of breath and weakness. His echo in the hospital showed LVEF now 75% (previously 40%). Vinnie Siegel has hypertension that is uncontrolled. Vinnie Siegel quit smoking after hospitalization in june. He is extremely sedentary and continues to gain weight. Vinnie Siegel denies chest pain,  palpitations, presyncope, or syncope. Vinnie Siegel has dyslipidemia on high intensity statin. Vinnie Siegel chronic kidney disease that has worsened to end stage on recent labs.     ROS:    Constitution: Negative for decreased appetite, diaphoresis, fever, and weight loss. Positive for fatigue and weight gain  HENT: Negative for congestion, nosebleeds and sore throat.    Eyes: Negative for blurred vision, vision loss in left eye, vision loss in right eye and visual disturbance.  Cardiovascular: Negative for chest pain, claudication, near-syncope, orthopnea, palpitations, paroxysmal nocturnal dyspnea and syncope. Positive for dyspnea on exertion and leg swelling  Respiratory: Negative for cough, hemoptysis, snoring, and wheezing.. Positive for shortness of breath   Hematologic/Lymphatic: Negative for bleeding problem. Does not bruise/bleed easily.   Endocrine: Negative for polyuria  Musculoskeletal: Negative for muscle cramps and myalgias.   Gastrointestinal: Negative for abdominal pain, change in bowel habit, diarrhea, heartburn, hematemesis, hematochezia, melena, nausea and vomiting.   Neurological:  Negative for dizziness, headaches and light-headedness. Positive for generalized weakness  Psychiatric/Behavioral: Negative for depression.   Allergic/Immunologic: Negative for hives.   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 [facility]    lateral internal anal sphincterotomy  12/13/13    Dr. root [facility]    URETERAL STENT PLACEMENT       Allergies:   Review of patient's allergies indicates:  No Known Allergies  Medications:     Current Outpatient Medications on File Prior to Visit   Medication Sig Dispense Refill    atorvastatin (LIPITOR) 40 MG tablet Take 1 tablet (40 mg total) by mouth once daily. 90 tablet 0    blood sugar diagnostic Strp To check BG 4 times daily, to use with insurance preferred meter 400 each 3    blood-glucose meter kit To check BG 4 times daily, to use with insurance preferred meter, e 11.65 1 each 0    carvediloL (COREG) 25 MG tablet TAKE 1 TABLET(25 MG) BY MOUTH TWICE DAILY WITH MEALS 180 tablet 0    dorzolamide-timolol 2-0.5% (COSOPT) 22.3-6.8 mg/mL ophthalmic solution Place 1 drop into the right eye 2 (two) times daily. 10 mL 6    dorzolamide-timolol 2-0.5% (COSOPT) 22.3-6.8 mg/mL ophthalmic solution Place 1 drop into both eyes 2 (two) times daily. 10 mL 1    fluticasone propionate (FLONASE) 50 mcg/actuation nasal spray 2 sprays (100 mcg  "total) by Each Nostril route once daily. 48 mL 3    furosemide (LASIX) 40 MG tablet Take 1 tablet (40 mg total) by mouth once daily. 90 tablet 1    insulin (LANTUS SOLOSTAR U-100 INSULIN) glargine 100 units/mL (3mL) SubQ pen Inject 28 units at night. 15 mL 6    insulin lispro (HUMALOG KWIKPEN INSULIN) 100 unit/mL pen Inject 8 units w/ meals plus scale 180-230+2, 231-280+4, 281-330+6, 331-380+8, >380+10. 15 mL 6    ipratropium-albuteroL (COMBIVENT)  mcg/actuation inhaler Inhale 1 puff into the lungs every 6 (six) hours as needed for Wheezing or Shortness of Breath. Rescue 1 Package 11    lancets Misc To check BG 4 times daily, to use with insurance preferred meter 400 each 3    magnesium oxide (MAG-OX) 400 mg (241.3 mg magnesium) tablet Take 1 tablet (400 mg total) by mouth every morning. 90 tablet 0    NIFEdipine (PROCARDIA-XL) 90 MG (OSM) 24 hr tablet Take 1 tablet (90 mg total) by mouth once daily. 30 tablet 4    pantoprazole (PROTONIX) 40 MG tablet Take 1 tablet (40 mg total) by mouth once daily. 30 tablet 2    pen needle, diabetic (BD ULTRA-FINE SHORT PEN NEEDLE) 31 gauge x 5/16" Ndle USE FOUR TIMES DAILY 400 each 3    sodium bicarbonate 650 MG tablet Take 1 tablet (650 mg total) by mouth once daily. 30 tablet 0    tamsulosin (FLOMAX) 0.4 mg Cap Take 1 capsule (0.4 mg total) by mouth every morning. 90 capsule 1    TRUE METRIX GLUCOSE TEST STRIP Strp TEST FOUR TIMES DAILY 400 strip 3    TRUEPLUS LANCETS 33 gauge Misc TEST FOUR TIMES DAILY 400 each 3    vitamin D (VITAMIN D3) 1000 units Tab Take 1 tablet (1,000 Units total) by mouth once daily. 30 tablet 0     No current facility-administered medications on file prior to visit.      Social History:     Social History     Tobacco Use    Smoking status: Former Smoker     Packs/day: 0.50     Years: 45.00     Pack years: 22.50     Quit date: 2020     Years since quittin.3    Smokeless tobacco: Never Used    Tobacco comment: says he " "could quit and will try    Substance Use Topics    Alcohol use: Yes     Comment: rarely     Family History:     Family History   Problem Relation Age of Onset    Diabetes Brother         type 1    Hypertension Brother     Stroke Brother      Physical Exam:   BP (!) 196/88 (BP Location: Left arm, Patient Position: Sitting, BP Method: X-Large (Automatic))   Pulse (!) 58   Ht 6' 2" (1.88 m)   Wt (!) 159.3 kg (351 lb 3.1 oz)   BMI 45.09 kg/m²      Constitutional: He is oriented to person, place, and time. He appears well-developed. No distress. morbidly obese  HENT:   Head: Normocephalic.   Right Ear: External ear normal.   Left Ear: External ear normal.   Eyes: Pupils are equal, round, and reactive to light. EOM are normal. No scleral icterus.   Neck: Neck supple. No JVD present. No thyromegaly present.   Cardiovascular: Normal rate, regular rhythm and intact distal pulses. PMI is not displaced. Exam reveals no gallop and no friction rub.   Murmur heard.   Medium-pitched midsystolic murmur is present with a grade of 2/6 at the upper right sternal border, upper left sternal border and lower left sternal border.  3+ bilateral pedal edema up to knees, no presacral edema  No JVD   Pulmonary/Chest: Effort normal and breath sounds normal. No respiratory distress. He has no wheezes. He has no rales.   Abdominal: Soft. He exhibits no distension. There is no hepatosplenomegaly. There is no tenderness.   Musculoskeletal: He exhibits no edema or tenderness.   Neurological: He is alert and oriented to person, place, and time.   Skin: Skin is warm and dry. No rash noted.   Psychiatric: He has a normal mood and affect. His behavior is normal.   Labs:     Lab Results   Component Value Date     10/15/2020    K 4.6 10/15/2020     10/15/2020    CO2 24 10/15/2020    BUN 57 (H) 10/15/2020    CREATININE 5.0 (H) 10/15/2020    ANIONGAP 8 10/15/2020     Lab Results   Component Value Date    HGBA1C 5.9 (H) 08/10/2020 "     Lab Results   Component Value Date     (H) 09/11/2020     (H) 07/03/2020     (H) 12/23/2015    Lab Results   Component Value Date    WBC 7.73 09/11/2020    HGB 10.2 (L) 09/11/2020    HCT 32.3 (L) 09/11/2020     09/11/2020    GRAN 4.8 09/11/2020    GRAN 62.0 09/11/2020     Lab Results   Component Value Date    CHOL 123 12/13/2018    HDL 41 12/13/2018    LDLCALC 52.6 (L) 12/13/2018    TRIG 147 12/13/2018          Imaging:   TTE 6/30/2020  · Hyperdynamic left ventricular systolic function. The estimated ejection fraction is 75%.  · No wall motion abnormalities.  · Moderate concentric left ventricular hypertrophy.  · Grade II (moderate) left ventricular diastolic dysfunction consistent with pseudonormalization.  · Normal right ventricular systolic function.  · The estimated PA systolic pressure is 16 mmHg.  · Normal central venous pressure (3 mmHg).    EKG 11/5/2020: Sinus bradycardia, HR 56, RBBB    Assessment:     1. Diastolic dysfunction    2. Bradycardia    3. Essential hypertension    4. Stage 5 chronic kidney disease not on chronic dialysis    5. Type 2 diabetes mellitus with stage 4 chronic kidney disease, with long-term current use of insulin    6. Obesity, Class III, BMI 40-49.9 (morbid obesity)    7. Dyslipidemia      Plan:     Diastolic dysfunction  -ZEPEDA and pedal edema likely multifactorial: morbid obesity, physical deconditioning-, smoking history, diastolic CHF, and CKD with low albumin. His lungs are clear on exam and he has no JVD.   -Recommend lifestyle modification involving increasing physical activity and weight loss  -Low sodium diet  -Continue Furosemide 40 mg daily.     Bradycardia  -Unable to initiate beta blocker given sinus bradycardia   -Check TSH/T4    Hypertension   -Uncontrolled  -Start Hydralazine 50 mg tid   -Continue Nifedipine 90 mg although likely contributing to pedal edema    CKD Stage 5  -Renal function deteriorating. F/u with Nephrologist as  scheduled in 3 days.     Dyslipidemia  -Continue high intensity statin   -Mediterranean diet    Follow up in 3 months. This patient was discussed with Dr Ricardo.    Signed:  Rowan Aguilar PA-C  Cardiology   [phone] - Interventional  [phone] - General  11/5/2020 7:57 AM

## 2020-11-05 NOTE — TELEPHONE ENCOUNTER
Please make sure he comes for the appoint as his creatinine is worse from 3 weeks ago-hydrate well.

## 2020-11-09 ENCOUNTER — OFFICE VISIT (OUTPATIENT)
Dept: NEPHROLOGY | Facility: CLINIC | Age: 75
End: 2020-11-09
Payer: MEDICARE

## 2020-11-09 VITALS
HEART RATE: 67 BPM | OXYGEN SATURATION: 98 % | SYSTOLIC BLOOD PRESSURE: 148 MMHG | WEIGHT: 315 LBS | DIASTOLIC BLOOD PRESSURE: 64 MMHG | BODY MASS INDEX: 44.72 KG/M2

## 2020-11-09 DIAGNOSIS — E11.65 TYPE 2 DIABETES MELLITUS WITH HYPERGLYCEMIA, WITHOUT LONG-TERM CURRENT USE OF INSULIN: ICD-10-CM

## 2020-11-09 DIAGNOSIS — N18.4 CKD (CHRONIC KIDNEY DISEASE) STAGE 4, GFR 15-29 ML/MIN: Primary | ICD-10-CM

## 2020-11-09 DIAGNOSIS — Z01.818 PRE-OPERATIVE EXAMINATION: Primary | ICD-10-CM

## 2020-11-09 DIAGNOSIS — I10 HYPERTENSION, UNSPECIFIED TYPE: ICD-10-CM

## 2020-11-09 DIAGNOSIS — N25.0 RENAL OSTEODYSTROPHY: ICD-10-CM

## 2020-11-09 PROCEDURE — 99999 PR PBB SHADOW E&M-EST. PATIENT-LVL IV: ICD-10-PCS | Mod: PBBFAC,,, | Performed by: INTERNAL MEDICINE

## 2020-11-09 PROCEDURE — 99214 OFFICE O/P EST MOD 30 MIN: CPT | Mod: PBBFAC | Performed by: INTERNAL MEDICINE

## 2020-11-09 PROCEDURE — 99999 PR PBB SHADOW E&M-EST. PATIENT-LVL IV: CPT | Mod: PBBFAC,,, | Performed by: INTERNAL MEDICINE

## 2020-11-09 PROCEDURE — 99215 OFFICE O/P EST HI 40 MIN: CPT | Mod: S$PBB,,, | Performed by: INTERNAL MEDICINE

## 2020-11-09 PROCEDURE — 99215 PR OFFICE/OUTPT VISIT, EST, LEVL V, 40-54 MIN: ICD-10-PCS | Mod: S$PBB,,, | Performed by: INTERNAL MEDICINE

## 2020-11-09 RX ORDER — TORSEMIDE 20 MG/1
TABLET ORAL
Qty: 180 TABLET | Refills: 3 | Status: ON HOLD | OUTPATIENT
Start: 2020-11-09 | End: 2020-12-08 | Stop reason: CLARIF

## 2020-11-09 RX ORDER — ERGOCALCIFEROL 1.25 MG/1
50000 CAPSULE ORAL
Qty: 12 CAPSULE | Refills: 0 | Status: SHIPPED | OUTPATIENT
Start: 2020-11-09

## 2020-11-09 NOTE — PROGRESS NOTES
Subjective:       Patient ID: Vinnie Siegel is a 75 y.o. Black or  male who presents for follow-up evaluation of Chronic Kidney Disease    HPI This is a 75 -year-old -American male with nephrolithiasis, hypertension and diabetes,ckd, proteinuria who is coming in for f/u of  Nephrolithiasis, ckd, HTN and proteinuria. No recent stones and denies any hematuria, dysuria, or flank pain. DM not well controlled in the past but much better at a1c of 5.9 recently.  Unclear about  Bp's at home.    Creatinine high. Has Proteinuria. Admitted to ICU in June 2020 with HHS, encephalopathy, GI bleed, ADONAY, respiratory failure and sepsis. Sepsis was due to E. Coli UTI and Strep agalactiae pneumonia. Pt lost f/u and was last seen in 2017 in renal clinic. Denies NSAID's.     PAST MEDICAL HISTORY: Significant for hypertension, diabetes for several years,   and nephrolithiasis. The patient had first episode about several  years ago and had to   have a subsequent procedure and second stone was in August 2012 and he   had left ureteroscopy for that. He states that he was never symptomatic with   either of the stones and was found on imaging.     SOCIAL HISTORY: He used to smoke less than a pack a day and quit in June 2020. Does not drink. He is a   retired .     FAMILY HISTORY: No family history of kidney stones or kidney problems.     Review of Systems   Constitutional: Positive for fatigue.        Apetite stable but eating less than usual. Gained weight likleey fluid.   Eyes: Negative for discharge.   Respiratory: Positive for shortness of breath. Negative for cough and wheezing.    Cardiovascular: Negative for chest pain and palpitations.   Gastrointestinal: Negative for abdominal pain, diarrhea, nausea and vomiting.   Genitourinary: Negative for dysuria, frequency, hematuria and urgency.   Skin: Negative for color change and rash.   Psychiatric/Behavioral: Negative for confusion.   All other systems  reviewed and are negative.      Objective:    limited with pandemic.  +2 edema jose cruz LE.       Assessment:       No diagnosis found.    Plan:         This is a 75 -year-old -American male with history of nephrolithiasis,   hypertension, diabetes, is coming in for f/u of nephrolithiasis, ckd, proteinuria.     1. CKD 4: creatinine of 2.6 with MDRD GFR of 27 ml/min-with progression over the last 3 years. Prior to hospitalization, baseline appears to be 2.6-2.7 and had ADONAY with peak creatinine of  4's and went back to back to 2.6 but has been progressively getting worse since then and now at 6.0 with GFR <10ml/min.   CKD sec to DM and HTN. Change lasix 40 mg qd to torsemide 40 mg daily. Biopsy less likely helpful at this point-discussed risks/benefits with pt.  2.  Proteinuria:  sec to DM and  HTN.  Off losartan with low GFR.   Stressed importance of good control.  Sig increase in 2017-over 7 gms and rechecked 24 hr urine and lost f/u.  Recent decrease to 1.5 gm but high at 5 gms.  Serologies neg  but a1c uncontrolled in the past but better now.  Off spironolactone since hopsitalization. Vasc access and TOPPs education. Aware of uremic symptoms and low K and low phos diet handouts given. Edecated extensively about dialysis.   2. Nephrolithiasis: He had calcium oxalate stones 100% in the shell consist of uric acid stones 75%, calcium oxalate 25%. He is instructed on conservative measures to decrease the risk of stone formation. He is instructed to hydrate well along with decreasing the animal protein intake and low-salt intake. On mag supplements. His citrate levels are good. On allopurinol and sodium bicarbonate for low PH but off allopurinol since his recent hopsitalization. Last  US shows did not show stone.  Seen in  Urology in the past.  Urorisk from 2017 showed high sodium and high oxalate.  Rec low salt diet and to follow low oxalate diet.   3. Hypertension. Blood pressures are stable  per pt.  Call  with  readings. Switched nifedipine 60 mg bid to losaratn 100 mg for nephroprotection and proteinuria but held with low GFR.   4. Renal osteodystrophy:  Ca slightly low/phos stable.  PTH elevated with low vit D-restart ergo.   5. Acidosis:  Stable on sodiumbicarbonate.   6. Anemia: hgb and iron stable.   RTc in 1-2  Months. Labs ordered for December-please schedule.  Refer to [facility] surgery for access; TOPPs education. Low K and low phos handouts.  High BMI for txp -discussed weight loss.

## 2020-11-17 DIAGNOSIS — I42.0 DILATED CARDIOMYOPATHY: ICD-10-CM

## 2020-11-17 DIAGNOSIS — E87.20 ACIDOSIS: ICD-10-CM

## 2020-11-17 DIAGNOSIS — E78.5 HYPERLIPIDEMIA, UNSPECIFIED HYPERLIPIDEMIA TYPE: ICD-10-CM

## 2020-11-17 DIAGNOSIS — I10 ESSENTIAL HYPERTENSION: ICD-10-CM

## 2020-11-17 DIAGNOSIS — J30.2 SEASONAL ALLERGIES: ICD-10-CM

## 2020-11-17 RX ORDER — LANOLIN ALCOHOL/MO/W.PET/CERES
1 CREAM (GRAM) TOPICAL EVERY MORNING
Qty: 90 TABLET | Refills: 0 | Status: SHIPPED | OUTPATIENT
Start: 2020-11-17 | End: 2020-11-17 | Stop reason: SDUPTHER

## 2020-11-17 RX ORDER — ATORVASTATIN CALCIUM 40 MG/1
40 TABLET, FILM COATED ORAL DAILY
Qty: 90 TABLET | Refills: 1 | Status: SHIPPED | OUTPATIENT
Start: 2020-11-17 | End: 2021-02-22 | Stop reason: SDUPTHER

## 2020-11-17 RX ORDER — LANOLIN ALCOHOL/MO/W.PET/CERES
1 CREAM (GRAM) TOPICAL EVERY MORNING
Qty: 90 TABLET | Refills: 1 | Status: SHIPPED | OUTPATIENT
Start: 2020-11-17 | End: 2021-08-24

## 2020-11-17 RX ORDER — FLUTICASONE PROPIONATE 50 MCG
2 SPRAY, SUSPENSION (ML) NASAL DAILY
Qty: 48 ML | Refills: 3 | Status: SHIPPED | OUTPATIENT
Start: 2020-11-17 | End: 2020-11-17 | Stop reason: SDUPTHER

## 2020-11-17 RX ORDER — ATORVASTATIN CALCIUM 40 MG/1
40 TABLET, FILM COATED ORAL DAILY
Qty: 90 TABLET | Refills: 0 | Status: SHIPPED | OUTPATIENT
Start: 2020-11-17 | End: 2020-11-17 | Stop reason: SDUPTHER

## 2020-11-17 RX ORDER — SODIUM BICARBONATE 650 MG/1
650 TABLET ORAL DAILY
Qty: 90 TABLET | Refills: 1 | Status: SHIPPED | OUTPATIENT
Start: 2020-11-17 | End: 2021-04-06 | Stop reason: SDUPTHER

## 2020-11-17 RX ORDER — CARVEDILOL 25 MG/1
TABLET ORAL
Qty: 180 TABLET | Refills: 0 | Status: SHIPPED | OUTPATIENT
Start: 2020-11-17 | End: 2020-11-17 | Stop reason: SDUPTHER

## 2020-11-17 RX ORDER — CARVEDILOL 25 MG/1
TABLET ORAL
Qty: 180 TABLET | Refills: 1 | Status: SHIPPED | OUTPATIENT
Start: 2020-11-17 | End: 2021-02-22 | Stop reason: SDUPTHER

## 2020-11-17 RX ORDER — TAMSULOSIN HYDROCHLORIDE 0.4 MG/1
1 CAPSULE ORAL EVERY MORNING
Qty: 90 CAPSULE | Refills: 1 | Status: SHIPPED | OUTPATIENT
Start: 2020-11-17 | End: 2021-05-21 | Stop reason: SDUPTHER

## 2020-11-17 RX ORDER — SODIUM BICARBONATE 650 MG/1
650 TABLET ORAL DAILY
Qty: 30 TABLET | Refills: 0 | COMMUNITY
Start: 2020-11-17 | End: 2020-11-17 | Stop reason: SDUPTHER

## 2020-11-17 RX ORDER — FLUTICASONE PROPIONATE 50 MCG
2 SPRAY, SUSPENSION (ML) NASAL DAILY
Qty: 48 ML | Refills: 3 | Status: SHIPPED | OUTPATIENT
Start: 2020-11-17 | End: 2021-11-22

## 2020-11-17 NOTE — TELEPHONE ENCOUNTER
"----- Message from Alana Mancini sent at 11/17/2020 11:06 AM CST -----  Regarding: Patient Advice  Name of Who is Calling: Vinnie Siegel      What is the request in detail:   Patient called requesting a call. Patient states, "he has three medications that was called into [facility] that Dr. Walter has to authorize."   Patient doesn't know the the names of the medications.  Please further advise      Reply by [facility]:  no      Call Back: [phone] (M)                                        "

## 2020-11-17 NOTE — TELEPHONE ENCOUNTER
[facility] states that they never did receive the refills sent in today and are requesting the rx to be sent in. [facility].

## 2020-11-18 ENCOUNTER — PATIENT OUTREACH (OUTPATIENT)
Dept: ADMINISTRATIVE | Facility: OTHER | Age: 75
End: 2020-11-18

## 2020-11-18 NOTE — PROGRESS NOTES
LINKS immunization registry not responding  Care Everywhere updated  Health Maintenance updated  Chart reviewed for overdue Proactive [facility] ([facility]) health maintenance testing (CRS, Breast Ca, Diabetic Eye Exam)   Orders entered:N/A

## 2020-11-19 ENCOUNTER — INITIAL CONSULT (OUTPATIENT)
Dept: VASCULAR SURGERY | Facility: CLINIC | Age: 75
End: 2020-11-19
Attending: SURGERY
Payer: MEDICARE

## 2020-11-19 ENCOUNTER — HOSPITAL ENCOUNTER (OUTPATIENT)
Dept: VASCULAR SURGERY | Facility: CLINIC | Age: 75
Discharge: HOME | End: 2020-11-19
Attending: SURGERY
Payer: MEDICARE

## 2020-11-19 ENCOUNTER — TELEPHONE (OUTPATIENT)
Dept: VASCULAR SURGERY | Facility: CLINIC | Age: 75
End: 2020-11-19

## 2020-11-19 VITALS
BODY MASS INDEX: 40.43 KG/M2 | TEMPERATURE: 98 F | DIASTOLIC BLOOD PRESSURE: 84 MMHG | WEIGHT: 315 LBS | HEIGHT: 74 IN | HEART RATE: 61 BPM | SYSTOLIC BLOOD PRESSURE: 177 MMHG

## 2020-11-19 DIAGNOSIS — I12.0 CKD STAGE 5 SECONDARY TO HYPERTENSION: Primary | ICD-10-CM

## 2020-11-19 DIAGNOSIS — N18.5 STAGE 5 CHRONIC KIDNEY DISEASE NOT ON CHRONIC DIALYSIS: Primary | ICD-10-CM

## 2020-11-19 DIAGNOSIS — N18.5 CKD STAGE 5 SECONDARY TO HYPERTENSION: Primary | ICD-10-CM

## 2020-11-19 DIAGNOSIS — Z01.818 PRE-OPERATIVE EXAMINATION: ICD-10-CM

## 2020-11-19 DIAGNOSIS — N18.4 CKD (CHRONIC KIDNEY DISEASE) STAGE 4, GFR 15-29 ML/MIN: ICD-10-CM

## 2020-11-19 DIAGNOSIS — I10 HYPERTENSION, UNSPECIFIED TYPE: ICD-10-CM

## 2020-11-19 DIAGNOSIS — Z01.818 PRE-OPERATIVE CLEARANCE: ICD-10-CM

## 2020-11-19 DIAGNOSIS — Z01.818 PREOPERATIVE TESTING: Primary | ICD-10-CM

## 2020-11-19 DIAGNOSIS — E11.65 TYPE 2 DIABETES MELLITUS WITH HYPERGLYCEMIA, WITHOUT LONG-TERM CURRENT USE OF INSULIN: ICD-10-CM

## 2020-11-19 PROCEDURE — 99204 PR OFFICE/OUTPT VISIT, NEW, LEVL IV, 45-59 MIN: ICD-10-PCS | Mod: S$PBB,,, | Performed by: SURGERY

## 2020-11-19 PROCEDURE — 99214 OFFICE O/P EST MOD 30 MIN: CPT | Mod: PBBFAC | Performed by: SURGERY

## 2020-11-19 PROCEDURE — 99999 PR PBB SHADOW E&M-EST. PATIENT-LVL IV: ICD-10-PCS | Mod: PBBFAC,,, | Performed by: SURGERY

## 2020-11-19 PROCEDURE — 99204 OFFICE O/P NEW MOD 45 MIN: CPT | Mod: S$PBB,,, | Performed by: SURGERY

## 2020-11-19 PROCEDURE — 93970 PR US DUPLEX, UPPER OR LOWER EXT VENOUS,COMPLETE BILAT: ICD-10-PCS | Mod: 26,S$PBB,, | Performed by: SURGERY

## 2020-11-19 PROCEDURE — 93970 EXTREMITY STUDY: CPT | Mod: 26,S$PBB,, | Performed by: SURGERY

## 2020-11-19 PROCEDURE — 93970 EXTREMITY STUDY: CPT | Mod: PBBFAC | Performed by: SURGERY

## 2020-11-19 PROCEDURE — 99999 PR PBB SHADOW E&M-EST. PATIENT-LVL IV: CPT | Mod: PBBFAC,,, | Performed by: SURGERY

## 2020-11-19 RX ORDER — TIMOLOL MALEATE 2.5 MG/ML
SOLUTION/ DROPS OPHTHALMIC
COMMUNITY
Start: 2020-10-15 | End: 2020-12-21

## 2020-11-19 NOTE — PROGRESS NOTES
Vinnie Siegel  11/19/2020    HPI:  Patient is a 75 y.o. male who is here today for evaluation for permanent HD access with most recent GFR - 9.  Right hand dominant.  No previous HD access, PPM, or AICD.    Per Dr. Ramírez's note:    This is a 75 -year-old -American male with nephrolithiasis, hypertension and diabetes,ckd, proteinuria who is coming in for f/u of  Nephrolithiasis, ckd, HTN and proteinuria. No recent stones and denies any hematuria, dysuria, or flank pain. DM not well controlled in the past but much better at a1c of 5.9 recently.  Unclear about  Bp's at home.    Creatinine high. Has Proteinuria. Admitted to ICU in June 2020 with HHS, encephalopathy, GI bleed, ADONAY, respiratory failure and sepsis. Sepsis was due to E. Coli UTI and Strep agalactiae pneumonia. Pt lost f/u and was last seen in 2017 in renal clinic. Denies NSAID's.      PAST MEDICAL HISTORY: Significant for hypertension, diabetes for several years,   and nephrolithiasis. The patient had first episode about several  years ago and had to   have a subsequent procedure and second stone was in August 2012 and he   had left ureteroscopy for that. He states that he was never symptomatic with   either of the stones and was found on imaging.       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;  Service: Endoscopy;  Laterality: N/A;    EYE SURGERY      HEMORRHOID SURGERY      Dr. Bone [facility] [place]    lateral  internal anal sphincterotomy  13    [place] [facility] [place] [facility] [place]    URETERAL STENT PLACEMENT       Family History   Problem Relation Age of Onset    Diabetes Brother         type 1    Hypertension Brother     Stroke Brother      Social History     Socioeconomic History    Marital status: Single     Spouse name: Not on file    Number of children: Not on file    Years of education: Not on file    Highest education level: Not on file   Occupational History    Not on file   Social Needs    Financial resource strain: Not on file    Food insecurity     Worry: Not on file     Inability: Not on file    Transportation needs     Medical: Not on file     Non-medical: Not on file   Tobacco Use    Smoking status: Former Smoker     Packs/day: 0.50     Years: 45.00     Pack years: 22.50     Quit date: 2020     Years since quittin.3    Smokeless tobacco: Never Used    Tobacco comment: says he could quit and will try    Substance and Sexual Activity    Alcohol use: Yes     Comment: rarely    Drug use: No    Sexual activity: Yes     Comment: single, retired , no kids   Lifestyle    Physical activity     Days per week: Not on file     Minutes per session: Not on file    Stress: Not on file   Relationships    Social connections     Talks on phone: Not on file     Gets together: Not on file     Attends Cheondoism service: Not on file     Active member of club or organization: Not on file     Attends meetings of clubs or organizations: Not on file     Relationship status: Not on file   Other Topics Concern    Not on file   Social History Narrative    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     Current Outpatient Medications on File Prior to Visit   Medication Sig    atorvastatin (LIPITOR) 40 MG tablet Take 1 tablet (40 mg total) by mouth once daily.    blood sugar diagnostic Strp To check BG 4 times daily, to use with insurance preferred meter    blood-glucose meter kit To check BG 4  "times daily, to use with insurance preferred meter, e 11.65    carvediloL (COREG) 25 MG tablet TAKE 1 TABLET(25 MG) BY MOUTH TWICE DAILY WITH MEALS    dorzolamide-timolol 2-0.5% (COSOPT) 22.3-6.8 mg/mL ophthalmic solution Place 1 drop into both eyes 2 (two) times daily.    ergocalciferol (ERGOCALCIFEROL) 50,000 unit Cap Take 1 capsule (50,000 Units total) by mouth every 7 days.    fluticasone propionate (FLONASE) 50 mcg/actuation nasal spray 2 sprays (100 mcg total) by Each Nostril route once daily.    hydrALAZINE (APRESOLINE) 50 MG tablet Take 1 tablet (50 mg total) by mouth 3 (three) times daily.    insulin (LANTUS SOLOSTAR U-100 INSULIN) glargine 100 units/mL (3mL) SubQ pen Inject 28 units at night.    insulin lispro (HUMALOG KWIKPEN INSULIN) 100 unit/mL pen Inject 8 units w/ meals plus scale 180-230+2, 231-280+4, 281-330+6, 331-380+8, >380+10.    ipratropium-albuteroL (COMBIVENT)  mcg/actuation inhaler Inhale 1 puff into the lungs every 6 (six) hours as needed for Wheezing or Shortness of Breath. Rescue    lancets Misc To check BG 4 times daily, to use with insurance preferred meter    magnesium oxide (MAG-OX) 400 mg (241.3 mg magnesium) tablet Take 1 tablet (400 mg total) by mouth every morning.    NIFEdipine (PROCARDIA-XL) 90 MG (OSM) 24 hr tablet Take 1 tablet (90 mg total) by mouth once daily.    pantoprazole (PROTONIX) 40 MG tablet Take 1 tablet (40 mg total) by mouth once daily.    pen needle, diabetic (BD ULTRA-FINE SHORT PEN NEEDLE) 31 gauge x 5/16" Ndle USE FOUR TIMES DAILY    sodium bicarbonate 650 MG tablet Take 1 tablet (650 mg total) by mouth once daily.    tamsulosin (FLOMAX) 0.4 mg Cap Take 1 capsule (0.4 mg total) by mouth every morning.    timolol maleate 0.25% (TIMOPTIC) 0.25 % Drop PLACE 1 GTT IN OU BID    torsemide (DEMADEX) 20 MG Tab Take 2 tablets once a day for a total dose of 40 mg.    TRUE METRIX GLUCOSE TEST STRIP Strp TEST FOUR TIMES DAILY    TRUEPLUS LANCETS " 33 gauge Misc TEST FOUR TIMES DAILY    vitamin D (VITAMIN D3) 1000 units Tab Take 1 tablet (1,000 Units total) by mouth once daily.     No current facility-administered medications on file prior to visit.        REVIEW OF SYSTEMS:  General: negative; ENT: negative; Allergy and Immunology: negative; Hematological and Lymphatic: Negative; Endocrine: negative; Respiratory: no cough, shortness of breath, or wheezing; Cardiovascular: no chest pain or dyspnea on exertion; Gastrointestinal: no abdominal pain/back, change in bowel habits, or bloody stools; Genito-Urinary: no dysuria, trouble voiding, or hematuria; Musculoskeletal: negative  Neurological: no TIA or stroke symptoms    PHYSICAL EXAM:   Right Arm BP - Sittin/84 (20 1034)  Left Arm BP - Sittin/91 (20 1034)  Pulse: 61  Temp: 98.3 °F (36.8 °C)      General appearance:  Alert, obese. well-appearing, and in no distress.  Oriented to person, place, and time   Neurological: Normal speech, no focal findings noted; CN II - XII grossly intact           Musculoskeletal: Digits/nail without cyanosis/clubbing.  Normal muscle strength/tone.                 Neck: Supple, no significant adenopathy; thyroid is not enlarged                  No carotid bruit can be auscultated                Chest:  Clear to auscultation, no wheezes, rales or rhonchi, symmetric air entry     No use of accessory muscles             Cardiac: Normal rate and regular rhythm, S1 and S2 normal; PMI non-displaced          Abdomen: Soft, nontender, nondistended, no masses or organomegaly     No rebound tenderness noted; bowel sounds normal     Pulsatile aortic mass is not palpable.     No groin adenopathy      Extremities:   2+ femoral pulses bilaterally     2+ Popliteal pulses; 2+ pedal pulses palpable.     No pedal edema     No ulcerations     2+ left radial and brachial pulse    LAB RESULTS:  Lab Results   Component Value Date    K 4.0 2020    K 3.9 2020    K 4.6  10/15/2020    CREATININE 6.5 (H) 11/19/2020    CREATININE 6.0 (H) 11/05/2020    CREATININE 5.0 (H) 10/15/2020     Lab Results   Component Value Date    WBC 6.73 11/05/2020    WBC 7.73 09/11/2020    WBC 7.00 07/15/2020    HCT 30.3 (L) 11/05/2020    HCT 32.3 (L) 09/11/2020    HCT 25.6 (L) 07/15/2020     11/05/2020     09/11/2020     07/15/2020     Lab Results   Component Value Date    HGBA1C 5.9 (H) 08/10/2020    HGBA1C 7.8 (H) 06/29/2020    HGBA1C 7.4 (H) 05/21/2020     IMAGING:      End Stage Renal Disease on Dialysis     Upper Extremity Vein Mapping   =======================     Rt prox cephalic V arm AP diam 2.0 mm   Rt cephalic V antecub fossa AP diam    3.7 mm   Rt mid cephalic V forearm AP diam  1.5 mm   Rt cephalic V wrist AP diam    1.5 mm   Rt basilic V shoulder AP diam  6.4 mm   Rt prox basilic V arm AP diam  3.0 mm   Rt basilic V antecub fossa AP diam 2.0 mm   Rt prox basilic V forearm AP diam  1.2 mm   Lt prox cephalic V arm AP diam 3.0 mm   Lt cephalic V antecub fossa AP diam    5.0 mm   Lt mid cephalic V forearm AP diam  1.8 mm   Lt cephalic V wrist AP diam    1.8 mm   Lt basilic V shoulder AP diam  6.4 mm   Lt prox basilic V arm AP diam  3.1 mm   Lt basilic V antecub fossa AP diam 2.0 mm   Lt prox basilic V forearm AP diam  1.5 mm     Impression   =========     Mapped and measured the veins of the bilateral upper extremities. Cephalic vein with tourniquet applied.     IMP/PLAN:  75 y.o. male with a history as noted above, CKD V with GFR 9.  RHD.  Adequate left cephalic vein for brachiocephalic AVF (will likely need second stage elevation given body habitus).  Risks and benefits discussed in detail, and he wishes to proceed.     1) plan for LUE AVF v AVG 12/04.      Benji Becerril MD  Vascular & Endovascular Surgery

## 2020-11-19 NOTE — LETTER
November 19, 2020      Yisel Ramírez,   [address]  [facility] [address]           [facility] Vascular Surg 5th Fl  [address]  [facility] [address]  Phone: [phone]  Fax: [phone]          Patient: Vinnie Siegel   MR Number: [id number]   YOB: 1945   Date of Visit: 11/19/2020       Dear Dr. Yisel Ramírez:    Thank you for referring Vinnie Siegel to me for evaluation. Attached you will find relevant portions of my assessment and plan of care.    If you have questions, please do not hesitate to call me. I look forward to following Vinnie Siegel along with you.    Sincerely,    Benji Becerril MD    Enclosure  CC:  No Recipients    If you would like to receive this communication electronically, please contact [email or web address] or [phone] to request more information on [employer] access.    For providers and/or their staff who would like to refer a patient to [facility], please contact us through our one-stop-shop provider referral line, [facility] [place], at [phone].    If you feel you have received this communication in error or would no longer like to receive these types of communications, please e-mail [email or web address]

## 2020-11-19 NOTE — H&P (VIEW-ONLY)
Vinnie Siegel  11/19/2020    HPI:  Patient is a 75 y.o. male who is here today for evaluation for permanent HD access with most recent GFR - 9.  Right hand dominant.  No previous HD access, PPM, or AICD.    Per Dr. Ramírez's note:    This is a 75 -year-old -American male with nephrolithiasis, hypertension and diabetes,ckd, proteinuria who is coming in for f/u of  Nephrolithiasis, ckd, HTN and proteinuria. No recent stones and denies any hematuria, dysuria, or flank pain. DM not well controlled in the past but much better at a1c of 5.9 recently.  Unclear about  Bp's at home.    Creatinine high. Has Proteinuria. Admitted to ICU in June 2020 with HHS, encephalopathy, GI bleed, ADONAY, respiratory failure and sepsis. Sepsis was due to E. Coli UTI and Strep agalactiae pneumonia. Pt lost f/u and was last seen in 2017 in renal clinic. Denies NSAID's.      PAST MEDICAL HISTORY: Significant for hypertension, diabetes for several years,   and nephrolithiasis. The patient had first episode about several  years ago and had to   have a subsequent procedure and second stone was in August 2012 and he   had left ureteroscopy for that. He states that he was never symptomatic with   either of the stones and was found on imaging.       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 Bone [facility] [place]    lateral  internal anal sphincterotomy  13    Dr. root [facility] [place] [place]    URETERAL STENT PLACEMENT       Family History   Problem Relation Age of Onset    Diabetes Brother         type 1    Hypertension Brother     Stroke Brother      Social History     Socioeconomic History    Marital status: Single     Spouse name: Not on file    Number of children: Not on file    Years of education: Not on file    Highest education level: Not on file   Occupational History    Not on file   Social Needs    Financial resource strain: Not on file    Food insecurity     Worry: Not on file     Inability: Not on file    Transportation needs     Medical: Not on file     Non-medical: Not on file   Tobacco Use    Smoking status: Former Smoker     Packs/day: 0.50     Years: 45.00     Pack years: 22.50     Quit date: 2020     Years since quittin.3    Smokeless tobacco: Never Used    Tobacco comment: says he could quit and will try    Substance and Sexual Activity    Alcohol use: Yes     Comment: rarely    Drug use: No    Sexual activity: Yes     Comment: single, retired , no kids   Lifestyle    Physical activity     Days per week: Not on file     Minutes per session: Not on file    Stress: Not on file   Relationships    Social connections     Talks on phone: Not on file     Gets together: Not on file     Attends Evangelical service: Not on file     Active member of club or organization: Not on file     Attends meetings of clubs or organizations: Not on file     Relationship status: Not on file   Other Topics Concern    Not on file   Social History Narrative    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     Current Outpatient Medications on File Prior to Visit   Medication Sig    atorvastatin (LIPITOR) 40 MG tablet Take 1 tablet (40 mg total) by mouth once daily.    blood sugar diagnostic Strp To check BG 4 times daily, to use with insurance preferred meter    blood-glucose meter kit To check BG 4  "times daily, to use with insurance preferred meter, e 11.65    carvediloL (COREG) 25 MG tablet TAKE 1 TABLET(25 MG) BY MOUTH TWICE DAILY WITH MEALS    dorzolamide-timolol 2-0.5% (COSOPT) 22.3-6.8 mg/mL ophthalmic solution Place 1 drop into both eyes 2 (two) times daily.    ergocalciferol (ERGOCALCIFEROL) 50,000 unit Cap Take 1 capsule (50,000 Units total) by mouth every 7 days.    fluticasone propionate (FLONASE) 50 mcg/actuation nasal spray 2 sprays (100 mcg total) by Each Nostril route once daily.    hydrALAZINE (APRESOLINE) 50 MG tablet Take 1 tablet (50 mg total) by mouth 3 (three) times daily.    insulin (LANTUS SOLOSTAR U-100 INSULIN) glargine 100 units/mL (3mL) SubQ pen Inject 28 units at night.    insulin lispro (HUMALOG KWIKPEN INSULIN) 100 unit/mL pen Inject 8 units w/ meals plus scale 180-230+2, 231-280+4, 281-330+6, 331-380+8, >380+10.    ipratropium-albuteroL (COMBIVENT)  mcg/actuation inhaler Inhale 1 puff into the lungs every 6 (six) hours as needed for Wheezing or Shortness of Breath. Rescue    lancets Misc To check BG 4 times daily, to use with insurance preferred meter    magnesium oxide (MAG-OX) 400 mg (241.3 mg magnesium) tablet Take 1 tablet (400 mg total) by mouth every morning.    NIFEdipine (PROCARDIA-XL) 90 MG (OSM) 24 hr tablet Take 1 tablet (90 mg total) by mouth once daily.    pantoprazole (PROTONIX) 40 MG tablet Take 1 tablet (40 mg total) by mouth once daily.    pen needle, diabetic (BD ULTRA-FINE SHORT PEN NEEDLE) 31 gauge x 5/16" Ndle USE FOUR TIMES DAILY    sodium bicarbonate 650 MG tablet Take 1 tablet (650 mg total) by mouth once daily.    tamsulosin (FLOMAX) 0.4 mg Cap Take 1 capsule (0.4 mg total) by mouth every morning.    timolol maleate 0.25% (TIMOPTIC) 0.25 % Drop PLACE 1 GTT IN OU BID    torsemide (DEMADEX) 20 MG Tab Take 2 tablets once a day for a total dose of 40 mg.    TRUE METRIX GLUCOSE TEST STRIP Strp TEST FOUR TIMES DAILY    TRUEPLUS LANCETS " 33 gauge Misc TEST FOUR TIMES DAILY    vitamin D (VITAMIN D3) 1000 units Tab Take 1 tablet (1,000 Units total) by mouth once daily.     No current facility-administered medications on file prior to visit.        REVIEW OF SYSTEMS:  General: negative; ENT: negative; Allergy and Immunology: negative; Hematological and Lymphatic: Negative; Endocrine: negative; Respiratory: no cough, shortness of breath, or wheezing; Cardiovascular: no chest pain or dyspnea on exertion; Gastrointestinal: no abdominal pain/back, change in bowel habits, or bloody stools; Genito-Urinary: no dysuria, trouble voiding, or hematuria; Musculoskeletal: negative  Neurological: no TIA or stroke symptoms    PHYSICAL EXAM:   Right Arm BP - Sittin/84 (20 1034)  Left Arm BP - Sittin/91 (20 1034)  Pulse: 61  Temp: 98.3 °F (36.8 °C)      General appearance:  Alert, obese. well-appearing, and in no distress.  Oriented to person, place, and time   Neurological: Normal speech, no focal findings noted; CN II - XII grossly intact           Musculoskeletal: Digits/nail without cyanosis/clubbing.  Normal muscle strength/tone.                 Neck: Supple, no significant adenopathy; thyroid is not enlarged                  No carotid bruit can be auscultated                Chest:  Clear to auscultation, no wheezes, rales or rhonchi, symmetric air entry     No use of accessory muscles             Cardiac: Normal rate and regular rhythm, S1 and S2 normal; PMI non-displaced          Abdomen: Soft, nontender, nondistended, no masses or organomegaly     No rebound tenderness noted; bowel sounds normal     Pulsatile aortic mass is not palpable.     No groin adenopathy      Extremities:   2+ femoral pulses bilaterally     2+ Popliteal pulses; 2+ pedal pulses palpable.     No pedal edema     No ulcerations     2+ left radial and brachial pulse    LAB RESULTS:  Lab Results   Component Value Date    K 4.0 2020    K 3.9 2020    K 4.6  10/15/2020    CREATININE 6.5 (H) 11/19/2020    CREATININE 6.0 (H) 11/05/2020    CREATININE 5.0 (H) 10/15/2020     Lab Results   Component Value Date    WBC 6.73 11/05/2020    WBC 7.73 09/11/2020    WBC 7.00 07/15/2020    HCT 30.3 (L) 11/05/2020    HCT 32.3 (L) 09/11/2020    HCT 25.6 (L) 07/15/2020     11/05/2020     09/11/2020     07/15/2020     Lab Results   Component Value Date    HGBA1C 5.9 (H) 08/10/2020    HGBA1C 7.8 (H) 06/29/2020    HGBA1C 7.4 (H) 05/21/2020     IMAGING:      End Stage Renal Disease on Dialysis     Upper Extremity Vein Mapping   =======================     Rt prox cephalic V arm AP diam 2.0 mm   Rt cephalic V antecub fossa AP diam    3.7 mm   Rt mid cephalic V forearm AP diam  1.5 mm   Rt cephalic V wrist AP diam    1.5 mm   Rt basilic V shoulder AP diam  6.4 mm   Rt prox basilic V arm AP diam  3.0 mm   Rt basilic V antecub fossa AP diam 2.0 mm   Rt prox basilic V forearm AP diam  1.2 mm   Lt prox cephalic V arm AP diam 3.0 mm   Lt cephalic V antecub fossa AP diam    5.0 mm   Lt mid cephalic V forearm AP diam  1.8 mm   Lt cephalic V wrist AP diam    1.8 mm   Lt basilic V shoulder AP diam  6.4 mm   Lt prox basilic V arm AP diam  3.1 mm   Lt basilic V antecub fossa AP diam 2.0 mm   Lt prox basilic V forearm AP diam  1.5 mm     Impression   =========     Mapped and measured the veins of the bilateral upper extremities. Cephalic vein with tourniquet applied.     IMP/PLAN:  75 y.o. male with a history as noted above, CKD V with GFR 9.  RHD.  Adequate left cephalic vein for brachiocephalic AVF (will likely need second stage elevation given body habitus).  Risks and benefits discussed in detail, and he wishes to proceed.     1) plan for LUE AVF v AVG 12/04.      Benji Becerril MD  Vascular & Endovascular Surgery

## 2020-12-01 ENCOUNTER — LAB VISIT (OUTPATIENT)
Dept: URGENT CARE | Facility: CLINIC | Age: 75
End: 2020-12-01
Attending: SURGERY
Payer: MEDICARE

## 2020-12-01 VITALS — TEMPERATURE: 99 F

## 2020-12-01 DIAGNOSIS — Z01.818 PRE-OPERATIVE CLEARANCE: ICD-10-CM

## 2020-12-01 PROCEDURE — U0003 INFECTIOUS AGENT DETECTION BY NUCLEIC ACID (DNA OR RNA); SEVERE ACUTE RESPIRATORY SYNDROME CORONAVIRUS 2 (SARS-COV-2) (CORONAVIRUS DISEASE [COVID-19]), AMPLIFIED PROBE TECHNIQUE, MAKING USE OF HIGH THROUGHPUT TECHNOLOGIES AS DESCRIBED BY CMS-2020-01-R: HCPCS

## 2020-12-02 RX ORDER — FUROSEMIDE 40 MG/1
40 TABLET ORAL EVERY MORNING
COMMUNITY
Start: 2020-11-29 | End: 2021-04-13

## 2020-12-03 LAB — SARS-COV-2 RNA RESP QL NAA+PROBE: NOT DETECTED

## 2020-12-07 ENCOUNTER — TELEPHONE (OUTPATIENT)
Dept: VASCULAR SURGERY | Facility: CLINIC | Age: 75
End: 2020-12-07

## 2020-12-07 NOTE — PRE-PROCEDURE INSTRUCTIONS
PRE-OP INSTRUCTIONS:Instructed patient to have nothing by mouth past midnight.It is ok to take AM medications with a few sips of water.Patient instructed to shower the night before surgery as well as the morning of surgery with an antibacterial soap like dial or hibiclens from the neck down.Encouraged patient to wear loose fitting, comfortable clothing .Medication instructions for pm prior to and am of surgery reviewed.Instructed patient to avoid taking vitamins,supplements,aspirin or ibuprofen the am of surgery.Patient's questions and concerns addressed.Patient informed of the current visitor policy and advised patient that one visitor may accompany each patient into the hospital and wait (socially distanced) until a member of the medical team provides an update at the conclusion of the procedure.When they enter the hospital both patient and visitor will have their temperature checked.All visitors are asked to arrive with a mask and to keep their mask on throughout the visit. Patient stated an understanding.    Patient denies any side effects or issues with anesthesia or sedation.

## 2020-12-08 ENCOUNTER — ANESTHESIA EVENT (OUTPATIENT)
Dept: SURGERY | Facility: HOSPITAL | Age: 75
End: 2020-12-08
Payer: MEDICARE

## 2020-12-08 ENCOUNTER — HOSPITAL ENCOUNTER (OUTPATIENT)
Facility: HOSPITAL | Age: 75
Discharge: HOME | End: 2020-12-08
Attending: SURGERY | Admitting: SURGERY
Payer: MEDICARE

## 2020-12-08 ENCOUNTER — ANESTHESIA (OUTPATIENT)
Dept: SURGERY | Facility: HOSPITAL | Age: 75
End: 2020-12-08
Payer: MEDICARE

## 2020-12-08 VITALS
BODY MASS INDEX: 40.43 KG/M2 | HEART RATE: 62 BPM | SYSTOLIC BLOOD PRESSURE: 128 MMHG | RESPIRATION RATE: 18 BRPM | TEMPERATURE: 99 F | WEIGHT: 315 LBS | HEIGHT: 74 IN | DIASTOLIC BLOOD PRESSURE: 60 MMHG | OXYGEN SATURATION: 97 %

## 2020-12-08 DIAGNOSIS — N18.6 ESRD (END STAGE RENAL DISEASE): ICD-10-CM

## 2020-12-08 LAB
POCT GLUCOSE: 112 MG/DL (ref 70–110)
POCT GLUCOSE: 98 MG/DL (ref 70–110)
SARS-COV-2 RDRP RESP QL NAA+PROBE: NEGATIVE

## 2020-12-08 PROCEDURE — 63600175 PHARM REV CODE 636 W HCPCS: Performed by: NURSE ANESTHETIST, CERTIFIED REGISTERED

## 2020-12-08 PROCEDURE — 63600175 PHARM REV CODE 636 W HCPCS: Performed by: SURGERY

## 2020-12-08 PROCEDURE — 36821 PR ANASTOMOSIS,AV,ANY SITE: ICD-10-PCS | Mod: GC,,, | Performed by: SURGERY

## 2020-12-08 PROCEDURE — 82962 GLUCOSE BLOOD TEST: CPT | Mod: 91 | Performed by: SURGERY

## 2020-12-08 PROCEDURE — 36821 AV FUSION DIRECT ANY SITE: CPT | Mod: GC,,, | Performed by: SURGERY

## 2020-12-08 PROCEDURE — 82962 GLUCOSE BLOOD TEST: CPT | Performed by: SURGERY

## 2020-12-08 PROCEDURE — D9220A PRA ANESTHESIA: ICD-10-PCS | Mod: CRNA,,, | Performed by: NURSE ANESTHETIST, CERTIFIED REGISTERED

## 2020-12-08 PROCEDURE — 27200750 HC INSULATED NEEDLE/ STIMUPLEX: Performed by: ANESTHESIOLOGY

## 2020-12-08 PROCEDURE — U0002 COVID-19 LAB TEST NON-CDC: HCPCS

## 2020-12-08 PROCEDURE — 25000003 PHARM REV CODE 250: Performed by: NURSE ANESTHETIST, CERTIFIED REGISTERED

## 2020-12-08 PROCEDURE — D9220A PRA ANESTHESIA: ICD-10-PCS | Mod: ANES,,, | Performed by: ANESTHESIOLOGY

## 2020-12-08 PROCEDURE — 71000015 HC POSTOP RECOV 1ST HR: Performed by: SURGERY

## 2020-12-08 PROCEDURE — 71000044 HC DOSC ROUTINE RECOVERY FIRST HOUR: Performed by: SURGERY

## 2020-12-08 PROCEDURE — 37000008 HC ANESTHESIA 1ST 15 MINUTES: Performed by: SURGERY

## 2020-12-08 PROCEDURE — 36000706: Performed by: SURGERY

## 2020-12-08 PROCEDURE — 27201423 OPTIME MED/SURG SUP & DEVICES STERILE SUPPLY: Performed by: SURGERY

## 2020-12-08 PROCEDURE — 37000009 HC ANESTHESIA EA ADD 15 MINS: Performed by: SURGERY

## 2020-12-08 PROCEDURE — D9220A PRA ANESTHESIA: Mod: ANES,,, | Performed by: ANESTHESIOLOGY

## 2020-12-08 PROCEDURE — 71000016 HC POSTOP RECOV ADDL HR: Performed by: SURGERY

## 2020-12-08 PROCEDURE — A4216 STERILE WATER/SALINE, 10 ML: HCPCS | Performed by: NURSE ANESTHETIST, CERTIFIED REGISTERED

## 2020-12-08 PROCEDURE — 63600175 PHARM REV CODE 636 W HCPCS: Performed by: STUDENT IN AN ORGANIZED HEALTH CARE EDUCATION/TRAINING PROGRAM

## 2020-12-08 PROCEDURE — 36000707: Performed by: SURGERY

## 2020-12-08 PROCEDURE — D9220A PRA ANESTHESIA: Mod: CRNA,,, | Performed by: NURSE ANESTHETIST, CERTIFIED REGISTERED

## 2020-12-08 RX ORDER — SODIUM CHLORIDE 9 MG/ML
INJECTION, SOLUTION INTRAVENOUS CONTINUOUS
Status: DISCONTINUED | OUTPATIENT
Start: 2020-12-08 | End: 2020-12-08 | Stop reason: HOSPADM

## 2020-12-08 RX ORDER — SODIUM CHLORIDE 0.9 % (FLUSH) 0.9 %
3 SYRINGE (ML) INJECTION
Status: DISCONTINUED | OUTPATIENT
Start: 2020-12-08 | End: 2020-12-08 | Stop reason: HOSPADM

## 2020-12-08 RX ORDER — HYDROMORPHONE HYDROCHLORIDE 1 MG/ML
0.2 INJECTION, SOLUTION INTRAMUSCULAR; INTRAVENOUS; SUBCUTANEOUS EVERY 5 MIN PRN
Status: DISCONTINUED | OUTPATIENT
Start: 2020-12-08 | End: 2020-12-08 | Stop reason: HOSPADM

## 2020-12-08 RX ORDER — CEFAZOLIN SODIUM 1 G/3ML
2 INJECTION, POWDER, FOR SOLUTION INTRAMUSCULAR; INTRAVENOUS
Status: COMPLETED | OUTPATIENT
Start: 2020-12-08 | End: 2020-12-08

## 2020-12-08 RX ORDER — PROTAMINE SULFATE 10 MG/ML
INJECTION, SOLUTION INTRAVENOUS
Status: DISCONTINUED | OUTPATIENT
Start: 2020-12-08 | End: 2020-12-08

## 2020-12-08 RX ORDER — HYDROCODONE BITARTRATE AND ACETAMINOPHEN 5; 325 MG/1; MG/1
1 TABLET ORAL EVERY 6 HOURS PRN
Qty: 15 TABLET | Refills: 0 | Status: SHIPPED | OUTPATIENT
Start: 2020-12-08 | End: 2022-08-24

## 2020-12-08 RX ORDER — LIDOCAINE HYDROCHLORIDE 10 MG/ML
1 INJECTION, SOLUTION EPIDURAL; INFILTRATION; INTRACAUDAL; PERINEURAL ONCE
Status: DISCONTINUED | OUTPATIENT
Start: 2020-12-08 | End: 2020-12-08 | Stop reason: HOSPADM

## 2020-12-08 RX ORDER — HEPARIN SODIUM 1000 [USP'U]/ML
INJECTION, SOLUTION INTRAVENOUS; SUBCUTANEOUS
Status: DISCONTINUED | OUTPATIENT
Start: 2020-12-08 | End: 2020-12-08

## 2020-12-08 RX ORDER — HEPARIN SODIUM 1000 [USP'U]/ML
INJECTION, SOLUTION INTRAVENOUS; SUBCUTANEOUS
Status: DISCONTINUED | OUTPATIENT
Start: 2020-12-08 | End: 2020-12-08 | Stop reason: HOSPADM

## 2020-12-08 RX ADMIN — CEFAZOLIN 2 G: 330 INJECTION, POWDER, FOR SOLUTION INTRAMUSCULAR; INTRAVENOUS at 12:12

## 2020-12-08 RX ADMIN — DEXMEDETOMIDINE HYDROCHLORIDE 0.2 MCG/KG/HR: 100 INJECTION, SOLUTION, CONCENTRATE INTRAVENOUS at 12:12

## 2020-12-08 RX ADMIN — PROTAMINE SULFATE 20 MG: 10 INJECTION, SOLUTION INTRAVENOUS at 01:12

## 2020-12-08 RX ADMIN — HEPARIN SODIUM 6000 UNITS: 1000 INJECTION, SOLUTION INTRAVENOUS; SUBCUTANEOUS at 12:12

## 2020-12-08 NOTE — DISCHARGE INSTRUCTIONS
Home Care Instructions  A-V FISTULA, GORTEX GRAFT, OR  DECLOTTING AND REVISIONS    ACTIVITY LEVEL:  If you received sedation or an anesthetic, you may feel sleepy for several hours. Rest until you are more awake. Gradually resume light activity. Avoid heavy lifting and tight garments. Remember do not let your affected arm be used for blood pressure measurements or for blood drawing. You should check with your doctor about  when you may return to work. No heavy lifting.    DIET:  At home, continue with liquids, and if there is no nausea, you may eat a soft diet. Gradually resume your regular diet.    BATHING:  _____ Sponge bathe only.  _____ Remove your dressing in ____ days.  _____ You may shower in ____ days.    MEDICATIONS:  You will receive instructions for any pain and/or antibiotic prescriptions. Pain medication should be taken only if needed and as directed. Antibiotics should be taken as directed until the entire prescription is completed.    OTHER INSTRUCTIONS:_________________________________________________________________    GENERAL INSTRUCTIONS:  You should learn to recognize the flow (thrill) in the graft. Ask the nurse to show you how to check this.    WHEN TO CALL THE DOCTOR:   If there are marked changes in the thrill or no thrill at all.   Any obvious bleeding.   Redness or swelling around the incision.   Fever over 101°F (38.4°C).   Drainage (pus) from the wound.   Persistent pain not relieved by the pain medication.   Numbness or tingling in your hand that does not go away.    RETURN APPOINTMENT:  _________________________________________________________________________________________    FOR EMERGENCIES:  If any unusual problems or difficulties occur, contact Dr. Becerril or the resident at [phone] (page ) or at the Clinic office.

## 2020-12-08 NOTE — ANESTHESIA PROCEDURE NOTES
Left Supraclavicular Brachial Plexus Single Injection Block    Patient location during procedure: OR    Reason for block: primary anesthetic   Diagnosis: Left AV Fistula   Start time: 12/8/2020 11:48 AM  Timeout: 12/8/2020 11:48 AM   End time: 12/8/2020 12:00 PM    Staffing  Authorizing Provider: Scottie Manley MD  Performing Provider: Ny Silverio MD    Preanesthetic Checklist  Completed: patient identified, site marked, surgical consent, pre-op evaluation, timeout performed, IV checked, risks and benefits discussed and monitors and equipment checked  Peripheral Block  Patient position: supine  Prep: ChloraPrep  Patient monitoring: heart rate, cardiac monitor, continuous pulse ox, continuous capnometry and frequent blood pressure checks  Block type: supraclavicular  Laterality: left  Injection technique: single shot  Needle  Needle type: Stimuplex   Needle gauge: 22 G  Needle length: 2 in  Needle localization: anatomical landmarks and ultrasound guidance   -ultrasound image captured on disc.  Assessment  Injection assessment: negative aspiration, negative parasthesia and local visualized surrounding nerve  Paresthesia pain: none  Heart rate change: no  Slow fractionated injection: yes  Additional Notes  Left supraclavicular single shot injection performed. 30 cc's of 1.5% mepivacaine + epi injected.   Intercostal brachial field block performed with mepivacaine 1.5% 10ml for additional coverage.    VSS.  See anesthesia record.  Patient tolerated procedure well.

## 2020-12-08 NOTE — PLAN OF CARE
I assisted patient and his sig other to get into the gown and get into the bed. Patient is a max assist and fall risk. Patient is oriented x 4 but seems to be a little lethargic.   Vanesa says this is is norm because he does not sleep well. BG=98.   Dr Lai notified. Will continue to monitor.    Patient needs update to H&P and site sudha .

## 2020-12-08 NOTE — OP NOTE
VASCULAR SURGERY SERVICE  OP NOTE    Date of Procedure: 12/8/2020    Surgeon: Benji Becerril MD    Assistant(s): Yoseph Arceo MD PGY-7    Pre-Op Diagnosis:   CKD nearing ESRD    Post-Op Diagnosis:   Same    Procedure(s):    Left brachiocephalic AVF    Anesthesia:    Regional / MAC    Findings / Key Elements:    Excellent quality vein   Standard end-to-side anastomosis    Estimated Blood Loss: <50mL    Specimens:   Specimen (12h ago, onward)    None        Procedure Details:  The patient was seen in the Holding Room. The risks, benefits, complications, treatment options, and expected outcomes were discussed with the patient. The patient concurred with the proposed plan, giving informed consent.  The site of surgery properly noted/marked. A Time Out was held and the above information confirmed.    The patient was brought to the Operating Room. The arm prepped and draped in a sterile fashion. A transverse incision was made just distal to the left antecubital crease. The cephalic vein was identified crossing obliquely. It was exposed proximally and distally. The brachial artery was then dissected and proximal and distal control were obtained by placing vessel loops around it.  After flushing the vessels with heparin, the vessel loops were clamped and a end-to-side anastomosis between the cephalic vein and the brachial artery was created using 6-0 Prolene sutures in a continuous running manner. After completion of the anastomosis, the vessel loops were released. Hemostasis was secured. Good thrill was noted in the fistula and the incision was then closed in two layers, subcutaneous tissue with 3-0 Vicryl and skin with 4-0 Monocryl. dermabond was applied.    Yoseph Arceo MD PGY-7  Vascular and Endovascular Surgery Fellow  12/08/2020

## 2020-12-08 NOTE — ANESTHESIA PREPROCEDURE EVALUATION
12/08/2020  Vinnie Siegel is a 75 y.o., male.    Anesthesia Evaluation    I have reviewed the Patient Summary Reports.         Review of Systems  Anesthesia Hx:  No problems with previous Anesthesia    Social:  Non-Smoker    Hematology/Oncology:  Hematology Normal   Oncology Normal     EENT/Dental:EENT/Dental Normal   Cardiovascular:   Hypertension Dysrhythmias    Pulmonary:   Shortness of breath    Renal/:   Chronic Renal Disease, CRI    Hepatic/GI:  Hepatic/GI Normal    Musculoskeletal:  Musculoskeletal Normal    Neurological:  Neurology Normal    Endocrine:   Diabetes, type 2    Dermatological:  Skin Normal    Psych:  Psychiatric Normal           Physical Exam  General:  Well nourished    Airway/Jaw/Neck:  Airway Findings: Mouth Opening: Normal Tongue: Normal  General Airway Assessment: Adult  Mallampati: II  Improves to II with phonation.  TM Distance: Normal, at least 6 cm  Jaw/Neck Findings:  Neck ROM: Normal ROM      Dental:  Dental Findings: In tact   Chest/Lungs:  Chest/Lungs Findings: Clear to auscultation, Normal Respiratory Rate     Heart/Vascular:  Heart Findings: Rate: Normal  Rhythm: Regular Rhythm  Sounds: Normal             Anesthesia Plan  Type of Anesthesia, risks & benefits discussed:  Anesthesia Type:  regional  Patient's Preference: Regional  Intra-op Monitoring Plan:   Intra-op Monitoring Plan Comments:   Post Op Pain Control Plan:   Post Op Pain Control Plan Comments:   Induction:   IV  Beta Blocker:  Patient is not currently on a Beta-Blocker (No further documentation required).       Informed Consent: Patient understands risks and agrees with Anesthesia plan.  Questions answered. Anesthesia consent signed with patient.  ASA Score: 4     Day of Surgery Review of History & Physical: I have interviewed and examined the patient. I have reviewed the patient's H&P dated:  There are no  significant changes.          Ready For Surgery From Anesthesia Perspective.

## 2020-12-08 NOTE — PLAN OF CARE
Discharge instructions reviewed at this time. PIV removed with catheter intact. Home med delivered. Instructed to feel for thrill. Denied questions. Significant other at bedside

## 2020-12-08 NOTE — TRANSFER OF CARE
"Anesthesia Transfer of Care Note    Patient: Vinnie Siegel    Procedure(s) Performed: Procedure(s) (LRB):  CREATION, AV FISTULA (Left)    Patient location: [facility]    Anesthesia Type: MAC    Transport from OR: Transported from OR on room air with adequate spontaneous ventilation    Post pain: adequate analgesia    Post assessment: no apparent anesthetic complications and tolerated procedure well    Post vital signs: stable    Level of consciousness: awake, alert and oriented    Nausea/Vomiting: no nausea/vomiting    Complications: none    Transfer of care protocol was followed      Last vitals:   Visit Vitals  BP (!) 170/81 (BP Location: Right arm, Patient Position: Lying)   Pulse (!) 59   Temp 36.4 °C (97.5 °F) (Axillary)   Resp 16   Ht 6' 2" (1.88 m)   Wt (!) 157.4 kg (347 lb)   SpO2 99%   BMI 44.55 kg/m²     "

## 2020-12-09 NOTE — ANESTHESIA POSTPROCEDURE EVALUATION
Anesthesia Post Evaluation    Patient: Vinnie Siegel    Procedure(s) Performed: Procedure(s) (LRB):  CREATION, AV FISTULA (Left)    Final Anesthesia Type: regional    Patient location during evaluation: PACU  Patient participation: Yes- Able to Participate  Level of consciousness: awake and alert  Post-procedure vital signs: reviewed and stable  Pain management: adequate  Airway patency: patent    PONV status at discharge: No PONV  Anesthetic complications: no      Cardiovascular status: blood pressure returned to baseline and stable  Respiratory status: unassisted  Hydration status: euvolemic  Follow-up not needed.          Vitals Value Taken Time   /60 12/08/20 1446   Temp 37 °C (98.6 °F) 12/08/20 1415   Pulse 62 12/08/20 1454   Resp 18 12/08/20 1454   SpO2 97 % 12/08/20 1454         No case tracking events are documented in the log.      Pain/Felicity Score: Felicity Score: 9 (12/8/2020  2:15 PM)

## 2020-12-14 DIAGNOSIS — K92.2 GASTROINTESTINAL HEMORRHAGE, UNSPECIFIED GASTROINTESTINAL HEMORRHAGE TYPE: ICD-10-CM

## 2020-12-14 RX ORDER — PANTOPRAZOLE SODIUM 40 MG/1
40 TABLET, DELAYED RELEASE ORAL DAILY
Qty: 30 TABLET | Refills: 0 | Status: SHIPPED | OUTPATIENT
Start: 2020-12-14 | End: 2021-01-11

## 2020-12-14 NOTE — TELEPHONE ENCOUNTER
The patient has not seen Dr. Walter since April 2018.  This medication originally prescribed by urgent care physician, then refilled by one of our staff.  EGD as inpatient 6/2020 for evaluation of GIB; colonoscopy canceled (still not performed).  Please contact patient to schedule visit with Dr. Walter as soon as possible.  Medication refilled x 30 days only.

## 2020-12-29 DIAGNOSIS — N18.4 STAGE 4 CHRONIC KIDNEY DISEASE: Primary | ICD-10-CM

## 2021-01-04 ENCOUNTER — LAB VISIT (OUTPATIENT)
Dept: LAB | Facility: HOSPITAL | Age: 76
End: 2021-01-04
Attending: PEDIATRICS
Payer: MEDICARE

## 2021-01-04 DIAGNOSIS — E11.65 TYPE 2 DIABETES MELLITUS WITH HYPERGLYCEMIA, WITHOUT LONG-TERM CURRENT USE OF INSULIN: ICD-10-CM

## 2021-01-04 DIAGNOSIS — N18.4 STAGE 4 CHRONIC KIDNEY DISEASE: ICD-10-CM

## 2021-01-04 DIAGNOSIS — N18.4 CKD (CHRONIC KIDNEY DISEASE) STAGE 4, GFR 15-29 ML/MIN: ICD-10-CM

## 2021-01-04 DIAGNOSIS — I10 HYPERTENSION, UNSPECIFIED TYPE: ICD-10-CM

## 2021-01-04 LAB
ALBUMIN SERPL BCP-MCNC: 3.5 G/DL (ref 3.5–5.2)
ALBUMIN SERPL BCP-MCNC: 3.5 G/DL (ref 3.5–5.2)
ANION GAP SERPL CALC-SCNC: 13 MMOL/L (ref 8–16)
ANION GAP SERPL CALC-SCNC: 13 MMOL/L (ref 8–16)
BASOPHILS # BLD AUTO: 0.02 K/UL (ref 0–0.2)
BASOPHILS NFR BLD: 0.3 % (ref 0–1.9)
BUN SERPL-MCNC: 68 MG/DL (ref 8–23)
BUN SERPL-MCNC: 68 MG/DL (ref 8–23)
CALCIUM SERPL-MCNC: 7.8 MG/DL (ref 8.7–10.5)
CALCIUM SERPL-MCNC: 7.8 MG/DL (ref 8.7–10.5)
CHLORIDE SERPL-SCNC: 105 MMOL/L (ref 95–110)
CHLORIDE SERPL-SCNC: 105 MMOL/L (ref 95–110)
CO2 SERPL-SCNC: 25 MMOL/L (ref 23–29)
CO2 SERPL-SCNC: 25 MMOL/L (ref 23–29)
CREAT SERPL-MCNC: 6.9 MG/DL (ref 0.5–1.4)
CREAT SERPL-MCNC: 6.9 MG/DL (ref 0.5–1.4)
DIFFERENTIAL METHOD: ABNORMAL
EOSINOPHIL # BLD AUTO: 0.2 K/UL (ref 0–0.5)
EOSINOPHIL NFR BLD: 3.2 % (ref 0–8)
ERYTHROCYTE [DISTWIDTH] IN BLOOD BY AUTOMATED COUNT: 15.8 % (ref 11.5–14.5)
EST. GFR  (AFRICAN AMERICAN): 8.3 ML/MIN/1.73 M^2
EST. GFR  (AFRICAN AMERICAN): 8.3 ML/MIN/1.73 M^2
EST. GFR  (NON AFRICAN AMERICAN): 7.1 ML/MIN/1.73 M^2
EST. GFR  (NON AFRICAN AMERICAN): 7.1 ML/MIN/1.73 M^2
FERRITIN SERPL-MCNC: 96 NG/ML (ref 20–300)
GLUCOSE SERPL-MCNC: 72 MG/DL (ref 70–110)
GLUCOSE SERPL-MCNC: 72 MG/DL (ref 70–110)
HCT VFR BLD AUTO: 26.6 % (ref 40–54)
HCT VFR BLD AUTO: 26.6 % (ref 40–54)
HGB BLD-MCNC: 8.1 G/DL (ref 14–18)
HGB BLD-MCNC: 8.1 G/DL (ref 14–18)
IMM GRANULOCYTES # BLD AUTO: 0.03 K/UL (ref 0–0.04)
IMM GRANULOCYTES NFR BLD AUTO: 0.4 % (ref 0–0.5)
IRON SERPL-MCNC: 42 UG/DL (ref 45–160)
LYMPHOCYTES # BLD AUTO: 1.2 K/UL (ref 1–4.8)
LYMPHOCYTES NFR BLD: 15.5 % (ref 18–48)
MCH RBC QN AUTO: 28.1 PG (ref 27–31)
MCHC RBC AUTO-ENTMCNC: 30.5 G/DL (ref 32–36)
MCV RBC AUTO: 92 FL (ref 82–98)
MONOCYTES # BLD AUTO: 0.6 K/UL (ref 0.3–1)
MONOCYTES NFR BLD: 8.1 % (ref 4–15)
NEUTROPHILS # BLD AUTO: 5.4 K/UL (ref 1.8–7.7)
NEUTROPHILS NFR BLD: 72.5 % (ref 38–73)
NRBC BLD-RTO: 0 /100 WBC
PHOSPHATE SERPL-MCNC: 4.5 MG/DL (ref 2.7–4.5)
PHOSPHATE SERPL-MCNC: 4.5 MG/DL (ref 2.7–4.5)
PLATELET # BLD AUTO: 202 K/UL (ref 150–350)
PMV BLD AUTO: 10.1 FL (ref 9.2–12.9)
POTASSIUM SERPL-SCNC: 3.9 MMOL/L (ref 3.5–5.1)
POTASSIUM SERPL-SCNC: 3.9 MMOL/L (ref 3.5–5.1)
RBC # BLD AUTO: 2.88 M/UL (ref 4.6–6.2)
SATURATED IRON: 12 % (ref 20–50)
SODIUM SERPL-SCNC: 143 MMOL/L (ref 136–145)
SODIUM SERPL-SCNC: 143 MMOL/L (ref 136–145)
TOTAL IRON BINDING CAPACITY: 363 UG/DL (ref 250–450)
TRANSFERRIN SERPL-MCNC: 245 MG/DL (ref 200–375)
WBC # BLD AUTO: 7.5 K/UL (ref 3.9–12.7)

## 2021-01-04 PROCEDURE — 36415 COLL VENOUS BLD VENIPUNCTURE: CPT

## 2021-01-04 PROCEDURE — 83540 ASSAY OF IRON: CPT

## 2021-01-04 PROCEDURE — 80069 RENAL FUNCTION PANEL: CPT

## 2021-01-04 PROCEDURE — 82728 ASSAY OF FERRITIN: CPT

## 2021-01-04 PROCEDURE — 85025 COMPLETE CBC W/AUTO DIFF WBC: CPT

## 2021-01-06 ENCOUNTER — OFFICE VISIT (OUTPATIENT)
Dept: NEPHROLOGY | Facility: CLINIC | Age: 76
End: 2021-01-06
Payer: MEDICARE

## 2021-01-06 ENCOUNTER — TELEPHONE (OUTPATIENT)
Dept: NEPHROLOGY | Facility: CLINIC | Age: 76
End: 2021-01-06

## 2021-01-06 ENCOUNTER — PATIENT OUTREACH (OUTPATIENT)
Dept: ADMINISTRATIVE | Facility: OTHER | Age: 76
End: 2021-01-06

## 2021-01-06 ENCOUNTER — HOSPITAL ENCOUNTER (OUTPATIENT)
Dept: CARDIOLOGY | Facility: HOSPITAL | Age: 76
Discharge: HOME | End: 2021-01-06
Attending: INTERNAL MEDICINE
Payer: MEDICARE

## 2021-01-06 VITALS
OXYGEN SATURATION: 99 % | DIASTOLIC BLOOD PRESSURE: 74 MMHG | HEART RATE: 59 BPM | SYSTOLIC BLOOD PRESSURE: 128 MMHG | WEIGHT: 315 LBS | BODY MASS INDEX: 43.02 KG/M2

## 2021-01-06 DIAGNOSIS — I74.9 EMBOLISM AND THROMBOSIS OF UNSPECIFIED ARTERY: ICD-10-CM

## 2021-01-06 DIAGNOSIS — R80.9 PROTEINURIA, UNSPECIFIED TYPE: ICD-10-CM

## 2021-01-06 DIAGNOSIS — N18.4 TYPE 2 DIABETES MELLITUS WITH STAGE 4 CHRONIC KIDNEY DISEASE, WITH LONG-TERM CURRENT USE OF INSULIN: ICD-10-CM

## 2021-01-06 DIAGNOSIS — N18.5 STAGE 5 CHRONIC KIDNEY DISEASE: Primary | ICD-10-CM

## 2021-01-06 DIAGNOSIS — E11.22 TYPE 2 DIABETES MELLITUS WITH STAGE 4 CHRONIC KIDNEY DISEASE, WITH LONG-TERM CURRENT USE OF INSULIN: Primary | ICD-10-CM

## 2021-01-06 DIAGNOSIS — Z99.2 END STAGE RENAL FAILURE ON DIALYSIS: Primary | ICD-10-CM

## 2021-01-06 DIAGNOSIS — N25.0 RENAL OSTEODYSTROPHY: ICD-10-CM

## 2021-01-06 DIAGNOSIS — N18.4 CKD (CHRONIC KIDNEY DISEASE) STAGE 4, GFR 15-29 ML/MIN: ICD-10-CM

## 2021-01-06 DIAGNOSIS — E11.22 TYPE 2 DIABETES MELLITUS WITH STAGE 4 CHRONIC KIDNEY DISEASE, WITH LONG-TERM CURRENT USE OF INSULIN: ICD-10-CM

## 2021-01-06 DIAGNOSIS — Z79.4 TYPE 2 DIABETES MELLITUS WITH STAGE 4 CHRONIC KIDNEY DISEASE, WITH LONG-TERM CURRENT USE OF INSULIN: Primary | ICD-10-CM

## 2021-01-06 DIAGNOSIS — I10 HYPERTENSION, UNSPECIFIED TYPE: ICD-10-CM

## 2021-01-06 DIAGNOSIS — Z79.4 TYPE 2 DIABETES MELLITUS WITH STAGE 4 CHRONIC KIDNEY DISEASE, WITH LONG-TERM CURRENT USE OF INSULIN: ICD-10-CM

## 2021-01-06 DIAGNOSIS — N18.4 CKD (CHRONIC KIDNEY DISEASE) STAGE 4, GFR 15-29 ML/MIN: Primary | ICD-10-CM

## 2021-01-06 DIAGNOSIS — N18.4 TYPE 2 DIABETES MELLITUS WITH STAGE 4 CHRONIC KIDNEY DISEASE, WITH LONG-TERM CURRENT USE OF INSULIN: Primary | ICD-10-CM

## 2021-01-06 DIAGNOSIS — N18.6 END STAGE RENAL FAILURE ON DIALYSIS: Primary | ICD-10-CM

## 2021-01-06 PROCEDURE — 99215 PR OFFICE/OUTPT VISIT, EST, LEVL V, 40-54 MIN: ICD-10-PCS | Mod: S$PBB,,, | Performed by: INTERNAL MEDICINE

## 2021-01-06 PROCEDURE — 99214 OFFICE O/P EST MOD 30 MIN: CPT | Mod: PBBFAC,25 | Performed by: INTERNAL MEDICINE

## 2021-01-06 PROCEDURE — 93971 EXTREMITY STUDY: CPT | Mod: RT

## 2021-01-06 PROCEDURE — 99999 PR PBB SHADOW E&M-EST. PATIENT-LVL IV: ICD-10-PCS | Mod: PBBFAC,,, | Performed by: INTERNAL MEDICINE

## 2021-01-06 PROCEDURE — 93971 EXTREMITY STUDY: CPT | Mod: 26,RT,, | Performed by: INTERNAL MEDICINE

## 2021-01-06 PROCEDURE — 93971 CV US DOPPLER VENOUS LEG RIGHT (CUPID ONLY): ICD-10-PCS | Mod: 26,RT,, | Performed by: INTERNAL MEDICINE

## 2021-01-06 PROCEDURE — 99999 PR PBB SHADOW E&M-EST. PATIENT-LVL IV: CPT | Mod: PBBFAC,,, | Performed by: INTERNAL MEDICINE

## 2021-01-06 PROCEDURE — 99215 OFFICE O/P EST HI 40 MIN: CPT | Mod: S$PBB,,, | Performed by: INTERNAL MEDICINE

## 2021-01-06 RX ORDER — LOSARTAN POTASSIUM 100 MG/1
TABLET ORAL
COMMUNITY
Start: 2020-12-04 | End: 2021-04-13

## 2021-01-06 RX ORDER — FERROUS SULFATE 325(65) MG
TABLET ORAL
Qty: 90 TABLET | Refills: 1 | Status: SHIPPED | OUTPATIENT
Start: 2021-01-06

## 2021-01-06 NOTE — CARE UPDATE
NASIMA completed a referral for OP HD for pt with [facility] for the [facility]. NASIMA faxed over all needed docs except pending hep b panel. Will continue to follow.

## 2021-01-07 ENCOUNTER — TELEPHONE (OUTPATIENT)
Dept: NEPHROLOGY | Facility: CLINIC | Age: 76
End: 2021-01-07

## 2021-01-12 ENCOUNTER — TELEPHONE (OUTPATIENT)
Dept: NEPHROLOGY | Facility: CLINIC | Age: 76
End: 2021-01-12

## 2021-01-14 ENCOUNTER — HOSPITAL ENCOUNTER (OUTPATIENT)
Dept: VASCULAR SURGERY | Facility: CLINIC | Age: 76
Discharge: HOME | End: 2021-01-14
Attending: SURGERY
Payer: MEDICARE

## 2021-01-14 ENCOUNTER — OFFICE VISIT (OUTPATIENT)
Dept: VASCULAR SURGERY | Facility: CLINIC | Age: 76
End: 2021-01-14
Attending: SURGERY
Payer: MEDICARE

## 2021-01-14 ENCOUNTER — HOSPITAL ENCOUNTER (OUTPATIENT)
Dept: RADIOLOGY | Facility: HOSPITAL | Age: 76
Discharge: HOME | End: 2021-01-14
Attending: INTERNAL MEDICINE
Payer: MEDICARE

## 2021-01-14 VITALS
RESPIRATION RATE: 20 BRPM | HEART RATE: 60 BPM | DIASTOLIC BLOOD PRESSURE: 69 MMHG | HEIGHT: 74 IN | TEMPERATURE: 99 F | WEIGHT: 315 LBS | BODY MASS INDEX: 40.43 KG/M2 | SYSTOLIC BLOOD PRESSURE: 148 MMHG

## 2021-01-14 DIAGNOSIS — Z79.4 TYPE 2 DIABETES MELLITUS WITH STAGE 4 CHRONIC KIDNEY DISEASE, WITH LONG-TERM CURRENT USE OF INSULIN: ICD-10-CM

## 2021-01-14 DIAGNOSIS — N18.5 STAGE 5 CHRONIC KIDNEY DISEASE NOT ON CHRONIC DIALYSIS: Primary | ICD-10-CM

## 2021-01-14 DIAGNOSIS — N18.4 TYPE 2 DIABETES MELLITUS WITH STAGE 4 CHRONIC KIDNEY DISEASE, WITH LONG-TERM CURRENT USE OF INSULIN: ICD-10-CM

## 2021-01-14 DIAGNOSIS — Z99.2 END STAGE RENAL FAILURE ON DIALYSIS: ICD-10-CM

## 2021-01-14 DIAGNOSIS — N18.6 END STAGE RENAL FAILURE ON DIALYSIS: ICD-10-CM

## 2021-01-14 DIAGNOSIS — E11.22 TYPE 2 DIABETES MELLITUS WITH STAGE 4 CHRONIC KIDNEY DISEASE, WITH LONG-TERM CURRENT USE OF INSULIN: ICD-10-CM

## 2021-01-14 PROCEDURE — 99999 PR PBB SHADOW E&M-EST. PATIENT-LVL IV: CPT | Mod: PBBFAC,,, | Performed by: SURGERY

## 2021-01-14 PROCEDURE — 99024 PR POST-OP FOLLOW-UP VISIT: ICD-10-PCS | Mod: POP,,, | Performed by: SURGERY

## 2021-01-14 PROCEDURE — 71046 X-RAY EXAM CHEST 2 VIEWS: CPT | Mod: TC,FY

## 2021-01-14 PROCEDURE — 93990 DOPPLER FLOW TESTING: CPT | Mod: PBBFAC | Performed by: SURGERY

## 2021-01-14 PROCEDURE — 93990 PR DUPLEX HEMODIALYSIS ACCESS: ICD-10-PCS | Mod: 26,S$PBB,, | Performed by: SURGERY

## 2021-01-14 PROCEDURE — 71046 X-RAY EXAM CHEST 2 VIEWS: CPT | Mod: 26,,, | Performed by: RADIOLOGY

## 2021-01-14 PROCEDURE — 71046 XR CHEST PA AND LATERAL: ICD-10-PCS | Mod: 26,,, | Performed by: RADIOLOGY

## 2021-01-14 PROCEDURE — 99999 PR PBB SHADOW E&M-EST. PATIENT-LVL IV: ICD-10-PCS | Mod: PBBFAC,,, | Performed by: SURGERY

## 2021-01-14 PROCEDURE — 99024 POSTOP FOLLOW-UP VISIT: CPT | Mod: POP,,, | Performed by: SURGERY

## 2021-01-14 PROCEDURE — 93990 DOPPLER FLOW TESTING: CPT | Mod: 26,S$PBB,, | Performed by: SURGERY

## 2021-01-14 PROCEDURE — 99214 OFFICE O/P EST MOD 30 MIN: CPT | Mod: PBBFAC,25 | Performed by: SURGERY

## 2021-01-20 ENCOUNTER — TELEPHONE (OUTPATIENT)
Dept: NEPHROLOGY | Facility: CLINIC | Age: 76
End: 2021-01-20

## 2021-01-29 DIAGNOSIS — N18.4 STAGE 4 CHRONIC KIDNEY DISEASE: Primary | ICD-10-CM

## 2021-01-29 RX ORDER — ERGOCALCIFEROL 1.25 MG/1
50000 CAPSULE ORAL
Qty: 12 CAPSULE | Refills: 0 | OUTPATIENT
Start: 2021-01-29

## 2021-02-10 RX ORDER — NIFEDIPINE 90 MG/1
90 TABLET, EXTENDED RELEASE ORAL DAILY
Qty: 30 TABLET | Refills: 4 | Status: SHIPPED | OUTPATIENT
Start: 2021-02-10 | End: 2021-08-18 | Stop reason: SDUPTHER

## 2021-02-12 ENCOUNTER — TELEPHONE (OUTPATIENT)
Dept: VASCULAR SURGERY | Facility: CLINIC | Age: 76
End: 2021-02-12

## 2021-02-12 DIAGNOSIS — Z99.2 END STAGE RENAL FAILURE ON DIALYSIS: Primary | ICD-10-CM

## 2021-02-12 DIAGNOSIS — N18.6 END STAGE RENAL FAILURE ON DIALYSIS: Primary | ICD-10-CM

## 2021-02-22 DIAGNOSIS — I42.0 DILATED CARDIOMYOPATHY: ICD-10-CM

## 2021-02-22 DIAGNOSIS — I10 ESSENTIAL HYPERTENSION: ICD-10-CM

## 2021-02-22 DIAGNOSIS — E78.5 HYPERLIPIDEMIA, UNSPECIFIED HYPERLIPIDEMIA TYPE: ICD-10-CM

## 2021-02-22 RX ORDER — ATORVASTATIN CALCIUM 40 MG/1
40 TABLET, FILM COATED ORAL DAILY
Qty: 90 TABLET | Refills: 0 | Status: SHIPPED | OUTPATIENT
Start: 2021-02-22 | End: 2021-08-18 | Stop reason: SDUPTHER

## 2021-02-22 RX ORDER — CARVEDILOL 25 MG/1
TABLET ORAL
Qty: 180 TABLET | Refills: 0 | Status: SHIPPED | OUTPATIENT
Start: 2021-02-22 | End: 2021-08-18 | Stop reason: SDUPTHER

## 2021-02-23 ENCOUNTER — TELEPHONE (OUTPATIENT)
Dept: VASCULAR SURGERY | Facility: CLINIC | Age: 76
End: 2021-02-23

## 2021-02-26 ENCOUNTER — IMMUNIZATION (OUTPATIENT)
Dept: INTERNAL MEDICINE | Facility: CLINIC | Age: 76
End: 2021-02-26
Payer: MEDICARE

## 2021-02-26 DIAGNOSIS — Z23 NEED FOR VACCINATION: Primary | ICD-10-CM

## 2021-02-26 PROCEDURE — 91300 COVID-19, MRNA, LNP-S, PF, 30 MCG/0.3 ML DOSE VACCINE: CPT | Mod: PBBFAC | Performed by: INTERNAL MEDICINE

## 2021-03-19 ENCOUNTER — IMMUNIZATION (OUTPATIENT)
Dept: INTERNAL MEDICINE | Facility: CLINIC | Age: 76
End: 2021-03-19
Payer: MEDICARE

## 2021-03-19 DIAGNOSIS — Z23 NEED FOR VACCINATION: Primary | ICD-10-CM

## 2021-03-19 PROCEDURE — 91300 COVID-19, MRNA, LNP-S, PF, 30 MCG/0.3 ML DOSE VACCINE: CPT | Mod: PBBFAC | Performed by: INTERNAL MEDICINE

## 2021-03-19 PROCEDURE — 0002A COVID-19, MRNA, LNP-S, PF, 30 MCG/0.3 ML DOSE VACCINE: CPT | Mod: PBBFAC | Performed by: INTERNAL MEDICINE

## 2021-04-06 RX ORDER — SODIUM BICARBONATE 650 MG/1
650 TABLET ORAL DAILY
Qty: 90 TABLET | Refills: 1 | Status: SHIPPED | OUTPATIENT
Start: 2021-04-06 | End: 2021-10-14

## 2021-04-08 ENCOUNTER — HOSPITAL ENCOUNTER (OUTPATIENT)
Dept: VASCULAR SURGERY | Facility: CLINIC | Age: 76
Discharge: HOME | End: 2021-04-08
Attending: SURGERY
Payer: MEDICARE

## 2021-04-08 ENCOUNTER — OFFICE VISIT (OUTPATIENT)
Dept: VASCULAR SURGERY | Facility: CLINIC | Age: 76
End: 2021-04-08
Attending: SURGERY
Payer: MEDICARE

## 2021-04-08 VITALS
BODY MASS INDEX: 40.18 KG/M2 | WEIGHT: 313.06 LBS | TEMPERATURE: 98 F | HEIGHT: 74 IN | SYSTOLIC BLOOD PRESSURE: 180 MMHG | HEART RATE: 53 BPM | DIASTOLIC BLOOD PRESSURE: 76 MMHG

## 2021-04-08 DIAGNOSIS — Z99.2 END STAGE RENAL FAILURE ON DIALYSIS: Primary | ICD-10-CM

## 2021-04-08 DIAGNOSIS — N18.6 END STAGE RENAL FAILURE ON DIALYSIS: Primary | ICD-10-CM

## 2021-04-08 DIAGNOSIS — N18.6 END STAGE RENAL FAILURE ON DIALYSIS: ICD-10-CM

## 2021-04-08 DIAGNOSIS — T82.858D ARTERIOVENOUS FISTULA STENOSIS, SUBSEQUENT ENCOUNTER: Primary | ICD-10-CM

## 2021-04-08 DIAGNOSIS — Z01.818 PRE-OPERATIVE CLEARANCE: ICD-10-CM

## 2021-04-08 DIAGNOSIS — Z99.2 END STAGE RENAL FAILURE ON DIALYSIS: ICD-10-CM

## 2021-04-08 PROCEDURE — 99214 OFFICE O/P EST MOD 30 MIN: CPT | Mod: S$PBB,,, | Performed by: SURGERY

## 2021-04-08 PROCEDURE — 93990 DOPPLER FLOW TESTING: CPT | Mod: 26,S$PBB,, | Performed by: SURGERY

## 2021-04-08 PROCEDURE — 99215 OFFICE O/P EST HI 40 MIN: CPT | Mod: PBBFAC,25 | Performed by: SURGERY

## 2021-04-08 PROCEDURE — 99999 PR PBB SHADOW E&M-EST. PATIENT-LVL V: ICD-10-PCS | Mod: PBBFAC,,, | Performed by: SURGERY

## 2021-04-08 PROCEDURE — 99214 PR OFFICE/OUTPT VISIT, EST, LEVL IV, 30-39 MIN: ICD-10-PCS | Mod: S$PBB,,, | Performed by: SURGERY

## 2021-04-08 PROCEDURE — 93990 PR DUPLEX HEMODIALYSIS ACCESS: ICD-10-PCS | Mod: 26,S$PBB,, | Performed by: SURGERY

## 2021-04-08 PROCEDURE — 93990 DOPPLER FLOW TESTING: CPT | Mod: PBBFAC | Performed by: SURGERY

## 2021-04-08 PROCEDURE — 99999 PR PBB SHADOW E&M-EST. PATIENT-LVL V: CPT | Mod: PBBFAC,,, | Performed by: SURGERY

## 2021-04-10 ENCOUNTER — PATIENT OUTREACH (OUTPATIENT)
Dept: ADMINISTRATIVE | Facility: OTHER | Age: 76
End: 2021-04-10

## 2021-04-13 ENCOUNTER — OFFICE VISIT (OUTPATIENT)
Dept: CARDIOLOGY | Facility: CLINIC | Age: 76
End: 2021-04-13
Payer: MEDICARE

## 2021-04-13 ENCOUNTER — LAB VISIT (OUTPATIENT)
Dept: INTERNAL MEDICINE | Facility: CLINIC | Age: 76
End: 2021-04-13
Attending: SURGERY
Payer: MEDICARE

## 2021-04-13 VITALS
SYSTOLIC BLOOD PRESSURE: 167 MMHG | DIASTOLIC BLOOD PRESSURE: 71 MMHG | HEART RATE: 67 BPM | WEIGHT: 313.06 LBS | BODY MASS INDEX: 40.18 KG/M2 | HEIGHT: 74 IN

## 2021-04-13 DIAGNOSIS — Z79.4 TYPE 2 DIABETES MELLITUS WITH HYPERGLYCEMIA, WITH LONG-TERM CURRENT USE OF INSULIN: ICD-10-CM

## 2021-04-13 DIAGNOSIS — N18.5 STAGE 5 CHRONIC KIDNEY DISEASE NOT ON CHRONIC DIALYSIS: ICD-10-CM

## 2021-04-13 DIAGNOSIS — Z72.0 TOBACCO ABUSE: Chronic | ICD-10-CM

## 2021-04-13 DIAGNOSIS — E66.01 OBESITY, CLASS III, BMI 40-49.9 (MORBID OBESITY): Chronic | ICD-10-CM

## 2021-04-13 DIAGNOSIS — E11.65 TYPE 2 DIABETES MELLITUS WITH HYPERGLYCEMIA, WITH LONG-TERM CURRENT USE OF INSULIN: ICD-10-CM

## 2021-04-13 DIAGNOSIS — Z01.818 PRE-OPERATIVE CLEARANCE: ICD-10-CM

## 2021-04-13 DIAGNOSIS — I10 ESSENTIAL HYPERTENSION: Primary | Chronic | ICD-10-CM

## 2021-04-13 DIAGNOSIS — I50.32 CHRONIC DIASTOLIC (CONGESTIVE) HEART FAILURE: ICD-10-CM

## 2021-04-13 DIAGNOSIS — I51.89 DIASTOLIC DYSFUNCTION: ICD-10-CM

## 2021-04-13 DIAGNOSIS — E78.5 DYSLIPIDEMIA: ICD-10-CM

## 2021-04-13 PROCEDURE — U0003 INFECTIOUS AGENT DETECTION BY NUCLEIC ACID (DNA OR RNA); SEVERE ACUTE RESPIRATORY SYNDROME CORONAVIRUS 2 (SARS-COV-2) (CORONAVIRUS DISEASE [COVID-19]), AMPLIFIED PROBE TECHNIQUE, MAKING USE OF HIGH THROUGHPUT TECHNOLOGIES AS DESCRIBED BY CMS-2020-01-R: HCPCS | Performed by: SURGERY

## 2021-04-13 PROCEDURE — 99999 PR PBB SHADOW E&M-EST. PATIENT-LVL III: CPT | Mod: PBBFAC,,, | Performed by: INTERNAL MEDICINE

## 2021-04-13 PROCEDURE — 99214 PR OFFICE/OUTPT VISIT, EST, LEVL IV, 30-39 MIN: ICD-10-PCS | Mod: S$PBB,,, | Performed by: INTERNAL MEDICINE

## 2021-04-13 PROCEDURE — 99999 PR PBB SHADOW E&M-EST. PATIENT-LVL III: ICD-10-PCS | Mod: PBBFAC,,, | Performed by: INTERNAL MEDICINE

## 2021-04-13 PROCEDURE — 99214 OFFICE O/P EST MOD 30 MIN: CPT | Mod: S$PBB,,, | Performed by: INTERNAL MEDICINE

## 2021-04-13 PROCEDURE — 99213 OFFICE O/P EST LOW 20 MIN: CPT | Mod: PBBFAC | Performed by: INTERNAL MEDICINE

## 2021-04-13 PROCEDURE — U0005 INFEC AGEN DETEC AMPLI PROBE: HCPCS | Performed by: SURGERY

## 2021-04-13 RX ORDER — TORSEMIDE 20 MG/1
20 TABLET ORAL 2 TIMES DAILY
COMMUNITY
Start: 2021-04-02 | End: 2021-09-27 | Stop reason: SDUPTHER

## 2021-04-13 RX ORDER — HYDRALAZINE HYDROCHLORIDE 100 MG/1
100 TABLET, FILM COATED ORAL 3 TIMES DAILY
Qty: 90 TABLET | Refills: 11 | Status: SHIPPED | OUTPATIENT
Start: 2021-04-13 | End: 2022-08-15 | Stop reason: SDUPTHER

## 2021-04-14 LAB — SARS-COV-2 RNA RESP QL NAA+PROBE: NOT DETECTED

## 2021-04-15 ENCOUNTER — DOCUMENTATION ONLY (OUTPATIENT)
Dept: VASCULAR SURGERY | Facility: CLINIC | Age: 76
End: 2021-04-15

## 2021-04-16 ENCOUNTER — HOSPITAL ENCOUNTER (OUTPATIENT)
Facility: HOSPITAL | Age: 76
LOS: 1 days | Discharge: HOME | End: 2021-04-16
Attending: SURGERY | Admitting: SURGERY
Payer: MEDICARE

## 2021-04-16 VITALS
DIASTOLIC BLOOD PRESSURE: 77 MMHG | SYSTOLIC BLOOD PRESSURE: 171 MMHG | HEART RATE: 64 BPM | RESPIRATION RATE: 19 BRPM | TEMPERATURE: 98 F | WEIGHT: 313.06 LBS | BODY MASS INDEX: 40.19 KG/M2

## 2021-04-16 DIAGNOSIS — Z99.2 END STAGE RENAL FAILURE ON DIALYSIS: Primary | ICD-10-CM

## 2021-04-16 DIAGNOSIS — T82.858D ARTERIOVENOUS FISTULA STENOSIS, SUBSEQUENT ENCOUNTER: ICD-10-CM

## 2021-04-16 DIAGNOSIS — N18.6 END STAGE RENAL FAILURE ON DIALYSIS: Primary | ICD-10-CM

## 2021-04-16 PROBLEM — T82.858A ARTERIOVENOUS FISTULA STENOSIS: Status: ACTIVE | Noted: 2021-04-16

## 2021-04-16 PROCEDURE — 99152 MOD SED SAME PHYS/QHP 5/>YRS: CPT | Mod: ,,, | Performed by: SURGERY

## 2021-04-16 PROCEDURE — C1725 CATH, TRANSLUMIN NON-LASER: HCPCS | Performed by: SURGERY

## 2021-04-16 PROCEDURE — 82962 GLUCOSE BLOOD TEST: CPT | Performed by: SURGERY

## 2021-04-16 PROCEDURE — C1769 GUIDE WIRE: HCPCS | Performed by: SURGERY

## 2021-04-16 PROCEDURE — 36902 INTRO CATH DIALYSIS CIRCUIT: CPT | Mod: GC,,, | Performed by: SURGERY

## 2021-04-16 PROCEDURE — 25000003 PHARM REV CODE 250: Performed by: SURGERY

## 2021-04-16 PROCEDURE — 63600175 PHARM REV CODE 636 W HCPCS: Performed by: SURGERY

## 2021-04-16 PROCEDURE — 36902 INTRO CATH DIALYSIS CIRCUIT: CPT | Performed by: SURGERY

## 2021-04-16 PROCEDURE — 25500020 PHARM REV CODE 255: Performed by: SURGERY

## 2021-04-16 PROCEDURE — 99152 MOD SED SAME PHYS/QHP 5/>YRS: CPT | Performed by: SURGERY

## 2021-04-16 PROCEDURE — 36902 PR INTRO CATH, DIALYSIS CIRCUIT W/TRANSLML BALLOON ANGIO: ICD-10-PCS | Mod: GC,,, | Performed by: SURGERY

## 2021-04-16 PROCEDURE — 27201423 OPTIME MED/SURG SUP & DEVICES STERILE SUPPLY: Performed by: SURGERY

## 2021-04-16 PROCEDURE — C1887 CATHETER, GUIDING: HCPCS | Performed by: SURGERY

## 2021-04-16 PROCEDURE — C1894 INTRO/SHEATH, NON-LASER: HCPCS | Performed by: SURGERY

## 2021-04-16 PROCEDURE — 99152 PR MOD CONSCIOUS SEDATION, SAME PHYS, 5+ YRS, FIRST 15 MIN: ICD-10-PCS | Mod: ,,, | Performed by: SURGERY

## 2021-04-16 RX ORDER — MIDAZOLAM HYDROCHLORIDE 1 MG/ML
INJECTION, SOLUTION INTRAMUSCULAR; INTRAVENOUS
Status: DISCONTINUED | OUTPATIENT
Start: 2021-04-16 | End: 2021-04-16 | Stop reason: HOSPADM

## 2021-04-16 RX ORDER — HYDRALAZINE HYDROCHLORIDE 20 MG/ML
INJECTION INTRAMUSCULAR; INTRAVENOUS
Status: DISCONTINUED | OUTPATIENT
Start: 2021-04-16 | End: 2021-04-16 | Stop reason: HOSPADM

## 2021-04-16 RX ORDER — LIDOCAINE HYDROCHLORIDE 20 MG/ML
INJECTION, SOLUTION EPIDURAL; INFILTRATION; INTRACAUDAL; PERINEURAL
Status: DISCONTINUED | OUTPATIENT
Start: 2021-04-16 | End: 2021-04-16 | Stop reason: HOSPADM

## 2021-04-16 RX ORDER — FENTANYL CITRATE 50 UG/ML
INJECTION, SOLUTION INTRAMUSCULAR; INTRAVENOUS
Status: DISCONTINUED | OUTPATIENT
Start: 2021-04-16 | End: 2021-04-16 | Stop reason: HOSPADM

## 2021-04-16 RX ORDER — SODIUM CHLORIDE 9 MG/ML
INJECTION, SOLUTION INTRAVENOUS CONTINUOUS
Status: DISCONTINUED | OUTPATIENT
Start: 2021-04-16 | End: 2021-04-16 | Stop reason: HOSPADM

## 2021-04-16 RX ORDER — HEPARIN SOD,PORCINE/0.9 % NACL 1000/500ML
INTRAVENOUS SOLUTION INTRAVENOUS
Status: DISCONTINUED | OUTPATIENT
Start: 2021-04-16 | End: 2021-04-16 | Stop reason: HOSPADM

## 2021-04-16 RX ORDER — ONDANSETRON 8 MG/1
8 TABLET, ORALLY DISINTEGRATING ORAL EVERY 8 HOURS PRN
Status: DISCONTINUED | OUTPATIENT
Start: 2021-04-16 | End: 2021-04-16 | Stop reason: HOSPADM

## 2021-04-19 LAB
POCT GLUCOSE: 126 MG/DL (ref 70–110)
POCT GLUCOSE: 96 MG/DL (ref 70–110)

## 2021-04-28 RX ORDER — INSULIN LISPRO 100 [IU]/ML
INJECTION, SOLUTION INTRAVENOUS; SUBCUTANEOUS
Qty: 15 ML | Refills: 6 | Status: SHIPPED | OUTPATIENT
Start: 2021-04-28 | End: 2021-08-18 | Stop reason: SDUPTHER

## 2021-04-28 RX ORDER — INSULIN GLARGINE 100 [IU]/ML
INJECTION, SOLUTION SUBCUTANEOUS
Qty: 15 ML | Refills: 6 | Status: SHIPPED | OUTPATIENT
Start: 2021-04-28 | End: 2021-08-18 | Stop reason: SDUPTHER

## 2021-04-29 ENCOUNTER — HOSPITAL ENCOUNTER (OUTPATIENT)
Dept: VASCULAR SURGERY | Facility: CLINIC | Age: 76
Discharge: HOME | End: 2021-04-29
Attending: SURGERY
Payer: MEDICARE

## 2021-04-29 ENCOUNTER — OFFICE VISIT (OUTPATIENT)
Dept: VASCULAR SURGERY | Facility: CLINIC | Age: 76
End: 2021-04-29
Attending: SURGERY
Payer: MEDICARE

## 2021-04-29 VITALS
SYSTOLIC BLOOD PRESSURE: 164 MMHG | BODY MASS INDEX: 39.9 KG/M2 | TEMPERATURE: 100 F | HEART RATE: 74 BPM | WEIGHT: 310.88 LBS | DIASTOLIC BLOOD PRESSURE: 73 MMHG | HEIGHT: 74 IN

## 2021-04-29 DIAGNOSIS — T82.858D ARTERIOVENOUS FISTULA STENOSIS, SUBSEQUENT ENCOUNTER: Primary | ICD-10-CM

## 2021-04-29 DIAGNOSIS — N18.6 END STAGE RENAL FAILURE ON DIALYSIS: ICD-10-CM

## 2021-04-29 DIAGNOSIS — Z99.2 END STAGE RENAL FAILURE ON DIALYSIS: ICD-10-CM

## 2021-04-29 PROCEDURE — 93990 PR DUPLEX HEMODIALYSIS ACCESS: ICD-10-PCS | Mod: 26,S$PBB,, | Performed by: SURGERY

## 2021-04-29 PROCEDURE — 99214 OFFICE O/P EST MOD 30 MIN: CPT | Mod: PBBFAC,25 | Performed by: SURGERY

## 2021-04-29 PROCEDURE — 99999 PR PBB SHADOW E&M-EST. PATIENT-LVL IV: ICD-10-PCS | Mod: PBBFAC,,, | Performed by: SURGERY

## 2021-04-29 PROCEDURE — 93990 DOPPLER FLOW TESTING: CPT | Mod: 26,S$PBB,, | Performed by: SURGERY

## 2021-04-29 PROCEDURE — 99999 PR PBB SHADOW E&M-EST. PATIENT-LVL IV: CPT | Mod: PBBFAC,,, | Performed by: SURGERY

## 2021-04-29 PROCEDURE — 99214 PR OFFICE/OUTPT VISIT, EST, LEVL IV, 30-39 MIN: ICD-10-PCS | Mod: S$PBB,,, | Performed by: SURGERY

## 2021-04-29 PROCEDURE — 93990 DOPPLER FLOW TESTING: CPT | Mod: PBBFAC | Performed by: SURGERY

## 2021-04-29 PROCEDURE — 99214 OFFICE O/P EST MOD 30 MIN: CPT | Mod: S$PBB,,, | Performed by: SURGERY

## 2021-05-07 ENCOUNTER — PATIENT OUTREACH (OUTPATIENT)
Dept: ADMINISTRATIVE | Facility: HOSPITAL | Age: 76
End: 2021-05-07

## 2021-05-07 ENCOUNTER — TELEPHONE (OUTPATIENT)
Dept: ADMINISTRATIVE | Facility: HOSPITAL | Age: 76
End: 2021-05-07

## 2021-05-21 DIAGNOSIS — E87.20 ACIDOSIS: ICD-10-CM

## 2021-05-21 RX ORDER — TAMSULOSIN HYDROCHLORIDE 0.4 MG/1
1 CAPSULE ORAL EVERY MORNING
Qty: 90 CAPSULE | Refills: 1 | Status: SHIPPED | OUTPATIENT
Start: 2021-05-21 | End: 2021-08-18 | Stop reason: SDUPTHER

## 2021-06-29 ENCOUNTER — TELEPHONE (OUTPATIENT)
Dept: INTERNAL MEDICINE | Facility: CLINIC | Age: 76
End: 2021-06-29

## 2021-08-18 ENCOUNTER — OFFICE VISIT (OUTPATIENT)
Dept: PRIMARY CARE CLINIC | Facility: CLINIC | Age: 76
End: 2021-08-18
Attending: FAMILY MEDICINE
Payer: MEDICARE

## 2021-08-18 ENCOUNTER — TELEPHONE (OUTPATIENT)
Dept: PRIMARY CARE CLINIC | Facility: CLINIC | Age: 76
End: 2021-08-18

## 2021-08-18 ENCOUNTER — LAB VISIT (OUTPATIENT)
Dept: LAB | Facility: HOSPITAL | Age: 76
End: 2021-08-18
Attending: FAMILY MEDICINE
Payer: MEDICARE

## 2021-08-18 VITALS
WEIGHT: 302.38 LBS | BODY MASS INDEX: 38.81 KG/M2 | HEART RATE: 94 BPM | HEIGHT: 74 IN | DIASTOLIC BLOOD PRESSURE: 78 MMHG | OXYGEN SATURATION: 97 % | SYSTOLIC BLOOD PRESSURE: 130 MMHG

## 2021-08-18 DIAGNOSIS — E66.01 OBESITY, CLASS III, BMI 40-49.9 (MORBID OBESITY): Chronic | ICD-10-CM

## 2021-08-18 DIAGNOSIS — I51.89 DIASTOLIC DYSFUNCTION: ICD-10-CM

## 2021-08-18 DIAGNOSIS — E11.65 TYPE 2 DIABETES MELLITUS WITH HYPERGLYCEMIA, WITHOUT LONG-TERM CURRENT USE OF INSULIN: ICD-10-CM

## 2021-08-18 DIAGNOSIS — I10 ESSENTIAL HYPERTENSION: ICD-10-CM

## 2021-08-18 DIAGNOSIS — Z99.2 END STAGE RENAL FAILURE ON DIALYSIS: ICD-10-CM

## 2021-08-18 DIAGNOSIS — R53.81 DEBILITY: ICD-10-CM

## 2021-08-18 DIAGNOSIS — E11.65 TYPE 2 DIABETES MELLITUS WITH HYPERGLYCEMIA, WITHOUT LONG-TERM CURRENT USE OF INSULIN: Primary | ICD-10-CM

## 2021-08-18 DIAGNOSIS — R06.02 SOB (SHORTNESS OF BREATH): ICD-10-CM

## 2021-08-18 DIAGNOSIS — E78.5 HYPERLIPIDEMIA, UNSPECIFIED HYPERLIPIDEMIA TYPE: ICD-10-CM

## 2021-08-18 DIAGNOSIS — D50.8 OTHER IRON DEFICIENCY ANEMIA: ICD-10-CM

## 2021-08-18 DIAGNOSIS — N18.6 END STAGE RENAL FAILURE ON DIALYSIS: ICD-10-CM

## 2021-08-18 DIAGNOSIS — I42.0 DILATED CARDIOMYOPATHY: ICD-10-CM

## 2021-08-18 DIAGNOSIS — E87.20 ACIDOSIS: ICD-10-CM

## 2021-08-18 DIAGNOSIS — E78.5 DYSLIPIDEMIA: ICD-10-CM

## 2021-08-18 DIAGNOSIS — Z72.0 TOBACCO ABUSE: Chronic | ICD-10-CM

## 2021-08-18 PROBLEM — N18.9 ANEMIA IN CHRONIC KIDNEY DISEASE: Status: ACTIVE | Noted: 2021-02-01

## 2021-08-18 PROBLEM — E83.51 HYPOCALCEMIA: Status: ACTIVE | Noted: 2021-08-12

## 2021-08-18 PROBLEM — Z86.0100 HISTORY OF COLONIC POLYPS: Status: ACTIVE | Noted: 2021-01-13

## 2021-08-18 PROBLEM — Z86.010 HISTORY OF COLONIC POLYPS: Status: ACTIVE | Noted: 2021-01-13

## 2021-08-18 PROBLEM — Z79.4 LONG TERM (CURRENT) USE OF INSULIN: Status: ACTIVE | Noted: 2021-02-01

## 2021-08-18 PROBLEM — D50.9 IRON DEFICIENCY ANEMIA, UNSPECIFIED: Status: ACTIVE | Noted: 2021-02-09

## 2021-08-18 PROBLEM — E63.9 NUTRITIONAL DEFICIENCY, UNSPECIFIED: Status: ACTIVE | Noted: 2021-02-09

## 2021-08-18 PROBLEM — D63.1 ANEMIA IN CHRONIC KIDNEY DISEASE: Status: ACTIVE | Noted: 2021-02-01

## 2021-08-18 PROBLEM — N25.81 SECONDARY HYPERPARATHYROIDISM OF RENAL ORIGIN: Status: ACTIVE | Noted: 2021-02-09

## 2021-08-18 PROBLEM — N25.0 RENAL OSTEODYSTROPHY: Status: ACTIVE | Noted: 2021-01-13

## 2021-08-18 LAB
ESTIMATED AVG GLUCOSE: 111 MG/DL (ref 68–131)
HBA1C MFR BLD: 5.5 % (ref 4–5.6)

## 2021-08-18 PROCEDURE — 99214 OFFICE O/P EST MOD 30 MIN: CPT | Mod: PBBFAC,PN | Performed by: FAMILY MEDICINE

## 2021-08-18 PROCEDURE — 99215 PR OFFICE/OUTPT VISIT, EST, LEVL V, 40-54 MIN: ICD-10-PCS | Mod: S$PBB,,, | Performed by: FAMILY MEDICINE

## 2021-08-18 PROCEDURE — 99215 OFFICE O/P EST HI 40 MIN: CPT | Mod: S$PBB,,, | Performed by: FAMILY MEDICINE

## 2021-08-18 PROCEDURE — 99999 PR PBB SHADOW E&M-EST. PATIENT-LVL IV: CPT | Mod: PBBFAC,,, | Performed by: FAMILY MEDICINE

## 2021-08-18 PROCEDURE — 36415 COLL VENOUS BLD VENIPUNCTURE: CPT | Mod: PN | Performed by: FAMILY MEDICINE

## 2021-08-18 PROCEDURE — 83036 HEMOGLOBIN GLYCOSYLATED A1C: CPT | Performed by: FAMILY MEDICINE

## 2021-08-18 PROCEDURE — 99999 PR PBB SHADOW E&M-EST. PATIENT-LVL IV: ICD-10-PCS | Mod: PBBFAC,,, | Performed by: FAMILY MEDICINE

## 2021-08-18 RX ORDER — INSULIN LISPRO 100 [IU]/ML
INJECTION, SOLUTION INTRAVENOUS; SUBCUTANEOUS
Qty: 15 ML | Refills: 6 | Status: SHIPPED | OUTPATIENT
Start: 2021-08-18 | End: 2022-07-06 | Stop reason: SDUPTHER

## 2021-08-18 RX ORDER — NIFEDIPINE 90 MG/1
90 TABLET, EXTENDED RELEASE ORAL DAILY
Qty: 90 TABLET | Refills: 1 | Status: SHIPPED | OUTPATIENT
Start: 2021-08-18 | End: 2022-03-22

## 2021-08-18 RX ORDER — ATORVASTATIN CALCIUM 40 MG/1
40 TABLET, FILM COATED ORAL DAILY
Qty: 90 TABLET | Refills: 1 | Status: SHIPPED | OUTPATIENT
Start: 2021-08-18 | End: 2022-08-18

## 2021-08-18 RX ORDER — TAMSULOSIN HYDROCHLORIDE 0.4 MG/1
1 CAPSULE ORAL EVERY MORNING
Qty: 90 CAPSULE | Refills: 1 | Status: SHIPPED | OUTPATIENT
Start: 2021-08-18 | End: 2022-05-20

## 2021-08-18 RX ORDER — INSULIN GLARGINE 100 [IU]/ML
INJECTION, SOLUTION SUBCUTANEOUS
Qty: 15 ML | Refills: 6 | Status: SHIPPED | OUTPATIENT
Start: 2021-08-18 | End: 2022-10-05

## 2021-08-18 RX ORDER — CARVEDILOL 25 MG/1
TABLET ORAL
Qty: 180 TABLET | Refills: 1 | Status: SHIPPED | OUTPATIENT
Start: 2021-08-18 | End: 2021-09-21

## 2021-08-20 ENCOUNTER — TELEPHONE (OUTPATIENT)
Dept: INTERNAL MEDICINE | Facility: CLINIC | Age: 76
End: 2021-08-20

## 2021-09-09 ENCOUNTER — PES CALL (OUTPATIENT)
Dept: ADMINISTRATIVE | Facility: CLINIC | Age: 76
End: 2021-09-09

## 2021-09-21 ENCOUNTER — PATIENT OUTREACH (OUTPATIENT)
Dept: ADMINISTRATIVE | Facility: OTHER | Age: 76
End: 2021-09-21

## 2021-09-21 ENCOUNTER — LAB VISIT (OUTPATIENT)
Dept: LAB | Facility: HOSPITAL | Age: 76
End: 2021-09-21
Attending: INTERNAL MEDICINE
Payer: MEDICARE

## 2021-09-21 ENCOUNTER — OFFICE VISIT (OUTPATIENT)
Dept: CARDIOLOGY | Facility: CLINIC | Age: 76
End: 2021-09-21
Payer: MEDICARE

## 2021-09-21 VITALS
SYSTOLIC BLOOD PRESSURE: 118 MMHG | HEART RATE: 71 BPM | BODY MASS INDEX: 39.16 KG/M2 | HEIGHT: 74 IN | WEIGHT: 305.13 LBS | DIASTOLIC BLOOD PRESSURE: 56 MMHG

## 2021-09-21 DIAGNOSIS — I45.10 RBBB: ICD-10-CM

## 2021-09-21 DIAGNOSIS — N18.4 TYPE 2 DIABETES MELLITUS WITH STAGE 4 CHRONIC KIDNEY DISEASE, WITH LONG-TERM CURRENT USE OF INSULIN: Chronic | ICD-10-CM

## 2021-09-21 DIAGNOSIS — Z72.0 TOBACCO ABUSE: Chronic | ICD-10-CM

## 2021-09-21 DIAGNOSIS — E78.5 DYSLIPIDEMIA: ICD-10-CM

## 2021-09-21 DIAGNOSIS — Z79.4 TYPE 2 DIABETES MELLITUS WITH STAGE 4 CHRONIC KIDNEY DISEASE, WITH LONG-TERM CURRENT USE OF INSULIN: Chronic | ICD-10-CM

## 2021-09-21 DIAGNOSIS — I10 ESSENTIAL HYPERTENSION: Chronic | ICD-10-CM

## 2021-09-21 DIAGNOSIS — N18.5 STAGE 5 CHRONIC KIDNEY DISEASE NOT ON CHRONIC DIALYSIS: ICD-10-CM

## 2021-09-21 DIAGNOSIS — E11.22 TYPE 2 DIABETES MELLITUS WITH STAGE 4 CHRONIC KIDNEY DISEASE, WITH LONG-TERM CURRENT USE OF INSULIN: Chronic | ICD-10-CM

## 2021-09-21 DIAGNOSIS — R06.02 SHORTNESS OF BREATH ON EXERTION: ICD-10-CM

## 2021-09-21 DIAGNOSIS — I10 ESSENTIAL HYPERTENSION: Primary | Chronic | ICD-10-CM

## 2021-09-21 DIAGNOSIS — I51.89 DIASTOLIC DYSFUNCTION: ICD-10-CM

## 2021-09-21 DIAGNOSIS — E66.01 OBESITY, CLASS III, BMI 40-49.9 (MORBID OBESITY): Chronic | ICD-10-CM

## 2021-09-21 LAB
25(OH)D3+25(OH)D2 SERPL-MCNC: 17 NG/ML (ref 30–96)
ALBUMIN SERPL BCP-MCNC: 3.7 G/DL (ref 3.5–5.2)
ALP SERPL-CCNC: 69 U/L (ref 55–135)
ALT SERPL W/O P-5'-P-CCNC: 9 U/L (ref 10–44)
ANION GAP SERPL CALC-SCNC: 16 MMOL/L (ref 8–16)
AST SERPL-CCNC: 8 U/L (ref 10–40)
BASOPHILS # BLD AUTO: 0.04 K/UL (ref 0–0.2)
BASOPHILS NFR BLD: 0.5 % (ref 0–1.9)
BILIRUB SERPL-MCNC: 0.5 MG/DL (ref 0.1–1)
BNP SERPL-MCNC: 102 PG/ML (ref 0–99)
BUN SERPL-MCNC: 47 MG/DL (ref 8–23)
CALCIUM SERPL-MCNC: 9.1 MG/DL (ref 8.7–10.5)
CHLORIDE SERPL-SCNC: 94 MMOL/L (ref 95–110)
CHOLEST SERPL-MCNC: 163 MG/DL (ref 120–199)
CHOLEST/HDLC SERPL: 2.7 {RATIO} (ref 2–5)
CO2 SERPL-SCNC: 27 MMOL/L (ref 23–29)
CREAT SERPL-MCNC: 7 MG/DL (ref 0.5–1.4)
DIFFERENTIAL METHOD: ABNORMAL
EOSINOPHIL # BLD AUTO: 0.1 K/UL (ref 0–0.5)
EOSINOPHIL NFR BLD: 1.5 % (ref 0–8)
ERYTHROCYTE [DISTWIDTH] IN BLOOD BY AUTOMATED COUNT: 15.5 % (ref 11.5–14.5)
EST. GFR  (AFRICAN AMERICAN): 8.1 ML/MIN/1.73 M^2
EST. GFR  (NON AFRICAN AMERICAN): 7 ML/MIN/1.73 M^2
ESTIMATED AVG GLUCOSE: 143 MG/DL (ref 68–131)
GLUCOSE SERPL-MCNC: 158 MG/DL (ref 70–110)
HBA1C MFR BLD: 6.6 % (ref 4–5.6)
HCT VFR BLD AUTO: 34.3 % (ref 40–54)
HDLC SERPL-MCNC: 60 MG/DL (ref 40–75)
HDLC SERPL: 36.8 % (ref 20–50)
HGB BLD-MCNC: 11.1 G/DL (ref 14–18)
IMM GRANULOCYTES # BLD AUTO: 0.07 K/UL (ref 0–0.04)
IMM GRANULOCYTES NFR BLD AUTO: 0.8 % (ref 0–0.5)
LDLC SERPL CALC-MCNC: 82.4 MG/DL (ref 63–159)
LYMPHOCYTES # BLD AUTO: 1.6 K/UL (ref 1–4.8)
LYMPHOCYTES NFR BLD: 17.6 % (ref 18–48)
MCH RBC QN AUTO: 30.7 PG (ref 27–31)
MCHC RBC AUTO-ENTMCNC: 32.4 G/DL (ref 32–36)
MCV RBC AUTO: 95 FL (ref 82–98)
MONOCYTES # BLD AUTO: 0.8 K/UL (ref 0.3–1)
MONOCYTES NFR BLD: 8.6 % (ref 4–15)
NEUTROPHILS # BLD AUTO: 6.3 K/UL (ref 1.8–7.7)
NEUTROPHILS NFR BLD: 71 % (ref 38–73)
NONHDLC SERPL-MCNC: 103 MG/DL
NRBC BLD-RTO: 0 /100 WBC
PLATELET # BLD AUTO: 220 K/UL (ref 150–450)
PMV BLD AUTO: 9.1 FL (ref 9.2–12.9)
POTASSIUM SERPL-SCNC: 4.5 MMOL/L (ref 3.5–5.1)
PROT SERPL-MCNC: 7.5 G/DL (ref 6–8.4)
RBC # BLD AUTO: 3.61 M/UL (ref 4.6–6.2)
SODIUM SERPL-SCNC: 137 MMOL/L (ref 136–145)
TRIGL SERPL-MCNC: 103 MG/DL (ref 30–150)
TSH SERPL DL<=0.005 MIU/L-ACNC: 0.64 UIU/ML (ref 0.4–4)
WBC # BLD AUTO: 8.84 K/UL (ref 3.9–12.7)

## 2021-09-21 PROCEDURE — 80061 LIPID PANEL: CPT | Performed by: INTERNAL MEDICINE

## 2021-09-21 PROCEDURE — 83036 HEMOGLOBIN GLYCOSYLATED A1C: CPT | Performed by: INTERNAL MEDICINE

## 2021-09-21 PROCEDURE — 36415 COLL VENOUS BLD VENIPUNCTURE: CPT | Performed by: INTERNAL MEDICINE

## 2021-09-21 PROCEDURE — 83880 ASSAY OF NATRIURETIC PEPTIDE: CPT | Performed by: INTERNAL MEDICINE

## 2021-09-21 PROCEDURE — 80053 COMPREHEN METABOLIC PANEL: CPT | Performed by: INTERNAL MEDICINE

## 2021-09-21 PROCEDURE — 99215 OFFICE O/P EST HI 40 MIN: CPT | Mod: PBBFAC | Performed by: INTERNAL MEDICINE

## 2021-09-21 PROCEDURE — 99215 PR OFFICE/OUTPT VISIT, EST, LEVL V, 40-54 MIN: ICD-10-PCS | Mod: S$PBB,,, | Performed by: INTERNAL MEDICINE

## 2021-09-21 PROCEDURE — 82306 VITAMIN D 25 HYDROXY: CPT | Performed by: INTERNAL MEDICINE

## 2021-09-21 PROCEDURE — 84443 ASSAY THYROID STIM HORMONE: CPT | Performed by: INTERNAL MEDICINE

## 2021-09-21 PROCEDURE — 85025 COMPLETE CBC W/AUTO DIFF WBC: CPT | Performed by: INTERNAL MEDICINE

## 2021-09-21 PROCEDURE — 99215 OFFICE O/P EST HI 40 MIN: CPT | Mod: S$PBB,,, | Performed by: INTERNAL MEDICINE

## 2021-09-21 PROCEDURE — 99999 PR PBB SHADOW E&M-EST. PATIENT-LVL V: CPT | Mod: PBBFAC,,, | Performed by: INTERNAL MEDICINE

## 2021-09-21 PROCEDURE — 99999 PR PBB SHADOW E&M-EST. PATIENT-LVL V: ICD-10-PCS | Mod: PBBFAC,,, | Performed by: INTERNAL MEDICINE

## 2021-09-21 RX ORDER — METOPROLOL SUCCINATE 25 MG/1
25 TABLET, EXTENDED RELEASE ORAL DAILY
Qty: 90 TABLET | Refills: 3 | Status: SHIPPED | OUTPATIENT
Start: 2021-09-21 | End: 2022-03-20

## 2021-09-27 DIAGNOSIS — I51.89 DIASTOLIC DYSFUNCTION: Primary | ICD-10-CM

## 2021-09-27 RX ORDER — TORSEMIDE 20 MG/1
20 TABLET ORAL 2 TIMES DAILY
Qty: 60 TABLET | Refills: 3 | Status: SHIPPED | OUTPATIENT
Start: 2021-09-27 | End: 2022-01-19 | Stop reason: SDUPTHER

## 2021-10-06 NOTE — PLAN OF CARE
KATI MONGE 1968    DATE OF OPERATION: 09/30/2021    PREOPERATIVE DIAGNOSIS:   Symptomatic pelvic organ prolapse. Cystocele  Rectocele  Enterocele  Uterine prolapse      POSTOPERATIVE DIAGNOSIS:   Symptomatic pelvic organ prolapse. Cystocele  Rectocele  Enterocele  Uterine prolapse    PROCEDURES PERFORMED:    hysterectomy by Dr Joslyn Harrison  Robotic sacrocolpopexy [facility])   Abdominal enterocele repair    Cystourethroscopy     SURGEON: Maryuri Virk MD     ANESTHESIA: General endotracheal.   COMPLICATIONS: None. ESTIMATED BLOOD LOSS: minimal less than 50 cc for my part     FINDINGS: pelvic organ prolapse, small normal appearing cervix and uterus, normal tubes and ovaries bilaterally, bilateral ureteral spill on cystoscopy    INDICATIONS FOR THE PROCEDURE: 47year old female who presented with significant pelvic organ prolapse. On exam, she had Stage 2 POP. The patient desired definitive treatment. The risks and benefits of surgery included but not limited to injury to the bowel, bladder, ureter, internal organs; possibility of nerve entrapment and nerve injury; possibility of bleeding with subsequent transfusion were all discussed with the patient at a great length. She was also counseled on the risks of damage to internal organs, voiding dysfunction, defecatory dysfunction, mesh erosion, dyspareunia, recurrent prolapse, recurrent incontinence, DVT, PE, and death. After all questions were answered, she signed the consent and was scheduled for surgery. DESCRIPTION OF THE PROCEDURE: The patient was taken to the operating room where general anesthesia was obtained without difficulty. She was then prepped and draped in normal sterile fashion in dorsal supine lithotomy position using Keegan stirrups. A Sams catheter was placed. Dr Joslyn Harrison finished the hysterectomy and will dictate this separate and then I came and took over.    A Cardiere was placed in arm #3, a bipolar in #1, and SW met with patient and ash Velásquez at bedside to complete discharge planning assessment.  Patient sleep and unable to complete at this time.  SW completed assessment with patient's fiance.  Verified all demographic information on facesheet is correct.  Verified PCP is Dr. Janeth Walter.  Verified primary health insurance is Medicare and secondary is [facility].  Patient with NO home health or DME.  Patient with NO POA or Living Will.  Patient not on dialysis or medication coumadin.  Patient with no 30 day admission.  Patient with no financial issues at this time.  Patient family will provide transportation upon discharge from facility.  Prior to hospitalization, patient independent with ADLs, live with ash, drives self.  Per Vanesa, patient has appointment with Pulmonogist July 1, 2020 @ 8:30am and lab draw for tomorrow.  Patient is diabetic.  Patient uses [id number] [place] - [address] AT [facility] [address]         06/28/20 1010   Discharge Assessment   Assessment Type Discharge Planning Assessment   Confirmed/corrected address and phone number on facesheet? Yes   Assessment information obtained from? Caregiver;Other  (Vanesa Velásquez (ash))   Prior to hospitilization cognitive status: Alert/Oriented   Prior to hospitalization functional status: Independent   Current cognitive status: Unable to Assess   Current Functional Status:   (unable to assess)   Lives With significant other   Able to Return to Prior Arrangements yes   Is patient able to care for self after discharge? Yes   Patient's perception of discharge disposition home or selfcare   Readmission Within the Last 30 Days no previous admission in last 30 days   Patient currently being followed by outpatient case management? No   Patient currently receives any other outside agency services? No   Equipment Currently Used at Home none   Do you have any problems affording any of your prescribed medications?  No   Is the patient taking medications as prescribed? yes   Does the patient have transportation home? Yes   Transportation Anticipated family or friend will provide   Does the patient receive services at the Coumadin Clinic? No   Discharge Plan A Home   Discharge Plan B Home with family   DME Needed Upon Discharge  other (see comments)  (TBD)   Patient/Family in Agreement with Plan yes        scissors in #2. We were easily able to identify the sacral promontory and the right ureter. The peritoneum overlying the sacral promontory was elevated and incised. The incision was carried down in to the pelvis to the posterior side of the vagina along the right lateral sidewall. The ureter was visualized and carefully avoided. The bladder was carefully dissected off of the anterior vaginal wall by means of sharp dissection. There the rectum was carefully dissected off of the posterior vaginal wall. Two strips of 1 x 6 inch polypropylene mesh were cut. The mesh was inserted into the abdomen and using PDS sutures, one piece was secured to the posterior side of the vagina with four 2-0 PDS sutures. The other piece was then secured to the anterior side of the vagina using six 2-0 PDS sutures. The sigmoid colon was then held to the patient's left and the appropriate tension was determined and the mesh was cut to fit the sacral promontory without tension. Three Ethibond sutures were used to attach the mesh to the anterior longitudinal ligament of the sacrum. This resulted in excellent elevation of the vaginal apex. The peritoneum was then reperitonealized with a 2-0 Vicryl suture. This suture was used to obliterate the enterocele in a fashion similar to an abdominal Grace procedure. The abdomen was copiously irrigated and the sigmoid colon was allowed back into the pelvis. The robot was undocked. Cystourethroscopy was done, which revealed no inward entries to the bladder, urethra, or trigone and both ureteral orifices were noted to spill bilaterally. The right UOP was sluggish and the robot was redocked and the right ureter was examined and noted to be far form the peritoneal closure. The cysto was repeated and there was brisk spill. The abdominal ports were closed with 4-0 Vicryl suture. And covered with dermabond. There were no complications.  The patient tolerated the procedure well and was taken to the recovery room in stable condition.          Digna Ware MD

## 2021-10-12 ENCOUNTER — TELEPHONE (OUTPATIENT)
Dept: PRIMARY CARE CLINIC | Facility: CLINIC | Age: 76
End: 2021-10-12

## 2021-10-12 RX ORDER — LANCETS 33 GAUGE
EACH MISCELLANEOUS
Qty: 400 EACH | Refills: 3 | Status: SHIPPED | OUTPATIENT
Start: 2021-10-12 | End: 2022-08-24 | Stop reason: SDUPTHER

## 2021-10-14 RX ORDER — SODIUM BICARBONATE 650 MG/1
TABLET ORAL
Qty: 90 TABLET | Refills: 0 | Status: SHIPPED | OUTPATIENT
Start: 2021-10-14 | End: 2022-08-24

## 2021-12-14 ENCOUNTER — TELEPHONE (OUTPATIENT)
Dept: TRANSPLANT | Facility: CLINIC | Age: 76
End: 2021-12-14
Payer: MEDICARE

## 2022-01-19 DIAGNOSIS — I10 ESSENTIAL HYPERTENSION: Primary | ICD-10-CM

## 2022-01-19 RX ORDER — TORSEMIDE 20 MG/1
20 TABLET ORAL 2 TIMES DAILY
Qty: 180 TABLET | Refills: 1 | Status: SHIPPED | OUTPATIENT
Start: 2022-01-19 | End: 2022-08-24 | Stop reason: SDUPTHER

## 2022-01-19 NOTE — TELEPHONE ENCOUNTER
Care Due:                  Date            Visit Type   Department     Provider  --------------------------------------------------------------------------------                                             [facility] PRIMARY  Last Visit: 08-      None         CARE           Janeth Waletr  Next Visit: None Scheduled  None         None Found                                                            Last  Test          Frequency    Reason                     Performed    Due Date  --------------------------------------------------------------------------------    HBA1C.......  6 months...  insulin..................  09- 03-    Powered by [employer]. Reference number: [id number].   1/19/2022 10:15:49 AM CST

## 2022-01-19 NOTE — TELEPHONE ENCOUNTER
----- Message from Giovanni Garcia sent at 1/19/2022  9:05 AM CST -----  Contact: [phone] pt  Pt states he needs the office to call him back. In regards to his medication. Please call and advise  Thank you

## 2022-01-19 NOTE — TELEPHONE ENCOUNTER
Returned pt's call.  Pt states he's completely out of his Torsemide and he is unsure why he was only prescribed 30 days vs 90 days as per his usual. Pended refill for 90 days as high priority as pt is out of medication.

## 2022-01-21 NOTE — ASSESSMENT & PLAN NOTE
- CXR with LLL opacity   - Respiratory culture pending. Continue broad spectrum abx  - Will stop Vanc if no Staph isolated     16

## 2022-02-08 NOTE — TELEPHONE ENCOUNTER
Spoke to patient and let him know that Nicole Cerda received BG logs  Mostly at goal- no changes     Stable

## 2022-02-09 ENCOUNTER — PES CALL (OUTPATIENT)
Dept: ADMINISTRATIVE | Facility: CLINIC | Age: 77
End: 2022-02-09
Payer: MEDICARE

## 2022-02-21 ENCOUNTER — PES CALL (OUTPATIENT)
Dept: ADMINISTRATIVE | Facility: CLINIC | Age: 77
End: 2022-02-21
Payer: MEDICARE

## 2022-03-22 DIAGNOSIS — I10 ESSENTIAL HYPERTENSION: ICD-10-CM

## 2022-03-22 RX ORDER — NIFEDIPINE 90 MG/1
TABLET, EXTENDED RELEASE ORAL
Qty: 90 TABLET | Refills: 1 | Status: SHIPPED | OUTPATIENT
Start: 2022-03-22 | End: 2022-08-24 | Stop reason: SDUPTHER

## 2022-03-23 NOTE — TELEPHONE ENCOUNTER
----- Message from Naomi Addison sent at 3/23/2022 12:01 PM CDT -----  Regarding: medication  Name of Who is Calling:IMER ISRAEL [[id number]]          What is the request in detail:  Patient is requesting a call back in reference to his medication           Can the clinic reply by [facility]: No           What Number to Call Back if not in [facility]:[phone]

## 2022-03-23 NOTE — TELEPHONE ENCOUNTER
Informed pt that the nifedipine was [facility] in yesterday [facility] [address]. [facility]

## 2022-04-13 ENCOUNTER — PATIENT OUTREACH (OUTPATIENT)
Dept: INTERNAL MEDICINE | Facility: CLINIC | Age: 77
End: 2022-04-13
Payer: MEDICARE

## 2022-04-13 DIAGNOSIS — E11.65 TYPE 2 DIABETES MELLITUS WITH HYPERGLYCEMIA, WITH LONG-TERM CURRENT USE OF INSULIN: Primary | ICD-10-CM

## 2022-04-13 DIAGNOSIS — Z79.4 TYPE 2 DIABETES MELLITUS WITH HYPERGLYCEMIA, WITH LONG-TERM CURRENT USE OF INSULIN: Primary | ICD-10-CM

## 2022-05-20 DIAGNOSIS — E87.20 ACIDOSIS: ICD-10-CM

## 2022-05-20 RX ORDER — TAMSULOSIN HYDROCHLORIDE 0.4 MG/1
CAPSULE ORAL
Qty: 90 CAPSULE | Refills: 0 | Status: SHIPPED | OUTPATIENT
Start: 2022-05-20 | End: 2023-03-27

## 2022-05-20 NOTE — TELEPHONE ENCOUNTER
Care Due:                  Date            Visit Type   Department     Provider  --------------------------------------------------------------------------------                                EP -                              PRIMARY      [facility] PRIMARY  Last Visit: 08-      CARE ([facility])   CARE           Janeth Walter  Next Visit: None Scheduled  None         None Found                                                            Last  Test          Frequency    Reason                     Performed    Due Date  --------------------------------------------------------------------------------    Office Visit  12 months..  atorvastatin, insulin,     08- 08-                             ipratropium-albuteroL,                             tamsulosin, torsemide....    HBA1C.......  6 months...  insulin..................  09- 03-    [facility] Embedded Care Gaps. Reference number: [id number]. 5/20/2022   4:17:11 PM CDT

## 2022-05-20 NOTE — TELEPHONE ENCOUNTER
Refill Authorization Note   Vinnie Siegel  is requesting a refill authorization.  Brief Assessment and Rationale for Refill:  Approve     Medication Therapy Plan:       Medication Reconciliation Completed: No   Comments:     Provider Staff:     Action is required for this patient.   Please see care gap opportunities below in Care Due Message.     Thanks!  [facility]     Appointments      Date Provider   Last Visit   8/18/2021 Janeth Walter MD   Next Visit   Visit date not found Janeth Walter MD     Note composed:4:33 PM 05/20/2022           Note composed:4:33 PM 05/20/2022

## 2022-06-20 ENCOUNTER — PES CALL (OUTPATIENT)
Dept: ADMINISTRATIVE | Facility: CLINIC | Age: 77
End: 2022-06-20
Payer: MEDICARE

## 2022-07-03 NOTE — PROGRESS NOTES
Subjective:   Patient ID:  Vinnie Siegel is a 76 y.o. male who presents for follow-up of CAD risk    HPI: The patient is here for CAD risk factors.   The patient has no chest pain, TIA, palpitations, syncope or pre-syncope.Patient does not exercise.He has chronic SOB/ZEPEDA.        Review of Systems   Constitutional: Negative for chills, decreased appetite, diaphoresis, fever, malaise/fatigue, night sweats, weight gain and weight loss.   HENT: Negative for congestion, hoarse voice, nosebleeds, sore throat and tinnitus.    Eyes: Negative for blurred vision, double vision, vision loss in left eye, vision loss in right eye, visual disturbance and visual halos.   Cardiovascular: Positive for dyspnea on exertion. Negative for chest pain, claudication, cyanosis, irregular heartbeat, leg swelling, near-syncope, orthopnea, palpitations, paroxysmal nocturnal dyspnea and syncope.   Respiratory: Positive for shortness of breath. Negative for cough, hemoptysis, sleep disturbances due to breathing, snoring, sputum production and wheezing.    Endocrine: Negative for cold intolerance, heat intolerance, polydipsia, polyphagia and polyuria.   Hematologic/Lymphatic: Negative for adenopathy and bleeding problem. Does not bruise/bleed easily.   Skin: Negative for color change, dry skin, flushing, itching, nail changes, poor wound healing, rash, skin cancer, suspicious lesions and unusual hair distribution.   Musculoskeletal: Negative for arthritis, back pain, falls, gout, joint pain, joint swelling, muscle cramps, muscle weakness, myalgias and stiffness.   Gastrointestinal: Negative for abdominal pain, anorexia, change in bowel habit, constipation, diarrhea, dysphagia, heartburn, hematemesis, hematochezia, melena and vomiting.   Genitourinary: Negative for decreased libido, dysuria, hematuria, hesitancy and urgency.   Neurological: Negative for excessive daytime sleepiness, dizziness, focal weakness, headaches, light-headedness, loss  "of balance, numbness, paresthesias, seizures, sensory change, tremors, vertigo and weakness.   Psychiatric/Behavioral: Negative for altered mental status, depression, hallucinations, memory loss, substance abuse and suicidal ideas. The patient does not have insomnia and is not nervous/anxious.    Allergic/Immunologic: Negative for environmental allergies and hives.       Objective: /65 (BP Location: Right arm, Patient Position: Sitting, BP Method: Medium (Automatic))   Pulse (!) 56   Ht 6' 2" (1.88 m)   Wt (!) 138 kg (304 lb 3.8 oz)   BMI 39.06 kg/m²      Physical Exam  Constitutional:       General: He is not in acute distress.     Appearance: He is well-developed. He is not diaphoretic.   HENT:      Head: Normocephalic.   Eyes:      Pupils: Pupils are equal, round, and reactive to light.   Neck:      Thyroid: No thyromegaly.   Cardiovascular:      Rate and Rhythm: Normal rate and regular rhythm.      Pulses: Intact distal pulses.           Carotid pulses are 3+ on the right side and 3+ on the left side.       Radial pulses are 3+ on the right side and 3+ on the left side.        Femoral pulses are 3+ on the right side and 3+ on the left side.       Popliteal pulses are 3+ on the right side and 3+ on the left side.        Dorsalis pedis pulses are 3+ on the right side and 3+ on the left side.        Posterior tibial pulses are 3+ on the right side and 3+ on the left side.      Heart sounds: Normal heart sounds. No murmur heard.    No friction rub. No gallop.   Pulmonary:      Effort: Pulmonary effort is normal. No respiratory distress.      Breath sounds: Normal breath sounds. No wheezing or rales.   Chest:      Chest wall: No tenderness.   Abdominal:      General: There is no distension.      Palpations: Abdomen is soft. There is no mass.      Tenderness: There is no abdominal tenderness.   Musculoskeletal:         General: Normal range of motion.      Cervical back: Normal range of motion. "   Lymphadenopathy:      Cervical: No cervical adenopathy.   Skin:     General: Skin is warm.      Nails: There is no clubbing.   Neurological:      Mental Status: He is alert and oriented to person, place, and time.   Psychiatric:         Speech: Speech normal.         Behavior: Behavior normal.         Thought Content: Thought content normal.         Judgment: Judgment normal.         Assessment:     1. Essential hypertension    2. Tobacco abuse    3. Type 2 diabetes mellitus with stage 4 chronic kidney disease, with long-term current use of insulin    4. Obesity, Class III, BMI 40-49.9 (morbid obesity)    5. PAC (premature atrial contraction)    6. RBBB    7. Stage 5 chronic kidney disease not on chronic dialysis    8. Type 2 diabetes mellitus with hyperglycemia, without long-term current use of insulin    9. Dyslipidemia    10. Diastolic dysfunction    11. Anemia in stage 3a chronic kidney disease    12. Dependence on renal dialysis    13. End stage renal failure on dialysis        Plan:   Discussed diet , achieving and maintaining ideal body weight, and exercise.   We reviewed meds in detail.  Reassured-Discussed goals, options, plan.  Omega-3 > 800 mg/d combined EPA/DHA.  Goal BP< 130/80.  Goal LDL < 70.  Home and Dialysis BPs    Vinnie was seen today for essential hypertension .    Diagnoses and all orders for this visit:    Essential hypertension  -     Lipid Panel; Future; Expected date: 09/06/2023  -     Comprehensive Metabolic Panel; Future; Expected date: 09/06/2023  -     CBC Auto Differential; Future; Expected date: 09/06/2023    Tobacco abuse    Type 2 diabetes mellitus with stage 4 chronic kidney disease, with long-term current use of insulin  -     Comprehensive Metabolic Panel; Future; Expected date: 09/06/2023  -     TSH; Future; Expected date: 09/06/2023  -     Hemoglobin A1C; Future; Expected date: 09/06/2023    Obesity, Class III, BMI 40-49.9 (morbid obesity)  -     TSH; Future; Expected date:  09/06/2023  -     BNP; Future; Expected date: 09/06/2023    PAC (premature atrial contraction)    RBBB    Stage 5 chronic kidney disease not on chronic dialysis    Type 2 diabetes mellitus with hyperglycemia, without long-term current use of insulin  -     Hemoglobin A1C; Future; Expected date: 09/06/2023    Dyslipidemia  -     Lipid Panel; Future; Expected date: 09/06/2023  -     Comprehensive Metabolic Panel; Future; Expected date: 09/06/2023  -     TSH; Future; Expected date: 09/06/2023    Diastolic dysfunction  -     BNP; Future; Expected date: 09/06/2023    Anemia in stage 3a chronic kidney disease    Dependence on renal dialysis    End stage renal failure on dialysis  -     CBC Auto Differential; Future; Expected date: 09/06/2023  -     BNP; Future; Expected date: 09/06/2023            Follow up in about 15 months (around 10/6/2023) for with ECG AND LABS.

## 2022-07-06 ENCOUNTER — OFFICE VISIT (OUTPATIENT)
Dept: CARDIOLOGY | Facility: CLINIC | Age: 77
End: 2022-07-06
Payer: MEDICARE

## 2022-07-06 ENCOUNTER — HOSPITAL ENCOUNTER (OUTPATIENT)
Dept: CARDIOLOGY | Facility: HOSPITAL | Age: 77
Discharge: HOME | End: 2022-07-06
Attending: INTERNAL MEDICINE
Payer: MEDICARE

## 2022-07-06 ENCOUNTER — HOSPITAL ENCOUNTER (OUTPATIENT)
Dept: CARDIOLOGY | Facility: CLINIC | Age: 77
Discharge: HOME | End: 2022-07-06
Payer: MEDICARE

## 2022-07-06 VITALS
DIASTOLIC BLOOD PRESSURE: 65 MMHG | HEIGHT: 74 IN | BODY MASS INDEX: 39.05 KG/M2 | SYSTOLIC BLOOD PRESSURE: 137 MMHG | HEART RATE: 56 BPM | WEIGHT: 304.25 LBS

## 2022-07-06 VITALS — DIASTOLIC BLOOD PRESSURE: 68 MMHG | HEART RATE: 64 BPM | SYSTOLIC BLOOD PRESSURE: 138 MMHG

## 2022-07-06 DIAGNOSIS — I51.89 DIASTOLIC DYSFUNCTION: ICD-10-CM

## 2022-07-06 DIAGNOSIS — E66.01 OBESITY, CLASS III, BMI 40-49.9 (MORBID OBESITY): ICD-10-CM

## 2022-07-06 DIAGNOSIS — D63.1 ANEMIA IN STAGE 3A CHRONIC KIDNEY DISEASE: ICD-10-CM

## 2022-07-06 DIAGNOSIS — N18.5 STAGE 5 CHRONIC KIDNEY DISEASE NOT ON CHRONIC DIALYSIS: ICD-10-CM

## 2022-07-06 DIAGNOSIS — N18.6 END STAGE RENAL FAILURE ON DIALYSIS: ICD-10-CM

## 2022-07-06 DIAGNOSIS — I10 ESSENTIAL HYPERTENSION: Primary | Chronic | ICD-10-CM

## 2022-07-06 DIAGNOSIS — E11.65 TYPE 2 DIABETES MELLITUS WITH HYPERGLYCEMIA, WITHOUT LONG-TERM CURRENT USE OF INSULIN: ICD-10-CM

## 2022-07-06 DIAGNOSIS — Z99.2 END STAGE RENAL FAILURE ON DIALYSIS: ICD-10-CM

## 2022-07-06 DIAGNOSIS — I45.10 RBBB: ICD-10-CM

## 2022-07-06 DIAGNOSIS — I49.1 PAC (PREMATURE ATRIAL CONTRACTION): ICD-10-CM

## 2022-07-06 DIAGNOSIS — I10 ESSENTIAL HYPERTENSION: Chronic | ICD-10-CM

## 2022-07-06 DIAGNOSIS — N18.4 TYPE 2 DIABETES MELLITUS WITH STAGE 4 CHRONIC KIDNEY DISEASE, WITH LONG-TERM CURRENT USE OF INSULIN: Chronic | ICD-10-CM

## 2022-07-06 DIAGNOSIS — E66.01 OBESITY, CLASS III, BMI 40-49.9 (MORBID OBESITY): Chronic | ICD-10-CM

## 2022-07-06 DIAGNOSIS — I10 ESSENTIAL HYPERTENSION: ICD-10-CM

## 2022-07-06 DIAGNOSIS — E78.5 DYSLIPIDEMIA: ICD-10-CM

## 2022-07-06 DIAGNOSIS — Z99.2 DEPENDENCE ON RENAL DIALYSIS: ICD-10-CM

## 2022-07-06 DIAGNOSIS — R06.02 SHORTNESS OF BREATH ON EXERTION: ICD-10-CM

## 2022-07-06 DIAGNOSIS — E11.22 TYPE 2 DIABETES MELLITUS WITH STAGE 4 CHRONIC KIDNEY DISEASE, WITH LONG-TERM CURRENT USE OF INSULIN: Chronic | ICD-10-CM

## 2022-07-06 DIAGNOSIS — Z79.4 TYPE 2 DIABETES MELLITUS WITH STAGE 4 CHRONIC KIDNEY DISEASE, WITH LONG-TERM CURRENT USE OF INSULIN: Chronic | ICD-10-CM

## 2022-07-06 DIAGNOSIS — Z72.0 TOBACCO ABUSE: Chronic | ICD-10-CM

## 2022-07-06 DIAGNOSIS — N18.31 ANEMIA IN STAGE 3A CHRONIC KIDNEY DISEASE: ICD-10-CM

## 2022-07-06 LAB
ASCENDING AORTA: 3.19 CM
AV INDEX (PROSTH): 0.64
AV MEAN GRADIENT: 7 MMHG
AV PEAK GRADIENT: 13 MMHG
AV VALVE AREA: 2.45 CM2
AV VELOCITY RATIO: 0.54
CV ECHO LV RWT: 0.42 CM
DOP CALC AO PEAK VEL: 1.81 M/S
DOP CALC AO VTI: 35.75 CM
DOP CALC LVOT AREA: 3.8 CM2
DOP CALC LVOT DIAMETER: 2.21 CM
DOP CALC LVOT PEAK VEL: 0.97 M/S
DOP CALC LVOT STROKE VOLUME: 87.45 CM3
DOP CALCLVOT PEAK VEL VTI: 22.81 CM
E WAVE DECELERATION TIME: 242.43 MSEC
E/A RATIO: 0.73
E/E' RATIO: 6.67 M/S
ECHO LV POSTERIOR WALL: 1.07 CM (ref 0.6–1.1)
EJECTION FRACTION: 60 %
FRACTIONAL SHORTENING: 38 % (ref 28–44)
INTERVENTRICULAR SEPTUM: 1.14 CM (ref 0.6–1.1)
LA MAJOR: 5.19 CM
LA MINOR: 5.31 CM
LA WIDTH: 3.71 CM
LEFT ATRIUM SIZE: 3.68 CM
LEFT ATRIUM VOLUME MOD: 48.88 CM3
LEFT ATRIUM VOLUME: 60.92 CM3
LEFT INTERNAL DIMENSION IN SYSTOLE: 3.16 CM (ref 2.1–4)
LEFT VENTRICLE DIASTOLIC VOLUME: 121.61 ML
LEFT VENTRICLE SYSTOLIC VOLUME: 39.81 ML
LEFT VENTRICULAR INTERNAL DIMENSION IN DIASTOLE: 5.06 CM (ref 3.5–6)
LEFT VENTRICULAR MASS: 212.5 G
LV LATERAL E/E' RATIO: 6.67 M/S
LV SEPTAL E/E' RATIO: 6.67 M/S
MV A" WAVE DURATION": 12.84 MSEC
MV PEAK A VEL: 0.82 M/S
MV PEAK E VEL: 0.6 M/S
MV STENOSIS PRESSURE HALF TIME: 70.3 MS
MV VALVE AREA P 1/2 METHOD: 3.13 CM2
PISA TR MAX VEL: 1.89 M/S
PULM VEIN S/D RATIO: 0.89
PV PEAK D VEL: 0.47 M/S
PV PEAK S VEL: 0.42 M/S
RA MAJOR: 4.61 CM
RA PRESSURE: 3 MMHG
RA WIDTH: 3.71 CM
RIGHT VENTRICULAR END-DIASTOLIC DIMENSION: 3.67 CM
RV TISSUE DOPPLER FREE WALL SYSTOLIC VELOCITY 1 (APICAL 4 CHAMBER VIEW): 17.07 CM/S
SINUS: 3 CM
STJ: 3.16 CM
TDI LATERAL: 0.09 M/S
TDI SEPTAL: 0.09 M/S
TDI: 0.09 M/S
TR MAX PG: 14 MMHG
TRICUSPID ANNULAR PLANE SYSTOLIC EXCURSION: 2.25 CM
TV REST PULMONARY ARTERY PRESSURE: 17 MMHG

## 2022-07-06 PROCEDURE — 99215 OFFICE O/P EST HI 40 MIN: CPT | Mod: PBBFAC,25 | Performed by: INTERNAL MEDICINE

## 2022-07-06 PROCEDURE — 93306 TTE W/DOPPLER COMPLETE: CPT

## 2022-07-06 PROCEDURE — 99999 PR PBB SHADOW E&M-EST. PATIENT-LVL V: CPT | Mod: PBBFAC,,, | Performed by: INTERNAL MEDICINE

## 2022-07-06 PROCEDURE — 93010 ELECTROCARDIOGRAM REPORT: CPT | Mod: S$PBB,,, | Performed by: INTERNAL MEDICINE

## 2022-07-06 PROCEDURE — 99215 OFFICE O/P EST HI 40 MIN: CPT | Mod: S$PBB,,, | Performed by: INTERNAL MEDICINE

## 2022-07-06 PROCEDURE — 99215 PR OFFICE/OUTPT VISIT, EST, LEVL V, 40-54 MIN: ICD-10-PCS | Mod: S$PBB,,, | Performed by: INTERNAL MEDICINE

## 2022-07-06 PROCEDURE — 93306 ECHO (CUPID ONLY): ICD-10-PCS | Mod: 26,,, | Performed by: INTERNAL MEDICINE

## 2022-07-06 PROCEDURE — 99999 PR PBB SHADOW E&M-EST. PATIENT-LVL V: ICD-10-PCS | Mod: PBBFAC,,, | Performed by: INTERNAL MEDICINE

## 2022-07-06 PROCEDURE — 93005 ELECTROCARDIOGRAM TRACING: CPT | Mod: PBBFAC | Performed by: INTERNAL MEDICINE

## 2022-07-06 PROCEDURE — 93306 TTE W/DOPPLER COMPLETE: CPT | Mod: 26,,, | Performed by: INTERNAL MEDICINE

## 2022-07-06 PROCEDURE — 93010 EKG 12-LEAD: ICD-10-PCS | Mod: S$PBB,,, | Performed by: INTERNAL MEDICINE

## 2022-07-06 NOTE — PATIENT INSTRUCTIONS
Discussed diet , achieving and maintaining ideal body weight, and exercise.   We reviewed meds in detail.  Reassured-Discussed goals, options, plan.  Omega-3 > 800 mg/d combined EPA/DHA.  Goal BP< 130/80.  Goal LDL < 70.  Home and Dialysis BPs

## 2022-07-21 ENCOUNTER — PATIENT OUTREACH (OUTPATIENT)
Dept: ADMINISTRATIVE | Facility: HOSPITAL | Age: 77
End: 2022-07-21
Payer: MEDICARE

## 2022-08-10 ENCOUNTER — PATIENT OUTREACH (OUTPATIENT)
Dept: INTERNAL MEDICINE | Facility: CLINIC | Age: 77
End: 2022-08-10
Payer: COMMERCIAL

## 2022-08-14 ENCOUNTER — NURSE TRIAGE (OUTPATIENT)
Dept: ADMINISTRATIVE | Facility: CLINIC | Age: 77
End: 2022-08-14
Payer: COMMERCIAL

## 2022-08-14 NOTE — TELEPHONE ENCOUNTER
Trying to get hydralazine refilled, prescribed by Dr. Ricardo initially but he retired. Takes 100mg TID. Has enough for today and tomorrow but needs refill sent to [facility] #[id number]. Please call the pt first thing in the morning, would like rx filled first thing in the morning.     hydrALAZINE (APRESOLINE) 100 MG tablet () 100 mg, 3 times daily       Reason for Disposition   [1] Caller has NON-URGENT medicine question about med that PCP prescribed AND [2] triager unable to answer question    Protocols used: MEDICATION REFILL AND RENEWAL CALL-A-AH

## 2022-08-24 ENCOUNTER — OFFICE VISIT (OUTPATIENT)
Dept: PRIMARY CARE CLINIC | Facility: CLINIC | Age: 77
End: 2022-08-24
Attending: FAMILY MEDICINE
Payer: MEDICARE

## 2022-08-24 VITALS
SYSTOLIC BLOOD PRESSURE: 124 MMHG | BODY MASS INDEX: 38.38 KG/M2 | DIASTOLIC BLOOD PRESSURE: 62 MMHG | HEART RATE: 90 BPM | WEIGHT: 299.06 LBS | OXYGEN SATURATION: 98 % | HEIGHT: 74 IN

## 2022-08-24 DIAGNOSIS — I42.0 DILATED CARDIOMYOPATHY: ICD-10-CM

## 2022-08-24 DIAGNOSIS — I74.9 EMBOLISM AND THROMBOSIS OF UNSPECIFIED ARTERY: ICD-10-CM

## 2022-08-24 DIAGNOSIS — E11.22 TYPE 2 DIABETES MELLITUS WITH STAGE 4 CHRONIC KIDNEY DISEASE, WITH LONG-TERM CURRENT USE OF INSULIN: Primary | Chronic | ICD-10-CM

## 2022-08-24 DIAGNOSIS — Z13.220 ENCOUNTER FOR LIPID SCREENING FOR CARDIOVASCULAR DISEASE: ICD-10-CM

## 2022-08-24 DIAGNOSIS — N18.4 TYPE 2 DIABETES MELLITUS WITH STAGE 4 CHRONIC KIDNEY DISEASE, WITH LONG-TERM CURRENT USE OF INSULIN: Primary | Chronic | ICD-10-CM

## 2022-08-24 DIAGNOSIS — E78.5 DYSLIPIDEMIA: ICD-10-CM

## 2022-08-24 DIAGNOSIS — R00.1 BRADYCARDIA: ICD-10-CM

## 2022-08-24 DIAGNOSIS — I10 ESSENTIAL HYPERTENSION: ICD-10-CM

## 2022-08-24 DIAGNOSIS — N18.5 STAGE 5 CHRONIC KIDNEY DISEASE NOT ON CHRONIC DIALYSIS: ICD-10-CM

## 2022-08-24 DIAGNOSIS — Z99.2 END STAGE RENAL FAILURE ON DIALYSIS: ICD-10-CM

## 2022-08-24 DIAGNOSIS — E11.65 TYPE 2 DIABETES MELLITUS WITH HYPERGLYCEMIA, WITHOUT LONG-TERM CURRENT USE OF INSULIN: ICD-10-CM

## 2022-08-24 DIAGNOSIS — E78.5 HYPERLIPIDEMIA, UNSPECIFIED HYPERLIPIDEMIA TYPE: ICD-10-CM

## 2022-08-24 DIAGNOSIS — Z79.4 TYPE 2 DIABETES MELLITUS WITH STAGE 4 CHRONIC KIDNEY DISEASE, WITH LONG-TERM CURRENT USE OF INSULIN: Primary | Chronic | ICD-10-CM

## 2022-08-24 DIAGNOSIS — Z13.6 ENCOUNTER FOR LIPID SCREENING FOR CARDIOVASCULAR DISEASE: ICD-10-CM

## 2022-08-24 DIAGNOSIS — Z12.5 SCREENING PSA (PROSTATE SPECIFIC ANTIGEN): ICD-10-CM

## 2022-08-24 DIAGNOSIS — T82.858A STENOSIS OF OTHER VASCULAR PROSTHETIC DEVICES, IMPLANTS AND GRAFTS, INITIAL ENCOUNTER: ICD-10-CM

## 2022-08-24 DIAGNOSIS — N18.6 END STAGE RENAL FAILURE ON DIALYSIS: ICD-10-CM

## 2022-08-24 PROBLEM — Z72.0 TOBACCO ABUSE: Chronic | Status: RESOLVED | Noted: 2017-02-06 | Resolved: 2022-08-24

## 2022-08-24 PROCEDURE — 99999 PR PBB SHADOW E&M-EST. PATIENT-LVL IV: CPT | Mod: PBBFAC,,, | Performed by: FAMILY MEDICINE

## 2022-08-24 PROCEDURE — 99215 OFFICE O/P EST HI 40 MIN: CPT | Mod: S$PBB,,, | Performed by: FAMILY MEDICINE

## 2022-08-24 PROCEDURE — 99214 OFFICE O/P EST MOD 30 MIN: CPT | Mod: PBBFAC,PN | Performed by: FAMILY MEDICINE

## 2022-08-24 PROCEDURE — 99999 PR PBB SHADOW E&M-EST. PATIENT-LVL IV: ICD-10-PCS | Mod: PBBFAC,,, | Performed by: FAMILY MEDICINE

## 2022-08-24 PROCEDURE — 99215 PR OFFICE/OUTPT VISIT, EST, LEVL V, 40-54 MIN: ICD-10-PCS | Mod: S$PBB,,, | Performed by: FAMILY MEDICINE

## 2022-08-24 RX ORDER — TORSEMIDE 20 MG/1
20 TABLET ORAL 2 TIMES DAILY
Qty: 180 TABLET | Refills: 3 | Status: SHIPPED | OUTPATIENT
Start: 2022-08-24 | End: 2024-02-27

## 2022-08-24 RX ORDER — HYDROCODONE BITARTRATE AND ACETAMINOPHEN 7.5; 325 MG/1; MG/1
1 TABLET ORAL 3 TIMES DAILY PRN
COMMUNITY
Start: 2022-08-22 | End: 2023-08-09 | Stop reason: CLARIF

## 2022-08-24 RX ORDER — NIFEDIPINE 90 MG/1
90 TABLET, EXTENDED RELEASE ORAL DAILY
Qty: 90 TABLET | Refills: 3 | Status: SHIPPED | OUTPATIENT
Start: 2022-08-24

## 2022-08-24 RX ORDER — ATORVASTATIN CALCIUM 40 MG/1
40 TABLET, FILM COATED ORAL DAILY
Qty: 90 TABLET | Refills: 3 | Status: SHIPPED | OUTPATIENT
Start: 2022-08-24 | End: 2023-06-26

## 2022-08-24 RX ORDER — HYDRALAZINE HYDROCHLORIDE 100 MG/1
100 TABLET, FILM COATED ORAL 3 TIMES DAILY
Qty: 90 TABLET | Refills: 3 | Status: SHIPPED | OUTPATIENT
Start: 2022-08-24 | End: 2023-10-20 | Stop reason: SDUPTHER

## 2022-08-24 RX ORDER — METOPROLOL SUCCINATE 25 MG/1
12.5 TABLET, EXTENDED RELEASE ORAL DAILY
Qty: 45 TABLET | Refills: 3 | Status: SHIPPED | OUTPATIENT
Start: 2022-08-24 | End: 2023-10-16

## 2022-08-24 RX ORDER — CALCIUM ACETATE 667 MG/1
CAPSULE ORAL
COMMUNITY
Start: 2022-08-16

## 2022-08-24 NOTE — PROGRESS NOTES
Subjective:       Patient ID: Vinnie Siegel is a 77 y.o. male.    Chief Complaint: Annual Exam    Pt presents today for DM, HTN f/u and general medical wellness exam. Pt is on dialysis now 3 days per week. Per pt, he is trying his best to keep up with his DM. Feels that it is well controlled but aware that his glucose levels and a1c indicate otherwise. Pt also states that he needs to have meds refilled. Is followed by endo  States that he took his BP meds this AM. Being followed by cards as well. Is due to see his specialists    cscope last done 2011. Pt states that they will call him when ready for repeat. Overall he is at his baseline. Is not exercising. Trying to eat better. quit smoking. Does not want ct scan of chest at this time    Medication Refill  Pertinent negatives include no abdominal pain, arthralgias, fatigue, joint swelling, numbness, rash or weakness.   Hypertension  Pertinent negatives include no shortness of breath.   Diabetes  Pertinent negatives for hypoglycemia include no dizziness, nervousness/anxiousness or pallor. Pertinent negatives for diabetes include no fatigue and no weakness.     Review of Systems   Constitutional: Negative.  Negative for activity change, appetite change and fatigue.   HENT: Negative.    Eyes: Negative.    Respiratory: Negative.  Negative for chest tightness and shortness of breath.    Cardiovascular: Negative.    Gastrointestinal: Negative.  Negative for abdominal pain.   Endocrine: Negative.    Genitourinary: Negative.  Negative for difficulty urinating, dysuria, frequency and urgency.   Musculoskeletal: Negative.  Negative for arthralgias, gait problem and joint swelling.   Skin: Negative.  Negative for color change, pallor and rash.   Allergic/Immunologic: Negative.    Neurological: Negative for dizziness, weakness, light-headedness and numbness.   Hematological: Negative.    Psychiatric/Behavioral: Negative.  Negative for decreased concentration, dysphoric mood,  self-injury, sleep disturbance and suicidal ideas. The patient is not nervous/anxious.    All other systems reviewed and are negative.      Objective:      Physical Exam  Vitals reviewed.   Constitutional:       General: He is not in acute distress.     Appearance: He is well-developed. He is obese. He is not ill-appearing, toxic-appearing or diaphoretic.   HENT:      Head: Normocephalic and atraumatic.      Right Ear: Tympanic membrane, ear canal and external ear normal.      Left Ear: Tympanic membrane, ear canal and external ear normal.      Mouth/Throat:      Pharynx: No oropharyngeal exudate or posterior oropharyngeal erythema.   Eyes:      General: No scleral icterus.        Right eye: No discharge.         Left eye: No discharge.      Extraocular Movements: Extraocular movements intact.      Conjunctiva/sclera: Conjunctivae normal.      Pupils: Pupils are equal, round, and reactive to light.   Neck:      Thyroid: No thyromegaly.      Vascular: No JVD.   Cardiovascular:      Rate and Rhythm: Normal rate and regular rhythm.      Heart sounds: Normal heart sounds. No murmur heard.  Pulmonary:      Effort: Pulmonary effort is normal. No respiratory distress.      Breath sounds: Normal breath sounds. No wheezing or rales.   Chest:      Chest wall: No tenderness.   Abdominal:      General: Bowel sounds are normal. There is no distension.      Palpations: Abdomen is soft. There is no mass.      Tenderness: There is no abdominal tenderness. There is no guarding or rebound.   Musculoskeletal:         General: No tenderness. Normal range of motion.      Cervical back: Normal range of motion and neck supple.      Right lower leg: Edema present.      Left lower leg: Edema present.   Lymphadenopathy:      Cervical: No cervical adenopathy.   Skin:     General: Skin is warm and dry.      Coloration: Skin is not pale.      Findings: No erythema.   Neurological:      Mental Status: He is alert and oriented to person, place,  and time. Mental status is at baseline.      Cranial Nerves: No cranial nerve deficit.      Sensory: No sensory deficit.      Motor: No weakness or abnormal muscle tone.      Coordination: Coordination normal.      Gait: Gait normal.      Deep Tendon Reflexes: Reflexes are normal and symmetric. Reflexes normal.   Psychiatric:         Behavior: Behavior normal.         Thought Content: Thought content normal.         Judgment: Judgment normal.         Assessment:       1. Screening PSA (prostate specific antigen)    2. Essential hypertension    3. Dilated cardiomyopathy    4. Hyperlipidemia, unspecified hyperlipidemia type    5. Encounter for lipid screening for cardiovascular disease    6. Type 2 diabetes mellitus with hyperglycemia, without long-term current use of insulin    7. Dyslipidemia    8. End stage renal failure on dialysis    9. Bradycardia        Plan:       HTN: controlled.   DM: followed by endocrine, uncontrolled  Screening PSA (prostate specific antigen)    Essential hypertension  -     atorvastatin (LIPITOR) 40 MG tablet; Take 1 tablet (40 mg total) by mouth once daily.  Dispense: 90 tablet; Refill: 3  -     hydrALAZINE (APRESOLINE) 100 MG tablet; Take 1 tablet (100 mg total) by mouth 3 (three) times daily.  Dispense: 90 tablet; Refill: 3  -     NIFEdipine (PROCARDIA-XL) 90 MG (OSM) 24 hr tablet; Take 1 tablet (90 mg total) by mouth once daily.  Dispense: 90 tablet; Refill: 3  -     metoprolol succinate (TOPROL-XL) 25 MG 24 hr tablet; Take 0.5 tablets (12.5 mg total) by mouth once daily.  Dispense: 45 tablet; Refill: 3  -     torsemide (DEMADEX) 20 MG Tab; Take 1 tablet (20 mg total) by mouth 2 (two) times daily.  Dispense: 180 tablet; Refill: 3    Dilated cardiomyopathy  -     atorvastatin (LIPITOR) 40 MG tablet; Take 1 tablet (40 mg total) by mouth once daily.  Dispense: 90 tablet; Refill: 3    Hyperlipidemia, unspecified hyperlipidemia type  -     atorvastatin (LIPITOR) 40 MG tablet; Take 1  tablet (40 mg total) by mouth once daily.  Dispense: 90 tablet; Refill: 3    Encounter for lipid screening for cardiovascular disease    Type 2 diabetes mellitus with hyperglycemia, without long-term current use of insulin    Dyslipidemia    End stage renal failure on dialysis    Bradycardia    f/u with specialists. Pt doing well overall, can do LDCT scan later since pt declines at this time  RTC prn symptoms or 6 mos

## 2022-09-24 ENCOUNTER — HOSPITAL ENCOUNTER (OUTPATIENT)
Facility: HOSPITAL | Age: 77
Discharge: HOME | End: 2022-09-30
Attending: EMERGENCY MEDICINE | Admitting: STUDENT IN AN ORGANIZED HEALTH CARE EDUCATION/TRAINING PROGRAM
Payer: MEDICARE

## 2022-09-24 DIAGNOSIS — Z99.2 TYPE 2 DIABETES MELLITUS WITH CHRONIC KIDNEY DISEASE ON CHRONIC DIALYSIS, WITH LONG-TERM CURRENT USE OF INSULIN: ICD-10-CM

## 2022-09-24 DIAGNOSIS — T82.898A AV FISTULA OCCLUSION, INITIAL ENCOUNTER: ICD-10-CM

## 2022-09-24 DIAGNOSIS — N18.6 ESRD (END STAGE RENAL DISEASE): Primary | ICD-10-CM

## 2022-09-24 DIAGNOSIS — E11.22 TYPE 2 DIABETES MELLITUS WITH CHRONIC KIDNEY DISEASE ON CHRONIC DIALYSIS, WITH LONG-TERM CURRENT USE OF INSULIN: ICD-10-CM

## 2022-09-24 DIAGNOSIS — N18.6 ANEMIA IN ESRD (END-STAGE RENAL DISEASE): ICD-10-CM

## 2022-09-24 DIAGNOSIS — E87.5 HYPERKALEMIA: ICD-10-CM

## 2022-09-24 DIAGNOSIS — N18.6 ESRD ON HEMODIALYSIS: ICD-10-CM

## 2022-09-24 DIAGNOSIS — R07.9 CHEST PAIN: ICD-10-CM

## 2022-09-24 DIAGNOSIS — Z79.4 TYPE 2 DIABETES MELLITUS WITH CHRONIC KIDNEY DISEASE ON CHRONIC DIALYSIS, WITH LONG-TERM CURRENT USE OF INSULIN: ICD-10-CM

## 2022-09-24 DIAGNOSIS — D63.1 ANEMIA IN ESRD (END-STAGE RENAL DISEASE): ICD-10-CM

## 2022-09-24 DIAGNOSIS — N18.6 TYPE 2 DIABETES MELLITUS WITH CHRONIC KIDNEY DISEASE ON CHRONIC DIALYSIS, WITH LONG-TERM CURRENT USE OF INSULIN: ICD-10-CM

## 2022-09-24 DIAGNOSIS — T82.590A DIALYSIS AV FISTULA MALFUNCTION, INITIAL ENCOUNTER: ICD-10-CM

## 2022-09-24 DIAGNOSIS — T82.590A MALFUNCTION OF ARTERIOVENOUS DIALYSIS FISTULA, INITIAL ENCOUNTER: ICD-10-CM

## 2022-09-24 DIAGNOSIS — Z99.2 ESRD ON HEMODIALYSIS: ICD-10-CM

## 2022-09-24 DIAGNOSIS — T82.858A AV FISTULA STENOSIS, INITIAL ENCOUNTER: ICD-10-CM

## 2022-09-24 DIAGNOSIS — T82.898S AV FISTULA OCCLUSION, SEQUELA: ICD-10-CM

## 2022-09-24 PROBLEM — H40.1134 PRIMARY OPEN ANGLE GLAUCOMA (POAG) OF BOTH EYES, INDETERMINATE STAGE: Status: ACTIVE | Noted: 2022-09-24

## 2022-09-24 PROBLEM — N40.0 BENIGN PROSTATIC HYPERPLASIA WITHOUT LOWER URINARY TRACT SYMPTOMS: Status: ACTIVE | Noted: 2022-09-24

## 2022-09-24 PROBLEM — E11.69 MIXED DIABETIC HYPERLIPIDEMIA ASSOCIATED WITH TYPE 2 DIABETES MELLITUS: Status: ACTIVE | Noted: 2022-09-24

## 2022-09-24 PROBLEM — E78.2 MIXED DIABETIC HYPERLIPIDEMIA ASSOCIATED WITH TYPE 2 DIABETES MELLITUS: Status: ACTIVE | Noted: 2022-09-24

## 2022-09-24 PROBLEM — K21.9 GASTROESOPHAGEAL REFLUX DISEASE WITHOUT ESOPHAGITIS: Status: ACTIVE | Noted: 2022-09-24

## 2022-09-24 PROBLEM — N18.5 HYPERTENSION ASSOCIATED WITH STAGE 5 CHRONIC KIDNEY DISEASE DUE TO TYPE 2 DIABETES MELLITUS: Status: ACTIVE | Noted: 2017-03-30

## 2022-09-24 PROBLEM — I12.0 HYPERTENSION ASSOCIATED WITH STAGE 5 CHRONIC KIDNEY DISEASE DUE TO TYPE 2 DIABETES MELLITUS: Status: ACTIVE | Noted: 2017-03-30

## 2022-09-24 PROBLEM — E66.812 CLASS 2 SEVERE OBESITY DUE TO EXCESS CALORIES WITH SERIOUS COMORBIDITY AND BODY MASS INDEX (BMI) OF 38.0 TO 38.9 IN ADULT: Status: ACTIVE | Noted: 2018-10-09

## 2022-09-24 PROBLEM — E11.59 HYPERTENSION ASSOCIATED WITH TYPE 2 DIABETES MELLITUS: Status: ACTIVE | Noted: 2017-03-30

## 2022-09-24 PROBLEM — I15.2 HYPERTENSION ASSOCIATED WITH TYPE 2 DIABETES MELLITUS: Status: ACTIVE | Noted: 2017-03-30

## 2022-09-24 LAB
ANION GAP SERPL CALC-SCNC: 14 MMOL/L (ref 8–16)
BASOPHILS # BLD AUTO: 0.04 K/UL (ref 0–0.2)
BASOPHILS NFR BLD: 0.5 % (ref 0–1.9)
BUN SERPL-MCNC: 44 MG/DL (ref 8–23)
BUN SERPL-MCNC: 63 MG/DL (ref 6–30)
CALCIUM SERPL-MCNC: 8.7 MG/DL (ref 8.7–10.5)
CHLORIDE SERPL-SCNC: 96 MMOL/L (ref 95–110)
CHLORIDE SERPL-SCNC: 97 MMOL/L (ref 95–110)
CO2 SERPL-SCNC: 23 MMOL/L (ref 23–29)
CREAT SERPL-MCNC: 7.1 MG/DL (ref 0.5–1.4)
CREAT SERPL-MCNC: 7.5 MG/DL (ref 0.5–1.4)
DIFFERENTIAL METHOD: ABNORMAL
EOSINOPHIL # BLD AUTO: 0.1 K/UL (ref 0–0.5)
EOSINOPHIL NFR BLD: 1.6 % (ref 0–8)
ERYTHROCYTE [DISTWIDTH] IN BLOOD BY AUTOMATED COUNT: 15.3 % (ref 11.5–14.5)
EST. GFR  (NO RACE VARIABLE): 7.4 ML/MIN/1.73 M^2
GLUCOSE SERPL-MCNC: 270 MG/DL (ref 70–110)
GLUCOSE SERPL-MCNC: 316 MG/DL (ref 70–110)
HCT VFR BLD AUTO: 36.4 % (ref 40–54)
HCT VFR BLD CALC: 33 %PCV (ref 36–54)
HCV AB SERPL QL IA: NORMAL
HGB BLD-MCNC: 11.9 G/DL (ref 14–18)
HIV 1+2 AB+HIV1 P24 AG SERPL QL IA: NORMAL
IMM GRANULOCYTES # BLD AUTO: 0.04 K/UL (ref 0–0.04)
IMM GRANULOCYTES NFR BLD AUTO: 0.5 % (ref 0–0.5)
LYMPHOCYTES # BLD AUTO: 2 K/UL (ref 1–4.8)
LYMPHOCYTES NFR BLD: 26.6 % (ref 18–48)
MAGNESIUM SERPL-MCNC: 1.7 MG/DL (ref 1.6–2.6)
MCH RBC QN AUTO: 31.7 PG (ref 27–31)
MCHC RBC AUTO-ENTMCNC: 32.7 G/DL (ref 32–36)
MCV RBC AUTO: 97 FL (ref 82–98)
MONOCYTES # BLD AUTO: 0.6 K/UL (ref 0.3–1)
MONOCYTES NFR BLD: 8.4 % (ref 4–15)
NEUTROPHILS # BLD AUTO: 4.6 K/UL (ref 1.8–7.7)
NEUTROPHILS NFR BLD: 62.4 % (ref 38–73)
NRBC BLD-RTO: 0 /100 WBC
PHOSPHATE SERPL-MCNC: 3.2 MG/DL (ref 2.7–4.5)
PLATELET # BLD AUTO: 208 K/UL (ref 150–450)
PMV BLD AUTO: 10.3 FL (ref 9.2–12.9)
POC IONIZED CALCIUM: 0.94 MMOL/L (ref 1.06–1.42)
POC TCO2 (MEASURED): 28 MMOL/L (ref 23–29)
POCT GLUCOSE: 226 MG/DL (ref 70–110)
POCT GLUCOSE: 270 MG/DL (ref 70–110)
POTASSIUM BLD-SCNC: 6 MMOL/L (ref 3.5–5.1)
POTASSIUM SERPL-SCNC: 4.5 MMOL/L (ref 3.5–5.1)
RBC # BLD AUTO: 3.75 M/UL (ref 4.6–6.2)
SAMPLE: ABNORMAL
SODIUM BLD-SCNC: 133 MMOL/L (ref 136–145)
SODIUM SERPL-SCNC: 134 MMOL/L (ref 136–145)
WBC # BLD AUTO: 7.34 K/UL (ref 3.9–12.7)

## 2022-09-24 PROCEDURE — 84100 ASSAY OF PHOSPHORUS: CPT | Performed by: PHYSICIAN ASSISTANT

## 2022-09-24 PROCEDURE — G0378 HOSPITAL OBSERVATION PER HR: HCPCS

## 2022-09-24 PROCEDURE — 80048 BASIC METABOLIC PNL TOTAL CA: CPT | Performed by: PHYSICIAN ASSISTANT

## 2022-09-24 PROCEDURE — G0257 UNSCHED DIALYSIS ESRD PT HOS: HCPCS

## 2022-09-24 PROCEDURE — 83735 ASSAY OF MAGNESIUM: CPT | Performed by: PHYSICIAN ASSISTANT

## 2022-09-24 PROCEDURE — 86803 HEPATITIS C AB TEST: CPT | Performed by: PHYSICIAN ASSISTANT

## 2022-09-24 PROCEDURE — 99283 PR EMERGENCY DEPT VISIT,LEVEL III: ICD-10-PCS | Mod: GC,,, | Performed by: SURGERY

## 2022-09-24 PROCEDURE — 99285 PR EMERGENCY DEPT VISIT,LEVEL V: ICD-10-PCS | Mod: ,,, | Performed by: PHYSICIAN ASSISTANT

## 2022-09-24 PROCEDURE — 99284 PR EMERGENCY DEPT VISIT,LEVEL IV: ICD-10-PCS | Mod: ,,, | Performed by: NURSE PRACTITIONER

## 2022-09-24 PROCEDURE — 85025 COMPLETE CBC W/AUTO DIFF WBC: CPT | Performed by: PHYSICIAN ASSISTANT

## 2022-09-24 PROCEDURE — 96372 THER/PROPH/DIAG INJ SC/IM: CPT | Performed by: STUDENT IN AN ORGANIZED HEALTH CARE EDUCATION/TRAINING PROGRAM

## 2022-09-24 PROCEDURE — A4216 STERILE WATER/SALINE, 10 ML: HCPCS | Performed by: STUDENT IN AN ORGANIZED HEALTH CARE EDUCATION/TRAINING PROGRAM

## 2022-09-24 PROCEDURE — 63600175 PHARM REV CODE 636 W HCPCS: Performed by: NURSE PRACTITIONER

## 2022-09-24 PROCEDURE — 87389 HIV-1 AG W/HIV-1&-2 AB AG IA: CPT | Performed by: PHYSICIAN ASSISTANT

## 2022-09-24 PROCEDURE — 99220 PR INITIAL OBSERVATION CARE,LEVL III: ICD-10-PCS | Mod: ,,, | Performed by: STUDENT IN AN ORGANIZED HEALTH CARE EDUCATION/TRAINING PROGRAM

## 2022-09-24 PROCEDURE — 25000003 PHARM REV CODE 250: Performed by: STUDENT IN AN ORGANIZED HEALTH CARE EDUCATION/TRAINING PROGRAM

## 2022-09-24 PROCEDURE — 99283 EMERGENCY DEPT VISIT LOW MDM: CPT | Mod: GC,,, | Performed by: SURGERY

## 2022-09-24 PROCEDURE — 99285 EMERGENCY DEPT VISIT HI MDM: CPT | Mod: ,,, | Performed by: PHYSICIAN ASSISTANT

## 2022-09-24 PROCEDURE — 99285 EMERGENCY DEPT VISIT HI MDM: CPT | Mod: 25

## 2022-09-24 PROCEDURE — 82962 GLUCOSE BLOOD TEST: CPT | Mod: 91

## 2022-09-24 PROCEDURE — 93005 ELECTROCARDIOGRAM TRACING: CPT

## 2022-09-24 PROCEDURE — 63600175 PHARM REV CODE 636 W HCPCS: Performed by: STUDENT IN AN ORGANIZED HEALTH CARE EDUCATION/TRAINING PROGRAM

## 2022-09-24 PROCEDURE — 99220 PR INITIAL OBSERVATION CARE,LEVL III: CPT | Mod: ,,, | Performed by: STUDENT IN AN ORGANIZED HEALTH CARE EDUCATION/TRAINING PROGRAM

## 2022-09-24 PROCEDURE — 80047 BASIC METABLC PNL IONIZED CA: CPT

## 2022-09-24 PROCEDURE — 99284 EMERGENCY DEPT VISIT MOD MDM: CPT | Mod: ,,, | Performed by: NURSE PRACTITIONER

## 2022-09-24 RX ORDER — ACETAMINOPHEN 325 MG/1
650 TABLET ORAL EVERY 4 HOURS PRN
Status: DISCONTINUED | OUTPATIENT
Start: 2022-09-24 | End: 2022-09-30 | Stop reason: HOSPADM

## 2022-09-24 RX ORDER — CALCIUM ACETATE 667 MG/1
667 CAPSULE ORAL
Status: DISCONTINUED | OUTPATIENT
Start: 2022-09-24 | End: 2022-09-30 | Stop reason: HOSPADM

## 2022-09-24 RX ORDER — HYDRALAZINE HYDROCHLORIDE 50 MG/1
100 TABLET, FILM COATED ORAL 3 TIMES DAILY
Status: DISCONTINUED | OUTPATIENT
Start: 2022-09-24 | End: 2022-09-30 | Stop reason: HOSPADM

## 2022-09-24 RX ORDER — ERGOCALCIFEROL 1.25 MG/1
50000 CAPSULE ORAL
Status: DISCONTINUED | OUTPATIENT
Start: 2022-09-25 | End: 2022-09-30 | Stop reason: HOSPADM

## 2022-09-24 RX ORDER — POLYETHYLENE GLYCOL 3350 17 G/17G
17 POWDER, FOR SOLUTION ORAL DAILY PRN
Status: DISCONTINUED | OUTPATIENT
Start: 2022-09-24 | End: 2022-09-30 | Stop reason: HOSPADM

## 2022-09-24 RX ORDER — NIFEDIPINE 30 MG/1
90 TABLET, EXTENDED RELEASE ORAL DAILY
Status: DISCONTINUED | OUTPATIENT
Start: 2022-09-25 | End: 2022-09-25

## 2022-09-24 RX ORDER — SIMETHICONE 80 MG
1 TABLET,CHEWABLE ORAL 4 TIMES DAILY PRN
Status: DISCONTINUED | OUTPATIENT
Start: 2022-09-24 | End: 2022-09-30 | Stop reason: HOSPADM

## 2022-09-24 RX ORDER — ONDANSETRON 2 MG/ML
4 INJECTION INTRAMUSCULAR; INTRAVENOUS EVERY 8 HOURS PRN
Status: DISCONTINUED | OUTPATIENT
Start: 2022-09-24 | End: 2022-09-30 | Stop reason: HOSPADM

## 2022-09-24 RX ORDER — GLUCAGON 1 MG
1 KIT INJECTION
Status: DISCONTINUED | OUTPATIENT
Start: 2022-09-24 | End: 2022-09-30 | Stop reason: HOSPADM

## 2022-09-24 RX ORDER — HEPARIN SODIUM 5000 [USP'U]/ML
5000 INJECTION, SOLUTION INTRAVENOUS; SUBCUTANEOUS EVERY 8 HOURS
Status: DISCONTINUED | OUTPATIENT
Start: 2022-09-24 | End: 2022-09-26

## 2022-09-24 RX ORDER — TALC
6 POWDER (GRAM) TOPICAL NIGHTLY PRN
Status: DISCONTINUED | OUTPATIENT
Start: 2022-09-24 | End: 2022-09-30 | Stop reason: HOSPADM

## 2022-09-24 RX ORDER — SODIUM CHLORIDE 0.9 % (FLUSH) 0.9 %
10 SYRINGE (ML) INJECTION EVERY 8 HOURS
Status: DISCONTINUED | OUTPATIENT
Start: 2022-09-24 | End: 2022-09-30 | Stop reason: HOSPADM

## 2022-09-24 RX ORDER — IPRATROPIUM BROMIDE AND ALBUTEROL SULFATE 2.5; .5 MG/3ML; MG/3ML
3 SOLUTION RESPIRATORY (INHALATION) EVERY 6 HOURS PRN
Status: DISCONTINUED | OUTPATIENT
Start: 2022-09-24 | End: 2022-09-30 | Stop reason: HOSPADM

## 2022-09-24 RX ORDER — TAMSULOSIN HYDROCHLORIDE 0.4 MG/1
1 CAPSULE ORAL EVERY MORNING
Status: DISCONTINUED | OUTPATIENT
Start: 2022-09-25 | End: 2022-09-30 | Stop reason: HOSPADM

## 2022-09-24 RX ORDER — PANTOPRAZOLE SODIUM 40 MG/1
40 TABLET, DELAYED RELEASE ORAL DAILY
Status: DISCONTINUED | OUTPATIENT
Start: 2022-09-25 | End: 2022-09-30 | Stop reason: HOSPADM

## 2022-09-24 RX ORDER — TIMOLOL MALEATE 2.5 MG/ML
1 SOLUTION/ DROPS OPHTHALMIC 2 TIMES DAILY
Status: DISCONTINUED | OUTPATIENT
Start: 2022-09-24 | End: 2022-09-30 | Stop reason: HOSPADM

## 2022-09-24 RX ORDER — MAG HYDROX/ALUMINUM HYD/SIMETH 200-200-20
30 SUSPENSION, ORAL (FINAL DOSE FORM) ORAL 4 TIMES DAILY PRN
Status: DISCONTINUED | OUTPATIENT
Start: 2022-09-24 | End: 2022-09-30 | Stop reason: HOSPADM

## 2022-09-24 RX ORDER — ATORVASTATIN CALCIUM 40 MG/1
40 TABLET, FILM COATED ORAL DAILY
Status: DISCONTINUED | OUTPATIENT
Start: 2022-09-25 | End: 2022-09-30 | Stop reason: HOSPADM

## 2022-09-24 RX ORDER — TORSEMIDE 20 MG/1
20 TABLET ORAL 2 TIMES DAILY
Status: DISCONTINUED | OUTPATIENT
Start: 2022-09-24 | End: 2022-09-30 | Stop reason: HOSPADM

## 2022-09-24 RX ORDER — LANOLIN ALCOHOL/MO/W.PET/CERES
1 CREAM (GRAM) TOPICAL DAILY
Refills: 1 | Status: DISCONTINUED | OUTPATIENT
Start: 2022-09-25 | End: 2022-09-30 | Stop reason: HOSPADM

## 2022-09-24 RX ORDER — PROCHLORPERAZINE EDISYLATE 5 MG/ML
5 INJECTION INTRAMUSCULAR; INTRAVENOUS EVERY 6 HOURS PRN
Status: DISCONTINUED | OUTPATIENT
Start: 2022-09-24 | End: 2022-09-30 | Stop reason: HOSPADM

## 2022-09-24 RX ORDER — INSULIN ASPART 100 [IU]/ML
1-10 INJECTION, SOLUTION INTRAVENOUS; SUBCUTANEOUS
Status: DISCONTINUED | OUTPATIENT
Start: 2022-09-24 | End: 2022-09-30 | Stop reason: HOSPADM

## 2022-09-24 RX ORDER — IBUPROFEN 200 MG
16 TABLET ORAL
Status: DISCONTINUED | OUTPATIENT
Start: 2022-09-24 | End: 2022-09-30 | Stop reason: HOSPADM

## 2022-09-24 RX ORDER — IBUPROFEN 200 MG
24 TABLET ORAL
Status: DISCONTINUED | OUTPATIENT
Start: 2022-09-24 | End: 2022-09-30 | Stop reason: HOSPADM

## 2022-09-24 RX ORDER — HEPARIN SODIUM 1000 [USP'U]/ML
2000 INJECTION, SOLUTION INTRAVENOUS; SUBCUTANEOUS ONCE
Status: COMPLETED | OUTPATIENT
Start: 2022-09-24 | End: 2022-09-24

## 2022-09-24 RX ORDER — NALOXONE HCL 0.4 MG/ML
0.02 VIAL (ML) INJECTION
Status: DISCONTINUED | OUTPATIENT
Start: 2022-09-24 | End: 2022-09-30 | Stop reason: HOSPADM

## 2022-09-24 RX ADMIN — INSULIN DETEMIR 28 UNITS: 100 INJECTION, SOLUTION SUBCUTANEOUS at 10:09

## 2022-09-24 RX ADMIN — HYDRALAZINE HYDROCHLORIDE 100 MG: 50 TABLET ORAL at 10:09

## 2022-09-24 RX ADMIN — HEPARIN SODIUM 2000 UNITS: 1000 INJECTION, SOLUTION INTRAVENOUS; SUBCUTANEOUS at 02:09

## 2022-09-24 RX ADMIN — HEPARIN SODIUM 5000 UNITS: 5000 INJECTION INTRAVENOUS; SUBCUTANEOUS at 10:09

## 2022-09-24 RX ADMIN — Medication 10 ML: at 10:09

## 2022-09-24 RX ADMIN — CALCIUM ACETATE 667 MG: 667 CAPSULE ORAL at 10:09

## 2022-09-24 RX ADMIN — TORSEMIDE 20 MG: 20 TABLET ORAL at 10:09

## 2022-09-24 RX ADMIN — TIMOLOL MALEATE 1 DROP: 2.5 SOLUTION/ DROPS OPHTHALMIC at 10:09

## 2022-09-24 NOTE — Clinical Note
Dr. Becerril Attempted to open patients fistula.  Unable to open fistula.  Attempted to place a temporary dialysis catheter but patient refused and did not want to proceed.  Patient escorted back to their room via RN

## 2022-09-24 NOTE — ASSESSMENT & PLAN NOTE
- Body mass index is 38.26 kg/m².  - Needs dietary and lifestyle modifications in relation to health  - Consider dietary/nutrition consult outpatient

## 2022-09-24 NOTE — H&P
[facility] Emergency Dept  [facility]  History & Physical    Patient Name: Vinnie Siegel  MRN: [id number]  Patient Class: OP- Observation  Admission Date: 9/24/2022  Attending Physician: Scottie Smiley MD   Primary Care Provider: Janeth Walter MD         Patient information was obtained from patient and ER records.     Subjective:     Principal Problem:AV fistula occlusion, initial encounter    Chief Complaint:   Chief Complaint   Patient presents with    Vascular Access Problem     Sent from dialysis        HPI: Vinnie Siegel is a 76yo AAM with of PMH of ESRD (HD TTS via left brachiocephalic AVF), HTN, HLD, DM2, anemia of chronic renal failure, GERD, BPH, glaucoma, and obesity who presented to the [facility] ED on 9/24/22 with a malfunctioning AVF. Pt reported that his HD center has been experiencing issues with his AVF for the past several weeks, and during his most recent session, there were issues with flow during the end of HD. On day of admission, HD center was unable to get any flow, and sent him to the ER. Pt is grossly asymptomatic, denies F/C/N/V/D/C, HA, SOB, CP, palpitations, abdominal pain, dysuria, extremity swelling, or symptoms otherwise.    In the ED, vitals significant for /74. Labs remarkable for glucose 270, though were otherwise unremarkable and consistent with known ESRD status. CXR without any acute cardiopulmonary processes. US of AVF showed occlusion with associated hypoechoic, expansile, acute thrombosis of the cephalic vein extending proximal to the AV fistula. Vascular surgery consulted, planning for upcoming fistulogram. Nephrology consulted, and were able to cannulate AVF for HD session. Hospital medicine was consulted for admission and further management.      Past Medical History:   Diagnosis Date    Arteriovenous fistula stenosis     Cataract     Chronic kidney disease     Colon polyp     Diabetes mellitus     Diabetes mellitus type II     Glaucoma 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  open angle     Hyperlipidemia     Hypertension     Kidney stone     Seasonal allergies 2014       Past Surgical History:   Procedure Laterality Date    AV FISTULA PLACEMENT Left 2020    Procedure: CREATION, AV FISTULA;  Surgeon: Benji Becerril MD;  Location: [address];  Service: Peripheral Vascular;  Laterality: Left;    CATARACT EXTRACTION W/  INTRAOCULAR LENS IMPLANT Right 16    Dr Meehan    COLONOSCOPY W/ BIOPSIES      ESOPHAGOGASTRODUODENOSCOPY N/A 2020    Procedure: EGD (ESOPHAGOGASTRODUODENOSCOPY);  Surgeon: Ravi Leone MD;  Location: [facility];  Service: Endoscopy;  Laterality: N/A;    EYE SURGERY      FISTULOGRAM Left 2021    Procedure: Fistulogram;  Surgeon: Benji Becerril MD;  Location: [facility] CATH LAB;  Service: Peripheral Vascular;  Laterality: Left;    HEMORRHOID SURGERY      Dr. Root [facility]    lateral internal anal sphincterotomy  13    Dr. root [facility]    PERCUTANEOUS TRANSLUMINAL ANGIOPLASTY OF ARTERIOVENOUS FISTULA Left 2021    Procedure: PTA, AV FISTULA;  Surgeon: Benji Becerril MD;  Location: [facility] CATH LAB;  Service: Peripheral Vascular;  Laterality: Left;    URETERAL STENT PLACEMENT         Review of patient's allergies indicates:  No Known Allergies    No current facility-administered medications on file prior to encounter.     Current Outpatient Medications on File Prior to Encounter   Medication Sig    atorvastatin (LIPITOR) 40 MG tablet Take 1 tablet (40 mg total) by mouth once daily.    blood sugar diagnostic Strp To check BG 4 times daily, to use with insurance preferred meter    blood-glucose meter kit To check BG 4 times daily, to use with insurance preferred meter, e 11.65    calcium acetate,phosphat bind, (PHOSLO) 667 mg capsule SMARTSI Capsule(s) By Mouth    ergocalciferol (ERGOCALCIFEROL) 50,000 unit Cap Take 1 capsule (50,000 Units total) by mouth every 7 days. (Patient taking differently: Take  "50,000 Units by mouth every 7 days. Patient takes on Mondays)    ferrous sulfate (FEOSOL) 325 mg (65 mg iron) Tab tablet 3 TABS A DAY. (Patient taking differently: every evening. 3 TABS A DAY.)    fluticasone propionate (FLONASE) 50 mcg/actuation nasal spray SHAKE LIQUID AND USE 2 SPRAYS(100 MCG) IN EACH NOSTRIL EVERY DAY    hydrALAZINE (APRESOLINE) 100 MG tablet Take 1 tablet (100 mg total) by mouth 3 (three) times daily.    HYDROcodone-acetaminophen (NORCO) 7.5-325 mg per tablet Take 1 tablet by mouth 3 (three) times daily as needed.    insulin (LANTUS SOLOSTAR U-100 INSULIN) glargine 100 units/mL (3mL) SubQ pen Inject 28-36 units at night.    insulin lispro (HUMALOG KWIKPEN INSULIN) 100 unit/mL pen INJECT 8-12 UNITS EVERY DAY WITH MEALS PLUS SCALE. MAX DAILY50 UNITS    ipratropium-albuteroL (COMBIVENT)  mcg/actuation inhaler Inhale 1 puff into the lungs every 6 (six) hours as needed for Wheezing or Shortness of Breath. Rescue (Patient not taking: Reported on 8/24/2022)    lancets Misc To check BG 4 times daily, to use with insurance preferred meter    metoprolol succinate (TOPROL-XL) 25 MG 24 hr tablet Take 0.5 tablets (12.5 mg total) by mouth once daily.    NIFEdipine (PROCARDIA-XL) 90 MG (OSM) 24 hr tablet Take 1 tablet (90 mg total) by mouth once daily.    pantoprazole (PROTONIX) 40 MG tablet TAKE 1 TABLET(40 MG) BY MOUTH EVERY DAY    pen needle, diabetic (BD ULTRA-FINE SHORT PEN NEEDLE) 31 gauge x 5/16" Ndle USE FOUR TIMES DAILY    tamsulosin (FLOMAX) 0.4 mg Cap TAKE ONE CAPSULE BY MOUTH EVERY MORNING    timolol maleate 0.25% (TIMOPTIC) 0.25 % Drop PLACE 1 DROP IN BOTH EYES TWICE DAILY    torsemide (DEMADEX) 20 MG Tab Take 1 tablet (20 mg total) by mouth 2 (two) times daily.    TRUE METRIX GLUCOSE TEST STRIP Strp USE TO TEST BLOOD SUGAR FOUR TIMES DAILY    [DISCONTINUED] magnesium oxide (MAG-OX) 400 mg (241.3 mg magnesium) tablet TAKE 1 TABLET(400 MG) BY MOUTH EVERY MORNING    " [DISCONTINUED] metoprolol succinate (TOPROL-XL) 12.5 MG 24 hr split tablet Metoprolol ER Succinate 25 mg     Family History       Problem Relation (Age of Onset)    Diabetes Brother    Hypertension Brother    Stroke Brother          Tobacco Use    Smoking status: Former     Packs/day: 0.50     Years: 45.00     Pack years: 22.50     Types: Cigarettes     Quit date: 2020     Years since quittin.2    Smokeless tobacco: Never   Substance and Sexual Activity    Alcohol use: Not Currently     Comment: rarely    Drug use: No    Sexual activity: Yes     Comment: single, retired , no kids       ROS  General ROS: negative for weight loss, weight gain, fevers, chills, night sweats  ENT ROS: negative for epistaxis, headaches or sinus pain  Ophthalmic ROS: negative for blurry vision, decreased vision, double vision or itchy eyes  Cardiovascular ROS: negative for chest pain or dyspnea on exertion  Respiratory ROS: negative for cough, shortness of breath, or wheezing  Gastrointestinal ROS: negative for abdominal pain, change in bowel habits, black or bloody stools, nausea, emesis, or bloating  Genito-Urinary ROS: negative for dysuria, trouble voiding, or hematuria  Neurological ROS: negative for TIA or stroke symptoms  Endocrine ROS: negative for hair pattern changes or unexpected weight changes  Musculoskeletal ROS: negative for muscle pain, weakness, joint pain or joint swelling  Skin: negative for rash, wounds, skin breakdown, or issues otherwise  Hematological and Lymphatic ROS: negative for bleeding, bruising, swollen lymph nodes  Psychological ROS: negative for anxiety or depression      Objective:     Vital Signs (Most Recent):  Temp: 98.2 °F (36.8 °C) (22 1347)  Pulse: 71 (22 1418)  Resp: 15 (22 1347)  BP: (!) 169/79 (22 1418)  SpO2: 99 % (22 1210)   Vital Signs (24h Range):  Temp:  [97.8 °F (36.6 °C)-98.5 °F (36.9 °C)] 98.2 °F (36.8 °C)  Pulse:  [65-71] 71  Resp:  [15-20]  15  SpO2:  [99 %-100 %] 99 %  BP: (137-173)/(72-79) 169/79     Weight: 135.2 kg (298 lb)  Body mass index is 38.26 kg/m².      Physical Exam  Gen: in NAD, appears stated age; pt is obese  Neuro: AAOx4, CN2-12 grossly intact BL; motor, sensory, and strength grossly intact BL  HEENT: NTNC, EOMI, PERRLA, MMM; no thyromegaly or lymphadenopathy; no JVD appreciated  CVS: RRR, no m/r/g; S1/S2 auscultated with no S3 or S4; capillary refill < 2 sec  Resp: lungs CTAB, no w/r/r; no belabored breathing or accessory muscle use appreciated   Abd: BS+ in all 4 quadrants; NTND, soft to palpation; no organomegaly appreciated   Extrem: pulses full, equal, and regular over all 4 extremities; no UE or LE edema BL       Significant Labs: All pertinent labs within the past 24 hours have been reviewed.    Significant Imaging: I have reviewed all pertinent imaging results/findings within the past 24 hours.    Assessment/Plan:     * AV fistula occlusion, initial encounter  - Interval history and physical exam findings as described above  - AVF US findings as documented  - Vascular surgery consulted, planning for upcoming fistulogram  - Nephrology consulted, and were able to cannulate AVF for HD session  - NPO at [facility] on 9/26  - Continuing to monitor    ESRD on hemodialysis  - Interval history and physical exam findings as described above  - HD TTS via left brachiocephalic AVF  - Nephology consulted, appreciate assistance  - Further decision for HD per nephology  - Renally dosing all medications  - Avoid nephrotoxins  - Will continue to monitor on daily labs    Hypertension associated with type 2 diabetes mellitus  - Working to optimize BP control  - Continue home regimen of hydralazine, metoprolol, nifedipine, and torsemide  - Will continue to monitor and further titrate antihypertensives as clinically indicated     Mixed diabetic hyperlipidemia associated with type 2 diabetes mellitus  - Continue home statin regimen    Type 2 diabetes  mellitus with chronic kidney disease on chronic dialysis, with long-term current use of insulin  - Working to optimize BG control  - Levemir 28u qHS  - SSI provided for corrective dosing  - DXTs as ordered  - Hypoglycemic protocol in effect  - Diabetic diet provided    Anemia in ESRD (end-stage renal disease)  - H/H stable near baseline  - Continue home iron supplementation regimen  - Will continue to monitor on daily labs    Gastroesophageal reflux disease without esophagitis  - Currently asymptomatic  - Continue home PPI regimen    Benign prostatic hyperplasia without lower urinary tract symptoms  - Continue home tamsulosin regimen    Primary open angle glaucoma (POAG) of both eyes, indeterminate stage  - Continue home eye drop regimen    Class 2 severe obesity due to excess calories with serious comorbidity and body mass index (BMI) of 38.0 to 38.9 in adult  - Body mass index is 38.26 kg/m².  - Needs dietary and lifestyle modifications in relation to health  - Consider dietary/nutrition consult outpatient      VTE Risk Mitigation (From admission, onward)         Ordered     heparin (porcine) injection 5,000 Units  Every 8 hours         09/24/22 1526     IP VTE HIGH RISK PATIENT  Once         09/24/22 1526     Place sequential compression device  Until discontinued         09/24/22 1526                 Scottie Smiley MD  Attending Physician  Department of Hospital Medicine  Epic secure chat preferred, or ext. [phone]  9/24/2022

## 2022-09-24 NOTE — HPI
Vinnie Siegel is a 76 yo male with ESRD on iHD TTS who presents from his OP dialysis unit for vascular sx evaluation of his AVF.  According to report, the dialysis unit has been having issues with poor blood flow rates but patient has not been able to follow-up with a vascular surgeon yet.  Today, the dialysis nurse attempted to cannulate, but was unable to cannulate the venous return needle to the proximal aspect of the fistula so he was referred to the ED for further management.  Vascular sx has been consulted with plans for vascular intervention and formal vascular US on Monday.  iSTAT with K of 6.0, and at time of consultation a formal chemistry was in process. His AVF is ++ for bruit/thrill, faint thrill proximally with bounding thrill distally.  Nephrology has been consulted for ESRD management while IP.

## 2022-09-24 NOTE — Clinical Note
Diagnosis: ESRD (end stage renal disease) [932765]   Future Attending Provider: NIKUNJ ALVARADO [[id number]]   Is the patient being sent to ED Observation?: Yes   Admitting Provider:: NIKUNJ ALVARADO [[id number]]   Special Needs:: No Special Needs [1]

## 2022-09-24 NOTE — ACP (ADVANCE CARE PLANNING)
[facility] Code Status Documentation Note    Spoke with Mr. Siegel regarding their end of life intentions and goals of care. Discussed code status and explained differences between full code and DNR, and answered all questions to the pt's satisfaction.     At this time, pt desires to be full code. Order has been placed in chart to reflect their wishes.    Scottie Smiley MD  Attending Physician  Department of [facility]  Epic secure chat preferred, or ext. [phone]  9/24/2022      Advance Care Planning     Date: 09/24/2022    Code Status  I engaged the the patient in a conversation about the patient's preferences for care  at the very end of life. The patient wishes to have CPR or other invasive treatments performed when his heart and/or breathing stops. I communicated to the patient that a full code order would be placed in his medical record to reflect this preference.  I spent a total of 15 minutes engaging the patient in this advance care planning discussion.

## 2022-09-24 NOTE — ASSESSMENT & PLAN NOTE
- Working to optimize BP control  - Continue home regimen of hydralazine, metoprolol, nifedipine, and torsemide  - Will continue to monitor and further titrate antihypertensives as clinically indicated

## 2022-09-24 NOTE — CONSULTS
[facility] - [facility] Dept  Vascular Surgery  Consult Note    Inpatient consult to Vascular Surgery  Consult performed by: Sacha Bar MD  Consult ordered by: Maritza Vick PA-C      Subjective:     Chief Complaint/Reason for Admission: AV fistula access issue    History of Present Illness: Vinnie Siegel is a 77 y.o. man with past medical history significant for end-stage renal disease on hemodialysis Tuesday, Thursday, Saturday via left brachiocephalic AV fistula who presents with issues with dialysis access. Patient states he has been having difficulty with dialysis over the last 3 weeks, he follows with another vascular surgeon who did a fistulogram on 09/12 described as having done a angioplasty.  Remaining details of this procedure are unclear as they are not in our medical record and patient has no documentation.      He states the past 2 dialysis treatments, the nurse was having difficulty gaining access to his fistula and describes having prolonged bleeding. Denies dyspnea, angina, syncope, fevers, chills, sweats.     Potassium 6.0, BUN 63, glucose 316 on iSTAT labs.        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     Hyperlipidemia     Hypertension     Kidney stone     Seasonal allergies 6/24/2014     Past Surgical History:   Procedure Laterality Date    AV FISTULA PLACEMENT Left 12/8/2020    Procedure: CREATION, AV FISTULA;  Surgeon: Benji Becerril MD;  Location: [facility] OR [address] [place] [address];  Service: Peripheral Vascular;  Laterality: Left;    CATARACT EXTRACTION W/  INTRAOCULAR LENS IMPLANT Right 04/04/16    Dr Meehan    COLONOSCOPY W/ BIOPSIES      ESOPHAGOGASTRODUODENOSCOPY N/A 6/29/2020    Procedure: EGD  (ESOPHAGOGASTRODUODENOSCOPY);  Surgeon: Ravi Leone MD;  Location: [facility];  Service: Endoscopy;  Laterality: N/A;    EYE SURGERY      FISTULOGRAM Left 2021    Procedure: Fistulogram;  Surgeon: Benji Becerril MD;  Location: [facility] CATH LAB;  Service: Peripheral Vascular;  Laterality: Left;    HEMORRHOID SURGERY      Dr. Root [facility]    lateral internal anal sphincterotomy  13    Dr. root [facility]    PERCUTANEOUS TRANSLUMINAL ANGIOPLASTY OF ARTERIOVENOUS FISTULA Left 2021    Procedure: PTA, AV FISTULA;  Surgeon: Benji Becerril MD;  Location: [facility] CATH LAB;  Service: Peripheral Vascular;  Laterality: Left;    URETERAL STENT PLACEMENT       Family History       Problem Relation (Age of Onset)    Diabetes Brother    Hypertension Brother    Stroke Brother          Tobacco Use    Smoking status: Former     Packs/day: 0.50     Years: 45.00     Pack years: 22.50     Types: Cigarettes     Quit date: 2020     Years since quittin.2    Smokeless tobacco: Never   Substance and Sexual Activity    Alcohol use: Not Currently     Comment: rarely    Drug use: No    Sexual activity: Yes     Comment: single, retired , no kids     Review of Systems  Left arm mild pain near dialysis access site, denies hand weakness or loss of sensation.  No angina or dyspnea or syncope, no fevers chills or sweats.    Objective:     Vital Signs (Most Recent):  Temp: 98.5 °F (36.9 °C) (22)  Pulse: 65 (22)  Resp: 20 (22)  BP: 137/72 (22)  SpO2: 100 % (22) Vital Signs (24h Range):  Temp:  [98.5 °F (36.9 °C)] 98.5 °F (36.9 °C)  Pulse:  [65] 65  Resp:  [20] 20  SpO2:  [100 %] 100 %  BP: (137)/(72) 137/72     Weight: 135.2 kg (298 lb)  Body mass index is 38.26 kg/m².    Physical Exam  General:  No acute distress, alert and oriented X 4   Cardiac:  Regular rate and rhythm, no murmurs rubs or gallops appreciated   Pulmonary: Nonlabored breathing on room air,  clear to auscultation bilaterally   Abdomen:  Soft, nontender nondistended.    Upper extremities: Left arm with brachiocephalic AV fistula, thrill near arterial anastomosis, remainder of fistula is pulsatile.  Hemostatic, no overlying ulceration.  Radial pulse 2 +, strength 5/5    Significant Labs:  Recent Lab Results         09/24/22  0919   09/24/22  0916        Hepatitis C Ab   Non-reactive       HIV 1/2 Ag/Ab   Non-reactive       POC BUN 63         POC Chloride 96         POC Creatinine 7.5         POC Glucose 316         POC Hematocrit 33         POC Ionized Calcium 0.94         POC Potassium 6.0         POC Sodium 133         POC TCO2 (MEASURED) 28         Sample MARY                 Significant Diagnostics:  U/S: No results found in the last 24 hours.    Assessment/Plan:     Inadequate flow of hemodialysis AV fistula  Vinnie Siegel is a 77 y.o. man with past medical history significant for end-stage renal disease on hemodialysis Tuesday, Thursday, Saturday via left brachiocephalic AV fistula who presents with issues with dialysis access.    Based on patient's history and physical exam, does appear that the patient has an outflow stenosis making access to his fistula difficult    Recommend:  Admission to Internal Medicine  Shift potassium now  Placement of temporary dialysis catheter by on call team  Dialysis as soon as possible  Formal vascular ultrasound of dialysis access Monday  Will perform fistulagram early this coming week         Thank you for your consult. I will follow-up with patient. Please contact us if you have any additional questions.    TJ CARROLL MD  Vascular Surgery  [facility]

## 2022-09-24 NOTE — ASSESSMENT & PLAN NOTE
Vinnie Siegel is a 77 y.o. man with past medical history significant for end-stage renal disease on hemodialysis Tuesday, Thursday, Saturday via left brachiocephalic AV fistula who presents with issues with dialysis access.    Based on patient's history and physical exam, does appear that the patient has an outflow stenosis making access to his fistula difficult    Recommend:  Admission to Internal Medicine  Shift potassium now  Placement of temporary dialysis catheter by on call team  Dialysis as soon as possible  Formal vascular ultrasound of dialysis access Monday  Will perform fistulagram early this coming week

## 2022-09-24 NOTE — ASSESSMENT & PLAN NOTE
- Working to optimize BG control  - Levemir 28u qHS  - SSI provided for corrective dosing  - DXTs as ordered  - Hypoglycemic protocol in effect  - Diabetic diet provided

## 2022-09-24 NOTE — Clinical Note
30 ml of contrast were injected throughout the case. 70 mL of contrast was the total wasted during the case. 100 mL was the total amount used during the case.

## 2022-09-24 NOTE — ASSESSMENT & PLAN NOTE
- Interval history and physical exam findings as described above  - HD TTS via left brachiocephalic AVF  - Nephology consulted, appreciate assistance  - Further decision for HD per nephology  - Renally dosing all medications  - Avoid nephrotoxins  - Will continue to monitor on daily labs

## 2022-09-24 NOTE — SUBJECTIVE & OBJECTIVE
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     Hyperlipidemia     Hypertension     Kidney stone     Seasonal allergies 6/24/2014       Past Surgical History:   Procedure Laterality Date    AV FISTULA PLACEMENT Left 12/8/2020    Procedure: CREATION, AV FISTULA;  Surgeon: Benji Becerril MD;  Location: [address];  Service: Peripheral Vascular;  Laterality: Left;    CATARACT EXTRACTION W/  INTRAOCULAR LENS IMPLANT Right 04/04/16    Dr Meehan    COLONOSCOPY W/ BIOPSIES      ESOPHAGOGASTRODUODENOSCOPY N/A 6/29/2020    Procedure: EGD (ESOPHAGOGASTRODUODENOSCOPY);  Surgeon: Ravi Leone MD;  Location: [facility];  Service: Endoscopy;  Laterality: N/A;    EYE SURGERY      FISTULOGRAM Left 4/16/2021    Procedure: Fistulogram;  Surgeon: Benji Becerril MD;  Location: [facility] CATH LAB;  Service: Peripheral Vascular;  Laterality: Left;    HEMORRHOID SURGERY      Dr. Root [facility] [place]    lateral internal anal sphincterotomy  12/13/13    Dr. root [facility] [place]    PERCUTANEOUS TRANSLUMINAL ANGIOPLASTY OF ARTERIOVENOUS FISTULA Left 4/16/2021    Procedure: PTA, AV FISTULA;  Surgeon: Benji Becerril MD;  Location: [facility] CATH LAB;  Service: Peripheral Vascular;  Laterality: Left;    URETERAL STENT PLACEMENT         Review of patient's allergies indicates:  No Known Allergies  No current facility-administered medications for this encounter.     Current Outpatient Medications   Medication    atorvastatin (LIPITOR) 40 MG tablet    blood sugar diagnostic Strp    blood-glucose meter kit    calcium acetate,phosphat bind, (PHOSLO) 667 mg capsule    ergocalciferol (ERGOCALCIFEROL) 50,000 unit Cap    ferrous sulfate (FEOSOL) 325 mg (65 mg iron) Tab tablet    fluticasone propionate (FLONASE) 50 mcg/actuation nasal spray    hydrALAZINE (APRESOLINE) 100 MG tablet     "HYDROcodone-acetaminophen (NORCO) 7.5-325 mg per tablet    insulin (LANTUS SOLOSTAR U-100 INSULIN) glargine 100 units/mL (3mL) SubQ pen    insulin lispro (HUMALOG KWIKPEN INSULIN) 100 unit/mL pen    ipratropium-albuteroL (COMBIVENT)  mcg/actuation inhaler    lancets Misc    metoprolol succinate (TOPROL-XL) 25 MG 24 hr tablet    NIFEdipine (PROCARDIA-XL) 90 MG (OSM) 24 hr tablet    pantoprazole (PROTONIX) 40 MG tablet    pen needle, diabetic (BD ULTRA-FINE SHORT PEN NEEDLE) 31 gauge x 5/16" Ndle    tamsulosin (FLOMAX) 0.4 mg Cap    timolol maleate 0.25% (TIMOPTIC) 0.25 % Drop    torsemide (DEMADEX) 20 MG Tab    TRUE METRIX GLUCOSE TEST STRIP Strp     Family History       Problem Relation (Age of Onset)    Diabetes Brother    Hypertension Brother    Stroke Brother          Tobacco Use    Smoking status: Former     Packs/day: 0.50     Years: 45.00     Pack years: 22.50     Types: Cigarettes     Quit date: 2020     Years since quittin.2    Smokeless tobacco: Never   Substance and Sexual Activity    Alcohol use: Not Currently     Comment: rarely    Drug use: No    Sexual activity: Yes     Comment: single, retired , no kids     Review of Systems   Constitutional:  Negative for activity change, chills and fatigue.   HENT:  Negative for congestion, facial swelling, postnasal drip, rhinorrhea and sinus pain.    Respiratory:  Negative for cough, chest tightness and shortness of breath.    Cardiovascular:  Positive for leg swelling. Negative for chest pain and palpitations.   Gastrointestinal:  Negative for abdominal distention, constipation, diarrhea, nausea and vomiting.   Musculoskeletal:  Negative for arthralgias, back pain and gait problem.   Skin:  Negative for color change, rash and wound.   Neurological:  Negative for dizziness, light-headedness and headaches.   Psychiatric/Behavioral:  Negative for agitation, behavioral problems and confusion.    Objective:     Vital Signs (Most Recent):  Temp: " 97.8 °F (36.6 °C) (09/24/22 1210)  Pulse: 68 (09/24/22 1210)  Resp: 16 (09/24/22 1210)  BP: (!) 173/74 (09/24/22 1210)  SpO2: 99 % (09/24/22 1210)   Vital Signs (24h Range):  Temp:  [97.8 °F (36.6 °C)-98.5 °F (36.9 °C)] 97.8 °F (36.6 °C)  Pulse:  [65-68] 68  Resp:  [16-20] 16  SpO2:  [99 %-100 %] 99 %  BP: (137-173)/(72-74) 173/74     Weight: 135.2 kg (298 lb) (09/24/22 0819)  Body mass index is 38.26 kg/m².  Body surface area is 2.66 meters squared.    No intake/output data recorded.    Physical Exam  Vitals and nursing note reviewed.   Constitutional:       General: He is not in acute distress.     Appearance: Normal appearance. He is obese. He is not ill-appearing or toxic-appearing.   HENT:      Head: Normocephalic and atraumatic.      Mouth/Throat:      Mouth: Mucous membranes are moist.      Pharynx: No oropharyngeal exudate or posterior oropharyngeal erythema.   Eyes:      General: No scleral icterus.        Right eye: No discharge.         Left eye: No discharge.      Extraocular Movements: Extraocular movements intact.      Conjunctiva/sclera: Conjunctivae normal.   Cardiovascular:      Rate and Rhythm: Normal rate and regular rhythm.      Heart sounds: No murmur heard.    No friction rub.      Comments: HUSEYIN AVF  Proximal aspect with faint bruit/thrill  Pulmonary:      Effort: Pulmonary effort is normal. No respiratory distress.      Breath sounds: No wheezing or rales.   Abdominal:      General: Bowel sounds are normal. There is no distension.      Palpations: Abdomen is soft.      Tenderness: There is no abdominal tenderness.   Musculoskeletal:         General: No swelling.      Cervical back: Normal range of motion. No rigidity or tenderness.      Right lower leg: Edema present.      Left lower leg: Edema present.   Skin:     General: Skin is warm and dry.   Neurological:      General: No focal deficit present.      Mental Status: He is alert and oriented to person, place, and time.   Psychiatric:          Mood and Affect: Mood normal.         Behavior: Behavior normal.       Significant Labs:  CBC:   Recent Labs   Lab 09/24/22  0919   HCT 33*     CMP: No results for input(s): GLU, CALCIUM, ALBUMIN, PROT, NA, K, CO2, CL, BUN, CREATININE, ALKPHOS, ALT, AST, BILITOT in the last 168 hours.

## 2022-09-24 NOTE — ASSESSMENT & PLAN NOTE
- Interval history and physical exam findings as described above  - AVF US findings as documented  - Vascular surgery consulted, planning for upcoming fistulogram  - Nephrology consulted, and were able to cannulate AVF for HD session  - NPO at [facility] on 9/26  - Continuing to monitor

## 2022-09-24 NOTE — ED NOTES
I-STAT Chem-8+ Results:    Value Reference Range   Sodium 133 136-145 mmol/L   Potassium  6.0 3.5-5.1 mmol/L   Chloride 96  mmol/L   Ionized Calcium 0.94 1.06-1.42 mmol/L   CO2 (measured) 28 23-29 mmol/L   Glucose 316  mg/dL   BUN 63 6-30 mg/dL   Creatinine 7.5 0.5-1.4 mg/dL   Hematocrit 33 36-54%

## 2022-09-24 NOTE — SUBJECTIVE & OBJECTIVE
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     Hyperlipidemia     Hypertension     Kidney stone     Seasonal allergies 2014     Past Surgical History:   Procedure Laterality Date    AV FISTULA PLACEMENT Left 2020    Procedure: CREATION, AV FISTULA;  Surgeon: Benji Becerril MD;  Location: [address];  Service: Peripheral Vascular;  Laterality: Left;    CATARACT EXTRACTION W/  INTRAOCULAR LENS IMPLANT Right 16    Dr Meehan    COLONOSCOPY W/ BIOPSIES      ESOPHAGOGASTRODUODENOSCOPY N/A 2020    Procedure: EGD (ESOPHAGOGASTRODUODENOSCOPY);  Surgeon: Ravi Leone MD;  Location: [facility];  Service: Endoscopy;  Laterality: N/A;    EYE SURGERY      FISTULOGRAM Left 2021    Procedure: Fistulogram;  Surgeon: Benji Becerril MD;  Location: [facility] CATH LAB;  Service: Peripheral Vascular;  Laterality: Left;    [place]MORR[place]ID [place]      [place] [facility] [place] sphincterotomy  13    [place] [facility] [place]    PERCUTANEOUS TRANSLUMINAL ANGIOPLASTY OF ARTERIOVENOUS FISTULA Left 2021    Procedure: PTA, AV FISTULA;  Surgeon: Benji Becerril MD;  Location: [facility] CATH LAB;  Service: Peripheral Vascular;  Laterality: Left;    URETERAL STENT PLACEMENT       Family History       Problem Relation (Age of Onset)    Diabetes Brother    Hypertension Brother    Stroke Brother          Tobacco Use    Smoking status: Former     Packs/day: 0.50     Years: 45.00     Pack years: 22.50     Types: Cigarettes     Quit date: 2020     Years since quittin.2    Smokeless tobacco: Never   Substance and Sexual Activity    Alcohol use: Not Currently     Comment: rarely    Drug use: No    Sexual activity: Yes      Comment: single, retired , no kids     Review of Systems  Left arm mild pain near dialysis access site, denies hand weakness or loss of sensation.  No angina or dyspnea or syncope, no fevers chills or sweats.    Objective:     Vital Signs (Most Recent):  Temp: 98.5 °F (36.9 °C) (09/24/22 0819)  Pulse: 65 (09/24/22 0819)  Resp: 20 (09/24/22 0819)  BP: 137/72 (09/24/22 0819)  SpO2: 100 % (09/24/22 0819) Vital Signs (24h Range):  Temp:  [98.5 °F (36.9 °C)] 98.5 °F (36.9 °C)  Pulse:  [65] 65  Resp:  [20] 20  SpO2:  [100 %] 100 %  BP: (137)/(72) 137/72     Weight: 135.2 kg (298 lb)  Body mass index is 38.26 kg/m².    Physical Exam  General:  No acute distress, alert and oriented X 4   Cardiac:  Regular rate and rhythm, no murmurs rubs or gallops appreciated   Pulmonary: Nonlabored breathing on room air, clear to auscultation bilaterally   Abdomen:  Soft, nontender nondistended.    Upper extremities: Left arm with brachiocephalic AV fistula, thrill near arterial anastomosis, remainder of fistula is pulsatile.  Hemostatic, no overlying ulceration.  Radial pulse 2 +, strength 5/5    Significant Labs:  Recent Lab Results         09/24/22 0919 09/24/22 0916        Hepatitis C Ab   Non-reactive       HIV 1/2 Ag/Ab   Non-reactive       POC BUN 63         POC Chloride 96         POC Creatinine 7.5         POC Glucose 316         POC Hematocrit 33         POC Ionized Calcium 0.94         POC Potassium 6.0         POC Sodium 133         POC TCO2 (MEASURED) 28         Sample MARY                 Significant Diagnostics:  U/S: No results found in the last 24 hours.

## 2022-09-24 NOTE — ED NOTES
Messaged dialysis regarding sending pt to their room now that it's available. Attempted to give report however floor RN stated that she was in another pt's room. Will try again in 10 minutes as requested.

## 2022-09-24 NOTE — ASSESSMENT & PLAN NOTE
Faint bruit/thrill to proximal aspect of his HUSEYIN AVF  +pulsatile to distal portion  -Vasular Sx following, plans for fistulogram Monday

## 2022-09-24 NOTE — CONSULTS
[facility] - Emergency Dept  Nephrology  Consult Note    Patient Name: Vinnie Siegel  MRN: [id number]  Admission Date: 9/24/2022  Hospital Length of Stay: 0 days  Attending Provider: Laura Mccoy MD   Primary Care Physician: Janeth Walter MD  Principal Problem:<principal problem not specified>    Inpatient consult to Nephrology  Consult performed by: Jean Goode NP  Consult ordered by: Maritza Vick PA-C  Reason for consult: ESRD        Subjective:     HPI: Vinnie Siegel is a 78 yo male with ESRD on iHD TTS who presents from his OP dialysis unit for vascular sx evaluation of his AVF.  According to report, the dialysis unit has been having issues with poor blood flow rates but patient has not been able to follow-up with a vascular surgeon yet.  Today, the dialysis nurse attempted to cannulate, but was unable to cannulate the venous return needle to the proximal aspect of the fistula so he was referred to the ED for further management.  Vascular sx has been consulted with plans for vascular intervention and formal vascular US on Monday.  iSTAT with K of 6.0, and at time of consultation a formal chemistry was in process. His AVF is ++ for bruit/thrill, faint thrill proximally with bounding thrill distally.  Nephrology has been consulted for ESRD management while IP.      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     Hyperlipidemia     Hypertension     Kidney stone     Seasonal allergies 6/24/2014       Past Surgical History:   Procedure Laterality Date    AV FISTULA PLACEMENT Left 12/8/2020    Procedure: CREATION, AV FISTULA;  Surgeon: Benji Becerril MD;  Location: [facility] OR [address] [place] [address];  Service: Peripheral Vascular;  Laterality: Left;    CATARACT EXTRACTION W/  INTRAOCULAR LENS IMPLANT Right 04/04/16    Dr Meehan     "COLONOSCOPY W/ BIOPSIES      ESOPHAGOGASTRODUODENOSCOPY N/A 6/29/2020    Procedure: EGD (ESOPHAGOGASTRODUODENOSCOPY);  Surgeon: Ravi Leone MD;  Location: [facility];  Service: Endoscopy;  Laterality: N/A;    EYE SURGERY      FISTULOGRAM Left 4/16/2021    Procedure: Fistulogram;  Surgeon: Benji Becerril MD;  Location: [facility] CATH LAB;  Service: Peripheral Vascular;  Laterality: Left;    [place] [facility] [place]    [place]er[place]  12[place]13/13    [place] [facility] [place]    PERCUTANEOUS TRANSLUMINAL ANGIOPLASTY OF ARTERIOVENOUS FISTULA Left 4/16/2021    Procedure: PTA, AV FISTULA;  Surgeon: Benji Becerril MD;  Location: [facility] CATH LAB;  Service: Peripheral Vascular;  Laterality: Left;    URETERAL STENT PLACEMENT         Review of patient's allergies indicates:  No Known Allergies  No current facility-administered medications for this encounter.     Current Outpatient Medications   Medication    atorvastatin (LIPITOR) 40 MG tablet    blood sugar diagnostic Strp    blood-glucose meter kit    calcium acetate,phosphat bind, (PHOSLO) 667 mg capsule    ergocalciferol (ERGOCALCIFEROL) 50,000 unit Cap    ferrous sulfate (FEOSOL) 325 mg (65 mg iron) Tab tablet    fluticasone propionate (FLONASE) 50 mcg/actuation nasal spray    hydrALAZINE (APRESOLINE) 100 MG tablet    HYDROcodone-acetaminophen (NORCO) 7.5-325 mg per tablet    insulin (LANTUS SOLOSTAR U-100 INSULIN) glargine 100 units/mL (3mL) SubQ pen    insulin lispro (HUMALOG KWIKPEN INSULIN) 100 unit/mL pen    ipratropium-albuteroL (COMBIVENT)  mcg/actuation inhaler    lancets Misc    metoprolol succinate (TOPROL-XL) 25 MG 24 hr tablet    NIFEdipine (PROCARDIA-XL) 90 MG (OSM) 24 hr tablet    pantoprazole (PROTONIX) 40 MG tablet    pen needle, diabetic (BD ULTRA-FINE SHORT PEN NEEDLE) 31 gauge x 5/16" Ndle    tamsulosin (FLOMAX) 0.4 mg Cap    timolol maleate 0.25% (TIMOPTIC) 0.25 % Drop    torsemide " (DEMADEX) 20 MG Tab    TRUE METRIX GLUCOSE TEST STRIP Strp     Family History       Problem Relation (Age of Onset)    Diabetes Brother    Hypertension Brother    Stroke Brother          Tobacco Use    Smoking status: Former     Packs/day: 0.50     Years: 45.00     Pack years: 22.50     Types: Cigarettes     Quit date: 2020     Years since quittin.2    Smokeless tobacco: Never   Substance and Sexual Activity    Alcohol use: Not Currently     Comment: rarely    Drug use: No    Sexual activity: Yes     Comment: single, retired , no kids     Review of Systems   Constitutional:  Negative for activity change, chills and fatigue.   HENT:  Negative for congestion, facial swelling, postnasal drip, rhinorrhea and sinus pain.    Respiratory:  Negative for cough, chest tightness and shortness of breath.    Cardiovascular:  Positive for leg swelling. Negative for chest pain and palpitations.   Gastrointestinal:  Negative for abdominal distention, constipation, diarrhea, nausea and vomiting.   Musculoskeletal:  Negative for arthralgias, back pain and gait problem.   Skin:  Negative for color change, rash and wound.   Neurological:  Negative for dizziness, light-headedness and headaches.   Psychiatric/Behavioral:  Negative for agitation, behavioral problems and confusion.    Objective:     Vital Signs (Most Recent):  Temp: 97.8 °F (36.6 °C) (22 1210)  Pulse: 68 (22 1210)  Resp: 16 (22 1210)  BP: (!) 173/74 (22 1210)  SpO2: 99 % (22 1210)   Vital Signs (24h Range):  Temp:  [97.8 °F (36.6 °C)-98.5 °F (36.9 °C)] 97.8 °F (36.6 °C)  Pulse:  [65-68] 68  Resp:  [16-20] 16  SpO2:  [99 %-100 %] 99 %  BP: (137-173)/(72-74) 173/74     Weight: 135.2 kg (298 lb) (22 0819)  Body mass index is 38.26 kg/m².  Body surface area is 2.66 meters squared.    No intake/output data recorded.    Physical Exam  Vitals and nursing note reviewed.   Constitutional:       General: He is not in acute  distress.     Appearance: Normal appearance. He is obese. He is not ill-appearing or toxic-appearing.   HENT:      Head: Normocephalic and atraumatic.      Mouth/Throat:      Mouth: Mucous membranes are moist.      Pharynx: No oropharyngeal exudate or posterior oropharyngeal erythema.   Eyes:      General: No scleral icterus.        Right eye: No discharge.         Left eye: No discharge.      Extraocular Movements: Extraocular movements intact.      Conjunctiva/sclera: Conjunctivae normal.   Cardiovascular:      Rate and Rhythm: Normal rate and regular rhythm.      Heart sounds: No murmur heard.    No friction rub.      Comments: HUSEYIN AVF  Proximal aspect with faint bruit/thrill  Pulmonary:      Effort: Pulmonary effort is normal. No respiratory distress.      Breath sounds: No wheezing or rales.   Abdominal:      General: Bowel sounds are normal. There is no distension.      Palpations: Abdomen is soft.      Tenderness: There is no abdominal tenderness.   Musculoskeletal:         General: No swelling.      Cervical back: Normal range of motion. No rigidity or tenderness.      Right lower leg: Edema present.      Left lower leg: Edema present.   Skin:     General: Skin is warm and dry.   Neurological:      General: No focal deficit present.      Mental Status: He is alert and oriented to person, place, and time.   Psychiatric:         Mood and Affect: Mood normal.         Behavior: Behavior normal.       Significant Labs:  CBC:   Recent Labs   Lab 09/24/22  0919   HCT 33*     CMP: No results for input(s): GLU, CALCIUM, ALBUMIN, PROT, NA, K, CO2, CL, BUN, CREATININE, ALKPHOS, ALT, AST, BILITOT in the last 168 hours.      Assessment/Plan:     ESRD (end stage renal disease)  ESRD on iHD TTS  Last HD on Thursday prior to presentation.  Reported to have had poor flow rates through AVF over the past several week.  Unable to cannulate venous return, so he was sent to the ED on 9/24 for vascular sx  jose francisco.    Plan/Recommendations:  -check renal function panel  -will attempt HD this afternoon using the distal portion of his AVF.  Low blood flow rates.  -recommend to check RFP post HD to assess for improvement in his hyperkalemia, may need additional session tomorrow dependent on clearance  -recommend to keep IP until fistulogram can be completed.   -If unable to cannulate, recommend placement of temporary dialysis catheter for HD through the weekend until intervention can be performed.    Inadequate flow of hemodialysis AV fistula  Faint bruit/thrill to proximal aspect of his HUSEYIN AVF  +pulsatile to distal portion  -Vasular Sx following, plans for fistulogram Monday     Jean Tillman NP  Nephrology  [facility] - Emergency Dept

## 2022-09-24 NOTE — HPI
Vinnie Siegel is a 77 y.o. man with past medical history significant for end-stage renal disease on hemodialysis Tuesday, Thursday, Saturday via left brachiocephalic AV fistula who presents with issues with dialysis access. Patient states he has been having difficulty with dialysis over the last 3 weeks, he follows with another vascular surgeon who did a fistulogram on 09/12 described as having done a angioplasty.  Remaining details of this procedure are unclear as they are not in our medical record and patient has no documentation.      He states the past 2 dialysis treatments, the nurse was having difficulty gaining access to his fistula and describes having prolonged bleeding. Denies dyspnea, angina, syncope, fevers, chills, sweats.     Potassium 6.0, BUN 63, glucose 316 on iSTAT labs.

## 2022-09-24 NOTE — ASSESSMENT & PLAN NOTE
ESRD on iHD TTS  Last HD on Thursday prior to presentation.  Reported to have had poor flow rates through AVF over the past several week.  Unable to cannulate venous return, so he was sent to the ED on 9/24 for vascular sx eval.    Plan/Recommendations:  -check renal function panel  -will attempt HD this afternoon using the distal portion of his AVF.  Low blood flow rates.  -recommend to check RFP post HD to assess for improvement in his hyperkalemia, may need additional session tomorrow dependent on clearance  -recommend to keep IP until fistulogram can be completed.

## 2022-09-24 NOTE — ED TRIAGE NOTES
Pt presents to the ED c/o vascular access problem. Pt states he was attempting to get dialysis this AM and they were unable to access his site. Full session Thursday.

## 2022-09-24 NOTE — ASSESSMENT & PLAN NOTE
- H/H stable near baseline  - Continue home iron supplementation regimen  - Will continue to monitor on daily labs

## 2022-09-24 NOTE — Clinical Note
The right radial and right brachial was prepped. The site was prepped with ChloraPrep. The patient was draped. The patient was positioned supine.

## 2022-09-24 NOTE — HPI
Vinnie Siegel is a 76yo AAM with of PMH of ESRD (HD TTS via left brachiocephalic AVF), HTN, HLD, DM2, anemia of chronic renal failure, GERD, BPH, glaucoma, and obesity who [place] [facility] [place]24/22 with a malfunctioning AVF. Pt reported that his HD center has been experiencing issues with his AVF for the past several weeks, and during his most recent session, there were issues with flow during the end of HD. On day of admission, HD center was unable to get any flow, and sent him to the ER. Pt is grossly asymptomatic, denies F/C/N/V/D/C, HA, SOB, CP, palpitations, abdominal pain, dysuria, extremity swelling, or symptoms otherwise.    In the ED, vitals significant for /74. Labs remarkable for glucose 270, though were otherwise unremarkable and consistent with known ESRD status. CXR without any acute cardiopulmonary processes. US of AVF showed occlusion with associated hypoechoic, expansile, acute thrombosis of the cephalic vein extending proximal to the AV fistula. Vascular surgery consulted, planning for upcoming fistulogram. Nephrology consulted, and were able to cannulate AVF for HD session. Hospital medicine was consulted for admission and further management.

## 2022-09-24 NOTE — SUBJECTIVE & OBJECTIVE
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     Hyperlipidemia     Hypertension     Kidney stone     Seasonal allergies 6/24/2014       Past Surgical History:   Procedure Laterality Date    AV FISTULA PLACEMENT Left 12/8/2020    Procedure: CREATION, AV FISTULA;  Surgeon: Benji Becerril MD;  Location: [address];  Service: Peripheral Vascular;  Laterality: Left;    CATARACT EXTRACTION W/  INTRAOCULAR LENS IMPLANT Right 04/04/16    Dr Meehan    COLONOSCOPY W/ BIOPSIES      ESOPHAGOGASTRODUODENOSCOPY N/A 6/29/2020    Procedure: EGD (ESOPHAGOGASTRODUODENOSCOPY);  Surgeon: Ravi Leone MD;  Location: [facility];  Service: Endoscopy;  Laterality: N/A;    EYE SURGERY      FISTULOGRAM Left 4/16/2021    Procedure: Fistulogram;  Surgeon: Benji Becerril MD;  Location: [facility] CATH LAB;  Service: Peripheral Vascular;  Laterality: Left;    HEMORRHOID SURGERY      Dr. Root [facility]    lateral internal anal sphincterotomy  12/13/13    Dr. root [facility]    PERCUTANEOUS TRANSLUMINAL ANGIOPLASTY OF ARTERIOVENOUS FISTULA Left 4/16/2021    Procedure: PTA, AV FISTULA;  Surgeon: Benji Becerril MD;  Location: [facility] CATH LAB;  Service: Peripheral Vascular;  Laterality: Left;    URETERAL STENT PLACEMENT         Review of patient's allergies indicates:  No Known Allergies    No current facility-administered medications on file prior to encounter.     Current Outpatient Medications on File Prior to Encounter   Medication Sig    atorvastatin (LIPITOR) 40 MG tablet Take 1 tablet (40 mg total) by mouth once daily.    blood sugar diagnostic Strp To check BG 4 times daily, to use with insurance preferred meter    blood-glucose meter kit To check BG 4 times daily, to use with insurance preferred meter, e 11.65    calcium acetate,phosphat bind, (PHOSLO) 667 mg  "capsule SMARTSI Capsule(s) By Mouth    ergocalciferol (ERGOCALCIFEROL) 50,000 unit Cap Take 1 capsule (50,000 Units total) by mouth every 7 days. (Patient taking differently: Take 50,000 Units by mouth every 7 days. Patient takes on )    ferrous sulfate (FEOSOL) 325 mg (65 mg iron) Tab tablet 3 TABS A DAY. (Patient taking differently: every evening. 3 TABS A DAY.)    fluticasone propionate (FLONASE) 50 mcg/actuation nasal spray SHAKE LIQUID AND USE 2 SPRAYS(100 MCG) IN EACH NOSTRIL EVERY DAY    hydrALAZINE (APRESOLINE) 100 MG tablet Take 1 tablet (100 mg total) by mouth 3 (three) times daily.    HYDROcodone-acetaminophen (NORCO) 7.5-325 mg per tablet Take 1 tablet by mouth 3 (three) times daily as needed.    insulin (LANTUS SOLOSTAR U-100 INSULIN) glargine 100 units/mL (3mL) SubQ pen Inject 28-36 units at night.    insulin lispro (HUMALOG KWIKPEN INSULIN) 100 unit/mL pen INJECT 8-12 UNITS EVERY DAY WITH MEALS PLUS SCALE. MAX DAILY50 UNITS    ipratropium-albuteroL (COMBIVENT)  mcg/actuation inhaler Inhale 1 puff into the lungs every 6 (six) hours as needed for Wheezing or Shortness of Breath. Rescue (Patient not taking: Reported on 2022)    lancets Mis To check BG 4 times daily, to use with insurance preferred meter    metoprolol succinate (TOPROL-XL) 25 MG 24 hr tablet Take 0.5 tablets (12.5 mg total) by mouth once daily.    NIFEdipine (PROCARDIA-XL) 90 MG (OSM) 24 hr tablet Take 1 tablet (90 mg total) by mouth once daily.    pantoprazole (PROTONIX) 40 MG tablet TAKE 1 TABLET(40 MG) BY MOUTH EVERY DAY    pen needle, diabetic (BD ULTRA-FINE SHORT PEN NEEDLE) 31 gauge x 5/16" Ndle USE FOUR TIMES DAILY    tamsulosin (FLOMAX) 0.4 mg Cap TAKE ONE CAPSULE BY MOUTH EVERY MORNING    timolol maleate 0.25% (TIMOPTIC) 0.25 % Drop PLACE 1 DROP IN BOTH EYES TWICE DAILY    torsemide (DEMADEX) 20 MG Tab Take 1 tablet (20 mg total) by mouth 2 (two) times daily.    TRUE METRIX GLUCOSE TEST STRIP Strp USE TO " TEST BLOOD SUGAR FOUR TIMES DAILY    [DISCONTINUED] magnesium oxide (MAG-OX) 400 mg (241.3 mg magnesium) tablet TAKE 1 TABLET(400 MG) BY MOUTH EVERY MORNING    [DISCONTINUED] metoprolol succinate (TOPROL-XL) 12.5 MG 24 hr split tablet Metoprolol ER Succinate 25 mg     Family History       Problem Relation (Age of Onset)    Diabetes Brother    Hypertension Brother    Stroke Brother          Tobacco Use    Smoking status: Former     Packs/day: 0.50     Years: 45.00     Pack years: 22.50     Types: Cigarettes     Quit date: 2020     Years since quittin.2    Smokeless tobacco: Never   Substance and Sexual Activity    Alcohol use: Not Currently     Comment: rarely    Drug use: No    Sexual activity: Yes     Comment: single, retired , no kids       ROS  General ROS: negative for weight loss, weight gain, fevers, chills, night sweats  ENT ROS: negative for epistaxis, headaches or sinus pain  Ophthalmic ROS: negative for blurry vision, decreased vision, double vision or itchy eyes  Cardiovascular ROS: negative for chest pain or dyspnea on exertion  Respiratory ROS: negative for cough, shortness of breath, or wheezing  Gastrointestinal ROS: negative for abdominal pain, change in bowel habits, black or bloody stools, nausea, emesis, or bloating  Genito-Urinary ROS: negative for dysuria, trouble voiding, or hematuria  Neurological ROS: negative for TIA or stroke symptoms  Endocrine ROS: negative for hair pattern changes or unexpected weight changes  Musculoskeletal ROS: negative for muscle pain, weakness, joint pain or joint swelling  Skin: negative for rash, wounds, skin breakdown, or issues otherwise  Hematological and Lymphatic ROS: negative for bleeding, bruising, swollen lymph nodes  Psychological ROS: negative for anxiety or depression      Objective:     Vital Signs (Most Recent):  Temp: 98.2 °F (36.8 °C) (22 1347)  Pulse: 71 (22 1418)  Resp: 15 (22 1347)  BP: (!) 169/79 (22  1418)  SpO2: 99 % (09/24/22 1210)   Vital Signs (24h Range):  Temp:  [97.8 °F (36.6 °C)-98.5 °F (36.9 °C)] 98.2 °F (36.8 °C)  Pulse:  [65-71] 71  Resp:  [15-20] 15  SpO2:  [99 %-100 %] 99 %  BP: (137-173)/(72-79) 169/79     Weight: 135.2 kg (298 lb)  Body mass index is 38.26 kg/m².      Physical Exam  Gen: in NAD, appears stated age; pt is obese  Neuro: AAOx4, CN2-12 grossly intact BL; motor, sensory, and strength grossly intact BL  HEENT: NTNC, EOMI, PERRLA, MMM; no thyromegaly or lymphadenopathy; no JVD appreciated  CVS: RRR, no m/r/g; S1/S2 auscultated with no S3 or S4; capillary refill < 2 sec  Resp: lungs CTAB, no w/r/r; no belabored breathing or accessory muscle use appreciated   Abd: BS+ in all 4 quadrants; NTND, soft to palpation; no organomegaly appreciated   Extrem: pulses full, equal, and regular over all 4 extremities; no UE or LE edema BL       Significant Labs: All pertinent labs within the past 24 hours have been reviewed.    Significant Imaging: I have reviewed all pertinent imaging results/findings within the past 24 hours.

## 2022-09-24 NOTE — ED PROVIDER NOTES
Encounter Date: 9/24/2022       History     Chief Complaint   Patient presents with    Vascular Access Problem     Sent from dialysis     8:53 AM  75 y.o. male with morbid obesity and CKD V, LUE brachiocephalic AVF on 12/8/20, who [place] [facility] [place] with [place]F dysfunction.     Patient denies any pain.  Reports feeling okay.  States his last HD was Thursday but they did have issues when he was coming off of dialysis per patient.  Per staff today today, she states that they have been having trouble with good flow for 3 weeks. He has seen outpatient Vascular Surgery. Today they were unable to cannulate the 2nd needle which prompted them to refer patient to [facility] [place] for vascular surgery evaluation.    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     Hyperlipidemia     Hypertension     Kidney stone     Seasonal allergies 6/24/2014     Past Surgical History:   Procedure Laterality Date    AV FISTULA PLACEMENT Left 12/8/2020    Procedure: CREATION, AV FISTULA;  Surgeon: Benji Becerril MD;  Location: [address];  Service: Peripheral Vascular;  Laterality: Left;    CATARACT EXTRACTION W/  INTRAOCULAR LENS IMPLANT Right 04/04/16    Dr Meehan    COLONOSCOPY W/ BIOPSIES      ESOPHAGOGASTRODUODENOSCOPY N/A 6/29/2020    Procedure: EGD (ESOPHAGOGASTRODUODENOSCOPY);  Surgeon: Ravi Leone MD;  Location: [facility];  Service: Endoscopy;  Laterality: N/A;    EYE SURGERY      FISTULOGRAM Left 4/16/2021    Procedure: Fistulogram;  Surgeon: Benji Becerril MD;  Location: [facility] CATH LAB;  Service: Peripheral Vascular;  Laterality: Left;    HEMORRHOID SURGERY      Dr. Root [facility]    lateral internal anal sphincterotomy  12/13/13    Dr. root [facility]    PERCUTANEOUS TRANSLUMINAL ANGIOPLASTY OF  ARTERIOVENOUS FISTULA Left 2021    Procedure: PTA, AV FISTULA;  Surgeon: Benji Becerril MD;  Location: [facility] CATH LAB;  Service: Peripheral Vascular;  Laterality: Left;    URETERAL STENT PLACEMENT       Family History   Problem Relation Age of Onset    Diabetes Brother         type 1    Hypertension Brother     Stroke Brother      Social History     Tobacco Use    Smoking status: Former     Packs/day: 0.50     Years: 45.00     Pack years: 22.50     Types: Cigarettes     Quit date: 2020     Years since quittin.2    Smokeless tobacco: Never   Substance Use Topics    Alcohol use: Not Currently     Comment: rarely    Drug use: No     Review of Systems   Constitutional:  Negative for chills, diaphoresis and fever.   HENT:  Negative for sore throat.    Respiratory:  Negative for cough and shortness of breath.    Cardiovascular:  Negative for chest pain.   Gastrointestinal:  Negative for abdominal pain and nausea.   Genitourinary:  Negative for dysuria.   Musculoskeletal:  Negative for back pain.   Skin:  Negative for rash.   Neurological:  Negative for dizziness, weakness, light-headedness and headaches.   Hematological:  Does not bruise/bleed easily.     Physical Exam     Initial Vitals [22 0819]   BP Pulse Resp Temp SpO2   137/72 65 20 98.5 °F (36.9 °C) 100 %      MAP       --         Physical Exam    Vitals reviewed.  Constitutional: He appears well-developed and well-nourished. He is not diaphoretic. He is cooperative.  Non-toxic appearance. He does not have a sickly appearance. He does not appear ill. No distress. Face mask in place.   HENT:   Head: Normocephalic and atraumatic.   Nose: Nose normal.   Mouth/Throat: No trismus in the jaw.   Eyes: Conjunctivae and EOM are normal.   Neck:   Normal range of motion.  Cardiovascular:            Pulses:       Radial pulses are 2+ on the left side.   Palpable and ascultatable thrill of left AVF. Skin without erythema, edema, induration, bleeding, or  rash.    Pulmonary/Chest: No accessory muscle usage. No tachypnea. No respiratory distress.   Abdominal: He exhibits no distension.   Musculoskeletal:         General: Normal range of motion.      Cervical back: Normal range of motion.     Neurological: He is alert. He has normal strength.   Skin: Skin is dry. No pallor.       ED Course   Procedures  Labs Reviewed   CBC W/ AUTO DIFFERENTIAL - Abnormal; Notable for the following components:       Result Value    RBC 3.75 (*)     Hemoglobin 11.9 (*)     Hematocrit 36.4 (*)     MCH 31.7 (*)     RDW 15.3 (*)     All other components within normal limits   BASIC METABOLIC PANEL - Abnormal; Notable for the following components:    Sodium 134 (*)     Glucose 270 (*)     BUN 44 (*)     Creatinine 7.1 (*)     eGFR 7.4 (*)     All other components within normal limits   ISTAT PROCEDURE - Abnormal; Notable for the following components:    POC Glucose 316 (*)     POC BUN 63 (*)     POC Creatinine 7.5 (*)     POC Sodium 133 (*)     POC Potassium 6.0 (*)     POC Ionized Calcium 0.94 (*)     POC Hematocrit 33 (*)     All other components within normal limits   HIV 1 / 2 ANTIBODY    Narrative:     Release to patient->Immediate   HEPATITIS C ANTIBODY    Narrative:     Release to patient->Immediate   MAGNESIUM   PHOSPHORUS   ISTAT CHEM8          Imaging Results               US Hemodialysis Access (Final result)  Result time 09/24/22 12:26:03      Final result by Haylie Bustamante MD (09/24/22 12:26:03)                   Impression:      Left upper extremity brachiocephalic AV fistula occlusion with associated hypoechoic, expansile, acute thrombosis of the cephalic vein extending proximal to the AV fistula.    This report was flagged in Epic as abnormal.    Electronically signed by resident: Abdelrahman Bishop  Date:    09/24/2022  Time:    12:05    Electronically signed by: Haylie Bustamante MD  Date:    09/24/2022  Time:    12:26               Narrative:    EXAMINATION:  US HEMODIALYSIS  ACCESS    CLINICAL HISTORY:  L AVF dysfunction;    TECHNIQUE:  Limited sonographic images of the left upper extremity were performed to evaluate dialysis access.    COMPARISON:  Vascular ultrasound hemodialysis access 04/29/2021    FINDINGS:  Duplex and color flow Doppler evaluation reveals occlusion of the left upper extremity brachiocephalic AV fistula.  There is occlusive, hypoechoic, expansile acute thrombus of the cephalic vein extending proximally from the surgically created AV fistula into the upper arm.  Flow is identified in the brachial artery.                                       X-Ray Chest PA And Lateral (Final result)  Result time 09/24/22 10:01:47      Final result by Gail Craft MD (09/24/22 10:01:47)                   Impression:      No acute abnormality.      Electronically signed by: Gail Craft MD  Date:    09/24/2022  Time:    10:01               Narrative:    EXAMINATION:  XR CHEST PA AND LATERAL    CLINICAL HISTORY:  Hyperkalemia    TECHNIQUE:  PA and lateral views of the chest were performed.    COMPARISON:  01/14/2021    FINDINGS:  The lungs are clear, with normal appearance of pulmonary vasculature.No pleural effusion.No pneumothorax.    The cardiac silhouette is normal in size. The hilar and mediastinal contours are unremarkable.Calcified atheromatous disease affects the aorta.    Bones are intact.                                       Medications   heparin (porcine) injection 2,000 Units (2,000 Units Intravenous Given 9/24/22 1421)     Medical Decision Making:   History:   Old Medical Records: I decided to obtain old medical records.  Old Records Summarized: records from clinic visits and records from previous admission(s).  Initial Assessment:   75 y.o. male with morbid obesity and CKD V, LUE brachiocephalic AVF on 12/8/20, who presents to [facility] ED with AVF dysfunction.   Differential Diagnosis:   Includes but is not limited to stenosis, occlusion, thrombosis, pseudoaneurysm,  electrolyte abnormalities.  Independently Interpreted Test(s):   I have ordered and independently interpreted EKG Reading(s) - see summary below  Clinical Tests:   Lab Tests: Ordered and Reviewed  Radiological Study: Reviewed and Ordered  Medical Tests: Ordered and Reviewed  ED Management:  Staff at dialysis center has had some difficulty with accessing AV fistula.  Will discuss case with vascular surgery on-call.      I-STAT with hyperkalemia at 6.0.  Will obtain official labs, EKG prior to treatment.  Other:   I have discussed this case with another health care provider.          Attending Attestation:     Physician Attestation Statement for NP/PA:   I discussed this assessment and plan of this patient with the NP/PA, but I did not personally examine the patient. The face to face encounter was performed by the NP/PA.            ED Course as of 09/24/22 1519   Sat Sep 24, 2022   0908 BP: 137/72 [CL]   0908 Temp: 98.5 °F (36.9 °C) [CL]   0908 Pulse: 65 [CL]   0908 Resp: 20 [CL]   0908 SpO2: 100 % [CL]   0931 POC Glucose(!): 316 [CL]   0931 POC BUN(!): 63 [CL]   0931 POC Creatinine(!): 7.5 [CL]   0931 POC Sodium(!): 133 [CL]   0931 POC Potassium(!): 6.0  Will send official labs. Last HD 2 days ago. [CL]   0931 POC Hematocrit(!): 33 [CL]   0931 POC Ionized Calcium(!): 0.94 [CL]   1010 Case discussed with vascular surgery on call.  [CL]   1049 Vascular surgery would like to attempt at access here. Case discussed with Nephrology who will evaluate give recommendations. [CL]   1229 EKG by my independent interpretation is sinus bradycardia rate of 55, there is a right bundle-branch block, which is unchanged from prior. [EB]   1343 WBC: 7.34 [CL]   1343 Hemoglobin(!): 11.9 [CL]   1343 US Hemodialysis Access(!)  Left upper extremity brachiocephalic AV fistula occlusion with associated hypoechoic, expansile, acute thrombosis of the cephalic vein extending proximal to the AV fistula. [CL]   1343 X-Ray Chest PA And Lateral  No  acute abnormality. [CL]   1419 Sodium(!): 134 [CL]   1419 Potassium: 4.5 [CL]   1419 Phosphorus: 3.2 [CL]   1419 Magnesium: 1.7 [CL]      ED Course User Index  [CL] Maritza Vick PA-C  [EB] Laura Mccoy MD          CMP without significant electrolyte abnormalities.  Magnesium and phosphorus within normal limits.  Nephrology and vascular surgery has evaluated patient.  Refer to their consult note.  Nephrology you will try to access AV fistula for HD this afternoon.  Vascular Surgery would like to plan fistulogram procedure.  They both recommend admission to Hospital Medicine for further care.  Case was discussed with Hospital Medicine who will place in observation for further evaluation and management.     I have reviewed patient's chart and discussed this case with my supervising MD.     Maritza Vick PA-C  Emergent Department  [facility]  Spectralink #[id number] or #[id number]    Clinical Impression:   Final diagnoses:  [E87.5] Hyperkalemia  [N18.6] ESRD (end stage renal disease) (Primary)  [T82.858A] AV fistula stenosis, initial encounter      ED Disposition Condition    Observation                   Maritza Vick PA-C  09/24/22 [id number]       Laura Mccoy MD  09/24/22 [id number]

## 2022-09-25 LAB
POCT GLUCOSE: 268 MG/DL (ref 70–110)
POCT GLUCOSE: 352 MG/DL (ref 70–110)
POCT GLUCOSE: 364 MG/DL (ref 70–110)

## 2022-09-25 PROCEDURE — 99226 PR SUBSEQUENT OBSERVATION CARE,LEVEL III: ICD-10-PCS | Mod: ,,, | Performed by: STUDENT IN AN ORGANIZED HEALTH CARE EDUCATION/TRAINING PROGRAM

## 2022-09-25 PROCEDURE — 63600175 PHARM REV CODE 636 W HCPCS: Performed by: STUDENT IN AN ORGANIZED HEALTH CARE EDUCATION/TRAINING PROGRAM

## 2022-09-25 PROCEDURE — 25000003 PHARM REV CODE 250: Performed by: STUDENT IN AN ORGANIZED HEALTH CARE EDUCATION/TRAINING PROGRAM

## 2022-09-25 PROCEDURE — 96372 THER/PROPH/DIAG INJ SC/IM: CPT | Performed by: STUDENT IN AN ORGANIZED HEALTH CARE EDUCATION/TRAINING PROGRAM

## 2022-09-25 PROCEDURE — 99226 PR SUBSEQUENT OBSERVATION CARE,LEVEL III: CPT | Mod: ,,, | Performed by: STUDENT IN AN ORGANIZED HEALTH CARE EDUCATION/TRAINING PROGRAM

## 2022-09-25 PROCEDURE — A4216 STERILE WATER/SALINE, 10 ML: HCPCS | Performed by: STUDENT IN AN ORGANIZED HEALTH CARE EDUCATION/TRAINING PROGRAM

## 2022-09-25 PROCEDURE — G0378 HOSPITAL OBSERVATION PER HR: HCPCS

## 2022-09-25 RX ORDER — NIFEDIPINE 30 MG/1
60 TABLET, EXTENDED RELEASE ORAL 2 TIMES DAILY
Status: DISCONTINUED | OUTPATIENT
Start: 2022-09-25 | End: 2022-09-30 | Stop reason: HOSPADM

## 2022-09-25 RX ADMIN — TORSEMIDE 20 MG: 20 TABLET ORAL at 05:09

## 2022-09-25 RX ADMIN — INSULIN ASPART 6 UNITS: 100 INJECTION, SOLUTION INTRAVENOUS; SUBCUTANEOUS at 08:09

## 2022-09-25 RX ADMIN — ERGOCALCIFEROL 50000 UNITS: 1.25 CAPSULE ORAL at 08:09

## 2022-09-25 RX ADMIN — INSULIN ASPART 10 UNITS: 100 INJECTION, SOLUTION INTRAVENOUS; SUBCUTANEOUS at 04:09

## 2022-09-25 RX ADMIN — INSULIN ASPART 10 UNITS: 100 INJECTION, SOLUTION INTRAVENOUS; SUBCUTANEOUS at 11:09

## 2022-09-25 RX ADMIN — CALCIUM ACETATE 667 MG: 667 CAPSULE ORAL at 05:09

## 2022-09-25 RX ADMIN — NIFEDIPINE 60 MG: 30 TABLET, FILM COATED, EXTENDED RELEASE ORAL at 08:09

## 2022-09-25 RX ADMIN — HYDRALAZINE HYDROCHLORIDE 100 MG: 50 TABLET ORAL at 02:09

## 2022-09-25 RX ADMIN — CALCIUM ACETATE 667 MG: 667 CAPSULE ORAL at 08:09

## 2022-09-25 RX ADMIN — HEPARIN SODIUM 5000 UNITS: 5000 INJECTION INTRAVENOUS; SUBCUTANEOUS at 06:09

## 2022-09-25 RX ADMIN — ATORVASTATIN CALCIUM 40 MG: 40 TABLET, FILM COATED ORAL at 08:09

## 2022-09-25 RX ADMIN — TIMOLOL MALEATE 1 DROP: 2.5 SOLUTION/ DROPS OPHTHALMIC at 08:09

## 2022-09-25 RX ADMIN — Medication 10 ML: at 06:09

## 2022-09-25 RX ADMIN — PANTOPRAZOLE SODIUM 40 MG: 40 TABLET, DELAYED RELEASE ORAL at 08:09

## 2022-09-25 RX ADMIN — TIMOLOL MALEATE 1 DROP: 2.5 SOLUTION/ DROPS OPHTHALMIC at 09:09

## 2022-09-25 RX ADMIN — FERROUS SULFATE TAB 325 MG (65 MG ELEMENTAL FE) 1 EACH: 325 (65 FE) TAB at 08:09

## 2022-09-25 RX ADMIN — METOPROLOL SUCCINATE 12.5 MG: 25 TABLET, EXTENDED RELEASE ORAL at 08:09

## 2022-09-25 RX ADMIN — Medication 10 ML: at 02:09

## 2022-09-25 RX ADMIN — HEPARIN SODIUM 5000 UNITS: 5000 INJECTION INTRAVENOUS; SUBCUTANEOUS at 02:09

## 2022-09-25 RX ADMIN — HYDRALAZINE HYDROCHLORIDE 100 MG: 50 TABLET ORAL at 08:09

## 2022-09-25 RX ADMIN — TAMSULOSIN HYDROCHLORIDE 0.4 MG: 0.4 CAPSULE ORAL at 06:09

## 2022-09-25 RX ADMIN — TORSEMIDE 20 MG: 20 TABLET ORAL at 08:09

## 2022-09-25 RX ADMIN — HEPARIN SODIUM 5000 UNITS: 5000 INJECTION INTRAVENOUS; SUBCUTANEOUS at 10:09

## 2022-09-25 RX ADMIN — INSULIN ASPART 2 UNITS: 100 INJECTION, SOLUTION INTRAVENOUS; SUBCUTANEOUS at 08:09

## 2022-09-25 RX ADMIN — NIFEDIPINE 90 MG: 30 TABLET, FILM COATED, EXTENDED RELEASE ORAL at 08:09

## 2022-09-25 RX ADMIN — Medication 10 ML: at 10:09

## 2022-09-25 RX ADMIN — CALCIUM ACETATE 667 MG: 667 CAPSULE ORAL at 11:09

## 2022-09-25 NOTE — SUBJECTIVE & OBJECTIVE
Medications:  Continuous Infusions:  Scheduled Meds:   atorvastatin  40 mg Oral Daily    calcium acetate(phosphat bind)  667 mg Oral TID WM    ergocalciferol  50,000 Units Oral Q7 Days    ferrous sulfate  1 tablet Oral Daily    heparin (porcine)  5,000 Units Subcutaneous Q8H    hydrALAZINE  100 mg Oral TID    insulin detemir U-100  28 Units Subcutaneous QHS    metoprolol succinate  12.5 mg Oral Daily    NIFEdipine  90 mg Oral Daily    pantoprazole  40 mg Oral Daily    sodium chloride 0.9%  10 mL Intravenous Q8H    tamsulosin  1 capsule Oral QAM    timolol maleate 0.25%  1 drop Both Eyes BID    torsemide  20 mg Oral BID     PRN Meds:acetaminophen, albuterol-ipratropium, aluminum-magnesium hydroxide-simethicone, dextrose 10%, dextrose 10%, glucagon (human recombinant), glucose, glucose, insulin aspart U-100, melatonin, naloxone, ondansetron, polyethylene glycol, prochlorperazine, simethicone     Objective:     Vital Signs (Most Recent):  Temp: 98.5 °F (36.9 °C) (09/25/22 0404)  Pulse: 74 (09/25/22 0613)  Resp: 18 (09/25/22 0404)  BP: (!) 156/78 (09/25/22 0807)  SpO2: 99 % (09/25/22 0404)   Vital Signs (24h Range):  Temp:  [97.8 °F (36.6 °C)-98.5 °F (36.9 °C)] 98.5 °F (36.9 °C)  Pulse:  [57-75] 74  Resp:  [15-18] 18  SpO2:  [98 %-99 %] 99 %  BP: (131-174)/(63-96) 156/78         Physical Exam  Constitutional:       Appearance: Normal appearance.   HENT:      Head: Normocephalic and atraumatic.      Nose: Nose normal.      Mouth/Throat:      Mouth: Mucous membranes are moist.   Cardiovascular:      Rate and Rhythm: Normal rate and regular rhythm.      Pulses:           Radial pulses are 2+ on the right side and 2+ on the left side.   Pulmonary:      Effort: Pulmonary effort is normal.      Breath sounds: Normal breath sounds.   Abdominal:      General: There is no distension.      Palpations: Abdomen is soft.   Musculoskeletal:      Cervical back: Normal range of motion.      Comments: KAUSHIK LOZA, pulsative, thrill felt  at arterial anastomoss only. Tender to palpation. Not bleeding.    Skin:     General: Skin is warm and dry.   Neurological:      General: No focal deficit present.      Mental Status: He is alert and oriented to person, place, and time.       Significant Labs:  CBC:   Recent Labs   Lab 09/24/22  1250   WBC 7.34   RBC 3.75*   HGB 11.9*   HCT 36.4*      MCV 97   MCH 31.7*   MCHC 32.7     CMP:   Recent Labs   Lab 09/24/22  1250   *   CALCIUM 8.7   *   K 4.5   CO2 23   CL 97   BUN 44*   CREATININE 7.1*       Significant Diagnostics:  I have reviewed all pertinent imaging results/findings within the past 24 hours.

## 2022-09-25 NOTE — HOSPITAL COURSE
Pt admitted to [facility] and was able to be successfully dialyzed. Vascular surgery originally planned for angiogram on 9/27 but had to be re-scheduled to 9/28.  HD to be done after fistulagram.

## 2022-09-25 NOTE — SUBJECTIVE & OBJECTIVE
Interval History: Pt seen and examined this morning on rounds. AALIYAH. Remains grossly asymptomatic. Agreeable to remaining inpatient until Tuesday for fistulogram, as he does not want any further issues to complicate his HD needs. Care plan reviewed. Otherwise, doing well and with no further complaints at this time.      Objective:     Vital Signs (Most Recent):  Temp: 98.5 °F (36.9 °C) (09/25/22 0404)  Pulse: 73 (09/25/22 1104)  Resp: 18 (09/25/22 0404)  BP: (!) 156/78 (09/25/22 0807)  SpO2: 99 % (09/25/22 0404)   Vital Signs (24h Range):  Temp:  [97.8 °F (36.6 °C)-98.5 °F (36.9 °C)] 98.5 °F (36.9 °C)  Pulse:  [57-75] 73  Resp:  [15-18] 18  SpO2:  [98 %-99 %] 99 %  BP: (131-174)/(63-96) 156/78     Weight: (!) 136.3 kg (300 lb 7.8 oz)  Body mass index is 38.58 kg/m².    Intake/Output Summary (Last 24 hours) at 9/25/2022 1216  Last data filed at 9/25/2022 0807  Gross per 24 hour   Intake 850 ml   Output 3926 ml   Net -3076 ml        Physical Exam  Gen: in NAD, appears stated age; pt is obese  Neuro: AAOx4, CN2-12 grossly intact BL; motor, sensory, and strength grossly intact BL  HEENT: NTNC, EOMI, PERRLA, MMM; no thyromegaly or lymphadenopathy; no JVD appreciated  CVS: RRR, no m/r/g; S1/S2 auscultated with no S3 or S4; capillary refill < 2 sec  Resp: lungs CTAB, no w/r/r; no belabored breathing or accessory muscle use appreciated   Abd: BS+ in all 4 quadrants; NTND, soft to palpation; no organomegaly appreciated   Extrem: pulses full, equal, and regular over all 4 extremities; no UE or LE edema BL       Significant Labs: All pertinent labs within the past 24 hours have been reviewed.    Significant Imaging: I have reviewed all pertinent imaging results/findings within the past 24 hours.

## 2022-09-25 NOTE — ED NOTES
Patient identifiers for Vinnie Siegel 77 y.o. male checked and correct.  Chief Complaint   Patient presents with    Vascular Access Problem     Sent from dialysis     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     Hyperlipidemia     Hypertension     Kidney stone     Seasonal allergies 6/24/2014     Allergies reported: Review of patient's allergies indicates:  No Known Allergies      LOC: Patient is awake, alert, and aware of environment with an appropriate affect. Patient is oriented x 4 and speaking appropriately.  APPEARANCE: Patient resting comfortably and in no acute distress. Patient is clean and well groomed, patient's clothing is properly fastened.  HEENT: Pt presents with surgical mask on. Pt has a toothpick in mouth, when asked if he could take it out, pt declined.   SKIN: The skin is warm and dry. Patient has normal skin turgor and moist mucus membranes.   MUSKULOSKELETAL: Patient is moving all extremities well, generalized weakness, pts baseline. Reports he uses a cane to ambulate but does not have a cane with him today. No obvious deformities noted. Pulses intact. Vascular access to LUE  RESPIRATORY: Airway is open and patent. Respirations are spontaneous and non-labored with normal effort and rate.  CARDIAC: Patient has a normal rate and rhythm. 75 on cardiac monitor. No peripheral edema noted.   ABDOMEN: No distention noted. Soft and non-tender upon palpation.  NEUROLOGICAL: Facial expression is symmetrical. Hand grasps are equal bilaterally. Normal sensation in all extremities when touched with finger.

## 2022-09-25 NOTE — PROGRESS NOTES
[facility] [address]  [facility]  Progress Note    Patient Name: Vinnie Siegel  MRN: [id number]  Patient Class: OP- Observation   Admission Date: 9/24/2022  Length of Stay: 0 days  Attending Physician: Scottie Smiley MD  Primary Care Provider: Janeth Walter MD        Subjective:     Principal Problem:AV fistula occlusion, initial encounter        HPI:  Vinnie Siegel is a 78yo AAM with of PMH of ESRD (HD TTS via left brachiocephalic AVF), HTN, HLD, DM2, anemia of chronic renal failure, GERD, BPH, glaucoma, and obesity who presented to the [facility] ED on 9/24/22 with a malfunctioning AVF. Pt reported that his HD center has been experiencing issues with his AVF for the past several weeks, and during his most recent session, there were issues with flow during the end of HD. On day of admission, HD center was unable to get any flow, and sent him to the ER. Pt is grossly asymptomatic, denies F/C/N/V/D/C, HA, SOB, CP, palpitations, abdominal pain, dysuria, extremity swelling, or symptoms otherwise.    In the ED, vitals significant for /74. Labs remarkable for glucose 270, though were otherwise unremarkable and consistent with known ESRD status. CXR without any acute cardiopulmonary processes. US of AVF showed occlusion with associated hypoechoic, expansile, acute thrombosis of the cephalic vein extending proximal to the AV fistula. Vascular surgery consulted, planning for upcoming fistulogram. Nephrology consulted, and were able to cannulate AVF for HD session. Hospital medicine was consulted for admission and further management.      Overview/Hospital Course:  [place] [facility] and was able to be successfully dialyzed. Vascular surgery planning for angiogram on 9/27.      Interval History: Pt seen and examined this morning on lulu FISCHER. Remains grossly asymptomatic. Agreeable to remaining inpatient until Tuesday for fistulogram, as he does not want any further issues to complicate  his HD needs. Care plan reviewed. Otherwise, doing well and with no further complaints at this time.      Objective:     Vital Signs (Most Recent):  Temp: 98.5 °F (36.9 °C) (09/25/22 0404)  Pulse: 73 (09/25/22 1104)  Resp: 18 (09/25/22 0404)  BP: (!) 156/78 (09/25/22 0807)  SpO2: 99 % (09/25/22 0404)   Vital Signs (24h Range):  Temp:  [97.8 °F (36.6 °C)-98.5 °F (36.9 °C)] 98.5 °F (36.9 °C)  Pulse:  [57-75] 73  Resp:  [15-18] 18  SpO2:  [98 %-99 %] 99 %  BP: (131-174)/(63-96) 156/78     Weight: (!) 136.3 kg (300 lb 7.8 oz)  Body mass index is 38.58 kg/m².    Intake/Output Summary (Last 24 hours) at 9/25/2022 1216  Last data filed at 9/25/2022 0807  Gross per 24 hour   Intake 850 ml   Output 3926 ml   Net -3076 ml        Physical Exam  Gen: in NAD, appears stated age; pt is obese  Neuro: AAOx4, CN2-12 grossly intact BL; motor, sensory, and strength grossly intact BL  HEENT: NTNC, EOMI, PERRLA, MMM; no thyromegaly or lymphadenopathy; no JVD appreciated  CVS: RRR, no m/r/g; S1/S2 auscultated with no S3 or S4; capillary refill < 2 sec  Resp: lungs CTAB, no w/r/r; no belabored breathing or accessory muscle use appreciated   Abd: BS+ in all 4 quadrants; NTND, soft to palpation; no organomegaly appreciated   Extrem: pulses full, equal, and regular over all 4 extremities; no UE or LE edema BL       Significant Labs: All pertinent labs within the past 24 hours have been reviewed.    Significant Imaging: I have reviewed all pertinent imaging results/findings within the past 24 hours.      Assessment/Plan:      * AV fistula occlusion, initial encounter  - Interval history and physical exam findings as described above  - AVF US findings as documented  - Vascular surgery consulted, planning for upcoming fistulogram on 9/27  - Nephrology consulted, and were able to cannulate AVF for HD session  - Continuing to monitor    ESRD on hemodialysis  - Interval history and physical exam findings as described above  - HD TTS via left  brachiocephalic AVF  - Nephology consulted, appreciate assistance  - Further decision for HD per nephology  - Renally dosing all medications  - Avoid nephrotoxins  - Will continue to monitor on daily labs    Hypertension associated with type 2 diabetes mellitus  - Working to optimize BP control  - Continue home regimen of hydralazine, metoprolol, nifedipine, and torsemide  - Will continue to monitor and further titrate antihypertensives as clinically indicated     Mixed diabetic hyperlipidemia associated with type 2 diabetes mellitus  - Continue home statin regimen    Type 2 diabetes mellitus with chronic kidney disease on chronic dialysis, with long-term current use of insulin  - Working to optimize BG control  - Levemir 28u qHS  - SSI provided for corrective dosing  - DXTs as ordered  - Hypoglycemic protocol in effect  - Diabetic diet provided    Anemia in ESRD (end-stage renal disease)  - H/H stable near baseline  - Continue home iron supplementation regimen  - Will continue to monitor on daily labs    Gastroesophageal reflux disease without esophagitis  - Currently asymptomatic  - Continue home PPI regimen    Benign prostatic hyperplasia without lower urinary tract symptoms  - Continue home tamsulosin regimen    Primary open angle glaucoma (POAG) of both eyes, indeterminate stage  - Continue home eye drop regimen    Class 2 severe obesity due to excess calories with serious comorbidity and body mass index (BMI) of 38.0 to 38.9 in adult  - Body mass index is 38.26 kg/m².  - Needs dietary and lifestyle modifications in relation to health  - Consider dietary/nutrition consult outpatient      VTE Risk Mitigation (From admission, onward)         Ordered     heparin (porcine) injection 5,000 Units  Every 8 hours         09/24/22 1526     IP VTE HIGH RISK PATIENT  Once         09/24/22 1526     Place sequential compression device  Until discontinued         09/24/22 1526                Discharge Planning   HERMAN:  9/27/2022     Code Status: Full Code   Is the patient medically ready for discharge?: No    Reason for patient still in hospital (select all that apply): Patient trending condition             Scottie Smiley MD  Attending Physician  Department of Hospital Medicine  Epic secure chat preferred, or ext. [phone]  9/25/2022

## 2022-09-25 NOTE — ED NOTES
Pt vocalized frustration that he wasn't sent to room, explained that pt couldn't be sent to room with receiving nurse to receive report, pt changed into a gown, given three blankets, and assisted into a bed. Pt belongings given to caretakerVanesa.

## 2022-09-25 NOTE — ASSESSMENT & PLAN NOTE
- Interval history and physical exam findings as described above  - AVF US findings as documented  - Vascular surgery consulted, planning for upcoming fistulogram on 9/27  - Nephrology consulted, and were able to cannulate AVF for HD session  - Continuing to monitor

## 2022-09-25 NOTE — PLAN OF CARE
Problem: Hemodynamic Instability (Hemodialysis)  Goal: Effective Tissue Perfusion  Outcome: Ongoing, Progressing     Dialysis tx ended to the left arm AV fistula, no complaints.    Net fluid removed: 3L    Report called to nurse Kyara. Pt transported from [facility] to [facility]U 03

## 2022-09-25 NOTE — ED NOTES
Tele box 1903 applied to pt. Dona in war room states able to see pt on monitor, rhythm NSR with HR 67.

## 2022-09-25 NOTE — ASSESSMENT & PLAN NOTE
Vinnie Siegel is a 77 y.o. man with past medical history significant for end-stage renal disease on hemodialysis Tuesday, Thursday, Saturday via left brachiocephalic AV fistula who presents with issues with dialysis access.    Based on patient's history and physical exam, does appear that the patient has an outflow stenosis making access to his fistula difficult. Patient dialysed yesterday via AVF, completed HD without issue.       Plan  - Plan for fistulagram on Tuesday   - From vasc surgery perspective, patient can go home  - Would need to coordinate timing of fistulagram with HD [facility]

## 2022-09-25 NOTE — PROGRESS NOTES
[facility] - Telemetry Stepdown ([place] [address])  Vascular Surgery  Progress Note    Patient Name: Vinnie Siegel  MRN: [id number]  Admission Date: 9/24/2022  Primary Care Provider: Janeth Walter MD    Subjective:     Interval History: NAEOn patient seen this morning resting in bed. LUE AVF pulsatile, tender to palpation, HD completed yesterday via AVF, dressing removed, no bleeding seen. Patient can follow up outpatient for fistulagram on Tuesday 9/27. Ok for DC per vascular.    Post-Op Info:  * No surgery found *           Medications:  Continuous Infusions:  Scheduled Meds:   atorvastatin  40 mg Oral Daily    calcium acetate(phosphat bind)  667 mg Oral TID WM    ergocalciferol  50,000 Units Oral Q7 Days    ferrous sulfate  1 tablet Oral Daily    heparin (porcine)  5,000 Units Subcutaneous Q8H    hydrALAZINE  100 mg Oral TID    insulin detemir U-100  28 Units Subcutaneous QHS    metoprolol succinate  12.5 mg Oral Daily    NIFEdipine  90 mg Oral Daily    pantoprazole  40 mg Oral Daily    sodium chloride 0.9%  10 mL Intravenous Q8H    tamsulosin  1 capsule Oral QAM    timolol maleate 0.25%  1 drop Both Eyes BID    torsemide  20 mg Oral BID     PRN Meds:acetaminophen, albuterol-ipratropium, aluminum-magnesium hydroxide-simethicone, dextrose 10%, dextrose 10%, glucagon (human recombinant), glucose, glucose, insulin aspart U-100, melatonin, naloxone, ondansetron, polyethylene glycol, prochlorperazine, simethicone     Objective:     Vital Signs (Most Recent):  Temp: 98.5 °F (36.9 °C) (09/25/22 0404)  Pulse: 74 (09/25/22 0613)  Resp: 18 (09/25/22 0404)  BP: (!) 156/78 (09/25/22 0807)  SpO2: 99 % (09/25/22 0404)   Vital Signs (24h Range):  Temp:  [97.8 °F (36.6 °C)-98.5 °F (36.9 °C)] 98.5 °F (36.9 °C)  Pulse:  [57-75] 74  Resp:  [15-18] 18  SpO2:  [98 %-99 %] 99 %  BP: (131-174)/(63-96) 156/78         Physical Exam  Constitutional:       Appearance: Normal appearance.   HENT:      Head: Normocephalic and  atraumatic.      Nose: Nose normal.      Mouth/Throat:      Mouth: Mucous membranes are moist.   Cardiovascular:      Rate and Rhythm: Normal rate and regular rhythm.      Pulses:           Radial pulses are 2+ on the right side and 2+ on the left side.   Pulmonary:      Effort: Pulmonary effort is normal.      Breath sounds: Normal breath sounds.   Abdominal:      General: There is no distension.      Palpations: Abdomen is soft.   Musculoskeletal:      Cervical back: Normal range of motion.      Comments: LUE AVF, pulsative, thrill felt at arterial anastomoss only. Tender to palpation. Not bleeding.    Skin:     General: Skin is warm and dry.   Neurological:      General: No focal deficit present.      Mental Status: He is alert and oriented to person, place, and time.       Significant Labs:  CBC:   Recent Labs   Lab 09/24/22  1250   WBC 7.34   RBC 3.75*   HGB 11.9*   HCT 36.4*      MCV 97   MCH 31.7*   MCHC 32.7     CMP:   Recent Labs   Lab 09/24/22  1250   *   CALCIUM 8.7   *   K 4.5   CO2 23   CL 97   BUN 44*   CREATININE 7.1*       Significant Diagnostics:  I have reviewed all pertinent imaging results/findings within the past 24 hours.    Assessment/Plan:     * AV fistula occlusion, initial encounter  Vinnie Siegel is a 77 y.o. man with past medical history significant for end-stage renal disease on hemodialysis Tuesday, Thursday, Saturday via left brachiocephalic AV fistula who presents with issues with dialysis access.    Based on patient's history and physical exam, does appear that the patient has an outflow stenosis making access to his fistula difficult. Patient dialysed yesterday via AVF, completed HD without issue.       Plan  - Plan for fistulagram on Tuesday   - From vasc surgery perspective, patient can go home  - Would need to coordinate timing of fistulagram with HD as patient receives Angelica Cole MD  Vascular Surgery  [facility] - Telemetry  Stepdown ([place]-[facility])

## 2022-09-26 PROBLEM — M89.9 CHRONIC KIDNEY DISEASE-MINERAL AND BONE DISORDER: Status: ACTIVE | Noted: 2022-09-26

## 2022-09-26 PROBLEM — E83.9 CHRONIC KIDNEY DISEASE-MINERAL AND BONE DISORDER: Status: ACTIVE | Noted: 2022-09-26

## 2022-09-26 PROBLEM — N18.9 CHRONIC KIDNEY DISEASE-MINERAL AND BONE DISORDER: Status: ACTIVE | Noted: 2022-09-26

## 2022-09-26 LAB
ALBUMIN SERPL BCP-MCNC: 3.2 G/DL (ref 3.5–5.2)
ALP SERPL-CCNC: 61 U/L (ref 55–135)
ALT SERPL W/O P-5'-P-CCNC: 10 U/L (ref 10–44)
ANION GAP SERPL CALC-SCNC: 13 MMOL/L (ref 8–16)
AST SERPL-CCNC: 8 U/L (ref 10–40)
BASOPHILS # BLD AUTO: 0.03 K/UL (ref 0–0.2)
BASOPHILS NFR BLD: 0.4 % (ref 0–1.9)
BILIRUB SERPL-MCNC: 0.5 MG/DL (ref 0.1–1)
BUN SERPL-MCNC: 48 MG/DL (ref 8–23)
CALCIUM SERPL-MCNC: 8.6 MG/DL (ref 8.7–10.5)
CHLORIDE SERPL-SCNC: 101 MMOL/L (ref 95–110)
CO2 SERPL-SCNC: 22 MMOL/L (ref 23–29)
CREAT SERPL-MCNC: 7.8 MG/DL (ref 0.5–1.4)
DIFFERENTIAL METHOD: ABNORMAL
EOSINOPHIL # BLD AUTO: 0.1 K/UL (ref 0–0.5)
EOSINOPHIL NFR BLD: 1.9 % (ref 0–8)
ERYTHROCYTE [DISTWIDTH] IN BLOOD BY AUTOMATED COUNT: 15.5 % (ref 11.5–14.5)
EST. GFR  (NO RACE VARIABLE): 6.6 ML/MIN/1.73 M^2
GLUCOSE SERPL-MCNC: 241 MG/DL (ref 70–110)
HCT VFR BLD AUTO: 30.3 % (ref 40–54)
HGB BLD-MCNC: 10 G/DL (ref 14–18)
IMM GRANULOCYTES # BLD AUTO: 0.06 K/UL (ref 0–0.04)
IMM GRANULOCYTES NFR BLD AUTO: 0.9 % (ref 0–0.5)
LYMPHOCYTES # BLD AUTO: 1.6 K/UL (ref 1–4.8)
LYMPHOCYTES NFR BLD: 23.8 % (ref 18–48)
MAGNESIUM SERPL-MCNC: 1.6 MG/DL (ref 1.6–2.6)
MCH RBC QN AUTO: 31 PG (ref 27–31)
MCHC RBC AUTO-ENTMCNC: 33 G/DL (ref 32–36)
MCV RBC AUTO: 94 FL (ref 82–98)
MONOCYTES # BLD AUTO: 0.7 K/UL (ref 0.3–1)
MONOCYTES NFR BLD: 10 % (ref 4–15)
NEUTROPHILS # BLD AUTO: 4.4 K/UL (ref 1.8–7.7)
NEUTROPHILS NFR BLD: 63 % (ref 38–73)
NRBC BLD-RTO: 0 /100 WBC
PHOSPHATE SERPL-MCNC: 3.9 MG/DL (ref 2.7–4.5)
PLATELET # BLD AUTO: 181 K/UL (ref 150–450)
PMV BLD AUTO: 9.9 FL (ref 9.2–12.9)
POCT GLUCOSE: 220 MG/DL (ref 70–110)
POCT GLUCOSE: 221 MG/DL (ref 70–110)
POCT GLUCOSE: 236 MG/DL (ref 70–110)
POCT GLUCOSE: 295 MG/DL (ref 70–110)
POTASSIUM SERPL-SCNC: 4.4 MMOL/L (ref 3.5–5.1)
PROT SERPL-MCNC: 6.5 G/DL (ref 6–8.4)
RBC # BLD AUTO: 3.23 M/UL (ref 4.6–6.2)
SODIUM SERPL-SCNC: 136 MMOL/L (ref 136–145)
WBC # BLD AUTO: 6.9 K/UL (ref 3.9–12.7)

## 2022-09-26 PROCEDURE — 99214 PR OFFICE/OUTPT VISIT, EST, LEVL IV, 30-39 MIN: ICD-10-PCS | Mod: ,,, | Performed by: NURSE PRACTITIONER

## 2022-09-26 PROCEDURE — 25000003 PHARM REV CODE 250: Performed by: STUDENT IN AN ORGANIZED HEALTH CARE EDUCATION/TRAINING PROGRAM

## 2022-09-26 PROCEDURE — 93990 DOPPLER FLOW TESTING: CPT

## 2022-09-26 PROCEDURE — 84100 ASSAY OF PHOSPHORUS: CPT | Performed by: STUDENT IN AN ORGANIZED HEALTH CARE EDUCATION/TRAINING PROGRAM

## 2022-09-26 PROCEDURE — 85025 COMPLETE CBC W/AUTO DIFF WBC: CPT | Performed by: STUDENT IN AN ORGANIZED HEALTH CARE EDUCATION/TRAINING PROGRAM

## 2022-09-26 PROCEDURE — G0378 HOSPITAL OBSERVATION PER HR: HCPCS

## 2022-09-26 PROCEDURE — 63600175 PHARM REV CODE 636 W HCPCS: Performed by: STUDENT IN AN ORGANIZED HEALTH CARE EDUCATION/TRAINING PROGRAM

## 2022-09-26 PROCEDURE — 94761 N-INVAS EAR/PLS OXIMETRY MLT: CPT

## 2022-09-26 PROCEDURE — 96372 THER/PROPH/DIAG INJ SC/IM: CPT | Performed by: INTERNAL MEDICINE

## 2022-09-26 PROCEDURE — 83735 ASSAY OF MAGNESIUM: CPT | Performed by: STUDENT IN AN ORGANIZED HEALTH CARE EDUCATION/TRAINING PROGRAM

## 2022-09-26 PROCEDURE — 99225 PR SUBSEQUENT OBSERVATION CARE,LEVEL II: CPT | Mod: 95,,, | Performed by: INTERNAL MEDICINE

## 2022-09-26 PROCEDURE — 99225 PR SUBSEQUENT OBSERVATION CARE,LEVEL II: ICD-10-PCS | Mod: 95,,, | Performed by: INTERNAL MEDICINE

## 2022-09-26 PROCEDURE — 80053 COMPREHEN METABOLIC PANEL: CPT | Performed by: STUDENT IN AN ORGANIZED HEALTH CARE EDUCATION/TRAINING PROGRAM

## 2022-09-26 PROCEDURE — 96372 THER/PROPH/DIAG INJ SC/IM: CPT | Performed by: STUDENT IN AN ORGANIZED HEALTH CARE EDUCATION/TRAINING PROGRAM

## 2022-09-26 PROCEDURE — 36415 COLL VENOUS BLD VENIPUNCTURE: CPT | Performed by: STUDENT IN AN ORGANIZED HEALTH CARE EDUCATION/TRAINING PROGRAM

## 2022-09-26 PROCEDURE — 63600175 PHARM REV CODE 636 W HCPCS: Performed by: INTERNAL MEDICINE

## 2022-09-26 PROCEDURE — A4216 STERILE WATER/SALINE, 10 ML: HCPCS | Performed by: STUDENT IN AN ORGANIZED HEALTH CARE EDUCATION/TRAINING PROGRAM

## 2022-09-26 PROCEDURE — 99214 OFFICE O/P EST MOD 30 MIN: CPT | Mod: ,,, | Performed by: NURSE PRACTITIONER

## 2022-09-26 RX ORDER — SODIUM CHLORIDE 9 MG/ML
INJECTION, SOLUTION INTRAVENOUS ONCE
Status: COMPLETED | OUTPATIENT
Start: 2022-09-27 | End: 2022-09-29

## 2022-09-26 RX ORDER — INSULIN ASPART 100 [IU]/ML
7 INJECTION, SOLUTION INTRAVENOUS; SUBCUTANEOUS
Status: DISCONTINUED | OUTPATIENT
Start: 2022-09-26 | End: 2022-09-30 | Stop reason: HOSPADM

## 2022-09-26 RX ADMIN — CALCIUM ACETATE 667 MG: 667 CAPSULE ORAL at 08:09

## 2022-09-26 RX ADMIN — TIMOLOL MALEATE 1 DROP: 2.5 SOLUTION/ DROPS OPHTHALMIC at 09:09

## 2022-09-26 RX ADMIN — Medication 10 ML: at 10:09

## 2022-09-26 RX ADMIN — APIXABAN 2.5 MG: 2.5 TABLET, FILM COATED ORAL at 08:09

## 2022-09-26 RX ADMIN — HYDRALAZINE HYDROCHLORIDE 100 MG: 50 TABLET ORAL at 02:09

## 2022-09-26 RX ADMIN — TORSEMIDE 20 MG: 20 TABLET ORAL at 05:09

## 2022-09-26 RX ADMIN — NIFEDIPINE 60 MG: 30 TABLET, FILM COATED, EXTENDED RELEASE ORAL at 08:09

## 2022-09-26 RX ADMIN — TORSEMIDE 20 MG: 20 TABLET ORAL at 08:09

## 2022-09-26 RX ADMIN — INSULIN ASPART 7 UNITS: 100 INJECTION, SOLUTION INTRAVENOUS; SUBCUTANEOUS at 05:09

## 2022-09-26 RX ADMIN — FERROUS SULFATE TAB 325 MG (65 MG ELEMENTAL FE) 1 EACH: 325 (65 FE) TAB at 08:09

## 2022-09-26 RX ADMIN — HYDRALAZINE HYDROCHLORIDE 100 MG: 50 TABLET ORAL at 08:09

## 2022-09-26 RX ADMIN — INSULIN ASPART 6 UNITS: 100 INJECTION, SOLUTION INTRAVENOUS; SUBCUTANEOUS at 05:09

## 2022-09-26 RX ADMIN — CALCIUM ACETATE 667 MG: 667 CAPSULE ORAL at 05:09

## 2022-09-26 RX ADMIN — ATORVASTATIN CALCIUM 40 MG: 40 TABLET, FILM COATED ORAL at 08:09

## 2022-09-26 RX ADMIN — APIXABAN 2.5 MG: 2.5 TABLET, FILM COATED ORAL at 01:09

## 2022-09-26 RX ADMIN — INSULIN ASPART 4 UNITS: 100 INJECTION, SOLUTION INTRAVENOUS; SUBCUTANEOUS at 08:09

## 2022-09-26 RX ADMIN — TAMSULOSIN HYDROCHLORIDE 0.4 MG: 0.4 CAPSULE ORAL at 06:09

## 2022-09-26 RX ADMIN — INSULIN ASPART 2 UNITS: 100 INJECTION, SOLUTION INTRAVENOUS; SUBCUTANEOUS at 09:09

## 2022-09-26 RX ADMIN — HEPARIN SODIUM 5000 UNITS: 5000 INJECTION INTRAVENOUS; SUBCUTANEOUS at 06:09

## 2022-09-26 RX ADMIN — CALCIUM ACETATE 667 MG: 667 CAPSULE ORAL at 12:09

## 2022-09-26 RX ADMIN — PANTOPRAZOLE SODIUM 40 MG: 40 TABLET, DELAYED RELEASE ORAL at 08:09

## 2022-09-26 RX ADMIN — TIMOLOL MALEATE 1 DROP: 2.5 SOLUTION/ DROPS OPHTHALMIC at 08:09

## 2022-09-26 RX ADMIN — Medication 10 ML: at 01:09

## 2022-09-26 RX ADMIN — INSULIN ASPART 4 UNITS: 100 INJECTION, SOLUTION INTRAVENOUS; SUBCUTANEOUS at 12:09

## 2022-09-26 RX ADMIN — METOPROLOL SUCCINATE 12.5 MG: 25 TABLET, EXTENDED RELEASE ORAL at 08:09

## 2022-09-26 RX ADMIN — Medication 10 ML: at 06:09

## 2022-09-26 NOTE — SUBJECTIVE & OBJECTIVE
This encounter was provided through telemedicine.  Patient was transferred to the telemedicine service on:  09/26/2022   The patient location is: [id number] A admitted 9/24/2022  8:30 AM.    Interval History/Overnight Events:   Clinical record since admit reviewed.  Patient able to provide history.    Uneventful night; no pain to LUE fistula; he is very frustrated about not having fistulagram on 9/27 as previously discussed; nursing to assist with shower; inpatient HD also had trouble accessing fistula per patient    Review of Systems   Constitutional:  Negative for activity change, fatigue and fever.   Respiratory:  Negative for shortness of breath.    Gastrointestinal:  Negative for diarrhea and vomiting.   Musculoskeletal:  Negative for arthralgias and myalgias.   Psychiatric/Behavioral:  Positive for dysphoric mood. The patient is nervous/anxious.       Inpatient Medications:  Scheduled Meds:   apixaban  2.5 mg Oral BID    atorvastatin  40 mg Oral Daily    calcium acetate(phosphat bind)  667 mg Oral TID WM    ergocalciferol  50,000 Units Oral Q7 Days    ferrous sulfate  1 tablet Oral Daily    hydrALAZINE  100 mg Oral TID    metoprolol succinate  12.5 mg Oral Daily    NIFEdipine  60 mg Oral BID    pantoprazole  40 mg Oral Daily    sodium chloride 0.9%  10 mL Intravenous Q8H    tamsulosin  1 capsule Oral QAM    timolol maleate 0.25%  1 drop Both Eyes BID    torsemide  20 mg Oral BID     Continuous Infusions:  PRN Meds:.acetaminophen, albuterol-ipratropium, aluminum-magnesium hydroxide-simethicone, dextrose 10%, dextrose 10%, glucagon (human recombinant), glucose, glucose, insulin aspart U-100, melatonin, naloxone, ondansetron, polyethylene glycol, prochlorperazine, simethicone      Objective:     Temp:  [97.7 °F (36.5 °C)-98.8 °F (37.1 °C)] 97.7 °F (36.5 °C)  Pulse:  [68-78] 78  Resp:  [18-20] 20  SpO2:  [98 %-100 %] 98 %  BP: (123-167)/(65-85) 167/85    No intake or output data in the 24 hours ending 09/26/22  1327     Body mass index is 38.58 kg/m².    Physical Exam  Vitals and nursing note reviewed.   Constitutional:       General: He is not in acute distress.     Appearance: Normal appearance. He is normal weight. He is not ill-appearing.   HENT:      Head: Normocephalic and atraumatic.      Right Ear: Hearing normal.      Left Ear: Hearing normal.      Nose: Nose normal.   Eyes:      General: No scleral icterus.        Right eye: No discharge.         Left eye: No discharge.      Extraocular Movements: Extraocular movements intact.   Cardiovascular:      Rate and Rhythm: Normal rate.   Pulmonary:      Effort: Pulmonary effort is normal. No accessory muscle usage or respiratory distress.   Neurological:      General: No focal deficit present.      Mental Status: He is alert and oriented to person, place, and time.      Cranial Nerves: No cranial nerve deficit.      Motor: No weakness.   Psychiatric:         Attention and Perception: Attention normal.         Mood and Affect: Affect is angry.         Speech: Speech is rapid and pressured.         Behavior: Behavior is cooperative.        Labs:  Recent Results (from the past 24 hour(s))   POCT glucose    Collection Time: 09/25/22  3:41 PM   Result Value Ref Range    POCT Glucose 364 (H) 70 - 110 mg/dL   POCT glucose    Collection Time: 09/25/22  8:41 PM   Result Value Ref Range    POCT Glucose 220 (H) 70 - 110 mg/dL   Phosphorus    Collection Time: 09/26/22  5:08 AM   Result Value Ref Range    Phosphorus 3.9 2.7 - 4.5 mg/dL   Magnesium    Collection Time: 09/26/22  5:08 AM   Result Value Ref Range    Magnesium 1.6 1.6 - 2.6 mg/dL   Comprehensive Metabolic Panel    Collection Time: 09/26/22  5:08 AM   Result Value Ref Range    Sodium 136 136 - 145 mmol/L    Potassium 4.4 3.5 - 5.1 mmol/L    Chloride 101 95 - 110 mmol/L    CO2 22 (L) 23 - 29 mmol/L    Glucose 241 (H) 70 - 110 mg/dL    BUN 48 (H) 8 - 23 mg/dL    Creatinine 7.8 (H) 0.5 - 1.4 mg/dL    Calcium 8.6 (L) 8.7 -  10.5 mg/dL    Total Protein 6.5 6.0 - 8.4 g/dL    Albumin 3.2 (L) 3.5 - 5.2 g/dL    Total Bilirubin 0.5 0.1 - 1.0 mg/dL    Alkaline Phosphatase 61 55 - 135 U/L    AST 8 (L) 10 - 40 U/L    ALT 10 10 - 44 U/L    Anion Gap 13 8 - 16 mmol/L    eGFR 6.6 (A) >60 mL/min/1.73 m^2   CBC Auto Differential    Collection Time: 09/26/22  5:08 AM   Result Value Ref Range    WBC 6.90 3.90 - 12.70 K/uL    RBC 3.23 (L) 4.60 - 6.20 M/uL    Hemoglobin 10.0 (L) 14.0 - 18.0 g/dL    Hematocrit 30.3 (L) 40.0 - 54.0 %    MCV 94 82 - 98 fL    MCH 31.0 27.0 - 31.0 pg    MCHC 33.0 32.0 - 36.0 g/dL    RDW 15.5 (H) 11.5 - 14.5 %    Platelets 181 150 - 450 K/uL    MPV 9.9 9.2 - 12.9 fL    Immature Granulocytes 0.9 (H) 0.0 - 0.5 %    Gran # (ANC) 4.4 1.8 - 7.7 K/uL    Immature Grans (Abs) 0.06 (H) 0.00 - 0.04 K/uL    Lymph # 1.6 1.0 - 4.8 K/uL    Mono # 0.7 0.3 - 1.0 K/uL    Eos # 0.1 0.0 - 0.5 K/uL    Baso # 0.03 0.00 - 0.20 K/uL    nRBC 0 0 /100 WBC    Gran % 63.0 38.0 - 73.0 %    Lymph % 23.8 18.0 - 48.0 %    Mono % 10.0 4.0 - 15.0 %    Eosinophil % 1.9 0.0 - 8.0 %    Basophil % 0.4 0.0 - 1.9 %    Differential Method Automated    POCT glucose    Collection Time: 09/26/22  8:19 AM   Result Value Ref Range    POCT Glucose 236 (H) 70 - 110 mg/dL        Lab Results   Component Value Date    [id number] Not Detected 04/13/2021       Recent Labs   Lab 09/24/22  0919 09/24/22  1250 09/26/22  0508   WBC  --  7.34 6.90   LYMPH  --  26.6  2.0 23.8  1.6   HGB  --  11.9* 10.0*   HCT 33* 36.4* 30.3*   PLT  --  208 181     Recent Labs   Lab 09/24/22  1250 09/26/22  0508   * 136   K 4.5 4.4   CL 97 101   CO2 23 22*   BUN 44* 48*   CREATININE 7.1* 7.8*   * 241*   CALCIUM 8.7 8.6*   MG 1.7 1.6   PHOS 3.2 3.9     Recent Labs   Lab 09/26/22  0508   ALKPHOS 61   ALT 10   AST 8*   ALBUMIN 3.2*   PROT 6.5   BILITOT 0.5        No results for input(s): DDIMER, FERRITIN, CRP, LDH, BNP, TROPONINI, CPK in the last 72 hours.    Invalid input(s):  PROCALCITONIN    All labs within the last 24 hours were reviewed.     Microbiology:  Microbiology Results (last 7 days)       ** No results found for the last 168 hours. **              Imaging      Results for orders placed during the hospital encounter of 07/06/22    Echo    Interpretation Summary  · The left ventricle is normal in size with concentric remodeling and normal systolic function. The estimated ejection fraction is 60%.  · Normal right ventricular size with normal right ventricular systolic function.  · Normal left ventricular diastolic function.  · Mild aortic regurgitation.  · The estimated PA systolic pressure is 17 mmHg.  · Normal central venous pressure (3 mmHg).      US Hemodialysis Access  Narrative: EXAMINATION:  US HEMODIALYSIS ACCESS    CLINICAL HISTORY:  L AVF dysfunction;    TECHNIQUE:  Limited sonographic images of the left upper extremity were performed to evaluate dialysis access.    COMPARISON:  Vascular ultrasound hemodialysis access 04/29/2021    FINDINGS:  Duplex and color flow Doppler evaluation reveals occlusion of the left upper extremity brachiocephalic AV fistula.  There is occlusive, hypoechoic, expansile acute thrombus of the cephalic vein extending proximally from the surgically created AV fistula into the upper arm.  Flow is identified in the brachial artery.  Impression: Left upper extremity brachiocephalic AV fistula occlusion with associated hypoechoic, expansile, acute thrombosis of the cephalic vein extending proximal to the AV fistula.    This report was flagged in Epic as abnormal.    Electronically signed by resident: Abdelrahman Bishop  Date:    09/24/2022  Time:    12:05    Electronically signed by: Haylie Bustamante MD  Date:    09/24/2022  Time:    12:26  X-Ray Chest PA And Lateral  Narrative: EXAMINATION:  XR CHEST PA AND LATERAL    CLINICAL HISTORY:  Hyperkalemia    TECHNIQUE:  PA and lateral views of the chest were  performed.    COMPARISON:  01/14/2021    FINDINGS:  The lungs are clear, with normal appearance of pulmonary vasculature.No pleural effusion.No pneumothorax.    The cardiac silhouette is normal in size. The hilar and mediastinal contours are unremarkable.Calcified atheromatous disease affects the aorta.    Bones are intact.  Impression: No acute abnormality.    Electronically signed by: Gail Craft MD  Date:    09/24/2022  Time:    10:01      All imaging within the last 24 hours was reviewed.       Discharge Planning   HERMAN: 9/27/2022     Code Status: Full Code   Is the patient medically ready for discharge?: No    Reason for patient still in hospital (select all that apply): Patient trending condition, Treatment, Imaging, and Consult recommendations  Discharge Plan A: Home

## 2022-09-26 NOTE — ASSESSMENT & PLAN NOTE
- Body mass index is 38.58 kg/m².  - Needs dietary and lifestyle modifications in relation to health  - Consider dietary/nutrition consult outpatient

## 2022-09-26 NOTE — PROGRESS NOTES
[facility] - Tele[facility] Stepdown ([place] [id number])  Vascular Surgery  Progress Note    Patient Name: Vinnie Siegel  MRN: [id number]  Admission Date: 9/24/2022  Primary Care Provider: Janeth Walter MD    Subjective:     Interval History: No acute overnight events, AF, VSS. LUE AVF pulsatile, no active bleeding. Plan for fistulogram tomorrow. Ok for DC per vascular.    Post-Op Info:  * No surgery found *           Medications:  Continuous Infusions:  Scheduled Meds:   atorvastatin  40 mg Oral Daily    calcium acetate(phosphat bind)  667 mg Oral TID WM    ergocalciferol  50,000 Units Oral Q7 Days    ferrous sulfate  1 tablet Oral Daily    heparin (porcine)  5,000 Units Subcutaneous Q8H    hydrALAZINE  100 mg Oral TID    insulin detemir U-100  35 Units Subcutaneous QHS    metoprolol succinate  12.5 mg Oral Daily    NIFEdipine  60 mg Oral BID    pantoprazole  40 mg Oral Daily    sodium chloride 0.9%  10 mL Intravenous Q8H    tamsulosin  1 capsule Oral QAM    timolol maleate 0.25%  1 drop Both Eyes BID    torsemide  20 mg Oral BID     PRN Meds:acetaminophen, albuterol-ipratropium, aluminum-magnesium hydroxide-simethicone, dextrose 10%, dextrose 10%, glucagon (human recombinant), glucose, glucose, insulin aspart U-100, melatonin, naloxone, ondansetron, polyethylene glycol, prochlorperazine, simethicone     Objective:     Vital Signs (Most Recent):  Temp: 98.6 °F (37 °C) (09/26/22 0025)  Pulse: 71 (09/26/22 0025)  Resp: 18 (09/26/22 0025)  BP: (!) 151/72 (09/26/22 0025)  SpO2: 99 % (09/26/22 0025)   Vital Signs (24h Range):  Temp:  [98.6 °F (37 °C)-98.8 °F (37.1 °C)] 98.6 °F (37 °C)  Pulse:  [68-76] 71  Resp:  [18] 18  SpO2:  [99 %-100 %] 99 %  BP: (123-156)/(65-78) 151/72         Physical Exam  Constitutional:       Appearance: Normal appearance.   HENT:      Head: Normocephalic and atraumatic.      Nose: Nose normal.      Mouth/Throat:      Mouth: Mucous membranes are moist.   Cardiovascular:      Rate and Rhythm:  Normal rate and regular rhythm.      Pulses:           Radial pulses are 2+ on the right side and 2+ on the left side.   Pulmonary:      Effort: Pulmonary effort is normal.      Breath sounds: Normal breath sounds.   Abdominal:      General: There is no distension.      Palpations: Abdomen is soft.   Musculoskeletal:      Cervical back: Normal range of motion.      Comments: LUE AVF, pulsatile. Not bleeding.    Skin:     General: Skin is warm and dry.   Neurological:      General: No focal deficit present.      Mental Status: He is alert and oriented to person, place, and time.       Significant Labs:  CBC:   Recent Labs   Lab 09/26/22  0508   WBC 6.90   RBC 3.23*   HGB 10.0*   HCT 30.3*      MCV 94   MCH 31.0   MCHC 33.0       CMP:   Recent Labs   Lab 09/26/22  0508   *   CALCIUM 8.6*   ALBUMIN 3.2*   PROT 6.5      K 4.4   CO2 22*      BUN 48*   CREATININE 7.8*   ALKPHOS 61   ALT 10   AST 8*   BILITOT 0.5         Significant Diagnostics:  I have reviewed all pertinent imaging results/findings within the past 24 hours.    Assessment/Plan:     * AV fistula occlusion, initial encounter  Vinnie Siegel is a 77 y.o. man with past medical history significant for end-stage renal disease on hemodialysis Tuesday, Thursday, Saturday via left brachiocephalic AV fistula who presents with issues with dialysis access.    Based on patient's history and physical exam, does appear that the patient has an outflow stenosis making access to his fistula difficult. Patient dialysed yesterday via AVF, completed HD without issue.       Plan  - Plan for fistulogram Tomorrow  - Would need to coordinate timing of fistulagram with HD as patient receives Angelica Rangel MD  Vascular Surgery  [facility] - Tele[facility] ([place]-)

## 2022-09-26 NOTE — SUBJECTIVE & OBJECTIVE
Interval History: Electrolytes stable. Plan for fistulogram tomorrow     Review of patient's allergies indicates:  No Known Allergies  Current Facility-Administered Medications   Medication Frequency    acetaminophen tablet 650 mg Q4H PRN    albuterol-ipratropium 2.5 mg-0.5 mg/3 mL nebulizer solution 3 mL Q6H PRN    aluminum-magnesium hydroxide-simethicone 200-200-20 mg/5 mL suspension 30 mL QID PRN    apixaban tablet 2.5 mg BID    atorvastatin tablet 40 mg Daily    calcium acetate(phosphat bind) capsule 667 mg TID WM    dextrose 10% bolus 125 mL PRN    dextrose 10% bolus 250 mL PRN    ergocalciferol capsule 50,000 Units Q7 Days    ferrous sulfate tablet 1 each Daily    glucagon (human recombinant) injection 1 mg PRN    glucose chewable tablet 16 g PRN    glucose chewable tablet 24 g PRN    hydrALAZINE tablet 100 mg TID    insulin aspart U-100 pen 1-10 Units QID (AC + HS) PRN    melatonin tablet 6 mg Nightly PRN    metoprolol succinate (TOPROL-XL) 24 hr split tablet 12.5 mg Daily    naloxone 0.4 mg/mL injection 0.02 mg PRN    NIFEdipine 24 hr tablet 60 mg BID    ondansetron injection 4 mg Q8H PRN    pantoprazole EC tablet 40 mg Daily    polyethylene glycol packet 17 g Daily PRN    prochlorperazine injection Soln 5 mg Q6H PRN    simethicone chewable tablet 80 mg QID PRN    sodium chloride 0.9% flush 10 mL Q8H    tamsulosin 24 hr capsule 0.4 mg QAM    timolol maleate 0.25% ophthalmic solution 1 drop BID    torsemide tablet 20 mg BID       Objective:     Vital Signs (Most Recent):  Temp: 97.7 °F (36.5 °C) (09/26/22 0730)  Pulse: 78 (09/26/22 0730)  Resp: 20 (09/26/22 0730)  BP: (!) 167/85 (09/26/22 0730)  SpO2: 98 % (09/26/22 0730)  O2 Device (Oxygen Therapy): room air (09/25/22 0807)   Vital Signs (24h Range):  Temp:  [97.7 °F (36.5 °C)-98.8 °F (37.1 °C)] 97.7 °F (36.5 °C)  Pulse:  [68-78] 78  Resp:  [18-20] 20  SpO2:  [98 %-100 %] 98 %  BP: (123-167)/(65-85) 167/85     Weight: (!) 136.3 kg (300 lb 7.8 oz) (09/24/22  2059)  Body mass index is 38.58 kg/m².  Body surface area is 2.67 meters squared.    I/O last 3 completed shifts:  In: -   Out: 125 [Urine:125]    Physical Exam  Vitals and nursing note reviewed.   Constitutional:       General: He is not in acute distress.     Appearance: Normal appearance. He is obese. He is not ill-appearing or toxic-appearing.   HENT:      Head: Normocephalic and atraumatic.      Mouth/Throat:      Mouth: Mucous membranes are moist.      Pharynx: No oropharyngeal exudate or posterior oropharyngeal erythema.   Eyes:      General: No scleral icterus.        Right eye: No discharge.         Left eye: No discharge.      Extraocular Movements: Extraocular movements intact.      Conjunctiva/sclera: Conjunctivae normal.   Cardiovascular:      Rate and Rhythm: Normal rate and regular rhythm.      Heart sounds: No murmur heard.    No friction rub.      Comments: HUSEYIN AVF  Proximal aspect with faint bruit/thrill  Pulmonary:      Effort: Pulmonary effort is normal. No respiratory distress.      Breath sounds: No wheezing or rales.   Abdominal:      General: Bowel sounds are normal. There is no distension.      Palpations: Abdomen is soft.      Tenderness: There is no abdominal tenderness.   Musculoskeletal:         General: No swelling.      Cervical back: Normal range of motion. No rigidity or tenderness.      Right lower leg: Edema present.      Left lower leg: Edema present.   Skin:     General: Skin is warm and dry.   Neurological:      General: No focal deficit present.      Mental Status: He is alert and oriented to person, place, and time.   Psychiatric:         Mood and Affect: Mood normal.         Behavior: Behavior normal.       Significant Labs:  CBC:   Recent Labs   Lab 09/26/22  0508   WBC 6.90   RBC 3.23*   HGB 10.0*   HCT 30.3*      MCV 94   MCH 31.0   MCHC 33.0     CMP:   Recent Labs   Lab 09/26/22  0508   *   CALCIUM 8.6*   ALBUMIN 3.2*   PROT 6.5      K 4.4   CO2 22*       BUN 48*   CREATININE 7.8*   ALKPHOS 61   ALT 10   AST 8*   BILITOT 0.5     All labs within the past 24 hours have been reviewed.

## 2022-09-26 NOTE — ASSESSMENT & PLAN NOTE
- H/H stable  - Continue home iron supplementation regimen  - Will continue to monitor on daily labs

## 2022-09-26 NOTE — ASSESSMENT & PLAN NOTE
ESRD on iHD TTS  Last HD on Thursday prior to presentation.  Reported to have had poor flow rates through AVF over the past several week.  Unable to cannulate venous return, so he was sent to the ED on 9/24 for vascular sx eval.    Plan/Recommendations:    -Plan for fistulagram tomorrow 9/27, will plan for to dialyze following fistulagram   -recommend to keep IP until fistulogram can be completed.  -RFP daily

## 2022-09-26 NOTE — SUBJECTIVE & OBJECTIVE
Medications:  Continuous Infusions:  Scheduled Meds:   atorvastatin  40 mg Oral Daily    calcium acetate(phosphat bind)  667 mg Oral TID WM    ergocalciferol  50,000 Units Oral Q7 Days    ferrous sulfate  1 tablet Oral Daily    heparin (porcine)  5,000 Units Subcutaneous Q8H    hydrALAZINE  100 mg Oral TID    insulin detemir U-100  35 Units Subcutaneous QHS    metoprolol succinate  12.5 mg Oral Daily    NIFEdipine  60 mg Oral BID    pantoprazole  40 mg Oral Daily    sodium chloride 0.9%  10 mL Intravenous Q8H    tamsulosin  1 capsule Oral QAM    timolol maleate 0.25%  1 drop Both Eyes BID    torsemide  20 mg Oral BID     PRN Meds:acetaminophen, albuterol-ipratropium, aluminum-magnesium hydroxide-simethicone, dextrose 10%, dextrose 10%, glucagon (human recombinant), glucose, glucose, insulin aspart U-100, melatonin, naloxone, ondansetron, polyethylene glycol, prochlorperazine, simethicone     Objective:     Vital Signs (Most Recent):  Temp: 98.6 °F (37 °C) (09/26/22 0025)  Pulse: 71 (09/26/22 0025)  Resp: 18 (09/26/22 0025)  BP: (!) 151/72 (09/26/22 0025)  SpO2: 99 % (09/26/22 0025)   Vital Signs (24h Range):  Temp:  [98.6 °F (37 °C)-98.8 °F (37.1 °C)] 98.6 °F (37 °C)  Pulse:  [68-76] 71  Resp:  [18] 18  SpO2:  [99 %-100 %] 99 %  BP: (123-156)/(65-78) 151/72         Physical Exam  Constitutional:       Appearance: Normal appearance.   HENT:      Head: Normocephalic and atraumatic.      Nose: Nose normal.      Mouth/Throat:      Mouth: Mucous membranes are moist.   Cardiovascular:      Rate and Rhythm: Normal rate and regular rhythm.      Pulses:           Radial pulses are 2+ on the right side and 2+ on the left side.   Pulmonary:      Effort: Pulmonary effort is normal.      Breath sounds: Normal breath sounds.   Abdominal:      General: There is no distension.      Palpations: Abdomen is soft.   Musculoskeletal:      Cervical back: Normal range of motion.      Comments: LUE AVF, pulsatile. Not bleeding.     Skin:     General: Skin is warm and dry.   Neurological:      General: No focal deficit present.      Mental Status: He is alert and oriented to person, place, and time.       Significant Labs:  CBC:   Recent Labs   Lab 09/26/22  0508   WBC 6.90   RBC 3.23*   HGB 10.0*   HCT 30.3*      MCV 94   MCH 31.0   MCHC 33.0       CMP:   Recent Labs   Lab 09/26/22  0508   *   CALCIUM 8.6*   ALBUMIN 3.2*   PROT 6.5      K 4.4   CO2 22*      BUN 48*   CREATININE 7.8*   ALKPHOS 61   ALT 10   AST 8*   BILITOT 0.5         Significant Diagnostics:  I have reviewed all pertinent imaging results/findings within the past 24 hours.

## 2022-09-26 NOTE — ASSESSMENT & PLAN NOTE
Vinnie Siegel is a 77 y.o. man with past medical history significant for end-stage renal disease on hemodialysis Tuesday, Thursday, Saturday via left brachiocephalic AV fistula who presents with issues with dialysis access.    Based on patient's history and physical exam, does appear that the patient has an outflow stenosis making access to his fistula difficult. Patient dialysed yesterday via AVF, completed HD without issue.       Plan  - Plan for fistulogram Tomorrow  - Would need to coordinate timing of fistulagram with HD as patient receives [facility]

## 2022-09-26 NOTE — ASSESSMENT & PLAN NOTE
- Working to optimize BG control  - Levemir increased to 35 u qHS; resume prandial insulin with meals  - SSI provided for corrective dosing  - DXTs as ordered  - Hypoglycemic protocol in effect  - Diabetic diet provided

## 2022-09-26 NOTE — ASSESSMENT & PLAN NOTE
- Interval history and physical exam findings as described above  - AVF US findings as documented  - Vascular surgery consulted, planning for upcoming fistulogram on 9/28 (unable to schedule on 9/27)  - Nephrology consulted, and were able to cannulate AVF for HD session  - patient to remain hospitalized to ensure timely and adequate HD until fistula is evaluated; HD unit requires patient have HD inpatient after fistula evaluated prior to acceptance back for outpatient care  - apixaban started by vascular surgery due to acute thrombosis of the cephalic vein  - Continuing to monitor

## 2022-09-26 NOTE — PROGRESS NOTES
[facility] [address]  [facility] Medicine  Telemedicine Progress Note    Patient Name: Vinnie Siegel  MRN: [id number]  Patient Class: OP- Observation   Admission Date: 9/24/2022  Length of Stay: 0 days  Attending Physician: Tatum Casillas MD  Primary Care Provider: Janeth Walter MD          Subjective:     Principal Problem:AV fistula occlusion, initial encounter        HPI:  Vinnie Siegel is a 76yo AAM with of PMH of ESRD (HD TTS via left brachiocephalic AVF), HTN, HLD, DM2, anemia of chronic renal failure, GERD, BPH, glaucoma, and obesity who presented to the [facility] ED on 9/24/22 with a malfunctioning AVF. Pt reported that his HD center has been experiencing issues with his AVF for the past several weeks, and during his most recent session, there were issues with flow during the end of HD. On day of admission, HD center was unable to get any flow, and sent him to the ER. Pt is grossly asymptomatic, denies F/C/N/V/D/C, HA, SOB, CP, palpitations, abdominal pain, dysuria, extremity swelling, or symptoms otherwise.    In the ED, vitals significant for /74. Labs remarkable for glucose 270, though were otherwise unremarkable and consistent with known ESRD status. CXR without any acute cardiopulmonary processes. US of AVF showed occlusion with associated hypoechoic, expansile, acute thrombosis of the cephalic vein extending proximal to the AV fistula. Vascular surgery consulted, planning for upcoming fistulogram. Nephrology consulted, and were able to cannulate AVF for HD session. Hospital medicine was consulted for admission and further management.      Overview/Hospital Course:  [place] [facility] and was able to be successfully dialyzed. Vascular surgery planning for angiogram on 9/27.        This encounter was provided through telemedicine.  Patient was transferred to the telemedicine service on:  09/26/2022   The patient location is: [facility] A admitted 9/24/2022  8:30  AM.    Interval History/Overnight Events:   Clinical record since admit reviewed.  Patient able to provide history.    Uneventful night; no pain to LUE fistula; he is very frustrated about not having fistulagram on 9/27 as previously discussed; nursing to assist with shower; inpatient HD also had trouble accessing fistula per patient    Review of Systems   Constitutional:  Negative for activity change, fatigue and fever.   Respiratory:  Negative for shortness of breath.    Gastrointestinal:  Negative for diarrhea and vomiting.   Musculoskeletal:  Negative for arthralgias and myalgias.   Psychiatric/Behavioral:  Positive for dysphoric mood. The patient is nervous/anxious.       Inpatient Medications:  Scheduled Meds:   apixaban  2.5 mg Oral BID    atorvastatin  40 mg Oral Daily    calcium acetate(phosphat bind)  667 mg Oral TID WM    ergocalciferol  50,000 Units Oral Q7 Days    ferrous sulfate  1 tablet Oral Daily    hydrALAZINE  100 mg Oral TID    metoprolol succinate  12.5 mg Oral Daily    NIFEdipine  60 mg Oral BID    pantoprazole  40 mg Oral Daily    sodium chloride 0.9%  10 mL Intravenous Q8H    tamsulosin  1 capsule Oral QAM    timolol maleate 0.25%  1 drop Both Eyes BID    torsemide  20 mg Oral BID     Continuous Infusions:  PRN Meds:.acetaminophen, albuterol-ipratropium, aluminum-magnesium hydroxide-simethicone, dextrose 10%, dextrose 10%, glucagon (human recombinant), glucose, glucose, insulin aspart U-100, melatonin, naloxone, ondansetron, polyethylene glycol, prochlorperazine, simethicone      Objective:     Temp:  [97.7 °F (36.5 °C)-98.8 °F (37.1 °C)] 97.7 °F (36.5 °C)  Pulse:  [68-78] 78  Resp:  [18-20] 20  SpO2:  [98 %-100 %] 98 %  BP: (123-167)/(65-85) 167/85    No intake or output data in the 24 hours ending 09/26/22 1327     Body mass index is 38.58 kg/m².    Physical Exam  Vitals and nursing note reviewed.   Constitutional:       General: He is not in acute distress.     Appearance:  Normal appearance. He is normal weight. He is not ill-appearing.   HENT:      Head: Normocephalic and atraumatic.      Right Ear: Hearing normal.      Left Ear: Hearing normal.      Nose: Nose normal.   Eyes:      General: No scleral icterus.        Right eye: No discharge.         Left eye: No discharge.      Extraocular Movements: Extraocular movements intact.   Cardiovascular:      Rate and Rhythm: Normal rate.   Pulmonary:      Effort: Pulmonary effort is normal. No accessory muscle usage or respiratory distress.   Neurological:      General: No focal deficit present.      Mental Status: He is alert and oriented to person, place, and time.      Cranial Nerves: No cranial nerve deficit.      Motor: No weakness.   Psychiatric:         Attention and Perception: Attention normal.         Mood and Affect: Affect is angry.         Speech: Speech is rapid and pressured.         Behavior: Behavior is cooperative.        Labs:  Recent Results (from the past 24 hour(s))   POCT glucose    Collection Time: 09/25/22  3:41 PM   Result Value Ref Range    POCT Glucose 364 (H) 70 - 110 mg/dL   POCT glucose    Collection Time: 09/25/22  8:41 PM   Result Value Ref Range    POCT Glucose 220 (H) 70 - 110 mg/dL   Phosphorus    Collection Time: 09/26/22  5:08 AM   Result Value Ref Range    Phosphorus 3.9 2.7 - 4.5 mg/dL   Magnesium    Collection Time: 09/26/22  5:08 AM   Result Value Ref Range    Magnesium 1.6 1.6 - 2.6 mg/dL   Comprehensive Metabolic Panel    Collection Time: 09/26/22  5:08 AM   Result Value Ref Range    Sodium 136 136 - 145 mmol/L    Potassium 4.4 3.5 - 5.1 mmol/L    Chloride 101 95 - 110 mmol/L    CO2 22 (L) 23 - 29 mmol/L    Glucose 241 (H) 70 - 110 mg/dL    BUN 48 (H) 8 - 23 mg/dL    Creatinine 7.8 (H) 0.5 - 1.4 mg/dL    Calcium 8.6 (L) 8.7 - 10.5 mg/dL    Total Protein 6.5 6.0 - 8.4 g/dL    Albumin 3.2 (L) 3.5 - 5.2 g/dL    Total Bilirubin 0.5 0.1 - 1.0 mg/dL    Alkaline Phosphatase 61 55 - 135 U/L    AST 8 (L)  10 - 40 U/L    ALT 10 10 - 44 U/L    Anion Gap 13 8 - 16 mmol/L    eGFR 6.6 (A) >60 mL/min/1.73 m^2   CBC Auto Differential    Collection Time: 09/26/22  5:08 AM   Result Value Ref Range    WBC 6.90 3.90 - 12.70 K/uL    RBC 3.23 (L) 4.60 - 6.20 M/uL    Hemoglobin 10.0 (L) 14.0 - 18.0 g/dL    Hematocrit 30.3 (L) 40.0 - 54.0 %    MCV 94 82 - 98 fL    MCH 31.0 27.0 - 31.0 pg    MCHC 33.0 32.0 - 36.0 g/dL    RDW 15.5 (H) 11.5 - 14.5 %    Platelets 181 150 - 450 K/uL    MPV 9.9 9.2 - 12.9 fL    Immature Granulocytes 0.9 (H) 0.0 - 0.5 %    Gran # (ANC) 4.4 1.8 - 7.7 K/uL    Immature Grans (Abs) 0.06 (H) 0.00 - 0.04 K/uL    Lymph # 1.6 1.0 - 4.8 K/uL    Mono # 0.7 0.3 - 1.0 K/uL    Eos # 0.1 0.0 - 0.5 K/uL    Baso # 0.03 0.00 - 0.20 K/uL    nRBC 0 0 /100 WBC    Gran % 63.0 38.0 - 73.0 %    Lymph % 23.8 18.0 - 48.0 %    Mono % 10.0 4.0 - 15.0 %    Eosinophil % 1.9 0.0 - 8.0 %    Basophil % 0.4 0.0 - 1.9 %    Differential Method Automated    POCT glucose    Collection Time: 09/26/22  8:19 AM   Result Value Ref Range    POCT Glucose 236 (H) 70 - 110 mg/dL        Lab Results   Component Value Date    [id number] Not Detected 04/13/2021       Recent Labs   Lab 09/24/22  0919 09/24/22  1250 09/26/22  0508   WBC  --  7.34 6.90   LYMPH  --  26.6  2.0 23.8  1.6   HGB  --  11.9* 10.0*   HCT 33* 36.4* 30.3*   PLT  --  208 181     Recent Labs   Lab 09/24/22  1250 09/26/22  0508   * 136   K 4.5 4.4   CL 97 101   CO2 23 22*   BUN 44* 48*   CREATININE 7.1* 7.8*   * 241*   CALCIUM 8.7 8.6*   MG 1.7 1.6   PHOS 3.2 3.9     Recent Labs   Lab 09/26/22  0508   ALKPHOS 61   ALT 10   AST 8*   ALBUMIN 3.2*   PROT 6.5   BILITOT 0.5        No results for input(s): DDIMER, FERRITIN, CRP, LDH, BNP, TROPONINI, CPK in the last 72 hours.    Invalid input(s): PROCALCITONIN    All labs within the last 24 hours were reviewed.     Microbiology:  Microbiology Results (last 7 days)       ** No results found for the last 168 hours. **               Imaging      Results for orders placed during the hospital encounter of 07/06/22    Echo    Interpretation Summary  · The left ventricle is normal in size with concentric remodeling and normal systolic function. The estimated ejection fraction is 60%.  · Normal right ventricular size with normal right ventricular systolic function.  · Normal left ventricular diastolic function.  · Mild aortic regurgitation.  · The estimated PA systolic pressure is 17 mmHg.  · Normal central venous pressure (3 mmHg).      US Hemodialysis Access  Narrative: EXAMINATION:  US HEMODIALYSIS ACCESS    CLINICAL HISTORY:  L AVF dysfunction;    TECHNIQUE:  Limited sonographic images of the left upper extremity were performed to evaluate dialysis access.    COMPARISON:  Vascular ultrasound hemodialysis access 04/29/2021    FINDINGS:  Duplex and color flow Doppler evaluation reveals occlusion of the left upper extremity brachiocephalic AV fistula.  There is occlusive, hypoechoic, expansile acute thrombus of the cephalic vein extending proximally from the surgically created AV fistula into the upper arm.  Flow is identified in the brachial artery.  Impression: Left upper extremity brachiocephalic AV fistula occlusion with associated hypoechoic, expansile, acute thrombosis of the cephalic vein extending proximal to the AV fistula.    This report was flagged in Epic as abnormal.    Electronically signed by resident: Abdelrahman Bishop  Date:    09/24/2022  Time:    12:05    Electronically signed by: Haylie Bustamante MD  Date:    09/24/2022  Time:    12:26  X-Ray Chest PA And Lateral  Narrative: EXAMINATION:  XR CHEST PA AND LATERAL    CLINICAL HISTORY:  Hyperkalemia    TECHNIQUE:  PA and lateral views of the chest were performed.    COMPARISON:  01/14/2021    FINDINGS:  The lungs are clear, with normal appearance of pulmonary vasculature.No pleural effusion.No pneumothorax.    The cardiac silhouette is normal in size. The hilar and mediastinal  contours are unremarkable.Calcified atheromatous disease affects the aorta.    Bones are intact.  Impression: No acute abnormality.    Electronically signed by: Gail Craft MD  Date:    09/24/2022  Time:    10:01      All imaging within the last 24 hours was reviewed.       Discharge Planning   HERMAN: 9/27/2022     Code Status: Full Code   Is the patient medically ready for discharge?: No    Reason for patient still in hospital (select all that apply): Patient trending condition, Treatment, Imaging, and Consult recommendations  Discharge Plan A: Home            Assessment/Plan:      * AV fistula occlusion, initial encounter  - Interval history and physical exam findings as described above  - AVF US findings as documented  - Vascular surgery consulted, planning for upcoming fistulogram on 9/28 (unable to schedule on 9/27)  - Nephrology consulted, and were able to cannulate AVF for HD session  - patient to remain hospitalized to ensure timely and adequate HD until fistula is evaluated; HD unit requires patient have HD inpatient after fistula evaluated prior to acceptance back for outpatient care  - apixaban started by vascular surgery due to acute thrombosis of the cephalic vein  - Continuing to monitor    Primary open angle glaucoma (POAG) of both eyes, indeterminate stage  - Continue home eye drop regimen    Benign prostatic hyperplasia without lower urinary tract symptoms  - Continue home tamsulosin regimen    Gastroesophageal reflux disease without esophagitis  - Currently asymptomatic  - Continue home PPI regimen    Mixed diabetic hyperlipidemia associated with type 2 diabetes mellitus  - Continue home statin regimen    ESRD on hemodialysis  - Interval history and physical exam findings as described above  - HD TTS via left brachiocephalic AVF  - Nephology consulted, appreciate assistance  - Further decision for HD per nephology  - Renally dosing all medications  - Avoid nephrotoxins  - Will continue to monitor  on daily labs    Anemia in ESRD (end-stage renal disease)  - H/H stable  - Continue home iron supplementation regimen  - Will continue to monitor on daily labs    Class 2 severe obesity due to excess calories with serious comorbidity and body mass index (BMI) of 38.0 to 38.9 in adult  - Body mass index is 38.58 kg/m².  - Needs dietary and lifestyle modifications in relation to health  - Consider dietary/nutrition consult outpatient    Hypertension associated with type 2 diabetes mellitus  - Working to optimize BP control  - Continue home regimen of hydralazine, metoprolol, nifedipine, and torsemide  - Will continue to monitor and further titrate antihypertensives as clinically indicated     Type 2 diabetes mellitus with chronic kidney disease on chronic dialysis, with long-term current use of insulin  - Working to optimize BG control  - Levemir increased to 35 u qHS; resume prandial insulin with meals  - SSI provided for corrective dosing  - DXTs as ordered  - Hypoglycemic protocol in effect  - Diabetic diet provided      VTE Risk Mitigation (From admission, onward)         Ordered     apixaban tablet 2.5 mg  2 times daily         09/26/22 1146     IP VTE HIGH RISK PATIENT  Once         09/24/22 1526     Place sequential compression device  Until discontinued         09/24/22 1526                  I have completed this tele-visit without the assistance of a telepresenter.    The attending portion of this evaluation, treatment, and documentation was performed per Tatum Casillas MD via Telemedicine AudioVisual using the secure [employer] software platform with 2 way audio/video. The provider was located off-site and the patient is located in the hospital. The aforementioned video software was utilized to document the relevant history and physical exam    Tatum Casillas MD  Department of Hospital Medicine   [facility] - Telemetry Stepdown ([place]-[facility])

## 2022-09-26 NOTE — PLAN OF CARE
[facility] - Telemetry Stepdown ([facility])  Discharge Assessment    Primary Care Provider: Janeth Walter MD     Discharge Assessment (most recent)       BRIEF DISCHARGE ASSESSMENT - 09/26/22 1136          Discharge Planning    Assessment Type Discharge Planning Brief Assessment     Resource/Environmental Concerns none     Support Systems Spouse/significant other;Family members     Equipment Currently Used at Home walker, rolling     Current Living Arrangements home/apartment/condo     Patient/Family Anticipates Transition to home     Patient/Family Anticipated Services at Transition none     DME Needed Upon Discharge  none     Discharge Plan A Home     Discharge Plan B Home        Physical Activity    On average, how many days per week do you engage in moderate to strenuous exercise (like a brisk walk)? 0 days     On average, how many minutes do you engage in exercise at this level? 0 min        Financial Resource Strain    How hard is it for you to pay for the very basics like food, housing, medical care, and heating? Not very hard        Housing Stability    In the last 12 months, was there a time when you were not able to pay the mortgage or rent on time? No     In the last 12 months, how many places have you lived? 1     In the last 12 months, was there a time when you did not have a steady place to sleep or slept in a shelter (including now)? No        Transportation Needs    In the past 12 months, has lack of transportation kept you from medical appointments or from getting medications? No     In the past 12 months, has lack of transportation kept you from meetings, work, or from getting things needed for daily living? No        Food Insecurity    Within the past 12 months, you worried that your food would run out before you got the money to buy more. Never true     Within the past 12 months, the food you bought just didn't last and you didn't have money to get more. Never true        Stress    Do you feel  "stress - tense, restless, nervous, or anxious, or unable to sleep at night because your mind is troubled all the time - these days? To some extent        Social Connections    In a typical week, how many times do you talk on the phone with family, friends, or neighbors? Three times a week     How often do you get together with friends or relatives? Three times a week                   Pt is a 77 y.o. male admitted with AV fistula occlusion and has a PMH of ESRD (T-TH-Sat CDI on [place]) HTN and DM2. He lives with his SO Vanesa in a single story house with 4 steps to enter. He can do his own basic self care however his SO Vanesa states " It takes a while" and she will assist. [facility] Discharge Packet given to patient and/or family with understanding verbalized.   name and number and estimated discharge date written on white board in patient's room with request to call for any questions or concerns.  Will continue to follow for needs.  Mark Okeefe RN,BSN            "

## 2022-09-26 NOTE — CONSULTS
Thank you for your consult to [facility]. We have reviewed the patient chart. This patient does meet criteria for [facility] service at this time. Will assume care on 09/26/22 at 7AM.    Tatum Casillas MD

## 2022-09-26 NOTE — PROGRESS NOTES
[facility] - Telemetry Stepdown ([place] [address])  Nephrology  Progress Note    Patient Name: Vinnie Siegel  MRN: [id number]  Admission Date: 9/24/2022  Hospital Length of Stay: 0 days  Attending Provider: Tatum Casillas MD   Primary Care Physician: Janeth Walter MD  Principal Problem:AV fistula occlusion, initial encounter    Subjective:       Interval History: Electrolytes stable. Plan for fistulogram tomorrow     Review of patient's allergies indicates:  No Known Allergies  Current Facility-Administered Medications   Medication Frequency    acetaminophen tablet 650 mg Q4H PRN    albuterol-ipratropium 2.5 mg-0.5 mg/3 mL nebulizer solution 3 mL Q6H PRN    aluminum-magnesium hydroxide-simethicone 200-200-20 mg/5 mL suspension 30 mL QID PRN    apixaban tablet 2.5 mg BID    atorvastatin tablet 40 mg Daily    calcium acetate(phosphat bind) capsule 667 mg TID WM    dextrose 10% bolus 125 mL PRN    dextrose 10% bolus 250 mL PRN    ergocalciferol capsule 50,000 Units Q7 Days    ferrous sulfate tablet 1 each Daily    glucagon (human recombinant) injection 1 mg PRN    glucose chewable tablet 16 g PRN    glucose chewable tablet 24 g PRN    hydrALAZINE tablet 100 mg TID    insulin aspart U-100 pen 1-10 Units QID (AC + HS) PRN    melatonin tablet 6 mg Nightly PRN    metoprolol succinate (TOPROL-XL) 24 hr split tablet 12.5 mg Daily    naloxone 0.4 mg/mL injection 0.02 mg PRN    NIFEdipine 24 hr tablet 60 mg BID    ondansetron injection 4 mg Q8H PRN    pantoprazole EC tablet 40 mg Daily    polyethylene glycol packet 17 g Daily PRN    prochlorperazine injection Soln 5 mg Q6H PRN    simethicone chewable tablet 80 mg QID PRN    sodium chloride 0.9% flush 10 mL Q8H    tamsulosin 24 hr capsule 0.4 mg QAM    timolol maleate 0.25% ophthalmic solution 1 drop BID    torsemide tablet 20 mg BID       Objective:     Vital Signs (Most Recent):  Temp: 97.7 °F (36.5 °C) (09/26/22 0730)  Pulse: 78 (09/26/22 0730)  Resp:  20 (09/26/22 0730)  BP: (!) 167/85 (09/26/22 0730)  SpO2: 98 % (09/26/22 0730)  O2 Device (Oxygen Therapy): room air (09/25/22 0807)   Vital Signs (24h Range):  Temp:  [97.7 °F (36.5 °C)-98.8 °F (37.1 °C)] 97.7 °F (36.5 °C)  Pulse:  [68-78] 78  Resp:  [18-20] 20  SpO2:  [98 %-100 %] 98 %  BP: (123-167)/(65-85) 167/85     Weight: (!) 136.3 kg (300 lb 7.8 oz) (09/24/22 2059)  Body mass index is 38.58 kg/m².  Body surface area is 2.67 meters squared.    I/O last 3 completed shifts:  In: -   Out: 125 [Urine:125]    Physical Exam  Vitals and nursing note reviewed.   Constitutional:       General: He is not in acute distress.     Appearance: Normal appearance. He is obese. He is not ill-appearing or toxic-appearing.   HENT:      Head: Normocephalic and atraumatic.      Mouth/Throat:      Mouth: Mucous membranes are moist.      Pharynx: No oropharyngeal exudate or posterior oropharyngeal erythema.   Eyes:      General: No scleral icterus.        Right eye: No discharge.         Left eye: No discharge.      Extraocular Movements: Extraocular movements intact.      Conjunctiva/sclera: Conjunctivae normal.   Cardiovascular:      Rate and Rhythm: Normal rate and regular rhythm.      Heart sounds: No murmur heard.    No friction rub.      Comments: HUSEYIN AVF  Proximal aspect with faint bruit/thrill  Pulmonary:      Effort: Pulmonary effort is normal. No respiratory distress.      Breath sounds: No wheezing or rales.   Abdominal:      General: Bowel sounds are normal. There is no distension.      Palpations: Abdomen is soft.      Tenderness: There is no abdominal tenderness.   Musculoskeletal:         General: No swelling.      Cervical back: Normal range of motion. No rigidity or tenderness.      Right lower leg: Edema present.      Left lower leg: Edema present.   Skin:     General: Skin is warm and dry.   Neurological:      General: No focal deficit present.      Mental Status: He is alert and oriented to person, place, and  time.   Psychiatric:         Mood and Affect: Mood normal.         Behavior: Behavior normal.       Significant Labs:  CBC:   Recent Labs   Lab 09/26/22  0508   WBC 6.90   RBC 3.23*   HGB 10.0*   HCT 30.3*      MCV 94   MCH 31.0   MCHC 33.0     CMP:   Recent Labs   Lab 09/26/22  0508   *   CALCIUM 8.6*   ALBUMIN 3.2*   PROT 6.5      K 4.4   CO2 22*      BUN 48*   CREATININE 7.8*   ALKPHOS 61   ALT 10   AST 8*   BILITOT 0.5     All labs within the past 24 hours have been reviewed.         Assessment/Plan:     * AV fistula occlusion, initial encounter  Faint bruit/thrill to proximal aspect of his HUSEYIN AVF  +pulsatile to distal portion  -Vasular Sx following, plans for fistulogram Monday     Chronic kidney disease-mineral and bone disorder  -continue phos binders   Calcium acetate 667 mg TID with meals     ESRD on hemodialysis  ESRD on iHD TTS  Last HD on Thursday prior to presentation.  Reported to have had poor flow rates through AVF over the past several week.  Unable to cannulate venous return, so he was sent to the ED on 9/24 for vascular sx eval.    Plan/Recommendations:    -Plan for fistulagram tomorrow 9/27, will plan for to dialyze following fistulagram   -recommend to keep IP until fistulogram can be completed.  -RFP daily     Anemia in ESRD (end-stage renal disease)  Goal 10-11  Hgb 10.1          Thank you for your consult. I will follow-up with patient. Please contact us if you have any additional questions.    Kyara Hines, NINO  Nephrology  [facility] - Telemetry Stepdown ([place]

## 2022-09-27 LAB
ALBUMIN SERPL BCP-MCNC: 3.2 G/DL (ref 3.5–5.2)
ALP SERPL-CCNC: 60 U/L (ref 55–135)
ALT SERPL W/O P-5'-P-CCNC: 7 U/L (ref 10–44)
ANION GAP SERPL CALC-SCNC: 13 MMOL/L (ref 8–16)
AST SERPL-CCNC: 9 U/L (ref 10–40)
BASOPHILS # BLD AUTO: 0.03 K/UL (ref 0–0.2)
BASOPHILS NFR BLD: 0.4 % (ref 0–1.9)
BILIRUB SERPL-MCNC: 0.5 MG/DL (ref 0.1–1)
BUN SERPL-MCNC: 53 MG/DL (ref 8–23)
CALCIUM SERPL-MCNC: 8.5 MG/DL (ref 8.7–10.5)
CHLORIDE SERPL-SCNC: 101 MMOL/L (ref 95–110)
CO2 SERPL-SCNC: 22 MMOL/L (ref 23–29)
CREAT SERPL-MCNC: 7.8 MG/DL (ref 0.5–1.4)
DIFFERENTIAL METHOD: ABNORMAL
EOSINOPHIL # BLD AUTO: 0.1 K/UL (ref 0–0.5)
EOSINOPHIL NFR BLD: 1.7 % (ref 0–8)
ERYTHROCYTE [DISTWIDTH] IN BLOOD BY AUTOMATED COUNT: 15.8 % (ref 11.5–14.5)
EST. GFR  (NO RACE VARIABLE): 6.6 ML/MIN/1.73 M^2
ESTIMATED AVG GLUCOSE: 309 MG/DL (ref 68–131)
GLUCOSE SERPL-MCNC: 231 MG/DL (ref 70–110)
HBA1C MFR BLD: 12.4 % (ref 4–5.6)
HCT VFR BLD AUTO: 30.9 % (ref 40–54)
HGB BLD-MCNC: 10.2 G/DL (ref 14–18)
IMM GRANULOCYTES # BLD AUTO: 0.05 K/UL (ref 0–0.04)
IMM GRANULOCYTES NFR BLD AUTO: 0.7 % (ref 0–0.5)
LYMPHOCYTES # BLD AUTO: 1.5 K/UL (ref 1–4.8)
LYMPHOCYTES NFR BLD: 21.8 % (ref 18–48)
MAGNESIUM SERPL-MCNC: 1.6 MG/DL (ref 1.6–2.6)
MCH RBC QN AUTO: 31 PG (ref 27–31)
MCHC RBC AUTO-ENTMCNC: 33 G/DL (ref 32–36)
MCV RBC AUTO: 94 FL (ref 82–98)
MONOCYTES # BLD AUTO: 0.6 K/UL (ref 0.3–1)
MONOCYTES NFR BLD: 8.6 % (ref 4–15)
NEUTROPHILS # BLD AUTO: 4.6 K/UL (ref 1.8–7.7)
NEUTROPHILS NFR BLD: 66.8 % (ref 38–73)
NRBC BLD-RTO: 0 /100 WBC
PHOSPHATE SERPL-MCNC: 4.3 MG/DL (ref 2.7–4.5)
PLATELET # BLD AUTO: 194 K/UL (ref 150–450)
PMV BLD AUTO: 9.8 FL (ref 9.2–12.9)
POCT GLUCOSE: 248 MG/DL (ref 70–110)
POCT GLUCOSE: 261 MG/DL (ref 70–110)
POCT GLUCOSE: 287 MG/DL (ref 70–110)
POCT GLUCOSE: 291 MG/DL (ref 70–110)
POTASSIUM SERPL-SCNC: 4.5 MMOL/L (ref 3.5–5.1)
PROT SERPL-MCNC: 6.6 G/DL (ref 6–8.4)
RBC # BLD AUTO: 3.29 M/UL (ref 4.6–6.2)
SODIUM SERPL-SCNC: 136 MMOL/L (ref 136–145)
WBC # BLD AUTO: 6.89 K/UL (ref 3.9–12.7)

## 2022-09-27 PROCEDURE — 99214 PR OFFICE/OUTPT VISIT, EST, LEVL IV, 30-39 MIN: ICD-10-PCS | Mod: ,,, | Performed by: NURSE PRACTITIONER

## 2022-09-27 PROCEDURE — A4216 STERILE WATER/SALINE, 10 ML: HCPCS | Performed by: STUDENT IN AN ORGANIZED HEALTH CARE EDUCATION/TRAINING PROGRAM

## 2022-09-27 PROCEDURE — 83036 HEMOGLOBIN GLYCOSYLATED A1C: CPT | Performed by: INTERNAL MEDICINE

## 2022-09-27 PROCEDURE — 36415 COLL VENOUS BLD VENIPUNCTURE: CPT | Performed by: STUDENT IN AN ORGANIZED HEALTH CARE EDUCATION/TRAINING PROGRAM

## 2022-09-27 PROCEDURE — 99225 PR SUBSEQUENT OBSERVATION CARE,LEVEL II: ICD-10-PCS | Mod: 95,,, | Performed by: INTERNAL MEDICINE

## 2022-09-27 PROCEDURE — 25000003 PHARM REV CODE 250: Performed by: STUDENT IN AN ORGANIZED HEALTH CARE EDUCATION/TRAINING PROGRAM

## 2022-09-27 PROCEDURE — 83735 ASSAY OF MAGNESIUM: CPT | Performed by: STUDENT IN AN ORGANIZED HEALTH CARE EDUCATION/TRAINING PROGRAM

## 2022-09-27 PROCEDURE — 99214 OFFICE O/P EST MOD 30 MIN: CPT | Mod: ,,, | Performed by: NURSE PRACTITIONER

## 2022-09-27 PROCEDURE — 80053 COMPREHEN METABOLIC PANEL: CPT | Performed by: STUDENT IN AN ORGANIZED HEALTH CARE EDUCATION/TRAINING PROGRAM

## 2022-09-27 PROCEDURE — 85025 COMPLETE CBC W/AUTO DIFF WBC: CPT | Performed by: STUDENT IN AN ORGANIZED HEALTH CARE EDUCATION/TRAINING PROGRAM

## 2022-09-27 PROCEDURE — G0378 HOSPITAL OBSERVATION PER HR: HCPCS

## 2022-09-27 PROCEDURE — 84100 ASSAY OF PHOSPHORUS: CPT | Performed by: STUDENT IN AN ORGANIZED HEALTH CARE EDUCATION/TRAINING PROGRAM

## 2022-09-27 PROCEDURE — 99225 PR SUBSEQUENT OBSERVATION CARE,LEVEL II: CPT | Mod: 95,,, | Performed by: INTERNAL MEDICINE

## 2022-09-27 PROCEDURE — 94761 N-INVAS EAR/PLS OXIMETRY MLT: CPT

## 2022-09-27 RX ADMIN — Medication 10 ML: at 10:09

## 2022-09-27 RX ADMIN — HYDRALAZINE HYDROCHLORIDE 100 MG: 50 TABLET ORAL at 09:09

## 2022-09-27 RX ADMIN — Medication 10 ML: at 02:09

## 2022-09-27 RX ADMIN — INSULIN ASPART 6 UNITS: 100 INJECTION, SOLUTION INTRAVENOUS; SUBCUTANEOUS at 06:09

## 2022-09-27 RX ADMIN — CALCIUM ACETATE 667 MG: 667 CAPSULE ORAL at 05:09

## 2022-09-27 RX ADMIN — INSULIN ASPART 7 UNITS: 100 INJECTION, SOLUTION INTRAVENOUS; SUBCUTANEOUS at 09:09

## 2022-09-27 RX ADMIN — INSULIN ASPART 2 UNITS: 100 INJECTION, SOLUTION INTRAVENOUS; SUBCUTANEOUS at 10:09

## 2022-09-27 RX ADMIN — CALCIUM ACETATE 667 MG: 667 CAPSULE ORAL at 09:09

## 2022-09-27 RX ADMIN — NIFEDIPINE 60 MG: 30 TABLET, FILM COATED, EXTENDED RELEASE ORAL at 09:09

## 2022-09-27 RX ADMIN — INSULIN ASPART 7 UNITS: 100 INJECTION, SOLUTION INTRAVENOUS; SUBCUTANEOUS at 04:09

## 2022-09-27 RX ADMIN — TORSEMIDE 20 MG: 20 TABLET ORAL at 05:09

## 2022-09-27 RX ADMIN — TAMSULOSIN HYDROCHLORIDE 0.4 MG: 0.4 CAPSULE ORAL at 06:09

## 2022-09-27 RX ADMIN — HYDRALAZINE HYDROCHLORIDE 100 MG: 50 TABLET ORAL at 04:09

## 2022-09-27 RX ADMIN — ATORVASTATIN CALCIUM 40 MG: 40 TABLET, FILM COATED ORAL at 09:09

## 2022-09-27 RX ADMIN — METOPROLOL SUCCINATE 12.5 MG: 25 TABLET, EXTENDED RELEASE ORAL at 09:09

## 2022-09-27 RX ADMIN — APIXABAN 2.5 MG: 2.5 TABLET, FILM COATED ORAL at 09:09

## 2022-09-27 RX ADMIN — TIMOLOL MALEATE 1 DROP: 2.5 SOLUTION/ DROPS OPHTHALMIC at 09:09

## 2022-09-27 RX ADMIN — PANTOPRAZOLE SODIUM 40 MG: 40 TABLET, DELAYED RELEASE ORAL at 09:09

## 2022-09-27 RX ADMIN — HYDRALAZINE HYDROCHLORIDE 100 MG: 50 TABLET ORAL at 10:09

## 2022-09-27 RX ADMIN — TORSEMIDE 20 MG: 20 TABLET ORAL at 09:09

## 2022-09-27 RX ADMIN — FERROUS SULFATE TAB 325 MG (65 MG ELEMENTAL FE) 1 EACH: 325 (65 FE) TAB at 09:09

## 2022-09-27 RX ADMIN — APIXABAN 2.5 MG: 2.5 TABLET, FILM COATED ORAL at 10:09

## 2022-09-27 RX ADMIN — NIFEDIPINE 60 MG: 30 TABLET, FILM COATED, EXTENDED RELEASE ORAL at 10:09

## 2022-09-27 RX ADMIN — TIMOLOL MALEATE 1 DROP: 2.5 SOLUTION/ DROPS OPHTHALMIC at 10:09

## 2022-09-27 RX ADMIN — INSULIN ASPART 6 UNITS: 100 INJECTION, SOLUTION INTRAVENOUS; SUBCUTANEOUS at 09:09

## 2022-09-27 RX ADMIN — Medication 10 ML: at 06:09

## 2022-09-27 NOTE — PLAN OF CARE
POC reviewed with patient. All questions and concerns reviewed. Safety precautions implemented and maintained. No acute changes noted this shift. Pt VSS and pt denies any further needs. Pt resting in bed in NAD at this time. Bed locked in lowest position. Call bell within reach. Will continue to monitor.     Problem: Hemodynamic Instability (Hemodialysis)  Goal: Effective Tissue Perfusion  Outcome: Ongoing, Progressing     Problem: Adult Inpatient Plan of Care  Goal: Plan of Care Review  Outcome: Ongoing, Progressing     Problem: Adult Inpatient Plan of Care  Goal: Absence of Hospital-Acquired Illness or Injury  Outcome: Ongoing, Progressing     Problem: Adult Inpatient Plan of Care  Goal: Readiness for Transition of Care  Outcome: Ongoing, Progressing     Problem: Impaired Wound Healing  Goal: Optimal Wound Healing  Outcome: Ongoing, Progressing

## 2022-09-27 NOTE — SUBJECTIVE & OBJECTIVE
Interval History: Fistulagram postponed until tomorrow. Electrolytes stable.     Review of patient's allergies indicates:  No Known Allergies  Current Facility-Administered Medications   Medication Frequency    0.9%  NaCl infusion Once    acetaminophen tablet 650 mg Q4H PRN    albuterol-ipratropium 2.5 mg-0.5 mg/3 mL nebulizer solution 3 mL Q6H PRN    aluminum-magnesium hydroxide-simethicone 200-200-20 mg/5 mL suspension 30 mL QID PRN    apixaban tablet 2.5 mg BID    atorvastatin tablet 40 mg Daily    calcium acetate(phosphat bind) capsule 667 mg TID WM    dextrose 10% bolus 125 mL PRN    dextrose 10% bolus 250 mL PRN    ergocalciferol capsule 50,000 Units Q7 Days    ferrous sulfate tablet 1 each Daily    glucagon (human recombinant) injection 1 mg PRN    glucose chewable tablet 16 g PRN    glucose chewable tablet 24 g PRN    hydrALAZINE tablet 100 mg TID    insulin aspart U-100 pen 1-10 Units QID (AC + HS) PRN    insulin aspart U-100 pen 7 Units TIDWM    melatonin tablet 6 mg Nightly PRN    metoprolol succinate (TOPROL-XL) 24 hr split tablet 12.5 mg Daily    naloxone 0.4 mg/mL injection 0.02 mg PRN    NIFEdipine 24 hr tablet 60 mg BID    ondansetron injection 4 mg Q8H PRN    pantoprazole EC tablet 40 mg Daily    polyethylene glycol packet 17 g Daily PRN    prochlorperazine injection Soln 5 mg Q6H PRN    simethicone chewable tablet 80 mg QID PRN    sodium chloride 0.9% flush 10 mL Q8H    tamsulosin 24 hr capsule 0.4 mg QAM    timolol maleate 0.25% ophthalmic solution 1 drop BID    torsemide tablet 20 mg BID       Objective:     Vital Signs (Most Recent):  Temp: 97.5 °F (36.4 °C) (09/27/22 1543)  Pulse: 74 (09/27/22 1543)  Resp: 18 (09/27/22 1543)  BP: (!) 156/76 (09/27/22 1543)  SpO2: 99 % (09/27/22 1543)  O2 Device (Oxygen Therapy): room air (09/25/22 0807)   Vital Signs (24h Range):  Temp:  [97.4 °F (36.3 °C)-98 °F (36.7 °C)] 97.5 °F (36.4 °C)  Pulse:  [64-81] 74  Resp:  [17-18] 18  SpO2:  [96 %-99 %] 99 %  BP:  (127-161)/(67-77) 156/76     Weight: (!) 136.3 kg (300 lb 7.8 oz) (09/24/22 2059)  Body mass index is 38.58 kg/m².  Body surface area is 2.67 meters squared.    No intake/output data recorded.    Physical Exam  Vitals and nursing note reviewed.   HENT:      Head: Normocephalic.      Mouth/Throat:      Pharynx: Oropharynx is clear.   Eyes:      Conjunctiva/sclera: Conjunctivae normal.   Pulmonary:      Effort: Pulmonary effort is normal.   Abdominal:      Palpations: Abdomen is soft.   Musculoskeletal:      Cervical back: Neck supple.      Right lower leg: No edema.      Left lower leg: No edema.   Skin:     General: Skin is warm and dry.   Neurological:      Mental Status: He is alert and oriented to person, place, and time.   Psychiatric:         Mood and Affect: Mood normal.         Behavior: Behavior normal.         Thought Content: Thought content normal.       Significant Labs:  CBC:   Recent Labs   Lab 09/27/22  0434   WBC 6.89   RBC 3.29*   HGB 10.2*   HCT 30.9*      MCV 94   MCH 31.0   MCHC 33.0     CMP:   Recent Labs   Lab 09/27/22  0434   *   CALCIUM 8.5*   ALBUMIN 3.2*   PROT 6.6      K 4.5   CO2 22*      BUN 53*   CREATININE 7.8*   ALKPHOS 60   ALT 7*   AST 9*   BILITOT 0.5     All labs within the past 24 hours have been reviewed.

## 2022-09-27 NOTE — ASSESSMENT & PLAN NOTE
ESRD on iHD TTS  Last HD on Thursday prior to presentation.  Reported to have had poor flow rates through AVF over the past several week.  Unable to cannulate venous return, so he was sent to the ED on 9/24 for vascular sx eval.    Plan/Recommendations:    -Fistulagram postponed until tomorrrow 9/28, will plan for to dialyze following fistulagram   -recommend to keep IP until fistulogram can be completed.  -RFP daily

## 2022-09-27 NOTE — ASSESSMENT & PLAN NOTE
- Interval history and physical exam findings as described above  - AVF US findings as documented  - Vascular surgery consulted, planning for upcoming fistulogram on 9/28 (unable to schedule on 9/27)  - Nephrology consulted, and were able to cannulate AVF for HD session  - patient to remain hospitalized to ensure timely and adequate HD until fistula is evaluated; outpatient HD unit requires patient have HD inpatient after fistula evaluated prior to acceptance back for outpatient care  - apixaban started by vascular surgery due to acute thrombosis of the cephalic vein  - Continuing to monitor

## 2022-09-27 NOTE — PROGRESS NOTES
[facility] ([place] [address])  Nephrology  Progress Note    Patient Name: Vinnie Siegel  MRN: [id number]  Admission Date: 9/24/2022  Hospital Length of Stay: 0 days  Attending Provider: Tatum Casillas MD   Primary Care Physician: Janeth Walter MD  Principal Problem:AV fistula occlusion, initial encounter    Subjective:       Interval History: Fistulagram postponed until tomorrow. Electrolytes stable.     Review of patient's allergies indicates:  No Known Allergies  Current Facility-Administered Medications   Medication Frequency    0.9%  NaCl infusion Once    acetaminophen tablet 650 mg Q4H PRN    albuterol-ipratropium 2.5 mg-0.5 mg/3 mL nebulizer solution 3 mL Q6H PRN    aluminum-magnesium hydroxide-simethicone 200-200-20 mg/5 mL suspension 30 mL QID PRN    apixaban tablet 2.5 mg BID    atorvastatin tablet 40 mg Daily    calcium acetate(phosphat bind) capsule 667 mg TID WM    dextrose 10% bolus 125 mL PRN    dextrose 10% bolus 250 mL PRN    ergocalciferol capsule 50,000 Units Q7 Days    ferrous sulfate tablet 1 each Daily    glucagon (human recombinant) injection 1 mg PRN    glucose chewable tablet 16 g PRN    glucose chewable tablet 24 g PRN    hydrALAZINE tablet 100 mg TID    insulin aspart U-100 pen 1-10 Units QID (AC + HS) PRN    insulin aspart U-100 pen 7 Units TIDWM    melatonin tablet 6 mg Nightly PRN    metoprolol succinate (TOPROL-XL) 24 hr split tablet 12.5 mg Daily    naloxone 0.4 mg/mL injection 0.02 mg PRN    NIFEdipine 24 hr tablet 60 mg BID    ondansetron injection 4 mg Q8H PRN    pantoprazole EC tablet 40 mg Daily    polyethylene glycol packet 17 g Daily PRN    prochlorperazine injection Soln 5 mg Q6H PRN    simethicone chewable tablet 80 mg QID PRN    sodium chloride 0.9% flush 10 mL Q8H    tamsulosin 24 hr capsule 0.4 mg QAM    timolol maleate 0.25% ophthalmic solution 1 drop BID    torsemide tablet 20 mg BID       Objective:     Vital Signs (Most  Recent):  Temp: 97.5 °F (36.4 °C) (09/27/22 1543)  Pulse: 74 (09/27/22 1543)  Resp: 18 (09/27/22 1543)  BP: (!) 156/76 (09/27/22 1543)  SpO2: 99 % (09/27/22 1543)  O2 Device (Oxygen Therapy): room air (09/25/22 0807)   Vital Signs (24h Range):  Temp:  [97.4 °F (36.3 °C)-98 °F (36.7 °C)] 97.5 °F (36.4 °C)  Pulse:  [64-81] 74  Resp:  [17-18] 18  SpO2:  [96 %-99 %] 99 %  BP: (127-161)/(67-77) 156/76     Weight: (!) 136.3 kg (300 lb 7.8 oz) (09/24/22 2059)  Body mass index is 38.58 kg/m².  Body surface area is 2.67 meters squared.    No intake/output data recorded.    Physical Exam  Vitals and nursing note reviewed.   HENT:      Head: Normocephalic.      Mouth/Throat:      Pharynx: Oropharynx is clear.   Eyes:      Conjunctiva/sclera: Conjunctivae normal.   Pulmonary:      Effort: Pulmonary effort is normal.   Abdominal:      Palpations: Abdomen is soft.   Musculoskeletal:      Cervical back: Neck supple.      Right lower leg: No edema.      Left lower leg: No edema.   Skin:     General: Skin is warm and dry.   Neurological:      Mental Status: He is alert and oriented to person, place, and time.   Psychiatric:         Mood and Affect: Mood normal.         Behavior: Behavior normal.         Thought Content: Thought content normal.       Significant Labs:  CBC:   Recent Labs   Lab 09/27/22 0434   WBC 6.89   RBC 3.29*   HGB 10.2*   HCT 30.9*      MCV 94   MCH 31.0   MCHC 33.0     CMP:   Recent Labs   Lab 09/27/22 0434   *   CALCIUM 8.5*   ALBUMIN 3.2*   PROT 6.6      K 4.5   CO2 22*      BUN 53*   CREATININE 7.8*   ALKPHOS 60   ALT 7*   AST 9*   BILITOT 0.5     All labs within the past 24 hours have been reviewed.         Assessment/Plan:     Chronic kidney disease-mineral and bone disorder  -continue phos binders   Calcium acetate 667 mg TID with meals     ESRD on hemodialysis  ESRD on iHD TTS  Last HD on Thursday prior to presentation.  Reported to have had poor flow rates through AVF over  the past several week.  Unable to cannulate venous return, so he was sent to the ED on 9/24 for vascular sx eval.    Plan/Recommendations:    -Fistulagram postponed until tomorrrow 9/28, will plan for to dialyze following fistulagram   -electrolytes/acid-base/volume status stable   -recommend to keep IP until fistulogram can be completed.  -RFP daily     Anemia in ESRD (end-stage renal disease)  Goal 10-11  Hgb 10.1          Thank you for your consult. I will follow-up with patient. Please contact us if you have any additional questions.    Kyara Hines, NINO  Nephrology  [facility] ([place])

## 2022-09-27 NOTE — ASSESSMENT & PLAN NOTE
- Working to optimize BG control  - Levemir increased to 35 u qHS; resume prandial insulin with meals  - SSI provided for corrective dosing  - DXTs as ordered  - Hypoglycemic protocol in effect  - Diabetic diet provided  - levemir held overnight by vascular so did not receive a dose - will resume after procedure

## 2022-09-27 NOTE — PROGRESS NOTES
[facility] [address]  [facility] Medicine  Telemedicine Progress Note    Patient Name: Vinnie Siegel  MRN: [id number]  Patient Class: OP- Observation   Admission Date: 9/24/2022  Length of Stay: 0 days  Attending Physician: Tatum Casillas MD  Primary Care Provider: Janeth Walter MD          Subjective:     Principal Problem:AV fistula occlusion, initial encounter        HPI:  Vinnie Siegel is a 78yo AAM with of PMH of ESRD (HD TTS via left brachiocephalic AVF), HTN, HLD, DM2, anemia of chronic renal failure, GERD, BPH, glaucoma, and obesity who presented to the [facility] ED on 9/24/22 with a malfunctioning AVF. Pt reported that his HD center has been experiencing issues with his AVF for the past several weeks, and during his most recent session, there were issues with flow during the end of HD. On day of admission, HD center was unable to get any flow, and sent him to the ER. Pt is grossly asymptomatic, denies F/C/N/V/D/C, HA, SOB, CP, palpitations, abdominal pain, dysuria, extremity swelling, or symptoms otherwise.    In the ED, vitals significant for /74. Labs remarkable for glucose 270, though were otherwise unremarkable and consistent with known ESRD status. CXR without any acute cardiopulmonary processes. US of AVF showed occlusion with associated hypoechoic, expansile, acute thrombosis of the cephalic vein extending proximal to the AV fistula. Vascular surgery consulted, planning for upcoming fistulogram. Nephrology consulted, and were able to cannulate AVF for HD session. Hospital medicine was consulted for admission and further management.      Overview/Hospital Course:  Pt admitted to [facility] and was able to be successfully dialyzed. Vascular surgery originally planned for angiogram on 9/27 but had to be re-scheduled to 9/28.  HD to be done after fistulagram.        This encounter was provided through telemedicine.  Patient was transferred to the telemedicine service on:   09/26/2022   The patient location is: [id number] A admitted 9/24/2022  8:30 AM.    Interval History/Overnight Events:   Clinical record since admit reviewed.  Patient able to provide history.    Uneventful night; no pain to LUE fistula; he remains very frustrated about not having fistulagram on 9/27 as previously discussed; procedure is scheduled for 9/28; nephrology will schedule HD after fistulagram on 9/28    Review of Systems   Constitutional:  Negative for activity change, fatigue and fever.   Respiratory:  Negative for shortness of breath.    Gastrointestinal:  Negative for diarrhea and vomiting.   Musculoskeletal:  Negative for arthralgias and myalgias.   Psychiatric/Behavioral:  Positive for dysphoric mood. The patient is nervous/anxious.       Inpatient Medications:  Scheduled Meds:   sodium chloride 0.9%   Intravenous Once    apixaban  2.5 mg Oral BID    atorvastatin  40 mg Oral Daily    calcium acetate(phosphat bind)  667 mg Oral TID WM    ergocalciferol  50,000 Units Oral Q7 Days    ferrous sulfate  1 tablet Oral Daily    hydrALAZINE  100 mg Oral TID    insulin aspart U-100  7 Units Subcutaneous TIDWM    metoprolol succinate  12.5 mg Oral Daily    NIFEdipine  60 mg Oral BID    pantoprazole  40 mg Oral Daily    sodium chloride 0.9%  10 mL Intravenous Q8H    tamsulosin  1 capsule Oral QAM    timolol maleate 0.25%  1 drop Both Eyes BID    torsemide  20 mg Oral BID     Continuous Infusions:  PRN Meds:.acetaminophen, albuterol-ipratropium, aluminum-magnesium hydroxide-simethicone, dextrose 10%, dextrose 10%, glucagon (human recombinant), glucose, glucose, insulin aspart U-100, melatonin, naloxone, ondansetron, polyethylene glycol, prochlorperazine, simethicone      Objective:     Temp:  [97.4 °F (36.3 °C)-98 °F (36.7 °C)] 97.9 °F (36.6 °C)  Pulse:  [64-74] 70  Resp:  [17-20] 17  SpO2:  [96 %-100 %] 99 %  BP: (127-161)/(67-77) 159/76    No intake or output data in the 24 hours ending 09/27/22  1324     Body mass index is 38.58 kg/m².    Physical Exam  Vitals and nursing note reviewed.   Constitutional:       General: He is not in acute distress.     Appearance: Normal appearance. He is normal weight. He is not ill-appearing.   HENT:      Head: Normocephalic and atraumatic.      Right Ear: Hearing normal.      Left Ear: Hearing normal.      Nose: Nose normal.   Eyes:      General: No scleral icterus.        Right eye: No discharge.         Left eye: No discharge.      Extraocular Movements: Extraocular movements intact.   Cardiovascular:      Rate and Rhythm: Normal rate.   Pulmonary:      Effort: Pulmonary effort is normal. No accessory muscle usage or respiratory distress.   Neurological:      General: No focal deficit present.      Mental Status: He is alert and oriented to person, place, and time.      Cranial Nerves: No cranial nerve deficit.      Motor: No weakness.   Psychiatric:         Attention and Perception: Attention normal.         Mood and Affect: Affect is angry.         Speech: Speech is rapid and pressured.         Behavior: Behavior is cooperative.        Labs:  Recent Results (from the past 24 hour(s))   POCT glucose    Collection Time: 09/26/22  3:27 PM   Result Value Ref Range    POCT Glucose 295 (H) 70 - 110 mg/dL   POCT glucose    Collection Time: 09/26/22  7:31 PM   Result Value Ref Range    POCT Glucose 221 (H) 70 - 110 mg/dL   Phosphorus    Collection Time: 09/27/22  4:34 AM   Result Value Ref Range    Phosphorus 4.3 2.7 - 4.5 mg/dL   Magnesium    Collection Time: 09/27/22  4:34 AM   Result Value Ref Range    Magnesium 1.6 1.6 - 2.6 mg/dL   Comprehensive Metabolic Panel    Collection Time: 09/27/22  4:34 AM   Result Value Ref Range    Sodium 136 136 - 145 mmol/L    Potassium 4.5 3.5 - 5.1 mmol/L    Chloride 101 95 - 110 mmol/L    CO2 22 (L) 23 - 29 mmol/L    Glucose 231 (H) 70 - 110 mg/dL    BUN 53 (H) 8 - 23 mg/dL    Creatinine 7.8 (H) 0.5 - 1.4 mg/dL    Calcium 8.5 (L) 8.7 -  10.5 mg/dL    Total Protein 6.6 6.0 - 8.4 g/dL    Albumin 3.2 (L) 3.5 - 5.2 g/dL    Total Bilirubin 0.5 0.1 - 1.0 mg/dL    Alkaline Phosphatase 60 55 - 135 U/L    AST 9 (L) 10 - 40 U/L    ALT 7 (L) 10 - 44 U/L    Anion Gap 13 8 - 16 mmol/L    eGFR 6.6 (A) >60 mL/min/1.73 m^2   CBC Auto Differential    Collection Time: 09/27/22  4:34 AM   Result Value Ref Range    WBC 6.89 3.90 - 12.70 K/uL    RBC 3.29 (L) 4.60 - 6.20 M/uL    Hemoglobin 10.2 (L) 14.0 - 18.0 g/dL    Hematocrit 30.9 (L) 40.0 - 54.0 %    MCV 94 82 - 98 fL    MCH 31.0 27.0 - 31.0 pg    MCHC 33.0 32.0 - 36.0 g/dL    RDW 15.8 (H) 11.5 - 14.5 %    Platelets 194 150 - 450 K/uL    MPV 9.8 9.2 - 12.9 fL    Immature Granulocytes 0.7 (H) 0.0 - 0.5 %    Gran # (ANC) 4.6 1.8 - 7.7 K/uL    Immature Grans (Abs) 0.05 (H) 0.00 - 0.04 K/uL    Lymph # 1.5 1.0 - 4.8 K/uL    Mono # 0.6 0.3 - 1.0 K/uL    Eos # 0.1 0.0 - 0.5 K/uL    Baso # 0.03 0.00 - 0.20 K/uL    nRBC 0 0 /100 WBC    Gran % 66.8 38.0 - 73.0 %    Lymph % 21.8 18.0 - 48.0 %    Mono % 8.6 4.0 - 15.0 %    Eosinophil % 1.7 0.0 - 8.0 %    Basophil % 0.4 0.0 - 1.9 %    Differential Method Automated    Hemoglobin A1c    Collection Time: 09/27/22  4:34 AM   Result Value Ref Range    Hemoglobin A1C 12.4 (H) 4.0 - 5.6 %    Estimated Avg Glucose 309 (H) 68 - 131 mg/dL   POCT glucose    Collection Time: 09/27/22  8:12 AM   Result Value Ref Range    POCT Glucose 287 (H) 70 - 110 mg/dL   POCT glucose    Collection Time: 09/27/22 11:37 AM   Result Value Ref Range    POCT Glucose 291 (H) 70 - 110 mg/dL        Lab Results   Component Value Date    [id number] Not Detected 04/13/2021       Recent Labs   Lab 09/24/22  1250 09/26/22  0508 09/27/22  0434   WBC 7.34 6.90 6.89   LYMPH 26.6  2.0 23.8  1.6 21.8  1.5   HGB 11.9* 10.0* 10.2*   HCT 36.4* 30.3* 30.9*    181 194       Recent Labs   Lab 09/24/22  1250 09/26/22  0508 09/27/22  0434   * 136 136   K 4.5 4.4 4.5   CL 97 101 101   CO2 23 22* 22*   BUN 44* 48*  53*   CREATININE 7.1* 7.8* 7.8*   * 241* 231*   CALCIUM 8.7 8.6* 8.5*   MG 1.7 1.6 1.6   PHOS 3.2 3.9 4.3       Recent Labs   Lab 09/26/22  0508 09/27/22  0434   ALKPHOS 61 60   ALT 10 7*   AST 8* 9*   ALBUMIN 3.2* 3.2*   PROT 6.5 6.6   BILITOT 0.5 0.5          No results for input(s): DDIMER, FERRITIN, CRP, LDH, BNP, TROPONINI, CPK in the last 72 hours.    Invalid input(s): PROCALCITONIN    All labs within the last 24 hours were reviewed.     Microbiology:  Microbiology Results (last 7 days)       ** No results found for the last 168 hours. **              Imaging      Results for orders placed during the hospital encounter of 07/06/22    Echo    Interpretation Summary  · The left ventricle is normal in size with concentric remodeling and normal systolic function. The estimated ejection fraction is 60%.  · Normal right ventricular size with normal right ventricular systolic function.  · Normal left ventricular diastolic function.  · Mild aortic regurgitation.  · The estimated PA systolic pressure is 17 mmHg.  · Normal central venous pressure (3 mmHg).      [facility] Hem[facility]ialysis Access  Indication  ========    -End Stage Renal Disease on Dialysis, Complications during Dialysis    Dialysis Access  =============    Dialysis access location:  Left Arm  Type of AV access: Brachiocephalic AVF  Lt inflow A PSV    109 cm/s  Lt at anastomosis PSV  305 cm/s  Lt A distal anastomosis PSV    50 cm/s  Lt fistula prox PSV    34 cm/s  Lt fistula mid PSV 72 cm/s  Lt fistula mid flow volume 206 ml/min  Lt fistula distal PSV  93 cm/s  Lt outflow V prox PSV  61 cm/s  Lt outflow V distal PSV    38 cm/s    Impression  =========    Color flow duplex imaging reveals a nearly occlusive left Brachiocephalic AV fistula. Extensive thrombus is visualized throughout the  fistula, extending into the outflow vein. The volume flow at the mid bicep measures 206 mL/min.    DATE OF SERVICE: 09/26/2022                                                       Sonographer: RM COLBERT RDMS, RVT  Electronically Signed by: Milton García II at 09/26/2022-16:21      All imaging within the last 24 hours was reviewed.       Discharge Planning   HERMAN: 9/28/2022     Code Status: Full Code   Is the patient medically ready for discharge?: No    Reason for patient still in hospital (select all that apply): Patient trending condition, Treatment, Imaging, and Consult recommendations  Discharge Plan A: Home            Assessment/Plan:      * AV fistula occlusion, initial encounter  - Interval history and physical exam findings as described above  - AVF US findings as documented  - Vascular surgery consulted, planning for upcoming fistulogram on 9/28 (unable to schedule on 9/27)  - Nephrology consulted, and were able to cannulate AVF for HD session  - patient to remain hospitalized to ensure timely and adequate HD until fistula is evaluated; outpatient HD unit requires patient have HD inpatient after fistula evaluated prior to acceptance back for outpatient care  - apixaban started by vascular surgery due to acute thrombosis of the cephalic vein  - Continuing to monitor    Primary open angle glaucoma (POAG) of both eyes, indeterminate stage  - Continue home eye drop regimen    Benign prostatic hyperplasia without lower urinary tract symptoms  - Continue home tamsulosin regimen    Gastroesophageal reflux disease without esophagitis  - Currently asymptomatic  - Continue home PPI regimen    Mixed diabetic hyperlipidemia associated with type 2 diabetes mellitus  - Continue home statin regimen    ESRD on hemodialysis  - Interval history and physical exam findings as described above  - HD TTS via left brachiocephalic AVF  - Nephology consulted, appreciate assistance  - Further decision for HD per nephology  - Renally dosing all medications  - Avoid nephrotoxins  - Will continue to monitor on daily labs    Anemia in ESRD (end-stage renal disease)  - H/H stable  - Continue home  iron supplementation regimen  - Will continue to monitor on daily labs    Class 2 severe obesity due to excess calories with serious comorbidity and body mass index (BMI) of 38.0 to 38.9 in adult  - Body mass index is 38.58 kg/m².  - Needs dietary and lifestyle modifications in relation to health  - Consider dietary/nutrition consult outpatient    Hypertension associated with type 2 diabetes mellitus  - Working to optimize BP control  - Continue home regimen of hydralazine, metoprolol, nifedipine, and torsemide  - Will continue to monitor and further titrate antihypertensives as clinically indicated     Type 2 diabetes mellitus with chronic kidney disease on chronic dialysis, with long-term current use of insulin  - Working to optimize BG control  - Levemir increased to 35 u qHS; resume prandial insulin with meals  - SSI provided for corrective dosing  - DXTs as ordered  - Hypoglycemic protocol in effect  - Diabetic diet provided  - levemir held overnight by vascular so did not receive a dose - will resume after procedure      VTE Risk Mitigation (From admission, onward)         Ordered     apixaban tablet 2.5 mg  2 times daily         09/26/22 1146     IP VTE HIGH RISK PATIENT  Once         09/24/22 1526     Place sequential compression device  Until discontinued         09/24/22 1526                I have completed this tele-visit without the assistance of a telepresenter.    The attending portion of this evaluation, treatment, and documentation was performed per Tatum Casillas MD via Telemedicine AudioVisual using the secure IEV software platform with 2 way audio/video. The provider was located off-site and the patient is located in the hospital. The aforementioned video software was utilized to document the relevant history and physical exam    Tatum Casillas MD  Department of Hospital Medicine   [facility] - Telemetry Stepdown ([place])

## 2022-09-27 NOTE — SUBJECTIVE & OBJECTIVE
This encounter was provided through telemedicine.  Patient was transferred to the telemedicine service on:  09/26/2022   The patient location is: 7082/7082 A admitted 9/24/2022  8:30 AM.    Interval History/Overnight Events:   Clinical record since admit reviewed.  Patient able to provide history.    Uneventful night; no pain to LUE fistula; he remains very frustrated about not having fistulagram on 9/27 as previously discussed; procedure is scheduled for 9/28; nephrology will schedule HD after fistulagram on 9/28    Review of Systems   Constitutional:  Negative for activity change, fatigue and fever.   Respiratory:  Negative for shortness of breath.    Gastrointestinal:  Negative for diarrhea and vomiting.   Musculoskeletal:  Negative for arthralgias and myalgias.   Psychiatric/Behavioral:  Positive for dysphoric mood. The patient is nervous/anxious.       Inpatient Medications:  Scheduled Meds:   sodium chloride 0.9%   Intravenous Once    apixaban  2.5 mg Oral BID    atorvastatin  40 mg Oral Daily    calcium acetate(phosphat bind)  667 mg Oral TID WM    ergocalciferol  50,000 Units Oral Q7 Days    ferrous sulfate  1 tablet Oral Daily    hydrALAZINE  100 mg Oral TID    insulin aspart U-100  7 Units Subcutaneous TIDWM    metoprolol succinate  12.5 mg Oral Daily    NIFEdipine  60 mg Oral BID    pantoprazole  40 mg Oral Daily    sodium chloride 0.9%  10 mL Intravenous Q8H    tamsulosin  1 capsule Oral QAM    timolol maleate 0.25%  1 drop Both Eyes BID    torsemide  20 mg Oral BID     Continuous Infusions:  PRN Meds:.acetaminophen, albuterol-ipratropium, aluminum-magnesium hydroxide-simethicone, dextrose 10%, dextrose 10%, glucagon (human recombinant), glucose, glucose, insulin aspart U-100, melatonin, naloxone, ondansetron, polyethylene glycol, prochlorperazine, simethicone      Objective:     Temp:  [97.4 °F (36.3 °C)-98 °F (36.7 °C)] 97.9 °F (36.6 °C)  Pulse:  [64-74] 70  Resp:  [17-20] 17  SpO2:  [96 %-100 %] 99  %  BP: (127-161)/(67-77) 159/76    No intake or output data in the 24 hours ending 09/27/22 1324     Body mass index is 38.58 kg/m².    Physical Exam  Vitals and nursing note reviewed.   Constitutional:       General: He is not in acute distress.     Appearance: Normal appearance. He is normal weight. He is not ill-appearing.   HENT:      Head: Normocephalic and atraumatic.      Right Ear: Hearing normal.      Left Ear: Hearing normal.      Nose: Nose normal.   Eyes:      General: No scleral icterus.        Right eye: No discharge.         Left eye: No discharge.      Extraocular Movements: Extraocular movements intact.   Cardiovascular:      Rate and Rhythm: Normal rate.   Pulmonary:      Effort: Pulmonary effort is normal. No accessory muscle usage or respiratory distress.   Neurological:      General: No focal deficit present.      Mental Status: He is alert and oriented to person, place, and time.      Cranial Nerves: No cranial nerve deficit.      Motor: No weakness.   Psychiatric:         Attention and Perception: Attention normal.         Mood and Affect: Affect is angry.         Speech: Speech is rapid and pressured.         Behavior: Behavior is cooperative.        Labs:  Recent Results (from the past 24 hour(s))   POCT glucose    Collection Time: 09/26/22  3:27 PM   Result Value Ref Range    POCT Glucose 295 (H) 70 - 110 mg/dL   POCT glucose    Collection Time: 09/26/22  7:31 PM   Result Value Ref Range    POCT Glucose 221 (H) 70 - 110 mg/dL   Phosphorus    Collection Time: 09/27/22  4:34 AM   Result Value Ref Range    Phosphorus 4.3 2.7 - 4.5 mg/dL   Magnesium    Collection Time: 09/27/22  4:34 AM   Result Value Ref Range    Magnesium 1.6 1.6 - 2.6 mg/dL   Comprehensive Metabolic Panel    Collection Time: 09/27/22  4:34 AM   Result Value Ref Range    Sodium 136 136 - 145 mmol/L    Potassium 4.5 3.5 - 5.1 mmol/L    Chloride 101 95 - 110 mmol/L    CO2 22 (L) 23 - 29 mmol/L    Glucose 231 (H) 70 - 110 mg/dL     BUN 53 (H) 8 - 23 mg/dL    Creatinine 7.8 (H) 0.5 - 1.4 mg/dL    Calcium 8.5 (L) 8.7 - 10.5 mg/dL    Total Protein 6.6 6.0 - 8.4 g/dL    Albumin 3.2 (L) 3.5 - 5.2 g/dL    Total Bilirubin 0.5 0.1 - 1.0 mg/dL    Alkaline Phosphatase 60 55 - 135 U/L    AST 9 (L) 10 - 40 U/L    ALT 7 (L) 10 - 44 U/L    Anion Gap 13 8 - 16 mmol/L    eGFR 6.6 (A) >60 mL/min/1.73 m^2   CBC Auto Differential    Collection Time: 09/27/22  4:34 AM   Result Value Ref Range    WBC 6.89 3.90 - 12.70 K/uL    RBC 3.29 (L) 4.60 - 6.20 M/uL    Hemoglobin 10.2 (L) 14.0 - 18.0 g/dL    Hematocrit 30.9 (L) 40.0 - 54.0 %    MCV 94 82 - 98 fL    MCH 31.0 27.0 - 31.0 pg    MCHC 33.0 32.0 - 36.0 g/dL    RDW 15.8 (H) 11.5 - 14.5 %    Platelets 194 150 - 450 K/uL    MPV 9.8 9.2 - 12.9 fL    Immature Granulocytes 0.7 (H) 0.0 - 0.5 %    Gran # (ANC) 4.6 1.8 - 7.7 K/uL    Immature Grans (Abs) 0.05 (H) 0.00 - 0.04 K/uL    Lymph # 1.5 1.0 - 4.8 K/uL    Mono # 0.6 0.3 - 1.0 K/uL    Eos # 0.1 0.0 - 0.5 K/uL    Baso # 0.03 0.00 - 0.20 K/uL    nRBC 0 0 /100 WBC    Gran % 66.8 38.0 - 73.0 %    Lymph % 21.8 18.0 - 48.0 %    Mono % 8.6 4.0 - 15.0 %    Eosinophil % 1.7 0.0 - 8.0 %    Basophil % 0.4 0.0 - 1.9 %    Differential Method Automated    Hemoglobin A1c    Collection Time: 09/27/22  4:34 AM   Result Value Ref Range    Hemoglobin A1C 12.4 (H) 4.0 - 5.6 %    Estimated Avg Glucose 309 (H) 68 - 131 mg/dL   POCT glucose    Collection Time: 09/27/22  8:12 AM   Result Value Ref Range    POCT Glucose 287 (H) 70 - 110 mg/dL   POCT glucose    Collection Time: 09/27/22 11:37 AM   Result Value Ref Range    POCT Glucose 291 (H) 70 - 110 mg/dL        Lab Results   Component Value Date    [id number] Not Detected 04/13/2021       Recent Labs   Lab 09/24/22  1250 09/26/22  0508 09/27/22  0434   WBC 7.34 6.90 6.89   LYMPH 26.6  2.0 23.8  1.6 21.8  1.5   HGB 11.9* 10.0* 10.2*   HCT 36.4* 30.3* 30.9*    181 194       Recent Labs   Lab 09/24/22  1250 09/26/22  0508  09/27/22  0434   * 136 136   K 4.5 4.4 4.5   CL 97 101 101   CO2 23 22* 22*   BUN 44* 48* 53*   CREATININE 7.1* 7.8* 7.8*   * 241* 231*   CALCIUM 8.7 8.6* 8.5*   MG 1.7 1.6 1.6   PHOS 3.2 3.9 4.3       Recent Labs   Lab 09/26/22  0508 09/27/22  0434   ALKPHOS 61 60   ALT 10 7*   AST 8* 9*   ALBUMIN 3.2* 3.2*   PROT 6.5 6.6   BILITOT 0.5 0.5          No results for input(s): DDIMER, FERRITIN, CRP, LDH, BNP, TROPONINI, CPK in the last 72 hours.    Invalid input(s): PROCALCITONIN    All labs within the last 24 hours were reviewed.     Microbiology:  Microbiology Results (last 7 days)       ** No results found for the last 168 hours. **              Imaging      Results for orders placed during the hospital encounter of 07/06/22    Echo    Interpretation Summary  · The left ventricle is normal in size with concentric remodeling and normal systolic function. The estimated ejection fraction is 60%.  · Normal right ventricular size with normal right ventricular systolic function.  · Normal left ventricular diastolic function.  · Mild aortic regurgitation.  · The estimated PA systolic pressure is 17 mmHg.  · Normal central venous pressure (3 mmHg).      [facility] Hemodialysis Access  Indication  ========    -End Stage Renal Disease on Dialysis, Complications during Dialysis    Dialysis Access  =============    Dialysis access location:  Left Arm  Type of AV access: Brachiocephalic AVF  Lt inflow A PSV    109 cm/s  Lt at anastomosis PSV  305 cm/s  Lt A distal anastomosis PSV    50 cm/s  Lt fistula prox PSV    34 cm/s  Lt fistula mid PSV 72 cm/s  Lt fistula mid flow volume 206 ml/min  Lt fistula distal PSV  93 cm/s  Lt outflow V prox PSV  61 cm/s  Lt outflow V distal PSV    38 cm/s    Impression  =========    Color flow duplex imaging reveals a nearly occlusive left Brachiocephalic AV fistula. Extensive thrombus is visualized throughout the  fistula, extending into the outflow vein. The volume flow at the mid bicep  measures 206 mL/min.    DATE OF SERVICE: 09/26/2022                                                      Sonographer: RM COLBERT RDMS, RVT  Electronically Signed by: Milton García II at 09/26/2022-16:21      All imaging within the last 24 hours was reviewed.       Discharge Planning   HERMAN: 9/28/2022     Code Status: Full Code   Is the patient medically ready for discharge?: No    Reason for patient still in hospital (select all that apply): Patient trending condition, Treatment, Imaging, and Consult recommendations  Discharge Plan A: Home

## 2022-09-28 LAB
ALBUMIN SERPL BCP-MCNC: 3.3 G/DL (ref 3.5–5.2)
ALBUMIN SERPL BCP-MCNC: 3.3 G/DL (ref 3.5–5.2)
ALP SERPL-CCNC: 64 U/L (ref 55–135)
ALT SERPL W/O P-5'-P-CCNC: 7 U/L (ref 10–44)
ANION GAP SERPL CALC-SCNC: 14 MMOL/L (ref 8–16)
ANION GAP SERPL CALC-SCNC: 16 MMOL/L (ref 8–16)
AST SERPL-CCNC: 8 U/L (ref 10–40)
BASOPHILS # BLD AUTO: 0.02 K/UL (ref 0–0.2)
BASOPHILS NFR BLD: 0.3 % (ref 0–1.9)
BILIRUB SERPL-MCNC: 0.5 MG/DL (ref 0.1–1)
BUN SERPL-MCNC: 59 MG/DL (ref 8–23)
BUN SERPL-MCNC: 60 MG/DL (ref 8–23)
CALCIUM SERPL-MCNC: 8.7 MG/DL (ref 8.7–10.5)
CALCIUM SERPL-MCNC: 9 MG/DL (ref 8.7–10.5)
CHLORIDE SERPL-SCNC: 102 MMOL/L (ref 95–110)
CHLORIDE SERPL-SCNC: 102 MMOL/L (ref 95–110)
CO2 SERPL-SCNC: 19 MMOL/L (ref 23–29)
CO2 SERPL-SCNC: 19 MMOL/L (ref 23–29)
CREAT SERPL-MCNC: 8.1 MG/DL (ref 0.5–1.4)
CREAT SERPL-MCNC: 8.4 MG/DL (ref 0.5–1.4)
DIFFERENTIAL METHOD: ABNORMAL
EOSINOPHIL # BLD AUTO: 0.1 K/UL (ref 0–0.5)
EOSINOPHIL NFR BLD: 1.6 % (ref 0–8)
ERYTHROCYTE [DISTWIDTH] IN BLOOD BY AUTOMATED COUNT: 15.7 % (ref 11.5–14.5)
EST. GFR  (NO RACE VARIABLE): 6.1 ML/MIN/1.73 M^2
EST. GFR  (NO RACE VARIABLE): 6.3 ML/MIN/1.73 M^2
GLUCOSE SERPL-MCNC: 225 MG/DL (ref 70–110)
GLUCOSE SERPL-MCNC: 241 MG/DL (ref 70–110)
HCT VFR BLD AUTO: 31.1 % (ref 40–54)
HGB BLD-MCNC: 10.5 G/DL (ref 14–18)
IMM GRANULOCYTES # BLD AUTO: 0.05 K/UL (ref 0–0.04)
IMM GRANULOCYTES NFR BLD AUTO: 0.8 % (ref 0–0.5)
INR PPP: 1 (ref 0.8–1.2)
LYMPHOCYTES # BLD AUTO: 1.6 K/UL (ref 1–4.8)
LYMPHOCYTES NFR BLD: 25 % (ref 18–48)
MAGNESIUM SERPL-MCNC: 1.6 MG/DL (ref 1.6–2.6)
MCH RBC QN AUTO: 31.8 PG (ref 27–31)
MCHC RBC AUTO-ENTMCNC: 33.8 G/DL (ref 32–36)
MCV RBC AUTO: 94 FL (ref 82–98)
MONOCYTES # BLD AUTO: 0.5 K/UL (ref 0.3–1)
MONOCYTES NFR BLD: 8.7 % (ref 4–15)
NEUTROPHILS # BLD AUTO: 4 K/UL (ref 1.8–7.7)
NEUTROPHILS NFR BLD: 63.6 % (ref 38–73)
NRBC BLD-RTO: 0 /100 WBC
PHOSPHATE SERPL-MCNC: 4 MG/DL (ref 2.7–4.5)
PHOSPHATE SERPL-MCNC: 4.1 MG/DL (ref 2.7–4.5)
PLATELET # BLD AUTO: 214 K/UL (ref 150–450)
PMV BLD AUTO: 10.2 FL (ref 9.2–12.9)
POCT GLUCOSE: 231 MG/DL (ref 70–110)
POCT GLUCOSE: 232 MG/DL (ref 70–110)
POCT GLUCOSE: 304 MG/DL (ref 70–110)
POTASSIUM SERPL-SCNC: 4.3 MMOL/L (ref 3.5–5.1)
POTASSIUM SERPL-SCNC: 4.5 MMOL/L (ref 3.5–5.1)
PROT SERPL-MCNC: 6.8 G/DL (ref 6–8.4)
PROTHROMBIN TIME: 10.7 SEC (ref 9–12.5)
RBC # BLD AUTO: 3.3 M/UL (ref 4.6–6.2)
SODIUM SERPL-SCNC: 135 MMOL/L (ref 136–145)
SODIUM SERPL-SCNC: 137 MMOL/L (ref 136–145)
WBC # BLD AUTO: 6.24 K/UL (ref 3.9–12.7)

## 2022-09-28 PROCEDURE — 99225 PR SUBSEQUENT OBSERVATION CARE,LEVEL II: CPT | Mod: 95,,, | Performed by: INTERNAL MEDICINE

## 2022-09-28 PROCEDURE — 99152 MOD SED SAME PHYS/QHP 5/>YRS: CPT | Performed by: SURGERY

## 2022-09-28 PROCEDURE — 83735 ASSAY OF MAGNESIUM: CPT | Performed by: STUDENT IN AN ORGANIZED HEALTH CARE EDUCATION/TRAINING PROGRAM

## 2022-09-28 PROCEDURE — 99214 PR OFFICE/OUTPT VISIT, EST, LEVL IV, 30-39 MIN: ICD-10-PCS | Mod: ,,, | Performed by: NURSE PRACTITIONER

## 2022-09-28 PROCEDURE — 99153 MOD SED SAME PHYS/QHP EA: CPT | Performed by: SURGERY

## 2022-09-28 PROCEDURE — 63600175 PHARM REV CODE 636 W HCPCS: Performed by: SURGERY

## 2022-09-28 PROCEDURE — C1887 CATHETER, GUIDING: HCPCS | Performed by: SURGERY

## 2022-09-28 PROCEDURE — 25000003 PHARM REV CODE 250: Performed by: SURGERY

## 2022-09-28 PROCEDURE — A4216 STERILE WATER/SALINE, 10 ML: HCPCS | Performed by: STUDENT IN AN ORGANIZED HEALTH CARE EDUCATION/TRAINING PROGRAM

## 2022-09-28 PROCEDURE — 36415 COLL VENOUS BLD VENIPUNCTURE: CPT | Performed by: STUDENT IN AN ORGANIZED HEALTH CARE EDUCATION/TRAINING PROGRAM

## 2022-09-28 PROCEDURE — 80053 COMPREHEN METABOLIC PANEL: CPT | Performed by: STUDENT IN AN ORGANIZED HEALTH CARE EDUCATION/TRAINING PROGRAM

## 2022-09-28 PROCEDURE — 85025 COMPLETE CBC W/AUTO DIFF WBC: CPT | Performed by: STUDENT IN AN ORGANIZED HEALTH CARE EDUCATION/TRAINING PROGRAM

## 2022-09-28 PROCEDURE — 84100 ASSAY OF PHOSPHORUS: CPT | Performed by: STUDENT IN AN ORGANIZED HEALTH CARE EDUCATION/TRAINING PROGRAM

## 2022-09-28 PROCEDURE — 36415 COLL VENOUS BLD VENIPUNCTURE: CPT | Performed by: NURSE PRACTITIONER

## 2022-09-28 PROCEDURE — G0378 HOSPITAL OBSERVATION PER HR: HCPCS

## 2022-09-28 PROCEDURE — C1769 GUIDE WIRE: HCPCS | Performed by: SURGERY

## 2022-09-28 PROCEDURE — 99233 PR SUBSEQUENT HOSPITAL CARE,LEVL III: ICD-10-PCS | Mod: 25,GC,, | Performed by: SURGERY

## 2022-09-28 PROCEDURE — 84100 ASSAY OF PHOSPHORUS: CPT | Mod: 91 | Performed by: NURSE PRACTITIONER

## 2022-09-28 PROCEDURE — 99152 MOD SED SAME PHYS/QHP 5/>YRS: CPT | Mod: ,,, | Performed by: SURGERY

## 2022-09-28 PROCEDURE — C1894 INTRO/SHEATH, NON-LASER: HCPCS | Performed by: SURGERY

## 2022-09-28 PROCEDURE — 25000003 PHARM REV CODE 250: Performed by: STUDENT IN AN ORGANIZED HEALTH CARE EDUCATION/TRAINING PROGRAM

## 2022-09-28 PROCEDURE — 25000003 PHARM REV CODE 250: Performed by: INTERNAL MEDICINE

## 2022-09-28 PROCEDURE — 36901 INTRO CATH DIALYSIS CIRCUIT: CPT | Mod: ,,, | Performed by: SURGERY

## 2022-09-28 PROCEDURE — 85610 PROTHROMBIN TIME: CPT | Performed by: STUDENT IN AN ORGANIZED HEALTH CARE EDUCATION/TRAINING PROGRAM

## 2022-09-28 PROCEDURE — 99214 OFFICE O/P EST MOD 30 MIN: CPT | Mod: ,,, | Performed by: NURSE PRACTITIONER

## 2022-09-28 PROCEDURE — 36901 INTRO CATH DIALYSIS CIRCUIT: CPT | Performed by: SURGERY

## 2022-09-28 PROCEDURE — 99225 PR SUBSEQUENT OBSERVATION CARE,LEVEL II: ICD-10-PCS | Mod: 95,,, | Performed by: INTERNAL MEDICINE

## 2022-09-28 PROCEDURE — 99233 SBSQ HOSP IP/OBS HIGH 50: CPT | Mod: 25,GC,, | Performed by: SURGERY

## 2022-09-28 PROCEDURE — 99152 PR MOD CONSCIOUS SEDATION, SAME PHYS, 5+ YRS, FIRST 15 MIN: ICD-10-PCS | Mod: ,,, | Performed by: SURGERY

## 2022-09-28 PROCEDURE — 96372 THER/PROPH/DIAG INJ SC/IM: CPT | Mod: 59 | Performed by: INTERNAL MEDICINE

## 2022-09-28 PROCEDURE — 36901 PR INTRO CATH, DIALYSIS CIRCUIT: ICD-10-PCS | Mod: ,,, | Performed by: SURGERY

## 2022-09-28 RX ORDER — LIDOCAINE HYDROCHLORIDE 10 MG/ML
INJECTION, SOLUTION EPIDURAL; INFILTRATION; INTRACAUDAL; PERINEURAL
Status: DISCONTINUED | OUTPATIENT
Start: 2022-09-28 | End: 2022-09-28 | Stop reason: HOSPADM

## 2022-09-28 RX ORDER — FENTANYL CITRATE 50 UG/ML
INJECTION, SOLUTION INTRAMUSCULAR; INTRAVENOUS
Status: DISCONTINUED | OUTPATIENT
Start: 2022-09-28 | End: 2022-09-28 | Stop reason: HOSPADM

## 2022-09-28 RX ORDER — MIDAZOLAM HYDROCHLORIDE 1 MG/ML
INJECTION, SOLUTION INTRAMUSCULAR; INTRAVENOUS
Status: DISCONTINUED | OUTPATIENT
Start: 2022-09-28 | End: 2022-09-28 | Stop reason: HOSPADM

## 2022-09-28 RX ORDER — HEPARIN SOD,PORCINE/0.9 % NACL 1000/500ML
INTRAVENOUS SOLUTION INTRAVENOUS
Status: DISCONTINUED | OUTPATIENT
Start: 2022-09-28 | End: 2022-09-28 | Stop reason: HOSPADM

## 2022-09-28 RX ORDER — HYDROCODONE BITARTRATE AND ACETAMINOPHEN 5; 325 MG/1; MG/1
1 TABLET ORAL EVERY 4 HOURS PRN
Status: CANCELLED | OUTPATIENT
Start: 2022-09-28

## 2022-09-28 RX ADMIN — METOPROLOL SUCCINATE 12.5 MG: 25 TABLET, EXTENDED RELEASE ORAL at 08:09

## 2022-09-28 RX ADMIN — TIMOLOL MALEATE 1 DROP: 2.5 SOLUTION/ DROPS OPHTHALMIC at 09:09

## 2022-09-28 RX ADMIN — INSULIN ASPART 4 UNITS: 100 INJECTION, SOLUTION INTRAVENOUS; SUBCUTANEOUS at 05:09

## 2022-09-28 RX ADMIN — ATORVASTATIN CALCIUM 40 MG: 40 TABLET, FILM COATED ORAL at 08:09

## 2022-09-28 RX ADMIN — Medication 10 ML: at 06:09

## 2022-09-28 RX ADMIN — INSULIN ASPART 7 UNITS: 100 INJECTION, SOLUTION INTRAVENOUS; SUBCUTANEOUS at 05:09

## 2022-09-28 RX ADMIN — NIFEDIPINE 60 MG: 30 TABLET, FILM COATED, EXTENDED RELEASE ORAL at 09:09

## 2022-09-28 RX ADMIN — TORSEMIDE 20 MG: 20 TABLET ORAL at 08:09

## 2022-09-28 RX ADMIN — INSULIN ASPART 4 UNITS: 100 INJECTION, SOLUTION INTRAVENOUS; SUBCUTANEOUS at 08:09

## 2022-09-28 RX ADMIN — CALCIUM ACETATE 667 MG: 667 CAPSULE ORAL at 05:09

## 2022-09-28 RX ADMIN — CALCIUM ACETATE 667 MG: 667 CAPSULE ORAL at 08:09

## 2022-09-28 RX ADMIN — FERROUS SULFATE TAB 325 MG (65 MG ELEMENTAL FE) 1 EACH: 325 (65 FE) TAB at 08:09

## 2022-09-28 RX ADMIN — Medication 10 ML: at 04:09

## 2022-09-28 RX ADMIN — NIFEDIPINE 60 MG: 30 TABLET, FILM COATED, EXTENDED RELEASE ORAL at 08:09

## 2022-09-28 RX ADMIN — TORSEMIDE 20 MG: 20 TABLET ORAL at 05:09

## 2022-09-28 RX ADMIN — HYDRALAZINE HYDROCHLORIDE 100 MG: 50 TABLET ORAL at 09:09

## 2022-09-28 RX ADMIN — INSULIN ASPART 4 UNITS: 100 INJECTION, SOLUTION INTRAVENOUS; SUBCUTANEOUS at 09:09

## 2022-09-28 RX ADMIN — TAMSULOSIN HYDROCHLORIDE 0.4 MG: 0.4 CAPSULE ORAL at 06:09

## 2022-09-28 RX ADMIN — HYDRALAZINE HYDROCHLORIDE 100 MG: 50 TABLET ORAL at 08:09

## 2022-09-28 RX ADMIN — APIXABAN 2.5 MG: 2.5 TABLET, FILM COATED ORAL at 08:09

## 2022-09-28 RX ADMIN — INSULIN DETEMIR 28 UNITS: 100 INJECTION, SOLUTION SUBCUTANEOUS at 09:09

## 2022-09-28 RX ADMIN — Medication 10 ML: at 10:09

## 2022-09-28 RX ADMIN — PANTOPRAZOLE SODIUM 40 MG: 40 TABLET, DELAYED RELEASE ORAL at 08:09

## 2022-09-28 RX ADMIN — HYDRALAZINE HYDROCHLORIDE 100 MG: 50 TABLET ORAL at 04:09

## 2022-09-28 NOTE — PROGRESS NOTES
Nutrition-Related Diabetes Education      Time Spent: 10 min    Learners: Pt    Current HbA1c: 12.4    Is patient aware of their A1c and their goal A1c? Yes    Nutrition Education with handouts: Educated pt on CHO counting for people with diabetes. Pt verbalized understanding. Emphasized the importance of diet adherence. Pt with no additional questions. No other needs identified. Left all education material with pt at bedside.    Comments: Pt reports good appetite. Receiving HD. Per wt hx,  lb. Wt stable. Denies any significant wt changes. No indicators of malnutrition.    Barriers to Learning: No    Follow up: Yes    Please consult as needed.  Thank you!    Jer RIOS

## 2022-09-28 NOTE — ASSESSMENT & PLAN NOTE
ESRD on iHD TTS  Last HD on Thursday prior to presentation.  Reported to have had poor flow rates through AVF over the past several week.  Unable to cannulate venous return, so he was sent to the ED on 9/24 for vascular sx eval.    Plan/Recommendations:    -Electrolytes, volume status stable, repeat RFP this evening   -Plan for TDC with IR tomorrow 9/29, will plan for to dialyze following Line placement    -RFP daily   -agree with torsemide 20 BID

## 2022-09-28 NOTE — SUBJECTIVE & OBJECTIVE
Interval History: HD yesterday, tolerated well.  Net UF 3L.     Review of patient's allergies indicates:  No Known Allergies  Current Facility-Administered Medications   Medication Frequency    0.9%  NaCl infusion Once    acetaminophen tablet 650 mg Q4H PRN    albuterol-ipratropium 2.5 mg-0.5 mg/3 mL nebulizer solution 3 mL Q6H PRN    aluminum-magnesium hydroxide-simethicone 200-200-20 mg/5 mL suspension 30 mL QID PRN    apixaban tablet 2.5 mg BID    atorvastatin tablet 40 mg Daily    calcium acetate(phosphat bind) capsule 667 mg TID WM    dextrose 10% bolus 125 mL PRN    dextrose 10% bolus 250 mL PRN    ergocalciferol capsule 50,000 Units Q7 Days    ferrous sulfate tablet 1 each Daily    glucagon (human recombinant) injection 1 mg PRN    glucose chewable tablet 16 g PRN    glucose chewable tablet 24 g PRN    hydrALAZINE tablet 100 mg TID    insulin aspart U-100 pen 1-10 Units QID (AC + HS) PRN    insulin aspart U-100 pen 7 Units TIDWM    melatonin tablet 6 mg Nightly PRN    metoprolol succinate (TOPROL-XL) 24 hr split tablet 12.5 mg Daily    naloxone 0.4 mg/mL injection 0.02 mg PRN    NIFEdipine 24 hr tablet 60 mg BID    ondansetron injection 4 mg Q8H PRN    pantoprazole EC tablet 40 mg Daily    polyethylene glycol packet 17 g Daily PRN    prochlorperazine injection Soln 5 mg Q6H PRN    simethicone chewable tablet 80 mg QID PRN    sodium chloride 0.9% flush 10 mL Q8H    tamsulosin 24 hr capsule 0.4 mg QAM    timolol maleate 0.25% ophthalmic solution 1 drop BID    torsemide tablet 20 mg BID       Objective:     Vital Signs (Most Recent):  Temp: 97.3 °F (36.3 °C) (09/28/22 1555)  Pulse: 75 (09/28/22 0856)  Resp: 19 (09/28/22 1555)  BP: (!) 145/81 (09/28/22 1600)  SpO2: 100 % (09/28/22 1555)  O2 Device (Oxygen Therapy): room air (09/25/22 0807)   Vital Signs (24h Range):  Temp:  [97.2 °F (36.2 °C)-98.4 °F (36.9 °C)] 97.3 °F (36.3 °C)  Pulse:  [64-75] 75  Resp:  [18-19] 19  SpO2:  [97 %-100 %] 100 %  BP:  (145-159)/(68-81) 145/81     Weight: (!) 136.3 kg (300 lb 7.8 oz) (09/24/22 2059)  Body mass index is 38.58 kg/m².  Body surface area is 2.67 meters squared.    I/O last 3 completed shifts:  In: -   Out: 400 [Urine:400]    Physical Exam  Vitals and nursing note reviewed.   HENT:      Head: Normocephalic.      Mouth/Throat:      Pharynx: Oropharynx is clear.   Eyes:      Conjunctiva/sclera: Conjunctivae normal.   Pulmonary:      Effort: Pulmonary effort is normal.   Abdominal:      Palpations: Abdomen is soft.   Musculoskeletal:      Cervical back: Neck supple.      Right lower leg: No edema.      Left lower leg: No edema.   Skin:     General: Skin is warm and dry.   Neurological:      Mental Status: He is alert and oriented to person, place, and time.   Psychiatric:         Mood and Affect: Mood normal.         Behavior: Behavior normal.         Thought Content: Thought content normal.       Significant Labs:  CBC:   Recent Labs   Lab 09/29/22  0513   WBC 6.34   RBC 3.23*   HGB 10.0*   HCT 29.8*      MCV 92   MCH 31.0   MCHC 33.6     CMP:   Recent Labs   Lab 09/29/22  0513   *   CALCIUM 8.5*   ALBUMIN 3.1*   PROT 6.7      K 4.6   CO2 18*      BUN 65*   CREATININE 7.7*   ALKPHOS 56   ALT 7*   AST 9*   BILITOT 0.4     All labs within the past 24 hours have been reviewed.

## 2022-09-28 NOTE — ASSESSMENT & PLAN NOTE
Faint bruit/thrill to proximal aspect of his HUSEYIN AVF  +pulsatile to distal portion  -Vasular Sx following,   Thrombosed AVF - plan for TDC 9/29

## 2022-09-28 NOTE — SUBJECTIVE & OBJECTIVE
This encounter was provided through telemedicine.  Patient was transferred to the telemedicine service on:  09/26/2022   The patient location is: 7082/7082 A admitted 9/24/2022  8:30 AM.    Interval History/Overnight Events:     Patient able to provide history.    Uneventful night; no pain to LUE fistula; better spirits today after the fistulagram and fistula was thrombosed; IR will place the tunneled catheter on 9/29 with HD thereafter    Review of Systems   Constitutional:  Negative for activity change, fatigue and fever.   Respiratory:  Negative for shortness of breath.    Gastrointestinal:  Negative for diarrhea and vomiting.   Musculoskeletal:  Negative for arthralgias and myalgias.      Inpatient Medications:  Scheduled Meds:   sodium chloride 0.9%   Intravenous Once    apixaban  2.5 mg Oral BID    atorvastatin  40 mg Oral Daily    calcium acetate(phosphat bind)  667 mg Oral TID WM    ergocalciferol  50,000 Units Oral Q7 Days    ferrous sulfate  1 tablet Oral Daily    hydrALAZINE  100 mg Oral TID    insulin aspart U-100  7 Units Subcutaneous TIDWM    insulin detemir U-100  28 Units Subcutaneous QHS    metoprolol succinate  12.5 mg Oral Daily    NIFEdipine  60 mg Oral BID    pantoprazole  40 mg Oral Daily    sodium chloride 0.9%  10 mL Intravenous Q8H    tamsulosin  1 capsule Oral QAM    timolol maleate 0.25%  1 drop Both Eyes BID    torsemide  20 mg Oral BID     Continuous Infusions:  PRN Meds:.acetaminophen, albuterol-ipratropium, aluminum-magnesium hydroxide-simethicone, dextrose 10%, dextrose 10%, glucagon (human recombinant), glucose, glucose, insulin aspart U-100, melatonin, naloxone, ondansetron, polyethylene glycol, prochlorperazine, simethicone      Objective:     Temp:  [97.2 °F (36.2 °C)-98.4 °F (36.9 °C)] 98.1 °F (36.7 °C)  Pulse:  [64-80] 80  Resp:  [18-19] 19  SpO2:  [97 %-100 %] 100 %  BP: (145-159)/(68-81) 145/81      Intake/Output Summary (Last 24 hours) at 9/28/2022 6308  Last data filed at  9/28/2022 0500  Gross per 24 hour   Intake --   Output 400 ml   Net -400 ml          Body mass index is 38.58 kg/m².    Physical Exam  Vitals and nursing note reviewed.   Constitutional:       General: He is not in acute distress.     Appearance: Normal appearance. He is normal weight. He is not ill-appearing.   HENT:      Head: Normocephalic and atraumatic.      Right Ear: Hearing normal.      Left Ear: Hearing normal.      Nose: Nose normal.   Eyes:      General: No scleral icterus.        Right eye: No discharge.         Left eye: No discharge.      Extraocular Movements: Extraocular movements intact.   Cardiovascular:      Rate and Rhythm: Normal rate.   Pulmonary:      Effort: Pulmonary effort is normal. No accessory muscle usage or respiratory distress.   Neurological:      General: No focal deficit present.      Mental Status: He is alert and oriented to person, place, and time.      Cranial Nerves: No cranial nerve deficit.      Motor: No weakness.   Psychiatric:         Attention and Perception: Attention normal.         Mood and Affect: Affect is angry.         Speech: Speech is rapid and pressured.         Behavior: Behavior is cooperative.        Labs:  Recent Results (from the past 24 hour(s))   POCT glucose    Collection Time: 09/27/22  7:43 PM   Result Value Ref Range    POCT Glucose 248 (H) 70 - 110 mg/dL   Phosphorus    Collection Time: 09/28/22  5:48 AM   Result Value Ref Range    Phosphorus 4.1 2.7 - 4.5 mg/dL   Magnesium    Collection Time: 09/28/22  5:48 AM   Result Value Ref Range    Magnesium 1.6 1.6 - 2.6 mg/dL   Comprehensive Metabolic Panel    Collection Time: 09/28/22  5:48 AM   Result Value Ref Range    Sodium 135 (L) 136 - 145 mmol/L    Potassium 4.5 3.5 - 5.1 mmol/L    Chloride 102 95 - 110 mmol/L    CO2 19 (L) 23 - 29 mmol/L    Glucose 241 (H) 70 - 110 mg/dL    BUN 59 (H) 8 - 23 mg/dL    Creatinine 8.1 (H) 0.5 - 1.4 mg/dL    Calcium 8.7 8.7 - 10.5 mg/dL    Total Protein 6.8 6.0 - 8.4  g/dL    Albumin 3.3 (L) 3.5 - 5.2 g/dL    Total Bilirubin 0.5 0.1 - 1.0 mg/dL    Alkaline Phosphatase 64 55 - 135 U/L    AST 8 (L) 10 - 40 U/L    ALT 7 (L) 10 - 44 U/L    Anion Gap 14 8 - 16 mmol/L    eGFR 6.3 (A) >60 mL/min/1.73 m^2   CBC Auto Differential    Collection Time: 09/28/22  5:48 AM   Result Value Ref Range    WBC 6.24 3.90 - 12.70 K/uL    RBC 3.30 (L) 4.60 - 6.20 M/uL    Hemoglobin 10.5 (L) 14.0 - 18.0 g/dL    Hematocrit 31.1 (L) 40.0 - 54.0 %    MCV 94 82 - 98 fL    MCH 31.8 (H) 27.0 - 31.0 pg    MCHC 33.8 32.0 - 36.0 g/dL    RDW 15.7 (H) 11.5 - 14.5 %    Platelets 214 150 - 450 K/uL    MPV 10.2 9.2 - 12.9 fL    Immature Granulocytes 0.8 (H) 0.0 - 0.5 %    Gran # (ANC) 4.0 1.8 - 7.7 K/uL    Immature Grans (Abs) 0.05 (H) 0.00 - 0.04 K/uL    Lymph # 1.6 1.0 - 4.8 K/uL    Mono # 0.5 0.3 - 1.0 K/uL    Eos # 0.1 0.0 - 0.5 K/uL    Baso # 0.02 0.00 - 0.20 K/uL    nRBC 0 0 /100 WBC    Gran % 63.6 38.0 - 73.0 %    Lymph % 25.0 18.0 - 48.0 %    Mono % 8.7 4.0 - 15.0 %    Eosinophil % 1.6 0.0 - 8.0 %    Basophil % 0.3 0.0 - 1.9 %    Differential Method Automated    POCT glucose    Collection Time: 09/28/22  8:05 AM   Result Value Ref Range    POCT Glucose 232 (H) 70 - 110 mg/dL   POCT glucose    Collection Time: 09/28/22  4:18 PM   Result Value Ref Range    POCT Glucose 231 (H) 70 - 110 mg/dL        Lab Results   Component Value Date    [id number] Not Detected 04/13/2021       Recent Labs   Lab 09/26/22  0508 09/27/22  0434 09/28/22  0548   WBC 6.90 6.89 6.24   LYMPH 23.8  1.6 21.8  1.5 25.0  1.6   HGB 10.0* 10.2* 10.5*   HCT 30.3* 30.9* 31.1*    194 214       Recent Labs   Lab 09/26/22  0508 09/27/22  0434 09/28/22  0548    136 135*   K 4.4 4.5 4.5    101 102   CO2 22* 22* 19*   BUN 48* 53* 59*   CREATININE 7.8* 7.8* 8.1*   * 231* 241*   CALCIUM 8.6* 8.5* 8.7   MG 1.6 1.6 1.6   PHOS 3.9 4.3 4.1       Recent Labs   Lab 09/26/22  0508 09/27/22  0434 09/28/22  0548   ALKPHOS 61 60 64    ALT 10 7* 7*   AST 8* 9* 8*   ALBUMIN 3.2* 3.2* 3.3*   PROT 6.5 6.6 6.8   BILITOT 0.5 0.5 0.5          No results for input(s): DDIMER, FERRITIN, CRP, LDH, BNP, TROPONINI, CPK in the last 72 hours.    Invalid input(s): PROCALCITONIN    All labs within the last 24 hours were reviewed.     Microbiology:  Microbiology Results (last 7 days)       ** No results found for the last 168 hours. **              Imaging      Results for orders placed during the hospital encounter of 07/06/22    Echo    Interpretation Summary  · The left ventricle is normal in size with concentric remodeling and normal systolic function. The estimated ejection fraction is 60%.  · Normal right ventricular size with normal right ventricular systolic function.  · Normal left ventricular diastolic function.  · Mild aortic regurgitation.  · The estimated PA systolic pressure is 17 mmHg.  · Normal central venous pressure (3 mmHg).      [facility] Hemodialysis Access  Indication  ========    -End Stage Renal Disease on Dialysis, Complications during Dialysis    Dialysis Access  =============    Dialysis access location:  Left Arm  Type of AV access: Brachiocephalic AVF  Lt inflow A PSV    109 cm/s  Lt at anastomosis PSV  305 cm/s  Lt A distal anastomosis PSV    50 cm/s  Lt fistula prox PSV    34 cm/s  Lt fistula mid PSV 72 cm/s  Lt fistula mid flow volume 206 ml/min  Lt fistula distal PSV  93 cm/s  Lt outflow V prox PSV  61 cm/s  Lt outflow V distal PSV    38 cm/s    Impression  =========    Color flow duplex imaging reveals a nearly occlusive left Brachiocephalic AV fistula. Extensive thrombus is visualized throughout the  fistula, extending into the outflow vein. The volume flow at the mid bicep measures 206 mL/min.    DATE OF SERVICE: 09/26/2022                                                      Sonographer: RM COLBERT RDMS, RVT  Electronically Signed by: Milton García II at 09/26/2022-16:21      All imaging within the last 24 hours was  reviewed.       Discharge Planning   HERMAN: 9/29/2022     Code Status: Full Code   Is the patient medically ready for discharge?: No    Reason for patient still in hospital (select all that apply): Patient trending condition, Treatment, Imaging, and Consult recommendations  Discharge Plan A: Home

## 2022-09-28 NOTE — ASSESSMENT & PLAN NOTE
ESRD on iHD TTS  Last HD on Thursday prior to presentation.  Reported to have had poor flow rates through AVF over the past several week.  Unable to cannulate venous return, so he was sent to the ED on 9/24 for vascular sx eval.    Plan/Recommendations:    -HD yesterday, tolerated well. Plan for HD tomorrow 9/31  -RFP daily   -agree with torsemide 20 BID

## 2022-09-28 NOTE — CONSULTS
Radiology Consult    Vinnie Siegel is a 77 y.o. male w/ pmhx of HTN, HLD, T2DM, anemia, GERD, BPH,  and ESRD s/p brachiocephalic AVF who presents with malfunction of his AVF. US of AVF showed occlusion with associated hypoechoic expansile, acute thrombosis of the cephalic vein extending proximal to the AVF. Fistulogram performed on 9/28/22 with vascular surgery showing thrombosed AVF with outflow via collaterals and basilic vein. Permacatheter placement attempted and patient became agitated. IR consulted for HD catheter placement.     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     Hyperlipidemia     Hypertension     Kidney stone     Seasonal allergies 6/24/2014     Past Surgical History:   Procedure Laterality Date    AV FISTULA PLACEMENT Left 12/8/2020    Procedure: CREATION, AV FISTULA;  Surgeon: Benji Becerril MD;  Location: [address];  Service: Peripheral Vascular;  Laterality: Left;    CATARACT EXTRACTION W/  INTRAOCULAR LENS IMPLANT Right 04/04/16    Dr Meehan    COLONOSCOPY W/ BIOPSIES      ESOPHAGOGASTRODUODENOSCOPY N/A 6/29/2020    Procedure: EGD (ESOPHAGOGASTRODUODENOSCOPY);  Surgeon: Ravi Leone MD;  Location: [facility];  Service: Endoscopy;  Laterality: N/A;    EYE SURGERY      FISTULOGRAM Left 4/16/2021    Procedure: Fistulogram;  Surgeon: Benji Becerril MD;  Location: [facility] CATH LAB;  Service: Peripheral Vascular;  Laterality: Left;    HEMORRHOID SURGERY      Dr. Root [facility]    lateral internal anal sphincterotomy  12/13/13    Dr. root [facility]    PERCUTANEOUS TRANSLUMINAL ANGIOPLASTY OF ARTERIOVENOUS FISTULA Left 4/16/2021    Procedure: PTA, AV FISTULA;  Surgeon: Benji Becerril MD;  Location: [facility] CATH LAB;  Service: Peripheral Vascular;  Laterality: Left;    URETERAL STENT PLACEMENT         Discussed  with primary team, Dr. Remington Cristina.    Imaging reviewed with Radiology staff, Dr. Remington Cristina.     Procedure: HD line placement.    Scheduled Meds:    sodium chloride 0.9%   Intravenous Once    apixaban  2.5 mg Oral BID    atorvastatin  40 mg Oral Daily    calcium acetate(phosphat bind)  667 mg Oral TID WM    ergocalciferol  50,000 Units Oral Q7 Days    ferrous sulfate  1 tablet Oral Daily    hydrALAZINE  100 mg Oral TID    insulin aspart U-100  7 Units Subcutaneous TIDWM    metoprolol succinate  12.5 mg Oral Daily    NIFEdipine  60 mg Oral BID    pantoprazole  40 mg Oral Daily    sodium chloride 0.9%  10 mL Intravenous Q8H    tamsulosin  1 capsule Oral QAM    timolol maleate 0.25%  1 drop Both Eyes BID    torsemide  20 mg Oral BID     Continuous Infusions:   PRN Meds:acetaminophen, albuterol-ipratropium, aluminum-magnesium hydroxide-simethicone, dextrose 10%, dextrose 10%, glucagon (human recombinant), glucose, glucose, insulin aspart U-100, melatonin, naloxone, ondansetron, polyethylene glycol, prochlorperazine, simethicone    Allergies: Review of patient's allergies indicates:  No Known Allergies    Labs:  No results for input(s): INR in the last 168 hours.    Invalid input(s):  PT,  PTT    Recent Labs   Lab 09/28/22  0548   WBC 6.24   HGB 10.5*   HCT 31.1*   MCV 94         Recent Labs   Lab 09/28/22  0548   *   *   K 4.5      CO2 19*   BUN 59*   CREATININE 8.1*   CALCIUM 8.7   MG 1.6   ALT 7*   AST 8*   ALBUMIN 3.3*   BILITOT 0.5         Vitals (Most Recent):  Temp: 97.8 °F (36.6 °C) (09/28/22 0856)  Pulse: 75 (09/28/22 0856)  Resp: 19 (09/28/22 0856)  BP: (!) 159/74 (09/28/22 0856)  SpO2: 97 % (09/28/22 0856)    Plan:   1. NPO after midnight.  2. Hold anticoagulants.  3. HD line scheduled for 9/29/22.    Laura Felix

## 2022-09-28 NOTE — H&P
Interval H&P:   The patient has been examined and the H&P has been reviewed:  I concur with the findings and no changes have occurred since H&P was written. Risks and benefits of procedure have been explained, questions were answered. Patient verbalized understanding and agreement with plan. Consent was signed and patient was marked.     Anesthesia/Surgery risks, benefits and alternative options discussed and understood by patient/family.    Teja Sánchez MD  General Surgery, PGY-1        Subjective:      Interval History: No acute overnight events, AF, VSS. LUE AVF pulsatile, no active bleeding. Plan for fistulogram tomorrow. Ok for DC per vascular.     Post-Op Info:  * No surgery found *            Medications:  Continuous Infusions:  Scheduled Meds:   atorvastatin  40 mg Oral Daily    calcium acetate(phosphat bind)  667 mg Oral TID WM    ergocalciferol  50,000 Units Oral Q7 Days    ferrous sulfate  1 tablet Oral Daily    heparin (porcine)  5,000 Units Subcutaneous Q8H    hydrALAZINE  100 mg Oral TID    insulin detemir U-100  35 Units Subcutaneous QHS    metoprolol succinate  12.5 mg Oral Daily    NIFEdipine  60 mg Oral BID    pantoprazole  40 mg Oral Daily    sodium chloride 0.9%  10 mL Intravenous Q8H    tamsulosin  1 capsule Oral QAM    timolol maleate 0.25%  1 drop Both Eyes BID    torsemide  20 mg Oral BID      PRN Meds:acetaminophen, albuterol-ipratropium, aluminum-magnesium hydroxide-simethicone, dextrose 10%, dextrose 10%, glucagon (human recombinant), glucose, glucose, insulin aspart U-100, melatonin, naloxone, ondansetron, polyethylene glycol, prochlorperazine, simethicone      Objective:      Vital Signs (Most Recent):  Temp: 98.6 °F (37 °C) (09/26/22 0025)  Pulse: 71 (09/26/22 0025)  Resp: 18 (09/26/22 0025)  BP: (!) 151/72 (09/26/22 0025)  SpO2: 99 % (09/26/22 0025)    Vital Signs (24h Range):  Temp:  [98.6 °F (37 °C)-98.8 °F (37.1 °C)] 98.6 °F (37 °C)  Pulse:  [68-76] 71  Resp:  [18]  18  SpO2:  [99 %-100 %] 99 %  BP: (123-156)/(65-78) 151/72            Physical Exam  Constitutional:       Appearance: Normal appearance.   HENT:      Head: Normocephalic and atraumatic.      Nose: Nose normal.      Mouth/Throat:      Mouth: Mucous membranes are moist.   Cardiovascular:      Rate and Rhythm: Normal rate and regular rhythm.      Pulses:           Radial pulses are 2+ on the right side and 2+ on the left side.   Pulmonary:      Effort: Pulmonary effort is normal.      Breath sounds: Normal breath sounds.   Abdominal:      General: There is no distension.      Palpations: Abdomen is soft.   Musculoskeletal:      Cervical back: Normal range of motion.      Comments: LUE AVF, pulsatile. Not bleeding.    Skin:     General: Skin is warm and dry.   Neurological:      General: No focal deficit present.      Mental Status: He is alert and oriented to person, place, and time.         Significant Labs:  CBC:       Recent Labs   Lab 09/26/22  0508   WBC 6.90   RBC 3.23*   HGB 10.0*   HCT 30.3*      MCV 94   MCH 31.0   MCHC 33.0         CMP:       Recent Labs   Lab 09/26/22  0508   *   CALCIUM 8.6*   ALBUMIN 3.2*   PROT 6.5      K 4.4   CO2 22*      BUN 48*   CREATININE 7.8*   ALKPHOS 61   ALT 10   AST 8*   BILITOT 0.5            Significant Diagnostics:  I have reviewed all pertinent imaging results/findings within the past 24 hours.     Assessment/Plan:      * AV fistula occlusion, initial encounter  Vinnie Siegel is a 77 y.o. man with past medical history significant for end-stage renal disease on hemodialysis Tuesday, Thursday, Saturday via left brachiocephalic AV fistula who presents with issues with dialysis access.     Based on patient's history and physical exam, does appear that the patient has an outflow stenosis making access to his fistula difficult. Patient dialysed yesterday via AVF, completed HD without issue.         Plan  - Plan for fistulogram Tomorrow  - Would need  to coordinate timing of fistulagram with HD as patient receives Angelica Rangel MD  Vascular Surgery  [facility] - Telemetry Stepdown ([place]-7)

## 2022-09-28 NOTE — BRIEF OP NOTE
"[facility] - Cath Lab  Brief Operative Note    SUMMARY     Surgery Date: 9/28/2022     Surgeon(s) and Role:     * Benji Becerril MD - Primary     * Ciro Ordaz MD _ Fellow    Assisting Surgeon:  Pre-op Diagnosis:  Dialysis AV fistula malfunction, initial encounter [T82.590A]    Post-op Diagnosis:  Post-Op Diagnosis Codes:     * Dialysis AV fistula malfunction, initial encounter [T82.590A]    Procedure(s) (LRB):  Fistulogram (Left)    Anesthesia: Choice    Operative Findings: Thrombosed BC AVF, outflow via collaterals and basilic vein. Attempted permacatheter placement and patient became agitated and wanted "out of here." Unable to proceed with permacatheter placement. Patient will need a permacatheter and new access.     Estimated Blood Loss: * No values recorded between 9/28/2022 11:24 AM and 9/28/2022 12:03 PM *    Estimated Blood Loss has been documented.         Specimens:   Specimen (24h ago, onward)      None            [id number]      Ciro Ordaz MD PGY7  Vascular Surgery Fellow  [phone]    "

## 2022-09-28 NOTE — PROGRESS NOTES
[facility] [address]  [facility]  Telemedicine Progress Note    Patient Name: Vinnie Siegel  MRN: [id number]  Patient Class: OP- Observation   Admission Date: 9/24/2022  Length of Stay: 0 days  Attending Physician: Tatum Casillas MD  Primary Care Provider: Janeth Walter MD          Subjective:     Principal Problem:AV fistula occlusion, initial encounter        HPI:  Vinnie Siegel is a 78yo AAM with of PMH of ESRD (HD TTS via left brachiocephalic AVF), HTN, HLD, DM2, anemia of chronic renal failure, GERD, BPH, glaucoma, and obesity who presented to the [facility] ED on 9/24/22 with a malfunctioning AVF. Pt reported that his HD center has been experiencing issues with his AVF for the past several weeks, and during his most recent session, there were issues with flow during the end of HD. On day of admission, HD center was unable to get any flow, and sent him to the ER. Pt is grossly asymptomatic, denies F/C/N/V/D/C, HA, SOB, CP, palpitations, abdominal pain, dysuria, extremity swelling, or symptoms otherwise.    In the ED, vitals significant for /74. Labs remarkable for glucose 270, though were otherwise unremarkable and consistent with known ESRD status. CXR without any acute cardiopulmonary processes. US of AVF showed occlusion with associated hypoechoic, expansile, acute thrombosis of the cephalic vein extending proximal to the AV fistula. Vascular surgery consulted, planning for upcoming fistulogram. Nephrology consulted, and were able to cannulate AVF for HD session. Hospital medicine was consulted for admission and further management.      Overview/Hospital Course:  Pt admitted to [facility] and was able to be successfully dialyzed. Vascular surgery originally planned for angiogram on 9/27 but had to be re-scheduled to 9/28.  HD to be done after fistulagram.        This encounter was provided through telemedicine.  Patient was transferred to the telemedicine service on:   09/26/2022   The patient location is: [id number] A admitted 9/24/2022  8:30 AM.    Interval History/Overnight Events:     Patient able to provide history.    Uneventful night; no pain to LUE fistula; better spirits today after the fistulagram and fistula was thrombosed; IR will place the tunneled catheter on 9/29 with HD thereafter    Review of Systems   Constitutional:  Negative for activity change, fatigue and fever.   Respiratory:  Negative for shortness of breath.    Gastrointestinal:  Negative for diarrhea and vomiting.   Musculoskeletal:  Negative for arthralgias and myalgias.      Inpatient Medications:  Scheduled Meds:   sodium chloride 0.9%   Intravenous Once    apixaban  2.5 mg Oral BID    atorvastatin  40 mg Oral Daily    calcium acetate(phosphat bind)  667 mg Oral TID WM    ergocalciferol  50,000 Units Oral Q7 Days    ferrous sulfate  1 tablet Oral Daily    hydrALAZINE  100 mg Oral TID    insulin aspart U-100  7 Units Subcutaneous TIDWM    insulin detemir U-100  28 Units Subcutaneous QHS    metoprolol succinate  12.5 mg Oral Daily    NIFEdipine  60 mg Oral BID    pantoprazole  40 mg Oral Daily    sodium chloride 0.9%  10 mL Intravenous Q8H    tamsulosin  1 capsule Oral QAM    timolol maleate 0.25%  1 drop Both Eyes BID    torsemide  20 mg Oral BID     Continuous Infusions:  PRN Meds:.acetaminophen, albuterol-ipratropium, aluminum-magnesium hydroxide-simethicone, dextrose 10%, dextrose 10%, glucagon (human recombinant), glucose, glucose, insulin aspart U-100, melatonin, naloxone, ondansetron, polyethylene glycol, prochlorperazine, simethicone      Objective:     Temp:  [97.2 °F (36.2 °C)-98.4 °F (36.9 °C)] 98.1 °F (36.7 °C)  Pulse:  [64-80] 80  Resp:  [18-19] 19  SpO2:  [97 %-100 %] 100 %  BP: (145-159)/(68-81) 145/81      Intake/Output Summary (Last 24 hours) at 9/28/2022 1638  Last data filed at 9/28/2022 0500  Gross per 24 hour   Intake --   Output 400 ml   Net -400 ml          Body mass  index is 38.58 kg/m².    Physical Exam  Vitals and nursing note reviewed.   Constitutional:       General: He is not in acute distress.     Appearance: Normal appearance. He is normal weight. He is not ill-appearing.   HENT:      Head: Normocephalic and atraumatic.      Right Ear: Hearing normal.      Left Ear: Hearing normal.      Nose: Nose normal.   Eyes:      General: No scleral icterus.        Right eye: No discharge.         Left eye: No discharge.      Extraocular Movements: Extraocular movements intact.   Cardiovascular:      Rate and Rhythm: Normal rate.   Pulmonary:      Effort: Pulmonary effort is normal. No accessory muscle usage or respiratory distress.   Neurological:      General: No focal deficit present.      Mental Status: He is alert and oriented to person, place, and time.      Cranial Nerves: No cranial nerve deficit.      Motor: No weakness.   Psychiatric:         Attention and Perception: Attention normal.         Mood and Affect: Affect is angry.         Speech: Speech is rapid and pressured.         Behavior: Behavior is cooperative.        Labs:  Recent Results (from the past 24 hour(s))   POCT glucose    Collection Time: 09/27/22  7:43 PM   Result Value Ref Range    POCT Glucose 248 (H) 70 - 110 mg/dL   Phosphorus    Collection Time: 09/28/22  5:48 AM   Result Value Ref Range    Phosphorus 4.1 2.7 - 4.5 mg/dL   Magnesium    Collection Time: 09/28/22  5:48 AM   Result Value Ref Range    Magnesium 1.6 1.6 - 2.6 mg/dL   Comprehensive Metabolic Panel    Collection Time: 09/28/22  5:48 AM   Result Value Ref Range    Sodium 135 (L) 136 - 145 mmol/L    Potassium 4.5 3.5 - 5.1 mmol/L    Chloride 102 95 - 110 mmol/L    CO2 19 (L) 23 - 29 mmol/L    Glucose 241 (H) 70 - 110 mg/dL    BUN 59 (H) 8 - 23 mg/dL    Creatinine 8.1 (H) 0.5 - 1.4 mg/dL    Calcium 8.7 8.7 - 10.5 mg/dL    Total Protein 6.8 6.0 - 8.4 g/dL    Albumin 3.3 (L) 3.5 - 5.2 g/dL    Total Bilirubin 0.5 0.1 - 1.0 mg/dL    Alkaline  Phosphatase 64 55 - 135 U/L    AST 8 (L) 10 - 40 U/L    ALT 7 (L) 10 - 44 U/L    Anion Gap 14 8 - 16 mmol/L    eGFR 6.3 (A) >60 mL/min/1.73 m^2   CBC Auto Differential    Collection Time: 09/28/22  5:48 AM   Result Value Ref Range    WBC 6.24 3.90 - 12.70 K/uL    RBC 3.30 (L) 4.60 - 6.20 M/uL    Hemoglobin 10.5 (L) 14.0 - 18.0 g/dL    Hematocrit 31.1 (L) 40.0 - 54.0 %    MCV 94 82 - 98 fL    MCH 31.8 (H) 27.0 - 31.0 pg    MCHC 33.8 32.0 - 36.0 g/dL    RDW 15.7 (H) 11.5 - 14.5 %    Platelets 214 150 - 450 K/uL    MPV 10.2 9.2 - 12.9 fL    Immature Granulocytes 0.8 (H) 0.0 - 0.5 %    Gran # (ANC) 4.0 1.8 - 7.7 K/uL    Immature Grans (Abs) 0.05 (H) 0.00 - 0.04 K/uL    Lymph # 1.6 1.0 - 4.8 K/uL    Mono # 0.5 0.3 - 1.0 K/uL    Eos # 0.1 0.0 - 0.5 K/uL    Baso # 0.02 0.00 - 0.20 K/uL    nRBC 0 0 /100 WBC    Gran % 63.6 38.0 - 73.0 %    Lymph % 25.0 18.0 - 48.0 %    Mono % 8.7 4.0 - 15.0 %    Eosinophil % 1.6 0.0 - 8.0 %    Basophil % 0.3 0.0 - 1.9 %    Differential Method Automated    POCT glucose    Collection Time: 09/28/22  8:05 AM   Result Value Ref Range    POCT Glucose 232 (H) 70 - 110 mg/dL   POCT glucose    Collection Time: 09/28/22  4:18 PM   Result Value Ref Range    POCT Glucose 231 (H) 70 - 110 mg/dL        Lab Results   Component Value Date    [id number] Not Detected 04/13/2021       Recent Labs   Lab 09/26/22  0508 09/27/22  0434 09/28/22  0548   WBC 6.90 6.89 6.24   LYMPH 23.8  1.6 21.8  1.5 25.0  1.6   HGB 10.0* 10.2* 10.5*   HCT 30.3* 30.9* 31.1*    194 214       Recent Labs   Lab 09/26/22  0508 09/27/22  0434 09/28/22  0548    136 135*   K 4.4 4.5 4.5    101 102   CO2 22* 22* 19*   BUN 48* 53* 59*   CREATININE 7.8* 7.8* 8.1*   * 231* 241*   CALCIUM 8.6* 8.5* 8.7   MG 1.6 1.6 1.6   PHOS 3.9 4.3 4.1       Recent Labs   Lab 09/26/22  0508 09/27/22  0434 09/28/22  0548   ALKPHOS 61 60 64   ALT 10 7* 7*   AST 8* 9* 8*   ALBUMIN 3.2* 3.2* 3.3*   PROT 6.5 6.6 6.8   BILITOT 0.5  0.5 0.5          No results for input(s): DDIMER, FERRITIN, CRP, LDH, BNP, TROPONINI, CPK in the last 72 hours.    Invalid input(s): PROCALCITONIN    All labs within the last 24 hours were reviewed.     Microbiology:  Microbiology Results (last 7 days)       ** No results found for the last 168 hours. **              Imaging      Results for orders placed during the hospital encounter of 07/06/22    Echo    Interpretation Summary  · The left ventricle is normal in size with concentric remodeling and normal systolic function. The estimated ejection fraction is 60%.  · Normal right ventricular size with normal right ventricular systolic function.  · Normal left ventricular diastolic function.  · Mild aortic regurgitation.  · The estimated PA systolic pressure is 17 mmHg.  · Normal central venous pressure (3 mmHg).      [facility] Hemodialysis Access  Indication  ========    -End Stage Renal Disease on Dialysis, Complications during Dialysis    Dialysis Access  =============    Dialysis access location:  Left Arm  Type of AV access: Brachiocephalic AVF  Lt inflow A PSV    109 cm/s  Lt at anastomosis PSV  305 cm/s  Lt A distal anastomosis PSV    50 cm/s  Lt fistula prox PSV    34 cm/s  Lt fistula mid PSV 72 cm/s  Lt fistula mid flow volume 206 ml/min  Lt fistula distal PSV  93 cm/s  Lt outflow V prox PSV  61 cm/s  Lt outflow V distal PSV    38 cm/s    Impression  =========    Color flow duplex imaging reveals a nearly occlusive left Brachiocephalic AV fistula. Extensive thrombus is visualized throughout the  fistula, extending into the outflow vein. The volume flow at the mid bicep measures 206 mL/min.    DATE OF SERVICE: 09/26/2022                                                      Sonographer: MR COLBERT RDMS, RVT  Electronically Signed by: Milton García II at 09/26/2022-16:21      All imaging within the last 24 hours was reviewed.       Discharge Planning   HERMAN: 9/29/2022     Code Status: Full Code   Is the  patient medically ready for discharge?: No    Reason for patient still in hospital (select all that apply): Patient trending condition, Treatment, Imaging, and Consult recommendations  Discharge Plan A: Home            Assessment/Plan:      * AV fistula occlusion, initial encounter  - Interval history and physical exam findings as described above  - AVF US findings as documented  - Vascular surgery consulted, fistulogram done on 9/28 - fistula thrombosed  - Nephrology consulted, and were able to cannulate AVF for HD session  - patient to remain hospitalized to ensure timely and adequate HD until fistula is evaluated; outpatient HD unit requires patient have HD inpatient after fistula evaluated prior to acceptance back for outpatient care  - apixaban started by vascular surgery due to acute thrombosis of the cephalic vein - holding for IR tunneled catheter placement on 9/29  -will need outpatient vascular surgery f/u  - Continuing to monitor    Primary open angle glaucoma (POAG) of both eyes, indeterminate stage  - Continue home eye drop regimen    Benign prostatic hyperplasia without lower urinary tract symptoms  - Continue home tamsulosin regimen    Gastroesophageal reflux disease without esophagitis  - Currently asymptomatic  - Continue home PPI regimen    Mixed diabetic hyperlipidemia associated with type 2 diabetes mellitus  - Continue home statin regimen    ESRD on hemodialysis  - Interval history and physical exam findings as described above  - HD TTS via left brachiocephalic AVF  - Nephology consulted, appreciate assistance  - Further decision for HD per nephology  - Renally dosing all medications  - Avoid nephrotoxins  - Will continue to monitor on daily labs    Anemia in ESRD (end-stage renal disease)  - H/H stable  - Continue home iron supplementation regimen  - Will continue to monitor on daily labs    Class 2 severe obesity due to excess calories with serious comorbidity and body mass index (BMI) of 38.0  to 38.9 in adult  - Body mass index is 38.58 kg/m².  - Needs dietary and lifestyle modifications in relation to health  - Consider dietary/nutrition consult outpatient    Hypertension associated with type 2 diabetes mellitus  - Working to optimize BP control  - Continue home regimen of hydralazine, metoprolol, nifedipine, and torsemide  - Will continue to monitor and further titrate antihypertensives as clinically indicated     Type 2 diabetes mellitus with chronic kidney disease on chronic dialysis, with long-term current use of insulin  - Working to optimize BG control  - Levemir increased to 35 u qHS; resume prandial insulin with meals  - SSI provided for corrective dosing  - DXTs as ordered  - Hypoglycemic protocol in effect  - Diabetic diet provided  - levemir held overnight by vascular so did not receive a dose - will resume after procedure      VTE Risk Mitigation (From admission, onward)         Ordered     apixaban tablet 2.5 mg  2 times daily         09/28/22 1641     IP VTE HIGH RISK PATIENT  Once         09/24/22 1526     Place sequential compression device  Until discontinued         09/24/22 1526                I have completed this tele-visit without the assistance of a telepresenter.    The attending portion of this evaluation, treatment, and documentation was performed per Tatum Casillas MD via Telemedicine AudioVisual using the secure [employer] software platform with 2 way audio/video. The provider was located off-site and the patient is located in the hospital. The aforementioned video software was utilized to document the relevant history and physical exam    Tatum Casillas MD  Department of Hospital Medicine   [facility] - Telemetry Stepdown ([place]

## 2022-09-28 NOTE — CARE UPDATE
Patient with diffuse chronic thrombus throughout AVF, as well as heavily calcified AVF pseudoaneurysm with compromised outflow - no way to reliably re-establish reliable high volume flow through AVF.      Permacath placement is indicated to establish HD access.  The patient's emergency contact was called, but no answer x 2.     The patient became quite agitated and demanded to go home, yelling at the staff. At this time, it is not safe to proceed with permacath placement given lack of consent, patient agitation and his demand to go home and not have further procedures done.      Will speak with the patient again in the PACU regarding permacath placement ASAP.  OK to attempt to access AVF in the mean time given successful HD several day ago - but this is not a long-term solution.     Benji Becerril MD  Vascular & Endovascular Surgery

## 2022-09-28 NOTE — PROGRESS NOTES
[facility] ([place] [id number])  Nephrology  Progress Note    Patient Name: Vinnie Siegel  MRN: [id number]  Admission Date: 9/24/2022  Hospital Length of Stay: 0 days  Attending Provider: Tatum Casillas MD   Primary Care Physician: Janeth Walter MD  Principal Problem:AV fistula occlusion, initial encounter    Subjective:     Interval History: Thrombosed AVF. Plan for TDC tomorrow 9/29. Electrolytes stable. 97% RA     Review of patient's allergies indicates:  No Known Allergies  Current Facility-Administered Medications   Medication Frequency    0.9%  NaCl infusion Once    acetaminophen tablet 650 mg Q4H PRN    albuterol-ipratropium 2.5 mg-0.5 mg/3 mL nebulizer solution 3 mL Q6H PRN    aluminum-magnesium hydroxide-simethicone 200-200-20 mg/5 mL suspension 30 mL QID PRN    apixaban tablet 2.5 mg BID    atorvastatin tablet 40 mg Daily    calcium acetate(phosphat bind) capsule 667 mg TID WM    dextrose 10% bolus 125 mL PRN    dextrose 10% bolus 250 mL PRN    ergocalciferol capsule 50,000 Units Q7 Days    ferrous sulfate tablet 1 each Daily    glucagon (human recombinant) injection 1 mg PRN    glucose chewable tablet 16 g PRN    glucose chewable tablet 24 g PRN    hydrALAZINE tablet 100 mg TID    insulin aspart U-100 pen 1-10 Units QID (AC + HS) PRN    insulin aspart U-100 pen 7 Units TIDWM    melatonin tablet 6 mg Nightly PRN    metoprolol succinate (TOPROL-XL) 24 hr split tablet 12.5 mg Daily    naloxone 0.4 mg/mL injection 0.02 mg PRN    NIFEdipine 24 hr tablet 60 mg BID    ondansetron injection 4 mg Q8H PRN    pantoprazole EC tablet 40 mg Daily    polyethylene glycol packet 17 g Daily PRN    prochlorperazine injection Soln 5 mg Q6H PRN    simethicone chewable tablet 80 mg QID PRN    sodium chloride 0.9% flush 10 mL Q8H    tamsulosin 24 hr capsule 0.4 mg QAM    timolol maleate 0.25% ophthalmic solution 1 drop BID    torsemide tablet 20 mg BID       Objective:     Vital Signs  (Most Recent):  Temp: 97.3 °F (36.3 °C) (09/28/22 1555)  Pulse: 75 (09/28/22 0856)  Resp: 19 (09/28/22 1555)  BP: (!) 145/81 (09/28/22 1600)  SpO2: 100 % (09/28/22 1555)  O2 Device (Oxygen Therapy): room air (09/25/22 0807)   Vital Signs (24h Range):  Temp:  [97.2 °F (36.2 °C)-98.4 °F (36.9 °C)] 97.3 °F (36.3 °C)  Pulse:  [64-75] 75  Resp:  [18-19] 19  SpO2:  [97 %-100 %] 100 %  BP: (145-159)/(68-81) 145/81     Weight: (!) 136.3 kg (300 lb 7.8 oz) (09/24/22 2059)  Body mass index is 38.58 kg/m².  Body surface area is 2.67 meters squared.    I/O last 3 completed shifts:  In: -   Out: 400 [Urine:400]    Physical Exam  Vitals and nursing note reviewed.   HENT:      Head: Normocephalic.      Mouth/Throat:      Pharynx: Oropharynx is clear.   Eyes:      Conjunctiva/sclera: Conjunctivae normal.   Pulmonary:      Effort: Pulmonary effort is normal.   Abdominal:      Palpations: Abdomen is soft.   Musculoskeletal:      Cervical back: Neck supple.      Right lower leg: No edema.      Left lower leg: No edema.   Skin:     General: Skin is warm and dry.   Neurological:      Mental Status: He is alert and oriented to person, place, and time.   Psychiatric:         Mood and Affect: Mood normal.         Behavior: Behavior normal.         Thought Content: Thought content normal.       Significant Labs:  CBC:   Recent Labs   Lab 09/28/22  0548   WBC 6.24   RBC 3.30*   HGB 10.5*   HCT 31.1*      MCV 94   MCH 31.8*   MCHC 33.8     CMP:   Recent Labs   Lab 09/28/22  0548   *   CALCIUM 8.7   ALBUMIN 3.3*   PROT 6.8   *   K 4.5   CO2 19*      BUN 59*   CREATININE 8.1*   ALKPHOS 64   ALT 7*   AST 8*   BILITOT 0.5     All labs within the past 24 hours have been reviewed.         Assessment/Plan:     * AV fistula occlusion, initial encounter  Faint bruit/thrill to proximal aspect of his HUSEYIN AVF  +pulsatile to distal portion  -Vasular Sx following,   Thrombosed AVF - plan for TDC 9/29    Chronic kidney  disease-mineral and bone disorder  -continue phos binders   Calcium acetate 667 mg TID with meals     ESRD on hemodialysis  ESRD on iHD TTS  Last HD on Thursday prior to presentation.  Reported to have had poor flow rates through AVF over the past several week.  Unable to cannulate venous return, so he was sent to the ED on 9/24 for vascular sx eval.    Plan/Recommendations:    -Electrolytes, volume status stable, repeat RFP this evening   -Plan for TDC with IR tomorrow 9/29, will plan for to dialyze following Line placement    -RFP daily   -agree with torsemide 20 BID     Anemia in ESRD (end-stage renal disease)  Goal 10-11  Hgb 10.1          Thank you for your consult. I will follow-up with patient. Please contact us if you have any additional questions.    Kyara Hines DNP  Nephrology  [facility] ([place]

## 2022-09-28 NOTE — ASSESSMENT & PLAN NOTE
- Interval history and physical exam findings as described above  - AVF US findings as documented  - Vascular surgery consulted, fistulogram done on 9/28 - fistula thrombosed  - Nephrology consulted, and were able to cannulate AVF for HD session  - patient to remain hospitalized to ensure timely and adequate HD until fistula is evaluated; outpatient HD unit requires patient have HD inpatient after fistula evaluated prior to acceptance back for outpatient care  - apixaban started by vascular surgery due to acute thrombosis of the cephalic vein - holding for IR tunneled catheter placement on 9/29  -will need outpatient vascular surgery f/u  - Continuing to monitor

## 2022-09-28 NOTE — SUBJECTIVE & OBJECTIVE
Interval History: Thrombosed AVF. Plan for TDC tomorrow 9/29. Electrolytes stable. 97% RA     Review of patient's allergies indicates:  No Known Allergies  Current Facility-Administered Medications   Medication Frequency    0.9%  NaCl infusion Once    acetaminophen tablet 650 mg Q4H PRN    albuterol-ipratropium 2.5 mg-0.5 mg/3 mL nebulizer solution 3 mL Q6H PRN    aluminum-magnesium hydroxide-simethicone 200-200-20 mg/5 mL suspension 30 mL QID PRN    apixaban tablet 2.5 mg BID    atorvastatin tablet 40 mg Daily    calcium acetate(phosphat bind) capsule 667 mg TID WM    dextrose 10% bolus 125 mL PRN    dextrose 10% bolus 250 mL PRN    ergocalciferol capsule 50,000 Units Q7 Days    ferrous sulfate tablet 1 each Daily    glucagon (human recombinant) injection 1 mg PRN    glucose chewable tablet 16 g PRN    glucose chewable tablet 24 g PRN    hydrALAZINE tablet 100 mg TID    insulin aspart U-100 pen 1-10 Units QID (AC + HS) PRN    insulin aspart U-100 pen 7 Units TIDWM    melatonin tablet 6 mg Nightly PRN    metoprolol succinate (TOPROL-XL) 24 hr split tablet 12.5 mg Daily    naloxone 0.4 mg/mL injection 0.02 mg PRN    NIFEdipine 24 hr tablet 60 mg BID    ondansetron injection 4 mg Q8H PRN    pantoprazole EC tablet 40 mg Daily    polyethylene glycol packet 17 g Daily PRN    prochlorperazine injection Soln 5 mg Q6H PRN    simethicone chewable tablet 80 mg QID PRN    sodium chloride 0.9% flush 10 mL Q8H    tamsulosin 24 hr capsule 0.4 mg QAM    timolol maleate 0.25% ophthalmic solution 1 drop BID    torsemide tablet 20 mg BID       Objective:     Vital Signs (Most Recent):  Temp: 97.3 °F (36.3 °C) (09/28/22 1555)  Pulse: 75 (09/28/22 0856)  Resp: 19 (09/28/22 1555)  BP: (!) 145/81 (09/28/22 1600)  SpO2: 100 % (09/28/22 1555)  O2 Device (Oxygen Therapy): room air (09/25/22 0807)   Vital Signs (24h Range):  Temp:  [97.2 °F (36.2 °C)-98.4 °F (36.9 °C)] 97.3 °F (36.3 °C)  Pulse:  [64-75] 75  Resp:  [18-19] 19  SpO2:  [97  %-100 %] 100 %  BP: (145-159)/(68-81) 145/81     Weight: (!) 136.3 kg (300 lb 7.8 oz) (09/24/22 2059)  Body mass index is 38.58 kg/m².  Body surface area is 2.67 meters squared.    I/O last 3 completed shifts:  In: -   Out: 400 [Urine:400]    Physical Exam  Vitals and nursing note reviewed.   HENT:      Head: Normocephalic.      Mouth/Throat:      Pharynx: Oropharynx is clear.   Eyes:      Conjunctiva/sclera: Conjunctivae normal.   Pulmonary:      Effort: Pulmonary effort is normal.   Abdominal:      Palpations: Abdomen is soft.   Musculoskeletal:      Cervical back: Neck supple.      Right lower leg: No edema.      Left lower leg: No edema.   Skin:     General: Skin is warm and dry.   Neurological:      Mental Status: He is alert and oriented to person, place, and time.   Psychiatric:         Mood and Affect: Mood normal.         Behavior: Behavior normal.         Thought Content: Thought content normal.       Significant Labs:  CBC:   Recent Labs   Lab 09/28/22  0548   WBC 6.24   RBC 3.30*   HGB 10.5*   HCT 31.1*      MCV 94   MCH 31.8*   MCHC 33.8     CMP:   Recent Labs   Lab 09/28/22  0548   *   CALCIUM 8.7   ALBUMIN 3.3*   PROT 6.8   *   K 4.5   CO2 19*      BUN 59*   CREATININE 8.1*   ALKPHOS 64   ALT 7*   AST 8*   BILITOT 0.5     All labs within the past 24 hours have been reviewed.

## 2022-09-28 NOTE — NURSING
Care assumed at 1900. Patient resting in bed with eyes open. AAOx4. Denies pain at this time. Bed in lowest position. Call light within reach. Will continue to monitor. Safety maintained.

## 2022-09-29 PROBLEM — T82.898S: Status: ACTIVE | Noted: 2022-09-24

## 2022-09-29 LAB
ALBUMIN SERPL BCP-MCNC: 3.1 G/DL (ref 3.5–5.2)
ALP SERPL-CCNC: 56 U/L (ref 55–135)
ALT SERPL W/O P-5'-P-CCNC: 7 U/L (ref 10–44)
ANION GAP SERPL CALC-SCNC: 15 MMOL/L (ref 8–16)
AST SERPL-CCNC: 9 U/L (ref 10–40)
BASOPHILS # BLD AUTO: 0.02 K/UL (ref 0–0.2)
BASOPHILS NFR BLD: 0.3 % (ref 0–1.9)
BILIRUB SERPL-MCNC: 0.4 MG/DL (ref 0.1–1)
BUN SERPL-MCNC: 65 MG/DL (ref 8–23)
CALCIUM SERPL-MCNC: 8.5 MG/DL (ref 8.7–10.5)
CHLORIDE SERPL-SCNC: 103 MMOL/L (ref 95–110)
CO2 SERPL-SCNC: 18 MMOL/L (ref 23–29)
CREAT SERPL-MCNC: 7.7 MG/DL (ref 0.5–1.4)
DIFFERENTIAL METHOD: ABNORMAL
EOSINOPHIL # BLD AUTO: 0.1 K/UL (ref 0–0.5)
EOSINOPHIL NFR BLD: 1.6 % (ref 0–8)
ERYTHROCYTE [DISTWIDTH] IN BLOOD BY AUTOMATED COUNT: 15.9 % (ref 11.5–14.5)
EST. GFR  (NO RACE VARIABLE): 6.7 ML/MIN/1.73 M^2
GLUCOSE SERPL-MCNC: 248 MG/DL (ref 70–110)
HBV SURFACE AG SERPL QL IA: NORMAL
HCT VFR BLD AUTO: 29.8 % (ref 40–54)
HGB BLD-MCNC: 10 G/DL (ref 14–18)
IMM GRANULOCYTES # BLD AUTO: 0.03 K/UL (ref 0–0.04)
IMM GRANULOCYTES NFR BLD AUTO: 0.5 % (ref 0–0.5)
LYMPHOCYTES # BLD AUTO: 1.3 K/UL (ref 1–4.8)
LYMPHOCYTES NFR BLD: 20.3 % (ref 18–48)
MAGNESIUM SERPL-MCNC: 1.6 MG/DL (ref 1.6–2.6)
MCH RBC QN AUTO: 31 PG (ref 27–31)
MCHC RBC AUTO-ENTMCNC: 33.6 G/DL (ref 32–36)
MCV RBC AUTO: 92 FL (ref 82–98)
MONOCYTES # BLD AUTO: 0.6 K/UL (ref 0.3–1)
MONOCYTES NFR BLD: 9.9 % (ref 4–15)
NEUTROPHILS # BLD AUTO: 4.3 K/UL (ref 1.8–7.7)
NEUTROPHILS NFR BLD: 67.4 % (ref 38–73)
NRBC BLD-RTO: 0 /100 WBC
PHOSPHATE SERPL-MCNC: 4.1 MG/DL (ref 2.7–4.5)
PLATELET # BLD AUTO: 196 K/UL (ref 150–450)
PMV BLD AUTO: 9.2 FL (ref 9.2–12.9)
POCT GLUCOSE: 304 MG/DL (ref 70–110)
POCT GLUCOSE: 311 MG/DL (ref 70–110)
POTASSIUM SERPL-SCNC: 4.6 MMOL/L (ref 3.5–5.1)
PROT SERPL-MCNC: 6.7 G/DL (ref 6–8.4)
RBC # BLD AUTO: 3.23 M/UL (ref 4.6–6.2)
SODIUM SERPL-SCNC: 136 MMOL/L (ref 136–145)
WBC # BLD AUTO: 6.34 K/UL (ref 3.9–12.7)

## 2022-09-29 PROCEDURE — G0378 HOSPITAL OBSERVATION PER HR: HCPCS

## 2022-09-29 PROCEDURE — 99225 PR SUBSEQUENT OBSERVATION CARE,LEVEL II: ICD-10-PCS | Mod: 95,,, | Performed by: INTERNAL MEDICINE

## 2022-09-29 PROCEDURE — 83735 ASSAY OF MAGNESIUM: CPT | Performed by: STUDENT IN AN ORGANIZED HEALTH CARE EDUCATION/TRAINING PROGRAM

## 2022-09-29 PROCEDURE — 25000003 PHARM REV CODE 250: Performed by: NURSE PRACTITIONER

## 2022-09-29 PROCEDURE — 90935 PR HEMODIALYSIS, ONE EVALUATION: ICD-10-PCS | Mod: ,,, | Performed by: NURSE PRACTITIONER

## 2022-09-29 PROCEDURE — 84100 ASSAY OF PHOSPHORUS: CPT | Performed by: STUDENT IN AN ORGANIZED HEALTH CARE EDUCATION/TRAINING PROGRAM

## 2022-09-29 PROCEDURE — 25000003 PHARM REV CODE 250: Performed by: STUDENT IN AN ORGANIZED HEALTH CARE EDUCATION/TRAINING PROGRAM

## 2022-09-29 PROCEDURE — 87340 HEPATITIS B SURFACE AG IA: CPT | Performed by: NURSE PRACTITIONER

## 2022-09-29 PROCEDURE — A4216 STERILE WATER/SALINE, 10 ML: HCPCS | Performed by: STUDENT IN AN ORGANIZED HEALTH CARE EDUCATION/TRAINING PROGRAM

## 2022-09-29 PROCEDURE — 63600175 PHARM REV CODE 636 W HCPCS: Performed by: STUDENT IN AN ORGANIZED HEALTH CARE EDUCATION/TRAINING PROGRAM

## 2022-09-29 PROCEDURE — 99225 PR SUBSEQUENT OBSERVATION CARE,LEVEL II: CPT | Mod: 95,,, | Performed by: INTERNAL MEDICINE

## 2022-09-29 PROCEDURE — 90935 HEMODIALYSIS ONE EVALUATION: CPT | Mod: ,,, | Performed by: NURSE PRACTITIONER

## 2022-09-29 PROCEDURE — 36415 COLL VENOUS BLD VENIPUNCTURE: CPT | Performed by: STUDENT IN AN ORGANIZED HEALTH CARE EDUCATION/TRAINING PROGRAM

## 2022-09-29 PROCEDURE — 80053 COMPREHEN METABOLIC PANEL: CPT | Performed by: STUDENT IN AN ORGANIZED HEALTH CARE EDUCATION/TRAINING PROGRAM

## 2022-09-29 PROCEDURE — 85025 COMPLETE CBC W/AUTO DIFF WBC: CPT | Performed by: STUDENT IN AN ORGANIZED HEALTH CARE EDUCATION/TRAINING PROGRAM

## 2022-09-29 PROCEDURE — G0257 UNSCHED DIALYSIS ESRD PT HOS: HCPCS

## 2022-09-29 PROCEDURE — 63600175 PHARM REV CODE 636 W HCPCS: Performed by: NURSE PRACTITIONER

## 2022-09-29 RX ORDER — FENTANYL CITRATE 50 UG/ML
INJECTION, SOLUTION INTRAMUSCULAR; INTRAVENOUS
Status: DISCONTINUED | OUTPATIENT
Start: 2022-09-29 | End: 2022-09-30 | Stop reason: HOSPADM

## 2022-09-29 RX ORDER — HEPARIN SODIUM 1000 [USP'U]/ML
INJECTION, SOLUTION INTRAVENOUS; SUBCUTANEOUS
Status: DISCONTINUED | OUTPATIENT
Start: 2022-09-29 | End: 2022-09-30 | Stop reason: HOSPADM

## 2022-09-29 RX ORDER — HEPARIN SODIUM 1000 [USP'U]/ML
1000 INJECTION, SOLUTION INTRAVENOUS; SUBCUTANEOUS
Status: DISCONTINUED | OUTPATIENT
Start: 2022-09-29 | End: 2022-09-30 | Stop reason: HOSPADM

## 2022-09-29 RX ORDER — CEFAZOLIN SODIUM 1 G/50ML
SOLUTION INTRAVENOUS
Status: DISCONTINUED | OUTPATIENT
Start: 2022-09-29 | End: 2022-09-30 | Stop reason: HOSPADM

## 2022-09-29 RX ORDER — MIDAZOLAM HYDROCHLORIDE 1 MG/ML
INJECTION INTRAMUSCULAR; INTRAVENOUS
Status: DISCONTINUED | OUTPATIENT
Start: 2022-09-29 | End: 2022-09-30 | Stop reason: HOSPADM

## 2022-09-29 RX ADMIN — CEFAZOLIN SODIUM 1 G: 1 SOLUTION INTRAVENOUS at 09:09

## 2022-09-29 RX ADMIN — INSULIN ASPART 4 UNITS: 100 INJECTION, SOLUTION INTRAVENOUS; SUBCUTANEOUS at 09:09

## 2022-09-29 RX ADMIN — FENTANYL CITRATE 50 MCG: 50 INJECTION, SOLUTION INTRAMUSCULAR; INTRAVENOUS at 09:09

## 2022-09-29 RX ADMIN — HEPARIN SODIUM 1000 UNITS: 1000 INJECTION, SOLUTION INTRAVENOUS; SUBCUTANEOUS at 05:09

## 2022-09-29 RX ADMIN — Medication 10 ML: at 10:09

## 2022-09-29 RX ADMIN — TIMOLOL MALEATE 1 DROP: 2.5 SOLUTION/ DROPS OPHTHALMIC at 09:09

## 2022-09-29 RX ADMIN — Medication 10 ML: at 06:09

## 2022-09-29 RX ADMIN — NIFEDIPINE 60 MG: 30 TABLET, FILM COATED, EXTENDED RELEASE ORAL at 08:09

## 2022-09-29 RX ADMIN — SODIUM CHLORIDE: 0.9 INJECTION, SOLUTION INTRAVENOUS at 02:09

## 2022-09-29 RX ADMIN — HEPARIN SODIUM 3600 UNITS: 1000 INJECTION, SOLUTION INTRAVENOUS; SUBCUTANEOUS at 09:09

## 2022-09-29 RX ADMIN — TORSEMIDE 20 MG: 20 TABLET ORAL at 06:09

## 2022-09-29 RX ADMIN — TAMSULOSIN HYDROCHLORIDE 0.4 MG: 0.4 CAPSULE ORAL at 06:09

## 2022-09-29 RX ADMIN — CALCIUM ACETATE 667 MG: 667 CAPSULE ORAL at 06:09

## 2022-09-29 RX ADMIN — INSULIN DETEMIR 28 UNITS: 100 INJECTION, SOLUTION SUBCUTANEOUS at 09:09

## 2022-09-29 RX ADMIN — MIDAZOLAM HYDROCHLORIDE 1 MG: 1 INJECTION INTRAMUSCULAR; INTRAVENOUS at 09:09

## 2022-09-29 RX ADMIN — HYDRALAZINE HYDROCHLORIDE 100 MG: 50 TABLET ORAL at 08:09

## 2022-09-29 NOTE — ASSESSMENT & PLAN NOTE
- Working to optimize BG control  - Levemir increased to 35 u qHS; resume prandial insulin with meals  - Hypoglycemic protocol in effect  - Diabetic diet provided

## 2022-09-29 NOTE — ASSESSMENT & PLAN NOTE
- Vascular surgery consulted, fistulogram done on 9/28 - fistula thrombosed  - Nephrology consulted, and were able to cannulate AVF for HD session  - patient to remain hospitalized to ensure timely and adequate HD until fistula is evaluated; outpatient HD unit requires patient have HD inpatient after fistula evaluated prior to acceptance back for outpatient care  - apixaban started by Vascular Surgery due to acute thrombosis of the cephalic vein - holding for IR tunneled catheter placement on 9/29  - needs outpatient Vascular Surgery f/u

## 2022-09-29 NOTE — PLAN OF CARE
Pt arrived to IR room 190 via bed w/ transporter, AAOx4, name//allergies/procedure vierfied, pt will be monitored by RN @ bedside throughout procedure/sedation.

## 2022-09-29 NOTE — PROCEDURES
Radiology Post-Procedure Note    Pre Op Diagnosis: ESRD    Post Op Diagnosis: Same    Procedure: HD line placement    Procedure performed by: [place] HERNANDEZ, Paco Flores MD, Renato    Written Informed Consent Obtained: Yes  Specimen Removed: NO  Estimated Blood Loss: Minimal    Findings:   Via rt IJ, 23 cm tip to cuff permacath was placed with tip in RA. No complications. Line ready for use. See dictation.    Patient tolerated procedure well.    Mark Ho M.D.  Interventional Radiology  Department of Radiology  Pager: [phone]

## 2022-09-29 NOTE — DISCHARGE SUMMARY
[facility] ([place] [address])  [facility] Medicine  Telemedicine Discharge Summary      Patient Name: Vinnie Siegel  MRN: [id number]  Patient Class: OP- Observation  Admission Date: 9/24/2022  Hospital Length of Stay: 0 days  Discharge Date and Time:  09/29/2022 3:56 PM  Attending Physician: Justina Patterson MD   Discharging Provider: Justina Patterson MD  Primary Care Provider: Janeth Walter MD      HPI:   Vinnie Siegel is a 78yo AAM with of PMH of ESRD (HD TTS via left brachiocephalic AVF), HTN, HLD, DM2, anemia of chronic renal failure, GERD, BPH, glaucoma, and obesity who presented to the [facility] ED on 9/24/22 with a malfunctioning AVF. Pt reported that his HD center has been experiencing issues with his AVF for the past several weeks, and during his most recent session, there were issues with flow during the end of HD. On day of admission, HD center was unable to get any flow, and sent him to the ER. Pt is grossly asymptomatic, denies F/C/N/V/D/C, HA, SOB, CP, palpitations, abdominal pain, dysuria, extremity swelling, or symptoms otherwise.    In the ED, vitals significant for /74. Labs remarkable for glucose 270, though were otherwise unremarkable and consistent with known ESRD status. CXR without any acute cardiopulmonary processes. US of AVF showed occlusion with associated hypoechoic, expansile, acute thrombosis of the cephalic vein extending proximal to the AV fistula. Vascular surgery consulted, planning for upcoming fistulogram. Nephrology consulted, and were able to cannulate AVF for HD session. Hospital medicine was consulted for admission and further management.      Procedure(s) (LRB):  Fistulogram (Left)  PTA, AV FISTULA (N/A)      Hospital Course:   Pt admitted to [facility] and was able to be successfully dialyzed. Vascular surgery originally planned for angiogram on 9/27 but had to be re-scheduled to 9/28.  HD to be done after fistulagram. AVF is currently not usable;  apixaban  started by Vascular Surgery due to acute thrombosis of the cephalic vein. IR tunneled catheter placement on 9/29.    See Problems listed below for additional details of this hospital stay.     Goals of Care Treatment Preferences:  Code Status: Full Code      Consults:   Consults (From admission, onward)          Status Ordering Provider     Inpatient consult to Interventional Radiology  Once        Provider:  (Not yet assigned)    Completed MARY BETH MEADOWS     Inpatient virtual consult to Hospital Medicine  Once        Provider:  (Not yet assigned)    Completed NIKUNJ ALVARADO     Inpatient consult to Nephrology  Once        Provider:  (Not yet assigned)    Completed LE, YANIRA     Inpatient consult to Vascular Surgery  Once        Provider:  (Not yet assigned)    Completed LE, YANIRA            * AV fistula occlusion, sequela  - Vascular surgery consulted, fistulogram done on 9/28 - fistula thrombosed  - Nephrology consulted, and were able to cannulate AVF for HD session  - patient to remain hospitalized to ensure timely and adequate HD until fistula is evaluated; outpatient HD unit requires patient have HD inpatient after fistula evaluated prior to acceptance back for outpatient care  - apixaban started by Vascular Surgery due to acute thrombosis of the cephalic vein - holding for IR tunneled catheter placement on 9/29  - needs outpatient Vascular Surgery f/u    Primary open angle glaucoma (POAG) of both eyes, indeterminate stage  - Continue home eye drop     Benign prostatic hyperplasia without lower urinary tract symptoms  - Continue home tamsulosin     Gastroesophageal reflux disease without esophagitis  - Currently asymptomatic  - Continue home PPI regimen    Mixed diabetic hyperlipidemia associated with type 2 diabetes mellitus  - Continue home statin     ESRD on hemodialysis  - Interval history and physical exam findings as described above  - HD TTS via left brachiocephalic AVF  - Nephology consulted,  appreciate assistance  - Further decision for HD per nephology  - Renally dosing all medications  - Avoid nephrotoxins  - continue to monitor   - RIJ TDC placed on 9/29; functions well    Anemia in ESRD (end-stage renal disease)  - H/H stable  - Continue home iron supplementation regimen  - continue to monitor    AV fistula stenosis, initial encounter  Vascular Surgery consulted    Class 2 severe obesity due to excess calories with serious comorbidity and body mass index (BMI) of 38.0 to 38.9 in adult  - Body mass index is 38.58 kg/m².  - Needs dietary and lifestyle modifications in relation to health  - Consider dietary/nutrition consult outpatient    Hypertension associated with type 2 diabetes mellitus  - Continue home regimen of hydralazine, metoprolol, nifedipine, and torsemide  - continue to monitor and further titrate antihypertensives as clinically indicated     Type 2 diabetes mellitus with chronic kidney disease on chronic dialysis, with long-term current use of insulin  - Working to optimize BG control  - Levemir increased to 35 u qHS; resume prandial insulin with meals  - Hypoglycemic protocol in effect  - Diabetic diet provided      Final Active Diagnoses:    Diagnosis Date Noted POA    PRINCIPAL PROBLEM:  AV fistula occlusion, sequela [T82.898S] 09/24/2022 Not Applicable    Chronic kidney disease-mineral and bone disorder [N18.9, E83.9, M89.9] 09/26/2022 Yes    ESRD on hemodialysis [N18.6, Z99.2] 09/24/2022 Not Applicable    Mixed diabetic hyperlipidemia associated with type 2 diabetes mellitus [E11.69, E78.2] 09/24/2022 Yes    Gastroesophageal reflux disease without esophagitis [K21.9] 09/24/2022 Yes    Benign prostatic hyperplasia without lower urinary tract symptoms [N40.0] 09/24/2022 Yes    Primary open angle glaucoma (POAG) of both eyes, indeterminate stage [H40.1134] 09/24/2022 Yes    AV fistula stenosis, initial encounter [T82.328A] 04/16/2021 Yes    Anemia in ESRD (end-stage renal disease)  [N18.6, D63.1] 02/01/2021 Yes    ESRD (end stage renal disease) [N18.6] 12/08/2020 Yes    Class 2 severe obesity due to excess calories with serious comorbidity and body mass index (BMI) of 38.0 to 38.9 in adult [E66.01, Z68.38] 10/09/2018 Not Applicable    Hypertension associated with type 2 diabetes mellitus [E11.59, I15.2] 03/30/2017 Yes    Type 2 diabetes mellitus with chronic kidney disease on chronic dialysis, with long-term current use of insulin [E11.22, N18.6, Z99.2, Z79.4] 09/22/2015 Not Applicable      Problems Resolved During this Admission:       Discharged Condition: stable    Disposition: Home or Self Care    Follow Up:   Follow-up Information       [facility] VASCULAR SURGERY. Schedule an appointment as soon as possible for a visit in 1 week(s).    Specialty: Vascular Surgery  Why: As previously planned, To establish care. The Clinic Nurse will contact you with a follow up appointment. If you do not hear from them within 48 hours, please call [phone]. Thank You.  Contact information:  [address]  [facility] [address]  [phone]             Janeth Walter MD Follow up.    Specialty: Family Medicine  Why: The Clinic Nurse will contact you with a follow up appointment. If you do not hear from them within 48 hours, please call [phone]. Thank You.  Contact information:  [address] [address]  [phone]                           Future Appointments   Date Time Provider Department Center   10/20/2022  9:00 AM VASCULAR, LAB [facility] DAVID [facility]   10/20/2022 10:15 AM Benji Becerril MD [facility] BRENDA Sidhu       Patient Instructions:      VAS US Vein Mapping   Standing Status: Future Standing Exp. Date: 09/29/23   Order Comments: Patient will need access creation on right arm.     Order Specific Question Answer Comments   Release to patient Immediate      Ambulatory referral/consult to Vascular Surgery   Standing Status: Future   Referral  Priority: Urgent Referral Type: Consultation   Referral Reason: Specialty Services Required   Referred to Provider: ALBERTO ALVARADO Requested Specialty: Vascular Surgery   Number of Visits Requested: 1     Ambulatory referral/consult to Outpatient Case Management   Referral Priority: Routine Referral Type: Consultation   Referral Reason: Specialty Services Required   Number of Visits Requested: 1     Diet Adult Regular     Order Specific Question Answer Comments   Additional restrictions: Diabetic 2800    Additional restrictions: Renal      Lifting restrictions     Leave dressing on - Keep it clean, dry, and intact until clinic visit     Notify your health care provider if you experience any of the following:  temperature >100.4     Notify your health care provider if you experience any of the following:  persistent nausea and vomiting or diarrhea     Notify your health care provider if you experience any of the following:  redness, tenderness, or signs of infection (pain, swelling, redness, odor or green/yellow discharge around incision site)     Notify your health care provider if you experience any of the following:  difficulty breathing or increased cough     Notify your health care provider if you experience any of the following:  severe persistent headache     Notify your health care provider if you experience any of the following:  persistent dizziness, light-headedness, or visual disturbances     Notify your health care provider if you experience any of the following:  increased confusion or weakness     Activity as tolerated       Significant Diagnostic Studies:   Recent Labs   Lab 09/27/22 0434   HGBA1C 12.4*     Recent Labs   Lab 09/27/22 0434 09/28/22  0548 09/29/22  0513   WBC 6.89 6.24 6.34   HGB 10.2* 10.5* 10.0*   HCT 30.9* 31.1* 29.8*    214 196     Recent Labs   Lab 09/27/22 0434 09/28/22  0548 09/29/22  0513   GRAN 66.8  4.6 63.6  4.0 67.4  4.3   LYMPH 21.8  1.5 25.0  1.6 20.3   1.3   MONO 8.6  0.6 8.7  0.5 9.9  0.6   EOS 0.1 0.1 0.1     Recent Labs   Lab 09/27/22  0434 09/28/22  0548 09/28/22  1643 09/29/22  0513    135* 137 136   K 4.5 4.5 4.3 4.6    102 102 103   CO2 22* 19* 19* 18*   BUN 53* 59* 60* 65*   CREATININE 7.8* 8.1* 8.4* 7.7*   * 241* 225* 248*   CALCIUM 8.5* 8.7 9.0 8.5*   ALBUMIN 3.2* 3.3* 3.3* 3.1*   MG 1.6 1.6  --  1.6   PHOS 4.3 4.1 4.0 4.1     Recent Labs   Lab 09/27/22 0434 09/28/22 0548 09/28/22 1643 09/29/22  0513   ALKPHOS 60 64  --  56   ALT 7* 7*  --  7*   AST 9* 8*  --  9*   PROT 6.6 6.8  --  6.7   BILITOT 0.5 0.5  --  0.4   INR  --   --  1.0  --      SARS-CoV2 (COVID-19) Qualitative PCR (no units)   Date Value   04/13/2021 Not Detected   12/01/2020 Not Detected   09/15/2020 Not Detected     SARS-CoV-2 RNA, Amplification, Qual (no units)   Date Value   12/08/2020 Negative   07/15/2020 Negative   07/02/2020 Negative   06/27/2020 Negative     POC Rapid COVID (no units)   Date Value   09/11/2020 Negative       Results for orders placed during the hospital encounter of 07/06/22    Echo    Interpretation Summary  · The left ventricle is normal in size with concentric remodeling and normal systolic function. The estimated ejection fraction is 60%.  · Normal right ventricular size with normal right ventricular systolic function.  · Normal left ventricular diastolic function.  · Mild aortic regurgitation.  · The estimated PA systolic pressure is 17 mmHg.  · Normal central venous pressure (3 mmHg).      IR Tunneled Catheter Insert w/o Port  Narrative: EXAMINATION:  Tunneled central venous catheter placement    Procedural Personnel    Attending physician(s): Mark Ho MD    Fellow physician(s): Renato Antonio DO    Resident physician(s): None    Advanced practice provider(s): None    Pre-procedure diagnosis: Acute kidney injury    Post-procedure diagnosis: Same    Indication: Performance of hemodialysis    Additional clinical history:  None    Complications: No immediate complications.    TECHNIQUE:  - Venous access with ultrasound guidance    - Tunneled central venous catheter insertion with fluoroscopic guidance    FINDINGS:  Pre-procedure    History and imaging of central venous access reviewed (QCDR): Yes    Consent: Informed consent for the procedure was obtained and time-out was performed prior to the procedure.    Prophylactic antibiotic administered: Within 1 hour of procedure start time or 2 hours for vancomycin or fluoroquinolones    Preparation (MIPS): The site was prepared and draped using all elements of maximal sterile barrier technique including sterile gloves, sterile gown, cap, mask, large sterile sheet, sterile ultrasound probe cover, hand hygiene and cutaneous antisepsis with 2% chlorhexidine.    Medical reason for site preparation exception (MIPS): Not applicable    Anesthesia/sedation    Level of anesthesia/sedation: Moderate sedation (conscious sedation)    Anesthesia/sedation administered by: Nurse or other independent trained observer    Total intra-service sedation time (minutes): 17    Access    Local anesthesia was administered. The vessel was sonographically evaluated and determined to be patent. Real time ultrasound was used to visualize needle entry into the vessel and a permanent image was not stored.    Vein accessed: Internal jugular vein    Access technique: Micropuncture set with 21 gauge needle    Venography    Indication for venography: Not performed    Catheter tip position for venography: Not applicable    Venous segment imaged: Not applicable    Catheter placement    An incision was made near the venous access site and the catheter was tunneled subcutaneously to the venous access site.  The catheter was advanced via a peel-away sheath into the vein under fluoroscopic guidance. Catheter tip location was fluoroscopically verified and a permanent image was stored.    Catheter placed: Glidepath hemodialysis  catheter    Catheter size (Syriac): 14.5    Catheter cuff-to-tip or trimmed length (cm): 23    Catheter tip position: 2 vertebral body units (VBUs) below the jazmyn.    Unique Device Identifier (WALTER): Not available    Catheter flush: Heparin (1000 units/mL)    Closure    A sterile dressing was applied.    Access site closure technique: Tissue adhesive    Catheter securement technique: Non-absorbable suture    Contrast    Contrast agent: None    Contrast volume (mL): 0    Radiation Dose    Fluoroscopy time ( minutes): 0.8    Reference air kerma ( mGy): 10    Kerma area product ( cGy-m2 ): 199    Additional Details    Additional description of procedure: None    Equipment details: None    Specimens removed: None    Estimated blood loss (mL): Less than 10    Standardized report: [email or web address]_[email or web address]_[email or web address]    Attestation    Signer name: Mark Ho MD    I attest that I was present for the entire procedure. I reviewed the stored images and agree with the report as written.  Impression: Insertion of right -sided supradiaphragmatic dual- lumen tunneled dialysis catheter, with tip in the expected location of the right atrium.    Plan:    The catheter may be used immediately.    _______________________________________________________________    Electronically signed by resident: Renato Antonio  Date:    09/29/2022  Time:    11:39    Electronically signed by: Mark Ho MD  Date:    09/29/2022  Time:    11:52  Cardiac catheterization  Procedure performed in the Invasive Lab    - See Procedure Log link below for nursing documentation    - See OpNote on Surgeries Tab for physician findings    - See Imaging Tab for radiologist dictation       Medications:  Reconciled Home Medications:      Medication List        START taking these medications      ELIQUIS 2.5 mg Tab  Generic drug: apixaban  Take 1 tablet (2.5 mg total) by mouth 2 (two) times daily.  Start taking on: September 30, 2022            CHANGE how you take  "these medications      ferrous sulfate 325 mg (65 mg iron) Tab tablet  Commonly known as: FEOSOL  3 TABS A DAY.  What changed: when to take this            CONTINUE taking these medications      atorvastatin 40 MG tablet  Commonly known as: LIPITOR  Take 1 tablet (40 mg total) by mouth once daily.     * blood sugar diagnostic Strp  To check BG 4 times daily, to use with insurance preferred meter     * TRUE METRIX GLUCOSE TEST STRIP Strp  Generic drug: blood sugar diagnostic  USE TO TEST BLOOD SUGAR FOUR TIMES DAILY     blood-glucose meter kit  To check BG 4 times daily, to use with insurance preferred meter, e 11.65     calcium acetate(phosphat bind) 667 mg capsule  Commonly known as: PHOSLO  SMARTSI Capsule(s) By Mouth     fluticasone propionate 50 mcg/actuation nasal spray  Commonly known as: FLONASE  SHAKE LIQUID AND USE 2 SPRAYS(100 MCG) IN EACH NOSTRIL EVERY DAY     hydrALAZINE 100 MG tablet  Commonly known as: APRESOLINE  Take 1 tablet (100 mg total) by mouth 3 (three) times daily.     HYDROcodone-acetaminophen 7.5-325 mg per tablet  Commonly known as: NORCO  Take 1 tablet by mouth 3 (three) times daily as needed.     insulin lispro 100 unit/mL pen  Commonly known as: HumaLOG KwikPen Insulin  INJECT 8-12 UNITS EVERY DAY WITH MEALS PLUS SCALE. MAX DAILY50 UNITS     lancets Misc  To check BG 4 times daily, to use with insurance preferred meter     LANTUS SOLOSTAR U-100 INSULIN glargine 100 units/mL SubQ pen  Generic drug: insulin  Inject 28-36 units at night.     metoprolol succinate 25 MG 24 hr tablet  Commonly known as: TOPROL-XL  Take 0.5 tablets (12.5 mg total) by mouth once daily.     NIFEdipine 90 MG (OSM) 24 hr tablet  Commonly known as: PROCARDIA-XL  Take 1 tablet (90 mg total) by mouth once daily.     pantoprazole 40 MG tablet  Commonly known as: PROTONIX  TAKE 1 TABLET(40 MG) BY MOUTH EVERY DAY     pen needle, diabetic 31 gauge x 5/16" Ndle  Commonly known as: BD ULTRA-FINE SHORT PEN NEEDLE  USE " FOUR TIMES DAILY     tamsulosin 0.4 mg Cap  Commonly known as: FLOMAX  TAKE ONE CAPSULE BY MOUTH EVERY MORNING     timolol maleate 0.25% 0.25 % Drop  Commonly known as: TIMOPTIC  PLACE 1 DROP IN BOTH EYES TWICE DAILY     torsemide 20 MG Tab  Commonly known as: DEMADEX  Take 1 tablet (20 mg total) by mouth 2 (two) times daily.           * This list has 2 medication(s) that are the same as other medications prescribed for you. Read the directions carefully, and ask your doctor or other care provider to review them with you.                ASK your doctor about these medications      ergocalciferol 50,000 unit Cap  Commonly known as: ERGOCALCIFEROL  Take 1 capsule (50,000 Units total) by mouth every 7 days.     ipratropium-albuteroL  mcg/actuation inhaler  Commonly known as: CombiVENT  Inhale 1 puff into the lungs every 6 (six) hours as needed for Wheezing or Shortness of Breath. Rescue              Indwelling Lines/Drains at time of discharge:   Lines/Drains/Airways       Central Venous Catheter Line  Duration           Hemodialysis Catheter 09/29/22 0954 right internal jugular <1 day                 Hemodialysis AV Fistula Left upper arm -- days          Time spent on the discharge of patient: 45 minutes  This service was provided by Virtual Visit.   Patient was seen and examined on the date of discharge.  Additional time was spent speaking with consultants and case management, reviewing records, and/or discussing the plan of care with patient/family.  The patient location is: 7082/7082 A  Admitted 9/24/2022  8:30 AM  Present with the patient at the time of the telemed/virtual assessment: n/a    Patient was transferred to [facility] Medicine on:  09/26/2022  This document was prepared by chart review and may not directly reflect my personal knowledge of the patient's case, clinical course, or significant events during the hospital stay.    The attending portion of this evaluation, treatment, and  documentation was performed per Justina Patterson MD via Telemedicine AudioVisual using the secure MiTio software platform with 2 way audio/video. The provider was located off-site and the patient is located in the hospital. The aforementioned video software was utilized to document the relevant history and physical exam    Justina Patterson MD  Department of Hospital Medicine  [facility] - Telemetry Stepdown ([place]-[facility])

## 2022-09-29 NOTE — ASSESSMENT & PLAN NOTE
- Continue home regimen of hydralazine, metoprolol, nifedipine, and torsemide  - continue to monitor and further titrate antihypertensives as clinically indicated

## 2022-09-29 NOTE — H&P
Inpatient Radiology Pre-procedure Note    History of Present Illness:  Vinnie Siegel is a 77 y.o. male w/ pmhx of HTN, HLD, T2DM, anemia, GERD, BPH,  and ESRD s/p brachiocephalic AVF who presents with malfunction of his AVF. US of AVF showed occlusion with associated hypoechoic expansile, acute thrombosis of the cephalic vein extending proximal to the AVF. Fistulogram performed on 9/28/22 with vascular surgery showing thrombosed AVF with outflow via collaterals and basilic vein. Permacatheter placement attempted and patient became agitated. IR consulted for HD catheter placement.     Admission H&P reviewed.  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     Hyperlipidemia     Hypertension     Kidney stone     Seasonal allergies 6/24/2014     Past Surgical History:   Procedure Laterality Date    AV FISTULA PLACEMENT Left 12/8/2020    Procedure: CREATION, AV FISTULA;  Surgeon: Benji Becerril MD;  Location: [address];  Service: Peripheral Vascular;  Laterality: Left;    CATARACT EXTRACTION W/  INTRAOCULAR LENS IMPLANT Right 04/04/16    Dr Meehan    COLONOSCOPY W/ BIOPSIES      ESOPHAGOGASTRODUODENOSCOPY N/A 6/29/2020    Procedure: EGD (ESOPHAGOGASTRODUODENOSCOPY);  Surgeon: Ravi Leone MD;  Location: [facility];  Service: Endoscopy;  Laterality: N/A;    EYE SURGERY      FISTULOGRAM Left 4/16/2021    Procedure: Fistulogram;  Surgeon: Benji Becerril MD;  Location: [facility] CATH LAB;  Service: Peripheral Vascular;  Laterality: Left;    HEMORRHOID SURGERY      [place]. [place] [facility] [place] internal anal sphincterotomy  12/13/13    Dr. root [facility] [place]    PERCUTANEOUS TRANSLUMINAL ANGIOPLASTY OF ARTERIOVENOUS FISTULA Left 4/16/2021    Procedure: PTA, AV FISTULA;  Surgeon: Benji Becerril MD;  Location: [facility] CATH LAB;  Service: Peripheral  Vascular;  Laterality: Left;    URETERAL STENT PLACEMENT         Review of Systems:   As documented in primary team H&P    Home Meds:   Prior to Admission medications    Medication Sig Start Date End Date Taking? Authorizing Provider   atorvastatin (LIPITOR) 40 MG tablet Take 1 tablet (40 mg total) by mouth once daily. 22   Janeth Walter MD   blood sugar diagnostic Strp To check BG 4 times daily, to use with insurance preferred meter 10/14/20   MACK Wood FNP   blood-glucose meter kit To check BG 4 times daily, to use with insurance preferred meter, e 11.65 10/14/20 10/14/21  MACK Wood FNP   calcium acetate,phosphat bind, (PHOSLO) 667 mg capsule SMARTSI Capsule(s) By Mouth 22   Historical Provider   ergocalciferol (ERGOCALCIFEROL) 50,000 unit Cap Take 1 capsule (50,000 Units total) by mouth every 7 days.  Patient taking differently: Take 50,000 Units by mouth every 7 days. Patient takes on 20   Yisel Ramírez DO   ferrous sulfate (FEOSOL) 325 mg (65 mg iron) Tab tablet 3 TABS A DAY.  Patient taking differently: every evening. 3 TABS A DAY. 21   Yisel Ramírez DO   fluticasone propionate (FLONASE) 50 mcg/actuation nasal spray SHAKE LIQUID AND USE 2 SPRAYS(100 MCG) IN EACH NOSTRIL EVERY DAY 21   Janeth Walter MD   hydrALAZINE (APRESOLINE) 100 MG tablet Take 1 tablet (100 mg total) by mouth 3 (three) times daily. 22   Janeth Walter MD   HYDROcodone-acetaminophen (NORCO) 7.5-325 mg per tablet Take 1 tablet by mouth 3 (three) times daily as needed. 22   Historical Provider   insulin (LANTUS SOLOSTAR U-100 INSULIN) glargine 100 units/mL (3mL) SubQ pen Inject 28-36 units at night. 21   Janeth Walter MD   insulin lispro (HUMALOG KWIKPEN INSULIN) 100 unit/mL pen INJECT 8-12 UNITS EVERY DAY WITH MEALS PLUS SCALE. MAX DAILY50 UNITS 22   Dariusz Torres, MACK, FNP   ipratropium-albuteroL (COMBIVENT)  mcg/actuation inhaler Inhale 1  "puff into the lungs every 6 (six) hours as needed for Wheezing or Shortness of Breath. Rescue  Patient not taking: Reported on 8/24/2022 8/18/21   Janeth Walter MD   [facility] To [facility] BG 4 times daily, to use with insurance preferred meter 10/14/20   MACK Wood, LANCEP   metoprolol succinate (TOPROL-XL) 25 MG 24 hr tablet Take 0.5 tablets (12.5 mg total) by mouth once daily. 8/24/22 8/24/23  Janeth Walter MD   NIFEdipine (PROCARDIA-XL) 90 MG (OSM) 24 hr tablet Take 1 tablet (90 mg total) by mouth once daily. 8/24/22   Janeth Walter MD   pantoprazole (PROTONIX) 40 MG tablet TAKE 1 TABLET(40 MG) BY MOUTH EVERY DAY 8/18/22   Janeth Walter MD   pen needle, diabetic (BD ULTRA-FINE SHORT PEN NEEDLE) 31 gauge x 5/16" Ndle USE FOUR TIMES DAILY 10/11/21   MACK Wood, LANCEP   tamsulosin (FLOMAX) 0.4 mg Cap TAKE ONE CAPSULE BY MOUTH EVERY MORNING 5/20/22   Janeth Walter MD   timolol maleate 0.25% (TIMOPTIC) 0.25 % Drop PLACE 1 DROP IN BOTH EYES TWICE DAILY 12/27/21   Judy Manriquez, OD   torsemide (DEMADEX) 20 MG Tab Take 1 tablet (20 mg total) by mouth 2 (two) times daily. 8/24/22   Janeth Walter MD   TRUE METRIX GLUCOSE TEST STRIP Strp USE TO TEST BLOOD SUGAR FOUR TIMES DAILY 3/21/22   MACK Wood, LANCEP     Scheduled Meds:    sodium chloride 0.9%   Intravenous Once    [START ON 9/30/2022] apixaban  2.5 mg Oral BID    atorvastatin  40 mg Oral Daily    calcium acetate(phosphat bind)  667 mg Oral TID WM    ergocalciferol  50,000 Units Oral Q7 Days    ferrous sulfate  1 tablet Oral Daily    hydrALAZINE  100 mg Oral TID    insulin aspart U-100  7 Units Subcutaneous TIDWM    insulin detemir U-100  28 Units Subcutaneous QHS    metoprolol succinate  12.5 mg Oral Daily    NIFEdipine  60 mg Oral BID    pantoprazole  40 mg Oral Daily    sodium chloride 0.9%  10 mL Intravenous Q8H    tamsulosin  1 capsule Oral QAM    timolol maleate 0.25%  1 drop Both Eyes BID    torsemide  20 mg Oral BID "     Continuous Infusions:   PRN Meds:acetaminophen, albuterol-ipratropium, aluminum-magnesium hydroxide-simethicone, dextrose 10%, dextrose 10%, glucagon (human recombinant), glucose, glucose, insulin aspart U-100, melatonin, naloxone, ondansetron, polyethylene glycol, prochlorperazine, simethicone  Anticoagulants/Antiplatelets: no anticoagulation    Allergies: Review of patient's allergies indicates:  No Known Allergies  Sedation Hx: have not been any systemic reactions    Labs:  Recent Labs   Lab 09/28/22  1643   INR 1.0       Recent Labs   Lab 09/29/22  0513   WBC 6.34   HGB 10.0*   HCT 29.8*   MCV 92         Recent Labs   Lab 09/29/22 0513   *      K 4.6      CO2 18*   BUN 65*   CREATININE 7.7*   CALCIUM 8.5*   MG 1.6   ALT 7*   AST 9*   ALBUMIN 3.1*   BILITOT 0.4         Vitals:  Temp: 98.6 °F (37 °C) (09/29/22 0301)  Pulse: 75 (09/29/22 0301)  Resp: 18 (09/29/22 0301)  BP: (!) 148/71 (09/29/22 0301)  SpO2: 99 % (09/29/22 0301)     Physical Exam:  ASA: 3  Mallampati: 2    General: no acute distress  Mental Status: alert and oriented to person, place and time  HEENT: normocephalic, atraumatic  Chest: unlabored breathing  Heart: regular heart rate  Abdomen: nondistended  Extremity: moves all extremities    Plan: HD catheter placement  Sedation Plan: Up to moderate sedation.     Laura Felix

## 2022-09-29 NOTE — PROGRESS NOTES
[facility] STAFF HEMODIALYSIS NOTE     Patient currently on hemodialysis for removal of uremic toxins and volume.     Patient seen and evaluated on hemodialysis, tolerating treatment, see HD flowsheet for vitals and assessments.    Labs have been reviewed and the dialysate bath has been adjusted.       Assessment/Plan:    -RIJ TDC placed on 9/29  -Patient seen on HD, tolerating treatment well, w/o complaints   -UF goal of 3-4L as tolerated   -Renal diet, if not NPO   -Strict I/O's and daily weights  -Daily renal function panels  -Keep MAP >65 while on HD   -Maintain Hgb >7.0  -calcium acetate 667 TID   -Will continue to follow while inpatient     Kyara Hines DNP-FNP, C  Nephrology  Pager: [phone]

## 2022-09-29 NOTE — ASSESSMENT & PLAN NOTE
- Interval history and physical exam findings as described above  - HD TTS via left brachiocephalic AVF  - Nephology consulted, appreciate assistance  - Further decision for HD per nephology  - Renally dosing all medications  - Avoid nephrotoxins  - continue to monitor   - RIJ TDC placed on 9/29; functions well

## 2022-09-29 NOTE — PLAN OF CARE
Pt to be transferred to MPU bed 6 via bed w/ RN for 1 hr recovery . Will return to inpt room after recovery. Vss, drowsy but arouses easily to verbal stimuli.

## 2022-09-29 NOTE — PLAN OF CARE
Post procedure bedside report given to MPU RN Noel/Nathaly, pt drowsy but arouses easily to verbal stimuli, vss, denies pain,   Post procedure phone report given to primary nurseSuzette.

## 2022-09-29 NOTE — PROGRESS NOTES
Patient arrived in a bed to dialysis unit.   Report received as documented in PER Handoff on Doc Flowsheet.  VS's per Doc Flowsheet.    Observation Hemodialysis initiated using the following:    Dialysis Access: Right IJ dialysis catheter    Will Maintain telemetry and blood pressure monitoring throughout treatment.  Refer to flowsheet and MAR for details.

## 2022-09-29 NOTE — PLAN OF CARE
TC to [facility]  on [address] in [place] RE: Pt's new access. Staff requested HD note and Surgery note faxed along with AVS to [phone]. Items faxed. Pt to continue HD at this facility on 10/1/22 after discharge.   Will continue to update plan as needed.  Mark Okeefe RN,BSN

## 2022-09-29 NOTE — PROGRESS NOTES
Hemodialysis treatment completed.    Treatment time received: 3.5  hours    Net fluid removed: 3000 ml    Tolerated Treatment well. VSS. No acute distress.    Heparin Locked ports per order.    Report given as documented in PER Handoff on Doc Flowsheet  Refer to flowsheet and MAR for details.  Patient transported back in bed from Dialysis to primary unit.

## 2022-09-30 ENCOUNTER — TELEPHONE (OUTPATIENT)
Dept: VASCULAR SURGERY | Facility: CLINIC | Age: 77
End: 2022-09-30
Payer: COMMERCIAL

## 2022-09-30 VITALS
OXYGEN SATURATION: 100 % | BODY MASS INDEX: 38.56 KG/M2 | HEART RATE: 85 BPM | RESPIRATION RATE: 18 BRPM | SYSTOLIC BLOOD PRESSURE: 167 MMHG | TEMPERATURE: 98 F | DIASTOLIC BLOOD PRESSURE: 79 MMHG | HEIGHT: 74 IN | WEIGHT: 300.5 LBS

## 2022-09-30 LAB — POCT GLUCOSE: 151 MG/DL (ref 70–110)

## 2022-09-30 PROCEDURE — G0378 HOSPITAL OBSERVATION PER HR: HCPCS

## 2022-09-30 PROCEDURE — 25000003 PHARM REV CODE 250: Performed by: INTERNAL MEDICINE

## 2022-09-30 PROCEDURE — 99217 PR OBSERVATION CARE DISCHARGE: ICD-10-PCS | Mod: ,,, | Performed by: INTERNAL MEDICINE

## 2022-09-30 PROCEDURE — 99217 PR OBSERVATION CARE DISCHARGE: CPT | Mod: ,,, | Performed by: INTERNAL MEDICINE

## 2022-09-30 PROCEDURE — 99214 OFFICE O/P EST MOD 30 MIN: CPT | Mod: ,,, | Performed by: NURSE PRACTITIONER

## 2022-09-30 PROCEDURE — 25000003 PHARM REV CODE 250: Performed by: STUDENT IN AN ORGANIZED HEALTH CARE EDUCATION/TRAINING PROGRAM

## 2022-09-30 PROCEDURE — 99214 PR OFFICE/OUTPT VISIT, EST, LEVL IV, 30-39 MIN: ICD-10-PCS | Mod: ,,, | Performed by: NURSE PRACTITIONER

## 2022-09-30 RX ADMIN — FERROUS SULFATE TAB 325 MG (65 MG ELEMENTAL FE) 1 EACH: 325 (65 FE) TAB at 08:09

## 2022-09-30 RX ADMIN — APIXABAN 2.5 MG: 2.5 TABLET, FILM COATED ORAL at 08:09

## 2022-09-30 RX ADMIN — TIMOLOL MALEATE 1 DROP: 2.5 SOLUTION/ DROPS OPHTHALMIC at 08:09

## 2022-09-30 RX ADMIN — HYDRALAZINE HYDROCHLORIDE 100 MG: 50 TABLET ORAL at 08:09

## 2022-09-30 RX ADMIN — PANTOPRAZOLE SODIUM 40 MG: 40 TABLET, DELAYED RELEASE ORAL at 08:09

## 2022-09-30 RX ADMIN — METOPROLOL SUCCINATE 12.5 MG: 25 TABLET, EXTENDED RELEASE ORAL at 08:09

## 2022-09-30 RX ADMIN — NIFEDIPINE 60 MG: 30 TABLET, FILM COATED, EXTENDED RELEASE ORAL at 08:09

## 2022-09-30 RX ADMIN — TORSEMIDE 20 MG: 20 TABLET ORAL at 08:09

## 2022-09-30 RX ADMIN — CALCIUM ACETATE 667 MG: 667 CAPSULE ORAL at 08:09

## 2022-09-30 RX ADMIN — ATORVASTATIN CALCIUM 40 MG: 40 TABLET, FILM COATED ORAL at 08:09

## 2022-09-30 NOTE — PLAN OF CARE
[facility] - Telemetry Stepdown ([place] [facility])  Discharge Final Note    Primary Care Provider: Janeth Walter MD    Expected Discharge Date: 9/30/2022    Future Appointments   Date Time Provider Department Center   10/20/2022  9:00 AM VASCULAR, LAB [facility] DAVID Sidhu   10/20/2022 10:15 AM Benji Becerril MD [facility] BRENDA Sidhu       Pt discharged home with no services.    Mark Okeefe, RN,BSN        Final Discharge Note (most recent)       Final Note - 09/30/22 1046          Final Note    Assessment Type Final Discharge Note     Anticipated Discharge Disposition Home or Self Care     Hospital Resources/Appts/Education Provided Provided patient/caregiver with written discharge plan information;Appointments scheduled and added to AVS        Post-Acute Status    Discharge Delays None known at this time                     Important Message from Medicare             Contact Info       [facility] VASCULAR SURGERY   Specialty: Vascular Surgery    [address] [facility] [facility] [address]   Phone: [phone]       Next Steps: Schedule an appointment as soon as possible for a visit in 1 week(s)    Instructions: As previously planned, To establish care. The Clinic Nurse will contact you with a follow up appointment. If you do not hear from them within 48 hours, please call [phone]. Thank You.    Janeth Walter MD   Specialty: Family Medicine   Relationship: PCP - General    [address] [place] [id number]   Phone: [phone]       Next Steps: Follow up    Instructions: The Clinic Nurse will contact you with a follow up appointment. If you do not hear from them within 48 hours, please call [phone]. Thank You.

## 2022-09-30 NOTE — TELEPHONE ENCOUNTER
Spoke to the pt , he stated he needed a early appointment. I explained to him that this is the only time we have. He ask can it be on the following week, the provider is out of the office on 10/13 , appt  scheduled for 10/20

## 2022-09-30 NOTE — PROGRESS NOTES
[facility]hu - Telemetry [facility] ([address] Medicine  Telemedicine Progress Note    Patient Name: Vinnie Siegel  MRN: [id number]  Patient Class: OP- Observation   Admission Date: 9/24/2022  Length of Stay: 0 days  Attending Physician: Justina Patterson MD  Primary Care Provider: Janeth Walter MD      Subjective:     Principal Problem:AV fistula occlusion, sequela      HPI:  Vinnie Siegel is a 78yo AAM with of PMH of ESRD (HD TTS via left brachiocephalic AVF), HTN, HLD, DM2, anemia of chronic renal failure, GERD, BPH, glaucoma, and obesity who presented to the [facility] ED on 9/24/22 with a malfunctioning AVF. Pt reported that his HD center has been experiencing issues with his AVF for the past several weeks, and during his most recent session, there were issues with flow during the end of HD. On day of admission, HD center was unable to get any flow, and sent him to the ER. Pt is grossly asymptomatic, denies F/C/N/V/D/C, HA, SOB, CP, palpitations, abdominal pain, dysuria, extremity swelling, or symptoms otherwise.    In the ED, vitals significant for /74. Labs remarkable for glucose 270, though were otherwise unremarkable and consistent with known ESRD status. CXR without any acute cardiopulmonary processes. US of AVF showed occlusion with associated hypoechoic, expansile, acute thrombosis of the cephalic vein extending proximal to the AV fistula. Vascular surgery consulted, planning for upcoming fistulogram. Nephrology consulted, and were able to cannulate AVF for HD session. Hospital medicine was consulted for admission and further management.      Overview/Hospital Course:  Pt admitted to [facility] and was able to be successfully dialyzed. Vascular surgery originally planned for angiogram on 9/27 but had to be re-scheduled to 9/28.  HD to be done after fistulagram.      Telemedicine  This service was provided by Virtual Visit.    Patient was transferred to [facility] Medicine on:   09/26/2022    Chief Complaint   Patient presents with    Vascular Access Problem     Sent from dialysis     The patient location is: 7082/7082 A   Admitted 9/24/2022  8:30 AM    Interval History / Events Overnight:   The patient is able to provide adequate history. Additional history was obtained from past medical records. No significant events reported by Nursing.  Patient has not been receiving scheduled prandial insulin x 2 days.  Patient complains of nothing new. Symptoms have improved since yesterday. Associated symptoms include: fatigue. Symptoms are stable.     Lab test(s) reviewed: hyperglycemia    Review of Systems   Constitutional:  Negative for fever.   Respiratory:  Negative for shortness of breath.      Objective:        Vital Signs (24h Range):  Temp:  [97.6 °F (36.4 °C)-98.6 °F (37 °C)] 98.3 °F (36.8 °C)  Pulse:  [64-90] 81  Resp:  [15-20] 18  SpO2:  [98 %-100 %] 100 %  BP: (135-172)/(69-92) 156/79     Weight: (!) 136.3 kg (300 lb 7.8 oz)  Body mass index is 38.58 kg/m².    Intake/Output Summary (Last 24 hours) at 9/29/2022 2204  Last data filed at 9/29/2022 1745  Gross per 24 hour   Intake 561.31 ml   Output 3950 ml   Net -3388.69 ml      Physical Exam  Constitutional:       General: He is not in acute distress.     Appearance: Normal appearance.   Eyes:      General: Lids are normal. No scleral icterus.        Right eye: No discharge.         Left eye: No discharge.      Conjunctiva/sclera: Conjunctivae normal.   Neck:      Trachea: Phonation normal.   Cardiovascular:      Rate and Rhythm: Normal rate.      Comments: Monitor / Vital signs reviewed at time of visit  Pulmonary:      Effort: Pulmonary effort is normal. No tachypnea, accessory muscle usage or respiratory distress.   Abdominal:      General: There is no distension.   Neurological:      Mental Status: He is alert. He is not disoriented.   Psychiatric:         Attention and Perception: Attention normal.         Mood and Affect: Affect  normal.         Behavior: Behavior is cooperative.       Significant Labs:  Recent Labs   Lab 09/27/22 0434   HGBA1C 12.4*     Recent Labs   Lab 09/28/22  2047 09/29/22  0812 09/29/22  2104   POCTGLUCOSE 304* 311* 304*     Recent Labs   Lab 09/27/22 0434 09/28/22  0548 09/29/22  0513   WBC 6.89 6.24 6.34   HGB 10.2* 10.5* 10.0*   HCT 30.9* 31.1* 29.8*    214 196     Recent Labs   Lab 09/27/22 0434 09/28/22  0548 09/29/22  0513   GRAN 66.8  4.6 63.6  4.0 67.4  4.3   LYMPH 21.8  1.5 25.0  1.6 20.3  1.3   MONO 8.6  0.6 8.7  0.5 9.9  0.6   EOS 0.1 0.1 0.1     Recent Labs   Lab 09/27/22 0434 09/28/22 0548 09/28/22  1643 09/29/22  0513    135* 137 136   K 4.5 4.5 4.3 4.6    102 102 103   CO2 22* 19* 19* 18*   BUN 53* 59* 60* 65*   CREATININE 7.8* 8.1* 8.4* 7.7*   * 241* 225* 248*   CALCIUM 8.5* 8.7 9.0 8.5*   ALBUMIN 3.2* 3.3* 3.3* 3.1*   MG 1.6 1.6  --  1.6   PHOS 4.3 4.1 4.0 4.1     Recent Labs   Lab 09/27/22 0434 09/28/22  0548 09/28/22  1643 09/29/22  0513   ALKPHOS 60 64  --  56   ALT 7* 7*  --  7*   AST 9* 8*  --  9*   PROT 6.6 6.8  --  6.7   BILITOT 0.5 0.5  --  0.4   INR  --   --  1.0  --      SARS-CoV2 (COVID-19) Qualitative PCR (no units)   Date Value   04/13/2021 Not Detected   12/01/2020 Not Detected   09/15/2020 Not Detected     SARS-CoV-2 RNA, Amplification, Qual (no units)   Date Value   12/08/2020 Negative   07/15/2020 Negative   07/02/2020 Negative   06/27/2020 Negative     POC Rapid COVID (no units)   Date Value   09/11/2020 Negative       Results for orders placed during the hospital encounter of 07/06/22    Echo    Interpretation Summary  · The left ventricle is normal in size with concentric remodeling and normal systolic function. The estimated ejection fraction is 60%.  · Normal right ventricular size with normal right ventricular systolic function.  · Normal left ventricular diastolic function.  · Mild aortic regurgitation.  · The estimated PA systolic  pressure is 17 mmHg.  · Normal central venous pressure (3 mmHg).      IR Tunneled Catheter Insert w/o Port  Narrative: EXAMINATION:  Tunneled central venous catheter placement    Procedural Personnel    Attending physician(s): Mark Ho MD    Fellow physician(s): Renato Antonio DO    Resident physician(s): None    Advanced practice provider(s): None    Pre-procedure diagnosis: Acute kidney injury    Post-procedure diagnosis: Same    Indication: Performance of hemodialysis    Additional clinical history: None    Complications: No immediate complications.    TECHNIQUE:  - Venous access with ultrasound guidance    - Tunneled central venous catheter insertion with fluoroscopic guidance    FINDINGS:  Pre-procedure    History and imaging of central venous access reviewed (QCDR): Yes    Consent: Informed consent for the procedure was obtained and time-out was performed prior to the procedure.    Prophylactic antibiotic administered: Within 1 hour of procedure start time or 2 hours for vancomycin or fluoroquinolones    Preparation (MIPS): The site was prepared and draped using all elements of maximal sterile barrier technique including sterile gloves, sterile gown, cap, mask, large sterile sheet, sterile ultrasound probe cover, hand hygiene and cutaneous antisepsis with 2% chlorhexidine.    Medical reason for site preparation exception (MIPS): Not applicable    Anesthesia/sedation    Level of anesthesia/sedation: Moderate sedation (conscious sedation)    Anesthesia/sedation administered by: Nurse or other independent trained observer    Total intra-service sedation time (minutes): 17    Access    Local anesthesia was administered. The vessel was sonographically evaluated and determined to be patent. Real time ultrasound was used to visualize needle entry into the vessel and a permanent image was not stored.    Vein accessed: Internal jugular vein    Access technique: Micropuncture set with 21 gauge  needle    Venography    Indication for venography: Not performed    Catheter tip position for venography: Not applicable    Venous segment imaged: Not applicable    Catheter placement    An incision was made near the venous access site and the catheter was tunneled subcutaneously to the venous access site.  The catheter was advanced via a peel-away sheath into the vein under fluoroscopic guidance. Catheter tip location was fluoroscopically verified and a permanent image was stored.    Catheter placed: Glidepath hemodialysis catheter    Catheter size ([place]): 14.5    Catheter cuff-to-tip or trimmed length (cm): 23    Catheter tip position: 2 vertebral body units (VBUs) below the jazmyn.    Unique Device Identifier (WALTER): Not available    Catheter flush: Heparin (1000 units/mL)    Closure    A sterile dressing was applied.    Access site closure technique: Tissue adhesive    Catheter securement technique: Non-absorbable suture    Contrast    Contrast agent: None    Contrast volume (mL): 0    Radiation Dose    Fluoroscopy time ( minutes): 0.8    Reference air kerma ( mGy): 10    Kerma area product ( cGy-m2 ): 199    Additional Details    Additional description of procedure: None    Equipment details: None    Specimens removed: None    Estimated blood loss (mL): Less than 10    Standardized report: [email or web address]    Attestation    Signer name: Mark Ho MD    I attest that I was present for the entire procedure. I reviewed the stored images and agree with the report as written.  Impression: Insertion of right -sided supradiaphragmatic dual- lumen tunneled dialysis catheter, with tip in the expected location of the right atrium.    Plan:    The catheter may be used immediately.    _______________________________________________________________    Electronically signed by resident: Renato Antonio  Date:    09/29/2022  Time:    11:39    Electronically signed by: Mark Ho  MD  Date:    09/29/2022  Time:    11:52  Cardiac catheterization  Procedure performed in the Invasive Lab    - See Procedure Log link below for nursing documentation    - See OpNote on Surgeries Tab for physician findings    - See Imaging Tab for radiologist dictation      Labs and Imaging within the last 24 hours listed above were reviewed.       Diet: Diet diabetic [facility]; 2800 Calorie; Renal  Diet Adult Regular  Significant LDAs:   IV Access Type: Peripheral and Dialysis Access  Urinary Catheter Indication if present: Patient Does Not Have Urinary Catheter  Other Lines/Tubes/Drains:      Goals of Care:    Previous admission:  4/16/21  Likely prognosis:  Fair  Code Status: Full Code  Comfort Only: No  Hospice: No  Goals at discharge: remain at home, with physician follow-up    Discharge Planning   HERMAN: 9/29/2022     Code Status: Full Code   Is the patient medically ready for discharge?: Yes    Reason for patient still in hospital (select all that apply): Other (specify) HD line placement  Discharge Plan A: Home        Assessment/Plan:      * AV fistula occlusion, sequela  - Vascular surgery consulted, fistulogram done on 9/28 - fistula thrombosed  - Nephrology consulted, and were able to cannulate AVF for HD session  - patient to remain hospitalized to ensure timely and adequate HD until fistula is evaluated; outpatient HD unit requires patient have HD inpatient after fistula evaluated prior to acceptance back for outpatient care  - apixaban started by Vascular Surgery due to acute thrombosis of the cephalic vein - holding for IR tunneled catheter placement on 9/29  - needs outpatient Vascular Surgery f/u    Primary open angle glaucoma (POAG) of both eyes, indeterminate stage  - Continue home eye drop     Benign prostatic hyperplasia without lower urinary tract symptoms  - Continue home tamsulosin     Gastroesophageal reflux disease without esophagitis  - Currently asymptomatic  - Continue home PPI  regimen    Mixed diabetic hyperlipidemia associated with type 2 diabetes mellitus  - Continue home statin     ESRD on hemodialysis  - Interval history and physical exam findings as described above  - HD TTS via left brachiocephalic AVF  - Nephology consulted, appreciate assistance  - Further decision for HD per nephology  - Renally dosing all medications  - Avoid nephrotoxins  - continue to monitor   - RIJ TDC placed on 9/29; functions well    Anemia in ESRD (end-stage renal disease)  - H/H stable  - Continue home iron supplementation regimen  - continue to monitor    AV fistula stenosis, initial encounter  Vascular Surgery consulted    Class 2 severe obesity due to excess calories with serious comorbidity and body mass index (BMI) of 38.0 to 38.9 in adult  - Body mass index is 38.58 kg/m².  - Needs dietary and lifestyle modifications in relation to health  - Consider dietary/nutrition consult outpatient    Hypertension associated with type 2 diabetes mellitus  - Continue home regimen of hydralazine, metoprolol, nifedipine, and torsemide  - continue to monitor and further titrate antihypertensives as clinically indicated     Type 2 diabetes mellitus with chronic kidney disease on chronic dialysis, with long-term current use of insulin  - Working to optimize BG control  - Levemir increased to 35 u qHS; resume prandial insulin with meals  - Hypoglycemic protocol in effect  - Diabetic diet provided        Active Hospital Problems    Diagnosis  POA    *AV fistula occlusion, sequela [T82.898S]  Not Applicable    Chronic kidney disease-mineral and bone disorder [N18.9, E83.9, M89.9]  Yes    ESRD on hemodialysis [N18.6, Z99.2]  Not Applicable    Mixed diabetic hyperlipidemia associated with type 2 diabetes mellitus [E11.69, E78.2]  Yes    Gastroesophageal reflux disease without esophagitis [K21.9]  Yes    Benign prostatic hyperplasia without lower urinary tract symptoms [N40.0]  Yes    Primary open angle glaucoma  (POAG) of both eyes, indeterminate stage [H40.2110]  Yes    AV fistula stenosis, initial encounter [T89.450L]  Yes    Anemia in ESRD (end-stage renal disease) [N18.6, D63.1]  Yes    ESRD (end stage renal disease) [N18.6]  Yes    Class 2 severe obesity due to excess calories with serious comorbidity and body mass index (BMI) of 38.0 to 38.9 in adult [E66.01, Z68.38]  Not Applicable    Hypertension associated with type 2 diabetes mellitus [E11.59, I15.2]  Yes    Type 2 diabetes mellitus with chronic kidney disease on chronic dialysis, with long-term current use of insulin [E11.22, N18.6, Z99.2, Z79.4]  Not Applicable      Resolved Hospital Problems   No resolved problems to display.       Inpatient Medications Prescribed for Management of Current Problems:     Scheduled Meds:    [START ON 9/30/2022] apixaban  2.5 mg Oral BID    atorvastatin  40 mg Oral Daily    calcium acetate(phosphat bind)  667 mg Oral TID WM    ergocalciferol  50,000 Units Oral Q7 Days    ferrous sulfate  1 tablet Oral Daily    hydrALAZINE  100 mg Oral TID    insulin aspart U-100  7 Units Subcutaneous TIDWM    insulin detemir U-100  28 Units Subcutaneous QHS    metoprolol succinate  12.5 mg Oral Daily    NIFEdipine  60 mg Oral BID    pantoprazole  40 mg Oral Daily    sodium chloride 0.9%  10 mL Intravenous Q8H    tamsulosin  1 capsule Oral QAM    timolol maleate 0.25%  1 drop Both Eyes BID    torsemide  20 mg Oral BID     Continuous Infusions:    ceFAZolin 1 g/50ml Dextrose IVPB       As Needed: acetaminophen, albuterol-ipratropium, aluminum-magnesium hydroxide-simethicone, ceFAZolin 1 g/50ml Dextrose IVPB, dextrose 10%, dextrose 10%, fentaNYL, glucagon (human recombinant), glucose, glucose, heparin (porcine), heparin (porcine), insulin aspart U-100, melatonin, midazolam, naloxone, ondansetron, polyethylene glycol, prochlorperazine, simethicone    VTE Risk Mitigation (From admission, onward)         Ordered     apixaban tablet  2.5 mg  2 times daily         09/28/22 1641     heparin (porcine) injection 1,000 Units  As needed (PRN)         09/29/22 1532     heparin (porcine) injection  Intra-op PRN         09/29/22 0957     IP VTE HIGH RISK PATIENT  Once         09/24/22 1526     Place sequential compression device  Until discontinued         09/24/22 1526              I have completed this tele-visit without the assistance of a telepresenter.    The attending portion of this evaluation, treatment, and documentation was performed per Justina Patterson MD via Telemedicine AudioVisual using the secure [employer] software platform with 2 way audio/video. The provider was located off-site and the patient is located in the hospital. The aforementioned video software was utilized to document the relevant history and physical exam.     Justina Patterson MD  Department of Hospital Medicine   [facility] - Telemetry Stepdown ([place]-7)

## 2022-09-30 NOTE — SUBJECTIVE & OBJECTIVE
Telemedicine  This service was provided by [facility].    Patient was transferred to [facility] on:  09/26/2022    Chief Complaint   Patient presents with    Vascular Access Problem     Sent from dialysis     The patient location is: 7082/7082 A   Admitted 9/24/2022  8:30 AM    Interval History / Events Overnight:   The patient is able to provide adequate history. Additional history was obtained from past medical records. No significant events reported by Nursing.  Patient has not been receiving scheduled prandial insulin x 2 days.  Patient complains of nothing new. Symptoms have improved since yesterday. Associated symptoms include: fatigue. Symptoms are stable.     Lab test(s) reviewed: hyperglycemia    Review of Systems   Constitutional:  Negative for fever.   Respiratory:  Negative for shortness of breath.      Objective:        Vital Signs (24h Range):  Temp:  [97.6 °F (36.4 °C)-98.6 °F (37 °C)] 98.3 °F (36.8 °C)  Pulse:  [64-90] 81  Resp:  [15-20] 18  SpO2:  [98 %-100 %] 100 %  BP: (135-172)/(69-92) 156/79     Weight: (!) 136.3 kg (300 lb 7.8 oz)  Body mass index is 38.58 kg/m².    Intake/Output Summary (Last 24 hours) at 9/29/2022 2204  Last data filed at 9/29/2022 1745  Gross per 24 hour   Intake 561.31 ml   Output 3950 ml   Net -3388.69 ml      Physical Exam  Constitutional:       General: He is not in acute distress.     Appearance: Normal appearance.   Eyes:      General: Lids are normal. No scleral icterus.        Right eye: No discharge.         Left eye: No discharge.      Conjunctiva/sclera: Conjunctivae normal.   Neck:      Trachea: Phonation normal.   Cardiovascular:      Rate and Rhythm: Normal rate.      Comments: Monitor / Vital signs reviewed at time of visit  Pulmonary:      Effort: Pulmonary effort is normal. No tachypnea, accessory muscle usage or respiratory distress.   Abdominal:      General: There is no distension.   Neurological:      Mental Status: He is alert. He is  not disoriented.   Psychiatric:         Attention and Perception: Attention normal.         Mood and Affect: Affect normal.         Behavior: Behavior is cooperative.       Significant Labs:  Recent Labs   Lab 09/27/22 0434   HGBA1C 12.4*     Recent Labs   Lab 09/28/22 2047 09/29/22  0812 09/29/22  2104   POCTGLUCOSE 304* 311* 304*     Recent Labs   Lab 09/27/22 0434 09/28/22  0548 09/29/22  0513   WBC 6.89 6.24 6.34   HGB 10.2* 10.5* 10.0*   HCT 30.9* 31.1* 29.8*    214 196     Recent Labs   Lab 09/27/22 0434 09/28/22  0548 09/29/22  0513   GRAN 66.8  4.6 63.6  4.0 67.4  4.3   LYMPH 21.8  1.5 25.0  1.6 20.3  1.3   MONO 8.6  0.6 8.7  0.5 9.9  0.6   EOS 0.1 0.1 0.1     Recent Labs   Lab 09/27/22 0434 09/28/22 0548 09/28/22  1643 09/29/22  0513    135* 137 136   K 4.5 4.5 4.3 4.6    102 102 103   CO2 22* 19* 19* 18*   BUN 53* 59* 60* 65*   CREATININE 7.8* 8.1* 8.4* 7.7*   * 241* 225* 248*   CALCIUM 8.5* 8.7 9.0 8.5*   ALBUMIN 3.2* 3.3* 3.3* 3.1*   MG 1.6 1.6  --  1.6   PHOS 4.3 4.1 4.0 4.1     Recent Labs   Lab 09/27/22 0434 09/28/22  0548 09/28/22  1643 09/29/22  0513   ALKPHOS 60 64  --  56   ALT 7* 7*  --  7*   AST 9* 8*  --  9*   PROT 6.6 6.8  --  6.7   BILITOT 0.5 0.5  --  0.4   INR  --   --  1.0  --      SARS-CoV2 (COVID-19) Qualitative PCR (no units)   Date Value   04/13/2021 Not Detected   12/01/2020 Not Detected   09/15/2020 Not Detected     SARS-CoV-2 RNA, Amplification, Qual (no units)   Date Value   12/08/2020 Negative   07/15/2020 Negative   07/02/2020 Negative   06/27/2020 Negative     POC Rapid COVID (no units)   Date Value   09/11/2020 Negative       Results for orders placed during the hospital encounter of 07/06/22    Echo    Interpretation Summary  · The left ventricle is normal in size with concentric remodeling and normal systolic function. The estimated ejection fraction is 60%.  · Normal right ventricular size with normal right ventricular systolic  function.  · Normal left ventricular diastolic function.  · Mild aortic regurgitation.  · The estimated PA systolic pressure is 17 mmHg.  · Normal central venous pressure (3 mmHg).      IR Tunneled Catheter Insert w/o Port  Narrative: EXAMINATION:  Tunneled central venous catheter placement    Procedural Personnel    Attending physician(s): Mark Ho MD    Fellow physician(s): Renato Antonio DO    Resident physician(s): None    Advanced practice provider(s): None    Pre-procedure diagnosis: Acute kidney injury    Post-procedure diagnosis: Same    Indication: Performance of hemodialysis    Additional clinical history: None    Complications: No immediate complications.    TECHNIQUE:  - Venous access with ultrasound guidance    - Tunneled central venous catheter insertion with fluoroscopic guidance    FINDINGS:  Pre-procedure    History and imaging of central venous access reviewed (QCDR): Yes    Consent: Informed consent for the procedure was obtained and time-out was performed prior to the procedure.    Prophylactic antibiotic administered: Within 1 hour of procedure start time or 2 hours for vancomycin or fluoroquinolones    Preparation (MIPS): The site was prepared and draped using all elements of maximal sterile barrier technique including sterile gloves, sterile gown, cap, mask, large sterile sheet, sterile ultrasound probe cover, hand hygiene and cutaneous antisepsis with 2% chlorhexidine.    Medical reason for site preparation exception (MIPS): Not applicable    Anesthesia/sedation    Level of anesthesia/sedation: Moderate sedation (conscious sedation)    Anesthesia/sedation administered by: Nurse or other independent trained observer    Total intra-service sedation time (minutes): 17    Access    Local anesthesia was administered. The vessel was sonographically evaluated and determined to be patent. Real time ultrasound was used to visualize needle entry into the vessel and a permanent image was not  stored.    Vein accessed: Internal jugular vein    Access technique: Micropuncture set with 21 gauge needle    Venography    Indication for venography: Not performed    Catheter tip position for venography: Not applicable    Venous segment imaged: Not applicable    Catheter placement    An incision was made near the venous access site and the catheter was tunneled subcutaneously to the venous access site.  The catheter was advanced via a peel-away sheath into the vein under fluoroscopic guidance. Catheter tip location was fluoroscopically verified and a permanent image was stored.    Catheter placed: [employer] hemodialysis catheter    Catheter size (Slovak): 14.5    Catheter cuff-to-tip or trimmed length (cm): 23    Catheter tip position: 2 vertebral body units (VBUs) below the jazmyn.    Unique Device Identifier (WALTER): Not available    Catheter flush: Heparin (1000 units/mL)    Closure    A sterile dressing was applied.    Access site closure technique: Tissue adhesive    Catheter securement technique: Non-absorbable suture    Contrast    Contrast agent: None    Contrast volume (mL): 0    Radiation Dose    Fluoroscopy time ( minutes): 0.8    Reference air kerma ( mGy): 10    Kerma area product ( cGy-m2 ): 199    Additional Details    Additional description of procedure: None    Equipment details: None    Specimens removed: None    Estimated blood loss (mL): Less than 10    Standardized report: [email or web address]_[email or web address]_[email or web address]    Attestation    Signer name: Mark Ho MD    I attest that I was present for the entire procedure. I reviewed the stored images and agree with the report as written.  Impression: Insertion of right -sided supradiaphragmatic dual- lumen tunneled dialysis catheter, with tip in the expected location of the right atrium.    Plan:    The catheter may be used immediately.    _______________________________________________________________    Electronically signed by resident: Renato  Paco  Date:    09/29/2022  Time:    11:39    Electronically signed by: Mark Ho MD  Date:    09/29/2022  Time:    11:52  Cardiac catheterization  Procedure performed in the Invasive Lab    - See Procedure Log link below for nursing documentation    - See OpNote on Surgeries Tab for physician findings    - See Imaging Tab for radiologist dictation      Labs and Imaging within the last 24 hours listed above were reviewed.       Diet: Diet diabetic [facility]; 2800 Calorie; Renal  Diet Adult Regular  Significant LDAs:   IV Access Type: Peripheral and Dialysis Access  Urinary Catheter Indication if present: Patient Does Not Have Urinary Catheter  Other Lines/Tubes/Drains:      Goals of Care:    Previous admission:  4/16/21  Likely prognosis:  Fair  Code Status: Full Code  Comfort Only: No  Hospice: No  Goals at discharge: remain at home, with physician follow-up    Discharge Planning   HERMAN: 9/29/2022     Code Status: Full Code   Is the patient medically ready for discharge?: Yes    Reason for patient still in hospital (select all that apply): Other (specify) HD line placement  Discharge Plan A: Home

## 2022-09-30 NOTE — OP NOTE
"Surgery Date: 9/28/2022      Surgeon(s) and Role:     * Benji Becerril MD - Primary     * Ciro Ordaz MD _ Fellow     Assisting Surgeon:  Pre-op Diagnosis:  Dialysis AV fistula malfunction, initial encounter [T82.590A]     Post-op Diagnosis:  Post-Op Diagnosis Codes:     * Dialysis AV fistula malfunction, initial encounter [T82.590A]     Procedure(s) (LRB):  Fistulogram (Left)     Anesthesia: Choice     Operative Findings: Thrombosed BC AVF, outflow via collaterals and basilic vein. Attempted permacatheter placement and patient became agitated and wanted "out of here." Unable to proceed with permacatheter placement. Patient will need a permacatheter and new access.      Estimated Blood Loss: * No values recorded between 9/28/2022 11:24 AM and 9/28/2022 12:03 PM *     Estimated Blood Loss has been documented.         Specimens:   Specimen (24h ago, onward)        None                 EBL: <10 ml  PROCEDURE IN DETAIL:After an informed consent was obtained the patient was taken to the interventional suite and placed in the supine position.  The patient was brought to the Cath Lab, placed in supine. Arm was prepped and draped in the standard surgical fashion. Under ultrasound guidance, the proximal aspect of the avf was accessed with a micropuncture needle; ultrasound confirmed vessel patency, followed by placement of 4/3-Comoran micropuncture dilator. Through this, an 0.035-inch wire was placed in the short 6-Comoran sheath. Through this, an 0.035-inch Glidewire was placed through the high-grade stenosis, which was demonstrated by the angiogram.  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 It is still open via collaterals to the basilic vein. Decision was made not to intervene on this as it would most likely only quickly thrombose again. The sheath was removed, 3-0 nylon U-stitch was placed with good hemostasis. Attempted to place permacatheter for the patient but the patient became very agitated and " did not want to proceed.     MODERATE SEDATION IN CATH LAB   Benji Becerril MD was present for moderate sedation  Dr. Becerril monitored the patients cardio respiratory functions during the moderate sedation   See nurses notes for Intra-service start and end times and for the log of the name of the RN who administered the medicines for the moderate sedation, including their doseage and route.    Time of sedation:  30mins.    Ciro Ordaz MD PGY7  Vascular Surgery Fellow  [phone]

## 2022-09-30 NOTE — TELEPHONE ENCOUNTER
----- Message from Danyelle Ribeiro sent at 9/30/2022 10:30 AM CDT -----  Contact: Patient  Type:  Sooner Appointment Request      Name of Caller:Patient  When is the first available appointment?10/06/2022  Would the patient rather a call back or a response via [facility]? Call back  Best Call Back Number:[phone]  Additional Information: Please assist          Patient/Caregiver provided printed discharge information.

## 2022-09-30 NOTE — PROGRESS NOTES
[facility] Tele[facility] ([place] [address])  Nephrology  Progress Note    Patient Name: Vinnie Siegel  MRN: [id number]  Admission Date: 9/24/2022  Hospital Length of Stay: 0 days  Attending Provider: Justina Patterson MD   Primary Care Physician: Janeth Walter MD  Principal Problem:AV fistula occlusion, sequela    Subjective:       Interval History: HD yesterday, tolerated well.  Net UF 3L.     Review of patient's allergies indicates:  No Known Allergies  Current Facility-Administered Medications   Medication Frequency    0.9%  NaCl infusion Once    acetaminophen tablet 650 mg Q4H PRN    albuterol-ipratropium 2.5 mg-0.5 mg/3 mL nebulizer solution 3 mL Q6H PRN    aluminum-magnesium hydroxide-simethicone 200-200-20 mg/5 mL suspension 30 mL QID PRN    apixaban tablet 2.5 mg BID    atorvastatin tablet 40 mg Daily    calcium acetate(phosphat bind) capsule 667 mg TID WM    dextrose 10% bolus 125 mL PRN    dextrose 10% bolus 250 mL PRN    ergocalciferol capsule 50,000 Units Q7 Days    ferrous sulfate tablet 1 each Daily    glucagon (human recombinant) injection 1 mg PRN    glucose chewable tablet 16 g PRN    glucose chewable tablet 24 g PRN    hydrALAZINE tablet 100 mg TID    insulin aspart U-100 pen 1-10 Units QID (AC + HS) PRN    insulin aspart U-100 pen 7 Units TIDWM    melatonin tablet 6 mg Nightly PRN    metoprolol succinate (TOPROL-XL) 24 hr split tablet 12.5 mg Daily    naloxone 0.4 mg/mL injection 0.02 mg PRN    NIFEdipine 24 hr tablet 60 mg BID    ondansetron injection 4 mg Q8H PRN    pantoprazole EC tablet 40 mg Daily    polyethylene glycol packet 17 g Daily PRN    prochlorperazine injection Soln 5 mg Q6H PRN    simethicone chewable tablet 80 mg QID PRN    sodium chloride 0.9% flush 10 mL Q8H    tamsulosin 24 hr capsule 0.4 mg QAM    timolol maleate 0.25% ophthalmic solution 1 drop BID    torsemide tablet 20 mg BID       Objective:     Vital Signs (Most Recent):  Temp: 97.3 °F (36.3  °C) (09/28/22 1555)  Pulse: 75 (09/28/22 0856)  Resp: 19 (09/28/22 1555)  BP: (!) 145/81 (09/28/22 1600)  SpO2: 100 % (09/28/22 1555)  O2 Device (Oxygen Therapy): room air (09/25/22 0807)   Vital Signs (24h Range):  Temp:  [97.2 °F (36.2 °C)-98.4 °F (36.9 °C)] 97.3 °F (36.3 °C)  Pulse:  [64-75] 75  Resp:  [18-19] 19  SpO2:  [97 %-100 %] 100 %  BP: (145-159)/(68-81) 145/81     Weight: (!) 136.3 kg (300 lb 7.8 oz) (09/24/22 2059)  Body mass index is 38.58 kg/m².  Body surface area is 2.67 meters squared.    I/O last 3 completed shifts:  In: -   Out: 400 [Urine:400]    Physical Exam  Vitals and nursing note reviewed.   HENT:      Head: Normocephalic.      Mouth/Throat:      Pharynx: Oropharynx is clear.   Eyes:      Conjunctiva/sclera: Conjunctivae normal.   Pulmonary:      Effort: Pulmonary effort is normal.   Abdominal:      Palpations: Abdomen is soft.   Musculoskeletal:      Cervical back: Neck supple.      Right lower leg: No edema.      Left lower leg: No edema.   Skin:     General: Skin is warm and dry.   Neurological:      Mental Status: He is alert and oriented to person, place, and time.   Psychiatric:         Mood and Affect: Mood normal.         Behavior: Behavior normal.         Thought Content: Thought content normal.       Significant Labs:  CBC:   Recent Labs   Lab 09/29/22 0513   WBC 6.34   RBC 3.23*   HGB 10.0*   HCT 29.8*      MCV 92   MCH 31.0   MCHC 33.6     CMP:   Recent Labs   Lab 09/29/22 0513   *   CALCIUM 8.5*   ALBUMIN 3.1*   PROT 6.7      K 4.6   CO2 18*      BUN 65*   CREATININE 7.7*   ALKPHOS 56   ALT 7*   AST 9*   BILITOT 0.4     All labs within the past 24 hours have been reviewed.         Assessment/Plan:     * AV fistula occlusion, sequela    Thrombosed AVF - TDC placed on 9/29    Chronic kidney disease-mineral and bone disorder  -continue phos binders   Calcium acetate 667 mg TID with meals     ESRD on hemodialysis  ESRD on iHD TTS  Last HD on Thursday  prior to presentation.  Reported to have had poor flow rates through AVF over the past several week.  Unable to cannulate venous return, so he was sent to the ED on 9/24 for vascular sx eval.    Plan/Recommendations:    -HD yesterday, tolerated well. Plan for HD tomorrow 9/31  -RFP daily   -agree with torsemide 20 BID     Anemia in ESRD (end-stage renal disease)  Goal 10-11  Hgb 10.1  Transfuse for Hgb <7.0          Thank you for your consult. I will follow-up with patient. Please contact us if you have any additional questions.    Kyara Hines, NINO  Nephrology  [facility] ([place]

## 2022-09-30 NOTE — PLAN OF CARE
Discharge instructions, diagnosis, medications, and follow up discussed with patient. Patient verbalized understanding. All questions and concerns answered. No needs expressed at the time. Pt is awake, alert and oriented with no acute distress noted. Respirations even and unlabored. Pt escorted thais by transport x1 via wheelchair.     Problem: Device-Related Complication Risk (Hemodialysis)  Goal: Safe, Effective Therapy Delivery  Outcome: Met     Problem: Hemodynamic Instability (Hemodialysis)  Goal: Effective Tissue Perfusion  Outcome: Met     Problem: Infection (Hemodialysis)  Goal: Absence of Infection Signs and Symptoms  Outcome: Met     Problem: Adult Inpatient Plan of Care  Goal: Plan of Care Review  Outcome: Met  Goal: Patient-Specific Goal (Individualized)  Outcome: Met  Goal: Absence of Hospital-Acquired Illness or Injury  Outcome: Met  Goal: Optimal Comfort and Wellbeing  Outcome: Met  Goal: Readiness for Transition of Care  Outcome: Met     Problem: Infection  Goal: Absence of Infection Signs and Symptoms  Outcome: Met     Problem: Diabetes Comorbidity  Goal: Blood Glucose Level Within Targeted Range  Outcome: Met     Problem: Impaired Wound Healing  Goal: Optimal Wound Healing  Outcome: Met     Problem: Skin Injury Risk Increased  Goal: Skin Health and Integrity  Outcome: Met     Problem: Electrolyte Imbalance (Chronic Kidney Disease)  Goal: Electrolyte Balance  Outcome: Met     Problem: Fluid Volume Excess (Chronic Kidney Disease)  Goal: Fluid Balance  Outcome: Met

## 2022-10-03 ENCOUNTER — TELEPHONE (OUTPATIENT)
Dept: INTERNAL MEDICINE | Facility: CLINIC | Age: 77
End: 2022-10-03
Payer: COMMERCIAL

## 2022-10-03 NOTE — TELEPHONE ENCOUNTER
----- Message from Ben Santiago sent at 10/3/2022  9:27 AM CDT -----  Contact: [phone]  Pt is calling to get scheduled for a appt.  Pt access tried but no appts available.  Pt would like a call back. [phone]

## 2022-10-03 NOTE — TELEPHONE ENCOUNTER
Called patient back, offered virtual appointment with Dariusz, and appointment in March with Dariusz, patient refused at the moment and will call back if he want to schedule appt

## 2022-10-04 DIAGNOSIS — E11.65 TYPE 2 DIABETES MELLITUS WITH HYPERGLYCEMIA, WITHOUT LONG-TERM CURRENT USE OF INSULIN: ICD-10-CM

## 2022-10-04 RX ORDER — INSULIN GLARGINE 100 [IU]/ML
INJECTION, SOLUTION SUBCUTANEOUS
Qty: 15 ML | Refills: 6 | OUTPATIENT
Start: 2022-10-04

## 2022-10-04 NOTE — TELEPHONE ENCOUNTER
Pt has not been see by Dariusz x 2 years.   Needs an appt with me as new patient, or he can f/u with Primary Care for DM care and rx's.

## 2022-10-04 NOTE — TELEPHONE ENCOUNTER
"Dariusz Torres is currently on maternity leave until November 29th(virtual/telephone appointments) and December 13th(in person), from my note yesterday "we offered virtual appointment with Dariusz in November/December, and appointment in March with Dariusz, patient refused at the moment and will call back if he want to schedule appt".  Can you see patient sooner to follow up regarding diabetes and his refills?  "

## 2022-10-04 NOTE — TELEPHONE ENCOUNTER
----- Message from Corinne Gross sent at 10/4/2022  9:18 AM CDT -----  Regarding: refill  RX Name and Strength:   insulin (LANTUS SOLOSTAR U-100 INSULIN) glargine 100 units/mL (3mL) SubQ pen    Preferred Pharmacy with phone number:    [id number] - [place]  [address]  AT [facility] [address]  [address] [place] [address]  Phone: [phone] Fax: [phone]           Contact Preference:   [phone]

## 2022-10-05 ENCOUNTER — TELEPHONE (OUTPATIENT)
Dept: INTERNAL MEDICINE | Facility: CLINIC | Age: 77
End: 2022-10-05
Payer: COMMERCIAL

## 2022-10-05 RX ORDER — INSULIN GLARGINE 100 [IU]/ML
INJECTION, SOLUTION SUBCUTANEOUS
Qty: 15 ML | Refills: 6 | Status: SHIPPED | OUTPATIENT
Start: 2022-10-05 | End: 2022-12-15 | Stop reason: SDUPTHER

## 2022-10-05 NOTE — TELEPHONE ENCOUNTER
Returned call to explain that since he hasn't been seen in over 2 years we sent his medication request to his last pcp on file.Covering provider will not advise this medication without an appointment.    No answer, left message for patient to call us back

## 2022-10-05 NOTE — TELEPHONE ENCOUNTER
----- Message from Giovana Anaya sent at 10/5/2022 10:54 AM CDT -----  Contact: [phone]  Pt  is calling to get refill on insulin lispro (HUMALOG KWIKPEN INSULIN) 100 unit/mL pen please call and adv @ [phone]

## 2022-10-08 NOTE — TELEPHONE ENCOUNTER
No new care gaps identified.  [facility] Embedded Care Gaps. Reference number: [id number]. 10/08/2022   10:19:38 AM FERDINANDT

## 2022-10-08 NOTE — TELEPHONE ENCOUNTER
Rec'd fax from [facility] on [address] requesting a new Rx for Glucose testing strips.  LM for pt to return a call to us to confirm which location he would prefer.

## 2022-10-10 ENCOUNTER — OUTPATIENT CASE MANAGEMENT (OUTPATIENT)
Dept: ADMINISTRATIVE | Facility: OTHER | Age: 77
End: 2022-10-10
Payer: COMMERCIAL

## 2022-10-10 NOTE — PROGRESS NOTES
Outpatient Care Management   - Low Risk Patient Assessment    Patient: Vinnie Siegel  MRN:  [id number]  Date of Service:  10/10/2022  Completed by:  Shahrzad Loja LMSW  Referral Date: 09/29/2022    Reason for Visit   Patient presents with    Social Work Assessment - Low/Mod Risk       Brief Summary:  received a referral from [facility]. .  SW completed assessment with patient. Patient and significant other reside together. Patient reports sometimes needing assistance with ADL's. Patient reports friend Vanesa assists as much as she can but still requires additional assistance. Patient reports monthly income is around 1900 dollars. A month. SW will provide information on how to apply to patient significant other. Patient ambulates with a cane and walker. Patient denied needing assistance with food, shelter, medication or medical. Patient drives himself to and from appointments. Patient reports being retired. Patient denied having a LTC policy but served in the  and served during the war. SW will provide patient with information on Aid and Attendance program. Patient reports current symptoms as sleep disturbance. Patient became very tearful on the phone regarding his health. Patient reports he goes to doctors all the time. Patient reports he's currently on dialysis and attend dialysis, Tues, Thursday, and Sat. Patient denied SI or HI, but states concern with health. Patient denied resource for therapy.  Patient denied having any other family except a brother who has health care concerns as well. Patient ok before call was disconnected. Patient an agreement with SW following up with him on Wednesday to further discuss resources. Care plan was created in collaboration with patient/caregiver input.     Patient Summary     OPCM Social Work Assessment (Low/Moderate Risk)    General  Level of Caregiver support: Member independent and does not need caregiver assistance  Have you had  to make a decision between paying for any of the following in the last 2 months?: None  Transportation means: Self  Employment status: Retired and not working  Current symptoms: Sleep disturbances  Assessments  Was the PHQ Depression Screening completed this visit?: Yes         Complex Care Plan     Care plan was discussed and completed today with input from patient and/or caregiver.    Patient Instructions     No follow-ups on file.    Todays OPCM Self-Management Care Plan was developed with the patients/caregivers input and was based on identified barriers from todays assessment.  Goals were written today with the patient/caregiver and the patient has agreed to work towards these goals to improve his/her overall well-being. Patient verbalized understanding of the care plan, goals, and all of today's instructions. Encouraged patient/caregiver to communicate with his/her physician and health care team about health conditions and the treatment plan.  Provided my contact information today and encouraged patient/caregiver to call me with any questions as needed.

## 2022-10-12 ENCOUNTER — OUTPATIENT CASE MANAGEMENT (OUTPATIENT)
Dept: ADMINISTRATIVE | Facility: OTHER | Age: 77
End: 2022-10-12
Payer: COMMERCIAL

## 2022-10-12 NOTE — PROGRESS NOTES
SW followed up with patient. Patient feeling ok. SW discussed the following resources with patient. Medicaid CCW. And [facility] Aid and Attendance. Patient will contact both agencies. SW will follow up with patient in two weeks regarding status as requested.       Plan: follow up on 10/26/2022

## 2022-10-17 ENCOUNTER — TELEPHONE (OUTPATIENT)
Dept: ENDOCRINOLOGY | Facility: CLINIC | Age: 77
End: 2022-10-17
Payer: COMMERCIAL

## 2022-10-17 NOTE — TELEPHONE ENCOUNTER
Received order [facility] form for diabetic testing supplies.  Pt needs to have been seen within last 6 months for this form to be signed.    However, pt has not been seen by Dariusz for 2.5 years. Needs to re-establish care or f/u with Primary for refills.

## 2022-10-20 ENCOUNTER — PES CALL (OUTPATIENT)
Dept: ADMINISTRATIVE | Facility: CLINIC | Age: 77
End: 2022-10-20
Payer: COMMERCIAL

## 2022-10-20 ENCOUNTER — TELEPHONE (OUTPATIENT)
Dept: VASCULAR SURGERY | Facility: CLINIC | Age: 77
End: 2022-10-20

## 2022-10-20 ENCOUNTER — OFFICE VISIT (OUTPATIENT)
Dept: VASCULAR SURGERY | Facility: CLINIC | Age: 77
End: 2022-10-20
Attending: SURGERY
Payer: MEDICARE

## 2022-10-20 ENCOUNTER — HOSPITAL ENCOUNTER (OUTPATIENT)
Dept: VASCULAR SURGERY | Facility: CLINIC | Age: 77
Discharge: HOME | End: 2022-10-20
Payer: MEDICARE

## 2022-10-20 VITALS
SYSTOLIC BLOOD PRESSURE: 153 MMHG | TEMPERATURE: 98 F | HEART RATE: 68 BPM | HEIGHT: 74 IN | BODY MASS INDEX: 39.05 KG/M2 | DIASTOLIC BLOOD PRESSURE: 73 MMHG | WEIGHT: 304.25 LBS

## 2022-10-20 DIAGNOSIS — N18.6 END STAGE RENAL FAILURE ON DIALYSIS: ICD-10-CM

## 2022-10-20 DIAGNOSIS — Z99.2 HEMODIALYSIS ACCESS, AV GRAFT: ICD-10-CM

## 2022-10-20 DIAGNOSIS — T82.590A DIALYSIS AV FISTULA MALFUNCTION, INITIAL ENCOUNTER: ICD-10-CM

## 2022-10-20 DIAGNOSIS — Z01.818 PRE-OP EXAM: Primary | ICD-10-CM

## 2022-10-20 DIAGNOSIS — T82.898A AV FISTULA OCCLUSION, INITIAL ENCOUNTER: ICD-10-CM

## 2022-10-20 DIAGNOSIS — N18.5 STAGE 5 CHRONIC KIDNEY DISEASE NOT ON CHRONIC DIALYSIS: Primary | ICD-10-CM

## 2022-10-20 DIAGNOSIS — Z99.2 END STAGE RENAL FAILURE ON DIALYSIS: ICD-10-CM

## 2022-10-20 DIAGNOSIS — N18.6 ESRD (END STAGE RENAL DISEASE): ICD-10-CM

## 2022-10-20 PROCEDURE — 99999 PR PBB SHADOW E&M-EST. PATIENT-LVL IV: ICD-10-PCS | Mod: PBBFAC,,, | Performed by: SURGERY

## 2022-10-20 PROCEDURE — 93970 EXTREMITY STUDY: CPT | Mod: 26,S$PBB,, | Performed by: SURGERY

## 2022-10-20 PROCEDURE — 93970 EXTREMITY STUDY: CPT | Mod: PBBFAC | Performed by: SURGERY

## 2022-10-20 PROCEDURE — 93970 PR US DUPLEX, UPPER OR LOWER EXT VENOUS,COMPLETE BILAT: ICD-10-PCS | Mod: 26,S$PBB,, | Performed by: SURGERY

## 2022-10-20 PROCEDURE — 99214 OFFICE O/P EST MOD 30 MIN: CPT | Mod: PBBFAC,25 | Performed by: SURGERY

## 2022-10-20 PROCEDURE — 99214 PR OFFICE/OUTPT VISIT, EST, LEVL IV, 30-39 MIN: ICD-10-PCS | Mod: S$PBB,,, | Performed by: SURGERY

## 2022-10-20 PROCEDURE — 99214 OFFICE O/P EST MOD 30 MIN: CPT | Mod: S$PBB,,, | Performed by: SURGERY

## 2022-10-20 PROCEDURE — 99999 PR PBB SHADOW E&M-EST. PATIENT-LVL IV: CPT | Mod: PBBFAC,,, | Performed by: SURGERY

## 2022-10-20 RX ORDER — SODIUM CHLORIDE 9 MG/ML
INJECTION, SOLUTION INTRAVENOUS CONTINUOUS
Status: CANCELLED | OUTPATIENT
Start: 2022-10-20

## 2022-10-20 NOTE — PROGRESS NOTES
Vinnie Siegel  10/20/2022      Interval history:  10/20/22  Pt is a 75 year old established patient who comes in for evaluation of new HD access creation. Patient underwent fistulogram with attempt at declot of LUE AVF but was unsuccessful. Subsequently had RIJ permacath placement. Is receiving dialysis T Th Sa without any issues. Continues to take eliquis and statin.    HPI:  This is a 75 -year-old -American male with nephrolithiasis, hypertension and diabetes,ckd, proteinuria who is coming in for f/u of  Nephrolithiasis, ckd, HTN and proteinuria. No recent stones and denies any hematuria, dysuria, or flank pain. DM not well controlled in the past but much better at a1c of 5.9 recently.  Unclear about  Bp's at home.    Creatinine high. Has Proteinuria. Admitted to ICU in June 2020 with HHS, encephalopathy, GI bleed, ADONAY, respiratory failure and sepsis. Sepsis was due to E. Coli UTI and Strep agalactiae pneumonia. Pt lost f/u and was last seen in 2017 in renal clinic. Denies NSAID's.      PAST MEDICAL HISTORY: Significant for hypertension, diabetes for several years,   and nephrolithiasis. The patient had first episode about several  years ago and had to   have a subsequent procedure and second stone was in August 2012 and he   had left ureteroscopy for that. He states that he was never symptomatic with   either of the stones and was found on imaging.     LUE brachiocephalic AVF placed 12/8/2020.    Interval History:  Vinnie Siegel is here today for f/u after fistulagram of LUE brachiocephalic AVF done for focal distal stenosis which has resolved since that procedure. monocryl stitch in place that was removed today, healing well. He is using his AVF for dialysis 3 days a week now, Tues, Thurs, and Sat. US of AVF shows flow of 2043 ml/min and distal fistula velocity of 351 which is likely from a valve remnant.     Patient previously stated that he has an appt with Dr. Vasquez to discuss PD catheter  placement, but will not be doing this.      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     Hyperlipidemia     Hypertension     Kidney stone     Seasonal allergies 6/24/2014     Past Surgical History:   Procedure Laterality Date    AV FISTULA PLACEMENT Left 12/8/2020    Procedure: CREATION, AV FISTULA;  Surgeon: Benji Becerril MD;  Location: [address];  Service: Peripheral Vascular;  Laterality: Left;    CATARACT EXTRACTION W/  INTRAOCULAR LENS IMPLANT Right 04/04/16    Dr Meehan    COLONOSCOPY W/ BIOPSIES      ESOPHAGOGASTRODUODENOSCOPY N/A 6/29/2020    Procedure: EGD (ESOPHAGOGASTRODUODENOSCOPY);  Surgeon: Ravi Leone MD;  Location: [facility];  Service: Endoscopy;  Laterality: N/A;    EYE SURGERY      FISTULOGRAM Left 4/16/2021    Procedure: Fistulogram;  Surgeon: Benji Becerril MD;  Location: [facility] CATH LAB;  Service: Peripheral Vascular;  Laterality: Left;    FISTULOGRAM Left 9/28/2022    Procedure: Fistulogram;  Surgeon: Benji Becerril MD;  Location: [facility] CATH LAB;  Service: Cardiology;  Laterality: Left;    HEMORRHOID SURGERY      [place]. [place] [facility]    [place] sphincterotomy  12/13/13    [place]. [place] [facility]    PERCUTANEOUS TRANSLUMINAL ANGIOPLASTY OF ARTERIOVENOUS FISTULA Left 4/16/2021    Procedure: PTA, AV FISTULA;  Surgeon: Benji Becerril MD;  Location: [facility] CATH LAB;  Service: Peripheral Vascular;  Laterality: Left;    PERCUTANEOUS TRANSLUMINAL ANGIOPLASTY OF ARTERIOVENOUS FISTULA N/A 9/28/2022    Procedure: PTA, AV FISTULA;  Surgeon: Benji Becerril MD;  Location: [facility] CATH LAB;  Service: Cardiology;  Laterality: N/A;    URETERAL STENT PLACEMENT       Family History   Problem Relation Age of Onset    Diabetes Brother         type 1    Hypertension Brother     Stroke Brother      Social  History     Socioeconomic History    Marital status: Single   Tobacco Use    Smoking status: Former     Packs/day: 0.50     Years: 45.00     Pack years: 22.50     Types: Cigarettes     Quit date: 2020     Years since quittin.3    Smokeless tobacco: Never   Substance and Sexual Activity    Alcohol use: Not Currently     Comment: rarely    Drug use: No    Sexual activity: Yes     Comment: single, retired , no kids   Social History Narrative    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     Social Determinants of Health     Financial Resource Strain: Low Risk     Difficulty of Paying Living Expenses: Not very hard   Food Insecurity: No Food Insecurity    Worried About Running Out of Food in the Last Year: Never true    Ran Out of Food in the Last Year: Never true   Transportation Needs: No Transportation Needs    Lack of Transportation (Medical): No    Lack of Transportation (Non-Medical): No   Physical Activity: Inactive    Days of Exercise per Week: 0 days    Minutes of Exercise per Session: 0 min   Stress: Stress Concern Present    Feeling of Stress : To some extent   Social Connections: Unknown    Frequency of Communication with Friends and Family: Three times a week    Frequency of Social Gatherings with Friends and Family: Three times a week   Housing Stability: Low Risk     Unable to Pay for Housing in the Last Year: No    Number of Places Lived in the Last Year: 1    Unstable Housing in the Last Year: No     Current Outpatient Medications on File Prior to Visit   Medication Sig    apixaban (ELIQUIS) 2.5 mg Tab Take 1 tablet (2.5 mg total) by mouth 2 (two) times daily.    atorvastatin (LIPITOR) 40 MG tablet Take 1 tablet (40 mg total) by mouth once daily.    blood sugar diagnostic (TRUE METRIX GLUCOSE TEST STRIP) Strp Use to check blood glucose 3-5 times daily as directed.    blood sugar diagnostic Strp To check BG 4 times daily, to use with insurance preferred meter    calcium  "acetate,phosphat bind, (PHOSLO) 667 mg capsule SMARTSI Capsule(s) By Mouth    ergocalciferol (ERGOCALCIFEROL) 50,000 unit Cap Take 1 capsule (50,000 Units total) by mouth every 7 days. (Patient taking differently: Take 50,000 Units by mouth every 7 days. Patient takes on )    ferrous sulfate (FEOSOL) 325 mg (65 mg iron) Tab tablet 3 TABS A DAY. (Patient taking differently: every evening. 3 TABS A DAY.)    fluticasone propionate (FLONASE) 50 mcg/actuation nasal spray SHAKE LIQUID AND USE 2 SPRAYS(100 MCG) IN EACH NOSTRIL EVERY DAY    hydrALAZINE (APRESOLINE) 100 MG tablet Take 1 tablet (100 mg total) by mouth 3 (three) times daily.    HYDROcodone-acetaminophen (NORCO) 7.5-325 mg per tablet Take 1 tablet by mouth 3 (three) times daily as needed.    insulin (LANTUS SOLOSTAR U-100 INSULIN) glargine 100 units/mL SubQ pen INJECT 28-36 UNITS UNDER THE SKIN AT NIGHT    insulin (LANTUS SOLOSTAR U-100 INSULIN) glargine 100 units/mL SubQ pen Inject 28-36 units at night.    insulin lispro (HUMALOG KWIKPEN INSULIN) 100 unit/mL pen INJECT 8-12 UNITS EVERY DAY WITH MEALS PLUS SCALE. MAX DAILY50 UNITS    ipratropium-albuteroL (COMBIVENT)  mcg/actuation inhaler Inhale 1 puff into the lungs every 6 (six) hours as needed for Wheezing or Shortness of Breath. Rescue    lancets Misc To check BG 4 times daily, to use with insurance preferred meter    metoprolol succinate (TOPROL-XL) 25 MG 24 hr tablet Take 0.5 tablets (12.5 mg total) by mouth once daily.    NIFEdipine (PROCARDIA-XL) 90 MG (OSM) 24 hr tablet Take 1 tablet (90 mg total) by mouth once daily.    pantoprazole (PROTONIX) 40 MG tablet TAKE 1 TABLET(40 MG) BY MOUTH EVERY DAY    pen needle, diabetic (BD ULTRA-FINE SHORT PEN NEEDLE) 31 gauge x 5/16" Ndle USE FOUR TIMES DAILY    tamsulosin (FLOMAX) 0.4 mg Cap TAKE ONE CAPSULE BY MOUTH EVERY MORNING    timolol maleate 0.25% (TIMOPTIC) 0.25 % Drop PLACE 1 DROP IN BOTH EYES TWICE DAILY    torsemide (DEMADEX) 20 MG Tab " Take 1 tablet (20 mg total) by mouth 2 (two) times daily.    blood-glucose meter kit To check BG 4 times daily, to use with insurance preferred meter, e 11.65     No current facility-administered medications on file prior to visit.       REVIEW OF SYSTEMS:  General: negative; ENT: negative; Allergy and Immunology: negative; Hematological and Lymphatic: Negative; Endocrine: negative; Respiratory: no cough, shortness of breath, or wheezing; Cardiovascular: no chest pain or dyspnea on exertion; Gastrointestinal: no abdominal pain/back, change in bowel habits, or bloody stools; Genito-Urinary: no dysuria, trouble voiding, or hematuria; Musculoskeletal: negative  Neurological: no TIA or stroke symptoms    PHYSICAL EXAM:   Right Arm BP - Sittin/73 (10/20/22 1002)  Pulse: 68  Temp: 98.2 °F (36.8 °C)      General appearance:  Alert, obese. well-appearing, and in no distress.  Oriented to person, place, and time   Neurological: Normal speech, no focal findings noted; CN II - XII grossly intact           Musculoskeletal: Digits/nail without cyanosis/clubbing.  Normal muscle strength/tone.                 Neck: Supple, no significant adenopathy; thyroid is not enlarged                  No carotid bruit can be auscultated                Chest:  Clear to auscultation, no wheezes, rales or rhonchi, symmetric air entry     No use of accessory muscles             Cardiac: Normal rate and regular rhythm, S1 and S2 normal; PMI non-displaced          Abdomen: Soft, nontender, nondistended, no masses or organomegaly     No rebound tenderness noted; bowel sounds normal     Pulsatile aortic mass is not palpable.     No groin adenopathy      Extremities:   2+ femoral pulses bilaterally     2+ Popliteal pulses; 2+ pedal pulses palpable.     No pedal edema     No ulcerations     2+ left radial and brachial pulse     LUE AVF with slight thrill proximally, pulsatile throughout     2+ right radial and brachial pulse    LAB RESULTS:  Lab  Results   Component Value Date    K 4.6 09/29/2022    K 4.3 09/28/2022    K 4.5 09/28/2022    CREATININE 7.7 (H) 09/29/2022    CREATININE 8.4 (H) 09/28/2022    CREATININE 8.1 (H) 09/28/2022     Lab Results   Component Value Date    WBC 6.34 09/29/2022    WBC 6.24 09/28/2022    WBC 6.89 09/27/2022    HCT 29.8 (L) 09/29/2022    HCT 31.1 (L) 09/28/2022    HCT 30.9 (L) 09/27/2022     09/29/2022     09/28/2022     09/27/2022     Lab Results   Component Value Date    HGBA1C 12.4 (H) 09/27/2022    HGBA1C 6.6 (H) 09/21/2021    HGBA1C 5.5 08/18/2021     IMAGING:  Vein mapping  Left cephalic nearly occlusive  Right cephalic >3mm arm and antecubital fossa  Upper Extremity Vein Mapping   =======================     Rt prox cephalic V arm AP diam 3.0 mm   Rt mid cephalic V arm AP diam  3.6 mm   Rt distal cephalic V arm AP diam   3.6 mm   Rt cephalic V antecub fossa AP diam    4.2 mm   Rt prox cephalic V forearm AP diam 2.7 mm   Rt mid cephalic V forearm AP diam  2.9 mm   Rt distal cephalic V forearm AP diam   2.7 mm   Rt cephalic V wrist AP diam    2.5 mm   Rt basilic V shoulder AP diam  5.9 mm   Rt mid basillic V arm AP diam  4.2 mm   Rt basilic V antecub fossa AP diam 2.9 mm   Rt prox basilic V forearm AP diam  0.9 mm   Rt mid MCV AP diam 2.4 mm   Lt prox cephalic V arm details:    nearly occlusive AV fistula   Lt mid cephalic V arm details: nearly occlusive AV fistula   Lt distal cephalic V arm details:  nearly occlusive AV fistula   Lt cephalic V antecub fossa details:   nearly occlusive AV fistula   Lt prox cephalic V forearm AP diam 1.4 mm   Lt mid cephalic V forearm AP diam  1.8 mm   Lt distal cephalic V forearm AP diam   1.6 mm   Lt cephalic V wrist AP diam    1.2 mm   Lt basilic V shoulder AP diam  11.6 mm   Lt prox basilic V arm AP diam  4.2 mm   Lt mid basilic V arm AP diam   2.7 mm   Lt distal basilic V arm AP diam    3.5 mm   Lt basilic V antecub fossa AP diam 2.2 mm   Lt prox basilic V forearm  AP diam  1.7 mm   Lt mid MCV AP diam 11.5 mm   Vein map upper extr other findings:    Rt. Lateral Duplicate Cephalic V: in mm   Prox FA=2.7   Mid FA=3.0   Distal FA=2.4   Wrist=2.2     Rt. Medial Duplicate Basilic V: in mm   Mid Bicep=2.7   Antecubital Fossa=2.3     Brachial Vein measurements: in mm   Right: Prox- 3.9 , ACF-3.6 ; Left: Prox-7.1 , ACF-3.9       Perforating VEIN Measurements : in mm       Right- 4.1     Distance from Rt. Radial Artery- 4.1   Left-3.7       Distance from Lt. Radial Artery- 0.8     Impression   =========     Upper Vein Mapping: Mapped and measured veins of the bilateral upper extremities. Tourniquet applied for cephalic vein, median   cubital, and deep  vein mapping. Duplex imaging reveals patent and compressible vessels with no evidence of deep venous   thrombosis. However, a known nearly thrombosed Lt. Brachiocephalic AV fistula is noted. Duplicate cephalic and basilic veins in the   right upper extremity were mapped and documented appropriately. There are diameter decreases throughout the left basilic vein, and   it also branches right at the antecubital fossa.     Upper Arterial Mapping: Mapped and measured the bilateral brachial and radial arteries appropriately. Duplex imaging reveals no   evidence of a hemodynamically significant stenosis.     Dialysis Access   =============     Right Arm Arterial   Rt distal brachial A AP diam   6.8 mm   Rt distal brachial A PSV   83 cm/s   Rt prox radial A AP diam   2.8 mm   Rt prox radial A PSV   77 cm/s   Rt distal radial A AP diam 3.6 mm   Rt distal radial A PSV 74 cm/s   Left Arm Arterial   Lt distal brachial A AP diameter   5.7 mm   Lt distal brachial A PSV   180 cm/s   Lt prox radial A AP diameter   3.7 mm   Lt prox radial A PSV   116 cm/s   Lt distal radial A AP diameter 2.5 mm   Lt distal radial A PSV 87 cm/s      IMP/PLAN:  77 y.o. male with morbid obesity and CKD V, on HD using his LUE AFV. S/p LUE brachiocephalic AVF on  12/8/20, now s/p LUE AVF fistulagram with unsuccessful declot. Patient requires new HD access creation.   Plan for RUE AVF vs AVG creation - risks and benefits discussed in detail.     1) RUE AVF v AVG in the coming weeks  2) HOLD eliquis for 3 days preop    Benji Becerril MD  Vascular & Endovascular Surgery

## 2022-10-25 NOTE — TELEPHONE ENCOUNTER
See previous note about reaching out to Endocrinology dept for assistance w/ scheduling.  Pt goes to Dialysis T/Th/Sat.

## 2022-10-25 NOTE — TELEPHONE ENCOUNTER
Diabetes supplies order form from [facility] received. Unable to sign since pt has not been seen for 2.5 years.

## 2022-10-26 ENCOUNTER — OUTPATIENT CASE MANAGEMENT (OUTPATIENT)
Dept: ADMINISTRATIVE | Facility: OTHER | Age: 77
End: 2022-10-26
Payer: COMMERCIAL

## 2022-10-26 NOTE — PROGRESS NOTES
Follow Up  Reason for referral: Community resources/Home Aide      SW attempt to reach patient regarding assistance through [facility] Aid and Attendance and Medicaid Community Choice waiver, however no answer. SW left a message for a return call.

## 2022-10-28 ENCOUNTER — TELEPHONE (OUTPATIENT)
Dept: ENDOCRINOLOGY | Facility: CLINIC | Age: 77
End: 2022-10-28
Payer: COMMERCIAL

## 2022-10-28 NOTE — TELEPHONE ENCOUNTER
Spoke with pt about his appt. Pt wanted to change his appt to an earlier time. No appointments are available until March.

## 2022-10-28 NOTE — PROGRESS NOTES
Follow Up  Reason for referral: Community Resources  SW followed up with patient on today. Patient reports he was unable to reach the [facility]. Patient reports agency gave him the rum around but will keep trying. Patient reports he also reach out to Medicaid Community Choice but was unsuccessful. Patient reports wait time was long. Patient will try these resources again. Patient also frustrated with upcoming appointments. Patient reports NP that oversees his diabetes is out of the office until next year. Patient reports he was given an appointment to see Endocrinology but states due to his physical mobility he's unable to make it to appointment. Patient reports appointment is on the 5th floor and he's unable to travel that far. SW explored resources what patient. SW advised patient he can  park and patient escort can assist him with getting to his appointment. SW advised patient she will follow-up with [facility] to verify if services are still offered. Patient voiced understanding.    SW contacted [facility] and spoke with Rep. Rep advised SW services are offered free of charge to patient 65 and older. Rep advised SW no ID is needed. Rep also advised SW if patient escort is needed, they will call for someone to escort patient.    SW followed up with patient regarding the above. Patient not to show about Endocrinology appointment. Patient reports he is awaiting a call from [facility] [employer] NP office to verify if someone can see him in the primary care building.       Plan : SW will follow-up with patient on next week regarding outcome for Aid and Attendance.

## 2022-10-28 NOTE — TELEPHONE ENCOUNTER
----- Message from Kaycee Harman sent at 10/28/2022 10:12 AM CDT -----  Contact: Pt  Pt is requesting a callback. The pt have appt. Scheduled for 11/2 with provider. The pt stated that he don't want to be seen by a new provider, he rather his original  provider Dariusz Torres even if it's  a virtual visit. Please adv pt.     Confirmed contact below:   Contact Name:Vinnie Siegel  Phone Number: [phone]

## 2022-11-10 ENCOUNTER — TELEPHONE (OUTPATIENT)
Dept: VASCULAR SURGERY | Facility: CLINIC | Age: 77
End: 2022-11-10
Payer: COMMERCIAL

## 2022-11-11 ENCOUNTER — OUTPATIENT CASE MANAGEMENT (OUTPATIENT)
Dept: ADMINISTRATIVE | Facility: OTHER | Age: 77
End: 2022-11-11
Payer: COMMERCIAL

## 2022-11-11 ENCOUNTER — TELEPHONE (OUTPATIENT)
Dept: VASCULAR SURGERY | Facility: CLINIC | Age: 77
End: 2022-11-11
Payer: COMMERCIAL

## 2022-11-11 NOTE — TELEPHONE ENCOUNTER
Spoke pt Vanesa , she apologized for Mr Barrera behavior and stated to give her a call when we r/s so she can know. I explained pt can restart his eliquis until we reschedule him.

## 2022-11-11 NOTE — TELEPHONE ENCOUNTER
Spoke to the pt, I explained that the provider had a emergency where he had to   cancel all surgeries. He starting yelling at me in which I told him that was not very nice for him to do since I'm only trying to help him.  he was very rude and vulgar  with me. I told him that if he was un happy with this , that he is the pt and can choose to receive care wherever he chooses, but things happen beyond our control and the surgeon is unable to preform the his surgery today . We will call him with a new surgery date if should want to continue his care at [facility].

## 2022-11-18 NOTE — PROGRESS NOTES
NASIMA followed up with patient . NASIMA explained to patient he can apply for Aid and Attendance via [place] mail. Patient requesting application . NASIMA mailed application for completion. Patient denied any further needs currently.     NASIMA will follow-up on 12/16/2022   Patient an agreement with this plan.

## 2022-11-22 ENCOUNTER — TELEPHONE (OUTPATIENT)
Dept: VASCULAR SURGERY | Facility: CLINIC | Age: 77
End: 2022-11-22
Payer: MEDICARE

## 2022-11-22 NOTE — TELEPHONE ENCOUNTER
Confirm pt has stop his eliquis . He will dialysis . He will get in touch with his unit to see if they can go later.

## 2022-11-23 ENCOUNTER — HOSPITAL ENCOUNTER (EMERGENCY)
Facility: HOSPITAL | Age: 77
Discharge: HOME | End: 2022-11-23
Attending: EMERGENCY MEDICINE
Payer: MEDICARE

## 2022-11-23 VITALS
HEIGHT: 74 IN | OXYGEN SATURATION: 97 % | SYSTOLIC BLOOD PRESSURE: 188 MMHG | DIASTOLIC BLOOD PRESSURE: 90 MMHG | HEART RATE: 68 BPM | BODY MASS INDEX: 38.5 KG/M2 | WEIGHT: 300 LBS | TEMPERATURE: 98 F | RESPIRATION RATE: 16 BRPM

## 2022-11-23 DIAGNOSIS — R73.9 HYPERGLYCEMIA: Primary | ICD-10-CM

## 2022-11-23 LAB
ALBUMIN SERPL BCP-MCNC: 2.8 G/DL (ref 3.5–5.2)
ALLENS TEST: ABNORMAL
ALP SERPL-CCNC: 64 U/L (ref 55–135)
ALT SERPL W/O P-5'-P-CCNC: 10 U/L (ref 10–44)
ANION GAP SERPL CALC-SCNC: 10 MMOL/L (ref 8–16)
AST SERPL-CCNC: 9 U/L (ref 10–40)
B-OH-BUTYR BLD STRIP-SCNC: 0.1 MMOL/L (ref 0–0.5)
BACTERIA #/AREA URNS AUTO: NORMAL /HPF
BASOPHILS # BLD AUTO: 0.01 K/UL (ref 0–0.2)
BASOPHILS NFR BLD: 0.2 % (ref 0–1.9)
BILIRUB SERPL-MCNC: 0.4 MG/DL (ref 0.1–1)
BILIRUB UR QL STRIP: NEGATIVE
BNP SERPL-MCNC: 46 PG/ML (ref 0–99)
BUN SERPL-MCNC: 39 MG/DL (ref 8–23)
CALCIUM SERPL-MCNC: 7.5 MG/DL (ref 8.7–10.5)
CHLORIDE SERPL-SCNC: 97 MMOL/L (ref 95–110)
CLARITY UR REFRACT.AUTO: CLEAR
CO2 SERPL-SCNC: 24 MMOL/L (ref 23–29)
COLOR UR AUTO: YELLOW
CREAT SERPL-MCNC: 5.7 MG/DL (ref 0.5–1.4)
DELSYS: ABNORMAL
DIFFERENTIAL METHOD: ABNORMAL
EOSINOPHIL # BLD AUTO: 0.1 K/UL (ref 0–0.5)
EOSINOPHIL NFR BLD: 1.2 % (ref 0–8)
ERYTHROCYTE [DISTWIDTH] IN BLOOD BY AUTOMATED COUNT: 16.3 % (ref 11.5–14.5)
EST. GFR  (NO RACE VARIABLE): 9.7 ML/MIN/1.73 M^2
GLUCOSE SERPL-MCNC: 385 MG/DL (ref 70–110)
GLUCOSE SERPL-MCNC: 455 MG/DL (ref 70–110)
GLUCOSE SERPL-MCNC: 499 MG/DL (ref 70–110)
GLUCOSE UR QL STRIP: ABNORMAL
HCO3 UR-SCNC: 32.4 MMOL/L (ref 24–28)
HCT VFR BLD AUTO: 34.6 % (ref 40–54)
HGB BLD-MCNC: 11.5 G/DL (ref 14–18)
HGB UR QL STRIP: NEGATIVE
HYALINE CASTS UR QL AUTO: 0 /LPF
IMM GRANULOCYTES # BLD AUTO: 0.01 K/UL (ref 0–0.04)
IMM GRANULOCYTES NFR BLD AUTO: 0.2 % (ref 0–0.5)
KETONES UR QL STRIP: NEGATIVE
LEUKOCYTE ESTERASE UR QL STRIP: NEGATIVE
LYMPHOCYTES # BLD AUTO: 1.1 K/UL (ref 1–4.8)
LYMPHOCYTES NFR BLD: 26.1 % (ref 18–48)
MCH RBC QN AUTO: 30.3 PG (ref 27–31)
MCHC RBC AUTO-ENTMCNC: 33.2 G/DL (ref 32–36)
MCV RBC AUTO: 91 FL (ref 82–98)
MICROSCOPIC COMMENT: NORMAL
MONOCYTES # BLD AUTO: 0.7 K/UL (ref 0.3–1)
MONOCYTES NFR BLD: 17.1 % (ref 4–15)
NEUTROPHILS # BLD AUTO: 2.3 K/UL (ref 1.8–7.7)
NEUTROPHILS NFR BLD: 55.2 % (ref 38–73)
NITRITE UR QL STRIP: NEGATIVE
NRBC BLD-RTO: 0 /100 WBC
PCO2 BLDA: 58.5 MMHG (ref 35–45)
PH SMN: 7.35 [PH] (ref 7.35–7.45)
PH UR STRIP: 7 [PH] (ref 5–8)
PLATELET # BLD AUTO: 213 K/UL (ref 150–450)
PMV BLD AUTO: 11.2 FL (ref 9.2–12.9)
PO2 BLDA: 37 MMHG (ref 40–60)
POC BE: 7 MMOL/L
POC SATURATED O2: 66 % (ref 95–100)
POC TCO2: 34 MMOL/L (ref 24–29)
POCT GLUCOSE: 385 MG/DL (ref 70–110)
POCT GLUCOSE: 455 MG/DL (ref 70–110)
POTASSIUM SERPL-SCNC: 3.8 MMOL/L (ref 3.5–5.1)
PROT SERPL-MCNC: 5.9 G/DL (ref 6–8.4)
PROT UR QL STRIP: ABNORMAL
RBC # BLD AUTO: 3.8 M/UL (ref 4.6–6.2)
RBC #/AREA URNS AUTO: 1 /HPF (ref 0–4)
SAMPLE: ABNORMAL
SITE: ABNORMAL
SODIUM SERPL-SCNC: 131 MMOL/L (ref 136–145)
SP GR UR STRIP: 1.01 (ref 1–1.03)
SQUAMOUS #/AREA URNS AUTO: 1 /HPF
TROPONIN I SERPL DL<=0.01 NG/ML-MCNC: 0.03 NG/ML (ref 0–0.03)
TROPONIN I SERPL DL<=0.01 NG/ML-MCNC: 0.03 NG/ML (ref 0–0.03)
URN SPEC COLLECT METH UR: ABNORMAL
WBC # BLD AUTO: 4.14 K/UL (ref 3.9–12.7)
WBC #/AREA URNS AUTO: 0 /HPF (ref 0–5)
YEAST UR QL AUTO: NORMAL

## 2022-11-23 PROCEDURE — 96372 THER/PROPH/DIAG INJ SC/IM: CPT

## 2022-11-23 PROCEDURE — 81001 URINALYSIS AUTO W/SCOPE: CPT

## 2022-11-23 PROCEDURE — 80053 COMPREHEN METABOLIC PANEL: CPT | Performed by: EMERGENCY MEDICINE

## 2022-11-23 PROCEDURE — 99284 EMERGENCY DEPT VISIT MOD MDM: CPT | Mod: ,,, | Performed by: EMERGENCY MEDICINE

## 2022-11-23 PROCEDURE — 93010 EKG 12-LEAD: ICD-10-PCS | Mod: ,,, | Performed by: INTERNAL MEDICINE

## 2022-11-23 PROCEDURE — 82803 BLOOD GASES ANY COMBINATION: CPT

## 2022-11-23 PROCEDURE — 84484 ASSAY OF TROPONIN QUANT: CPT | Performed by: EMERGENCY MEDICINE

## 2022-11-23 PROCEDURE — 96372 THER/PROPH/DIAG INJ SC/IM: CPT | Performed by: EMERGENCY MEDICINE

## 2022-11-23 PROCEDURE — 99900035 HC TECH TIME PER 15 MIN (STAT)

## 2022-11-23 PROCEDURE — 93010 ELECTROCARDIOGRAM REPORT: CPT | Mod: ,,, | Performed by: INTERNAL MEDICINE

## 2022-11-23 PROCEDURE — 99285 EMERGENCY DEPT VISIT HI MDM: CPT | Mod: 25

## 2022-11-23 PROCEDURE — 99284 PR EMERGENCY DEPT VISIT,LEVEL IV: ICD-10-PCS | Mod: ,,, | Performed by: EMERGENCY MEDICINE

## 2022-11-23 PROCEDURE — 63600175 PHARM REV CODE 636 W HCPCS: Performed by: EMERGENCY MEDICINE

## 2022-11-23 PROCEDURE — 93005 ELECTROCARDIOGRAM TRACING: CPT

## 2022-11-23 PROCEDURE — 82962 GLUCOSE BLOOD TEST: CPT

## 2022-11-23 PROCEDURE — 36000 PLACE NEEDLE IN VEIN: CPT

## 2022-11-23 PROCEDURE — 85025 COMPLETE CBC W/AUTO DIFF WBC: CPT

## 2022-11-23 PROCEDURE — 84484 ASSAY OF TROPONIN QUANT: CPT | Mod: 91

## 2022-11-23 PROCEDURE — 83880 ASSAY OF NATRIURETIC PEPTIDE: CPT

## 2022-11-23 PROCEDURE — 82010 KETONE BODYS QUAN: CPT

## 2022-11-23 PROCEDURE — 63600175 PHARM REV CODE 636 W HCPCS

## 2022-11-23 RX ADMIN — INSULIN HUMAN 5 UNITS: 100 INJECTION, SOLUTION PARENTERAL at 11:11

## 2022-11-23 RX ADMIN — INSULIN HUMAN 5 UNITS: 100 INJECTION, SOLUTION PARENTERAL at 12:11

## 2022-11-23 NOTE — DISCHARGE INSTRUCTIONS
Diagnosis:   1. Hyperglycemia      Home Care Instructions:  - Take your diabetes medication and other medication at home.  - Please regularly check your blood glucose.    Follow-Up Plan:  - A referral has been made with vascular surgery to arrange for dialysis graft placement.  - Follow-up with primary care provider as needed.    Return to the Emergency Department for symptoms including but not limited to: worsening symptoms, shortness of breath or chest pain, vomiting with inability to keep fluids down, altered mental status, or other concerning symptoms.

## 2022-11-23 NOTE — H&P (VIEW-ONLY)
The patient has been examined and the H&P has been reviewed:    I concur with the findings and no changes have occurred since H&P was written.    Surgery risks, benefits and alternative options discussed and understood by patient/family.          There are no hospital problems to display for this patient.          Vinnie Siegel  10/20/2022        Interval history:  10/20/22  Pt is a 75 year old established patient who comes in for evaluation of new HD access creation. Patient underwent fistulogram with attempt at declot of LUE AVF but was unsuccessful. Subsequently had RIJ permacath placement. Is receiving dialysis T Th Sa without any issues. Continues to take eliquis and statin.     HPI:  This is a 75 -year-old -American male with nephrolithiasis, hypertension and diabetes,ckd, proteinuria who is coming in for f/u of  Nephrolithiasis, ckd, HTN and proteinuria. No recent stones and denies any hematuria, dysuria, or flank pain. DM not well controlled in the past but much better at a1c of 5.9 recently.  Unclear about  Bp's at home.    Creatinine high. Has Proteinuria. Admitted to ICU in June 2020 with HHS, encephalopathy, GI bleed, ADONAY, respiratory failure and sepsis. Sepsis was due to E. Coli UTI and Strep agalactiae pneumonia. Pt lost f/u and was last seen in 2017 in renal clinic. Denies NSAID's.      PAST MEDICAL HISTORY: Significant for hypertension, diabetes for several years,   and nephrolithiasis. The patient had first episode about several  years ago and had to   have a subsequent procedure and second stone was in August 2012 and he   had left ureteroscopy for that. He states that he was never symptomatic with   either of the stones and was found on imaging.      LUE brachiocephalic AVF placed 12/8/2020.     Interval History:  Vinnie Siegel is here today for f/u after fistulagram of LUE brachiocephalic AVF done for focal distal stenosis which has resolved since that procedure. monocryl stitch in  place that was removed today, healing well. He is using his AVF for dialysis 3 days a week now, Tues, Thurs, and Sat. US of AVF shows flow of 2043 ml/min and distal fistula velocity of 351 which is likely from a valve remnant.      Patient previously stated that he has an appt with Dr. Vasquez to discuss PD catheter placement, but will not be doing this.             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      Hyperlipidemia      Hypertension      Kidney stone      Seasonal allergies 6/24/2014            Past Surgical History:   Procedure Laterality Date    AV FISTULA PLACEMENT Left 12/8/2020     Procedure: CREATION, AV FISTULA;  Surgeon: Benji Becerril MD;  Location: [facility] OR [address] [place] [address];  Service: Peripheral Vascular;  Laterality: Left;    CATARACT EXTRACTION W/  INTRAOCULAR LENS IMPLANT Right 04/04/16     Dr Meehan    COLONOSCOPY W/ BIOPSIES        ESOPHAGOGASTRODUODENOSCOPY N/A 6/29/2020     Procedure: EGD (ESOPHAGOGASTRODUODENOSCOPY);  Surgeon: Ravi Leone MD;  Location: [facility];  Service: Endoscopy;  Laterality: N/A;    EYE SURGERY        FISTULOGRAM Left 4/16/2021     Procedure: Fistulogram;  Surgeon: Benji Becerril MD;  Location: [facility] CATH LAB;  Service: Peripheral Vascular;  Laterality: Left;    FISTULOGRAM Left 9/28/2022     Procedure: Fistulogram;  Surgeon: Benji Becerril MD;  Location: [facility] CATH LAB;  Service: Cardiology;  Laterality: Left;    HEMORRHOID SURGERY         Dr. Root [facility] [place]    lateral internal anal sphincterotomy   12/13/13     Dr. root [facility] [place]    PERCUTANEOUS TRANSLUMINAL ANGIOPLASTY OF ARTERIOVENOUS FISTULA Left 4/16/2021     Procedure: PTA, AV FISTULA;  Surgeon: Benji Becerril MD;  Location: [facility] CATH LAB;  Service: Peripheral Vascular;  Laterality: Left;    PERCUTANEOUS  TRANSLUMINAL ANGIOPLASTY OF ARTERIOVENOUS FISTULA N/A 2022     Procedure: PTA, AV FISTULA;  Surgeon: Benji Becerril MD;  Location: [facility] CATH LAB;  Service: Cardiology;  Laterality: N/A;    URETERAL STENT PLACEMENT                Family History   Problem Relation Age of Onset    Diabetes Brother           type 1    Hypertension Brother      Stroke Brother        Social History               Socioeconomic History    Marital status: Single   Tobacco Use    Smoking status: Former       Packs/day: 0.50       Years: 45.00       Pack years: 22.50       Types: Cigarettes       Quit date: 2020       Years since quittin.3    Smokeless tobacco: Never   Substance and Sexual Activity    Alcohol use: Not Currently       Comment: rarely    Drug use: No    Sexual activity: Yes       Comment: single, retired , no kids   Social History Narrative     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      Social Determinants of Health          Financial Resource Strain: Low Risk     Difficulty of Paying Living Expenses: Not very hard   Food Insecurity: No Food Insecurity    Worried About Running Out of Food in the Last Year: Never true    Ran Out of Food in the Last Year: Never true   Transportation Needs: No Transportation Needs    Lack of Transportation (Medical): No    Lack of Transportation (Non-Medical): No   Physical Activity: Inactive    Days of Exercise per Week: 0 days    Minutes of Exercise per Session: 0 min   Stress: Stress Concern Present    Feeling of Stress : To some extent   Social Connections: Unknown    Frequency of Communication with Friends and Family: Three times a week    Frequency of Social Gatherings with Friends and Family: Three times a week   Housing Stability: Low Risk     Unable to Pay for Housing in the Last Year: No    Number of Places Lived in the Last Year: 1    Unstable Housing in the Last Year: No              Current Outpatient Medications on File Prior to Visit    Medication Sig    apixaban (ELIQUIS) 2.5 mg Tab Take 1 tablet (2.5 mg total) by mouth 2 (two) times daily.    atorvastatin (LIPITOR) 40 MG tablet Take 1 tablet (40 mg total) by mouth once daily.    blood sugar diagnostic (TRUE METRIX GLUCOSE TEST STRIP) Strp Use to check blood glucose 3-5 times daily as directed.    blood sugar diagnostic Strp To check BG 4 times daily, to use with insurance preferred meter    calcium acetate,phosphat bind, (PHOSLO) 667 mg capsule SMARTSI Capsule(s) By Mouth    ergocalciferol (ERGOCALCIFEROL) 50,000 unit Cap Take 1 capsule (50,000 Units total) by mouth every 7 days. (Patient taking differently: Take 50,000 Units by mouth every 7 days. Patient takes on )    ferrous sulfate (FEOSOL) 325 mg (65 mg iron) Tab tablet 3 TABS A DAY. (Patient taking differently: every evening. 3 TABS A DAY.)    fluticasone propionate (FLONASE) 50 mcg/actuation nasal spray SHAKE LIQUID AND USE 2 SPRAYS(100 MCG) IN EACH NOSTRIL EVERY DAY    hydrALAZINE (APRESOLINE) 100 MG tablet Take 1 tablet (100 mg total) by mouth 3 (three) times daily.    HYDROcodone-acetaminophen (NORCO) 7.5-325 mg per tablet Take 1 tablet by mouth 3 (three) times daily as needed.    insulin (LANTUS SOLOSTAR U-100 INSULIN) glargine 100 units/mL SubQ pen INJECT 28-36 UNITS UNDER THE SKIN AT NIGHT    insulin (LANTUS SOLOSTAR U-100 INSULIN) glargine 100 units/mL SubQ pen Inject 28-36 units at night.    insulin lispro (HUMALOG KWIKPEN INSULIN) 100 unit/mL pen INJECT 8-12 UNITS EVERY DAY WITH MEALS PLUS SCALE. MAX DAILY50 UNITS    ipratropium-albuteroL (COMBIVENT)  mcg/actuation inhaler Inhale 1 puff into the lungs every 6 (six) hours as needed for Wheezing or Shortness of Breath. Rescue    lancets Misc To check BG 4 times daily, to use with insurance preferred meter    metoprolol succinate (TOPROL-XL) 25 MG 24 hr tablet Take 0.5 tablets (12.5 mg total) by mouth once daily.    NIFEdipine (PROCARDIA-XL) 90 MG (OSM) 24 hr tablet  "Take 1 tablet (90 mg total) by mouth once daily.    pantoprazole (PROTONIX) 40 MG tablet TAKE 1 TABLET(40 MG) BY MOUTH EVERY DAY    pen needle, diabetic (BD ULTRA-FINE SHORT PEN NEEDLE) 31 gauge x 5/16" Ndle USE FOUR TIMES DAILY    tamsulosin (FLOMAX) 0.4 mg Cap TAKE ONE CAPSULE BY MOUTH EVERY MORNING    timolol maleate 0.25% (TIMOPTIC) 0.25 % Drop PLACE 1 DROP IN BOTH EYES TWICE DAILY    torsemide (DEMADEX) 20 MG Tab Take 1 tablet (20 mg total) by mouth 2 (two) times daily.    blood-glucose meter kit To check BG 4 times daily, to use with insurance preferred meter, e 11.65      No current facility-administered medications on file prior to visit.         REVIEW OF SYSTEMS:  General: negative; ENT: negative; Allergy and Immunology: negative; Hematological and Lymphatic: Negative; Endocrine: negative; Respiratory: no cough, shortness of breath, or wheezing; Cardiovascular: no chest pain or dyspnea on exertion; Gastrointestinal: no abdominal pain/back, change in bowel habits, or bloody stools; Genito-Urinary: no dysuria, trouble voiding, or hematuria; Musculoskeletal: negative  Neurological: no TIA or stroke symptoms     PHYSICAL EXAM:   Right Arm BP - Sittin/73 (10/20/22 1002)  Pulse: 68  Temp: 98.2 °F (36.8 °C)       General appearance:          Alert, obese. well-appearing, and in no distress.  Oriented to person, place, and time              Neurological: Normal speech, no focal findings noted; CN II - XII grossly intact           Musculoskeletal:          Digits/nail without cyanosis/clubbing.  Normal muscle strength/tone.                            Neck:            Supple, no significant adenopathy; thyroid is not enlarged                                                  No carotid bruit can be auscultated                           Chest:            Clear to auscultation, no wheezes, rales or rhonchi, symmetric air entry                                      No use of accessory muscles                        " Cardiac:           Normal rate and regular rhythm, S1 and S2 normal; PMI non-displaced                     Abdomen:           Soft, nontender, nondistended, no masses or organomegaly                                      No rebound tenderness noted; bowel sounds normal                                      Pulsatile aortic mass is not palpable.                                      No groin adenopathy                 Extremities:            2+ femoral pulses bilaterally                                      2+ Popliteal pulses; 2+ pedal pulses palpable.                                      No pedal edema                                      No ulcerations                                      2+ left radial and brachial pulse                                      LUE AVF with slight thrill proximally, pulsatile throughout                                      2+ right radial and brachial pulse     LAB RESULTS:        Lab Results   Component Value Date     K 4.6 09/29/2022     K 4.3 09/28/2022     K 4.5 09/28/2022     CREATININE 7.7 (H) 09/29/2022     CREATININE 8.4 (H) 09/28/2022     CREATININE 8.1 (H) 09/28/2022            Lab Results   Component Value Date     WBC 6.34 09/29/2022     WBC 6.24 09/28/2022     WBC 6.89 09/27/2022     HCT 29.8 (L) 09/29/2022     HCT 31.1 (L) 09/28/2022     HCT 30.9 (L) 09/27/2022      09/29/2022      09/28/2022      09/27/2022            Lab Results   Component Value Date     HGBA1C 12.4 (H) 09/27/2022     HGBA1C 6.6 (H) 09/21/2021     HGBA1C 5.5 08/18/2021      IMAGING:  Vein mapping  Left cephalic nearly occlusive  Right cephalic >3mm arm and antecubital fossa  Upper Extremity Vein Mapping   =======================     Rt prox cephalic V arm AP diam 3.0 mm   Rt mid cephalic V arm AP diam  3.6 mm   Rt distal cephalic V arm AP diam   3.6 mm   Rt cephalic V antecub fossa AP diam    4.2 mm   Rt prox cephalic V forearm AP diam 2.7 mm   Rt mid cephalic V forearm AP diam  2.9  mm   Rt distal cephalic V forearm AP diam   2.7 mm   Rt cephalic V wrist AP diam    2.5 mm   Rt basilic V shoulder AP diam  5.9 mm   Rt mid basillic V arm AP diam  4.2 mm   Rt basilic V antecub fossa AP diam 2.9 mm   Rt prox basilic V forearm AP diam  0.9 mm   Rt mid MCV AP diam 2.4 mm   Lt prox cephalic V arm details:    nearly occlusive AV fistula   Lt mid cephalic V arm details: nearly occlusive AV fistula   Lt distal cephalic V arm details:  nearly occlusive AV fistula   Lt cephalic V antecub fossa details:   nearly occlusive AV fistula   Lt prox cephalic V forearm AP diam 1.4 mm   Lt mid cephalic V forearm AP diam  1.8 mm   Lt distal cephalic V forearm AP diam   1.6 mm   Lt cephalic V wrist AP diam    1.2 mm   Lt basilic V shoulder AP diam  11.6 mm   Lt prox basilic V arm AP diam  4.2 mm   Lt mid basilic V arm AP diam   2.7 mm   Lt distal basilic V arm AP diam    3.5 mm   Lt basilic V antecub fossa AP diam 2.2 mm   Lt prox basilic V forearm AP diam  1.7 mm   Lt mid MCV AP diam 11.5 mm   Vein map upper extr other findings:    Rt. Lateral Duplicate Cephalic V: in mm   Prox FA=2.7   Mid FA=3.0   Distal FA=2.4   Wrist=2.2     Rt. Medial Duplicate Basilic V: in mm   Mid Bicep=2.7   Antecubital Fossa=2.3     Brachial Vein measurements: in mm   Right: Prox- 3.9 , ACF-3.6 ; Left: Prox-7.1 , ACF-3.9       Perforating VEIN Measurements : in mm       Right- 4.1     Distance from Rt. Radial Artery- 4.1   Left-3.7       Distance from Lt. Radial Artery- 0.8     Impression   =========     Upper Vein Mapping: Mapped and measured veins of the bilateral upper extremities. Tourniquet applied for cephalic vein, median   cubital, and deep  vein mapping. Duplex imaging reveals patent and compressible vessels with no evidence of deep venous   thrombosis. However, a known nearly thrombosed Lt. Brachiocephalic AV fistula is noted. Duplicate cephalic and basilic veins in the   right upper extremity were mapped and documented  appropriately. There are diameter decreases throughout the left basilic vein, and   it also branches right at the antecubital fossa.     Upper Arterial Mapping: Mapped and measured the bilateral brachial and radial arteries appropriately. Duplex imaging reveals no   evidence of a hemodynamically significant stenosis.     Dialysis Access   =============     Right Arm Arterial   Rt distal brachial A AP diam   6.8 mm   Rt distal brachial A PSV   83 cm/s   Rt prox radial A AP diam   2.8 mm   Rt prox radial A PSV   77 cm/s   Rt distal radial A AP diam 3.6 mm   Rt distal radial A PSV 74 cm/s   Left Arm Arterial   Lt distal brachial A AP diameter   5.7 mm   Lt distal brachial A PSV   180 cm/s   Lt prox radial A AP diameter   3.7 mm   Lt prox radial A PSV   116 cm/s   Lt distal radial A AP diameter 2.5 mm   Lt distal radial A PSV 87 cm/s      IMP/PLAN:  77 y.o. male with morbid obesity and CKD V, on HD using his LUE AFV. S/p LUE brachiocephalic AVF on 12/8/20, now s/p LUE AVF fistulagram with unsuccessful declot. Patient requires new HD access creation.   Plan for RUE AVF vs AVG creation - risks and benefits discussed in detail.      1) RUE AVF v AVG in the coming weeks  2) HOLD eliquis for 3 days preop     Benji Becerril MD  Vascular & Endovascular Surgery

## 2022-11-23 NOTE — PLAN OF CARE
"SW went to visit with pt regarding dialysis.  Pt was frustrated and stated that the "right hand didn't know what the left hand was doing".  NASIMA asked pt for the name of his Dialysis clinic and the phone number.  Pt stated that he did not have the number and his 'friend' had the number.  SW probed pt for information about his 'friend' and pt stated her name was Vanesa and SW verified the phone number on pt facesheet.    NASIMA called Vanesa [phone] and Vanesa stated that pt dialysis clinic is [facility] on [address] [address],  [phone] (opt #2).  Pt chair times are M, W, Fri at approx 630am.        NASIMA called [facility] and spoke with Roopa.  Roopa stated that if we can get pt to the clinic by 1400hrs they can perform dialysis on the pt.   NASIMA asked Roopa if  pt blood sugars are a bit high if dialysis could still be completed and Roopa stated it could be.      NASIMA informed pt and asked if he needed a ride to the clinic.  Pt stated that his truck is in the parking lot.  NASIMA asked pt if he was ok to drive and pt then stated that he did not know where his truck was in the parking lot.  NASIMA stated she would contact Vanesa and arrange a ride for pt.     NASIMA contacted Vanesa again and Vanesa stated she was on her way to the hospital to drive pt to dialysis.        Nathaly Vallecillo, FERDINAND, MSW, LMSW, RSW   Case Management  [facility]  Email: [email or web address]    "

## 2022-11-23 NOTE — ED PROVIDER NOTES
Encounter Date: 11/23/2022       History     Chief Complaint   Patient presents with    Hyperglycemia     From [facility]; scheduled to have dialysis access graft placed this AM. Found to by hyperglycemic by providers, sent to ED for further evaluation.  at triage. Pt denies taking morning meds, including insulin, per [facility] procedure instructions.      77-year-old gentleman with history of type 2 DM, HLD, HTN, and ESRD (HD Tue/Thur/Sat) who presents to the ED for chief complaint of hyperglycemia.  Significant other is at bedside.  Patient was scheduled to have a new dialysis graft placed this morning, was found to have elevated blood glucose, and was sent to the emergency department.  Patient last took his insulin on Tuesday.  Due to the procedure and upcoming Thanksgiving holiday, he was last dialyzed on Monday and was scheduled to receive dialysis today.  Patient endorses increased urination, increased thirst. He denies fever, chills, vomiting, and headache.    The history is provided by the patient, medical records and a significant other. No  was used.   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     Hyperlipidemia     Hypertension     Kidney stone     Seasonal allergies 6/24/2014     Past Surgical History:   Procedure Laterality Date    AV FISTULA PLACEMENT Left 12/8/2020    Procedure: CREATION, AV FISTULA;  Surgeon: Benji Becerril MD;  Location: [facility] OR [address] [place] [address];  Service: Peripheral Vascular;  Laterality: Left;    CATARACT EXTRACTION W/  INTRAOCULAR LENS IMPLANT Right 04/04/16    Dr Meehan    COLONOSCOPY W/ BIOPSIES      ESOPHAGOGASTRODUODENOSCOPY N/A 6/29/2020    Procedure: EGD (ESOPHAGOGASTRODUODENOSCOPY);  Surgeon: Ravi Leone  MD;  Location: [facility] ENDO;  Service: Endoscopy;  Laterality: N/A;    EYE SURGERY      FISTULOGRAM Left 2021    Procedure: Fistulogram;  Surgeon: Benji Becerril MD;  Location: [facility] CATH LAB;  Service: Peripheral Vascular;  Laterality: Left;    FISTULOGRAM Left 2022    Procedure: Fistulogram;  Surgeon: Benji Becerril MD;  Location: [facility] CATH LAB;  Service: Cardiology;  Laterality: Left;    HEMORRHOID SURGERY      Dr. Root [facility]    lateral internal anal sphincterotomy  13    Dr. root [facility]    PERCUTANEOUS TRANSLUMINAL ANGIOPLASTY OF ARTERIOVENOUS FISTULA Left 2021    Procedure: PTA, AV FISTULA;  Surgeon: Benji Becerril MD;  Location: [facility] CATH LAB;  Service: Peripheral Vascular;  Laterality: Left;    PERCUTANEOUS TRANSLUMINAL ANGIOPLASTY OF ARTERIOVENOUS FISTULA N/A 2022    Procedure: PTA, AV FISTULA;  Surgeon: Benji Becerril MD;  Location: [facility] CATH LAB;  Service: Cardiology;  Laterality: N/A;    URETERAL STENT PLACEMENT       Family History   Problem Relation Age of Onset    Diabetes Brother         type 1    Hypertension Brother     Stroke Brother      Social History     Tobacco Use    Smoking status: Former     Packs/day: 0.50     Years: 45.00     Pack years: 22.50     Types: Cigarettes     Quit date: 2020     Years since quittin.4    Smokeless tobacco: Never   Substance Use Topics    Alcohol use: Not Currently     Comment: rarely    Drug use: No     Review of Systems   Constitutional:  Negative for chills and fever.   HENT:  Negative for congestion and rhinorrhea.    Respiratory:  Negative for shortness of breath.    Cardiovascular:  Negative for chest pain.   Gastrointestinal:  Negative for vomiting.   Endocrine: Positive for polydipsia.   Genitourinary:  Positive for frequency. Negative for dysuria.   Skin:  Negative for rash.   Allergic/Immunologic: Negative for environmental allergies and food allergies.   Neurological:  Negative for  headaches.     Physical Exam     Initial Vitals [11/23/22 0748]   BP Pulse Resp Temp SpO2   (!) 153/80 68 18 98.3 °F (36.8 °C) 98 %      MAP       --         Physical Exam    Nursing note and vitals reviewed.  Constitutional: He appears well-developed. No distress.   Patient is laying in bed in no apparent distress.   HENT:   Head: Normocephalic and atraumatic.   Mouth/Throat: Oropharynx is clear and moist.   Eyes: Conjunctivae are normal. No scleral icterus.   Neck:   Normal range of motion.  Cardiovascular:  Normal rate, regular rhythm and normal heart sounds.           Pulmonary/Chest: Breath sounds normal. No stridor. No respiratory distress. He has no wheezes. He has no rhonchi. He has no rales.   Dialysis catheter in right chest.  No erythema or drainage.   Abdominal: Abdomen is soft. He exhibits no distension. There is no abdominal tenderness.   Obese abdomen. There is no rebound and no guarding.   Musculoskeletal:         General: Normal range of motion.      Cervical back: Normal range of motion.     Neurological: He is alert and oriented to person, place, and time. GCS score is 15. GCS eye subscore is 4. GCS verbal subscore is 5. GCS motor subscore is 6.   Skin: Skin is warm. Capillary refill takes less than 2 seconds. No rash noted.   Psychiatric: He has a normal mood and affect. Thought content normal.       ED Course   Procedures  Labs Reviewed   CBC W/ AUTO DIFFERENTIAL - Abnormal; Notable for the following components:       Result Value    RBC 3.80 (*)     Hemoglobin 11.5 (*)     Hematocrit 34.6 (*)     RDW 16.3 (*)     Mono % 17.1 (*)     All other components within normal limits   URINALYSIS, REFLEX TO URINE CULTURE - Abnormal; Notable for the following components:    Protein, UA 2+ (*)     Glucose, UA 3+ (*)     All other components within normal limits    Narrative:     Specimen Source->Urine   TROPONIN I - Abnormal; Notable for the following components:    Troponin I 0.030 (*)     All other  components within normal limits    Narrative:     add on trop per dr.lAURA MEJIA /ORDER#[id number] @ 11/23/2022    09:15    COMPREHENSIVE METABOLIC PANEL - Abnormal; Notable for the following components:    Sodium 131 (*)     Glucose 499 (*)     BUN 39 (*)     Creatinine 5.7 (*)     Calcium 7.5 (*)     Total Protein 5.9 (*)     Albumin 2.8 (*)     AST 9 (*)     eGFR 9.7 (*)     All other components within normal limits   ISTAT PROCEDURE - Abnormal; Notable for the following components:    POC PCO2 58.5 (*)     POC PO2 37 (*)     POC HCO3 32.4 (*)     POC SATURATED O2 66 (*)     POC TCO2 34 (*)     All other components within normal limits   POCT GLUCOSE MONITORING CONTINUOUS - Abnormal; Notable for the following components:    POC Glucose 455 (*)     All other components within normal limits   POCT GLUCOSE - Abnormal; Notable for the following components:    POCT Glucose 455 (*)     All other components within normal limits   POCT GLUCOSE MONITORING CONTINUOUS - Abnormal; Notable for the following components:    POC Glucose 385 (*)     All other components within normal limits   POCT GLUCOSE - Abnormal; Notable for the following components:    POCT Glucose 385 (*)     All other components within normal limits   BETA - HYDROXYBUTYRATE, SERUM    Narrative:     add on trop per dr.lAURA MEJIA /ORDER#[id number] @ 11/23/2022    09:15    TROPONIN I   B-TYPE NATRIURETIC PEPTIDE   TROPONIN I   B-TYPE NATRIURETIC PEPTIDE    Narrative:     add on trop per dr.lAURA MEJIA /ORDER#[id number] @ 11/23/2022    09:15   ADD ON BRAIN NATRIURETIC PEPTIDE [id number] PER GALINA MANNING MD   10:33  11/23/2022    URINALYSIS MICROSCOPIC    Narrative:     Specimen Source->Urine     EKG Readings: (Independently Interpreted)   Initial Reading: No STEMI. Previous EKG: Compared with most recent EKG Rhythm: Normal Sinus Rhythm. Heart Rate: 64. Conduction: RBBB.   ECG Results              EKG 12-lead (Final result)  Result time 11/23/22  10:14:17      Final result by Interface, Lab In [facility] (11/23/22 10:14:17)                   Narrative:    Test Reason : R73.9,    Vent. Rate : 064 BPM     Atrial Rate : 064 BPM     P-R Int : 214 ms          QRS Dur : 138 ms      QT Int : 484 ms       P-R-T Axes : 049 062 068 degrees     QTc Int : 499 ms    Sinus rhythm with 1st degree A-V block  Right bundle branch block  Abnormal ECG  When compared with ECG of 24-SEP-2022 12:16,  IL interval has increased  Confirmed by Blake NIELSEN MD ([id number]) on 11/23/2022 10:14:07 AM    Referred By: [email or web address]   SELF           Confirmed By:Blake NIELSEN MD                                  Imaging Results              X-Ray Chest AP Portable (Final result)  Result time 11/23/22 10:31:36      Final result by Mark Mcmahan MD (11/23/22 10:31:36)                   Impression:      No significant intrathoracic abnormality.  Allowing for differences in projection and inspiratory depth level, there has been no significant detrimental interval change in the appearance of the chest since 09/24/2022.      Electronically signed by: Mark Mcmahan MD  Date:    11/23/2022  Time:    10:31               Narrative:    EXAMINATION:  XR CHEST AP PORTABLE    CLINICAL HISTORY:  rule out fluid overload;    TECHNIQUE:  One view    COMPARISON:  Comparison is made to 09/24/2022.    FINDINGS:  Vascular access catheter noted, its tip in the superior vena cava.  Allowing for magnification related to projection, the true heart size is close to the upper limit of normal, and the appearance of the cardiomediastinal silhouette demonstrates no significant detrimental change since the examination referenced above.  Pulmonary vascularity is normal, and there are no findings indicating current cardiac decompensation/volume overload.  Lung zones are clear, and are free of significant airspace consolidation or volume loss.  No pleural fluid.  No pneumothorax.                                       Medications   insulin regular  injection 5 Units 0.05 mL (5 Units Subcutaneous Given 11/23/22 1140)   insulin regular injection 5 Units 0.05 mL (5 Units Subcutaneous Given 11/23/22 1255)     Medical Decision Making:   History:   Old Medical Records: I decided to obtain old medical records.  Initial Assessment:   77-year-old gentleman with history of type 2 DM, HLD, HTN, and ESRD (Tue/Thur/Sat) who presents to the ED for chief complaint hyperglycemia.  Patient is laying in bed in no apparent distress.  He is hypertensive.  Differential Diagnosis:     Including, but not limited to:  - Hyperglycemia:  Patient has an elevated blood glucose.  Will evaluate for DKA/HHS.  - UTI:  Will evaluate with a UA.  - PNA:  Will evaluate with a chest x-ray.  - ACS:  Will evaluate with a troponin and EKG.  Independently Interpreted Test(s):   I have ordered and independently interpreted X-rays - see summary below.  I have ordered and independently interpreted EKG Reading(s) - see prior notes  Clinical Tests:   Lab Tests: Ordered and Reviewed  Radiological Study: Ordered and Reviewed  Medical Tests: Ordered and Reviewed  ED Management:  Patient presents with hyperglycemia, 's in triage.  Obtained history and physical exam.  Ordered an EKG which shows a sinus rhythm with first-degree heart block.  Administered 5 units of insulin subcutaneous.  Ordered labs for i-STAT VBG, POCT glucose, beta hydroxybutyrate, CBC, CMP, UA, troponin, and BNP.  Ordered a chest x-ray.  BG is 455.  Troponin, BNP, and beta hydroxybutyrate are WNL.  Chest x-ray is unremarkable.  Administered 5 units of insulin subcutaneous.  Patient reassessed and BG is 385.  Please refer to ED Course for additional details.    Discussed with patient to continue to take diabetes medication and regularly check blood glucose at home. Discussed referral and follow up with vascular surgery for graft access placement and primary care provider in 3 days.  Provided precautions for when to return to the  emergency department.  Discharged patient to go to outpatient dialysis.           Attending Attestation:   Physician Attestation Statement for Resident:  As the supervising MD   Physician Attestation Statement: I have personally seen and examined this patient.   I agree with the above history.  -: 77-year-old male referred to the emergency department for hyperglycemia.  Planned for vascular surgery this morning.  Last dialysis on Monday.  Denies shortness of breath.   As the supervising MD I agree with the above PE.   -:   No respiratory distress  Oriented x3 and answering questions appropriately  Moving all extremities equally  Lungs clear  Right chest wall catheter dressing intact, no erythema or purulence  No peripheral edema   As the supervising MD I agree with the above treatment, course, plan, and disposition.   -: No indication for emergent dialysis, no hyperkalemia, no altered mental status, no respiratory distress.  Hyperglycemia without DKA, improved with subcutaneous insulin. Likely 2/2 holding prescribed medications prior to procedure.   Appreciate social work evaluation of the patient, able to confirm patient can receive dialysis outpatient today, no cutoff required for glucose level at facility.   Patient's SO will transport him (he was offered transportation).  I discussed the case with vascular surgery - ok for d/c, will re-arrange procedure in upcoming weeks.  No indication for admission.  CTNI down-trending, doubt ACS, no chest pain.  F/u PCP 3 days for repeat BP check.  Monitor blood sugar and pressure closely at home.   I have reviewed and agree with the residents interpretation of the following: lab data, EKG and x-rays.  I have reviewed the following: old records at this facility.              ED Course as of 11/23/22 1616   Wed Nov 23, 2022   0915 WBC: 4.14  No leukocytosis  [AB]   0916 Hemoglobin(!): 11.5  Stable anemia  [AB]   0948 POC PH: 7.352  No acidosis  [AB]   0948 Troponin I(!):  0.030  At approximate baseline  [AB]   1048 Ketones, UA: Negative [AB]   1141 No acidosis, no ketonuria, no AG elevation - no concern for DKA.  [AB]   1141 Beta-Hydroxybutyrate: 0.1 [AB]   1141 Troponin I: 0.026  Down-trended, doubt ACS  [AB]      ED Course User Index  [AB] Ben Valladares MD                 Clinical Impression:   Final diagnoses:  [R73.9] Hyperglycemia (Primary)        ED Disposition Condition    Discharge Stable          ED Prescriptions    None       Follow-up Information       Follow up With Specialties Details Why Contact Info Additional Information    [facility] - Vascular Surg 5th Fl Vascular Surgery Call in 2 days  [address] [facility] [address] [phone] Main Building, 5th Floor Please park in [facility] and use Clinic elevator             Romeo Ayala MD  Resident  11/23/22 1507              Ben Valladares MD  11/23/22 2006

## 2022-11-23 NOTE — ED TRIAGE NOTES
Hyperglycemia (From [facility]; scheduled to have dialysis access graft placed this AM. Found to by hyperglycemic by providers, sent to ED for further evaluation.  at triage. Pt denies taking morning meds, including insulin, per [facility] procedure instructions. )

## 2022-11-23 NOTE — ED NOTES
Patient identifiers verified and correct for Vinnie Siegel  LOC: The patient is awake, alert and aware of environment with an appropriate affect, the patient is oriented x 3 and speaking appropriately.   APPEARANCE: Patient appears comfortable and in no acute distress, patient is clean and well groomed.  SKIN: The skin is warm and dry, color consistent with ethnicity, patient has normal skin turgor and moist mucus membranes, skin intact, no breakdown or bruising noted.   MUSCULOSKELETAL: Patient moving all extremities spontaneously, no swelling noted.  RESPIRATORY: Airway is open and patent, respirations are spontaneous, patient has a normal effort and rate, no accessory muscle use noted, pt placed on continuous pulse ox with O2 sats noted at 98% on room air.  CARDIAC: Pt placed on cardiac monitor. Patient has a normal rate and regular rhythm, no edema noted, capillary refill < 3 seconds.   GASTRO: Soft and non tender to palpation, no distention noted, normoactive bowel sounds present in all four quadrants. Pt states bowel movements have been regular.  : Pt denies any pain or frequency with urination.  NEURO: Pt opens eyes spontaneously, behavior appropriate to situation, follows commands, facial expression symmetrical, bilateral hand grasp equal and even, purposeful motor response noted, normal sensation in all extremities when touched with a finger.

## 2022-12-02 ENCOUNTER — TELEPHONE (OUTPATIENT)
Dept: VASCULAR SURGERY | Facility: CLINIC | Age: 77
End: 2022-12-02
Payer: MEDICARE

## 2022-12-02 DIAGNOSIS — Z99.2 END STAGE RENAL FAILURE ON DIALYSIS: Primary | ICD-10-CM

## 2022-12-02 DIAGNOSIS — N18.6 END STAGE RENAL FAILURE ON DIALYSIS: Primary | ICD-10-CM

## 2022-12-06 ENCOUNTER — TELEPHONE (OUTPATIENT)
Dept: VASCULAR SURGERY | Facility: CLINIC | Age: 77
End: 2022-12-06
Payer: MEDICARE

## 2022-12-06 NOTE — PRE-PROCEDURE INSTRUCTIONS
PRE-OP INSTRUCTIONS:  Instructed to have nothing by mouth past midnight.  It is ok to take AM medications with a few sips of water.  Instructed to shower the night before surgery as well as the morning of surgery with an antibacterial soap like dial or hibiclens from the neck down.  Encouraged to wear loose fitting, comfortable clothing .  Medication instructions for pm prior to and am of surgery reviewed.  Instructed to avoid taking vitamins,supplements or ibuprofen the am of surgery.    PATIENT REPORTED THAT HE PLANS TO TAKE NO MEDICATIONS THE MORNING OF SURGERY    Patient denies any side effects or issues with anesthesia or sedation.

## 2022-12-07 ENCOUNTER — ANESTHESIA EVENT (OUTPATIENT)
Dept: SURGERY | Facility: HOSPITAL | Age: 77
End: 2022-12-07
Payer: MEDICARE

## 2022-12-07 ENCOUNTER — ANESTHESIA (OUTPATIENT)
Dept: SURGERY | Facility: HOSPITAL | Age: 77
End: 2022-12-07
Payer: MEDICARE

## 2022-12-07 ENCOUNTER — HOSPITAL ENCOUNTER (OUTPATIENT)
Facility: HOSPITAL | Age: 77
Discharge: HOME | End: 2022-12-07
Attending: SURGERY | Admitting: SURGERY
Payer: MEDICARE

## 2022-12-07 VITALS
BODY MASS INDEX: 38.52 KG/M2 | HEART RATE: 73 BPM | HEIGHT: 74 IN | TEMPERATURE: 97 F | RESPIRATION RATE: 20 BRPM | OXYGEN SATURATION: 95 % | SYSTOLIC BLOOD PRESSURE: 137 MMHG | DIASTOLIC BLOOD PRESSURE: 71 MMHG

## 2022-12-07 DIAGNOSIS — N18.6 ESRD (END STAGE RENAL DISEASE): ICD-10-CM

## 2022-12-07 LAB
GLUCOSE SERPL-MCNC: 214 MG/DL (ref 70–110)
HCO3 UR-SCNC: 27.6 MMOL/L (ref 24–28)
HCT VFR BLD CALC: 39 %PCV (ref 36–54)
PCO2 BLDA: 43 MMHG (ref 35–45)
PH SMN: 7.42 [PH] (ref 7.35–7.45)
PO2 BLDA: 41 MMHG (ref 40–60)
POC BE: 3 MMOL/L
POC IONIZED CALCIUM: 1.08 MMOL/L (ref 1.06–1.42)
POC SATURATED O2: 77 % (ref 95–100)
POC TCO2: 29 MMOL/L (ref 24–29)
POCT GLUCOSE: 174 MG/DL (ref 70–110)
POCT GLUCOSE: 211 MG/DL (ref 70–110)
POTASSIUM BLD-SCNC: 3.9 MMOL/L (ref 3.5–5.1)
SAMPLE: ABNORMAL
SODIUM BLD-SCNC: 135 MMOL/L (ref 136–145)

## 2022-12-07 PROCEDURE — 71000044 HC DOSC ROUTINE RECOVERY FIRST HOUR: Performed by: SURGERY

## 2022-12-07 PROCEDURE — 63600175 PHARM REV CODE 636 W HCPCS: Performed by: SURGERY

## 2022-12-07 PROCEDURE — 37000009 HC ANESTHESIA EA ADD 15 MINS: Performed by: SURGERY

## 2022-12-07 PROCEDURE — 25000003 PHARM REV CODE 250: Performed by: STUDENT IN AN ORGANIZED HEALTH CARE EDUCATION/TRAINING PROGRAM

## 2022-12-07 PROCEDURE — 37000008 HC ANESTHESIA 1ST 15 MINUTES: Performed by: SURGERY

## 2022-12-07 PROCEDURE — 71000015 HC POSTOP RECOV 1ST HR: Performed by: SURGERY

## 2022-12-07 PROCEDURE — 63600175 PHARM REV CODE 636 W HCPCS: Performed by: ANESTHESIOLOGY

## 2022-12-07 PROCEDURE — D9220A PRA ANESTHESIA: ICD-10-PCS | Mod: ANES,,, | Performed by: ANESTHESIOLOGY

## 2022-12-07 PROCEDURE — 36821 PR ANASTOMOSIS,AV,ANY SITE: ICD-10-PCS | Mod: ,,, | Performed by: SURGERY

## 2022-12-07 PROCEDURE — 63600175 PHARM REV CODE 636 W HCPCS: Performed by: NURSE ANESTHETIST, CERTIFIED REGISTERED

## 2022-12-07 PROCEDURE — 25000003 PHARM REV CODE 250: Performed by: NURSE ANESTHETIST, CERTIFIED REGISTERED

## 2022-12-07 PROCEDURE — 36821 AV FUSION DIRECT ANY SITE: CPT | Mod: ,,, | Performed by: SURGERY

## 2022-12-07 PROCEDURE — 36000706: Performed by: SURGERY

## 2022-12-07 PROCEDURE — D9220A PRA ANESTHESIA: Mod: ANES,,, | Performed by: ANESTHESIOLOGY

## 2022-12-07 PROCEDURE — D9220A PRA ANESTHESIA: ICD-10-PCS | Mod: CRNA,,, | Performed by: NURSE ANESTHETIST, CERTIFIED REGISTERED

## 2022-12-07 PROCEDURE — 76942 ECHO GUIDE FOR BIOPSY: CPT | Performed by: STUDENT IN AN ORGANIZED HEALTH CARE EDUCATION/TRAINING PROGRAM

## 2022-12-07 PROCEDURE — 82962 GLUCOSE BLOOD TEST: CPT | Mod: 91 | Performed by: SURGERY

## 2022-12-07 PROCEDURE — 36000707: Performed by: SURGERY

## 2022-12-07 PROCEDURE — D9220A PRA ANESTHESIA: Mod: CRNA,,, | Performed by: NURSE ANESTHETIST, CERTIFIED REGISTERED

## 2022-12-07 PROCEDURE — 27201423 OPTIME MED/SURG SUP & DEVICES STERILE SUPPLY: Performed by: SURGERY

## 2022-12-07 RX ORDER — SODIUM CHLORIDE 0.9 % (FLUSH) 0.9 %
10 SYRINGE (ML) INJECTION
Status: DISCONTINUED | OUTPATIENT
Start: 2022-12-07 | End: 2022-12-07 | Stop reason: HOSPADM

## 2022-12-07 RX ORDER — CEFAZOLIN SODIUM 1 G/3ML
INJECTION, POWDER, FOR SOLUTION INTRAMUSCULAR; INTRAVENOUS
Status: DISCONTINUED | OUTPATIENT
Start: 2022-12-07 | End: 2022-12-07

## 2022-12-07 RX ORDER — FENTANYL CITRATE 50 UG/ML
INJECTION, SOLUTION INTRAMUSCULAR; INTRAVENOUS
Status: DISCONTINUED | OUTPATIENT
Start: 2022-12-07 | End: 2022-12-07

## 2022-12-07 RX ORDER — MUPIROCIN 20 MG/G
OINTMENT TOPICAL
Status: DISCONTINUED | OUTPATIENT
Start: 2022-12-07 | End: 2022-12-07 | Stop reason: HOSPADM

## 2022-12-07 RX ORDER — PROTAMINE SULFATE 10 MG/ML
INJECTION, SOLUTION INTRAVENOUS
Status: DISCONTINUED | OUTPATIENT
Start: 2022-12-07 | End: 2022-12-07

## 2022-12-07 RX ORDER — DIPHENHYDRAMINE HYDROCHLORIDE 50 MG/ML
25 INJECTION INTRAMUSCULAR; INTRAVENOUS EVERY 6 HOURS PRN
Status: DISCONTINUED | OUTPATIENT
Start: 2022-12-07 | End: 2022-12-07 | Stop reason: HOSPADM

## 2022-12-07 RX ORDER — HEPARIN SODIUM 1000 [USP'U]/ML
INJECTION, SOLUTION INTRAVENOUS; SUBCUTANEOUS
Status: DISCONTINUED | OUTPATIENT
Start: 2022-12-07 | End: 2022-12-07 | Stop reason: HOSPADM

## 2022-12-07 RX ORDER — ONDANSETRON 2 MG/ML
INJECTION INTRAMUSCULAR; INTRAVENOUS
Status: DISCONTINUED | OUTPATIENT
Start: 2022-12-07 | End: 2022-12-07

## 2022-12-07 RX ORDER — PHENYLEPHRINE HYDROCHLORIDE 10 MG/ML
INJECTION INTRAVENOUS
Status: DISCONTINUED | OUTPATIENT
Start: 2022-12-07 | End: 2022-12-07

## 2022-12-07 RX ORDER — LIDOCAINE HYDROCHLORIDE 10 MG/ML
1 INJECTION, SOLUTION EPIDURAL; INFILTRATION; INTRACAUDAL; PERINEURAL ONCE
Status: DISCONTINUED | OUTPATIENT
Start: 2022-12-07 | End: 2022-12-07 | Stop reason: HOSPADM

## 2022-12-07 RX ORDER — HYDROCODONE BITARTRATE AND ACETAMINOPHEN 5; 325 MG/1; MG/1
1 TABLET ORAL EVERY 6 HOURS PRN
Status: DISCONTINUED | OUTPATIENT
Start: 2022-12-07 | End: 2022-12-07 | Stop reason: HOSPADM

## 2022-12-07 RX ORDER — DEXAMETHASONE SODIUM PHOSPHATE 4 MG/ML
INJECTION, SOLUTION INTRA-ARTICULAR; INTRALESIONAL; INTRAMUSCULAR; INTRAVENOUS; SOFT TISSUE
Status: DISCONTINUED | OUTPATIENT
Start: 2022-12-07 | End: 2022-12-07

## 2022-12-07 RX ORDER — PROPOFOL 10 MG/ML
VIAL (ML) INTRAVENOUS
Status: DISCONTINUED | OUTPATIENT
Start: 2022-12-07 | End: 2022-12-07

## 2022-12-07 RX ORDER — HEPARIN SODIUM 1000 [USP'U]/ML
INJECTION, SOLUTION INTRAVENOUS; SUBCUTANEOUS
Status: DISCONTINUED | OUTPATIENT
Start: 2022-12-07 | End: 2022-12-07

## 2022-12-07 RX ORDER — IPRATROPIUM BROMIDE AND ALBUTEROL SULFATE 2.5; .5 MG/3ML; MG/3ML
3 SOLUTION RESPIRATORY (INHALATION) EVERY 4 HOURS PRN
Status: DISCONTINUED | OUTPATIENT
Start: 2022-12-07 | End: 2022-12-07 | Stop reason: HOSPADM

## 2022-12-07 RX ORDER — FENTANYL CITRATE 50 UG/ML
25 INJECTION, SOLUTION INTRAMUSCULAR; INTRAVENOUS EVERY 5 MIN PRN
Status: DISCONTINUED | OUTPATIENT
Start: 2022-12-07 | End: 2022-12-07 | Stop reason: HOSPADM

## 2022-12-07 RX ORDER — ONDANSETRON 2 MG/ML
4 INJECTION INTRAMUSCULAR; INTRAVENOUS DAILY PRN
Status: DISCONTINUED | OUTPATIENT
Start: 2022-12-07 | End: 2022-12-07 | Stop reason: HOSPADM

## 2022-12-07 RX ADMIN — PROTAMINE SULFATE 10 MG: 10 INJECTION, SOLUTION INTRAVENOUS at 11:12

## 2022-12-07 RX ADMIN — PHENYLEPHRINE HYDROCHLORIDE 100 MCG: 10 INJECTION INTRAVENOUS at 10:12

## 2022-12-07 RX ADMIN — HEPARIN SODIUM 7000 UNITS: 1000 INJECTION, SOLUTION INTRAVENOUS; SUBCUTANEOUS at 11:12

## 2022-12-07 RX ADMIN — SODIUM CHLORIDE: 0.9 INJECTION, SOLUTION INTRAVENOUS at 10:12

## 2022-12-07 RX ADMIN — PHENYLEPHRINE HYDROCHLORIDE 0.1 MCG/KG/MIN: 10 INJECTION INTRAVENOUS at 10:12

## 2022-12-07 RX ADMIN — MUPIROCIN 1 TUBE: 20 OINTMENT TOPICAL at 09:12

## 2022-12-07 RX ADMIN — DEXAMETHASONE SODIUM PHOSPHATE 4 MG: 4 INJECTION, SOLUTION INTRAMUSCULAR; INTRAVENOUS at 12:12

## 2022-12-07 RX ADMIN — CEFAZOLIN 3 G: 330 INJECTION, POWDER, FOR SOLUTION INTRAMUSCULAR; INTRAVENOUS at 10:12

## 2022-12-07 RX ADMIN — PROPOFOL 50 MCG/KG/MIN: 10 INJECTION, EMULSION INTRAVENOUS at 10:12

## 2022-12-07 RX ADMIN — MEPIVACAINE HYDROCHLORIDE 30 ML: 15 INJECTION, SOLUTION EPIDURAL; INFILTRATION at 10:12

## 2022-12-07 RX ADMIN — ONDANSETRON 4 MG: 2 INJECTION INTRAMUSCULAR; INTRAVENOUS at 12:12

## 2022-12-07 RX ADMIN — PROPOFOL 40 MG: 10 INJECTION, EMULSION INTRAVENOUS at 10:12

## 2022-12-07 NOTE — OP NOTE
[facility] - Surgery ([facility])  Brief Operative Note     Surgery Date: 12/7/2022      Surgeon(s) and Role:     * Benji Becerril MD - Primary     * Sacha Bar MD - Fellow     Assisting Surgeon: None     Pre-op Diagnosis:  End stage renal failure on dialysis [N18.6, Z99.2]     Post-op Diagnosis:  Post-Op Diagnosis Codes:     * End stage renal failure on dialysis [N18.6, Z99.2]     Procedure(s) (LRB):  Right brachiocephalic AV fistula    Anesthesia: Regional     Operative Findings: brachial artery 3 mm at AC fossa, cephalic vein 3.7 mm at AC fossa. Palpable thrill, radial pulse 2+.      Estimated Blood Loss: * No values recorded between 12/7/2022 10:45 AM and 12/7/2022 12:33 PM *         Specimens:   Specimen (24h ago, onward)        None      Procedure Details:  The patient was seen in the Holding Room. The risks, benefits, complications, treatment options, and expected outcomes were discussed with the patient. The patient concurred with the proposed plan, giving informed consent.  The site of surgery properly noted/marked. A Time Out was held and the above information confirmed.The patient was brought to the Operating Room. The right arm was prepped and draped in the usual sterile fashion. Preoperative antibiotics were given. A transverse incision was made just distal to the antecubital crease. The cephalic vein was identified crossing obliquely. It was dissected proximally and distally, it was noted to be 3.7 mm in diameter. The brachial artery was then dissected, noted to be 4 mm in diameter and free of disease. Proximal and distal control were obtained by placing vessel loops around the artery.  The patient was systemically heparinized with 10,000 units, the vessel loops were tightened, and an arteriotomy was made with 11 blade scalpel and extended with Cantu scissors. A stay suture was placed medially. An end-to-side anastomosis between the cephalic vein and the brachial artery was created using  6-0 Prolene sutures in a continuous running manner. After completion of the anastomosis, the vessel loops were released. Hemostasis was secured.  30mg protamine was given. Good thrill was noted in the fistula and 2+ palpable radial pulse was present. The incision was then closed in two layers, subcutaneous tissue with 3-0 Vicryl and skin with 4-0 Monocryl.     Instrument and sponge counts were correct x2.    The patient tolerated the procedure and was transported to the the recovery room in good condition.     Dr. Becerril was present for the entire procedure.     Sacha Bar  Vascular Surgery Fellow, PGY-6  [phone]

## 2022-12-07 NOTE — INTERVAL H&P NOTE
The patient has been examined and the H&P has been reviewed:    I concur with the findings and changes have been noted since the H&P was written: Last dialysis yesterday 12/6/2022. Dialysis having problems recently with perm-a-cath clogging.      Procedure risks, benefits and alternative options discussed and understood by patient/family.          There are no hospital problems to display for this patient.

## 2022-12-07 NOTE — TRANSFER OF CARE
"Anesthesia Transfer of Care Note    Patient: Vinnie Siegel    Procedure(s) Performed: Procedure(s) (LRB):  CREATION, AV FISTULA (Right)    Patient location: [facility]    Anesthesia Type: MAC    Transport from OR: Transported from OR on room air with adequate spontaneous ventilation    Post pain: adequate analgesia    Post assessment: no apparent anesthetic complications    Post vital signs: stable    Level of consciousness: sedated    Nausea/Vomiting: no nausea/vomiting    Complications: none    Transfer of care protocol was followed      Last vitals:   Visit Vitals  BP (!) 140/81   Pulse 72   Temp 36.8 °C (98.3 °F) (Oral)   Resp 16   Ht 6' 2" (1.88 m)   SpO2 100%   BMI 38.52 kg/m²     "

## 2022-12-07 NOTE — PATIENT INSTRUCTIONS
VASCULAR SURGERY DISCHARGE INSTRUCTIONS    Woundcare:  - Take your incision dressing off 2 days after your surgery and gently rinse your incision with soap and water daily. Pad the incision dry afterward  - If you have a dialysis catheter in place, keep your catheter dressing clean and dry  - If you do not have a catheter, take a full shower daily beginning 2 days after the surgery. Allow soap and water to run over your incision. Pad the incision dry afterward  - When resting or sleeping, try to keep your arm elevated to shoulder level on pillows to reduce swelling  - If you notice clear drainage from your incision, you can apply dry gauze daily and secure in place with tape or gentle elastic wrap    Activity:  - Avoid prolonged exertion of the affected arm  - Avoid keeping your arm down below your chest for prolonged periods of time (this could lead to increased swelling)  - No heavy lifting with the affected arm  - Sleep with your arm elevated on pillows at night to reduce swelling  -- No swimming in pools, [employer], [employer] etc. for 6 weeks after your surgery    Diet:  -Resume your pre-operative home diet    Follow up:  -Refer to follow up instructions     Call Vascular Surgery Office if you experience:  -Increased redness, warmth, tenderness, or draining pus at your incision(s)  -Worsening fevers, chills, nausea/vomiting  -Pain, weakness, coldness, or numbness in your hand  -Uncontrolled pain  -Your call will be returned within 24 hours and further instructions will be provided    Go to ER/Urgent Care if you experience:  -Worsening shortness of breath or chest pain

## 2022-12-07 NOTE — BRIEF OP NOTE
[facility] - Surgery ([facility])  Brief Operative Note    Surgery Date: 12/7/2022     Surgeon(s) and Role:     * Benji Becerril MD - Primary     * Sacha Bar MD - Fellow    Assisting Surgeon: None    Pre-op Diagnosis:  End stage renal failure on dialysis [N18.6, Z99.2]    Post-op Diagnosis:  Post-Op Diagnosis Codes:     * End stage renal failure on dialysis [N18.6, Z99.2]    Procedure(s) (LRB):  CREATION, AV FISTULA (Right)    Anesthesia: Regional    Operative Findings: brachial artery 3 mm at AC fossa, cephalic vein 3.7 mm at AC fossa. Palpable thrill, radial pulse 2+.     Estimated Blood Loss: * No values recorded between 12/7/2022 10:45 AM and 12/7/2022 12:33 PM *         Specimens:   Specimen (24h ago, onward)      None              Discharge Note    OUTCOME: Patient tolerated treatment/procedure well without complication and is now ready for discharge.    DISPOSITION: Home or Self Care    FINAL DIAGNOSIS:  ESRD    FOLLOWUP: In clinic    DISCHARGE INSTRUCTIONS:  No discharge procedures on file.

## 2022-12-07 NOTE — ANESTHESIA PROCEDURE NOTES
R Supraclavicular SS    Patient location during procedure: ICU    Reason for block: primary anesthetic    Diagnosis: R AVF   Start time: 12/7/2022 10:21 AM  Timeout: 12/7/2022 10:20 AM   End time: 12/7/2022 10:28 AM    Staffing  Authorizing Provider: Dharmesh Lucas MD  Performing Provider: Elvin Bland MD    Preanesthetic Checklist  Completed: patient identified, IV checked, site marked, risks and benefits discussed, surgical consent, monitors and equipment checked, pre-op evaluation and timeout performed  Peripheral Block  Patient position: supine  Prep: ChloraPrep  Patient monitoring: heart rate, cardiac monitor, continuous pulse ox, continuous capnometry and frequent blood pressure checks  Block type: supraclavicular  Laterality: right  Injection technique: single shot  Needle  Needle type: Stimuplex   Needle gauge: 22 G  Needle length: 2 in  Needle localization: anatomical landmarks and ultrasound guidance   -ultrasound image captured on disc.  Assessment  Injection assessment: negative aspiration, negative parasthesia and local visualized surrounding nerve  Paresthesia pain: none  Heart rate change: no  Slow fractionated injection: yes  Pain Tolerance: no complaints and comfortable throughout block  Medications:    Medications: mepivacaine (CARBOCAINE) injection 15 mg/mL (1.5%) - Perineural   30 mL - 12/7/2022 10:30:00 AM    Additional Notes  VSS.  [facility] RN monitoring vitals throughout procedure.  Patient tolerated procedure well.     30ml of 1.5% mepivacaine with 1:300,000k epi + 10mL ICB of 1.5% mepivacaine

## 2022-12-07 NOTE — ANESTHESIA PREPROCEDURE EVALUATION
12/07/2022  Vinnie Siegel is a 77 y.o., male.      Pre-op Assessment    I have reviewed the Patient Summary Reports.     I have reviewed the Nursing Notes.    I have reviewed the Medications.     Review of Systems  Anesthesia Hx:  No problems with previous Anesthesia  Denies Family Hx of Anesthesia complications.    Cardiovascular:   Exercise tolerance: good Hypertension Dysrhythmias    Pulmonary:  Pulmonary Normal    Renal/:   Chronic Renal Disease, ESRD    Hepatic/GI:   GERD    Neurological:  Neurology Normal    Endocrine:   Diabetes      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     Hyperlipidemia     Hypertension     Kidney stone     Seasonal allergies 6/24/2014     Past Surgical History:   Procedure Laterality Date    AV FISTULA PLACEMENT Left 12/8/2020    Procedure: CREATION, AV FISTULA;  Surgeon: Benji Becerril MD;  Location: [facility] OR [address] [place] [address];  Service: Peripheral Vascular;  Laterality: Left;    CATARACT EXTRACTION W/  INTRAOCULAR LENS IMPLANT Right 04/04/16    Dr Meehan    COLONOSCOPY W/ BIOPSIES      ESOPHAGOGASTRODUODENOSCOPY N/A 6/29/2020    Procedure: EGD (ESOPHAGOGASTRODUODENOSCOPY);  Surgeon: Ravi Leone MD;  Location: [facility];  Service: Endoscopy;  Laterality: N/A;    EYE SURGERY      FISTULOGRAM Left 4/16/2021    Procedure: Fistulogram;  Surgeon: Benji Becerril MD;  Location: [facility] CATH LAB;  Service: Peripheral Vascular;  Laterality: Left;    FISTULOGRAM Left 9/28/2022    Procedure: Fistulogram;  Surgeon: Benji Becerril MD;  Location: [facility] CATH LAB;  Service: Cardiology;  Laterality: Left;    HEMORRHOID SURGERY      Dr. Bone [facility]    lateral internal anal sphincterotomy   12/13/13    Dr. root [facility]    PERCUTANEOUS TRANSLUMINAL ANGIOPLASTY OF ARTERIOVENOUS FISTULA Left 4/16/2021    Procedure: PTA, AV FISTULA;  Surgeon: Benji Becerril MD;  Location: [facility] CATH LAB;  Service: Peripheral Vascular;  Laterality: Left;    PERCUTANEOUS TRANSLUMINAL ANGIOPLASTY OF ARTERIOVENOUS FISTULA N/A 9/28/2022    Procedure: PTA, AV FISTULA;  Surgeon: Benji Becerril MD;  Location: [facility] CATH LAB;  Service: Cardiology;  Laterality: N/A;    URETERAL STENT PLACEMENT       Patient Active Problem List   Diagnosis    Essential hypertension    PAC (premature atrial contraction)    RBBB    Seasonal allergies    Stage 5 chronic kidney disease not on chronic dialysis    Proteinuria    Calculus of both kidneys    Type 2 diabetes mellitus with chronic kidney disease on chronic dialysis, with long-term current use of insulin    Nuclear sclerosis    Shortness of breath on exertion    History of snoring    Hypertension associated with type 2 diabetes mellitus    Class 2 severe obesity due to excess calories with serious comorbidity and body mass index (BMI) of 38.0 to 38.9 in adult    GIB (gastrointestinal bleeding)    Type 2 diabetes mellitus with hyperglycemia    Acute blood loss anemia    Abscess of neck    Debility    Dyslipidemia    Diastolic dysfunction    Pre-op exam    ESRD (end stage renal disease)    AV fistula stenosis, initial encounter    Anemia in ESRD (end-stage renal disease)    Dependence on renal dialysis    History of colonic polyps    Iron deficiency anemia, unspecified    Long term (current) use of insulin    Nutritional deficiency, unspecified    Renal osteodystrophy    Secondary hyperparathyroidism of renal origin    Hypocalcemia    Embolism and thrombosis of unspecified artery    AV fistula occlusion, sequela    ESRD on hemodialysis    Mixed diabetic hyperlipidemia associated with type 2 diabetes mellitus    Gastroesophageal reflux disease  without esophagitis    Benign prostatic hyperplasia without lower urinary tract symptoms    Primary open angle glaucoma (POAG) of both eyes, indeterminate stage    Chronic kidney disease-mineral and bone disorder     Facility-Administered Medications as of 2022   Medication Dose Route Frequency Provider Last Rate Last Admin    LIDOcaine (PF) 10 mg/ml (1%) injection 10 mg  1 mL Intradermal Once Sacha Bar MD        mupirocin 2 % ointment   Nasal On Call Procedure Sacha Bar MD        sodium chloride 0.9% flush 10 mL  10 mL Intravenous PRN Sacha Bar MD         Outpatient Medications as of 2022   Medication Sig Dispense Refill    apixaban (ELIQUIS) 2.5 mg Tab Take 1 tablet (2.5 mg total) by mouth 2 (two) times daily. 60 tablet 5    insulin lispro (HUMALOG KWIKPEN INSULIN) 100 unit/mL pen INJECT 8-12 UNITS EVERY DAY WITH MEALS PLUS SCALE. MAX DAILY50 UNITS 15 mL 6    atorvastatin (LIPITOR) 40 MG tablet Take 1 tablet (40 mg total) by mouth once daily. 90 tablet 3    blood sugar diagnostic (TRUE METRIX GLUCOSE TEST STRIP) Strp Use to check blood glucose 3-5 times daily as directed. 450 strip 0    blood sugar diagnostic Strp To check BG 4 times daily, to use with insurance preferred meter 400 each 3    calcium acetate,phosphat bind, (PHOSLO) 667 mg capsule SMARTSI Capsule(s) By Mouth      ergocalciferol (ERGOCALCIFEROL) 50,000 unit Cap Take 1 capsule (50,000 Units total) by mouth every 7 days. (Patient taking differently: Take 50,000 Units by mouth every 7 days. Patient takes on ) 12 capsule 0    ferrous sulfate (FEOSOL) 325 mg (65 mg iron) Tab tablet 3 TABS A DAY. (Patient taking differently: every evening. 3 TABS A DAY.) 90 tablet 1    fluticasone propionate (FLONASE) 50 mcg/actuation nasal spray SHAKE LIQUID AND USE 2 SPRAYS(100 MCG) IN EACH NOSTRIL EVERY DAY 48 g 11    hydrALAZINE (APRESOLINE) 100 MG tablet Take 1 tablet (100 mg total) by mouth 3  "(three) times daily. 90 tablet 3    HYDROcodone-acetaminophen (NORCO) 7.5-325 mg per tablet Take 1 tablet by mouth 3 (three) times daily as needed.      insulin (LANTUS SOLOSTAR U-100 INSULIN) glargine 100 units/mL SubQ pen INJECT 28-36 UNITS UNDER THE SKIN AT NIGHT 15 mL 6    insulin (LANTUS SOLOSTAR U-100 INSULIN) glargine 100 units/mL SubQ pen Inject 28-36 units at night. 15 mL 6    ipratropium-albuteroL (COMBIVENT)  mcg/actuation inhaler Inhale 1 puff into the lungs every 6 (six) hours as needed for Wheezing or Shortness of Breath. Rescue 1 Package 11    lancets Misc To check BG 4 times daily, to use with insurance preferred meter 400 each 3    metoprolol succinate (TOPROL-XL) 25 MG 24 hr tablet Take 0.5 tablets (12.5 mg total) by mouth once daily. 45 tablet 3    NIFEdipine (PROCARDIA-XL) 90 MG (OSM) 24 hr tablet Take 1 tablet (90 mg total) by mouth once daily. 90 tablet 3    pantoprazole (PROTONIX) 40 MG tablet TAKE 1 TABLET(40 MG) BY MOUTH EVERY DAY 90 tablet 0    pen needle, diabetic (BD ULTRA-FINE SHORT PEN NEEDLE) 31 gauge x 5/16" Ndle USE FOUR TIMES DAILY 400 each 3    tamsulosin (FLOMAX) 0.4 mg Cap TAKE ONE CAPSULE BY MOUTH EVERY MORNING 90 capsule 0    timolol maleate 0.25% (TIMOPTIC) 0.25 % Drop PLACE 1 DROP IN BOTH EYES TWICE DAILY 15 mL 10    torsemide (DEMADEX) 20 MG Tab Take 1 tablet (20 mg total) by mouth 2 (two) times daily. 180 tablet 3         Physical Exam  General: Well nourished, Cooperative and Alert    Airway:  Mallampati: II   Mouth Opening: Normal  TM Distance: Normal  Tongue: Normal  Neck ROM: Normal ROM    Chest/Lungs:  Normal Respiratory Rate    Heart:  Rate: Normal      Wt Readings from Last 3 Encounters:   11/23/22 136.1 kg (300 lb)   11/23/22 136.1 kg (300 lb)   10/20/22 (!) 138 kg (304 lb 3.8 oz)     Temp Readings from Last 3 Encounters:   11/23/22 36.4 °C (97.5 °F)   11/23/22 36.1 °C (97 °F) (Temporal)   10/20/22 36.8 °C (98.2 °F) (Oral)     BP Readings from Last " 3 Encounters:   11/23/22 (!) 188/90   11/23/22 (!) 140/80   10/20/22 (!) 153/73     Pulse Readings from Last 3 Encounters:   11/23/22 68   11/23/22 70   10/20/22 68     Lab Results   Component Value Date    WBC 4.14 11/23/2022    HGB 11.5 (L) 11/23/2022    HCT 34.6 (L) 11/23/2022    MCV 91 11/23/2022     11/23/2022         Chemistry        Component Value Date/Time     (L) 11/23/2022 0959    K 3.8 11/23/2022 0959    CL 97 11/23/2022 0959    CO2 24 11/23/2022 0959    BUN 39 (H) 11/23/2022 0959    CREATININE 5.7 (H) 11/23/2022 0959     (HH) 11/23/2022 0959        Component Value Date/Time    CALCIUM 7.5 (L) 11/23/2022 0959    ALKPHOS 64 11/23/2022 0959    AST 9 (L) 11/23/2022 0959    ALT 10 11/23/2022 0959    BILITOT 0.4 11/23/2022 0959    ESTGFRAFRICA 8.1 (A) 09/21/2021 1125    EGFRNONAA 7.0 (A) 09/21/2021 1125        Results for orders placed or performed during the hospital encounter of 11/23/22   EKG 12-lead    Collection Time: 11/23/22  8:53 AM    Narrative    Test Reason : R73.9,    Vent. Rate : 064 BPM     Atrial Rate : 064 BPM     P-R Int : 214 ms          QRS Dur : 138 ms      QT Int : 484 ms       P-R-T Axes : 049 062 068 degrees     QTc Int : 499 ms    Sinus rhythm with 1st degree A-V block  Right bundle branch block  Abnormal ECG  When compared with ECG of 24-SEP-2022 12:16,  WV interval has increased  Confirmed by Blake NIELSEN MD ([id number]) on 11/23/2022 10:14:07 AM    Referred By: [email or web address]   SELF           Confirmed By:Blake NIELSEN MD     Echo 7/6/22  Summary     The left ventricle is normal in size with concentric remodeling and normal systolic function. The estimated ejection fraction is 60%.   Normal right ventricular size with normal right ventricular systolic function.   Normal left ventricular diastolic function.   Mild aortic regurgitation.   The estimated PA systolic pressure is 17 mmHg.   Normal central venous pressure (3 mmHg).        Anesthesia Plan  Type of Anesthesia,  risks & benefits discussed:    Anesthesia Type: Regional, Gen Natural Airway, MAC, Gen Supraglottic Airway, Gen ETT  Intra-op Monitoring Plan: Standard ASA Monitors  Post Op Pain Control Plan: multimodal analgesia and peripheral nerve block  Induction:  IV  Informed Consent: Informed consent signed with the Patient and all parties understand the risks and agree with anesthesia plan.  All questions answered.   ASA Score: 4  Day of Surgery Review of History & Physical: H&P Update referred to the surgeon/provider.    Ready For Surgery From Anesthesia Perspective.     .

## 2022-12-08 DIAGNOSIS — Z99.2 END STAGE RENAL FAILURE ON DIALYSIS: Primary | ICD-10-CM

## 2022-12-08 DIAGNOSIS — N18.6 END STAGE RENAL FAILURE ON DIALYSIS: Primary | ICD-10-CM

## 2022-12-08 NOTE — ANESTHESIA POSTPROCEDURE EVALUATION
Anesthesia Post Evaluation    Patient: Vinnie Siegel    Procedure(s) Performed: Procedure(s) (LRB):  CREATION, AV FISTULA (Right)    Final Anesthesia Type: regional      Patient location during evaluation: [facility]  Patient participation: Yes- Able to Participate  Level of consciousness: awake and alert  Post-procedure vital signs: reviewed and stable  Pain management: adequate  Airway patency: patent    PONV status at discharge: No PONV  Anesthetic complications: no      Cardiovascular status: blood pressure returned to baseline  Respiratory status: unassisted  Hydration status: euvolemic  Follow-up not needed.          Vitals Value Taken Time   /71 12/07/22 1330   Temp 36.2 °C (97.2 °F) 12/07/22 1231   Pulse 73 12/07/22 1330   Resp 20 12/07/22 1330   SpO2 95 % 12/07/22 1330         No case tracking events are documented in the log.      Pain/Felicity Score: Felicity Score: 9 (12/7/2022 12:45 PM)

## 2022-12-14 ENCOUNTER — TELEPHONE (OUTPATIENT)
Dept: INTERVENTIONAL RADIOLOGY/VASCULAR | Facility: HOSPITAL | Age: 77
End: 2022-12-14
Payer: MEDICARE

## 2022-12-14 DIAGNOSIS — Z99.2 END STAGE RENAL FAILURE ON DIALYSIS: Primary | ICD-10-CM

## 2022-12-14 DIAGNOSIS — N18.6 END STAGE RENAL FAILURE ON DIALYSIS: Primary | ICD-10-CM

## 2022-12-15 ENCOUNTER — HOSPITAL ENCOUNTER (OUTPATIENT)
Dept: INTERVENTIONAL RADIOLOGY/VASCULAR | Facility: HOSPITAL | Age: 77
Discharge: HOME | End: 2022-12-15
Attending: FAMILY MEDICINE
Payer: MEDICARE

## 2022-12-15 VITALS
RESPIRATION RATE: 13 BRPM | BODY MASS INDEX: 38.24 KG/M2 | SYSTOLIC BLOOD PRESSURE: 141 MMHG | OXYGEN SATURATION: 100 % | TEMPERATURE: 98 F | DIASTOLIC BLOOD PRESSURE: 63 MMHG | HEIGHT: 74 IN | WEIGHT: 298 LBS | HEART RATE: 59 BPM

## 2022-12-15 DIAGNOSIS — Z99.2 END STAGE RENAL FAILURE ON DIALYSIS: ICD-10-CM

## 2022-12-15 DIAGNOSIS — N18.6 END STAGE RENAL FAILURE ON DIALYSIS: ICD-10-CM

## 2022-12-15 LAB
INR PPP: 1 (ref 0.8–1.2)
PROTHROMBIN TIME: 10.7 SEC (ref 9–12.5)

## 2022-12-15 PROCEDURE — C1769 GUIDE WIRE: HCPCS

## 2022-12-15 PROCEDURE — 63600175 PHARM REV CODE 636 W HCPCS: Performed by: RADIOLOGY

## 2022-12-15 PROCEDURE — 85610 PROTHROMBIN TIME: CPT | Performed by: STUDENT IN AN ORGANIZED HEALTH CARE EDUCATION/TRAINING PROGRAM

## 2022-12-15 PROCEDURE — 36581 REPLACE TUNNELED CV CATH: CPT | Mod: RT | Performed by: RADIOLOGY

## 2022-12-15 PROCEDURE — 77001 FLUOROGUIDE FOR VEIN DEVICE: CPT | Mod: 26,,, | Performed by: RADIOLOGY

## 2022-12-15 PROCEDURE — 36581 IR TUNNEL CATH REPLACEMENT WITH PORT INC FLUORO (XPD): ICD-10-PCS | Mod: RT,,, | Performed by: RADIOLOGY

## 2022-12-15 PROCEDURE — 76937 US GUIDE VASCULAR ACCESS: CPT | Mod: TC | Performed by: RADIOLOGY

## 2022-12-15 PROCEDURE — 77001 IR TUNNEL CATH REPLACEMENT WITH PORT INC FLUORO (XPD): ICD-10-PCS | Mod: 26,,, | Performed by: RADIOLOGY

## 2022-12-15 PROCEDURE — 25000003 PHARM REV CODE 250: Performed by: RADIOLOGY

## 2022-12-15 PROCEDURE — 76937 PR  US GUIDE, VASCULAR ACCESS: ICD-10-PCS | Mod: 26,,, | Performed by: RADIOLOGY

## 2022-12-15 PROCEDURE — 76937 US GUIDE VASCULAR ACCESS: CPT | Mod: 26,,, | Performed by: RADIOLOGY

## 2022-12-15 RX ORDER — MIDAZOLAM HYDROCHLORIDE 1 MG/ML
INJECTION INTRAMUSCULAR; INTRAVENOUS
Status: COMPLETED | OUTPATIENT
Start: 2022-12-15 | End: 2022-12-15

## 2022-12-15 RX ORDER — CEFAZOLIN SODIUM 1 G/50ML
SOLUTION INTRAVENOUS
Status: COMPLETED | OUTPATIENT
Start: 2022-12-15 | End: 2022-12-15

## 2022-12-15 RX ORDER — HEPARIN SODIUM 1000 [USP'U]/ML
INJECTION, SOLUTION INTRAVENOUS; SUBCUTANEOUS
Status: COMPLETED | OUTPATIENT
Start: 2022-12-15 | End: 2022-12-15

## 2022-12-15 RX ORDER — FENTANYL CITRATE 50 UG/ML
INJECTION, SOLUTION INTRAMUSCULAR; INTRAVENOUS
Status: COMPLETED | OUTPATIENT
Start: 2022-12-15 | End: 2022-12-15

## 2022-12-15 RX ORDER — LIDOCAINE HYDROCHLORIDE 10 MG/ML
1 INJECTION, SOLUTION EPIDURAL; INFILTRATION; INTRACAUDAL; PERINEURAL ONCE
Status: DISCONTINUED | OUTPATIENT
Start: 2022-12-15 | End: 2022-12-16 | Stop reason: HOSPADM

## 2022-12-15 RX ORDER — LIDOCAINE HYDROCHLORIDE 10 MG/ML
INJECTION INFILTRATION; PERINEURAL
Status: COMPLETED | OUTPATIENT
Start: 2022-12-15 | End: 2022-12-15

## 2022-12-15 RX ORDER — SODIUM CHLORIDE 9 MG/ML
75 INJECTION, SOLUTION INTRAVENOUS CONTINUOUS
Status: DISCONTINUED | OUTPATIENT
Start: 2022-12-15 | End: 2022-12-16 | Stop reason: HOSPADM

## 2022-12-15 RX ADMIN — HEPARIN SODIUM 2000 UNITS: 1000 INJECTION, SOLUTION INTRAVENOUS; SUBCUTANEOUS at 09:12

## 2022-12-15 RX ADMIN — FENTANYL CITRATE 50 MCG: 50 INJECTION, SOLUTION INTRAMUSCULAR; INTRAVENOUS at 09:12

## 2022-12-15 RX ADMIN — CEFAZOLIN SODIUM 2 G: 1 SOLUTION INTRAVENOUS at 09:12

## 2022-12-15 RX ADMIN — MIDAZOLAM HYDROCHLORIDE 1 MG: 1 INJECTION INTRAMUSCULAR; INTRAVENOUS at 09:12

## 2022-12-15 RX ADMIN — LIDOCAINE HYDROCHLORIDE 3 ML: 10 INJECTION, SOLUTION INFILTRATION; PERINEURAL at 09:12

## 2022-12-15 NOTE — PLAN OF CARE
HD line exchange completed. Patient tolerated well; VSS. Site CDI. Patient to be transported to MPU for 1 hour recovery; report to be given at the bedside.

## 2022-12-15 NOTE — PLAN OF CARE
Pt arrived to MPU room 5 for 1hr recovery s/p HD-cath replacement, no acute distress noted. VSS. Dressing CDI. Pt. Resting comfortably.

## 2022-12-15 NOTE — PLAN OF CARE
1hr IR recovery s/p HD-Cath replacement complete.  VSS.  Dressing CDI. IV removed. Discharge instructions reviewed and given. Pt. Awaiting transport to main entrance and private vehicle.

## 2022-12-15 NOTE — DISCHARGE INSTRUCTIONS
[facility] [phone] (MON-FRI 8 AM- 5PM). Radiology Resident on call [phone].

## 2022-12-15 NOTE — PLAN OF CARE
Patient arrived to room. PIV placed. Admit assessment completed. Plan of care discussed with patient. Will monitor. Call light within reach, instructed in use.   POC

## 2022-12-15 NOTE — PLAN OF CARE
Pt arrived to IR Room 189 for HD line exchange. Pt oriented to unit and staff. Plan of care reviewed with patient, patient verbalizes understanding. Comfort measures utilized. Pt safely transferred from stretcher to procedural table. Fall risk reviewed with patient, fall risk interventions maintained. Safety strap applied, positioner pillows utilized to minimize pressure points. Blankets applied. Pt prepped and draped utilizing standard sterile technique. Patient placed on continuous monitoring, as required by sedation policy. Timeouts completed utilizing standard universal time-out, per department and facility policy. RN to remain at bedside, continuous monitoring maintained. Pt resting comfortably. Denies pain/discomfort. Will continue to monitor. See flow sheets for monitoring, medication administration, and updates.

## 2022-12-16 ENCOUNTER — OUTPATIENT CASE MANAGEMENT (OUTPATIENT)
Dept: ADMINISTRATIVE | Facility: OTHER | Age: 77
End: 2022-12-16
Payer: MEDICARE

## 2022-12-16 LAB — POCT GLUCOSE: 279 MG/DL (ref 70–110)

## 2022-12-16 NOTE — PROGRESS NOTES
SW followed up with patient regarding resources. Patient reports receivng resources but resources were wet for Aid and Attendance.  Patient ask that SW re mail resources. SW will follow up jan 6 2023 to confirm resources

## 2022-12-28 ENCOUNTER — OUTPATIENT CASE MANAGEMENT (OUTPATIENT)
Dept: ADMINISTRATIVE | Facility: OTHER | Age: 77
End: 2022-12-28
Payer: MEDICARE

## 2022-12-28 NOTE — PROGRESS NOTES
SW received a follow-up call from patient. Patient reports he's unsure if he will meet the criteria for Aid and attendance. Patient asking if income will interfere in services. SW [facility] to [facility]. Patient voiced understainding.

## 2022-12-30 RX ORDER — PEN NEEDLE, DIABETIC 30 GX3/16"
NEEDLE, DISPOSABLE MISCELLANEOUS
Qty: 400 EACH | Refills: 3 | Status: SHIPPED | OUTPATIENT
Start: 2022-12-30

## 2023-01-05 ENCOUNTER — OFFICE VISIT (OUTPATIENT)
Dept: VASCULAR SURGERY | Facility: CLINIC | Age: 78
End: 2023-01-05
Attending: SURGERY
Payer: MEDICARE

## 2023-01-05 ENCOUNTER — HOSPITAL ENCOUNTER (OUTPATIENT)
Dept: VASCULAR SURGERY | Facility: CLINIC | Age: 78
Discharge: HOME | End: 2023-01-05
Attending: SURGERY
Payer: MEDICARE

## 2023-01-05 VITALS
HEIGHT: 74 IN | WEIGHT: 298.06 LBS | BODY MASS INDEX: 38.25 KG/M2 | TEMPERATURE: 99 F | SYSTOLIC BLOOD PRESSURE: 122 MMHG | DIASTOLIC BLOOD PRESSURE: 71 MMHG | HEART RATE: 64 BPM

## 2023-01-05 DIAGNOSIS — N18.6 END STAGE RENAL FAILURE ON DIALYSIS: Primary | ICD-10-CM

## 2023-01-05 DIAGNOSIS — Z99.2 END STAGE RENAL FAILURE ON DIALYSIS: Primary | ICD-10-CM

## 2023-01-05 DIAGNOSIS — Z99.2 END STAGE RENAL FAILURE ON DIALYSIS: ICD-10-CM

## 2023-01-05 DIAGNOSIS — R73.9 HYPERGLYCEMIA: ICD-10-CM

## 2023-01-05 DIAGNOSIS — N18.6 END STAGE RENAL FAILURE ON DIALYSIS: ICD-10-CM

## 2023-01-05 PROCEDURE — 93990 PR DUPLEX HEMODIALYSIS ACCESS: ICD-10-PCS | Mod: 26,S$PBB,, | Performed by: SURGERY

## 2023-01-05 PROCEDURE — 99214 OFFICE O/P EST MOD 30 MIN: CPT | Mod: PBBFAC | Performed by: SURGERY

## 2023-01-05 PROCEDURE — 99999 PR PBB SHADOW E&M-EST. PATIENT-LVL IV: ICD-10-PCS | Mod: PBBFAC,,, | Performed by: SURGERY

## 2023-01-05 PROCEDURE — 99024 PR POST-OP FOLLOW-UP VISIT: ICD-10-PCS | Mod: POP,,, | Performed by: SURGERY

## 2023-01-05 PROCEDURE — 99999 PR PBB SHADOW E&M-EST. PATIENT-LVL IV: CPT | Mod: PBBFAC,,, | Performed by: SURGERY

## 2023-01-05 PROCEDURE — 99024 POSTOP FOLLOW-UP VISIT: CPT | Mod: POP,,, | Performed by: SURGERY

## 2023-01-05 PROCEDURE — 93990 DOPPLER FLOW TESTING: CPT | Mod: PBBFAC | Performed by: SURGERY

## 2023-01-05 PROCEDURE — 93990 DOPPLER FLOW TESTING: CPT | Mod: 26,S$PBB,, | Performed by: SURGERY

## 2023-01-05 NOTE — PROGRESS NOTES
Vinnie Siegel  01/05/2023      Interval history:  10/20/22  Pt is a 75 year old established patient who comes in for evaluation of new HD access creation. Patient underwent fistulogram with attempt at declot of LUE AVF but was unsuccessful. Subsequently had RIJ permacath placement. Is receiving dialysis T Th Sa without any issues. Continues to take eliquis and statin.    HPI:  This is a 75 -year-old -American male with nephrolithiasis, hypertension and diabetes,ckd, proteinuria who is coming in for f/u of  Nephrolithiasis, ckd, HTN and proteinuria. No recent stones and denies any hematuria, dysuria, or flank pain. DM not well controlled in the past but much better at a1c of 5.9 recently.  Unclear about  Bp's at home.    Creatinine high. Has Proteinuria. Admitted to ICU in June 2020 with HHS, encephalopathy, GI bleed, ADONAY, respiratory failure and sepsis. Sepsis was due to E. Coli UTI and Strep agalactiae pneumonia. Pt lost f/u and was last seen in 2017 in renal clinic. Denies NSAID's.      PAST MEDICAL HISTORY: Significant for hypertension, diabetes for several years,   and nephrolithiasis. The patient had first episode about several  years ago and had to   have a subsequent procedure and second stone was in August 2012 and he   had left ureteroscopy for that. He states that he was never symptomatic with   either of the stones and was found on imaging.     LUE brachiocephalic AVF placed 12/8/2020.    Interval History:  Vinnie Siegel is here today for f/u after fistulagram of LUE brachiocephalic AVF done for focal distal stenosis which has resolved since that procedure. monocryl stitch in place that was removed today, healing well. He is using his AVF for dialysis 3 days a week now, Tues, Thurs, and Sat. US of AVF shows flow of 2043 ml/min and distal fistula velocity of 351 which is likely from a valve remnant.     Patient previously stated that he has an appt with Dr. Vasquez to discuss PD catheter  placement, but will not be doing this.    1/5/23  S/p R brachiocephalic AVF 12/7/22. Denies pain, erythema, numbness on RUE. Currently using trialysis line for dialysis T, Th, Sat.     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     Hyperlipidemia     Hypertension     Kidney stone     Seasonal allergies 6/24/2014     Past Surgical History:   Procedure Laterality Date    AV FISTULA PLACEMENT Left 12/8/2020    Procedure: CREATION, AV FISTULA;  Surgeon: Benji Becerril MD;  Location: [facility] OR [address] [place] [address];  Service: Peripheral Vascular;  Laterality: Left;    AV FISTULA PLACEMENT Right 12/7/2022    Procedure: CREATION, AV FISTULA;  Surgeon: Benji Becerril MD;  Location: [facility] OR [address] [place] [address];  Service: Peripheral Vascular;  Laterality: Right;  RUE    CATARACT EXTRACTION W/  INTRAOCULAR LENS IMPLANT Right 04/04/16    Dr Meehan    COLONOSCOPY W/ BIOPSIES      ESOPHAGOGASTRODUODENOSCOPY N/A 6/29/2020    Procedure: EGD (ESOPHAGOGASTRODUODENOSCOPY);  Surgeon: Ravi Leone MD;  Location: [facility];  Service: Endoscopy;  Laterality: N/A;    EYE SURGERY      FISTULOGRAM Left 4/16/2021    Procedure: Fistulogram;  Surgeon: Benji Becerril MD;  Location: [facility] CATH LAB;  Service: Peripheral Vascular;  Laterality: Left;    FISTULOGRAM Left 9/28/2022    Procedure: Fistulogram;  Surgeon: Benji Becerril MD;  Location: [facility] CATH LAB;  Service: Cardiology;  Laterality: Left;    HEMORRHOID SURGERY      Dr. Root [facility]    lateral internal anal sphincterotomy  12/13/13    Dr. root [facility]    PERCUTANEOUS TRANSLUMINAL ANGIOPLASTY OF ARTERIOVENOUS FISTULA Left 4/16/2021    Procedure: PTA, AV FISTULA;  Surgeon: Benji Becerril MD;  Location: [facility] CATH LAB;  Service: Peripheral Vascular;  Laterality: Left;    PERCUTANEOUS TRANSLUMINAL ANGIOPLASTY OF  ARTERIOVENOUS FISTULA N/A 2022    Procedure: PTA, AV FISTULA;  Surgeon: Benji Becerril MD;  Location: [facility] CATH LAB;  Service: Cardiology;  Laterality: N/A;    URETERAL STENT PLACEMENT       Family History   Problem Relation Age of Onset    Diabetes Brother         type 1    Hypertension Brother     Stroke Brother      Social History     Socioeconomic History    Marital status: Single   Tobacco Use    Smoking status: Former     Packs/day: 0.50     Years: 45.00     Pack years: 22.50     Types: Cigarettes     Quit date: 2020     Years since quittin.5    Smokeless tobacco: Never   Substance and Sexual Activity    Alcohol use: Not Currently     Comment: rarely    Drug use: No    Sexual activity: Yes     Partners: Female     Comment: single, retired , no kids   Social History Narrative    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     Social Determinants of Health     Financial Resource Strain: Low Risk     Difficulty of Paying Living Expenses: Not very hard   Food Insecurity: No Food Insecurity    Worried About Running Out of Food in the Last Year: Never true    Ran Out of Food in the Last Year: Never true   Transportation Needs: No Transportation Needs    Lack of Transportation (Medical): No    Lack of Transportation (Non-Medical): No   Physical Activity: Inactive    Days of Exercise per Week: 0 days    Minutes of Exercise per Session: 0 min   Stress: Stress Concern Present    Feeling of Stress : To some extent   Social Connections: Unknown    Frequency of Communication with Friends and Family: Three times a week    Frequency of Social Gatherings with Friends and Family: Three times a week   Housing Stability: Low Risk     Unable to Pay for Housing in the Last Year: No    Number of Places Lived in the Last Year: 1    Unstable Housing in the Last Year: No     Current Outpatient Medications on File Prior to Visit   Medication Sig    apixaban (ELIQUIS) 2.5 mg Tab Take 1 tablet  (2.5 mg total) by mouth 2 (two) times daily.    atorvastatin (LIPITOR) 40 MG tablet Take 1 tablet (40 mg total) by mouth once daily.    blood sugar diagnostic (TRUE METRIX GLUCOSE TEST STRIP) Strp Use to check blood glucose 3-5 times daily as directed.    blood sugar diagnostic Strp To check BG 4 times daily, to use with insurance preferred meter    calcium acetate,phosphat bind, (PHOSLO) 667 mg capsule SMARTSI Capsule(s) By Mouth    ergocalciferol (ERGOCALCIFEROL) 50,000 unit Cap Take 1 capsule (50,000 Units total) by mouth every 7 days. (Patient taking differently: Take 50,000 Units by mouth every 7 days. Patient takes on )    ferrous sulfate (FEOSOL) 325 mg (65 mg iron) Tab tablet 3 TABS A DAY. (Patient taking differently: every evening. 3 TABS A DAY.)    fluticasone propionate (FLONASE) 50 mcg/actuation nasal spray SHAKE LIQUID AND USE 2 SPRAYS(100 MCG) IN EACH NOSTRIL EVERY DAY    hydrALAZINE (APRESOLINE) 100 MG tablet Take 1 tablet (100 mg total) by mouth 3 (three) times daily.    HYDROcodone-acetaminophen (NORCO) 7.5-325 mg per tablet Take 1 tablet by mouth 3 (three) times daily as needed.    insulin (LANTUS SOLOSTAR U-100 INSULIN) glargine 100 units/mL SubQ pen INJECT 28-36 UNITS UNDER THE SKIN AT NIGHT    insulin lispro (HUMALOG KWIKPEN INSULIN) 100 unit/mL pen INJECT 8-12 UNITS EVERY DAY WITH MEALS PLUS SCALE. MAX DAILY50 UNITS    ipratropium-albuteroL (COMBIVENT)  mcg/actuation inhaler Inhale 1 puff into the lungs every 6 (six) hours as needed for Wheezing or Shortness of Breath. Rescue    lancets Misc To check BG 4 times daily, to use with insurance preferred meter    metoprolol succinate (TOPROL-XL) 25 MG 24 hr tablet Take 0.5 tablets (12.5 mg total) by mouth once daily.    NIFEdipine (PROCARDIA-XL) 90 MG (OSM) 24 hr tablet Take 1 tablet (90 mg total) by mouth once daily.    pantoprazole (PROTONIX) 40 MG tablet TAKE 1 TABLET(40 MG) BY MOUTH EVERY DAY    pen needle, diabetic (BD  "ULTRA-FINE SHORT PEN NEEDLE) 31 gauge x 5/16" Ndle USE FOUR TIMES DAILY    tamsulosin (FLOMAX) 0.4 mg Cap TAKE ONE CAPSULE BY MOUTH EVERY MORNING    timolol maleate 0.25% (TIMOPTIC) 0.25 % Drop PLACE 1 DROP IN BOTH EYES TWICE DAILY    torsemide (DEMADEX) 20 MG Tab Take 1 tablet (20 mg total) by mouth 2 (two) times daily.    blood-glucose meter kit To check BG 4 times daily, to use with insurance preferred meter, e 11.65     No current facility-administered medications on file prior to visit.       REVIEW OF SYSTEMS:  General: negative; ENT: negative; Allergy and Immunology: negative; Hematological and Lymphatic: Negative; Endocrine: negative; Respiratory: no cough, shortness of breath, or wheezing; Cardiovascular: no chest pain or dyspnea on exertion; Gastrointestinal: no abdominal pain/back, change in bowel habits, or bloody stools; Genito-Urinary: no dysuria, trouble voiding, or hematuria; Musculoskeletal: negative  Neurological: no TIA or stroke symptoms    PHYSICAL EXAM:      Pulse: 64  Temp: 98.5 °F (36.9 °C)      General appearance:  Alert, obese. well-appearing, and in no distress.  Oriented to person, place, and time   Neurological: Normal speech, no focal findings noted; CN II - XII grossly intact           Musculoskeletal: Digits/nail without cyanosis/clubbing.  Normal muscle strength/tone.                 Neck: Supple, no significant adenopathy; thyroid is not enlarged                  No carotid bruit can be auscultated                Chest:  Clear to auscultation, no wheezes, rales or rhonchi, symmetric air entry     No use of accessory muscles             Cardiac: Normal rate and regular rhythm, S1 and S2 normal; PMI non-displaced          Abdomen: Soft, nontender, nondistended, no masses or organomegaly     No rebound tenderness noted; bowel sounds normal     Pulsatile aortic mass is not palpable.     No groin adenopathy      Extremities:  RUE AVF incision CDI with stitches in place. Palpable thrill. "      2+ left radial and brachial pulse     LUE AVF with slight thrill proximally, pulsatile throughout     2+ right radial and brachial pulse    LAB RESULTS:  Lab Results   Component Value Date    K 3.8 11/23/2022    K 4.6 09/29/2022    K 4.3 09/28/2022    CREATININE 5.7 (H) 11/23/2022    CREATININE 7.7 (H) 09/29/2022    CREATININE 8.4 (H) 09/28/2022     Lab Results   Component Value Date    WBC 4.14 11/23/2022    WBC 6.34 09/29/2022    WBC 6.24 09/28/2022    HCT 39 12/07/2022    HCT 34.6 (L) 11/23/2022    HCT 29.8 (L) 09/29/2022     11/23/2022     09/29/2022     09/28/2022     Lab Results   Component Value Date    HGBA1C 12.4 (H) 09/27/2022    HGBA1C 6.6 (H) 09/21/2021    HGBA1C 5.5 08/18/2021     IMAGING:    Vein mapping  Left cephalic nearly occlusive  Right cephalic >3mm arm and antecubital fossa  Upper Extremity Vein Mapping   =======================     Rt prox cephalic V arm AP diam 3.0 mm   Rt mid cephalic V arm AP diam  3.6 mm   Rt distal cephalic V arm AP diam   3.6 mm   Rt cephalic V antecub fossa AP diam    4.2 mm   Rt prox cephalic V forearm AP diam 2.7 mm   Rt mid cephalic V forearm AP diam  2.9 mm   Rt distal cephalic V forearm AP diam   2.7 mm   Rt cephalic V wrist AP diam    2.5 mm   Rt basilic V shoulder AP diam  5.9 mm   Rt mid basillic V arm AP diam  4.2 mm   Rt basilic V antecub fossa AP diam 2.9 mm   Rt prox basilic V forearm AP diam  0.9 mm   Rt mid MCV AP diam 2.4 mm   Lt prox cephalic V arm details:    nearly occlusive AV fistula   Lt mid cephalic V arm details: nearly occlusive AV fistula   Lt distal cephalic V arm details:  nearly occlusive AV fistula   Lt cephalic V antecub fossa details:   nearly occlusive AV fistula   Lt prox cephalic V forearm AP diam 1.4 mm   Lt mid cephalic V forearm AP diam  1.8 mm   Lt distal cephalic V forearm AP diam   1.6 mm   Lt cephalic V wrist AP diam    1.2 mm   Lt basilic V shoulder AP diam  11.6 mm   Lt prox basilic V arm AP diam  4.2  mm   Lt mid basilic V arm AP diam   2.7 mm   Lt distal basilic V arm AP diam    3.5 mm   Lt basilic V antecub fossa AP diam 2.2 mm   Lt prox basilic V forearm AP diam  1.7 mm   Lt mid MCV AP diam 11.5 mm   Vein map upper extr other findings:    Rt. Lateral Duplicate Cephalic V: in mm   Prox FA=2.7   Mid FA=3.0   Distal FA=2.4   Wrist=2.2     Rt. Medial Duplicate Basilic V: in mm   Mid Bicep=2.7   Antecubital Fossa=2.3     Brachial Vein measurements: in mm   Right: Prox- 3.9 , ACF-3.6 ; Left: Prox-7.1 , ACF-3.9       Perforating VEIN Measurements : in mm       Right- 4.1     Distance from Rt. Radial Artery- 4.1   Left-3.7       Distance from Lt. Radial Artery- 0.8     Impression   =========     Upper Vein Mapping: Mapped and measured veins of the bilateral upper extremities. Tourniquet applied for cephalic vein, median   cubital, and deep  vein mapping. Duplex imaging reveals patent and compressible vessels with no evidence of deep venous   thrombosis. However, a known nearly thrombosed Lt. Brachiocephalic AV fistula is noted. Duplicate cephalic and basilic veins in the   right upper extremity were mapped and documented appropriately. There are diameter decreases throughout the left basilic vein, and   it also branches right at the antecubital fossa.     Upper Arterial Mapping: Mapped and measured the bilateral brachial and radial arteries appropriately. Duplex imaging reveals no   evidence of a hemodynamically significant stenosis.     Dialysis Access   =============     Right Arm Arterial   Rt distal brachial A AP diam   6.8 mm   Rt distal brachial A PSV   83 cm/s   Rt prox radial A AP diam   2.8 mm   Rt prox radial A PSV   77 cm/s   Rt distal radial A AP diam 3.6 mm   Rt distal radial A PSV 74 cm/s   Left Arm Arterial   Lt distal brachial A AP diameter   5.7 mm   Lt distal brachial A PSV   180 cm/s   Lt prox radial A AP diameter   3.7 mm   Lt prox radial A PSV   116 cm/s   Lt distal radial A AP diameter  2.5 mm   Lt distal radial A PSV 87 cm/s      IMP/PLAN:  77 y.o. male with morbid obesity and CKD V, on HD using his LUE AFV. S/p LUE brachiocephalic AVF on 12/8/20, now s/p LUE AVF fistulagram with unsuccessful declot. Patient requires new HD access creation.   S/p RUE AVF 12/7/22.     1) AVF maturing well, will be ready for cannulation in several weeks  2) RTC in 3-4 weeks for AVF jose francisco Becerril MD  Vascular & Endovascular Surgery

## 2023-01-05 NOTE — TELEPHONE ENCOUNTER
----- Message from Shantelle Hameed sent at 9/20/2017 12:52 PM CDT -----  Contact: Self  [phone]  Zaida  -  Pt calling to requesting envelops to send in blood pressure readings.  Please call the pt back to verify .     Pt can be reached at [phone]     · Continue scheduled nebulizers  · Consider wean of IV steroids versus transition to oral  · Wean oxygen for SpO2>88% Provider Procedure Text (D): After obtaining clear surgical margins the defect was repaired by another provider.

## 2023-01-20 ENCOUNTER — OUTPATIENT CASE MANAGEMENT (OUTPATIENT)
Dept: ADMINISTRATIVE | Facility: OTHER | Age: 78
End: 2023-01-20
Payer: COMMERCIAL

## 2023-01-20 NOTE — PROGRESS NOTES
SW attempt to follow-up with patient, however no answer swallowing left a message for a return call.

## 2023-02-02 ENCOUNTER — HOSPITAL ENCOUNTER (OUTPATIENT)
Dept: VASCULAR SURGERY | Facility: CLINIC | Age: 78
Discharge: HOME | End: 2023-02-02
Attending: SURGERY
Payer: MEDICARE

## 2023-02-02 ENCOUNTER — TELEPHONE (OUTPATIENT)
Dept: VASCULAR SURGERY | Facility: CLINIC | Age: 78
End: 2023-02-02
Payer: COMMERCIAL

## 2023-02-02 ENCOUNTER — OFFICE VISIT (OUTPATIENT)
Dept: VASCULAR SURGERY | Facility: CLINIC | Age: 78
End: 2023-02-02
Attending: SURGERY
Payer: MEDICARE

## 2023-02-02 VITALS
TEMPERATURE: 99 F | BODY MASS INDEX: 37.63 KG/M2 | HEIGHT: 74 IN | DIASTOLIC BLOOD PRESSURE: 63 MMHG | HEART RATE: 62 BPM | WEIGHT: 293.19 LBS | SYSTOLIC BLOOD PRESSURE: 130 MMHG

## 2023-02-02 DIAGNOSIS — Z99.2 END STAGE RENAL FAILURE ON DIALYSIS: Primary | ICD-10-CM

## 2023-02-02 DIAGNOSIS — N18.6 END STAGE RENAL DISEASE: ICD-10-CM

## 2023-02-02 DIAGNOSIS — N18.6 END STAGE RENAL FAILURE ON DIALYSIS: ICD-10-CM

## 2023-02-02 DIAGNOSIS — Z99.2 END STAGE RENAL FAILURE ON DIALYSIS: ICD-10-CM

## 2023-02-02 DIAGNOSIS — N18.6 END STAGE RENAL FAILURE ON DIALYSIS: Primary | ICD-10-CM

## 2023-02-02 PROCEDURE — 93990 PR DUPLEX HEMODIALYSIS ACCESS: ICD-10-PCS | Mod: 26,S$PBB,, | Performed by: SURGERY

## 2023-02-02 PROCEDURE — 99999 PR PBB SHADOW E&M-EST. PATIENT-LVL IV: CPT | Mod: PBBFAC,,, | Performed by: SURGERY

## 2023-02-02 PROCEDURE — 99024 PR POST-OP FOLLOW-UP VISIT: ICD-10-PCS | Mod: POP,,, | Performed by: SURGERY

## 2023-02-02 PROCEDURE — 93990 DOPPLER FLOW TESTING: CPT | Mod: 26,S$PBB,, | Performed by: SURGERY

## 2023-02-02 PROCEDURE — 93990 DOPPLER FLOW TESTING: CPT | Mod: PBBFAC | Performed by: SURGERY

## 2023-02-02 PROCEDURE — 99024 POSTOP FOLLOW-UP VISIT: CPT | Mod: POP,,, | Performed by: SURGERY

## 2023-02-02 PROCEDURE — 99999 PR PBB SHADOW E&M-EST. PATIENT-LVL IV: ICD-10-PCS | Mod: PBBFAC,,, | Performed by: SURGERY

## 2023-02-02 PROCEDURE — 99214 OFFICE O/P EST MOD 30 MIN: CPT | Mod: PBBFAC | Performed by: SURGERY

## 2023-02-02 RX ORDER — SODIUM CHLORIDE 0.9 % (FLUSH) 0.9 %
10 SYRINGE (ML) INJECTION
Status: CANCELLED | OUTPATIENT
Start: 2023-02-02

## 2023-02-02 RX ORDER — LIDOCAINE HYDROCHLORIDE 10 MG/ML
1 INJECTION, SOLUTION EPIDURAL; INFILTRATION; INTRACAUDAL; PERINEURAL ONCE
Status: CANCELLED | OUTPATIENT
Start: 2023-02-02 | End: 2023-02-02

## 2023-02-02 RX ORDER — MUPIROCIN 20 MG/G
OINTMENT TOPICAL
Status: CANCELLED | OUTPATIENT
Start: 2023-02-02

## 2023-02-02 NOTE — PROGRESS NOTES
Vinnie Siegel  02/02/2023      Patient now s/p LUE BC AVF . Doing well.  HD via RIJ permacath.     Interval history:  10/20/22  Pt is a 75 year old established patient who comes in for evaluation of new HD access creation. Patient underwent fistulogram with attempt at declot of LUE AVF but was unsuccessful. Subsequently had RIJ permacath placement. Is receiving dialysis T Th Sa without any issues. Continues to take eliquis and statin.    HPI:  This is a 75 -year-old -American male with nephrolithiasis, hypertension and diabetes,ckd, proteinuria who is coming in for f/u of  Nephrolithiasis, ckd, HTN and proteinuria. No recent stones and denies any hematuria, dysuria, or flank pain. DM not well controlled in the past but much better at a1c of 5.9 recently.  Unclear about  Bp's at home.    Creatinine high. Has Proteinuria. Admitted to ICU in June 2020 with HHS, encephalopathy, GI bleed, ADONAY, respiratory failure and sepsis. Sepsis was due to E. Coli UTI and Strep agalactiae pneumonia. Pt lost f/u and was last seen in 2017 in renal clinic. Denies NSAID's.      PAST MEDICAL HISTORY: Significant for hypertension, diabetes for several years,   and nephrolithiasis. The patient had first episode about several  years ago and had to   have a subsequent procedure and second stone was in August 2012 and he   had left ureteroscopy for that. He states that he was never symptomatic with   either of the stones and was found on imaging.     LUE brachiocephalic AVF placed 12/8/2020.    Interval History:  Vinnie Siegel is here today for f/u after fistulagram of LUE brachiocephalic AVF done for focal distal stenosis which has resolved since that procedure. monocryl stitch in place that was removed today, healing well. He is using his AVF for dialysis 3 days a week now, Tues, Thurs, and Sat. US of AVF shows flow of 2043 ml/min and distal fistula velocity of 351 which is likely from a valve remnant.     Patient previously  stated that he has an appt with Dr. Vasquez to discuss PD catheter placement, but will not be doing this.    1/5/23  S/p R brachiocephalic AVF 12/7/22. Denies pain, erythema, numbness on RUE. Currently using trialysis line for dialysis T, Th, Sat.     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     Hyperlipidemia     Hypertension     Kidney stone     Seasonal allergies 6/24/2014     Past Surgical History:   Procedure Laterality Date    AV FISTULA PLACEMENT Left 12/8/2020    Procedure: CREATION, AV FISTULA;  Surgeon: Benji Becerril MD;  Location: [facility] OR [address] [place] [address];  Service: Peripheral Vascular;  Laterality: Left;    AV FISTULA PLACEMENT Right 12/7/2022    Procedure: CREATION, AV FISTULA;  Surgeon: Benji Becerril MD;  Location: [facility] OR [address] [place] [address];  Service: Peripheral Vascular;  Laterality: Right;  RUE    CATARACT EXTRACTION W/  INTRAOCULAR LENS IMPLANT Right 04/04/16    Dr Meehan    COLONOSCOPY W/ BIOPSIES      ESOPHAGOGASTRODUODENOSCOPY N/A 6/29/2020    Procedure: EGD (ESOPHAGOGASTRODUODENOSCOPY);  Surgeon: Ravi Leone MD;  Location: [facility];  Service: Endoscopy;  Laterality: N/A;    EYE SURGERY      FISTULOGRAM Left 4/16/2021    Procedure: Fistulogram;  Surgeon: Benji Becerril MD;  Location: [facility] CATH LAB;  Service: Peripheral Vascular;  Laterality: Left;    FISTULOGRAM Left 9/28/2022    Procedure: Fistulogram;  Surgeon: Benji Becerril MD;  Location: [facility] CATH LAB;  Service: Cardiology;  Laterality: Left;    HEMORRHOID SURGERY      Dr. Root ADRYAN    lateral internal anal sphincterotomy  12/13/13    [place]. [place] [facility]    PERCUTANEOUS TRANSLUMINAL ANGIOPLASTY OF ARTERIOVENOUS FISTULA Left 4/16/2021    Procedure: PTA, AV FISTULA;  Surgeon: Benji Becerril MD;  Location: [facility] CATH LAB;  Service: Peripheral Vascular;   Laterality: Left;    PERCUTANEOUS TRANSLUMINAL ANGIOPLASTY OF ARTERIOVENOUS FISTULA N/A 2022    Procedure: PTA, AV FISTULA;  Surgeon: Benji Becerril MD;  Location: [facility] CATH LAB;  Service: Cardiology;  Laterality: N/A;    URETERAL STENT PLACEMENT       Family History   Problem Relation Age of Onset    Diabetes Brother         type 1    Hypertension Brother     Stroke Brother      Social History     Socioeconomic History    Marital status: Single   Tobacco Use    Smoking status: Former     Packs/day: 0.50     Years: 45.00     Pack years: 22.50     Types: Cigarettes     Quit date: 2020     Years since quittin.6    Smokeless tobacco: Never   Substance and Sexual Activity    Alcohol use: Not Currently     Comment: rarely    Drug use: No    Sexual activity: Yes     Partners: Female     Comment: single, retired , no kids   Social History Narrative    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     Social Determinants of Health     Financial Resource Strain: Low Risk     Difficulty of Paying Living Expenses: Not very hard   Food Insecurity: No Food Insecurity    Worried About Running Out of Food in the Last Year: Never true    Ran Out of Food in the Last Year: Never true   Transportation Needs: No Transportation Needs    Lack of Transportation (Medical): No    Lack of Transportation (Non-Medical): No   Physical Activity: Inactive    Days of Exercise per Week: 0 days    Minutes of Exercise per Session: 0 min   Stress: Stress Concern Present    Feeling of Stress : To some extent   Social Connections: Unknown    Frequency of Communication with Friends and Family: Three times a week    Frequency of Social Gatherings with Friends and Family: Three times a week   Housing Stability: Low Risk     Unable to Pay for Housing in the Last Year: No    Number of Places Lived in the Last Year: 1    Unstable Housing in the Last Year: No     Current Outpatient Medications on File Prior to Visit    Medication Sig    apixaban (ELIQUIS) 2.5 mg Tab Take 1 tablet (2.5 mg total) by mouth 2 (two) times daily.    atorvastatin (LIPITOR) 40 MG tablet Take 1 tablet (40 mg total) by mouth once daily.    blood sugar diagnostic (TRUE METRIX GLUCOSE TEST STRIP) Strp Use to check blood glucose 3-5 times daily as directed.    blood sugar diagnostic Strp To check BG 4 times daily, to use with insurance preferred meter    calcium acetate,phosphat bind, (PHOSLO) 667 mg capsule SMARTSI Capsule(s) By Mouth    ergocalciferol (ERGOCALCIFEROL) 50,000 unit Cap Take 1 capsule (50,000 Units total) by mouth every 7 days. (Patient taking differently: Take 50,000 Units by mouth every 7 days. Patient takes on )    ferrous sulfate (FEOSOL) 325 mg (65 mg iron) Tab tablet 3 TABS A DAY. (Patient taking differently: every evening. 3 TABS A DAY.)    fluticasone propionate (FLONASE) 50 mcg/actuation nasal spray SHAKE LIQUID AND USE 2 SPRAYS(100 MCG) IN EACH NOSTRIL EVERY DAY    hydrALAZINE (APRESOLINE) 100 MG tablet Take 1 tablet (100 mg total) by mouth 3 (three) times daily.    HYDROcodone-acetaminophen (NORCO) 7.5-325 mg per tablet Take 1 tablet by mouth 3 (three) times daily as needed.    insulin (LANTUS SOLOSTAR U-100 INSULIN) glargine 100 units/mL SubQ pen INJECT 28-36 UNITS UNDER THE SKIN AT NIGHT    insulin lispro (HUMALOG KWIKPEN INSULIN) 100 unit/mL pen INJECT 8-12 UNITS EVERY DAY WITH MEALS PLUS SCALE. MAX DAILY50 UNITS    ipratropium-albuteroL (COMBIVENT)  mcg/actuation inhaler Inhale 1 puff into the lungs every 6 (six) hours as needed for Wheezing or Shortness of Breath. Rescue    lancets Misc To check BG 4 times daily, to use with insurance preferred meter    metoprolol succinate (TOPROL-XL) 25 MG 24 hr tablet Take 0.5 tablets (12.5 mg total) by mouth once daily.    NIFEdipine (PROCARDIA-XL) 90 MG (OSM) 24 hr tablet Take 1 tablet (90 mg total) by mouth once daily.    pantoprazole (PROTONIX) 40 MG tablet TAKE 1  "TABLET(40 MG) BY MOUTH EVERY DAY    pen needle, diabetic (BD ULTRA-FINE SHORT PEN NEEDLE) 31 gauge x 5/16" Ndle USE FOUR TIMES DAILY    tamsulosin (FLOMAX) 0.4 mg Cap TAKE ONE CAPSULE BY MOUTH EVERY MORNING    timolol maleate 0.25% (TIMOPTIC) 0.25 % Drop PLACE 1 DROP IN BOTH EYES TWICE DAILY    torsemide (DEMADEX) 20 MG Tab Take 1 tablet (20 mg total) by mouth 2 (two) times daily.    blood-glucose meter kit To check BG 4 times daily, to use with insurance preferred meter, e 11.65     No current facility-administered medications on file prior to visit.       REVIEW OF SYSTEMS:  General: negative; ENT: negative; Allergy and Immunology: negative; Hematological and Lymphatic: Negative; Endocrine: negative; Respiratory: no cough, shortness of breath, or wheezing; Cardiovascular: no chest pain or dyspnea on exertion; Gastrointestinal: no abdominal pain/back, change in bowel habits, or bloody stools; Genito-Urinary: no dysuria, trouble voiding, or hematuria; Musculoskeletal: negative  Neurological: no TIA or stroke symptoms    PHYSICAL EXAM:   Left Arm BP - Sittin/63 (23 1046)  Pulse: 62  Temp: 98.7 °F (37.1 °C)      General appearance:  Alert, obese. well-appearing, and in no distress.  Oriented to person, place, and time   Neurological: Normal speech, no focal findings noted; CN II - XII grossly intact           Musculoskeletal: Digits/nail without cyanosis/clubbing.  Normal muscle strength/tone.                 Neck: Supple, no significant adenopathy; thyroid is not enlarged                  No carotid bruit can be auscultated                Chest:  Clear to auscultation, no wheezes, rales or rhonchi, symmetric air entry     No use of accessory muscles             Cardiac: Normal rate and regular rhythm, S1 and S2 normal; PMI non-displaced          Abdomen: Soft, nontender, nondistended, no masses or organomegaly     No rebound tenderness noted; bowel sounds normal     Pulsatile aortic mass is not " palpable.     No groin adenopathy      Extremities:  RUE AVF incision CDI with stitches in place. Palpable thrill.      2+ left radial and brachial pulse     LUE AVF with slight thrill proximally, pulsatile throughout     2+ right radial and brachial pulse    LAB RESULTS:  Lab Results   Component Value Date    K 3.8 11/23/2022    K 4.6 09/29/2022    K 4.3 09/28/2022    CREATININE 5.7 (H) 11/23/2022    CREATININE 7.7 (H) 09/29/2022    CREATININE 8.4 (H) 09/28/2022     Lab Results   Component Value Date    WBC 4.14 11/23/2022    WBC 6.34 09/29/2022    WBC 6.24 09/28/2022    HCT 39 12/07/2022    HCT 34.6 (L) 11/23/2022    HCT 29.8 (L) 09/29/2022     11/23/2022     09/29/2022     09/28/2022     Lab Results   Component Value Date    HGBA1C 12.4 (H) 09/27/2022    HGBA1C 6.6 (H) 09/21/2021    HGBA1C 5.5 08/18/2021     IMAGING:  End Stage Renal Disease on Dialysis     Dialysis Access   =============     Dialysis access location:  Right Arm   Type of AV access: Brachiocephalic AVF   Rt inflow A PSV    351 cm/s   Rt at anastomosis PSV  544 cm/s   Rt A distal anastomosis PSV    695 cm/s   Rt fistula prox depth  3.1 mm   Rt fistula prox AP diam    7.4 mm   Rt fistula prox PSV    173 cm/s   Rt fistula mid depth   4.0 mm   Rt fistula mid AP diam 5.7 mm   Rt fistula mid  cm/s   Rt fistula mid flow volume 659.1 ml/min   Rt fistula distal depth    7.8 mm   Rt fistula distal AP diam  4.5 mm   Rt fistula distal PSV  153 cm/s   Rt outflow V prox PSV  153 cm/s   Rt outflow V mid PSV   144 cm/s   Rt outflow V distal PSV    235 cm/s     Impression   =========     Color flow duplex reveals a patent Rt Brachiocephalic AV fistula. There is a visual narrowing and a PSV increase from 134 cm/sec to   311 cm/sec noted in the mid AVF with a patent branch noted at the lesion. These findings do suggest a focal hemodynamically   significant stenosis. The volume flow at the mid bicep measures 659 mL/min.     IMP/PLAN:  77  y.o. male with morbid obesity and CKD V, on HD using his LUE AFV. S/p LUE brachiocephalic AVF on 12/8/20, now s/p LUE AVF fistulagram with unsuccessful declot. Patient requires new HD access creation.   S/p RUE AVF 12/7/22.  Flow volume has decreased from 1700 to 700 - fistulagram is warranted.      1) Plan for RUE fistulagram    Benji Becerril MD  Vascular & Endovascular Surgery

## 2023-02-03 ENCOUNTER — ANESTHESIA (OUTPATIENT)
Dept: SURGERY | Facility: HOSPITAL | Age: 78
End: 2023-02-03
Payer: MEDICARE

## 2023-02-03 ENCOUNTER — HOSPITAL ENCOUNTER (OUTPATIENT)
Facility: HOSPITAL | Age: 78
Discharge: HOME | End: 2023-02-03
Attending: SURGERY | Admitting: SURGERY
Payer: MEDICARE

## 2023-02-03 ENCOUNTER — ANESTHESIA EVENT (OUTPATIENT)
Dept: SURGERY | Facility: HOSPITAL | Age: 78
End: 2023-02-03
Payer: MEDICARE

## 2023-02-03 VITALS
RESPIRATION RATE: 18 BRPM | TEMPERATURE: 98 F | DIASTOLIC BLOOD PRESSURE: 81 MMHG | HEART RATE: 54 BPM | SYSTOLIC BLOOD PRESSURE: 167 MMHG | OXYGEN SATURATION: 100 %

## 2023-02-03 DIAGNOSIS — N18.9 CHRONIC KIDNEY DISEASE-MINERAL AND BONE DISORDER: Primary | ICD-10-CM

## 2023-02-03 DIAGNOSIS — E83.9 CHRONIC KIDNEY DISEASE-MINERAL AND BONE DISORDER: Primary | ICD-10-CM

## 2023-02-03 DIAGNOSIS — N18.6 END STAGE RENAL DISEASE: ICD-10-CM

## 2023-02-03 DIAGNOSIS — M89.9 CHRONIC KIDNEY DISEASE-MINERAL AND BONE DISORDER: Primary | ICD-10-CM

## 2023-02-03 LAB
POCT GLUCOSE: 217 MG/DL (ref 70–110)
POCT GLUCOSE: 228 MG/DL (ref 70–110)

## 2023-02-03 PROCEDURE — D9220A PRA ANESTHESIA: ICD-10-PCS | Mod: CRNA,,, | Performed by: NURSE ANESTHETIST, CERTIFIED REGISTERED

## 2023-02-03 PROCEDURE — 36000706: Performed by: SURGERY

## 2023-02-03 PROCEDURE — 71000044 HC DOSC ROUTINE RECOVERY FIRST HOUR: Performed by: SURGERY

## 2023-02-03 PROCEDURE — C1769 GUIDE WIRE: HCPCS | Performed by: SURGERY

## 2023-02-03 PROCEDURE — C1894 INTRO/SHEATH, NON-LASER: HCPCS | Performed by: SURGERY

## 2023-02-03 PROCEDURE — 63600175 PHARM REV CODE 636 W HCPCS: Performed by: NURSE ANESTHETIST, CERTIFIED REGISTERED

## 2023-02-03 PROCEDURE — 25000003 PHARM REV CODE 250: Performed by: NURSE ANESTHETIST, CERTIFIED REGISTERED

## 2023-02-03 PROCEDURE — D9220A PRA ANESTHESIA: ICD-10-PCS | Mod: ANES,,, | Performed by: ANESTHESIOLOGY

## 2023-02-03 PROCEDURE — 36901 PR INTRO CATH, DIALYSIS CIRCUIT: ICD-10-PCS | Mod: 78,51,GC, | Performed by: SURGERY

## 2023-02-03 PROCEDURE — 82962 GLUCOSE BLOOD TEST: CPT | Performed by: SURGERY

## 2023-02-03 PROCEDURE — 25500020 PHARM REV CODE 255: Performed by: SURGERY

## 2023-02-03 PROCEDURE — D9220A PRA ANESTHESIA: Mod: ANES,,, | Performed by: ANESTHESIOLOGY

## 2023-02-03 PROCEDURE — D9220A PRA ANESTHESIA: Mod: CRNA,,, | Performed by: NURSE ANESTHETIST, CERTIFIED REGISTERED

## 2023-02-03 PROCEDURE — 71000015 HC POSTOP RECOV 1ST HR: Performed by: SURGERY

## 2023-02-03 PROCEDURE — 37000009 HC ANESTHESIA EA ADD 15 MINS: Performed by: SURGERY

## 2023-02-03 PROCEDURE — 36581 PR REPLACE CV CATH, COMPLETE, TUNNELED, W/O SUBQ PORT OR PUMP: ICD-10-PCS | Mod: 78,GC,, | Performed by: SURGERY

## 2023-02-03 PROCEDURE — 36901 INTRO CATH DIALYSIS CIRCUIT: CPT | Mod: 78,51,GC, | Performed by: SURGERY

## 2023-02-03 PROCEDURE — 25000003 PHARM REV CODE 250

## 2023-02-03 PROCEDURE — C1750 CATH, HEMODIALYSIS,LONG-TERM: HCPCS | Performed by: SURGERY

## 2023-02-03 PROCEDURE — 36000707: Performed by: SURGERY

## 2023-02-03 PROCEDURE — 36581 REPLACE TUNNELED CV CATH: CPT | Mod: 78,GC,, | Performed by: SURGERY

## 2023-02-03 PROCEDURE — 37000008 HC ANESTHESIA 1ST 15 MINUTES: Performed by: SURGERY

## 2023-02-03 DEVICE — CATH GLIDEPATH ACRV 14.5 19X25: Type: IMPLANTABLE DEVICE | Site: CHEST | Status: FUNCTIONAL

## 2023-02-03 RX ORDER — FENTANYL CITRATE 50 UG/ML
INJECTION, SOLUTION INTRAMUSCULAR; INTRAVENOUS
Status: DISCONTINUED | OUTPATIENT
Start: 2023-02-03 | End: 2023-02-03

## 2023-02-03 RX ORDER — HYDROMORPHONE HYDROCHLORIDE 1 MG/ML
0.2 INJECTION, SOLUTION INTRAMUSCULAR; INTRAVENOUS; SUBCUTANEOUS EVERY 5 MIN PRN
Status: CANCELLED | OUTPATIENT
Start: 2023-02-03

## 2023-02-03 RX ORDER — HYDROCODONE BITARTRATE AND ACETAMINOPHEN 5; 325 MG/1; MG/1
1 TABLET ORAL EVERY 4 HOURS PRN
Status: DISCONTINUED | OUTPATIENT
Start: 2023-02-03 | End: 2023-02-03 | Stop reason: HOSPADM

## 2023-02-03 RX ORDER — MIDAZOLAM HYDROCHLORIDE 1 MG/ML
INJECTION, SOLUTION INTRAMUSCULAR; INTRAVENOUS
Status: DISCONTINUED | OUTPATIENT
Start: 2023-02-03 | End: 2023-02-03

## 2023-02-03 RX ORDER — IODIXANOL 320 MG/ML
INJECTION, SOLUTION INTRAVASCULAR
Status: DISCONTINUED | OUTPATIENT
Start: 2023-02-03 | End: 2023-02-03 | Stop reason: HOSPADM

## 2023-02-03 RX ORDER — MUPIROCIN 20 MG/G
OINTMENT TOPICAL
Status: DISCONTINUED | OUTPATIENT
Start: 2023-02-03 | End: 2023-02-03 | Stop reason: HOSPADM

## 2023-02-03 RX ORDER — SODIUM CHLORIDE 0.9 % (FLUSH) 0.9 %
10 SYRINGE (ML) INJECTION
Status: DISCONTINUED | OUTPATIENT
Start: 2023-02-03 | End: 2023-02-03 | Stop reason: HOSPADM

## 2023-02-03 RX ORDER — SODIUM CHLORIDE 0.9 % (FLUSH) 0.9 %
10 SYRINGE (ML) INJECTION
Status: CANCELLED | OUTPATIENT
Start: 2023-02-03

## 2023-02-03 RX ORDER — HALOPERIDOL 5 MG/ML
0.5 INJECTION INTRAMUSCULAR EVERY 10 MIN PRN
Status: CANCELLED | OUTPATIENT
Start: 2023-02-03

## 2023-02-03 RX ORDER — LIDOCAINE HYDROCHLORIDE 10 MG/ML
1 INJECTION, SOLUTION EPIDURAL; INFILTRATION; INTRACAUDAL; PERINEURAL ONCE
Status: DISCONTINUED | OUTPATIENT
Start: 2023-02-03 | End: 2023-02-03 | Stop reason: HOSPADM

## 2023-02-03 RX ADMIN — SODIUM CHLORIDE 3 G: 9 INJECTION, SOLUTION INTRAVENOUS at 01:02

## 2023-02-03 RX ADMIN — FENTANYL CITRATE 25 MCG: 50 INJECTION, SOLUTION INTRAMUSCULAR; INTRAVENOUS at 01:02

## 2023-02-03 RX ADMIN — FENTANYL CITRATE 25 MCG: 50 INJECTION, SOLUTION INTRAMUSCULAR; INTRAVENOUS at 02:02

## 2023-02-03 RX ADMIN — MUPIROCIN: 20 OINTMENT TOPICAL at 10:02

## 2023-02-03 RX ADMIN — MIDAZOLAM HYDROCHLORIDE 0.5 MG: 1 INJECTION, SOLUTION INTRAMUSCULAR; INTRAVENOUS at 01:02

## 2023-02-03 RX ADMIN — SODIUM CHLORIDE: 0.9 INJECTION, SOLUTION INTRAVENOUS at 01:02

## 2023-02-03 NOTE — ANESTHESIA PREPROCEDURE EVALUATION
02/03/2023  Vinnie Siegel is a 77 y.o., male.      July 2022   The left ventricle is normal in size with concentric remodeling and normal systolic function. The estimated ejection fraction is 60%.   Normal right ventricular size with normal right ventricular systolic function.   Normal left ventricular diastolic function.   Mild aortic regurgitation.   The estimated PA systolic pressure is 17 mmHg.   Normal central venous pressure (3 mmHg).    Patient Active Problem List   Diagnosis    Essential hypertension    PAC (premature atrial contraction)    RBBB    Seasonal allergies    Stage 5 chronic kidney disease not on chronic dialysis    Proteinuria    Calculus of both kidneys    Type 2 diabetes mellitus with chronic kidney disease on chronic dialysis, with long-term current use of insulin    Nuclear sclerosis    Shortness of breath on exertion    History of snoring    Hypertension associated with type 2 diabetes mellitus    Class 2 severe obesity due to excess calories with serious comorbidity and body mass index (BMI) of 38.0 to 38.9 in adult    GIB (gastrointestinal bleeding)    Type 2 diabetes mellitus with hyperglycemia    Acute blood loss anemia    Abscess of neck    Debility    Dyslipidemia    Diastolic dysfunction    Pre-op exam    End stage renal failure on dialysis    AV fistula stenosis, initial encounter    Anemia in ESRD (end-stage renal disease)    Dependence on renal dialysis    History of colonic polyps    Iron deficiency anemia, unspecified    Long term (current) use of insulin    Nutritional deficiency, unspecified    Renal osteodystrophy    Secondary hyperparathyroidism of renal origin    Hypocalcemia    Embolism and thrombosis of unspecified artery    AV fistula occlusion, sequela    ESRD on hemodialysis    Mixed diabetic hyperlipidemia associated  with type 2 diabetes mellitus    Gastroesophageal reflux disease without esophagitis    Benign prostatic hyperplasia without lower urinary tract symptoms    Primary open angle glaucoma (POAG) of both eyes, indeterminate stage    Chronic kidney disease-mineral and bone disorder     Past Surgical History:   Procedure Laterality Date    AV FISTULA PLACEMENT Left 12/8/2020    Procedure: CREATION, AV FISTULA;  Surgeon: Benji Becerril MD;  Location: [facility] OR [address] [place] [address];  Service: Peripheral Vascular;  Laterality: Left;    AV FISTULA PLACEMENT Right 12/7/2022    Procedure: CREATION, AV FISTULA;  Surgeon: Benji Becerril MD;  [facility];  Service: Peripheral Vascular;  Laterality: Right;  RUE    CATARACT EXTRACTION W/  INTRAOCULAR LENS IMPLANT Right 04/04/16    Dr Meehan    COLONOSCOPY W/ BIOPSIES      ESOPHAGOGASTRODUODENOSCOPY N/A 6/29/2020    Procedure: EGD (ESOPHAGOGASTRODUODENOSCOPY);  Surgeon: Ravi Leone MD;  Location: [facility];  Service: Endoscopy;  Laterality: N/A;    EYE SURGERY      FISTULOGRAM Left 4/16/2021    Procedure: Fistulogram;  Surgeon: Benji Becerril MD;  Location: [facility] CATH LAB;  Service: Peripheral Vascular;  Laterality: Left;    FISTULOGRAM Left 9/28/2022    Procedure: Fistulogram;  Surgeon: Benji Becerril MD;  Location: [facility] CATH LAB;  Service: Cardiology;  Laterality: Left;    HEMORRHOID SURGERY      Dr. Root [facility]    lateral internal anal sphincterotomy  12/13/13    Dr. root [facility]    PERCUTANEOUS TRANSLUMINAL ANGIOPLASTY OF ARTERIOVENOUS FISTULA Left 4/16/2021    Procedure: PTA, AV FISTULA;  Surgeon: Benji Becerril MD;  Location: [facility] CATH LAB;  Service: Peripheral Vascular;  Laterality: Left;    PERCUTANEOUS TRANSLUMINAL ANGIOPLASTY OF ARTERIOVENOUS FISTULA N/A 9/28/2022    Procedure: PTA, AV FISTULA;  Surgeon: Benji Becerril MD;  Location: [facility] CATH LAB;  Service: Cardiology;  Laterality: N/A;    URETERAL STENT PLACEMENT            Pre-op Assessment    I have reviewed the Patient Summary Reports.     I have reviewed the Nursing Notes. I have reviewed the NPO Status.      Review of Systems      Physical Exam  General: Well nourished    Airway:  Mallampati: II   Mouth Opening: Normal  TM Distance: Normal  Tongue: Normal  Neck ROM: Normal ROM        Anesthesia Plan  Type of Anesthesia, risks & benefits discussed:    Anesthesia Type: Gen ETT, Gen Natural Airway  Intra-op Monitoring Plan: Standard ASA Monitors  Post Op Pain Control Plan: multimodal analgesia  Induction:  IV  Airway Plan: Direct  Informed Consent: Informed consent signed with the Patient and all parties understand the risks and agree with anesthesia plan.  All questions answered.   ASA Score: 3  Day of Surgery Review of History & Physical: H&P Update referred to the surgeon/provider.    Ready For Surgery From Anesthesia Perspective.     .

## 2023-02-03 NOTE — PLAN OF CARE
Discharge instructions given and explained to patient and family with verbalization of understanding all instructions. No prescription given at this time. Patients v/s stable, denies n/v and tolerating po, rates pain level tolerable, IV removed, and family at bedside for patient discharge home.

## 2023-02-03 NOTE — BRIEF OP NOTE
[facility] - Surgery ([facility])  Brief Operative Note    Surgery Date: 2/3/2023     Surgeon(s) and Role:     * Benji Becerril MD - Primary     * Ciro Ordaz MD - Fellow    Assisting Surgeon: None    Pre-op Diagnosis:  End stage renal failure on dialysis [N18.6, Z99.2]    Post-op Diagnosis:  Post-Op Diagnosis Codes:     * End stage renal failure on dialysis [N18.6, Z99.2]    Procedure(s) (LRB):  FISTULOGRAM, WITH PTA (Right)  EXCHANGE, PERMACATH (N/A)    Anesthesia: Choice    Operative Findings: No stenosis seen on Fistulogram, Permacath placed in good position    Estimated Blood Loss: * No values recorded between 2/3/2023  2:01 PM and 2/3/2023  2:36 PM *         Specimens:   Specimen (24h ago, onward)      None              Discharge Note    OUTCOME: Patient tolerated treatment/procedure well without complication and is now ready for discharge.    DISPOSITION: Home or Self Care    FINAL DIAGNOSIS:  <principal problem not specified>    FOLLOWUP: In clinic    DISCHARGE INSTRUCTIONS:  No discharge procedures on file.      Ciro Ordaz MD PGY7  Vascular Surgery Fellow  [phone]

## 2023-02-03 NOTE — TRANSFER OF CARE
Anesthesia Transfer of Care Note    Patient: Vinnie Siegel    Procedure(s) Performed: Procedure(s) (LRB):  FISTULOGRAM, WITH PTA (Right)  EXCHANGE, PERMACATH (N/A)    Patient location: PACU    Anesthesia Type: MAC    Transport from OR: Transported from OR on 2-3 L/min O2 by NC with adequate spontaneous ventilation    Post pain: adequate analgesia    Post assessment: no apparent anesthetic complications    Post vital signs: stable    Level of consciousness: awake and alert    Nausea/Vomiting: no nausea/vomiting    Complications: none    Transfer of care protocol was followed      Last vitals:   Visit Vitals  BP (!) 141/66 (BP Location: Left arm, Patient Position: Lying)   Pulse 63   Temp 36.6 °C (97.9 °F) (Oral)   Resp 18   SpO2 100%

## 2023-02-03 NOTE — PLAN OF CARE
Chart reviewed. Preop nursing care complete per orders. Safe surgery checklist complete. Per thania Parikh to use left lower arm for IV and no potassium lab needed today. Belongings placed in postop locker.  Waiting for H&P update, site sudha, and updated surgery consent prior to surgery.

## 2023-02-03 NOTE — H&P
Vinnie Siegel  02/02/2023     Presents today for R sided fistulagram and permacath revision. Evidence of stenosis on US. Permcath not drawing back.      HPI:  This is a 75 -year-old -American male with nephrolithiasis, hypertension and diabetes,ckd, proteinuria who is coming in for f/u of  Nephrolithiasis, ckd, HTN and proteinuria. No recent stones and denies any hematuria, dysuria, or flank pain. DM not well controlled in the past but much better at a1c of 5.9 recently.  Unclear about  Bp's at home.    Creatinine high. Has Proteinuria. Admitted to ICU in June 2020 with HHS, encephalopathy, GI bleed, ADONYA, respiratory failure and sepsis. Sepsis was due to E. Coli UTI and Strep agalactiae pneumonia. Pt lost f/u and was last seen in 2017 in renal clinic. Denies NSAID's.      PAST MEDICAL HISTORY: Significant for hypertension, diabetes for several years,   and nephrolithiasis. The patient had first episode about several  years ago and had to   have a subsequent procedure and second stone was in August 2012 and he   had left ureteroscopy for that. He states that he was never symptomatic with   either of the stones and was found on imaging.      LUE brachiocephalic AVF placed 12/8/2020.     Interval History:  Vinnie Siegel is here today for f/u after fistulagram of LUE brachiocephalic AVF done for focal distal stenosis which has resolved since that procedure. monocryl stitch in place that was removed today, healing well. He is using his AVF for dialysis 3 days a week now, Tues, Thurs, and Sat. US of AVF shows flow of 2043 ml/min and distal fistula velocity of 351 which is likely from a valve remnant.      Patient previously stated that he has an appt with Dr. Vasquez to discuss PD catheter placement, but will not be doing this.     10/20/22  Pt is a 75 year old established patient who comes in for evaluation of new HD access creation. Patient underwent fistulogram with attempt at declot of LUE AVF but was  unsuccessful. Subsequently had RIJ permacath placement. Is receiving dialysis T Th Sa without any issues. Continues to take eliquis and statin.    1/5/23  S/p R brachiocephalic AVF 12/7/22. Denies pain, erythema, numbness on RUE. Currently using trialysis line for dialysis T, Th, Sat.           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      Hyperlipidemia      Hypertension      Kidney stone      Seasonal allergies 6/24/2014            Past Surgical History:   Procedure Laterality Date    AV FISTULA PLACEMENT Left 12/8/2020     Procedure: CREATION, AV FISTULA;  Surgeon: Benji Becerril MD;  [facility];  Service: Peripheral Vascular;  Laterality: Left;    AV FISTULA PLACEMENT Right 12/7/2022     Procedure: CREATION, AV FISTULA;  Surgeon: Benji Becerril MD;  [facility];  Service: Peripheral Vascular;  Laterality: Right;  RUE    CATARACT EXTRACTION W/  INTRAOCULAR LENS IMPLANT Right 04/04/16     Dr Meehan    COLONOSCOPY W/ BIOPSIES        ESOPHAGOGASTRODUODENOSCOPY N/A 6/29/2020     Procedure: EGD (ESOPHAGOGASTRODUODENOSCOPY);  Surgeon: Ravi Leone MD;  Location: [facility] [place], [place];  Service: Endoscopy;  Laterality: N/A;    EYE SURGERY        FISTULOGRAM Left 4/16/2021     Procedure: Fistulogram;  Surgeon: Benji Becerril MD;  Location: [facility] CATH LAB;  Service: Peripheral Vascular;  Laterality: Left;    FISTULOGRAM Left 9/28/2022     Procedure: Fistulogram;  Surgeon: Benji Becerril MD;  Location: [facility] CATH LAB;  Service: Cardiology;  Laterality: Left;    HEMORRHOID SURGERY         Dr. Root ADRYAN    lateral internal anal sphincterotomy   12/13/13     Dr. root ADRYAN    PERCUTANEOUS TRANSLUMINAL ANGIOPLASTY OF ARTERIOVENOUS FISTULA Left 4/16/2021     Procedure: PTA, AV FISTULA;  Surgeon: Benji  BETTY Becerril MD;  Location: [facility] CATH LAB;  Service: Peripheral Vascular;  Laterality: Left;    PERCUTANEOUS TRANSLUMINAL ANGIOPLASTY OF ARTERIOVENOUS FISTULA N/A 2022     Procedure: PTA, AV FISTULA;  Surgeon: Benji Becerril MD;  Location: [facility] CATH LAB;  Service: Cardiology;  Laterality: N/A;    URETERAL STENT PLACEMENT                Family History   Problem Relation Age of Onset    Diabetes Brother           type 1    Hypertension Brother      Stroke Brother        Social History               Socioeconomic History    Marital status: Single   Tobacco Use    Smoking status: Former       Packs/day: 0.50       Years: 45.00       Pack years: 22.50       Types: Cigarettes       Quit date: 2020       Years since quittin.6    Smokeless tobacco: Never   Substance and Sexual Activity    Alcohol use: Not Currently       Comment: rarely    Drug use: No    Sexual activity: Yes       Partners: Female       Comment: single, retired , no kids   Social History Narrative     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      Social Determinants of Health          Financial Resource Strain: Low Risk     Difficulty of Paying Living Expenses: Not very hard   Food Insecurity: No Food Insecurity    Worried About Running Out of Food in the Last Year: Never true    Ran Out of Food in the Last Year: Never true   Transportation Needs: No Transportation Needs    Lack of Transportation (Medical): No    Lack of Transportation (Non-Medical): No   Physical Activity: Inactive    Days of Exercise per Week: 0 days    Minutes of Exercise per Session: 0 min   Stress: Stress Concern Present    Feeling of Stress : To some extent   Social Connections: Unknown    Frequency of Communication with Friends and Family: Three times a week    Frequency of Social Gatherings with Friends and Family: Three times a week   Housing Stability: Low Risk     Unable to Pay for Housing in the Last Year: No    Number of Places  Lived in the Last Year: 1    Unstable Housing in the Last Year: No              Current Outpatient Medications on File Prior to Visit   Medication Sig    apixaban (ELIQUIS) 2.5 mg Tab Take 1 tablet (2.5 mg total) by mouth 2 (two) times daily.    atorvastatin (LIPITOR) 40 MG tablet Take 1 tablet (40 mg total) by mouth once daily.    blood sugar diagnostic (TRUE METRIX GLUCOSE TEST STRIP) Strp Use to check blood glucose 3-5 times daily as directed.    blood sugar diagnostic Strp To check BG 4 times daily, to use with insurance preferred meter    calcium acetate,phosphat bind, (PHOSLO) 667 mg capsule SMARTSI Capsule(s) By Mouth    ergocalciferol (ERGOCALCIFEROL) 50,000 unit Cap Take 1 capsule (50,000 Units total) by mouth every 7 days. (Patient taking differently: Take 50,000 Units by mouth every 7 days. Patient takes on )    ferrous sulfate (FEOSOL) 325 mg (65 mg iron) Tab tablet 3 TABS A DAY. (Patient taking differently: every evening. 3 TABS A DAY.)    fluticasone propionate (FLONASE) 50 mcg/actuation nasal spray SHAKE LIQUID AND USE 2 SPRAYS(100 MCG) IN EACH NOSTRIL EVERY DAY    hydrALAZINE (APRESOLINE) 100 MG tablet Take 1 tablet (100 mg total) by mouth 3 (three) times daily.    HYDROcodone-acetaminophen (NORCO) 7.5-325 mg per tablet Take 1 tablet by mouth 3 (three) times daily as needed.    insulin (LANTUS SOLOSTAR U-100 INSULIN) glargine 100 units/mL SubQ pen INJECT 28-36 UNITS UNDER THE SKIN AT NIGHT    insulin lispro (HUMALOG KWIKPEN INSULIN) 100 unit/mL pen INJECT 8-12 UNITS EVERY DAY WITH MEALS PLUS SCALE. MAX DAILY50 UNITS    ipratropium-albuteroL (COMBIVENT)  mcg/actuation inhaler Inhale 1 puff into the lungs every 6 (six) hours as needed for Wheezing or Shortness of Breath. Rescue    lancets Misc To check BG 4 times daily, to use with insurance preferred meter    metoprolol succinate (TOPROL-XL) 25 MG 24 hr tablet Take 0.5 tablets (12.5 mg total) by mouth once daily.    NIFEdipine  "(PROCARDIA-XL) 90 MG (OSM) 24 hr tablet Take 1 tablet (90 mg total) by mouth once daily.    pantoprazole (PROTONIX) 40 MG tablet TAKE 1 TABLET(40 MG) BY MOUTH EVERY DAY    pen needle, diabetic (BD ULTRA-FINE SHORT PEN NEEDLE) 31 gauge x 5/16" Ndle USE FOUR TIMES DAILY    tamsulosin (FLOMAX) 0.4 mg Cap TAKE ONE CAPSULE BY MOUTH EVERY MORNING    timolol maleate 0.25% (TIMOPTIC) 0.25 % Drop PLACE 1 DROP IN BOTH EYES TWICE DAILY    torsemide (DEMADEX) 20 MG Tab Take 1 tablet (20 mg total) by mouth 2 (two) times daily.    blood-glucose meter kit To check BG 4 times daily, to use with insurance preferred meter, e 11.65      No current facility-administered medications on file prior to visit.         REVIEW OF SYSTEMS:  General: negative; ENT: negative; Allergy and Immunology: negative; Hematological and Lymphatic: Negative; Endocrine: negative; Respiratory: no cough, shortness of breath, or wheezing; Cardiovascular: no chest pain or dyspnea on exertion; Gastrointestinal: no abdominal pain/back, change in bowel habits, or bloody stools; Genito-Urinary: no dysuria, trouble voiding, or hematuria; Musculoskeletal: negative  Neurological: no TIA or stroke symptoms     PHYSICAL EXAM:   Left Arm BP - Sittin/63 (23 1046)  Pulse: 62  Temp: 98.7 °F (37.1 °C)       General appearance:          Alert, obese. well-appearing, and in no distress.  Oriented to person, place, and time              Neurological: Normal speech, no focal findings noted; CN II - XII grossly intact           Musculoskeletal:          Digits/nail without cyanosis/clubbing.  Normal muscle strength/tone.                            Neck:            Supple, no significant adenopathy; thyroid is not enlarged                                                  No carotid bruit can be auscultated                           Chest:            Clear to auscultation, no wheezes, rales or rhonchi, symmetric air entry                                      No use of " accessory muscles                        Cardiac:           Normal rate and regular rhythm, S1 and S2 normal; PMI non-displaced                     Abdomen:           Soft, nontender, nondistended, no masses or organomegaly                                      No rebound tenderness noted; bowel sounds normal                                      Pulsatile aortic mass is not palpable.                                      No groin adenopathy                 Extremities:  RUE AVF incision CDI with stitches in place. Palpable thrill.                                       2+ left radial and brachial pulse                                      LUE AVF with slight thrill proximally, pulsatile throughout                                      2+ right radial and brachial pulse     LAB RESULTS:        Lab Results   Component Value Date     K 3.8 11/23/2022     K 4.6 09/29/2022     K 4.3 09/28/2022     CREATININE 5.7 (H) 11/23/2022     CREATININE 7.7 (H) 09/29/2022     CREATININE 8.4 (H) 09/28/2022            Lab Results   Component Value Date     WBC 4.14 11/23/2022     WBC 6.34 09/29/2022     WBC 6.24 09/28/2022     HCT 39 12/07/2022     HCT 34.6 (L) 11/23/2022     HCT 29.8 (L) 09/29/2022      11/23/2022      09/29/2022      09/28/2022            Lab Results   Component Value Date     HGBA1C 12.4 (H) 09/27/2022     HGBA1C 6.6 (H) 09/21/2021     HGBA1C 5.5 08/18/2021      IMAGING:  End Stage Renal Disease on Dialysis     Dialysis Access   =============     Dialysis access location:  Right Arm   Type of AV access: Brachiocephalic AVF   Rt inflow A PSV    351 cm/s   Rt at anastomosis PSV  544 cm/s   Rt A distal anastomosis PSV    695 cm/s   Rt fistula prox depth  3.1 mm   Rt fistula prox AP diam    7.4 mm   Rt fistula prox PSV    173 cm/s   Rt fistula mid depth   4.0 mm   Rt fistula mid AP diam 5.7 mm   Rt fistula mid  cm/s   Rt fistula mid flow volume 659.1 ml/min   Rt fistula distal depth    7.8 mm    Rt fistula distal AP diam  4.5 mm   Rt fistula distal PSV  153 cm/s   Rt outflow V prox PSV  153 cm/s   Rt outflow V mid PSV   144 cm/s   Rt outflow V distal PSV    235 cm/s     Impression   =========     Color flow duplex reveals a patent Rt Brachiocephalic AV fistula. There is a visual narrowing and a PSV increase from 134 cm/sec to   311 cm/sec noted in the mid AVF with a patent branch noted at the lesion. These findings do suggest a focal hemodynamically   significant stenosis. The volume flow at the mid bicep measures 659 mL/min.     IMP/PLAN:  77 y.o. male with morbid obesity and CKD V, on HD using his LUE AFV. S/p LUE brachiocephalic AVF on 12/8/20, now s/p LUE AVF fistulagram with unsuccessful declot. Patient requires new HD access creation.   S/p RUE AVF 12/7/22.  Flow volume has decreased from 1700 to 700 - fistulagram is warranted.       - To OR for RUE fistulogram and permacath revision - replacement onto left side     Vadim Loving MD  [facility] PGY3

## 2023-02-04 NOTE — OP NOTE
Surgery Date: 2/3/2023      Surgeon(s) and Role:     * Benji Becerril MD - Primary     * Ciro Ordaz MD - Fellow     Assisting Surgeon: None     Pre-op Diagnosis:  End stage renal failure on dialysis [N18.6, Z99.2]     Post-op Diagnosis:  Post-Op Diagnosis Codes:     * End stage renal failure on dialysis [N18.6, Z99.2]     Procedure(s) (LRB):  Right upper extremity FISTULOGRAM  2. EXCHANGE of PERMACATH      Anesthesia: Choice     Operative Findings: No stenosis seen on Fistulogram, Permacath placed in good position     Estimated Blood Loss: * No values recorded between 2/3/2023  2:01 PM and 2/3/2023  2:36 PM *         Specimens:   Specimen (24h ago, onward)        None               Anesthesia: Local MAC   Findings/Key Components:   Successful treatment of > no stenosis  Strong palpable thrill   EBL: <10 ml  PROCEDURE IN DETAIL:After an informed consent was obtained the patient was taken to the interventional suite and placed in the supine position.  The patient was brought to the OR, placed in supine. Arm was prepped and draped in the standard surgical fashion. Under ultrasound guidance, the proximal aspect of the AVF was accessed with a micropuncture needle; ultrasound confirmed vessel patency, followed by placement of 4/3-Jamaican micropuncture dilator. Through this, an 0.035-inch wire was placed in the short 6-Jamaican sheath. Through this, an 0.035-inch Glidewire was placed through the high-grade stenosis, which was demonstrated by the angiogram.  Heparin 5000u was given.  The ultrasound demonstrated vessel patency. No stenosis was seen. Strong thrill could be felt. The sheath was removed, 3-0 nylon U-stitch was placed with good hemostasis. The patient was prepped and draped in usual sterile fashion. WE exchanged out the old permacatheter for a new permacatheter. This was done over the wire.  the internal jugular vein using a Seldinger technique with an 18-gauge single wall needle and a J-wire was passed  into the superior vena cava through the heart into the inferior vena cava. The wire position was confirmed with intraoperative fluoroscopy. The wire was temporarily held in place with a hemostat to drapes. Fluoroscopy was used to estimate the position of the catheter.  Local anesthetic was used to anesthetize the entire tract of the catheter. The dual lumen catheter was tunneled underneath the chest wall over the clavicle to the neck incision and the felt cuff was within the chest wall subcutaneous tissue. The tract was serially dilated.  The catheter was positioned appropriately at the right atrial SVC junction. Fluoroscopy was used to confirm that the catheter tip was in appropriate position that there was no kinking at the apex of the catheter. A permanent recording of the catheter position was also taken with fluoroscopy. Both lumens had excellent draw and flush. The catheter was then secured in place with 3-0 Prolene. The counterincision in the neck and chest incision were closed with a 3-0 Monocryl and the catheter was then locked with 1000 unit/mL heparin and sterile dressing was applied.     Ciro Ordaz MD PGY7  Vascular Surgery Fellow  [phone]

## 2023-02-06 DIAGNOSIS — Z99.2 END STAGE RENAL FAILURE ON DIALYSIS: Primary | ICD-10-CM

## 2023-02-06 DIAGNOSIS — N18.6 END STAGE RENAL FAILURE ON DIALYSIS: Primary | ICD-10-CM

## 2023-02-07 NOTE — ANESTHESIA POSTPROCEDURE EVALUATION
Anesthesia Post Evaluation    Patient: Vinnie Siegel    Procedure(s) Performed: Procedure(s) (LRB):  FISTULOGRAM, WITH PTA (Right)  EXCHANGE, PERMACATH (N/A)    Final Anesthesia Type: general      Patient location during evaluation: PACU  Patient participation: Yes- Able to Participate  Level of consciousness: awake and alert and oriented  Pain management: adequate  Airway patency: patent    PONV status at discharge: No PONV  Anesthetic complications: no      Cardiovascular status: blood pressure returned to baseline and hemodynamically stable  Respiratory status: unassisted  Hydration status: euvolemic  Follow-up not needed.          Vitals Value Taken Time   /75 02/03/23 1530     02/07/23 0921   Pulse 56 02/03/23 1531   Resp 18 02/03/23 1530   SpO2 100 % 02/03/23 1531   Vitals shown include unvalidated device data.      No case tracking events are documented in the log.      Pain/Felicity Score: No data recorded

## 2023-02-23 ENCOUNTER — OFFICE VISIT (OUTPATIENT)
Dept: VASCULAR SURGERY | Facility: CLINIC | Age: 78
End: 2023-02-23
Attending: SURGERY
Payer: MEDICARE

## 2023-02-23 ENCOUNTER — HOSPITAL ENCOUNTER (OUTPATIENT)
Dept: VASCULAR SURGERY | Facility: CLINIC | Age: 78
Discharge: HOME | End: 2023-02-23
Attending: SURGERY
Payer: MEDICARE

## 2023-02-23 VITALS
TEMPERATURE: 99 F | SYSTOLIC BLOOD PRESSURE: 151 MMHG | HEART RATE: 61 BPM | WEIGHT: 293.19 LBS | HEIGHT: 74 IN | BODY MASS INDEX: 37.63 KG/M2 | DIASTOLIC BLOOD PRESSURE: 72 MMHG

## 2023-02-23 DIAGNOSIS — T82.858D ARTERIOVENOUS FISTULA STENOSIS, SUBSEQUENT ENCOUNTER: ICD-10-CM

## 2023-02-23 DIAGNOSIS — N18.6 END STAGE RENAL FAILURE ON DIALYSIS: ICD-10-CM

## 2023-02-23 DIAGNOSIS — Z99.2 END STAGE RENAL FAILURE ON DIALYSIS: ICD-10-CM

## 2023-02-23 DIAGNOSIS — N18.6 END STAGE RENAL FAILURE ON DIALYSIS: Primary | ICD-10-CM

## 2023-02-23 DIAGNOSIS — Z99.2 END STAGE RENAL FAILURE ON DIALYSIS: Primary | ICD-10-CM

## 2023-02-23 PROCEDURE — 93990 DOPPLER FLOW TESTING: CPT | Mod: 26,S$PBB,, | Performed by: SURGERY

## 2023-02-23 PROCEDURE — 99999 PR PBB SHADOW E&M-EST. PATIENT-LVL IV: ICD-10-PCS | Mod: PBBFAC,,, | Performed by: SURGERY

## 2023-02-23 PROCEDURE — 93990 DOPPLER FLOW TESTING: CPT | Mod: PBBFAC | Performed by: SURGERY

## 2023-02-23 PROCEDURE — 99024 PR POST-OP FOLLOW-UP VISIT: ICD-10-PCS | Mod: POP,,, | Performed by: SURGERY

## 2023-02-23 PROCEDURE — 99999 PR PBB SHADOW E&M-EST. PATIENT-LVL IV: CPT | Mod: PBBFAC,,, | Performed by: SURGERY

## 2023-02-23 PROCEDURE — 99214 OFFICE O/P EST MOD 30 MIN: CPT | Mod: PBBFAC | Performed by: SURGERY

## 2023-02-23 PROCEDURE — 93990 PR DUPLEX HEMODIALYSIS ACCESS: ICD-10-PCS | Mod: 26,S$PBB,, | Performed by: SURGERY

## 2023-02-23 PROCEDURE — 99024 POSTOP FOLLOW-UP VISIT: CPT | Mod: POP,,, | Performed by: SURGERY

## 2023-02-23 NOTE — PROGRESS NOTES
Vinnie Siegel  02/23/2023      Patient now s/p LUE BC AVF with recent fistulagram showing some tortuosity but no significant stenosis.  Doing well.  HD via RIJ permacath.     Interval history:  10/20/22  Pt is a 75 year old established patient who comes in for evaluation of new HD access creation. Patient underwent fistulogram with attempt at declot of LUE AVF but was unsuccessful. Subsequently had RIJ permacath placement. Is receiving dialysis T Th Sa without any issues. Continues to take eliquis and statin.    HPI:  This is a 75 -year-old -American male with nephrolithiasis, hypertension and diabetes,ckd, proteinuria who is coming in for f/u of  Nephrolithiasis, ckd, HTN and proteinuria. No recent stones and denies any hematuria, dysuria, or flank pain. DM not well controlled in the past but much better at a1c of 5.9 recently.  Unclear about  Bp's at home.    Creatinine high. Has Proteinuria. Admitted to ICU in June 2020 with HHS, encephalopathy, GI bleed, ADONAY, respiratory failure and sepsis. Sepsis was due to E. Coli UTI and Strep agalactiae pneumonia. Pt lost f/u and was last seen in 2017 in renal clinic. Denies NSAID's.      PAST MEDICAL HISTORY: Significant for hypertension, diabetes for several years,   and nephrolithiasis. The patient had first episode about several  years ago and had to   have a subsequent procedure and second stone was in August 2012 and he   had left ureteroscopy for that. He states that he was never symptomatic with   either of the stones and was found on imaging.     LUE brachiocephalic AVF placed 12/8/2020.    Interval History:  Vinnie Siegel is here today for f/u after fistulagram of LUE brachiocephalic AVF done for focal distal stenosis which has resolved since that procedure. monocryl stitch in place that was removed today, healing well. He is using his AVF for dialysis 3 days a week now, Tues, Thurs, and Sat. US of AVF shows flow of 2043 ml/min and distal fistula  velocity of 351 which is likely from a valve remnant.     Patient previously stated that he has an appt with Dr. Vasquez to discuss PD catheter placement, but will not be doing this.    1/5/23  S/p R brachiocephalic AVF 12/7/22. Denies pain, erythema, numbness on RUE. Currently using trialysis line for dialysis T, Th, Sat.     Interval history 2/23/23:  Patient is here after fistulogram on 2/3/23 due to low velocity - No stenosis seen on Fistulogram, Permacath placed in good position.     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     Hyperlipidemia     Hypertension     Kidney stone     Seasonal allergies 6/24/2014     Past Surgical History:   Procedure Laterality Date    AV FISTULA PLACEMENT Left 12/8/2020    Procedure: CREATION, AV FISTULA;  Surgeon: Benji Becerril MD;  Location: [facility] OR [address] [place] [address];  Service: Peripheral Vascular;  Laterality: Left;    AV FISTULA PLACEMENT Right 12/7/2022    Procedure: CREATION, AV FISTULA;  Surgeon: Benji Becerril MD;  Location: [facility] OR [address] [place] [address];  Service: Peripheral Vascular;  Laterality: Right;  RUE    CATARACT EXTRACTION W/  INTRAOCULAR LENS IMPLANT Right 04/04/16    Dr Meehan    COLONOSCOPY W/ BIOPSIES      ESOPHAGOGASTRODUODENOSCOPY N/A 6/29/2020    Procedure: EGD (ESOPHAGOGASTRODUODENOSCOPY);  Surgeon: Ravi Leone MD;  Location: [facility];  Service: Endoscopy;  Laterality: N/A;    EYE SURGERY      FISTULOGRAM Left 4/16/2021    Procedure: Fistulogram;  Surgeon: Benji Becerril MD;  Location: [facility] CATH LAB;  Service: Peripheral Vascular;  Laterality: Left;    FISTULOGRAM Left 9/28/2022    Procedure: Fistulogram;  Surgeon: Benji Becerril MD;  Location: [facility] CATH LAB;  Service: Cardiology;  Laterality: Left;    FISTULOGRAM, WITH PTA Right 2/3/2023    Procedure: FISTULOGRAM, WITH  PTA;  Surgeon: Benji Becerril MD;  Location: [facility] OR [address] [place] [address];  Service: Peripheral Vascular;  Laterality: Right;  205.17 mGy  2.3 min    HEMORRHOID SURGERY      Dr. Root [facility]    lateral internal anal sphincterotomy  13    Dr. root [facility]    PERCUTANEOUS TRANSLUMINAL ANGIOPLASTY OF ARTERIOVENOUS FISTULA Left 2021    Procedure: PTA, AV FISTULA;  Surgeon: Benji Becerril MD;  Location: [facility] CATH LAB;  Service: Peripheral Vascular;  Laterality: Left;    PERCUTANEOUS TRANSLUMINAL ANGIOPLASTY OF ARTERIOVENOUS FISTULA N/A 2022    Procedure: PTA, AV FISTULA;  Surgeon: Benji Becerril MD;  Location: [facility] CATH LAB;  Service: Cardiology;  Laterality: N/A;    PLACEMENT OF DUAL-LUMEN VASCULAR CATHETER N/A 2/3/2023    Procedure: EXCHANGE, PERMACATH;  Surgeon: Benji Becerril MD;  Location: [facility] OR [address] [place] [address];  Service: Peripheral Vascular;  Laterality: N/A;    URETERAL STENT PLACEMENT       Family History   Problem Relation Age of Onset    Diabetes Brother         type 1    Hypertension Brother     Stroke Brother      Social History     Socioeconomic History    Marital status: Single   Tobacco Use    Smoking status: Former     Packs/day: 0.50     Years: 45.00     Pack years: 22.50     Types: Cigarettes     Quit date: 2020     Years since quittin.6    Smokeless tobacco: Never   Substance and Sexual Activity    Alcohol use: Not Currently     Comment: rarely    Drug use: No    Sexual activity: Yes     Partners: Female     Comment: single, retired , no kids   Social History Narrative    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     Social Determinants of Health     Financial Resource Strain: Low Risk     Difficulty of Paying Living Expenses: Not very hard   Food Insecurity: No Food Insecurity    Worried About Running Out of Food in the Last Year: Never true    Ran Out of Food in the Last Year: Never true   Transportation Needs: No Transportation Needs    Lack of  Transportation (Medical): No    Lack of Transportation (Non-Medical): No   Physical Activity: Inactive    Days of Exercise per Week: 0 days    Minutes of Exercise per Session: 0 min   Stress: Stress Concern Present    Feeling of Stress : To some extent   Social Connections: Unknown    Frequency of Communication with Friends and Family: Three times a week    Frequency of Social Gatherings with Friends and Family: Three times a week   Housing Stability: Low Risk     Unable to Pay for Housing in the Last Year: No    Number of Places Lived in the Last Year: 1    Unstable Housing in the Last Year: No     Current Outpatient Medications on File Prior to Visit   Medication Sig    apixaban (ELIQUIS) 2.5 mg Tab Take 1 tablet (2.5 mg total) by mouth 2 (two) times daily.    atorvastatin (LIPITOR) 40 MG tablet Take 1 tablet (40 mg total) by mouth once daily.    blood sugar diagnostic (TRUE METRIX GLUCOSE TEST STRIP) Strp Use to check blood glucose 3-5 times daily as directed.    blood sugar diagnostic Strp To check BG 4 times daily, to use with insurance preferred meter    blood-glucose meter kit To check BG 4 times daily, to use with insurance preferred meter, e 11.65    calcium acetate,phosphat bind, (PHOSLO) 667 mg capsule SMARTSI Capsule(s) By Mouth    ergocalciferol (ERGOCALCIFEROL) 50,000 unit Cap Take 1 capsule (50,000 Units total) by mouth every 7 days. (Patient taking differently: Take 50,000 Units by mouth every 7 days. Patient takes on )    ferrous sulfate (FEOSOL) 325 mg (65 mg iron) Tab tablet 3 TABS A DAY. (Patient taking differently: every evening. 3 TABS A DAY.)    fluticasone propionate (FLONASE) 50 mcg/actuation nasal spray SHAKE LIQUID AND USE 2 SPRAYS(100 MCG) IN EACH NOSTRIL EVERY DAY    hydrALAZINE (APRESOLINE) 100 MG tablet Take 1 tablet (100 mg total) by mouth 3 (three) times daily.    HYDROcodone-acetaminophen (NORCO) 7.5-325 mg per tablet Take 1 tablet by mouth 3 (three) times daily as needed.  "   insulin (LANTUS SOLOSTAR U-100 INSULIN) glargine 100 units/mL SubQ pen INJECT 28-36 UNITS UNDER THE SKIN AT NIGHT    insulin lispro (HUMALOG KWIKPEN INSULIN) 100 unit/mL pen INJECT 8-12 UNITS EVERY DAY WITH MEALS PLUS SCALE. MAX DAILY50 UNITS    ipratropium-albuteroL (COMBIVENT)  mcg/actuation inhaler Inhale 1 puff into the lungs every 6 (six) hours as needed for Wheezing or Shortness of Breath. Rescue    lancets Misc To check BG 4 times daily, to use with insurance preferred meter    metoprolol succinate (TOPROL-XL) 25 MG 24 hr tablet Take 0.5 tablets (12.5 mg total) by mouth once daily.    NIFEdipine (PROCARDIA-XL) 90 MG (OSM) 24 hr tablet Take 1 tablet (90 mg total) by mouth once daily.    pantoprazole (PROTONIX) 40 MG tablet TAKE 1 TABLET(40 MG) BY MOUTH EVERY DAY    pen needle, diabetic (BD ULTRA-FINE SHORT PEN NEEDLE) 31 gauge x 5/16" Ndle USE FOUR TIMES DAILY    tamsulosin (FLOMAX) 0.4 mg Cap TAKE ONE CAPSULE BY MOUTH EVERY MORNING    timolol maleate 0.25% (TIMOPTIC) 0.25 % Drop PLACE 1 DROP IN BOTH EYES TWICE DAILY    torsemide (DEMADEX) 20 MG Tab Take 1 tablet (20 mg total) by mouth 2 (two) times daily.     No current facility-administered medications on file prior to visit.       REVIEW OF SYSTEMS:  General: negative; ENT: negative; Allergy and Immunology: negative; Hematological and Lymphatic: Negative; Endocrine: negative; Respiratory: no cough, shortness of breath, or wheezing; Cardiovascular: no chest pain or dyspnea on exertion; Gastrointestinal: no abdominal pain/back, change in bowel habits, or bloody stools; Genito-Urinary: no dysuria, trouble voiding, or hematuria; Musculoskeletal: negative  Neurological: no TIA or stroke symptoms    PHYSICAL EXAM:                General appearance:  Alert, obese. well-appearing, and in no distress.  Oriented to person, place, and time   Neurological: Normal speech, no focal findings noted; CN II - XII grossly intact           Musculoskeletal: Digits/nail " without cyanosis/clubbing.  Normal muscle strength/tone.                 Neck: Supple, no significant adenopathy; thyroid is not enlarged                  No carotid bruit can be auscultated                Chest:  Clear to auscultation, no wheezes, rales or rhonchi, symmetric air entry     No use of accessory muscles             Cardiac: Normal rate and regular rhythm, S1 and S2 normal; PMI non-displaced          Abdomen: Soft, nontender, nondistended, no masses or organomegaly     No rebound tenderness noted; bowel sounds normal     Pulsatile aortic mass is not palpable.     No groin adenopathy      Extremities:  RUE AVF incision CDI with stitches in place. Palpable thrill.      2+ left radial and brachial pulse     LUE AVF with slight thrill proximally, pulsatile throughout     2+ right radial and brachial pulse    LAB RESULTS:  Lab Results   Component Value Date    K 3.8 11/23/2022    K 4.6 09/29/2022    K 4.3 09/28/2022    CREATININE 5.7 (H) 11/23/2022    CREATININE 7.7 (H) 09/29/2022    CREATININE 8.4 (H) 09/28/2022     Lab Results   Component Value Date    WBC 4.14 11/23/2022    WBC 6.34 09/29/2022    WBC 6.24 09/28/2022    HCT 39 12/07/2022    HCT 34.6 (L) 11/23/2022    HCT 29.8 (L) 09/29/2022     11/23/2022     09/29/2022     09/28/2022     Lab Results   Component Value Date    HGBA1C 12.4 (H) 09/27/2022    HGBA1C 6.6 (H) 09/21/2021    HGBA1C 5.5 08/18/2021     IMAGING:  Dialysis Access   =============     Dialysis access location:  Right Arm   Type of AV access: Brachiocephalic AVF   Rt inflow A PSV    318 cm/s   Rt at anastomosis PSV  700 cm/s   Rt A distal anastomosis PSV    689 cm/s   Rt fistula prox PSV    112 cm/s   Rt fistula mid  cm/s   Rt fistula mid flow volume 1,235.0 ml/min   Rt fistula distal PSV  176 cm/s   Rt outflow V prox PSV  164 cm/s   Rt outflow V distal PSV    149 cm/s     Impression   =========     Color flow duplex reveals a patent Rt Brachiocephalic AV  fistula. Elevated velocities noted at the anastomosis; however it appears   widely patent and does not suggest a hemodynamically significant stenosis. The volume flow at the mid bicep measures 1235   mL/min.     IMP/PLAN:  77 y.o. male with morbid obesity and CKD V, on HD using his LUE AFV. S/p LUE brachiocephalic AVF on 12/8/20, now s/p LUE AVF fistulagram with unsuccessful declot. Patient requires new HD access creation.   S/p RUE AVF 12/7/22.  Flow volume is <1200.  Ok to transition from permacath to AVF.     1) RTC in 3 weeks for permacath removal  2) RTC in 3 mo for AVF duplex    Benji Becerril MD  Vascular & Endovascular Surgery

## 2023-02-27 ENCOUNTER — OFFICE VISIT (OUTPATIENT)
Dept: OPTOMETRY | Facility: CLINIC | Age: 78
End: 2023-02-27
Payer: MEDICARE

## 2023-02-27 DIAGNOSIS — H40.1113 PRIMARY OPEN ANGLE GLAUCOMA (POAG) OF RIGHT EYE, SEVERE STAGE: Primary | ICD-10-CM

## 2023-02-27 DIAGNOSIS — H25.12 NUCLEAR SCLEROSIS OF LEFT EYE: ICD-10-CM

## 2023-02-27 DIAGNOSIS — H40.022 OPEN ANGLE WITH BORDERLINE FINDINGS, HIGH RISK, LEFT EYE: ICD-10-CM

## 2023-02-27 PROCEDURE — 92083 EXTENDED VISUAL FIELD XM: CPT | Mod: PBBFAC | Performed by: OPTOMETRIST

## 2023-02-27 PROCEDURE — 99999 PR PBB SHADOW E&M-EST. PATIENT-LVL III: CPT | Mod: PBBFAC,,, | Performed by: OPTOMETRIST

## 2023-02-27 PROCEDURE — 99999 PR PBB SHADOW E&M-EST. PATIENT-LVL III: ICD-10-PCS | Mod: PBBFAC,,, | Performed by: OPTOMETRIST

## 2023-02-27 PROCEDURE — 92133 CPTRZD OPH DX IMG PST SGM ON: CPT | Mod: PBBFAC | Performed by: OPTOMETRIST

## 2023-02-27 PROCEDURE — 92004 PR EYE EXAM, NEW PATIENT,COMPREHESV: ICD-10-PCS | Mod: S$PBB,,, | Performed by: OPTOMETRIST

## 2023-02-27 PROCEDURE — 92004 COMPRE OPH EXAM NEW PT 1/>: CPT | Mod: S$PBB,,, | Performed by: OPTOMETRIST

## 2023-02-27 PROCEDURE — 92083 HUMPHREY VISUAL FIELD - OU - BOTH EYES: ICD-10-PCS | Mod: 26,S$PBB,, | Performed by: OPTOMETRIST

## 2023-02-27 PROCEDURE — 99213 OFFICE O/P EST LOW 20 MIN: CPT | Mod: PBBFAC | Performed by: OPTOMETRIST

## 2023-02-27 PROCEDURE — 92133 POSTERIOR SEGMENT OCT OPTIC NERVE(OCULAR COHERENCE TOMOGRAPHY) - OU - BOTH EYES: ICD-10-PCS | Mod: 26,S$PBB,, | Performed by: OPTOMETRIST

## 2023-02-27 RX ORDER — TIMOLOL MALEATE 2.5 MG/ML
1 SOLUTION/ DROPS OPHTHALMIC 2 TIMES DAILY
Qty: 15 ML | Refills: 10 | Status: SHIPPED | OUTPATIENT
Start: 2023-02-27 | End: 2024-03-13 | Stop reason: ALTCHOICE

## 2023-02-27 NOTE — PROGRESS NOTES
Assessment /Plan     For exam results, see Encounter Report.    Primary open angle glaucoma (POAG) of right eye, severe stage  -     timolol maleate 0.25% (TIMOPTIC) 0.25 % Drop; Place 1 drop into both eyes 2 (two) times daily.  Dispense: 15 mL; Refill: 10  -     [facility] - OU - Extended - Both Eyes  -     OCT - Optic Nerve    Open angle with borderline findings, high risk, left eye  -     timolol maleate 0.25% (TIMOPTIC) 0.25 % Drop; Place 1 drop into both eyes 2 (two) times daily.  Dispense: 15 mL; Refill: 10  -     [facility] - OU - Extended - Both Eyes  -     OCT - Optic Nerve    Nuclear sclerosis of left eye        1-2.  Continue Timolol twice daily OU-refills given.  Testing OD stable.  Unable to do testing OS due to cataracts.   Patient does not recall having trauma to right eye.  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 Started patient on Latanoprost in 2013 but patient had side effects.  Scheduled appointment with glaucoma to establish care.    HVF: 02/27/23  OCT: 02/27/23  DFE: 02/27/23  Photos: 9/19/17  Gonio: 9/8/16  Pachy: 564 OD, 566 OS  Initial IOPs: 21 OD, 18 OS  MHx: DM, HTN  FHx: none  3.  Refer for cataract surgery.

## 2023-03-13 ENCOUNTER — TELEPHONE (OUTPATIENT)
Dept: ADMINISTRATIVE | Facility: HOSPITAL | Age: 78
End: 2023-03-13
Payer: MEDICARE

## 2023-03-13 ENCOUNTER — LAB VISIT (OUTPATIENT)
Dept: LAB | Facility: HOSPITAL | Age: 78
End: 2023-03-13
Payer: MEDICARE

## 2023-03-13 ENCOUNTER — OFFICE VISIT (OUTPATIENT)
Dept: ENDOCRINOLOGY | Facility: CLINIC | Age: 78
End: 2023-03-13
Payer: MEDICARE

## 2023-03-13 VITALS
BODY MASS INDEX: 37.65 KG/M2 | OXYGEN SATURATION: 99 % | SYSTOLIC BLOOD PRESSURE: 144 MMHG | WEIGHT: 293.19 LBS | RESPIRATION RATE: 16 BRPM | DIASTOLIC BLOOD PRESSURE: 56 MMHG | HEART RATE: 86 BPM

## 2023-03-13 DIAGNOSIS — E11.65 TYPE 2 DIABETES MELLITUS WITH HYPERGLYCEMIA, UNSPECIFIED WHETHER LONG TERM INSULIN USE: ICD-10-CM

## 2023-03-13 DIAGNOSIS — E78.2 MIXED DIABETIC HYPERLIPIDEMIA ASSOCIATED WITH TYPE 2 DIABETES MELLITUS: ICD-10-CM

## 2023-03-13 DIAGNOSIS — N18.6 TYPE 2 DIABETES MELLITUS WITH CHRONIC KIDNEY DISEASE ON CHRONIC DIALYSIS, WITH LONG-TERM CURRENT USE OF INSULIN: ICD-10-CM

## 2023-03-13 DIAGNOSIS — E11.69 MIXED DIABETIC HYPERLIPIDEMIA ASSOCIATED WITH TYPE 2 DIABETES MELLITUS: ICD-10-CM

## 2023-03-13 DIAGNOSIS — N25.81 SECONDARY HYPERPARATHYROIDISM OF RENAL ORIGIN: ICD-10-CM

## 2023-03-13 DIAGNOSIS — Z99.2 TYPE 2 DIABETES MELLITUS WITH CHRONIC KIDNEY DISEASE ON CHRONIC DIALYSIS, WITH LONG-TERM CURRENT USE OF INSULIN: ICD-10-CM

## 2023-03-13 DIAGNOSIS — Z79.4 TYPE 2 DIABETES MELLITUS WITH CHRONIC KIDNEY DISEASE ON CHRONIC DIALYSIS, WITH LONG-TERM CURRENT USE OF INSULIN: ICD-10-CM

## 2023-03-13 DIAGNOSIS — E11.22 TYPE 2 DIABETES MELLITUS WITH CHRONIC KIDNEY DISEASE ON CHRONIC DIALYSIS, WITH LONG-TERM CURRENT USE OF INSULIN: ICD-10-CM

## 2023-03-13 DIAGNOSIS — E66.01 MORBID (SEVERE) OBESITY DUE TO EXCESS CALORIES: ICD-10-CM

## 2023-03-13 LAB
CHOLEST SERPL-MCNC: 284 MG/DL (ref 120–199)
CHOLEST/HDLC SERPL: 5.8 {RATIO} (ref 2–5)
ESTIMATED AVG GLUCOSE: 278 MG/DL (ref 68–131)
HBA1C MFR BLD: 11.3 % (ref 4–5.6)
HDLC SERPL-MCNC: 49 MG/DL (ref 40–75)
HDLC SERPL: 17.3 % (ref 20–50)
LDLC SERPL CALC-MCNC: 202.2 MG/DL (ref 63–159)
NONHDLC SERPL-MCNC: 235 MG/DL
TRIGL SERPL-MCNC: 164 MG/DL (ref 30–150)

## 2023-03-13 PROCEDURE — 99204 PR OFFICE/OUTPT VISIT, NEW, LEVL IV, 45-59 MIN: ICD-10-PCS | Mod: S$PBB,,, | Performed by: INTERNAL MEDICINE

## 2023-03-13 PROCEDURE — 83036 HEMOGLOBIN GLYCOSYLATED A1C: CPT | Performed by: INTERNAL MEDICINE

## 2023-03-13 PROCEDURE — 99215 OFFICE O/P EST HI 40 MIN: CPT | Mod: PBBFAC | Performed by: INTERNAL MEDICINE

## 2023-03-13 PROCEDURE — 99999 PR PBB SHADOW E&M-EST. PATIENT-LVL V: ICD-10-PCS | Mod: PBBFAC,,, | Performed by: INTERNAL MEDICINE

## 2023-03-13 PROCEDURE — 99999 PR PBB SHADOW E&M-EST. PATIENT-LVL V: CPT | Mod: PBBFAC,,, | Performed by: INTERNAL MEDICINE

## 2023-03-13 PROCEDURE — 99204 OFFICE O/P NEW MOD 45 MIN: CPT | Mod: S$PBB,,, | Performed by: INTERNAL MEDICINE

## 2023-03-13 PROCEDURE — 80061 LIPID PANEL: CPT | Performed by: INTERNAL MEDICINE

## 2023-03-13 PROCEDURE — 36415 COLL VENOUS BLD VENIPUNCTURE: CPT | Performed by: INTERNAL MEDICINE

## 2023-03-13 NOTE — ASSESSMENT & PLAN NOTE
Uncontrolled with hyperglycemia.  Limited data today as he infrequently checking blood glucose and last hemoglobin A1c was close to 6 months ago at 12.4.  Unable to make significant adjustments to insulin today but have encouraged him to keep blood glucose log, check blood sugar more frequently, and add sliding scale Humalog to his prandial dosing.  Will have him follow-up with Diabetes Education to review log and help determine if we need to adjust his insulin regimen.      In the past tolerated Trulicity but it was not affordable.  Will try for Ozempic with gradual dose escalation.      Will send Dexcom G7 paperwork as he is on 4 injections of insulin per day and uncontrolled.

## 2023-03-13 NOTE — PATIENT INSTRUCTIONS
Let us know if you get the dexcom so we can set up the training     Please check your blood sugar at least twice daily (every day fasting and then choose 1 other time of day which you can vary).  Please write down your blood sugar numbers on the sugar log and be sure to bring her log with you to all visits.     Regimen as of 03/13/2023    Lantus 28 units nightly  Humalog 8 units 3 times a day plus sliding scale or correction scale - inject 15 minutes before you start eating  Start taking Ozempic 0.25 mg once weekly for 4 weeks then increase to 0.5 mg once weekly     Sliding Scale (second box on your log)   If your blood sugar is:  150-199             give extra 1 units of insulin to yourself.  200-249                             2 units  250-299         3 units  300-349                             4 units  350-399                             5 units  >400                                  6 units       Potential Side Effects of Ozempic:    One of the ways it works is by decreasing how fast your stomach empties, which makes you feel full faster after you eat and should help you lose weight. The main side-effects are related to GI upset, such as nausea, bloating and abdominal cramps. You can usually avoid this by eating slowly (take half of your normal portion and eat it over 30 minutes). If you do experience nausea, it does tend to get better after the first few weeks. The most severe reaction is acute pancreatitis which can cause severe abdominal pain radiating to your back. If you experience this, stop the medication and go to the ER. Fortunately this is very rare.

## 2023-03-13 NOTE — ASSESSMENT & PLAN NOTE
Discussed lifestyle modifications, start Ozempic as above, referral placed Diabetes Education to reiterate lifestyle modifications.

## 2023-03-13 NOTE — ASSESSMENT & PLAN NOTE
Will check lipids today.  Based on updated 2023 ADA guidelines goal LDL is less than 55 so may need to adjust medications.

## 2023-03-13 NOTE — PROGRESS NOTES
ENDOCRINOLOGY CLINIC  03/13/2023       Subjective:      CC: referred by Self, Aaareferral for management of diabetes    HPI:   Vinnie Siegel is a 77 y.o. male with uncontrolled type 2 diabetes, hypertension, ESRD on hemodialysis, secondary hyperparathyroidism, hyperlipidemia, GERD, BPH, glaucoma who presents for management of uncontrolled type 2 diabetes.      Previously being managed by diabetes nurse practitioner through Internal Medicine ( Dariusz Torres) but last visit was in April of 2020, after that had difficulty getting follow-up but preferred location.    Denies history of pancreatitis or medullary thyroid cancer.  History recurrent UTI: No    Makes only a small amount urine, getting HD three times a week     Diabetes Hx:  Diagnosed w/ DM: T2 DIABETES diagnosed >5 years ago   Complications: ESRD  Current meds: compliant with    Lantus 28 units nightly  Humalog 8 units 3 times a day (right before eating)  Not using sliding scale    Previous meds:   Metformin - stopped for CKD    Trulicity - stopped due to cost , denied having any side effects  Hypoglycemia: denies  Home glucose checks: checks BG once a week with numbers in high 100s before lunch   Diet/Exercise:    Eats breakfast and dinner, snacking in the day time    Has not recently seen Diabetes Education  Last A1c:   Lab Results   Component Value Date    HGBA1C 12.4 (H) 09/27/2022    HGBA1C 6.6 (H) 09/21/2021    HGBA1C 5.5 08/18/2021    HGBA1C 5.9 (H) 08/10/2020    HGBA1C 7.8 (H) 06/29/2020    HGBA1C 7.4 (H) 05/21/2020    HGBA1C 7.7 (H) 12/05/2019    HGBA1C 6.3 (H) 06/18/2019    HGBA1C 6.5 (H) 12/13/2018    HGBA1C 7.0 (H) 09/04/2018       microalbumin: ESRD and secondary hyperparathyroidism, managed by nephrology  Lab Results   Component Value Date    LABMICR 155.0 06/18/2019    CREATRANDUR 63.0 01/04/2021    MICALBCREAT 196.2 (H) 06/18/2019     Lipids: on atorvastatin 40 mg daily   Lab Results   Component Value Date    CHOL 163 09/21/2021    TRIG 103  2021    HDL 60 2021    LDLCALC 82.4 2021    CHOLHDL 36.8 2021     Aspirin: no on eliquis  TSH:  Lab Results   Component Value Date    TSH 0.635 2021     Eye: + glaucoma, cataracts, denies retinopathy   Last eye exam: : 2023)  Foot: previously seeing podiatry    Last foot exam: Most Recent Foot Exam Date: Not Found)      Review of patient's allergies indicates:  No Known Allergies      Current Outpatient Medications:     atorvastatin (LIPITOR) 40 MG tablet, Take 1 tablet (40 mg total) by mouth once daily., Disp: 90 tablet, Rfl: 3    blood sugar diagnostic (TRUE METRIX GLUCOSE TEST STRIP) Strp, Use to check blood glucose 3-5 times daily as directed., Disp: 450 strip, Rfl: 0    blood sugar diagnostic Strp, To check BG 4 times daily, to use with insurance preferred meter, Disp: 400 each, Rfl: 3    calcium acetate,phosphat bind, (PHOSLO) 667 mg capsule, SMARTSI Capsule(s) By Mouth, Disp: , Rfl:     ergocalciferol (ERGOCALCIFEROL) 50,000 unit Cap, Take 1 capsule (50,000 Units total) by mouth every 7 days. (Patient taking differently: Take 50,000 Units by mouth every 7 days. Patient takes on ), Disp: 12 capsule, Rfl: 0    ferrous sulfate (FEOSOL) 325 mg (65 mg iron) Tab tablet, 3 TABS A DAY. (Patient taking differently: every evening. 3 TABS A DAY.), Disp: 90 tablet, Rfl: 1    fluticasone propionate (FLONASE) 50 mcg/actuation nasal spray, SHAKE LIQUID AND USE 2 SPRAYS(100 MCG) IN EACH NOSTRIL EVERY DAY, Disp: 48 g, Rfl: 11    hydrALAZINE (APRESOLINE) 100 MG tablet, Take 1 tablet (100 mg total) by mouth 3 (three) times daily., Disp: 90 tablet, Rfl: 3    HYDROcodone-acetaminophen (NORCO) 7.5-325 mg per tablet, Take 1 tablet by mouth 3 (three) times daily as needed., Disp: , Rfl:     insulin (LANTUS SOLOSTAR U-100 INSULIN) glargine 100 units/mL SubQ pen, INJECT 28-36 UNITS UNDER THE SKIN AT NIGHT, Disp: 15 mL, Rfl: 6    insulin lispro (HUMALOG KWIKPEN INSULIN) 100 unit/mL pen,  "INJECT 8-12 UNITS EVERY DAY WITH MEALS PLUS SCALE. MAX DAILY50 UNITS, Disp: 15 mL, Rfl: 6    ipratropium-albuteroL (COMBIVENT)  mcg/actuation inhaler, Inhale 1 puff into the lungs every 6 (six) hours as needed for Wheezing or Shortness of Breath. Rescue, Disp: 1 Package, Rfl: 11    lancets Misc, To check BG 4 times daily, to use with insurance preferred meter, Disp: 400 each, Rfl: 3    metoprolol succinate (TOPROL-XL) 25 MG 24 hr tablet, Take 0.5 tablets (12.5 mg total) by mouth once daily., Disp: 45 tablet, Rfl: 3    NIFEdipine (PROCARDIA-XL) 90 MG (OSM) 24 hr tablet, Take 1 tablet (90 mg total) by mouth once daily., Disp: 90 tablet, Rfl: 3    pantoprazole (PROTONIX) 40 MG tablet, TAKE 1 TABLET(40 MG) BY MOUTH EVERY DAY, Disp: 90 tablet, Rfl: 0    pen needle, diabetic (BD ULTRA-FINE SHORT PEN NEEDLE) 31 gauge x 5/16" Ndle, USE FOUR TIMES DAILY, Disp: 400 each, Rfl: 3    tamsulosin (FLOMAX) 0.4 mg Cap, TAKE ONE CAPSULE BY MOUTH EVERY MORNING, Disp: 90 capsule, Rfl: 0    timolol maleate 0.25% (TIMOPTIC) 0.25 % Drop, Place 1 drop into both eyes 2 (two) times daily., Disp: 15 mL, Rfl: 10    torsemide (DEMADEX) 20 MG Tab, Take 1 tablet (20 mg total) by mouth 2 (two) times daily., Disp: 180 tablet, Rfl: 3    apixaban (ELIQUIS) 2.5 mg Tab, Take 1 tablet (2.5 mg total) by mouth 2 (two) times daily. (Patient not taking: Reported on 3/13/2023), Disp: 60 tablet, Rfl: 5    blood-glucose meter kit, To check BG 4 times daily, to use with insurance preferred meter, e 11.65, Disp: 1 each, Rfl: 0  ROS: see HPI     Objective:   Physical Exam   BP (!) 144/56   Pulse 86   Resp 16   Wt 133 kg (293 lb 3.4 oz)   SpO2 99%   BMI 37.65 kg/m²   Wt Readings from Last 3 Encounters:   03/13/23 133 kg (293 lb 3.4 oz)   02/23/23 133 kg (293 lb 3.4 oz)   02/02/23 133 kg (293 lb 3.4 oz)   ]    Constitutional:  Pleasant,  in no acute distress.   HENT:   Eyes:     No scleral icterus.   Respiratory:   Effort normal   Neurological:  normal " speech  Psych:  Normal mood and affect.        LABORATORY REVIEW:  See HPI for other labs reviewed today      Chemistry        Component Value Date/Time     (L) 11/23/2022 0959    K 3.8 11/23/2022 0959    CL 97 11/23/2022 0959    CO2 24 11/23/2022 0959    BUN 39 (H) 11/23/2022 0959    CREATININE 5.7 (H) 11/23/2022 0959     (HH) 11/23/2022 0959        Component Value Date/Time    CALCIUM 7.5 (L) 11/23/2022 0959    ALKPHOS 64 11/23/2022 0959    AST 9 (L) 11/23/2022 0959    ALT 10 11/23/2022 0959    BILITOT 0.4 11/23/2022 0959    ESTGFRAFRICA 8.1 (A) 09/21/2021 1125    EGFRNONAA 7.0 (A) 09/21/2021 1125          Lab Results   Component Value Date    HGBA1C 12.4 (H) 09/27/2022    HGBA1C 6.6 (H) 09/21/2021    HGBA1C 5.5 08/18/2021     Other labs reviewed today in HPI    Assessment/Plan:     Problem List Items Addressed This Visit          1 - High    Type 2 diabetes mellitus with chronic kidney disease on chronic dialysis, with long-term current use of insulin     Uncontrolled with hyperglycemia.  Limited data today as he infrequently checking blood glucose and last hemoglobin A1c was close to 6 months ago at 12.4.  Unable to make significant adjustments to insulin today but have encouraged him to keep blood glucose log, check blood sugar more frequently, and add sliding scale Humalog to his prandial dosing.  Will have him follow-up with Diabetes Education to review log and help determine if we need to adjust his insulin regimen.      In the past tolerated Trulicity but it was not affordable.  Will try for Ozempic with gradual dose escalation.      Will send Dexcom G7 paperwork as he is on 4 injections of insulin per day and uncontrolled.               Relevant Medications    semaglutide (OZEMPIC) 0.25 mg or 0.5 mg(2 mg/1.5 mL) pen injector       2     Mixed diabetic hyperlipidemia associated with type 2 diabetes mellitus     Will check lipids today.  Based on updated 2023 ADA guidelines goal LDL is less than  55 so may need to adjust medications.         Relevant Medications    semaglutide (OZEMPIC) 0.25 mg or 0.5 mg(2 mg/1.5 mL) pen injector       3     Secondary hyperparathyroidism of renal origin     Managed by Nephrology, has ongoing follow-up.            4     Morbid (severe) obesity due to excess calories     Discussed lifestyle modifications, start Ozempic as above, referral placed Diabetes Education to reiterate lifestyle modifications.            Unprioritized    RESOLVED: Type 2 diabetes mellitus with hyperglycemia    Relevant Medications    semaglutide (OZEMPIC) 0.25 mg or 0.5 mg(2 mg/1.5 mL) pen injector    Other Relevant Orders    Lipid Panel    Ambulatory referral/consult to Podiatry    Ambulatory referral/consult to Diabetes Education    Hemoglobin A1C    Ambulatory referral/consult to Diabetes Education       Return to clinic in 3 months  Send dexcom G7 paperwork  Needs CDE soon for comprehensive education (will need separate session for dexcom G7 start after receiving it)      Jeovanny Montoya MD

## 2023-03-14 ENCOUNTER — TELEPHONE (OUTPATIENT)
Dept: ENDOCRINOLOGY | Facility: CLINIC | Age: 78
End: 2023-03-14
Payer: MEDICARE

## 2023-03-14 ENCOUNTER — HOSPITAL ENCOUNTER (EMERGENCY)
Facility: HOSPITAL | Age: 78
Discharge: HOME | End: 2023-03-14
Attending: EMERGENCY MEDICINE
Payer: MEDICARE

## 2023-03-14 VITALS
WEIGHT: 293 LBS | DIASTOLIC BLOOD PRESSURE: 83 MMHG | BODY MASS INDEX: 39.68 KG/M2 | TEMPERATURE: 98 F | SYSTOLIC BLOOD PRESSURE: 165 MMHG | OXYGEN SATURATION: 98 % | HEIGHT: 72 IN | RESPIRATION RATE: 18 BRPM | HEART RATE: 74 BPM

## 2023-03-14 DIAGNOSIS — R76.11 POSITIVE TB TEST: ICD-10-CM

## 2023-03-14 PROCEDURE — 86480 TB TEST CELL IMMUN MEASURE: CPT | Performed by: EMERGENCY MEDICINE

## 2023-03-14 PROCEDURE — 99283 EMERGENCY DEPT VISIT LOW MDM: CPT | Mod: 25

## 2023-03-14 PROCEDURE — 99283 PR EMERGENCY DEPT VISIT,LEVEL III: ICD-10-PCS | Mod: ,,, | Performed by: EMERGENCY MEDICINE

## 2023-03-14 PROCEDURE — 99283 EMERGENCY DEPT VISIT LOW MDM: CPT | Mod: ,,, | Performed by: EMERGENCY MEDICINE

## 2023-03-14 NOTE — TELEPHONE ENCOUNTER
Called patient to inform him of test results. Patient informed about new medication that was prescribed. Patient was asked if he is taking his medication regularly and as prescribed. Patient stated he take all his medication and do not miss a dosage.

## 2023-03-14 NOTE — ED TRIAGE NOTES
"PT sent over from dialysis due to positive t-spot. Unable to have diaylsis today without " a chest xray"  "

## 2023-03-14 NOTE — PLAN OF CARE
Chest XRAY is without abnormality. The patient has not symptoms of Tuberculosis. No evidence of Active Tuberculosis. Patient safe to return to Dialysis

## 2023-03-14 NOTE — ED NOTES
Patient identifiers verified and correct for Vinnie Siegel  LOC: The patient is awake, alert and aware of environment with an appropriate affect, the patient is oriented x 3 and speaking appropriately.   APPEARANCE: Patient appears comfortable and in no acute distress, patient is clean and well groomed.  SKIN: The skin is warm and dry, color consistent with ethnicity, patient has normal skin turgor and moist mucus membranes, skin intact, no breakdown or bruising noted.   MUSCULOSKELETAL: Patient moving all extremities spontaneously, no swelling noted.  RESPIRATORY: Airway is open and patent, respirations are spontaneous, patient has a normal effort and rate, no accessory muscle use notedO2 sats noted at 100% on room air.  CARDIAC: Patient has a normal rate 71 and regular rhythm, no edema noted, capillary refill < 3 seconds.   GASTRO: Soft and non tender to palpation, no distention noted, normoactive bowel sounds present in all four quadrants. Pt states bowel movements have been 'normal'.  : Pt denies any pain or frequency with urination.  NEURO: Pt opens eyes spontaneously, behavior appropriate to situation, follows commands, facial expression symmetrical, bilateral hand grasp equal and even, purposeful motor response noted, normal sensation in all extremities when touched with a finger.

## 2023-03-14 NOTE — ED PROVIDER NOTES
"Encounter Date: 3/14/2023       History     Chief Complaint   Patient presents with    POSITIVE T-SPOT     PT sent over from dialysis due to positive t-spot. Unable to have diaylsis today without " a chest xray"     77-year-old male with pmhx of type 2 DM, HLD, HTN, and ESRD (HD Tue/Thur/Sat) who presents to the ED for positive TB skin test. He is unable to attend dialysis until he gets chest xray.  Denies living in high-risk congregate settings. Denies travel to countries where TB disease is common. Denies fever, cough, night sweats, weight loss.    The history is provided by the patient.   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     Hyperlipidemia     Hypertension     Kidney stone     Seasonal allergies 6/24/2014     Past Surgical History:   Procedure Laterality Date    AV FISTULA PLACEMENT Left 12/8/2020    Procedure: CREATION, AV FISTULA;  Surgeon: Benji Becerril MD;  Location: [address];  Service: Peripheral Vascular;  Laterality: Left;    AV FISTULA PLACEMENT Right 12/7/2022    Procedure: CREATION, AV FISTULA;  Surgeon: Benji Becerril MD;  Location: [address];  Service: Peripheral Vascular;  Laterality: Right;  RUE    CATARACT EXTRACTION W/  INTRAOCULAR LENS IMPLANT Right 04/04/16    Dr Meehan    COLONOSCOPY W/ BIOPSIES      ESOPHAGOGASTRODUODENOSCOPY N/A 6/29/2020    Procedure: EGD (ESOPHAGOGASTRODUODENOSCOPY);  Surgeon: Ravi Leone MD;  Location: [facility];  Service: Endoscopy;  Laterality: N/A;    EYE SURGERY      FISTULOGRAM Left 4/16/2021    Procedure: Fistulogram;  Surgeon: Benji Becerril MD;  Location: [facility] CATH LAB;  Service: Peripheral Vascular;  Laterality: Left;    FISTULOGRAM Left 9/28/2022    Procedure: Fistulogram;  Surgeon: " Benji Becerril MD;  Location: [facility] CATH LAB;  Service: Cardiology;  Laterality: Left;    FISTULOGRAM, WITH PTA Right 2/3/2023    Procedure: FISTULOGRAM, WITH PTA;  Surgeon: Benji Becerril MD;  Location: [facility] OR [address] [place] [address];  Service: Peripheral Vascular;  Laterality: Right;  205.17 mGy  2.3 min    HEMORRHOID SURGERY      [place] [facility] anal sphincterotomy  13    [place] [facility] [place]    PERCUTANEOUS TRANSLUMINAL ANGIOPLASTY OF ARTERIOVENOUS FISTULA Left 2021    Procedure: PTA, AV FISTULA;  Surgeon: Benji Becerril MD;  Location: [facility] CATH LAB;  Service: Peripheral Vascular;  Laterality: Left;    PERCUTANEOUS TRANSLUMINAL ANGIOPLASTY OF ARTERIOVENOUS FISTULA N/A 2022    Procedure: PTA, AV FISTULA;  Surgeon: Benji Becerril MD;  Location: [facility] CATH LAB;  Service: Cardiology;  Laterality: N/A;    PLACEMENT OF DUAL-LUMEN VASCULAR CATHETER N/A 2/3/2023    Procedure: EXCHANGE, PERMACATH;  Surgeon: Benji Becerril MD;  Location: [facility] OR [address] [place] [address];  Service: Peripheral Vascular;  Laterality: N/A;    URETERAL STENT PLACEMENT       Family History   Problem Relation Age of Onset    Diabetes Brother         type 1    Hypertension Brother     Stroke Brother      Social History     Tobacco Use    Smoking status: Former     Packs/day: 0.50     Years: 45.00     Pack years: 22.50     Types: Cigarettes     Quit date: 2020     Years since quittin.7    Smokeless tobacco: Never   Substance Use Topics    Alcohol use: Not Currently     Comment: rarely    Drug use: No     Review of Systems   Constitutional:  Negative for chills, diaphoresis, fever and unexpected weight change.   Respiratory:  Negative for cough.      Physical Exam     Initial Vitals [23 0654]   BP Pulse Resp Temp SpO2   (!) 179/84 71 18 97.9 °F (36.6 °C) 100 %      MAP       --         Physical Exam    Constitutional: He appears well-developed and well-nourished. He is not diaphoretic. No distress.   HENT:  "  Head: Normocephalic and atraumatic.   Eyes: Conjunctivae and EOM are normal.   Neck: Neck supple.   Cardiovascular:  Normal rate and regular rhythm.           Pulmonary/Chest: Breath sounds normal. No respiratory distress.   Musculoskeletal:      Cervical back: Neck supple.     Neurological: He is alert and oriented to person, place, and time.   Skin: No rash noted.   Psychiatric: He has a normal mood and affect. His behavior is normal. Judgment and thought content normal.       ED Course   Procedures  Labs Reviewed   QUANTIFERON GOLD TB          Imaging Results              X-Ray Chest PA And Lateral (Final result)  Result time 03/14/23 08:17:33      Final result by Dariel Ibarra MD (03/14/23 08:17:33)                   Impression:      No acute finding or detrimental change when compared with 11/23/2022.      Electronically signed by: Dariel Ibarra MD  Date:    03/14/2023  Time:    08:17               Narrative:    EXAMINATION:  XR CHEST PA AND LATERAL    CLINICAL HISTORY:  Provided history is "  Nonspecific reaction to tuberculin skin test without active tuberculosis".    TECHNIQUE:  Frontal and lateral views of the chest were performed.    COMPARISON:  11/23/2022.    FINDINGS:  Exam quality is limited by suboptimal positioning and portable technique.  Right-sided central venous catheter is present with the tip overlying the SVC.  Cardiac silhouette is not enlarged.  No focal consolidation.  No sizable pleural effusion.  No pneumothorax.                                       Medications - No data to display  Medical Decision Making:   Initial Assessment:   77-year-old male presents to the ED after testing positive on TB skin test.  He is asymptomatic.  He appears nontoxic.  We will obtain chest x-ray and QuantiFERON gold TB test. Will reassess.   Differential Diagnosis:   Latent TB, false positive testing, active TB, dermatitis  Independently Interpreted Test(s):   I have ordered and independently " interpreted X-rays - see prior notes.  Clinical Tests:   Lab Tests: Ordered and Reviewed  ED Management:  Chest x-ray negative for acute findings or signs of TB.  QuantiFERON gold TB test performed today.  Discussed checking results via [employer].  Patient was provided with a copy of x-ray results for dialysis and advise to go to dialysis as scheduled.  He was comfortable with this and is ready for discharge this time.  Other:   I discussed test(s) with the performing physician.                        Clinical Impression:   Final diagnoses:  [R76.11] Positive TB test        ED Disposition Condition    Discharge Stable          ED Prescriptions    None       Follow-up Information       Follow up With Specialties Details Why Contact Info    [facility] Emergency Medicine  If symptoms worsen [id number]  [facility] [id number] [phone]             Savannah Ellsworth PA-C  03/14/23 0948

## 2023-03-14 NOTE — DISCHARGE INSTRUCTIONS
No acute findings noted on chest x-ray  You were provided with a copy of your x-ray report. please bring this with you to dialysis  QuantiFERON gold TB test performed today.  Please check your MyChart for results

## 2023-03-15 DIAGNOSIS — R76.12 POSITIVE QUANTIFERON-TB GOLD TEST: Primary | ICD-10-CM

## 2023-03-15 LAB
GAMMA INTERFERON BACKGROUND BLD IA-ACNC: 0.38 IU/ML
M TB IFN-G CD4+ BCKGRND COR BLD-ACNC: 4.82 IU/ML
MITOGEN IGNF BCKGRD COR BLD-ACNC: 9.62 IU/ML
TB GOLD PLUS: POSITIVE
TB2 - NIL: 5.36 IU/ML

## 2023-03-17 ENCOUNTER — TELEPHONE (OUTPATIENT)
Dept: ADMINISTRATIVE | Facility: HOSPITAL | Age: 78
End: 2023-03-17
Payer: MEDICARE

## 2023-03-19 NOTE — PROGRESS NOTES
Subjective:       Patient ID: Vinnie Siegel is a 77 y.o. male.    Chief Complaint: Positive TB Test    HPI    + quant gold. Negative chest xray. Hx dialysis.     Positive quantiferon gold, Initial Visit  Patient is here for evaluation of positive Quantiferon. Quantiferon was positive on 3/14/23.  There is not history of BCG. There is not history of exposure to TB. Current symptoms: none. Patient denies cough, diarrhea, fatigue, fever, night sweats, and weight loss. Chest x-ray was done 3/14/23 and result was negative for acute findings.       Patient does not have signs or symptoms of active pulmonary tuberculosis. I have reviewed chest imaging which shows no changes concerning for active pulmonary tuberculosis.   Discussed diagnosis of latent tuberculosis, disease course, and CDC recommended treatment options.   Patient counseled regarding risks and benefits or prophylactic therapy.   Patient has elected to take prophylactic therapy.   Patient has elected to undergo therapy with:   Isoniazid 300 mg PO daily plus Pyridoxine (vitamin B6) 50 mg PO daily for 9 months.   Patient was counseled about possible side effects related to the medication.   The patient was asked to inform me if there are any problems tolerating the medication.   Will monitor ALT/CMP two weeks after initiation of therapy and monthly thereafter if no abnormalities.   The majority of this visit was spent reviewing results, discussing the implications, and answering the patient's questions regarding latent TB.               Review of Systems   Constitutional:  Negative for chills, diaphoresis, fatigue and fever.   Respiratory:  Negative for cough and shortness of breath.    Cardiovascular:  Negative for chest pain.   Gastrointestinal:  Negative for abdominal pain, constipation, diarrhea, nausea and vomiting.   Neurological:  Negative for dizziness, weakness, numbness and headaches.   Psychiatric/Behavioral:  Negative for agitation and confusion.  The patient is not nervous/anxious.        Objective:      Physical Exam  Vitals reviewed.   Constitutional:       General: He is not in acute distress.     Appearance: Normal appearance. He is well-developed and normal weight. He is not ill-appearing, toxic-appearing or diaphoretic.   HENT:      Head: Normocephalic and atraumatic.      Mouth/Throat:      Mouth: Mucous membranes are moist.      Dentition: Normal dentition. Does not have dentures. No dental caries or dental abscesses.      Pharynx: Oropharynx is clear.   Eyes:      General: No scleral icterus.     Conjunctiva/sclera: Conjunctivae normal.   Cardiovascular:      Rate and Rhythm: Normal rate and regular rhythm.      Heart sounds: Normal heart sounds. No murmur heard.  Pulmonary:      Effort: Pulmonary effort is normal. No respiratory distress.      Breath sounds: Normal breath sounds. No wheezing or rales.   Abdominal:      General: Bowel sounds are normal. There is no distension.      Palpations: Abdomen is soft.      Tenderness: There is no abdominal tenderness. There is no guarding.   Musculoskeletal:         General: Normal range of motion.      Cervical back: Normal range of motion.      Right lower leg: No edema.      Left lower leg: No edema.   Skin:     General: Skin is warm and dry.      Findings: No erythema or rash.   Neurological:      General: No focal deficit present.      Mental Status: He is alert and oriented to person, place, and time.   Psychiatric:         Mood and Affect: Mood normal.         Behavior: Behavior normal.         Thought Content: Thought content normal.         Judgment: Judgment normal.       Assessment:       Problem List Items Addressed This Visit    None  Visit Diagnoses       TB lung, latent    -  Primary    Relevant Medications    isoniazid (NYDRAZID) 300 MG Tab    pyridoxine, vitamin B6, (VITAMIN B-6) 50 MG Tab    Other Relevant Orders    Comprehensive Metabolic Panel    Comprehensive Metabolic Panel (Completed)     Positive QuantiFERON-TB Gold test        Relevant Medications    isoniazid (NYDRAZID) 300 MG Tab    pyridoxine, vitamin B6, (VITAMIN B-6) 50 MG Tab    Other Relevant Orders    Comprehensive Metabolic Panel    Comprehensive Metabolic Panel (Completed)            Plan:       Patient does not have signs or symptoms of active pulmonary tuberculosis. I have reviewed chest imaging which shows no changes concerning for active pulmonary tuberculosis.   Discussed diagnosis of latent tuberculosis, disease course, and CDC recommended treatment options.   Patient counseled regarding risks and benefits or prophylactic therapy.   Patient has elected to take prophylactic therapy.   Patient has elected to undergo therapy with:   Isoniazid 300 mg PO daily plus Pyridoxine (vitamin B6) 50 mg PO daily for 9 months.   Patient was counseled about possible side effects related to the medication.   The patient was asked to inform me if there are any problems tolerating the medication.   Will monitor ALT/CMP two weeks after initiation of therapy and monthly thereafter if no abnormalities.   The majority of this visit was spent reviewing results, discussing the implications, and answering the patient's questions regarding latent TB.           30 minutes of total time was spent on this encounter, which includes face to face time and non-face to face time preparing to see the patient (eg, review of tests), Obtaining and/or reviewing separately obtained history, documenting clinical information in the electronic or other health record, independently interpreting results (not separately reported) and communicating results to the patient/family/caregiver, or care coordination (not separately reported).

## 2023-03-20 ENCOUNTER — OFFICE VISIT (OUTPATIENT)
Dept: INFECTIOUS DISEASES | Facility: CLINIC | Age: 78
End: 2023-03-20
Payer: MEDICARE

## 2023-03-20 ENCOUNTER — LAB VISIT (OUTPATIENT)
Dept: LAB | Facility: HOSPITAL | Age: 78
End: 2023-03-20
Payer: MEDICARE

## 2023-03-20 VITALS
TEMPERATURE: 99 F | HEART RATE: 80 BPM | WEIGHT: 293 LBS | DIASTOLIC BLOOD PRESSURE: 66 MMHG | SYSTOLIC BLOOD PRESSURE: 130 MMHG | HEIGHT: 72 IN | BODY MASS INDEX: 39.68 KG/M2

## 2023-03-20 DIAGNOSIS — Z22.7 TB LUNG, LATENT: Primary | ICD-10-CM

## 2023-03-20 DIAGNOSIS — R76.12 POSITIVE QUANTIFERON-TB GOLD TEST: ICD-10-CM

## 2023-03-20 DIAGNOSIS — Z22.7 TB LUNG, LATENT: ICD-10-CM

## 2023-03-20 LAB
ALBUMIN SERPL BCP-MCNC: 3.6 G/DL (ref 3.5–5.2)
ALP SERPL-CCNC: 48 U/L (ref 55–135)
ALT SERPL W/O P-5'-P-CCNC: 7 U/L (ref 10–44)
ANION GAP SERPL CALC-SCNC: 16 MMOL/L (ref 8–16)
AST SERPL-CCNC: 8 U/L (ref 10–40)
BILIRUB SERPL-MCNC: 0.6 MG/DL (ref 0.1–1)
BUN SERPL-MCNC: 44 MG/DL (ref 8–23)
CALCIUM SERPL-MCNC: 9.5 MG/DL (ref 8.7–10.5)
CHLORIDE SERPL-SCNC: 93 MMOL/L (ref 95–110)
CO2 SERPL-SCNC: 25 MMOL/L (ref 23–29)
CREAT SERPL-MCNC: 9.6 MG/DL (ref 0.5–1.4)
EST. GFR  (NO RACE VARIABLE): 5.2 ML/MIN/1.73 M^2
GLUCOSE SERPL-MCNC: 348 MG/DL (ref 70–110)
POTASSIUM SERPL-SCNC: 3.6 MMOL/L (ref 3.5–5.1)
PROT SERPL-MCNC: 7.5 G/DL (ref 6–8.4)
SODIUM SERPL-SCNC: 134 MMOL/L (ref 136–145)

## 2023-03-20 PROCEDURE — 36415 COLL VENOUS BLD VENIPUNCTURE: CPT | Performed by: REGISTERED NURSE

## 2023-03-20 PROCEDURE — 99215 OFFICE O/P EST HI 40 MIN: CPT | Mod: PBBFAC | Performed by: REGISTERED NURSE

## 2023-03-20 PROCEDURE — 99999 PR PBB SHADOW E&M-EST. PATIENT-LVL V: ICD-10-PCS | Mod: PBBFAC,,, | Performed by: REGISTERED NURSE

## 2023-03-20 PROCEDURE — 99999 PR PBB SHADOW E&M-EST. PATIENT-LVL V: CPT | Mod: PBBFAC,,, | Performed by: REGISTERED NURSE

## 2023-03-20 PROCEDURE — 99214 OFFICE O/P EST MOD 30 MIN: CPT | Mod: S$PBB,,, | Performed by: REGISTERED NURSE

## 2023-03-20 PROCEDURE — 80053 COMPREHEN METABOLIC PANEL: CPT | Performed by: REGISTERED NURSE

## 2023-03-20 PROCEDURE — 99214 PR OFFICE/OUTPT VISIT, EST, LEVL IV, 30-39 MIN: ICD-10-PCS | Mod: S$PBB,,, | Performed by: REGISTERED NURSE

## 2023-03-20 RX ORDER — LANOLIN ALCOHOL/MO/W.PET/CERES
50 CREAM (GRAM) TOPICAL DAILY
Qty: 90 TABLET | Refills: 3 | Status: SHIPPED | OUTPATIENT
Start: 2023-03-20 | End: 2023-12-04 | Stop reason: ALTCHOICE

## 2023-03-20 RX ORDER — ISONIAZID 300 MG/1
300 TABLET ORAL DAILY
Qty: 90 TABLET | Refills: 3 | Status: SHIPPED | OUTPATIENT
Start: 2023-03-20 | End: 2023-12-04 | Stop reason: ALTCHOICE

## 2023-03-28 NOTE — PROGRESS NOTES
Subjective:       Patient ID: Vinnie Siegel is a 77 y.o. male.    Chief Complaint: Glaucoma and Cataract     HPI    DLS: 2/27/2023 with Dr. Manriquez    Pt here for Glaucoma/Cataract Eval per Dr. Manriquez;  Pt states no eye pain but vision has been blurry. Pt states he was using   Timolol eye drops but hasn't been using them because he feels like its not   helping him.  Says he cant see in the left eye.     Last edited by Yvonne Nielson MD on 3/29/2023  4:09 PM.                Assessment & Plan   Primary open angle glaucoma (POAG) of left eye, severe stage    Primary open angle glaucoma (POAG) of right eye, indeterminate stage    Combined forms of age-related cataract of left eye  -     IOL Master - OS - Left Eye    Pseudophakia, right eye         Dr. Manriquez    POAG severe OD // indeterminate OS  -Fhx (), Steroids (),Trauma()  -Drops: Timolol twice daily OU --> NOT USING --> restart  -Drop intolerance/contraindication: Latanoprost   -Laser:  -Surgeries: CE IOL OD  -CCT: 564 OD // 566 OS  -Gonio: SS OD // SS/TM OS  Tm 23 // 28    Performed independent review of outside records including notes and tests  Discussed imaging and interpretation with patient.   Discussed eyedrops and if more than one, recommend 5 minutes between all drops.     2/23 HVF 24-2 SAD and dense IAD w fixation VFI 56% OD // unable OS(similar to 2012)  2/23 RNFL dec throughout OD // unable OS    OS RNFL and HVF full 2019    Disc photos 2017    Restart timolol BID OU        Combined forms of age-related cataract OS  Visually significant and interfering with activities of daily living including driving/reading  Patient desires cataract surgery for visual rehabilitation.     R/B/A discussed and patient agrees to proceed with surgery.   IOL options discussed according to patient's goals and concomitant ocular pathology; and patient content with monofocal lens.  Refractive target discussed at length. Patient opts for distance  Combine with OMNI  OS    FLOMAX    LEFT EYE DIBOO +21.00 target -0.08 with OMNI      Pseudophakia OD  Dr. Meehan  Lenses WC, monitor      PLAN  Restart timolol BID OU    RTC POD#1 CE IOL OS with OMNI    Yvonne Nielson M.D., M.S.  Department of Ophthalmology   Division of Glaucoma Surgery  [facility]

## 2023-03-29 ENCOUNTER — OFFICE VISIT (OUTPATIENT)
Dept: OPHTHALMOLOGY | Facility: CLINIC | Age: 78
End: 2023-03-29
Payer: MEDICARE

## 2023-03-29 DIAGNOSIS — Z96.1 PSEUDOPHAKIA, RIGHT EYE: ICD-10-CM

## 2023-03-29 DIAGNOSIS — H40.1123 PRIMARY OPEN ANGLE GLAUCOMA (POAG) OF LEFT EYE, SEVERE STAGE: Primary | ICD-10-CM

## 2023-03-29 DIAGNOSIS — H25.812 COMBINED FORMS OF AGE-RELATED CATARACT OF LEFT EYE: ICD-10-CM

## 2023-03-29 DIAGNOSIS — H40.1114 PRIMARY OPEN ANGLE GLAUCOMA (POAG) OF RIGHT EYE, INDETERMINATE STAGE: ICD-10-CM

## 2023-03-29 PROCEDURE — 99204 PR OFFICE/OUTPT VISIT, NEW, LEVL IV, 45-59 MIN: ICD-10-PCS | Mod: S$PBB,,, | Performed by: STUDENT IN AN ORGANIZED HEALTH CARE EDUCATION/TRAINING PROGRAM

## 2023-03-29 PROCEDURE — 99999 PR PBB SHADOW E&M-EST. PATIENT-LVL II: ICD-10-PCS | Mod: PBBFAC,,, | Performed by: STUDENT IN AN ORGANIZED HEALTH CARE EDUCATION/TRAINING PROGRAM

## 2023-03-29 PROCEDURE — 92020 PR SPECIAL EYE EVAL,GONIOSCOPY: ICD-10-PCS | Mod: S$PBB,,, | Performed by: STUDENT IN AN ORGANIZED HEALTH CARE EDUCATION/TRAINING PROGRAM

## 2023-03-29 PROCEDURE — 92136 IOL MASTER - OS - LEFT EYE: ICD-10-PCS | Mod: 26,S$PBB,LT, | Performed by: STUDENT IN AN ORGANIZED HEALTH CARE EDUCATION/TRAINING PROGRAM

## 2023-03-29 PROCEDURE — 99999 PR PBB SHADOW E&M-EST. PATIENT-LVL II: CPT | Mod: PBBFAC,,, | Performed by: STUDENT IN AN ORGANIZED HEALTH CARE EDUCATION/TRAINING PROGRAM

## 2023-03-29 PROCEDURE — 92020 GONIOSCOPY: CPT | Mod: PBBFAC | Performed by: STUDENT IN AN ORGANIZED HEALTH CARE EDUCATION/TRAINING PROGRAM

## 2023-03-29 PROCEDURE — 99212 OFFICE O/P EST SF 10 MIN: CPT | Mod: PBBFAC | Performed by: STUDENT IN AN ORGANIZED HEALTH CARE EDUCATION/TRAINING PROGRAM

## 2023-03-29 PROCEDURE — 92136 OPHTHALMIC BIOMETRY: CPT | Mod: PBBFAC,LT | Performed by: STUDENT IN AN ORGANIZED HEALTH CARE EDUCATION/TRAINING PROGRAM

## 2023-03-29 PROCEDURE — 92020 GONIOSCOPY: CPT | Mod: S$PBB,,, | Performed by: STUDENT IN AN ORGANIZED HEALTH CARE EDUCATION/TRAINING PROGRAM

## 2023-03-29 PROCEDURE — 99204 OFFICE O/P NEW MOD 45 MIN: CPT | Mod: S$PBB,,, | Performed by: STUDENT IN AN ORGANIZED HEALTH CARE EDUCATION/TRAINING PROGRAM

## 2023-03-30 ENCOUNTER — CLINICAL SUPPORT (OUTPATIENT)
Dept: DIABETES | Facility: CLINIC | Age: 78
End: 2023-03-30
Payer: MEDICARE

## 2023-03-30 DIAGNOSIS — E11.65 TYPE 2 DIABETES MELLITUS WITH HYPERGLYCEMIA, UNSPECIFIED WHETHER LONG TERM INSULIN USE: ICD-10-CM

## 2023-03-30 PROCEDURE — 99211 OFF/OP EST MAY X REQ PHY/QHP: CPT | Mod: PBBFAC | Performed by: DIETITIAN, REGISTERED

## 2023-03-30 PROCEDURE — G0108 DIAB MANAGE TRN  PER INDIV: HCPCS | Mod: PBBFAC | Performed by: DIETITIAN, REGISTERED

## 2023-03-30 PROCEDURE — 99999 PR PBB SHADOW E&M-EST. PATIENT-LVL I: ICD-10-PCS | Mod: PBBFAC,,,

## 2023-03-30 PROCEDURE — 99999 PR PBB SHADOW E&M-EST. PATIENT-LVL I: CPT | Mod: PBBFAC,,,

## 2023-03-30 NOTE — PROGRESS NOTES
Diabetes Care Specialist Progress Note  Author: Isidra Ding RD, CDE  Date: 3/30/2023    Program Intake  Reason for Diabetes Program Visit:: Initial Diabetes Assessment  Type of Intervention:: Individual  Individual: Education  Education: Self-Management Skill Review    Current diabetes risk level:: high    In the last 12 months, have you:: been admitted to a hospital  Was the ER or hospital admission related to diabetes?: No    Permission to speak with others about care:: yes (brother present at visit today)      Lab Results   Component Value Date    HGBA1C 11.3 (H) 03/13/2023         Clinical  Problem Review  Reviewed Problem List with Patient: yes  Active comorbidities affecting diabetes self-care.: yes  Comorbidities: End Stage Renal Disease (HD on TTS)    Clinical Assessment  Current Diabetes Treatment: Insulin  Lantus 28 units daily  Humalog 8 units AC    Have you ever experienced hypoglycemia (low blood sugar)?: no  Have you ever experienced hyperglycemia (high blood sugar)?: yes  In the last month, how often have you experienced high blood sugar?: more than once a day      SMBG twice daily  Range 200-399          Medication Information  Medication adherence impacting ability to self-manage diabetes?: No    Labs  Lab Compliance Barriers: No        Nutritional Status  Drinks: coke  Meal Plan 24 Hour Recall - Breakfast: grits  Meal Plan 24 Hour Recall - Lunch: sandwiches  Meal Plan 24 Hour Recall - Dinner: rice and beans, fried chicken wings    Current nutritional status an area of need that is impacting patient's ability to self-manage diabetes?: No              Additional Social History  Support  Does anyone support you with your diabetes care?: yes  Who supports you?: family member, self  Does the current support meet the patient's needs?: Yes  Is Support an area impacting ability to self-manage diabetes?: No    Access to Mass Media & Technology  Media or technology needs impacting ability to self-manage  diabetes?: No    Cognitive/Behavioral Health  Alert and Oriented: Yes  Difficulty Thinking: No  Requires Prompting: No  Requires assistance for routine expression?: No  Cognitive or behavioral barriers impacting ability to self-manage diabetes?: No    Culture/Orthodoxy  Culture or Evangelical beliefs that may impact ability to access healthcare: No    Communication  Language preference: English  Hearing Problems: No  Vision Problems: No  Communication needs impacting ability to self-manage diabetes?: No    Health Literacy  Preferred Learning Method: Face to Face  Health literacy needs impacting ability to self-manage diabetes?: No            Diabetes Self-Management Skills Assessment  Diabetes Disease Process/Treatment Options  Patient/caregiver able to state what happens when someone has diabetes.: yes  Patient/caregiver knows what type of diabetes they have.: yes  Diabetes Type : Type II  Diabetes Disease Process/Treatment Options: Skills Assessment Completed: Yes  Assessment indicates:: Adequate understanding  Area of need?: No    Nutrition/Healthy Eating  Method of carbohydrate measurement:: no method  Patient can identify foods that impact blood sugar.: yes  Nutrition/Healthy Eating Skills Assessment Completed:: Yes  Assessment indicates:: Instruction Needed  Area of need?: Yes    Physical Activity/Exercise  Patient can identify forms of physical activity.: yes  Patient can identify reasons why exercise/physical activity is important in diabetes management.: yes  Physical Activity/Exercise Skills Assessment Completed: : Yes  Assessment indicates:: Instruction Needed  Area of need?: Yes    Medications  Patient is able to describe current diabetes management routine.: yes  Diabetes management routine:: insulin  Medication Skills Assessment Completed:: Yes  Assessment indicates:: Instruction Needed  Area of need?: Yes    Home Blood Glucose Monitoring  Patient states that blood sugar is checked at home daily.:  yes  Monitoring Method:: home glucometer  Home Blood Glucose Monitoring Skills Assessment Completed: : Yes  Assessment indicates:: Instruction Needed  Area of need?: Yes    Acute Complications  Patient is able to identify types of acute complications: Yes  Acute Complications Skills Assessment Completed: : Yes  Assessment indicates:: Adequate understanding  Area of need?: No    Chronic Complications  Patient can identify major chronic complications of diabetes.: yes  Patient can identify ways to prevent or delay diabetes complications.: yes  Chronic Complications Skills Assessment Completed: : Yes  Assessment indicates:: Adequate understanding  Area of need?: No    Psychosocial/Coping  Patient can identify ways of coping with chronic disease.: yes  Psychosocial/Coping Skills Assessment Completed: : Yes  Area of need?: No              Diabetes Self Support Plan    Assessment Summary and Plan    Based on today's diabetes care assessment, the following areas of need were identified:      Social 3/30/2023   Support No   Access to Mass Media/Tech No   Cognitive/Behavioral Health No   Culture/Bahai No   Communication No   Health Literacy No        Clinical 3/30/2023   Medication Adherence No   Lab Compliance No   Nutritional Status No        Diabetes Self-Management Skills 3/30/2023   Diabetes Disease Process/Treatment Options No   Nutrition/Healthy Eating Yes  Reviewed MNT recs for DM and CKD.     Encouraged to choose coke zero or diet coke instead of regular coke.          Physical Activity/Exercise Yes  Encouraged regular activity as able       Medication Yes  See Care Plan       Home Blood Glucose Monitoring Yes  Discussed goal BGs for different times of day and in relation to meals. Instructed pt to test BG AC/HS: fasting and 2-hours after any meal. Reviewed need for updated BG logs for all endo, PCP, and education appts.     Acute Complications No   Chronic Complications No   Psychosocial/Coping No      Today's  interventions were provided through individual discussion, instruction, and written materials were provided.      Patient verbalized understanding of instruction and written materials.  Pt was able to return back demonstration of instructions today. Patient understood key points, needs reinforcement and further instruction.                 Diabetes Self-Management Care Plan:    Today's Diabetes Self-Management Care Plan was developed with Vinnie's input. Vinnie has agreed to work toward the following goal(s) to improve his/her overall diabetes control.      Care Plan: Diabetes Management   Updates made since 5/22/2023 12:00 AM        Problem: Medications         Goal: Patient Agrees to take insulin as instructed today    Start Date: 3/30/2023   Expected End Date: 9/30/2023   Priority: High   Barriers: Knowledge deficit   Note:    BG logs reviewed with pt and MD today.     Per Dr. Montoya, increase Humalog to 10 units AC and use CS.   Continue Lantus 28 daily.       Reviewed appropriate use of CS with pt today and practiced multiple examples.      ozempic today and start asap.           Task: Reviewed with patient all current diabetes medications and provided basic review of the purpose, dosage, frequency, side effects, and storage of both oral and injectable diabetes medications. Completed 6/21/2023        Task: Instructed patient on appropriate administration of long and rapid acting insulins Completed 6/21/2023        Task: Discussed guidelines for preventing, detecting and treating hypoglycemia and hyperglycemia and reviewed the importance of meal and medication timing with diabetes mediations for prevention of hypoglycemia and maximum drug benefit. Completed 6/21/2023                  Follow Up Plan     F/u in 3 months        Today's care plan and follow up schedule was discussed with patient.  Vinnie verbalized understanding of the care plan, goals, and agrees to follow up plan.        The patient was  encouraged to communicate with his/her health care provider/physician and care team regarding his/her condition(s) and treatment.  I provided the patient with my contact information today and encouraged to contact me via phone or [facility] Patient Portal as needed.           Length of Visit   Total Time: 60 Minutes

## 2023-04-03 ENCOUNTER — LAB VISIT (OUTPATIENT)
Dept: LAB | Facility: HOSPITAL | Age: 78
End: 2023-04-03
Attending: REGISTERED NURSE
Payer: MEDICARE

## 2023-04-03 DIAGNOSIS — R76.12 POSITIVE QUANTIFERON-TB GOLD TEST: ICD-10-CM

## 2023-04-03 DIAGNOSIS — Z22.7 TB LUNG, LATENT: ICD-10-CM

## 2023-04-03 LAB
ALBUMIN SERPL BCP-MCNC: 3.2 G/DL (ref 3.5–5.2)
ALP SERPL-CCNC: 43 U/L (ref 55–135)
ALT SERPL W/O P-5'-P-CCNC: <5 U/L (ref 10–44)
ANION GAP SERPL CALC-SCNC: 16 MMOL/L (ref 8–16)
AST SERPL-CCNC: 12 U/L (ref 10–40)
BILIRUB SERPL-MCNC: 0.5 MG/DL (ref 0.1–1)
BUN SERPL-MCNC: 51 MG/DL (ref 8–23)
CALCIUM SERPL-MCNC: 8.9 MG/DL (ref 8.7–10.5)
CHLORIDE SERPL-SCNC: 94 MMOL/L (ref 95–110)
CO2 SERPL-SCNC: 23 MMOL/L (ref 23–29)
CREAT SERPL-MCNC: 8.9 MG/DL (ref 0.5–1.4)
EST. GFR  (NO RACE VARIABLE): 5.7 ML/MIN/1.73 M^2
GLUCOSE SERPL-MCNC: 276 MG/DL (ref 70–110)
POTASSIUM SERPL-SCNC: 3.8 MMOL/L (ref 3.5–5.1)
PROT SERPL-MCNC: 7 G/DL (ref 6–8.4)
SODIUM SERPL-SCNC: 133 MMOL/L (ref 136–145)

## 2023-04-03 PROCEDURE — 36415 COLL VENOUS BLD VENIPUNCTURE: CPT | Mod: PN | Performed by: REGISTERED NURSE

## 2023-04-03 PROCEDURE — 80053 COMPREHEN METABOLIC PANEL: CPT | Performed by: REGISTERED NURSE

## 2023-04-04 ENCOUNTER — TELEPHONE (OUTPATIENT)
Dept: INFECTIOUS DISEASES | Facility: HOSPITAL | Age: 78
End: 2023-04-04
Payer: MEDICARE

## 2023-04-04 NOTE — TELEPHONE ENCOUNTER
OPAT follow up - LTBI on INH/B6 x9 months. End of care ~ Nov 30th, 2023.     CMP from 4/3 reviewed. No transaminates noted. Will repeat labs in 1 month ~ 5/5/23.

## 2023-04-05 DIAGNOSIS — Z22.7 TB LUNG, LATENT: Primary | ICD-10-CM

## 2023-04-12 ENCOUNTER — TELEPHONE (OUTPATIENT)
Dept: ADMINISTRATIVE | Facility: HOSPITAL | Age: 78
End: 2023-04-12
Payer: MEDICARE

## 2023-04-18 ENCOUNTER — TELEPHONE (OUTPATIENT)
Dept: ADMINISTRATIVE | Facility: HOSPITAL | Age: 78
End: 2023-04-18
Payer: MEDICARE

## 2023-04-20 ENCOUNTER — TELEPHONE (OUTPATIENT)
Dept: OPHTHALMOLOGY | Facility: CLINIC | Age: 78
End: 2023-04-20
Payer: MEDICARE

## 2023-04-20 DIAGNOSIS — H40.1123 PRIMARY OPEN ANGLE GLAUCOMA (POAG) OF LEFT EYE, SEVERE STAGE: Primary | ICD-10-CM

## 2023-04-20 DIAGNOSIS — H25.812 COMBINED FORMS OF AGE-RELATED CATARACT OF LEFT EYE: ICD-10-CM

## 2023-04-25 ENCOUNTER — TELEPHONE (OUTPATIENT)
Dept: PRIMARY CARE CLINIC | Facility: CLINIC | Age: 78
End: 2023-04-25
Payer: MEDICARE

## 2023-04-25 ENCOUNTER — TELEPHONE (OUTPATIENT)
Dept: ENDOCRINOLOGY | Facility: CLINIC | Age: 78
End: 2023-04-25
Payer: MEDICARE

## 2023-04-25 ENCOUNTER — PES CALL (OUTPATIENT)
Dept: ADMINISTRATIVE | Facility: OTHER | Age: 78
End: 2023-04-25
Payer: MEDICARE

## 2023-04-25 NOTE — TELEPHONE ENCOUNTER
----- Message from Janeth Walter MD sent at 4/25/2023  7:09 AM CDT -----  Pl schedule as telemed will need paperwork if there is paperwork BERTA obrien  ----- Message -----  From: Jeovanny Montoya MD  Sent: 4/21/2023   1:50 PM CDT  To: Janeth Walter MD    This was accidentally sent to me.    ----- Message -----  From: Monique Hameed MA  Sent: 4/21/2023   1:44 PM CDT  To: Jeovanny Montoya MD    Good Afternoon ,\    Patient needs a clearance  letter to have surery done   ----- Message -----  From: Yasir Avendano MA  Sent: 4/21/2023  11:08 AM CDT  To: Lindsay Kapadia    Good Morning ,    The above patient is scheduled for cataract and glaucoma surgery. Patient will need medical clearance prior  to surgery which has been scheduled for 6/1/2023 The surgery will be performed under local anesthesia. Please have patient come in for a pre op appointment if need be.    Thank you   Surgery coordinator.  Fax [phone].

## 2023-04-25 NOTE — TELEPHONE ENCOUNTER
Schedule pt for pre op on 5/2/23 and informed pt that if there is any paperwork needed to be filled out he needs to bring it to Dr. Walter office before the appt. Pt verbalized understanding.

## 2023-04-25 NOTE — TELEPHONE ENCOUNTER
Called and informed patient that he would have to go through primary care doctor to get medical clearance for surgery.

## 2023-04-25 NOTE — TELEPHONE ENCOUNTER
----- Message from Yasir Avendano MA sent at 4/24/2023  4:46 PM CDT -----  Hello ,     The above patient is scheduled for cataract and glaucoma surgery. Patient will need medical clearance prior  to surgery which has been scheduled for 6/1/2023 The surgery will be performed under local anesthesia. Please have patient come in for a pre op appointment if need be.     Thank you   Surgery coordinator.   Fax [phone].

## 2023-04-26 ENCOUNTER — TELEPHONE (OUTPATIENT)
Dept: ENDOCRINOLOGY | Facility: CLINIC | Age: 78
End: 2023-04-26
Payer: MEDICARE

## 2023-04-26 ENCOUNTER — TELEPHONE (OUTPATIENT)
Dept: PRIMARY CARE CLINIC | Facility: CLINIC | Age: 78
End: 2023-04-26
Payer: MEDICARE

## 2023-04-26 NOTE — TELEPHONE ENCOUNTER
----- Message from Félix Griffin sent at 4/25/2023  4:32 PM CDT -----  Regarding: Call Back  Name of Who is Calling:  Patient          What is the request in detail:  Patient would like Kenisha to give him a call            Can the clinic reply by [facility]: No            What Number to Call Back if not in [facility]:[phone]

## 2023-04-26 NOTE — TELEPHONE ENCOUNTER
Patient is requesting a clearance for cataract surgery 2 working days before his scheduled surgery. I have not seen this patient since 8/24/2022. His last a1c is 11.3. Unfortunately, I will be unable to clear him for this procedure until his a1c is under control. He is actively managed by endocrine for his diabetes and will need to follow up with endo until his a1c is below 8, at which point I will clear him after an appt with me. Thanks, PV

## 2023-04-26 NOTE — TELEPHONE ENCOUNTER
Returned patient call about trying to get an earlier appointment. Patient informed that there are no available appointments as of right now. Patient stated ok and that he will keep his upcoming appointment.

## 2023-04-26 NOTE — TELEPHONE ENCOUNTER
Hi sorry that I am unable to clear this patient at this time. He really needs to be reassessed with endocrine to get his blood sugars under control first. Thanks, PV     Per my note,  I will not be able to clear him, He must reschedule his surgery until his a1c is under control. Please inform patient. David, PV     Informed pt that he will need to get his A1c under 8 in order to be cleared for eye surgery. Informed pt that he should f/u with endocrine Dr. Montoya office to work on getting his A1c under control. Pt verbalized understanding. Cancelled pre op appt on 5/2/23 because Dr. Walter will not clear this pt until his A1c is under control. Pt verbalized understanding.

## 2023-04-26 NOTE — TELEPHONE ENCOUNTER
Pt states that he call the eye doctor and they have no pre op note for him to fill out. He just needs a letter or office note stating that he is clear for surgery for the eyes. Pt is schedule for Pre op on 5/2/23.

## 2023-05-01 ENCOUNTER — LAB VISIT (OUTPATIENT)
Dept: LAB | Facility: HOSPITAL | Age: 78
End: 2023-05-01
Payer: MEDICARE

## 2023-05-01 DIAGNOSIS — Z22.7 TB LUNG, LATENT: ICD-10-CM

## 2023-05-01 LAB
ALBUMIN SERPL BCP-MCNC: 3.4 G/DL (ref 3.5–5.2)
ALP SERPL-CCNC: 47 U/L (ref 55–135)
ALT SERPL W/O P-5'-P-CCNC: <5 U/L (ref 10–44)
ANION GAP SERPL CALC-SCNC: 19 MMOL/L (ref 8–16)
AST SERPL-CCNC: 10 U/L (ref 10–40)
BILIRUB SERPL-MCNC: 0.6 MG/DL (ref 0.1–1)
BUN SERPL-MCNC: 55 MG/DL (ref 8–23)
CALCIUM SERPL-MCNC: 9.3 MG/DL (ref 8.7–10.5)
CHLORIDE SERPL-SCNC: 90 MMOL/L (ref 95–110)
CO2 SERPL-SCNC: 28 MMOL/L (ref 23–29)
CREAT SERPL-MCNC: 9.8 MG/DL (ref 0.5–1.4)
EST. GFR  (NO RACE VARIABLE): 5 ML/MIN/1.73 M^2
GLUCOSE SERPL-MCNC: 241 MG/DL (ref 70–110)
POTASSIUM SERPL-SCNC: 4.1 MMOL/L (ref 3.5–5.1)
PROT SERPL-MCNC: 7.4 G/DL (ref 6–8.4)
SODIUM SERPL-SCNC: 137 MMOL/L (ref 136–145)

## 2023-05-01 PROCEDURE — 36415 COLL VENOUS BLD VENIPUNCTURE: CPT | Performed by: REGISTERED NURSE

## 2023-05-01 PROCEDURE — 80053 COMPREHEN METABOLIC PANEL: CPT | Performed by: REGISTERED NURSE

## 2023-05-02 ENCOUNTER — TELEPHONE (OUTPATIENT)
Dept: VASCULAR SURGERY | Facility: CLINIC | Age: 78
End: 2023-05-02
Payer: MEDICARE

## 2023-05-02 DIAGNOSIS — Z99.2 END STAGE RENAL FAILURE ON DIALYSIS: Primary | ICD-10-CM

## 2023-05-02 DIAGNOSIS — N18.6 END STAGE RENAL FAILURE ON DIALYSIS: Primary | ICD-10-CM

## 2023-05-02 NOTE — TELEPHONE ENCOUNTER
----- Message from Yani Katz MA sent at 5/2/2023  8:45 AM CDT -----  Leonarda from [facility] is calling to schedule appt with Dr. Becerril because the patient is having issues with dialysis.  She states to contact the patient at [phone] with appt date and time.

## 2023-05-05 ENCOUNTER — TELEPHONE (OUTPATIENT)
Dept: INFECTIOUS DISEASES | Facility: CLINIC | Age: 78
End: 2023-05-05
Payer: MEDICARE

## 2023-05-05 ENCOUNTER — TELEPHONE (OUTPATIENT)
Dept: PRIMARY CARE CLINIC | Facility: CLINIC | Age: 78
End: 2023-05-05
Payer: MEDICARE

## 2023-05-05 DIAGNOSIS — Z51.81 MEDICATION MONITORING ENCOUNTER: ICD-10-CM

## 2023-05-05 DIAGNOSIS — Z22.7 TB LUNG, LATENT: Primary | ICD-10-CM

## 2023-05-05 NOTE — TELEPHONE ENCOUNTER
----- Message from Roger Mejia sent at 5/5/2023 10:52 AM CDT -----  Regarding: Return Call  Contact: IMER ISRAEL [[id number]]  Type:  Patient Returning Call    Who Called: IMER ISRAEL [[id number]]    Who Left Message for Patient:     Does the patient know what this is regarding?: yes    Would the patient rather a call back or a response via [facility]? call    Best Call Back Number: [phone]    Additional Information:

## 2023-05-05 NOTE — TELEPHONE ENCOUNTER
OPAT follow up - LTBI on INH/B6 x9 months. End of care ~ Nov 30th, 2023.      CMP from 5/1 reviewed. No transaminates noted. Will repeat labs in 1 month ~ 6/5/23.

## 2023-05-05 NOTE — TELEPHONE ENCOUNTER
Informed pt that Dr. Watler did not want to see the pt for a preop at this time. Dr. Walter will not clear this pt for eye surgery until the A1c is under control. Informed pt that he should discuss his A1c with Dr. Montoya in endocrine. Pt verbalized understanding.

## 2023-05-11 ENCOUNTER — OFFICE VISIT (OUTPATIENT)
Dept: VASCULAR SURGERY | Facility: CLINIC | Age: 78
End: 2023-05-11
Attending: SURGERY
Payer: MEDICARE

## 2023-05-11 ENCOUNTER — HOSPITAL ENCOUNTER (OUTPATIENT)
Dept: VASCULAR SURGERY | Facility: CLINIC | Age: 78
Discharge: HOME | End: 2023-05-11
Attending: SURGERY
Payer: MEDICARE

## 2023-05-11 VITALS
DIASTOLIC BLOOD PRESSURE: 75 MMHG | WEIGHT: 286.63 LBS | SYSTOLIC BLOOD PRESSURE: 131 MMHG | HEIGHT: 74 IN | HEART RATE: 78 BPM | BODY MASS INDEX: 36.78 KG/M2 | TEMPERATURE: 98 F

## 2023-05-11 DIAGNOSIS — T82.858D ARTERIOVENOUS FISTULA STENOSIS, SUBSEQUENT ENCOUNTER: Primary | ICD-10-CM

## 2023-05-11 DIAGNOSIS — Z99.2 END STAGE RENAL FAILURE ON DIALYSIS: ICD-10-CM

## 2023-05-11 DIAGNOSIS — N18.6 END STAGE RENAL FAILURE ON DIALYSIS: ICD-10-CM

## 2023-05-11 PROCEDURE — 99999 PR PBB SHADOW E&M-EST. PATIENT-LVL IV: ICD-10-PCS | Mod: PBBFAC,,, | Performed by: SURGERY

## 2023-05-11 PROCEDURE — 99214 OFFICE O/P EST MOD 30 MIN: CPT | Mod: PBBFAC | Performed by: SURGERY

## 2023-05-11 PROCEDURE — 93990 PR DUPLEX HEMODIALYSIS ACCESS: ICD-10-PCS | Mod: 26,S$PBB,, | Performed by: SURGERY

## 2023-05-11 PROCEDURE — 99214 PR OFFICE/OUTPT VISIT, EST, LEVL IV, 30-39 MIN: ICD-10-PCS | Mod: S$PBB,,, | Performed by: SURGERY

## 2023-05-11 PROCEDURE — 93990 DOPPLER FLOW TESTING: CPT | Mod: 26,S$PBB,, | Performed by: SURGERY

## 2023-05-11 PROCEDURE — 99999 PR PBB SHADOW E&M-EST. PATIENT-LVL IV: CPT | Mod: PBBFAC,,, | Performed by: SURGERY

## 2023-05-11 PROCEDURE — 99214 OFFICE O/P EST MOD 30 MIN: CPT | Mod: S$PBB,,, | Performed by: SURGERY

## 2023-05-11 PROCEDURE — 93990 DOPPLER FLOW TESTING: CPT | Mod: PBBFAC | Performed by: SURGERY

## 2023-05-11 RX ORDER — SEMAGLUTIDE 0.68 MG/ML
INJECTION, SOLUTION SUBCUTANEOUS
COMMUNITY
Start: 2023-05-01 | End: 2023-06-26

## 2023-05-11 NOTE — PROGRESS NOTES
Vinnie Siegel  05/11/2023      Mr. Siegel returns for follow up evaluation.  He has intermittently undergone hemodialysis with 2 needles in his right upper extremity brachiocephalic AV fistula, however, not reliably.  He can not articulate why the dialysis center occasionally relies on his PermCath.  Based on his flow volume and palpable thrill throughout the 1-2/3st of his fistula, I can not understand this either.  I would like to get him permanently onto needles and remove his PermCath is soon as possible.    Interval history:  10/20/22  Pt is a 75 year old established patient who comes in for evaluation of new HD access creation. Patient underwent fistulogram with attempt at declot of LUE AVF but was unsuccessful. Subsequently had RIJ permacath placement. Is receiving dialysis T Th Sa without any issues. Continues to take eliquis and statin.    HPI:  This is a 75 -year-old -American male with nephrolithiasis, hypertension and diabetes,ckd, proteinuria who is coming in for f/u of  Nephrolithiasis, ckd, HTN and proteinuria. No recent stones and denies any hematuria, dysuria, or flank pain. DM not well controlled in the past but much better at a1c of 5.9 recently.  Unclear about  Bp's at home.    Creatinine high. Has Proteinuria. Admitted to ICU in June 2020 with HHS, encephalopathy, GI bleed, ADONAY, respiratory failure and sepsis. Sepsis was due to E. Coli UTI and Strep agalactiae pneumonia. Pt lost f/u and was last seen in 2017 in renal clinic. Denies NSAID's.      PAST MEDICAL HISTORY: Significant for hypertension, diabetes for several years,   and nephrolithiasis. The patient had first episode about several  years ago and had to   have a subsequent procedure and second stone was in August 2012 and he   had left ureteroscopy for that. He states that he was never symptomatic with   either of the stones and was found on imaging.     LUE brachiocephalic AVF placed 12/8/2020.    Interval History:  Vinnie  Robbin is here today for f/u after fistulagram of LUE brachiocephalic AVF done for focal distal stenosis which has resolved since that procedure. monocryl stitch in place that was removed today, healing well. He is using his AVF for dialysis 3 days a week now, Tues, Thurs, and Sat. US of AVF shows flow of 2043 ml/min and distal fistula velocity of 351 which is likely from a valve remnant.     Patient previously stated that he has an appt with Dr. Vasquez to discuss PD catheter placement, but will not be doing this.    1/5/23  S/p R brachiocephalic AVF 12/7/22. Denies pain, erythema, numbness on RUE. Currently using trialysis line for dialysis T, Th, Sat.     Interval history 2/23/23:  Patient is here after fistulogram on 2/3/23 due to low velocity - No stenosis seen on Fistulogram, Permacath placed in good position.     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     Hyperlipidemia     Hypertension     Kidney stone     Seasonal allergies 6/24/2014     Past Surgical History:   Procedure Laterality Date    AV FISTULA PLACEMENT Left 12/8/2020    Procedure: CREATION, AV FISTULA;  Surgeon: Benji Becerril MD;  Location: [facility] OR [address] [place] [address];  Service: Peripheral Vascular;  Laterality: Left;    AV FISTULA PLACEMENT Right 12/7/2022    Procedure: CREATION, AV FISTULA;  Surgeon: Benji Becerril MD;  Location: [facility] OR [address] [place] [address];  Service: Peripheral Vascular;  Laterality: Right;  RUE    CATARACT EXTRACTION W/  INTRAOCULAR LENS IMPLANT Right 04/04/16    Dr Meehan    COLONOSCOPY W/ BIOPSIES      ESOPHAGOGASTRODUODENOSCOPY N/A 6/29/2020    Procedure: EGD (ESOPHAGOGASTRODUODENOSCOPY);  Surgeon: Ravi Leone MD;  Location: [facility];  Service: Endoscopy;  Laterality: N/A;    EYE SURGERY      FISTULOGRAM Left 4/16/2021    Procedure: Fistulogram;   Surgeon: Benji Becerril MD;  Location: [facility] CATH LAB;  Service: Peripheral Vascular;  Laterality: Left;    FISTULOGRAM Left 2022    Procedure: Fistulogram;  Surgeon: Benji Becerril MD;  Location: [facility] CATH LAB;  Service: Cardiology;  Laterality: Left;    FISTULOGRAM, WITH PTA Right 2/3/2023    Procedure: FISTULOGRAM, WITH PTA;  Surgeon: Benji Becerril MD;  [facility];  Service: Peripheral Vascular;  Laterality: Right;  205.17 mGy  2.3 min    HEMORRHOID SURGERY      Dr. Root [facility]    lateral internal anal sphincterotomy  13    Dr. root [facility]    PERCUTANEOUS TRANSLUMINAL ANGIOPLASTY OF ARTERIOVENOUS FISTULA Left 2021    Procedure: PTA, AV FISTULA;  Surgeon: Benji Becerril MD;  Location: [facility] CATH LAB;  Service: Peripheral Vascular;  Laterality: Left;    PERCUTANEOUS TRANSLUMINAL ANGIOPLASTY OF ARTERIOVENOUS FISTULA N/A 2022    Procedure: PTA, AV FISTULA;  Surgeon: Benji Becerril MD;  Location: [facility] CATH LAB;  Service: Cardiology;  Laterality: N/A;    PLACEMENT OF DUAL-LUMEN VASCULAR CATHETER N/A 2/3/2023    Procedure: EXCHANGE, PERMACATH;  Surgeon: Benji Becerril MD;  Location: [facility] OR [address] [place] [address];  Service: Peripheral Vascular;  Laterality: N/A;    URETERAL STENT PLACEMENT       Family History   Problem Relation Age of Onset    Diabetes Brother         type 1    Hypertension Brother     Stroke Brother      Social History     Socioeconomic History    Marital status: Single   Tobacco Use    Smoking status: Former     Packs/day: 0.50     Years: 45.00     Pack years: 22.50     Types: Cigarettes     Quit date: 2020     Years since quittin.8    Smokeless tobacco: Never   Substance and Sexual Activity    Alcohol use: Not Currently     Comment: rarely    Drug use: No    Sexual activity: Yes     Partners: Female     Comment: single, retired , no kids   Social History Narrative    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      Social Determinants of Health     Financial Resource Strain: Low Risk     Difficulty of Paying Living Expenses: Not very hard   Food Insecurity: No Food Insecurity    Worried About Running Out of Food in the Last Year: Never true    Ran Out of Food in the Last Year: Never true   Transportation Needs: No Transportation Needs    Lack of Transportation (Medical): No    Lack of Transportation (Non-Medical): No   Physical Activity: Inactive    Days of Exercise per Week: 0 days    Minutes of Exercise per Session: 0 min   Stress: Stress Concern Present    Feeling of Stress : To some extent   Social Connections: Unknown    Frequency of Communication with Friends and Family: Three times a week    Frequency of Social Gatherings with Friends and Family: Three times a week   Housing Stability: Low Risk     Unable to Pay for Housing in the Last Year: No    Number of Places Lived in the Last Year: 1    Unstable Housing in the Last Year: No     Current Outpatient Medications on File Prior to Visit   Medication Sig    atorvastatin (LIPITOR) 40 MG tablet Take 1 tablet (40 mg total) by mouth once daily.    blood sugar diagnostic (TRUE METRIX GLUCOSE TEST STRIP) Strp Use to check blood glucose 3-5 times daily as directed.    blood sugar diagnostic Strp To check BG 4 times daily, to use with insurance preferred meter    calcium acetate,phosphat bind, (PHOSLO) 667 mg capsule SMARTSI Capsule(s) By Mouth    ergocalciferol (ERGOCALCIFEROL) 50,000 unit Cap Take 1 capsule (50,000 Units total) by mouth every 7 days. (Patient taking differently: Take 50,000 Units by mouth every 7 days. Patient takes on )    ferrous sulfate (FEOSOL) 325 mg (65 mg iron) Tab tablet 3 TABS A DAY. (Patient taking differently: every evening. 3 TABS A DAY.)    fluticasone propionate (FLONASE) 50 mcg/actuation nasal spray SHAKE LIQUID AND USE 2 SPRAYS(100 MCG) IN EACH NOSTRIL EVERY DAY    hydrALAZINE (APRESOLINE) 100 MG tablet Take 1 tablet (100 mg  "total) by mouth 3 (three) times daily.    HYDROcodone-acetaminophen (NORCO) 7.5-325 mg per tablet Take 1 tablet by mouth 3 (three) times daily as needed.    insulin (LANTUS SOLOSTAR U-100 INSULIN) glargine 100 units/mL SubQ pen INJECT 28-36 UNITS UNDER THE SKIN AT NIGHT    insulin lispro (HUMALOG KWIKPEN INSULIN) 100 unit/mL pen INJECT 8-12 UNITS EVERY DAY WITH MEALS PLUS SCALE. MAX DAILY50 UNITS    ipratropium-albuteroL (COMBIVENT)  mcg/actuation inhaler Inhale 1 puff into the lungs every 6 (six) hours as needed for Wheezing or Shortness of Breath. Rescue    isoniazid (NYDRAZID) 300 MG Tab Take 1 tablet (300 mg total) by mouth once daily.    lancets Misc To check BG 4 times daily, to use with insurance preferred meter    metoprolol succinate (TOPROL-XL) 25 MG 24 hr tablet Take 0.5 tablets (12.5 mg total) by mouth once daily.    NIFEdipine (PROCARDIA-XL) 90 MG (OSM) 24 hr tablet Take 1 tablet (90 mg total) by mouth once daily.    OZEMPIC 0.25 mg or 0.5 mg (2 mg/3 mL) pen injector Inject into the skin.    pantoprazole (PROTONIX) 40 MG tablet TAKE 1 TABLET(40 MG) BY MOUTH EVERY DAY    pen needle, diabetic (BD ULTRA-FINE SHORT PEN NEEDLE) 31 gauge x 5/16" Ndle USE FOUR TIMES DAILY    pyridoxine, vitamin B6, (VITAMIN B-6) 50 MG Tab Take 1 tablet (50 mg total) by mouth once daily.    tamsulosin (FLOMAX) 0.4 mg Cap TAKE 1 CAPSULE BY MOUTH EVERY MORNING    timolol maleate 0.25% (TIMOPTIC) 0.25 % Drop Place 1 drop into both eyes 2 (two) times daily.    torsemide (DEMADEX) 20 MG Tab Take 1 tablet (20 mg total) by mouth 2 (two) times daily.    blood-glucose meter kit To check BG 4 times daily, to use with insurance preferred meter, e 11.65    [DISCONTINUED] semaglutide (OZEMPIC) 0.25 mg or 0.5 mg(2 mg/1.5 mL) pen injector Inject 0.25 mg into the skin every 7 days for 28 days, THEN 0.5 mg every 7 days.     No current facility-administered medications on file prior to visit.       REVIEW OF SYSTEMS:  General: negative; " ENT: negative; Allergy and Immunology: negative; Hematological and Lymphatic: Negative; Endocrine: negative; Respiratory: no cough, shortness of breath, or wheezing; Cardiovascular: no chest pain or dyspnea on exertion; Gastrointestinal: no abdominal pain/back, change in bowel habits, or bloody stools; Genito-Urinary: no dysuria, trouble voiding, or hematuria; Musculoskeletal: negative  Neurological: no TIA or stroke symptoms    PHYSICAL EXAM:   Left Arm BP - Sittin/75 (23 1008)  Pulse: 78  Temp: 98.1 °F (36.7 °C)      General appearance:  Alert, obese. well-appearing, and in no distress.  Oriented to person, place, and time   Neurological: Normal speech, no focal findings noted; CN II - XII grossly intact           Musculoskeletal: Digits/nail without cyanosis/clubbing.  Normal muscle strength/tone.                 Neck: Supple, no significant adenopathy; thyroid is not enlarged                  No carotid bruit can be auscultated                Chest:  Clear to auscultation, no wheezes, rales or rhonchi, symmetric air entry     No use of accessory muscles             Cardiac: Normal rate and regular rhythm, S1 and S2 normal; PMI non-displaced          Abdomen: Soft, nontender, nondistended, no masses or organomegaly     No rebound tenderness noted; bowel sounds normal     Pulsatile aortic mass is not palpable.     No groin adenopathy      Extremities:  RUE AVF incision CDI with stitches in place. Palpable thrill.      2+ left radial and brachial pulse     LUE AVF with slight thrill proximally, pulsatile throughout     2+ right radial and brachial pulse    LAB RESULTS:  Lab Results   Component Value Date    K 4.1 2023    K 3.8 2023    K 3.6 2023    CREATININE 9.8 (H) 2023    CREATININE 8.9 (H) 2023    CREATININE 9.6 (H) 2023     Lab Results   Component Value Date    WBC 4.14 2022    WBC 6.34 2022    WBC 6.24 2022    HCT 39 2022    HCT 34.6 (L)  11/23/2022    HCT 29.8 (L) 09/29/2022     11/23/2022     09/29/2022     09/28/2022     Lab Results   Component Value Date    HGBA1C 11.3 (H) 03/13/2023    HGBA1C 12.4 (H) 09/27/2022    HGBA1C 6.6 (H) 09/21/2021     IMAGING:    Dialysis Access   =============     Rt inflow A PSV    338 cm/s   Rt at anastomosis PSV  656 cm/s   Rt A distal anastomosis PSV    492 cm/s   Rt fistula prox depth  6.0 mm   Rt fistula prox AP diam    7.5 mm   Rt fistula prox PSV    146 cm/s   Rt fistula mid depth   8.4 mm   Rt fistula mid AP diam 8.4 mm   Rt fistula mid  cm/s   Rt fistula mid flow volume 1,419.7 ml/min   Rt fistula distal depth    11.3 mm   Rt fistula distal AP diam  5.5 mm   Rt fistula distal PSV  130 cm/s   Rt outflow V prox PSV  122 cm/s   Rt outflow V mid PSV   105 cm/s   Rt outflow V distal PSV    99 cm/s     Impression   =========     Color flow duplex reveals a patent Rt. Brachiocephalic AV fistula. There is no evidence of a hemodynamically significant stenosis   despite accelerated velocities at the anastomosis (656 cm/sec). The volume flow at the mid bicep measures 1419 mL/min. A small   pseudoaneurysm is noted in the mid bicep that measures 0.4 cm x 0.3 cm. Outflow vein measurements are taken, which appears to   be very tortuous in nature. Varying depths noted.     IMP/PLAN:  77 y.o. male with morbid obesity and CKD V, on HD using his LUE AFV. S/p LUE brachiocephalic AVF on 12/8/20, now s/p LUE AVF fistulagram with unsuccessful declot. Patient requires new HD access creation.   S/p RUE AVF 12/7/22.  Flow volume is >1400.  Mr. Siegel returns for follow up evaluation.  He has intermittently undergone hemodialysis with 2 needles in his right upper extremity brachiocephalic AV fistula, however, not reliably.  He can not articulate why the dialysis center occasionally relies on his PermCath.  Based on his flow volume and palpable thrill throughout the 1st 2/3 of his fistula, I can not  understand this either.  Perhaps this is related to the tortuosity of the fistula, however, it is easily palpable throughout the proximal upper arm.  I instructed him to encourage the most experience dialysis nurses to access his fistula for the time being.  I would like to get him permanently onto needles and remove his PermCath is soon as possible.    1) RTC in 2-3 weeks for permacath removal  2) RTC in 3 mo for AVF duplex    Benji Becerril MD  Vascular & Endovascular Surgery

## 2023-05-30 NOTE — PROGRESS NOTES
Subjective:       Patient ID: Vinnie Siegel is a 77 y.o. male.    Chief Complaint: Glaucoma     HPI    DLS: 03/29/2023  Pt is not able to be cleared for sx at this time. He reports he did not   know that he hadn't seen his PCP.  States using his drops BID OU     IOP check  POAG  PC IOL OD   NSC OS    Timolol BID OU   Last edited by Yvonne Nielson MD on 5/31/2023  9:40 AM.            Assessment & Plan   Primary open angle glaucoma (POAG) of left eye, severe stage    Combined forms of age-related cataract of left eye    Primary open angle glaucoma (POAG) of right eye, indeterminate stage    Pseudophakia, right eye       Dr. Manriquez    POAG severe OD // indeterminate OS  -Fhx (), Steroids (),Trauma()  -Drops: Timolol twice daily OU --> NOT USING --> restart  -Drop intolerance/contraindication: Latanoprost   -Laser:  -Surgeries: CE IOL OD  -CCT: 564 OD // 566 OS  -Gonio: SS OD // SS/TM OS  Tm 23 // 28    Performed independent review of outside records including notes and tests  Discussed imaging and interpretation with patient.   Discussed eyedrops and if more than one, recommend 5 minutes between all drops.     2/23 HVF 24-2 SAD and dense IAD w fixation VFI 56% OD // unable OS(similar to 2012)  2/23 RNFL dec throughout OD // unable OS    OS RNFL and HVF full 2019    Disc photos 2017    IOP improved with timolol - continue         Combined forms of age-related cataract OS  Visually significant and interfering with activities of daily living including driving/reading  Patient desires cataract surgery for visual rehabilitation.     R/B/A discussed and patient agrees to proceed with surgery.   IOL options discussed according to patient's goals and concomitant ocular pathology; and patient content with monofocal lens.  Refractive target discussed at length. Patient opts for distance  Combine with OMNI OS    FLOMAX    LEFT EYE DIBOO +21.00 target -0.08 with OMNI    Patient not able to be cleared for surgery until A1c  improves due to increased risk.   Educated patient and encouraged compliance and appt with Endo and PCP.       Pseudophakia OD  Dr. Meehan  Lenses WC, monitor      PLAN  Cont timolol BID OU        RTC 3 months IOP check or POD#1 CE IOL OS with OMNI - whatever first     Yvonne Nielson M.D., M.S.  Department of Ophthalmology   Division of Glaucoma Surgery  [facility]

## 2023-05-31 ENCOUNTER — OFFICE VISIT (OUTPATIENT)
Dept: OPHTHALMOLOGY | Facility: CLINIC | Age: 78
End: 2023-05-31
Payer: MEDICARE

## 2023-05-31 ENCOUNTER — LAB VISIT (OUTPATIENT)
Dept: LAB | Facility: HOSPITAL | Age: 78
End: 2023-05-31
Attending: REGISTERED NURSE
Payer: MEDICARE

## 2023-05-31 DIAGNOSIS — H40.1114 PRIMARY OPEN ANGLE GLAUCOMA (POAG) OF RIGHT EYE, INDETERMINATE STAGE: ICD-10-CM

## 2023-05-31 DIAGNOSIS — Z96.1 PSEUDOPHAKIA, RIGHT EYE: ICD-10-CM

## 2023-05-31 DIAGNOSIS — H40.1123 PRIMARY OPEN ANGLE GLAUCOMA (POAG) OF LEFT EYE, SEVERE STAGE: Primary | ICD-10-CM

## 2023-05-31 DIAGNOSIS — Z51.81 MEDICATION MONITORING ENCOUNTER: ICD-10-CM

## 2023-05-31 DIAGNOSIS — Z22.7 TB LUNG, LATENT: ICD-10-CM

## 2023-05-31 DIAGNOSIS — H25.812 COMBINED FORMS OF AGE-RELATED CATARACT OF LEFT EYE: ICD-10-CM

## 2023-05-31 LAB
ALBUMIN SERPL BCP-MCNC: 3.4 G/DL (ref 3.5–5.2)
ALP SERPL-CCNC: 52 U/L (ref 55–135)
ALT SERPL W/O P-5'-P-CCNC: <5 U/L (ref 10–44)
ANION GAP SERPL CALC-SCNC: 18 MMOL/L (ref 8–16)
AST SERPL-CCNC: 9 U/L (ref 10–40)
BILIRUB SERPL-MCNC: 0.7 MG/DL (ref 0.1–1)
BUN SERPL-MCNC: 32 MG/DL (ref 8–23)
CALCIUM SERPL-MCNC: 9.7 MG/DL (ref 8.7–10.5)
CHLORIDE SERPL-SCNC: 91 MMOL/L (ref 95–110)
CO2 SERPL-SCNC: 27 MMOL/L (ref 23–29)
CREAT SERPL-MCNC: 8 MG/DL (ref 0.5–1.4)
EST. GFR  (NO RACE VARIABLE): 6.4 ML/MIN/1.73 M^2
GLUCOSE SERPL-MCNC: 238 MG/DL (ref 70–110)
POTASSIUM SERPL-SCNC: 3.7 MMOL/L (ref 3.5–5.1)
PROT SERPL-MCNC: 7.9 G/DL (ref 6–8.4)
SODIUM SERPL-SCNC: 136 MMOL/L (ref 136–145)

## 2023-05-31 PROCEDURE — 99214 OFFICE O/P EST MOD 30 MIN: CPT | Mod: S$PBB,,, | Performed by: STUDENT IN AN ORGANIZED HEALTH CARE EDUCATION/TRAINING PROGRAM

## 2023-05-31 PROCEDURE — 99213 OFFICE O/P EST LOW 20 MIN: CPT | Mod: PBBFAC | Performed by: STUDENT IN AN ORGANIZED HEALTH CARE EDUCATION/TRAINING PROGRAM

## 2023-05-31 PROCEDURE — 99999 PR PBB SHADOW E&M-EST. PATIENT-LVL III: CPT | Mod: PBBFAC,,, | Performed by: STUDENT IN AN ORGANIZED HEALTH CARE EDUCATION/TRAINING PROGRAM

## 2023-05-31 PROCEDURE — 99214 PR OFFICE/OUTPT VISIT, EST, LEVL IV, 30-39 MIN: ICD-10-PCS | Mod: S$PBB,,, | Performed by: STUDENT IN AN ORGANIZED HEALTH CARE EDUCATION/TRAINING PROGRAM

## 2023-05-31 PROCEDURE — 80053 COMPREHEN METABOLIC PANEL: CPT | Performed by: REGISTERED NURSE

## 2023-05-31 PROCEDURE — 99999 PR PBB SHADOW E&M-EST. PATIENT-LVL III: ICD-10-PCS | Mod: PBBFAC,,, | Performed by: STUDENT IN AN ORGANIZED HEALTH CARE EDUCATION/TRAINING PROGRAM

## 2023-05-31 PROCEDURE — 36415 COLL VENOUS BLD VENIPUNCTURE: CPT | Performed by: REGISTERED NURSE

## 2023-06-02 ENCOUNTER — TELEPHONE (OUTPATIENT)
Dept: INFECTIOUS DISEASES | Facility: HOSPITAL | Age: 78
End: 2023-06-02
Payer: MEDICARE

## 2023-06-02 NOTE — TELEPHONE ENCOUNTER
OPAT follow up - LTBI on INH/B6 x9 months. End of care ~ Nov 30th, 2023.      CMP from 5/31 reviewed. No transaminates noted. Will repeat labs in 1 month ~ 7/1/23.

## 2023-06-06 DIAGNOSIS — Z99.2 END STAGE RENAL FAILURE ON DIALYSIS: Primary | ICD-10-CM

## 2023-06-06 DIAGNOSIS — N18.6 END STAGE RENAL FAILURE ON DIALYSIS: Primary | ICD-10-CM

## 2023-06-08 ENCOUNTER — HOSPITAL ENCOUNTER (OUTPATIENT)
Dept: VASCULAR SURGERY | Facility: CLINIC | Age: 78
Discharge: HOME | End: 2023-06-08
Attending: SURGERY
Payer: MEDICARE

## 2023-06-08 ENCOUNTER — OFFICE VISIT (OUTPATIENT)
Dept: VASCULAR SURGERY | Facility: CLINIC | Age: 78
End: 2023-06-08
Attending: SURGERY
Payer: MEDICARE

## 2023-06-08 VITALS
DIASTOLIC BLOOD PRESSURE: 93 MMHG | HEART RATE: 79 BPM | HEIGHT: 72 IN | BODY MASS INDEX: 38.87 KG/M2 | TEMPERATURE: 98 F | SYSTOLIC BLOOD PRESSURE: 139 MMHG

## 2023-06-08 DIAGNOSIS — Z99.2 END STAGE RENAL FAILURE ON DIALYSIS: Primary | ICD-10-CM

## 2023-06-08 DIAGNOSIS — N18.6 END STAGE RENAL FAILURE ON DIALYSIS: ICD-10-CM

## 2023-06-08 DIAGNOSIS — N18.6 END STAGE RENAL FAILURE ON DIALYSIS: Primary | ICD-10-CM

## 2023-06-08 DIAGNOSIS — Z99.2 END STAGE RENAL FAILURE ON DIALYSIS: ICD-10-CM

## 2023-06-08 PROCEDURE — 93990 DOPPLER FLOW TESTING: CPT | Mod: 26,S$PBB,, | Performed by: SURGERY

## 2023-06-08 PROCEDURE — 93990 PR DUPLEX HEMODIALYSIS ACCESS: ICD-10-PCS | Mod: 26,S$PBB,, | Performed by: SURGERY

## 2023-06-08 PROCEDURE — 36589 PR REMOVAL TUNNELED CV CATH W/O SUBQ PORT OR PUMP: ICD-10-PCS | Mod: S$PBB,GC,, | Performed by: SURGERY

## 2023-06-08 PROCEDURE — 99999 PR PBB SHADOW E&M-EST. PATIENT-LVL IV: ICD-10-PCS | Mod: PBBFAC,,, | Performed by: SURGERY

## 2023-06-08 PROCEDURE — 99999 PR PBB SHADOW E&M-EST. PATIENT-LVL IV: CPT | Mod: PBBFAC,,, | Performed by: SURGERY

## 2023-06-08 PROCEDURE — 36589 REMOVAL TUNNELED CV CATH: CPT | Mod: S$PBB,GC,, | Performed by: SURGERY

## 2023-06-08 PROCEDURE — 36589 REMOVAL TUNNELED CV CATH: CPT | Mod: PBBFAC,GC | Performed by: SURGERY

## 2023-06-08 PROCEDURE — 99214 OFFICE O/P EST MOD 30 MIN: CPT | Mod: PBBFAC | Performed by: SURGERY

## 2023-06-08 PROCEDURE — 93990 DOPPLER FLOW TESTING: CPT | Mod: PBBFAC | Performed by: SURGERY

## 2023-06-23 NOTE — PROGRESS NOTES
Vinnie Siegel  6/15/2023      Mr. Siegel returns for follow up evaluation.  He is now reliably undergone hemodialysis with his right upper extremity brachiocephalic fistula for several weeks with 2 large bore needles.  He is here for PermCath removal.    Interval history:  10/20/22  Pt is a 75 year old established patient who comes in for evaluation of new HD access creation. Patient underwent fistulogram with attempt at declot of LUE AVF but was unsuccessful. Subsequently had RIJ permacath placement. Is receiving dialysis T Th Sa without any issues. Continues to take eliquis and statin.    HPI:  This is a 75 -year-old -American male with nephrolithiasis, hypertension and diabetes,ckd, proteinuria who is coming in for f/u of  Nephrolithiasis, ckd, HTN and proteinuria. No recent stones and denies any hematuria, dysuria, or flank pain. DM not well controlled in the past but much better at a1c of 5.9 recently.  Unclear about  Bp's at home.    Creatinine high. Has Proteinuria. Admitted to ICU in June 2020 with HHS, encephalopathy, GI bleed, ADONAY, respiratory failure and sepsis. Sepsis was due to E. Coli UTI and Strep agalactiae pneumonia. Pt lost f/u and was last seen in 2017 in renal clinic. Denies NSAID's.      PAST MEDICAL HISTORY: Significant for hypertension, diabetes for several years,   and nephrolithiasis. The patient had first episode about several  years ago and had to   have a subsequent procedure and second stone was in August 2012 and he   had left ureteroscopy for that. He states that he was never symptomatic with   either of the stones and was found on imaging.     LUE brachiocephalic AVF placed 12/8/2020.    Interval History:  Vinnie Siegel is here today for f/u after fistulagram of LUE brachiocephalic AVF done for focal distal stenosis which has resolved since that procedure. monocryl stitch in place that was removed today, healing well. He is using his AVF for dialysis 3 days a week now,  Tues, Thurs, and Sat. US of AVF shows flow of 2043 ml/min and distal fistula velocity of 351 which is likely from a valve remnant.     Patient previously stated that he has an appt with Dr. Vasquez to discuss PD catheter placement, but will not be doing this.    1/5/23  S/p R brachiocephalic AVF 12/7/22. Denies pain, erythema, numbness on RUE. Currently using trialysis line for dialysis T, Th, Sat.     Interval history 2/23/23:  Patient is here after fistulogram on 2/3/23 due to low velocity - No stenosis seen on Fistulogram, Permacath placed in good position.     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     Hyperlipidemia     Hypertension     Kidney stone     Seasonal allergies 6/24/2014     Past Surgical History:   Procedure Laterality Date    AV FISTULA PLACEMENT Left 12/8/2020    Procedure: CREATION, AV FISTULA;  Surgeon: Benji Becerril MD;  Location: [facility] OR [address] [place] [address];  Service: Peripheral Vascular;  Laterality: Left;    AV FISTULA PLACEMENT Right 12/7/2022    Procedure: CREATION, AV FISTULA;  Surgeon: Benji Becerril MD;  [facility];  Service: Peripheral Vascular;  Laterality: Right;  RUE    CATARACT EXTRACTION W/  INTRAOCULAR LENS IMPLANT Right 04/04/16    Dr Meehan    COLONOSCOPY W/ BIOPSIES      ESOPHAGOGASTRODUODENOSCOPY N/A 6/29/2020    Procedure: EGD (ESOPHAGOGASTRODUODENOSCOPY);  Surgeon: Ravi Leone MD;  Location: [facility];  Service: Endoscopy;  Laterality: N/A;    EYE SURGERY      FISTULOGRAM Left 4/16/2021    Procedure: Fistulogram;  Surgeon: Benji Becerril MD;  Location: [facility] CATH LAB;  Service: Peripheral Vascular;  Laterality: Left;    FISTULOGRAM Left 9/28/2022    Procedure: Fistulogram;  Surgeon: Benji Becerril MD;  Location: [facility] CATH LAB;  Service: Cardiology;  Laterality:  Left;    FISTULOGRAM, WITH PTA Right 2/3/2023    Procedure: FISTULOGRAM, WITH PTA;  Surgeon: Benji Becerril MD;  Location: [facility] OR [address] [place] [address];  Service: Peripheral Vascular;  Laterality: Right;  205.17 mGy  2.3 min    HEMORRHOID SURGERY      Dr. Root [facility]    lateral internal anal sphincterotomy  13    Dr. root [facility]    PERCUTANEOUS TRANSLUMINAL ANGIOPLASTY OF ARTERIOVENOUS FISTULA Left 2021    Procedure: PTA, AV FISTULA;  Surgeon: Benji Becerril MD;  Location: [facility] CATH LAB;  Service: Peripheral Vascular;  Laterality: Left;    PERCUTANEOUS TRANSLUMINAL ANGIOPLASTY OF ARTERIOVENOUS FISTULA N/A 2022    Procedure: PTA, AV FISTULA;  Surgeon: Benji Becerril MD;  Location: [facility] CATH LAB;  Service: Cardiology;  Laterality: N/A;    PLACEMENT OF DUAL-LUMEN VASCULAR CATHETER N/A 2/3/2023    Procedure: EXCHANGE, PERMACATH;  Surgeon: Benji Becerril MD;  Location: [facility] OR [address] [place] [address];  Service: Peripheral Vascular;  Laterality: N/A;    URETERAL STENT PLACEMENT       Family History   Problem Relation Age of Onset    Diabetes Brother         type 1    Hypertension Brother     Stroke Brother      Social History     Socioeconomic History    Marital status: Single   Tobacco Use    Smoking status: Former     Packs/day: 0.50     Years: 45.00     Pack years: 22.50     Types: Cigarettes     Quit date: 2020     Years since quittin.9    Smokeless tobacco: Never   Substance and Sexual Activity    Alcohol use: Not Currently     Comment: rarely    Drug use: No    Sexual activity: Yes     Partners: Female     Comment: single, retired , no kids   Social History Narrative    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     Social Determinants of Health     Financial Resource Strain: Low Risk     Difficulty of Paying Living Expenses: Not very hard   Food Insecurity: No Food Insecurity    Worried About Running Out of Food in the Last Year: Never true    Ran Out of Food in the  Last Year: Never true   Transportation Needs: No Transportation Needs    Lack of Transportation (Medical): No    Lack of Transportation (Non-Medical): No   Physical Activity: Inactive    Days of Exercise per Week: 0 days    Minutes of Exercise per Session: 0 min   Stress: Stress Concern Present    Feeling of Stress : To some extent   Social Connections: Unknown    Frequency of Communication with Friends and Family: Three times a week    Frequency of Social Gatherings with Friends and Family: Three times a week   Housing Stability: Low Risk     Unable to Pay for Housing in the Last Year: No    Number of Places Lived in the Last Year: 1    Unstable Housing in the Last Year: No     Current Outpatient Medications on File Prior to Visit   Medication Sig    atorvastatin (LIPITOR) 40 MG tablet Take 1 tablet (40 mg total) by mouth once daily.    blood sugar diagnostic (TRUE METRIX GLUCOSE TEST STRIP) Strp Use to check blood glucose 3-5 times daily as directed.    blood sugar diagnostic Strp To check BG 4 times daily, to use with insurance preferred meter    calcium acetate,phosphat bind, (PHOSLO) 667 mg capsule SMARTSI Capsule(s) By Mouth    ergocalciferol (ERGOCALCIFEROL) 50,000 unit Cap Take 1 capsule (50,000 Units total) by mouth every 7 days. (Patient taking differently: Take 50,000 Units by mouth every 7 days. Patient takes on )    ferrous sulfate (FEOSOL) 325 mg (65 mg iron) Tab tablet 3 TABS A DAY. (Patient taking differently: every evening. 3 TABS A DAY.)    fluticasone propionate (FLONASE) 50 mcg/actuation nasal spray SHAKE LIQUID AND USE 2 SPRAYS(100 MCG) IN EACH NOSTRIL EVERY DAY    hydrALAZINE (APRESOLINE) 100 MG tablet Take 1 tablet (100 mg total) by mouth 3 (three) times daily.    HYDROcodone-acetaminophen (NORCO) 7.5-325 mg per tablet Take 1 tablet by mouth 3 (three) times daily as needed.    insulin (LANTUS SOLOSTAR U-100 INSULIN) glargine 100 units/mL SubQ pen INJECT 28-36 UNITS UNDER THE SKIN AT  "NIGHT    insulin lispro (HUMALOG KWIKPEN INSULIN) 100 unit/mL pen INJECT 8-12 UNITS EVERY DAY WITH MEALS PLUS SCALE. MAX DAILY50 UNITS    ipratropium-albuteroL (COMBIVENT)  mcg/actuation inhaler Inhale 1 puff into the lungs every 6 (six) hours as needed for Wheezing or Shortness of Breath. Rescue    isoniazid (NYDRAZID) 300 MG Tab Take 1 tablet (300 mg total) by mouth once daily.    lancets Misc To check BG 4 times daily, to use with insurance preferred meter    metoprolol succinate (TOPROL-XL) 25 MG 24 hr tablet Take 0.5 tablets (12.5 mg total) by mouth once daily.    NIFEdipine (PROCARDIA-XL) 90 MG (OSM) 24 hr tablet Take 1 tablet (90 mg total) by mouth once daily.    OZEMPIC 0.25 mg or 0.5 mg (2 mg/3 mL) pen injector Inject into the skin.    pantoprazole (PROTONIX) 40 MG tablet TAKE 1 TABLET(40 MG) BY MOUTH EVERY DAY    pen needle, diabetic (BD ULTRA-FINE SHORT PEN NEEDLE) 31 gauge x 5/16" Ndle USE FOUR TIMES DAILY    pyridoxine, vitamin B6, (VITAMIN B-6) 50 MG Tab Take 1 tablet (50 mg total) by mouth once daily.    tamsulosin (FLOMAX) 0.4 mg Cap TAKE 1 CAPSULE BY MOUTH EVERY MORNING    timolol maleate 0.25% (TIMOPTIC) 0.25 % Drop Place 1 drop into both eyes 2 (two) times daily.    torsemide (DEMADEX) 20 MG Tab Take 1 tablet (20 mg total) by mouth 2 (two) times daily.    blood-glucose meter kit To check BG 4 times daily, to use with insurance preferred meter, e 11.65     No current facility-administered medications on file prior to visit.       REVIEW OF SYSTEMS:  General: negative; ENT: negative; Allergy and Immunology: negative; Hematological and Lymphatic: Negative; Endocrine: negative; Respiratory: no cough, shortness of breath, or wheezing; Cardiovascular: no chest pain or dyspnea on exertion; Gastrointestinal: no abdominal pain/back, change in bowel habits, or bloody stools; Genito-Urinary: no dysuria, trouble voiding, or hematuria; Musculoskeletal: negative  Neurological: no TIA or stroke " symptoms    PHYSICAL EXAM:      Pulse: 79  Temp: 98.1 °F (36.7 °C)      General appearance:  Alert, obese. well-appearing, and in no distress.  Oriented to person, place, and time   Neurological: Normal speech, no focal findings noted; CN II - XII grossly intact           Musculoskeletal: Digits/nail without cyanosis/clubbing.  Normal muscle strength/tone.                 Neck: Supple, no significant adenopathy; thyroid is not enlarged                  No carotid bruit can be auscultated                Chest:  Clear to auscultation, no wheezes, rales or rhonchi, symmetric air entry     No use of accessory muscles             Cardiac: Normal rate and regular rhythm, S1 and S2 normal; PMI non-displaced          Abdomen: Soft, nontender, nondistended, no masses or organomegaly     No rebound tenderness noted; bowel sounds normal     Pulsatile aortic mass is not palpable.     No groin adenopathy      Extremities:  RUE AVF incision CDI with stitches in place. Palpable thrill.      2+ left radial and brachial pulse     LUE AVF with good thrill th     2+ right radial and brachial pulse    LAB RESULTS:  Lab Results   Component Value Date    K 3.7 05/31/2023    K 4.1 05/01/2023    K 3.8 04/03/2023    CREATININE 8.0 (H) 05/31/2023    CREATININE 9.8 (H) 05/01/2023    CREATININE 8.9 (H) 04/03/2023     Lab Results   Component Value Date    WBC 4.14 11/23/2022    WBC 6.34 09/29/2022    WBC 6.24 09/28/2022    HCT 39 12/07/2022    HCT 34.6 (L) 11/23/2022    HCT 29.8 (L) 09/29/2022     11/23/2022     09/29/2022     09/28/2022     Lab Results   Component Value Date    HGBA1C 11.3 (H) 03/13/2023    HGBA1C 12.4 (H) 09/27/2022    HGBA1C 6.6 (H) 09/21/2021     IMAGING:    Indication   ========     End Stage Renal Disease on Dialysis     Dialysis Access   =============     Dialysis access location:  Right Arm   Type of AV access: Brachiocephalic AVF   Rt inflow A PSV    343 cm/s   Rt at anastomosis PSV  565 cm/s   Rt  A distal anastomosis PSV    430 cm/s   Rt fistula prox depth  2.0 mm   Rt fistula prox AP diam    8.6 mm   Rt fistula prox PSV    124 cm/s   Rt fistula mid depth   4.3 mm   Rt fistula mid AP diam 8.6 mm   Rt fistula mid  cm/s   Rt fistula mid flow volume 1,176.9 ml/min   Rt fistula distal depth    6.9 mm   Rt fistula distal AP diam  6.5 mm   Rt fistula distal PSV  130 cm/s   Rt outflow V prox PSV  110 cm/s   Rt outflow V distal PSV    121 cm/s     Impression   =========     Color flow duplex exam reveals a patent right Brachiocephalic AV fistula with no evidence of a hemodynamically significant stenosis   despite elevated velocities at the anastomosis measuring 565 cm/s, this area appears to be widely patent without evidence of a focal   narrowing. Volume flow at mid bicep level measures 1177 ml/min.      Tunneled Central Venous Catheter Removal    Procedure:  The patient was prepped and draped in sterile fashion. 2% Lidocaine with Epinephrine was used for local anesthetic. A blunt hemostat was used to dissect the exit sheath and fibrin sheath from the catheter cuff. After the cuff was freed, the catheter was pulled and pressure was applied to the venotomy. Once hemostasis was achieved, a pressure dressing was applied.      IMP/PLAN:  77 y.o. male with morbid obesity and CKD V, on HD using his LUE AFV. S/p LUE brachiocephalic AVF on 12/8/20, now s/p LUE AVF fistulagram with unsuccessful declot. Patient requires new HD access creation.   S/p RUE AVF 12/7/22.  Mr. Siegel returns for follow up evaluation.  He is now reliably undergone hemodialysis with his right upper extremity brachiocephalic fistula for several weeks with 2 large bore needles.  He is here for PermCath removal.    1) RTC in 2 mo with duplex    Benji Becerril MD  Vascular & Endovascular Surgery

## 2023-06-26 ENCOUNTER — OFFICE VISIT (OUTPATIENT)
Dept: ENDOCRINOLOGY | Facility: CLINIC | Age: 78
End: 2023-06-26
Payer: MEDICARE

## 2023-06-26 ENCOUNTER — LAB VISIT (OUTPATIENT)
Dept: LAB | Facility: HOSPITAL | Age: 78
End: 2023-06-26
Payer: MEDICARE

## 2023-06-26 VITALS
OXYGEN SATURATION: 99 % | SYSTOLIC BLOOD PRESSURE: 126 MMHG | RESPIRATION RATE: 16 BRPM | HEART RATE: 81 BPM | BODY MASS INDEX: 38.78 KG/M2 | DIASTOLIC BLOOD PRESSURE: 64 MMHG | WEIGHT: 285.94 LBS

## 2023-06-26 DIAGNOSIS — Z99.2 TYPE 2 DIABETES MELLITUS WITH CHRONIC KIDNEY DISEASE ON CHRONIC DIALYSIS, WITH LONG-TERM CURRENT USE OF INSULIN: ICD-10-CM

## 2023-06-26 DIAGNOSIS — E78.5 HYPERLIPIDEMIA, UNSPECIFIED HYPERLIPIDEMIA TYPE: ICD-10-CM

## 2023-06-26 DIAGNOSIS — E78.2 MIXED DIABETIC HYPERLIPIDEMIA ASSOCIATED WITH TYPE 2 DIABETES MELLITUS: ICD-10-CM

## 2023-06-26 DIAGNOSIS — E11.22 TYPE 2 DIABETES MELLITUS WITH CHRONIC KIDNEY DISEASE ON CHRONIC DIALYSIS, WITH LONG-TERM CURRENT USE OF INSULIN: ICD-10-CM

## 2023-06-26 DIAGNOSIS — I10 ESSENTIAL HYPERTENSION: ICD-10-CM

## 2023-06-26 DIAGNOSIS — E11.65 TYPE 2 DIABETES MELLITUS WITH HYPERGLYCEMIA, WITHOUT LONG-TERM CURRENT USE OF INSULIN: ICD-10-CM

## 2023-06-26 DIAGNOSIS — Z79.4 TYPE 2 DIABETES MELLITUS WITH CHRONIC KIDNEY DISEASE ON CHRONIC DIALYSIS, WITH LONG-TERM CURRENT USE OF INSULIN: ICD-10-CM

## 2023-06-26 DIAGNOSIS — I42.0 DILATED CARDIOMYOPATHY: ICD-10-CM

## 2023-06-26 DIAGNOSIS — N18.6 TYPE 2 DIABETES MELLITUS WITH CHRONIC KIDNEY DISEASE ON CHRONIC DIALYSIS, WITH LONG-TERM CURRENT USE OF INSULIN: ICD-10-CM

## 2023-06-26 DIAGNOSIS — E11.69 MIXED DIABETIC HYPERLIPIDEMIA ASSOCIATED WITH TYPE 2 DIABETES MELLITUS: ICD-10-CM

## 2023-06-26 LAB
ESTIMATED AVG GLUCOSE: 214 MG/DL (ref 68–131)
HBA1C MFR BLD: 9.1 % (ref 4–5.6)

## 2023-06-26 PROCEDURE — 99215 OFFICE O/P EST HI 40 MIN: CPT | Mod: PBBFAC | Performed by: INTERNAL MEDICINE

## 2023-06-26 PROCEDURE — 83036 HEMOGLOBIN GLYCOSYLATED A1C: CPT | Performed by: INTERNAL MEDICINE

## 2023-06-26 PROCEDURE — 99214 OFFICE O/P EST MOD 30 MIN: CPT | Mod: S$PBB,,, | Performed by: INTERNAL MEDICINE

## 2023-06-26 PROCEDURE — 99999 PR PBB SHADOW E&M-EST. PATIENT-LVL V: ICD-10-PCS | Mod: PBBFAC,,, | Performed by: INTERNAL MEDICINE

## 2023-06-26 PROCEDURE — 99214 PR OFFICE/OUTPT VISIT, EST, LEVL IV, 30-39 MIN: ICD-10-PCS | Mod: S$PBB,,, | Performed by: INTERNAL MEDICINE

## 2023-06-26 PROCEDURE — 36415 COLL VENOUS BLD VENIPUNCTURE: CPT | Performed by: INTERNAL MEDICINE

## 2023-06-26 PROCEDURE — 99999 PR PBB SHADOW E&M-EST. PATIENT-LVL V: CPT | Mod: PBBFAC,,, | Performed by: INTERNAL MEDICINE

## 2023-06-26 RX ORDER — SEMAGLUTIDE 1.34 MG/ML
1 INJECTION, SOLUTION SUBCUTANEOUS
Qty: 9 ML | Refills: 1 | Status: SHIPPED | OUTPATIENT
Start: 2023-06-26 | End: 2024-06-25

## 2023-06-26 RX ORDER — INSULIN LISPRO 100 [IU]/ML
INJECTION, SOLUTION INTRAVENOUS; SUBCUTANEOUS
Qty: 15 ML | Refills: 6 | Status: SHIPPED | OUTPATIENT
Start: 2023-06-26 | End: 2023-10-16 | Stop reason: SDUPTHER

## 2023-06-26 RX ORDER — INSULIN GLARGINE 100 [IU]/ML
INJECTION, SOLUTION SUBCUTANEOUS
Qty: 15 ML | Refills: 6 | Status: SHIPPED | OUTPATIENT
Start: 2023-06-26 | End: 2023-10-16

## 2023-06-26 RX ORDER — ATORVASTATIN CALCIUM 80 MG/1
80 TABLET, FILM COATED ORAL DAILY
Qty: 90 TABLET | Refills: 3 | Status: SHIPPED | OUTPATIENT
Start: 2023-06-26

## 2023-06-26 NOTE — ASSESSMENT & PLAN NOTE
LDL much higher than goal with reported compliance with lipitor 40 mg daily.  Will increase to 80 mg but recommend he discuss this with cardiology to discuss other options like PCSK-9 inhibitor

## 2023-06-26 NOTE — Clinical Note
Dr. Walter- patient again requested that I give medical clearance for cataract surgery.  This is something that needs to be done by his primary care provider but from a diabetes standpoint okay to proceed if HbA1c today is <9.   I told him I would pass this along to you for the clearance.   Kyara - patient said he was called about dexcom but didn't provide the information requested as he thought it might be a scam.  Please reach out to [employer] to have them call patient again.

## 2023-06-26 NOTE — PATIENT INSTRUCTIONS
Increase atorvastatin to 80 mg daily and talk to your cardiologist about other medication options for lowering your cholesterol.      Regimen as of 06/26/2023    Lantus 32 units nightly  Humalog 12 units before meals (give 4 units of humalog before snacks containing carbs)  Increase to Ozempic 1 mg weekly after you finish the 0.5 mg pen    Let us know when you get the dexcom so that we can set up the training session.     Please check your blood sugar 4 times a day (before each meal time and at bedtime) and write down your numbers in your blood sugar log along with the number of units of insulin you inject (if on insulin).

## 2023-06-26 NOTE — Clinical Note
This patient's surgery was postponed for HbA1 >11 3 month(s) ago.  His blood sugar has improved with HbA1c of 9.1.  He is very eager to have surgery due to impaired vision.  I think it is okay to proceed from a diabetes standpoint.

## 2023-06-26 NOTE — ASSESSMENT & PLAN NOTE
uncontrolled with global hyperglycemia reported but did not bring glucose log for review.  Will increase basal and prandial insulin and add snack coverage and increase ozpempic.  Will follow up with CDE in 2 wks with BG log    Needs to get dexcom G7.      I cannot give medical clearance for surgery but from blood sugar standpoint can proceed with cataract surgery if HbA1c today is <9.

## 2023-06-26 NOTE — PROGRESS NOTES
ENDOCRINOLOGY CLINIC  06/26/2023       Subjective:      CC: referred by No ref. provider found for management of diabetes    HPI:   Vinnie Siegel is a 77 y.o. male with uncontrolled type 2 diabetes, hypertension, ESRD on hemodialysis, secondary hyperparathyroidism, hyperlipidemia, GERD, BPH, glaucoma who presents for management of uncontrolled type 2 diabetes.      Previously being managed by diabetes nurse practitioner through Internal Medicine ( Dariusz Torres) but last visit was in April of 2020, after that had difficulty getting follow-up but preferred location.    Interval Hx:  At last visit no changes were made to insulin regimen as we did not have any blood glucose data.  Since then he saw Diabetes Education with global hyperglycemia so Humalog was increased to 10 units before meals and compliance was stressed.  Most recent hemoglobin A1c 3 months ago was still very elevated at 11.3.      Started ozempic and tolerating well     Dexcom paperwork was sent but he has received yet.  He reports that he did get a phone call about dexcom but he was worried it was a scam and did not provide personal info or shipping information requested    He forgot to bring BG log but reports most number in low 200s.     Denies history of pancreatitis or medullary thyroid cancer.  History recurrent UTI: No    Makes only a small amount urine, getting HD three times a week     Needs cataract surgery which was postponed due to uncontrolled DM    Diabetes Hx:  Diagnosed w/ DM: T2 DIABETES diagnosed >5 years ago   Complications: ESRD  Current meds: compliant with    Lantus 28 units nightly  Humalog 10 units 2-3 times a day (10-15 min before meals)- sometimes forgetting lunch dose  Not using sliding scale    Ozempic 0.5 mg weekly   Previous meds:   Metformin - stopped for CKD    Trulicity - stopped due to cost , denied having any side effects  Hypoglycemia: denies  Home glucose checks: 3 x/day, mostly in low to mid 200s  Diet/Exercise:     Eats breakfast and dinner, snacking in the day time (sandwich, salad, beets)- not giving insulin for snacks    Last A1c:   Lab Results   Component Value Date    HGBA1C 11.3 (H) 2023    HGBA1C 12.4 (H) 2022    HGBA1C 6.6 (H) 2021    HGBA1C 5.5 2021    HGBA1C 5.9 (H) 08/10/2020    HGBA1C 7.8 (H) 2020    HGBA1C 7.4 (H) 2020    HGBA1C 7.7 (H) 2019    HGBA1C 6.3 (H) 2019    HGBA1C 6.5 (H) 2018       microalbumin: ESRD and secondary hyperparathyroidism, managed by nephrology  Lab Results   Component Value Date    LABMICR 155.0 2019    CREATRANDUR 63.0 2021    MICALBCREAT 196.2 (H) 2019     Lipids: on atorvastatin 40 mg daily   Lab Results   Component Value Date    CHOL 284 (H) 2023    TRIG 164 (H) 2023    HDL 49 2023    LDLCALC 202.2 (H) 2023    CHOLHDL 17.3 (L) 2023     Aspirin: no on eliquis  TSH:  Lab Results   Component Value Date    TSH 0.635 2021     Eye: + glaucoma, cataracts, denies retinopathy   Last eye exam: : 2023)  Foot: previously seeing podiatry    Last foot exam: Most Recent Foot Exam Date: Not Found)      Review of patient's allergies indicates:  No Known Allergies      Current Outpatient Medications:     atorvastatin (LIPITOR) 40 MG tablet, Take 1 tablet (40 mg total) by mouth once daily., Disp: 90 tablet, Rfl: 3    blood sugar diagnostic (TRUE METRIX GLUCOSE TEST STRIP) Strp, Use to check blood glucose 3-5 times daily as directed., Disp: 450 strip, Rfl: 0    blood sugar diagnostic Strp, To check BG 4 times daily, to use with insurance preferred meter, Disp: 400 each, Rfl: 3    calcium acetate,phosphat bind, (PHOSLO) 667 mg capsule, SMARTSI Capsule(s) By Mouth, Disp: , Rfl:     ergocalciferol (ERGOCALCIFEROL) 50,000 unit Cap, Take 1 capsule (50,000 Units total) by mouth every 7 days. (Patient taking differently: Take 50,000 Units by mouth every 7 days. Patient takes on ), Disp:  "12 capsule, Rfl: 0    ferrous sulfate (FEOSOL) 325 mg (65 mg iron) Tab tablet, 3 TABS A DAY. (Patient taking differently: every evening. 3 TABS A DAY.), Disp: 90 tablet, Rfl: 1    fluticasone propionate (FLONASE) 50 mcg/actuation nasal spray, SHAKE LIQUID AND USE 2 SPRAYS(100 MCG) IN EACH NOSTRIL EVERY DAY, Disp: 48 g, Rfl: 11    hydrALAZINE (APRESOLINE) 100 MG tablet, Take 1 tablet (100 mg total) by mouth 3 (three) times daily., Disp: 90 tablet, Rfl: 3    HYDROcodone-acetaminophen (NORCO) 7.5-325 mg per tablet, Take 1 tablet by mouth 3 (three) times daily as needed., Disp: , Rfl:     insulin (LANTUS SOLOSTAR U-100 INSULIN) glargine 100 units/mL SubQ pen, INJECT 28-36 UNITS UNDER THE SKIN AT NIGHT, Disp: 15 mL, Rfl: 6    insulin lispro (HUMALOG KWIKPEN INSULIN) 100 unit/mL pen, INJECT 8-12 UNITS EVERY DAY WITH MEALS PLUS SCALE. MAX DAILY50 UNITS, Disp: 15 mL, Rfl: 6    ipratropium-albuteroL (COMBIVENT)  mcg/actuation inhaler, Inhale 1 puff into the lungs every 6 (six) hours as needed for Wheezing or Shortness of Breath. Rescue, Disp: 1 Package, Rfl: 11    isoniazid (NYDRAZID) 300 MG Tab, Take 1 tablet (300 mg total) by mouth once daily., Disp: 90 tablet, Rfl: 3    lancets Misc, To check BG 4 times daily, to use with insurance preferred meter, Disp: 400 each, Rfl: 3    metoprolol succinate (TOPROL-XL) 25 MG 24 hr tablet, Take 0.5 tablets (12.5 mg total) by mouth once daily., Disp: 45 tablet, Rfl: 3    NIFEdipine (PROCARDIA-XL) 90 MG (OSM) 24 hr tablet, Take 1 tablet (90 mg total) by mouth once daily., Disp: 90 tablet, Rfl: 3    OZEMPIC 0.25 mg or 0.5 mg (2 mg/3 mL) pen injector, Inject into the skin., Disp: , Rfl:     pantoprazole (PROTONIX) 40 MG tablet, TAKE 1 TABLET(40 MG) BY MOUTH EVERY DAY, Disp: 90 tablet, Rfl: 0    pen needle, diabetic (BD ULTRA-FINE SHORT PEN NEEDLE) 31 gauge x 5/16" Ndle, USE FOUR TIMES DAILY, Disp: 400 each, Rfl: 3    pyridoxine, vitamin B6, (VITAMIN B-6) 50 MG Tab, Take 1 tablet (50 " mg total) by mouth once daily., Disp: 90 tablet, Rfl: 3    tamsulosin (FLOMAX) 0.4 mg Cap, TAKE 1 CAPSULE BY MOUTH EVERY MORNING, Disp: 90 capsule, Rfl: 0    timolol maleate 0.25% (TIMOPTIC) 0.25 % Drop, Place 1 drop into both eyes 2 (two) times daily., Disp: 15 mL, Rfl: 10    torsemide (DEMADEX) 20 MG Tab, Take 1 tablet (20 mg total) by mouth 2 (two) times daily., Disp: 180 tablet, Rfl: 3    blood-glucose meter kit, To check BG 4 times daily, to use with insurance preferred meter, e 11.65, Disp: 1 each, Rfl: 0  ROS: see HPI     Objective:   Physical Exam   /64   Pulse 81   Resp 16   Wt 129.7 kg (285 lb 15 oz)   SpO2 99%   BMI 38.78 kg/m²   Wt Readings from Last 3 Encounters:   06/26/23 129.7 kg (285 lb 15 oz)   05/11/23 130 kg (286 lb 9.6 oz)   03/20/23 132.9 kg (292 lb 15.9 oz)   ]    Constitutional:  Pleasant,  in no acute distress.   HENT:   Eyes:     No scleral icterus.   Respiratory:   Effort normal   Neurological:  normal speech  Psych:  Normal mood and affect.        LABORATORY REVIEW:  See HPI for other labs reviewed today      Chemistry        Component Value Date/Time     05/31/2023 0946    K 3.7 05/31/2023 0946    CL 91 (L) 05/31/2023 0946    CO2 27 05/31/2023 0946    BUN 32 (H) 05/31/2023 0946    CREATININE 8.0 (H) 05/31/2023 0946     (H) 05/31/2023 0946        Component Value Date/Time    CALCIUM 9.7 05/31/2023 0946    ALKPHOS 52 (L) 05/31/2023 0946    AST 9 (L) 05/31/2023 0946    ALT <5 (L) 05/31/2023 0946    BILITOT 0.7 05/31/2023 0946    ESTGFRAFRICA 8.1 (A) 09/21/2021 1125    EGFRNONAA 7.0 (A) 09/21/2021 1125          Lab Results   Component Value Date    HGBA1C 11.3 (H) 03/13/2023    HGBA1C 12.4 (H) 09/27/2022    HGBA1C 6.6 (H) 09/21/2021     Other labs reviewed today in HPI    Assessment/Plan:     Problem List Items Addressed This Visit          1 - High    Type 2 diabetes mellitus with chronic kidney disease on chronic dialysis, with long-term current use of insulin      uncontrolled with global hyperglycemia reported but did not bring glucose log for review.  Will increase basal and prandial insulin and add snack coverage and increase ozpempic.  Will follow up with CDE in 2 wks with BG log    Needs to get dexcom G7.      I cannot give medical clearance for surgery but from blood sugar standpoint can proceed with cataract surgery if HbA1c today is <9.              Relevant Medications    semaglutide (OZEMPIC) 1 mg/dose (4 mg/3 mL)    insulin (LANTUS SOLOSTAR U-100 INSULIN) glargine 100 units/mL SubQ pen    insulin lispro (HUMALOG KWIKPEN INSULIN) 100 unit/mL pen       2     Mixed diabetic hyperlipidemia associated with type 2 diabetes mellitus     LDL much higher than goal with reported compliance with lipitor 40 mg daily.  Will increase to 80 mg but recommend he discuss this with cardiology to discuss other options like PCSK-9 inhibitor         Relevant Medications    semaglutide (OZEMPIC) 1 mg/dose (4 mg/3 mL)    insulin (LANTUS SOLOSTAR U-100 INSULIN) glargine 100 units/mL SubQ pen    insulin lispro (HUMALOG KWIKPEN INSULIN) 100 unit/mL pen       Unprioritized    Essential hypertension (Chronic)    Relevant Medications    atorvastatin (LIPITOR) 80 MG tablet     Other Visit Diagnoses       Type 2 diabetes mellitus with hyperglycemia, without long-term current use of insulin        Relevant Medications    semaglutide (OZEMPIC) 1 mg/dose (4 mg/3 mL)    insulin (LANTUS SOLOSTAR U-100 INSULIN) glargine 100 units/mL SubQ pen    insulin lispro (HUMALOG KWIKPEN INSULIN) 100 unit/mL pen    Other Relevant Orders    Hemoglobin A1C    Ambulatory referral/consult to Diabetes Education    Hemoglobin A1C (Completed)    Dilated cardiomyopathy        Relevant Medications    atorvastatin (LIPITOR) 80 MG tablet    Hyperlipidemia, unspecified hyperlipidemia type        Relevant Medications    atorvastatin (LIPITOR) 80 MG tablet          Patient Instructions   Increase atorvastatin to 80 mg daily and  talk to your cardiologist about other medication options for lowering your cholesterol.      Regimen as of 06/26/2023    Lantus 32 units nightly  Humalog 12 units before meals (give 4 units of humalog before snacks containing carbs)  Increase to Ozempic 1 mg weekly after you finish the 0.5 mg pen    Let us know when you get the dexcom so that we can set up the training session.     Please check your blood sugar 4 times a day (before each meal time and at bedtime) and write down your numbers in your blood sugar log along with the number of units of insulin you inject (if on insulin).            Return to clinic in 3 months with me or NP, HbA1c prior  hbA1c today too      Jeovanny Montoya MD      ADDENDUM:  HbA1c above goal but significantly improved since last visit.  Okay to proceed with cataract surgery from an endocrine standpoint.     Lab Visit on 06/26/2023   Component Date Value Ref Range Status    Hemoglobin A1C 06/26/2023 9.1 (H)  4.0 - 5.6 % Final    Comment: ADA Screening Guidelines:  5.7-6.4%  Consistent with prediabetes  >or=6.5%  Consistent with diabetes    High levels of fetal hemoglobin interfere with the HbA1C  assay. Heterozygous hemoglobin variants (HbS, HgC, etc)do  not significantly interfere with this assay.   However, presence of multiple variants may affect accuracy.      Estimated Avg Glucose 06/26/2023 214 (H)  68 - 131 mg/dL Final

## 2023-06-27 ENCOUNTER — TELEPHONE (OUTPATIENT)
Dept: FAMILY MEDICINE | Facility: CLINIC | Age: 78
End: 2023-06-27
Payer: MEDICARE

## 2023-06-27 NOTE — TELEPHONE ENCOUNTER
----- Message from Janeth Walter MD sent at 6/26/2023  5:40 PM CDT -----  Pt needs an appt for cataract clearance. Can be telemed but I will need a form if he has one prior to appt. Thanks, PV  ----- Message -----  From: Jeovanny Montoya MD  Sent: 6/26/2023  12:10 PM CDT  To: Janeth Walter MD, [facility] Staff    Dr. Walter- patient again requested that I give medical clearance for cataract surgery.  This is something that needs to be done by his primary care provider but from a diabetes standpoint okay to proceed if HbA1c today is <9.   I told him I would pass this along to you for the clearance.     Kyara - patient said he was called about dexcom but didn't provide the information requested as he thought it might be a scam.  Please reach out to [employer] to have them call patient again.

## 2023-06-28 ENCOUNTER — OFFICE VISIT (OUTPATIENT)
Dept: PRIMARY CARE CLINIC | Facility: CLINIC | Age: 78
End: 2023-06-28
Attending: FAMILY MEDICINE
Payer: MEDICARE

## 2023-06-28 VITALS
OXYGEN SATURATION: 100 % | BODY MASS INDEX: 38.03 KG/M2 | HEART RATE: 93 BPM | WEIGHT: 280.75 LBS | DIASTOLIC BLOOD PRESSURE: 78 MMHG | HEIGHT: 72 IN | SYSTOLIC BLOOD PRESSURE: 126 MMHG

## 2023-06-28 DIAGNOSIS — R06.02 SHORTNESS OF BREATH ON EXERTION: ICD-10-CM

## 2023-06-28 DIAGNOSIS — K21.9 GASTROESOPHAGEAL REFLUX DISEASE WITHOUT ESOPHAGITIS: ICD-10-CM

## 2023-06-28 DIAGNOSIS — E78.5 DYSLIPIDEMIA: ICD-10-CM

## 2023-06-28 DIAGNOSIS — N18.6 END STAGE RENAL FAILURE ON DIALYSIS: ICD-10-CM

## 2023-06-28 DIAGNOSIS — E11.22 TYPE 2 DIABETES MELLITUS WITH CHRONIC KIDNEY DISEASE ON CHRONIC DIALYSIS, WITH LONG-TERM CURRENT USE OF INSULIN: Primary | ICD-10-CM

## 2023-06-28 DIAGNOSIS — Z99.2 TYPE 2 DIABETES MELLITUS WITH CHRONIC KIDNEY DISEASE ON CHRONIC DIALYSIS, WITH LONG-TERM CURRENT USE OF INSULIN: Primary | ICD-10-CM

## 2023-06-28 DIAGNOSIS — Z01.818 PRE-OP EXAM: ICD-10-CM

## 2023-06-28 DIAGNOSIS — E11.59 HYPERTENSION ASSOCIATED WITH TYPE 2 DIABETES MELLITUS: ICD-10-CM

## 2023-06-28 DIAGNOSIS — N18.6 TYPE 2 DIABETES MELLITUS WITH CHRONIC KIDNEY DISEASE ON CHRONIC DIALYSIS, WITH LONG-TERM CURRENT USE OF INSULIN: Primary | ICD-10-CM

## 2023-06-28 DIAGNOSIS — Z99.2 END STAGE RENAL FAILURE ON DIALYSIS: ICD-10-CM

## 2023-06-28 DIAGNOSIS — I15.2 HYPERTENSION ASSOCIATED WITH TYPE 2 DIABETES MELLITUS: ICD-10-CM

## 2023-06-28 DIAGNOSIS — Z79.4 TYPE 2 DIABETES MELLITUS WITH CHRONIC KIDNEY DISEASE ON CHRONIC DIALYSIS, WITH LONG-TERM CURRENT USE OF INSULIN: Primary | ICD-10-CM

## 2023-06-28 PROBLEM — I74.9 EMBOLISM AND THROMBOSIS OF UNSPECIFIED ARTERY: Status: RESOLVED | Noted: 2022-08-24 | Resolved: 2023-06-28

## 2023-06-28 PROCEDURE — 99215 OFFICE O/P EST HI 40 MIN: CPT | Mod: S$PBB,,, | Performed by: FAMILY MEDICINE

## 2023-06-28 PROCEDURE — 99215 PR OFFICE/OUTPT VISIT, EST, LEVL V, 40-54 MIN: ICD-10-PCS | Mod: S$PBB,,, | Performed by: FAMILY MEDICINE

## 2023-06-28 PROCEDURE — 99999 PR PBB SHADOW E&M-EST. PATIENT-LVL IV: CPT | Mod: PBBFAC,,, | Performed by: FAMILY MEDICINE

## 2023-06-28 PROCEDURE — 99999 PR PBB SHADOW E&M-EST. PATIENT-LVL IV: ICD-10-PCS | Mod: PBBFAC,,, | Performed by: FAMILY MEDICINE

## 2023-06-28 PROCEDURE — 99214 OFFICE O/P EST MOD 30 MIN: CPT | Mod: PBBFAC,PN | Performed by: FAMILY MEDICINE

## 2023-06-28 NOTE — PROGRESS NOTES
Subjective     Patient ID: Vinnie Siegel is a 77 y.o. male.    Chief Complaint: Pre-op Exam    Pt presents for preop clearance left cataract surgery date TBD w Dr Nielson    Pt denies any issues at present    Most recent a1c 9.1    Review of Systems   Constitutional: Negative.  Negative for activity change, appetite change and fatigue.   HENT: Negative.     Eyes: Negative.    Respiratory: Negative.  Negative for chest tightness and shortness of breath.    Cardiovascular: Negative.    Gastrointestinal: Negative.  Negative for abdominal pain.   Endocrine: Negative.    Genitourinary: Negative.  Negative for difficulty urinating, dysuria, frequency and urgency.   Musculoskeletal: Negative.  Negative for arthralgias, gait problem and joint swelling.   Integumentary:  Negative for color change, pallor and rash. Negative.   Allergic/Immunologic: Negative.    Neurological:  Negative for dizziness, weakness, light-headedness and numbness.   Hematological: Negative.    Psychiatric/Behavioral: Negative.  Negative for decreased concentration, dysphoric mood, self-injury, sleep disturbance and suicidal ideas. The patient is not nervous/anxious.    All other systems reviewed and are negative.       Objective     Physical Exam  Vitals reviewed.   Constitutional:       General: He is not in acute distress.     Appearance: Normal appearance. He is well-developed. He is obese. He is not toxic-appearing.   HENT:      Head: Normocephalic and atraumatic.      Right Ear: Tympanic membrane, ear canal and external ear normal. There is no impacted cerumen.      Left Ear: Tympanic membrane, ear canal and external ear normal. There is no impacted cerumen.      Mouth/Throat:      Pharynx: No oropharyngeal exudate or posterior oropharyngeal erythema.   Eyes:      Extraocular Movements: Extraocular movements intact.      Conjunctiva/sclera: Conjunctivae normal.      Pupils: Pupils are equal, round, and reactive to light.   Neck:      Thyroid:  No thyromegaly.   Cardiovascular:      Rate and Rhythm: Normal rate and regular rhythm.      Heart sounds: Normal heart sounds. No murmur heard.  Pulmonary:      Effort: Pulmonary effort is normal. No respiratory distress.      Breath sounds: Normal breath sounds. No wheezing or rales.   Chest:      Chest wall: No tenderness.   Abdominal:      General: Bowel sounds are normal. There is no distension.      Palpations: Abdomen is soft. There is no mass.      Tenderness: There is no abdominal tenderness. There is no guarding or rebound.   Musculoskeletal:         General: No tenderness. Normal range of motion.      Cervical back: Normal range of motion and neck supple.      Right lower leg: Edema present.      Left lower leg: Edema present.   Lymphadenopathy:      Cervical: No cervical adenopathy.   Skin:     General: Skin is warm and dry.      Coloration: Skin is not pale.      Findings: No erythema.   Neurological:      General: No focal deficit present.      Mental Status: He is alert and oriented to person, place, and time. Mental status is at baseline.      Cranial Nerves: No cranial nerve deficit.      Deep Tendon Reflexes: Reflexes are normal and symmetric. Reflexes normal.   Psychiatric:         Mood and Affect: Mood normal.         Behavior: Behavior normal.         Thought Content: Thought content normal.         Judgment: Judgment normal.          Assessment and Plan     1. Hypertension associated with type 2 diabetes mellitus    2. Type 2 diabetes mellitus with chronic kidney disease on chronic dialysis, with long-term current use of insulin    3. Pre-op exam    4. Gastroesophageal reflux disease without esophagitis    5. End stage renal failure on dialysis        Pt cleared for surgery please refer to preop letter

## 2023-06-28 NOTE — LETTER
June 28, 2023    Dr. Nielson             [facility]  [address] [address]  Phone: [phone]  Fax: [phone] Dear Dr. Nielson,    Mr Siegel presents today for preoperative clearance for cataract surgery for his left eye. Mr. Siegel has multiple co morbidities that include ESRD and DMT2. He does see an endocrinologist, Dr Montoya who manages his diabetes. Today, Mr Siegel a1c is at 9.1. Dr Montoya did provide input that if patient has an a1c lower than 9, he can be cleared for surgery.    Mr. Siegel is aware that his a1c hovers slightly above a 9, however states that his mental health is being affected by his inability to drive. He states that he is fully aware of all the risks involved with an elevated a1c and states that he accepts those risks.    Mr Siegel is a high risk candidate for a low risk procedure and based on recent labs, physical exam and vitals, is cleared for cataract surgery of the left eye.    If you have any questions or concerns, please don't hesitate to call.    Sincerely,        Janeth Walter MD

## 2023-06-30 ENCOUNTER — TELEPHONE (OUTPATIENT)
Dept: ENDOCRINOLOGY | Facility: CLINIC | Age: 78
End: 2023-06-30
Payer: MEDICARE

## 2023-06-30 NOTE — TELEPHONE ENCOUNTER
Called patient. Pt states he is calling in reference to a rx was sent to [facility]. He states that he cannot afford the new medication.

## 2023-06-30 NOTE — TELEPHONE ENCOUNTER
----- Message from Abdi Bullock sent at 6/30/2023 10:59 AM CDT -----  Regarding: Medication questions  Contact: @ [phone]  Pt is calling wanting to speak with in regards to a medication that was prescribed for him. Pt is stating that he wants the provider to reinstate his old medication semaglutide (OZEMPIC) 1 mg/dose (4 mg/3 mL). He does not want to start the new medication. Please call pt to discuss further.

## 2023-07-03 ENCOUNTER — LAB VISIT (OUTPATIENT)
Dept: LAB | Facility: HOSPITAL | Age: 78
End: 2023-07-03
Payer: MEDICARE

## 2023-07-03 ENCOUNTER — PATIENT MESSAGE (OUTPATIENT)
Dept: CARDIOLOGY | Facility: CLINIC | Age: 78
End: 2023-07-03
Payer: MEDICARE

## 2023-07-03 DIAGNOSIS — E11.65 TYPE 2 DIABETES MELLITUS WITH HYPERGLYCEMIA, WITHOUT LONG-TERM CURRENT USE OF INSULIN: ICD-10-CM

## 2023-07-03 DIAGNOSIS — Z79.4 TYPE 2 DIABETES MELLITUS WITH STAGE 4 CHRONIC KIDNEY DISEASE, WITH LONG-TERM CURRENT USE OF INSULIN: Chronic | ICD-10-CM

## 2023-07-03 DIAGNOSIS — N18.4 TYPE 2 DIABETES MELLITUS WITH STAGE 4 CHRONIC KIDNEY DISEASE, WITH LONG-TERM CURRENT USE OF INSULIN: Chronic | ICD-10-CM

## 2023-07-03 DIAGNOSIS — E11.22 TYPE 2 DIABETES MELLITUS WITH STAGE 4 CHRONIC KIDNEY DISEASE, WITH LONG-TERM CURRENT USE OF INSULIN: Chronic | ICD-10-CM

## 2023-07-03 LAB
ESTIMATED AVG GLUCOSE: 209 MG/DL (ref 68–131)
HBA1C MFR BLD: 8.9 % (ref 4–5.6)

## 2023-07-03 PROCEDURE — 36415 COLL VENOUS BLD VENIPUNCTURE: CPT | Performed by: INTERNAL MEDICINE

## 2023-07-03 PROCEDURE — 83036 HEMOGLOBIN GLYCOSYLATED A1C: CPT | Performed by: INTERNAL MEDICINE

## 2023-07-03 NOTE — PROGRESS NOTES
Your results look fine and do not require any change in treatment. Sugar is badly controlled -get this to Primary and/or DM clinicians.    Please contact me if you have any additional concerns.    Sincerely,    Blake Santamaria

## 2023-07-17 ENCOUNTER — PES CALL (OUTPATIENT)
Dept: ADMINISTRATIVE | Facility: CLINIC | Age: 78
End: 2023-07-17
Payer: MEDICARE

## 2023-07-19 ENCOUNTER — CLINICAL SUPPORT (OUTPATIENT)
Dept: DIABETES | Facility: CLINIC | Age: 78
End: 2023-07-19
Payer: MEDICARE

## 2023-07-19 DIAGNOSIS — E11.65 TYPE 2 DIABETES MELLITUS WITH HYPERGLYCEMIA, WITHOUT LONG-TERM CURRENT USE OF INSULIN: ICD-10-CM

## 2023-07-19 PROCEDURE — G0108 DIAB MANAGE TRN  PER INDIV: HCPCS | Mod: PBBFAC | Performed by: DIETITIAN, REGISTERED

## 2023-07-19 NOTE — PROGRESS NOTES
Diabetes Care Specialist Progress Note  Author: Isidra Ding RD, CDE  Date: 7/19/2023    Program Intake  Reason for Diabetes Program Visit:: Intervention  Type of Intervention:: Individual  Individual: Education  Education: Self-Management Skill Review, Pattern Management    Current diabetes risk level:: moderate    Permission to speak with others about care:: yes (spouse present at visit)        Lab Results   Component Value Date    HGBA1C 8.9 (H) 07/03/2023           Clinical  Problem Review  Reviewed Problem List with Patient: yes  Active comorbidities affecting diabetes self-care.: yes  Comorbidities: End Stage Renal Disease (HD on TTS)    Clinical Assessment  Current Diabetes Treatment: Insulin, Injectable  Ozempic 0.5 mg weekly  Lantus 32 units nightly  Humalog 12 units AC (twice daily)  (supposed to take 10 if <150,  and 12 if >150, but he is always >150)      Have you ever experienced hypoglycemia (low blood sugar)?: no  Have you ever experienced hyperglycemia (high blood sugar)?: yes  In the last month, how often have you experienced high blood sugar?: once a day    SMBG 2-3 times daily  160s-200s  Average low/mid  200s  Highest 345  Lowest 160          Medication Information  How many days a week do you miss your medications?: Never  Medication adherence impacting ability to self-manage diabetes?: No    Labs  Lab Compliance Barriers: No          Nutritional Status  Meal Plan 24 Hour Recall - Breakfast: eggs, grits  Meal Plan 24 Hour Recall - Lunch: fruits  Meal Plan 24 Hour Recall - Dinner: rotisserie chicken, veggies  Recent Changes in Weight: Weight Loss  Was weight loss intentional or unintentional?: Intentional  Current nutritional status an area of need that is impacting patient's ability to self-manage diabetes?: No               Diabetes Self-Management Care Plan:    Today's Diabetes Self-Management Care Plan was developed with Vinnie's input. Vinnie has agreed to work toward the following  goal(s) to improve his/her overall diabetes control.      Care Plan: Diabetes Management   Updates made since 6/19/2023 12:00 AM        Problem: Medications         Goal: Patient Agrees to take insulin as instructed today    Start Date: 3/30/2023   Expected End Date: 9/30/2023   This Visit's Progress: On track   Priority: High   Barriers: Knowledge deficit   Note:        Was scheduled for Dexcom G7 training but he still has not received supplies.   Will reach out to Dr. Montoya's MA for help.       His A1c has improved and he is scheduled for cataract surgery.   Encouraged him to get BGs <200 prior to surgery so will increases doses slightly today.   He is not typically eating a lunch, so not taking Humalog at that time, but he is snacking frequently on fruits.     Discussed need to take some Humalog with midday snacks.   Will give a snack dose for him to take when he is snacking frequently.     Based on reported BG logs and in anticipation for surgery - instructed him in increase   Lantus to 35 units nighlty  Humalog to 13 units (if BG <150), and 15 units (if BG >150).     Will need to revisit Ozempic coverage once he is out of the donut hole.              Task: Reviewed with patient all current diabetes medications and provided basic review of the purpose, dosage, frequency, side effects, and storage of both oral and injectable diabetes medications. Completed 6/21/2023        Task: Instructed patient on appropriate administration of long and rapid acting insulins Completed 6/21/2023        Task: Discussed guidelines for preventing, detecting and treating hypoglycemia and hyperglycemia and reviewed the importance of meal and medication timing with diabetes mediations for prevention of hypoglycemia and maximum drug benefit. Completed 6/21/2023                Follow Up Plan     F/u in 3 months        Today's care plan and follow up schedule was discussed with patient.  Vinnie verbalized understanding of the care plan,  goals, and agrees to follow up plan.        The patient was encouraged to communicate with his/her health care provider/physician and care team regarding his/her condition(s) and treatment.  I provided the patient with my contact information today and encouraged to contact me via phone or [facility] Patient Portal as needed.         Length of Visit   Total Time: 60 Minutes

## 2023-08-07 ENCOUNTER — LAB VISIT (OUTPATIENT)
Dept: LAB | Facility: HOSPITAL | Age: 78
End: 2023-08-07
Payer: MEDICARE

## 2023-08-07 DIAGNOSIS — Z22.7 TB LUNG, LATENT: ICD-10-CM

## 2023-08-07 LAB
ALBUMIN SERPL BCP-MCNC: 3.2 G/DL (ref 3.5–5.2)
ALP SERPL-CCNC: 45 U/L (ref 55–135)
ALT SERPL W/O P-5'-P-CCNC: 10 U/L (ref 10–44)
ANION GAP SERPL CALC-SCNC: 17 MMOL/L (ref 8–16)
AST SERPL-CCNC: 13 U/L (ref 10–40)
BILIRUB SERPL-MCNC: 0.6 MG/DL (ref 0.1–1)
BUN SERPL-MCNC: 40 MG/DL (ref 8–23)
CALCIUM SERPL-MCNC: 9.3 MG/DL (ref 8.7–10.5)
CHLORIDE SERPL-SCNC: 96 MMOL/L (ref 95–110)
CO2 SERPL-SCNC: 24 MMOL/L (ref 23–29)
CREAT SERPL-MCNC: 8.2 MG/DL (ref 0.5–1.4)
EST. GFR  (NO RACE VARIABLE): 6.2 ML/MIN/1.73 M^2
GLUCOSE SERPL-MCNC: 232 MG/DL (ref 70–110)
POTASSIUM SERPL-SCNC: 4.2 MMOL/L (ref 3.5–5.1)
PROT SERPL-MCNC: 7.4 G/DL (ref 6–8.4)
SODIUM SERPL-SCNC: 137 MMOL/L (ref 136–145)

## 2023-08-07 PROCEDURE — 36415 COLL VENOUS BLD VENIPUNCTURE: CPT | Performed by: REGISTERED NURSE

## 2023-08-07 PROCEDURE — 80053 COMPREHEN METABOLIC PANEL: CPT | Performed by: REGISTERED NURSE

## 2023-08-08 ENCOUNTER — TELEPHONE (OUTPATIENT)
Dept: OPHTHALMOLOGY | Facility: CLINIC | Age: 78
End: 2023-08-08
Payer: MEDICARE

## 2023-08-08 ENCOUNTER — TELEPHONE (OUTPATIENT)
Dept: INFECTIOUS DISEASES | Facility: CLINIC | Age: 78
End: 2023-08-08
Payer: MEDICARE

## 2023-08-08 NOTE — TELEPHONE ENCOUNTER
OPAT follow up - LTBI on INH/B6 x9 months. End of care ~ Nov 30th, 2023.      CMP from 8/8/23 reviewed. No transaminates noted. Will repeat labs in 1 month ~ 9/1/23.

## 2023-08-08 NOTE — H&P
Ophthalmology Preoperative History and Physical Exam     CC/Reason for Surgery:   Visually significant combined forms of age-related cataract left eye  Primary open angle glaucoma severe stage on maximally tolerated medical therapy left eye    HPI:   Pt presents c/o progressive decrease in vision, blurred vision, difficulty reading in dim light, trouble driving at night and significant glare and halos around lights at night.  Patient with also need for further IOP lowering in the setting of glaucoma.     Past Medical History:  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     Hyperlipidemia     Hypertension     Kidney stone     Seasonal allergies 6/24/2014        Past Surgical History:  Past Surgical History:   Procedure Laterality Date    AV FISTULA PLACEMENT Left 12/8/2020    Procedure: CREATION, AV FISTULA;  Surgeon: Benji Becerril MD;  Location: [facility] OR [address] [place] [address];  Service: Peripheral Vascular;  Laterality: Left;    AV FISTULA PLACEMENT Right 12/7/2022    Procedure: CREATION, AV FISTULA;  Surgeon: Benji Becerril MD;  Location: [facility] OR [address] [place] [address];  Service: Peripheral Vascular;  Laterality: Right;  RUE    CATARACT EXTRACTION W/  INTRAOCULAR LENS IMPLANT Right 04/04/16    Dr Meehan    COLONOSCOPY W/ BIOPSIES      ESOPHAGOGASTRODUODENOSCOPY N/A 6/29/2020    Procedure: EGD (ESOPHAGOGASTRODUODENOSCOPY);  Surgeon: Ravi Leone MD;  Location: [facility];  Service: Endoscopy;  Laterality: N/A;    EYE SURGERY      FISTULOGRAM Left 4/16/2021    Procedure: Fistulogram;  Surgeon: Benji Becerril MD;  Location: [facility] CATH LAB;  Service: Peripheral Vascular;  Laterality: Left;    FISTULOGRAM Left 9/28/2022    Procedure: Fistulogram;  Surgeon: Benji Becerril MD;  Location: [facility] CATH LAB;  Service: Cardiology;  Laterality: Left;     FISTULOGRAM, WITH PTA Right 2/3/2023    Procedure: FISTULOGRAM, WITH PTA;  Surgeon: Benji Becerril MD;  Location: [facility] OR [address] [place] [address];  Service: Peripheral Vascular;  Laterality: Right;  205.17 mGy  2.3 min    HEMORRHOID [place]      [place] [facility] [place]er[place]  12/13/13    [place] [facility] [place]    PERCUTANEOUS TRANSLUMINAL ANGIOPLASTY OF ARTERIOVENOUS FISTULA Left 4/16/2021    Procedure: PTA, AV FISTULA;  Surgeon: Benji Becerril MD;  Location: [facility] CATH LAB;  Service: Peripheral Vascular;  Laterality: Left;    PERCUTANEOUS TRANSLUMINAL ANGIOPLASTY OF ARTERIOVENOUS FISTULA N/A 9/28/2022    Procedure: PTA, AV FISTULA;  Surgeon: Benji Becerril MD;  Location: [facility] CATH LAB;  Service: Cardiology;  Laterality: N/A;    PLACEMENT OF DUAL-LUMEN VASCULAR CATHETER N/A 2/3/2023    Procedure: EXCHANGE, PERMACATH;  Surgeon: Benji Becerril MD;  Location: [facility] OR [address] [place] [address];  Service: Peripheral Vascular;  Laterality: N/A;    URETERAL STENT PLACEMENT          Past Ocular History:  Glaucoma  Cataracts  See prior note for details      Allergies:  Review of patient's allergies indicates:  No Known Allergies     Social History:  Social History     Socioeconomic History    Marital status: Single   Tobacco Use    Smoking status: Former     Current packs/day: 0.00     Average packs/day: 0.5 packs/day for 45.0 years (22.5 ttl pk-yrs)     Types: Cigarettes     Start date: 6/27/1975     Quit date: 6/27/2020     Years since quitting: 3.1     Passive exposure: Never    Smokeless tobacco: Never   Substance and Sexual Activity    Alcohol use: Not Currently     Comment: rarely    Drug use: No    Sexual activity: Yes     Partners: Female     Comment: single, retired , no kids   Social History Narrative    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     Social Determinants of Health     Financial Resource Strain: Low Risk  (9/26/2022)    Overall Financial Resource Strain (CARDIA)      Difficulty of Paying Living Expenses: Not very hard   Food Insecurity: No Food Insecurity (2022)    Hunger Vital Sign     Worried About Running Out of Food in the Last Year: Never true     Ran Out of Food in the Last Year: Never true   Transportation Needs: No Transportation Needs (2022)    PRAPARE - Transportation     Lack of Transportation (Medical): No     Lack of Transportation (Non-Medical): No   Physical Activity: Inactive (2022)    Exercise Vital Sign     Days of Exercise per Week: 0 days     Minutes of Exercise per Session: 0 min   Stress: Stress Concern Present (2022)    [employer] - Occupational Stress Questionnaire     Feeling of Stress : To some extent   Social Connections: Unknown (2022)    Social Connection and Isolation Panel [NHANES]     Frequency of Communication with Friends and Family: Three times a week     Frequency of Social Gatherings with Friends and Family: Three times a week   Housing Stability: Low Risk  (2022)    Housing Stability Vital Sign     Unable to Pay for Housing in the Last Year: No     Number of Places Lived in the Last Year: 1     Unstable Housing in the Last Year: No        Medications:  No current facility-administered medications for this encounter.    Current Outpatient Medications:     atorvastatin (LIPITOR) 80 MG tablet, Take 1 tablet (80 mg total) by mouth once daily., Disp: 90 tablet, Rfl: 3    blood sugar diagnostic (TRUE METRIX GLUCOSE TEST STRIP) Strp, Use to check blood glucose 3-5 times daily as directed., Disp: 450 strip, Rfl: 0    blood sugar diagnostic Strp, To check BG 4 times daily, to use with insurance preferred meter, Disp: 400 each, Rfl: 3    blood-glucose meter kit, To check BG 4 times daily, to use with insurance preferred meter, e 11.65, Disp: 1 each, Rfl: 0    calcium acetate,phosphat bind, (PHOSLO) 667 mg capsule, SMARTSI Capsule(s) By Mouth, Disp: , Rfl:     ergocalciferol  (ERGOCALCIFEROL) 50,000 unit Cap, Take 1 capsule (50,000 Units total) by mouth every 7 days. (Patient taking differently: Take 50,000 Units by mouth every 7 days. Patient takes on Mondays), Disp: 12 capsule, Rfl: 0    ferrous sulfate (FEOSOL) 325 mg (65 mg iron) Tab tablet, 3 TABS A DAY. (Patient taking differently: every evening. 3 TABS A DAY.), Disp: 90 tablet, Rfl: 1    fluticasone propionate (FLONASE) 50 mcg/actuation nasal spray, SHAKE LIQUID AND USE 2 SPRAYS(100 MCG) IN EACH NOSTRIL EVERY DAY, Disp: 48 g, Rfl: 11    hydrALAZINE (APRESOLINE) 100 MG tablet, Take 1 tablet (100 mg total) by mouth 3 (three) times daily., Disp: 90 tablet, Rfl: 3    HYDROcodone-acetaminophen (NORCO) 7.5-325 mg per tablet, Take 1 tablet by mouth 3 (three) times daily as needed., Disp: , Rfl:     insulin (LANTUS SOLOSTAR U-100 INSULIN) glargine 100 units/mL SubQ pen, INJECT 32 UNITS UNDER THE SKIN AT NIGHT, increase per MD instruction to max daily dose of 40 units, Disp: 15 mL, Rfl: 6    insulin lispro (HUMALOG KWIKPEN INSULIN) 100 unit/mL pen, INJECT 12 UNITS 3 times a day before meals and 4 units for snacks, max daily dose of 45 units, Disp: 15 mL, Rfl: 6    ipratropium-albuteroL (COMBIVENT)  mcg/actuation inhaler, Inhale 1 puff into the lungs every 6 (six) hours as needed for Wheezing or Shortness of Breath. Rescue, Disp: 1 Package, Rfl: 11    isoniazid (NYDRAZID) 300 MG Tab, Take 1 tablet (300 mg total) by mouth once daily., Disp: 90 tablet, Rfl: 3    lancets Misc, To check BG 4 times daily, to use with insurance preferred meter, Disp: 400 each, Rfl: 3    metoprolol succinate (TOPROL-XL) 25 MG 24 hr tablet, Take 0.5 tablets (12.5 mg total) by mouth once daily., Disp: 45 tablet, Rfl: 3    NIFEdipine (PROCARDIA-XL) 90 MG (OSM) 24 hr tablet, Take 1 tablet (90 mg total) by mouth once daily., Disp: 90 tablet, Rfl: 3    pantoprazole (PROTONIX) 40 MG tablet, TAKE 1 TABLET(40 MG) BY MOUTH EVERY DAY, Disp: 90 tablet, Rfl: 0    pen  "needle, diabetic (BD ULTRA-FINE SHORT PEN NEEDLE) 31 gauge x 5/16" Ndle, USE FOUR TIMES DAILY, Disp: 400 each, Rfl: 3    pyridoxine, vitamin B6, (VITAMIN B-6) 50 MG Tab, Take 1 tablet (50 mg total) by mouth once daily., Disp: 90 tablet, Rfl: 3    semaglutide (OZEMPIC) 1 mg/dose (4 mg/3 mL), Inject 1 mg into the skin every 7 days., Disp: 9 mL, Rfl: 1    tamsulosin (FLOMAX) 0.4 mg Cap, TAKE 1 CAPSULE BY MOUTH EVERY MORNING, Disp: 90 capsule, Rfl: 0    timolol maleate 0.25% (TIMOPTIC) 0.25 % Drop, Place 1 drop into both eyes 2 (two) times daily., Disp: 15 mL, Rfl: 10    torsemide (DEMADEX) 20 MG Tab, Take 1 tablet (20 mg total) by mouth 2 (two) times daily., Disp: 180 tablet, Rfl: 3     Family History:  Family History   Problem Relation Age of Onset    Diabetes Brother         type 1    Hypertension Brother     Stroke Brother         ROS:   Constitutional: WNL   Eyes: See HPI   Ears: WNL   CV: WNL   Resp: WNL   Gastro: WNL    Musculo: WNL   Skin: WNL   Neuro: WNL     Physical Exam:  See nursing intake for vitals    General: No acute distress  HEENT: NC/AT  CV: Pulses strong and equal bilaterally  Resp: Breathing comfortably on room air  Musculoskeletal: WNL, able to lay flat    Lab Results:  CBC w/Diff   Lab Results   Component Value Date/Time    WBC 4.14 11/23/2022 09:01 AM    RBC 3.80 (L) 11/23/2022 09:01 AM    HGB 11.5 (L) 11/23/2022 09:01 AM    HCT 39 12/07/2022 09:28 AM    MCV 91 11/23/2022 09:01 AM    MCH 30.3 11/23/2022 09:01 AM    MCHC 33.2 11/23/2022 09:01 AM    RDW 16.3 (H) 11/23/2022 09:01 AM    MPV 11.2 11/23/2022 09:01 AM    No components found for: "NEUT", "ANC", "LYMA", "ALC", "ROBERT", "AMC", "EOSA", "AEC", "BASA", "ABC"     Imaging:  None    Assessment:  Visually significant combined forms of age-related cataract left eye  Primary open angle glaucoma severe stage on maximally tolerated medical therapy left eye    Plan:  To operating room for   Cataract Extraction with Intraocular Lens Placement left " Eye  Possible goniotomy with ab interno canaloplasty and trabeculotomy, left eye    An extensive discussion took place with the patient concerning the risks, benefits and alternatives to the above procedure. The patient was given the opportunity to have all questions answered. At the conclusion of our discussion, signed informed consent was obtained.     Yvonne Nielson M.D., M.S.  Department of Ophthalmology   Division of Glaucoma Surgery  [facility]

## 2023-08-09 ENCOUNTER — TELEPHONE (OUTPATIENT)
Dept: OPHTHALMOLOGY | Facility: CLINIC | Age: 78
End: 2023-08-09
Payer: MEDICARE

## 2023-08-10 ENCOUNTER — HOSPITAL ENCOUNTER (OUTPATIENT)
Facility: HOSPITAL | Age: 78
Discharge: HOME | End: 2023-08-10
Attending: STUDENT IN AN ORGANIZED HEALTH CARE EDUCATION/TRAINING PROGRAM | Admitting: STUDENT IN AN ORGANIZED HEALTH CARE EDUCATION/TRAINING PROGRAM
Payer: MEDICARE

## 2023-08-10 VITALS
WEIGHT: 285 LBS | RESPIRATION RATE: 16 BRPM | SYSTOLIC BLOOD PRESSURE: 128 MMHG | OXYGEN SATURATION: 100 % | HEART RATE: 81 BPM | TEMPERATURE: 99 F | HEIGHT: 74 IN | DIASTOLIC BLOOD PRESSURE: 65 MMHG | BODY MASS INDEX: 36.57 KG/M2

## 2023-08-10 DIAGNOSIS — H25.812 COMBINED FORM OF AGE-RELATED CATARACT, LEFT EYE: ICD-10-CM

## 2023-08-10 DIAGNOSIS — H40.1134 PRIMARY OPEN ANGLE GLAUCOMA (POAG) OF BOTH EYES, INDETERMINATE STAGE: Primary | ICD-10-CM

## 2023-08-10 LAB
ALBUMIN SERPL BCP-MCNC: 3.2 G/DL (ref 3.5–5.2)
ALP SERPL-CCNC: 40 U/L (ref 55–135)
ALT SERPL W/O P-5'-P-CCNC: 10 U/L (ref 10–44)
ANION GAP SERPL CALC-SCNC: 16 MMOL/L (ref 8–16)
AST SERPL-CCNC: 29 U/L (ref 10–40)
BILIRUB SERPL-MCNC: 0.6 MG/DL (ref 0.1–1)
BUN SERPL-MCNC: 46 MG/DL (ref 8–23)
CALCIUM SERPL-MCNC: 9.2 MG/DL (ref 8.7–10.5)
CHLORIDE SERPL-SCNC: 99 MMOL/L (ref 95–110)
CO2 SERPL-SCNC: 22 MMOL/L (ref 23–29)
CREAT SERPL-MCNC: 8.5 MG/DL (ref 0.5–1.4)
EST. GFR  (NO RACE VARIABLE): 6 ML/MIN/1.73 M^2
GLUCOSE SERPL-MCNC: 150 MG/DL (ref 70–110)
POCT GLUCOSE: 161 MG/DL (ref 70–110)
POTASSIUM SERPL-SCNC: 5.5 MMOL/L (ref 3.5–5.1)
PROT SERPL-MCNC: 7.7 G/DL (ref 6–8.4)
SODIUM SERPL-SCNC: 137 MMOL/L (ref 136–145)

## 2023-08-10 PROCEDURE — 25000003 PHARM REV CODE 250: Performed by: STUDENT IN AN ORGANIZED HEALTH CARE EDUCATION/TRAINING PROGRAM

## 2023-08-10 PROCEDURE — 25000003 PHARM REV CODE 250

## 2023-08-10 PROCEDURE — 82962 GLUCOSE BLOOD TEST: CPT | Performed by: STUDENT IN AN ORGANIZED HEALTH CARE EDUCATION/TRAINING PROGRAM

## 2023-08-10 PROCEDURE — 80053 COMPREHEN METABOLIC PANEL: CPT | Performed by: STUDENT IN AN ORGANIZED HEALTH CARE EDUCATION/TRAINING PROGRAM

## 2023-08-10 RX ORDER — KETOROLAC TROMETHAMINE 5 MG/ML
1 SOLUTION OPHTHALMIC
Status: DISCONTINUED | OUTPATIENT
Start: 2023-08-10 | End: 2023-08-10 | Stop reason: HOSPADM

## 2023-08-10 RX ORDER — MOXIFLOXACIN 5 MG/ML
SOLUTION/ DROPS OPHTHALMIC
Status: DISCONTINUED
Start: 2023-08-10 | End: 2023-08-10 | Stop reason: WASHOUT

## 2023-08-10 RX ORDER — CYCLOP/TROP/PROPA/PHEN/KET/WAT 1-1-0.1%
1 DROPS (EA) OPHTHALMIC (EYE)
Status: COMPLETED | OUTPATIENT
Start: 2023-08-10 | End: 2023-08-10

## 2023-08-10 RX ORDER — SODIUM CHLORIDE 0.9 % (FLUSH) 0.9 %
10 SYRINGE (ML) INJECTION
Status: CANCELLED | OUTPATIENT
Start: 2023-08-10

## 2023-08-10 RX ORDER — SODIUM CHLORIDE 0.9 % (FLUSH) 0.9 %
10 SYRINGE (ML) INJECTION
Status: DISCONTINUED | OUTPATIENT
Start: 2023-08-10 | End: 2023-08-10 | Stop reason: HOSPADM

## 2023-08-10 RX ORDER — LIDOCAINE HYDROCHLORIDE 40 MG/ML
INJECTION, SOLUTION RETROBULBAR
Status: DISCONTINUED
Start: 2023-08-10 | End: 2023-08-10 | Stop reason: HOSPADM

## 2023-08-10 RX ORDER — MOXIFLOXACIN 5 MG/ML
1 SOLUTION/ DROPS OPHTHALMIC
Status: DISCONTINUED | OUTPATIENT
Start: 2023-08-10 | End: 2023-08-10 | Stop reason: HOSPADM

## 2023-08-10 RX ORDER — LIDOCAINE HYDROCHLORIDE 10 MG/ML
INJECTION, SOLUTION EPIDURAL; INFILTRATION; INTRACAUDAL; PERINEURAL
Status: DISCONTINUED
Start: 2023-08-10 | End: 2023-08-10 | Stop reason: HOSPADM

## 2023-08-10 RX ORDER — FENTANYL CITRATE 50 UG/ML
25 INJECTION, SOLUTION INTRAMUSCULAR; INTRAVENOUS EVERY 5 MIN PRN
Status: CANCELLED | OUTPATIENT
Start: 2023-08-10

## 2023-08-10 RX ORDER — PROPARACAINE HYDROCHLORIDE 5 MG/ML
1 SOLUTION/ DROPS OPHTHALMIC
Status: DISCONTINUED | OUTPATIENT
Start: 2023-08-10 | End: 2023-08-10 | Stop reason: HOSPADM

## 2023-08-10 RX ORDER — LIDOCAINE HYDROCHLORIDE 10 MG/ML
1 INJECTION, SOLUTION EPIDURAL; INFILTRATION; INTRACAUDAL; PERINEURAL ONCE
Status: DISCONTINUED | OUTPATIENT
Start: 2023-08-10 | End: 2023-08-10 | Stop reason: HOSPADM

## 2023-08-10 RX ORDER — OXYCODONE HYDROCHLORIDE 5 MG/1
5 TABLET ORAL
Status: CANCELLED | OUTPATIENT
Start: 2023-08-10

## 2023-08-10 RX ORDER — PREDNISOLONE ACETATE 10 MG/ML
SUSPENSION/ DROPS OPHTHALMIC
Status: DISCONTINUED
Start: 2023-08-10 | End: 2023-08-10 | Stop reason: HOSPADM

## 2023-08-10 RX ADMIN — Medication 1 DROP: at 09:08

## 2023-08-10 RX ADMIN — MOXIFLOXACIN OPHTHALMIC 1 DROP: 5 SOLUTION/ DROPS OPHTHALMIC at 10:08

## 2023-08-10 RX ADMIN — MOXIFLOXACIN OPHTHALMIC 1 DROP: 5 SOLUTION/ DROPS OPHTHALMIC at 09:08

## 2023-08-10 RX ADMIN — Medication 1 DROP: at 10:08

## 2023-08-10 NOTE — PROGRESS NOTES
Informed Dr. Redding pt did not receive dialysis this morning, new orders noted. Pt states he cant go until Saturday now.

## 2023-08-10 NOTE — PROGRESS NOTES
Patient is scheduled for dialysis Tuesday, Thursdays, and Saturdays. He did not arrange for dialysis today. His last dialysis was Tuesday and planned for his next dialysis to be Saturday. I explained to him that this dialysis plan is not safe. Labs were drawn and potassium levels were elevated. At this time, it is not safe to proceed with surgery and I recommend the patient to have dialysis today instead of surgery. We can reschedule surgery on a non-dialysis day or after he received dialysis.    Yvonne Nielson M.D., M.S.  Department of Ophthalmology   Division of Glaucoma Surgery  [facility]

## 2023-08-11 ENCOUNTER — TELEPHONE (OUTPATIENT)
Dept: OPHTHALMOLOGY | Facility: CLINIC | Age: 78
End: 2023-08-11
Payer: MEDICARE

## 2023-08-11 NOTE — TELEPHONE ENCOUNTER
Called patient regarding his concerns will speak with  about it     ----- Message from Shauna Whittaker MA sent at 8/11/2023  9:37 AM CDT -----  Regarding: FW: appt access surgery  Contact: self @ [phone]    ----- Message -----  From: Shamika Shrestha  Sent: 8/11/2023   9:16 AM CDT  To: Nisreen Bowdenise Staff  Subject: appt access surgery                              Pt was scheduled for surgery on yesterday.  Pts wife says it was cancelled and he was sent home.  She would like to speak with someone asap.  Pls call.

## 2023-08-11 NOTE — TELEPHONE ENCOUNTER
Clinic left  for pt regarding missed 08/11/2023 1 day post-op appt with Dr. Nielson at 8:00 am. Clinic offered pt to arrive this afternoon around 2:00 pm for missed appt. Left dept # for pt to call back.

## 2023-08-15 DIAGNOSIS — E87.20 ACIDOSIS: ICD-10-CM

## 2023-08-15 DIAGNOSIS — K92.2 GASTROINTESTINAL HEMORRHAGE, UNSPECIFIED GASTROINTESTINAL HEMORRHAGE TYPE: ICD-10-CM

## 2023-08-15 RX ORDER — PANTOPRAZOLE SODIUM 40 MG/1
40 TABLET, DELAYED RELEASE ORAL DAILY
Qty: 90 TABLET | Refills: 3 | Status: SHIPPED | OUTPATIENT
Start: 2023-08-15

## 2023-08-15 RX ORDER — TAMSULOSIN HYDROCHLORIDE 0.4 MG/1
1 CAPSULE ORAL EVERY MORNING
Qty: 90 CAPSULE | Refills: 3 | Status: SHIPPED | OUTPATIENT
Start: 2023-08-15

## 2023-08-15 NOTE — TELEPHONE ENCOUNTER
No care due was identified.  [facility] Embedded Care Due Messages. Reference number: [id number].   8/15/2023 1:23:08 PM CDT

## 2023-08-15 NOTE — TELEPHONE ENCOUNTER
Refill Decision Note   Vinnie Robbin  is requesting a refill authorization.  Brief Assessment and Rationale for Refill:  Approve     Medication Therapy Plan:       Medication Reconciliation Completed: No    Comments:     No Care Gaps recommended.     Note composed:2:22 PM 08/15/2023

## 2023-08-23 ENCOUNTER — OFFICE VISIT (OUTPATIENT)
Dept: OPHTHALMOLOGY | Facility: CLINIC | Age: 78
End: 2023-08-23
Payer: MEDICARE

## 2023-08-23 ENCOUNTER — TELEPHONE (OUTPATIENT)
Dept: OPHTHALMOLOGY | Facility: CLINIC | Age: 78
End: 2023-08-23

## 2023-08-23 DIAGNOSIS — H25.812 COMBINED FORMS OF AGE-RELATED CATARACT OF LEFT EYE: Primary | ICD-10-CM

## 2023-08-23 DIAGNOSIS — Z96.1 PSEUDOPHAKIA, RIGHT EYE: ICD-10-CM

## 2023-08-23 DIAGNOSIS — H40.1114 PRIMARY OPEN ANGLE GLAUCOMA (POAG) OF RIGHT EYE, INDETERMINATE STAGE: ICD-10-CM

## 2023-08-23 DIAGNOSIS — H40.1123 PRIMARY OPEN ANGLE GLAUCOMA (POAG) OF LEFT EYE, SEVERE STAGE: ICD-10-CM

## 2023-08-23 PROCEDURE — 99499 NO LOS: ICD-10-PCS | Mod: S$PBB,,, | Performed by: STUDENT IN AN ORGANIZED HEALTH CARE EDUCATION/TRAINING PROGRAM

## 2023-08-23 PROCEDURE — 99499 UNLISTED E&M SERVICE: CPT | Mod: S$PBB,,, | Performed by: STUDENT IN AN ORGANIZED HEALTH CARE EDUCATION/TRAINING PROGRAM

## 2023-08-23 NOTE — PROGRESS NOTES
Subjective:       Patient ID: Vinnie Siegel is a 78 y.o. male.    Chief Complaint: No chief complaint on file.    HPI    3 mo IOP check  DLS: 5/31/2023 - Dr. Nielson    Pt with concerns about cancelled case and delay in care. He is motivated   for cataract surgery in the left eye.         GTTS:  Timolol BID OU    Ocular History:  Primary open angle glaucoma (POAG) of left eye, severe stage  Combined forms of age-related cataract of left eye  Primary open angle glaucoma (POAG) of right eye, indeterminate stage  Pseudophakia, right eye   Last edited by Yvonne Nielson MD on 8/23/2023  1:06 PM.               Assessment & Plan   Combined forms of age-related cataract of left eye    Primary open angle glaucoma (POAG) of left eye, severe stage    Primary open angle glaucoma (POAG) of right eye, indeterminate stage    Pseudophakia, right eye       Pt was scheduled for cataract surgery but in the preparation for surgery in pre-op area, it was revealed he skipped dialysis that day and he had no plans to attend that day. Next dialysis day he was planning to attend was 2 days later. Labs were checked and the decision was made to cancel the surgical procedure due to hyperkalemia. He was instructed to proceed to dialysis or risk fatal arrhythmia.     - Lengthy discussion today with patient regarding cancelled cataract case  - Discussed reasons why a case may be cancelled - when there is more risk of harm to the eye or body with surgery than without - specifically with uncontrolled hyperglycemia, uncontrolled hypertension, dangerous electrolyte abnormalities, among others.   - Discussed he needs to continue dialysis and eye surgery is not an indication to stop dialysis  - He will need to arrange with dialysis center to accommodate his surgery day.   - If the above are met, it is OK to proceed with cataract surgery    PLAN  Re-schedule CE IOL OS with [facility]    RTC POD#1     Yvonne Nielson M.D., M.S.  Department of Ophthalmology    Division of Glaucoma Surgery  [facility]

## 2023-09-01 ENCOUNTER — LAB VISIT (OUTPATIENT)
Dept: LAB | Facility: HOSPITAL | Age: 78
End: 2023-09-01
Payer: MEDICARE

## 2023-09-01 DIAGNOSIS — Z22.7 TB LUNG, LATENT: ICD-10-CM

## 2023-09-01 LAB
ALBUMIN SERPL BCP-MCNC: 3.3 G/DL (ref 3.5–5.2)
ALP SERPL-CCNC: 48 U/L (ref 55–135)
ALT SERPL W/O P-5'-P-CCNC: 8 U/L (ref 10–44)
ANION GAP SERPL CALC-SCNC: 16 MMOL/L (ref 8–16)
AST SERPL-CCNC: 13 U/L (ref 10–40)
BILIRUB SERPL-MCNC: 0.6 MG/DL (ref 0.1–1)
BUN SERPL-MCNC: 24 MG/DL (ref 8–23)
CALCIUM SERPL-MCNC: 9.6 MG/DL (ref 8.7–10.5)
CHLORIDE SERPL-SCNC: 89 MMOL/L (ref 95–110)
CO2 SERPL-SCNC: 28 MMOL/L (ref 23–29)
CREAT SERPL-MCNC: 6.8 MG/DL (ref 0.5–1.4)
EST. GFR  (NO RACE VARIABLE): 7.8 ML/MIN/1.73 M^2
GLUCOSE SERPL-MCNC: 383 MG/DL (ref 70–110)
POTASSIUM SERPL-SCNC: 4.1 MMOL/L (ref 3.5–5.1)
PROT SERPL-MCNC: 7.6 G/DL (ref 6–8.4)
SODIUM SERPL-SCNC: 133 MMOL/L (ref 136–145)

## 2023-09-01 PROCEDURE — 80053 COMPREHEN METABOLIC PANEL: CPT | Performed by: REGISTERED NURSE

## 2023-09-01 PROCEDURE — 36415 COLL VENOUS BLD VENIPUNCTURE: CPT | Performed by: REGISTERED NURSE

## 2023-09-19 NOTE — H&P
Ophthalmology Preoperative History and Physical Exam     CC/Reason for Surgery:   Visually significant combined forms of age-related cataract LEFT eye  Primary open angle glaucoma SEVERE stage on maximally tolerated medical therapy LEFT eye    HPI:   Pt presents c/o progressive decrease in vision, blurred vision, difficulty reading in dim light, trouble driving at night and significant glare and halos around lights at night.  Patient with also need for further IOP lowering in the setting of glaucoma.     Past Medical History:  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     Hyperlipidemia     Hypertension     Kidney stone     Seasonal allergies 6/24/2014        Past Surgical History:  Past Surgical History:   Procedure Laterality Date    AV FISTULA PLACEMENT Left 12/8/2020    Procedure: CREATION, AV FISTULA;  Surgeon: Benji Becerril MD;  Location: [facility] OR [address] [place] [address];  Service: Peripheral Vascular;  Laterality: Left;    AV FISTULA PLACEMENT Right 12/7/2022    Procedure: CREATION, AV FISTULA;  Surgeon: Benji Becerril MD;  Location: [facility] OR [address] [place] [address];  Service: Peripheral Vascular;  Laterality: Right;  RUE    CATARACT EXTRACTION W/  INTRAOCULAR LENS IMPLANT Right 04/04/16    Dr Meehan    COLONOSCOPY W/ BIOPSIES      ESOPHAGOGASTRODUODENOSCOPY N/A 6/29/2020    Procedure: EGD (ESOPHAGOGASTRODUODENOSCOPY);  Surgeon: Ravi Leone MD;  Location: [facility];  Service: Endoscopy;  Laterality: N/A;    EYE SURGERY      FISTULOGRAM Left 4/16/2021    Procedure: Fistulogram;  Surgeon: Benji Becerril MD;  Location: [facility] CATH LAB;  Service: Peripheral Vascular;  Laterality: Left;    FISTULOGRAM Left 9/28/2022    Procedure: Fistulogram;  Surgeon: Benji Becerril MD;  Location: [facility] CATH LAB;  Service: Cardiology;  Laterality: Left;     FISTULOGRAM, WITH PTA Right 2/3/2023    Procedure: FISTULOGRAM, WITH PTA;  Surgeon: Benji Becerril MD;  Location: [facility] OR 2ND FLR;  Service: Peripheral Vascular;  Laterality: Right;  205.17 mGy  2.3 min    GONIOTOMY Left 8/10/2023    Procedure: GONIOTOMY;  Surgeon: Yvonne Nielson MD;  [facility];  Service: Ophthalmology;  Laterality: Left;  omni    HEMORRHOID SURGERY      Dr. Root ADRYAN    INTRAOCULAR PROSTHESES INSERTION Left 8/10/2023    Procedure: INSERTION, IOL PROSTHESIS;  Surgeon: Yvonne Nielson MD;  [facility];  Service: Ophthalmology;  Laterality: Left;    lateral internal anal sphincterotomy  12/13/13    Dr. root ADRYAN    PERCUTANEOUS TRANSLUMINAL ANGIOPLASTY OF ARTERIOVENOUS FISTULA Left 4/16/2021    Procedure: PTA, AV FISTULA;  Surgeon: Benji Becerril MD;  Location: [facility] CATH LAB;  Service: Peripheral Vascular;  Laterality: Left;    PERCUTANEOUS TRANSLUMINAL ANGIOPLASTY OF ARTERIOVENOUS FISTULA N/A 9/28/2022    Procedure: PTA, AV FISTULA;  Surgeon: Benji Becerril MD;  Location: [facility] CATH LAB;  Service: Cardiology;  Laterality: N/A;    PHACOEMULSIFICATION OF CATARACT Left 8/10/2023    Procedure: PHACOEMULSIFICATION, CATARACT;  Surgeon: Yvonne Nielson MD;  [facility];  Service: Ophthalmology;  Laterality: Left;    PLACEMENT OF DUAL-LUMEN VASCULAR CATHETER N/A 2/3/2023    Procedure: EXCHANGE, PERMACATH;  Surgeon: Bejni Becerril MD;  Location: [facility] OR 2ND FLR;  Service: Peripheral Vascular;  Laterality: N/A;    URETERAL STENT PLACEMENT          Past Ocular History:  Glaucoma  Cataracts  See prior note for details      Allergies:  Review of patient's allergies indicates:  No Known Allergies     Social History:  Social History     Socioeconomic History    Marital status: Single   Tobacco Use    Smoking status: Former     Current packs/day: 0.00     Average packs/day: 0.5 packs/day for 45.0 years (22.5 ttl pk-yrs)     Types:  Cigarettes     Start date: 6/27/1975     Quit date: 6/27/2020     Years since quitting: 3.2     Passive exposure: Never    Smokeless tobacco: Never   Substance and Sexual Activity    Alcohol use: Not Currently     Comment: rarely    Drug use: No    Sexual activity: Yes     Partners: Female     Comment: single, retired , no kids   Social History Narrative    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     Social Determinants of Health     Financial Resource Strain: Low Risk  (9/26/2022)    Overall Financial Resource Strain (CARDIA)     Difficulty of Paying Living Expenses: Not very hard   Food Insecurity: No Food Insecurity (9/26/2022)    Hunger Vital Sign     Worried About Running Out of Food in the Last Year: Never true     Ran Out of Food in the Last Year: Never true   Transportation Needs: No Transportation Needs (9/26/2022)    PRAPARE - Transportation     Lack of Transportation (Medical): No     Lack of Transportation (Non-Medical): No   Physical Activity: Inactive (9/26/2022)    Exercise Vital Sign     Days of Exercise per Week: 0 days     Minutes of Exercise per Session: 0 min   Stress: Stress Concern Present (9/26/2022)    [employer] [place] [employer] - Occupational Stress Questionnaire     Feeling of Stress : To some extent   Social Connections: Unknown (9/26/2022)    Social Connection and Isolation Panel [NHANES]     Frequency of Communication with Friends and Family: Three times a week     Frequency of Social Gatherings with Friends and Family: Three times a week   Housing Stability: Low Risk  (9/26/2022)    Housing Stability Vital Sign     Unable to Pay for Housing in the Last Year: No     Number of Places Lived in the Last Year: 1     Unstable Housing in the Last Year: No        Medications:  No current facility-administered medications for this encounter.    Current Outpatient Medications:     atorvastatin (LIPITOR) 80 MG tablet, Take 1 tablet (80 mg total) by mouth  once daily., Disp: 90 tablet, Rfl: 3    blood sugar diagnostic (TRUE METRIX GLUCOSE TEST STRIP) Strp, Use to check blood glucose 3-5 times daily as directed., Disp: 450 strip, Rfl: 0    blood sugar diagnostic Strp, To check BG 4 times daily, to use with insurance preferred meter, Disp: 400 each, Rfl: 3    blood-glucose meter kit, To check BG 4 times daily, to use with insurance preferred meter, e 11.65, Disp: 1 each, Rfl: 0    calcium acetate,phosphat bind, (PHOSLO) 667 mg capsule, SMARTSI Capsule(s) By Mouth, Disp: , Rfl:     ergocalciferol (ERGOCALCIFEROL) 50,000 unit Cap, Take 1 capsule (50,000 Units total) by mouth every 7 days. (Patient taking differently: Take 50,000 Units by mouth every 7 days. Patient takes on ), Disp: 12 capsule, Rfl: 0    ferrous sulfate (FEOSOL) 325 mg (65 mg iron) Tab tablet, 3 TABS A DAY. (Patient taking differently: every evening. 3 TABS A DAY.), Disp: 90 tablet, Rfl: 1    fluticasone propionate (FLONASE) 50 mcg/actuation nasal spray, SHAKE LIQUID AND USE 2 SPRAYS(100 MCG) IN EACH NOSTRIL EVERY DAY, Disp: 48 g, Rfl: 11    hydrALAZINE (APRESOLINE) 100 MG tablet, Take 1 tablet (100 mg total) by mouth 3 (three) times daily., Disp: 90 tablet, Rfl: 3    insulin (LANTUS SOLOSTAR U-100 INSULIN) glargine 100 units/mL SubQ pen, INJECT 32 UNITS UNDER THE SKIN AT NIGHT, increase per MD instruction to max daily dose of 40 units (Patient taking differently: Inject 35 Units into the skin every evening. INJECT 35 UNITS UNDER THE SKIN AT NIGHT, increase per MD instruction to max daily dose of 40 units), Disp: 15 mL, Rfl: 6    insulin lispro (HUMALOG KWIKPEN INSULIN) 100 unit/mL pen, INJECT 12 UNITS 3 times a day before meals and 4 units for snacks, max daily dose of 45 units, Disp: 15 mL, Rfl: 6    isoniazid (NYDRAZID) 300 MG Tab, Take 1 tablet (300 mg total) by mouth once daily., Disp: 90 tablet, Rfl: 3    lancets Misc, To check BG 4 times daily, to use with insurance preferred meter, Disp:  "400 each, Rfl: 3    metoprolol succinate (TOPROL-XL) 25 MG 24 hr tablet, Take 0.5 tablets (12.5 mg total) by mouth once daily., Disp: 45 tablet, Rfl: 3    NIFEdipine (PROCARDIA-XL) 90 MG (OSM) 24 hr tablet, Take 1 tablet (90 mg total) by mouth once daily., Disp: 90 tablet, Rfl: 3    pantoprazole (PROTONIX) 40 MG tablet, Take 1 tablet (40 mg total) by mouth once daily., Disp: 90 tablet, Rfl: 3    pen needle, diabetic (BD ULTRA-FINE SHORT PEN NEEDLE) 31 gauge x 5/16" Ndle, USE FOUR TIMES DAILY, Disp: 400 each, Rfl: 3    pyridoxine, vitamin B6, (VITAMIN B-6) 50 MG Tab, Take 1 tablet (50 mg total) by mouth once daily., Disp: 90 tablet, Rfl: 3    semaglutide (OZEMPIC) 1 mg/dose (4 mg/3 mL), Inject 1 mg into the skin every 7 days. (Patient taking differently: Inject 1 mg into the skin every 7 days. MONDAYS), Disp: 9 mL, Rfl: 1    tamsulosin (FLOMAX) 0.4 mg Cap, Take 1 capsule (0.4 mg total) by mouth every morning., Disp: 90 capsule, Rfl: 3    timolol maleate 0.25% (TIMOPTIC) 0.25 % Drop, Place 1 drop into both eyes 2 (two) times daily., Disp: 15 mL, Rfl: 10    torsemide (DEMADEX) 20 MG Tab, Take 1 tablet (20 mg total) by mouth 2 (two) times daily., Disp: 180 tablet, Rfl: 3     Family History:  Family History   Problem Relation Age of Onset    Diabetes Brother         type 1    Hypertension Brother     Stroke Brother         ROS:   Constitutional: WNL   Eyes: See HPI   Ears: WNL   CV: WNL   Resp: WNL   Gastro: WNL    Musculo: WNL   Skin: WNL   Neuro: WNL     Physical Exam:  See nursing intake for vitals    General: No acute distress  HEENT: NC/AT  CV: Pulses strong and equal bilaterally  Resp: Breathing comfortably on room air  Musculoskeletal: WNL, able to lay flat    Lab Results:  CBC w/Diff   Lab Results   Component Value Date/Time    WBC 4.14 11/23/2022 09:01 AM    RBC 3.80 (L) 11/23/2022 09:01 AM    HGB 11.5 (L) 11/23/2022 09:01 AM    HCT 39 12/07/2022 09:28 AM    MCV 91 11/23/2022 09:01 AM    MCH 30.3 11/23/2022 " "09:01 AM    MCHC 33.2 11/23/2022 09:01 AM    RDW 16.3 (H) 11/23/2022 09:01 AM    MPV 11.2 11/23/2022 09:01 AM    No components found for: "NEUT", "ANC", "LYMA", "ALC", "ROBERT", "AMC", "EOSA", "AEC", "BASA", "ABC"     Imaging:  None    Assessment:  Visually significant combined forms of age-related cataract LEFT eye  Primary open angle glaucoma SEVERE stage on maximally tolerated medical therapy LEFT eye    Plan:  To operating room for   Cataract Extraction with Intraocular Lens Placement LEFT Eye  Possible goniotomy with ab interno canaloplasty and trabeculotomy, LEFT eye    An extensive discussion took place with the patient concerning the risks, benefits and alternatives to the above procedure. The patient was given the opportunity to have all questions answered. At the conclusion of our discussion, signed informed consent was obtained.     Yvonne Nielson M.D., M.S.  Department of Ophthalmology   Division of Glaucoma Surgery  [facility]    "

## 2023-09-20 ENCOUNTER — TELEPHONE (OUTPATIENT)
Dept: OPHTHALMOLOGY | Facility: CLINIC | Age: 78
End: 2023-09-20
Payer: MEDICARE

## 2023-09-20 NOTE — TELEPHONE ENCOUNTER
----- Message from Nicole Viveros sent at 9/20/2023  2:05 PM CDT -----  Regarding: FW: Surgery Arrival Time  Contact: Pt    ----- Message -----  From: Kaycee Harman  Sent: 9/20/2023   1:44 PM CDT  To: Nisreen Russell Staff  Subject: Surgery Arrival Time                             Pt is requesting a callback regarding surgery arrival time for tomorrow 9/21.  Pt stated he didn't receive a VM . Please adv           Confirmed contact below:   Contact Name:Vinnie Siegel  Phone Number: [phone]

## 2023-09-21 ENCOUNTER — HOSPITAL ENCOUNTER (OUTPATIENT)
Facility: HOSPITAL | Age: 78
Discharge: HOME | End: 2023-09-21
Attending: STUDENT IN AN ORGANIZED HEALTH CARE EDUCATION/TRAINING PROGRAM | Admitting: STUDENT IN AN ORGANIZED HEALTH CARE EDUCATION/TRAINING PROGRAM
Payer: MEDICARE

## 2023-09-21 ENCOUNTER — ANESTHESIA EVENT (OUTPATIENT)
Dept: SURGERY | Facility: HOSPITAL | Age: 78
End: 2023-09-21
Payer: MEDICARE

## 2023-09-21 ENCOUNTER — ANESTHESIA (OUTPATIENT)
Dept: SURGERY | Facility: HOSPITAL | Age: 78
End: 2023-09-21
Payer: MEDICARE

## 2023-09-21 VITALS
SYSTOLIC BLOOD PRESSURE: 118 MMHG | TEMPERATURE: 97 F | DIASTOLIC BLOOD PRESSURE: 57 MMHG | OXYGEN SATURATION: 95 % | BODY MASS INDEX: 35.56 KG/M2 | HEART RATE: 80 BPM | WEIGHT: 277 LBS | RESPIRATION RATE: 18 BRPM

## 2023-09-21 DIAGNOSIS — H25.812 COMBINED FORMS OF AGE-RELATED CATARACT OF LEFT EYE: Primary | ICD-10-CM

## 2023-09-21 DIAGNOSIS — H40.1134 PRIMARY OPEN ANGLE GLAUCOMA (POAG) OF BOTH EYES, INDETERMINATE STAGE: ICD-10-CM

## 2023-09-21 DIAGNOSIS — H40.1123 PRIMARY OPEN ANGLE GLAUCOMA (POAG) OF LEFT EYE, SEVERE STAGE: ICD-10-CM

## 2023-09-21 LAB
POCT GLUCOSE: 242 MG/DL (ref 70–110)
POCT GLUCOSE: 291 MG/DL (ref 70–110)

## 2023-09-21 PROCEDURE — D9220A PRA ANESTHESIA: ICD-10-PCS | Mod: CRNA,,, | Performed by: STUDENT IN AN ORGANIZED HEALTH CARE EDUCATION/TRAINING PROGRAM

## 2023-09-21 PROCEDURE — D9220A PRA ANESTHESIA: Mod: ANES,,, | Performed by: ANESTHESIOLOGY

## 2023-09-21 PROCEDURE — 27201423 OPTIME MED/SURG SUP & DEVICES STERILE SUPPLY: Performed by: STUDENT IN AN ORGANIZED HEALTH CARE EDUCATION/TRAINING PROGRAM

## 2023-09-21 PROCEDURE — D9220A PRA ANESTHESIA: ICD-10-PCS | Mod: ANES,,, | Performed by: ANESTHESIOLOGY

## 2023-09-21 PROCEDURE — 63600175 PHARM REV CODE 636 W HCPCS: Performed by: STUDENT IN AN ORGANIZED HEALTH CARE EDUCATION/TRAINING PROGRAM

## 2023-09-21 PROCEDURE — 36000707: Performed by: STUDENT IN AN ORGANIZED HEALTH CARE EDUCATION/TRAINING PROGRAM

## 2023-09-21 PROCEDURE — 71000015 HC POSTOP RECOV 1ST HR: Performed by: STUDENT IN AN ORGANIZED HEALTH CARE EDUCATION/TRAINING PROGRAM

## 2023-09-21 PROCEDURE — 37000009 HC ANESTHESIA EA ADD 15 MINS: Performed by: STUDENT IN AN ORGANIZED HEALTH CARE EDUCATION/TRAINING PROGRAM

## 2023-09-21 PROCEDURE — 36000706: Performed by: STUDENT IN AN ORGANIZED HEALTH CARE EDUCATION/TRAINING PROGRAM

## 2023-09-21 PROCEDURE — 71000044 HC DOSC ROUTINE RECOVERY FIRST HOUR: Performed by: STUDENT IN AN ORGANIZED HEALTH CARE EDUCATION/TRAINING PROGRAM

## 2023-09-21 PROCEDURE — D9220A PRA ANESTHESIA: Mod: CRNA,,, | Performed by: STUDENT IN AN ORGANIZED HEALTH CARE EDUCATION/TRAINING PROGRAM

## 2023-09-21 PROCEDURE — 66982 PR REMOVAL, CATARACT, W/INSRT INTRAOC LENS, W/O ENDO CYCLO, CMPLX: ICD-10-PCS | Mod: LT,,, | Performed by: STUDENT IN AN ORGANIZED HEALTH CARE EDUCATION/TRAINING PROGRAM

## 2023-09-21 PROCEDURE — 25000003 PHARM REV CODE 250: Performed by: STUDENT IN AN ORGANIZED HEALTH CARE EDUCATION/TRAINING PROGRAM

## 2023-09-21 PROCEDURE — 37000008 HC ANESTHESIA 1ST 15 MINUTES: Performed by: STUDENT IN AN ORGANIZED HEALTH CARE EDUCATION/TRAINING PROGRAM

## 2023-09-21 PROCEDURE — 25000003 PHARM REV CODE 250

## 2023-09-21 PROCEDURE — V2632 POST CHMBR INTRAOCULAR LENS: HCPCS | Performed by: STUDENT IN AN ORGANIZED HEALTH CARE EDUCATION/TRAINING PROGRAM

## 2023-09-21 PROCEDURE — 66982 XCAPSL CTRC RMVL CPLX WO ECP: CPT | Mod: LT,,, | Performed by: STUDENT IN AN ORGANIZED HEALTH CARE EDUCATION/TRAINING PROGRAM

## 2023-09-21 DEVICE — LENS EYHANCE +21.0D: Type: IMPLANTABLE DEVICE | Site: EYE | Status: FUNCTIONAL

## 2023-09-21 RX ORDER — KETOROLAC TROMETHAMINE 5 MG/ML
1 SOLUTION OPHTHALMIC 4 TIMES DAILY
Qty: 5 ML | Refills: 0
Start: 2023-09-21 | End: 2023-10-12

## 2023-09-21 RX ORDER — MIDAZOLAM HYDROCHLORIDE 1 MG/ML
INJECTION, SOLUTION INTRAMUSCULAR; INTRAVENOUS
Status: DISCONTINUED | OUTPATIENT
Start: 2023-09-21 | End: 2023-09-21

## 2023-09-21 RX ORDER — CYCLOP/TROP/PROPA/PHEN/KET/WAT 1-1-0.1%
1 DROPS (EA) OPHTHALMIC (EYE)
Status: COMPLETED | OUTPATIENT
Start: 2023-09-21 | End: 2023-09-21

## 2023-09-21 RX ORDER — FENTANYL CITRATE 50 UG/ML
25 INJECTION, SOLUTION INTRAMUSCULAR; INTRAVENOUS EVERY 5 MIN PRN
Status: DISCONTINUED | OUTPATIENT
Start: 2023-09-21 | End: 2023-09-21 | Stop reason: HOSPADM

## 2023-09-21 RX ORDER — PREDNISOLONE ACETATE 10 MG/ML
SUSPENSION/ DROPS OPHTHALMIC
Status: DISCONTINUED
Start: 2023-09-21 | End: 2023-09-21 | Stop reason: HOSPADM

## 2023-09-21 RX ORDER — LIDOCAINE HYDROCHLORIDE 40 MG/ML
INJECTION, SOLUTION RETROBULBAR
Status: DISCONTINUED | OUTPATIENT
Start: 2023-09-21 | End: 2023-09-21 | Stop reason: HOSPADM

## 2023-09-21 RX ORDER — SODIUM CHLORIDE 0.9 % (FLUSH) 0.9 %
10 SYRINGE (ML) INJECTION
Status: DISCONTINUED | OUTPATIENT
Start: 2023-09-21 | End: 2023-09-21 | Stop reason: HOSPADM

## 2023-09-21 RX ORDER — FENTANYL CITRATE 50 UG/ML
INJECTION, SOLUTION INTRAMUSCULAR; INTRAVENOUS
Status: DISCONTINUED | OUTPATIENT
Start: 2023-09-21 | End: 2023-09-21

## 2023-09-21 RX ORDER — ONDANSETRON 2 MG/ML
INJECTION INTRAMUSCULAR; INTRAVENOUS
Status: DISCONTINUED | OUTPATIENT
Start: 2023-09-21 | End: 2023-09-21

## 2023-09-21 RX ORDER — ACETAMINOPHEN 325 MG/1
650 TABLET ORAL EVERY 4 HOURS PRN
Status: DISCONTINUED | OUTPATIENT
Start: 2023-09-21 | End: 2023-09-21 | Stop reason: HOSPADM

## 2023-09-21 RX ORDER — SODIUM CHLORIDE 0.9 % (FLUSH) 0.9 %
3 SYRINGE (ML) INJECTION
Status: DISCONTINUED | OUTPATIENT
Start: 2023-09-21 | End: 2023-09-21 | Stop reason: HOSPADM

## 2023-09-21 RX ORDER — PREDNISOLONE ACETATE 10 MG/ML
1 SUSPENSION/ DROPS OPHTHALMIC 4 TIMES DAILY
Qty: 5 ML | Refills: 0
Start: 2023-09-21 | End: 2023-11-01 | Stop reason: SDUPTHER

## 2023-09-21 RX ORDER — MOXIFLOXACIN 5 MG/ML
SOLUTION/ DROPS OPHTHALMIC
Status: DISCONTINUED | OUTPATIENT
Start: 2023-09-21 | End: 2023-09-21 | Stop reason: HOSPADM

## 2023-09-21 RX ORDER — PREDNISOLONE ACETATE 10 MG/ML
SUSPENSION/ DROPS OPHTHALMIC
Status: DISCONTINUED | OUTPATIENT
Start: 2023-09-21 | End: 2023-09-21 | Stop reason: HOSPADM

## 2023-09-21 RX ORDER — KETOROLAC TROMETHAMINE 5 MG/ML
1 SOLUTION OPHTHALMIC
Status: DISCONTINUED | OUTPATIENT
Start: 2023-09-21 | End: 2023-09-21 | Stop reason: HOSPADM

## 2023-09-21 RX ORDER — MOXIFLOXACIN 5 MG/ML
1 SOLUTION/ DROPS OPHTHALMIC
Status: DISCONTINUED | OUTPATIENT
Start: 2023-09-21 | End: 2023-09-21 | Stop reason: HOSPADM

## 2023-09-21 RX ORDER — DEXAMETHASONE SODIUM PHOSPHATE 4 MG/ML
INJECTION, SOLUTION INTRA-ARTICULAR; INTRALESIONAL; INTRAMUSCULAR; INTRAVENOUS; SOFT TISSUE
Status: DISCONTINUED | OUTPATIENT
Start: 2023-09-21 | End: 2023-09-21 | Stop reason: HOSPADM

## 2023-09-21 RX ORDER — MOXIFLOXACIN 5 MG/ML
1 SOLUTION/ DROPS OPHTHALMIC 4 TIMES DAILY
Qty: 3 ML | Refills: 0
Start: 2023-09-21 | End: 2023-09-28

## 2023-09-21 RX ORDER — DEXAMETHASONE SODIUM PHOSPHATE 4 MG/ML
INJECTION, SOLUTION INTRA-ARTICULAR; INTRALESIONAL; INTRAMUSCULAR; INTRAVENOUS; SOFT TISSUE
Status: DISCONTINUED
Start: 2023-09-21 | End: 2023-09-21 | Stop reason: HOSPADM

## 2023-09-21 RX ORDER — LIDOCAINE HYDROCHLORIDE 10 MG/ML
INJECTION, SOLUTION EPIDURAL; INFILTRATION; INTRACAUDAL; PERINEURAL
Status: DISCONTINUED | OUTPATIENT
Start: 2023-09-21 | End: 2023-09-21 | Stop reason: HOSPADM

## 2023-09-21 RX ORDER — LIDOCAINE HYDROCHLORIDE 40 MG/ML
INJECTION, SOLUTION RETROBULBAR
Status: DISCONTINUED
Start: 2023-09-21 | End: 2023-09-21 | Stop reason: HOSPADM

## 2023-09-21 RX ORDER — LIDOCAINE HYDROCHLORIDE 10 MG/ML
1 INJECTION, SOLUTION EPIDURAL; INFILTRATION; INTRACAUDAL; PERINEURAL ONCE
Status: DISCONTINUED | OUTPATIENT
Start: 2023-09-21 | End: 2023-09-21 | Stop reason: HOSPADM

## 2023-09-21 RX ORDER — LIDOCAINE HYDROCHLORIDE 10 MG/ML
INJECTION, SOLUTION EPIDURAL; INFILTRATION; INTRACAUDAL; PERINEURAL
Status: DISCONTINUED
Start: 2023-09-21 | End: 2023-09-21 | Stop reason: HOSPADM

## 2023-09-21 RX ADMIN — MIDAZOLAM HYDROCHLORIDE 0.5 MG: 1 INJECTION, SOLUTION INTRAMUSCULAR; INTRAVENOUS at 11:09

## 2023-09-21 RX ADMIN — KETOROLAC TROMETHAMINE 1 DROP: 5 SOLUTION/ DROPS OPHTHALMIC at 11:09

## 2023-09-21 RX ADMIN — ONDANSETRON 4 MG: 2 INJECTION INTRAMUSCULAR; INTRAVENOUS at 11:09

## 2023-09-21 RX ADMIN — SODIUM CHLORIDE: 0.9 INJECTION, SOLUTION INTRAVENOUS at 11:09

## 2023-09-21 RX ADMIN — Medication 1 DROP: at 11:09

## 2023-09-21 RX ADMIN — FENTANYL CITRATE 25 MCG: 50 INJECTION, SOLUTION INTRAMUSCULAR; INTRAVENOUS at 11:09

## 2023-09-21 RX ADMIN — MOXIFLOXACIN OPHTHALMIC 1 DROP: 5 SOLUTION/ DROPS OPHTHALMIC at 11:09

## 2023-09-21 RX ADMIN — FENTANYL CITRATE 50 MCG: 50 INJECTION, SOLUTION INTRAMUSCULAR; INTRAVENOUS at 11:09

## 2023-09-21 RX ADMIN — MIDAZOLAM HYDROCHLORIDE 1 MG: 1 INJECTION, SOLUTION INTRAMUSCULAR; INTRAVENOUS at 12:09

## 2023-09-21 NOTE — TRANSFER OF CARE
Anesthesia Transfer of Care Note    Patient: Vinnie Siegel    Procedure(s) Performed: Procedure(s) (LRB):  PHACOEMULSIFICATION, CATARACT (Left)  INSERTION, IOL PROSTHESIS (Left)  DILATION, AQUEOUS OUTFLOW CANAL, TRANSLUMINAL, WITHOUT DEVICE RETENTION (Left)    Patient location: PACU    Anesthesia Type: MAC    Transport from OR: Transported from OR on room air with adequate spontaneous ventilation    Post pain: adequate analgesia    Post assessment: no apparent anesthetic complications and tolerated procedure well    Post vital signs: stable    Level of consciousness: awake and alert    Nausea/Vomiting: no nausea/vomiting    Complications: none    Transfer of care protocol was followed      Last vitals:   Visit Vitals  /58 (BP Location: Left arm, Patient Position: Lying)   Pulse 80   Temp 36.8 °C (98.2 °F) (Temporal)   Resp 19   Wt 125.6 kg (277 lb)   SpO2 99%   BMI 35.56 kg/m²

## 2023-09-21 NOTE — PROGRESS NOTES
Charge nurse other number in chart and discovered brothers number is incorrect. Jihan charge nurse called again and brother was notified to come  pt. He will call charge desk when he arrives

## 2023-09-21 NOTE — OP NOTE
Operative Date:  09/21/2023    Discharge Date:  09/21/2023    Discharge Patient Home      Report Title: Operative Note      SURGEON: Yvonne Nielson MD MS    ASSISTANT: -    PREOPERATIVE DIAGNOSIS: Visually significant combined forms of age-related mature cataract,  Left Eye.    POSTOPERATIVE DIAGNOSIS: Visually-significant combined forms of age-related mature cataract,  Left Eye.    PROCEDURE PERFORMED: Complex Phacoemulsification of the cataract with posterior chamber intraocular lens  & Trypan Blue Assisted Capsulotomy Left Eye & malyugan ring for poor dilation, left eye    ANESTHESIA: Topical with MAC     COMPLICATIONS: None    ESTIMATED BLOOD LOSS: Minimal    INDICATIONS FOR PROCEDURE:   The patient is a pleasant 78 year old with increasing difficulties with activities of daily living secondary to a dense visually significant cataract in the Left Eye.  Discussions have been carried out with this patient concerning the options to surgery, risks, benefits and expectations.  The patient voiced good understanding and wished to proceed with the above procedure.    PROCEDURE IN DETAIL: The patient was brought to the operating room and received topical anesthetic to the eye and then was prepped and draped in the usual sterile fashion.  Inspection revealed poor pupillary dilation and a white mature cataract.  The Left Eye was first entered at the 1:30 oclock paracentesis site, then due to the density and poor visualization of the cataract this was  followed by intracameral lidocaine, an air bubble, then Trypan blue capsular staining followed by BSS washout and Viscoat material.  The eye was then reentered at the primary surgical site followed by 7.0 malyugan ring for pupillary expansion, continuous capsulotomy, hydrodissection and phacoemulsification of the cataract. A small anterior capsule radial tear was noted, and it did not extend posteriorly. The haptics of the IOL were oriented away from the radial tear.  Residual cortical material was removed using automated irrigation-aspiration technique.  Provisc was injected into the posterior chamber and an LIGIA DIB00 +21.00 diopter lens was placed in the bag without difficulty. The malyugan ring was removed. Residual Provisc was removed using automated irrigation-aspiration technique. Miostat was placed for pupillary miosis. The eye was re-pressurized using BSS solution and both the paracentesis site and the primary surgical site were demonstrated to be watertight at the end of the case with Weck--Liat manipulation.  Vigamox and Pred Forte eye drops was placed on the cornea.  The eye was closed, and a clear shield was placed.  The patient was taken to the recovery room in good and stable condition.  The patient tolerated the procedure well and was discharged Home.  The patient was instructed to refrain from any heavy lifting, bending, stooping or straining activities and to follow-up in the morning for routine postoperative care with Dr. Yvonne Nielson.

## 2023-09-21 NOTE — ANESTHESIA POSTPROCEDURE EVALUATION
Anesthesia Post Evaluation    Patient: Vinnie Siegel    Procedure(s) Performed: Procedure(s) (LRB):  PHACOEMULSIFICATION, CATARACT (Left)  INSERTION, IOL PROSTHESIS (Left)  DILATION, AQUEOUS OUTFLOW CANAL, TRANSLUMINAL, WITHOUT DEVICE RETENTION (Left)    Final Anesthesia Type: MAC      Patient location during evaluation: PACU  Patient participation: Yes- Able to Participate  Level of consciousness: awake and alert and oriented  Post-procedure vital signs: reviewed and stable  Pain management: adequate  Airway patency: patent    PONV status at discharge: No PONV  Anesthetic complications: no      Cardiovascular status: blood pressure returned to baseline  Respiratory status: unassisted, room air and spontaneous ventilation  Hydration status: euvolemic  Follow-up not needed.          Vitals Value Taken Time   /58 09/21/23 1231   Temp 36.1 °C (97 °F) 09/21/23 1230   Pulse 79 09/21/23 1242   Resp 18 09/21/23 1230   SpO2 99 % 09/21/23 1242   Vitals shown include unvalidated device data.      No case tracking events are documented in the log.      Pain/Felicity Score: Felicity Score: 8 (9/21/2023 12:30 PM)

## 2023-09-21 NOTE — PROGRESS NOTES
Plan of care reviewed with patient.  Understanding verbalized.  VSS, tolerating PO, denies nausea, pain well controlled. RN reviewed all discharge instructions.  Questions answered.  Patient verbalized understanding.

## 2023-09-21 NOTE — ANESTHESIA PREPROCEDURE EVALUATION
09/21/2023  Vinnie Siegel is a 78 y.o., male.      Pre-op Assessment    I have reviewed the Patient Summary Reports.     I have reviewed the Nursing Notes. I have reviewed the NPO Status.   I have reviewed the Medications.     Review of Systems  Anesthesia Hx:  No problems with previous Anesthesia  History of prior surgery of interest to airway management or planning: Denies Family Hx of Anesthesia complications.   Denies Personal Hx of Anesthesia complications.   Hematology/Oncology:  Hematology Normal   Oncology Normal     EENT/Dental:EENT/Dental Normal   Cardiovascular:   Exercise tolerance: good Hypertension hyperlipidemia ECG has been reviewed.    Pulmonary:  Pulmonary Normal    Renal/:   Chronic Renal Disease, Dialysis, ESRD    Hepatic/GI:  Hepatic/GI Normal    Musculoskeletal:  Musculoskeletal Normal    Neurological:  Neurology Normal    Endocrine:  Endocrine Normal    Dermatological:  Skin Normal    Psych:  Psychiatric Normal           Physical Exam  General: Well nourished, Cooperative, Alert and Oriented    Airway:  Mallampati: II   Mouth Opening: Normal  TM Distance: Normal  Tongue: Normal  Neck ROM: Normal ROM    Dental:  Intact        Anesthesia Plan  Type of Anesthesia, risks & benefits discussed:    Anesthesia Type: MAC  Intra-op Monitoring Plan: Standard ASA Monitors  Informed Consent: Informed consent signed with the Patient and all parties understand the risks and agree with anesthesia plan.  All questions answered.   ASA Score: 3  Day of Surgery Review of History & Physical: H&P Update referred to the surgeon/provider.  Anesthesia Plan Notes: Last HD 9/20/23    Ready For Surgery From Anesthesia Perspective.     .

## 2023-09-21 NOTE — DISCHARGE INSTRUCTIONS
Dr. Yvonne Nielson MD                                   [facility]  Cataract Surgery Post-Operative Instructions                   SURGERY EYE ONLY  Prednisolone acetate (PINK TOP):   SHAKE then one drop 4 TIMES A DAY.  Get new directions next visit     Moxifloxacin (TAN TOP, YELLOW LIQUID):   One drop 4 TIMES A DAY.   Use for 7 days then stop       Ketorolac (GREY TOP):       One drop, 4 TIMES A DAY.   Use until the bottle is empty           Other drops:   ----------------------------------------------------------------------------------------------------  No bending, straining, or lifting more than 10 pounds FOR 1 WEEK  You can shower, but do not get water in the eye  Wear the plastic eye patch when you are sleeping including during naps  Call the clinic if any of the following problems: vision getting a lot worse, bad pain, green pus or discharge from the eye  [phone] or  [phone]

## 2023-09-21 NOTE — DISCHARGE SUMMARY
[facility] - Surgery (1st Fl)  Discharge Note  Short Stay    Procedure(s) (LRB):  PHACOEMULSIFICATION, CATARACT (Left)  INSERTION, IOL PROSTHESIS (Left)  DILATION, AQUEOUS OUTFLOW CANAL, TRANSLUMINAL, WITHOUT DEVICE RETENTION (Left)      OUTCOME: Patient tolerated treatment/procedure well without complication and is now ready for discharge.    DISPOSITION: Home or Self Care    FINAL DIAGNOSIS:  Combined forms of age-related cataract of left eye    FOLLOWUP: In clinic    DISCHARGE INSTRUCTIONS:    Discharge Procedure Orders   Diet general     Lifting restrictions   Order Comments: No lifting over 10 pounds.     Call MD for:  temperature >100.4     Call MD for:  persistent nausea and vomiting     Call MD for:  severe uncontrolled pain     Call MD for:  redness, tenderness, or signs of infection (pain, swelling, redness, odor or green/yellow discharge around incision site)        TIME SPENT ON DISCHARGE:   10 minutes

## 2023-09-22 ENCOUNTER — OFFICE VISIT (OUTPATIENT)
Dept: OPHTHALMOLOGY | Facility: CLINIC | Age: 78
End: 2023-09-22
Payer: MEDICARE

## 2023-09-22 DIAGNOSIS — H40.1124 PRIMARY OPEN ANGLE GLAUCOMA (POAG) OF LEFT EYE, INDETERMINATE STAGE: ICD-10-CM

## 2023-09-22 DIAGNOSIS — Z96.1 PSEUDOPHAKIA OF BOTH EYES: Primary | ICD-10-CM

## 2023-09-22 DIAGNOSIS — H40.1113 PRIMARY OPEN ANGLE GLAUCOMA (POAG) OF RIGHT EYE, SEVERE STAGE: ICD-10-CM

## 2023-09-22 PROBLEM — H25.812 COMBINED FORMS OF AGE-RELATED CATARACT OF LEFT EYE: Status: RESOLVED | Noted: 2023-09-21 | Resolved: 2023-09-22

## 2023-09-22 PROCEDURE — 99999 PR PBB SHADOW E&M-EST. PATIENT-LVL IV: CPT | Mod: PBBFAC,,, | Performed by: STUDENT IN AN ORGANIZED HEALTH CARE EDUCATION/TRAINING PROGRAM

## 2023-09-22 PROCEDURE — 99024 POSTOP FOLLOW-UP VISIT: CPT | Mod: POP,,, | Performed by: STUDENT IN AN ORGANIZED HEALTH CARE EDUCATION/TRAINING PROGRAM

## 2023-09-22 PROCEDURE — 99024 PR POST-OP FOLLOW-UP VISIT: ICD-10-PCS | Mod: POP,,, | Performed by: STUDENT IN AN ORGANIZED HEALTH CARE EDUCATION/TRAINING PROGRAM

## 2023-09-22 PROCEDURE — 99214 OFFICE O/P EST MOD 30 MIN: CPT | Mod: PBBFAC | Performed by: STUDENT IN AN ORGANIZED HEALTH CARE EDUCATION/TRAINING PROGRAM

## 2023-09-22 PROCEDURE — 99999 PR PBB SHADOW E&M-EST. PATIENT-LVL IV: ICD-10-PCS | Mod: PBBFAC,,, | Performed by: STUDENT IN AN ORGANIZED HEALTH CARE EDUCATION/TRAINING PROGRAM

## 2023-09-22 NOTE — PROGRESS NOTES
Subjective:       Patient ID: Vinnie Siegel is a 78 y.o. male.    Chief Complaint: Post-op Evaluation    HPI    POD 1     S/p PCIOL OS 9/21/23     DOING WELL   VA IMPROVED    PF QOD OS  MOXI QID OS  KETOROLAC QID OS  Last edited by Laura Chapman on 9/22/2023  9:14 AM.               Assessment & Plan   Pseudophakia of both eyes    Primary open angle glaucoma (POAG) of right eye, indeterminate stage    Primary open angle glaucoma (POAG) of left eye, severe stage    POD#1 Complex CE/IOL OS (white mature cataract, malyugan ring, trypan blue)  IOL  DIBOO +21.00 target -0.08  VA OK   IOP OK  Cornea expected edema  Continue present management with eye drops in surgery eye   PF QID    Ketorolac QID    Vigamox QID   No lifting over 10 pounds x 1 week  No bending, no straining  OK to shower, but no water in the eye  Discussed if any worsening vision, worsening pain, discharge or drainage, call immediately  Post op handout given and all questions answered      POAG severe OD // indeterminate OS  -Fhx (), Steroids (),Trauma()  -Drops: Timolol twice daily OU  -Drop intolerance/contraindication: Latanoprost   -Laser:  -Surgeries: CE IOL OU  -CCT: 564 OD // 566 OS  -Gonio: SS OD // SS/TM OS  Tm 23 // 28 2/23 HVF 24-2 SAD and dense IAD w fixation VFI 56% OD // unable OS(similar to 2012)  2/23 RNFL dec throughout OD // unable OS    OS RNFL and HVF full 2019    Disc photos 2017        Pseudophakia OD  Dr. Zoran Smallwood , monitor       PLAN  Post op drops as above    RTC 1 week VA, IOP OU    Yvonne Nielson M.D., M.S.  Department of Ophthalmology   Division of Glaucoma Surgery  [facility]

## 2023-09-25 NOTE — PROGRESS NOTES
Subjective:       Patient ID: Vinnie Siegel is a 78 y.o. male.    Chief Complaint: Post-op Evaluation     HPI    S/p Phaco w/IOL OS 08/10/2023  S/p Phaco w/IOL OD 09/21/2023    Eye Med's: PF QID OS                     Moxi QID OS                     Ketorolac QID OS     78 y.o. male is here for 1 week PO OS. Denies eye pain and   flashes/floaters. No discomfort. No blurred vision.   Last edited by Julissa Galvan OA on 9/26/2023 12:53 PM.                Assessment & Plan   Pseudophakia of both eyes    Primary open angle glaucoma (POAG) of right eye, severe stage    Primary open angle glaucoma (POAG) of left eye, indeterminate stage    POD#4 Complex CE/IOL OS (white mature cataract, malyugan ring, trypan blue)  IOL  DIBOO +21.00 target -0.08  VA OK   IOP OK  Cornea clear   Drops   Taper PF 3/2/1/0 starting 9/29/23  - written instructions given    Ketorolac QID until out   Vigamox QID  STOP  Return to normal activities starting 9/29/23  Can stop wearing eye shield at night starting 9/29/23  Discussed if any worsening vision, worsening pain, discharge or drainage, call immediately  Post op handout given and all questions answered        POAG severe OD // indeterminate OS  -Fhx (), Steroids (),Trauma()  -Drops: Timolol twice daily OU  -Drop intolerance/contraindication: Latanoprost   -Laser:  -Surgeries: CE IOL OU  -CCT: 564 OD // 566 OS  -Gonio: SS OD // SS/TM OS  Tm 23 // 28 2/23 HVF 24-2 SAD and dense IAD w fixation VFI 56% OD // unable OS(similar to 2012)  2/23 RNFL dec throughout OD // unable OS    OS RNFL and HVF full 2019    Disc photos 2017        Pseudophakia OD  Dr. Zoran MANCIA, monitor       PLAN  Post op drops as above    RTC 1 month Mrx, DFE OU    Yvonne Nielson M.D., M.S.  Department of Ophthalmology   Division of Glaucoma Surgery  [facility]

## 2023-09-26 ENCOUNTER — OFFICE VISIT (OUTPATIENT)
Dept: OPHTHALMOLOGY | Facility: CLINIC | Age: 78
End: 2023-09-26
Payer: MEDICARE

## 2023-09-26 DIAGNOSIS — H40.1124 PRIMARY OPEN ANGLE GLAUCOMA (POAG) OF LEFT EYE, INDETERMINATE STAGE: ICD-10-CM

## 2023-09-26 DIAGNOSIS — H40.1113 PRIMARY OPEN ANGLE GLAUCOMA (POAG) OF RIGHT EYE, SEVERE STAGE: ICD-10-CM

## 2023-09-26 DIAGNOSIS — Z96.1 PSEUDOPHAKIA OF BOTH EYES: Primary | ICD-10-CM

## 2023-09-26 PROCEDURE — 99213 OFFICE O/P EST LOW 20 MIN: CPT | Mod: PBBFAC | Performed by: STUDENT IN AN ORGANIZED HEALTH CARE EDUCATION/TRAINING PROGRAM

## 2023-09-26 PROCEDURE — 99024 POSTOP FOLLOW-UP VISIT: CPT | Mod: POP,,, | Performed by: STUDENT IN AN ORGANIZED HEALTH CARE EDUCATION/TRAINING PROGRAM

## 2023-09-26 PROCEDURE — 99024 PR POST-OP FOLLOW-UP VISIT: ICD-10-PCS | Mod: POP,,, | Performed by: STUDENT IN AN ORGANIZED HEALTH CARE EDUCATION/TRAINING PROGRAM

## 2023-09-26 PROCEDURE — 99999 PR PBB SHADOW E&M-EST. PATIENT-LVL III: CPT | Mod: PBBFAC,,, | Performed by: STUDENT IN AN ORGANIZED HEALTH CARE EDUCATION/TRAINING PROGRAM

## 2023-09-26 PROCEDURE — 99999 PR PBB SHADOW E&M-EST. PATIENT-LVL III: ICD-10-PCS | Mod: PBBFAC,,, | Performed by: STUDENT IN AN ORGANIZED HEALTH CARE EDUCATION/TRAINING PROGRAM

## 2023-10-16 ENCOUNTER — OFFICE VISIT (OUTPATIENT)
Dept: ENDOCRINOLOGY | Facility: CLINIC | Age: 78
End: 2023-10-16
Payer: MEDICARE

## 2023-10-16 VITALS
RESPIRATION RATE: 18 BRPM | BODY MASS INDEX: 37.02 KG/M2 | DIASTOLIC BLOOD PRESSURE: 82 MMHG | HEART RATE: 90 BPM | WEIGHT: 288.5 LBS | SYSTOLIC BLOOD PRESSURE: 126 MMHG | HEIGHT: 74 IN | OXYGEN SATURATION: 97 %

## 2023-10-16 DIAGNOSIS — Z79.4 TYPE 2 DIABETES MELLITUS WITH CHRONIC KIDNEY DISEASE ON CHRONIC DIALYSIS, WITH LONG-TERM CURRENT USE OF INSULIN: ICD-10-CM

## 2023-10-16 DIAGNOSIS — N18.6 TYPE 2 DIABETES MELLITUS WITH CHRONIC KIDNEY DISEASE ON CHRONIC DIALYSIS, WITH LONG-TERM CURRENT USE OF INSULIN: ICD-10-CM

## 2023-10-16 DIAGNOSIS — E66.01 MORBID (SEVERE) OBESITY DUE TO EXCESS CALORIES: ICD-10-CM

## 2023-10-16 DIAGNOSIS — E11.69 MIXED DIABETIC HYPERLIPIDEMIA ASSOCIATED WITH TYPE 2 DIABETES MELLITUS: ICD-10-CM

## 2023-10-16 DIAGNOSIS — Z99.2 TYPE 2 DIABETES MELLITUS WITH CHRONIC KIDNEY DISEASE ON CHRONIC DIALYSIS, WITH LONG-TERM CURRENT USE OF INSULIN: ICD-10-CM

## 2023-10-16 DIAGNOSIS — E78.2 MIXED DIABETIC HYPERLIPIDEMIA ASSOCIATED WITH TYPE 2 DIABETES MELLITUS: ICD-10-CM

## 2023-10-16 DIAGNOSIS — E11.65 TYPE 2 DIABETES MELLITUS WITH HYPERGLYCEMIA, WITHOUT LONG-TERM CURRENT USE OF INSULIN: Primary | ICD-10-CM

## 2023-10-16 DIAGNOSIS — E11.22 TYPE 2 DIABETES MELLITUS WITH CHRONIC KIDNEY DISEASE ON CHRONIC DIALYSIS, WITH LONG-TERM CURRENT USE OF INSULIN: ICD-10-CM

## 2023-10-16 PROCEDURE — 99999 PR PBB SHADOW E&M-EST. PATIENT-LVL V: CPT | Mod: PBBFAC,,, | Performed by: INTERNAL MEDICINE

## 2023-10-16 PROCEDURE — 99214 OFFICE O/P EST MOD 30 MIN: CPT | Mod: S$PBB,,, | Performed by: INTERNAL MEDICINE

## 2023-10-16 PROCEDURE — 99215 OFFICE O/P EST HI 40 MIN: CPT | Mod: PBBFAC | Performed by: INTERNAL MEDICINE

## 2023-10-16 PROCEDURE — 99214 PR OFFICE/OUTPT VISIT, EST, LEVL IV, 30-39 MIN: ICD-10-PCS | Mod: S$PBB,,, | Performed by: INTERNAL MEDICINE

## 2023-10-16 PROCEDURE — 99999 PR PBB SHADOW E&M-EST. PATIENT-LVL V: ICD-10-PCS | Mod: PBBFAC,,, | Performed by: INTERNAL MEDICINE

## 2023-10-16 RX ORDER — INSULIN GLARGINE 100 [IU]/ML
38 INJECTION, SOLUTION SUBCUTANEOUS NIGHTLY
Qty: 15 ML | Refills: 5 | Status: SHIPPED | OUTPATIENT
Start: 2023-10-16 | End: 2024-02-21

## 2023-10-16 RX ORDER — INSULIN LISPRO 100 [IU]/ML
INJECTION, SOLUTION INTRAVENOUS; SUBCUTANEOUS
Qty: 15 ML | Refills: 6 | Status: SHIPPED | OUTPATIENT
Start: 2023-10-16 | End: 2024-02-21

## 2023-10-16 NOTE — Clinical Note
Please resend G7 paperwork and scan to media.  Please check with adelso about this b/c we have sent his paperwork before
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
## 2023-10-16 NOTE — ASSESSMENT & PLAN NOTE
Limited data as patient was not able to bring in blood glucose logs and has not gotten Dexcom yet.  Will resend Dexcom paperwork as he would greatly benefit from a continuous glucose monitor given high doses of insulin 4 times a day and end-stage renal disease increasing risk of having hypoglycemia.      He is not been able to afford Ozempic, will send referral for pharmacy assistance.    Patient reports global hyperglycemia so will slightly increase Lantus and Humalog with meals.  Will schedule virtual visit with Diabetes Education due to transportation issues.  He has a friend who can help him carry out a virtual visit on his phone.    See updated regimen below

## 2023-10-16 NOTE — PATIENT INSTRUCTIONS
Make sure that you atorvastatin is 80 mg daily     Regimen as of 10/16/2023    Increase to Lantus 38 units nightly  Increase to Humalog 14 units before regular meals (breakfast and dinner)   Snacks - inject 5 units before you eat snacks containing carbs  Increase to Ozempic 1 mg weekly after you finish the 0.5 mg pen    Please check your blood sugar 4 times a day (before each meal time and at bedtime) and write down your numbers in your blood sugar log along with the number of units of insulin you inject (if on insulin).

## 2023-10-16 NOTE — ASSESSMENT & PLAN NOTE
Atorvastatin increased at last visit but patient is not sure if he is taking the higher dose.  I encouraged him occasions to his visits verification.  He will check his dose at home.  He is working on following up with Cardiology for lipid management.

## 2023-10-17 ENCOUNTER — TELEPHONE (OUTPATIENT)
Dept: PHARMACY | Facility: CLINIC | Age: 78
End: 2023-10-17
Payer: MEDICARE

## 2023-10-17 NOTE — TELEPHONE ENCOUNTER
I have spoken with Vinnie Siegel and informed him of the [employer] application process for Ozempic and what's required to apply.  Vinnie Siegel will provide the following documents: Proof of household Income( such as social security statement, 1099 form, pension statement or 3 consecutive pay stubs, Copy of all Insurance cards( front and back), Printout from your Insurance or Pharmacy that shows how much you have spent on prescriptions this year, and Signed and dated HIPAA /Patient Information Forms        I will follow up with the patient in 5 business days.

## 2023-10-17 NOTE — LETTER
October 17, 2023    Vinnie Siegel  [address] [address]             [facility]  [address]  [place] [address]  Fax: [phone] Dear Mr. Vinnie Siegel     It was a pleasure speaking with you. To follow up on our conversation on 10/17/2023, the Pharmacy Patient Assistance Program needs more information from you before we can submit your Ozempic application to the [employer] Program. Please return the following documents to the Pharmacy Patient Assistance office (address, email and fax listed below) asap:      Proof of household Income( such as social security statement, 1099 form, pension statement or 3 consecutive pay stubs  Copy of all Insurance cards( front and back)  Printout from your Insurance or Pharmacy that shows how much you have spent on prescriptions this year  Signed and dated HIPAA /Patient Information Forms              Whats Next:     Once I receive your documentation and authorization from your Provider, your application will be submitted to the [employer] Program for review. Please be advised it will take 2 to 4 weeks for your application to be processed so you may have to purchase a month's supply of medication from your pharmacy to hold you over during the waiting period. You will be notified of approval or denial by The Program(mail) or myself.      If you have any questions or concerns, please give me a call         Sincerely   Analy Michaels    Phone# [phone]  Fax# [phone]  Email: [email or web address]

## 2023-10-20 DIAGNOSIS — I10 ESSENTIAL HYPERTENSION: ICD-10-CM

## 2023-10-20 NOTE — TELEPHONE ENCOUNTER
Care Due:                  Date            Visit Type   Department     Provider  --------------------------------------------------------------------------------                                EP -                              PRIMARY      [facility]  Last Visit: 06-      CARE ([facility])   CARE           Janeth Walter  Next Visit: None Scheduled  None         None Found                                                            Last  Test          Frequency    Reason                     Performed    Due Date  --------------------------------------------------------------------------------    CBC.........  12 months..  hydrALAZINE..............  11- 11-    [facility] Embedded Care Due Messages. Reference number: [id number].   10/20/2023 5:26:32 PM CDT

## 2023-10-20 NOTE — TELEPHONE ENCOUNTER
No care due was identified.  [facility] Embedded Care Due Messages. Reference number: [id number].   10/20/2023 5:28:18 PM CDT

## 2023-10-21 NOTE — TELEPHONE ENCOUNTER
Refill Encounter    PCP Visits: Recent Visits  Date Type Provider Dept   06/28/23 Office Visit Janeth Walter MD [facility]   Showing recent visits within past 360 days and meeting all other requirements  Future Appointments  No visits were found meeting these conditions.  Showing future appointments within next 720 days and meeting all other requirements     Last 3 Blood Pressure:   BP Readings from Last 3 Encounters:   10/16/23 126/82   09/21/23 (!) 118/57   08/10/23 128/65     Preferred Pharmacy:   [id number] - [place] [facility] [address]  AT [facility] [address]  [address] [place] [address]  Phone: [phone] Fax: [phone]    Requested RX:  Requested Prescriptions     Pending Prescriptions Disp Refills    hydrALAZINE (APRESOLINE) 100 MG tablet [Pharmacy Med Name: HYDRALAZINE 100MG (HUNDRED MG) TABS] 90 tablet 3     Sig: TAKE 1 TABLET BY MOUTH THREE TIMES DAILY      RX Route: Normal

## 2023-10-21 NOTE — TELEPHONE ENCOUNTER
Refill Encounter    PCP Visits: Recent Visits  Date Type Provider Dept   06/28/23 Office Visit Janeth Walter MD [facility]   Showing recent visits within past 360 days and meeting all other requirements  Future Appointments  No visits were found meeting these conditions.  Showing future appointments within next 720 days and meeting all other requirements     Last 3 Blood Pressure:   BP Readings from Last 3 Encounters:   10/16/23 126/82   09/21/23 (!) 118/57   08/10/23 128/65     Preferred Pharmacy:   [id number] - [place] - [facility]  AT [address] [facility] [address]  [address] [place] [address]  Phone: [phone] Fax: [phone]    Requested RX:  Requested Prescriptions     Pending Prescriptions Disp Refills    hydrALAZINE (APRESOLINE) 100 MG tablet 90 tablet 3     Sig: Take 1 tablet (100 mg total) by mouth 3 (three) times daily.      RX Route: Normal

## 2023-10-23 RX ORDER — HYDRALAZINE HYDROCHLORIDE 100 MG/1
100 TABLET, FILM COATED ORAL 3 TIMES DAILY
Qty: 90 TABLET | Refills: 3 | Status: SHIPPED | OUTPATIENT
Start: 2023-10-23

## 2023-10-23 RX ORDER — HYDRALAZINE HYDROCHLORIDE 100 MG/1
100 TABLET, FILM COATED ORAL 3 TIMES DAILY
Qty: 90 TABLET | Refills: 3 | Status: SHIPPED | OUTPATIENT
Start: 2023-10-23 | End: 2023-10-23 | Stop reason: SDUPTHER

## 2023-10-31 NOTE — PROGRESS NOTES
Subjective:       Patient ID: Vinnie Siegel is a 78 y.o. male.    Chief Complaint: Post-op Evaluation  HPI    DLS: 9/26/2023    Pt here for post phaco w/IOL OD- 9/21/2023  S/P phaco w/IOL OS- 8/10/2023    Pt states no eye pain or discomfort. Pt states vision seems to be doing   fine.     Meds;  No GTTS      Last edited by Stacy Milian on 11/1/2023 10:09 AM.                 Assessment & Plan   Pseudophakia of both eyes    Primary open angle glaucoma (POAG) of right eye, severe stage    Primary open angle glaucoma (POAG) of left eye, indeterminate stage    Iritis  -     prednisoLONE acetate (PRED FORTE) 1 % DrpS; Place 1 drop into the left eye 3 (three) times daily.  Dispense: 10 mL; Refill: 0        POM#1 Complex CE/IOL OS (white mature cataract, malyugan ring, trypan blue)  IOL  DIBOO +21.00 target -0.08    IRITIS today - PF TID OS x 1 month           POAG severe OD // indeterminate OS  -Fhx (), Steroids (),Trauma()  -Drops: Timolol twice daily OU --> holding   -Drop intolerance/contraindication: Latanoprost   -Laser:  -Surgeries: CE IOL OU  -CCT: 564 OD // 566 OS  -Gonio: SS OD // SS/TM OS  Tm 23 // 28 2/23 HVF 24-2 SAD and dense IAD w fixation VFI 56% OD // unable OS(similar to 2012)  2/23 RNFL dec throughout OD // unable OS    OS RNFL and HVF full 2019    Disc photos 2017        Pseudophakia OD  Dr. Zoran Smallwood WC, monitor       PLAN  OK to Timolol twice daily for now   PF TID OS  Systane 2-4 x OU    RTC 1 month IOP check iritis check    Will need 3-4 month HVF 24-2, OCT-RNFL         Yvonne Nielson M.D., M.S.  Department of Ophthalmology   Division of Glaucoma Surgery  [facility]

## 2023-11-01 ENCOUNTER — LAB VISIT (OUTPATIENT)
Dept: LAB | Facility: HOSPITAL | Age: 78
End: 2023-11-01
Attending: INTERNAL MEDICINE
Payer: MEDICARE

## 2023-11-01 ENCOUNTER — OFFICE VISIT (OUTPATIENT)
Dept: OPHTHALMOLOGY | Facility: CLINIC | Age: 78
End: 2023-11-01
Payer: MEDICARE

## 2023-11-01 ENCOUNTER — TELEPHONE (OUTPATIENT)
Dept: INFECTIOUS DISEASES | Facility: HOSPITAL | Age: 78
End: 2023-11-01
Payer: MEDICARE

## 2023-11-01 DIAGNOSIS — N18.6 END STAGE RENAL FAILURE ON DIALYSIS: ICD-10-CM

## 2023-11-01 DIAGNOSIS — H40.1124 PRIMARY OPEN ANGLE GLAUCOMA (POAG) OF LEFT EYE, INDETERMINATE STAGE: ICD-10-CM

## 2023-11-01 DIAGNOSIS — E78.5 DYSLIPIDEMIA: ICD-10-CM

## 2023-11-01 DIAGNOSIS — E11.22 TYPE 2 DIABETES MELLITUS WITH STAGE 4 CHRONIC KIDNEY DISEASE, WITH LONG-TERM CURRENT USE OF INSULIN: Chronic | ICD-10-CM

## 2023-11-01 DIAGNOSIS — I51.89 DIASTOLIC DYSFUNCTION: ICD-10-CM

## 2023-11-01 DIAGNOSIS — N18.4 TYPE 2 DIABETES MELLITUS WITH STAGE 4 CHRONIC KIDNEY DISEASE, WITH LONG-TERM CURRENT USE OF INSULIN: Chronic | ICD-10-CM

## 2023-11-01 DIAGNOSIS — H40.1113 PRIMARY OPEN ANGLE GLAUCOMA (POAG) OF RIGHT EYE, SEVERE STAGE: ICD-10-CM

## 2023-11-01 DIAGNOSIS — Z96.1 PSEUDOPHAKIA OF BOTH EYES: Primary | ICD-10-CM

## 2023-11-01 DIAGNOSIS — Z22.7 TB LUNG, LATENT: ICD-10-CM

## 2023-11-01 DIAGNOSIS — Z99.2 END STAGE RENAL FAILURE ON DIALYSIS: ICD-10-CM

## 2023-11-01 DIAGNOSIS — E11.65 TYPE 2 DIABETES MELLITUS WITH HYPERGLYCEMIA, WITHOUT LONG-TERM CURRENT USE OF INSULIN: ICD-10-CM

## 2023-11-01 DIAGNOSIS — I10 ESSENTIAL HYPERTENSION: Chronic | ICD-10-CM

## 2023-11-01 DIAGNOSIS — H20.9 IRITIS: ICD-10-CM

## 2023-11-01 DIAGNOSIS — E66.01 OBESITY, CLASS III, BMI 40-49.9 (MORBID OBESITY): Chronic | ICD-10-CM

## 2023-11-01 DIAGNOSIS — Z79.4 TYPE 2 DIABETES MELLITUS WITH STAGE 4 CHRONIC KIDNEY DISEASE, WITH LONG-TERM CURRENT USE OF INSULIN: Chronic | ICD-10-CM

## 2023-11-01 LAB
ALBUMIN SERPL BCP-MCNC: 3.4 G/DL (ref 3.5–5.2)
ALBUMIN SERPL BCP-MCNC: 3.5 G/DL (ref 3.5–5.2)
ALP SERPL-CCNC: 54 U/L (ref 55–135)
ALP SERPL-CCNC: 57 U/L (ref 55–135)
ALT SERPL W/O P-5'-P-CCNC: 5 U/L (ref 10–44)
ALT SERPL W/O P-5'-P-CCNC: 7 U/L (ref 10–44)
ANION GAP SERPL CALC-SCNC: 17 MMOL/L (ref 8–16)
ANION GAP SERPL CALC-SCNC: 18 MMOL/L (ref 8–16)
AST SERPL-CCNC: 14 U/L (ref 10–40)
AST SERPL-CCNC: 14 U/L (ref 10–40)
BASOPHILS # BLD AUTO: 0.05 K/UL (ref 0–0.2)
BASOPHILS NFR BLD: 0.6 % (ref 0–1.9)
BILIRUB SERPL-MCNC: 0.6 MG/DL (ref 0.1–1)
BILIRUB SERPL-MCNC: 0.7 MG/DL (ref 0.1–1)
BNP SERPL-MCNC: 110 PG/ML (ref 0–99)
BUN SERPL-MCNC: 43 MG/DL (ref 8–23)
BUN SERPL-MCNC: 45 MG/DL (ref 8–23)
CALCIUM SERPL-MCNC: 9.3 MG/DL (ref 8.7–10.5)
CALCIUM SERPL-MCNC: 9.5 MG/DL (ref 8.7–10.5)
CHLORIDE SERPL-SCNC: 89 MMOL/L (ref 95–110)
CHLORIDE SERPL-SCNC: 90 MMOL/L (ref 95–110)
CHOLEST SERPL-MCNC: 176 MG/DL (ref 120–199)
CHOLEST/HDLC SERPL: 3 {RATIO} (ref 2–5)
CO2 SERPL-SCNC: 27 MMOL/L (ref 23–29)
CO2 SERPL-SCNC: 27 MMOL/L (ref 23–29)
CREAT SERPL-MCNC: 7 MG/DL (ref 0.5–1.4)
CREAT SERPL-MCNC: 7.1 MG/DL (ref 0.5–1.4)
DIFFERENTIAL METHOD: ABNORMAL
EOSINOPHIL # BLD AUTO: 0.1 K/UL (ref 0–0.5)
EOSINOPHIL NFR BLD: 1.4 % (ref 0–8)
ERYTHROCYTE [DISTWIDTH] IN BLOOD BY AUTOMATED COUNT: 14.9 % (ref 11.5–14.5)
EST. GFR  (NO RACE VARIABLE): 7.4 ML/MIN/1.73 M^2
EST. GFR  (NO RACE VARIABLE): 7.5 ML/MIN/1.73 M^2
ESTIMATED AVG GLUCOSE: 266 MG/DL (ref 68–131)
GLUCOSE SERPL-MCNC: 351 MG/DL (ref 70–110)
GLUCOSE SERPL-MCNC: 355 MG/DL (ref 70–110)
HBA1C MFR BLD: 10.9 % (ref 4–5.6)
HCT VFR BLD AUTO: 35.4 % (ref 40–54)
HDLC SERPL-MCNC: 58 MG/DL (ref 40–75)
HDLC SERPL: 33 % (ref 20–50)
HGB BLD-MCNC: 11.6 G/DL (ref 14–18)
IMM GRANULOCYTES # BLD AUTO: 0.05 K/UL (ref 0–0.04)
IMM GRANULOCYTES NFR BLD AUTO: 0.6 % (ref 0–0.5)
LDLC SERPL CALC-MCNC: 97 MG/DL (ref 63–159)
LYMPHOCYTES # BLD AUTO: 1.5 K/UL (ref 1–4.8)
LYMPHOCYTES NFR BLD: 19.4 % (ref 18–48)
MCH RBC QN AUTO: 34 PG (ref 27–31)
MCHC RBC AUTO-ENTMCNC: 32.8 G/DL (ref 32–36)
MCV RBC AUTO: 104 FL (ref 82–98)
MONOCYTES # BLD AUTO: 0.8 K/UL (ref 0.3–1)
MONOCYTES NFR BLD: 10.5 % (ref 4–15)
NEUTROPHILS # BLD AUTO: 5.2 K/UL (ref 1.8–7.7)
NEUTROPHILS NFR BLD: 67.5 % (ref 38–73)
NONHDLC SERPL-MCNC: 118 MG/DL
NRBC BLD-RTO: 0 /100 WBC
PLATELET # BLD AUTO: 237 K/UL (ref 150–450)
PMV BLD AUTO: 9.6 FL (ref 9.2–12.9)
POTASSIUM SERPL-SCNC: 4.3 MMOL/L (ref 3.5–5.1)
POTASSIUM SERPL-SCNC: 4.4 MMOL/L (ref 3.5–5.1)
PROT SERPL-MCNC: 7.7 G/DL (ref 6–8.4)
PROT SERPL-MCNC: 7.8 G/DL (ref 6–8.4)
RBC # BLD AUTO: 3.41 M/UL (ref 4.6–6.2)
SODIUM SERPL-SCNC: 133 MMOL/L (ref 136–145)
SODIUM SERPL-SCNC: 135 MMOL/L (ref 136–145)
TRIGL SERPL-MCNC: 105 MG/DL (ref 30–150)
TSH SERPL DL<=0.005 MIU/L-ACNC: 1.31 UIU/ML (ref 0.4–4)
WBC # BLD AUTO: 7.74 K/UL (ref 3.9–12.7)

## 2023-11-01 PROCEDURE — 84443 ASSAY THYROID STIM HORMONE: CPT | Performed by: INTERNAL MEDICINE

## 2023-11-01 PROCEDURE — 85025 COMPLETE CBC W/AUTO DIFF WBC: CPT | Performed by: INTERNAL MEDICINE

## 2023-11-01 PROCEDURE — 80053 COMPREHEN METABOLIC PANEL: CPT | Performed by: REGISTERED NURSE

## 2023-11-01 PROCEDURE — 99213 OFFICE O/P EST LOW 20 MIN: CPT | Mod: PBBFAC | Performed by: STUDENT IN AN ORGANIZED HEALTH CARE EDUCATION/TRAINING PROGRAM

## 2023-11-01 PROCEDURE — 80061 LIPID PANEL: CPT | Performed by: INTERNAL MEDICINE

## 2023-11-01 PROCEDURE — 36415 COLL VENOUS BLD VENIPUNCTURE: CPT | Performed by: INTERNAL MEDICINE

## 2023-11-01 PROCEDURE — 83880 ASSAY OF NATRIURETIC PEPTIDE: CPT | Performed by: INTERNAL MEDICINE

## 2023-11-01 PROCEDURE — 99999 PR PBB SHADOW E&M-EST. PATIENT-LVL III: ICD-10-PCS | Mod: PBBFAC,,, | Performed by: STUDENT IN AN ORGANIZED HEALTH CARE EDUCATION/TRAINING PROGRAM

## 2023-11-01 PROCEDURE — 80053 COMPREHEN METABOLIC PANEL: CPT | Mod: 91 | Performed by: INTERNAL MEDICINE

## 2023-11-01 PROCEDURE — 99024 POSTOP FOLLOW-UP VISIT: CPT | Mod: POP,,, | Performed by: STUDENT IN AN ORGANIZED HEALTH CARE EDUCATION/TRAINING PROGRAM

## 2023-11-01 PROCEDURE — 99999 PR PBB SHADOW E&M-EST. PATIENT-LVL III: CPT | Mod: PBBFAC,,, | Performed by: STUDENT IN AN ORGANIZED HEALTH CARE EDUCATION/TRAINING PROGRAM

## 2023-11-01 PROCEDURE — 83036 HEMOGLOBIN GLYCOSYLATED A1C: CPT | Performed by: INTERNAL MEDICINE

## 2023-11-01 PROCEDURE — 99024 PR POST-OP FOLLOW-UP VISIT: ICD-10-PCS | Mod: POP,,, | Performed by: STUDENT IN AN ORGANIZED HEALTH CARE EDUCATION/TRAINING PROGRAM

## 2023-11-01 RX ORDER — PREDNISOLONE ACETATE 10 MG/ML
1 SUSPENSION/ DROPS OPHTHALMIC 3 TIMES DAILY
Qty: 10 ML | Refills: 0
Start: 2023-11-01 | End: 2023-12-13 | Stop reason: ALTCHOICE

## 2023-11-01 NOTE — PATIENT INSTRUCTIONS
RIGHT EYE   Systane lubricant eye drops 2-4 times per day     LEFT EYE  Prednisolone acetate (pink top) 3 times per day   Systane lubricant eye drops 2-4 times per day

## 2023-11-01 NOTE — TELEPHONE ENCOUNTER
OPAT follow up - LTBI on INH/B6 x9 months. End of care ~ Nov 30th, 2023.      CMP from 11/1/23 reviewed. No transaminates noted. Will schedule end of care.

## 2023-11-01 NOTE — PROGRESS NOTES
Please contact the patient and let them know that sugar is quite high so get this to DM clinician. Lipids could be better so change atorva when it runs out to Rosuva 40 mg and add Ezetimide 10 mg but just take half pill to start and get comp lipids A1C in 4 months

## 2023-11-09 RX ORDER — EZETIMIBE 10 MG/1
10 TABLET ORAL DAILY
COMMUNITY
End: 2023-11-09 | Stop reason: SDUPTHER

## 2023-11-10 ENCOUNTER — TELEPHONE (OUTPATIENT)
Dept: ENDOCRINOLOGY | Facility: CLINIC | Age: 78
End: 2023-11-10
Payer: MEDICARE

## 2023-11-10 RX ORDER — EZETIMIBE 10 MG/1
10 TABLET ORAL DAILY
Qty: 30 TABLET | Refills: 11 | Status: SHIPPED | OUTPATIENT
Start: 2023-11-10

## 2023-11-10 NOTE — TELEPHONE ENCOUNTER
----- Message from Shelby Maldonado MA sent at 11/10/2023  1:34 PM CST -----  Regarding: FW: Sugar vquite high in recent labs.    ----- Message -----  From: Jeovanny Montoya MD  Sent: 11/10/2023  12:51 PM CST  To: [facility] Staff  Subject: FW: Sugar vquite high in recent labs.            Please call patient and confirm insulin regimen.     Please also schedule in sugar clinic soon if he can drop off blood sugar log the day before.  Please also follow up with adelso about his dexcom if he hasn't gotten it yet.     Regimen as of 10/16/2023    Increase to Lantus 38 units nightly  Increase to Humalog 14 units before regular meals (breakfast and dinner)              Snacks - inject 5 units before you eat snacks containing carbs  Increase to Ozempic 1 mg weekly after you finish the 0.5 mg pen           ----- Message -----  From: Tarsha May MA  Sent: 11/9/2023   7:07 PM CST  To: Janeth Walter MD; Jeovanny Montoya MD  Subject: Sugar vquite high in recent labs.                Dr Santamaria asked that we forward this to you as his sugar quite high recently. Pt has been notified of this too. Thank you.    ----- Message -----  From: Blake Santamaria MD  Sent: 11/1/2023  10:34 AM CST  To: Tarsha May MA    Please contact the patient and let them know that sugar is quite high so get this to DM clinician. Lipids could be better so change atorva when it runs out to Rosuva 40 mg and add Ezetimide 10 mg but just take half pill to start and get comp lipids A1C in 4 months

## 2023-11-10 NOTE — TELEPHONE ENCOUNTER
Called pt twice, no answer. Phone straight to , left message per .   Please call patient and confirm insulin regimen.      Please also schedule in sugar clinic soon if he can drop off blood sugar log the day before.  Please also follow up with daelso about his dexcom if he hasn't gotten it yet.      Regimen as of 10/16/2023    Increase to Lantus 38 units nightly  Increase to Humalog 14 units before regular meals (breakfast and dinner)              Snacks - inject 5 units before you eat snacks containing carbs  Increase to Ozempic 1 mg weekly after you finish the 0.5 mg pen

## 2023-11-29 ENCOUNTER — TELEPHONE (OUTPATIENT)
Dept: OPHTHALMOLOGY | Facility: CLINIC | Age: 78
End: 2023-11-29
Payer: MEDICARE

## 2023-11-29 NOTE — TELEPHONE ENCOUNTER
----- Message from Radha Bonilla sent at 11/29/2023  8:43 AM CST -----  Regarding: Requesting Call Back  Patient called in regards to about having light sensitivity in left eye.    Please call back to further assist- [phone]

## 2023-12-04 ENCOUNTER — OFFICE VISIT (OUTPATIENT)
Dept: INFECTIOUS DISEASES | Facility: CLINIC | Age: 78
End: 2023-12-04
Payer: MEDICARE

## 2023-12-04 ENCOUNTER — TELEPHONE (OUTPATIENT)
Dept: INFECTIOUS DISEASES | Facility: CLINIC | Age: 78
End: 2023-12-04

## 2023-12-04 ENCOUNTER — LAB VISIT (OUTPATIENT)
Dept: LAB | Facility: HOSPITAL | Age: 78
End: 2023-12-04
Payer: MEDICARE

## 2023-12-04 VITALS
HEIGHT: 74 IN | DIASTOLIC BLOOD PRESSURE: 72 MMHG | SYSTOLIC BLOOD PRESSURE: 156 MMHG | HEART RATE: 63 BPM | BODY MASS INDEX: 37.04 KG/M2 | TEMPERATURE: 98 F

## 2023-12-04 DIAGNOSIS — R76.12 POSITIVE QUANTIFERON-TB GOLD TEST: ICD-10-CM

## 2023-12-04 DIAGNOSIS — Z22.7 TB LUNG, LATENT: Primary | ICD-10-CM

## 2023-12-04 DIAGNOSIS — Z51.81 MEDICATION MONITORING ENCOUNTER: ICD-10-CM

## 2023-12-04 DIAGNOSIS — Z22.7 TB LUNG, LATENT: ICD-10-CM

## 2023-12-04 LAB
ALBUMIN SERPL BCP-MCNC: 3.2 G/DL (ref 3.5–5.2)
ALP SERPL-CCNC: 56 U/L (ref 55–135)
ALT SERPL W/O P-5'-P-CCNC: 9 U/L (ref 10–44)
ANION GAP SERPL CALC-SCNC: 16 MMOL/L (ref 8–16)
AST SERPL-CCNC: 11 U/L (ref 10–40)
BILIRUB SERPL-MCNC: 0.7 MG/DL (ref 0.1–1)
BUN SERPL-MCNC: 51 MG/DL (ref 8–23)
CALCIUM SERPL-MCNC: 9.5 MG/DL (ref 8.7–10.5)
CHLORIDE SERPL-SCNC: 89 MMOL/L (ref 95–110)
CO2 SERPL-SCNC: 26 MMOL/L (ref 23–29)
CREAT SERPL-MCNC: 7 MG/DL (ref 0.5–1.4)
EST. GFR  (NO RACE VARIABLE): 7.5 ML/MIN/1.73 M^2
GLUCOSE SERPL-MCNC: 516 MG/DL (ref 70–110)
POTASSIUM SERPL-SCNC: 4.4 MMOL/L (ref 3.5–5.1)
PROT SERPL-MCNC: 7.2 G/DL (ref 6–8.4)
SODIUM SERPL-SCNC: 131 MMOL/L (ref 136–145)

## 2023-12-04 PROCEDURE — 99212 OFFICE O/P EST SF 10 MIN: CPT | Mod: S$PBB,,, | Performed by: REGISTERED NURSE

## 2023-12-04 PROCEDURE — 80053 COMPREHEN METABOLIC PANEL: CPT | Performed by: REGISTERED NURSE

## 2023-12-04 PROCEDURE — 99999 PR PBB SHADOW E&M-EST. PATIENT-LVL III: CPT | Mod: PBBFAC,,, | Performed by: REGISTERED NURSE

## 2023-12-04 PROCEDURE — 99999 PR PBB SHADOW E&M-EST. PATIENT-LVL III: ICD-10-PCS | Mod: PBBFAC,,, | Performed by: REGISTERED NURSE

## 2023-12-04 PROCEDURE — 36415 COLL VENOUS BLD VENIPUNCTURE: CPT | Performed by: REGISTERED NURSE

## 2023-12-04 PROCEDURE — 99212 PR OFFICE/OUTPT VISIT, EST, LEVL II, 10-19 MIN: ICD-10-PCS | Mod: S$PBB,,, | Performed by: REGISTERED NURSE

## 2023-12-04 PROCEDURE — 99213 OFFICE O/P EST LOW 20 MIN: CPT | Mod: PBBFAC | Performed by: REGISTERED NURSE

## 2023-12-04 NOTE — TELEPHONE ENCOUNTER
Called pt to discuss critically high blood glucose. Pt denies symptoms of hyperglycemia. States he can recheck blood sugar when he gets home in about an hour. Instructed pt to proceed to ED with any symptoms of confusion, fatigue, blurred vision, excessive thirst, excessive urination, etc. Will call in about an hour to check on pt following repeat sugar.

## 2023-12-04 NOTE — PROGRESS NOTES
Subjective     Patient ID: Vinnie Siegel is a 78 y.o. male.    Chief Complaint: Follow-up    HPI    Mr. Siegel is a 77 yo male with PMH of HTN, HLD, T2DM, CKD5, ESRD on HD, who has been followed per ID for latent TB. Pt had a positive quantiferon gold screen on 3/14/23. Pt denied history of exposure to TB. Denied cough, diarrhea, fatigue, fever, night sweats, weight loss. Chest xray on 3/14/23 was negative for acute findings.     Pt was prescribed INH/vitamin B6 for 9 months. Pt reports compliance. Labs were reviewed ~ monthly, and pt remained without elevated liver enzymes. Today, pt continues to deny TB symptoms, such as cough, unexpected weight loss, lymphadenopathy, fatigue, fever, night sweats. Denies nonhealing wounds.           Review of Systems   Constitutional:  Negative for chills, diaphoresis, fatigue and fever.   HENT: Negative.     Eyes: Negative.    Respiratory:  Negative for cough and shortness of breath.    Cardiovascular:  Negative for chest pain.   Gastrointestinal:  Negative for diarrhea, nausea and vomiting.   Musculoskeletal:  Positive for gait problem.   Neurological:  Negative for dizziness.   Psychiatric/Behavioral:  Negative for agitation and confusion.           Objective     Physical Exam  Vitals reviewed.   Constitutional:       General: He is not in acute distress.     Appearance: Normal appearance. He is not ill-appearing.   HENT:      Head: Normocephalic.      Nose: Nose normal.      Mouth/Throat:      Mouth: Mucous membranes are moist.   Eyes:      Conjunctiva/sclera: Conjunctivae normal.      Pupils: Pupils are equal, round, and reactive to light.   Pulmonary:      Effort: Pulmonary effort is normal. No respiratory distress.   Abdominal:      General: Abdomen is flat. There is no distension.      Palpations: Abdomen is soft.   Musculoskeletal:      Cervical back: Normal range of motion.   Skin:     General: Skin is warm.      Coloration: Skin is not jaundiced.      Findings: No  erythema or rash.   Neurological:      General: No focal deficit present.      Mental Status: He is alert and oriented to person, place, and time.      Motor: No weakness.      Gait: Gait normal.   Psychiatric:         Mood and Affect: Mood normal.         Behavior: Behavior normal.            Assessment and Plan     1. TB lung, latent    2. Medication monitoring encounter    3. Positive QuantiFERON-TB Gold test        Okay to discontinue therapy (INH/vitamin b6) as pt has completed 9 months of latent TB treatment. Instructed pt to seek care with any TB symptoms, such as unexplained fever, weight loss, coughing, lymphadenopathy, etc. Pt verbalized understanding.

## 2023-12-05 ENCOUNTER — TELEPHONE (OUTPATIENT)
Dept: INFECTIOUS DISEASES | Facility: CLINIC | Age: 78
End: 2023-12-05
Payer: MEDICARE

## 2023-12-05 NOTE — TELEPHONE ENCOUNTER
Followed up with pt regarding blood sugar. States it increased to 576. Denied going to ED. States he believes it was due to him drinking soft drinks. States he is at dialysis now. Does not believe the nurse took his sugar. Instructed him to ask the nurse to check his sugar and if it remains elevated our recommendation is that he seeks care. Pt verbalized understanding.     Call received, pt reports his blood sugars were checked x3 times today, sugars were 289, 313, 279. Instructed pt to follow up with diabetic provider/PCP. Pt states he will call and make appt.

## 2023-12-12 NOTE — PROGRESS NOTES
Subjective:       Patient ID: Vinnie Siegel is a 78 y.o. male.    Chief Complaint: Concerns About Ocular Health     HPI    DLS: 11/1/2023     S/P phaco w/IOL OD- 9/21/2023  S/P phaco w/IOL OS- 8/10/2023    77 y/o male present to clinic for 1 month iritis check. Pt stated about   7/10 days ago he was experiencing eye pain of LT eye but had subsided once   he started with PF. Today's visit, he states iritis has resolved. He d/c   Timolol drops around 9/2023. He denies eye pain and light sensitivity. He   had pain for 1 day.     Meds;  T 1/2 BID--- d/c'd before the surgery   PF TID OS --> still taking  Systane  --> still taking       Last edited by Yvonne Nielson MD on 12/13/2023  9:29 AM.              Assessment & Plan   Primary open angle glaucoma (POAG) of right eye, severe stage    Primary open angle glaucoma (POAG) of left eye, indeterminate stage    Pseudophakia of both eyes    POW#12 Complex CE/IOL OS (white mature cataract, malyugan ring, trypan blue)  IOL  DIBOO +21.00 target -0.08    IRITIS resolved --> stop PF       POAG severe OD // indeterminate OS  -Fhx (), Steroids (),Trauma()  -Drops: Timolol twice daily OU --> holding   -Drop intolerance/contraindication: Latanoprost   -Laser:  -Surgeries: CE IOL OU  -CCT: 564 OD // 566 OS  -Gonio: SS OD // SS/TM OS  Tm 23 // 28 2/23 HVF 24-2 SAD and dense IAD w fixation VFI 56% OD // unable OS(similar to 2012)  2/23 RNFL dec throughout OD // unable OS    OS RNFL and HVF full 2019    Disc photos 2017        Pseudophakia OD  Dr. Zoran Smallwood , monitor       PLAN  Systane 2-4 x OU  Will determine what glaucoma medications after next visit    3 month HVF 24-2, OCT-RNFL        Yvonne Nielson M.D., M.S.  Department of Ophthalmology   Division of Glaucoma Surgery  [facility]

## 2023-12-13 ENCOUNTER — OFFICE VISIT (OUTPATIENT)
Dept: OPHTHALMOLOGY | Facility: CLINIC | Age: 78
End: 2023-12-13
Payer: MEDICARE

## 2023-12-13 DIAGNOSIS — H40.1113 PRIMARY OPEN ANGLE GLAUCOMA (POAG) OF RIGHT EYE, SEVERE STAGE: Primary | ICD-10-CM

## 2023-12-13 DIAGNOSIS — Z96.1 PSEUDOPHAKIA OF BOTH EYES: ICD-10-CM

## 2023-12-13 DIAGNOSIS — H40.1124 PRIMARY OPEN ANGLE GLAUCOMA (POAG) OF LEFT EYE, INDETERMINATE STAGE: ICD-10-CM

## 2023-12-13 PROCEDURE — 99024 PR POST-OP FOLLOW-UP VISIT: ICD-10-PCS | Mod: POP,,, | Performed by: STUDENT IN AN ORGANIZED HEALTH CARE EDUCATION/TRAINING PROGRAM

## 2023-12-13 PROCEDURE — 99999 PR PBB SHADOW E&M-EST. PATIENT-LVL III: ICD-10-PCS | Mod: PBBFAC,,, | Performed by: STUDENT IN AN ORGANIZED HEALTH CARE EDUCATION/TRAINING PROGRAM

## 2023-12-13 PROCEDURE — 99213 OFFICE O/P EST LOW 20 MIN: CPT | Mod: PBBFAC | Performed by: STUDENT IN AN ORGANIZED HEALTH CARE EDUCATION/TRAINING PROGRAM

## 2023-12-13 PROCEDURE — 99999 PR PBB SHADOW E&M-EST. PATIENT-LVL III: CPT | Mod: PBBFAC,,, | Performed by: STUDENT IN AN ORGANIZED HEALTH CARE EDUCATION/TRAINING PROGRAM

## 2023-12-13 PROCEDURE — 99024 POSTOP FOLLOW-UP VISIT: CPT | Mod: POP,,, | Performed by: STUDENT IN AN ORGANIZED HEALTH CARE EDUCATION/TRAINING PROGRAM

## 2024-01-11 DIAGNOSIS — Z00.00 ENCOUNTER FOR MEDICARE ANNUAL WELLNESS EXAM: ICD-10-CM

## 2024-01-19 ENCOUNTER — HOSPITAL ENCOUNTER (OUTPATIENT)
Dept: RADIOLOGY | Facility: HOSPITAL | Age: 79
Discharge: HOME | End: 2024-01-19
Attending: INTERNAL MEDICINE
Payer: MEDICARE

## 2024-01-19 DIAGNOSIS — Z11.7 ENCOUNTER FOR TESTING FOR LATENT TUBERCULOSIS: Primary | ICD-10-CM

## 2024-01-19 DIAGNOSIS — Z11.7 ENCOUNTER FOR TESTING FOR LATENT TUBERCULOSIS: ICD-10-CM

## 2024-01-19 PROCEDURE — 71046 X-RAY EXAM CHEST 2 VIEWS: CPT | Mod: 26,,, | Performed by: RADIOLOGY

## 2024-01-19 PROCEDURE — 71046 X-RAY EXAM CHEST 2 VIEWS: CPT | Mod: TC

## 2024-02-14 ENCOUNTER — TELEPHONE (OUTPATIENT)
Dept: PRIMARY CARE CLINIC | Facility: CLINIC | Age: 79
End: 2024-02-14
Payer: MEDICARE

## 2024-02-14 NOTE — TELEPHONE ENCOUNTER
----- Message from Génesis Ross Mayda sent at 2/14/2024 11:52 AM CST -----  Regarding: call back  Type: Patient Call Back    Who called: pt    What is the request in detail: requests call back to discuss a request for medical notes submitted by his insurance rep     Can the clinic reply by [facility]?no    Would the patient rather a call back or a response via [facility]? call    Best call back number: [phone]    Additional Information:

## 2024-02-14 NOTE — TELEPHONE ENCOUNTER
Spoke with pt, pt states that his insurance company sent us an email to fill out paperwork for him. I informed the pt that we do not have a direct company email where information can be sent to. I did provide the fax number for the pt to give to his insurance company.    Pt verbalized understanding and states he will inform his insurance of our fax number.

## 2024-02-15 ENCOUNTER — TELEPHONE (OUTPATIENT)
Dept: PRIMARY CARE CLINIC | Facility: CLINIC | Age: 79
End: 2024-02-15
Payer: MEDICARE

## 2024-02-15 NOTE — TELEPHONE ENCOUNTER
Informed Raymundo that pt is diagnosis with type 2 diabetes mellitus with chronic kidney diease on chronic dialysis with long term current use of insulin.      Also contacted pt to let him know his forms have been received and that staff spoke to Raymundo about his paper work

## 2024-02-15 NOTE — TELEPHONE ENCOUNTER
----- Message from Triny Mallory sent at 2/15/2024 11:07 AM CST -----  Regarding: paperwork  Name of Who is Calling: Vinnie           What is the request in detail: Patient is requesting a call back to confirm if [employer] faxed over some paperwork they said they needed to fax over.            Can the clinic reply by [facility]: No           What Number to Call Back if not in [facility]: [phone]

## 2024-02-15 NOTE — TELEPHONE ENCOUNTER
----- Message from Chelsea Haywood sent at 2/15/2024 12:15 PM CST -----  Regarding: Call back  Type: Patient Call Back    Who called: Raymundo Ruano [phone]    What is the request in detail: raymundo is calling to verify if fax was received, she states it is urgent or p/t will be dropped from insurance.

## 2024-02-16 NOTE — PROGRESS NOTES
Subjective:      Patient ID: Vinnie Siegel is a 78 y.o. male.    Chief Complaint:  Diabetes    History of Present Illness  Vinnie Siegel is here for follow up of DM.  Previously seen by Dr Montoya.  Last seen 10/2023.  This is their first visit with me.      With regards to diabetes:    Diagnosed: ~2018  DE: 7/2023  Known complications:  DKA Denies  RN Denies  Eye Exam: 12/2023  PN Denies  Podiatry: None  Nephropathy +  HD T/Th/Sa  CAD +  Denies history of pancreatitis & personal/family history of medullary thyroid cancer.     Diet/Exercise:  Eats 2 meals a day. Skips B.   Snacks : Yes.   Drinks : sprite, root beer, water.   Exercise : None.   Recent illness, injury, steroids: Denies     Current Regimen:  Lantus 45units nightly  Humalog 13 if glucose < 150 and 15 units if glucose > 150 before meals     A friend administers his insulin - no specific reason why - patient can administer it    Other medications tried:  Metformin - stopped for CKD   Trulicity - stopped due to cost , denied having any side effects  Ozempic 1 mg weekly - not affordable in the [place] - off since 2023    Glucose Monitor: Dexcom G7 through [employer] but he has since changed insurance and not sure if approved with that supply company   0 times a day testing  Log reviewed: NONE  Reports glucometer broke.     Hypoglycemia:  Denies signs/symptoms.   Knows how to correct with 15 grams of carbs- juice, coke, or a peppermint.       Diabetes Management Status    Hemoglobin A1C   Date Value Ref Range Status   11/01/2023 10.9 (H) 4.0 - 5.6 % Final     Comment:     ADA Screening Guidelines:  5.7-6.4%  Consistent with prediabetes  >or=6.5%  Consistent with diabetes    High levels of fetal hemoglobin interfere with the HbA1C  assay. Heterozygous hemoglobin variants (HbS, HgC, etc)do  not significantly interfere with this assay.   However, presence of multiple variants may affect accuracy.     07/03/2023 8.9 (H) 4.0 - 5.6 % Final     Comment:     ADA  "Screening Guidelines:  5.7-6.4%  Consistent with prediabetes  >or=6.5%  Consistent with diabetes    High levels of fetal hemoglobin interfere with the HbA1C  assay. Heterozygous hemoglobin variants (HbS, HgC, etc)do  not significantly interfere with this assay.   However, presence of multiple variants may affect accuracy.     06/26/2023 9.1 (H) 4.0 - 5.6 % Final     Comment:     ADA Screening Guidelines:  5.7-6.4%  Consistent with prediabetes  >or=6.5%  Consistent with diabetes    High levels of fetal hemoglobin interfere with the HbA1C  assay. Heterozygous hemoglobin variants (HbS, HgC, etc)do  not significantly interfere with this assay.   However, presence of multiple variants may affect accuracy.         Statin: Taking  ACE/ARB: Not taking  Screening or Prevention Patient's value Goal Complete/Controlled?   HgA1C Testing and Control   Lab Results   Component Value Date    HGBA1C 10.9 (H) 11/01/2023      Annually/Less than 8% No   Lipid profile : 11/01/2023 Annually Yes   LDL control Lab Results   Component Value Date    LDLCALC 97.0 11/01/2023    Annually/Less than 100 mg/dl  Yes   Nephropathy screening Lab Results   Component Value Date    LABMICR 155.0 06/18/2019     Lab Results   Component Value Date    PROTEINUA 2+ (A) 11/23/2022    Annually No   Blood pressure BP Readings from Last 1 Encounters:   02/21/24 (!) 147/72    Less than 140/90 No   Dilated retinal exam : 03/29/2023 Annually Yes   Foot exam   Most Recent Foot Exam Date: Not Found Annually Yes       Review of Systems  As above     Objective:   Physical Exam  Vitals reviewed.   Cardiovascular:      Rate and Rhythm: Normal rate.      Comments: No edema present  Pulmonary:      Effort: Pulmonary effort is normal.   Abdominal:      Palpations: Abdomen is soft.       Injection sites are without edema or erythema. No lipo hypertropthy or atrophy.    Visit Vitals  BP (!) 147/72   Pulse 86   Ht 6' 2" (1.88 m)   Wt 129.5 kg (285 lb 7.9 oz)   SpO2 99%   BMI " 36.66 kg/m²       Body mass index is 36.66 kg/m².    Lab Review:   Lab Results   Component Value Date    HGBA1C 10.9 (H) 11/01/2023    HGBA1C 8.9 (H) 07/03/2023    HGBA1C 9.1 (H) 06/26/2023       Lab Results   Component Value Date    CHOL 176 11/01/2023    HDL 58 11/01/2023    LDLCALC 97.0 11/01/2023    TRIG 105 11/01/2023    CHOLHDL 33.0 11/01/2023     Lab Results   Component Value Date     (L) 12/04/2023    K 4.4 12/04/2023    CL 89 (L) 12/04/2023    CO2 26 12/04/2023     (HH) 12/04/2023    BUN 51 (H) 12/04/2023    CREATININE 7.0 (H) 12/04/2023    CALCIUM 9.5 12/04/2023    PROT 7.2 12/04/2023    ALBUMIN 3.2 (L) 12/04/2023    BILITOT 0.7 12/04/2023    ALKPHOS 56 12/04/2023    AST 11 12/04/2023    ALT 9 (L) 12/04/2023    ANIONGAP 16 12/04/2023    ESTGFRAFRICA 8.1 (A) 09/21/2021    EGFRNONAA 7.0 (A) 09/21/2021    TSH 1.311 11/01/2023     Vit D, 25-Hydroxy   Date Value Ref Range Status   09/21/2021 17 (L) 30 - 96 ng/mL Final     Comment:     Vitamin D deficiency.........<10 ng/mL                              Vitamin D insufficiency......10-29 ng/mL       Vitamin D sufficiency........> or equal to 30 ng/mL  Vitamin D toxicity............>100 ng/mL       Assessment and Plan     1. Type 2 diabetes mellitus with chronic kidney disease on chronic dialysis, with long-term current use of insulin  blood-glucose meter kit    lancets Misc    blood sugar diagnostic Strp    Ambulatory referral/consult to Pharmacy Assistance      2. Type 2 diabetes mellitus with hyperglycemia, without long-term current use of insulin  insulin (LANTUS SOLOSTAR U-100 INSULIN) glargine 100 units/mL SubQ pen    insulin lispro (HUMALOG KWIKPEN INSULIN) 100 unit/mL pen      3. ESRD on hemodialysis        4. Secondary hyperparathyroidism of renal origin        5. Morbid (severe) obesity due to excess calories            Type 2 diabetes mellitus with chronic kidney disease on chronic dialysis, with long-term current use of insulin  -- Close  follow up.  -- A1c goal <8%.  -- Medications discussed:  MFM - ESRD  GLP1-DPP4 - costly  VEGA   SGLT2 - ESRD  Insulin   -- Reviewed logs/CGM:  Not checking glucose due to glucometer breaking - ordered a new one.  Patient educated on need for SMBG checks as backup even if he has a CGM.  Encouraged to resume SMBG checks until [place] for Dexcom training.  -- Per patient, did not qualify for PAP 2023. Will resubmit for 2024 as patient would benefit from restarting GLP1.  -- Medication Changes: insufficient data to make changes  Lantus 45units nightly  Humalog 15 units before meals   -- Reviewed goals of therapy are to get the best control we can without hypoglycemia.  -- Reviewed patient's current insulin regimen. Clarified proper insulin dose and timing in relation to meals, etc. Insulin injection sites and proper rotation instructed.    -- Advised frequent self blood glucose monitoring.  Patient encouraged to document glucose results and bring them to every clinic visit.  -- Hypoglycemia precautions discussed. Instructed on precautions before driving.    -- Call for Bg repeatedly < 90 or > 180.   -- Close adherence to lifestyle changes recommended.   -- Periodic follow ups for eye evaluations, foot care and dental care suggested.    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    Secondary hyperparathyroidism of renal origin  -- Continue following with Nephrology.    Morbid (severe) obesity due to excess calories  -- Encouraged dietary and lifestyle modifications.  -- Emphasized weight loss goals.      Follow up in about 4 weeks (around 3/20/2024).    Visit today included increased complexity associated with the care of the problems addressed and managing the longitudinal care of the patient due to the serious and/or complex managed problems.

## 2024-02-21 ENCOUNTER — OFFICE VISIT (OUTPATIENT)
Dept: ENDOCRINOLOGY | Facility: CLINIC | Age: 79
End: 2024-02-21
Payer: MEDICARE

## 2024-02-21 ENCOUNTER — TELEPHONE (OUTPATIENT)
Dept: PHARMACY | Facility: CLINIC | Age: 79
End: 2024-02-21
Payer: MEDICARE

## 2024-02-21 VITALS
SYSTOLIC BLOOD PRESSURE: 147 MMHG | WEIGHT: 285.5 LBS | HEART RATE: 86 BPM | HEIGHT: 74 IN | BODY MASS INDEX: 36.64 KG/M2 | OXYGEN SATURATION: 99 % | DIASTOLIC BLOOD PRESSURE: 72 MMHG

## 2024-02-21 DIAGNOSIS — N18.6 TYPE 2 DIABETES MELLITUS WITH CHRONIC KIDNEY DISEASE ON CHRONIC DIALYSIS, WITH LONG-TERM CURRENT USE OF INSULIN: Primary | ICD-10-CM

## 2024-02-21 DIAGNOSIS — N18.6 ESRD ON HEMODIALYSIS: ICD-10-CM

## 2024-02-21 DIAGNOSIS — E66.01 MORBID (SEVERE) OBESITY DUE TO EXCESS CALORIES: ICD-10-CM

## 2024-02-21 DIAGNOSIS — N25.81 SECONDARY HYPERPARATHYROIDISM OF RENAL ORIGIN: ICD-10-CM

## 2024-02-21 DIAGNOSIS — E11.65 TYPE 2 DIABETES MELLITUS WITH HYPERGLYCEMIA, WITHOUT LONG-TERM CURRENT USE OF INSULIN: ICD-10-CM

## 2024-02-21 DIAGNOSIS — Z99.2 TYPE 2 DIABETES MELLITUS WITH CHRONIC KIDNEY DISEASE ON CHRONIC DIALYSIS, WITH LONG-TERM CURRENT USE OF INSULIN: Primary | ICD-10-CM

## 2024-02-21 DIAGNOSIS — E11.22 TYPE 2 DIABETES MELLITUS WITH CHRONIC KIDNEY DISEASE ON CHRONIC DIALYSIS, WITH LONG-TERM CURRENT USE OF INSULIN: Primary | ICD-10-CM

## 2024-02-21 DIAGNOSIS — Z99.2 ESRD ON HEMODIALYSIS: ICD-10-CM

## 2024-02-21 DIAGNOSIS — Z79.4 TYPE 2 DIABETES MELLITUS WITH CHRONIC KIDNEY DISEASE ON CHRONIC DIALYSIS, WITH LONG-TERM CURRENT USE OF INSULIN: Primary | ICD-10-CM

## 2024-02-21 PROCEDURE — 3077F SYST BP >= 140 MM HG: CPT | Mod: CPTII,S$GLB,, | Performed by: NURSE PRACTITIONER

## 2024-02-21 PROCEDURE — G2211 COMPLEX E/M VISIT ADD ON: HCPCS | Mod: S$GLB,,, | Performed by: NURSE PRACTITIONER

## 2024-02-21 PROCEDURE — 1159F MED LIST DOCD IN RCRD: CPT | Mod: CPTII,S$GLB,, | Performed by: NURSE PRACTITIONER

## 2024-02-21 PROCEDURE — 1126F AMNT PAIN NOTED NONE PRSNT: CPT | Mod: CPTII,S$GLB,, | Performed by: NURSE PRACTITIONER

## 2024-02-21 PROCEDURE — 99214 OFFICE O/P EST MOD 30 MIN: CPT | Mod: S$GLB,,, | Performed by: NURSE PRACTITIONER

## 2024-02-21 PROCEDURE — 1160F RVW MEDS BY RX/DR IN RCRD: CPT | Mod: CPTII,S$GLB,, | Performed by: NURSE PRACTITIONER

## 2024-02-21 PROCEDURE — 99999 PR PBB SHADOW E&M-EST. PATIENT-LVL V: CPT | Mod: PBBFAC,,, | Performed by: NURSE PRACTITIONER

## 2024-02-21 PROCEDURE — 3288F FALL RISK ASSESSMENT DOCD: CPT | Mod: CPTII,S$GLB,, | Performed by: NURSE PRACTITIONER

## 2024-02-21 PROCEDURE — 1100F PTFALLS ASSESS-DOCD GE2>/YR: CPT | Mod: CPTII,S$GLB,, | Performed by: NURSE PRACTITIONER

## 2024-02-21 PROCEDURE — 3078F DIAST BP <80 MM HG: CPT | Mod: CPTII,S$GLB,, | Performed by: NURSE PRACTITIONER

## 2024-02-21 RX ORDER — HYDROCODONE BITARTRATE AND ACETAMINOPHEN 7.5; 325 MG/1; MG/1
1 TABLET ORAL
COMMUNITY
Start: 2024-02-16

## 2024-02-21 RX ORDER — LANCETS
EACH MISCELLANEOUS
Qty: 450 EACH | Refills: 3 | Status: SHIPPED | OUTPATIENT
Start: 2024-02-21

## 2024-02-21 RX ORDER — INSULIN PUMP SYRINGE, 3 ML
EACH MISCELLANEOUS
Qty: 1 EACH | Refills: 0 | Status: SHIPPED | OUTPATIENT
Start: 2024-02-21

## 2024-02-21 RX ORDER — INSULIN GLARGINE 100 [IU]/ML
45 INJECTION, SOLUTION SUBCUTANEOUS NIGHTLY
Qty: 30 ML | Refills: 3 | Status: SHIPPED | OUTPATIENT
Start: 2024-02-21 | End: 2024-04-03

## 2024-02-21 RX ORDER — TRAZODONE HYDROCHLORIDE 100 MG/1
100 TABLET ORAL NIGHTLY
COMMUNITY
Start: 2023-11-02

## 2024-02-21 RX ORDER — INSULIN LISPRO 100 [IU]/ML
15 INJECTION, SOLUTION INTRAVENOUS; SUBCUTANEOUS
Qty: 30 ML | Refills: 3 | Status: SHIPPED | OUTPATIENT
Start: 2024-02-21 | End: 2024-02-26 | Stop reason: SDUPTHER

## 2024-02-21 NOTE — LETTER
February 29, 2024    Vinnie Siegel  [address] [place] [address]             [facility] Pharmacy Assistance  [address]  [place] [address]  Fax: [phone] Dear Vinnie Siegel         My name is Analy Michaels and I work with the Pharmacy Patient Assistance Program here at [facility].  We received a referral from your provider, inquiring about assistance with your medication. I tried to contact you to discuss your assistance options, sorry I missed you. If you are still in need of assistance, please reach out to my phone number listed below.       Please be advised enrollment in The Pharmacy Patient Assistance Program will require the following documentation.             Proof of household income (such as tax return, social security award letter, pension/[employer] statements)   Copy of Insurance (front and back) Cards   Printout from your pharmacy or insurance that shows how much money you have spent on prescription -pays this (2024) year   Signed and dated HIPAA /Patient Information Forms             Thank you for choosing [facility] for your healthcare needs      Sincerely  Analy Brando   Pharmacy Patient Assistance  [address]  [address]  [place] [address]  Phone: [phone]  Fax: [phone]  Email: [email or web address]

## 2024-02-21 NOTE — LETTER
February 21, 2024    Vinnie Siegel  [address] [address]             [facility]  [address]  [facility] [address]  Fax: [phone] Dear Mr. Vinnie Siegel     It was a pleasure speaking with you. To follow up on our conversation on 2/21/2024, the Pharmacy Patient Assistance Program needs more information from you before we can submit your Ozempic application to the [facility] [facility] Program. Please return the following documents to the Pharmacy Patient Assistance office ASAP.  Fax requested documentation to [phone] or email [email or web address]. Documentation can also be mailed to address listed below       Proof of household Income( such as social security statement, 1099 form, pension statement or 3 consecutive pay stubs  Copy of all Insurance cards( front and back)  Signed and dated HIPAA /Patient Information Forms          Whats Next:     Once I receive your documentation and authorization from your Provider, your application will be submitted to the [facility] Assistance Program for review. Please be advised it will take 2 to 4 weeks for your application to be processed so you may have to purchase a month's supply of medication from your pharmacy to hold you over during the waiting period. You will be notified of approval or denial by The Program(mail) or myself.      If you have any questions or concerns, please give me a call         Sincerely   Analy Michaels   Pharmacy Patient Assistance  [address] [facility] [address]  [place] [address]  Phone: [phone]  Fax: [phone]  Email: [email or web address]

## 2024-02-21 NOTE — LETTER
March 8, 2024    Vinnie Siegel  [address] [facility] [address]             [facility]  [address]  [facility] [address]  Fax: [phone] Dear Mr. Vinnie Siegel     It was a pleasure speaking with you. To follow up on our conversation on 3/8/2024, the Pharmacy Patient Assistance Program needs more information from you before we can submit your Ozempic application to the [facility] Program. Please return the following documents to the Pharmacy Patient Assistance office ASAP.  Fax requested documentation to [phone] or email [email or web address]. Documentation can also be mailed to address listed below       Proof of household Income( such as social security statement, 1099 form, pension statement or 3 consecutive pay stubs  Copy of all Insurance cards( front and back)  Printout from your Insurance or Pharmacy that shows how much you have spent on prescriptions this year  Signed and dated HIPAA /Patient Information Forms       Whats Next:     Once I receive your documentation and authorization from your Provider, your application will be submitted to the [facility]pected Assistance Program for review. Please be advised it will take 2 to 4 weeks for your application to be processed so you may have to purchase a month's supply of medication from your pharmacy to hold you over during the waiting period. You will be notified of approval or denial by The Program(mail) or myself.      If you have any questions or concerns, please give me a call         Sincerely   Analy Michaels   Pharmacy Patient Assistance  [address] [facility] [address]  [place] [address]  Phone: [phone]  Fax: [phone]  Email: [email or web address]

## 2024-02-21 NOTE — ASSESSMENT & PLAN NOTE
-- Close follow up.  -- A1c goal <8%.  -- Medications discussed:  MFM - ESRD  GLP1-DPP4 - costly  VEGA   SGLT2 - ESRD  Insulin   -- Reviewed logs/CGM:  Not checking glucose due to glucometer breaking - ordered a new one.  Patient educated on need for SMBG checks as backup even if he has a CGM.  Encouraged to resume SMBG checks until DE for Dexcom training.  -- Per patient, did not qualify for PAP 2023. Will resubmit for 2024 as patient would benefit from restarting GLP1.  -- Medication Changes: insufficient data to make changes  Lantus 45units nightly  Humalog 15 units before meals   -- Reviewed goals of therapy are to get the best control we can without hypoglycemia.  -- Reviewed patient's current insulin regimen. Clarified proper insulin dose and timing in relation to meals, etc. Insulin injection sites and proper rotation instructed.    -- Advised frequent self blood glucose monitoring.  Patient encouraged to document glucose results and bring them to every clinic visit.  -- Hypoglycemia precautions discussed. Instructed on precautions before driving.    -- Call for Bg repeatedly < 90 or > 180.   -- Close adherence to lifestyle changes recommended.   -- Periodic follow ups for eye evaluations, foot care and dental care suggested.

## 2024-02-21 NOTE — LETTER
March 7, 2024    Vinnie Siegel  [address] [facility] [address]             [facility]  [address]  [place] [address]  Fax: [phone]   Dear Mr. Siegel    My Name is Analy Brando. I am a Pharmacy Technician reaching out on behalf of [facility]s Pharmacy Patient Assistance Team after receiving a referral from your provider, Dr. Montoya, inquiring about assistance with your medications on 10/16/2023. We have tried to reach you via phone, MyChart, and a mailed letter to begin the pharmacy assistance process. Unfortunately, weve been unable to reach you. Please note that unless we hear back from you this will be our final attempt to reach you regarding your assistance.    Please reach out to my phone number below if you are still in need of assistance with your medications. We look forward to hearing from you soon!    Thank you for choosing [facility] for your healthcare needs.    Analy Michaels   Phone: [phone]  Fax: [phone]  Email: [email or web address]

## 2024-02-21 NOTE — TELEPHONE ENCOUNTER
I have spoken with Vinnie Siegel and informed him of the [employer] application process for Ozempic and what's required to apply.  Vinnie Siegel will provide the following documents: Proof of household Income( such as social security statement, 1099 form, pension statement or 3 consecutive pay stubs, Copy of all Insurance cards( front and back), and Signed and dated HIPAA /Patient Information Forms        I will follow up with the patient in 5 business days.

## 2024-02-23 DIAGNOSIS — I10 ESSENTIAL HYPERTENSION: ICD-10-CM

## 2024-02-23 RX ORDER — ATORVASTATIN CALCIUM 40 MG/1
40 TABLET, FILM COATED ORAL
Qty: 90 TABLET | Refills: 1 | OUTPATIENT
Start: 2024-02-23

## 2024-02-26 DIAGNOSIS — E11.65 TYPE 2 DIABETES MELLITUS WITH HYPERGLYCEMIA, WITHOUT LONG-TERM CURRENT USE OF INSULIN: ICD-10-CM

## 2024-02-26 RX ORDER — INSULIN LISPRO 100 [IU]/ML
15 INJECTION, SOLUTION INTRAVENOUS; SUBCUTANEOUS
Qty: 30 ML | Refills: 3 | Status: SHIPPED | OUTPATIENT
Start: 2024-02-26 | End: 2024-04-03

## 2024-02-27 RX ORDER — NIFEDIPINE 90 MG/1
90 TABLET, FILM COATED, EXTENDED RELEASE ORAL
Qty: 90 TABLET | Refills: 1 | Status: SHIPPED | OUTPATIENT
Start: 2024-02-27

## 2024-02-27 RX ORDER — TORSEMIDE 20 MG/1
20 TABLET ORAL 2 TIMES DAILY
Qty: 180 TABLET | Refills: 1 | Status: SHIPPED | OUTPATIENT
Start: 2024-02-27

## 2024-02-29 NOTE — TELEPHONE ENCOUNTER
A 2nd attempt has been made to establish contact with Vinnie Siegel  via MY CHART. The final contact attempt will be made in 5 business days

## 2024-02-29 NOTE — TELEPHONE ENCOUNTER
Vinnie Siegel was scheduled on 02/21/2024 to provide the following documentation to initiate the application process.           Proof of household Income( such as social security statement, 1099 form, pension statement or 3 consecutive pay stubs  Copy of all Insurance cards( front and back)  Signed and dated HIPAA /Patient Information Forms          As of today, the documents have not been received.  Please instruct the patient to email requested documentation to [email or web address]  or reach out to Analy Michaels [phone] with any follow-up questions.       Pharmacy Patient Assistance  [address] [place] [address]  [id number] [place] [id number]  Fax: [phone]  Email: [email or web address]

## 2024-03-01 ENCOUNTER — CLINICAL SUPPORT (OUTPATIENT)
Dept: DIABETES | Facility: CLINIC | Age: 79
End: 2024-03-01
Payer: MEDICARE

## 2024-03-01 VITALS — WEIGHT: 285.5 LBS | BODY MASS INDEX: 36.66 KG/M2

## 2024-03-01 DIAGNOSIS — E11.22 TYPE 2 DIABETES MELLITUS WITH CHRONIC KIDNEY DISEASE ON CHRONIC DIALYSIS, WITH LONG-TERM CURRENT USE OF INSULIN: Primary | ICD-10-CM

## 2024-03-01 DIAGNOSIS — Z79.4 TYPE 2 DIABETES MELLITUS WITH CHRONIC KIDNEY DISEASE ON CHRONIC DIALYSIS, WITH LONG-TERM CURRENT USE OF INSULIN: Primary | ICD-10-CM

## 2024-03-01 DIAGNOSIS — N18.6 TYPE 2 DIABETES MELLITUS WITH CHRONIC KIDNEY DISEASE ON CHRONIC DIALYSIS, WITH LONG-TERM CURRENT USE OF INSULIN: Primary | ICD-10-CM

## 2024-03-01 DIAGNOSIS — E11.65 TYPE 2 DIABETES MELLITUS WITH HYPERGLYCEMIA, WITHOUT LONG-TERM CURRENT USE OF INSULIN: ICD-10-CM

## 2024-03-01 DIAGNOSIS — Z99.2 TYPE 2 DIABETES MELLITUS WITH CHRONIC KIDNEY DISEASE ON CHRONIC DIALYSIS, WITH LONG-TERM CURRENT USE OF INSULIN: Primary | ICD-10-CM

## 2024-03-01 PROCEDURE — 99999 PR PBB SHADOW E&M-EST. PATIENT-LVL I: CPT | Mod: PBBFAC,,,

## 2024-03-01 PROCEDURE — G0108 DIAB MANAGE TRN  PER INDIV: HCPCS | Mod: S$GLB,,, | Performed by: INTERNAL MEDICINE

## 2024-03-01 NOTE — PROGRESS NOTES
Diabetes Care Specialist Progress Note  Author: Ny Hameed RN, CDE  Date: 3/1/2024    Program Intake  Reason for Diabetes Program Visit:: Initial Diabetes Assessment  Type of Intervention:: Individual  Individual: Device Training (New Dexcom G7 start)  Education: Self-Management Skill Review  Current diabetes risk level:: high  In the last 12 months, have you:: none  Continuous Glucose Monitoring  Patient has CGM: No    Lab Results   Component Value Date    HGBA1C 10.9 (H) 11/01/2023       Clinical    Weight: 129.5 kg (285 lb 7.9 oz)       Body mass index is 36.66 kg/m².    Patient Health Rating  Compared to other people your age, how would you rate your health?: Poor    Problem Review  Reviewed Problem List with Patient: yes  Comorbidities: End Stage Renal Disease, Cardiovascular Disease, Hypertension (Dialysis)  Reviewed health maintenance: yes    Clinical Assessment  Current Diabetes Treatment: Insulin (Lantus 45 units at night; Humalog 15 units ac meals)  Have you ever experienced hypoglycemia (low blood sugar)?: no  Have you ever experienced hyperglycemia (high blood sugar)?: yes  In the last month, how often have you experienced high blood sugar?: more than once a day  Are you able to tell when your blood sugar is high?: No (comment)  Have you ever been hospitalized because your blood sugar was high?: no    Medication Information  How do you obtain your medications?: Family picks up  How many days a week do you miss your medications?: Never  Do you sometimes have difficulty refilling your medications?: Yes (see comment) (has applied for pharm assistance for Ozempic)  Medication adherence impacting ability to self-manage diabetes?: No      Nutritional Status  Diet: Regular  Meal Plan 24 Hour Recall: Breakfast, Lunch, Dinner, Snack  Meal Plan 24 Hour Recall - Breakfast: skips  Meal Plan 24 Hour Recall - Lunch: tuna, veggies  Meal Plan 24 Hour Recall - Dinner: chicken, veggies, eats rice and potatoes.   portioned by helper  Meal Plan 24 Hour Recall - Snack: bag of chips, drinking regular sodas and water  Change in appetite?: No  Recent Changes in Weight: No Recent Weight Change  Current nutritional status an area of need that is impacting patient's ability to self-manage diabetes?: No    Additional Social History    Support  Does anyone support you with your diabetes care?: yes  Who supports you?: friend  Who takes you to your medical appointments?: friend, family member  Does the current support meet the patient's needs?: Yes  Is Support an area impacting ability to self-manage diabetes?: No    Access to Mass Media & Technology  Media or technology needs impacting ability to self-manage diabetes?: Yes    Cognitive/Behavioral Health  Alert and Oriented: Yes  Difficulty Thinking: No  Requires Prompting: No  Requires assistance for routine expression?: No  Cognitive or behavioral barriers impacting ability to self-manage diabetes?: No    Culture/Worship  Culture or Baptist beliefs that may impact ability to access healthcare: No    Communication  Language preference: English  Hearing Problems: No  Vision Problems: Yes  Vision Assistance: Glasses  Communication needs impacting ability to self-manage diabetes?: No    Health Literacy  Preferred Learning Method: Face to Face, Demonstration  How often do you need to have someone help you read instructions, pamphlets, or written material from your doctor or pharmacy?: Sometimes  Health literacy needs impacting ability to self-manage diabetes?: No      Diabetes Self-Management Skills Assessment    Diabetes Disease Process/Treatment Options  Patient/caregiver able to state what happens when someone has diabetes.: yes  Patient/caregiver knows what type of diabetes they have.: yes  Diabetes Type : Type II  Patient/caregiver able to identify at least three signs and symptoms of diabetes.: no  Patient able to identify at least three risk factors for diabetes.: no  Diabetes  Disease Process/Treatment Options: Skills Assessment Completed: Yes  Assessment indicates:: Knowledge deficit  Area of need?: Yes    Nutrition/Healthy Eating  Challenges to healthy eating:: snacking between meals and at night  Method of carbohydrate measurement:: measuring cups/spoons  Patient can identify foods that impact blood sugar.: no (see comments)  Nutrition/Healthy Eating Skills Assessment Completed:: Yes  Assessment indicates:: Knowledge deficit, Instruction Needed  Area of need?: Yes    Physical Activity/Exercise  Patient's daily activity level:: sedentary  Patient formally exercises outside of work.: no  Reasons for not exercising:: physically unable to exercise currently  Patient can identify forms of physical activity.: no  Physical Activity/Exercise Skills Assessment Completed: : Yes  Assessment indicates:: Knowledge deficit  Area of need?: Yes    Medications  Patient is able to describe current diabetes management routine.: yes  Diabetes management routine:: insulin  Patient is able to identify current diabetes medications, dosages, and appropriate timing of medications.: yes  Patient understands the purpose of the medications taken for diabetes.: yes  Patient reports problems or concerns with current medication regimen.: no  Medication Skills Assessment Completed:: Yes  Assessment indicates:: Adequate understanding  Area of need?: No    Home Blood Glucose Monitoring  Patient states that blood sugar is checked at home daily.: yes  Monitoring Method:: home glucometer  Home glucometer meter type:: insurance preferred meter  How often do you check your blood sugar?: Twice a day  Blood glucose logs:: no, encouraged to keep logs, encouraged to bring logs to provider visits  Blood glucose logs reviewed today?: no  Home Blood Glucose Monitoring Skills Assessment Completed: : Yes  Assessment indicates:: Other (comment) (Dexcom G7 instructions)  Area of need?: Yes    Acute Complications  Patient is able to  identify types of acute complications: No  Acute Complications Skills Assessment Completed: : Yes  Assessment indicates:: Instruction Needed  Area of need?: Yes    Chronic Complications  Patient can identify major chronic complications of diabetes.: no  Patient can identify ways to prevent or delay diabetes complications.: no  Patient is aware that having diabetes increases risk of heart disease?: No  Patient is taking statin?: Yes  Chronic Complications Skills Assessment Completed: : Yes  Assessment indicates:: Knowledge deficit  Area of need?: Yes    Psychosocial/Coping  Patient can identify ways of coping with chronic disease.: yes  Patient-stated ways of coping with chronic disease:: support from loved ones  Psychosocial/Coping Skills Assessment Completed: : Yes  Assessment indicates:: Adequate understanding  Area of need?: No      Assessment Summary and Plan    Based on today's diabetes care assessment, the following areas of need were identified:          3/1/2024    12:01 AM   Social   Support No   Access to Mass Media/Tech Yes   Cognitive/Behavioral Health No   Culture/Latter day No   Communication No   Health Literacy No            3/1/2024    12:01 AM   Clinical   Medication Adherence No   Nutritional Status No            3/1/2024    12:01 AM   Diabetes Self-Management Skills   Diabetes Disease Process/Treatment Options Yes, Instructed patient on what is diabetes and the progression of the disease. Discussed significance of current A1c and blood glucose goals.   Nutrition/Healthy Eating Yes, see care planning   Physical Activity/Exercise Yes, discussed benefits of physical activity   Medication No   Home Blood Glucose Monitoring Yes, see care planning   Acute Complications Yes, Reviewed hypoglycemia, s/s and appropriate tx. Have/get quick acting glucose tablets at hand.   Chronic Complications Yes, Discussed long term complications of diabetes. Patient agrees to have the following diabetes carlo maintenance  "screenings/appointments yearly eye exams, foot exams.    Psychosocial/Coping No          Today's interventions were provided through individual discussion, instruction, and written materials were provided.      Patient verbalized understanding of instruction and written materials.  Pt was able to return back demonstration of instructions today. Patient understood key points, needs reinforcement and further instruction.     Diabetes Self-Management Care Plan:    Today's Diabetes Self-Management Care Plan was developed with Vinnie's input. Vinnie has agreed to work toward the following goal(s) to improve his/her overall diabetes control.      Care Plan: Diabetes Management   Updates made since 1/31/2024 12:00 AM        Problem: Blood Glucose Self-Monitoring         Goal: Patient agrees to check and record blood sugars using the Dexcom G7 system    Start Date: 3/1/2024   Expected End Date: 4/3/2024   Priority: Medium   Note:    Patient accompanied by friend (Vanesa) here today for Dexcom G7 start.    DEXCOM G7 CGM TRAINING     Patient referred to clinic today for Dexcom G7 continuous glucose sensor system training.  Phone is not compatible with rajesh, he will be using the reciever.  Education was provided using "Quick Start Guide" per Dexcom protocol.    Overview:  5min glucose reading updates, trending arrows, BG graph screens, battery life indicator, Blue Tooth Symbol.  Pt instructed on lag time of interstitial fluid from CBG and was advised to tx hypoglycemia and dose insulin based on SMBG values.  Patient advised to always check glucose with glucometer should readings do not match symptoms felt (ex. Hypo or hyperglycemia).     Menus: trend Graph, start sensor, enter BG, events, Alerts, Settings  Alerts: Low alert set at 80; High alert set at 350, Urgent low fixed at 55 mg/dl  Reviewed where to find sensor insertion time and sensor expiration date.   Reviewed sensor site selection. Site selected and prepped using " aseptic technique Inserted to left arm.  Reviewed problem solving aspects of sensor transmission/ variables that can disrupt RF transmission.  range 20 feet  Precautions regarding exposure to CT, MRI scans  Advised to always use meter in the event CGM numbers do not reflect symptoms  Advised to bring  to every visit    Vanesa (friend) will be applying new sensors for patient every 10 days.  Patient is unable to do this on his own.         Problem: Healthy Eating         Goal: Eat 2-3 meals daily with 30-45g/2-3 servings of Carbohydrate per meal. Limit snacking in between meal to 1 serving (15 grams).    Start Date: 3/1/2024   Expected End Date: 4/3/2024   Priority: High   Note:    Reviewed meal planning and general nutritional counseling with regards to diabetes management. Typical food intake obtained from patient. Patient currently is not measuring foods or carb counting. He is drinking regular soft drinks.  Recommended switching to diet of zero soft drinks.  He sees a dietician when he has dialysis.     We concentrated on portion sizes at today's session.  Encouraged increased consumption of non-starchy veggies to diet.  Overall meal planning and making better choices for meals. Encouraged to avoid sources of sugar in diet.  Written education information provided to patient for use at home.            Task: Recommended replacing beverages containing high sugar content with noncaloric/sugar free options and/or water. Completed 3/1/2024         Follow Up Plan     Plan: f/u on 4/2/24 with EVER Ding RD, [facility] download , review insulin, please reinforce meal planning, He will be seeing ERIKA Fay NP at 11 am    Today's care plan and follow up schedule was discussed with patient.  Vinnie verbalized understanding of the care plan, goals, and agrees to follow up plan.        The patient was encouraged to communicate with his/her health care provider/physician and care team regarding his/her  condition(s) and treatment.  I provided the patient with my contact information today and encouraged to contact me via phone or [facility] Patient Portal as needed.     Length of Visit   Total Time: 60 Minutes

## 2024-03-07 ENCOUNTER — TELEPHONE (OUTPATIENT)
Dept: ENDOCRINOLOGY | Facility: CLINIC | Age: 79
End: 2024-03-07
Payer: MEDICARE

## 2024-03-07 NOTE — TELEPHONE ENCOUNTER
Vinnie Siegel has not returned calls or messages regarding support for his Ozempic after multiple attempts to reach him over 10 business days. A final letter has been mailed to the patient to reach back out to Pharmacy Patient Assistance to initiate this process. Please instruct the patient to reach out to Analy Michaels   @ [phone] or [email or web address] if he is still in need of assistance.

## 2024-03-07 NOTE — TELEPHONE ENCOUNTER
Called patient to give him Ms[employer] Analy number so he could get some assistance with his medication .

## 2024-03-08 NOTE — TELEPHONE ENCOUNTER
I have spoken with Vinnie Siegel and informed him of the [employer] application process for Ozempic and what's required to apply.  Vinnie Siegel will provide the following documents: Proof of household Income( such as social security statement, 1099 form, pension statement or 3 consecutive pay stubs, Copy of all Insurance cards( front and back), Printout from your Insurance or Pharmacy that shows how much you have spent on prescriptions this year, and Signed and dated HIPAA /Patient Information Forms        I will follow up with the patient in 5 business days.

## 2024-03-13 ENCOUNTER — CLINICAL SUPPORT (OUTPATIENT)
Dept: OPHTHALMOLOGY | Facility: CLINIC | Age: 79
End: 2024-03-13
Payer: MEDICARE

## 2024-03-13 ENCOUNTER — OFFICE VISIT (OUTPATIENT)
Dept: OPHTHALMOLOGY | Facility: CLINIC | Age: 79
End: 2024-03-13
Payer: MEDICARE

## 2024-03-13 DIAGNOSIS — H40.1113 PRIMARY OPEN ANGLE GLAUCOMA (POAG) OF RIGHT EYE, SEVERE STAGE: Primary | ICD-10-CM

## 2024-03-13 DIAGNOSIS — Z96.1 PSEUDOPHAKIA OF BOTH EYES: ICD-10-CM

## 2024-03-13 DIAGNOSIS — H40.1124 PRIMARY OPEN ANGLE GLAUCOMA (POAG) OF LEFT EYE, INDETERMINATE STAGE: ICD-10-CM

## 2024-03-13 PROCEDURE — 1160F RVW MEDS BY RX/DR IN RCRD: CPT | Mod: CPTII,S$GLB,, | Performed by: STUDENT IN AN ORGANIZED HEALTH CARE EDUCATION/TRAINING PROGRAM

## 2024-03-13 PROCEDURE — 92133 CPTRZD OPH DX IMG PST SGM ON: CPT | Mod: S$GLB,,, | Performed by: STUDENT IN AN ORGANIZED HEALTH CARE EDUCATION/TRAINING PROGRAM

## 2024-03-13 PROCEDURE — 99214 OFFICE O/P EST MOD 30 MIN: CPT | Mod: S$GLB,,, | Performed by: STUDENT IN AN ORGANIZED HEALTH CARE EDUCATION/TRAINING PROGRAM

## 2024-03-13 PROCEDURE — 1126F AMNT PAIN NOTED NONE PRSNT: CPT | Mod: CPTII,S$GLB,, | Performed by: STUDENT IN AN ORGANIZED HEALTH CARE EDUCATION/TRAINING PROGRAM

## 2024-03-13 PROCEDURE — 92083 EXTENDED VISUAL FIELD XM: CPT | Mod: S$GLB,,, | Performed by: STUDENT IN AN ORGANIZED HEALTH CARE EDUCATION/TRAINING PROGRAM

## 2024-03-13 PROCEDURE — 1101F PT FALLS ASSESS-DOCD LE1/YR: CPT | Mod: CPTII,S$GLB,, | Performed by: STUDENT IN AN ORGANIZED HEALTH CARE EDUCATION/TRAINING PROGRAM

## 2024-03-13 PROCEDURE — 1159F MED LIST DOCD IN RCRD: CPT | Mod: CPTII,S$GLB,, | Performed by: STUDENT IN AN ORGANIZED HEALTH CARE EDUCATION/TRAINING PROGRAM

## 2024-03-13 PROCEDURE — 3288F FALL RISK ASSESSMENT DOCD: CPT | Mod: CPTII,S$GLB,, | Performed by: STUDENT IN AN ORGANIZED HEALTH CARE EDUCATION/TRAINING PROGRAM

## 2024-03-13 PROCEDURE — 99999 PR PBB SHADOW E&M-EST. PATIENT-LVL III: CPT | Mod: PBBFAC,,, | Performed by: STUDENT IN AN ORGANIZED HEALTH CARE EDUCATION/TRAINING PROGRAM

## 2024-03-13 RX ORDER — DORZOLAMIDE HYDROCHLORIDE AND TIMOLOL MALEATE 20; 5 MG/ML; MG/ML
1 SOLUTION/ DROPS OPHTHALMIC 2 TIMES DAILY
Qty: 30 ML | Refills: 3 | Status: SHIPPED | OUTPATIENT
Start: 2024-03-13 | End: 2024-04-17 | Stop reason: SDUPTHER

## 2024-03-13 NOTE — PROGRESS NOTES
Subjective:       Patient ID: Vinnie Siegel is a 78 y.o. male.    Chief Complaint: Glaucoma (3 mon iop chk)    HPI     Glaucoma            Comments: 3 mon iop chk          Comments    Patient states that vision seems about the same as last visit in both   eyes.    S/P phaco w/IOL OD- 9/21/2023  S/P phaco w/IOL OS- 8/10/2023  POAG Severe OD Indeterminate Stage OS    Meds;  Systane PRN OU              Last edited by Mark Santiago on 3/13/2024 10:46 AM.              Assessment & Plan   Primary open angle glaucoma (POAG) of right eye, severe stage  -     Posterior Segment OCT Optic Nerve- Both eyes  -     Dockery Visual Field - OU - Extended - Both Eyes  -     dorzolamide-timolol 2-0.5% (COSOPT) 22.3-6.8 mg/mL ophthalmic solution; Place 1 drop into both eyes 2 (two) times daily.  Dispense: 30 mL; Refill: 3    Primary open angle glaucoma (POAG) of left eye, indeterminate stage  -     Posterior Segment OCT Optic Nerve- Both eyes  -     Dockery Visual Field - OU - Extended - Both Eyes  -     dorzolamide-timolol 2-0.5% (COSOPT) 22.3-6.8 mg/mL ophthalmic solution; Place 1 drop into both eyes 2 (two) times daily.  Dispense: 30 mL; Refill: 3    Pseudophakia of both eyes        POAG severe OD // indeterminate OS  -Fhx (), Steroids (),Trauma()  -Drops: Dorzolamide=Timolol twice daily OU -  -Drop intolerance/contraindication: Latanoprost   -Laser:  -Surgeries: CE IOL OU  -CCT: 564 OD // 566 OS  -Gonio: SS OD // SS/TM OS  Tm 23 // 28    Disc photos 2017  OS RNFL and HVF full 2019 2/23 HVF 24-2 SAD and dense IAD w fixation VFI 56% OD // unable OS(similar to 2012)  2/23 RNFL dec throughout except B T OD // unable OS    3/2024 HVF 24-2 SALT and IAD VFI 33% OD // scattered defects VFI 95% --> +prog OD  3/2024 RNFL dec throughout except B T OD // full OS --> stable OD    Start cosopt BID OU        P/O Complex CE/IOL OS (white mature cataract, malyugan ring, trypan blue)  IOL  DIBOO +21.00 target -0.08  C/b post op IRITIS --  resolved    LORIN OU  Mild, asymptomatic    Pseudophakia OD  Dr. Zoran Smallwood WC, monitor    Dialysis x 2 yrs       PLAN  Systane 2-4 x OU --> encourage  Start cosopt BID OU --> IOP check 4 weeks -- 90-day   Recheck HVF in 6 months     4 week IOP check OU        Yvonne Nielson M.D., M.S.  Department of Ophthalmology   Division of Glaucoma Surgery  [facility]

## 2024-03-13 NOTE — PROGRESS NOTES
VISUAL FIELD TEST 24-2 SS-OU-DONE/AD  OD-REL-FIX-COOP-GOOD/AD  OS-REL-GOOD-FIX-POOR-COOP-GOOD/AD    PT HAS NO KNOWN ALLERGIES TO LATEX OR ADHESIVES./AD    MRX: OD -0.50 +0.50 X 180            OS      0  +0.50 X 178

## 2024-04-01 NOTE — PROGRESS NOTES
Subjective:      Patient ID: Vinnie Siegel is a 78 y.o. male.    Chief Complaint:  Diabetes    History of Present Illness  Vinnie Siegel is here for follow up of DM.  Previously seen by me 2/2024.    With regards to diabetes:    Diagnosed: ~2018  DE: 4/2024  Known complications:  DKA Denies  RN Denies  Eye Exam: 3/2024  PN Denies  Podiatry: None  Nephropathy +  HD T/Th/Sa  CAD +  Denies history of pancreatitis & personal/family history of medullary thyroid cancer.     Diet/Exercise:  Eats 2 meals a day. Skips B or L.   Snacks : Yes.   Drinks : sprite, root beer, water.   Exercise : None.   Recent illness, injury, steroids: Denies     Current Regimen:  Lantus 45units nightly  Humalog 15 units before meals     A friend administers his insulin - no specific reason why - patient can administer it  Sometimes skipping L dose if its a snack.    Applied for [facility] PAP - pending.     Other medications tried:  Metformin - stopped for CKD   Trulicity - stopped due to cost , denied having any side effects  Ozempic 1 mg weekly - not affordable in the [facility] [place] - off since 2023    Glucose Monitor: Dexcom G7 -    6 times a day testing  Log reviewed: sensor downloaded and reviewed.    Hypoglycemia:  Denies   Knows how to correct with 15 grams of carbs- juice, coke, or a peppermint.       Diabetes Management Status    Hemoglobin A1C   Date Value Ref Range Status   11/01/2023 10.9 (H) 4.0 - 5.6 % Final     Comment:     ADA Screening Guidelines:  5.7-6.4%  Consistent with prediabetes  >or=6.5%  Consistent with diabetes    High levels of fetal hemoglobin interfere with the HbA1C  assay. Heterozygous hemoglobin variants (HbS, HgC, etc)do  not significantly interfere with this assay.   However, presence of multiple variants may affect accuracy.     07/03/2023 8.9 (H) 4.0 - 5.6 % Final     Comment:     ADA Screening Guidelines:  5.7-6.4%  Consistent with prediabetes  >or=6.5%  Consistent with diabetes    High levels of  "fetal hemoglobin interfere with the HbA1C  assay. Heterozygous hemoglobin variants (HbS, HgC, etc)do  not significantly interfere with this assay.   However, presence of multiple variants may affect accuracy.     06/26/2023 9.1 (H) 4.0 - 5.6 % Final     Comment:     ADA Screening Guidelines:  5.7-6.4%  Consistent with prediabetes  >or=6.5%  Consistent with diabetes    High levels of fetal hemoglobin interfere with the HbA1C  assay. Heterozygous hemoglobin variants (HbS, HgC, etc)do  not significantly interfere with this assay.   However, presence of multiple variants may affect accuracy.         Statin: Taking  ACE/ARB: Not taking  Screening or Prevention Patient's value Goal Complete/Controlled?   HgA1C Testing and Control   Lab Results   Component Value Date    HGBA1C 10.9 (H) 11/01/2023      Annually/Less than 8% No   Lipid profile : 11/01/2023 Annually Yes   LDL control Lab Results   Component Value Date    LDLCALC 97.0 11/01/2023    Annually/Less than 100 mg/dl  Yes   Nephropathy screening Lab Results   Component Value Date    LABMICR 155.0 06/18/2019     Lab Results   Component Value Date    PROTEINUA 2+ (A) 11/23/2022    Annually No   Blood pressure BP Readings from Last 1 Encounters:   02/21/24 (!) 147/72    Less than 140/90 No   Dilated retinal exam : 03/29/2023 Annually Yes   Foot exam   Most Recent Foot Exam Date: Not Found Annually Yes       Review of Systems  As above     Objective:   Physical Exam  Vitals reviewed.   Cardiovascular:      Rate and Rhythm: Normal rate.      Comments: No edema present  Pulmonary:      Effort: Pulmonary effort is normal.   Abdominal:      Palpations: Abdomen is soft.       Injection sites are without edema or erythema. No lipo hypertropthy or atrophy.    Visit Vitals  Pulse 71   Ht 6' 2" (1.88 m)   Wt 128.8 kg (283 lb 13.5 oz)   SpO2 99%   BMI 36.44 kg/m²         Body mass index is 36.44 kg/m².    Lab Review:   Lab Results   Component Value Date    HGBA1C 10.9 (H) " 11/01/2023    HGBA1C 8.9 (H) 07/03/2023    HGBA1C 9.1 (H) 06/26/2023       Lab Results   Component Value Date    CHOL 176 11/01/2023    HDL 58 11/01/2023    LDLCALC 97.0 11/01/2023    TRIG 105 11/01/2023    CHOLHDL 33.0 11/01/2023     Lab Results   Component Value Date     (L) 12/04/2023    K 4.4 12/04/2023    CL 89 (L) 12/04/2023    CO2 26 12/04/2023     (HH) 12/04/2023    BUN 51 (H) 12/04/2023    CREATININE 7.0 (H) 12/04/2023    CALCIUM 9.5 12/04/2023    PROT 7.2 12/04/2023    ALBUMIN 3.2 (L) 12/04/2023    BILITOT 0.7 12/04/2023    ALKPHOS 56 12/04/2023    AST 11 12/04/2023    ALT 9 (L) 12/04/2023    ANIONGAP 16 12/04/2023    ESTGFRAFRICA 8.1 (A) 09/21/2021    EGFRNONAA 7.0 (A) 09/21/2021    TSH 1.311 11/01/2023     Vit D, 25-Hydroxy   Date Value Ref Range Status   09/21/2021 17 (L) 30 - 96 ng/mL Final     Comment:     Vitamin D deficiency.........<10 ng/mL                              Vitamin D insufficiency......10-29 ng/mL       Vitamin D sufficiency........> or equal to 30 ng/mL  Vitamin D toxicity............>100 ng/mL       Assessment and Plan     1. Type 2 diabetes mellitus with chronic kidney disease on chronic dialysis, with long-term current use of insulin        2. Type 2 diabetes mellitus with hyperglycemia, without long-term current use of insulin  insulin (LANTUS SOLOSTAR U-100 INSULIN) glargine 100 units/mL SubQ pen    HUMALOG KWIKPEN INSULIN 100 unit/mL pen          Type 2 diabetes mellitus with chronic kidney disease on chronic dialysis, with long-term current use of insulin  -- A1c goal <8%.  -- Medications discussed:  MFM - ESRD  GLP1-DPP4 - costly  VEGA   SGLT2 - ESRD  Insulin   -- Reviewed logs/CGM:  Daytime hyperglycemia.   No hypoglycemia.   -- Per patient, did not qualify for PAP 2023. Will resubmit for 2024 as patient would benefit from restarting GLP1.  -- Medication Changes:   Lantus 50 units nightly  Humalog 17 units before meals   Okay to take Humalog for large snack  replacing L.  -- Reviewed goals of therapy are to get the best control we can without hypoglycemia.  -- Reviewed patient's current insulin regimen. Clarified proper insulin dose and timing in relation to meals, etc. Insulin injection sites and proper rotation instructed.    -- Advised frequent self blood glucose monitoring.  Patient encouraged to document glucose results and bring them to every clinic visit.  -- Hypoglycemia precautions discussed. Instructed on precautions before driving.    -- Call for Bg repeatedly < 90 or > 180.   -- Close adherence to lifestyle changes recommended.   -- Periodic follow ups for eye evaluations, foot care and dental care suggested.        Follow up in about 3 months (around 7/3/2024).    ROLAN Michaels

## 2024-04-03 ENCOUNTER — OFFICE VISIT (OUTPATIENT)
Dept: ENDOCRINOLOGY | Facility: CLINIC | Age: 79
End: 2024-04-03
Payer: MEDICARE

## 2024-04-03 ENCOUNTER — CLINICAL SUPPORT (OUTPATIENT)
Dept: DIABETES | Facility: CLINIC | Age: 79
End: 2024-04-03
Payer: MEDICARE

## 2024-04-03 VITALS — HEART RATE: 71 BPM | HEIGHT: 74 IN | OXYGEN SATURATION: 99 % | WEIGHT: 283.81 LBS | BODY MASS INDEX: 36.42 KG/M2

## 2024-04-03 DIAGNOSIS — Z79.4 TYPE 2 DIABETES MELLITUS WITH CHRONIC KIDNEY DISEASE ON CHRONIC DIALYSIS, WITH LONG-TERM CURRENT USE OF INSULIN: Primary | ICD-10-CM

## 2024-04-03 DIAGNOSIS — Z99.2 TYPE 2 DIABETES MELLITUS WITH CHRONIC KIDNEY DISEASE ON CHRONIC DIALYSIS, WITH LONG-TERM CURRENT USE OF INSULIN: ICD-10-CM

## 2024-04-03 DIAGNOSIS — Z79.4 TYPE 2 DIABETES MELLITUS WITH CHRONIC KIDNEY DISEASE ON CHRONIC DIALYSIS, WITH LONG-TERM CURRENT USE OF INSULIN: ICD-10-CM

## 2024-04-03 DIAGNOSIS — Z99.2 TYPE 2 DIABETES MELLITUS WITH CHRONIC KIDNEY DISEASE ON CHRONIC DIALYSIS, WITH LONG-TERM CURRENT USE OF INSULIN: Primary | ICD-10-CM

## 2024-04-03 DIAGNOSIS — E11.22 TYPE 2 DIABETES MELLITUS WITH CHRONIC KIDNEY DISEASE ON CHRONIC DIALYSIS, WITH LONG-TERM CURRENT USE OF INSULIN: Primary | ICD-10-CM

## 2024-04-03 DIAGNOSIS — E11.65 TYPE 2 DIABETES MELLITUS WITH HYPERGLYCEMIA, WITHOUT LONG-TERM CURRENT USE OF INSULIN: ICD-10-CM

## 2024-04-03 DIAGNOSIS — N18.6 TYPE 2 DIABETES MELLITUS WITH CHRONIC KIDNEY DISEASE ON CHRONIC DIALYSIS, WITH LONG-TERM CURRENT USE OF INSULIN: Primary | ICD-10-CM

## 2024-04-03 DIAGNOSIS — E11.22 TYPE 2 DIABETES MELLITUS WITH CHRONIC KIDNEY DISEASE ON CHRONIC DIALYSIS, WITH LONG-TERM CURRENT USE OF INSULIN: ICD-10-CM

## 2024-04-03 DIAGNOSIS — N18.6 TYPE 2 DIABETES MELLITUS WITH CHRONIC KIDNEY DISEASE ON CHRONIC DIALYSIS, WITH LONG-TERM CURRENT USE OF INSULIN: ICD-10-CM

## 2024-04-03 PROCEDURE — G0108 DIAB MANAGE TRN  PER INDIV: HCPCS | Mod: S$GLB,,, | Performed by: INTERNAL MEDICINE

## 2024-04-03 PROCEDURE — 99999 PR PBB SHADOW E&M-EST. PATIENT-LVL I: CPT | Mod: PBBFAC,,,

## 2024-04-03 PROCEDURE — 95251 CONT GLUC MNTR ANALYSIS I&R: CPT | Mod: S$GLB,,, | Performed by: NURSE PRACTITIONER

## 2024-04-03 PROCEDURE — 1159F MED LIST DOCD IN RCRD: CPT | Mod: CPTII,S$GLB,, | Performed by: NURSE PRACTITIONER

## 2024-04-03 PROCEDURE — 3288F FALL RISK ASSESSMENT DOCD: CPT | Mod: CPTII,S$GLB,, | Performed by: NURSE PRACTITIONER

## 2024-04-03 PROCEDURE — 1101F PT FALLS ASSESS-DOCD LE1/YR: CPT | Mod: CPTII,S$GLB,, | Performed by: NURSE PRACTITIONER

## 2024-04-03 PROCEDURE — 1160F RVW MEDS BY RX/DR IN RCRD: CPT | Mod: CPTII,S$GLB,, | Performed by: NURSE PRACTITIONER

## 2024-04-03 PROCEDURE — 99999 PR PBB SHADOW E&M-EST. PATIENT-LVL V: CPT | Mod: PBBFAC,,, | Performed by: NURSE PRACTITIONER

## 2024-04-03 PROCEDURE — 99214 OFFICE O/P EST MOD 30 MIN: CPT | Mod: S$GLB,,, | Performed by: NURSE PRACTITIONER

## 2024-04-03 PROCEDURE — 1126F AMNT PAIN NOTED NONE PRSNT: CPT | Mod: CPTII,S$GLB,, | Performed by: NURSE PRACTITIONER

## 2024-04-03 RX ORDER — INSULIN GLARGINE 100 [IU]/ML
50 INJECTION, SOLUTION SUBCUTANEOUS NIGHTLY
Qty: 30 ML | Refills: 3 | Status: SHIPPED | OUTPATIENT
Start: 2024-04-03

## 2024-04-03 RX ORDER — INSULIN LISPRO 100 [IU]/ML
17 INJECTION, SOLUTION INTRAVENOUS; SUBCUTANEOUS
Qty: 30 ML | Refills: 3 | Status: SHIPPED | OUTPATIENT
Start: 2024-04-03

## 2024-04-03 NOTE — PROGRESS NOTES
Diabetes Care Specialist Progress Note  Author: Isidra Ding RD, CDE  Date: 4/3/2024    Program Intake  Reason for Diabetes Program Visit:: Intervention  Type of Intervention:: Individual  Individual: Education  Education: Self-Management Skill Review, Pattern Management      Current diabetes risk level:: high      Continuous Glucose Monitoring  Personal CGM type:: Dexcom G7  GMI Date: 24  GMI Value: 9.1 %      Lab Results   Component Value Date    HGBA1C 10.9 (H) 2023           Clinical  Problem Review  Reviewed Problem List with Patient: yes  Active comorbidities affecting diabetes self-care.: yes  Comorbidities: End Stage Renal Disease    Clinical Assessment  Current Diabetes Treatment: Insulin, Injectable  Lantus 45 units nightly  Humalog 13 if glucose < 150,  15 units if glucose > 150            Have you ever experienced hypoglycemia (low blood sugar)?: no  Have you ever experienced hyperglycemia (high blood sugar)?: yes  In the last month, how often have you experienced high blood sugar?: more than once a day      SMBG via Dexcom G7 ( only)  Average B mg/dL  Time in Range:  20%  (0% low, 80% high)  GMI:  9.1%          Medication Information  Medication adherence impacting ability to self-manage diabetes?: No    Labs  Lab Compliance Barriers: No        Nutritional Status  Current nutritional status an area of need that is impacting patient's ability to self-manage diabetes?: No                  Diabetes Self-Management Care Plan:    Today's Diabetes Self-Management Care Plan was developed with Vinnie's input. Vinnie has agreed to work toward the following goal(s) to improve his/her overall diabetes control.      Care Plan: Diabetes Management   Updates made since 3/4/2024 12:00 AM        Problem: Blood Glucose Self-Monitoring         Goal: Patient agrees to check and record blood sugars using the Dexcom G7 system    Start Date: 3/1/2024   Expected End Date: 4/3/2024   This Visit's  "Progress: On track   Priority: Medium   Note:        Insulin doses updated per NP today:  Lantus 50  Humalog 17 at meals    He usually only eats Breakfast around 9am and dinner around 6pm - so really only giving Humalog at those two times.   He does do a lot of snacking in between these 2 meals but will not take any insulin at that time.   Discussed that he likely needs the 3rd dose at "lunchtime" d/t all his snacking.   He will plan to start taking the Humalog dose between 1-2 pm if he has been / is snacking throughout the day.                      Follow Up Plan     F/u in 3 months          Today's care plan and follow up schedule was discussed with patient.  Vinnie verbalized understanding of the care plan, goals, and agrees to follow up plan.        The patient was encouraged to communicate with his/her health care provider/physician and care team regarding his/her condition(s) and treatment.  I provided the patient with my contact information today and encouraged to contact me via phone or [facility] Patient Portal as needed.           Length of Visit   Total Time: 45 Minutes   "

## 2024-04-03 NOTE — Clinical Note
Zeke, Mr Siegel is having trouble getting in touch with you. Can you call him when you have time? Thanks.

## 2024-04-03 NOTE — ASSESSMENT & PLAN NOTE
-- A1c goal <8%.  -- Medications discussed:  MFM - ESRD  GLP1-DPP4 - costly  VEGA   SGLT2 - ESRD  Insulin   -- Reviewed logs/CGM:  Daytime hyperglycemia.   No hypoglycemia.   -- Per patient, did not qualify for PAP 2023. Will resubmit for 2024 as patient would benefit from restarting GLP1.  -- Medication Changes:   Lantus 50 units nightly  Humalog 17 units before meals   Okay to take Humalog for large snack replacing L.  -- Reviewed goals of therapy are to get the best control we can without hypoglycemia.  -- Reviewed patient's current insulin regimen. Clarified proper insulin dose and timing in relation to meals, etc. Insulin injection sites and proper rotation instructed.    -- Advised frequent self blood glucose monitoring.  Patient encouraged to document glucose results and bring them to every clinic visit.  -- Hypoglycemia precautions discussed. Instructed on precautions before driving.    -- Call for Bg repeatedly < 90 or > 180.   -- Close adherence to lifestyle changes recommended.   -- Periodic follow ups for eye evaluations, foot care and dental care suggested.

## 2024-04-16 NOTE — PROGRESS NOTES
Subjective:       Patient ID: Vinnie Siegel is a 78 y.o. male.    Chief Complaint: Glaucoma    HPI    DLS: 3/13/24 w/ Dr. Nielson    78 y.o. male presents for IOP Check. Patient denies changes to VA, pain,   headaches, flashes, and floaters.    S/P phaco w/IOL OD- 9/21/2023   S/P phaco w/IOL OS- 8/10/2023   POAG Severe OD Indeterminate Stage OS     Meds:  Dorzolamide timolol BID OU    Last edited by Sharonda Will on 4/17/2024 10:08 AM.               Assessment & Plan   Primary open angle glaucoma (POAG) of right eye, severe stage    Primary open angle glaucoma (POAG) of left eye, indeterminate stage    Pseudophakia of both eyes           POAG severe OD // indeterminate OS  -Fhx (), Steroids (),Trauma()  -Drops: Dorzolamide-Timolol twice daily OU (90-day)  -Drop intolerance/contraindication: Latanoprost   -Laser:  -Surgeries: CE IOL OU  -CCT: 564 OD // 566 OS  -Gonio: SS OD // SS/TM OS  Tm 23 // 28    Disc photos 2017  OS RNFL and HVF full 2019 2/23 HVF 24-2 SAD and dense IAD w fixation VFI 56% OD // unable OS(similar to 2012)  2/23 RNFL dec throughout except B T OD // unable OS    3/2024 HVF 24-2 SALT and IAD VFI 33% OD // scattered defects VFI 95% --> +prog OD  3/2024 RNFL dec throughout except B T OD // full OS --> stable OD    IOP better with D/T - CPM    P/O Complex CE/IOL OS (white mature cataract, malyugan ring, trypan blue)  IOL  DIBOO +21.00 target -0.08  C/b post op IRITIS -- resolved    LORIN OU  Mild, asymptomatic    Pseudophakia OD  Dr. Zoran MANCIA, monitor    Dialysis x 2 yrs       PLAN  Systane 2-4 x OU --> encourage  Cont cosopt BID OU  Consider Recheck HVF in 9/2024    4 month IOP check OU ILANA Nielson M.D., M.S.  Department of Ophthalmology   Division of Glaucoma Surgery  [facility]

## 2024-04-17 ENCOUNTER — OFFICE VISIT (OUTPATIENT)
Dept: OPHTHALMOLOGY | Facility: CLINIC | Age: 79
End: 2024-04-17
Payer: MEDICARE

## 2024-04-17 DIAGNOSIS — H40.1124 PRIMARY OPEN ANGLE GLAUCOMA (POAG) OF LEFT EYE, INDETERMINATE STAGE: ICD-10-CM

## 2024-04-17 DIAGNOSIS — Z96.1 PSEUDOPHAKIA OF BOTH EYES: ICD-10-CM

## 2024-04-17 DIAGNOSIS — H40.1113 PRIMARY OPEN ANGLE GLAUCOMA (POAG) OF RIGHT EYE, SEVERE STAGE: Primary | ICD-10-CM

## 2024-04-17 PROCEDURE — 99214 OFFICE O/P EST MOD 30 MIN: CPT | Mod: S$GLB,,, | Performed by: STUDENT IN AN ORGANIZED HEALTH CARE EDUCATION/TRAINING PROGRAM

## 2024-04-17 PROCEDURE — 1159F MED LIST DOCD IN RCRD: CPT | Mod: CPTII,S$GLB,, | Performed by: STUDENT IN AN ORGANIZED HEALTH CARE EDUCATION/TRAINING PROGRAM

## 2024-04-17 PROCEDURE — 1101F PT FALLS ASSESS-DOCD LE1/YR: CPT | Mod: CPTII,S$GLB,, | Performed by: STUDENT IN AN ORGANIZED HEALTH CARE EDUCATION/TRAINING PROGRAM

## 2024-04-17 PROCEDURE — 3288F FALL RISK ASSESSMENT DOCD: CPT | Mod: CPTII,S$GLB,, | Performed by: STUDENT IN AN ORGANIZED HEALTH CARE EDUCATION/TRAINING PROGRAM

## 2024-04-17 PROCEDURE — 1160F RVW MEDS BY RX/DR IN RCRD: CPT | Mod: CPTII,S$GLB,, | Performed by: STUDENT IN AN ORGANIZED HEALTH CARE EDUCATION/TRAINING PROGRAM

## 2024-04-17 PROCEDURE — 99999 PR PBB SHADOW E&M-EST. PATIENT-LVL III: CPT | Mod: PBBFAC,,, | Performed by: STUDENT IN AN ORGANIZED HEALTH CARE EDUCATION/TRAINING PROGRAM

## 2024-04-17 PROCEDURE — 1126F AMNT PAIN NOTED NONE PRSNT: CPT | Mod: CPTII,S$GLB,, | Performed by: STUDENT IN AN ORGANIZED HEALTH CARE EDUCATION/TRAINING PROGRAM

## 2024-04-17 RX ORDER — DORZOLAMIDE HYDROCHLORIDE AND TIMOLOL MALEATE 20; 5 MG/ML; MG/ML
1 SOLUTION/ DROPS OPHTHALMIC 2 TIMES DAILY
Qty: 30 ML | Refills: 3 | Status: SHIPPED | OUTPATIENT
Start: 2024-04-17 | End: 2025-04-17

## 2024-06-05 ENCOUNTER — PATIENT MESSAGE (OUTPATIENT)
Dept: ADMINISTRATIVE | Facility: HOSPITAL | Age: 79
End: 2024-06-05
Payer: MEDICARE

## 2024-06-17 ENCOUNTER — OFFICE VISIT (OUTPATIENT)
Dept: INTERNAL MEDICINE | Facility: CLINIC | Age: 79
End: 2024-06-17
Payer: MEDICARE

## 2024-06-17 ENCOUNTER — TELEPHONE (OUTPATIENT)
Dept: INTERNAL MEDICINE | Facility: CLINIC | Age: 79
End: 2024-06-17
Payer: MEDICARE

## 2024-06-17 DIAGNOSIS — Z99.2 ESRD ON HEMODIALYSIS: ICD-10-CM

## 2024-06-17 DIAGNOSIS — N18.6 ESRD ON HEMODIALYSIS: ICD-10-CM

## 2024-06-17 DIAGNOSIS — I10 ESSENTIAL HYPERTENSION: Primary | ICD-10-CM

## 2024-06-17 RX ORDER — PEN NEEDLE, DIABETIC 30 GX3/16"
NEEDLE, DISPOSABLE MISCELLANEOUS
Qty: 400 EACH | Refills: 3 | Status: SHIPPED | OUTPATIENT
Start: 2024-06-17

## 2024-06-17 RX ORDER — HYDRALAZINE HYDROCHLORIDE 100 MG/1
100 TABLET, FILM COATED ORAL 3 TIMES DAILY
Qty: 270 TABLET | Refills: 0 | Status: SHIPPED | OUTPATIENT
Start: 2024-06-17 | End: 2025-06-17

## 2024-06-17 NOTE — PROGRESS NOTES
The patient location is: [place]  The chief complaint leading to consultation is: Medication Refill    Visit type: Virtual visit with synchronous audio and video  Contact time spent with patient: 11 minutes  Each patient to whom he or she provides medical services by telemedicine is:  (1) informed of the relationship between the physician and patient and the respective role of any other health care provider with respect to management of the patient; and (2) notified that he or she may decline to receive medical services by telemedicine and may withdraw from such care at any time.    Subjective:       Patient ID: Vinnie Siegel is a 78 y.o. 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    Chief Complaint: Medication Refill     History was obtained from the patient and supplemented through chart review.  He is a patient of Janeth Walter MD.  Was last seen  for preop evaluation.    HPI    He connected to the virtual visit 12 minutes late.  Poor med literacy, confusion regarding reason for visit.  States that he wants to avoid coming to clinic in 2 days due to bad weather.    HTN:    He is requesting medication refill of hydralazine.      HTN regimen:  Hydralazine 100 t.i.d., ?Toprol 12.5, nifedipine 90. Takes Torsemide 20 b.i.d.  Taking medications consistently.      H/o bradycardia.  H/o BPH. On Flomax.    Previous meds: doesn't recall taking ACE/ARB    Home BP: unknown BP #s during dialysis.  Reports that sometimes his BP low during HD and that he has to wait for BP to recover before leaving.      BP Readings from Last 3 Encounters:   02/21/24 (!) 147/72   12/04/23 (!) 156/72   10/16/23 126/82     ESRD. Going to HD 3/week at [facility].  On HD x 3 years.    Review of Systems    Constitutional:  Negative for activity change and unexpected weight change.   HENT:  Negative for hearing loss, rhinorrhea and trouble swallowing.    Eyes:  Negative for discharge and visual disturbance.   Respiratory:  Negative for chest tightness and wheezing.    Cardiovascular:  Negative for chest pain and palpitations.   Gastrointestinal:  Negative for blood in stool, constipation, diarrhea and vomiting.   Endocrine: Negative for polydipsia and polyuria.   Genitourinary:  Negative for difficulty urinating, hematuria and urgency.   Musculoskeletal:  Negative for arthralgias, joint swelling and neck pain.   Neurological:  Negative for weakness and headaches.   Psychiatric/Behavioral:  Negative for confusion and dysphoric mood.        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     Hyperlipidemia     Hypertension     Kidney stone     Seasonal allergies 06/24/2014     Past Surgical History:   Procedure Laterality Date    AV FISTULA PLACEMENT Left 12/8/2020    Procedure: CREATION, AV FISTULA;  Surgeon: Benji Becerril MD;  Location: [facility] OR [address] [place] [address];  Service: Peripheral Vascular;  Laterality: Left;    AV FISTULA PLACEMENT Right 12/7/2022    Procedure: CREATION, AV FISTULA;  Surgeon: Benji Becerril MD;  Location: [facility] OR [address] [place] [address];  Service: Peripheral Vascular;  Laterality: Right;  RUE    CATARACT EXTRACTION W/  INTRAOCULAR LENS IMPLANT Right 04/04/16    Dr Meehan    COLONOSCOPY W/ BIOPSIES      DILATION, AQUEOUS OUTFLOW CANAL, TRANSLUMINAL, WITHOUT DEVICE RETENTION Left 9/21/2023    Procedure: DILATION, AQUEOUS OUTFLOW CANAL, TRANSLUMINAL, WITHOUT DEVICE RETENTION;  Surgeon: Yvonne Nielson MD;  Location: [facility] OR [address] [place] [address];  Service: Ophthalmology;  Laterality: Left;  omni    ESOPHAGOGASTRODUODENOSCOPY N/A 6/29/2020     Procedure: EGD (ESOPHAGOGASTRODUODENOSCOPY);  Surgeon: Ravi Leone MD;  Location: [facility];  Service: Endoscopy;  Laterality: N/A;    EYE SURGERY      FISTULOGRAM Left 4/16/2021    Procedure: Fistulogram;  Surgeon: Benji Becerril MD;  Location: [facility] CATH LAB;  Service: Peripheral Vascular;  Laterality: Left;    FISTULOGRAM Left 9/28/2022    Procedure: Fistulogram;  Surgeon: Benji Becerril MD;  Location: [facility] CATH LAB;  Service: Cardiology;  Laterality: Left;    FISTULOGRAM, WITH PTA Right 2/3/2023    Procedure: FISTULOGRAM, WITH PTA;  Surgeon: Benji Becerril MD;  Location: [facility] 2ND FLR;  Service: Peripheral Vascular;  Laterality: Right;  205.17 mGy  2.3 min    GONIOTOMY Left 8/10/2023    Procedure: GONIOTOMY;  Surgeon: Yvonne Nielson MD;  [address];  Service: Ophthalmology;  Laterality: Left;  omni    HEMORRHOID SURGERY      Dr. Root [facility]    INTRAOCULAR PROSTHESES INSERTION Left 8/10/2023    Procedure: INSERTION, IOL PROSTHESIS;  Surgeon: Yvonne Nielson MD;  [address];  Service: Ophthalmology;  Laterality: Left;    INTRAOCULAR PROSTHESES INSERTION Left 9/21/2023    Procedure: INSERTION, IOL PROSTHESIS;  Surgeon: Yvonne Nielson MD;  [address];  Service: Ophthalmology;  Laterality: Left;    lateral internal anal sphincterotomy  12/13/13    Dr. root [facility]    PERCUTANEOUS TRANSLUMINAL ANGIOPLASTY OF ARTERIOVENOUS FISTULA Left 4/16/2021    Procedure: PTA, AV FISTULA;  Surgeon: Benji Becerril MD;  Location: [facility] CATH LAB;  Service: Peripheral Vascular;  Laterality: Left;    PERCUTANEOUS TRANSLUMINAL ANGIOPLASTY OF ARTERIOVENOUS FISTULA N/A 9/28/2022    Procedure: PTA, AV FISTULA;  Surgeon: Benji Becerril MD;  Location: [facility] CATH LAB;  Service: Cardiology;  Laterality: N/A;    PHACOEMULSIFICATION OF CATARACT Left 8/10/2023    Procedure: PHACOEMULSIFICATION, CATARACT;  Surgeon: Yvonne Nielson MD;  Location: [address]  [place];  Service: Ophthalmology;  Laterality: Left;    PHACOEMULSIFICATION OF CATARACT Left 9/21/2023    Procedure: PHACOEMULSIFICATION, CATARACT;  Surgeon: Yvonne Nielson MD;  Location: [facility] OR 1ST FLR;  Service: Ophthalmology;  Laterality: Left;    PLACEMENT OF DUAL-LUMEN VASCULAR CATHETER N/A 2/3/2023    Procedure: EXCHANGE, PERMACATH;  Surgeon: Benji Becerril MD;  Location: [facility] OR 2ND FLR;  Service: Peripheral Vascular;  Laterality: N/A;    URETERAL STENT PLACEMENT       Family History   Problem Relation Name Age of Onset    Diabetes Brother          type 1    Hypertension Brother      Stroke Brother       Social History     Socioeconomic History    Marital status: Single   Tobacco Use    Smoking status: Former     Current packs/day: 0.00     Average packs/day: 0.5 packs/day for 45.0 years (22.5 ttl pk-yrs)     Types: Cigarettes     Start date: 6/27/1975     Quit date: 6/27/2020     Years since quitting: 3.9     Passive exposure: Never    Smokeless tobacco: Former   Substance and Sexual Activity    Alcohol use: Not Currently     Comment: rarely    Drug use: No    Sexual activity: Yes     Partners: Female     Comment: single, retired , no kids   Social History Narrative    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     Social Determinants of Health     Financial Resource Strain: Low Risk  (9/26/2022)    Overall Financial Resource Strain (CARDIA)     Difficulty of Paying Living Expenses: Not very hard   Food Insecurity: No Food Insecurity (9/26/2022)    Hunger Vital Sign     Worried About Running Out of Food in the Last Year: Never true     Ran Out of Food in the Last Year: Never true   Transportation Needs: No Transportation Needs (9/26/2022)    PRAPARE - Transportation     Lack of Transportation (Medical): No     Lack of Transportation (Non-Medical): No   Physical Activity: Inactive (9/26/2022)    Exercise Vital Sign     Days of Exercise per Week: 0 days     Minutes of Exercise  per Session: 0 min   Stress: Stress Concern Present (9/26/2022)    [employer] - Occupational Stress Questionnaire     Feeling of Stress : To some extent   Housing Stability: Low Risk  (9/26/2022)    Housing Stability Vital Sign     Unable to Pay for Housing in the Last Year: No     Number of Places Lived in the Last Year: 1     Unstable Housing in the Last Year: No     Objective:      There were no vitals filed for this visit.   Physical Exam  Constitutional:       General: He is not in acute distress.     Appearance: He is well-developed. He is not ill-appearing or diaphoretic.   HENT:      Head: Normocephalic.   Eyes:      General: No scleral icterus.        Right eye: No discharge.         Left eye: No discharge.   Pulmonary:      Effort: Pulmonary effort is normal. No respiratory distress.   Skin:     Coloration: Skin is not pale.      Findings: No erythema.   Neurological:      Mental Status: He is alert.   Psychiatric:         Behavior: Behavior normal.         Lab Results   Component Value Date    WBC 7.74 11/01/2023    HGB 11.6 (L) 11/01/2023    HCT 35.4 (L) 11/01/2023     11/01/2023    CHOL 176 11/01/2023    TRIG 105 11/01/2023    HDL 58 11/01/2023    ALT 9 (L) 12/04/2023    AST 11 12/04/2023     (L) 12/04/2023    K 4.4 12/04/2023    CL 89 (L) 12/04/2023    CREATININE 7.0 (H) 12/04/2023    BUN 51 (H) 12/04/2023    CO2 26 12/04/2023    TSH 1.311 11/01/2023    PSA 0.91 08/09/2016    INR 1.0 12/15/2022    HGBA1C 10.9 (H) 11/01/2023       The 10-year ASCVD risk score (Tanner GLASER, et al., 2019) is: 45.6%    Values used to calculate the score:      Age: 78 years      Sex: Male      Is Non- : Yes      Diabetic: Yes      Tobacco smoker: No      Systolic Blood Pressure: 147 mmHg      Is BP treated: Yes      HDL Cholesterol: 58 mg/dL      Total Cholesterol: 176 mg/dL    Assessment:       1. Essential hypertension    2. ESRD on hemodialysis      Plan:        Vinnie was seen today for medication refill.    Diagnoses and all orders for this visit:    Essential hypertension  Comments:  Unknown home BP. Sched f/u c PCP. Continue current meds for now. ARB indicated d/t CKD, HTN, DM.  Can likely start since he has been stable on HD.  Orders:  -     hydrALAZINE (APRESOLINE) 100 MG tablet; Take 1 tablet (100 mg total) by mouth 3 (three) times daily.    ESRD on hemodialysis  Comments:  Continue HD.     Side effects of medication(s) were discussed in detail and patient voiced understanding.  Patient will call back for any issues or complications.     RTC: schedule f/u c PCP for HTN.  In person.

## 2024-06-17 NOTE — TELEPHONE ENCOUNTER
----- Message from Rossi Dawkins sent at 6/17/2024  1:52 PM CDT -----  Contact: [phone]  Pt calling to get someone to give a call bk before appt  that's seeing the pt is dr viviana parisi and its a virtual visit today for 2:20 please advise [phone]

## 2024-07-08 ENCOUNTER — OFFICE VISIT (OUTPATIENT)
Dept: OPHTHALMOLOGY | Facility: CLINIC | Age: 79
End: 2024-07-08
Payer: MEDICARE

## 2024-07-08 DIAGNOSIS — H40.1122 PRIMARY OPEN ANGLE GLAUCOMA (POAG) OF LEFT EYE, MODERATE STAGE: ICD-10-CM

## 2024-07-08 DIAGNOSIS — Z96.1 STATUS POST CATARACT EXTRACTION AND INSERTION OF INTRAOCULAR LENS, UNSPECIFIED LATERALITY: ICD-10-CM

## 2024-07-08 DIAGNOSIS — H40.1113 PRIMARY OPEN ANGLE GLAUCOMA (POAG) OF RIGHT EYE, SEVERE STAGE: Primary | ICD-10-CM

## 2024-07-08 DIAGNOSIS — Z98.49 STATUS POST CATARACT EXTRACTION AND INSERTION OF INTRAOCULAR LENS, UNSPECIFIED LATERALITY: ICD-10-CM

## 2024-07-08 PROCEDURE — 92250 FUNDUS PHOTOGRAPHY W/I&R: CPT | Mod: HCNC,S$GLB,, | Performed by: OPHTHALMOLOGY

## 2024-07-08 PROCEDURE — 92020 GONIOSCOPY: CPT | Mod: HCNC,S$GLB,, | Performed by: OPHTHALMOLOGY

## 2024-07-08 PROCEDURE — 99999 PR PBB SHADOW E&M-EST. PATIENT-LVL III: CPT | Mod: PBBFAC,HCNC,, | Performed by: OPHTHALMOLOGY

## 2024-07-08 PROCEDURE — 3288F FALL RISK ASSESSMENT DOCD: CPT | Mod: HCNC,CPTII,S$GLB, | Performed by: OPHTHALMOLOGY

## 2024-07-08 PROCEDURE — 1126F AMNT PAIN NOTED NONE PRSNT: CPT | Mod: HCNC,CPTII,S$GLB, | Performed by: OPHTHALMOLOGY

## 2024-07-08 PROCEDURE — 1160F RVW MEDS BY RX/DR IN RCRD: CPT | Mod: HCNC,CPTII,S$GLB, | Performed by: OPHTHALMOLOGY

## 2024-07-08 PROCEDURE — 2023F DILAT RTA XM W/O RTNOPTHY: CPT | Mod: HCNC,CPTII,S$GLB, | Performed by: OPHTHALMOLOGY

## 2024-07-08 PROCEDURE — G2211 COMPLEX E/M VISIT ADD ON: HCPCS | Mod: HCNC,S$GLB,, | Performed by: OPHTHALMOLOGY

## 2024-07-08 PROCEDURE — 1101F PT FALLS ASSESS-DOCD LE1/YR: CPT | Mod: HCNC,CPTII,S$GLB, | Performed by: OPHTHALMOLOGY

## 2024-07-08 PROCEDURE — 99214 OFFICE O/P EST MOD 30 MIN: CPT | Mod: HCNC,S$GLB,, | Performed by: OPHTHALMOLOGY

## 2024-07-08 PROCEDURE — 1159F MED LIST DOCD IN RCRD: CPT | Mod: HCNC,CPTII,S$GLB, | Performed by: OPHTHALMOLOGY

## 2024-07-10 PROBLEM — H40.1122 PRIMARY OPEN ANGLE GLAUCOMA (POAG) OF LEFT EYE, MODERATE STAGE: Status: ACTIVE | Noted: 2024-07-10

## 2024-07-10 PROBLEM — Z98.49 STATUS POST CATARACT EXTRACTION AND INSERTION OF INTRAOCULAR LENS: Status: ACTIVE | Noted: 2024-07-10

## 2024-07-10 PROBLEM — H40.1113 PRIMARY OPEN ANGLE GLAUCOMA (POAG) OF RIGHT EYE, SEVERE STAGE: Status: ACTIVE | Noted: 2024-07-10

## 2024-07-10 PROBLEM — Z96.1 STATUS POST CATARACT EXTRACTION AND INSERTION OF INTRAOCULAR LENS: Status: ACTIVE | Noted: 2024-07-10

## 2024-07-10 NOTE — PROGRESS NOTES
Subjective:       Patient ID: Vinnie Siegel is a 78 y.o. male.    Chief Complaint: Glaucoma (3 mon iop chk- Dr. Nielson pt) and Concerns About Ocular Health    HPI     Glaucoma     Additional comments: 3 mon iop chk- Dr. Nielson pt           Comments    Pt states his va seems to be doing well in OU.    S/P phaco w/IOL OD- 9/21/2023   S/P phaco w/IOL OS- 8/10/2023   POAG Severe OD Indeterminate Stage OS     Meds:  Dorzolamide/Timolol BID OU            Last edited by Mark Santiago on 7/8/2024  9:34 AM.               Assessment & Plan   Primary open angle glaucoma (POAG) of right eye, severe stage    Primary open angle glaucoma (POAG) of left eye, moderate stage    Status post cataract extraction and insertion of intraocular lens, unspecified laterality       Dr Nielson    [facility] department    Dialysis    POAG severe OD /// Moderate OS  Fhx ()  Tm 23 // 28    CCT  564 // 566    Target mid teens --> discussed    Both eyes --> tolerating well & good adherence --> [facility]  Dorzolamide-Timolol BID    Latanoprost  --> intolerant      PC IOL OU Yvonne Nielson  Quiet  Observe  Complex CE/IOL OS (white mature cataract, malyugan ring, trypan blue)  IOL  DIBOO +21.00 target -0.08  C/b post op IRITIS -- resolved      Dry Eye Syndrome: discussed use of warm compresses, preserved & non-preserved artificial tears, gel and PM ointment options.  Also discussed options utilizing medications.    NIDDM  No BDR / CSME  Control --> discussed    Plan  RTC 4 months IOP & HVF Faster & OCT RNFL & adherence  RTC sooner prn with good understanding

## 2024-08-16 NOTE — PROGRESS NOTES
Subjective:      Patient ID: Vinnie Siegel is a 79 y.o. male.    Chief Complaint:  Diabetes    History of Present Illness  Vinnie Siegel is here for follow up of DM.  Previously seen by me 4/2024.    With regards to Diabetes:    Diagnosed: ~2018  DE: 4/2024  Known complications:  DKA Denies  RN Denies  Eye Exam: 3/2024  PN Denies  Podiatry: None  Nephropathy +  HD T/Th/Sa  CAD +  Denies history of pancreatitis & personal/family history of medullary thyroid cancer.     Diet/Exercise:  Eats 2 meals a day. Skips B or L.   Snacks : Yes.   Drinks : sprite, root beer, water.   Exercise : None.   Recent illness, injury, steroids: Denies     Current Regimen:  Lantus 50 units nightly  Humalog 17 units before meals     A friend administers his insulin - no specific reason why - patient can administer it  Occasionally missing lunch dose.    Applied for [facility] - pending - he does not want to proceed with this.     Other medications tried:  Metformin - stopped for CKD   Trulicity - stopped due to cost , denied having any side effects  [facility]    Glucose Monitor: Dexcom G7 -    6 times a day testing  Log reviewed: sensor downloaded and reviewed.    Hypoglycemia:  Occasionally overnight - has not confirmed with SMBG check    Knows how to correct with 15 grams of carbs- juice, coke, or a peppermint.       Diabetes Management Status    Hemoglobin A1C   Date Value Ref Range Status   11/01/2023 10.9 (H) 4.0 - 5.6 % Final     Comment:     ADA Screening Guidelines:  5.7-6.4%  Consistent with prediabetes  >or=6.5%  Consistent with diabetes    High levels of fetal hemoglobin interfere with the HbA1C  assay. Heterozygous hemoglobin variants (HbS, HgC, etc)do  not significantly interfere with this assay.   However, presence of multiple variants may affect accuracy.     07/03/2023 8.9 (H) 4.0 - 5.6 % Final     Comment:     ADA Screening Guidelines:  5.7-6.4%   "Consistent with prediabetes  >or=6.5%  Consistent with diabetes    High levels of fetal hemoglobin interfere with the HbA1C  assay. Heterozygous hemoglobin variants (HbS, HgC, etc)do  not significantly interfere with this assay.   However, presence of multiple variants may affect accuracy.     06/26/2023 9.1 (H) 4.0 - 5.6 % Final     Comment:     ADA Screening Guidelines:  5.7-6.4%  Consistent with prediabetes  >or=6.5%  Consistent with diabetes    High levels of fetal hemoglobin interfere with the HbA1C  assay. Heterozygous hemoglobin variants (HbS, HgC, etc)do  not significantly interfere with this assay.   However, presence of multiple variants may affect accuracy.         Statin: Taking  ACE/ARB: Not taking  Screening or Prevention Patient's value Goal Complete/Controlled?   HgA1C Testing and Control   Lab Results   Component Value Date    HGBA1C 10.9 (H) 11/01/2023      Annually/Less than 8% No   Lipid profile : 11/01/2023 Annually Yes   LDL control Lab Results   Component Value Date    LDLCALC 97.0 11/01/2023    Annually/Less than 100 mg/dl  Yes   Nephropathy screening Lab Results   Component Value Date    LABMICR 155.0 06/18/2019     Lab Results   Component Value Date    PROTEINUA 2+ (A) 11/23/2022    Annually No   Blood pressure BP Readings from Last 1 Encounters:   02/21/24 (!) 147/72    Less than 140/90 No   Dilated retinal exam : 07/10/2024 Annually Yes   Foot exam   Most Recent Foot Exam Date: Not Found Annually Yes       Review of Systems  As above     Objective:   Physical Exam  Vitals reviewed.   Cardiovascular:      Rate and Rhythm: Normal rate.      Comments: No edema present  Pulmonary:      Effort: Pulmonary effort is normal.   Abdominal:      Palpations: Abdomen is soft.       Injection sites are without edema or erythema. No lipo hypertropthy or atrophy.    Visit Vitals  Pulse 79   Ht 6' 2" (1.88 m)   Wt 128 kg (282 lb 1.3 oz)   SpO2 98%   BMI 36.22 kg/m²           Body mass index is 36.22 " kg/m².    Lab Review:   Lab Results   Component Value Date    HGBA1C 10.9 (H) 11/01/2023    HGBA1C 8.9 (H) 07/03/2023    HGBA1C 9.1 (H) 06/26/2023       Lab Results   Component Value Date    CHOL 176 11/01/2023    HDL 58 11/01/2023    LDLCALC 97.0 11/01/2023    TRIG 105 11/01/2023    CHOLHDL 33.0 11/01/2023     Lab Results   Component Value Date     (L) 12/04/2023    K 4.4 12/04/2023    CL 89 (L) 12/04/2023    CO2 26 12/04/2023     (HH) 12/04/2023    BUN 51 (H) 12/04/2023    CREATININE 7.0 (H) 12/04/2023    CALCIUM 9.5 12/04/2023    PROT 7.2 12/04/2023    ALBUMIN 3.2 (L) 12/04/2023    BILITOT 0.7 12/04/2023    ALKPHOS 56 12/04/2023    AST 11 12/04/2023    ALT 9 (L) 12/04/2023    ANIONGAP 16 12/04/2023    ESTGFRAFRICA 8.1 (A) 09/21/2021    EGFRNONAA 7.0 (A) 09/21/2021    TSH 1.311 11/01/2023     Vit D, 25-Hydroxy   Date Value Ref Range Status   09/21/2021 17 (L) 30 - 96 ng/mL Final     Comment:     Vitamin D deficiency.........<10 ng/mL                              Vitamin D insufficiency......10-29 ng/mL       Vitamin D sufficiency........> or equal to 30 ng/mL  Vitamin D toxicity............>100 ng/mL       Assessment and Plan     1. Type 2 diabetes mellitus with chronic kidney disease on chronic dialysis, with long-term current use of insulin  Hemoglobin A1C    Fructosamine    Glucose, Random    TSH      2. Type 2 diabetes mellitus with hyperglycemia, without long-term current use of insulin  insulin glargine U-100, Lantus, (LANTUS SOLOSTAR U-100 INSULIN) 100 unit/mL (3 mL) InPn pen      3. Essential hypertension  atorvastatin (LIPITOR) 80 MG tablet      4. Dilated cardiomyopathy  atorvastatin (LIPITOR) 80 MG tablet      5. Hyperlipidemia, unspecified hyperlipidemia type  atorvastatin (LIPITOR) 80 MG tablet      6. ESRD on hemodialysis            Type 2 diabetes mellitus with chronic kidney disease on chronic dialysis, with long-term current use of insulin  -- Labs now.  -- A1c goal <8%.  --  Medications discussed:  MFM - ESRD  GLP1-DPP4 - costly  VEGA   SGLT2 - ESRD  Insulin   -- Reviewed logs/CGM:  Glucose variable but improving - suspect due to medication noncompliance.   Encouraged to confirm hypoglycemia with SMBG check.   -- Medication Changes:   Lantus 45 units nightly  Humalog 17 units before meals   Okay to take Humalog for large snack replacing L.  -- Reviewed goals of therapy are to get the best control we can without hypoglycemia.  -- Reviewed patient's current insulin regimen. Clarified proper insulin dose and timing in relation to meals, etc. Insulin injection sites and proper rotation instructed.    -- Advised frequent self blood glucose monitoring.  Patient encouraged to document glucose results and bring them to every clinic visit.  -- Hypoglycemia precautions discussed. Instructed on precautions before driving.    -- Call for Bg repeatedly < 90 or > 180.   -- Close adherence to lifestyle changes recommended.   -- Periodic follow ups for eye evaluations, foot care and dental care suggested.    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          Follow up in about 4 months (around 12/21/2024).

## 2024-08-21 ENCOUNTER — OFFICE VISIT (OUTPATIENT)
Dept: ENDOCRINOLOGY | Facility: CLINIC | Age: 79
End: 2024-08-21
Payer: MEDICARE

## 2024-08-21 VITALS — HEIGHT: 74 IN | WEIGHT: 282.06 LBS | BODY MASS INDEX: 36.2 KG/M2 | HEART RATE: 79 BPM | OXYGEN SATURATION: 98 %

## 2024-08-21 DIAGNOSIS — N18.6 ESRD ON HEMODIALYSIS: ICD-10-CM

## 2024-08-21 DIAGNOSIS — Z99.2 ESRD ON HEMODIALYSIS: ICD-10-CM

## 2024-08-21 DIAGNOSIS — E78.5 HYPERLIPIDEMIA, UNSPECIFIED HYPERLIPIDEMIA TYPE: ICD-10-CM

## 2024-08-21 DIAGNOSIS — I10 ESSENTIAL HYPERTENSION: ICD-10-CM

## 2024-08-21 DIAGNOSIS — E11.65 TYPE 2 DIABETES MELLITUS WITH HYPERGLYCEMIA, WITHOUT LONG-TERM CURRENT USE OF INSULIN: ICD-10-CM

## 2024-08-21 DIAGNOSIS — Z99.2 TYPE 2 DIABETES MELLITUS WITH CHRONIC KIDNEY DISEASE ON CHRONIC DIALYSIS, WITH LONG-TERM CURRENT USE OF INSULIN: Primary | ICD-10-CM

## 2024-08-21 DIAGNOSIS — N18.6 TYPE 2 DIABETES MELLITUS WITH CHRONIC KIDNEY DISEASE ON CHRONIC DIALYSIS, WITH LONG-TERM CURRENT USE OF INSULIN: Primary | ICD-10-CM

## 2024-08-21 DIAGNOSIS — E11.22 TYPE 2 DIABETES MELLITUS WITH CHRONIC KIDNEY DISEASE ON CHRONIC DIALYSIS, WITH LONG-TERM CURRENT USE OF INSULIN: Primary | ICD-10-CM

## 2024-08-21 DIAGNOSIS — I42.0 DILATED CARDIOMYOPATHY: ICD-10-CM

## 2024-08-21 DIAGNOSIS — Z79.4 TYPE 2 DIABETES MELLITUS WITH CHRONIC KIDNEY DISEASE ON CHRONIC DIALYSIS, WITH LONG-TERM CURRENT USE OF INSULIN: Primary | ICD-10-CM

## 2024-08-21 PROCEDURE — 99999 PR PBB SHADOW E&M-EST. PATIENT-LVL IV: CPT | Mod: PBBFAC,HCNC,, | Performed by: NURSE PRACTITIONER

## 2024-08-21 RX ORDER — ATORVASTATIN CALCIUM 80 MG/1
80 TABLET, FILM COATED ORAL DAILY
Qty: 90 TABLET | Refills: 3 | Status: SHIPPED | OUTPATIENT
Start: 2024-08-21

## 2024-08-21 RX ORDER — INSULIN GLARGINE 100 [IU]/ML
45 INJECTION, SOLUTION SUBCUTANEOUS NIGHTLY
Qty: 40 ML | Refills: 3 | Status: SHIPPED | OUTPATIENT
Start: 2024-08-21

## 2024-08-21 NOTE — ASSESSMENT & PLAN NOTE
-- Labs now.  -- A1c goal <8%.  -- Medications discussed:  MFM - ESRD  GLP1-DPP4 - costly  VEGA   SGLT2 - ESRD  Insulin   -- Reviewed logs/CGM:  Glucose variable but improving - suspect due to medication noncompliance.   Encouraged to confirm hypoglycemia with SMBG check.   -- Medication Changes:   Lantus 45 units nightly  Humalog 17 units before meals   Okay to take Humalog for large snack replacing L.  -- Reviewed goals of therapy are to get the best control we can without hypoglycemia.  -- Reviewed patient's current insulin regimen. Clarified proper insulin dose and timing in relation to meals, etc. Insulin injection sites and proper rotation instructed.    -- Advised frequent self blood glucose monitoring.  Patient encouraged to document glucose results and bring them to every clinic visit.  -- Hypoglycemia precautions discussed. Instructed on precautions before driving.    -- Call for Bg repeatedly < 90 or > 180.   -- Close adherence to lifestyle changes recommended.   -- Periodic follow ups for eye evaluations, foot care and dental care suggested.

## 2024-08-28 ENCOUNTER — TELEPHONE (OUTPATIENT)
Dept: PRIMARY CARE CLINIC | Facility: CLINIC | Age: 79
End: 2024-08-28
Payer: MEDICARE

## 2024-08-28 ENCOUNTER — OFFICE VISIT (OUTPATIENT)
Dept: PRIMARY CARE CLINIC | Facility: CLINIC | Age: 79
End: 2024-08-28
Attending: FAMILY MEDICINE
Payer: MEDICARE

## 2024-08-28 ENCOUNTER — LAB VISIT (OUTPATIENT)
Dept: LAB | Facility: HOSPITAL | Age: 79
End: 2024-08-28
Payer: MEDICARE

## 2024-08-28 VITALS
HEIGHT: 74 IN | DIASTOLIC BLOOD PRESSURE: 69 MMHG | OXYGEN SATURATION: 99 % | BODY MASS INDEX: 35.88 KG/M2 | WEIGHT: 279.56 LBS | HEART RATE: 70 BPM | SYSTOLIC BLOOD PRESSURE: 125 MMHG

## 2024-08-28 DIAGNOSIS — N40.0 BENIGN PROSTATIC HYPERPLASIA, UNSPECIFIED WHETHER LOWER URINARY TRACT SYMPTOMS PRESENT: ICD-10-CM

## 2024-08-28 DIAGNOSIS — E11.22 TYPE 2 DIABETES MELLITUS WITH CHRONIC KIDNEY DISEASE ON CHRONIC DIALYSIS, WITH LONG-TERM CURRENT USE OF INSULIN: ICD-10-CM

## 2024-08-28 DIAGNOSIS — Z12.5 ENCOUNTER FOR SCREENING FOR MALIGNANT NEOPLASM OF PROSTATE: ICD-10-CM

## 2024-08-28 DIAGNOSIS — N18.6 TYPE 2 DIABETES MELLITUS WITH CHRONIC KIDNEY DISEASE ON CHRONIC DIALYSIS, WITH LONG-TERM CURRENT USE OF INSULIN: ICD-10-CM

## 2024-08-28 DIAGNOSIS — E87.20 ACIDOSIS: ICD-10-CM

## 2024-08-28 DIAGNOSIS — E11.59 HYPERTENSION ASSOCIATED WITH TYPE 2 DIABETES MELLITUS: ICD-10-CM

## 2024-08-28 DIAGNOSIS — I42.0 DILATED CARDIOMYOPATHY: ICD-10-CM

## 2024-08-28 DIAGNOSIS — K92.2 GASTROINTESTINAL HEMORRHAGE, UNSPECIFIED GASTROINTESTINAL HEMORRHAGE TYPE: ICD-10-CM

## 2024-08-28 DIAGNOSIS — E78.5 DYSLIPIDEMIA: ICD-10-CM

## 2024-08-28 DIAGNOSIS — E11.65 TYPE 2 DIABETES MELLITUS WITH HYPERGLYCEMIA, WITHOUT LONG-TERM CURRENT USE OF INSULIN: Primary | ICD-10-CM

## 2024-08-28 DIAGNOSIS — E78.5 HYPERLIPIDEMIA, UNSPECIFIED HYPERLIPIDEMIA TYPE: ICD-10-CM

## 2024-08-28 DIAGNOSIS — Z12.5 SCREENING PSA (PROSTATE SPECIFIC ANTIGEN): ICD-10-CM

## 2024-08-28 DIAGNOSIS — E11.65 TYPE 2 DIABETES MELLITUS WITH HYPERGLYCEMIA, WITHOUT LONG-TERM CURRENT USE OF INSULIN: ICD-10-CM

## 2024-08-28 DIAGNOSIS — Z99.2 TYPE 2 DIABETES MELLITUS WITH CHRONIC KIDNEY DISEASE ON CHRONIC DIALYSIS, WITH LONG-TERM CURRENT USE OF INSULIN: ICD-10-CM

## 2024-08-28 DIAGNOSIS — J30.2 SEASONAL ALLERGIES: ICD-10-CM

## 2024-08-28 DIAGNOSIS — E11.69 MIXED DIABETIC HYPERLIPIDEMIA ASSOCIATED WITH TYPE 2 DIABETES MELLITUS: ICD-10-CM

## 2024-08-28 DIAGNOSIS — E78.2 MIXED DIABETIC HYPERLIPIDEMIA ASSOCIATED WITH TYPE 2 DIABETES MELLITUS: ICD-10-CM

## 2024-08-28 DIAGNOSIS — L72.0 EPIDERMOID CYST OF NECK: Primary | ICD-10-CM

## 2024-08-28 DIAGNOSIS — I15.2 HYPERTENSION ASSOCIATED WITH TYPE 2 DIABETES MELLITUS: ICD-10-CM

## 2024-08-28 DIAGNOSIS — I10 ESSENTIAL HYPERTENSION: ICD-10-CM

## 2024-08-28 DIAGNOSIS — Z79.4 TYPE 2 DIABETES MELLITUS WITH CHRONIC KIDNEY DISEASE ON CHRONIC DIALYSIS, WITH LONG-TERM CURRENT USE OF INSULIN: ICD-10-CM

## 2024-08-28 DIAGNOSIS — Z00.00 ANNUAL PHYSICAL EXAM: ICD-10-CM

## 2024-08-28 LAB
ALBUMIN SERPL BCP-MCNC: 3.2 G/DL (ref 3.5–5.2)
ALP SERPL-CCNC: 61 U/L (ref 55–135)
ALT SERPL W/O P-5'-P-CCNC: 7 U/L (ref 10–44)
ANION GAP SERPL CALC-SCNC: 13 MMOL/L (ref 8–16)
AST SERPL-CCNC: 9 U/L (ref 10–40)
BASOPHILS # BLD AUTO: 0.04 K/UL (ref 0–0.2)
BASOPHILS NFR BLD: 0.7 % (ref 0–1.9)
BILIRUB SERPL-MCNC: 0.7 MG/DL (ref 0.1–1)
BUN SERPL-MCNC: 34 MG/DL (ref 8–23)
CALCIUM SERPL-MCNC: 8.5 MG/DL (ref 8.7–10.5)
CHLORIDE SERPL-SCNC: 100 MMOL/L (ref 95–110)
CHOLEST SERPL-MCNC: 205 MG/DL (ref 120–199)
CHOLEST/HDLC SERPL: 4.7 {RATIO} (ref 2–5)
CO2 SERPL-SCNC: 23 MMOL/L (ref 23–29)
COMPLEXED PSA SERPL-MCNC: 0.88 NG/ML (ref 0–4)
CREAT SERPL-MCNC: 7.1 MG/DL (ref 0.5–1.4)
DIFFERENTIAL METHOD BLD: ABNORMAL
EOSINOPHIL # BLD AUTO: 0.1 K/UL (ref 0–0.5)
EOSINOPHIL NFR BLD: 2.2 % (ref 0–8)
ERYTHROCYTE [DISTWIDTH] IN BLOOD BY AUTOMATED COUNT: 15.1 % (ref 11.5–14.5)
EST. GFR  (NO RACE VARIABLE): 7.3 ML/MIN/1.73 M^2
ESTIMATED AVG GLUCOSE: 151 MG/DL (ref 68–131)
GLUCOSE SERPL-MCNC: 136 MG/DL (ref 70–110)
GLUCOSE SERPL-MCNC: 137 MG/DL (ref 70–110)
HBA1C MFR BLD: 6.9 % (ref 4–5.6)
HCT VFR BLD AUTO: 37.3 % (ref 40–54)
HDLC SERPL-MCNC: 44 MG/DL (ref 40–75)
HDLC SERPL: 21.5 % (ref 20–50)
HGB BLD-MCNC: 12.2 G/DL (ref 14–18)
IMM GRANULOCYTES # BLD AUTO: 0.02 K/UL (ref 0–0.04)
IMM GRANULOCYTES NFR BLD AUTO: 0.3 % (ref 0–0.5)
LDLC SERPL CALC-MCNC: 137.4 MG/DL (ref 63–159)
LYMPHOCYTES # BLD AUTO: 1.5 K/UL (ref 1–4.8)
LYMPHOCYTES NFR BLD: 26 % (ref 18–48)
MCH RBC QN AUTO: 31 PG (ref 27–31)
MCHC RBC AUTO-ENTMCNC: 32.7 G/DL (ref 32–36)
MCV RBC AUTO: 95 FL (ref 82–98)
MONOCYTES # BLD AUTO: 0.7 K/UL (ref 0.3–1)
MONOCYTES NFR BLD: 11.5 % (ref 4–15)
NEUTROPHILS # BLD AUTO: 3.5 K/UL (ref 1.8–7.7)
NEUTROPHILS NFR BLD: 59.3 % (ref 38–73)
NONHDLC SERPL-MCNC: 161 MG/DL
NRBC BLD-RTO: 0 /100 WBC
PLATELET # BLD AUTO: 258 K/UL (ref 150–450)
PMV BLD AUTO: 10.2 FL (ref 9.2–12.9)
POTASSIUM SERPL-SCNC: 3.5 MMOL/L (ref 3.5–5.1)
PROT SERPL-MCNC: 7.3 G/DL (ref 6–8.4)
RBC # BLD AUTO: 3.94 M/UL (ref 4.6–6.2)
SODIUM SERPL-SCNC: 136 MMOL/L (ref 136–145)
TRIGL SERPL-MCNC: 118 MG/DL (ref 30–150)
TSH SERPL DL<=0.005 MIU/L-ACNC: 1.08 UIU/ML (ref 0.4–4)
WBC # BLD AUTO: 5.85 K/UL (ref 3.9–12.7)

## 2024-08-28 PROCEDURE — 80061 LIPID PANEL: CPT | Mod: HCNC | Performed by: INTERNAL MEDICINE

## 2024-08-28 PROCEDURE — 1159F MED LIST DOCD IN RCRD: CPT | Mod: HCNC,CPTII,S$GLB, | Performed by: FAMILY MEDICINE

## 2024-08-28 PROCEDURE — 85025 COMPLETE CBC W/AUTO DIFF WBC: CPT | Mod: HCNC | Performed by: FAMILY MEDICINE

## 2024-08-28 PROCEDURE — 82947 ASSAY GLUCOSE BLOOD QUANT: CPT | Mod: HCNC | Performed by: NURSE PRACTITIONER

## 2024-08-28 PROCEDURE — 1101F PT FALLS ASSESS-DOCD LE1/YR: CPT | Mod: HCNC,CPTII,S$GLB, | Performed by: FAMILY MEDICINE

## 2024-08-28 PROCEDURE — 82985 ASSAY OF GLYCATED PROTEIN: CPT | Mod: HCNC | Performed by: NURSE PRACTITIONER

## 2024-08-28 PROCEDURE — 99215 OFFICE O/P EST HI 40 MIN: CPT | Mod: HCNC,S$GLB,, | Performed by: FAMILY MEDICINE

## 2024-08-28 PROCEDURE — 1160F RVW MEDS BY RX/DR IN RCRD: CPT | Mod: HCNC,CPTII,S$GLB, | Performed by: FAMILY MEDICINE

## 2024-08-28 PROCEDURE — 80053 COMPREHEN METABOLIC PANEL: CPT | Mod: HCNC | Performed by: FAMILY MEDICINE

## 2024-08-28 PROCEDURE — 1126F AMNT PAIN NOTED NONE PRSNT: CPT | Mod: HCNC,CPTII,S$GLB, | Performed by: FAMILY MEDICINE

## 2024-08-28 PROCEDURE — 84153 ASSAY OF PSA TOTAL: CPT | Mod: HCNC | Performed by: FAMILY MEDICINE

## 2024-08-28 PROCEDURE — 3288F FALL RISK ASSESSMENT DOCD: CPT | Mod: HCNC,CPTII,S$GLB, | Performed by: FAMILY MEDICINE

## 2024-08-28 PROCEDURE — 3078F DIAST BP <80 MM HG: CPT | Mod: HCNC,CPTII,S$GLB, | Performed by: FAMILY MEDICINE

## 2024-08-28 PROCEDURE — 84443 ASSAY THYROID STIM HORMONE: CPT | Mod: HCNC | Performed by: NURSE PRACTITIONER

## 2024-08-28 PROCEDURE — 99999 PR PBB SHADOW E&M-EST. PATIENT-LVL IV: CPT | Mod: PBBFAC,HCNC,, | Performed by: FAMILY MEDICINE

## 2024-08-28 PROCEDURE — 83036 HEMOGLOBIN GLYCOSYLATED A1C: CPT | Mod: HCNC | Performed by: NURSE PRACTITIONER

## 2024-08-28 PROCEDURE — 36415 COLL VENOUS BLD VENIPUNCTURE: CPT | Mod: HCNC | Performed by: FAMILY MEDICINE

## 2024-08-28 PROCEDURE — 3074F SYST BP LT 130 MM HG: CPT | Mod: HCNC,CPTII,S$GLB, | Performed by: FAMILY MEDICINE

## 2024-08-28 RX ORDER — HYDRALAZINE HYDROCHLORIDE 100 MG/1
100 TABLET, FILM COATED ORAL 3 TIMES DAILY
Qty: 270 TABLET | Refills: 3 | Status: SHIPPED | OUTPATIENT
Start: 2024-08-28 | End: 2025-08-28

## 2024-08-28 RX ORDER — TAMSULOSIN HYDROCHLORIDE 0.4 MG/1
1 CAPSULE ORAL EVERY MORNING
Qty: 90 CAPSULE | Refills: 3 | Status: SHIPPED | OUTPATIENT
Start: 2024-08-28

## 2024-08-28 RX ORDER — METOPROLOL SUCCINATE 25 MG/1
12.5 TABLET, EXTENDED RELEASE ORAL DAILY
Qty: 45 TABLET | Refills: 3 | Status: SHIPPED | OUTPATIENT
Start: 2024-08-28 | End: 2025-08-28

## 2024-08-28 RX ORDER — ATORVASTATIN CALCIUM 80 MG/1
80 TABLET, FILM COATED ORAL DAILY
Qty: 90 TABLET | Refills: 3 | Status: SHIPPED | OUTPATIENT
Start: 2024-08-28

## 2024-08-28 RX ORDER — TORSEMIDE 20 MG/1
20 TABLET ORAL 2 TIMES DAILY
Qty: 180 TABLET | Refills: 3 | Status: SHIPPED | OUTPATIENT
Start: 2024-08-28

## 2024-08-28 RX ORDER — NIFEDIPINE 90 MG/1
90 TABLET, EXTENDED RELEASE ORAL DAILY
Qty: 90 TABLET | Refills: 3 | Status: SHIPPED | OUTPATIENT
Start: 2024-08-28

## 2024-08-28 RX ORDER — PANTOPRAZOLE SODIUM 40 MG/1
40 TABLET, DELAYED RELEASE ORAL DAILY
Qty: 90 TABLET | Refills: 3 | Status: SHIPPED | OUTPATIENT
Start: 2024-08-28

## 2024-08-28 RX ORDER — FLUTICASONE PROPIONATE 50 MCG
2 SPRAY, SUSPENSION (ML) NASAL NIGHTLY PRN
Qty: 48 G | Refills: 11 | Status: SHIPPED | OUTPATIENT
Start: 2024-08-28

## 2024-08-28 NOTE — TELEPHONE ENCOUNTER
----- Message from Ángela Baldwin sent at 8/28/2024 10:52 AM CDT -----  Regarding: Advice  Contact: [phone]  Patient is at the lab there is no orders in chart. Please contact pt

## 2024-09-03 LAB — FRUCTOSAMINE SERPL-SCNC: 296 UMOL /L

## 2024-09-05 ENCOUNTER — PATIENT MESSAGE (OUTPATIENT)
Dept: VASCULAR SURGERY | Facility: CLINIC | Age: 79
End: 2024-09-05
Payer: MEDICARE

## 2024-09-08 NOTE — PROGRESS NOTES
Subjective:       Patient ID: Vinnie Siegel is a 79 y.o. male.    Chief Complaint: Hypertension (Follow up )    Pt presents today for DM, HTN f/u and general medical wellness exam. Pt is on dialysis now 3 days per week. Per pt, he is trying his best to keep up with his DM. Feels that it is well controlled but aware that his glucose levels and a1c indicate otherwise. Pt also states that he needs to have meds refilled. Is followed by endo  States that he took his BP meds this AM. Being followed by cards as well. Is due to see his specialists    cscope last done 2011. Pt states that they will call him when ready for repeat. Overall he is at his baseline. Is not exercising. Trying to eat better. quit smoking. Does not want ct scan of chest at this time    Medication Refill  Pertinent negatives include no abdominal pain, arthralgias, fatigue, joint swelling, numbness, rash or weakness.   Hypertension  Pertinent negatives include no shortness of breath.   Diabetes  Pertinent negatives for hypoglycemia include no dizziness, nervousness/anxiousness or pallor. Pertinent negatives for diabetes include no fatigue and no weakness.     Review of Systems   Constitutional: Negative.  Negative for activity change, appetite change and fatigue.   HENT: Negative.     Eyes: Negative.    Respiratory: Negative.  Negative for chest tightness and shortness of breath.    Cardiovascular: Negative.    Gastrointestinal: Negative.  Negative for abdominal pain.   Endocrine: Negative.    Genitourinary: Negative.  Negative for difficulty urinating, dysuria, frequency and urgency.   Musculoskeletal: Negative.  Negative for arthralgias, gait problem and joint swelling.   Skin: Negative.  Negative for color change, pallor and rash.   Allergic/Immunologic: Negative.    Neurological:  Negative for dizziness, weakness, light-headedness and numbness.   Hematological: Negative.    Psychiatric/Behavioral: Negative.  Negative for decreased concentration,  dysphoric mood, self-injury, sleep disturbance and suicidal ideas. The patient is not nervous/anxious.    All other systems reviewed and are negative.      Objective:      Physical Exam  Vitals reviewed.   Constitutional:       General: He is not in acute distress.     Appearance: He is well-developed. He is obese. He is not ill-appearing, toxic-appearing or diaphoretic.   HENT:      Head: Normocephalic and atraumatic.      Right Ear: Tympanic membrane, ear canal and external ear normal.      Left Ear: Tympanic membrane, ear canal and external ear normal.      Mouth/Throat:      Pharynx: No oropharyngeal exudate or posterior oropharyngeal erythema.   Eyes:      General: No scleral icterus.        Right eye: No discharge.         Left eye: No discharge.      Extraocular Movements: Extraocular movements intact.      Conjunctiva/sclera: Conjunctivae normal.      Pupils: Pupils are equal, round, and reactive to light.   Neck:      Thyroid: No thyromegaly.      Vascular: No JVD.   Cardiovascular:      Rate and Rhythm: Normal rate and regular rhythm.      Heart sounds: Normal heart sounds. No murmur heard.  Pulmonary:      Effort: Pulmonary effort is normal. No respiratory distress.      Breath sounds: Normal breath sounds. No wheezing or rales.   Chest:      Chest wall: No tenderness.   Abdominal:      General: Bowel sounds are normal. There is no distension.      Palpations: Abdomen is soft. There is no mass.      Tenderness: There is no abdominal tenderness. There is no guarding or rebound.   Musculoskeletal:         General: No tenderness. Normal range of motion.      Cervical back: Normal range of motion and neck supple.      Right lower leg: Edema present.      Left lower leg: Edema present.   Lymphadenopathy:      Cervical: No cervical adenopathy.   Skin:     General: Skin is warm and dry.      Coloration: Skin is not pale.      Findings: No erythema.   Neurological:      Mental Status: He is alert and oriented to  person, place, and time. Mental status is at baseline.      Cranial Nerves: No cranial nerve deficit.      Sensory: No sensory deficit.      Motor: No weakness or abnormal muscle tone.      Coordination: Coordination normal.      Gait: Gait normal.      Deep Tendon Reflexes: Reflexes are normal and symmetric. Reflexes normal.   Psychiatric:         Behavior: Behavior normal.         Thought Content: Thought content normal.         Judgment: Judgment normal.         Assessment:       1. Epidermoid cyst of neck    2. Hypertension associated with type 2 diabetes mellitus    3. Mixed diabetic hyperlipidemia associated with type 2 diabetes mellitus    4. Encounter for screening for malignant neoplasm of prostate    5. Benign prostatic hyperplasia, unspecified whether lower urinary tract symptoms present    6. Essential hypertension    7. Dilated cardiomyopathy    8. Hyperlipidemia, unspecified hyperlipidemia type    9. Seasonal allergies    10. Essential hypertension    11. Gastrointestinal hemorrhage, unspecified gastrointestinal hemorrhage type    12. Acidosis    13. Dyslipidemia        Plan:       HTN: controlled.   DM: followed by endocrine, uncontrolled  Epidermoid cyst of neck  -     Ambulatory referral/consult to General Surgery; Future; Expected date: 09/04/2024    Hypertension associated with type 2 diabetes mellitus  -     Cancel: Lipid Panel; Future; Expected date: 08/28/2024  -     Cancel: CBC Auto Differential; Future; Expected date: 08/28/2024  -     Cancel: Comprehensive Metabolic Panel; Future; Expected date: 08/28/2024  -     Cancel: PSA, SCREENING; Future; Expected date: 08/28/2024    Mixed diabetic hyperlipidemia associated with type 2 diabetes mellitus  -     Cancel: Lipid Panel; Future; Expected date: 08/28/2024  -     Cancel: CBC Auto Differential; Future; Expected date: 08/28/2024  -     Cancel: Comprehensive Metabolic Panel; Future; Expected date: 08/28/2024  -     Cancel: PSA, SCREENING; Future;  Expected date: 08/28/2024    Encounter for screening for malignant neoplasm of prostate  -     Cancel: PSA, SCREENING; Future; Expected date: 08/28/2024    Benign prostatic hyperplasia, unspecified whether lower urinary tract symptoms present  -     Ambulatory referral/consult to Urology; Future; Expected date: 09/04/2024    Essential hypertension  -     atorvastatin (LIPITOR) 80 MG tablet; Take 1 tablet (80 mg total) by mouth once daily.  Dispense: 90 tablet; Refill: 3  -     hydrALAZINE (APRESOLINE) 100 MG tablet; Take 1 tablet (100 mg total) by mouth 3 (three) times daily.  Dispense: 270 tablet; Refill: 3  -     metoprolol succinate (TOPROL-XL) 25 MG 24 hr tablet; Take 0.5 tablets (12.5 mg total) by mouth once daily.  Dispense: 45 tablet; Refill: 3  -     NIFEdipine (PROCARDIA-XL) 90 MG (OSM) 24 hr tablet; Take 1 tablet (90 mg total) by mouth once daily.  Dispense: 90 tablet; Refill: 3  -     torsemide (DEMADEX) 20 MG Tab; Take 1 tablet (20 mg total) by mouth 2 (two) times daily.  Dispense: 180 tablet; Refill: 3    Dilated cardiomyopathy  -     atorvastatin (LIPITOR) 80 MG tablet; Take 1 tablet (80 mg total) by mouth once daily.  Dispense: 90 tablet; Refill: 3  -     fluticasone propionate (FLONASE) 50 mcg/actuation nasal spray; 2 sprays (100 mcg total) by Each Nostril route nightly as needed for Rhinitis.  Dispense: 48 g; Refill: 11    Hyperlipidemia, unspecified hyperlipidemia type  -     atorvastatin (LIPITOR) 80 MG tablet; Take 1 tablet (80 mg total) by mouth once daily.  Dispense: 90 tablet; Refill: 3    Seasonal allergies  -     fluticasone propionate (FLONASE) 50 mcg/actuation nasal spray; 2 sprays (100 mcg total) by Each Nostril route nightly as needed for Rhinitis.  Dispense: 48 g; Refill: 11    Essential hypertension  Comments:  Unknown home BP. Sched f/u c PCP. Continue current meds for now. ARB indicated d/t CKD, HTN, DM.  Can likely start since he has been stable on HD.  Orders:  -     atorvastatin  (LIPITOR) 80 MG tablet; Take 1 tablet (80 mg total) by mouth once daily.  Dispense: 90 tablet; Refill: 3  -     hydrALAZINE (APRESOLINE) 100 MG tablet; Take 1 tablet (100 mg total) by mouth 3 (three) times daily.  Dispense: 270 tablet; Refill: 3  -     metoprolol succinate (TOPROL-XL) 25 MG 24 hr tablet; Take 0.5 tablets (12.5 mg total) by mouth once daily.  Dispense: 45 tablet; Refill: 3  -     NIFEdipine (PROCARDIA-XL) 90 MG (OSM) 24 hr tablet; Take 1 tablet (90 mg total) by mouth once daily.  Dispense: 90 tablet; Refill: 3  -     torsemide (DEMADEX) 20 MG Tab; Take 1 tablet (20 mg total) by mouth 2 (two) times daily.  Dispense: 180 tablet; Refill: 3    Gastrointestinal hemorrhage, unspecified gastrointestinal hemorrhage type  -     pantoprazole (PROTONIX) 40 MG tablet; Take 1 tablet (40 mg total) by mouth once daily.  Dispense: 90 tablet; Refill: 3    Acidosis  -     tamsulosin (FLOMAX) 0.4 mg Cap; Take 1 capsule (0.4 mg total) by mouth every morning.  Dispense: 90 capsule; Refill: 3    Dyslipidemia    f/u with specialists, I will do 1 time refill of tamsulosin patient should follow up with Urology. Pt doing well overall, can do LDCT scan later since pt declines at this time  RTC prn symptoms or 6 mos

## 2024-09-26 ENCOUNTER — TELEPHONE (OUTPATIENT)
Dept: VASCULAR SURGERY | Facility: CLINIC | Age: 79
End: 2024-09-26
Payer: MEDICARE

## 2024-09-26 ENCOUNTER — HOSPITAL ENCOUNTER (OUTPATIENT)
Facility: HOSPITAL | Age: 79
Discharge: HOME | End: 2024-09-28
Attending: STUDENT IN AN ORGANIZED HEALTH CARE EDUCATION/TRAINING PROGRAM | Admitting: STUDENT IN AN ORGANIZED HEALTH CARE EDUCATION/TRAINING PROGRAM
Payer: MEDICARE

## 2024-09-26 DIAGNOSIS — N18.6 ESRD (END STAGE RENAL DISEASE) ON DIALYSIS: ICD-10-CM

## 2024-09-26 DIAGNOSIS — Z78.9 PROBLEM WITH VASCULAR ACCESS: Primary | ICD-10-CM

## 2024-09-26 DIAGNOSIS — Z01.818 PRE-OP EXAM: ICD-10-CM

## 2024-09-26 DIAGNOSIS — I49.9 DYSRHYTHMIA: ICD-10-CM

## 2024-09-26 DIAGNOSIS — N18.6 ESRD (END STAGE RENAL DISEASE): ICD-10-CM

## 2024-09-26 DIAGNOSIS — R07.9 CHEST PAIN: ICD-10-CM

## 2024-09-26 DIAGNOSIS — T82.868A AV FISTULA THROMBOSIS, INITIAL ENCOUNTER: ICD-10-CM

## 2024-09-26 DIAGNOSIS — Z99.2 ESRD (END STAGE RENAL DISEASE) ON DIALYSIS: ICD-10-CM

## 2024-09-26 DIAGNOSIS — I44.1 WENCKEBACH: ICD-10-CM

## 2024-09-26 DIAGNOSIS — I45.10 RBBB: ICD-10-CM

## 2024-09-26 PROBLEM — E87.6 HYPOKALEMIA: Status: ACTIVE | Noted: 2024-09-26

## 2024-09-26 PROBLEM — L02.11 ABSCESS OF NECK: Status: RESOLVED | Noted: 2020-07-02 | Resolved: 2024-09-26

## 2024-09-26 LAB
ALBUMIN SERPL BCP-MCNC: 3 G/DL (ref 3.5–5.2)
ALP SERPL-CCNC: 59 U/L (ref 55–135)
ALT SERPL W/O P-5'-P-CCNC: 7 U/L (ref 10–44)
ANION GAP SERPL CALC-SCNC: 15 MMOL/L (ref 8–16)
AST SERPL-CCNC: 8 U/L (ref 10–40)
BASOPHILS # BLD AUTO: 0.03 K/UL (ref 0–0.2)
BASOPHILS NFR BLD: 0.4 % (ref 0–1.9)
BILIRUB SERPL-MCNC: 0.6 MG/DL (ref 0.1–1)
BUN SERPL-MCNC: 43 MG/DL (ref 8–23)
BUN SERPL-MCNC: 48 MG/DL (ref 6–30)
CALCIUM SERPL-MCNC: 7.7 MG/DL (ref 8.7–10.5)
CHLORIDE SERPL-SCNC: 101 MMOL/L (ref 95–110)
CHLORIDE SERPL-SCNC: 96 MMOL/L (ref 95–110)
CO2 SERPL-SCNC: 24 MMOL/L (ref 23–29)
CREAT SERPL-MCNC: 10.1 MG/DL (ref 0.5–1.4)
CREAT SERPL-MCNC: 9.2 MG/DL (ref 0.5–1.4)
DIFFERENTIAL METHOD BLD: ABNORMAL
EOSINOPHIL # BLD AUTO: 0.1 K/UL (ref 0–0.5)
EOSINOPHIL NFR BLD: 1.6 % (ref 0–8)
ERYTHROCYTE [DISTWIDTH] IN BLOOD BY AUTOMATED COUNT: 14.7 % (ref 11.5–14.5)
EST. GFR  (NO RACE VARIABLE): 5.4 ML/MIN/1.73 M^2
GLUCOSE SERPL-MCNC: 119 MG/DL (ref 70–110)
GLUCOSE SERPL-MCNC: 137 MG/DL (ref 70–110)
HCT VFR BLD AUTO: 37.2 % (ref 40–54)
HCT VFR BLD CALC: 39 %PCV (ref 36–54)
HCV AB SERPL QL IA: NORMAL
HGB BLD-MCNC: 12 G/DL (ref 14–18)
HIV 1+2 AB+HIV1 P24 AG SERPL QL IA: NORMAL
IMM GRANULOCYTES # BLD AUTO: 0.03 K/UL (ref 0–0.04)
IMM GRANULOCYTES NFR BLD AUTO: 0.4 % (ref 0–0.5)
LYMPHOCYTES # BLD AUTO: 1.4 K/UL (ref 1–4.8)
LYMPHOCYTES NFR BLD: 19.7 % (ref 18–48)
MAGNESIUM SERPL-MCNC: 1.8 MG/DL (ref 1.6–2.6)
MCH RBC QN AUTO: 30.4 PG (ref 27–31)
MCHC RBC AUTO-ENTMCNC: 32.3 G/DL (ref 32–36)
MCV RBC AUTO: 94 FL (ref 82–98)
MONOCYTES # BLD AUTO: 0.7 K/UL (ref 0.3–1)
MONOCYTES NFR BLD: 9.6 % (ref 4–15)
NEUTROPHILS # BLD AUTO: 5 K/UL (ref 1.8–7.7)
NEUTROPHILS NFR BLD: 68.3 % (ref 38–73)
NRBC BLD-RTO: 0 /100 WBC
OHS QRS DURATION: 142 MS
OHS QRS DURATION: 150 MS
OHS QTC CALCULATION: 514 MS
OHS QTC CALCULATION: 550 MS
PHOSPHATE SERPL-MCNC: 4.3 MG/DL (ref 2.7–4.5)
PLATELET # BLD AUTO: 242 K/UL (ref 150–450)
PMV BLD AUTO: 10.2 FL (ref 9.2–12.9)
POC IONIZED CALCIUM: 0.89 MMOL/L (ref 1.06–1.42)
POC TCO2 (MEASURED): 27 MMOL/L (ref 23–29)
POCT GLUCOSE: 130 MG/DL (ref 70–110)
POCT GLUCOSE: 207 MG/DL (ref 70–110)
POCT GLUCOSE: 221 MG/DL (ref 70–110)
POTASSIUM BLD-SCNC: 4.4 MMOL/L (ref 3.5–5.1)
POTASSIUM SERPL-SCNC: 3 MMOL/L (ref 3.5–5.1)
PROT SERPL-MCNC: 6.7 G/DL (ref 6–8.4)
RBC # BLD AUTO: 3.95 M/UL (ref 4.6–6.2)
SAMPLE: ABNORMAL
SODIUM BLD-SCNC: 135 MMOL/L (ref 136–145)
SODIUM SERPL-SCNC: 140 MMOL/L (ref 136–145)
TSH SERPL DL<=0.005 MIU/L-ACNC: 0.85 UIU/ML (ref 0.4–4)
WBC # BLD AUTO: 7.31 K/UL (ref 3.9–12.7)

## 2024-09-26 PROCEDURE — 83735 ASSAY OF MAGNESIUM: CPT | Mod: HCNC | Performed by: STUDENT IN AN ORGANIZED HEALTH CARE EDUCATION/TRAINING PROGRAM

## 2024-09-26 PROCEDURE — G0378 HOSPITAL OBSERVATION PER HR: HCPCS | Mod: HCNC

## 2024-09-26 PROCEDURE — 84100 ASSAY OF PHOSPHORUS: CPT | Mod: HCNC | Performed by: STUDENT IN AN ORGANIZED HEALTH CARE EDUCATION/TRAINING PROGRAM

## 2024-09-26 PROCEDURE — 84443 ASSAY THYROID STIM HORMONE: CPT | Mod: HCNC | Performed by: EMERGENCY MEDICINE

## 2024-09-26 PROCEDURE — 82962 GLUCOSE BLOOD TEST: CPT | Mod: HCNC

## 2024-09-26 PROCEDURE — 25000003 PHARM REV CODE 250: Mod: HCNC | Performed by: STUDENT IN AN ORGANIZED HEALTH CARE EDUCATION/TRAINING PROGRAM

## 2024-09-26 PROCEDURE — 85025 COMPLETE CBC W/AUTO DIFF WBC: CPT | Mod: HCNC | Performed by: EMERGENCY MEDICINE

## 2024-09-26 PROCEDURE — 99222 1ST HOSP IP/OBS MODERATE 55: CPT | Mod: HCNC,GC,, | Performed by: STUDENT IN AN ORGANIZED HEALTH CARE EDUCATION/TRAINING PROGRAM

## 2024-09-26 PROCEDURE — 93005 ELECTROCARDIOGRAM TRACING: CPT | Mod: HCNC

## 2024-09-26 PROCEDURE — 96366 THER/PROPH/DIAG IV INF ADDON: CPT | Mod: HCNC

## 2024-09-26 PROCEDURE — 80053 COMPREHEN METABOLIC PANEL: CPT | Mod: HCNC | Performed by: STUDENT IN AN ORGANIZED HEALTH CARE EDUCATION/TRAINING PROGRAM

## 2024-09-26 PROCEDURE — 63600175 PHARM REV CODE 636 W HCPCS: Mod: HCNC | Performed by: STUDENT IN AN ORGANIZED HEALTH CARE EDUCATION/TRAINING PROGRAM

## 2024-09-26 PROCEDURE — 99285 EMERGENCY DEPT VISIT HI MDM: CPT | Mod: 25,HCNC

## 2024-09-26 PROCEDURE — 87389 HIV-1 AG W/HIV-1&-2 AB AG IA: CPT | Mod: HCNC | Performed by: PHYSICIAN ASSISTANT

## 2024-09-26 PROCEDURE — 93010 ELECTROCARDIOGRAM REPORT: CPT | Mod: HCNC,76,, | Performed by: INTERNAL MEDICINE

## 2024-09-26 PROCEDURE — 93010 ELECTROCARDIOGRAM REPORT: CPT | Mod: HCNC,,, | Performed by: INTERNAL MEDICINE

## 2024-09-26 PROCEDURE — 96372 THER/PROPH/DIAG INJ SC/IM: CPT | Mod: 59 | Performed by: STUDENT IN AN ORGANIZED HEALTH CARE EDUCATION/TRAINING PROGRAM

## 2024-09-26 PROCEDURE — 86803 HEPATITIS C AB TEST: CPT | Mod: HCNC | Performed by: PHYSICIAN ASSISTANT

## 2024-09-26 PROCEDURE — 96365 THER/PROPH/DIAG IV INF INIT: CPT | Mod: HCNC

## 2024-09-26 RX ORDER — CALCIUM ACETATE 667 MG/1
667 CAPSULE ORAL
Status: DISCONTINUED | OUTPATIENT
Start: 2024-09-27 | End: 2024-09-28 | Stop reason: HOSPADM

## 2024-09-26 RX ORDER — POTASSIUM CHLORIDE 750 MG/1
10 CAPSULE, EXTENDED RELEASE ORAL ONCE
Status: COMPLETED | OUTPATIENT
Start: 2024-09-26 | End: 2024-09-26

## 2024-09-26 RX ORDER — INSULIN ASPART 100 [IU]/ML
5 INJECTION, SOLUTION INTRAVENOUS; SUBCUTANEOUS
Status: DISCONTINUED | OUTPATIENT
Start: 2024-09-26 | End: 2024-09-28 | Stop reason: HOSPADM

## 2024-09-26 RX ORDER — TALC
6 POWDER (GRAM) TOPICAL NIGHTLY PRN
Status: DISCONTINUED | OUTPATIENT
Start: 2024-09-26 | End: 2024-09-28 | Stop reason: HOSPADM

## 2024-09-26 RX ORDER — NIFEDIPINE 30 MG/1
90 TABLET, EXTENDED RELEASE ORAL DAILY
Status: DISCONTINUED | OUTPATIENT
Start: 2024-09-27 | End: 2024-09-28 | Stop reason: HOSPADM

## 2024-09-26 RX ORDER — HYDROCODONE BITARTRATE AND ACETAMINOPHEN 7.5; 325 MG/1; MG/1
1 TABLET ORAL EVERY 6 HOURS PRN
Status: DISCONTINUED | OUTPATIENT
Start: 2024-09-26 | End: 2024-09-28 | Stop reason: HOSPADM

## 2024-09-26 RX ORDER — PANTOPRAZOLE SODIUM 40 MG/1
40 TABLET, DELAYED RELEASE ORAL DAILY
Status: DISCONTINUED | OUTPATIENT
Start: 2024-09-27 | End: 2024-09-28 | Stop reason: HOSPADM

## 2024-09-26 RX ORDER — TAMSULOSIN HYDROCHLORIDE 0.4 MG/1
1 CAPSULE ORAL EVERY MORNING
Status: DISCONTINUED | OUTPATIENT
Start: 2024-09-27 | End: 2024-09-28 | Stop reason: HOSPADM

## 2024-09-26 RX ORDER — IBUPROFEN 200 MG
16 TABLET ORAL
Status: DISCONTINUED | OUTPATIENT
Start: 2024-09-26 | End: 2024-09-28 | Stop reason: HOSPADM

## 2024-09-26 RX ORDER — NALOXONE HCL 0.4 MG/ML
0.02 VIAL (ML) INJECTION
Status: DISCONTINUED | OUTPATIENT
Start: 2024-09-26 | End: 2024-09-28 | Stop reason: HOSPADM

## 2024-09-26 RX ORDER — GLUCAGON 1 MG
1 KIT INJECTION
Status: DISCONTINUED | OUTPATIENT
Start: 2024-09-26 | End: 2024-09-28 | Stop reason: HOSPADM

## 2024-09-26 RX ORDER — INSULIN GLARGINE 100 [IU]/ML
15 INJECTION, SOLUTION SUBCUTANEOUS NIGHTLY
Status: DISCONTINUED | OUTPATIENT
Start: 2024-09-26 | End: 2024-09-28 | Stop reason: HOSPADM

## 2024-09-26 RX ORDER — TRAZODONE HYDROCHLORIDE 100 MG/1
100 TABLET ORAL NIGHTLY
Status: DISCONTINUED | OUTPATIENT
Start: 2024-09-26 | End: 2024-09-28 | Stop reason: HOSPADM

## 2024-09-26 RX ORDER — FLUTICASONE PROPIONATE 50 MCG
2 SPRAY, SUSPENSION (ML) NASAL NIGHTLY PRN
Status: DISCONTINUED | OUTPATIENT
Start: 2024-09-26 | End: 2024-09-28 | Stop reason: HOSPADM

## 2024-09-26 RX ORDER — MAGNESIUM SULFATE HEPTAHYDRATE 40 MG/ML
2 INJECTION, SOLUTION INTRAVENOUS
Status: COMPLETED | OUTPATIENT
Start: 2024-09-26 | End: 2024-09-26

## 2024-09-26 RX ORDER — IBUPROFEN 200 MG
24 TABLET ORAL
Status: DISCONTINUED | OUTPATIENT
Start: 2024-09-26 | End: 2024-09-28 | Stop reason: HOSPADM

## 2024-09-26 RX ORDER — ATORVASTATIN CALCIUM 40 MG/1
80 TABLET, FILM COATED ORAL DAILY
Status: DISCONTINUED | OUTPATIENT
Start: 2024-09-27 | End: 2024-09-28 | Stop reason: HOSPADM

## 2024-09-26 RX ORDER — LANOLIN ALCOHOL/MO/W.PET/CERES
1 CREAM (GRAM) TOPICAL DAILY
Status: DISCONTINUED | OUTPATIENT
Start: 2024-09-27 | End: 2024-09-28 | Stop reason: HOSPADM

## 2024-09-26 RX ORDER — INSULIN ASPART 100 [IU]/ML
0-10 INJECTION, SOLUTION INTRAVENOUS; SUBCUTANEOUS
Status: DISCONTINUED | OUTPATIENT
Start: 2024-09-26 | End: 2024-09-28 | Stop reason: HOSPADM

## 2024-09-26 RX ORDER — SODIUM CHLORIDE 0.9 % (FLUSH) 0.9 %
10 SYRINGE (ML) INJECTION
Status: DISCONTINUED | OUTPATIENT
Start: 2024-09-26 | End: 2024-09-28 | Stop reason: HOSPADM

## 2024-09-26 RX ORDER — SODIUM CHLORIDE 0.9 % (FLUSH) 0.9 %
10 SYRINGE (ML) INJECTION EVERY 12 HOURS PRN
Status: DISCONTINUED | OUTPATIENT
Start: 2024-09-26 | End: 2024-09-28 | Stop reason: HOSPADM

## 2024-09-26 RX ADMIN — TRAZODONE HYDROCHLORIDE 100 MG: 100 TABLET ORAL at 09:09

## 2024-09-26 RX ADMIN — MAGNESIUM SULFATE HEPTAHYDRATE 2 G: 40 INJECTION, SOLUTION INTRAVENOUS at 02:09

## 2024-09-26 RX ADMIN — POTASSIUM BICARBONATE 20 MEQ: 391 TABLET, EFFERVESCENT ORAL at 02:09

## 2024-09-26 RX ADMIN — INSULIN ASPART 5 UNITS: 100 INJECTION, SOLUTION INTRAVENOUS; SUBCUTANEOUS at 06:09

## 2024-09-26 RX ADMIN — INSULIN GLARGINE 15 UNITS: 100 INJECTION, SOLUTION SUBCUTANEOUS at 09:09

## 2024-09-26 RX ADMIN — POTASSIUM CHLORIDE 10 MEQ: 750 CAPSULE, EXTENDED RELEASE ORAL at 09:09

## 2024-09-26 NOTE — ED NOTES
I-STAT Chem-8+ Results:   Value Reference Range   Sodium 135 136-145 mmol/L   Potassium  4.4 3.5-5.1 mmol/L   Chloride 96  mmol/L   Ionized Calcium 0.89 1.06-1.42 mmol/L   CO2 (measured) 27 23-29 mmol/L   Glucose 137  mg/dL   BUN 48 6-30 mg/dL   Creatinine 10.1 0.5-1.4 mg/dL   Hematocrit 39 36-54%     MD Araiza Notified of pt's Bun and Creatinine values

## 2024-09-26 NOTE — ED NOTES
Received report from SHMUEL Vieira    LOC: The patient is awake, alert and aware of environment with an appropriate affect, the patient is oriented x 3 and speaking appropriately.   APPEARANCE: Patient appears comfortable and in no acute distress, patient is clean and well groomed.  SKIN: The skin is warm and dry, color consistent with ethnicity, patient has normal skin turgor and moist mucus membranes. Fistula to CHRISTINE, dressing in place from trying to access PTA. Dressing is clean, dry, intact.  MUSCULOSKELETAL: Patient moving all extremities spontaneously, no swelling noted.  RESPIRATORY: Airway is open and patent, respirations are spontaneous, patient has a normal effort and rate, no accessory muscle. Denies SOB, currently on RA.  CARDIAC: Pt placed on cardiac monitor. Patient has a normal rate and regular rhythm, no edema noted, capillary refill < 3 seconds. Denies CP.  GASTRO: Soft and non tender to palpation, no distention noted. Denies N/V/D.  : Pt denies any pain or frequency with urination.  NEURO: Pt opens eyes spontaneously, behavior appropriate to situation, follows commands, facial expression symmetrical, bilateral hand grasp equal and even, purposeful motor response noted, normal sensation in all extremities when touched with a finger.

## 2024-09-26 NOTE — TELEPHONE ENCOUNTER
Received a call from Martha with [facility] verbalizing that the pt is clotted this am.Pt's last full treatment was 9/24/24 and meal was last pm.Pt will be able to arrive within 1 hr.Message sent to Dr Martinez who's on call..

## 2024-09-26 NOTE — HPI
Vinnie Siegel is a 79 y.o. male with history of nephrolithiasis, hypertension and diabetes,ckd, AVF creation in RUE 12/22 who presents to the ED after failed HD access today. Last received HD on Tuesday. Denies paresthesias or pain in his RUE.

## 2024-09-26 NOTE — SUBJECTIVE & OBJECTIVE
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     Hyperlipidemia     Hypertension     Kidney stone     Seasonal allergies 06/24/2014       Past Surgical History:   Procedure Laterality Date    AV FISTULA PLACEMENT Left 12/8/2020    Procedure: CREATION, AV FISTULA;  Surgeon: Benji Becerril MD;  Location: [address];  Service: Peripheral Vascular;  Laterality: Left;    AV FISTULA PLACEMENT Right 12/7/2022    Procedure: CREATION, AV FISTULA;  Surgeon: Benji Becerril MD;  Location: [facility] OR [address] [place] [address];  Service: Peripheral Vascular;  Laterality: Right;  RUE    CATARACT EXTRACTION W/  INTRAOCULAR LENS IMPLANT Right 04/04/16    Dr Meehan    COLONOSCOPY W/ BIOPSIES      DILATION, AQUEOUS OUTFLOW CANAL, TRANSLUMINAL, WITHOUT DEVICE RETENTION Left 9/21/2023    Procedure: DILATION, AQUEOUS OUTFLOW CANAL, TRANSLUMINAL, WITHOUT DEVICE RETENTION;  Surgeon: Yvonne Nielson MD;  Location: [address];  Service: Ophthalmology;  Laterality: Left;  omni    ESOPHAGOGASTRODUODENOSCOPY N/A 6/29/2020    Procedure: EGD (ESOPHAGOGASTRODUODENOSCOPY);  Surgeon: Ravi Leone MD;  Location: [facility];  Service: Endoscopy;  Laterality: N/A;    EYE SURGERY      FISTULOGRAM Left 4/16/2021    Procedure: Fistulogram;  Surgeon: Benji Becerril MD;  Location: [facility] CATH LAB;  Service: Peripheral Vascular;  Laterality: Left;    FISTULOGRAM Left 9/28/2022    Procedure: Fistulogram;  Surgeon: Benji Becerril MD;  Location: [facility] CATH LAB;  Service: Cardiology;  Laterality: Left;    FISTULOGRAM, WITH PTA Right 2/3/2023    Procedure: FISTULOGRAM, WITH PTA;  Surgeon: Benji Becerril MD;  [facility];  Service: Peripheral Vascular;  Laterality: Right;  205.17 mGy  2.3 min    GONIOTOMY Left 8/10/2023    Procedure: GONIOTOMY;   Surgeon: Yvonne Nielson MD;  [facility];  Service: Ophthalmology;  Laterality: Left;  omni    HEMORRHOID SURGERY      Dr. Root [facility]    INTRAOCULAR PROSTHESES INSERTION Left 8/10/2023    Procedure: INSERTION, IOL PROSTHESIS;  Surgeon: Yvonne Nielson MD;  Location: [facility] OR 1ST FLR;  Service: Ophthalmology;  Laterality: Left;    INTRAOCULAR PROSTHESES INSERTION Left 9/21/2023    Procedure: INSERTION, IOL PROSTHESIS;  Surgeon: Yvonne Nielson MD;  Location: [facility] OR 1ST FLR;  Service: Ophthalmology;  Laterality: Left;    lateral internal anal sphincterotomy  12/13/13    Dr. root ADRYAN    PERCUTANEOUS TRANSLUMINAL ANGIOPLASTY OF ARTERIOVENOUS FISTULA Left 4/16/2021    Procedure: PTA, AV FISTULA;  Surgeon: Benji Becerril MD;  Location: [facility] CATH LAB;  Service: Peripheral Vascular;  Laterality: Left;    PERCUTANEOUS TRANSLUMINAL ANGIOPLASTY OF ARTERIOVENOUS FISTULA N/A 9/28/2022    Procedure: PTA, AV FISTULA;  Surgeon: Benji Becerril MD;  Location: [facility] CATH LAB;  Service: Cardiology;  Laterality: N/A;    PHACOEMULSIFICATION OF CATARACT Left 8/10/2023    Procedure: PHACOEMULSIFICATION, CATARACT;  Surgeon: Yvonne Nielson MD;  Location: [facility] OR 1ST FLR;  Service: Ophthalmology;  Laterality: Left;    PHACOEMULSIFICATION OF CATARACT Left 9/21/2023    Procedure: PHACOEMULSIFICATION, CATARACT;  Surgeon: Yvonne Nielson MD;  Location: [facility] OR 1ST FLR;  Service: Ophthalmology;  Laterality: Left;    PLACEMENT OF DUAL-LUMEN VASCULAR CATHETER N/A 2/3/2023    Procedure: EXCHANGE, PERMACATH;  Surgeon: Benji Becerril MD;  Location: [facility] OR 2ND FLR;  Service: Peripheral Vascular;  Laterality: N/A;    URETERAL STENT PLACEMENT         Review of patient's allergies indicates:  No Known Allergies    No current facility-administered medications on file prior to encounter.     Current Outpatient Medications on File Prior to Encounter   Medication Sig    atorvastatin (LIPITOR) 80 MG tablet Take 1  "tablet (80 mg total) by mouth once daily.    blood sugar diagnostic Strp To check BG 4 times daily, to use with insurance preferred meter    blood-glucose meter kit To check BG 4 times daily, to use with insurance preferred meter    calcium acetate,phosphat bind, (PHOSLO) 667 mg capsule SMARTSI Capsule(s) By Mouth    dorzolamide-timolol 2-0.5% (COSOPT) 22.3-6.8 mg/mL ophthalmic solution Place 1 drop into both eyes 2 (two) times daily.    ergocalciferol (ERGOCALCIFEROL) 50,000 unit Cap Take 1 capsule (50,000 Units total) by mouth every 7 days. (Patient not taking: Reported on 2024)    ezetimibe (ZETIA) 10 mg tablet Take 1 tablet (10 mg total) by mouth once daily. One a day or as directed    ferrous sulfate (FEOSOL) 325 mg (65 mg iron) Tab tablet 3 TABS A DAY. (Patient not taking: Reported on 2024)    fluticasone propionate (FLONASE) 50 mcg/actuation nasal spray 2 sprays (100 mcg total) by Each Nostril route nightly as needed for Rhinitis.    HUMALOG KWIKPEN INSULIN 100 unit/mL pen Inject 17 Units into the skin 3 (three) times daily before meals.    hydrALAZINE (APRESOLINE) 100 MG tablet Take 1 tablet (100 mg total) by mouth 3 (three) times daily.    HYDROcodone-acetaminophen (NORCO) 7.5-325 mg per tablet Take 1 tablet by mouth.    insulin glargine U-100, Lantus, (LANTUS SOLOSTAR U-100 INSULIN) 100 unit/mL (3 mL) InPn pen Inject 45 Units into the skin every evening. Increase per MD instruction to max daily dose of 70 units    lancets Misc To check BG 4 times daily, to use with insurance preferred meter    metoprolol succinate (TOPROL-XL) 25 MG 24 hr tablet Take 0.5 tablets (12.5 mg total) by mouth once daily.    NIFEdipine (PROCARDIA-XL) 90 MG (OSM) 24 hr tablet Take 1 tablet (90 mg total) by mouth once daily.    pantoprazole (PROTONIX) 40 MG tablet Take 1 tablet (40 mg total) by mouth once daily.    pen needle, diabetic (BD ULTRA-FINE SHORT PEN NEEDLE) 31 gauge x 5/16" Ndle USE FOUR TIMES DAILY    " tamsulosin (FLOMAX) 0.4 mg Cap Take 1 capsule (0.4 mg total) by mouth every morning.    torsemide (DEMADEX) 20 MG Tab Take 1 tablet (20 mg total) by mouth 2 (two) times daily.    traZODone (DESYREL) 100 MG tablet Take 100 mg by mouth every evening.     Family History       Problem Relation (Age of Onset)    Diabetes Brother    Hypertension Brother    Stroke Brother          Tobacco Use    Smoking status: Former     Current packs/day: 0.00     Average packs/day: 0.5 packs/day for 45.0 years (22.5 ttl pk-yrs)     Types: Cigarettes     Start date: 1975     Quit date: 2020     Years since quittin.2     Passive exposure: Never    Smokeless tobacco: Former   Substance and Sexual Activity    Alcohol use: Not Currently     Comment: rarely    Drug use: No    Sexual activity: Yes     Partners: Female     Comment: single, retired , no kids     Review of Systems   Constitutional: Negative for diaphoresis.   All other systems reviewed and are negative.    Objective:     Vital Signs (Most Recent):  Temp: 98.1 °F (36.7 °C) (24 1031)  Pulse: 61 (24 1545)  Resp: 15 (24 1545)  BP: 128/67 (24 1545)  SpO2: 100 % (24 1545) Vital Signs (24h Range):  Temp:  [98.1 °F (36.7 °C)] 98.1 °F (36.7 °C)  Pulse:  [61-86] 61  Resp:  [11-20] 15  SpO2:  [99 %-100 %] 100 %  BP: (128-149)/(67-86) 128/67     Weight: 126.6 kg (279 lb)  Body mass index is 35.82 kg/m².    SpO2: 100 %       No intake or output data in the 24 hours ending 24 1637    Lines/Drains/Airways       Central Venous Catheter Line  Duration                  Hemodialysis Catheter 12/15/22 0916 right internal jugular 651 days              Peripheral Intravenous Line  Duration                  Hemodialysis AV Fistula Left upper arm -- days         Hemodialysis AV Fistula 23 1100 Right forearm 601 days         Peripheral IV - Single Lumen 24 1157 20 G Left;Posterior Hand <1 day                     Physical Exam  Vitals  and nursing note reviewed.   Constitutional:       General: He is not in acute distress.     Appearance: Normal appearance. He is obese. He is not ill-appearing.   HENT:      Head: Normocephalic and atraumatic.      Nose: Nose normal.      Mouth/Throat:      Mouth: Mucous membranes are dry.      Pharynx: Oropharynx is clear.   Eyes:      Extraocular Movements: Extraocular movements intact.   Cardiovascular:      Rate and Rhythm: Normal rate and regular rhythm.      Pulses: Normal pulses.      Heart sounds: No murmur heard.     No friction rub.   Pulmonary:      Effort: Pulmonary effort is normal.      Breath sounds: Normal breath sounds.   Abdominal:      General: Abdomen is flat.      Palpations: Abdomen is soft.      Tenderness: There is no abdominal tenderness.   Musculoskeletal:         General: Swelling present. No tenderness. Normal range of motion.   Skin:     General: Skin is warm and dry.      Capillary Refill: Capillary refill takes less than 2 seconds.   Neurological:      General: No focal deficit present.      Mental Status: He is alert and oriented to person, place, and time.   Psychiatric:         Behavior: Behavior normal.         Thought Content: Thought content normal.          Significant Labs:   Recent Lab Results  (Last 5 results in the past 24 hours)        09/26/24  1444   09/26/24  1255   09/26/24  1152   09/26/24  1143   09/26/24  1119        Albumin   3.0             ALP   59             ALT   7             Anion Gap   15             AST   8             Baso #       0.03         Basophil %       0.4         BILIRUBIN TOTAL   0.6  Comment: For infants and newborns, interpretation of results should be based  on gestational age, weight and in agreement with clinical  observations.    Premature Infant recommended reference ranges:  Up to 24 hours.............<8.0 mg/dL  Up to 48 hours............<12.0 mg/dL  3-5 days..................<15.0 mg/dL  6-29 days.................<15.0 mg/dL                BUN   43             Calcium   7.7             Chloride   101             CO2   24             Creatinine   9.2             Differential Method       Automated         eGFR   5.4             Eos #       0.1         Eos %       1.6         Glucose   119             Gran # (ANC)       5.0         Gran %       68.3         Hematocrit       37.2         Hemoglobin       12.0         Hepatitis C Ab       Non-reactive         HIV 1/2 Ag/Ab       Non-reactive         Immature Grans (Abs)       0.03  Comment: Mild elevation in immature granulocytes is non specific and   can be seen in a variety of conditions including stress response,   acute inflammation, trauma and pregnancy. Correlation with other   laboratory and clinical findings is essential.           Immature Granulocytes       0.4         Lymph #       1.4         Lymph %       19.7         Magnesium    1.8             MCH       30.4         MCHC       32.3         MCV       94         Mono #       0.7         Mono %       9.6         MPV       10.2         nRBC       0         QRS Duration 150         142       [facility] QTC Calculation 550         514       Phosphorus Level   4.3             Platelet Count       242         POC BUN     48           POC Chloride     96           POC Creatinine     10.1           POC Glucose     137           POC Hematocrit     39           POC Ionized Calcium     0.89           POC Potassium     4.4           POC Sodium     135           POC TCO2 (MEASURED)     27           Potassium   3.0             PROTEIN TOTAL   6.7             RBC       3.95         RDW       14.7         Sample     MARY           Sodium   140             TSH       0.855         WBC       7.31                                Significant Imaging: Echocardiogram: Transthoracic echo (TTE) complete (Cupid Only):   Results for orders placed or performed during the hospital encounter of 07/06/22   Echo   Result Value Ref Range    Ascending aorta 3.19 cm    STJ 3.16 cm  "   AV mean gradient 7 mmHg    Ao peak xander 1.81 m/s    Ao VTI 35.75 cm    IVS 1.14 (A) 0.6 - 1.1 cm    LA size 3.68 cm    Left Atrium Major Axis 5.19 cm    Left Atrium Minor Axis 5.31 cm    LVIDd 5.06 3.5 - 6.0 cm    LVIDs 3.16 2.1 - 4.0 cm    LVOT diameter 2.21 cm    LVOT peak VTI 22.81 cm    Posterior Wall 1.07 0.6 - 1.1 cm    MV Peak A Xander 0.82 m/s    E wave deceleration time 242.43 msec    MV Peak E Xander 0.60 m/s    PV Peak D Xander 0.47 m/s    PV Peak S Xander 0.42 m/s    RA Major Axis 4.61 cm    RA Width 3.71 cm    RVDD 3.67 cm    Sinus 3.00 cm    TAPSE 2.25 cm    TR Max Xander 1.89 m/s    TDI LATERAL 0.09 m/s    TDI SEPTAL 0.09 m/s    LA WIDTH 3.71 cm    MV stenosis pressure 1/2 time 70.30 ms    LV Diastolic Volume 121.61 mL    LV Systolic Volume 39.81 mL    RV S' 17.07 cm/s    LVOT peak xander 0.97 m/s    LA Vol (MOD) 48.88 cm3    MV "A" wave duration 12.84 msec    LV LATERAL E/E' RATIO 6.67 m/s    LV SEPTAL E/E' RATIO 6.67 m/s    FS 38 %    LA volume 60.92 cm3    LV mass 212.50 g    Left Ventricle Relative Wall Thickness 0.42 cm    AV valve area 2.45 cm2    AV Velocity Ratio 0.54     AV index (prosthetic) 0.64     MV valve area p 1/2 method 3.13 cm2    E/A ratio 0.73     Mean e' 0.09 m/s    Pulm vein S/D ratio 0.89     LVOT area 3.8 cm2    LVOT stroke volume 87.45 cm3    AV peak gradient 13 mmHg    E/E' ratio 6.67 m/s    Triscuspid Valve Regurgitation Peak Gradient 14 mmHg    Right Atrial Pressure (from IVC) 3 mmHg    EF 60 %    TV resting pulmonary artery pressure 17 mmHg    Narrative    · The left ventricle is normal in size with concentric remodeling and   normal systolic function. The estimated ejection fraction is 60%.  · Normal right ventricular size with normal right ventricular systolic   function.  · Normal left ventricular diastolic function.  · Mild aortic regurgitation.  · The estimated PA systolic pressure is 17 mmHg.  · Normal central venous pressure (3 mmHg).        "

## 2024-09-26 NOTE — ED TRIAGE NOTES
"Pt arrives to ED with complaints of his Dialysis vascular access not working . PT  has dialysis Tuesday , Thursday and Saturday and he was not able to do dialysis today  due to his -right arm fistula being " Dead."  PT has had this happen another time with a fistula on his left arm. The dialysis clinic was referred he come to the ER. Pt Denies any pain and had no problems with dialysis this passed Tuesday. PT denies any other complaints at this time.   "

## 2024-09-26 NOTE — TELEPHONE ENCOUNTER
"Received a call from Martha saying "the pt was in the lobby and can someone speak with him?Informed her that the pt was not supposed to come to the hospital until directives from the surgeon on call who's currently in surgery.Martha verbalized "Oh I misunderstood. I thought when you asked how long it would take for him to get there,I thought you wanted him to come.This process is new to me". Informed her that Erica Meeks RN was currently talking with the pt on the phone and will f/u.   "

## 2024-09-26 NOTE — CONSULTS
[facility]  Cardiology  Consult Note    Patient Name: Vinnie Siegel  MRN: [id number]  Admission Date: 9/26/2024  Hospital Length of Stay: 0 days  Code Status: Prior   Attending Provider: Purvi Vargas MD   Consulting Provider: Margie Wray MD  Primary Care Physician: Janeth Walter MD  Principal Problem:<principal problem not specified>    Patient information was obtained from patient, past medical records, and ER records.     Consults  Subjective:     Chief Complaint:  AV fistula not working     HPI:   Mr. Siegel is a 80 y/o M with PMH HTN and ESRD on HD (TTS) who was referred to the ED after unable to get HD through AV fistula. Patient is schedule for repair with vascular surgery tomorrow. Cardiology was consulted for concerns for second degree heart block prior to procedure. Last ECG from 2022 showed SR with first degree AV block and a RBBB. Rhythm strip and ECGs reviewed with GERI [facility]inas and consistent with second degree AV block, Mobitz 1 with underlying RBBB and PACs. Patient is on low dose toprol 12.5mg q daily which was last taken yesterday and electrolyte significant for hypokalemia with K of 3.0 and a Mag 1.8. Patient denies any history of syncope, lightheadedness, palpitations, skipped beats, dizziness, or any other complaints. Denies any family history of arrhythmia.     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     Hyperlipidemia     Hypertension     Kidney stone     Seasonal allergies 06/24/2014       Past Surgical History:   Procedure Laterality Date    AV FISTULA PLACEMENT Left 12/8/2020    Procedure: CREATION, AV FISTULA;  Surgeon: Benji Becerril MD;  Location: [facility] OR [address] [place] [address];  Service: Peripheral Vascular;  Laterality: Left;    AV FISTULA PLACEMENT Right 12/7/2022     Procedure: CREATION, AV FISTULA;  Surgeon: Benji Becerril MD;  Location: [facility] OR 2ND FLR;  Service: Peripheral Vascular;  Laterality: Right;  RUE    CATARACT EXTRACTION W/  INTRAOCULAR LENS IMPLANT Right 04/04/16    Dr Meehan    COLONOSCOPY W/ BIOPSIES      DILATION, AQUEOUS OUTFLOW CANAL, TRANSLUMINAL, WITHOUT DEVICE RETENTION Left 9/21/2023    Procedure: DILATION, AQUEOUS OUTFLOW CANAL, TRANSLUMINAL, WITHOUT DEVICE RETENTION;  Surgeon: Yvonne Nielson MD;  Location: [facility] OR 1ST FLR;  Service: Ophthalmology;  Laterality: Left;  omni    ESOPHAGOGASTRODUODENOSCOPY N/A 6/29/2020    Procedure: EGD (ESOPHAGOGASTRODUODENOSCOPY);  Surgeon: Ravi Leone MD;  Location: [facility];  Service: Endoscopy;  Laterality: N/A;    EYE SURGERY      FISTULOGRAM Left 4/16/2021    Procedure: Fistulogram;  Surgeon: Benji Becerril MD;  Location: [facility] CATH LAB;  Service: Peripheral Vascular;  Laterality: Left;    FISTULOGRAM Left 9/28/2022    Procedure: Fistulogram;  Surgeon: Benji Becerril MD;  Location: [facility] CATH LAB;  Service: Cardiology;  Laterality: Left;    FISTULOGRAM, WITH PTA Right 2/3/2023    Procedure: FISTULOGRAM, WITH PTA;  Surgeon: Benji Becerril MD;  Location: [facility] OR 2ND FLR;  Service: Peripheral Vascular;  Laterality: Right;  205.17 mGy  2.3 min    GONIOTOMY Left 8/10/2023    Procedure: GONIOTOMY;  Surgeon: Yvonne Nielson MD;  [facility];  Service: Ophthalmology;  Laterality: Left;  omni    HEMORRHOID SURGERY      Dr. [place] [facility]    INTRAOCULAR PROSTHESES INSERTION Left 8/10/2023    Procedure: INSERTION, IOL PROSTHESIS;  Surgeon: Yvonne Nielson MD;  [facility];  Service: Ophthalmology;  Laterality: Left;    INTRAOCULAR PROSTHESES INSERTION Left 9/21/2023    Procedure: INSERTION, IOL PROSTHESIS;  Surgeon: Yvonne Nielson MD;  [facility];  Service: Ophthalmology;  Laterality: Left;    lateral internal anal sphincterotomy  12/13/13      [facility]    PERCUTANEOUS TRANSLUMINAL ANGIOPLASTY OF ARTERIOVENOUS FISTULA Left 2021    Procedure: PTA, AV FISTULA;  Surgeon: Benji Becerril MD;  Location: [facility] CATH LAB;  Service: Peripheral Vascular;  Laterality: Left;    PERCUTANEOUS TRANSLUMINAL ANGIOPLASTY OF ARTERIOVENOUS FISTULA N/A 2022    Procedure: PTA, AV FISTULA;  Surgeon: Benji Becerril MD;  Location: [facility] CATH LAB;  Service: Cardiology;  Laterality: N/A;    PHACOEMULSIFICATION OF CATARACT Left 8/10/2023    Procedure: PHACOEMULSIFICATION, CATARACT;  Surgeon: Yvonne Nielson MD;  Location: [facility] OR 1ST FLR;  Service: Ophthalmology;  Laterality: Left;    PHACOEMULSIFICATION OF CATARACT Left 2023    Procedure: PHACOEMULSIFICATION, CATARACT;  Surgeon: Yvonne Nielson MD;  Location: [facility] OR 1ST FLR;  Service: Ophthalmology;  Laterality: Left;    PLACEMENT OF DUAL-LUMEN VASCULAR CATHETER N/A 2/3/2023    Procedure: EXCHANGE, PERMACATH;  Surgeon: Benji Becerril MD;  Location: [facility] OR 2ND FLR;  Service: Peripheral Vascular;  Laterality: N/A;    URETERAL STENT PLACEMENT         Review of patient's allergies indicates:  No Known Allergies    No current facility-administered medications on file prior to encounter.     Current Outpatient Medications on File Prior to Encounter   Medication Sig    atorvastatin (LIPITOR) 80 MG tablet Take 1 tablet (80 mg total) by mouth once daily.    blood sugar diagnostic Strp To check BG 4 times daily, to use with insurance preferred meter    blood-glucose meter kit To check BG 4 times daily, to use with insurance preferred meter    calcium acetate,phosphat bind, (PHOSLO) 667 mg capsule SMARTSI Capsule(s) By Mouth    dorzolamide-timolol 2-0.5% (COSOPT) 22.3-6.8 mg/mL ophthalmic solution Place 1 drop into both eyes 2 (two) times daily.    ergocalciferol (ERGOCALCIFEROL) 50,000 unit Cap Take 1 capsule (50,000 Units total) by mouth every 7 days. (Patient not taking: Reported on 2024)     "ezetimibe (ZETIA) 10 mg tablet Take 1 tablet (10 mg total) by mouth once daily. One a day or as directed    ferrous sulfate (FEOSOL) 325 mg (65 mg iron) Tab tablet 3 TABS A DAY. (Patient not taking: Reported on 8/28/2024)    fluticasone propionate (FLONASE) 50 mcg/actuation nasal spray 2 sprays (100 mcg total) by Each Nostril route nightly as needed for Rhinitis.    HUMALOG KWIKPEN INSULIN 100 unit/mL pen Inject 17 Units into the skin 3 (three) times daily before meals.    hydrALAZINE (APRESOLINE) 100 MG tablet Take 1 tablet (100 mg total) by mouth 3 (three) times daily.    HYDROcodone-acetaminophen (NORCO) 7.5-325 mg per tablet Take 1 tablet by mouth.    insulin glargine U-100, Lantus, (LANTUS SOLOSTAR U-100 INSULIN) 100 unit/mL (3 mL) InPn pen Inject 45 Units into the skin every evening. Increase per MD instruction to max daily dose of 70 units    lancets Misc To check BG 4 times daily, to use with insurance preferred meter    metoprolol succinate (TOPROL-XL) 25 MG 24 hr tablet Take 0.5 tablets (12.5 mg total) by mouth once daily.    NIFEdipine (PROCARDIA-XL) 90 MG (OSM) 24 hr tablet Take 1 tablet (90 mg total) by mouth once daily.    pantoprazole (PROTONIX) 40 MG tablet Take 1 tablet (40 mg total) by mouth once daily.    pen needle, diabetic (BD ULTRA-FINE SHORT PEN NEEDLE) 31 gauge x 5/16" Ndle USE FOUR TIMES DAILY    tamsulosin (FLOMAX) 0.4 mg Cap Take 1 capsule (0.4 mg total) by mouth every morning.    torsemide (DEMADEX) 20 MG Tab Take 1 tablet (20 mg total) by mouth 2 (two) times daily.    traZODone (DESYREL) 100 MG tablet Take 100 mg by mouth every evening.     Family History       Problem Relation (Age of Onset)    Diabetes Brother    Hypertension Brother    Stroke Brother          Tobacco Use    Smoking status: Former     Current packs/day: 0.00     Average packs/day: 0.5 packs/day for 45.0 years (22.5 ttl pk-yrs)     Types: Cigarettes     Start date: 6/27/1975     Quit date: 6/27/2020     Years since " quittin.2     Passive exposure: Never    Smokeless tobacco: Former   Substance and Sexual Activity    Alcohol use: Not Currently     Comment: rarely    Drug use: No    Sexual activity: Yes     Partners: Female     Comment: single, retired , no kids     Review of Systems   Constitutional: Negative for diaphoresis.   All other systems reviewed and are negative.    Objective:     Vital Signs (Most Recent):  Temp: 98.1 °F (36.7 °C) (24 1031)  Pulse: 61 (24 1545)  Resp: 15 (24 1545)  BP: 128/67 (24 1545)  SpO2: 100 % (24 1545) Vital Signs (24h Range):  Temp:  [98.1 °F (36.7 °C)] 98.1 °F (36.7 °C)  Pulse:  [61-86] 61  Resp:  [11-20] 15  SpO2:  [99 %-100 %] 100 %  BP: (128-149)/(67-86) 128/67     Weight: 126.6 kg (279 lb)  Body mass index is 35.82 kg/m².    SpO2: 100 %       No intake or output data in the 24 hours ending 24 1637    Lines/Drains/Airways       Central Venous Catheter Line  Duration                  Hemodialysis Catheter 12/15/22 0916 right internal jugular 651 days              Peripheral Intravenous Line  Duration                  Hemodialysis AV Fistula Left upper arm -- days         Hemodialysis AV Fistula 23 1100 Right forearm 601 days         Peripheral IV - Single Lumen 24 1157 20 G Left;Posterior Hand <1 day                     Physical Exam  Vitals and nursing note reviewed.   Constitutional:       General: He is not in acute distress.     Appearance: Normal appearance. He is obese. He is not ill-appearing.   HENT:      Head: Normocephalic and atraumatic.      Nose: Nose normal.      Mouth/Throat:      Mouth: Mucous membranes are dry.      Pharynx: Oropharynx is clear.   Eyes:      Extraocular Movements: Extraocular movements intact.   Cardiovascular:      Rate and Rhythm: Normal rate and regular rhythm.      Pulses: Normal pulses.      Heart sounds: No murmur heard.     No friction rub.   Pulmonary:      Effort: Pulmonary effort is normal.       Breath sounds: Normal breath sounds.   Abdominal:      General: Abdomen is flat.      Palpations: Abdomen is soft.      Tenderness: There is no abdominal tenderness.   Musculoskeletal:         General: Swelling present. No tenderness. Normal range of motion.   Skin:     General: Skin is warm and dry.      Capillary Refill: Capillary refill takes less than 2 seconds.   Neurological:      General: No focal deficit present.      Mental Status: He is alert and oriented to person, place, and time.   Psychiatric:         Behavior: Behavior normal.         Thought Content: Thought content normal.          Significant Labs:   Recent Lab Results  (Last 5 results in the past 24 hours)        09/26/24  1444   09/26/24  1255   09/26/24  1152   09/26/24  1143   09/26/24  1119        Albumin   3.0             ALP   59             ALT   7             Anion Gap   15             AST   8             Baso #       0.03         Basophil %       0.4         BILIRUBIN TOTAL   0.6  Comment: For infants and newborns, interpretation of results should be based  on gestational age, weight and in agreement with clinical  observations.    Premature Infant recommended reference ranges:  Up to 24 hours.............<8.0 mg/dL  Up to 48 hours............<12.0 mg/dL  3-5 days..................<15.0 mg/dL  6-29 days.................<15.0 mg/dL               BUN   43             Calcium   7.7             Chloride   101             CO2   24             Creatinine   9.2             Differential Method       Automated         eGFR   5.4             Eos #       0.1         Eos %       1.6         Glucose   119             Gran # (ANC)       5.0         Gran %       68.3         Hematocrit       37.2         Hemoglobin       12.0         Hepatitis C Ab       Non-reactive         HIV 1/2 Ag/Ab       Non-reactive         Immature Grans (Abs)       0.03  Comment: Mild elevation in immature granulocytes is non specific and   can be seen in a variety of  conditions including stress response,   acute inflammation, trauma and pregnancy. Correlation with other   laboratory and clinical findings is essential.           Immature Granulocytes       0.4         Lymph #       1.4         Lymph %       19.7         Magnesium    1.8             MCH       30.4         MCHC       32.3         MCV       94         Mono #       0.7         Mono %       9.6         MPV       10.2         nRBC       0         QRS Duration 150         142       [facility] QTC Calculation 550         514       Phosphorus Level   4.3             Platelet Count       242         POC BUN     48           POC Chloride     96           POC Creatinine     10.1           POC Glucose     137           POC Hematocrit     39           POC Ionized Calcium     0.89           POC Potassium     4.4           POC Sodium     135           POC TCO2 (MEASURED)     27           Potassium   3.0             PROTEIN TOTAL   6.7             RBC       3.95         RDW       14.7         Sample     MARY           Sodium   140             TSH       0.855         WBC       7.31                                Significant Imaging: Echocardiogram: Transthoracic echo (TTE) complete (Cupid Only):   Results for orders placed or performed during the hospital encounter of 07/06/22   Echo   Result Value Ref Range    Ascending aorta 3.19 cm    STJ 3.16 cm    AV mean gradient 7 mmHg    Ao peak vipul 1.81 m/s    Ao VTI 35.75 cm    IVS 1.14 (A) 0.6 - 1.1 cm    LA size 3.68 cm    Left Atrium Major Axis 5.19 cm    Left Atrium Minor Axis 5.31 cm    LVIDd 5.06 3.5 - 6.0 cm    LVIDs 3.16 2.1 - 4.0 cm    LVOT diameter 2.21 cm    LVOT peak VTI 22.81 cm    Posterior Wall 1.07 0.6 - 1.1 cm    MV Peak A Vipul 0.82 m/s    E wave deceleration time 242.43 msec    MV Peak E Vipul 0.60 m/s    PV Peak D Vipul 0.47 m/s    PV Peak S Vipul 0.42 m/s    RA Major Axis 4.61 cm    RA Width 3.71 cm    RVDD 3.67 cm    Sinus 3.00 cm    TAPSE 2.25 cm    TR Max Vipul 1.89 m/s    TDI  "LATERAL 0.09 m/s    TDI SEPTAL 0.09 m/s    LA WIDTH 3.71 cm    MV stenosis pressure 1/2 time 70.30 ms    LV Diastolic Volume 121.61 mL    LV Systolic Volume 39.81 mL    RV S' 17.07 cm/s    LVOT peak vipul 0.97 m/s    LA Vol (MOD) 48.88 cm3    MV "A" wave duration 12.84 msec    LV LATERAL E/E' RATIO 6.67 m/s    LV SEPTAL E/E' RATIO 6.67 m/s    FS 38 %    LA volume 60.92 cm3    LV mass 212.50 g    Left Ventricle Relative Wall Thickness 0.42 cm    AV valve area 2.45 cm2    AV Velocity Ratio 0.54     AV index (prosthetic) 0.64     MV valve area p 1/2 method 3.13 cm2    E/A ratio 0.73     Mean e' 0.09 m/s    Pulm vein S/D ratio 0.89     LVOT area 3.8 cm2    LVOT stroke volume 87.45 cm3    AV peak gradient 13 mmHg    E/E' ratio 6.67 m/s    Triscuspid Valve Regurgitation Peak Gradient 14 mmHg    Right Atrial Pressure (from IVC) 3 mmHg    EF 60 %    TV resting pulmonary artery pressure 17 mmHg    Narrative    · The left ventricle is normal in size with concentric remodeling and   normal systolic function. The estimated ejection fraction is 60%.  · Normal right ventricular size with normal right ventricular systolic   function.  · Normal left ventricular diastolic function.  · Mild aortic regurgitation.  · The estimated PA systolic pressure is 17 mmHg.  · Normal central venous pressure (3 mmHg).        Assessment and Plan:     Second degree AV block, Mobitz type I  Mr. Siegel is a 80 y/o M with PMH HTN and ESRD on HD (TTS) referred to the ED due to malfunctioning AV fistula. Found to be in second degree AV block, Mobitz 1 with underlying RBBB and PACs.     Recs:  Rhythm strip and ECGs reviewed with EP attending. No concerns for a high degree AV block.   Recommend electrolyte replacement to a K>4 and Mag >2.  Discontinue metoprolol succinate 12.5mg for now and before discharge order a 48-hr holter monitor.  Can follow up with general cardiology as an outpatient.    Discussed also with staff Dr. Barclay.         VTE Risk " Mitigation (From admission, onward)      None            Thank you for your consult. I will sign off. Please contact us if you have any additional questions.    Margie Wray MD  Cardiology   [facility]

## 2024-09-26 NOTE — SUBJECTIVE & OBJECTIVE
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;  Service: Peripheral Vascular;  Laterality: Left;    AV FISTULA PLACEMENT Right 12/7/2022    Procedure: CREATION, AV FISTULA;  Surgeon: Benji Becerril MD;  Location: [facility] OR 2ND FLR;  Service: Peripheral Vascular;  Laterality: Right;  RUE    CATARACT EXTRACTION W/  INTRAOCULAR LENS IMPLANT Right 04/04/16    Dr Meehan    COLONOSCOPY W/ BIOPSIES      DILATION, AQUEOUS OUTFLOW CANAL, TRANSLUMINAL, WITHOUT DEVICE RETENTION Left 9/21/2023    Procedure: DILATION, AQUEOUS OUTFLOW CANAL, TRANSLUMINAL, WITHOUT DEVICE RETENTION;  Surgeon: Yvonne Nielson MD;  Location: [facility] OR 1ST FLR;  Service: Ophthalmology;  Laterality: Left;  omni    ESOPHAGOGASTRODUODENOSCOPY N/A 6/29/2020    Procedure: EGD (ESOPHAGOGASTRODUODENOSCOPY);  Surgeon: Ravi Leone MD;  Location: [facility];  Service: Endoscopy;  Laterality: N/A;    EYE SURGERY      FISTULOGRAM Left 4/16/2021    Procedure: Fistulogram;  Surgeon: Benji Becerril MD;  Location: [facility] CATH LAB;  Service: Peripheral Vascular;  Laterality: Left;    FISTULOGRAM Left 9/28/2022    Procedure: Fistulogram;  Surgeon: Benji Becerril MD;  Location: [facility] CATH LAB;  Service: Cardiology;  Laterality: Left;    FISTULOGRAM, WITH PTA Right 2/3/2023    Procedure: FISTULOGRAM, WITH PTA;  Surgeon: Benji Becerril MD;  [facility];  Service: Peripheral Vascular;   Laterality: Right;  205.17 mGy  2.3 min    GONIOTOMY Left 8/10/2023    Procedure: GONIOTOMY;  Surgeon: Yvonne Nielson MD;  Location: [facility] OR 1ST FLR;  Service: Ophthalmology;  Laterality: Left;  omni    [place] [facility] [place]    INTRAOCULAR PROSTHESES INSERTION Left 8/10/2023    Procedure: INSERTION, IOL PROSTHESIS;  Surgeon: Yvonne Nielson MD;  Location: [facility] OR 1ST FLR;  Service: Ophthalmology;  Laterality: Left;    INTRAOCULAR PROSTHESES INSERTION Left 9/21/2023    Procedure: INSERTION, IOL PROSTHESIS;  Surgeon: Yvonne Nielson MD;  Location: [facility] OR 1ST FLR;  Service: Ophthalmology;  Laterality: Left;    lateral internal anal sphincterotomy  12/13/13    Dr. root ADRYAN    PERCUTANEOUS TRANSLUMINAL ANGIOPLASTY OF ARTERIOVENOUS FISTULA Left 4/16/2021    Procedure: PTA, AV FISTULA;  Surgeon: Benji Becerril MD;  Location: [facility] CATH LAB;  Service: Peripheral Vascular;  Laterality: Left;    PERCUTANEOUS TRANSLUMINAL ANGIOPLASTY OF ARTERIOVENOUS FISTULA N/A 9/28/2022    Procedure: PTA, AV FISTULA;  Surgeon: Benji Becerril MD;  Location: [facility] CATH LAB;  Service: Cardiology;  Laterality: N/A;    PHACOEMULSIFICATION OF CATARACT Left 8/10/2023    Procedure: PHACOEMULSIFICATION, CATARACT;  Surgeon: Yvonne Nielson MD;  Location: [facility] OR 1ST FLR;  Service: Ophthalmology;  Laterality: Left;    PHACOEMULSIFICATION OF CATARACT Left 9/21/2023    Procedure: PHACOEMULSIFICATION, CATARACT;  Surgeon: Yvonne Nielson MD;  Location: [facility] OR 1ST FLR;  Service: Ophthalmology;  Laterality: Left;    PLACEMENT OF DUAL-LUMEN VASCULAR CATHETER N/A 2/3/2023    Procedure: EXCHANGE, PERMACATH;  Surgeon: Benji Becerril MD;  Location: [facility] OR 2ND FLR;  Service: Peripheral Vascular;  Laterality: N/A;    URETERAL STENT PLACEMENT       Family History       Problem Relation (Age of Onset)    Diabetes Brother    Hypertension Brother    Stroke Brother          Tobacco Use    Smoking status: Former      Current packs/day: 0.00     Average packs/day: 0.5 packs/day for 45.0 years (22.5 ttl pk-yrs)     Types: Cigarettes     Start date: 1975     Quit date: 2020     Years since quittin.2     Passive exposure: Never    Smokeless tobacco: Former   Substance and Sexual Activity    Alcohol use: Not Currently     Comment: rarely    Drug use: No    Sexual activity: Yes     Partners: Female     Comment: single, retired , no kids     Review of Systems   Constitutional:  Negative for chills and fever.   Skin:  Negative for color change and wound.     Objective:     Vital Signs (Most Recent):  Temp: 98.1 °F (36.7 °C) (24 1031)  Pulse: 62 (24 1600)  Resp: 14 (24 1600)  BP: (!) 148/64 (24 1600)  SpO2: 99 % (24 1600) Vital Signs (24h Range):  Temp:  [98.1 °F (36.7 °C)] 98.1 °F (36.7 °C)  Pulse:  [61-86] 62  Resp:  [11-20] 14  SpO2:  [99 %-100 %] 99 %  BP: (128-149)/(64-86) 148/64     Weight: 126.6 kg (279 lb)  Body mass index is 35.82 kg/m².      Physical Exam  Constitutional:       Appearance: He is obese. He is not ill-appearing.   HENT:      Head: Normocephalic.   Cardiovascular:      Rate and Rhythm: Normal rate.      Pulses:           Radial pulses are 2+ on the right side and 2+ on the left side.      Arteriovenous access: Right arteriovenous access is present.     Comments: No thrill or pulsatility appreciated in RUE AVF  Pulmonary:      Effort: Pulmonary effort is normal. No respiratory distress.   Musculoskeletal:         General: No swelling. Normal range of motion.   Neurological:      Mental Status: He is alert.          Significant Labs:  CBC:   Recent Labs   Lab 24  1143 24  1152   WBC 7.31  --    RBC 3.95*  --    HGB 12.0*  --    HCT 37.2* 39     --    MCV 94  --    MCH 30.4  --    MCHC 32.3  --      CMP:   Recent Labs   Lab 24  1255   *   CALCIUM 7.7*   ALBUMIN 3.0*   PROT 6.7      K 3.0*   CO2 24      BUN 43*   CREATININE  9.2*   ALKPHOS 59   ALT 7*   AST 8*   BILITOT 0.6       Significant Diagnostics:    US R AVF 09/26/2024       FINDINGS:  Occlusive thrombus measuring up to 2.4 cm can be seen in the right brachiocephalic AV fistula.  No significant color Doppler signal at this level.     Impression:     Right brachiocephalic AV fistula appears thrombosed.  I have reviewed all pertinent imaging results/findings within the past 24 hours.

## 2024-09-26 NOTE — ED PROVIDER NOTES
Source of History  patient and EMR    Chief Complaint    Vascular Access Problem (States went to dialysis this morning and was unable to get treatment d/t fistula being clotted. Last session Tuesday )      History of Present Illness    Vinnie Siegel is a 79 y.o. male presenting with concern for clotted fistula.  Right upper extremity.  Placed in 2022 by vascular surgery here.  Last full hemodialysis session was Tuesday.  Today went to dialysis and they could not access the right upper extremity fistula.  Notes that he has had no trauma, did not sleep in the arm.  No chest pain or dyspnea.  No other concerning skin features over the fistula.    Review of Systems    As per HPI and below:  Constitutional symptoms:  No chills, no sweats, no fever , no weakness  Skin symptoms:  No rash    Eye symptoms:  No blurred vision  ENMT symptoms:  No sore throat    Respiratory symptoms:  No shortness of breath  Cardiovascular symptoms:  No chest pain   Gastrointestinal symptoms:  No nausea or vomiting      Past History    As per HPI and below:  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     Hyperlipidemia     Hypertension     Kidney stone     Seasonal allergies 06/24/2014       Past Surgical History:   Procedure Laterality Date    AV FISTULA PLACEMENT Left 12/8/2020    Procedure: CREATION, AV FISTULA;  Surgeon: Benji Becerril MD;  Location: [facility] OR [address] [place] [address];  Service: Peripheral Vascular;  Laterality: Left;    AV FISTULA PLACEMENT Right 12/7/2022    Procedure: CREATION, AV FISTULA;  Surgeon: Benji Becerril MD;  Location: [facility] OR [address] [place] [address];  Service: Peripheral Vascular;  Laterality: Right;  RUE    CATARACT EXTRACTION W/  INTRAOCULAR LENS IMPLANT Right 04/04/16    Dr Meehan    COLONOSCOPY W/ BIOPSIES      DILATION, AQUEOUS OUTFLOW CANAL,  TRANSLUMINAL, WITHOUT DEVICE RETENTION Left 9/21/2023    Procedure: DILATION, AQUEOUS OUTFLOW CANAL, TRANSLUMINAL, WITHOUT DEVICE RETENTION;  Surgeon: Yvonne Nielson MD;  Location: [facility] OR 1ST FLR;  Service: Ophthalmology;  Laterality: Left;  omni    ESOPHAGOGASTRODUODENOSCOPY N/A 6/29/2020    Procedure: EGD (ESOPHAGOGASTRODUODENOSCOPY);  Surgeon: Ravi Leone MD;  Location: [facility];  Service: Endoscopy;  Laterality: N/A;    EYE SURGERY      FISTULOGRAM Left 4/16/2021    Procedure: Fistulogram;  Surgeon: Benji Becerril MD;  Location: [facility] CATH LAB;  Service: Peripheral Vascular;  Laterality: Left;    FISTULOGRAM Left 9/28/2022    Procedure: Fistulogram;  Surgeon: Benji Becerril MD;  Location: [facility] CATH LAB;  Service: Cardiology;  Laterality: Left;    FISTULOGRAM, WITH PTA Right 2/3/2023    Procedure: FISTULOGRAM, WITH PTA;  Surgeon: Benji Becerril MD;  Location: [facility] OR 2ND FLR;  Service: Peripheral Vascular;  Laterality: Right;  205.17 mGy  2.3 min    GONIOTOMY Left 8/10/2023    Procedure: GONIOTOMY;  Surgeon: Yvonne Nielson MD;  Location: [facility] OR 1ST FLR;  Service: Ophthalmology;  Laterality: Left;  omni    HEMORRHOID SURGERY      Dr. Root ADRYAN    INTRAOCULAR PROSTHESES INSERTION Left 8/10/2023    Procedure: INSERTION, IOL PROSTHESIS;  Surgeon: Yvonne Nielson MD;  Location: [facility] OR 1ST FLR;  Service: Ophthalmology;  Laterality: Left;    INTRAOCULAR PROSTHESES INSERTION Left 9/21/2023    Procedure: INSERTION, IOL PROSTHESIS;  Surgeon: Yvonne Nielson MD;  Location: [facility] OR 1ST FLR;  Service: Ophthalmology;  Laterality: Left;    lateral internal anal sphincterotomy  12/13/13    Dr. root ADRYAN    PERCUTANEOUS TRANSLUMINAL ANGIOPLASTY OF ARTERIOVENOUS FISTULA Left 4/16/2021    Procedure: PTA, AV FISTULA;  Surgeon: Benji Becerril MD;  Location: [facility] CATH LAB;  Service: Peripheral Vascular;  Laterality: Left;    PERCUTANEOUS TRANSLUMINAL ANGIOPLASTY OF ARTERIOVENOUS FISTULA  N/A 2022    Procedure: PTA, AV FISTULA;  Surgeon: Benji Becerril MD;  Location: [facility] CATH LAB;  Service: Cardiology;  Laterality: N/A;    PHACOEMULSIFICATION OF CATARACT Left 8/10/2023    Procedure: PHACOEMULSIFICATION, CATARACT;  Surgeon: Yvonne Nielson MD;  Location: [facility] OR 1ST FLR;  Service: Ophthalmology;  Laterality: Left;    PHACOEMULSIFICATION OF CATARACT Left 2023    Procedure: PHACOEMULSIFICATION, CATARACT;  Surgeon: Yvonne Nielson MD;  Location: [facility] OR 1ST FLR;  Service: Ophthalmology;  Laterality: Left;    PLACEMENT OF DUAL-LUMEN VASCULAR CATHETER N/A 2/3/2023    Procedure: EXCHANGE, PERMACATH;  Surgeon: Benji Becerril MD;  Location: [facility] OR 2ND FLR;  Service: Peripheral Vascular;  Laterality: N/A;    URETERAL STENT PLACEMENT         Social History     Socioeconomic History    Marital status: Single   Tobacco Use    Smoking status: Former     Current packs/day: 0.00     Average packs/day: 0.5 packs/day for 45.0 years (22.5 ttl pk-yrs)     Types: Cigarettes     Start date: 1975     Quit date: 2020     Years since quittin.2     Passive exposure: Never    Smokeless tobacco: Former   Substance and Sexual Activity    Alcohol use: Not Currently     Comment: rarely    Drug use: No    Sexual activity: Yes     Partners: Female     Comment: single, retired , no kids   Social History Narrative    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     Social Determinants of Health     Financial Resource Strain: Low Risk  (2022)    Overall Financial Resource Strain (CARDIA)     Difficulty of Paying Living Expenses: Not very hard   Food Insecurity: No Food Insecurity (2022)    Hunger Vital Sign     Worried About Running Out of Food in the Last Year: Never true     Ran Out of Food in the Last Year: Never true   Transportation Needs: No Transportation Needs (2022)    PRAPARE - Transportation     Lack of Transportation (Medical): No     Lack of  Transportation (Non-Medical): No   Physical Activity: Inactive (2022)    Exercise Vital Sign     Days of Exercise per Week: 0 days     Minutes of Exercise per Session: 0 min   Stress: Stress Concern Present (2022)    [employer] [place] [employer] - Occupational Stress Questionnaire     Feeling of Stress : To some extent   Housing Stability: Low Risk  (2022)    Housing Stability Vital Sign     Unable to Pay for Housing in the Last Year: No     Number of Places Lived in the Last Year: 1     Unstable Housing in the Last Year: No       Family History   Problem Relation Name Age of Onset    Diabetes Brother Cassius         type 1    Hypertension Brother Cassius     Stroke Brother Cassius        Review of patient's allergies indicates:  No Known Allergies    No current facility-administered medications on file prior to encounter.     Current Outpatient Medications on File Prior to Encounter   Medication Sig Dispense Refill    atorvastatin (LIPITOR) 80 MG tablet Take 1 tablet (80 mg total) by mouth once daily. 90 tablet 3    blood sugar diagnostic Strp To check BG 4 times daily, to use with insurance preferred meter 450 each 3    blood-glucose meter kit To check BG 4 times daily, to use with insurance preferred meter 1 each 0    calcium acetate,phosphat bind, (PHOSLO) 667 mg capsule SMARTSI Capsule(s) By Mouth      dorzolamide-timolol 2-0.5% (COSOPT) 22.3-6.8 mg/mL ophthalmic solution Place 1 drop into both eyes 2 (two) times daily. 30 mL 3    ergocalciferol (ERGOCALCIFEROL) 50,000 unit Cap Take 1 capsule (50,000 Units total) by mouth every 7 days. (Patient not taking: Reported on 2024) 12 capsule 0    ezetimibe (ZETIA) 10 mg tablet Take 1 tablet (10 mg total) by mouth once daily. One a day or as directed 30 tablet 11    ferrous sulfate (FEOSOL) 325 mg (65 mg iron) Tab tablet 3 TABS A DAY. (Patient not taking: Reported on 2024) 90 tablet 1    fluticasone propionate (FLONASE)  "50 mcg/actuation nasal spray 2 sprays (100 mcg total) by Each Nostril route nightly as needed for Rhinitis. 48 g 11    HUMALOG KWIKPEN INSULIN 100 unit/mL pen Inject 17 Units into the skin 3 (three) times daily before meals. 30 mL 3    hydrALAZINE (APRESOLINE) 100 MG tablet Take 1 tablet (100 mg total) by mouth 3 (three) times daily. 270 tablet 3    HYDROcodone-acetaminophen (NORCO) 7.5-325 mg per tablet Take 1 tablet by mouth.      insulin glargine U-100, Lantus, (LANTUS SOLOSTAR U-100 INSULIN) 100 unit/mL (3 mL) InPn pen Inject 45 Units into the skin every evening. Increase per MD instruction to max daily dose of 70 units 40 mL 3    lancets Misc To check BG 4 times daily, to use with insurance preferred meter 450 each 3    metoprolol succinate (TOPROL-XL) 25 MG 24 hr tablet Take 0.5 tablets (12.5 mg total) by mouth once daily. 45 tablet 3    NIFEdipine (PROCARDIA-XL) 90 MG (OSM) 24 hr tablet Take 1 tablet (90 mg total) by mouth once daily. 90 tablet 3    pantoprazole (PROTONIX) 40 MG tablet Take 1 tablet (40 mg total) by mouth once daily. 90 tablet 3    pen needle, diabetic (BD ULTRA-FINE SHORT PEN NEEDLE) 31 gauge x 5/16" Ndle USE FOUR TIMES DAILY 400 each 3    tamsulosin (FLOMAX) 0.4 mg Cap Take 1 capsule (0.4 mg total) by mouth every morning. 90 capsule 3    torsemide (DEMADEX) 20 MG Tab Take 1 tablet (20 mg total) by mouth 2 (two) times daily. 180 tablet 3    traZODone (DESYREL) 100 MG tablet Take 100 mg by mouth every evening.         Physical Exam    Reviewed nursing notes.  Vitals:    09/26/24 1500 09/26/24 1515 09/26/24 1545 09/26/24 1600   BP:   128/67 (!) 148/64   BP Location:       Pulse: 70 66 61 62   Resp: 17 14 15 14   Temp:       TempSrc:       SpO2: 100% 99% 100% 99%   Weight:       Height:         General:  Alert, no acute distress.    Skin:  Warm, dry, intact.  No rash.  Head:  Normocephalic, atraumatic.    Neck:  Supple.   HEENT:  Pupils are equal and round, appropriate for room, extraocular " movements are intact.  Normal phonation.  Moist mucous membranes.  Cardiovascular:  Regular rate, irregular rhythm.  Right upper extremity fistula without thrill.  Otherwise warm and well perfused.  No external skin findings on right upper extremity fistula.    Respiratory:  Lungs are clear to auscultation, respirations are non-labored, breath sounds are equal.    Gastrointestinal:  Soft, Nontender, Non distended.   Back:  Nontender.   Musculoskeletal:  Normal range of motion observed.  Neurological:  Alert and oriented to person, place, time, and situation.  No focal deficits observed.   Psychiatric:  Cooperative, appropriate mood & affect.       Initial MDM and Data Review    79 y.o. male presenting for evaluation of likely clotted fistula right upper extremity    Incidentally while checking EKG to rule out significant hyperkalemia, found new irregular rhythm, possible 2nd degree AV block, awaiting rhythm strip    Remain NPO, consult vascular surgery    Differential includes but is not limited to:  Dysrhythmia, electrolyte disturbance, clotted fistula, pseudoaneurysm    Work-up includes:  Ultrasound of the fistula, electrolytes, EKG, tele monitoring    Interventions include:  None at this time    The patient has significant medical comorbidities that influence decision making for this acute process, such as:  Hypertension,, end-stage renal disease on hemodialysis, hyperlipidemia, diabetes    I decided to obtain the patient's medical records and review relevant documentation from hospital records.  Pertinent information is noted.  I reviewed prior EKGs, he does have a history of right bundle branch block, but I do not see second-degree AV block noted    Medications   sodium chloride 0.9% flush 10 mL (has no administration in time range)   melatonin tablet 6 mg (has no administration in time range)   atorvastatin tablet 80 mg (has no administration in time range)   calcium acetate(phosphat bind) capsule 667 mg (has  no administration in time range)   ferrous sulfate tablet 1 each (has no administration in time range)   fluticasone propionate 50 mcg/actuation nasal spray 100 mcg (has no administration in time range)   HYDROcodone-acetaminophen 7.5-325 mg per tablet 1 tablet (has no administration in time range)   NIFEdipine 24 hr tablet 90 mg (has no administration in time range)   pantoprazole EC tablet 40 mg (has no administration in time range)   tamsulosin 24 hr capsule 0.4 mg (has no administration in time range)   traZODone tablet 100 mg (has no administration in time range)   sodium chloride 0.9% flush 10 mL (has no administration in time range)   naloxone 0.4 mg/mL injection 0.02 mg (has no administration in time range)   glucose chewable tablet 16 g (has no administration in time range)   glucose chewable tablet 24 g (has no administration in time range)   glucagon (human recombinant) injection 1 mg (has no administration in time range)   dextrose 10% bolus 125 mL 125 mL (has no administration in time range)   dextrose 10% bolus 250 mL 250 mL (has no administration in time range)   insulin aspart U-100 pen 0-10 Units (has no administration in time range)   insulin glargine U-100 (Lantus) pen 15 Units (has no administration in time range)   insulin aspart U-100 pen 5 Units (5 Units Subcutaneous Given 9/26/24 1853)   magnesium sulfate 2g in water 50mL IVPB (premix) (0 g Intravenous Stopped 9/26/24 1649)   potassium bicarbonate disintegrating tablet 20 mEq (20 mEq Oral Given 9/26/24 1444)       Results and ED Course    Labs Reviewed   CBC W/ AUTO DIFFERENTIAL - Abnormal       Result Value    WBC 7.31      RBC 3.95 (*)     Hemoglobin 12.0 (*)     Hematocrit 37.2 (*)     MCV 94      MCH 30.4      MCHC 32.3      RDW 14.7 (*)     Platelets 242      MPV 10.2      Immature Granulocytes 0.4      Gran # (ANC) 5.0      Immature Grans (Abs) 0.03      Lymph # 1.4      Mono # 0.7      Eos # 0.1      Baso # 0.03      nRBC 0      Gran %  68.3      Lymph % 19.7      Mono % 9.6      Eosinophil % 1.6      Basophil % 0.4      Differential Method Automated      Narrative:     Release to patient->Immediate   COMPREHENSIVE METABOLIC PANEL - Abnormal    Sodium 140      Potassium 3.0 (*)     Chloride 101      CO2 24      Glucose 119 (*)     BUN 43 (*)     Creatinine 9.2 (*)     Calcium 7.7 (*)     Total Protein 6.7      Albumin 3.0 (*)     Total Bilirubin 0.6      Alkaline Phosphatase 59      AST 8 (*)     ALT 7 (*)     eGFR 5.4 (*)     Anion Gap 15     ISTAT PROCEDURE - Abnormal    POC Glucose 137 (*)     POC BUN 48 (*)     POC Creatinine 10.1 (*)     POC Sodium 135 (*)     POC Potassium 4.4      POC Chloride 96      POC TCO2 (MEASURED) 27      POC Ionized Calcium 0.89 (*)     POC Hematocrit 39      Sample MARY     POCT GLUCOSE - Abnormal    POCT Glucose 130 (*)    POCT GLUCOSE - Abnormal    POCT Glucose 221 (*)    HIV 1 / 2 ANTIBODY    HIV 1/2 Ag/Ab Non-reactive      Narrative:     add on tsh per  /ordanisha#[id number] @ 09/26/2024  12:43                                                       Release to patient->Immediate   HEPATITIS C ANTIBODY    Hepatitis C Ab Non-reactive      Narrative:     add on tsh per  /jessica#[id number] @ 09/26/2024  12:43                                                       Release to patient->Immediate   TSH   TSH    TSH 0.855      Narrative:     add on tsh per  /ordanisha#[id number] @ 09/26/2024  12:43                                                       Release to patient->Immediate   PHOSPHORUS    Phosphorus 4.3     MAGNESIUM    Magnesium 1.8     ISTAT CHEM8   POCT GLUCOSE MONITORING CONTINUOUS       Imaging Results              US Hemodialysis Access (Final result)  Result time 09/26/24 15:24:36      Final result by Hema Jean Jr., MD (09/26/24 15:24:36)                   Impression:      Right brachiocephalic AV fistula appears thrombosed.    Electronically signed by resident: Allen  Lang  Date:    09/26/2024  Time:    14:48    Electronically signed by: Hema Manzo Jr  Date:    09/26/2024  Time:    15:24               Narrative:    EXAMINATION:  US HEMODIALYSIS ACCESS    CLINICAL HISTORY:  RUE fistula without thrill;.    TECHNIQUE:  Limited grayscale and color Doppler ultrasonographic evaluation was performed of the right brachiocephalic AV fistula.    COMPARISON:  US 09/24/2022.    FINDINGS:  Occlusive thrombus measuring up to 2.4 cm can be seen in the right brachiocephalic AV fistula.  No significant color Doppler signal at this level.                                      ED Course as of 09/26/24 1900   Thu Sep 26, 2024   1150 Paged vascular twice, no answer [AC]   1157 Paged vascular surgery again, secure chat has been sent [AC]   1208 K 4.4  Rhythm strip with likely second deg AV block, no evidence of CHB at this time [AC]   1222 WBC: 7.31 [AC]   1222 Hemoglobin(!): 12.0 [AC]   1223 Called VS again, no answer [AC]   1240 Fellow was scrubbed in to the OR, consult placed, awaiting final recommendations [AC]   1328 Potassium(!): 3.0 [AC]   1328 Sodium: 140 [AC]   1328 BUN(!): 43 [AC]   1328 Creatinine(!): 9.2 [AC]   1328 End-stage renal disease [AC]   1328 I spoke with Cardiology, they will review the patient's EKG and determine if okay for vascular surgery for declot [AC]   1340 Cardiology requesting initial repletion of electrolytes and repeat EKG [AC]      ED Course User Index  [AC] Martin Araiza, DO               EKG interpreted by myself    EKG  Performed: 09/26/2024   Rate/Rhythm/Axis: 59 bpm, undetermined rhythm, normal axis   ms  Qtc 514 ms  Impression:  Undetermined rhythm, although I do see P waves in front of the majority of the QRS complexes, there is an irregularity, possible 2nd degree AV block  Native rhythm appears to be a right bundle branch block  Prior EKG 2022 with right bundle branch block without similar dysrhythmia, although first-degree AV block is  present    Relevant imaging interpreted by myself  Right brachiocephalic AV fistula is thrombosed    Impression and Plan    79 y.o. male with findings of clotted right upper extremity dialysis fistula, new dysrhythmia based on the work up in the emergency department as above.    Important lab/imaging findings include:  Reviewed as above    I consulted with:   Vascular surgery -fistulogram for declot tomorrow  Cardiology- tele monitoring for now for new type 1 second-degree AV block, may need event monitor outpatient, cardiology referral placed for outpatient    All tests, treatment options and disposition options were discussed with the patient.  The decision was made to admit the patient to the hospital.    The patient was admitted in stable condition and all further questions/concerns by patient and/or family were addressed.               Final diagnoses:  [N18.6, Z99.2] ESRD (end stage renal disease) on dialysis  [I49.9] Dysrhythmia  [Z78.9] Problem with vascular access (Primary)  [I44.1] [facility]        ED Disposition Condition    Observation Stable                  Martin Araiza,   09/26/24 [id number]

## 2024-09-26 NOTE — ASSESSMENT & PLAN NOTE
Mr. Siegel is a 78 y/o M with PMH HTN and ESRD on HD (TTS) referred to the ED due to malfunctioning AV fistula. Found to be in second degree AV block, Mobitz 1 with underlying RBBB and PACs.     Recs:  Rhythm strip and ECGs reviewed with EP attending. No concerns for a high degree AV block.   Recommend electrolyte replacement to a K>4 and Mag >2.  Discontinue metoprolol succinate 12.5mg for now and before discharge order a 48-hr holter monitor.  Can follow up with general cardiology as an outpatient.    Discussed also with staff Dr. Barclay.

## 2024-09-26 NOTE — HPI
Mr. Siegel is a 80 y/o M with PMH HTN and ESRD on HD (TTS) who was referred to the ED after unable to get HD through AV fistula. Patient is schedule for repair with vascular surgery tomorrow. Cardiology was consulted for concerns for second degree heart block prior to procedure. Last ECG from 2022 showed SR with first degree AV block and a RBBB. Rhythm strip and ECGs reviewed with GERI Salinas and consistent with second degree AV block, Mobitz 1 with underlying RBBB and PACs. Patient is on low dose toprol 12.5mg q daily which was last taken yesterday and electrolyte significant for hypokalemia with K of 3.0 and a Mag 1.8. Patient denies any history of syncope, lightheadedness, palpitations, skipped beats, dizziness, or any other complaints. Denies any family history of arrhythmia.

## 2024-09-26 NOTE — ASSESSMENT & PLAN NOTE
Vinnie Siegel is a 79 y.o. male with RUE AVF thrombus. No thrill on exam. Last HD on Tuesday.     - recommend interventional nephrology consult for tunneled line for HD access  - will follow up outpatient for AVF, will message  our clinic staff  - EMELINA AGUIRRE upper extremity vein mapping tomorrow  - recommend admission to hospital medicine     Vascular Surgery will sign off, thank you for including us in the care of this patient. For further questions or recommendations, please reach out.

## 2024-09-27 PROBLEM — T82.868A AV FISTULA THROMBOSIS, INITIAL ENCOUNTER: Status: ACTIVE | Noted: 2021-04-16

## 2024-09-27 PROBLEM — N18.6 ESRD (END STAGE RENAL DISEASE) ON DIALYSIS: Status: ACTIVE | Noted: 2024-09-27

## 2024-09-27 PROBLEM — Z01.818 PRE-OP EXAM: Status: ACTIVE | Noted: 2024-09-27

## 2024-09-27 PROBLEM — Z99.2 ESRD (END STAGE RENAL DISEASE) ON DIALYSIS: Status: ACTIVE | Noted: 2024-09-27

## 2024-09-27 PROBLEM — Z78.9 PROBLEM WITH VASCULAR ACCESS: Status: ACTIVE | Noted: 2024-09-27

## 2024-09-27 LAB
ALBUMIN SERPL BCP-MCNC: 3 G/DL (ref 3.5–5.2)
ANION GAP SERPL CALC-SCNC: 17 MMOL/L (ref 8–16)
BUN SERPL-MCNC: 54 MG/DL (ref 8–23)
CALCIUM SERPL-MCNC: 8.2 MG/DL (ref 8.7–10.5)
CHLORIDE SERPL-SCNC: 98 MMOL/L (ref 95–110)
CO2 SERPL-SCNC: 23 MMOL/L (ref 23–29)
CREAT SERPL-MCNC: 10.1 MG/DL (ref 0.5–1.4)
ERYTHROCYTE [DISTWIDTH] IN BLOOD BY AUTOMATED COUNT: 14.6 % (ref 11.5–14.5)
EST. GFR  (NO RACE VARIABLE): 4.8 ML/MIN/1.73 M^2
GLUCOSE SERPL-MCNC: 157 MG/DL (ref 70–110)
HBV SURFACE AG SERPL QL IA: NORMAL
HCT VFR BLD AUTO: 33.3 % (ref 40–54)
HGB BLD-MCNC: 11.1 G/DL (ref 14–18)
INR PPP: 0.9 (ref 0.8–1.2)
MAGNESIUM SERPL-MCNC: 2.2 MG/DL (ref 1.6–2.6)
MCH RBC QN AUTO: 31 PG (ref 27–31)
MCHC RBC AUTO-ENTMCNC: 33.3 G/DL (ref 32–36)
MCV RBC AUTO: 93 FL (ref 82–98)
PHOSPHATE SERPL-MCNC: 5.9 MG/DL (ref 2.7–4.5)
PLATELET # BLD AUTO: 234 K/UL (ref 150–450)
PMV BLD AUTO: 10.2 FL (ref 9.2–12.9)
POCT GLUCOSE: 119 MG/DL (ref 70–110)
POCT GLUCOSE: 140 MG/DL (ref 70–110)
POCT GLUCOSE: 260 MG/DL (ref 70–110)
POTASSIUM SERPL-SCNC: 3.4 MMOL/L (ref 3.5–5.1)
PROTHROMBIN TIME: 10.4 SEC (ref 9–12.5)
RBC # BLD AUTO: 3.58 M/UL (ref 4.6–6.2)
SODIUM SERPL-SCNC: 138 MMOL/L (ref 136–145)
WBC # BLD AUTO: 6.49 K/UL (ref 3.9–12.7)

## 2024-09-27 PROCEDURE — 63600175 PHARM REV CODE 636 W HCPCS: Mod: HCNC | Performed by: RADIOLOGY

## 2024-09-27 PROCEDURE — 85610 PROTHROMBIN TIME: CPT | Mod: HCNC

## 2024-09-27 PROCEDURE — 36415 COLL VENOUS BLD VENIPUNCTURE: CPT | Mod: HCNC,XB | Performed by: NURSE PRACTITIONER

## 2024-09-27 PROCEDURE — 99214 OFFICE O/P EST MOD 30 MIN: CPT | Mod: HCNC,,, | Performed by: NURSE PRACTITIONER

## 2024-09-27 PROCEDURE — G0378 HOSPITAL OBSERVATION PER HR: HCPCS | Mod: HCNC

## 2024-09-27 PROCEDURE — 63600175 PHARM REV CODE 636 W HCPCS: Mod: HCNC | Performed by: NURSE PRACTITIONER

## 2024-09-27 PROCEDURE — 96372 THER/PROPH/DIAG INJ SC/IM: CPT | Performed by: STUDENT IN AN ORGANIZED HEALTH CARE EDUCATION/TRAINING PROGRAM

## 2024-09-27 PROCEDURE — 36415 COLL VENOUS BLD VENIPUNCTURE: CPT | Mod: HCNC | Performed by: STUDENT IN AN ORGANIZED HEALTH CARE EDUCATION/TRAINING PROGRAM

## 2024-09-27 PROCEDURE — 63600175 PHARM REV CODE 636 W HCPCS: Mod: HCNC | Performed by: STUDENT IN AN ORGANIZED HEALTH CARE EDUCATION/TRAINING PROGRAM

## 2024-09-27 PROCEDURE — 85027 COMPLETE CBC AUTOMATED: CPT | Mod: HCNC | Performed by: STUDENT IN AN ORGANIZED HEALTH CARE EDUCATION/TRAINING PROGRAM

## 2024-09-27 PROCEDURE — G0257 UNSCHED DIALYSIS ESRD PT HOS: HCPCS | Mod: HCNC

## 2024-09-27 PROCEDURE — 80069 RENAL FUNCTION PANEL: CPT | Mod: HCNC | Performed by: STUDENT IN AN ORGANIZED HEALTH CARE EDUCATION/TRAINING PROGRAM

## 2024-09-27 PROCEDURE — 25000003 PHARM REV CODE 250: Mod: HCNC | Performed by: STUDENT IN AN ORGANIZED HEALTH CARE EDUCATION/TRAINING PROGRAM

## 2024-09-27 PROCEDURE — 83735 ASSAY OF MAGNESIUM: CPT | Mod: HCNC | Performed by: STUDENT IN AN ORGANIZED HEALTH CARE EDUCATION/TRAINING PROGRAM

## 2024-09-27 PROCEDURE — 99223 1ST HOSP IP/OBS HIGH 75: CPT | Mod: HCNC,,, | Performed by: INTERNAL MEDICINE

## 2024-09-27 PROCEDURE — 25000003 PHARM REV CODE 250: Mod: HCNC | Performed by: RADIOLOGY

## 2024-09-27 PROCEDURE — 36415 COLL VENOUS BLD VENIPUNCTURE: CPT | Mod: HCNC

## 2024-09-27 PROCEDURE — 93985 DUP-SCAN HEMO COMPL BI STD: CPT | Mod: 26,HCNC,, | Performed by: SURGERY

## 2024-09-27 PROCEDURE — 87340 HEPATITIS B SURFACE AG IA: CPT | Mod: HCNC | Performed by: NURSE PRACTITIONER

## 2024-09-27 RX ORDER — SODIUM CHLORIDE 9 MG/ML
INJECTION, SOLUTION INTRAVENOUS ONCE
Status: DISCONTINUED | OUTPATIENT
Start: 2024-09-27 | End: 2024-09-28 | Stop reason: HOSPADM

## 2024-09-27 RX ORDER — LIDOCAINE HYDROCHLORIDE 10 MG/ML
INJECTION, SOLUTION INFILTRATION; PERINEURAL
Status: COMPLETED | OUTPATIENT
Start: 2024-09-27 | End: 2024-09-27

## 2024-09-27 RX ORDER — ACETAMINOPHEN 325 MG/1
650 TABLET ORAL EVERY 6 HOURS PRN
Status: DISCONTINUED | OUTPATIENT
Start: 2024-09-27 | End: 2024-09-28 | Stop reason: HOSPADM

## 2024-09-27 RX ORDER — FENTANYL CITRATE 50 UG/ML
INJECTION, SOLUTION INTRAMUSCULAR; INTRAVENOUS
Status: COMPLETED | OUTPATIENT
Start: 2024-09-27 | End: 2024-09-27

## 2024-09-27 RX ORDER — HYDRALAZINE HYDROCHLORIDE 20 MG/ML
10 INJECTION INTRAMUSCULAR; INTRAVENOUS EVERY 6 HOURS PRN
Status: DISCONTINUED | OUTPATIENT
Start: 2024-09-27 | End: 2024-09-28 | Stop reason: HOSPADM

## 2024-09-27 RX ORDER — POTASSIUM CHLORIDE 750 MG/1
30 CAPSULE, EXTENDED RELEASE ORAL ONCE
Status: COMPLETED | OUTPATIENT
Start: 2024-09-27 | End: 2024-09-27

## 2024-09-27 RX ORDER — HEPARIN SODIUM 1000 [USP'U]/ML
1000 INJECTION, SOLUTION INTRAVENOUS; SUBCUTANEOUS
Status: DISCONTINUED | OUTPATIENT
Start: 2024-09-27 | End: 2024-09-28 | Stop reason: HOSPADM

## 2024-09-27 RX ORDER — MIDAZOLAM HYDROCHLORIDE 1 MG/ML
INJECTION, SOLUTION INTRAMUSCULAR; INTRAVENOUS
Status: COMPLETED | OUTPATIENT
Start: 2024-09-27 | End: 2024-09-27

## 2024-09-27 RX ADMIN — INSULIN GLARGINE 15 UNITS: 100 INJECTION, SOLUTION SUBCUTANEOUS at 09:09

## 2024-09-27 RX ADMIN — LIDOCAINE HYDROCHLORIDE 5 ML: 10 INJECTION, SOLUTION INFILTRATION; PERINEURAL at 05:09

## 2024-09-27 RX ADMIN — HEPARIN SODIUM 1000 UNITS: 1000 INJECTION, SOLUTION INTRAVENOUS; SUBCUTANEOUS at 11:09

## 2024-09-27 RX ADMIN — DEXTROSE MONOHYDRATE 2 G: 2.5 INJECTION INTRAVENOUS at 05:09

## 2024-09-27 RX ADMIN — INSULIN ASPART 3 UNITS: 100 INJECTION, SOLUTION INTRAVENOUS; SUBCUTANEOUS at 09:09

## 2024-09-27 RX ADMIN — MIDAZOLAM 0.5 MG: 1 INJECTION INTRAMUSCULAR; INTRAVENOUS at 05:09

## 2024-09-27 RX ADMIN — POTASSIUM CHLORIDE 30 MEQ: 750 CAPSULE, EXTENDED RELEASE ORAL at 08:09

## 2024-09-27 RX ADMIN — TAMSULOSIN HYDROCHLORIDE 0.4 MG: 0.4 CAPSULE ORAL at 06:09

## 2024-09-27 RX ADMIN — FERROUS SULFATE TAB EC 325 MG (65 MG FE EQUIVALENT) 1 EACH: 325 (65 FE) TABLET DELAYED RESPONSE at 08:09

## 2024-09-27 RX ADMIN — PANTOPRAZOLE SODIUM 40 MG: 40 TABLET, DELAYED RELEASE ORAL at 08:09

## 2024-09-27 RX ADMIN — ATORVASTATIN CALCIUM 80 MG: 40 TABLET, FILM COATED ORAL at 08:09

## 2024-09-27 RX ADMIN — TRAZODONE HYDROCHLORIDE 100 MG: 100 TABLET ORAL at 09:09

## 2024-09-27 RX ADMIN — FENTANYL CITRATE 25 MCG: 0.05 INJECTION, SOLUTION INTRAMUSCULAR; INTRAVENOUS at 05:09

## 2024-09-27 RX ADMIN — NIFEDIPINE 90 MG: 30 TABLET, FILM COATED, EXTENDED RELEASE ORAL at 08:09

## 2024-09-27 NOTE — ED NOTES
Telemetry Verification   Patient placed on Telemetry Box  Verified with War Room  Box # [id number]   Monitor Tech    Rate    Rhythm

## 2024-09-27 NOTE — SUBJECTIVE & OBJECTIVE
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     Hyperlipidemia     Hypertension     Kidney stone     Seasonal allergies 06/24/2014       Past Surgical History:   Procedure Laterality Date    AV FISTULA PLACEMENT Left 12/8/2020    Procedure: CREATION, AV FISTULA;  Surgeon: Benji Becerril MD;  Location: [address];  Service: Peripheral Vascular;  Laterality: Left;    AV FISTULA PLACEMENT Right 12/7/2022    Procedure: CREATION, AV FISTULA;  Surgeon: Benji Becerril MD;  Location: [facility] OR [address] [place] [address];  Service: Peripheral Vascular;  Laterality: Right;  RUE    CATARACT EXTRACTION W/  INTRAOCULAR LENS IMPLANT Right 04/04/16    Dr Meehan    COLONOSCOPY W/ BIOPSIES      DILATION, AQUEOUS OUTFLOW CANAL, TRANSLUMINAL, WITHOUT DEVICE RETENTION Left 9/21/2023    Procedure: DILATION, AQUEOUS OUTFLOW CANAL, TRANSLUMINAL, WITHOUT DEVICE RETENTION;  Surgeon: Yvonne Nielson MD;  Location: [address];  Service: Ophthalmology;  Laterality: Left;  omni    ESOPHAGOGASTRODUODENOSCOPY N/A 6/29/2020    Procedure: EGD (ESOPHAGOGASTRODUODENOSCOPY);  Surgeon: Ravi Leone MD;  Location: [facility];  Service: Endoscopy;  Laterality: N/A;    EYE SURGERY      FISTULOGRAM Left 4/16/2021    Procedure: Fistulogram;  Surgeon: Benji Becerril MD;  Location: [facility] CATH LAB;  Service: Peripheral Vascular;  Laterality: Left;    FISTULOGRAM Left 9/28/2022    Procedure: Fistulogram;  Surgeon: Benji Becerril MD;  Location: [facility] CATH LAB;  Service: Cardiology;  Laterality: Left;    FISTULOGRAM, WITH PTA Right 2/3/2023    Procedure: FISTULOGRAM, WITH PTA;  Surgeon: Benji Becerril MD;  [facility];  Service: Peripheral Vascular;  Laterality: Right;  205.17 mGy  2.3 min    GONIOTOMY Left 8/10/2023    Procedure: GONIOTOMY;   Surgeon: Yvonne Nielson MD;  [facility];  Service: Ophthalmology;  Laterality: Left;  omni    [place] [facility] [place]TRAOCULAR PROSTHESES INSERTION Left 8/10/2023    Procedure: INSERTION, IOL PROSTHESIS;  Surgeon: Yvonne Nielson MD;  Location: [facility] OR 1ST FLR;  Service: Ophthalmology;  Laterality: Left;    INTRAOCULAR PROSTHESES INSERTION Left 9/21/2023    Procedure: INSERTION, IOL PROSTHESIS;  Surgeon: Yvonne Nielson MD;  Location: [facility] OR 1ST FLR;  Service: Ophthalmology;  Laterality: Left;    lateral internal anal sphincterotomy  12/13/13    Dr. root ADRYAN    PERCUTANEOUS TRANSLUMINAL ANGIOPLASTY OF ARTERIOVENOUS FISTULA Left 4/16/2021    Procedure: PTA, AV FISTULA;  Surgeon: Benji Becerril MD;  Location: [facility] CATH LAB;  Service: Peripheral Vascular;  Laterality: Left;    PERCUTANEOUS TRANSLUMINAL ANGIOPLASTY OF ARTERIOVENOUS FISTULA N/A 9/28/2022    Procedure: PTA, AV FISTULA;  Surgeon: Benji Becerril MD;  Location: [facility] CATH LAB;  Service: Cardiology;  Laterality: N/A;    PHACOEMULSIFICATION OF CATARACT Left 8/10/2023    Procedure: PHACOEMULSIFICATION, CATARACT;  Surgeon: Yvonne Nielson MD;  Location: [facility] OR 1ST FLR;  Service: Ophthalmology;  Laterality: Left;    PHACOEMULSIFICATION OF CATARACT Left 9/21/2023    Procedure: PHACOEMULSIFICATION, CATARACT;  Surgeon: Yvonne Nielson MD;  Location: [facility] OR 1ST FLR;  Service: Ophthalmology;  Laterality: Left;    PLACEMENT OF DUAL-LUMEN VASCULAR CATHETER N/A 2/3/2023    Procedure: EXCHANGE, PERMACATH;  Surgeon: Benji Becerril MD;  Location: [facility] OR 2ND FLR;  Service: Peripheral Vascular;  Laterality: N/A;    URETERAL STENT PLACEMENT         Review of patient's allergies indicates:  No Known Allergies    No current facility-administered medications on file prior to encounter.     Current Outpatient Medications on File Prior to Encounter   Medication Sig    atorvastatin (LIPITOR) 80 MG tablet Take 1  "tablet (80 mg total) by mouth once daily.    blood sugar diagnostic Strp To check BG 4 times daily, to use with insurance preferred meter    blood-glucose meter kit To check BG 4 times daily, to use with insurance preferred meter    calcium acetate,phosphat bind, (PHOSLO) 667 mg capsule SMARTSI Capsule(s) By Mouth    dorzolamide-timolol 2-0.5% (COSOPT) 22.3-6.8 mg/mL ophthalmic solution Place 1 drop into both eyes 2 (two) times daily.    ergocalciferol (ERGOCALCIFEROL) 50,000 unit Cap Take 1 capsule (50,000 Units total) by mouth every 7 days. (Patient not taking: Reported on 2024)    ezetimibe (ZETIA) 10 mg tablet Take 1 tablet (10 mg total) by mouth once daily. One a day or as directed    ferrous sulfate (FEOSOL) 325 mg (65 mg iron) Tab tablet 3 TABS A DAY. (Patient not taking: Reported on 2024)    fluticasone propionate (FLONASE) 50 mcg/actuation nasal spray 2 sprays (100 mcg total) by Each Nostril route nightly as needed for Rhinitis.    HUMALOG KWIKPEN INSULIN 100 unit/mL pen Inject 17 Units into the skin 3 (three) times daily before meals.    hydrALAZINE (APRESOLINE) 100 MG tablet Take 1 tablet (100 mg total) by mouth 3 (three) times daily.    HYDROcodone-acetaminophen (NORCO) 7.5-325 mg per tablet Take 1 tablet by mouth.    insulin glargine U-100, Lantus, (LANTUS SOLOSTAR U-100 INSULIN) 100 unit/mL (3 mL) InPn pen Inject 45 Units into the skin every evening. Increase per MD instruction to max daily dose of 70 units    lancets Misc To check BG 4 times daily, to use with insurance preferred meter    metoprolol succinate (TOPROL-XL) 25 MG 24 hr tablet Take 0.5 tablets (12.5 mg total) by mouth once daily.    NIFEdipine (PROCARDIA-XL) 90 MG (OSM) 24 hr tablet Take 1 tablet (90 mg total) by mouth once daily.    pantoprazole (PROTONIX) 40 MG tablet Take 1 tablet (40 mg total) by mouth once daily.    pen needle, diabetic (BD ULTRA-FINE SHORT PEN NEEDLE) 31 gauge x 5/16" Ndle USE FOUR TIMES DAILY    " tamsulosin (FLOMAX) 0.4 mg Cap Take 1 capsule (0.4 mg total) by mouth every morning.    torsemide (DEMADEX) 20 MG Tab Take 1 tablet (20 mg total) by mouth 2 (two) times daily.    traZODone (DESYREL) 100 MG tablet Take 100 mg by mouth every evening.     Family History       Problem Relation (Age of Onset)    Diabetes Brother    Hypertension Brother    Stroke Brother          Tobacco Use    Smoking status: Former     Current packs/day: 0.00     Average packs/day: 0.5 packs/day for 45.0 years (22.5 ttl pk-yrs)     Types: Cigarettes     Start date: 1975     Quit date: 2020     Years since quittin.2     Passive exposure: Never    Smokeless tobacco: Former   Substance and Sexual Activity    Alcohol use: Not Currently     Comment: rarely    Drug use: No    Sexual activity: Yes     Partners: Female     Comment: single, retired , no kids     Review of Systems  A comprehensive review of systems was negative except as noted above.     Objective:     Vital Signs (Most Recent):  Temp: 98.4 °F (36.9 °C) (24)  Pulse: 72 (24)  Resp: 16 (24)  BP: (!) 154/79 (24)  SpO2: 99 % (24) Vital Signs (24h Range):  Temp:  [98.1 °F (36.7 °C)-98.4 °F (36.9 °C)] 98.4 °F (36.9 °C)  Pulse:  [61-86] 72  Resp:  [11-20] 16  SpO2:  [99 %-100 %] 99 %  BP: (128-154)/(64-86) 154/79     Weight: 126.6 kg (279 lb)  Body mass index is 35.82 kg/m².     Physical Exam  Vitals reviewed.  Nursing notes reviewed  GEN: NAD; Obese  HEENT: Normocephalic, atraumatic. PERRLA, EOMI  CV: RRR, no murmurs/rubs/gallops  Lungs: CTAB, no rubs/rhonchi/rales, non-labored breathing on room air  Abd: soft, non-tender to palpation. No guarding  Msk: No muscular deformities noted  Skin: No rashes or erythema noted  RUE fistula- no palpable thrill. Old LUE fistula site, no palpable thrill  Neuro: Alert and oriented x3. No focal neurologic deficits  Psych: normal affect and mood        Significant Labs: All  pertinent labs within the past 24 hours have been reviewed.  CBC:   Recent Labs   Lab 09/26/24  1143 09/26/24  1152   WBC 7.31  --    HGB 12.0*  --    HCT 37.2* 39     --      CMP:   Recent Labs   Lab 09/26/24  1255      K 3.0*      CO2 24   *   BUN 43*   CREATININE 9.2*   CALCIUM 7.7*   PROT 6.7   ALBUMIN 3.0*   BILITOT 0.6   ALKPHOS 59   AST 8*   ALT 7*   ANIONGAP 15       Significant Imaging: I have reviewed all pertinent imaging results/findings within the past 24 hours.

## 2024-09-27 NOTE — ASSESSMENT & PLAN NOTE
79 y.o. Black or  Male ESRD-HD T-T-S  presents to ED on 9/26/2024 with diagnosis of: Dysrhythmia [I49.9];Wenckebach [I44.1];ESRD (end stage renal disease) on dialysis [N18.6, Z99.2];Chest pain [R07.9];Problem with vascular access [Z78.9]   Nephrology consulted for inpatient ESRD-HD management    Outpatient HD Information:  -Dialysis modality: Hemodialysis  -Outpatient HD unit: [facility]  -Nephrologist: ?  -HD TX days: Tuesday/Thursday/Saturday, duration of treatment: 4 hrs  -Dialysis access: RUE AV fistula   -Residual urine: Minium   -EDW: 127 kg     Assessment:   - Will provide dialysis today (09/27/2024) with UF - 2L as BP tolerates for metabolic clearance and volume management   - Labs reviewed and dialysate to be adjusted to current labs.   - Continue to monitor intake and output  - Please avoid gadolinium, fleets, phos-based laxatives, NSAIDs  - Dialysis thrice weekly unless more urgent indications arise. Will evaluate RRT requirements Daily.    Anemia of ESRD   Recent Labs   Lab 09/26/24  1143 09/26/24  1152 09/27/24  0502   WBC 7.31  --  6.49   HGB 12.0*  --  11.1*   HCT 37.2* 39 33.3*     --  234     Lab Results   Component Value Date    FESATURATED 12 (L) 01/04/2021    FERRITIN 96 01/04/2021       - Goal in ESRD is Hgb of 10-11. Hgb 11.1.  - EPO can be administered and dosed per his OP unit upon discharge.    Mineral Bone Disease in ESRD   Lab Results   Component Value Date    .0 (H) 11/05/2020    CALCIUM 8.2 (L) 09/27/2024    ALBUMIN 3.0 (L) 09/27/2024    CAION 1.00 (L) 07/10/2020    PHOS 5.9 (H) 09/27/2024       - F/U PO4, Mg, Calcium. And albumin levels daily.   - Renal diet with protein intake goal 1.5 g/kg/d with 1 L fluid restriction   - Novasource with meals  - Restart home phos binder, Phos 5.9

## 2024-09-27 NOTE — HPI
79 y.o. male with past medical history of ESRD on HD, essential hypertension, DM type 2, HLD, glaucoma, other history as below who presents to the ED with chief complaint of HD fistula malfunction, right upper extremity.  Patient previously had a left UE fistula that had malfunction, fistula needed to be moved to right arm.  Placed in 2022 by vascular surgery here.  Last full hemodialysis session was Tuesday.  Today went to dialysis and they could not access the right upper extremity fistula.  Notes that he has had no trauma, did not sleep on the arm.  Patient with no acute symptomatology.  He denies chest pain or fevers.  He states he has chronic shortness for breath with exertion.  For greater than 1 year he has had shortness for breath with minimal walking.  These symptoms are stable.    Vascular surgery consulted in the ED. Patient presented to the ED afebrile with good O2 sats on room air.  White count not elevated.  Hemoglobin stable with baseline. CMP showed creatinine 9.2, potassium 3.0, BUN 43.

## 2024-09-27 NOTE — SUBJECTIVE & OBJECTIVE
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     Hyperlipidemia     Hypertension     Kidney stone     Seasonal allergies 06/24/2014       Past Surgical History:   Procedure Laterality Date    AV FISTULA PLACEMENT Left 12/8/2020    Procedure: CREATION, AV FISTULA;  Surgeon: Benji Becerril MD;  [facility];  Service: Peripheral Vascular;  Laterality: Left;    AV FISTULA PLACEMENT Right 12/7/2022    Procedure: CREATION, AV FISTULA;  Surgeon: Benji Becerril MD;  [facility];  Service: Peripheral Vascular;  Laterality: Right;  RUE    CATARACT EXTRACTION W/  INTRAOCULAR LENS IMPLANT Right 04/04/16    Dr Meehan    COLONOSCOPY W/ BIOPSIES      DILATION, AQUEOUS OUTFLOW CANAL, TRANSLUMINAL, WITHOUT DEVICE RETENTION Left 9/21/2023    Procedure: DILATION, AQUEOUS OUTFLOW CANAL, TRANSLUMINAL, WITHOUT DEVICE RETENTION;  Surgeon: Yvonne Nielson MD;  Location: [facility] OR [address] [place] [address];  Service: Ophthalmology;  Laterality: Left;  omni    ESOPHAGOGASTRODUODENOSCOPY N/A 6/29/2020    Procedure: EGD (ESOPHAGOGASTRODUODENOSCOPY);  Surgeon: Ravi Leone MD;  Location: [facility];  Service: Endoscopy;  Laterality: N/A;    EYE SURGERY      FISTULOGRAM Left 4/16/2021    Procedure: Fistulogram;  Surgeon: Benji Becerril MD;  Location: [facility] CATH LAB;  Service: Peripheral Vascular;  Laterality: Left;    FISTULOGRAM Left 9/28/2022    Procedure: Fistulogram;  Surgeon: Benji Becerril MD;  Location: [facility] CATH LAB;  Service: Cardiology;  Laterality: Left;    FISTULOGRAM, WITH PTA Right 2/3/2023    Procedure: FISTULOGRAM, WITH PTA;  Surgeon: Benji Becerril MD;  [facility];  Service: Peripheral Vascular;  Laterality: Right;  205.17 mGy  2.3 min    GONIOTOMY Left  8/10/2023    Procedure: GONIOTOMY;  Surgeon: Yvonne Nielson MD;  Location: [facility] OR 1ST FLR;  Service: Ophthalmology;  Laterality: Left;  omni    [place]MORRHOID [place]      [place]. [place] [facility] [place]    INTRAOCULAR PROSTHESES INSERTION Left 8/10/2023    Procedure: INSERTION, IOL PROSTHESIS;  Surgeon: Yvonne Nielson MD;  Location: [facility] OR 1ST FLR;  Service: Ophthalmology;  Laterality: Left;    INTRAOCULAR PROSTHESES INSERTION Left 9/21/2023    Procedure: INSERTION, IOL PROSTHESIS;  Surgeon: Yvonne Nielson MD;  Location: [facility] OR 1ST FLR;  Service: Ophthalmology;  Laterality: Left;    lateral internal anal sphincterotomy  12/13/13    [place] [facility] [place]    PERCUTANEOUS TRANSLUMINAL ANGIOPLASTY OF ARTERIOVENOUS FISTULA Left 4/16/2021    Procedure: PTA, AV FISTULA;  Surgeon: Benji Becerril MD;  Location: [facility] CATH LAB;  Service: Peripheral Vascular;  Laterality: Left;    PERCUTANEOUS TRANSLUMINAL ANGIOPLASTY OF ARTERIOVENOUS FISTULA N/A 9/28/2022    Procedure: PTA, AV FISTULA;  Surgeon: Benji Becerril MD;  Location: [facility] CATH LAB;  Service: Cardiology;  Laterality: N/A;    PHACOEMULSIFICATION OF CATARACT Left 8/10/2023    Procedure: PHACOEMULSIFICATION, CATARACT;  Surgeon: Yvonne Nielson MD;  Location: [facility] OR 1ST FLR;  Service: Ophthalmology;  Laterality: Left;    PHACOEMULSIFICATION OF CATARACT Left 9/21/2023    Procedure: PHACOEMULSIFICATION, CATARACT;  Surgeon: Yvonne Nielson MD;  Location: [facility] OR 1ST FLR;  Service: Ophthalmology;  Laterality: Left;    PLACEMENT OF DUAL-LUMEN VASCULAR CATHETER N/A 2/3/2023    Procedure: EXCHANGE, PERMACATH;  Surgeon: Benji Becerril MD;  Location: [facility] OR 2ND FLR;  Service: Peripheral Vascular;  Laterality: N/A;    URETERAL STENT PLACEMENT         Review of patient's allergies indicates:  No Known Allergies  Current Facility-Administered Medications   Medication Frequency    atorvastatin tablet 80 mg Daily    calcium acetate(phosphat bind) capsule 667 mg TID WM     dextrose 10% bolus 125 mL 125 mL PRN    dextrose 10% bolus 250 mL 250 mL PRN    ferrous sulfate tablet 1 each Daily    fluticasone propionate 50 mcg/actuation nasal spray 100 mcg Nightly PRN    glucagon (human recombinant) injection 1 mg PRN    glucose chewable tablet 16 g PRN    glucose chewable tablet 24 g PRN    HYDROcodone-acetaminophen 7.5-325 mg per tablet 1 tablet Q6H PRN    insulin aspart U-100 pen 0-10 Units QID (AC + HS) PRN    insulin aspart U-100 pen 5 Units TIDWM    insulin glargine U-100 (Lantus) pen 15 Units QHS    melatonin tablet 6 mg Nightly PRN    naloxone 0.4 mg/mL injection 0.02 mg PRN    NIFEdipine 24 hr tablet 90 mg Daily    pantoprazole EC tablet 40 mg Daily    sodium chloride 0.9% flush 10 mL PRN    sodium chloride 0.9% flush 10 mL Q12H PRN    tamsulosin 24 hr capsule 0.4 mg QAM    traZODone tablet 100 mg QHS     Family History       Problem Relation (Age of Onset)    Diabetes Brother    Hypertension Brother    Stroke Brother          Tobacco Use    Smoking status: Former     Current packs/day: 0.00     Average packs/day: 0.5 packs/day for 45.0 years (22.5 ttl pk-yrs)     Types: Cigarettes     Start date: 1975     Quit date: 2020     Years since quittin.2     Passive exposure: Never    Smokeless tobacco: Former   Substance and Sexual Activity    Alcohol use: Not Currently     Comment: rarely    Drug use: No    Sexual activity: Yes     Partners: Female     Comment: single, retired , no kids     Review of Systems   Constitutional:  Negative for chills and fever.   Eyes:  Negative for visual disturbance.   Respiratory:  Negative for cough and shortness of breath.    Cardiovascular:  Negative for leg swelling.   Gastrointestinal:  Negative for constipation.   Genitourinary:  Positive for decreased urine volume.   Musculoskeletal:  Negative for gait problem.   Skin:  Negative for color change and wound.   Neurological:  Negative for weakness.   Psychiatric/Behavioral:   Negative for agitation.      Objective:     Vital Signs (Most Recent):  Temp: 98.6 °F (37 °C) (09/27/24 0757)  Pulse: 78 (09/27/24 0757)  Resp: 18 (09/27/24 0757)  BP: 139/66 (09/27/24 0757)  SpO2: 97 % (09/27/24 0757) Vital Signs (24h Range):  Temp:  [98 °F (36.7 °C)-98.7 °F (37.1 °C)] 98.6 °F (37 °C)  Pulse:  [56-80] 78  Resp:  [11-20] 18  SpO2:  [95 %-100 %] 97 %  BP: (110-154)/(53-86) 139/66     Weight: 126 kg (277 lb 12.5 oz) (09/27/24 0246)  Body mass index is 35.66 kg/m².  Body surface area is 2.56 meters squared.    I/O last 3 completed shifts:  In: 0   Out: 200 [Urine:200]     Physical Exam  Vitals and nursing note reviewed.   Constitutional:       Appearance: He is obese. He is not ill-appearing.   HENT:      Head: Normocephalic.   Cardiovascular:      Rate and Rhythm: Normal rate.      Pulses:           Radial pulses are 2+ on the right side and 2+ on the left side.      Arteriovenous access: Right arteriovenous access is present.     Comments: No thrill or pulsatility appreciated in RUE AVF  Pulmonary:      Effort: Pulmonary effort is normal. No respiratory distress.   Abdominal:      General: There is no distension.   Musculoskeletal:         General: No swelling. Normal range of motion.   Neurological:      Mental Status: He is alert and oriented to person, place, and time.   Psychiatric:         Mood and Affect: Mood normal.         Behavior: Behavior normal.          Significant Labs:  CBC:   Recent Labs   Lab 09/27/24  0502   WBC 6.49   RBC 3.58*   HGB 11.1*   HCT 33.3*      MCV 93   MCH 31.0   MCHC 33.3     CMP:   Recent Labs   Lab 09/26/24  1255 09/27/24  0502   * 157*   CALCIUM 7.7* 8.2*   ALBUMIN 3.0* 3.0*   PROT 6.7  --     138   K 3.0* 3.4*   CO2 24 23    98   BUN 43* 54*   CREATININE 9.2* 10.1*   ALKPHOS 59  --    ALT 7*  --    AST 8*  --    BILITOT 0.6  --      All labs within the past 24 hours have been reviewed.

## 2024-09-27 NOTE — ASSESSMENT & PLAN NOTE
-Seen by cardiology consult in the ED. (underlying RBBB and PACs)  No concerns for a high degree AV block   Recommend electrolyte replacement to a K>4 and Mag >2.  Discontinue metoprolol succinate 12.5mg. Order a 48-hr holter monitor for discharge.  follow up with general cardiology as an outpatient.

## 2024-09-27 NOTE — CONSULTS
[facility] Emergency Dept  Vascular Surgery  Consult Note    Inpatient consult to Vascular Surgery  Consult performed by: Jordyn Gibson MD  Consult ordered by: Martin Araiza DO        Subjective:     Chief Complaint/Reason for Admission: HD access problem    History of Present Illness: Vinnie Siegel is a 79 y.o. male with history of nephrolithiasis, hypertension and diabetes,ckd, AVF creation in RUE 12/22 who presents to the ED after failed HD access today. Last received HD on Tuesday. Denies paresthesias or pain in his RUE.    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     Hyperlipidemia     Hypertension     Kidney stone     Seasonal allergies 06/24/2014     Past Surgical History:   Procedure Laterality Date    AV FISTULA PLACEMENT Left 12/8/2020    Procedure: CREATION, AV FISTULA;  Surgeon: Benji Becerril MD;  Location: [facility] OR [address] [place] [address];  Service: Peripheral Vascular;  Laterality: Left;    AV FISTULA PLACEMENT Right 12/7/2022    Procedure: CREATION, AV FISTULA;  Surgeon: Benji Becerril MD;  Location: [facility] OR 2ND FLR;  Service: Peripheral Vascular;  Laterality: Right;  RUE    CATARACT EXTRACTION W/  INTRAOCULAR LENS IMPLANT Right 04/04/16    Dr Meehan    COLONOSCOPY W/ BIOPSIES      DILATION, AQUEOUS OUTFLOW CANAL, TRANSLUMINAL, WITHOUT DEVICE RETENTION Left 9/21/2023    Procedure: DILATION, AQUEOUS OUTFLOW CANAL, TRANSLUMINAL, WITHOUT DEVICE RETENTION;  Surgeon: Yvonne Nielson MD;  Location: [facility] OR [address] [place] [address];  Service: Ophthalmology;  Laterality: Left;  omni    ESOPHAGOGASTRODUODENOSCOPY N/A 6/29/2020    Procedure: EGD (ESOPHAGOGASTRODUODENOSCOPY);  Surgeon: Ravi Leone MD;  Location: [facility];   Service: Endoscopy;  Laterality: N/A;    EYE SURGERY      FISTULOGRAM Left 4/16/2021    Procedure: Fistulogram;  Surgeon: Benji Becerril MD;  Location: [facility] CATH LAB;  Service: Peripheral Vascular;  Laterality: Left;    FISTULOGRAM Left 9/28/2022    Procedure: Fistulogram;  Surgeon: Benji Becerril MD;  Location: [facility] CATH LAB;  Service: Cardiology;  Laterality: Left;    FISTULOGRAM, WITH PTA Right 2/3/2023    Procedure: FISTULOGRAM, WITH PTA;  Surgeon: Benji Becerril MD;  Location: [facility] OR 2ND FLR;  Service: Peripheral Vascular;  Laterality: Right;  205.17 mGy  2.3 min    GONIOTOMY Left 8/10/2023    Procedure: GONIOTOMY;  Surgeon: Yvonne Nielson MD;  Location: [facility] OR 1ST FLR;  Service: Ophthalmology;  Laterality: Left;  omni    HEMORRHOID SURGERY      Dr. Root [facility]    INTRAOCULAR PROSTHESES INSERTION Left 8/10/2023    Procedure: INSERTION, IOL PROSTHESIS;  Surgeon: Yvonne Nielson MD;  Location: [facility] OR 1ST FLR;  Service: Ophthalmology;  Laterality: Left;    INTRAOCULAR PROSTHESES INSERTION Left 9/21/2023    Procedure: INSERTION, IOL PROSTHESIS;  Surgeon: Yvonne Nielson MD;  Location: [facility] OR 1ST FLR;  Service: Ophthalmology;  Laterality: Left;    lateral internal anal sphincterotomy  12/13/13    Dr. root ADRYAN    PERCUTANEOUS TRANSLUMINAL ANGIOPLASTY OF ARTERIOVENOUS FISTULA Left 4/16/2021    Procedure: PTA, AV FISTULA;  Surgeon: Benji Becerril MD;  Location: [facility] CATH LAB;  Service: Peripheral Vascular;  Laterality: Left;    PERCUTANEOUS TRANSLUMINAL ANGIOPLASTY OF ARTERIOVENOUS FISTULA N/A 9/28/2022    Procedure: PTA, AV FISTULA;  Surgeon: Benji Becerril MD;  Location: [facility] CATH LAB;  Service: Cardiology;  Laterality: N/A;    PHACOEMULSIFICATION OF CATARACT Left 8/10/2023    Procedure: PHACOEMULSIFICATION, CATARACT;  Surgeon: Yvonne Nielson MD;  Location: [facility] OR 1ST FLR;  Service: Ophthalmology;  Laterality: Left;    PHACOEMULSIFICATION OF CATARACT Left 9/21/2023     Procedure: PHACOEMULSIFICATION, CATARACT;  Surgeon: Yvonne Nielson MD;  Location: [facility] OR 1ST FLR;  Service: Ophthalmology;  Laterality: Left;    PLACEMENT OF DUAL-LUMEN VASCULAR CATHETER N/A 2/3/2023    Procedure: EXCHANGE, PERMACATH;  Surgeon: Benji Becerril MD;  Location: [facility] OR 2ND FLR;  Service: Peripheral Vascular;  Laterality: N/A;    URETERAL STENT PLACEMENT       Family History       Problem Relation (Age of Onset)    Diabetes Brother    Hypertension Brother    Stroke Brother          Tobacco Use    Smoking status: Former     Current packs/day: 0.00     Average packs/day: 0.5 packs/day for 45.0 years (22.5 ttl pk-yrs)     Types: Cigarettes     Start date: 1975     Quit date: 2020     Years since quittin.2     Passive exposure: Never    Smokeless tobacco: Former   Substance and Sexual Activity    Alcohol use: Not Currently     Comment: rarely    Drug use: No    Sexual activity: Yes     Partners: Female     Comment: single, retired , no kids     Review of Systems   Constitutional:  Negative for chills and fever.   Skin:  Negative for color change and wound.     Objective:     Vital Signs (Most Recent):  Temp: 98.1 °F (36.7 °C) (24 1031)  Pulse: 62 (24 1600)  Resp: 14 (24 1600)  BP: (!) 148/64 (24 1600)  SpO2: 99 % (24 1600) Vital Signs (24h Range):  Temp:  [98.1 °F (36.7 °C)] 98.1 °F (36.7 °C)  Pulse:  [61-86] 62  Resp:  [11-20] 14  SpO2:  [99 %-100 %] 99 %  BP: (128-149)/(64-86) 148/64     Weight: 126.6 kg (279 lb)  Body mass index is 35.82 kg/m².      Physical Exam  Constitutional:       Appearance: He is obese. He is not ill-appearing.   HENT:      Head: Normocephalic.   Cardiovascular:      Rate and Rhythm: Normal rate.      Pulses:           Radial pulses are 2+ on the right side and 2+ on the left side.      Arteriovenous access: Right arteriovenous access is present.     Comments: No thrill or pulsatility appreciated in RUE AVF  Pulmonary:       Effort: Pulmonary effort is normal. No respiratory distress.   Musculoskeletal:         General: No swelling. Normal range of motion.   Neurological:      Mental Status: He is alert.          Significant Labs:  CBC:   Recent Labs   Lab 09/26/24  1143 09/26/24  1152   WBC 7.31  --    RBC 3.95*  --    HGB 12.0*  --    HCT 37.2* 39     --    MCV 94  --    MCH 30.4  --    MCHC 32.3  --      CMP:   Recent Labs   Lab 09/26/24  1255   *   CALCIUM 7.7*   ALBUMIN 3.0*   PROT 6.7      K 3.0*   CO2 24      BUN 43*   CREATININE 9.2*   ALKPHOS 59   ALT 7*   AST 8*   BILITOT 0.6       Significant Diagnostics:    US R AVF 09/26/2024       FINDINGS:  Occlusive thrombus measuring up to 2.4 cm can be seen in the right brachiocephalic AV fistula.  No significant color Doppler signal at this level.     Impression:     Right brachiocephalic AV fistula appears thrombosed.  I have reviewed all pertinent imaging results/findings within the past 24 hours.  Assessment/Plan:     AV fistula stenosis, initial encounter  Vinnie Siegel is a 79 y.o. male with RUE AVF thrombus. No thrill on exam. Last HD on Tuesday.     - recommend interventional nephrology consult for tunneled line for HD access  - will follow up outpatient for AVF, will message  our clinic staff  - EMELINA BL upper extremity vein mapping tomorrow, will follow up imaging  - recommend admission to hospital medicine              Thank you for your consult. I will follow-up with patient. Please contact us if you have any additional questions.    Jordyn Cole MD  Vascular Surgery  [facility]

## 2024-09-27 NOTE — CONSULTS
[facility] - Internal Medicine Telemetry  Nephrology  Consult Note    Patient Name: Vinnie Siegel  MRN: [id number]  Admission Date: 9/26/2024  Hospital Length of Stay: 0 days  Attending Provider: Purvi Vargas MD   Primary Care Physician: Janeth Walter MD  Principal Problem:<principal problem not specified>    Inpatient consult to Nephrology  Consult performed by: Rona Dawkins, DNP, FNP-C  Consult ordered by: Purvi Vargas MD  Reason for consult: ESRD        Subjective:     HPI: The patient is a 79 y.o. Black or  Male with multiple co morbidities including ESRD on HD, essential hypertension, DM type 2, HLD, glaucoma who presents to ED on 9/26/2024 with a clotted access. AVF creation in RUE 12/22. Last full HD session completed on Tuesday, 9/24. He went to HD yesterday and they were unable to access the R AVF. Denies any trauma or previous issues with access. No CP or SOB on exam. Vascular consulted, but recommending CVC placement with IR with FU in clinic for possible new access. Plans for CVC placement today. Nephrology consulted for HD post line placement. Nephrology consulted for management of ESRD and HD treatment.    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     Hyperlipidemia     Hypertension     Kidney stone     Seasonal allergies 06/24/2014       Past Surgical History:   Procedure Laterality Date    AV FISTULA PLACEMENT Left 12/8/2020    Procedure: CREATION, AV FISTULA;  Surgeon: Benji Becerril MD;  Location: [facility] OR [address] [place] [address];  Service: Peripheral Vascular;  Laterality: Left;    AV FISTULA PLACEMENT Right 12/7/2022    Procedure: CREATION, AV FISTULA;  Surgeon: Benji Becerril MD;  Location: [facility] OR [address] [place] [address];  Service: Peripheral Vascular;   Laterality: Right;  RUE    CATARACT EXTRACTION W/  INTRAOCULAR LENS IMPLANT Right 04/04/16    Dr Meehan    COLONOSCOPY W/ BIOPSIES      DILATION, AQUEOUS OUTFLOW CANAL, TRANSLUMINAL, WITHOUT DEVICE RETENTION Left 9/21/2023    Procedure: DILATION, AQUEOUS OUTFLOW CANAL, TRANSLUMINAL, WITHOUT DEVICE RETENTION;  Surgeon: Yvonne Nielson MD;  Location: [facility] OR 1ST FLR;  Service: Ophthalmology;  Laterality: Left;  omni    ESOPHAGOGASTRODUODENOSCOPY N/A 6/29/2020    Procedure: EGD (ESOPHAGOGASTRODUODENOSCOPY);  Surgeon: Ravi Leone MD;  Location: [facility] ENDO;  Service: Endoscopy;  Laterality: N/A;    EYE SURGERY      FISTULOGRAM Left 4/16/2021    Procedure: Fistulogram;  Surgeon: Benji Becerril MD;  Location: [facility] CATH LAB;  Service: Peripheral Vascular;  Laterality: Left;    FISTULOGRAM Left 9/28/2022    Procedure: Fistulogram;  Surgeon: Benji Becerril MD;  Location: [facility] CATH LAB;  Service: Cardiology;  Laterality: Left;    FISTULOGRAM, WITH PTA Right 2/3/2023    Procedure: FISTULOGRAM, WITH PTA;  Surgeon: Benji Becerril MD;  Location: [facility] OR 2ND FLR;  Service: Peripheral Vascular;  Laterality: Right;  205.17 mGy  2.3 min    GONIOTOMY Left 8/10/2023    Procedure: GONIOTOMY;  Surgeon: Yvonne Nielson MD;  Location: [facility] OR 1ST FLR;  Service: Ophthalmology;  Laterality: Left;  omni    HEMORRHOID SURGERY      Dr. Lizandro ROSENBERG    INTRAOCULAR PROSTHESES INSERTION Left 8/10/2023    Procedure: INSERTION, IOL PROSTHESIS;  Surgeon: Yvonne Nielson MD;  Location: [facility] OR 1ST FLR;  Service: Ophthalmology;  Laterality: Left;    INTRAOCULAR PROSTHESES INSERTION Left 9/21/2023    Procedure: INSERTION, IOL PROSTHESIS;  Surgeon: Yvonne Nielson MD;  Location: [facility] OR 1ST FLR;  Service: Ophthalmology;  Laterality: Left;    lateral internal anal sphincterotomy  12/13/13    Dr. root ADRYAN    PERCUTANEOUS TRANSLUMINAL ANGIOPLASTY OF ARTERIOVENOUS FISTULA Left 4/16/2021    Procedure: PTA, AV FISTULA;  Surgeon:  Benji Becerril MD;  Location: [facility] CATH LAB;  Service: Peripheral Vascular;  Laterality: Left;    PERCUTANEOUS TRANSLUMINAL ANGIOPLASTY OF ARTERIOVENOUS FISTULA N/A 9/28/2022    Procedure: PTA, AV FISTULA;  Surgeon: Benji Becerril MD;  Location: [facility] CATH LAB;  Service: Cardiology;  Laterality: N/A;    PHACOEMULSIFICATION OF CATARACT Left 8/10/2023    Procedure: PHACOEMULSIFICATION, CATARACT;  Surgeon: Yvonne Nielson MD;  Location: [facility] OR 1ST FLR;  Service: Ophthalmology;  Laterality: Left;    PHACOEMULSIFICATION OF CATARACT Left 9/21/2023    Procedure: PHACOEMULSIFICATION, CATARACT;  Surgeon: Yvonne Nielson MD;  Location: [facility] OR 1ST FLR;  Service: Ophthalmology;  Laterality: Left;    PLACEMENT OF DUAL-LUMEN VASCULAR CATHETER N/A 2/3/2023    Procedure: EXCHANGE, PERMACATH;  Surgeon: Benji Becerril MD;  Location: [facility] OR 2ND FLR;  Service: Peripheral Vascular;  Laterality: N/A;    URETERAL STENT PLACEMENT         Review of patient's allergies indicates:  No Known Allergies  Current Facility-Administered Medications   Medication Frequency    atorvastatin tablet 80 mg Daily    calcium acetate(phosphat bind) capsule 667 mg TID WM    dextrose 10% bolus 125 mL 125 mL PRN    dextrose 10% bolus 250 mL 250 mL PRN    ferrous sulfate tablet 1 each Daily    fluticasone propionate 50 mcg/actuation nasal spray 100 mcg Nightly PRN    glucagon (human recombinant) injection 1 mg PRN    glucose chewable tablet 16 g PRN    glucose chewable tablet 24 g PRN    HYDROcodone-acetaminophen 7.5-325 mg per tablet 1 tablet Q6H PRN    insulin aspart U-100 pen 0-10 Units QID (AC + HS) PRN    insulin aspart U-100 pen 5 Units TIDWM    insulin glargine U-100 (Lantus) pen 15 Units QHS    melatonin tablet 6 mg Nightly PRN    naloxone 0.4 mg/mL injection 0.02 mg PRN    NIFEdipine 24 hr tablet 90 mg Daily    pantoprazole EC tablet 40 mg Daily    sodium chloride 0.9% flush 10 mL PRN    sodium chloride 0.9% flush 10 mL Q12H PRN     tamsulosin 24 hr capsule 0.4 mg QAM    traZODone tablet 100 mg QHS     Family History       Problem Relation (Age of Onset)    Diabetes Brother    Hypertension Brother    Stroke Brother          Tobacco Use    Smoking status: Former     Current packs/day: 0.00     Average packs/day: 0.5 packs/day for 45.0 years (22.5 ttl pk-yrs)     Types: Cigarettes     Start date: 1975     Quit date: 2020     Years since quittin.2     Passive exposure: Never    Smokeless tobacco: Former   Substance and Sexual Activity    Alcohol use: Not Currently     Comment: rarely    Drug use: No    Sexual activity: Yes     Partners: Female     Comment: single, retired , no kids     Review of Systems   Constitutional:  Negative for chills and fever.   Eyes:  Negative for visual disturbance.   Respiratory:  Negative for cough and shortness of breath.    Cardiovascular:  Negative for leg swelling.   Gastrointestinal:  Negative for constipation.   Genitourinary:  Positive for decreased urine volume.   Musculoskeletal:  Negative for gait problem.   Skin:  Negative for color change and wound.   Neurological:  Negative for weakness.   Psychiatric/Behavioral:  Negative for agitation.      Objective:     Vital Signs (Most Recent):  Temp: 98.6 °F (37 °C) (24)  Pulse: 78 (24)  Resp: 18 (24)  BP: 139/66 (24)  SpO2: 97 % (24) Vital Signs (24h Range):  Temp:  [98 °F (36.7 °C)-98.7 °F (37.1 °C)] 98.6 °F (37 °C)  Pulse:  [56-80] 78  Resp:  [11-20] 18  SpO2:  [95 %-100 %] 97 %  BP: (110-154)/(53-86) 139/66     Weight: 126 kg (277 lb 12.5 oz) (24 0246)  Body mass index is 35.66 kg/m².  Body surface area is 2.56 meters squared.    I/O last 3 completed shifts:  In: 0   Out: 200 [Urine:200]     Physical Exam  Vitals and nursing note reviewed.   Constitutional:       Appearance: He is obese. He is not ill-appearing.   HENT:      Head: Normocephalic.   Cardiovascular:      Rate and  Rhythm: Normal rate.      Pulses:           Radial pulses are 2+ on the right side and 2+ on the left side.      Arteriovenous access: Right arteriovenous access is present.     Comments: No thrill or pulsatility appreciated in RUE AVF  Pulmonary:      Effort: Pulmonary effort is normal. No respiratory distress.   Abdominal:      General: There is no distension.   Musculoskeletal:         General: No swelling. Normal range of motion.   Neurological:      Mental Status: He is alert and oriented to person, place, and time.   Psychiatric:         Mood and Affect: Mood normal.         Behavior: Behavior normal.          Significant Labs:  CBC:   Recent Labs   Lab 09/27/24  0502   WBC 6.49   RBC 3.58*   HGB 11.1*   HCT 33.3*      MCV 93   MCH 31.0   MCHC 33.3     CMP:   Recent Labs   Lab 09/26/24  1255 09/27/24  0502   * 157*   CALCIUM 7.7* 8.2*   ALBUMIN 3.0* 3.0*   PROT 6.7  --     138   K 3.0* 3.4*   CO2 24 23    98   BUN 43* 54*   CREATININE 9.2* 10.1*   ALKPHOS 59  --    ALT 7*  --    AST 8*  --    BILITOT 0.6  --      All labs within the past 24 hours have been reviewed.    Assessment/Plan:     Renal/  End stage renal failure on dialysis  79 y.o. Black or  Male ESRD-HD T-T-S  presents to ED on 9/26/2024 with diagnosis of: Dysrhythmia [I49.9];Wenckebach [I44.1];ESRD (end stage renal disease) on dialysis [N18.6, Z99.2];Chest pain [R07.9];Problem with vascular access [Z78.9]   Nephrology consulted for inpatient ESRD-HD management    Outpatient HD Information:  -Dialysis modality: Hemodialysis  -Outpatient HD unit: [facility]  -Nephrologist: ?  -HD TX days: Tuesday/Thursday/Saturday, duration of treatment: 4 hrs  -Dialysis access: RUE AV fistula   -Residual urine: Minium   -EDW: 127 kg     Assessment:   - Will provide dialysis today (09/27/2024) with UF - 2L as BP tolerates for metabolic clearance and volume management   - Labs reviewed and dialysate to be adjusted to current labs.    - Continue to monitor intake and output  - Please avoid gadolinium, fleets, phos-based laxatives, NSAIDs  - Dialysis thrice weekly unless more urgent indications arise. Will evaluate RRT requirements Daily.    Anemia of ESRD   Recent Labs   Lab 09/26/24  1143 09/26/24  1152 09/27/24  0502   WBC 7.31  --  6.49   HGB 12.0*  --  11.1*   HCT 37.2* 39 33.3*     --  234     Lab Results   Component Value Date    FESATURATED 12 (L) 01/04/2021    FERRITIN 96 01/04/2021       - Goal in ESRD is Hgb of 10-11. Hgb 11.1.  - EPO can be administered and dosed per his OP unit upon discharge.    Mineral Bone Disease in ESRD   Lab Results   Component Value Date    .0 (H) 11/05/2020    CALCIUM 8.2 (L) 09/27/2024    ALBUMIN 3.0 (L) 09/27/2024    CAION 1.00 (L) 07/10/2020    PHOS 5.9 (H) 09/27/2024       - F/U PO4, Mg, Calcium. And albumin levels daily.   - Renal diet with protein intake goal 1.5 g/kg/d with 1 L fluid restriction   - Novasource with meals  - Restart home phos binder, Phos 5.9    Other  AV fistula stenosis, initial encounter  - defer to vascular   Plans for CVC placement with IR 09/27/2024          Thank you for your consult. I will follow-up with patient. Please contact us if you have any additional questions.    Rona Dawkins, NINO, FNP-C  Nephrology  [facility] Medicine Telemetry

## 2024-09-27 NOTE — PLAN OF CARE
Problem: Adult Inpatient Plan of Care  Goal: Plan of Care Review  Outcome: Progressing  Goal: Patient-Specific Goal (Individualized)  Outcome: Progressing  Goal: Absence of Hospital-Acquired Illness or Injury  Outcome: Progressing  Goal: Optimal Comfort and Wellbeing  Outcome: Progressing  Goal: Readiness for Transition of Care  Outcome: Progressing     Problem: Diabetes Comorbidity  Goal: Blood Glucose Level Within Targeted Range  Outcome: Progressing     Problem: Infection  Goal: Absence of Infection Signs and Symptoms  Outcome: Progressing     Problem: Skin Injury Risk Increased  Goal: Skin Health and Integrity  Outcome: Progressing   Pt AAO X 4; able to express needs.  No c/o  pain .   NPO since Midnight for Dialysis Cath. Replacement.   Patient using urinal at the bedside.  Assistance needed.  Safety maintained.  Bed in low position,  call  light in reach.

## 2024-09-27 NOTE — PLAN OF CARE
HD cath placement procedure completed. Patient tolerated well. Patient AAOx3, no distress noted, respirations even and unlabored, VSS. RIJ procedure site clean, dry, and intact; no bleeding or hematoma noted. Patient to be transferred to MPU 4 for 1 hour post-procedural recovery per MD, then return to inpt bed 1156. Report to be given at bedside to RN.

## 2024-09-27 NOTE — CONSULTS
IR Brief Consult Note    Vinnie Siegel is a 80 yo M with ESRD on HD s/p AVF. AVF unable to be accessed due to thrombosis. IR was consulted for TDC placement. Plan for TDC with moderate sedation pending availability today (9/27) if patient is NPO.     Susana Parks MD PGY-2  Department of Radiology  [facility]

## 2024-09-27 NOTE — ASSESSMENT & PLAN NOTE
Diabetes Mellitus Type 2 with hyperglycemia  -recent Hgb A1c of 6.9% Indicating good control chronically  -Hold home oral DM meds  -Glucose monitoring with sliding scale insulin and hypoglycemic protocol   -Start nightly Lantus 15 units and mealtime Aspart

## 2024-09-27 NOTE — PLAN OF CARE
Nurses Note -- 4 Eyes      9/27/2024   3:08 AM      Skin assessed during: Admit      [x] No Altered Skin Integrity Present    []Prevention Measures Documented      [] Yes- Altered Skin Integrity Present or Discovered   [] LDA Added if Not in Epic (Describe Wound)   [] New Altered Skin Integrity was Present on Admit and Documented in LDA   [] Wound Image Taken    Wound Care Consulted? No    Attending Nurse:  GINETTE Taylor    Second RN/Staff Member:   KRISTY Waters

## 2024-09-27 NOTE — PLAN OF CARE
Pt arrived to IR 90 for HD cath placement. Pt oriented to unit and staff. Plan of care reviewed with patient, patient verbalizes understanding. Comfort measures utilized. Pt safely transferred from stretcher to procedural table. Fall risk reviewed with patient, fall risk interventions maintained. positioner pillows utilized to minimize pressure points. Blankets applied. Pt prepped and draped utilizing standard sterile technique. Patient placed on continuous monitoring, as required by sedation policy. Timeouts to be completed utilizing standard universal time-out, per department and facility policy. RN to remain at bedside, continuous monitoring maintained. Pt resting comfortably. Denies pain/discomfort. See flow sheets for monitoring, medication administration, and updates.

## 2024-09-27 NOTE — ASSESSMENT & PLAN NOTE
-AVF creation in [facility] 12/22   -Unable to be accessed for HD today.   -Ultrasound access performed in the ED. Right brachiocephalic AV fistula appears thrombosed.   -Consult Vascular Surgery. Possible fistulogram in am

## 2024-09-27 NOTE — PROCEDURES
Radiology Post-Procedure Note    Pre Op Diagnosis: ESRD    Post Op Diagnosis: Same    Procedure: Tunneled HD line placement    Procedure performed by: Mark Ho MD    Written Informed Consent Obtained: Yes  Specimen Removed: NO  Estimated Blood Loss: Minimal    Findings:   Via Rt IJ, 23 cm tip to cuff, 14.5 Fr, dual lumen tunneled HD line placed with tip in RA. No complications. See dictation. Line is ready for use.    Patient tolerated procedure well.    Mark Ho M.D.  Interventional Radiology  Department of Radiology

## 2024-09-27 NOTE — CARE UPDATE
Pt arrived to MPU 4 for recovery of a HD line placement. Pt to recover for 45mins then return to floor.

## 2024-09-27 NOTE — ASSESSMENT & PLAN NOTE
-Anemia is likely due to chronic disease due to Chronic Kidney Disease. Most recent hemoglobin and hematocrit are listed below.  Recent Labs     09/26/24  1143 09/26/24  1152   HGB 12.0*  --    HCT 37.2* 39     Plan  - Monitor serial CBC: Daily  - Transfuse PRBC if patient becomes hemodynamically unstable, symptomatic or H/H drops below 7/21.  - Patient has not received any PRBC transfusions to date

## 2024-09-27 NOTE — PLAN OF CARE
[facility] - Internal Medicine Telemetry  Discharge Assessment    Primary Care Provider: Janeth Walter MD     Discharge Assessment (most recent)       BRIEF DISCHARGE ASSESSMENT - 09/27/24 1132          Discharge Planning    Assessment Type Discharge Planning Brief Assessment (P)      Resource/Environmental Concerns none (P)      Support Systems Spouse/significant other;Family members (P)      Equipment Currently Used at Home walker, rolling;cane, straight (P)      Current Living Arrangements home (P)      Patient/Family Anticipates Transition to home with family (P)      Patient/Family Anticipated Services at Transition outpatient care (P)      DME Needed Upon Discharge  none (P)      Discharge Plan A Home Health (P)      Discharge Plan B Home (P)         Physical Activity    On average, how many days per week do you engage in moderate to strenuous exercise (like a brisk walk)? 0 days (P)      On average, how many minutes do you engage in exercise at this level? 0 min (P)         Financial Resource Strain    How hard is it for you to pay for the very basics like food, housing, medical care, and heating? Not very hard (P)         Housing Stability    In the last 12 months, was there a time when you were not able to pay the mortgage or rent on time? No (P)      At any time in the past 12 months, were you homeless or living in a shelter (including now)? No (P)         Transportation Needs    Has the lack of transportation kept you from medical appointments, meetings, work or from getting things needed for daily living? No (P)         Food Insecurity    Within the past 12 months, you worried that your food would run out before you got the money to buy more. Never true (P)      Within the past 12 months, the food you bought just didn't last and you didn't have money to get more. Never true (P)         Stress    Do you feel stress - tense, restless, nervous, or anxious, or unable to sleep at night because your mind is  troubled all the time - these days? Rather much (P)         Social Isolation    How often do you feel lonely or isolated from those around you?  Never (P)         Alcohol Use    Q1: How often do you have a drink containing alcohol? Never (P)      Q2: How many drinks containing alcohol do you have on a typical day when you are drinking? Patient does not drink (P)      Q3: How often do you have six or more drinks on one occasion? Never (P)         Utilities    In the past 12 months has the electric, gas, oil, or water company threatened to shut off services in your home? No (P)         Health Literacy    How often do you need to have someone help you when you read instructions, pamphlets, or other written material from your doctor or pharmacy? Sometimes (P)                  Discharge Plan A and Plan B have been determined by review of patient's clinical status, future medical and therapeutic needs, and coverage/benefits for post-acute care in coordination with multidisciplinary team members.       SW completed DPA w/ patient, caretaker Vanesa and brother  at the bedside. Patient states that he lives w/ Vanesa. States that he uses cane and RW at home, Vanesa assists w/ bathing/dressing as needed. Patient receives dialysis T/Th/Sat @ [facility]. Patient to dc home w/ family via personal transportation when ready.                      CLAUDETTE Khan, LMSW  [facility]  Case Management  Ext. [phone]

## 2024-09-27 NOTE — H&P
[facility] - Emergency Dept  [facility] Medicine  History & Physical    Patient Name: Vinnie Siegel  MRN: [id number]  Patient Class: OP- Observation  Admission Date: 9/26/2024  Attending Physician: Purvi Vargas MD  Primary Care Provider: Janeth Walter MD         Patient information was obtained from patient, past medical records, ER records, and ED provider .     Subjective:     Principal Problem: HD Fistula malfunction    Chief Complaint:   Chief Complaint   Patient presents with    Vascular Access Problem     States went to dialysis this morning and was unable to get treatment d/t fistula being clotted. Last session Tuesday         HPI: 79 y.o. male with past medical history of ESRD on HD, essential hypertension, DM type 2, HLD, glaucoma, other history as below who presents to the ED with chief complaint of HD fistula malfunction, right upper extremity.  Patient previously had a left UE fistula that had malfunction, fistula needed to be moved to right arm.  Placed in 2022 by vascular surgery here.  Last full hemodialysis session was Tuesday.  Today went to dialysis and they could not access the right upper extremity fistula.  Notes that he has had no trauma, did not sleep on the arm.  Patient with no acute symptomatology.  He denies chest pain or fevers.  He states he has chronic shortness for breath with exertion.  For greater than 1 year he has had shortness for breath with minimal walking.  These symptoms are stable.    Vascular surgery consulted in the ED. Patient presented to the ED afebrile with good O2 sats on room air.  White count not elevated.  Hemoglobin stable with baseline. CMP showed creatinine 9.2, potassium 3.0, BUN 43.     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     Hyperlipidemia     Hypertension      Kidney stone     Seasonal allergies 06/24/2014       Past Surgical History:   Procedure Laterality Date    AV FISTULA PLACEMENT Left 12/8/2020    Procedure: CREATION, AV FISTULA;  Surgeon: Benji Becerril MD;  [address]  Service: Peripheral Vascular;  Laterality: Left;    AV FISTULA PLACEMENT Right 12/7/2022    Procedure: CREATION, AV FISTULA;  Surgeon: Benji Becerril MD;  [facility];  Service: Peripheral Vascular;  Laterality: Right;  RUE    CATARACT EXTRACTION W/  INTRAOCULAR LENS IMPLANT Right 04/04/16    Dr Meehan    COLONOSCOPY W/ BIOPSIES      DILATION, AQUEOUS OUTFLOW CANAL, TRANSLUMINAL, WITHOUT DEVICE RETENTION Left 9/21/2023    Procedure: DILATION, AQUEOUS OUTFLOW CANAL, TRANSLUMINAL, WITHOUT DEVICE RETENTION;  Surgeon: Yvonne Nielson MD;  Location: [address];  Service: Ophthalmology;  Laterality: Left;  omni    ESOPHAGOGASTRODUODENOSCOPY N/A 6/29/2020    Procedure: EGD (ESOPHAGOGASTRODUODENOSCOPY);  Surgeon: Ravi Leone MD;  Location: [facility];  Service: Endoscopy;  Laterality: N/A;    EYE SURGERY      FISTULOGRAM Left 4/16/2021    Procedure: Fistulogram;  Surgeon: Benji Becerril MD;  Location: [facility] CATH LAB;  Service: Peripheral Vascular;  Laterality: Left;    FISTULOGRAM Left 9/28/2022    Procedure: Fistulogram;  Surgeon: Benji Becerril MD;  Location: [facility] CATH LAB;  Service: Cardiology;  Laterality: Left;    FISTULOGRAM, WITH PTA Right 2/3/2023    Procedure: FISTULOGRAM, WITH PTA;  Surgeon: Benji Becerril MD;  [address];  Service: Peripheral Vascular;  Laterality: Right;  205.17 mGy  2.3 min    GONIOTOMY Left 8/10/2023    Procedure: GONIOTOMY;  Surgeon: Yvonne Nielson MD;  Location: [address];  Service: Ophthalmology;  Laterality: Left;  omni    HEMORRHOID SURGERY      [place] [facility]    INTRAOCULAR PROSTHESES INSERTION Left 8/10/2023    Procedure: INSERTION, IOL PROSTHESIS;  Surgeon: Yvonne Nielson  MD;  [facility];  Service: Ophthalmology;  Laterality: Left;    INTRAOCULAR PROSTHESES INSERTION Left 9/21/2023    Procedure: INSERTION, IOL PROSTHESIS;  Surgeon: Yvonne Nielson MD;  Location: [facility] OR 1ST FLR;  Service: Ophthalmology;  Laterality: Left;    lateral internal anal sphincterotomy  12/13/13    Dr. root [facility]    PERCUTANEOUS TRANSLUMINAL ANGIOPLASTY OF ARTERIOVENOUS FISTULA Left 4/16/2021    Procedure: PTA, AV FISTULA;  Surgeon: Benji Becerril MD;  Location: [facility] CATH LAB;  Service: Peripheral Vascular;  Laterality: Left;    PERCUTANEOUS TRANSLUMINAL ANGIOPLASTY OF ARTERIOVENOUS FISTULA N/A 9/28/2022    Procedure: PTA, AV FISTULA;  Surgeon: Benji Becerril MD;  Location: [facility] CATH LAB;  Service: Cardiology;  Laterality: N/A;    PHACOEMULSIFICATION OF CATARACT Left 8/10/2023    Procedure: PHACOEMULSIFICATION, CATARACT;  Surgeon: Yvonne Nielson MD;  [facility];  Service: Ophthalmology;  Laterality: Left;    PHACOEMULSIFICATION OF CATARACT Left 9/21/2023    Procedure: PHACOEMULSIFICATION, CATARACT;  Surgeon: Yvonne Nielson MD;  Location: [facility] OR [facility]ST FLR;  Service: Ophthalmology;  Laterality: Left;    PLACEMENT OF DUAL-LUMEN VASCULAR CATHETER N/A 2/3/2023    Procedure: EXCHANGE, PERMACATH;  Surgeon: Benji Becerril MD;  Location: [facility] OR 2ND FLR;  Service: Peripheral Vascular;  Laterality: N/A;    URETERAL STENT PLACEMENT         Review of patient's allergies indicates:  No Known Allergies    No current facility-administered medications on file prior to encounter.     Current Outpatient Medications on File Prior to Encounter   Medication Sig    atorvastatin (LIPITOR) 80 MG tablet Take 1 tablet (80 mg total) by mouth once daily.    blood sugar diagnostic Strp To check BG 4 times daily, to use with insurance preferred meter    blood-glucose meter kit To check BG 4 times daily, to use with insurance preferred meter    calcium acetate,phosphat bind, (PHOSLO)  "667 mg capsule SMARTSI Capsule(s) By Mouth    dorzolamide-timolol 2-0.5% (COSOPT) 22.3-6.8 mg/mL ophthalmic solution Place 1 drop into both eyes 2 (two) times daily.    ergocalciferol (ERGOCALCIFEROL) 50,000 unit Cap Take 1 capsule (50,000 Units total) by mouth every 7 days. (Patient not taking: Reported on 2024)    ezetimibe (ZETIA) 10 mg tablet Take 1 tablet (10 mg total) by mouth once daily. One a day or as directed    ferrous sulfate (FEOSOL) 325 mg (65 mg iron) Tab tablet 3 TABS A DAY. (Patient not taking: Reported on 2024)    fluticasone propionate (FLONASE) 50 mcg/actuation nasal spray 2 sprays (100 mcg total) by Each Nostril route nightly as needed for Rhinitis.    HUMALOG KWIKPEN INSULIN 100 unit/mL pen Inject 17 Units into the skin 3 (three) times daily before meals.    hydrALAZINE (APRESOLINE) 100 MG tablet Take 1 tablet (100 mg total) by mouth 3 (three) times daily.    HYDROcodone-acetaminophen (NORCO) 7.5-325 mg per tablet Take 1 tablet by mouth.    insulin glargine U-100, Lantus, (LANTUS SOLOSTAR U-100 INSULIN) 100 unit/mL (3 mL) InPn pen Inject 45 Units into the skin every evening. Increase per MD instruction to max daily dose of 70 units    lancets Misc To check BG 4 times daily, to use with insurance preferred meter    metoprolol succinate (TOPROL-XL) 25 MG 24 hr tablet Take 0.5 tablets (12.5 mg total) by mouth once daily.    NIFEdipine (PROCARDIA-XL) 90 MG (OSM) 24 hr tablet Take 1 tablet (90 mg total) by mouth once daily.    pantoprazole (PROTONIX) 40 MG tablet Take 1 tablet (40 mg total) by mouth once daily.    pen needle, diabetic (BD ULTRA-FINE SHORT PEN NEEDLE) 31 gauge x 5/16" Ndle USE FOUR TIMES DAILY    tamsulosin (FLOMAX) 0.4 mg Cap Take 1 capsule (0.4 mg total) by mouth every morning.    torsemide (DEMADEX) 20 MG Tab Take 1 tablet (20 mg total) by mouth 2 (two) times daily.    traZODone (DESYREL) 100 MG tablet Take 100 mg by mouth every evening.     Family History       " Problem Relation (Age of Onset)    Diabetes Brother    Hypertension Brother    Stroke Brother          Tobacco Use    Smoking status: Former     Current packs/day: 0.00     Average packs/day: 0.5 packs/day for 45.0 years (22.5 ttl pk-yrs)     Types: Cigarettes     Start date: 1975     Quit date: 2020     Years since quittin.2     Passive exposure: Never    Smokeless tobacco: Former   Substance and Sexual Activity    Alcohol use: Not Currently     Comment: rarely    Drug use: No    Sexual activity: Yes     Partners: Female     Comment: single, retired , no kids     Review of Systems  A comprehensive review of systems was negative except as noted above.     Objective:     Vital Signs (Most Recent):  Temp: 98.4 °F (36.9 °C) (24)  Pulse: 72 (24)  Resp: 16 (24)  BP: (!) 154/79 (24)  SpO2: 99 % (24) Vital Signs (24h Range):  Temp:  [98.1 °F (36.7 °C)-98.4 °F (36.9 °C)] 98.4 °F (36.9 °C)  Pulse:  [61-86] 72  Resp:  [11-20] 16  SpO2:  [99 %-100 %] 99 %  BP: (128-154)/(64-86) 154/79     Weight: 126.6 kg (279 lb)  Body mass index is 35.82 kg/m².     Physical Exam  Vitals reviewed.  Nursing notes reviewed  GEN: NAD; Obese  HEENT: Normocephalic, atraumatic. PERRLA, EOMI  CV: RRR, no murmurs/rubs/gallops  Lungs: CTAB, no rubs/rhonchi/rales, non-labored breathing on room air  Abd: soft, non-tender to palpation. No guarding  Msk: No muscular deformities noted  Skin: No rashes or erythema noted  RUE fistula- no palpable thrill. Old LUE fistula site, no palpable thrill  Neuro: Alert and oriented x3. No focal neurologic deficits  Psych: normal affect and mood        Significant Labs: All pertinent labs within the past 24 hours have been reviewed.  CBC:   Recent Labs   Lab 24  1143 24  1152   WBC 7.31  --    HGB 12.0*  --    HCT 37.2* 39     --      CMP:   Recent Labs   Lab 24  1255      K 3.0*      CO2 24   *   BUN  43*   CREATININE 9.2*   CALCIUM 7.7*   PROT 6.7   ALBUMIN 3.0*   BILITOT 0.6   ALKPHOS 59   AST 8*   ALT 7*   ANIONGAP 15       Significant Imaging: I have reviewed all pertinent imaging results/findings within the past 24 hours.  Assessment/Plan:     AV fistula stenosis, initial encounter  -AVF creation in [facility] 12/22   -Unable to be accessed for HD today.   -Ultrasound access performed in the ED. Right brachiocephalic AV fistula appears thrombosed.   -Consult Vascular Surgery. Possible fistulogram in am    Second degree AV block, Mobitz type I  -Seen by cardiology consult in the ED. (underlying RBBB and PACs)  No concerns for a high degree AV block   Recommend electrolyte replacement to a K>4 and Mag >2.  Discontinue metoprolol succinate 12.5mg. Order a 48-hr holter monitor for discharge.  follow up with general cardiology as an outpatient.      End stage renal failure on dialysis  -T-T-S HD, last completed HD Tuesday  -Consult Nephrology    Type 2 diabetes mellitus with chronic kidney disease on chronic dialysis, with long-term current use of insulin  Diabetes Mellitus Type 2 with hyperglycemia  -recent Hgb A1c of 6.9% Indicating good control chronically  -Hold home oral DM meds  -Glucose monitoring with sliding scale insulin and hypoglycemic protocol   -Start nightly Lantus 15 units and mealtime Aspart    Hypokalemia  #Hypokalemia  -Replete po as needed. Continue to monitor    Anemia in ESRD (end-stage renal disease)  -Anemia is likely due to chronic disease due to Chronic Kidney Disease. Most recent hemoglobin and hematocrit are listed below.  Recent Labs     09/26/24  1143 09/26/24  1152   HGB 12.0*  --    HCT 37.2* 39     Plan  - Monitor serial CBC: Daily  - Transfuse PRBC if patient becomes hemodynamically unstable, symptomatic or H/H drops below 7/21.  - Patient has not received any PRBC transfusions to date    Dyslipidemia  -Continue home statin        VTE Risk Mitigation (From admission, onward)            "Ordered     Reason for no Mechanical VTE Prophylaxis  Once        Question:  Reasons:  Answer:  Patient is Ambulatory  Comment:  ordered    09/26/24 1818     IP VTE HIGH RISK PATIENT  Once         09/26/24 1818     Reason for No Pharmacological VTE Prophylaxis  Once        Question:  Reasons:  Answer:  Risk of Bleeding    09/26/24 1818     Place sequential compression device  Until discontinued         09/26/24 1814                       On 09/26/2024, patient should be placed in hospital observation services under my care.      Purvi Vargas MD  Department of Hospital Medicine  [facility]  09/26/2024        *Portions of this note may have been created with voice recognition software. Occasional "wrong word "or "sound alike" substitutions may have occurred due to the inherent limitations of voice recognition software.  Please excuse errors. Please, read the note carefully and recognize, using context, where substitutions have occurred.      "

## 2024-09-27 NOTE — ED NOTES
Telemetry Verification   Patient placed on Telemetry Box  Verified with War Room  Box # [id number]   Monitor Tech TW   Rate 66   Rhythm NSR

## 2024-09-27 NOTE — H&P
VIR Pre-procedure H&P         History of Present Illness:  Vinnie Siegel is a 79 y.o. male who with multiple co morbidities including ESRD on HD, essential hypertension, DM type 2, HLD, glaucoma who presents to ED on 9/26/2024 with a clotted access. AVF creation in RUE 12/22. Last full HD session completed on Tuesday, 9/24. He went to HD yesterday and they were unable to access the R AVF. Denies any trauma or previous issues with access. No CP or SOB on exam. Vascular consulted, but recommending CVC placement with IR with FU in clinic for possible new access. Plans for CVC placement today.         Admission H&P reviewed.  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     Hyperlipidemia     Hypertension     Kidney stone     Seasonal allergies 06/24/2014     Past Surgical History:   Procedure Laterality Date    AV FISTULA PLACEMENT Left 12/8/2020    Procedure: CREATION, AV FISTULA;  Surgeon: Benji Becerril MD;  Location: [facility] OR [address] [place] [address];  Service: Peripheral Vascular;  Laterality: Left;    AV FISTULA PLACEMENT Right 12/7/2022    Procedure: CREATION, AV FISTULA;  Surgeon: Benji Becerril MD;  Location: [facility] OR [address] [place] [address];  Service: Peripheral Vascular;  Laterality: Right;  RUE    CATARACT EXTRACTION W/  INTRAOCULAR LENS IMPLANT Right 04/04/16    Dr Meehan    COLONOSCOPY W/ BIOPSIES      DILATION, AQUEOUS OUTFLOW CANAL, TRANSLUMINAL, WITHOUT DEVICE RETENTION Left 9/21/2023    Procedure: DILATION, AQUEOUS OUTFLOW CANAL, TRANSLUMINAL, WITHOUT DEVICE RETENTION;  Surgeon: Yvonne Nielson MD;  Location: [facility] OR [address] [place] [address];  Service: Ophthalmology;  Laterality: Left;  omni    ESOPHAGOGASTRODUODENOSCOPY N/A 6/29/2020    Procedure: EGD (ESOPHAGOGASTRODUODENOSCOPY);  Surgeon: Ravi Leone MD;  Location:  [facility] ENDO;  Service: Endoscopy;  Laterality: N/A;    EYE SURGERY      FISTULOGRAM Left 4/16/2021    Procedure: Fistulogram;  Surgeon: Benji Becerril MD;  Location: [facility] CATH LAB;  Service: Peripheral Vascular;  Laterality: Left;    FISTULOGRAM Left 9/28/2022    Procedure: Fistulogram;  Surgeon: Benji Becerril MD;  Location: [facility] CATH LAB;  Service: Cardiology;  Laterality: Left;    FISTULOGRAM, WITH PTA Right 2/3/2023    Procedure: FISTULOGRAM, WITH PTA;  Surgeon: Benji Becerril MD;  Location: [facility] OR 2ND FLR;  Service: Peripheral Vascular;  Laterality: Right;  205.17 mGy  2.3 min    GONIOTOMY Left 8/10/2023    Procedure: GONIOTOMY;  Surgeon: Yvonne Nielson MD;  Location: [facility] OR 1ST FLR;  Service: Ophthalmology;  Laterality: Left;  omni    HEMORRHOID SURGERY      [place]. [place] [facility]    INTRAOCULAR PROSTHESES INSERTION Left 8/10/2023    Procedure: INSERTION, IOL PROSTHESIS;  Surgeon: Yvonne Nielson MD;  Location: [facility] OR 1ST FLR;  Service: Ophthalmology;  Laterality: Left;    INTRAOCULAR PROSTHESES INSERTION Left 9/21/2023    Procedure: INSERTION, IOL PROSTHESIS;  Surgeon: Yvonne Nielson MD;  Location: [facility] OR 1ST FLR;  Service: Ophthalmology;  Laterality: Left;    lateral internal anal sphincterotomy  12/13/13    Dr. root ADRYAN    PERCUTANEOUS TRANSLUMINAL ANGIOPLASTY OF ARTERIOVENOUS FISTULA Left 4/16/2021    Procedure: PTA, AV FISTULA;  Surgeon: Benji Becerril MD;  Location: [facility] CATH LAB;  Service: Peripheral Vascular;  Laterality: Left;    PERCUTANEOUS TRANSLUMINAL ANGIOPLASTY OF ARTERIOVENOUS FISTULA N/A 9/28/2022    Procedure: PTA, AV FISTULA;  Surgeon: Benji Becerril MD;  Location: [facility] CATH LAB;  Service: Cardiology;  Laterality: N/A;    PHACOEMULSIFICATION OF CATARACT Left 8/10/2023    Procedure: PHACOEMULSIFICATION, CATARACT;  Surgeon: Yvonne Nielson MD;  Location: [facility] OR 1ST FLR;  Service: Ophthalmology;  Laterality: Left;    PHACOEMULSIFICATION OF CATARACT Left  2023    Procedure: PHACOEMULSIFICATION, CATARACT;  Surgeon: Yvonne Nielson MD;  Location: [facility] OR 1ST FLR;  Service: Ophthalmology;  Laterality: Left;    PLACEMENT OF DUAL-LUMEN VASCULAR CATHETER N/A 2/3/2023    Procedure: EXCHANGE, PERMACATH;  Surgeon: Benji Becerril MD;  Location: [facility] OR 2ND FLR;  Service: Peripheral Vascular;  Laterality: N/A;    URETERAL STENT PLACEMENT         Review of Systems:   As documented in primary team H&P    Home Meds:   Prior to Admission medications    Medication Sig Start Date End Date Taking? Authorizing Provider   atorvastatin (LIPITOR) 80 MG tablet Take 1 tablet (80 mg total) by mouth once daily. 24   Janeth Walter MD   blood sugar diagnostic Strp To check BG 4 times daily, to use with insurance preferred meter 24   Leora Fay NP   blood-glucose meter kit To check BG 4 times daily, to use with insurance preferred meter 24   Leora Fay NP   calcium acetate,phosphat bind, (PHOSLO) 667 mg capsule SMARTSI Capsule(s) By Mouth 22   Provider, Historical   dorzolamide-timolol 2-0.5% (COSOPT) 22.3-6.8 mg/mL ophthalmic solution Place 1 drop into both eyes 2 (two) times daily. 24  Yvonne Nielson MD   ergocalciferol (ERGOCALCIFEROL) 50,000 unit Cap Take 1 capsule (50,000 Units total) by mouth every 7 days.  Patient not taking: Reported on 2024   Yisel Ramírez DO   ezetimibe (ZETIA) 10 mg tablet Take 1 tablet (10 mg total) by mouth once daily. One a day or as directed 11/10/23   Blake Santamaria MD   ferrous sulfate (FEOSOL) 325 mg (65 mg iron) Tab tablet 3 TABS A DAY.  Patient not taking: Reported on 2024   Yisel Ramírez DO   fluticasone propionate (FLONASE) 50 mcg/actuation nasal spray 2 sprays (100 mcg total) by Each Nostril route nightly as needed for Rhinitis. 24   Janeth Walter MD HUMALOG KWIKPEN INSULIN 100 unit/mL pen Inject 17 Units into the skin 3 (three) times daily before  "meals. 4/3/24   Leora Fay NP   hydrALAZINE (APRESOLINE) 100 MG tablet Take 1 tablet (100 mg total) by mouth 3 (three) times daily. 8/28/24 8/28/25  Janeth Walter MD   HYDROcodone-acetaminophen (NORCO) 7.5-325 mg per tablet Take 1 tablet by mouth. 2/16/24   Provider, Historical   insulin glargine U-100, Lantus, (LANTUS SOLOSTAR U-100 INSULIN) 100 unit/mL (3 mL) InPn pen Inject 45 Units into the skin every evening. Increase per MD instruction to max daily dose of 70 units 8/21/24   Leora Fay NP   lancets Misc To check BG 4 times daily, to use with insurance preferred meter 2/21/24   Leora Fay NP   metoprolol succinate (TOPROL-XL) 25 MG 24 hr tablet Take 0.5 tablets (12.5 mg total) by mouth once daily. 8/28/24 8/28/25  Janeth Walter MD   NIFEdipine (PROCARDIA-XL) 90 MG (OSM) 24 hr tablet Take 1 tablet (90 mg total) by mouth once daily. 8/28/24   Janeth Walter MD   pantoprazole (PROTONIX) 40 MG tablet Take 1 tablet (40 mg total) by mouth once daily. 8/28/24   Janeth Walter MD   pen needle, diabetic (BD ULTRA-FINE SHORT PEN NEEDLE) 31 gauge x 5/16" Ndle USE FOUR TIMES DAILY 6/17/24   Leora Fay NP   tamsulosin (FLOMAX) 0.4 mg Cap Take 1 capsule (0.4 mg total) by mouth every morning. 8/28/24   Janeth Walter MD   torsemide (DEMADEX) 20 MG Tab Take 1 tablet (20 mg total) by mouth 2 (two) times daily. 8/28/24   Janeth Walter MD   traZODone (DESYREL) 100 MG tablet Take 100 mg by mouth every evening. 11/2/23   Provider, Historical     Scheduled Meds:    0.9% NaCl   Intravenous Once    atorvastatin  80 mg Oral Daily    calcium acetate(phosphat bind)  667 mg Oral TID WM    ferrous sulfate  1 tablet Oral Daily    insulin aspart U-100  5 Units Subcutaneous TIDWM    insulin glargine U-100  15 Units Subcutaneous QHS    NIFEdipine  90 mg Oral Daily    pantoprazole  40 mg Oral Daily    tamsulosin  1 capsule Oral QAM    traZODone  100 mg Oral QHS     Continuous Infusions:   PRN Meds:  Current " Facility-Administered Medications:     dextrose 10%, 12.5 g, Intravenous, PRN    dextrose 10%, 25 g, Intravenous, PRN    fluticasone propionate, 2 spray, Each Nostril, Nightly PRN    glucagon (human recombinant), 1 mg, Intramuscular, PRN    glucose, 16 g, Oral, PRN    glucose, 24 g, Oral, PRN    heparin (porcine), 1,000 Units, Intra-Catheter, PRN    HYDROcodone-acetaminophen, 1 tablet, Oral, Q6H PRN    insulin aspart U-100, 0-10 Units, Subcutaneous, QID (AC + HS) PRN    melatonin, 6 mg, Oral, Nightly PRN    naloxone, 0.02 mg, Intravenous, PRN    sodium chloride 0.9%, 10 mL, Intravenous, PRN    sodium chloride 0.9%, 10 mL, Intravenous, Q12H PRN  Anticoagulants/Antiplatelets: no anticoagulation    Allergies: Review of patient's allergies indicates:  No Known Allergies  Sedation Hx: have not been any systemic reactions    Labs:  Recent Labs   Lab 09/27/24  0919   INR 0.9       Recent Labs   Lab 09/27/24  0502   WBC 6.49   HGB 11.1*   HCT 33.3*   MCV 93         Recent Labs   Lab 09/26/24  1255 09/27/24  0502   * 157*    138   K 3.0* 3.4*    98   CO2 24 23   BUN 43* 54*   CREATININE 9.2* 10.1*   CALCIUM 7.7* 8.2*   MG 1.8 2.2   ALT 7*  --    AST 8*  --    ALBUMIN 3.0* 3.0*   BILITOT 0.6  --          Vitals:  Temp: 98.4 °F (36.9 °C) (09/27/24 1121)  Pulse: 76 (09/27/24 1534)  Resp: 18 (09/27/24 1121)  BP: (!) 140/56 (09/27/24 1121)  SpO2: 99 % (09/27/24 1121)     Physical Exam:  ASA: III  Mallampati: II    General: no acute distress  Mental Status: alert and oriented to person, place and time  HEENT: normocephalic, atraumatic  Chest: unlabored breathing  Heart: regular heart rate  Abdomen:  distended  Extremity: moves all extremities. No thrill in right UE AVF.       ASSESSMENT/PLAN:     Sedation Plan: Moderate sedation.   Patient will undergo tunneled HD catheter placement.      Earl Kaur MD  VIR

## 2024-09-27 NOTE — HPI
The patient is a 79 y.o. Black or  Male with multiple co morbidities including ESRD on HD, essential hypertension, DM type 2, HLD, glaucoma who presents to ED on 9/26/2024 with a clotted access. AVF creation in RUE 12/22. Last full HD session completed on Tuesday, 9/24. He went to HD yesterday and they were unable to access the R AVF. Denies any trauma or previous issues with access. No CP or SOB on exam. Vascular consulted, but recommending CVC placement with IR with FU in clinic for possible new access. Plans for CVC placement today. Nephrology consulted for HD post line placement. Nephrology consulted for management of ESRD and HD treatment.

## 2024-09-28 VITALS
SYSTOLIC BLOOD PRESSURE: 122 MMHG | HEART RATE: 67 BPM | TEMPERATURE: 98 F | BODY MASS INDEX: 35.65 KG/M2 | HEIGHT: 74 IN | WEIGHT: 277.75 LBS | RESPIRATION RATE: 17 BRPM | OXYGEN SATURATION: 98 % | DIASTOLIC BLOOD PRESSURE: 69 MMHG

## 2024-09-28 LAB
ALBUMIN SERPL BCP-MCNC: 3 G/DL (ref 3.5–5.2)
ANION GAP SERPL CALC-SCNC: 16 MMOL/L (ref 8–16)
BUN SERPL-MCNC: 36 MG/DL (ref 8–23)
CALCIUM SERPL-MCNC: 8.3 MG/DL (ref 8.7–10.5)
CHLORIDE SERPL-SCNC: 99 MMOL/L (ref 95–110)
CO2 SERPL-SCNC: 20 MMOL/L (ref 23–29)
CREAT SERPL-MCNC: 7.8 MG/DL (ref 0.5–1.4)
EST. GFR  (NO RACE VARIABLE): 6.5 ML/MIN/1.73 M^2
GLUCOSE SERPL-MCNC: 226 MG/DL (ref 70–110)
MAGNESIUM SERPL-MCNC: 2 MG/DL (ref 1.6–2.6)
PHOSPHATE SERPL-MCNC: 4.2 MG/DL (ref 2.7–4.5)
POCT GLUCOSE: 156 MG/DL (ref 70–110)
POCT GLUCOSE: 245 MG/DL (ref 70–110)
POTASSIUM SERPL-SCNC: 4 MMOL/L (ref 3.5–5.1)
SODIUM SERPL-SCNC: 135 MMOL/L (ref 136–145)

## 2024-09-28 PROCEDURE — 80069 RENAL FUNCTION PANEL: CPT | Mod: HCNC | Performed by: STUDENT IN AN ORGANIZED HEALTH CARE EDUCATION/TRAINING PROGRAM

## 2024-09-28 PROCEDURE — 25000003 PHARM REV CODE 250: Mod: HCNC | Performed by: STUDENT IN AN ORGANIZED HEALTH CARE EDUCATION/TRAINING PROGRAM

## 2024-09-28 PROCEDURE — G0378 HOSPITAL OBSERVATION PER HR: HCPCS | Mod: HCNC

## 2024-09-28 PROCEDURE — 99214 OFFICE O/P EST MOD 30 MIN: CPT | Mod: HCNC,,, | Performed by: NURSE PRACTITIONER

## 2024-09-28 PROCEDURE — 83735 ASSAY OF MAGNESIUM: CPT | Mod: HCNC | Performed by: STUDENT IN AN ORGANIZED HEALTH CARE EDUCATION/TRAINING PROGRAM

## 2024-09-28 PROCEDURE — 36415 COLL VENOUS BLD VENIPUNCTURE: CPT | Mod: HCNC | Performed by: STUDENT IN AN ORGANIZED HEALTH CARE EDUCATION/TRAINING PROGRAM

## 2024-09-28 RX ADMIN — NIFEDIPINE 90 MG: 30 TABLET, FILM COATED, EXTENDED RELEASE ORAL at 08:09

## 2024-09-28 RX ADMIN — ATORVASTATIN CALCIUM 80 MG: 40 TABLET, FILM COATED ORAL at 08:09

## 2024-09-28 RX ADMIN — INSULIN ASPART 5 UNITS: 100 INJECTION, SOLUTION INTRAVENOUS; SUBCUTANEOUS at 08:09

## 2024-09-28 RX ADMIN — FERROUS SULFATE TAB EC 325 MG (65 MG FE EQUIVALENT) 1 EACH: 325 (65 FE) TABLET DELAYED RESPONSE at 08:09

## 2024-09-28 RX ADMIN — CALCIUM ACETATE 667 MG: 667 CAPSULE ORAL at 08:09

## 2024-09-28 RX ADMIN — INSULIN ASPART 5 UNITS: 100 INJECTION, SOLUTION INTRAVENOUS; SUBCUTANEOUS at 12:09

## 2024-09-28 RX ADMIN — TAMSULOSIN HYDROCHLORIDE 0.4 MG: 0.4 CAPSULE ORAL at 07:09

## 2024-09-28 RX ADMIN — INSULIN ASPART 4 UNITS: 100 INJECTION, SOLUTION INTRAVENOUS; SUBCUTANEOUS at 08:09

## 2024-09-28 RX ADMIN — INSULIN ASPART 2 UNITS: 100 INJECTION, SOLUTION INTRAVENOUS; SUBCUTANEOUS at 12:09

## 2024-09-28 RX ADMIN — PANTOPRAZOLE SODIUM 40 MG: 40 TABLET, DELAYED RELEASE ORAL at 08:09

## 2024-09-28 NOTE — ASSESSMENT & PLAN NOTE
-Anemia is likely due to chronic disease due to Chronic Kidney Disease. Most recent hemoglobin and hematocrit are listed below.  Recent Labs     09/26/24  1143 09/26/24  1152 09/27/24  0502   HGB 12.0*  --  11.1*   HCT 37.2* 39 33.3*     Plan  - Monitor serial CBC: Daily  - Transfuse PRBC if patient becomes hemodynamically unstable, symptomatic or H/H drops below 7/21.  - Patient has not received any PRBC transfusions to date

## 2024-09-28 NOTE — SUBJECTIVE & OBJECTIVE
Interval History: Patient states he is feeling ok. No acute complaints  Initially, patient refusing neck catheter, stating he was told they would try to fix his fistula. Eventually patient agrees to catheter.     Review of Systems  A comprehensive review of systems was negative except as noted above.     Objective:     Vital Signs (Most Recent):  Temp: 98.3 °F (36.8 °C) (09/27/24 2045)  Pulse: 69 (09/27/24 2100)  Resp: 18 (09/27/24 2045)  BP: 129/67 (09/27/24 2100)  SpO2: 98 % (09/27/24 2045) Vital Signs (24h Range):  Temp:  [96.9 °F (36.1 °C)-98.7 °F (37.1 °C)] 98.3 °F (36.8 °C)  Pulse:  [56-94] 69  Resp:  [12-20] 18  SpO2:  [95 %-100 %] 98 %  BP: (110-166)/(53-86) 129/67     Weight: 126 kg (277 lb 12.5 oz)  Body mass index is 35.66 kg/m².    Intake/Output Summary (Last 24 hours) at 9/27/2024 2111  Last data filed at 9/27/2024 0530  Gross per 24 hour   Intake 0 ml   Output 200 ml   Net -200 ml         Physical Exam  Vitals reviewed.  Nursing notes reviewed  GEN: NAD; Obese  HEENT: Normocephalic, atraumatic. PERRLA, EOMI  CV: RRR, no murmurs/rubs/gallops  Lungs: CTAB, no rubs/rhonchi/rales, non-labored breathing on room air  Abd: soft, non-tender to palpation. No guarding  Msk: No muscular deformities noted  Skin: No rashes or erythema noted  RUE fistula- no palpable thrill. Old LUE fistula site, no palpable thrill  Neuro: Alert and oriented x3. No focal neurologic deficits  Psych: normal affect and mood         Significant Labs: All pertinent labs within the past 24 hours have been reviewed.  CBC:   Recent Labs   Lab 09/26/24  1143 09/26/24  1152 09/27/24  0502   WBC 7.31  --  6.49   HGB 12.0*  --  11.1*   HCT 37.2* 39 33.3*     --  234     CMP:   Recent Labs   Lab 09/26/24  1255 09/27/24  0502    138   K 3.0* 3.4*    98   CO2 24 23   * 157*   BUN 43* 54*   CREATININE 9.2* 10.1*   CALCIUM 7.7* 8.2*   PROT 6.7  --    ALBUMIN 3.0* 3.0*   BILITOT 0.6  --    ALKPHOS 59  --    AST 8*  --    ALT  7*  --    ANIONGAP 15 17*       Significant Imaging: I have reviewed all pertinent imaging results/findings within the past 24 hours.

## 2024-09-28 NOTE — SUBJECTIVE & OBJECTIVE
Interval History: HD completed via CVC overnight with 1820 mL removed. No distress noted. Patient no amendable to repeat HD today per OP schedule. K 4.0.    Review of patient's allergies indicates:  No Known Allergies  Current Facility-Administered Medications   Medication Frequency    0.9%  NaCl infusion Once    acetaminophen tablet 650 mg Q6H PRN    atorvastatin tablet 80 mg Daily    calcium acetate(phosphat bind) capsule 667 mg TID WM    dextrose 10% bolus 125 mL 125 mL PRN    dextrose 10% bolus 250 mL 250 mL PRN    ferrous sulfate tablet 1 each Daily    fluticasone propionate 50 mcg/actuation nasal spray 100 mcg Nightly PRN    glucagon (human recombinant) injection 1 mg PRN    glucose chewable tablet 16 g PRN    glucose chewable tablet 24 g PRN    heparin (porcine) injection 1,000 Units PRN    hydrALAZINE injection 10 mg Q6H PRN    HYDROcodone-acetaminophen 7.5-325 mg per tablet 1 tablet Q6H PRN    insulin aspart U-100 pen 0-10 Units QID (AC + HS) PRN    insulin aspart U-100 pen 5 Units TIDWM    insulin glargine U-100 (Lantus) pen 15 Units QHS    melatonin tablet 6 mg Nightly PRN    naloxone 0.4 mg/mL injection 0.02 mg PRN    NIFEdipine 24 hr tablet 90 mg Daily    pantoprazole EC tablet 40 mg Daily    sodium chloride 0.9% flush 10 mL PRN    sodium chloride 0.9% flush 10 mL Q12H PRN    tamsulosin 24 hr capsule 0.4 mg QAM    traZODone tablet 100 mg QHS       Objective:     Vital Signs (Most Recent):  Temp: 98.3 °F (36.8 °C) (09/28/24 0736)  Pulse: 67 (09/28/24 1115)  Resp: 17 (09/28/24 0736)  BP: 122/69 (09/28/24 0736)  SpO2: 98 % (09/28/24 0736) Vital Signs (24h Range):  Temp:  [96.9 °F (36.1 °C)-98.5 °F (36.9 °C)] 98.3 °F (36.8 °C)  Pulse:  [67-94] 67  Resp:  [12-20] 17  SpO2:  [95 %-100 %] 98 %  BP: (113-166)/(54-90) 122/69     Weight: 126 kg (277 lb 12.5 oz) (09/28/24 4903)  Body mass index is 35.66 kg/m².  Body surface area is 2.56 meters squared.    I/O last 3 completed shifts:  In: 0   Out: 2570 [Urine:350;  Other:2220]     Physical Exam  Vitals and nursing note reviewed.   Constitutional:       Appearance: He is obese. He is not ill-appearing.   HENT:      Head: Normocephalic.   Cardiovascular:      Rate and Rhythm: Normal rate.      Pulses:           Radial pulses are 2+ on the right side and 2+ on the left side.      Arteriovenous access: Right arteriovenous access is present.     Comments: No thrill or pulsatility appreciated in RUE AVF  Pulmonary:      Effort: Pulmonary effort is normal. No respiratory distress.   Abdominal:      General: There is no distension.   Musculoskeletal:         General: No swelling. Normal range of motion.   Neurological:      Mental Status: He is alert and oriented to person, place, and time.   Psychiatric:         Mood and Affect: Mood normal.         Behavior: Behavior normal.          Significant Labs:  CBC:   Recent Labs   Lab 09/27/24  0502   WBC 6.49   RBC 3.58*   HGB 11.1*   HCT 33.3*      MCV 93   MCH 31.0   MCHC 33.3     CMP:   Recent Labs   Lab 09/26/24  1255 09/27/24  0502 09/28/24  0428   *   < > 226*   CALCIUM 7.7*   < > 8.3*   ALBUMIN 3.0*   < > 3.0*   PROT 6.7  --   --       < > 135*   K 3.0*   < > 4.0   CO2 24   < > 20*      < > 99   BUN 43*   < > 36*   CREATININE 9.2*   < > 7.8*   ALKPHOS 59  --   --    ALT 7*  --   --    AST 8*  --   --    BILITOT 0.6  --   --     < > = values in this interval not displayed.     All labs within the past 24 hours have been reviewed.

## 2024-09-28 NOTE — PROGRESS NOTES
[facility] Medicine [facility] Medicine  Progress Note    Patient Name: Vinnie Siegel  MRN: [id number]  Patient Class: OP- Observation   Admission Date: 9/26/2024  Length of Stay: 0 days  Attending Physician: Purvi Vargas MD  Primary Care Provider: Janeth Walter MD        Subjective:     Principal Problem:AV fistula thrombosis, initial encounter        HPI:  79 y.o. male with past medical history of ESRD on HD, essential hypertension, DM type 2, HLD, glaucoma, other history as below who presents to the ED with chief complaint of HD fistula malfunction, right upper extremity.  Patient previously had a left UE fistula that had malfunction, fistula needed to be moved to right arm.  Placed in 2022 by vascular surgery here.  Last full hemodialysis session was Tuesday.  Today went to dialysis and they could not access the right upper extremity fistula.  Notes that he has had no trauma, did not sleep on the arm.  Patient with no acute symptomatology.  He denies chest pain or fevers.  He states he has chronic shortness for breath with exertion.  For greater than 1 year he has had shortness for breath with minimal walking.  These symptoms are stable.    Vascular surgery consulted in the ED. Patient presented to the ED afebrile with good O2 sats on room air.  White count not elevated.  Hemoglobin stable with baseline. CMP showed creatinine 9.2, potassium 3.0, BUN 43.     Overview/Hospital Course:  No notes on file    Interval History: Patient states he is feeling ok. No acute complaints  Initially, patient refusing neck catheter, stating he was told they would try to fix his fistula. Eventually patient agrees to catheter.     Review of Systems  A comprehensive review of systems was negative except as noted above.     Objective:     Vital Signs (Most Recent):  Temp: 98.3 °F (36.8 °C) (09/27/24 2045)  Pulse: 69 (09/27/24 2100)  Resp: 18 (09/27/24 2045)  BP: 129/67 (09/27/24 2100)  SpO2: 98 %  (09/27/24 2045) Vital Signs (24h Range):  Temp:  [96.9 °F (36.1 °C)-98.7 °F (37.1 °C)] 98.3 °F (36.8 °C)  Pulse:  [56-94] 69  Resp:  [12-20] 18  SpO2:  [95 %-100 %] 98 %  BP: (110-166)/(53-86) 129/67     Weight: 126 kg (277 lb 12.5 oz)  Body mass index is 35.66 kg/m².    Intake/Output Summary (Last 24 hours) at 9/27/2024 2111  Last data filed at 9/27/2024 0530  Gross per 24 hour   Intake 0 ml   Output 200 ml   Net -200 ml         Physical Exam  Vitals reviewed.  Nursing notes reviewed  GEN: NAD; Obese  HEENT: Normocephalic, atraumatic. PERRLA, EOMI  CV: RRR, no murmurs/rubs/gallops  Lungs: CTAB, no rubs/rhonchi/rales, non-labored breathing on room air  Abd: soft, non-tender to palpation. No guarding  Msk: No muscular deformities noted  Skin: No rashes or erythema noted  RUE fistula- no palpable thrill. Old LUE fistula site, no palpable thrill  Neuro: Alert and oriented x3. No focal neurologic deficits  Psych: normal affect and mood         Significant Labs: All pertinent labs within the past 24 hours have been reviewed.  CBC:   Recent Labs   Lab 09/26/24  1143 09/26/24  1152 09/27/24  0502   WBC 7.31  --  6.49   HGB 12.0*  --  11.1*   HCT 37.2* 39 33.3*     --  234     CMP:   Recent Labs   Lab 09/26/24  1255 09/27/24  0502    138   K 3.0* 3.4*    98   CO2 24 23   * 157*   BUN 43* 54*   CREATININE 9.2* 10.1*   CALCIUM 7.7* 8.2*   PROT 6.7  --    ALBUMIN 3.0* 3.0*   BILITOT 0.6  --    ALKPHOS 59  --    AST 8*  --    ALT 7*  --    ANIONGAP 15 17*       Significant Imaging: I have reviewed all pertinent imaging results/findings within the past 24 hours.    Assessment/Plan:      * AV fistula thrombosis, initial encounter  -AVF creation in [facility] 12/22   -Unable to be accessed for HD  -Ultrasound access performed in the ED. Right brachiocephalic AV fistula appears thrombosed.   -Consult Vascular Surgery. They do not recommend fistulogram. Recommend outpatient follow up  -Patient to have TDC placed  today by IR (he initially refused, but now agrees)    Second degree AV block, Mobitz type I  -Seen by cardiology consult in the ED. (underlying RBBB and PACs)  No concerns for a high degree AV block   Recommend electrolyte replacement to a K>4 and Mag >2.  Discontinue metoprolol succinate 12.5mg. Order a 48-hr holter monitor for discharge.  follow up with general cardiology as an outpatient.      ESRD (end stage renal disease)  -T-T-S HD, last completed HD Tuesday  -Consult Nephrology  -Plans for HD today after line placed    Essential hypertension  Chronic, controlled. Latest blood pressure and vitals reviewed-     Temp:  [96.9 °F (36.1 °C)-98.7 °F (37.1 °C)]   Pulse:  [56-94]   Resp:  [12-20]   BP: (110-166)/(53-86)   SpO2:  [95 %-100 %] .   Home meds for hypertension were reviewed and noted below.   Hypertension Medications               hydrALAZINE (APRESOLINE) 100 MG tablet Take 1 tablet (100 mg total) by mouth 3 (three) times daily.    NIFEdipine (PROCARDIA-XL) 90 MG (OSM) 24 hr tablet Take 1 tablet (90 mg total) by mouth once daily.    torsemide (DEMADEX) 20 MG Tab Take 1 tablet (20 mg total) by mouth 2 (two) times daily.       While in the hospital, will manage blood pressure as follows; Continue Nifedipine, Holding Hydralazine    Will utilize p.r.n. blood pressure medication only if patient's blood pressure greater than 180/110 and he develops symptoms such as worsening chest pain or shortness of breath.    Type 2 diabetes mellitus with chronic kidney disease on chronic dialysis, with long-term current use of insulin  Diabetes Mellitus Type 2 with hyperglycemia  -recent Hgb A1c of 6.9% Indicating good control chronically  -Hold home oral DM meds  -Glucose monitoring with sliding scale insulin and hypoglycemic protocol   -Start nightly Lantus 15 units and mealtime Aspart    Hypokalemia  #Hypokalemia  -Replete po as needed. K still low, give po and Continue to monitor    Anemia in ESRD (end-stage renal  disease)  -Anemia is likely due to chronic disease due to Chronic Kidney Disease. Most recent hemoglobin and hematocrit are listed below.  Recent Labs     09/26/24  1143 09/26/24  1152 09/27/24  0502   HGB 12.0*  --  11.1*   HCT 37.2* 39 33.3*     Plan  - Monitor serial CBC: Daily  - Transfuse PRBC if patient becomes hemodynamically unstable, symptomatic or H/H drops below 7/21.  - Patient has not received any PRBC transfusions to date    Dyslipidemia  -Continue home statin        VTE Risk Mitigation (From admission, onward)           Ordered     heparin (porcine) injection 1,000 Units  As needed (PRN)         09/27/24 1106     Reason for no Mechanical VTE Prophylaxis  Once        Question:  Reasons:  Answer:  Patient is Ambulatory  Comment:  ordered    09/26/24 1818     IP VTE HIGH RISK PATIENT  Once         09/26/24 1818     Reason for No Pharmacological VTE Prophylaxis  Once        Question:  Reasons:  Answer:  Risk of Bleeding    09/26/24 1818     Place sequential compression device  Until discontinued         09/26/24 1814                    Discharge Planning   HERMAN: 9/27/2024     Code Status: Full Code   Is the patient medically ready for discharge?:     Reason for patient still in hospital (select all that apply): Patient trending condition  Discharge Plan A: Home Health                  Purvi Vargas MD  Department of Hospital Medicine   [facility] - [facility] Telemetry

## 2024-09-28 NOTE — ASSESSMENT & PLAN NOTE
Chronic, controlled. Latest blood pressure and vitals reviewed-     Temp:  [96.9 °F (36.1 °C)-98.7 °F (37.1 °C)]   Pulse:  [56-94]   Resp:  [12-20]   BP: (110-166)/(53-86)   SpO2:  [95 %-100 %] .   Home meds for hypertension were reviewed and noted below.   Hypertension Medications               hydrALAZINE (APRESOLINE) 100 MG tablet Take 1 tablet (100 mg total) by mouth 3 (three) times daily.    NIFEdipine (PROCARDIA-XL) 90 MG (OSM) 24 hr tablet Take 1 tablet (90 mg total) by mouth once daily.    torsemide (DEMADEX) 20 MG Tab Take 1 tablet (20 mg total) by mouth 2 (two) times daily.       While in the hospital, will manage blood pressure as follows; Continue Nifedipine, Holding Hydralazine    Will utilize p.r.n. blood pressure medication only if patient's blood pressure greater than 180/110 and he develops symptoms such as worsening chest pain or shortness of breath.

## 2024-09-28 NOTE — ASSESSMENT & PLAN NOTE
79 y.o. Black or  Male ESRD-HD T-T-S  presents to ED on 9/26/2024 with diagnosis of: Dysrhythmia [I49.9];Wenckebach [I44.1];ESRD (end stage renal disease) on dialysis [N18.6, Z99.2];Chest pain [R07.9];Problem with vascular access [Z78.9]   Nephrology consulted for inpatient ESRD-HD management    Outpatient HD Information:  -Dialysis modality: Hemodialysis  -Outpatient HD unit: [facility]  -Nephrologist: ?  -HD TX days: Tuesday/Thursday/Saturday, duration of treatment: 4 hrs  -Dialysis access: RUE AV fistula   -Residual urine: Minium   -EDW: 127 kg     Assessment:   - HD completed overnight. Patient not amendable to repeat HD today per OP schedule. Plans to dialyze Tuesday per schedule. No distress on exam. K 4.0.  - Continue to monitor intake and output  - Please avoid gadolinium, fleets, phos-based laxatives, NSAIDs  - Dialysis thrice weekly unless more urgent indications arise. Will evaluate RRT requirements Daily.    Anemia of ESRD   Recent Labs   Lab 09/26/24  1143 09/26/24  1152 09/27/24  0502   WBC 7.31  --  6.49   HGB 12.0*  --  11.1*   HCT 37.2* 39 33.3*     --  234       Lab Results   Component Value Date    FESATURATED 12 (L) 01/04/2021    FERRITIN 96 01/04/2021       - Goal in ESRD is Hgb of 10-11. At goal.  - EPO can be administered and dosed per his OP unit upon discharge.    Mineral Bone Disease in ESRD   Lab Results   Component Value Date    .0 (H) 11/05/2020    CALCIUM 8.3 (L) 09/28/2024    ALBUMIN 3.0 (L) 09/28/2024    CAION 1.00 (L) 07/10/2020    PHOS 4.2 09/28/2024       - F/U PO4, Mg, Calcium. And albumin levels daily.   - Renal diet with protein intake goal 1.5 g/kg/d with 1 L fluid restriction   - Novasource with meals  - Continue binders

## 2024-09-28 NOTE — DISCHARGE SUMMARY
Discharge Summary  Hospital Medicine      Admit Date: 9/26/2024    Discharge Date and Time: 9/28/2024 12:36 PM     Discharge Attending Physician: Татьяна Mo MD     Diagnoses:  Active Hospital Problems    Diagnosis  POA    *AV fistula thrombosis, initial encounter [T82.218A]  Yes    Problem with vascular access [Z78.9]  Yes    Pre-op exam [Z01.818]  Not Applicable    ESRD (end stage renal disease) on dialysis [N18.6, Z99.2]  Not Applicable    Second degree AV block, Mobitz type I [I44.1]  Yes    Hypokalemia [E87.6]  Yes    Anemia in ESRD (end-stage renal disease) [N18.6, D63.1]  Yes    ESRD (end stage renal disease) [N18.6]  Yes    Dyslipidemia [E78.5]  Yes    Type 2 diabetes mellitus with chronic kidney disease on chronic dialysis, with long-term current use of insulin [E11.22, N18.6, Z99.2, Z79.4]  Not Applicable    Essential hypertension [I10]  Yes     Chronic     2008 negative nuclear stress        Resolved Hospital Problems   No resolved problems to display.       Discharged Condition: good    Indication for Admission: clotted fistula    Hospital Course: Patient was admitted for an inpatient procedure and tolerated the procedure well with no complications.    Greater than 30 minutes spent in the discharge of this patient     Consults: Interventional Radiology and Nephrology    Significant Diagnostic Studies: none    Special Treatments/Procedures: procedures:Tunneled catheter placement and dialysis: Hemodialysis    Disposition: Home or Self Care    Follow Up/Patient Instructions:      Medication List        CONTINUE taking these medications      atorvastatin 80 MG tablet  Commonly known as: LIPITOR  Take 1 tablet (80 mg total) by mouth once daily.     blood sugar diagnostic Strp  To check BG 4 times daily, to use with insurance preferred meter     blood-glucose meter kit  To check BG 4 times daily, to use with insurance preferred meter     calcium acetate(phosphat bind) 667 mg capsule  Commonly known as:  "PHOSLO     dorzolamide-timolol 2-0.5% 22.3-6.8 mg/mL ophthalmic solution  Commonly known as: COSOPT  Place 1 drop into both eyes 2 (two) times daily.     ezetimibe 10 mg tablet  Commonly known as: ZETIA  Take 1 tablet (10 mg total) by mouth once daily. One a day or as directed     fluticasone propionate 50 mcg/actuation nasal spray  Commonly known as: FLONASE  2 sprays (100 mcg total) by Each Nostril route nightly as needed for Rhinitis.     HumaLOG KwikPen Insulin 100 unit/mL pen  Generic drug: insulin lispro  Inject 17 Units into the skin 3 (three) times daily before meals.     hydrALAZINE 100 MG tablet  Commonly known as: APRESOLINE  Take 1 tablet (100 mg total) by mouth 3 (three) times daily.     HYDROcodone-acetaminophen 7.5-325 mg per tablet  Commonly known as: NORCO     lancets Misc  To check BG 4 times daily, to use with insurance preferred meter     LANTUS SOLOSTAR U-100 INSULIN 100 unit/mL (3 mL) Inpn pen  Generic drug: insulin glargine U-100 (Lantus)  Inject 45 Units into the skin every evening. Increase per MD instruction to max daily dose of 70 units     NIFEdipine 90 MG (OSM) 24 hr tablet  Commonly known as: PROCARDIA-XL  Take 1 tablet (90 mg total) by mouth once daily.     pantoprazole 40 MG tablet  Commonly known as: PROTONIX  Take 1 tablet (40 mg total) by mouth once daily.     pen needle, diabetic 31 gauge x 5/16" Ndle  Commonly known as: BD ULTRA-FINE SHORT PEN NEEDLE  USE FOUR TIMES DAILY     tamsulosin 0.4 mg Cap  Commonly known as: FLOMAX  Take 1 capsule (0.4 mg total) by mouth every morning.     torsemide 20 MG Tab  Commonly known as: DEMADEX  Take 1 tablet (20 mg total) by mouth 2 (two) times daily.     traZODone 100 MG tablet  Commonly known as: DESYREL            STOP taking these medications      metoprolol succinate 25 MG 24 hr tablet  Commonly known as: TOPROL-XL            ASK your doctor about these medications      ergocalciferol 50,000 unit Cap  Commonly known as: " ERGOCALCIFEROL  Take 1 capsule (50,000 Units total) by mouth every 7 days.     ferrous sulfate 325 mg (65 mg iron) Tab tablet  Commonly known as: FEOSOL  3 TABS A DAY.             Follow-up Information       Janeth Walter MD Follow up in 2 week(s).    Specialty: Family Medicine  Contact information:  [address] [address]  [phone]                           Discharge Procedure Orders   Ambulatory referral/consult to Cardiology   Standing Status: Future   Referral Priority: Urgent Referral Type: Consultation   Referral Reason: Specialty Services Required   Requested Specialty: Cardiology   Number of Visits Requested: 1     Diet diabetic     Diet renal     Notify your health care provider if you experience any of the following:  difficulty breathing or increased cough     Holter monitor - 48 hour   Standing Status: Future Standing Exp. Date: 09/27/25     Order Specific Question Answer Comments   Release to patient Immediate      Activity as tolerated       Татьяна Mo MD, FACP, [facility]  Department of Hospital Medicine  Senior Hospitalist

## 2024-09-28 NOTE — PROGRESS NOTES
[facility] - Internal Medicine Telemetry  Nephrology  Progress Note    Patient Name: Vinnie Siegel  MRN: [id number]  Admission Date: 9/26/2024  Hospital Length of Stay: 0 days  Attending Provider: Purvi Vargas MD   Primary Care Physician: Janeth Walter MD  Principal Problem:AV fistula thrombosis, initial encounter    Subjective:     Interval History: HD completed via CVC overnight with 1820 mL removed. No distress noted. Patient no amendable to repeat HD today per OP schedule. K 4.0.    Review of patient's allergies indicates:  No Known Allergies  Current Facility-Administered Medications   Medication Frequency    0.9%  NaCl infusion Once    acetaminophen tablet 650 mg Q6H PRN    atorvastatin tablet 80 mg Daily    calcium acetate(phosphat bind) capsule 667 mg TID WM    dextrose 10% bolus 125 mL 125 mL PRN    dextrose 10% bolus 250 mL 250 mL PRN    ferrous sulfate tablet 1 each Daily    fluticasone propionate 50 mcg/actuation nasal spray 100 mcg Nightly PRN    glucagon (human recombinant) injection 1 mg PRN    glucose chewable tablet 16 g PRN    glucose chewable tablet 24 g PRN    heparin (porcine) injection 1,000 Units PRN    hydrALAZINE injection 10 mg Q6H PRN    HYDROcodone-acetaminophen 7.5-325 mg per tablet 1 tablet Q6H PRN    insulin aspart U-100 pen 0-10 Units QID (AC + HS) PRN    insulin aspart U-100 pen 5 Units TIDWM    insulin glargine U-100 (Lantus) pen 15 Units QHS    melatonin tablet 6 mg Nightly PRN    naloxone 0.4 mg/mL injection 0.02 mg PRN    NIFEdipine 24 hr tablet 90 mg Daily    pantoprazole EC tablet 40 mg Daily    sodium chloride 0.9% flush 10 mL PRN    sodium chloride 0.9% flush 10 mL Q12H PRN    tamsulosin 24 hr capsule 0.4 mg QAM    traZODone tablet 100 mg QHS       Objective:     Vital Signs (Most Recent):  Temp: 98.3 °F (36.8 °C) (09/28/24 0736)  Pulse: 67 (09/28/24 1115)  Resp: 17 (09/28/24 0736)  BP: 122/69 (09/28/24 0736)  SpO2: 98 % (09/28/24 0736) Vital Signs (24h Range):  Temp:   [96.9 °F (36.1 °C)-98.5 °F (36.9 °C)] 98.3 °F (36.8 °C)  Pulse:  [67-94] 67  Resp:  [12-20] 17  SpO2:  [95 %-100 %] 98 %  BP: (113-166)/(54-90) 122/69     Weight: 126 kg (277 lb 12.5 oz) (09/28/24 0458)  Body mass index is 35.66 kg/m².  Body surface area is 2.56 meters squared.    I/O last 3 completed shifts:  In: 0   Out: 2570 [Urine:350; Other:2220]     Physical Exam  Vitals and nursing note reviewed.   Constitutional:       Appearance: He is obese. He is not ill-appearing.   HENT:      Head: Normocephalic.   Cardiovascular:      Rate and Rhythm: Normal rate.      Pulses:           Radial pulses are 2+ on the right side and 2+ on the left side.      Arteriovenous access: Right arteriovenous access is present.     Comments: No thrill or pulsatility appreciated in RUE AVF  Pulmonary:      Effort: Pulmonary effort is normal. No respiratory distress.   Abdominal:      General: There is no distension.   Musculoskeletal:         General: No swelling. Normal range of motion.   Neurological:      Mental Status: He is alert and oriented to person, place, and time.   Psychiatric:         Mood and Affect: Mood normal.         Behavior: Behavior normal.          Significant Labs:  CBC:   Recent Labs   Lab 09/27/24  0502   WBC 6.49   RBC 3.58*   HGB 11.1*   HCT 33.3*      MCV 93   MCH 31.0   MCHC 33.3     CMP:   Recent Labs   Lab 09/26/24  1255 09/27/24  0502 09/28/24  0428   *   < > 226*   CALCIUM 7.7*   < > 8.3*   ALBUMIN 3.0*   < > 3.0*   PROT 6.7  --   --       < > 135*   K 3.0*   < > 4.0   CO2 24   < > 20*      < > 99   BUN 43*   < > 36*   CREATININE 9.2*   < > 7.8*   ALKPHOS 59  --   --    ALT 7*  --   --    AST 8*  --   --    BILITOT 0.6  --   --     < > = values in this interval not displayed.     All labs within the past 24 hours have been reviewed.     Assessment/Plan:     Renal/  ESRD (end stage renal disease)  79 y.o. Black or  Male ESRD-HD T-T-S  presents to ED on  9/26/2024 with diagnosis of: Dysrhythmia [I49.9];Wenckebach [I44.1];ESRD (end stage renal disease) on dialysis [N18.6, Z99.2];Chest pain [R07.9];Problem with vascular access [Z78.9]   Nephrology consulted for inpatient ESRD-HD management    Outpatient HD Information:  -Dialysis modality: Hemodialysis  -Outpatient HD unit: [facility]  -Nephrologist: ?  -HD TX days: Tuesday/Thursday/Saturday, duration of treatment: 4 hrs  -Dialysis access: RUE AV fistula   -Residual urine: Minium   -EDW: 127 kg     Assessment:   - HD completed overnight. Patient not amendable to repeat HD today per OP schedule. Plans to dialyze Tuesday per schedule. No distress on exam. K 4.0.  - Continue to monitor intake and output  - Please avoid gadolinium, fleets, phos-based laxatives, NSAIDs  - Dialysis thrice weekly unless more urgent indications arise. Will evaluate RRT requirements Daily.    Anemia of ESRD   Recent Labs   Lab 09/26/24  1143 09/26/24  1152 09/27/24  0502   WBC 7.31  --  6.49   HGB 12.0*  --  11.1*   HCT 37.2* 39 33.3*     --  234       Lab Results   Component Value Date    FESATURATED 12 (L) 01/04/2021    FERRITIN 96 01/04/2021       - Goal in ESRD is Hgb of 10-11. At goal.  - EPO can be administered and dosed per his OP unit upon discharge.    Mineral Bone Disease in ESRD   Lab Results   Component Value Date    .0 (H) 11/05/2020    CALCIUM 8.3 (L) 09/28/2024    ALBUMIN 3.0 (L) 09/28/2024    CAION 1.00 (L) 07/10/2020    PHOS 4.2 09/28/2024       - F/U PO4, Mg, Calcium. And albumin levels daily.   - Renal diet with protein intake goal 1.5 g/kg/d with 1 L fluid restriction   - Novasource with meals  - Continue binders    Other  * AV fistula thrombosis, initial encounter  - defer to vascular   Sp CVC placement with IR Friday, 9/27.          Thank you for your consult. I will follow-up with patient. Please contact us if you have any additional questions.    Rona M Juancho, DNP, FNP-C  Nephrology  [facility] - Internal  Medicine Telemetry

## 2024-09-28 NOTE — ASSESSMENT & PLAN NOTE
-AVF creation in RU 12/22   -Unable to be accessed for HD  -Ultrasound access performed in the ED. Right brachiocephalic AV fistula appears thrombosed.   -Consult Vascular Surgery. They do not recommend fistulogram. Recommend outpatient follow up  -Patient to have TDC placed today by IR (he initially refused, but now agrees)

## 2024-09-28 NOTE — PROGRESS NOTES
09/27/24 2045   Vital Signs   Temp 98.3 °F (36.8 °C)   Temp Source Oral   Pulse 79   Heart Rate Source Monitor   Resp 18   SpO2 98 %   Device (Oxygen Therapy) room air   /69   MAP (mmHg) 108   BP Location Left arm   BP Method Automatic   Patient Position Lying     Pt is alert,oriented and stable for tx.  Pt is in Obs unit. POC discussed with the pt.  Bedside HD1:1 tx initiated aseptically via RIJ TDC without issue.

## 2024-09-28 NOTE — PLAN OF CARE
Problem: Adult Inpatient Plan of Care  Goal: Plan of Care Review  Outcome: Progressing  Goal: Patient-Specific Goal (Individualized)  Outcome: Progressing  Goal: Absence of Hospital-Acquired Illness or Injury  Outcome: Progressing  Goal: Optimal Comfort and Wellbeing  Outcome: Progressing  Goal: Readiness for Transition of Care  Outcome: Progressing     Problem: Diabetes Comorbidity  Goal: Blood Glucose Level Within Targeted Range  Outcome: Progressing     Problem: Infection  Goal: Absence of Infection Signs and Symptoms  Outcome: Progressing     Problem: Skin Injury Risk Increased  Goal: Skin Health and Integrity  Outcome: Progressing     Problem: Hemodialysis  Goal: Safe, Effective Therapy Delivery  Outcome: Progressing  Goal: Effective Tissue Perfusion  Outcome: Progressing  Goal: Absence of Infection Signs and Symptoms  Outcome: Progressing

## 2024-09-29 NOTE — PLAN OF CARE
[facility] Tele[facility]  Discharge Final Note    Primary Care Provider: Janeth Walter MD    Expected Discharge Date: 9/28/2024    Final Discharge Note (most recent)       Final Note - 09/29/24 0824          Final Note    Assessment Type Final Discharge Note (P)      Anticipated Discharge Disposition Home or Self Care (P)      What phone number can be called within the next 1-3 days to see how you are doing after discharge? [phone] (P)         Post-Acute Status    Post-Acute Authorization Other (P)      Other Status Awaiting f/u Appts (P)                      Important Message from Medicare             NOV 11 Established Patient Visit with Levar Pritchett MD  Monday Nov 11, 2024 9:30 AM [facility] - [address]  [address]  [place] [address]  [phone]   DEC    4 Established Patient with Leora Fay NP  Wednesday Dec 4, 2024 2:30 PM [facility] 6th [address]  [address]  [place] [address]  [phone]   FEB 26 2025 Established Patient Visit with Janeth Walter MD  Wednesday Feb 26, 2025 9:30 AM [facility]  [address] [address]  [phone]       Patient discharged home w/ family via personal vehicle.                  CLAUDETTE Khan, LMSW  [facility]  Case Management  Ext. [phone]

## 2024-09-30 ENCOUNTER — TELEPHONE (OUTPATIENT)
Dept: VASCULAR SURGERY | Facility: CLINIC | Age: 79
End: 2024-09-30
Payer: MEDICARE

## 2024-09-30 NOTE — TELEPHONE ENCOUNTER
Attempted to contact the pt to schedule f/u appt but no answer.Left a voice message with a call back number [phone].

## 2024-10-01 ENCOUNTER — PATIENT OUTREACH (OUTPATIENT)
Dept: ADMINISTRATIVE | Facility: CLINIC | Age: 79
End: 2024-10-01
Payer: MEDICARE

## 2024-10-01 DIAGNOSIS — R05.3 CHRONIC COUGH: Primary | ICD-10-CM

## 2024-10-01 DIAGNOSIS — Z87.891 HISTORY OF SMOKING 30 OR MORE PACK YEARS: ICD-10-CM

## 2024-10-01 NOTE — TELEPHONE ENCOUNTER
Spoke with patient, says he does not feel that he is in need of a Hosp f/u at this moment. Patient did state that he would like to have a CT scan of the chest.

## 2024-10-01 NOTE — PROGRESS NOTES
C3 nurse attempted to contact Vinnie Siegel for a TCC post hospital discharge follow up call. The patient is unable to conduct the call @ this time. The patient requested a callback.    The patient does not have a scheduled HOSFU appointment within 5-7 days post hospital discharge date 09/28/2024. Message sent to Physician staff to assist with HOSFU appointment scheduling.

## 2024-10-01 NOTE — PROGRESS NOTES
C3 nurse spoke with Vinnie Siegel for a TCC post hospital discharge follow up call. Nurse offered to scheduled TCC hospital follow-up appointment. The patient declined appointment at this time.

## 2024-10-11 ENCOUNTER — OFFICE VISIT (OUTPATIENT)
Dept: VASCULAR SURGERY | Facility: CLINIC | Age: 79
End: 2024-10-11
Payer: MEDICARE

## 2024-10-11 VITALS
WEIGHT: 276 LBS | HEIGHT: 74 IN | DIASTOLIC BLOOD PRESSURE: 64 MMHG | BODY MASS INDEX: 35.42 KG/M2 | HEART RATE: 78 BPM | SYSTOLIC BLOOD PRESSURE: 139 MMHG | OXYGEN SATURATION: 100 %

## 2024-10-11 DIAGNOSIS — N18.6 ESRD (END STAGE RENAL DISEASE) ON DIALYSIS: Primary | ICD-10-CM

## 2024-10-11 DIAGNOSIS — Z99.2 ESRD (END STAGE RENAL DISEASE) ON DIALYSIS: Primary | ICD-10-CM

## 2024-10-11 PROCEDURE — 99999 PR PBB SHADOW E&M-EST. PATIENT-LVL IV: CPT | Mod: PBBFAC,HCNC,, | Performed by: SURGERY

## 2024-10-11 NOTE — H&P (VIEW-ONLY)
VASCULAR SURGERY SERVICE    CHIEF COMPLAINT:  End-stage renal disease follow-up    HISTORY OF PRESENT ILLNESS: Vinnie Siegel is a 79 y.o. 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  In the past he had also had a left brachiocephalic fistula.  He is here for new access.  He is right-handed.  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     Hyperlipidemia     Hypertension     Kidney stone     Seasonal allergies 06/24/2014       Past Surgical History:   Procedure Laterality Date    AV FISTULA PLACEMENT Left 12/8/2020    Procedure: CREATION, AV FISTULA;  Surgeon: Benji Becerril MD;  Location: [facility] OR [address] [place] [address];  Service: Peripheral Vascular;  Laterality: Left;    AV FISTULA PLACEMENT Right 12/7/2022    Procedure: CREATION, AV FISTULA;  Surgeon: Benji Becerril MD;  Location: [facility] OR [address] [place] [address];  Service: Peripheral Vascular;  Laterality: Right;  RUE    CATARACT EXTRACTION W/  INTRAOCULAR LENS IMPLANT Right 04/04/16    Dr Meehan    COLONOSCOPY W/ BIOPSIES      DILATION, AQUEOUS OUTFLOW CANAL, TRANSLUMINAL, WITHOUT DEVICE RETENTION Left 9/21/2023    Procedure: DILATION, AQUEOUS OUTFLOW CANAL, TRANSLUMINAL, WITHOUT DEVICE RETENTION;  Surgeon: Yvonne Nielson MD;  Location: [facility] OR [address] [place] [address];  Service: Ophthalmology;  Laterality: Left;  omni    ESOPHAGOGASTRODUODENOSCOPY N/A 6/29/2020    Procedure: EGD (ESOPHAGOGASTRODUODENOSCOPY);  Surgeon: Ravi Leone MD;  Location: [facility];  Service: Endoscopy;  Laterality: N/A;    EYE SURGERY      FISTULOGRAM Left 4/16/2021    Procedure:  Fistulogram;  Surgeon: Benji Becerril MD;  Location: [facility] CATH LAB;  Service: Peripheral Vascular;  Laterality: Left;    FISTULOGRAM Left 9/28/2022    Procedure: Fistulogram;  Surgeon: Benji Becerril MD;  Location: [facility] CATH LAB;  Service: Cardiology;  Laterality: Left;    FISTULOGRAM, WITH PTA Right 2/3/2023    Procedure: FISTULOGRAM, WITH PTA;  Surgeon: Benji Becerril MD;  Location: [facility] OR 2ND FLR;  Service: Peripheral Vascular;  Laterality: Right;  205.17 mGy  2.3 min    GONIOTOMY Left 8/10/2023    Procedure: GONIOTOMY;  Surgeon: Yvonne Nielson MD;  Location: [facility] OR 1ST FLR;  Service: Ophthalmology;  Laterality: Left;  omni    HEMORRHOID SURGERY      Dr. Root ADRYAN    INTRAOCULAR PROSTHESES INSERTION Left 8/10/2023    Procedure: INSERTION, IOL PROSTHESIS;  Surgeon: Yvonne Nielson MD;  Location: [facility] OR 1ST FLR;  Service: Ophthalmology;  Laterality: Left;    INTRAOCULAR PROSTHESES INSERTION Left 9/21/2023    Procedure: INSERTION, IOL PROSTHESIS;  Surgeon: Yvonne Nielson MD;  Location: [facility] OR 1ST FLR;  Service: Ophthalmology;  Laterality: Left;    lateral internal anal sphincterotomy  12/13/13    Dr. root ADRYAN    PERCUTANEOUS TRANSLUMINAL ANGIOPLASTY OF ARTERIOVENOUS FISTULA Left 4/16/2021    Procedure: PTA, AV FISTULA;  Surgeon: Benji Becerril MD;  Location: [facility] CATH LAB;  Service: Peripheral Vascular;  Laterality: Left;    PERCUTANEOUS TRANSLUMINAL ANGIOPLASTY OF ARTERIOVENOUS FISTULA N/A 9/28/2022    Procedure: PTA, AV FISTULA;  Surgeon: Benji Beecrril MD;  Location: [facility] CATH LAB;  Service: Cardiology;  Laterality: N/A;    PHACOEMULSIFICATION OF CATARACT Left 8/10/2023    Procedure: PHACOEMULSIFICATION, CATARACT;  Surgeon: Yvonne Nielson MD;  Location: [facility] OR 1ST FLR;  Service: Ophthalmology;  Laterality: Left;    PHACOEMULSIFICATION OF CATARACT Left 9/21/2023    Procedure: PHACOEMULSIFICATION, CATARACT;  Surgeon: Yvonne Nielson MD;  Location: [facility] OR 1ST  FLR;  Service: Ophthalmology;  Laterality: Left;    PLACEMENT OF DUAL-LUMEN VASCULAR CATHETER N/A 2/3/2023    Procedure: EXCHANGE, PERMACATH;  Surgeon: Benji Becerril MD;  Location: [facility] OR 2ND FLR;  Service: Peripheral Vascular;  Laterality: N/A;    URETERAL STENT PLACEMENT           Current Outpatient Medications:     atorvastatin (LIPITOR) 80 MG tablet, Take 1 tablet (80 mg total) by mouth once daily., Disp: 90 tablet, Rfl: 3    blood sugar diagnostic Strp, To check BG 4 times daily, to use with insurance preferred meter, Disp: 450 each, Rfl: 3    blood-glucose meter kit, To check BG 4 times daily, to use with insurance preferred meter, Disp: 1 each, Rfl: 0    calcium acetate,phosphat bind, (PHOSLO) 667 mg capsule, SMARTSI Capsule(s) By Mouth, Disp: , Rfl:     dorzolamide-timolol 2-0.5% (COSOPT) 22.3-6.8 mg/mL ophthalmic solution, Place 1 drop into both eyes 2 (two) times daily., Disp: 30 mL, Rfl: 3    ezetimibe (ZETIA) 10 mg tablet, Take 1 tablet (10 mg total) by mouth once daily. One a day or as directed, Disp: 30 tablet, Rfl: 11    fluticasone propionate (FLONASE) 50 mcg/actuation nasal spray, 2 sprays (100 mcg total) by Each Nostril route nightly as needed for Rhinitis., Disp: 48 g, Rfl: 11    HUMALOG KWIKPEN INSULIN 100 unit/mL pen, Inject 17 Units into the skin 3 (three) times daily before meals., Disp: 30 mL, Rfl: 3    hydrALAZINE (APRESOLINE) 100 MG tablet, Take 1 tablet (100 mg total) by mouth 3 (three) times daily., Disp: 270 tablet, Rfl: 3    HYDROcodone-acetaminophen (NORCO) 7.5-325 mg per tablet, Take 1 tablet by mouth., Disp: , Rfl:     insulin glargine U-100, Lantus, (LANTUS SOLOSTAR U-100 INSULIN) 100 unit/mL (3 mL) InPn pen, Inject 45 Units into the skin every evening. Increase per MD instruction to max daily dose of 70 units, Disp: 40 mL, Rfl: 3    lancets Misc, To check BG 4 times daily, to use with insurance preferred meter, Disp: 450 each, Rfl: 3    NIFEdipine (PROCARDIA-XL) 90 MG  "(OSM) 24 hr tablet, Take 1 tablet (90 mg total) by mouth once daily., Disp: 90 tablet, Rfl: 3    pantoprazole (PROTONIX) 40 MG tablet, Take 1 tablet (40 mg total) by mouth once daily., Disp: 90 tablet, Rfl: 3    pen needle, diabetic (BD ULTRA-FINE SHORT PEN NEEDLE) 31 gauge x 5/16" Ndle, USE FOUR TIMES DAILY, Disp: 400 each, Rfl: 3    tamsulosin (FLOMAX) 0.4 mg Cap, Take 1 capsule (0.4 mg total) by mouth every morning., Disp: 90 capsule, Rfl: 3    torsemide (DEMADEX) 20 MG Tab, Take 1 tablet (20 mg total) by mouth 2 (two) times daily., Disp: 180 tablet, Rfl: 3    traZODone (DESYREL) 100 MG tablet, Take 100 mg by mouth every evening., Disp: , Rfl:     ergocalciferol (ERGOCALCIFEROL) 50,000 unit Cap, Take 1 capsule (50,000 Units total) by mouth every 7 days. (Patient not taking: Reported on 10/1/2024), Disp: 12 capsule, Rfl: 0    ferrous sulfate (FEOSOL) 325 mg (65 mg iron) Tab tablet, 3 TABS A DAY. (Patient not taking: Reported on 10/1/2024), Disp: 90 tablet, Rfl: 1    Review of patient's allergies indicates:  No Known Allergies    Family History   Problem Relation Name Age of Onset    Diabetes Brother Cassius         type 1    Hypertension Brother Cassius     Stroke Brother Cassius        Social History     Tobacco Use    Smoking status: Former     Current packs/day: 0.00     Average packs/day: 0.5 packs/day for 45.0 years (22.5 ttl pk-yrs)     Types: Cigarettes     Start date: 1975     Quit date: 2020     Years since quittin.2     Passive exposure: Never    Smokeless tobacco: Former   Substance Use Topics    Alcohol use: Not Currently     Comment: rarely    Drug use: No       REVIEW OF SYSTEMS:  General: No chills, fever, malaise, changes in weight  HEENT: No visual changes, difficulty hearing  Cardiovascular: No chest pain, palpitations, claudication  Pulmonary: No dyspnea, cough, wheezing  Gastrointestinal: No nausea, vomiting, diarrhea, constipation  Genitourinary: No dysuria, low urine " "output, hematuria  Endocrine: No polydipsia, polyphagia  Hematologic: No fatigue with exertion, pica, pallor  Musculoskeletal: No extremity or joint pain, no back pain, no difficulty with ambulation  Neurologic: no seizures, no headaches, no weakness  Psychiatric: no mood disturbance      PHYSICAL EXAM:   /64   Pulse 78   Ht 6' 2" (1.88 m)   Wt 125.2 kg (276 lb 0.3 oz)   SpO2 100%   BMI 35.44 kg/m²   Constitutional:  Alert,   Well-appearing  In no distress.   Neurological: Normal speech  no focal findings  CN II - XII grossly intact.    Psychiatric: Mood and affect appropriate and symmetric.   HEENT: Normocephalic / atraumatic  PERRLA  Midline trachea  No scars across the neck   Cardiac: Regular rate and rhythm.   Pulmonary: Normal pulmonary effort.   Abdomen: Soft, not distended.     Skin: Warm and well perfused.    Vascular:  Strong 2+ radial and brachial pulses bilaterally.   Extremities/  Musculoskeletal: No edema.   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  No skin thinning or ulceration     IMAGING:  Vein mapping shows no residual veins adequate for autogenous fistula    IMPRESSION:  End-stage renal disease, has failed bilateral brachiocephalic AV fistulas    PLAN:  Left upper arm prosthetic AV graft placement 10/23/2024  Regional block    Will tunnel this graft lateral to his existing chronically thrombosed AV fistula  Given the prior thrombosis of both autogenous fistula was would consider beginning Plavix empirically for him to aid in patency    I have explained the risks, benefits and alternatives for this procedure in detail.  The patient voices understanding and all questions have be answered, and agrees to proceed with the procedure.     GLADIS Borja III, MD, FACS  Professor and Chief, Vascular and Endovascular Surgery      "

## 2024-10-11 NOTE — PROGRESS NOTES
VASCULAR SURGERY SERVICE    CHIEF COMPLAINT:  End-stage renal disease follow-up    HISTORY OF PRESENT ILLNESS: Vinnie Siegel is a 79 y.o. 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  In the past he had also had a left brachiocephalic fistula.  He is here for new access.  He is right-handed.  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     Hyperlipidemia     Hypertension     Kidney stone     Seasonal allergies 06/24/2014       Past Surgical History:   Procedure Laterality Date    AV FISTULA PLACEMENT Left 12/8/2020    Procedure: CREATION, AV FISTULA;  Surgeon: Benji Becerril MD;  Location: [facility] OR [address] [place] [address];  Service: Peripheral Vascular;  Laterality: Left;    AV FISTULA PLACEMENT Right 12/7/2022    Procedure: CREATION, AV FISTULA;  Surgeon: Benji Becerril MD;  Location: [facility] OR [address] [place] [address];  Service: Peripheral Vascular;  Laterality: Right;  RUE    CATARACT EXTRACTION W/  INTRAOCULAR LENS IMPLANT Right 04/04/16    Dr Meehan    COLONOSCOPY W/ BIOPSIES      DILATION, AQUEOUS OUTFLOW CANAL, TRANSLUMINAL, WITHOUT DEVICE RETENTION Left 9/21/2023    Procedure: DILATION, AQUEOUS OUTFLOW CANAL, TRANSLUMINAL, WITHOUT DEVICE RETENTION;  Surgeon: Yvonne Nielson MD;  Location: [facility] OR [address] [place] [address];  Service: Ophthalmology;  Laterality: Left;  omni    ESOPHAGOGASTRODUODENOSCOPY N/A 6/29/2020    Procedure: EGD (ESOPHAGOGASTRODUODENOSCOPY);  Surgeon: Ravi Leone MD;  Location: [facility];  Service: Endoscopy;  Laterality: N/A;    EYE SURGERY      FISTULOGRAM Left 4/16/2021    Procedure:  Fistulogram;  Surgeon: Benji Becerril MD;  Location: [facility] CATH LAB;  Service: Peripheral Vascular;  Laterality: Left;    FISTULOGRAM Left 9/28/2022    Procedure: Fistulogram;  Surgeon: Benji Becerril MD;  Location: [facility] CATH LAB;  Service: Cardiology;  Laterality: Left;    FISTULOGRAM, WITH PTA Right 2/3/2023    Procedure: FISTULOGRAM, WITH PTA;  Surgeon: Benji Becerril MD;  Location: [facility] OR 2ND FLR;  Service: Peripheral Vascular;  Laterality: Right;  205.17 mGy  2.3 min    GONIOTOMY Left 8/10/2023    Procedure: GONIOTOMY;  Surgeon: Yvonne Nielson MD;  Location: [facility] OR 1ST FLR;  Service: Ophthalmology;  Laterality: Left;  omni    HEMORRHOID SURGERY      Dr. Root ADRYAN    INTRAOCULAR PROSTHESES INSERTION Left 8/10/2023    Procedure: INSERTION, IOL PROSTHESIS;  Surgeon: Yvonne Nielson MD;  Location: [facility] OR 1ST FLR;  Service: Ophthalmology;  Laterality: Left;    INTRAOCULAR PROSTHESES INSERTION Left 9/21/2023    Procedure: INSERTION, IOL PROSTHESIS;  Surgeon: Yvonne Nielson MD;  Location: [facility] OR 1ST FLR;  Service: Ophthalmology;  Laterality: Left;    lateral internal anal sphincterotomy  12/13/13    Dr. root ADRYAN    PERCUTANEOUS TRANSLUMINAL ANGIOPLASTY OF ARTERIOVENOUS FISTULA Left 4/16/2021    Procedure: PTA, AV FISTULA;  Surgeon: Benji Becerril MD;  Location: [facility] CATH LAB;  Service: Peripheral Vascular;  Laterality: Left;    PERCUTANEOUS TRANSLUMINAL ANGIOPLASTY OF ARTERIOVENOUS FISTULA N/A 9/28/2022    Procedure: PTA, AV FISTULA;  Surgeon: Benji Becerril MD;  Location: [facility] CATH LAB;  Service: Cardiology;  Laterality: N/A;    PHACOEMULSIFICATION OF CATARACT Left 8/10/2023    Procedure: PHACOEMULSIFICATION, CATARACT;  Surgeon: Yvonne Nielson MD;  Location: [facility] OR 1ST FLR;  Service: Ophthalmology;  Laterality: Left;    PHACOEMULSIFICATION OF CATARACT Left 9/21/2023    Procedure: PHACOEMULSIFICATION, CATARACT;  Surgeon: Yvonne Nielson MD;  Location: [facility] OR 1ST  FLR;  Service: Ophthalmology;  Laterality: Left;    PLACEMENT OF DUAL-LUMEN VASCULAR CATHETER N/A 2/3/2023    Procedure: EXCHANGE, PERMACATH;  Surgeon: Benji Becerril MD;  Location: [facility] OR 2ND FLR;  Service: Peripheral Vascular;  Laterality: N/A;    URETERAL STENT PLACEMENT           Current Outpatient Medications:     atorvastatin (LIPITOR) 80 MG tablet, Take 1 tablet (80 mg total) by mouth once daily., Disp: 90 tablet, Rfl: 3    blood sugar diagnostic Strp, To check BG 4 times daily, to use with insurance preferred meter, Disp: 450 each, Rfl: 3    blood-glucose meter kit, To check BG 4 times daily, to use with insurance preferred meter, Disp: 1 each, Rfl: 0    calcium acetate,phosphat bind, (PHOSLO) 667 mg capsule, SMARTSI Capsule(s) By Mouth, Disp: , Rfl:     dorzolamide-timolol 2-0.5% (COSOPT) 22.3-6.8 mg/mL ophthalmic solution, Place 1 drop into both eyes 2 (two) times daily., Disp: 30 mL, Rfl: 3    ezetimibe (ZETIA) 10 mg tablet, Take 1 tablet (10 mg total) by mouth once daily. One a day or as directed, Disp: 30 tablet, Rfl: 11    fluticasone propionate (FLONASE) 50 mcg/actuation nasal spray, 2 sprays (100 mcg total) by Each Nostril route nightly as needed for Rhinitis., Disp: 48 g, Rfl: 11    HUMALOG KWIKPEN INSULIN 100 unit/mL pen, Inject 17 Units into the skin 3 (three) times daily before meals., Disp: 30 mL, Rfl: 3    hydrALAZINE (APRESOLINE) 100 MG tablet, Take 1 tablet (100 mg total) by mouth 3 (three) times daily., Disp: 270 tablet, Rfl: 3    HYDROcodone-acetaminophen (NORCO) 7.5-325 mg per tablet, Take 1 tablet by mouth., Disp: , Rfl:     insulin glargine U-100, Lantus, (LANTUS SOLOSTAR U-100 INSULIN) 100 unit/mL (3 mL) InPn pen, Inject 45 Units into the skin every evening. Increase per MD instruction to max daily dose of 70 units, Disp: 40 mL, Rfl: 3    lancets Misc, To check BG 4 times daily, to use with insurance preferred meter, Disp: 450 each, Rfl: 3    NIFEdipine (PROCARDIA-XL) 90 MG  "(OSM) 24 hr tablet, Take 1 tablet (90 mg total) by mouth once daily., Disp: 90 tablet, Rfl: 3    pantoprazole (PROTONIX) 40 MG tablet, Take 1 tablet (40 mg total) by mouth once daily., Disp: 90 tablet, Rfl: 3    pen needle, diabetic (BD ULTRA-FINE SHORT PEN NEEDLE) 31 gauge x 5/16" Ndle, USE FOUR TIMES DAILY, Disp: 400 each, Rfl: 3    tamsulosin (FLOMAX) 0.4 mg Cap, Take 1 capsule (0.4 mg total) by mouth every morning., Disp: 90 capsule, Rfl: 3    torsemide (DEMADEX) 20 MG Tab, Take 1 tablet (20 mg total) by mouth 2 (two) times daily., Disp: 180 tablet, Rfl: 3    traZODone (DESYREL) 100 MG tablet, Take 100 mg by mouth every evening., Disp: , Rfl:     ergocalciferol (ERGOCALCIFEROL) 50,000 unit Cap, Take 1 capsule (50,000 Units total) by mouth every 7 days. (Patient not taking: Reported on 10/1/2024), Disp: 12 capsule, Rfl: 0    ferrous sulfate (FEOSOL) 325 mg (65 mg iron) Tab tablet, 3 TABS A DAY. (Patient not taking: Reported on 10/1/2024), Disp: 90 tablet, Rfl: 1    Review of patient's allergies indicates:  No Known Allergies    Family History   Problem Relation Name Age of Onset    Diabetes Brother Cassius         type 1    Hypertension Brother Cassius     Stroke Brother Cassius        Social History     Tobacco Use    Smoking status: Former     Current packs/day: 0.00     Average packs/day: 0.5 packs/day for 45.0 years (22.5 ttl pk-yrs)     Types: Cigarettes     Start date: 1975     Quit date: 2020     Years since quittin.2     Passive exposure: Never    Smokeless tobacco: Former   Substance Use Topics    Alcohol use: Not Currently     Comment: rarely    Drug use: No       REVIEW OF SYSTEMS:  General: No chills, fever, malaise, changes in weight  HEENT: No visual changes, difficulty hearing  Cardiovascular: No chest pain, palpitations, claudication  Pulmonary: No dyspnea, cough, wheezing  Gastrointestinal: No nausea, vomiting, diarrhea, constipation  Genitourinary: No dysuria, low urine " "output, hematuria  Endocrine: No polydipsia, polyphagia  Hematologic: No fatigue with exertion, pica, pallor  Musculoskeletal: No extremity or joint pain, no back pain, no difficulty with ambulation  Neurologic: no seizures, no headaches, no weakness  Psychiatric: no mood disturbance      PHYSICAL EXAM:   /64   Pulse 78   Ht 6' 2" (1.88 m)   Wt 125.2 kg (276 lb 0.3 oz)   SpO2 100%   BMI 35.44 kg/m²   Constitutional:  Alert,   Well-appearing  In no distress.   Neurological: Normal speech  no focal findings  CN II - XII grossly intact.    Psychiatric: Mood and affect appropriate and symmetric.   HEENT: Normocephalic / atraumatic  PERRLA  Midline trachea  No scars across the neck   Cardiac: Regular rate and rhythm.   Pulmonary: Normal pulmonary effort.   Abdomen: Soft, not distended.     Skin: Warm and well perfused.    Vascular:  Strong 2+ radial and brachial pulses bilaterally.   Extremities/  Musculoskeletal: No edema.   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  No skin thinning or ulceration     IMAGING:  Vein mapping shows no residual veins adequate for autogenous fistula    IMPRESSION:  End-stage renal disease, has failed bilateral brachiocephalic AV fistulas    PLAN:  Left upper arm prosthetic AV graft placement 10/23/2024  Regional block    Will tunnel this graft lateral to his existing chronically thrombosed AV fistula  Given the prior thrombosis of both autogenous fistula was would consider beginning Plavix empirically for him to aid in patency    I have explained the risks, benefits and alternatives for this procedure in detail.  The patient voices understanding and all questions have be answered, and agrees to proceed with the procedure.     GLADIS Borja III, MD, FACS  Professor and Chief, Vascular and Endovascular Surgery      "

## 2024-10-18 ENCOUNTER — TELEPHONE (OUTPATIENT)
Dept: CARDIOLOGY | Facility: CLINIC | Age: 79
End: 2024-10-18
Payer: MEDICARE

## 2024-10-18 NOTE — TELEPHONE ENCOUNTER
RN spoke with pt today regarding a request for an appt with Dr. Santamaria.  Pt was offered an earlier appt for 11/6.  Pt declined and prefers the 12/11 appt. Appointment slip mailed out.

## 2024-10-22 ENCOUNTER — ANESTHESIA EVENT (OUTPATIENT)
Dept: SURGERY | Facility: HOSPITAL | Age: 79
End: 2024-10-22
Payer: MEDICARE

## 2024-10-22 ENCOUNTER — TELEPHONE (OUTPATIENT)
Dept: VASCULAR SURGERY | Facility: CLINIC | Age: 79
End: 2024-10-22
Payer: MEDICARE

## 2024-10-22 RX ORDER — MIDAZOLAM HYDROCHLORIDE 1 MG/ML
.5-4 INJECTION, SOLUTION INTRAMUSCULAR; INTRAVENOUS
OUTPATIENT
Start: 2024-10-22

## 2024-10-22 RX ORDER — FENTANYL CITRATE 50 UG/ML
25-200 INJECTION, SOLUTION INTRAMUSCULAR; INTRAVENOUS
OUTPATIENT
Start: 2024-10-22

## 2024-10-22 NOTE — TELEPHONE ENCOUNTER
Per Dr. Borja's request nurse attempted to contact pt to reschedule AVG insertion from Wednesday 10/23/24 to Thursday 11/04/24 due to change in OR schedule. Pt verbalized understanding. States he will confirm he will have transportation to [facility] on this date and will call nurse back with confirmation. Will await return call from patient.

## 2024-10-23 ENCOUNTER — ANESTHESIA (OUTPATIENT)
Dept: SURGERY | Facility: HOSPITAL | Age: 79
End: 2024-10-23
Payer: MEDICARE

## 2024-10-25 ENCOUNTER — TELEPHONE (OUTPATIENT)
Dept: VASCULAR SURGERY | Facility: CLINIC | Age: 79
End: 2024-10-25
Payer: MEDICARE

## 2024-10-25 NOTE — TELEPHONE ENCOUNTER
Per Dr. Borja's request nurse contacted pt to notify him that AVG insertion on 11/4/24 must be moved to 11/6/24 due to change in surgeon's schedule. Pt verbalized understanding and agreed to new date. Case rescheduled to 11/6/24.

## 2024-11-04 NOTE — PRE-PROCEDURE INSTRUCTIONS
PreOp Instructions given:   - Verbal medication information (what to hold and what to take)   - NPO guidelines   Patients should stop full meals at midnight, but they can consume sugar-free clear liquids up to 1 hour prior to scheduled arrival time.  Clear liquids include Gatorade, water, Clear liquids do NOT include anything with pulp or food particles (such as chicken broth, ice cream, yogurt, Jello, etc.)   - Arrival place directions given; time to be given the day before procedure by the   Surgeon's Office [facility]  - Bathing with antibacterial soap   - Don't wear any jewelry or bring any valuables AM of surgery   - No makeup or moisturizer to face   - No perfume/cologne, powder, lotions or aftershave   Pt. verbalized understanding.   Pt denies any h/o Anesthesia/Sedation complications or side effects.  Patient does not know arrival time.  Explained that this information comes from the surgeon's office and if they haven't heard from them by 2 or 3 pm to call the office.  Patient stated an understanding.     Hypoglycemia protocol reviewed w/pt - Pt v/c/u

## 2024-11-05 ENCOUNTER — TELEPHONE (OUTPATIENT)
Dept: VASCULAR SURGERY | Facility: CLINIC | Age: 79
End: 2024-11-05
Payer: MEDICARE

## 2024-11-05 RX ORDER — MIDAZOLAM HYDROCHLORIDE 1 MG/ML
.5-4 INJECTION, SOLUTION INTRAMUSCULAR; INTRAVENOUS
OUTPATIENT
Start: 2024-11-05

## 2024-11-05 RX ORDER — FENTANYL CITRATE 50 UG/ML
25-200 INJECTION, SOLUTION INTRAMUSCULAR; INTRAVENOUS
OUTPATIENT
Start: 2024-11-05

## 2024-11-05 NOTE — ANESTHESIA PREPROCEDURE EVALUATION
[facility]  Anesthesia Pre-Operative Evaluation         Patient Name: Vinnie Siegel  YOB: 1945  MRN: [id number]    SUBJECTIVE:     Pre-operative evaluation for Procedure(s) (LRB):  INSERTION, GRAFT, ARTERIOVENOUS (Left)     11/05/2024    Vinnie Siegel is a 79 y.o. male w/ a significant PMHx of DM, HTN, HLD, end-stage renal disease, who had thrombosis of his right brachiocephalic AV fistula several weeks ago and has now been dialyzing through a PermCath.      Patient now presents for the above procedure(s).    Echo Summary  Results for orders placed during the hospital encounter of 07/06/22    Echo    Interpretation Summary  · The left ventricle is normal in size with concentric remodeling and normal systolic function. The estimated ejection fraction is 60%.  · Normal right ventricular size with normal right ventricular systolic function.  · Normal left ventricular diastolic function.  · Mild aortic regurgitation.  · The estimated PA systolic pressure is 17 mmHg.  · Normal central venous pressure (3 mmHg).       Prev airway: None documented.    LDA:        Hemodialysis Catheter 09/27/24 1719 right internal jugular (Active)   Number of days: 38            Hemodialysis AV Fistula Left upper arm (Active)   Number of days:             Hemodialysis AV Fistula 02/03/23 1100 Right forearm (Active)   Number of days: 641           Patient Active Problem List   Diagnosis    Essential hypertension    PAC (premature atrial contraction)    RBBB    Seasonal allergies    Proteinuria    Calculus of both kidneys    Type 2 diabetes mellitus with chronic kidney disease on chronic dialysis, with long-term current use of insulin    Shortness of breath on exertion    History of snoring    Hypertension associated with type 2 diabetes mellitus    GIB (gastrointestinal bleeding)    Acute blood loss anemia    Debility    Dyslipidemia    Diastolic dysfunction    AV fistula thrombosis, initial encounter     Anemia in ESRD (end-stage renal disease)    Dependence on renal dialysis    History of colonic polyps    Iron deficiency anemia, unspecified    Long term (current) use of insulin    Nutritional deficiency, unspecified    Renal osteodystrophy    Secondary hyperparathyroidism of renal origin    Hypocalcemia    AV fistula occlusion, sequela    Gastroesophageal reflux disease without esophagitis    Benign prostatic hyperplasia without lower urinary tract symptoms    Chronic kidney disease-mineral and bone disorder    Morbid (severe) obesity due to excess calories    Status post cataract extraction and insertion of intraocular lens    Primary open angle glaucoma (POAG) of right eye, severe stage    Primary open angle glaucoma (POAG) of left eye, moderate stage    Second degree AV block, Mobitz type I    Hypokalemia    Problem with vascular access    Pre-op exam    ESRD (end stage renal disease) on dialysis       Review of patient's allergies indicates:  No Known Allergies    Current Inpatient Medications:      No current facility-administered medications on file prior to encounter.     Current Outpatient Medications on File Prior to Encounter   Medication Sig Dispense Refill    atorvastatin (LIPITOR) 80 MG tablet Take 1 tablet (80 mg total) by mouth once daily. 90 tablet 3    blood sugar diagnostic Strp To check BG 4 times daily, to use with insurance preferred meter 450 each 3    blood-glucose meter kit To check BG 4 times daily, to use with insurance preferred meter 1 each 0    calcium acetate,phosphat bind, (PHOSLO) 667 mg capsule SMARTSI Capsule(s) By Mouth      dorzolamide-timolol 2-0.5% (COSOPT) 22.3-6.8 mg/mL ophthalmic solution Place 1 drop into both eyes 2 (two) times daily. 30 mL 3    ergocalciferol (ERGOCALCIFEROL) 50,000 unit Cap Take 1 capsule (50,000 Units total) by mouth every 7 days. (Patient not taking: Reported on 10/1/2024) 12 capsule 0    ezetimibe (ZETIA) 10 mg tablet Take 1 tablet (10 mg total)  "by mouth once daily. One a day or as directed 30 tablet 11    ferrous sulfate (FEOSOL) 325 mg (65 mg iron) Tab tablet 3 TABS A DAY. (Patient not taking: Reported on 10/1/2024) 90 tablet 1    fluticasone propionate (FLONASE) 50 mcg/actuation nasal spray 2 sprays (100 mcg total) by Each Nostril route nightly as needed for Rhinitis. 48 g 11    HUMALOG KWIKPEN INSULIN 100 unit/mL pen Inject 17 Units into the skin 3 (three) times daily before meals. 30 mL 3    hydrALAZINE (APRESOLINE) 100 MG tablet Take 1 tablet (100 mg total) by mouth 3 (three) times daily. (Patient taking differently: Take 100 mg by mouth 2 (two) times daily.) 270 tablet 3    HYDROcodone-acetaminophen (NORCO) 7.5-325 mg per tablet Take 1 tablet by mouth every 8 (eight) hours as needed.      insulin glargine U-100, Lantus, (LANTUS SOLOSTAR U-100 INSULIN) 100 unit/mL (3 mL) InPn pen Inject 45 Units into the skin every evening. Increase per MD instruction to max daily dose of 70 units 40 mL 3    lancets Misc To check BG 4 times daily, to use with insurance preferred meter 450 each 3    NIFEdipine (PROCARDIA-XL) 90 MG (OSM) 24 hr tablet Take 1 tablet (90 mg total) by mouth once daily. 90 tablet 3    pantoprazole (PROTONIX) 40 MG tablet Take 1 tablet (40 mg total) by mouth once daily. (Patient not taking: Reported on 11/4/2024) 90 tablet 3    pen needle, diabetic (BD ULTRA-FINE SHORT PEN NEEDLE) 31 gauge x 5/16" Ndle USE FOUR TIMES DAILY 400 each 3    tamsulosin (FLOMAX) 0.4 mg Cap Take 1 capsule (0.4 mg total) by mouth every morning. 90 capsule 3    torsemide (DEMADEX) 20 MG Tab Take 1 tablet (20 mg total) by mouth 2 (two) times daily. 180 tablet 3    traZODone (DESYREL) 100 MG tablet Take 100 mg by mouth every evening.         Past Surgical History:   Procedure Laterality Date    AV FISTULA PLACEMENT Left 12/8/2020    Procedure: CREATION, AV FISTULA;  Surgeon: Benji Becerril MD;  Location: [facility] OR 2ND FLR;  Service: Peripheral Vascular;  Laterality: " Left;    AV FISTULA PLACEMENT Right 12/7/2022    Procedure: CREATION, AV FISTULA;  Surgeon: Benji Becerril MD;  Location: [facility] OR 2ND FLR;  Service: Peripheral Vascular;  Laterality: Right;  RUE    CATARACT EXTRACTION W/  INTRAOCULAR LENS IMPLANT Right 04/04/16    Dr Meehan    COLONOSCOPY W/ BIOPSIES      DILATION, AQUEOUS OUTFLOW CANAL, TRANSLUMINAL, WITHOUT DEVICE RETENTION Left 9/21/2023    Procedure: DILATION, AQUEOUS OUTFLOW CANAL, TRANSLUMINAL, WITHOUT DEVICE RETENTION;  Surgeon: Yvonne Nielson MD;  [facility];  Service: Ophthalmology;  Laterality: Left;  omni    ESOPHAGOGASTRODUODENOSCOPY N/A 6/29/2020    Procedure: EGD (ESOPHAGOGASTRODUODENOSCOPY);  Surgeon: Ravi Leone MD;  Location: [facility];  Service: Endoscopy;  Laterality: N/A;    EYE SURGERY      FISTULOGRAM Left 4/16/2021    Procedure: Fistulogram;  Surgeon: Benji Becerril MD;  Location: [facility] CATH LAB;  Service: Peripheral Vascular;  Laterality: Left;    FISTULOGRAM Left 9/28/2022    Procedure: Fistulogram;  Surgeon: Benji Becerril MD;  Location: [facility] CATH LAB;  Service: Cardiology;  Laterality: Left;    FISTULOGRAM, WITH PTA Right 2/3/2023    Procedure: FISTULOGRAM, WITH PTA;  Surgeon: Benji Becerril MD;  Location: [facility] OR 2ND FLR;  Service: Peripheral Vascular;  Laterality: Right;  205.17 mGy  2.3 min    GONIOTOMY Left 8/10/2023    Procedure: GONIOTOMY;  Surgeon: Yvonne Nielson MD;  [facility];  Service: Ophthalmology;  Laterality: Left;  omni    HEMORRHOID SURGERY      Dr. [facility] [place]    INTRAOCULAR PROSTHESES INSERTION Left 8/10/2023    Procedure: INSERTION, IOL PROSTHESIS;  Surgeon: Yvonne Nielson MD;  [facility];  Service: Ophthalmology;  Laterality: Left;    INTRAOCULAR PROSTHESES INSERTION Left 9/21/2023    Procedure: INSERTION, IOL PROSTHESIS;  Surgeon: Yvonne Nielson MD;  [facility];  Service: Ophthalmology;  Laterality: Left;    lateral  internal anal sphincterotomy  12/13/13    Dr. root [facility] [place] [facility]    PERCUTANEOUS TRANSLUMINAL ANGIOPLASTY OF ARTERIOVENOUS FISTULA Left 4/16/2021    Procedure: PTA, AV FISTULA;  Surgeon: Benji Becerril MD;  Location: [facility] CATH LAB;  Service: Peripheral Vascular;  Laterality: Left;    PERCUTANEOUS TRANSLUMINAL ANGIOPLASTY OF ARTERIOVENOUS FISTULA N/A 9/28/2022    Procedure: PTA, AV FISTULA;  Surgeon: Benji Becerril MD;  Location: [facility] CATH LAB;  Service: Cardiology;  Laterality: N/A;    PHACOEMULSIFICATION OF CATARACT Left 8/10/2023    Procedure: PHACOEMULSIFICATION, CATARACT;  Surgeon: Yvonne Nielson MD;  Location: [facility] OR 1ST FLR;  Service: Ophthalmology;  Laterality: Left;    PHACOEMULSIFICATION OF CATARACT Left 9/21/2023    Procedure: PHACOEMULSIFICATION, CATARACT;  Surgeon: Yvonne Nielson MD;  Location: [facility] OR 1ST FLR;  Service: Ophthalmology;  Laterality: Left;    PLACEMENT OF DUAL-LUMEN VASCULAR CATHETER N/A 2/3/2023    Procedure: EXCHANGE, PERMACATH;  Surgeon: Benji Becerril MD;  Location: [facility] OR 2ND FLR;  Service: Peripheral Vascular;  Laterality: N/A;    URETERAL STENT PLACEMENT         Social History:  Tobacco Use: Medium Risk (10/11/2024)    Patient History     Smoking Tobacco Use: Former     Smokeless Tobacco Use: Former     Passive Exposure: Never      Alcohol Use: Not At Risk (9/27/2024)    AUDIT-C     Frequency of Alcohol Consumption: Never     Average Number of Drinks: Patient does not drink     Frequency of Binge Drinking: Never        OBJECTIVE:     Vital Signs Range (Last 24H):         Significant Labs:  Lab Results   Component Value Date    WBC 6.49 09/27/2024    HGB 11.1 (L) 09/27/2024    HCT 33.3 (L) 09/27/2024     09/27/2024    CHOL 205 (H) 08/28/2024    TRIG 118 08/28/2024    HDL 44 08/28/2024    ALT 7 (L) 09/26/2024    AST 8 (L) 09/26/2024     (L) 09/28/2024    K 4.0 09/28/2024    CL 99 09/28/2024    CREATININE 7.8 (H) 09/28/2024    BUN 36 (H) 09/28/2024     CO2 20 (L) 09/28/2024    TSH 0.855 09/26/2024    PSA 0.88 08/28/2024    INR 0.9 09/27/2024    HGBA1C 6.9 (H) 08/28/2024       Diagnostic Studies: No relevant studies.    EKG:   Results for orders placed or performed during the hospital encounter of 09/26/24   Repeat EKG 12-lead    Collection Time: 09/26/24  2:44 PM   Result Value Ref Range    QRS Duration 150 ms    [facility] QTC Calculation 550 ms    Narrative    Test Reason : I49.9,    Vent. Rate : 060 BPM     Atrial Rate : 059 BPM     P-R Int : 000 ms          QRS Dur : 150 ms      QT Int : 550 ms       P-R-T Axes : 060 -05 053 degrees     QTc Int : 550 ms    Sinus rhythm wiht premature atrial and junctional complexes  SA exit block is present  Right bundle branch block  Abnormal ECG  When compared with ECG of 26-SEP-2024 11:19,  No significant change was found  Confirmed by NIKUNJ ANSARI MD ([id number]) on 9/26/2024 3:28:57 PM    Referred By: [email or web address]   SELF           Confirmed By:NIKUNJ ANSARI MD       2D ECHO:  TTE:  Results for orders placed or performed during the hospital encounter of 07/06/22   Echo   Result Value Ref Range    Ascending aorta 3.19 cm    STJ 3.16 cm    AV mean gradient 7 mmHg    Ao peak vipul 1.81 m/s    Ao VTI 35.75 cm    IVS 1.14 (A) 0.6 - 1.1 cm    LA size 3.68 cm    Left Atrium Major Axis 5.19 cm    Left Atrium Minor Axis 5.31 cm    LVIDd 5.06 3.5 - 6.0 cm    LVIDs 3.16 2.1 - 4.0 cm    LVOT diameter 2.21 cm    LVOT peak VTI 22.81 cm    PW 1.07 0.6 - 1.1 cm    MV Peak A Vipul 0.82 m/s    E wave deceleration time 242.43 msec    MV Peak E Vipul 0.60 m/s    PV Peak D Vipul 0.47 m/s    PV Peak S Vipul 0.42 m/s    RA Major Axis 4.61 cm    RA Width 3.71 cm    RVDD 3.67 cm    Sinus 3.00 cm    TAPSE 2.25 cm    TR Max Vipul 1.89 m/s    TDI LATERAL 0.09 m/s    TDI SEPTAL 0.09 m/s    LA WIDTH 3.71 cm    MV stenosis pressure 1/2 time 70.30 ms    LV Diastolic Volume 121.61 mL    LV Systolic Volume 39.81 mL    RV S' 17.07 cm/s    LVOT peak vipul 0.97 m/s    LA Vol (MOD)  "48.88 cm3    MV "A" wave duration 12.84 msec    LV LATERAL E/E' RATIO 6.67 m/s    LV SEPTAL E/E' RATIO 6.67 m/s    FS 38 %    LA Vol 60.92 cm3    LV mass 212.50 g    Left Ventricle Relative Wall Thickness 0.42 cm    AV valve area 2.45 cm2    AV Velocity Ratio 0.54     AV index (prosthetic) 0.64     MV valve area p 1/2 method 3.13 cm2    E/A ratio 0.73     Mean e' 0.09 m/s    Pulm vein S/D ratio 0.89     LVOT area 3.8 cm2    LVOT stroke volume 87.45 cm3    AV peak gradient 13 mmHg    E/E' ratio 6.67 m/s    Triscuspid Valve Regurgitation Peak Gradient 14 mmHg    Right Atrial Pressure (from IVC) 3 mmHg    EF 60 %    TV resting pulmonary artery pressure 17 mmHg    Narrative    · The left ventricle is normal in size with concentric remodeling and   normal systolic function. The estimated ejection fraction is 60%.  · Normal right ventricular size with normal right ventricular systolic   function.  · Normal left ventricular diastolic function.  · Mild aortic regurgitation.  · The estimated PA systolic pressure is 17 mmHg.  · Normal central venous pressure (3 mmHg).          REESE:  No results found. However, due to the size of the patient record, not all encounters were searched. Please check Results Review for a complete set of results.    ASSESSMENT/PLAN:         Pre-op Assessment    I have reviewed the Patient Summary Reports.     I have reviewed the Nursing Notes.    I have reviewed the Medications.     Review of Systems  Anesthesia Hx:  No problems with previous Anesthesia   History of prior surgery of interest to airway management or planning:          Denies Family Hx of Anesthesia complications.    Denies Personal Hx of Anesthesia complications.                    Hematology/Oncology:  Hematology Normal   Oncology Normal                                   EENT/Dental:  EENT/Dental Normal           Cardiovascular:  Exercise tolerance: good   Hypertension           hyperlipidemia   ECG has been reviewed.                 "            Renal/:  Chronic Renal Disease, Dialysis, ESRD                Hepatic/GI:     GERD                Musculoskeletal:  Musculoskeletal Normal                Neurological:  Neurology Normal                                      Endocrine:  Diabetes           Dermatological:  Skin Normal    Psych:  Psychiatric Normal                    Physical Exam  General: Well nourished, Cooperative, Alert and Oriented    Airway:  Mallampati: II   Mouth Opening: Normal  TM Distance: Normal  Tongue: Normal  Neck ROM: Normal ROM    Dental:  Intact        Anesthesia Plan  Type of Anesthesia, risks & benefits discussed:    Anesthesia Type: MAC, Gen Natural Airway, Regional, Gen ETT  Intra-op Monitoring Plan: Standard ASA Monitors  Post Op Pain Control Plan: multimodal analgesia  Informed Consent: Informed consent signed with the Patient and all parties understand the risks and agree with anesthesia plan.  All questions answered.   ASA Score: 3  Day of Surgery Review of History & Physical: H&P Update referred to the surgeon/provider.  Anesthesia Plan Notes: Last HD 9/20/23    Ready For Surgery From Anesthesia Perspective.     .

## 2024-11-06 ENCOUNTER — HOSPITAL ENCOUNTER (OUTPATIENT)
Facility: HOSPITAL | Age: 79
Discharge: HOME | End: 2024-11-06
Attending: SURGERY | Admitting: SURGERY
Payer: MEDICARE

## 2024-11-06 VITALS
WEIGHT: 275 LBS | DIASTOLIC BLOOD PRESSURE: 68 MMHG | HEIGHT: 74 IN | SYSTOLIC BLOOD PRESSURE: 160 MMHG | RESPIRATION RATE: 11 BRPM | OXYGEN SATURATION: 99 % | TEMPERATURE: 97 F | BODY MASS INDEX: 35.29 KG/M2 | HEART RATE: 71 BPM

## 2024-11-06 DIAGNOSIS — Z99.2 ESRD (END STAGE RENAL DISEASE) ON DIALYSIS: Primary | ICD-10-CM

## 2024-11-06 DIAGNOSIS — N18.6 ESRD (END STAGE RENAL DISEASE) ON DIALYSIS: Primary | ICD-10-CM

## 2024-11-06 DIAGNOSIS — Z99.2 HEMODIALYSIS ACCESS, AV GRAFT: ICD-10-CM

## 2024-11-06 LAB
POCT GLUCOSE: 116 MG/DL (ref 70–110)
POCT GLUCOSE: 136 MG/DL (ref 70–110)

## 2024-11-06 PROCEDURE — 25000003 PHARM REV CODE 250: Mod: HCNC

## 2024-11-06 PROCEDURE — 37000008 HC ANESTHESIA 1ST 15 MINUTES: Mod: HCNC | Performed by: SURGERY

## 2024-11-06 PROCEDURE — 36000707: Mod: HCNC | Performed by: SURGERY

## 2024-11-06 PROCEDURE — 82962 GLUCOSE BLOOD TEST: CPT | Mod: HCNC | Performed by: SURGERY

## 2024-11-06 PROCEDURE — 71000044 HC DOSC ROUTINE RECOVERY FIRST HOUR: Mod: HCNC | Performed by: SURGERY

## 2024-11-06 PROCEDURE — 27201423 OPTIME MED/SURG SUP & DEVICES STERILE SUPPLY: Mod: HCNC | Performed by: SURGERY

## 2024-11-06 PROCEDURE — C1768 GRAFT, VASCULAR: HCPCS | Mod: HCNC | Performed by: SURGERY

## 2024-11-06 PROCEDURE — 63600175 PHARM REV CODE 636 W HCPCS: Mod: HCNC

## 2024-11-06 PROCEDURE — 63600175 PHARM REV CODE 636 W HCPCS: Mod: HCNC | Performed by: SURGERY

## 2024-11-06 PROCEDURE — 37000009 HC ANESTHESIA EA ADD 15 MINS: Mod: HCNC | Performed by: SURGERY

## 2024-11-06 PROCEDURE — 71000015 HC POSTOP RECOV 1ST HR: Mod: HCNC | Performed by: SURGERY

## 2024-11-06 PROCEDURE — 36000706: Mod: HCNC | Performed by: SURGERY

## 2024-11-06 DEVICE — GRAFT STRETCH TW RING 4-7 45C: Type: IMPLANTABLE DEVICE | Site: ARM | Status: FUNCTIONAL

## 2024-11-06 RX ORDER — GLUCAGON 1 MG
1 KIT INJECTION
Status: DISCONTINUED | OUTPATIENT
Start: 2024-11-06 | End: 2024-11-06 | Stop reason: HOSPADM

## 2024-11-06 RX ORDER — PROPOFOL 10 MG/ML
VIAL (ML) INTRAVENOUS
Status: DISCONTINUED | OUTPATIENT
Start: 2024-11-06 | End: 2024-11-06

## 2024-11-06 RX ORDER — MUPIROCIN 20 MG/G
OINTMENT TOPICAL
Status: DISCONTINUED | OUTPATIENT
Start: 2024-11-06 | End: 2024-11-06 | Stop reason: HOSPADM

## 2024-11-06 RX ORDER — OXYCODONE HYDROCHLORIDE 5 MG/1
5 TABLET ORAL
Status: CANCELLED | OUTPATIENT
Start: 2024-11-06

## 2024-11-06 RX ORDER — HALOPERIDOL 5 MG/ML
0.5 INJECTION INTRAMUSCULAR EVERY 10 MIN PRN
Status: DISCONTINUED | OUTPATIENT
Start: 2024-11-06 | End: 2024-11-06 | Stop reason: HOSPADM

## 2024-11-06 RX ORDER — OXYCODONE HYDROCHLORIDE 5 MG/1
5 TABLET ORAL EVERY 4 HOURS PRN
Qty: 30 TABLET | Refills: 0 | Status: SHIPPED | OUTPATIENT
Start: 2024-11-06 | End: 2024-11-11

## 2024-11-06 RX ORDER — LIDOCAINE HYDROCHLORIDE 10 MG/ML
1 INJECTION, SOLUTION EPIDURAL; INFILTRATION; INTRACAUDAL; PERINEURAL ONCE
Status: COMPLETED | OUTPATIENT
Start: 2024-11-06 | End: 2024-11-06

## 2024-11-06 RX ORDER — MUPIROCIN 20 MG/G
OINTMENT TOPICAL
OUTPATIENT
Start: 2024-11-06

## 2024-11-06 RX ORDER — HEPARIN SODIUM 1000 [USP'U]/ML
INJECTION, SOLUTION INTRAVENOUS; SUBCUTANEOUS
Status: DISCONTINUED | OUTPATIENT
Start: 2024-11-06 | End: 2024-11-06

## 2024-11-06 RX ORDER — HYDROMORPHONE HYDROCHLORIDE 1 MG/ML
0.2 INJECTION, SOLUTION INTRAMUSCULAR; INTRAVENOUS; SUBCUTANEOUS EVERY 5 MIN PRN
Status: DISCONTINUED | OUTPATIENT
Start: 2024-11-06 | End: 2024-11-06 | Stop reason: HOSPADM

## 2024-11-06 RX ORDER — EPHEDRINE SULFATE 50 MG/ML
INJECTION, SOLUTION INTRAVENOUS
Status: DISCONTINUED | OUTPATIENT
Start: 2024-11-06 | End: 2024-11-06

## 2024-11-06 RX ORDER — PHENYLEPHRINE HYDROCHLORIDE 10 MG/ML
INJECTION INTRAVENOUS
Status: DISCONTINUED | OUTPATIENT
Start: 2024-11-06 | End: 2024-11-06

## 2024-11-06 RX ORDER — CEFAZOLIN 2 G/1
2 INJECTION, POWDER, FOR SOLUTION INTRAMUSCULAR; INTRAVENOUS
OUTPATIENT
Start: 2024-11-06

## 2024-11-06 RX ORDER — SODIUM CHLORIDE 0.9 % (FLUSH) 0.9 %
10 SYRINGE (ML) INJECTION
Status: DISCONTINUED | OUTPATIENT
Start: 2024-11-06 | End: 2024-11-06 | Stop reason: HOSPADM

## 2024-11-06 RX ORDER — PROCHLORPERAZINE EDISYLATE 5 MG/ML
5 INJECTION INTRAMUSCULAR; INTRAVENOUS EVERY 30 MIN PRN
Status: CANCELLED | OUTPATIENT
Start: 2024-11-06

## 2024-11-06 RX ORDER — FENTANYL CITRATE 50 UG/ML
INJECTION, SOLUTION INTRAMUSCULAR; INTRAVENOUS
Status: DISCONTINUED | OUTPATIENT
Start: 2024-11-06 | End: 2024-11-06

## 2024-11-06 RX ORDER — MIDAZOLAM HYDROCHLORIDE 1 MG/ML
INJECTION INTRAMUSCULAR; INTRAVENOUS
Status: DISCONTINUED | OUTPATIENT
Start: 2024-11-06 | End: 2024-11-06

## 2024-11-06 RX ORDER — DEXTROMETHORPHAN HYDROBROMIDE, GUAIFENESIN 5; 100 MG/5ML; MG/5ML
650 LIQUID ORAL EVERY 8 HOURS
COMMUNITY
Start: 2024-11-06

## 2024-11-06 RX ORDER — GLUCAGON 1 MG
1 KIT INJECTION
Status: CANCELLED | OUTPATIENT
Start: 2024-11-06

## 2024-11-06 RX ORDER — DEXMEDETOMIDINE HYDROCHLORIDE 100 UG/ML
INJECTION, SOLUTION INTRAVENOUS
Status: DISCONTINUED | OUTPATIENT
Start: 2024-11-06 | End: 2024-11-06

## 2024-11-06 RX ORDER — HALOPERIDOL 5 MG/ML
0.5 INJECTION INTRAMUSCULAR EVERY 10 MIN PRN
Status: CANCELLED | OUTPATIENT
Start: 2024-11-06

## 2024-11-06 RX ORDER — LIDOCAINE HYDROCHLORIDE 20 MG/ML
INJECTION, SOLUTION EPIDURAL; INFILTRATION; INTRACAUDAL; PERINEURAL
Status: DISCONTINUED | OUTPATIENT
Start: 2024-11-06 | End: 2024-11-06

## 2024-11-06 RX ORDER — HYDROMORPHONE HYDROCHLORIDE 1 MG/ML
0.2 INJECTION, SOLUTION INTRAMUSCULAR; INTRAVENOUS; SUBCUTANEOUS EVERY 5 MIN PRN
Status: CANCELLED | OUTPATIENT
Start: 2024-11-06

## 2024-11-06 RX ORDER — SODIUM CHLORIDE 9 MG/ML
INJECTION, SOLUTION INTRAVENOUS CONTINUOUS
OUTPATIENT
Start: 2024-11-06

## 2024-11-06 RX ORDER — HEPARIN SOD,PORCINE/0.9 % NACL 1000/500ML
INTRAVENOUS SOLUTION INTRAVENOUS
Status: DISCONTINUED | OUTPATIENT
Start: 2024-11-06 | End: 2024-11-06 | Stop reason: HOSPADM

## 2024-11-06 RX ORDER — FENTANYL CITRATE 50 UG/ML
25 INJECTION, SOLUTION INTRAMUSCULAR; INTRAVENOUS EVERY 5 MIN PRN
Status: CANCELLED | OUTPATIENT
Start: 2024-11-06

## 2024-11-06 RX ADMIN — GLYCOPYRROLATE 0.2 MG: 0.2 INJECTION, SOLUTION INTRAMUSCULAR; INTRAVENOUS at 02:11

## 2024-11-06 RX ADMIN — SODIUM CHLORIDE: 0.9 INJECTION, SOLUTION INTRAVENOUS at 02:11

## 2024-11-06 RX ADMIN — DEXMEDETOMIDINE 8 MCG: 100 INJECTION, SOLUTION, CONCENTRATE INTRAVENOUS at 02:11

## 2024-11-06 RX ADMIN — PHENYLEPHRINE HYDROCHLORIDE 100 MCG: 10 INJECTION INTRAVENOUS at 02:11

## 2024-11-06 RX ADMIN — MUPIROCIN: 20 OINTMENT TOPICAL at 10:11

## 2024-11-06 RX ADMIN — HYDROMORPHONE HYDROCHLORIDE 0.2 MG: 1 INJECTION, SOLUTION INTRAMUSCULAR; INTRAVENOUS; SUBCUTANEOUS at 05:11

## 2024-11-06 RX ADMIN — EPHEDRINE SULFATE 5 MG: 50 INJECTION INTRAVENOUS at 03:11

## 2024-11-06 RX ADMIN — PROPOFOL 50 MG: 10 INJECTION, EMULSION INTRAVENOUS at 02:11

## 2024-11-06 RX ADMIN — HEPARIN SODIUM 3500 UNITS: 1000 INJECTION, SOLUTION INTRAVENOUS; SUBCUTANEOUS at 02:11

## 2024-11-06 RX ADMIN — LIDOCAINE HYDROCHLORIDE 10 MG: 10 INJECTION, SOLUTION EPIDURAL; INFILTRATION; INTRACAUDAL; PERINEURAL at 10:11

## 2024-11-06 RX ADMIN — MIDAZOLAM HYDROCHLORIDE 1 MG: 2 INJECTION, SOLUTION INTRAMUSCULAR; INTRAVENOUS at 02:11

## 2024-11-06 RX ADMIN — DEXTROSE 3 G: 50 INJECTION, SOLUTION INTRAVENOUS at 02:11

## 2024-11-06 RX ADMIN — PHENYLEPHRINE HYDROCHLORIDE 200 MCG: 10 INJECTION INTRAVENOUS at 03:11

## 2024-11-06 RX ADMIN — LIDOCAINE HYDROCHLORIDE 80 MG: 20 INJECTION, SOLUTION EPIDURAL; INFILTRATION; INTRACAUDAL; PERINEURAL at 02:11

## 2024-11-06 RX ADMIN — PROPOFOL 30 MCG/KG/MIN: 10 INJECTION, EMULSION INTRAVENOUS at 02:11

## 2024-11-06 RX ADMIN — EPHEDRINE SULFATE 5 MG: 50 INJECTION INTRAVENOUS at 02:11

## 2024-11-06 RX ADMIN — FENTANYL CITRATE 25 MCG: 50 INJECTION, SOLUTION INTRAMUSCULAR; INTRAVENOUS at 02:11

## 2024-11-06 NOTE — OP NOTE
VASCULAR SURGERY Operative Report     Date of Operation: 11/6/24     Pre-operative Diagnosis: ESRD     Post-operative Diagnosis: same     Attending Surgeon: GAVINO Borja MD     Resident: ADRYAN Guerrero MD PGY3     Operation/Procedure Performed:  AV graft insertion, upper arm 4-7 taper    Operative findings:   good vessels, soft thrill, 2+ radial after  Graft tunneled LATERAL to his existing thrombosed AVF     Anesthesia: MAC/ regional block     Indications:  ESRD/failed fistula        Procedure in Detail:  After informed consent was obtained the patient was taken to the OR in supine position. Pre-operative antibiotics were administered. Regional block was administered by the anesthesia team. The  left arm was prepped and draped in normal sterile fashion with chloraprep.  A time out was performed to confirm appropriate patient, site, positioning, and laterality.  A 5cm incision was made perpendicular to the antecubital fossa. The incision was deepened with a combination of electrocautery, blunt dissection, and sharp dissection. The brachial artery was dissected anteriorly and controlled proximally and distally with large vessel loops. The artery measured approximately 4mm in diameter. Next a 5cm incision was made in the axilla near the biceps crease and deepened with electrocautery, blunt dissection, and sharp dissection. The cephalic vein was identified and dissected circumferentially. It measured approximately 5mm in size. A tunneler was used to pass a 4-7 graft was passed between the incisions utilizing a counter incision. 3000 units of heparin were then administered intravenously. Proximal and distal clamps were applied to the brachial artery. A 3mm longitudinal arteriotomy was made on the artery. The graft was anastomosed to the artery with 6-0 prolene. Prior to completion the proximal and distal clamps were released from the artery and there was brisk flow with no thrombus. Clamps were reapplied and the lumen  was flushed with heparinized saline and then the anastomosis was completed. The proximal arterial clamp was released followed by the distal clamp. No repair sutures were necessary. The graft was flushed with heparinized saline and clamped with a randi hydrogrip clamp. Next we turned our attention to the venous incision. Clamps were applied to the vein proximally and distally. The graft was beveled appropriately and a 3mm venotomy was made in the vein. The graft was sewn to the vein with running 6-0 prolene suture. Prior to completion, the clamp was released from the proximal graft and there was brisk  flow with no thrombus. The clamps were reapplied and the anastomosis completed. Clamps were released after completion of the anastomosis and there was a palpable thrill over the artery, graft, and vein. No repair sutures were necessary. The radial artery had a palpable 2+ at the wrist. No protamine was administered. Surgicel was placed in the wound to assist with hemostasis. The wound was thoroughly irrigated with saline irrigation and the surgicel removed. The deep dermis was closed with interrupted 3-0 vicryl sutures. 4-0 monocryl was used to close the skin. The incisions were dressed with 4x4 gauze secured in place with burn netting. All sponge needle and instrument counts were correct.     The patient tolerated the procedure well and was transported to the PACU for recovery.     EBL: 15cc        Complications: none       JULIUS Guerrero MD  General Surgery, PGY-3  [phone]

## 2024-11-06 NOTE — OR NURSING
Dexcom noted on operative arm (left) upon entry to operating room. Notified Dr. Borja  and instructed to remove. Spoke with Vanesa, significant other of patient, and informed of removal of Dexcom from left upper arm. Vanesa stated that they had more of them at home and she would replace it when they arrived back at home. Dr. Borja notified.

## 2024-11-06 NOTE — TRANSFER OF CARE
"Anesthesia Transfer of Care Note    Patient: Vinnie Siegel    Procedure(s) Performed: Procedure(s) (LRB):  INSERTION, GRAFT, ARTERIOVENOUS (Left)    Patient location: [facility]    Anesthesia Type: general    Transport from OR: Transported from OR on 6-10 L/min O2 by face mask with adequate spontaneous ventilation    Post pain: adequate analgesia    Post assessment: no apparent anesthetic complications    Post vital signs: stable    Level of consciousness: awake    Nausea/Vomiting: no nausea/vomiting    Complications: none    Transfer of care protocol was followed      Last vitals: Visit Vitals  BP (!) 179/85 (BP Location: Right arm, Patient Position: Lying)   Pulse 69   Temp 36.6 °C (97.9 °F) (Skin)   Resp 20   Ht 6' 2" (1.88 m)   Wt 124.7 kg (275 lb)   SpO2 98%   BMI 35.31 kg/m²     "

## 2024-11-06 NOTE — BRIEF OP NOTE
[facility] - [facility] ([facility])  Brief Operative Note    Surgery Date: 11/6/2024     Surgeons and Role:     * YASIR Borja III, MD - Primary     * Maritza Guerrero MD - Resident - Assisting    Pre-op Diagnosis:  ESRD (end stage renal disease) on dialysis [N18.6, Z99.2]    Post-op Diagnosis:  Post-Op Diagnosis Codes:     * ESRD (end stage renal disease) on dialysis [N18.6, Z99.2]    Procedure(s) (LRB):  INSERTION, GRAFT, ARTERIOVENOUS (Left) upper arm 4-7 taper    Anesthesia: Regional    Operative Findings: good vessels, soft thrill, 2+ radial after  Graft tunneled LATERAL to his existing thrombosed AVF    Estimated Blood Loss: 15cc         Specimens:   Specimen (24h ago, onward)      None              Discharge Note    OUTCOME: successful outpatient procedure     DISPOSITION: home    FINAL DIAGNOSIS:  ESRD    FOLLOWUP: 2 weeks with AVG duplex    DISCHARGE INSTRUCTIONS:  see below

## 2024-11-06 NOTE — DISCHARGE INSTRUCTIONS
Okay to remove outer clear dressing and gauze 48 hours after your surgery.   Okay to shower 48 hours after your surgery and after removing dressing.   Please do not scrub or rub incisions. Pat incision dry.   Do not use lotion on your incision. May cover sterile gauze and tape as needed.     No heavy lifting anything over 15 lbs until your follow up visit with your surgeon.     Take 650mg to 1000mg tylenol every 6 hours.   Take oxycodone as needed for severe pain.     - Take a full shower daily beginning 2 days after the surgery. Allow soap and water to run over your incision. Pad the incision dry afterward  - When resting or sleeping, try to keep your arm elevated to shoulder level on pillows to reduce swelling  - If you notice clear drainage from your incision, you can apply dry gauze daily and secure in place with tape or gentle elastic wrap     Activity:  - Avoid prolonged exertion of the affected arm  - Avoid keeping your arm down below your chest for prolonged periods of time (this could lead to increased swelling)  - No heavy lifting with the affected arm  - Sleep with your arm elevated on pillows at night to reduce swelling  -- No swimming in pools, EatWith, infibond etc. for 6 weeks after your surgery     Diet:  -Resume your pre-operative home diet     Call Vascular Surgery Office at [phone] if you experience:  -Increased redness, warmth, tenderness, or draining pus at your incision(s)

## 2024-11-07 NOTE — ANESTHESIA POSTPROCEDURE EVALUATION
Anesthesia Post Evaluation    Patient: Vinnie Siegel    Procedure(s) Performed: Procedure(s) (LRB):  INSERTION, GRAFT, ARTERIOVENOUS (Left)    Final Anesthesia Type: general      Patient location during evaluation: PACU  Patient participation: Yes- Able to Participate  Level of consciousness: awake and alert  Post-procedure vital signs: reviewed and stable  Pain management: adequate  Airway patency: patent    PONV status at discharge: No PONV  Anesthetic complications: no      Cardiovascular status: blood pressure returned to baseline  Respiratory status: unassisted  Hydration status: euvolemic  Follow-up not needed.              Vitals Value Taken Time   /68 11/06/24 1746   Temp 36.1 °C (97 °F) 11/06/24 1632   Pulse 76 11/06/24 1746   Resp 16 11/06/24 1746   SpO2 99 % 11/06/24 1746   Vitals shown include unfiled device data.      No case tracking events are documented in the log.      Pain/Felicity Score: Pain Rating Prior to Med Admin: 7 (11/6/2024  5:17 PM)  Pain Rating Post Med Admin: 5 (11/6/2024  5:22 PM)  Felicity Score: 9 (11/6/2024  5:00 PM)

## 2024-11-07 NOTE — PLAN OF CARE
Pt is EVON,VSS. Discharge instructions reviewed and pt verbalizes understanding. All questions answered. Follow up in 2 weeks. IV removed. Spouse picked up prescriptions from inpt pharmacy. Pt ready for discharge.

## 2024-11-11 ENCOUNTER — OFFICE VISIT (OUTPATIENT)
Dept: OPHTHALMOLOGY | Facility: CLINIC | Age: 79
End: 2024-11-11
Payer: MEDICARE

## 2024-11-11 DIAGNOSIS — H40.1113 PRIMARY OPEN ANGLE GLAUCOMA (POAG) OF RIGHT EYE, SEVERE STAGE: ICD-10-CM

## 2024-11-11 DIAGNOSIS — H40.1122 PRIMARY OPEN ANGLE GLAUCOMA (POAG) OF LEFT EYE, MODERATE STAGE: ICD-10-CM

## 2024-11-11 DIAGNOSIS — H40.1124 PRIMARY OPEN ANGLE GLAUCOMA (POAG) OF LEFT EYE, INDETERMINATE STAGE: ICD-10-CM

## 2024-11-11 DIAGNOSIS — H40.1113 PRIMARY OPEN ANGLE GLAUCOMA (POAG) OF RIGHT EYE, SEVERE STAGE: Primary | ICD-10-CM

## 2024-11-11 DIAGNOSIS — Z96.1 PSEUDOPHAKIA OF BOTH EYES: ICD-10-CM

## 2024-11-11 PROCEDURE — G2211 COMPLEX E/M VISIT ADD ON: HCPCS | Mod: HCNC,S$GLB,, | Performed by: OPHTHALMOLOGY

## 2024-11-11 PROCEDURE — 99214 OFFICE O/P EST MOD 30 MIN: CPT | Mod: HCNC,S$GLB,, | Performed by: OPHTHALMOLOGY

## 2024-11-11 PROCEDURE — 92133 CPTRZD OPH DX IMG PST SGM ON: CPT | Mod: HCNC,S$GLB,, | Performed by: OPHTHALMOLOGY

## 2024-11-11 PROCEDURE — 1160F RVW MEDS BY RX/DR IN RCRD: CPT | Mod: HCNC,CPTII,S$GLB, | Performed by: OPHTHALMOLOGY

## 2024-11-11 PROCEDURE — 99999 PR PBB SHADOW E&M-EST. PATIENT-LVL III: CPT | Mod: PBBFAC,HCNC,, | Performed by: OPHTHALMOLOGY

## 2024-11-11 PROCEDURE — 2023F DILAT RTA XM W/O RTNOPTHY: CPT | Mod: HCNC,CPTII,S$GLB, | Performed by: OPHTHALMOLOGY

## 2024-11-11 PROCEDURE — 1159F MED LIST DOCD IN RCRD: CPT | Mod: HCNC,CPTII,S$GLB, | Performed by: OPHTHALMOLOGY

## 2024-11-11 RX ORDER — DORZOLAMIDE HYDROCHLORIDE AND TIMOLOL MALEATE 20; 5 MG/ML; MG/ML
1 SOLUTION/ DROPS OPHTHALMIC 2 TIMES DAILY
Qty: 30 ML | Refills: 3 | Status: SHIPPED | OUTPATIENT
Start: 2024-11-11 | End: 2025-11-11

## 2024-11-12 PROBLEM — Z98.49 STATUS POST CATARACT EXTRACTION AND INSERTION OF INTRAOCULAR LENS: Status: RESOLVED | Noted: 2024-07-10 | Resolved: 2024-11-12

## 2024-11-12 PROBLEM — Z96.1 STATUS POST CATARACT EXTRACTION AND INSERTION OF INTRAOCULAR LENS: Status: RESOLVED | Noted: 2024-07-10 | Resolved: 2024-11-12

## 2024-11-12 PROBLEM — Z96.1 PSEUDOPHAKIA OF BOTH EYES: Status: ACTIVE | Noted: 2024-11-12

## 2024-11-13 NOTE — PROGRESS NOTES
Subjective:       Patient ID: Vinnie Siegel is a 79 y.o. male.    Chief Complaint: Glaucoma    HPI    DLS: 07/08/2024  S/P phaco w/IOL OD- 9/21/2023   S/P phaco w/IOL OS- 8/10/2023   POAG Severe OD Indeterminate Stage OS     Meds:   Dorzolamide/Timolol BID OU     Last edited by Shauna Whittaker MA on 11/11/2024  9:25 AM.               Assessment & Plan   Primary open angle glaucoma (POAG) of right eye, severe stage  -     Posterior Segment OCT Optic Nerve- Both eyes    Primary open angle glaucoma (POAG) of left eye, moderate stage  -     Posterior Segment OCT Optic Nerve- Both eyes    Pseudophakia of both eyes       Dr Nielson    [facility] department    Dialysis    POAG severe OD /// Moderate OS  Fhx ()  Tm 23 // 28    CCT  564 // 566    Target mid teens --> achieved & discussed    Both eyes --> tolerating well & good adherence --> CSM  Dorzolamide-Timolol BID    Latanoprost  --> intolerant      PC IOL OU Yvonne Nisreen  Quiet  Observe  Complex CE/IOL OS (white mature cataract, malyugan ring, trypan blue)  IOL  DIBOO +21.00 target -0.08  C/b post op IRITIS -- resolved      Dry Eye Syndrome: discussed use of warm compresses, preserved & non-preserved artificial tears, gel and PM ointment options.  Also discussed options utilizing medications.    NIDDM  No BDR / CSME  Control --> discussed    Plan  RTC 4 months IOP & HVF Faster  & adherence  RTC sooner prn with good understanding

## 2024-11-18 ENCOUNTER — TELEPHONE (OUTPATIENT)
Dept: ENDOCRINOLOGY | Facility: CLINIC | Age: 79
End: 2024-11-18
Payer: MEDICARE

## 2024-11-21 ENCOUNTER — TELEPHONE (OUTPATIENT)
Dept: VASCULAR SURGERY | Facility: CLINIC | Age: 79
End: 2024-11-21
Payer: MEDICARE

## 2024-11-21 NOTE — TELEPHONE ENCOUNTER
Contacted pt to reschedule appts with Dr. Borja and with vascular surgery to later date due to change in surgeon's schedule. Appointments rescheduled, pt verified. Appointment letter placed in mail.

## 2024-12-02 NOTE — PROGRESS NOTES
Subjective:      Patient ID: Vinnie Siegel is a 79 y.o. male.    Chief Complaint:  No chief complaint on file.    History of Present Illness  Vinnie Siegel is here for follow up of DM.  Previously seen by me 8/2024.    With regards to Diabetes:    Diagnosed: ~2018  DE: 4/2024  Known complications:  DKA Denies  RN Denies  Eye Exam: 3/2024  PN Denies  Podiatry: None  Nephropathy +  HD T/Th/Sa  CAD +  Denies history of pancreatitis & personal/family history of medullary thyroid cancer.     Diet/Exercise:  Eats 2 meals a day. Skips B or L.   Snacks : Yes.   Drinks : sprite, root beer, water.   Exercise : None.   Recent illness, injury, steroids: Denies     Current Regimen:  Lantus 45 units nightly  Humalog 17 units before meals   Okay to take Humalog for large snack replacing L.    A friend administers his insulin - no specific reason why - patient can administer it  Occasionally missing lunch dose.    Applied for [facility] PAP - pending - he does not want to proceed with this.     Other medications tried:  Metformin - stopped for CKD   Trulicity - stopped due to cost , denied having any side effects  Ozempic 1 mg weekly - [place] - off since 2023    Glucose Monitor: Dexcom G7 -    6 times a day testing  Log reviewed: sensor downloaded and reviewed.    Hypoglycemia:  Occasionally at night - not long after D - has not confirmed with SMBG check    Knows how to correct with 15 grams of carbs- juice, coke, or a peppermint.       Diabetes Management Status    Hemoglobin A1C   Date Value Ref Range Status   08/28/2024 6.9 (H) 4.0 - 5.6 % Final     Comment:     ADA Screening Guidelines:  5.7-6.4%  Consistent with prediabetes  >or=6.5%  Consistent with diabetes    High levels of fetal hemoglobin interfere with the HbA1C  assay. Heterozygous hemoglobin variants (HbS, HgC, etc)do  not significantly interfere with this assay.   However, presence of multiple variants may affect accuracy.    "  11/01/2023 10.9 (H) 4.0 - 5.6 % Final     Comment:     ADA Screening Guidelines:  5.7-6.4%  Consistent with prediabetes  >or=6.5%  Consistent with diabetes    High levels of fetal hemoglobin interfere with the HbA1C  assay. Heterozygous hemoglobin variants (HbS, HgC, etc)do  not significantly interfere with this assay.   However, presence of multiple variants may affect accuracy.     07/03/2023 8.9 (H) 4.0 - 5.6 % Final     Comment:     ADA Screening Guidelines:  5.7-6.4%  Consistent with prediabetes  >or=6.5%  Consistent with diabetes    High levels of fetal hemoglobin interfere with the HbA1C  assay. Heterozygous hemoglobin variants (HbS, HgC, etc)do  not significantly interfere with this assay.   However, presence of multiple variants may affect accuracy.         Statin: Taking  ACE/ARB: Not taking  Screening or Prevention Patient's value Goal Complete/Controlled?   HgA1C Testing and Control   Lab Results   Component Value Date    HGBA1C 6.9 (H) 08/28/2024      Annually/Less than 8% No   Lipid profile : 08/28/2024 Annually Yes   LDL control Lab Results   Component Value Date    LDLCALC 137.4 08/28/2024    Annually/Less than 100 mg/dl  Yes   Nephropathy screening Lab Results   Component Value Date    LABMICR 155.0 06/18/2019     Lab Results   Component Value Date    PROTEINUA 2+ (A) 11/23/2022    Annually No   Blood pressure BP Readings from Last 1 Encounters:   12/04/24 120/60    Less than 140/90 No   Dilated retinal exam : 11/11/2024 Annually Yes   Foot exam   Most Recent Foot Exam Date: Not Found Annually Yes       Review of Systems  As above     Objective:   Physical Exam  Injection sites are without edema or erythema. No lipo hypertropthy or atrophy.    Visit Vitals  /60 (BP Location: Right arm, Patient Position: Sitting)   Pulse 108   Resp 18   Ht 6' 2" (1.88 m)   Wt 128.7 kg (283 lb 11.7 oz)   SpO2 (!) 93%   BMI 36.43 kg/m²       Body mass index is 36.43 kg/m².    Lab Review:   Lab Results " "  Component Value Date    HGBA1C 6.9 (H) 08/28/2024    HGBA1C 10.9 (H) 11/01/2023    HGBA1C 8.9 (H) 07/03/2023       Lab Results   Component Value Date    CHOL 205 (H) 08/28/2024    HDL 44 08/28/2024    LDLCALC 137.4 08/28/2024    TRIG 118 08/28/2024    CHOLHDL 21.5 08/28/2024     Lab Results   Component Value Date     (L) 09/28/2024    K 4.0 09/28/2024    CL 99 09/28/2024    CO2 20 (L) 09/28/2024     (H) 09/28/2024    BUN 36 (H) 09/28/2024    CREATININE 7.8 (H) 09/28/2024    CALCIUM 8.3 (L) 09/28/2024    PROT 6.7 09/26/2024    ALBUMIN 3.0 (L) 09/28/2024    BILITOT 0.6 09/26/2024    ALKPHOS 59 09/26/2024    AST 8 (L) 09/26/2024    ALT 7 (L) 09/26/2024    ANIONGAP 16 09/28/2024    ESTGFRAFRICA 8.1 (A) 09/21/2021    EGFRNONAA 7.0 (A) 09/21/2021    TSH 0.855 09/26/2024     Vit D, 25-Hydroxy   Date Value Ref Range Status   09/21/2021 17 (L) 30 - 96 ng/mL Final     Comment:     Vitamin D deficiency.........<10 ng/mL                              Vitamin D insufficiency......10-29 ng/mL       Vitamin D sufficiency........> or equal to 30 ng/mL  Vitamin D toxicity............>100 ng/mL       Assessment and Plan     1. Type 2 diabetes mellitus with chronic kidney disease on chronic dialysis, with long-term current use of insulin  HUMALOG KWIKPEN INSULIN 100 unit/mL pen    insulin glargine U-100, Lantus, (LANTUS SOLOSTAR U-100 INSULIN) 100 unit/mL (3 mL) InPn pen    pen needle, diabetic (BD ULTRA-FINE SHORT PEN NEEDLE) 31 gauge x 5/16" Ndle    Hemoglobin A1C      2. Type 2 diabetes mellitus with hyperglycemia, without long-term current use of insulin  HUMALOG KWIKPEN INSULIN 100 unit/mL pen    insulin glargine U-100, Lantus, (LANTUS SOLOSTAR U-100 INSULIN) 100 unit/mL (3 mL) InPn pen    pen needle, diabetic (BD ULTRA-FINE SHORT PEN NEEDLE) 31 gauge x 5/16" Ndle      3. ESRD (end stage renal disease) on dialysis            Type 2 diabetes mellitus with chronic kidney disease on chronic dialysis, with long-term " current use of insulin  -- Labs in February with PCP.  -- A1c goal <8%.  -- Medications discussed:  MFM - ESRD  GLP1-DPP4 - costly  VEGA   SGLT2 - ESRD  Insulin   -- Reviewed logs/CGM:  Glucose improving!  Hypoglycemia in the evening.   Encouraged to confirm hypoglycemia with SMBG check.   -- Medication Changes:   Lantus 40 units nightly  Humalog 17-17-14 units before meals   Okay to take Humalog for large snack replacing L.  -- Reviewed goals of therapy are to get the best control we can without hypoglycemia.  -- Reviewed patient's current insulin regimen. Clarified proper insulin dose and timing in relation to meals, etc. Insulin injection sites and proper rotation instructed.    -- Advised frequent self blood glucose monitoring.  Patient encouraged to document glucose results and bring them to every clinic visit.  -- Hypoglycemia precautions discussed. Instructed on precautions before driving.    -- Call for Bg repeatedly < 90 or > 180.   -- Close adherence to lifestyle changes recommended.   -- Periodic follow ups for eye evaluations, foot care and dental care suggested.    ESRD (end stage renal disease) on dialysis  -- Continue following with Nephrology.      Follow up in about 4 months (around 4/4/2025).      Visit today included increased complexity associated with the care of the problems addressed and managing the longitudinal care of the patient due to the serious and/or complex managed problems.

## 2024-12-04 ENCOUNTER — OFFICE VISIT (OUTPATIENT)
Dept: ENDOCRINOLOGY | Facility: CLINIC | Age: 79
End: 2024-12-04
Payer: MEDICARE

## 2024-12-04 VITALS
WEIGHT: 283.75 LBS | HEART RATE: 108 BPM | HEIGHT: 74 IN | BODY MASS INDEX: 36.42 KG/M2 | RESPIRATION RATE: 18 BRPM | OXYGEN SATURATION: 93 % | SYSTOLIC BLOOD PRESSURE: 120 MMHG | DIASTOLIC BLOOD PRESSURE: 60 MMHG

## 2024-12-04 DIAGNOSIS — E11.22 TYPE 2 DIABETES MELLITUS WITH CHRONIC KIDNEY DISEASE ON CHRONIC DIALYSIS, WITH LONG-TERM CURRENT USE OF INSULIN: Primary | ICD-10-CM

## 2024-12-04 DIAGNOSIS — Z99.2 TYPE 2 DIABETES MELLITUS WITH CHRONIC KIDNEY DISEASE ON CHRONIC DIALYSIS, WITH LONG-TERM CURRENT USE OF INSULIN: Primary | ICD-10-CM

## 2024-12-04 DIAGNOSIS — Z79.4 TYPE 2 DIABETES MELLITUS WITH CHRONIC KIDNEY DISEASE ON CHRONIC DIALYSIS, WITH LONG-TERM CURRENT USE OF INSULIN: Primary | ICD-10-CM

## 2024-12-04 DIAGNOSIS — Z99.2 ESRD (END STAGE RENAL DISEASE) ON DIALYSIS: ICD-10-CM

## 2024-12-04 DIAGNOSIS — E11.65 TYPE 2 DIABETES MELLITUS WITH HYPERGLYCEMIA, WITHOUT LONG-TERM CURRENT USE OF INSULIN: ICD-10-CM

## 2024-12-04 DIAGNOSIS — N18.6 ESRD (END STAGE RENAL DISEASE) ON DIALYSIS: ICD-10-CM

## 2024-12-04 DIAGNOSIS — N18.6 TYPE 2 DIABETES MELLITUS WITH CHRONIC KIDNEY DISEASE ON CHRONIC DIALYSIS, WITH LONG-TERM CURRENT USE OF INSULIN: Primary | ICD-10-CM

## 2024-12-04 PROCEDURE — 99999 PR PBB SHADOW E&M-EST. PATIENT-LVL IV: CPT | Mod: PBBFAC,HCNC,, | Performed by: NURSE PRACTITIONER

## 2024-12-04 RX ORDER — INSULIN LISPRO 100 [IU]/ML
INJECTION, SOLUTION INTRAVENOUS; SUBCUTANEOUS
Qty: 40 ML | Refills: 3 | Status: SHIPPED | OUTPATIENT
Start: 2024-12-04

## 2024-12-04 RX ORDER — INSULIN GLARGINE 100 [IU]/ML
40 INJECTION, SOLUTION SUBCUTANEOUS NIGHTLY
Qty: 40 ML | Refills: 3 | Status: SHIPPED | OUTPATIENT
Start: 2024-12-04

## 2024-12-04 RX ORDER — PEN NEEDLE, DIABETIC 30 GX3/16"
NEEDLE, DISPOSABLE MISCELLANEOUS
Qty: 400 EACH | Refills: 3 | Status: SHIPPED | OUTPATIENT
Start: 2024-12-04

## 2024-12-04 NOTE — ASSESSMENT & PLAN NOTE
-- Labs in February with PCP.  -- A1c goal <8%.  -- Medications discussed:  MFM - ESRD  GLP1-DPP4 - costly  VEGA   SGLT2 - ESRD  Insulin   -- Reviewed logs/CGM:  Glucose improving!  Hypoglycemia in the evening.   Encouraged to confirm hypoglycemia with SMBG check.   -- Medication Changes:   Lantus 40 units nightly  Humalog 17-17-14 units before meals   Okay to take Humalog for large snack replacing L.  -- Reviewed goals of therapy are to get the best control we can without hypoglycemia.  -- Reviewed patient's current insulin regimen. Clarified proper insulin dose and timing in relation to meals, etc. Insulin injection sites and proper rotation instructed.    -- Advised frequent self blood glucose monitoring.  Patient encouraged to document glucose results and bring them to every clinic visit.  -- Hypoglycemia precautions discussed. Instructed on precautions before driving.    -- Call for Bg repeatedly < 90 or > 180.   -- Close adherence to lifestyle changes recommended.   -- Periodic follow ups for eye evaluations, foot care and dental care suggested.

## 2024-12-06 ENCOUNTER — OFFICE VISIT (OUTPATIENT)
Dept: VASCULAR SURGERY | Facility: CLINIC | Age: 79
End: 2024-12-06
Attending: SURGERY
Payer: MEDICARE

## 2024-12-06 ENCOUNTER — HOSPITAL ENCOUNTER (OUTPATIENT)
Dept: VASCULAR SURGERY | Facility: CLINIC | Age: 79
Discharge: HOME | End: 2024-12-06
Attending: SURGERY
Payer: MEDICARE

## 2024-12-06 VITALS
HEART RATE: 80 BPM | BODY MASS INDEX: 36.22 KG/M2 | HEIGHT: 74 IN | WEIGHT: 282.19 LBS | DIASTOLIC BLOOD PRESSURE: 65 MMHG | TEMPERATURE: 98 F | SYSTOLIC BLOOD PRESSURE: 150 MMHG

## 2024-12-06 DIAGNOSIS — Z99.2 ESRD (END STAGE RENAL DISEASE) ON DIALYSIS: ICD-10-CM

## 2024-12-06 DIAGNOSIS — N18.6 ESRD (END STAGE RENAL DISEASE) ON DIALYSIS: ICD-10-CM

## 2024-12-06 DIAGNOSIS — N18.6 ESRD (END STAGE RENAL DISEASE) ON DIALYSIS: Primary | ICD-10-CM

## 2024-12-06 DIAGNOSIS — Z99.2 ESRD (END STAGE RENAL DISEASE) ON DIALYSIS: Primary | ICD-10-CM

## 2024-12-06 PROCEDURE — 93990 DOPPLER FLOW TESTING: CPT | Mod: HCNC,S$GLB,, | Performed by: SURGERY

## 2024-12-06 PROCEDURE — 99999 PR PBB SHADOW E&M-EST. PATIENT-LVL IV: CPT | Mod: PBBFAC,HCNC,, | Performed by: SURGERY

## 2024-12-06 NOTE — PROGRESS NOTES
VASCULAR SURGERY SERVICE    CHIEF COMPLAINT:  End-stage renal disease follow-up    HISTORY OF PRESENT ILLNESS: Vinnie Siegel is a 79 y.o. 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  In the past he had also had a left brachiocephalic fistula.  He is here for new access.  He is right-handed.  He has no history of AICD or pacemaker placement    He is on no antiplatelet or anticoagulation therapy    12/06/2024:  This is initial postoperative visit after left upper arm AV graft placement 11/06/2024.  He denies hand pain weakness or numbness.  He continues to use his PermCath    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     Hyperlipidemia     Hypertension     Kidney stone     Seasonal allergies 06/24/2014       Past Surgical History:   Procedure Laterality Date    AV FISTULA PLACEMENT Left 12/8/2020    Procedure: CREATION, AV FISTULA;  Surgeon: Benji Becerril MD;  Location: [facility] OR [address] [place] [address];  Service: Peripheral Vascular;  Laterality: Left;    AV FISTULA PLACEMENT Right 12/7/2022    Procedure: CREATION, AV FISTULA;  Surgeon: Benji Becerril MD;  Location: [facility] OR [address] [place] [address];  Service: Peripheral Vascular;  Laterality: Right;  RUE    CATARACT EXTRACTION W/  INTRAOCULAR LENS IMPLANT Right 04/04/16    Dr Meehan    COLONOSCOPY W/ BIOPSIES      DILATION, AQUEOUS OUTFLOW CANAL, TRANSLUMINAL, WITHOUT DEVICE RETENTION Left 9/21/2023    Procedure: DILATION, AQUEOUS OUTFLOW CANAL, TRANSLUMINAL, WITHOUT DEVICE RETENTION;  Surgeon: Yvonne Nielson MD;  Location: [facility] OR [address] [place] [address];  Service: Ophthalmology;  Laterality: Left;  omni    ESOPHAGOGASTRODUODENOSCOPY N/A 6/29/2020    Procedure: EGD  (ESOPHAGOGASTRODUODENOSCOPY);  Surgeon: Ravi Leone MD;  Location: [facility];  Service: Endoscopy;  Laterality: N/A;    EYE SURGERY      FISTULOGRAM Left 4/16/2021    Procedure: Fistulogram;  Surgeon: Benji Becerril MD;  Location: [facility] CATH LAB;  Service: Peripheral Vascular;  Laterality: Left;    FISTULOGRAM Left 9/28/2022    Procedure: Fistulogram;  Surgeon: Benji Becerril MD;  Location: [facility] CATH LAB;  Service: Cardiology;  Laterality: Left;    FISTULOGRAM, WITH PTA Right 2/3/2023    Procedure: FISTULOGRAM, WITH PTA;  Surgeon: Benji Becerril MD;  Location: [facility] OR 2ND FLR;  Service: Peripheral Vascular;  Laterality: Right;  205.17 mGy  2.3 min    GONIOTOMY Left 8/10/2023    Procedure: GONIOTOMY;  Surgeon: Yvonne Nielson MD;  [facility];  Service: Ophthalmology;  Laterality: Left;  omni    HEMORRHOID SURGERY      Dr. Root ADRYAN    INTRAOCULAR PROSTHESES INSERTION Left 8/10/2023    Procedure: INSERTION, IOL PROSTHESIS;  Surgeon: Yvonne Nielson MD;  Location: [facility] OR 1ST FLR;  Service: Ophthalmology;  Laterality: Left;    INTRAOCULAR PROSTHESES INSERTION Left 9/21/2023    Procedure: INSERTION, IOL PROSTHESIS;  Surgeon: Yvonne Nielson MD;  Location: [facility] OR 1ST FLR;  Service: Ophthalmology;  Laterality: Left;    lateral internal anal sphincterotomy  12/13/13    Dr. root ADRYAN    PERCUTANEOUS TRANSLUMINAL ANGIOPLASTY OF ARTERIOVENOUS FISTULA Left 4/16/2021    Procedure: PTA, AV FISTULA;  Surgeon: Benji Becerril MD;  Location: [facility] CATH LAB;  Service: Peripheral Vascular;  Laterality: Left;    PERCUTANEOUS TRANSLUMINAL ANGIOPLASTY OF ARTERIOVENOUS FISTULA N/A 9/28/2022    Procedure: PTA, AV FISTULA;  Surgeon: Benji Becerril MD;  Location: [facility] CATH LAB;  Service: Cardiology;  Laterality: N/A;    PHACOEMULSIFICATION OF CATARACT Left 8/10/2023    Procedure: PHACOEMULSIFICATION, CATARACT;  Surgeon: Yvonne Nielson MD;  Location: [facility] OR 1ST FLR;  Service:  Ophthalmology;  Laterality: Left;    PHACOEMULSIFICATION OF CATARACT Left 2023    Procedure: PHACOEMULSIFICATION, CATARACT;  Surgeon: Yvonne Nielson MD;  Location: [facility] OR 1ST FLR;  Service: Ophthalmology;  Laterality: Left;    PLACEMENT OF ARTERIOVENOUS GRAFT Left 2024    Procedure: INSERTION, GRAFT, ARTERIOVENOUS;  Surgeon: YASIR Borja III, MD;  Location: [facility] OR 2ND FLR;  Service: Vascular;  Laterality: Left;    PLACEMENT OF DUAL-LUMEN VASCULAR CATHETER N/A 2/3/2023    Procedure: EXCHANGE, PERMACATH;  Surgeon: Benji Becerril MD;  Location: [facility] OR 2ND FLR;  Service: Peripheral Vascular;  Laterality: N/A;    URETERAL STENT PLACEMENT           Current Outpatient Medications:     acetaminophen (TYLENOL) 650 MG TbSR, Take 1 tablet (650 mg total) by mouth every 8 (eight) hours., Disp: , Rfl:     atorvastatin (LIPITOR) 80 MG tablet, Take 1 tablet (80 mg total) by mouth once daily., Disp: 90 tablet, Rfl: 3    blood sugar diagnostic Strp, To check BG 4 times daily, to use with insurance preferred meter, Disp: 450 each, Rfl: 3    blood-glucose meter kit, To check BG 4 times daily, to use with insurance preferred meter, Disp: 1 each, Rfl: 0    calcium acetate,phosphat bind, (PHOSLO) 667 mg capsule, SMARTSI Capsule(s) By Mouth, Disp: , Rfl:     dorzolamide-timolol 2-0.5% (COSOPT) 22.3-6.8 mg/mL ophthalmic solution, Place 1 drop into both eyes 2 (two) times daily., Disp: 30 mL, Rfl: 3    ergocalciferol (ERGOCALCIFEROL) 50,000 unit Cap, Take 1 capsule (50,000 Units total) by mouth every 7 days., Disp: 12 capsule, Rfl: 0    ezetimibe (ZETIA) 10 mg tablet, Take 1 tablet (10 mg total) by mouth once daily. One a day or as directed, Disp: 30 tablet, Rfl: 11    ferrous sulfate (FEOSOL) 325 mg (65 mg iron) Tab tablet, 3 TABS A DAY., Disp: 90 tablet, Rfl: 1    fluticasone propionate (FLONASE) 50 mcg/actuation nasal spray, 2 sprays (100 mcg total) by Each Nostril route nightly as needed for Rhinitis., Disp:  "48 g, Rfl: 11    HUMALOG KWIKPEN INSULIN 100 unit/mL pen, Inject 17 Units into the skin before breakfast AND 17 Units daily with lunch AND 14 Units daily with dinner or evening meal., Disp: 40 mL, Rfl: 3    hydrALAZINE (APRESOLINE) 100 MG tablet, Take 1 tablet (100 mg total) by mouth 3 (three) times daily., Disp: 270 tablet, Rfl: 3    HYDROcodone-acetaminophen (NORCO) 7.5-325 mg per tablet, Take 1 tablet by mouth every 8 (eight) hours as needed., Disp: , Rfl:     insulin glargine U-100, Lantus, (LANTUS SOLOSTAR U-100 INSULIN) 100 unit/mL (3 mL) InPn pen, Inject 40 Units into the skin every evening. Increase per MD instruction to max daily dose of 70 units, Disp: 40 mL, Rfl: 3    lancets Misc, To check BG 4 times daily, to use with insurance preferred meter, Disp: 450 each, Rfl: 3    NIFEdipine (PROCARDIA-XL) 90 MG (OSM) 24 hr tablet, Take 1 tablet (90 mg total) by mouth once daily., Disp: 90 tablet, Rfl: 3    pantoprazole (PROTONIX) 40 MG tablet, Take 1 tablet (40 mg total) by mouth once daily., Disp: 90 tablet, Rfl: 3    pen needle, diabetic (BD ULTRA-FINE SHORT PEN NEEDLE) 31 gauge x 5/16" Ndle, USE FOUR TIMES DAILY, Disp: 400 each, Rfl: 3    tamsulosin (FLOMAX) 0.4 mg Cap, Take 1 capsule (0.4 mg total) by mouth every morning., Disp: 90 capsule, Rfl: 3    torsemide (DEMADEX) 20 MG Tab, Take 1 tablet (20 mg total) by mouth 2 (two) times daily., Disp: 180 tablet, Rfl: 3    traZODone (DESYREL) 100 MG tablet, Take 100 mg by mouth every evening., Disp: , Rfl:     Review of patient's allergies indicates:  No Known Allergies    Family History   Problem Relation Name Age of Onset    Diabetes Brother Cassius         type 1    Hypertension Brother Cassius     Stroke Brother Cassius        Social History     Tobacco Use    Smoking status: Former     Current packs/day: 0.00     Average packs/day: 0.5 packs/day for 45.0 years (22.5 ttl pk-yrs)     Types: Cigarettes     Start date: 6/27/1975     Quit date: 6/27/2020    " " Years since quittin.4     Passive exposure: Never    Smokeless tobacco: Former   Substance Use Topics    Alcohol use: Not Currently     Comment: rarely    Drug use: No     PHYSICAL EXAM:   BP (!) 150/65 (BP Location: Right arm, Patient Position: Sitting)   Pulse 80   Temp 98.4 °F (36.9 °C) (Oral)   Ht 6' 2" (1.88 m)   Wt 128 kg (282 lb 3 oz)   BMI 36.23 kg/m²   Constitutional:  Alert,   Well-appearing  In no distress.   Neurological: Normal speech  no focal findings  CN II - XII grossly intact.    Psychiatric: Mood and affect appropriate and symmetric.   HEENT: Normocephalic / atraumatic  PERRLA  Midline trachea  No scars across the neck   Cardiac: Regular rate and rhythm.   Pulmonary: Normal pulmonary effort.   Abdomen: Soft, not distended.     Skin: Warm and well perfused.    Vascular:  Strong 2+ radial and brachial pulses bilaterally.  Stable from prior   Extremities/  Musculoskeletal: Left arm demonstrates well-healed upper arm incisions.  There is a soft thrill without pulsatility in the prosthetic graft which tunneled lateral to the existing thrombosed fistula     IMAGING:  Duplex of the left arm graft shows to be patent without stenosis      IMPRESSION:  Well-functioning new left upper arm AV graft placed 1 month ago    PLAN:  May begin cannulation immediately  Follow up in 2 weeks for PermCath removal     GLADIS Borja III, MD, FACS  Professor and Chief, Vascular and Endovascular Surgery        "

## 2024-12-09 NOTE — PROGRESS NOTES
Subjective:   Patient ID:  Vinnie Siegel is a 79 y.o. male who presents for follow-up of CVD    HPI: The patient is here for CAD risk factors/DCM.   The patient has no chest pain,TIA, palpitations, syncope or pre-syncope.Not much ex as SOB with minimal. Smoking is none 4 years      Review of Systems   Constitutional: Negative for chills, decreased appetite, diaphoresis, fever, malaise/fatigue, night sweats, weight gain and weight loss.   HENT:  Negative for congestion, hoarse voice, nosebleeds, sore throat and tinnitus.    Eyes:  Negative for blurred vision, double vision, vision loss in left eye, vision loss in right eye, visual disturbance and visual halos.   Cardiovascular:  Positive for dyspnea on exertion. Negative for chest pain, claudication, cyanosis, irregular heartbeat, leg swelling, near-syncope, orthopnea, palpitations, paroxysmal nocturnal dyspnea and syncope.   Respiratory:  Positive for shortness of breath. Negative for cough, hemoptysis, sleep disturbances due to breathing, snoring, sputum production and wheezing.    Endocrine: Negative for cold intolerance, heat intolerance, polydipsia, polyphagia and polyuria.   Hematologic/Lymphatic: Negative for adenopathy and bleeding problem. Does not bruise/bleed easily.   Skin:  Negative for color change, dry skin, flushing, itching, nail changes, poor wound healing, rash, skin cancer, suspicious lesions and unusual hair distribution.   Musculoskeletal:  Positive for arthritis. Negative for back pain, falls, gout, joint pain, joint swelling, muscle cramps, muscle weakness, myalgias and stiffness.   Gastrointestinal:  Negative for abdominal pain, anorexia, change in bowel habit, constipation, diarrhea, dysphagia, heartburn, hematemesis, hematochezia, melena and vomiting.   Genitourinary:  Negative for decreased libido, dysuria, hematuria, hesitancy and urgency.   Neurological:  Negative for excessive daytime sleepiness, dizziness, focal weakness, headaches,  "light-headedness, loss of balance, numbness, paresthesias, seizures, sensory change, tremors, vertigo and weakness.   Psychiatric/Behavioral:  Negative for altered mental status, depression, hallucinations, memory loss, substance abuse and suicidal ideas. The patient does not have insomnia and is not nervous/anxious.    Allergic/Immunologic: Negative for environmental allergies and hives.       Objective: /60   Pulse 82   Ht 6' 2" (1.88 m)   Wt 128.3 kg (282 lb 13.6 oz)   SpO2 98%   BMI 36.32 kg/m²      Physical Exam  Constitutional:       General: He is not in acute distress.     Appearance: He is well-developed. He is not diaphoretic.   HENT:      Head: Normocephalic.   Eyes:      Pupils: Pupils are equal, round, and reactive to light.   Neck:      Thyroid: No thyromegaly.   Cardiovascular:      Rate and Rhythm: Normal rate and regular rhythm.      Pulses: Intact distal pulses.           Carotid pulses are 3+ on the right side and 3+ on the left side.       Radial pulses are 3+ on the right side and 3+ on the left side.        Femoral pulses are 3+ on the right side and 3+ on the left side.       Popliteal pulses are 3+ on the right side and 3+ on the left side.        Dorsalis pedis pulses are 3+ on the right side and 3+ on the left side.        Posterior tibial pulses are 3+ on the right side and 3+ on the left side.      Heart sounds: Normal heart sounds. No murmur heard.     No friction rub. No gallop.   Pulmonary:      Effort: Pulmonary effort is normal. No respiratory distress.      Breath sounds: Normal breath sounds. No wheezing or rales.   Chest:      Chest wall: No tenderness.   Abdominal:      General: There is no distension.      Palpations: Abdomen is soft. There is no mass.      Tenderness: There is no abdominal tenderness.   Musculoskeletal:         General: Normal range of motion.      Cervical back: Normal range of motion.   Lymphadenopathy:      Cervical: No cervical adenopathy. "   Skin:     General: Skin is warm.      Nails: There is no clubbing.   Neurological:      Mental Status: He is alert and oriented to person, place, and time.   Psychiatric:         Speech: Speech normal.         Behavior: Behavior normal.         Thought Content: Thought content normal.         Judgment: Judgment normal.     2021 CFD 60%; Glu and B/C high labs;     Assessment:     1. Dilated cardiomyopathy    2. Essential hypertension    3. Hyperlipidemia, unspecified hyperlipidemia type    4. Tobacco abuse    5. RBBB    6. Obesity (BMI 30-39.9)    7. Stage 5 chronic kidney disease not on chronic dialysis    8. Type 2 diabetes mellitus with chronic kidney disease on chronic dialysis, with long-term current use of insulin    9. Hypertension associated with type 2 diabetes mellitus    10. Gastrointestinal hemorrhage, unspecified gastrointestinal hemorrhage type    11. Type 2 diabetes mellitus with hyperglycemia, without long-term current use of insulin    12. Dyslipidemia    13. Diastolic dysfunction    14. Dysrhythmia        Plan:   Discussed diet , achieving and maintaining ideal body weight, and exercise.   We reviewed meds in detail.  Reassured-Discussed goals, options, plan.  Omega-3 > 800 mg/d combined EPA/DHA.  Goal BP< 130/80.  Goal LDL < 100.  Change Atorva to Rosuva  Add Carvedilol 12.5 but start just half twice    Vinnie was seen today for hypertension.    Diagnoses and all orders for this visit:    Dilated cardiomyopathy  -     Lipid Panel; Future  -     Comprehensive Metabolic Panel; Future  -     TSH; Future  -     carvediloL (COREG) 12.5 MG tablet; Take 1 tablet (12.5 mg total) by mouth 2 (two) times daily. .5-1 twice daily or as directed  -     BNP; Future  -     Echo; Future    Essential hypertension  -     Comprehensive Metabolic Panel; Future  -     TSH; Future  -     carvediloL (COREG) 12.5 MG tablet; Take 1 tablet (12.5 mg total) by mouth 2 (two) times daily. .5-1 twice daily or as  directed  -     BNP; Future  -     Echo; Future    Hyperlipidemia, unspecified hyperlipidemia type  -     Lipid Panel; Future  -     Comprehensive Metabolic Panel; Future  -     TSH; Future  -     rosuvastatin (CRESTOR) 40 MG Tab; Take 1 tablet (40 mg total) by mouth once daily.  -     BNP; Future  -     Echo; Future    Tobacco abuse    RBBB    Obesity (BMI 30-39.9)    Stage 5 chronic kidney disease not on chronic dialysis    Type 2 diabetes mellitus with chronic kidney disease on chronic dialysis, with long-term current use of insulin  -     Comprehensive Metabolic Panel; Future    Hypertension associated with type 2 diabetes mellitus  -     Comprehensive Metabolic Panel; Future  -     BNP; Future  -     Echo; Future    Gastrointestinal hemorrhage, unspecified gastrointestinal hemorrhage type    Type 2 diabetes mellitus with hyperglycemia, without long-term current use of insulin    Dyslipidemia  -     Comprehensive Metabolic Panel; Future    Diastolic dysfunction  -     Echo; Future    Dysrhythmia  -     Ambulatory referral/consult to Cardiology            Follow up for CFD Hina Santamaria to read soon; labs 4 months.

## 2024-12-10 ENCOUNTER — TELEPHONE (OUTPATIENT)
Dept: CARDIOLOGY | Facility: CLINIC | Age: 79
End: 2024-12-10
Payer: MEDICARE

## 2024-12-11 ENCOUNTER — OFFICE VISIT (OUTPATIENT)
Dept: CARDIOLOGY | Facility: CLINIC | Age: 79
End: 2024-12-11
Payer: MEDICARE

## 2024-12-11 VITALS
HEART RATE: 82 BPM | DIASTOLIC BLOOD PRESSURE: 60 MMHG | OXYGEN SATURATION: 98 % | WEIGHT: 282.88 LBS | BODY MASS INDEX: 36.3 KG/M2 | HEIGHT: 74 IN | SYSTOLIC BLOOD PRESSURE: 138 MMHG

## 2024-12-11 DIAGNOSIS — E66.9 OBESITY (BMI 30-39.9): ICD-10-CM

## 2024-12-11 DIAGNOSIS — I45.10 RBBB: ICD-10-CM

## 2024-12-11 DIAGNOSIS — I51.89 DIASTOLIC DYSFUNCTION: ICD-10-CM

## 2024-12-11 DIAGNOSIS — I49.9 DYSRHYTHMIA: ICD-10-CM

## 2024-12-11 DIAGNOSIS — I10 ESSENTIAL HYPERTENSION: Chronic | ICD-10-CM

## 2024-12-11 DIAGNOSIS — E11.59 HYPERTENSION ASSOCIATED WITH TYPE 2 DIABETES MELLITUS: ICD-10-CM

## 2024-12-11 DIAGNOSIS — N18.6 TYPE 2 DIABETES MELLITUS WITH CHRONIC KIDNEY DISEASE ON CHRONIC DIALYSIS, WITH LONG-TERM CURRENT USE OF INSULIN: ICD-10-CM

## 2024-12-11 DIAGNOSIS — E11.22 TYPE 2 DIABETES MELLITUS WITH CHRONIC KIDNEY DISEASE ON CHRONIC DIALYSIS, WITH LONG-TERM CURRENT USE OF INSULIN: ICD-10-CM

## 2024-12-11 DIAGNOSIS — E78.5 HYPERLIPIDEMIA, UNSPECIFIED HYPERLIPIDEMIA TYPE: Chronic | ICD-10-CM

## 2024-12-11 DIAGNOSIS — Z99.2 TYPE 2 DIABETES MELLITUS WITH CHRONIC KIDNEY DISEASE ON CHRONIC DIALYSIS, WITH LONG-TERM CURRENT USE OF INSULIN: ICD-10-CM

## 2024-12-11 DIAGNOSIS — N18.5 STAGE 5 CHRONIC KIDNEY DISEASE NOT ON CHRONIC DIALYSIS: ICD-10-CM

## 2024-12-11 DIAGNOSIS — I15.2 HYPERTENSION ASSOCIATED WITH TYPE 2 DIABETES MELLITUS: ICD-10-CM

## 2024-12-11 DIAGNOSIS — E11.65 TYPE 2 DIABETES MELLITUS WITH HYPERGLYCEMIA, WITHOUT LONG-TERM CURRENT USE OF INSULIN: ICD-10-CM

## 2024-12-11 DIAGNOSIS — I42.0 DILATED CARDIOMYOPATHY: Primary | Chronic | ICD-10-CM

## 2024-12-11 DIAGNOSIS — Z79.4 TYPE 2 DIABETES MELLITUS WITH CHRONIC KIDNEY DISEASE ON CHRONIC DIALYSIS, WITH LONG-TERM CURRENT USE OF INSULIN: ICD-10-CM

## 2024-12-11 DIAGNOSIS — K92.2 GASTROINTESTINAL HEMORRHAGE, UNSPECIFIED GASTROINTESTINAL HEMORRHAGE TYPE: ICD-10-CM

## 2024-12-11 DIAGNOSIS — E78.5 DYSLIPIDEMIA: ICD-10-CM

## 2024-12-11 DIAGNOSIS — Z72.0 TOBACCO ABUSE: Chronic | ICD-10-CM

## 2024-12-11 PROCEDURE — 1126F AMNT PAIN NOTED NONE PRSNT: CPT | Mod: CPTII,S$GLB,, | Performed by: INTERNAL MEDICINE

## 2024-12-11 PROCEDURE — 3288F FALL RISK ASSESSMENT DOCD: CPT | Mod: CPTII,S$GLB,, | Performed by: INTERNAL MEDICINE

## 2024-12-11 PROCEDURE — 99999 PR PBB SHADOW E&M-EST. PATIENT-LVL V: CPT | Mod: PBBFAC,HCNC,, | Performed by: INTERNAL MEDICINE

## 2024-12-11 PROCEDURE — 3078F DIAST BP <80 MM HG: CPT | Mod: CPTII,S$GLB,, | Performed by: INTERNAL MEDICINE

## 2024-12-11 PROCEDURE — 1101F PT FALLS ASSESS-DOCD LE1/YR: CPT | Mod: CPTII,S$GLB,, | Performed by: INTERNAL MEDICINE

## 2024-12-11 PROCEDURE — 99214 OFFICE O/P EST MOD 30 MIN: CPT | Mod: S$GLB,,, | Performed by: INTERNAL MEDICINE

## 2024-12-11 PROCEDURE — 1159F MED LIST DOCD IN RCRD: CPT | Mod: CPTII,S$GLB,, | Performed by: INTERNAL MEDICINE

## 2024-12-11 PROCEDURE — 3075F SYST BP GE 130 - 139MM HG: CPT | Mod: CPTII,S$GLB,, | Performed by: INTERNAL MEDICINE

## 2024-12-11 RX ORDER — ROSUVASTATIN CALCIUM 40 MG/1
40 TABLET, COATED ORAL DAILY
Qty: 90 TABLET | Refills: 3 | Status: SHIPPED | OUTPATIENT
Start: 2024-12-11

## 2024-12-11 RX ORDER — CARVEDILOL 12.5 MG/1
12.5 TABLET ORAL 2 TIMES DAILY
Qty: 180 TABLET | Refills: 3 | Status: SHIPPED | OUTPATIENT
Start: 2024-12-11

## 2024-12-11 NOTE — PATIENT INSTRUCTIONS
Discussed diet , achieving and maintaining ideal body weight, and exercise.   We reviewed meds in detail.  Reassured-Discussed goals, options, plan.  Omega-3 > 800 mg/d combined EPA/DHA.  Goal BP< 130/80.  Goal LDL < 100.  Discussed diet , achieving and maintaining ideal body weight, and exercise.   We reviewed meds in detail.  Reassured-Discussed goals, options, plan.  Omega-3 > 800 mg/d combined EPA/DHA.  Goal BP< 130/80.  Goal LDL < 100.

## 2024-12-19 DIAGNOSIS — E87.20 ACIDOSIS: ICD-10-CM

## 2024-12-19 DIAGNOSIS — K92.2 GASTROINTESTINAL HEMORRHAGE, UNSPECIFIED GASTROINTESTINAL HEMORRHAGE TYPE: ICD-10-CM

## 2024-12-19 NOTE — TELEPHONE ENCOUNTER
Refill Encounter    PCP Visits: Recent Visits  Date Type Provider Dept   08/28/24 Office Visit Janeth Walter MD [facility]   Showing recent visits within past 360 days and meeting all other requirements  Future Appointments  Date Type Provider Dept   02/26/25 Appointment Janeth Walter MD [facility]   Showing future appointments within next 720 days and meeting all other requirements     Last 3 Blood Pressure:   BP Readings from Last 3 Encounters:   12/11/24 138/60   12/06/24 (!) 150/65   12/04/24 120/60     Preferred Pharmacy:   [id number] - [place] - [address]  AT [facility] [address]  [address] [place] [address]  Phone: [phone] Fax: [phone]    Requested RX:  Requested Prescriptions     Pending Prescriptions Disp Refills    NIFEdipine (ADALAT CC) 90 MG TbSR [Pharmacy Med Name: NIFEDIPINE 90MG ER (CC) TABLETS] 90 tablet 1     Sig: TAKE 1 TABLET BY MOUTH EVERY DAY    tamsulosin (FLOMAX) 0.4 mg Cap [Pharmacy Med Name: TAMSULOSIN 0.4MG CAPSULES] 90 capsule 3     Sig: TAKE 1 CAPSULE(0.4 MG) BY MOUTH EVERY MORNING    pantoprazole (PROTONIX) 40 MG tablet [Pharmacy Med Name: PANTOPRAZOLE 40MG TABLETS] 90 tablet 3     Sig: TAKE 1 TABLET(40 MG) BY MOUTH EVERY DAY      RX Route: Normal

## 2024-12-19 NOTE — TELEPHONE ENCOUNTER
No care due was identified.  Health [facility] Embedded Care Due Messages. Reference number: [id number].   12/19/2024 10:54:54 AM CST

## 2024-12-20 ENCOUNTER — OFFICE VISIT (OUTPATIENT)
Dept: VASCULAR SURGERY | Facility: CLINIC | Age: 79
End: 2024-12-20
Payer: MEDICARE

## 2024-12-20 VITALS
HEIGHT: 74 IN | SYSTOLIC BLOOD PRESSURE: 137 MMHG | TEMPERATURE: 98 F | HEART RATE: 84 BPM | DIASTOLIC BLOOD PRESSURE: 70 MMHG | BODY MASS INDEX: 35.94 KG/M2 | WEIGHT: 280 LBS

## 2024-12-20 DIAGNOSIS — N18.6 ESRD (END STAGE RENAL DISEASE) ON DIALYSIS: Primary | ICD-10-CM

## 2024-12-20 DIAGNOSIS — Z99.2 ESRD (END STAGE RENAL DISEASE) ON DIALYSIS: Primary | ICD-10-CM

## 2024-12-20 PROBLEM — T82.868A AV FISTULA THROMBOSIS, INITIAL ENCOUNTER: Status: RESOLVED | Noted: 2021-04-16 | Resolved: 2024-12-20

## 2024-12-20 PROBLEM — T82.898S: Status: RESOLVED | Noted: 2022-09-24 | Resolved: 2024-12-20

## 2024-12-20 PROCEDURE — 99999 PR PBB SHADOW E&M-EST. PATIENT-LVL IV: CPT | Mod: PBBFAC,,, | Performed by: SURGERY

## 2024-12-20 RX ORDER — NIFEDIPINE 90 MG/1
90 TABLET, EXTENDED RELEASE ORAL
Qty: 90 TABLET | Refills: 1 | Status: SHIPPED | OUTPATIENT
Start: 2024-12-20

## 2024-12-20 RX ORDER — PANTOPRAZOLE SODIUM 40 MG/1
40 TABLET, DELAYED RELEASE ORAL
Qty: 90 TABLET | Refills: 3 | Status: SHIPPED | OUTPATIENT
Start: 2024-12-20

## 2024-12-20 RX ORDER — TAMSULOSIN HYDROCHLORIDE 0.4 MG/1
CAPSULE ORAL
Qty: 90 CAPSULE | Refills: 3 | Status: SHIPPED | OUTPATIENT
Start: 2024-12-20

## 2024-12-20 NOTE — PROGRESS NOTES
VASCULAR SURGERY SERVICE    CHIEF COMPLAINT:  End-stage renal disease follow-up    HISTORY OF PRESENT ILLNESS: Vinnie Siegel is a 79 y.o. 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  In the past he had also had a left brachiocephalic fistula.  He is here for new access.  He is right-handed.  He has no history of AICD or pacemaker placement    He is on no antiplatelet or anticoagulation therapy    12/06/2024:  This is initial postoperative visit after left upper arm AV graft placement 11/06/2024.  He denies hand pain weakness or numbness.  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     Hyperlipidemia     Hypertension     Kidney stone     Seasonal allergies 06/24/2014       Past Surgical History:   Procedure Laterality Date    AV FISTULA PLACEMENT Left 12/8/2020    Procedure: CREATION, AV FISTULA;  Surgeon: Benji Becerril MD;  Location: [facility] OR [address] [place] [address];  Service: Peripheral Vascular;  Laterality: Left;    AV FISTULA PLACEMENT Right 12/7/2022    Procedure: CREATION, AV FISTULA;  Surgeon: Benji Becerril MD;  Location: [facility] OR [address] [place] [address];  Service: Peripheral Vascular;  Laterality: Right;  RUE    CATARACT EXTRACTION W/  INTRAOCULAR LENS IMPLANT Right 04/04/16    Dr Meehan    COLONOSCOPY W/ BIOPSIES      DILATION, AQUEOUS OUTFLOW CANAL, TRANSLUMINAL, WITHOUT DEVICE RETENTION Left 9/21/2023    Procedure: DILATION, AQUEOUS OUTFLOW CANAL, TRANSLUMINAL, WITHOUT DEVICE RETENTION;  Surgeon: Yvonne Nielson MD;  Location: [facility] OR [address] [place] [address];  Service: Ophthalmology;  Laterality: Left;  omni    ESOPHAGOGASTRODUODENOSCOPY N/A  6/29/2020    Procedure: EGD (ESOPHAGOGASTRODUODENOSCOPY);  Surgeon: Ravi Leone MD;  [facility];  Service: Endoscopy;  Laterality: N/A;    EYE SURGERY      FISTULOGRAM Left 4/16/2021    Procedure: Fistulogram;  Surgeon: Benji Becerril MD;  Location: [facility] CATH LAB;  Service: Peripheral Vascular;  Laterality: Left;    FISTULOGRAM Left 9/28/2022    Procedure: Fistulogram;  Surgeon: Benji Becerril MD;  Location: [facility] CATH LAB;  Service: Cardiology;  Laterality: Left;    FISTULOGRAM, WITH PTA Right 2/3/2023    Procedure: FISTULOGRAM, WITH PTA;  Surgeon: Benji Becerril MD;  Location: [facility] 2ND FLR;  Service: Peripheral Vascular;  Laterality: Right;  205.17 mGy  2.3 min    GONIOTOMY Left 8/10/2023    Procedure: GONIOTOMY;  Surgeon: Yvonne Nielson MD;  Location: [facility] OR 1ST FLR;  Service: Ophthalmology;  Laterality: Left;  omni    HEMORRHOID SURGERY      Dr. Root [facility]    INTRAOCULAR PROSTHESES INSERTION Left 8/10/2023    Procedure: INSERTION, IOL PROSTHESIS;  Surgeon: Yvonne Nielson MD;  Location: [facility] OR 1ST FLR;  Service: Ophthalmology;  Laterality: Left;    INTRAOCULAR PROSTHESES INSERTION Left 9/21/2023    Procedure: INSERTION, IOL PROSTHESIS;  Surgeon: Yvonne Nielson MD;  Location: [facility] OR 1ST FLR;  Service: Ophthalmology;  Laterality: Left;    lateral internal anal sphincterotomy  12/13/13    Dr. root [facility]    PERCUTANEOUS TRANSLUMINAL ANGIOPLASTY OF ARTERIOVENOUS FISTULA Left 4/16/2021    Procedure: PTA, AV FISTULA;  Surgeon: Benji Becerril MD;  Location: [facility] CATH LAB;  Service: Peripheral Vascular;  Laterality: Left;    PERCUTANEOUS TRANSLUMINAL ANGIOPLASTY OF ARTERIOVENOUS FISTULA N/A 9/28/2022    Procedure: PTA, AV FISTULA;  Surgeon: Benji Becerril MD;  Location: [facility] CATH LAB;  Service: Cardiology;  Laterality: N/A;    PHACOEMULSIFICATION OF CATARACT Left 8/10/2023    Procedure: PHACOEMULSIFICATION, CATARACT;  Surgeon: Yvonne Nielson MD;  Location:  [facility] OR 1ST FLR;  Service: Ophthalmology;  Laterality: Left;    PHACOEMULSIFICATION OF CATARACT Left 2023    Procedure: PHACOEMULSIFICATION, CATARACT;  Surgeon: Yvonne Nielson MD;  Location: [facility] OR 1ST FLR;  Service: Ophthalmology;  Laterality: Left;    PLACEMENT OF ARTERIOVENOUS GRAFT Left 2024    Procedure: INSERTION, GRAFT, ARTERIOVENOUS;  Surgeon: YASIR Borja III, MD;  Location: [facility] OR 2ND FLR;  Service: Vascular;  Laterality: Left;    PLACEMENT OF DUAL-LUMEN VASCULAR CATHETER N/A 2/3/2023    Procedure: EXCHANGE, PERMACATH;  Surgeon: Benji Becerril MD;  Location: [facility] OR 2ND FLR;  Service: Peripheral Vascular;  Laterality: N/A;    URETERAL STENT PLACEMENT           Current Outpatient Medications:     acetaminophen (TYLENOL) 650 MG TbSR, Take 1 tablet (650 mg total) by mouth every 8 (eight) hours., Disp: , Rfl:     blood sugar diagnostic Strp, To check BG 4 times daily, to use with insurance preferred meter, Disp: 450 each, Rfl: 3    blood-glucose meter kit, To check BG 4 times daily, to use with insurance preferred meter, Disp: 1 each, Rfl: 0    calcium acetate,phosphat bind, (PHOSLO) 667 mg capsule, SMARTSI Capsule(s) By Mouth, Disp: , Rfl:     carvediloL (COREG) 12.5 MG tablet, Take 1 tablet (12.5 mg total) by mouth 2 (two) times daily. .5-1 twice daily or as directed, Disp: 180 tablet, Rfl: 3    dorzolamide-timolol 2-0.5% (COSOPT) 22.3-6.8 mg/mL ophthalmic solution, Place 1 drop into both eyes 2 (two) times daily., Disp: 30 mL, Rfl: 3    ergocalciferol (ERGOCALCIFEROL) 50,000 unit Cap, Take 1 capsule (50,000 Units total) by mouth every 7 days., Disp: 12 capsule, Rfl: 0    ezetimibe (ZETIA) 10 mg tablet, Take 1 tablet (10 mg total) by mouth once daily. One a day or as directed, Disp: 30 tablet, Rfl: 11    ferrous sulfate (FEOSOL) 325 mg (65 mg iron) Tab tablet, 3 TABS A DAY., Disp: 90 tablet, Rfl: 1    fluticasone propionate (FLONASE) 50 mcg/actuation nasal spray, 2 sprays (100 mcg  "total) by Each Nostril route nightly as needed for Rhinitis., Disp: 48 g, Rfl: 11    HUMALOG KWIKPEN INSULIN 100 unit/mL pen, Inject 17 Units into the skin before breakfast AND 17 Units daily with lunch AND 14 Units daily with dinner or evening meal., Disp: 40 mL, Rfl: 3    hydrALAZINE (APRESOLINE) 100 MG tablet, Take 1 tablet (100 mg total) by mouth 3 (three) times daily., Disp: 270 tablet, Rfl: 3    HYDROcodone-acetaminophen (NORCO) 7.5-325 mg per tablet, Take 1 tablet by mouth every 8 (eight) hours as needed., Disp: , Rfl:     insulin glargine U-100, Lantus, (LANTUS SOLOSTAR U-100 INSULIN) 100 unit/mL (3 mL) InPn pen, Inject 40 Units into the skin every evening. Increase per MD instruction to max daily dose of 70 units, Disp: 40 mL, Rfl: 3    lancets Misc, To check BG 4 times daily, to use with insurance preferred meter, Disp: 450 each, Rfl: 3    NIFEdipine (PROCARDIA-XL) 90 MG (OSM) 24 hr tablet, Take 1 tablet (90 mg total) by mouth once daily., Disp: 90 tablet, Rfl: 3    pantoprazole (PROTONIX) 40 MG tablet, Take 1 tablet (40 mg total) by mouth once daily., Disp: 90 tablet, Rfl: 3    pen needle, diabetic (BD ULTRA-FINE SHORT PEN NEEDLE) 31 gauge x 5/16" Ndle, USE FOUR TIMES DAILY, Disp: 400 each, Rfl: 3    rosuvastatin (CRESTOR) 40 MG Tab, Take 1 tablet (40 mg total) by mouth once daily., Disp: 90 tablet, Rfl: 3    tamsulosin (FLOMAX) 0.4 mg Cap, Take 1 capsule (0.4 mg total) by mouth every morning., Disp: 90 capsule, Rfl: 3    torsemide (DEMADEX) 20 MG Tab, Take 1 tablet (20 mg total) by mouth 2 (two) times daily., Disp: 180 tablet, Rfl: 3    traZODone (DESYREL) 100 MG tablet, Take 100 mg by mouth every evening., Disp: , Rfl:     Review of patient's allergies indicates:  No Known Allergies    Family History   Problem Relation Name Age of Onset    Diabetes Brother Cassius         type 1    Hypertension Brother Cassius     Stroke Brother Cassius        Social History     Tobacco Use    Smoking status: " "Former     Current packs/day: 0.00     Average packs/day: 0.5 packs/day for 45.0 years (22.5 ttl pk-yrs)     Types: Cigarettes     Start date: 1975     Quit date: 2020     Years since quittin.4     Passive exposure: Never    Smokeless tobacco: Former   Substance Use Topics    Alcohol use: Not Currently     Comment: rarely    Drug use: No     PHYSICAL EXAM:   /70 (BP Location: Right arm, Patient Position: Sitting)   Pulse 84   Temp 98.3 °F (36.8 °C) (Oral)   Ht 6' 2" (1.88 m)   Wt 127 kg (279 lb 15.8 oz)   BMI 35.95 kg/m²   Constitutional:  Alert,   Well-appearing  In no distress.   Neurological: Normal speech  no focal findings  CN II - XII grossly intact.    Psychiatric: Mood and affect appropriate and symmetric.   HEENT: Normocephalic / atraumatic  PERRLA  Midline trachea  No scars across the neck   Cardiac: Regular rate and rhythm.   Pulmonary: Normal pulmonary effort.   Abdomen: Soft, not distended.     Skin: Warm and well perfused.    Vascular:  Strong 2+ radial and brachial pulses bilaterally.  Stable from prior   Extremities/  Musculoskeletal: Left arm demonstrates well-healed upper arm incisions.  There is a soft thrill without pulsatility in the prosthetic graft which tunneled lateral to the existing thrombosed fistula. STABLE     IMAGIN2024 Duplex of the left arm graft shows to be patent without stenosis      IMPRESSION:  Well-functioning new left upper arm AV graft placed 1 month ago    PLAN:  Remove permacath today  6 month f/u with AVG duplex    PROCEDURE NOTE:   After sterile prep and drape and infiltration with lidocaine, the permacath cuff was freed up using blunt and sharp dissection, the permacath removed, and manual compression used for hemostasis.   No complications     GLADIS Borja III, MD, FACS  Professor and Chief, Vascular and Endovascular Surgery          "

## 2024-12-27 ENCOUNTER — HOSPITAL ENCOUNTER (OUTPATIENT)
Dept: CARDIOLOGY | Facility: HOSPITAL | Age: 79
Discharge: HOME | End: 2024-12-27
Attending: INTERNAL MEDICINE
Payer: MEDICARE

## 2024-12-27 VITALS
HEIGHT: 74 IN | SYSTOLIC BLOOD PRESSURE: 130 MMHG | BODY MASS INDEX: 35.81 KG/M2 | HEART RATE: 63 BPM | DIASTOLIC BLOOD PRESSURE: 62 MMHG | WEIGHT: 279 LBS

## 2024-12-27 DIAGNOSIS — E78.5 HYPERLIPIDEMIA, UNSPECIFIED HYPERLIPIDEMIA TYPE: Chronic | ICD-10-CM

## 2024-12-27 DIAGNOSIS — I10 ESSENTIAL HYPERTENSION: Chronic | ICD-10-CM

## 2024-12-27 DIAGNOSIS — I51.89 DIASTOLIC DYSFUNCTION: ICD-10-CM

## 2024-12-27 DIAGNOSIS — E11.59 HYPERTENSION ASSOCIATED WITH TYPE 2 DIABETES MELLITUS: ICD-10-CM

## 2024-12-27 DIAGNOSIS — I15.2 HYPERTENSION ASSOCIATED WITH TYPE 2 DIABETES MELLITUS: ICD-10-CM

## 2024-12-27 DIAGNOSIS — I42.0 DILATED CARDIOMYOPATHY: Chronic | ICD-10-CM

## 2024-12-27 LAB
ASCENDING AORTA: 3.58 CM
AV AREA BY CONTINUOUS VTI: 4.3 CM2
AV INDEX (PROSTH): 1.08
AV LVOT MEAN GRADIENT: 3 MMHG
AV LVOT PEAK GRADIENT: 5 MMHG
AV MEAN GRADIENT: 3.7 MMHG
AV PEAK GRADIENT: 5.8 MMHG
AV VALVE AREA BY VELOCITY RATIO: 3.8 CM²
AV VALVE AREA: 4.1 CM2
AV VELOCITY RATIO: 1
BSA FOR ECHO PROCEDURE: 2.57 M2
CV ECHO LV RWT: 0.4 CM
DOP CALC AO PEAK VEL: 1.2 M/S
DOP CALC AO VTI: 19.4 CM
DOP CALC LVOT AREA: 3.8 CM2
DOP CALC LVOT DIAMETER: 2.2 CM
DOP CALC LVOT PEAK VEL: 1.2 M/S
DOP CALC LVOT STROKE VOLUME: 79.4 CM3
DOP CALCLVOT PEAK VEL VTI: 20.9 CM
E WAVE DECELERATION TIME: 259.39 MS
E/A RATIO: 0.78
E/E' RATIO: 7.38 M/S
ECHO EF ESTIMATED: 63 %
ECHO LV POSTERIOR WALL: 1 CM (ref 0.6–1.1)
EJECTION FRACTION: 63 %
FRACTIONAL SHORTENING: 34 % (ref 28–44)
INTERVENTRICULAR SEPTUM: 1.2 CM (ref 0.6–1.1)
LA MAJOR: 5.67 CM
LA MINOR: 4.6 CM
LA WIDTH: 3.61 CM
LEFT ATRIUM SIZE: 3.51 CM
LEFT ATRIUM VOLUME INDEX MOD: 19.1 ML/M2
LEFT ATRIUM VOLUME INDEX: 21.9 ML/M2
LEFT ATRIUM VOLUME MOD: 47.7 ML
LEFT ATRIUM VOLUME: 54.71 CM3
LEFT INTERNAL DIMENSION IN SYSTOLE: 3.3 CM (ref 2.1–4)
LEFT VENTRICLE DIASTOLIC VOLUME INDEX: 46.14 ML/M2
LEFT VENTRICLE DIASTOLIC VOLUME: 115.36 ML
LEFT VENTRICLE MASS INDEX: 82.9 G/M2
LEFT VENTRICLE SYSTOLIC VOLUME INDEX: 17 ML/M2
LEFT VENTRICLE SYSTOLIC VOLUME: 42.57 ML
LEFT VENTRICULAR INTERNAL DIMENSION IN DIASTOLE: 5 CM (ref 3.5–6)
LEFT VENTRICULAR MASS: 207.1 G
LV LATERAL E/E' RATIO: 6.56
LV SEPTAL E/E' RATIO: 8.43
MV PEAK A VEL: 0.76 M/S
MV PEAK E VEL: 0.59 M/S
OHS CV RV/LV RATIO: 0.78 CM
RA MAJOR: 5.13 CM
RA PRESSURE ESTIMATED: 3 MMHG
RA WIDTH: 3.47 CM
RIGHT ATRIAL AREA: 16.7 CM2
RIGHT VENTRICLE DIASTOLIC BASEL DIMENSION: 3.9 CM
RV TISSUE DOPPLER FREE WALL SYSTOLIC VELOCITY 1 (APICAL 4 CHAMBER VIEW): 18.23 CM/S
SINUS: 3.33 CM
STJ: 3.07 CM
TDI LATERAL: 0.09 M/S
TDI SEPTAL: 0.07 M/S
TDI: 0.08 M/S
TRICUSPID ANNULAR PLANE SYSTOLIC EXCURSION: 2.38 CM
Z-SCORE OF LEFT VENTRICULAR DIMENSION IN END DIASTOLE: -10.24
Z-SCORE OF LEFT VENTRICULAR DIMENSION IN END SYSTOLE: -7.25

## 2024-12-27 PROCEDURE — 93306 TTE W/DOPPLER COMPLETE: CPT

## 2024-12-27 PROCEDURE — 93306 TTE W/DOPPLER COMPLETE: CPT | Mod: 26,,, | Performed by: INTERNAL MEDICINE

## 2024-12-27 NOTE — PROGRESS NOTES
Your results look fine and do not require any change in treatment.     Please contact me if you have any additional concerns.    Sincerely,    Blake Santamaria

## 2025-01-13 DIAGNOSIS — Z00.00 ENCOUNTER FOR MEDICARE ANNUAL WELLNESS EXAM: ICD-10-CM

## 2025-01-27 DIAGNOSIS — I10 ESSENTIAL HYPERTENSION: ICD-10-CM

## 2025-01-27 RX ORDER — HYDRALAZINE HYDROCHLORIDE 100 MG/1
100 TABLET, FILM COATED ORAL 3 TIMES DAILY
Qty: 270 TABLET | Refills: 3 | Status: SHIPPED | OUTPATIENT
Start: 2025-01-27 | End: 2026-01-27

## 2025-01-27 NOTE — TELEPHONE ENCOUNTER
----- Message from Pilar sent at 1/27/2025  3:35 PM CST -----   Name of Who is Calling:     What is the request in detail:  patient request call back in reference to pharmacy request prior authorization for medication hydrALAZINE (APRESOLINE) 100 MG tablet   Please contact to further discuss and advise      Can the clinic reply by [facility]:     What Number to Call Back if not in [facility]:   [phone]

## 2025-01-27 NOTE — TELEPHONE ENCOUNTER
Spoke with patient regarding medication    Spoke with [facility] staff regarding Apresoline, staff reports reports pt needs a new script for medication

## 2025-01-27 NOTE — TELEPHONE ENCOUNTER
No care due was identified.  Health [facility] Embedded Care Due Messages. Reference number: [id number].   1/27/2025 4:21:10 PM CST

## 2025-01-28 ENCOUNTER — TELEPHONE (OUTPATIENT)
Dept: PRIMARY CARE CLINIC | Facility: CLINIC | Age: 80
End: 2025-01-28
Payer: MEDICARE

## 2025-01-28 NOTE — TELEPHONE ENCOUNTER
----- Message from Alana Mancini sent at 1/28/2025 12:38 PM CST -----  Regarding: Medication  Refill  Request              Reply in [facility]: NO      Please refill the medication listed below. Please call the patient  [phone] (I)      Medication      hydrALAZINE (APRESOLINE) 100 MG tablet  /  90 Day Supply       Preferred Pharmacy:  [id number]  [place] [address] [address]  AT [facility] [address]   Phone: [phone]  Fax: [phone]

## 2025-02-26 ENCOUNTER — OFFICE VISIT (OUTPATIENT)
Dept: PRIMARY CARE CLINIC | Facility: CLINIC | Age: 80
End: 2025-02-26
Attending: FAMILY MEDICINE
Payer: MEDICARE

## 2025-02-26 VITALS — OXYGEN SATURATION: 74 % | HEART RATE: 88 BPM | DIASTOLIC BLOOD PRESSURE: 68 MMHG | SYSTOLIC BLOOD PRESSURE: 123 MMHG

## 2025-02-26 DIAGNOSIS — Z99.2 TYPE 2 DIABETES MELLITUS WITH CHRONIC KIDNEY DISEASE ON CHRONIC DIALYSIS, WITH LONG-TERM CURRENT USE OF INSULIN: ICD-10-CM

## 2025-02-26 DIAGNOSIS — Z99.2 ESRD (END STAGE RENAL DISEASE) ON DIALYSIS: ICD-10-CM

## 2025-02-26 DIAGNOSIS — I10 ESSENTIAL HYPERTENSION: Primary | Chronic | ICD-10-CM

## 2025-02-26 DIAGNOSIS — N18.6 ESRD (END STAGE RENAL DISEASE) ON DIALYSIS: ICD-10-CM

## 2025-02-26 DIAGNOSIS — M89.9 CHRONIC KIDNEY DISEASE-MINERAL AND BONE DISORDER: ICD-10-CM

## 2025-02-26 DIAGNOSIS — N18.6 TYPE 2 DIABETES MELLITUS WITH CHRONIC KIDNEY DISEASE ON CHRONIC DIALYSIS, WITH LONG-TERM CURRENT USE OF INSULIN: ICD-10-CM

## 2025-02-26 DIAGNOSIS — N18.9 CHRONIC KIDNEY DISEASE-MINERAL AND BONE DISORDER: ICD-10-CM

## 2025-02-26 DIAGNOSIS — E11.22 TYPE 2 DIABETES MELLITUS WITH CHRONIC KIDNEY DISEASE ON CHRONIC DIALYSIS, WITH LONG-TERM CURRENT USE OF INSULIN: ICD-10-CM

## 2025-02-26 DIAGNOSIS — N25.81 SECONDARY HYPERPARATHYROIDISM OF RENAL ORIGIN: ICD-10-CM

## 2025-02-26 DIAGNOSIS — E83.9 CHRONIC KIDNEY DISEASE-MINERAL AND BONE DISORDER: ICD-10-CM

## 2025-02-26 DIAGNOSIS — Z79.4 TYPE 2 DIABETES MELLITUS WITH CHRONIC KIDNEY DISEASE ON CHRONIC DIALYSIS, WITH LONG-TERM CURRENT USE OF INSULIN: ICD-10-CM

## 2025-02-26 DIAGNOSIS — I15.2 HYPERTENSION ASSOCIATED WITH TYPE 2 DIABETES MELLITUS: ICD-10-CM

## 2025-02-26 DIAGNOSIS — E78.5 DYSLIPIDEMIA: ICD-10-CM

## 2025-02-26 DIAGNOSIS — E11.59 HYPERTENSION ASSOCIATED WITH TYPE 2 DIABETES MELLITUS: ICD-10-CM

## 2025-02-26 DIAGNOSIS — E66.01 MORBID (SEVERE) OBESITY DUE TO EXCESS CALORIES: ICD-10-CM

## 2025-02-26 DIAGNOSIS — I42.0 DILATED CARDIOMYOPATHY: ICD-10-CM

## 2025-02-26 DIAGNOSIS — I51.89 DIASTOLIC DYSFUNCTION: ICD-10-CM

## 2025-02-26 PROCEDURE — 99999 PR PBB SHADOW E&M-EST. PATIENT-LVL IV: CPT | Mod: PBBFAC,HCNC,, | Performed by: FAMILY MEDICINE

## 2025-03-03 ENCOUNTER — CLINICAL SUPPORT (OUTPATIENT)
Dept: OPHTHALMOLOGY | Facility: CLINIC | Age: 80
End: 2025-03-03
Payer: MEDICARE

## 2025-03-03 ENCOUNTER — OFFICE VISIT (OUTPATIENT)
Dept: OPHTHALMOLOGY | Facility: CLINIC | Age: 80
End: 2025-03-03
Payer: MEDICARE

## 2025-03-03 DIAGNOSIS — H40.1113 PRIMARY OPEN ANGLE GLAUCOMA (POAG) OF RIGHT EYE, SEVERE STAGE: ICD-10-CM

## 2025-03-03 DIAGNOSIS — H40.1122 PRIMARY OPEN ANGLE GLAUCOMA (POAG) OF LEFT EYE, MODERATE STAGE: ICD-10-CM

## 2025-03-03 DIAGNOSIS — H40.1122 PRIMARY OPEN ANGLE GLAUCOMA (POAG) OF LEFT EYE, MODERATE STAGE: Primary | ICD-10-CM

## 2025-03-03 DIAGNOSIS — Z96.1 PSEUDOPHAKIA OF BOTH EYES: ICD-10-CM

## 2025-03-03 PROCEDURE — 99999 PR PBB SHADOW E&M-EST. PATIENT-LVL III: CPT | Mod: PBBFAC,HCNC,, | Performed by: OPHTHALMOLOGY

## 2025-03-03 NOTE — PROGRESS NOTES
Assessment /Plan     For exam results, see Encounter Report.    Primary open angle glaucoma (POAG) of right eye, severe stage  -     [place] [facility] - OU - Extended - Both Eyes    Primary open angle glaucoma (POAG) of left eye, moderate stage  -     [place] [facility] - OU - Extended - Both Eyes        24-2 ss done ou     Rel & Fix =  good ou     Coop =      good     Patient has no allergies to latex or adhesives at this time    Jthomas    Mrx    -.50 + .50 x 180   od    Pl + 50 x 178  os

## 2025-03-03 NOTE — PROGRESS NOTES
Subjective:       Patient ID: Vinnie Siegel is a 79 y.o. male.    Chief Complaint: No chief complaint on file.    HPI    DLS:11/11/2024 POAG Severe OD Indeterminate Stage OS     HX:  S/P phaco w/IOL OD- 9/21/2023   S/P phaco w/IOL OS- 8/10/2023   POAG Severe OD Indeterminate Stage OS      Meds:   Dorzolamide/Timolol once a day OU     Pt here today for 4 months IOP & HVF Faster  & adherence.   Pt reports that he has not noticed any new or concerning symptoms ,his   care taker Vanesa adds that he sometimes does complain about pain and she   noticed that his lower eyelids sometimes look a bit swollen.  Last edited by Stefan Morales on 3/3/2025 11:18 AM.               Assessment & Plan   Primary open angle glaucoma (POAG) of left eye, moderate stage    Primary open angle glaucoma (POAG) of right eye, severe stage    Pseudophakia of both eyes       Dr Nielson    [facility] department    Dialysis    POAG severe OD /// Moderate OS  Fhx ()  Tm 23 // 28    CCT  564 // 566    Target mid teens --> achieved & discussed    Both eyes --> tolerating well & good adherence --> CSM as discussed  Dorzolamide-Timolol BID    Latanoprost  --> intolerant      PC IOL OU Yvonne Nielson  Quiet  Observe    Complex CE/IOL OS (white mature cataract, malyugan ring, trypan blue)  IOL  DIBOO +21.00 target -0.08        Dry Eye Syndrome: discussed use of warm compresses, non-preserved artificial tears, gel and PM ointment options.      NIDDM  No BDR / CSME  Control --> discussed    Plan  RTC 5 months IOP & DFE  & adherence & consider Stereo disc --> then OCT RNFL  RTC sooner prn with good understanding

## 2025-03-26 NOTE — PROGRESS NOTES
Patient ID: Vinnie Siegel is a 79 y.o. male.    Chief Complaint: Follow-up and Hypertension    History of Present Illness    CHIEF COMPLAINT:  Patient presents today for blood pressure follow up    CURRENT SYMPTOMS:  He reports overall feeling good but notes significant fatigue attributed to recent [place] celebrations, increased work demands, and childcare responsibilities. He reports difficulty with walking any distance with associated pulse elevation.    PREVENTIVE CARE:  He received COVID and flu vaccines at [facility]. He wishes to delay low dose CT for lung screening until his current health concerns improve.    LABS:  Laboratory tests ordered by his specialist are scheduled for March.         Physical Exam    General: No acute distress. Well-developed. Well-nourished.  Eyes: EOMI. Sclerae anicteric.  HENT: Normocephalic. Atraumatic. Nares patent. Moist oral mucosa.  Ears: Bilateral TMs clear. Bilateral EACs clear.  Cardiovascular: Regular rate. Regular rhythm. No murmurs. No rubs. No gallops. Normal S1, S2.  Respiratory: Normal respiratory effort. Clear to auscultation bilaterally. No rales. No rhonchi. No wheezing.  Abdomen: Soft. Non-tender. Non-distended. Normoactive bowel sounds.  Musculoskeletal: No  obvious deformity.  Extremities: No lower extremity edema.  Neurological: Alert & oriented x3. No slurred speech. Normal gait.  Psychiatric: Normal mood. Normal affect. Good insight. Good judgment.  Skin: Warm. Dry. No rash.         Assessment & Plan    IMPRESSION:  - Reviewed blood pressure readings, which are within acceptable range.  - Evaluated timing for PSA test, determining it should be scheduled for August due to insurance coverage limitations.  - Discussed continuation of low-dose CTs for lung screening; patient prefers to delay but plans to pursue in future.  - Considered COVID-19 booster status, noting patient received recent vaccination despite absence in records.  - Manually checked pulse  rate due to inaccurate machine reading, confirming it is within normal range.    TYPE 2 DIABETES MELLITUS WITH DIABETIC CHRONIC KIDNEY DISEASE:  - Schedule labs in March to cover all necessary labs for diabetes and kidney function.  - Continue long-term insulin therapy and prepare for prescription refills in the near future.    END STAGE RENAL DISEASE:  - No additional actions required beyond the labs scheduled in March, which will also cover renal function tests.    DILATED CARDIOMYOPATHY:  - Auscultated the patient's heart during physical exam.  - Initial pulse reading appeared very high, but manual check revealed a normal heart rate.    FATIGUE:  - Patient reports fatigue, possibly due to work and family responsibilities.    DIFFICULTY WALKING:  - Patient reports difficulty walking any distance, which may cause pulse elevation.    PULSE RATE EVALUATION:  - Assessed that the previously recorded high rate was likely due to a machine error.          Visit today is associated with current or anticipated ongoing medical care related to this patient's single serious condition/complex condition of CHF. The patient will return to see me as these issues will be followed longitudinally      No follow-ups on file.    This note was generated with the assistance of ambient listening technology. Verbal consent was obtained by the patient and accompanying visitor(s) for the recording of patient appointment to facilitate this note. I attest to having reviewed and edited the generated note for accuracy, though some syntax or spelling errors may persist. Please contact the author of this note for any clarification.

## 2025-03-31 NOTE — PROGRESS NOTES
Subjective:      Patient ID: Vinnie Siegel is a 79 y.o. male.    Chief Complaint:  Diabetes    History of Present Illness  Vinnie Siegel is here for follow up of DM.  Previously seen by me 12/2024.    With regards to Diabetes:    Diagnosed: ~2018  DE: 4/2024  Known complications:  DKA Denies  RN Denies  Eye Exam: 3/2025  PN Denies  Podiatry: None  Nephropathy +  HD T/Th/Sa  CAD +  Denies history of pancreatitis & personal/family history of medullary thyroid cancer.     Diet/Exercise:  Eats 2 meals a day. Skips B or L.   Snacks : Yes.   Drinks : sprite, root beer, water.   Exercise : None.   Recent illness, injury, steroids: Denies     Current Regimen:  Lantus 40 units nightly  Humalog 14 units before meals     Reports compliance.    Applied for [facility] PAP - pending - he does not want to proceed with this.     Other medications tried:  Metformin - stopped for CKD   Trulicity - stopped due to cost , denied having any side effects  Ozempic 1 mg weekly - not affordable in [place] - off since 2023    Glucose Monitor: Dexcom G7 -    6 times a day testing  Log reviewed: sensor downloaded and reviewed.    Hypoglycemia:  Denies  Knows how to correct with 15 grams of carbs- juice, coke, or a peppermint.       Diabetes Management Status    Hemoglobin A1C   Date Value Ref Range Status   08/28/2024 6.9 (H) 4.0 - 5.6 % Final     Comment:     ADA Screening Guidelines:  5.7-6.4%  Consistent with prediabetes  >or=6.5%  Consistent with diabetes    High levels of fetal hemoglobin interfere with the HbA1C  assay. Heterozygous hemoglobin variants (HbS, HgC, etc)do  not significantly interfere with this assay.   However, presence of multiple variants may affect accuracy.     11/01/2023 10.9 (H) 4.0 - 5.6 % Final     Comment:     ADA Screening Guidelines:  5.7-6.4%  Consistent with prediabetes  >or=6.5%  Consistent with diabetes    High levels of fetal hemoglobin interfere with the HbA1C  assay. Heterozygous  "hemoglobin variants (HbS, HgC, etc)do  not significantly interfere with this assay.   However, presence of multiple variants may affect accuracy.     07/03/2023 8.9 (H) 4.0 - 5.6 % Final     Comment:     ADA Screening Guidelines:  5.7-6.4%  Consistent with prediabetes  >or=6.5%  Consistent with diabetes    High levels of fetal hemoglobin interfere with the HbA1C  assay. Heterozygous hemoglobin variants (HbS, HgC, etc)do  not significantly interfere with this assay.   However, presence of multiple variants may affect accuracy.         Statin: Taking  ACE/ARB: Not taking  Screening or Prevention Patient's value Goal Complete/Controlled?   HgA1C Testing and Control   Lab Results   Component Value Date    HGBA1C 6.9 (H) 08/28/2024      Annually/Less than 8% No   Lipid profile : 08/28/2024 Annually Yes   LDL control Lab Results   Component Value Date    LDLCALC 137.4 08/28/2024    Annually/Less than 100 mg/dl  Yes   Nephropathy screening Lab Results   Component Value Date    LABMICR 155.0 06/18/2019     Lab Results   Component Value Date    PROTEINUA 2+ (A) 11/23/2022    Annually No   Blood pressure BP Readings from Last 1 Encounters:   04/02/25 (!) 100/58    Less than 140/90 No   Dilated retinal exam : 03/03/2025 Annually Yes   Foot exam   Most Recent Foot Exam Date: Not Found Annually Yes       Review of Systems  As above     Objective:   Physical Exam  Injection sites are without edema or erythema. No lipo hypertrophy or atrophy.    Visit Vitals  BP (!) 100/58 (BP Location: Right arm, Patient Position: Sitting)   Pulse 82   Resp 18   Ht 6' 2" (1.88 m)   Wt 127.1 kg (280 lb 3.3 oz)   SpO2 99%   BMI 35.98 kg/m²         Body mass index is 35.98 kg/m².    Lab Review:   Lab Results   Component Value Date    HGBA1C 6.9 (H) 08/28/2024    HGBA1C 10.9 (H) 11/01/2023    HGBA1C 8.9 (H) 07/03/2023       Lab Results   Component Value Date    CHOL 205 (H) 08/28/2024    HDL 44 08/28/2024    LDLCALC 137.4 08/28/2024    TRIG 118 " "08/28/2024    CHOLHDL 21.5 08/28/2024     Lab Results   Component Value Date     (L) 09/28/2024    K 4.0 09/28/2024    CL 99 09/28/2024    CO2 20 (L) 09/28/2024     (H) 09/28/2024    BUN 36 (H) 09/28/2024    CREATININE 7.8 (H) 09/28/2024    CALCIUM 8.3 (L) 09/28/2024    PROT 6.7 09/26/2024    ALBUMIN 3.0 (L) 09/28/2024    BILITOT 0.6 09/26/2024    ALKPHOS 59 09/26/2024    AST 8 (L) 09/26/2024    ALT 7 (L) 09/26/2024    ANIONGAP 16 09/28/2024    ESTGFRAFRICA 8.1 (A) 09/21/2021    EGFRNONAA 7.0 (A) 09/21/2021    TSH 0.855 09/26/2024     Vit D, 25-Hydroxy   Date Value Ref Range Status   09/21/2021 17 (L) 30 - 96 ng/mL Final     Comment:     Vitamin D deficiency.........<10 ng/mL                              Vitamin D insufficiency......10-29 ng/mL       Vitamin D sufficiency........> or equal to 30 ng/mL  Vitamin D toxicity............>100 ng/mL       Assessment and Plan     1. Type 2 diabetes mellitus with chronic kidney disease on chronic dialysis, with long-term current use of insulin  insulin glargine U-100, Lantus, (LANTUS SOLOSTAR U-100 INSULIN) 100 unit/mL (3 mL) InPn pen    pen needle, diabetic (BD ULTRA-FINE SHORT PEN NEEDLE) 31 gauge x 5/16" Ndle    HUMALOG KWIKPEN INSULIN 100 unit/mL pen    glucagon (BAQSIMI) 3 mg/actuation Spry      2. Type 2 diabetes mellitus with hyperglycemia, without long-term current use of insulin  insulin glargine U-100, Lantus, (LANTUS SOLOSTAR U-100 INSULIN) 100 unit/mL (3 mL) InPn pen    pen needle, diabetic (BD ULTRA-FINE SHORT PEN NEEDLE) 31 gauge x 5/16" Ndle    HUMALOG KWIKPEN INSULIN 100 unit/mL pen      3. ESRD (end stage renal disease) on dialysis        4. Hypertension associated with type 2 diabetes mellitus            Type 2 diabetes mellitus with chronic kidney disease on chronic dialysis, with long-term current use of insulin  -- Labs this month.  -- A1c goal <8%.  -- Medications discussed:  MFM - ESRD  GLP1-DPP4 - costly  VEGA   SGLT2 - ESRD  Insulin   -- " Reviewed logs/CGM:  Overall, glucose controlled.  Reach out to me sooner for any glucose <70 or consistently >180.  -- Medication Changes:   Lantus 40 units nightly  Humalog 14 units before meals   -- Reviewed goals of therapy are to get the best control we can without hypoglycemia.  -- Reviewed patient's current insulin regimen. Clarified proper insulin dose and timing in relation to meals, etc. Insulin injection sites and proper rotation instructed.    -- Advised frequent self blood glucose monitoring.  Patient encouraged to document glucose results and bring them to every clinic visit.  -- Hypoglycemia precautions discussed. Instructed on precautions before driving.    -- Call for Bg repeatedly < 90 or > 180.   -- Close adherence to lifestyle changes recommended.   -- Periodic follow ups for eye evaluations, foot care and dental care suggested.    ESRD (end stage renal disease) on dialysis  -- Continue following with Nephrology.    Hypertension associated with type 2 diabetes mellitus  -- Reviewed BP - asymptomatic.         Follow up in about 4 months (around 8/2/2025).      Visit today included increased complexity associated with the care of the problems addressed and managing the longitudinal care of the patient due to the serious and/or complex managed problems.

## 2025-04-02 ENCOUNTER — OFFICE VISIT (OUTPATIENT)
Dept: ENDOCRINOLOGY | Facility: CLINIC | Age: 80
End: 2025-04-02
Payer: MEDICARE

## 2025-04-02 VITALS
HEART RATE: 82 BPM | RESPIRATION RATE: 18 BRPM | OXYGEN SATURATION: 99 % | HEIGHT: 74 IN | SYSTOLIC BLOOD PRESSURE: 100 MMHG | BODY MASS INDEX: 35.96 KG/M2 | WEIGHT: 280.19 LBS | DIASTOLIC BLOOD PRESSURE: 58 MMHG

## 2025-04-02 DIAGNOSIS — Z99.2 ESRD (END STAGE RENAL DISEASE) ON DIALYSIS: ICD-10-CM

## 2025-04-02 DIAGNOSIS — Z99.2 TYPE 2 DIABETES MELLITUS WITH CHRONIC KIDNEY DISEASE ON CHRONIC DIALYSIS, WITH LONG-TERM CURRENT USE OF INSULIN: Primary | ICD-10-CM

## 2025-04-02 DIAGNOSIS — E11.22 TYPE 2 DIABETES MELLITUS WITH CHRONIC KIDNEY DISEASE ON CHRONIC DIALYSIS, WITH LONG-TERM CURRENT USE OF INSULIN: Primary | ICD-10-CM

## 2025-04-02 DIAGNOSIS — E11.59 HYPERTENSION ASSOCIATED WITH TYPE 2 DIABETES MELLITUS: ICD-10-CM

## 2025-04-02 DIAGNOSIS — I15.2 HYPERTENSION ASSOCIATED WITH TYPE 2 DIABETES MELLITUS: ICD-10-CM

## 2025-04-02 DIAGNOSIS — Z79.4 TYPE 2 DIABETES MELLITUS WITH CHRONIC KIDNEY DISEASE ON CHRONIC DIALYSIS, WITH LONG-TERM CURRENT USE OF INSULIN: Primary | ICD-10-CM

## 2025-04-02 DIAGNOSIS — N18.6 ESRD (END STAGE RENAL DISEASE) ON DIALYSIS: ICD-10-CM

## 2025-04-02 DIAGNOSIS — E11.65 TYPE 2 DIABETES MELLITUS WITH HYPERGLYCEMIA, WITHOUT LONG-TERM CURRENT USE OF INSULIN: ICD-10-CM

## 2025-04-02 DIAGNOSIS — N18.6 TYPE 2 DIABETES MELLITUS WITH CHRONIC KIDNEY DISEASE ON CHRONIC DIALYSIS, WITH LONG-TERM CURRENT USE OF INSULIN: Primary | ICD-10-CM

## 2025-04-02 PROCEDURE — 99999 PR PBB SHADOW E&M-EST. PATIENT-LVL V: CPT | Mod: PBBFAC,HCNC,, | Performed by: NURSE PRACTITIONER

## 2025-04-02 RX ORDER — GLUCAGON 3 MG/1
3 POWDER NASAL
Qty: 1 EACH | Refills: 1 | Status: SHIPPED | OUTPATIENT
Start: 2025-04-02

## 2025-04-02 RX ORDER — PEN NEEDLE, DIABETIC 30 GX3/16"
NEEDLE, DISPOSABLE MISCELLANEOUS
Qty: 400 EACH | Refills: 3 | Status: SHIPPED | OUTPATIENT
Start: 2025-04-02

## 2025-04-02 RX ORDER — INSULIN LISPRO 100 [IU]/ML
14 INJECTION, SOLUTION INTRAVENOUS; SUBCUTANEOUS
Qty: 40 ML | Refills: 3 | Status: SHIPPED | OUTPATIENT
Start: 2025-04-02

## 2025-04-02 RX ORDER — INSULIN GLARGINE 100 [IU]/ML
40 INJECTION, SOLUTION SUBCUTANEOUS NIGHTLY
Qty: 40 ML | Refills: 3 | Status: SHIPPED | OUTPATIENT
Start: 2025-04-02

## 2025-04-02 NOTE — ASSESSMENT & PLAN NOTE
-- Labs this month.  -- A1c goal <8%.  -- Medications discussed:  MFM - ESRD  GLP1-DPP4 - costly  VEGA   SGLT2 - ESRD  Insulin   -- Reviewed logs/CGM:  Overall, glucose controlled.  Reach out to me sooner for any glucose <70 or consistently >180.  -- Medication Changes:   Lantus 40 units nightly  Humalog 14 units before meals   -- Reviewed goals of therapy are to get the best control we can without hypoglycemia.  -- Reviewed patient's current insulin regimen. Clarified proper insulin dose and timing in relation to meals, etc. Insulin injection sites and proper rotation instructed.    -- Advised frequent self blood glucose monitoring.  Patient encouraged to document glucose results and bring them to every clinic visit.  -- Hypoglycemia precautions discussed. Instructed on precautions before driving.    -- Call for Bg repeatedly < 90 or > 180.   -- Close adherence to lifestyle changes recommended.   -- Periodic follow ups for eye evaluations, foot care and dental care suggested.

## 2025-04-03 NOTE — TELEPHONE ENCOUNTER
Spoke to patient and he wanted to let us know that his Diabetes Rx was costing way more then he can afford. I asked him to call our Patient Asst. Office to see if they could help him with the cost of the Rx.   
right knee not assessed due to surgery/bilateral upper extremity ROM was WFL (within functional limits)/bilateral lower extremity ROM was WFL (within functional limits)

## 2025-04-08 ENCOUNTER — PATIENT MESSAGE (OUTPATIENT)
Dept: VASCULAR SURGERY | Facility: CLINIC | Age: 80
End: 2025-04-08
Payer: MEDICARE

## 2025-04-14 ENCOUNTER — RESULTS FOLLOW-UP (OUTPATIENT)
Dept: CARDIOLOGY | Facility: CLINIC | Age: 80
End: 2025-04-14

## 2025-04-14 ENCOUNTER — LAB VISIT (OUTPATIENT)
Dept: LAB | Facility: HOSPITAL | Age: 80
End: 2025-04-14
Attending: INTERNAL MEDICINE
Payer: MEDICARE

## 2025-04-14 ENCOUNTER — RESULTS FOLLOW-UP (OUTPATIENT)
Dept: ENDOCRINOLOGY | Facility: CLINIC | Age: 80
End: 2025-04-14

## 2025-04-14 DIAGNOSIS — E11.59 HYPERTENSION ASSOCIATED WITH TYPE 2 DIABETES MELLITUS: ICD-10-CM

## 2025-04-14 DIAGNOSIS — I15.2 HYPERTENSION ASSOCIATED WITH TYPE 2 DIABETES MELLITUS: ICD-10-CM

## 2025-04-14 DIAGNOSIS — E11.22 TYPE 2 DIABETES MELLITUS WITH CHRONIC KIDNEY DISEASE ON CHRONIC DIALYSIS, WITH LONG-TERM CURRENT USE OF INSULIN: ICD-10-CM

## 2025-04-14 DIAGNOSIS — N18.6 TYPE 2 DIABETES MELLITUS WITH CHRONIC KIDNEY DISEASE ON CHRONIC DIALYSIS, WITH LONG-TERM CURRENT USE OF INSULIN: ICD-10-CM

## 2025-04-14 DIAGNOSIS — E78.5 HYPERLIPIDEMIA, UNSPECIFIED HYPERLIPIDEMIA TYPE: Chronic | ICD-10-CM

## 2025-04-14 DIAGNOSIS — Z79.4 TYPE 2 DIABETES MELLITUS WITH CHRONIC KIDNEY DISEASE ON CHRONIC DIALYSIS, WITH LONG-TERM CURRENT USE OF INSULIN: ICD-10-CM

## 2025-04-14 DIAGNOSIS — E78.5 DYSLIPIDEMIA: ICD-10-CM

## 2025-04-14 DIAGNOSIS — I42.0 DILATED CARDIOMYOPATHY: Chronic | ICD-10-CM

## 2025-04-14 DIAGNOSIS — I10 ESSENTIAL HYPERTENSION: Chronic | ICD-10-CM

## 2025-04-14 DIAGNOSIS — Z99.2 TYPE 2 DIABETES MELLITUS WITH CHRONIC KIDNEY DISEASE ON CHRONIC DIALYSIS, WITH LONG-TERM CURRENT USE OF INSULIN: ICD-10-CM

## 2025-04-14 LAB
ALBUMIN SERPL BCP-MCNC: 3.2 G/DL (ref 3.5–5.2)
ALP SERPL-CCNC: 69 UNIT/L (ref 40–150)
ALT SERPL W/O P-5'-P-CCNC: 12 UNIT/L (ref 10–44)
ANION GAP (OHS): 15 MMOL/L (ref 8–16)
AST SERPL-CCNC: 11 UNIT/L (ref 11–45)
BILIRUB SERPL-MCNC: 0.6 MG/DL (ref 0.1–1)
BNP SERPL-MCNC: 278 PG/ML (ref 0–99)
BUN SERPL-MCNC: 37 MG/DL (ref 8–23)
CALCIUM SERPL-MCNC: 8.2 MG/DL (ref 8.7–10.5)
CHLORIDE SERPL-SCNC: 94 MMOL/L (ref 95–110)
CHOLEST SERPL-MCNC: 133 MG/DL (ref 120–199)
CHOLEST/HDLC SERPL: 3.2 {RATIO} (ref 2–5)
CO2 SERPL-SCNC: 29 MMOL/L (ref 23–29)
CREAT SERPL-MCNC: 7.7 MG/DL (ref 0.5–1.4)
EAG (OHS): 140 MG/DL (ref 68–131)
GFR SERPLBLD CREATININE-BSD FMLA CKD-EPI: 7 ML/MIN/1.73/M2
GLUCOSE SERPL-MCNC: 143 MG/DL (ref 70–110)
HBA1C MFR BLD: 6.5 % (ref 4–5.6)
HDLC SERPL-MCNC: 41 MG/DL (ref 40–75)
HDLC SERPL: 30.8 % (ref 20–50)
LDLC SERPL CALC-MCNC: 69 MG/DL (ref 63–159)
NONHDLC SERPL-MCNC: 92 MG/DL
POTASSIUM SERPL-SCNC: 4.3 MMOL/L (ref 3.5–5.1)
PROT SERPL-MCNC: 7.3 GM/DL (ref 6–8.4)
SODIUM SERPL-SCNC: 138 MMOL/L (ref 136–145)
TRIGL SERPL-MCNC: 115 MG/DL (ref 30–150)
TSH SERPL-ACNC: 1.28 UIU/ML (ref 0.4–4)

## 2025-04-14 PROCEDURE — 83880 ASSAY OF NATRIURETIC PEPTIDE: CPT | Mod: HCNC

## 2025-04-14 PROCEDURE — 83036 HEMOGLOBIN GLYCOSYLATED A1C: CPT | Mod: HCNC

## 2025-04-14 PROCEDURE — 84443 ASSAY THYROID STIM HORMONE: CPT | Mod: HCNC

## 2025-04-14 PROCEDURE — 36415 COLL VENOUS BLD VENIPUNCTURE: CPT | Mod: HCNC

## 2025-04-14 PROCEDURE — 80053 COMPREHEN METABOLIC PANEL: CPT | Mod: HCNC

## 2025-04-14 PROCEDURE — 80061 LIPID PANEL: CPT | Mod: HCNC

## 2025-04-15 NOTE — PROGRESS NOTES
Your results show HF blood test mild high so 2 days per week take an extra Torsemide    Please contact me if you have any additional concerns.    Sincerely,  Blake Santamaria

## 2025-05-27 NOTE — PROGRESS NOTES
"Subjective:       Patient ID: Vinnie Siegel is a 75 y.o. male.    Chief Complaint: Wound Check    Wound Check         75 y.o. male presents for evaluation and treatment of wound to posterior neck. The wound started as a "bump about 1 year ago" and never healed. He was in hospital for sepsis due to UTI and pneumonia.  He was discharged to SNF on 7/16/20.  During hospitalization he had abscess and had I&D per general surgery and treated with metronidazole for + culture of finegoldia.  He has [facility] home health.  Denies fever, chills, pain, erythema, warmth. C/o drainage at times. Wound is improving in size and appearance post debridement.       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 Root [facility]    lateral internal anal sphincterotomy  12/13/13    Dr. root [facility]    URETERAL STENT PLACEMENT           Review of Systems   Constitutional: Negative for activity change, chills, diaphoresis, fatigue and fever.   Respiratory: Negative for apnea, cough, chest tightness and shortness of breath.    Cardiovascular: Negative for chest pain, palpitations and leg swelling.   Musculoskeletal: Negative for gait problem and joint swelling.   Skin: Positive for wound. Negative for pallor and rash.   Neurological: Negative for syncope, weakness and numbness.   Psychiatric/Behavioral: Negative for " Ensuring your [employer] message was sent to correct person 12 days ago.   agitation. The patient is not nervous/anxious.    All other systems reviewed and are negative.          Objective:      Physical Exam  Vitals signs reviewed.   Constitutional:       General: He is not in acute distress.     Appearance: Normal appearance. He is well-developed.   HENT:      Head: Normocephalic.   Neck:      Musculoskeletal: Normal range of motion.   Cardiovascular:      Rate and Rhythm: Normal rate.      Pulses: Normal pulses.   Pulmonary:      Effort: Pulmonary effort is normal. No respiratory distress.   Musculoskeletal: Normal range of motion.         General: No swelling or tenderness.   Skin:     General: Skin is warm and dry.      Capillary Refill: Capillary refill takes less than 2 seconds.   Neurological:      Mental Status: He is alert and oriented to person, place, and time.   Psychiatric:         Behavior: Behavior normal.             Assessment:       1. Open wound of neck, initial encounter           Wound 09/03/20 1118  posterior Neck (Active)   09/03/20 1118    Pre-existing: Yes   Primary Wound Type:    Side:    Orientation: posterior   Location: Neck   Wound Number (optional):    Ankle-Brachial Index:    Pulses:    Removal Indication and Assessment:    Wound Outcome:    (Retired) Wound Type:    (Retired) Wound Length (cm):    (Retired) Wound Width (cm):    (Retired) Depth (cm):    Wound Description (Comments):    Removal Indications:        Wound Image   10/01/20 1009   Dressing Appearance Intact;Dry 10/01/20 1009   Drainage Amount Small 10/01/20 1009   Drainage Characteristics/Odor Serosanguineous 10/01/20 1009   Appearance Slough;Necrotic 10/01/20 1009   Tissue loss description Full thickness 10/01/20 1009   Black (%), Wound Tissue Color 100 % 10/01/20 1009   Periwound Area Intact 10/01/20 1009   Wound Edges Undefined 10/01/20 1009   Wound Length (cm) 1 cm 10/01/20 1009   Wound Width (cm) 1.2 cm 10/01/20 1009   Wound Depth (cm) 0.3 cm 10/01/20 1009   Wound Volume (cm^3) 0.36 cm^3  10/01/20 1009   Wound Surface Area (cm^2) 1.2 cm^2 10/01/20 1009       Post debridement                    Plan:       Wound debrided per debridement note, tolerated well  Medihoney and hydrofera blue ready to wound daily and cover with mepore dressing  Do not get wound dressings wet, if wet remove soiled dressings and apply new dressings  Observe for signs and symptoms of infection and report symptoms to clinic  F/U 2 weeks    [facility]    Clean wounds with mild soap and water and pat dry  Medihoney to wound base and cut hydrofera blue ready to fit wound and apply to wound and cover with mepore dressing  Do not get wound dressings wet, if wet remove soiled dressings and apply new dressings  Observe for signs and symptoms of infection and report symptoms to clinic  Skilled nursing visits 2 times per week

## 2025-06-20 ENCOUNTER — HOSPITAL ENCOUNTER (OUTPATIENT)
Dept: VASCULAR SURGERY | Facility: CLINIC | Age: 80
Discharge: HOME | End: 2025-06-20
Attending: SURGERY
Payer: MEDICARE

## 2025-06-20 ENCOUNTER — LAB VISIT (OUTPATIENT)
Dept: LAB | Facility: HOSPITAL | Age: 80
End: 2025-06-20
Attending: SURGERY
Payer: MEDICARE

## 2025-06-20 ENCOUNTER — OFFICE VISIT (OUTPATIENT)
Dept: VASCULAR SURGERY | Facility: CLINIC | Age: 80
End: 2025-06-20
Attending: SURGERY
Payer: MEDICARE

## 2025-06-20 VITALS
HEIGHT: 74 IN | TEMPERATURE: 99 F | SYSTOLIC BLOOD PRESSURE: 135 MMHG | HEART RATE: 38 BPM | WEIGHT: 273.38 LBS | BODY MASS INDEX: 35.08 KG/M2 | DIASTOLIC BLOOD PRESSURE: 55 MMHG

## 2025-06-20 DIAGNOSIS — N18.6 ESRD (END STAGE RENAL DISEASE) ON DIALYSIS: ICD-10-CM

## 2025-06-20 DIAGNOSIS — Z01.818 PRE-OP EVALUATION: ICD-10-CM

## 2025-06-20 DIAGNOSIS — T82.858A STENOSIS OF ARTERIOVENOUS DIALYSIS FISTULA, INITIAL ENCOUNTER: Primary | ICD-10-CM

## 2025-06-20 DIAGNOSIS — Z99.2 ESRD (END STAGE RENAL DISEASE) ON DIALYSIS: ICD-10-CM

## 2025-06-20 LAB
ABSOLUTE EOSINOPHIL (OHS): 0.15 K/UL
ABSOLUTE MONOCYTE (OHS): 0.73 K/UL (ref 0.3–1)
ABSOLUTE NEUTROPHIL COUNT (OHS): 3.78 K/UL (ref 1.8–7.7)
ANION GAP (OHS): 14 MMOL/L (ref 8–16)
BASOPHILS # BLD AUTO: 0.04 K/UL
BASOPHILS NFR BLD AUTO: 0.6 %
BUN SERPL-MCNC: 43 MG/DL (ref 8–23)
CALCIUM SERPL-MCNC: 9.3 MG/DL (ref 8.7–10.5)
CHLORIDE SERPL-SCNC: 97 MMOL/L (ref 95–110)
CO2 SERPL-SCNC: 27 MMOL/L (ref 23–29)
CREAT SERPL-MCNC: 7.4 MG/DL (ref 0.5–1.4)
ERYTHROCYTE [DISTWIDTH] IN BLOOD BY AUTOMATED COUNT: 15.6 % (ref 11.5–14.5)
GFR SERPLBLD CREATININE-BSD FMLA CKD-EPI: 7 ML/MIN/1.73/M2
GLUCOSE SERPL-MCNC: 178 MG/DL (ref 70–110)
HCT VFR BLD AUTO: 40.1 % (ref 40–54)
HGB BLD-MCNC: 12.6 GM/DL (ref 14–18)
IMM GRANULOCYTES # BLD AUTO: 0.04 K/UL (ref 0–0.04)
IMM GRANULOCYTES NFR BLD AUTO: 0.6 % (ref 0–0.5)
LYMPHOCYTES # BLD AUTO: 1.54 K/UL (ref 1–4.8)
MCH RBC QN AUTO: 29.8 PG (ref 27–31)
MCHC RBC AUTO-ENTMCNC: 31.4 G/DL (ref 32–36)
MCV RBC AUTO: 95 FL (ref 82–98)
NUCLEATED RBC (/100WBC) (OHS): 0 /100 WBC
PLATELET # BLD AUTO: 211 K/UL (ref 150–450)
PMV BLD AUTO: 9.7 FL (ref 9.2–12.9)
POTASSIUM SERPL-SCNC: 4.8 MMOL/L (ref 3.5–5.1)
RBC # BLD AUTO: 4.23 M/UL (ref 4.6–6.2)
RELATIVE EOSINOPHIL (OHS): 2.4 %
RELATIVE LYMPHOCYTE (OHS): 24.5 % (ref 18–48)
RELATIVE MONOCYTE (OHS): 11.6 % (ref 4–15)
RELATIVE NEUTROPHIL (OHS): 60.3 % (ref 38–73)
SODIUM SERPL-SCNC: 138 MMOL/L (ref 136–145)
WBC # BLD AUTO: 6.28 K/UL (ref 3.9–12.7)

## 2025-06-20 PROCEDURE — 80048 BASIC METABOLIC PNL TOTAL CA: CPT

## 2025-06-20 PROCEDURE — 36415 COLL VENOUS BLD VENIPUNCTURE: CPT

## 2025-06-20 PROCEDURE — 93990 DOPPLER FLOW TESTING: CPT | Mod: S$GLB,,, | Performed by: STUDENT IN AN ORGANIZED HEALTH CARE EDUCATION/TRAINING PROGRAM

## 2025-06-20 PROCEDURE — 99999 PR PBB SHADOW E&M-EST. PATIENT-LVL IV: CPT | Mod: PBBFAC,,, | Performed by: SURGERY

## 2025-06-20 PROCEDURE — 85025 COMPLETE CBC W/AUTO DIFF WBC: CPT

## 2025-06-20 RX ORDER — SODIUM CHLORIDE 9 MG/ML
INJECTION, SOLUTION INTRAVENOUS CONTINUOUS
OUTPATIENT
Start: 2025-06-20

## 2025-06-20 NOTE — PROGRESS NOTES
VASCULAR SURGERY SERVICE    CHIEF COMPLAINT:  End-stage renal disease follow-up    HISTORY OF PRESENT ILLNESS: Vinnie Siegel is a 79 y.o. 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  In the past he had also had a left brachiocephalic fistula.  He is here for new access.  He is right-handed.  He has no history of AICD or pacemaker placement    He is on no antiplatelet or anticoagulation therapy    12/06/2024:  This is initial postoperative visit after left upper arm AV graft placement 11/06/2024.  He denies hand pain weakness or numbness.  He continues to use his PermCath    12/20/2024: AVG working well    06/20/2025:  This is a six-month follow-up.  He is having now intermittent increased bleeding after dialysis.  He has had no interventions on the left AV graft.  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     Hyperlipidemia     Hypertension     Kidney stone     Seasonal allergies 06/24/2014       Past Surgical History:   Procedure Laterality Date    AV FISTULA PLACEMENT Left 12/8/2020    Procedure: CREATION, AV FISTULA;  Surgeon: Benji Becerril MD;  Location: [facility] OR [address] [place] [address];  Service: Peripheral Vascular;  Laterality: Left;    AV FISTULA PLACEMENT Right 12/7/2022    Procedure: CREATION, AV FISTULA;  Surgeon: Benji Becerril MD;  Location: [facility] OR [address] [place] [address];  Service: Peripheral Vascular;  Laterality: Right;  RUE    CATARACT EXTRACTION W/  INTRAOCULAR LENS IMPLANT Right 04/04/16    Dr Meehan    COLONOSCOPY W/ BIOPSIES      DILATION, AQUEOUS OUTFLOW CANAL, TRANSLUMINAL, WITHOUT  DEVICE RETENTION Left 9/21/2023    Procedure: DILATION, AQUEOUS OUTFLOW CANAL, TRANSLUMINAL, WITHOUT DEVICE RETENTION;  Surgeon: Yvonne Nielson MD;  Location: [facility] OR 1ST FLR;  Service: Ophthalmology;  Laterality: Left;  omni    ESOPHAGOGASTRODUODENOSCOPY N/A 6/29/2020    Procedure: EGD (ESOPHAGOGASTRODUODENOSCOPY);  Surgeon: Ravi Leone MD;  Location: [facility];  Service: Endoscopy;  Laterality: N/A;    EYE SURGERY      FISTULOGRAM Left 4/16/2021    Procedure: Fistulogram;  Surgeon: Benji Becerril MD;  Location: [facility] CATH LAB;  Service: Peripheral Vascular;  Laterality: Left;    FISTULOGRAM Left 9/28/2022    Procedure: Fistulogram;  Surgeon: Benji Becerril MD;  Location: [facility] CATH LAB;  Service: Cardiology;  Laterality: Left;    FISTULOGRAM, WITH PTA Right 2/3/2023    Procedure: FISTULOGRAM, WITH PTA;  Surgeon: Benji Becerril MD;  Location: [facility] OR 2ND FLR;  Service: Peripheral Vascular;  Laterality: Right;  205.17 mGy  2.3 min    GONIOTOMY Left 8/10/2023    Procedure: GONIOTOMY;  Surgeon: Yvonne Nielson MD;  Location: [facility] OR 1ST FLR;  Service: Ophthalmology;  Laterality: Left;  omni    HEMORRHOID SURGERY      Dr. Root ADRYAN    INTRAOCULAR PROSTHESES INSERTION Left 8/10/2023    Procedure: INSERTION, IOL PROSTHESIS;  Surgeon: Yvonne Nielson MD;  Location: [facility] OR 1ST FLR;  Service: Ophthalmology;  Laterality: Left;    INTRAOCULAR PROSTHESES INSERTION Left 9/21/2023    Procedure: INSERTION, IOL PROSTHESIS;  Surgeon: Yvonne Nielson MD;  Location: [facility] OR 1ST FLR;  Service: Ophthalmology;  Laterality: Left;    lateral internal anal sphincterotomy  12/13/13    Dr. root ADRYAN    PERCUTANEOUS TRANSLUMINAL ANGIOPLASTY OF ARTERIOVENOUS FISTULA Left 4/16/2021    Procedure: PTA, AV FISTULA;  Surgeon: Benji Becerril MD;  Location: [facility] CATH LAB;  Service: Peripheral Vascular;  Laterality: Left;    PERCUTANEOUS TRANSLUMINAL ANGIOPLASTY OF ARTERIOVENOUS FISTULA N/A 9/28/2022     Procedure: PTA, AV FISTULA;  Surgeon: Benji Becerril MD;  Location: [facility] CATH LAB;  Service: Cardiology;  Laterality: N/A;    PHACOEMULSIFICATION OF CATARACT Left 8/10/2023    Procedure: PHACOEMULSIFICATION, CATARACT;  Surgeon: Yvonne Nielson MD;  Location: [facility] OR 1ST FLR;  Service: Ophthalmology;  Laterality: Left;    PHACOEMULSIFICATION OF CATARACT Left 2023    Procedure: PHACOEMULSIFICATION, CATARACT;  Surgeon: Yvonne Nielson MD;  Location: [facility] OR 1ST FLR;  Service: Ophthalmology;  Laterality: Left;    PLACEMENT OF ARTERIOVENOUS GRAFT Left 2024    Procedure: INSERTION, GRAFT, ARTERIOVENOUS;  Surgeon: YASIR Borja III, MD;  Location: [facility] OR 2ND FLR;  Service: Vascular;  Laterality: Left;    PLACEMENT OF DUAL-LUMEN VASCULAR CATHETER N/A 2/3/2023    Procedure: EXCHANGE, PERMACATH;  Surgeon: Benji Becerril MD;  Location: [facility] OR 2ND FLR;  Service: Peripheral Vascular;  Laterality: N/A;    URETERAL STENT PLACEMENT           Current Outpatient Medications:     acetaminophen (TYLENOL) 650 MG TbSR, Take 1 tablet (650 mg total) by mouth every 8 (eight) hours., Disp: , Rfl:     blood sugar diagnostic Strp, To check BG 4 times daily, to use with insurance preferred meter, Disp: 450 each, Rfl: 3    blood-glucose meter kit, To check BG 4 times daily, to use with insurance preferred meter, Disp: 1 each, Rfl: 0    calcium acetate,phosphat bind, (PHOSLO) 667 mg capsule, SMARTSI Capsule(s) By Mouth, Disp: , Rfl:     carvediloL (COREG) 12.5 MG tablet, Take 1 tablet (12.5 mg total) by mouth 2 (two) times daily. .5-1 twice daily or as directed, Disp: 180 tablet, Rfl: 3    dorzolamide-timolol 2-0.5% (COSOPT) 22.3-6.8 mg/mL ophthalmic solution, Place 1 drop into both eyes 2 (two) times daily., Disp: 30 mL, Rfl: 3    ergocalciferol (ERGOCALCIFEROL) 50,000 unit Cap, Take 1 capsule (50,000 Units total) by mouth every 7 days., Disp: 12 capsule, Rfl: 0    ezetimibe (ZETIA) 10 mg tablet, Take 1 tablet  "(10 mg total) by mouth once daily. One a day or as directed, Disp: 30 tablet, Rfl: 11    ferrous sulfate (FEOSOL) 325 mg (65 mg iron) Tab tablet, 3 TABS A DAY., Disp: 90 tablet, Rfl: 1    fluticasone propionate (FLONASE) 50 mcg/actuation nasal spray, 2 sprays (100 mcg total) by Each Nostril route nightly as needed for Rhinitis., Disp: 48 g, Rfl: 11    glucagon (BAQSIMI) 3 mg/actuation Spry, 3 mg by Nasal route as needed (Severe Hypoglycemia)., Disp: 1 each, Rfl: 1    HUMALOG KWIKPEN INSULIN 100 unit/mL pen, Inject 14 Units into the skin 3 (three) times daily before meals., Disp: 40 mL, Rfl: 3    hydrALAZINE (APRESOLINE) 100 MG tablet, Take 1 tablet (100 mg total) by mouth 3 (three) times daily., Disp: 270 tablet, Rfl: 3    HYDROcodone-acetaminophen (NORCO) 7.5-325 mg per tablet, Take 1 tablet by mouth every 8 (eight) hours as needed., Disp: , Rfl:     insulin glargine U-100, Lantus, (LANTUS SOLOSTAR U-100 INSULIN) 100 unit/mL (3 mL) InPn pen, Inject 40 Units into the skin every evening. Increase per MD instruction to max daily dose of 70 units, Disp: 40 mL, Rfl: 3    lancets Misc, To check BG 4 times daily, to use with insurance preferred meter, Disp: 450 each, Rfl: 3    NIFEdipine (ADALAT CC) 90 MG TbSR, TAKE 1 TABLET BY MOUTH EVERY DAY, Disp: 90 tablet, Rfl: 1    pantoprazole (PROTONIX) 40 MG tablet, TAKE 1 TABLET(40 MG) BY MOUTH EVERY DAY, Disp: 90 tablet, Rfl: 3    pen needle, diabetic (BD ULTRA-FINE SHORT PEN NEEDLE) 31 gauge x 5/16" Ndle, USE FOUR TIMES DAILY, Disp: 400 each, Rfl: 3    rosuvastatin (CRESTOR) 40 MG Tab, Take 1 tablet (40 mg total) by mouth once daily., Disp: 90 tablet, Rfl: 3    tamsulosin (FLOMAX) 0.4 mg Cap, TAKE 1 CAPSULE(0.4 MG) BY MOUTH EVERY MORNING, Disp: 90 capsule, Rfl: 3    torsemide (DEMADEX) 20 MG Tab, Take 1 tablet (20 mg total) by mouth 2 (two) times daily., Disp: 180 tablet, Rfl: 3    traZODone (DESYREL) 100 MG tablet, Take 100 mg by mouth every evening., Disp: , Rfl:     Review " "of patient's allergies indicates:  No Known Allergies    Family History   Problem Relation Name Age of Onset    Diabetes Brother Cassius         type 1    Hypertension Brother Cassius     Stroke Brother Cassius        Social History     Tobacco Use    Smoking status: Former     Current packs/day: 0.00     Average packs/day: 0.5 packs/day for 45.0 years (22.5 ttl pk-yrs)     Types: Cigarettes     Start date: 1975     Quit date: 2020     Years since quittin.9     Passive exposure: Never    Smokeless tobacco: Former   Substance Use Topics    Alcohol use: Not Currently     Comment: rarely    Drug use: No     PHYSICAL EXAM:   BP (!) 135/55 (BP Location: Right forearm, Patient Position: Sitting)   Pulse (!) 38   Temp 98.7 °F (37.1 °C) (Oral)   Ht 6' 2" (1.88 m)   Wt 124 kg (273 lb 5.9 oz)   BMI 35.10 kg/m²   Constitutional:  Alert,   Well-appearing  In no distress.   Neurological: Normal speech  no focal findings  CN II - XII grossly intact.    Psychiatric: Mood and affect appropriate and symmetric.   HEENT: Normocephalic / atraumatic  PERRLA  Midline trachea  No scars across the neck   Cardiac: Regular rate and rhythm.   Pulmonary: Normal pulmonary effort.   Abdomen: Soft, not distended.     Skin: Warm and well perfused.    Vascular:  Strong 2+ radial and brachial pulses bilaterally.  Stable from prior   Extremities/  Musculoskeletal: Left arm demonstrates moderate pulsatility in the upper arm prosthetic graft with some underlying thrill.  Notably there is a chronically thrombosed brachiocephalic fistula that is medial to the loop     IMAGING:  AV graft duplex was personally interpreted, demonstrates outflow stenosis with a velocity of 526, flow volume of 1.34 L      IMPRESSION:  Left AV graft outflow stenosis, symptomatic.  Intervention is warranted    PLAN:  Left fistulogram and intervention 2025  Cath lab case, lab 3, 8:00 a.m.    I have explained the risks, benefits and alternatives " for this procedure in detail.  The patient voices understanding and all questions have be answered, and agrees to proceed with the procedure.     GLADIS Borja III, MD, FACS  Professor and Chief, Vascular and Endovascular Surgery

## 2025-06-20 NOTE — H&P (VIEW-ONLY)
VASCULAR SURGERY SERVICE    CHIEF COMPLAINT:  End-stage renal disease follow-up    HISTORY OF PRESENT ILLNESS: Vinnie Siegel is a 79 y.o. 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  In the past he had also had a left brachiocephalic fistula.  He is here for new access.  He is right-handed.  He has no history of AICD or pacemaker placement    He is on no antiplatelet or anticoagulation therapy    12/06/2024:  This is initial postoperative visit after left upper arm AV graft placement 11/06/2024.  He denies hand pain weakness or numbness.  He continues to use his PermCath    12/20/2024: AVG working well    06/20/2025:  This is a six-month follow-up.  He is having now intermittent increased bleeding after dialysis.  He has had no interventions on the left AV graft.  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     Hyperlipidemia     Hypertension     Kidney stone     Seasonal allergies 06/24/2014       Past Surgical History:   Procedure Laterality Date    AV FISTULA PLACEMENT Left 12/8/2020    Procedure: CREATION, AV FISTULA;  Surgeon: Benji Becerril MD;  Location: [facility] OR [address] [place] [address];  Service: Peripheral Vascular;  Laterality: Left;    AV FISTULA PLACEMENT Right 12/7/2022    Procedure: CREATION, AV FISTULA;  Surgeon: Benji Becerril MD;  Location: [facility] OR [address] [place] [address];  Service: Peripheral Vascular;  Laterality: Right;  RUE    CATARACT EXTRACTION W/  INTRAOCULAR LENS IMPLANT Right 04/04/16    Dr Meehan    COLONOSCOPY W/ BIOPSIES      DILATION, AQUEOUS OUTFLOW CANAL, TRANSLUMINAL, WITHOUT  DEVICE RETENTION Left 9/21/2023    Procedure: DILATION, AQUEOUS OUTFLOW CANAL, TRANSLUMINAL, WITHOUT DEVICE RETENTION;  Surgeon: Yvonne Nielson MD;  Location: [facility] OR 1ST FLR;  Service: Ophthalmology;  Laterality: Left;  omni    ESOPHAGOGASTRODUODENOSCOPY N/A 6/29/2020    Procedure: EGD (ESOPHAGOGASTRODUODENOSCOPY);  Surgeon: Ravi Leone MD;  Location: [facility];  Service: Endoscopy;  Laterality: N/A;    EYE SURGERY      FISTULOGRAM Left 4/16/2021    Procedure: Fistulogram;  Surgeon: Benji Becerril MD;  Location: [facility] CATH LAB;  Service: Peripheral Vascular;  Laterality: Left;    FISTULOGRAM Left 9/28/2022    Procedure: Fistulogram;  Surgeon: Benji Becerril MD;  Location: [facility] CATH LAB;  Service: Cardiology;  Laterality: Left;    FISTULOGRAM, WITH PTA Right 2/3/2023    Procedure: FISTULOGRAM, WITH PTA;  Surgeon: Benji Becerril MD;  Location: [facility] OR 2ND FLR;  Service: Peripheral Vascular;  Laterality: Right;  205.17 mGy  2.3 min    GONIOTOMY Left 8/10/2023    Procedure: GONIOTOMY;  Surgeon: Yvonne Nielson MD;  Location: [facility] OR 1ST FLR;  Service: Ophthalmology;  Laterality: Left;  omni    HEMORRHOID SURGERY      Dr. Root ADRYAN    INTRAOCULAR PROSTHESES INSERTION Left 8/10/2023    Procedure: INSERTION, IOL PROSTHESIS;  Surgeon: Yvonne Nielson MD;  Location: [facility] OR 1ST FLR;  Service: Ophthalmology;  Laterality: Left;    INTRAOCULAR PROSTHESES INSERTION Left 9/21/2023    Procedure: INSERTION, IOL PROSTHESIS;  Surgeon: Yvonne Nielson MD;  Location: [facility] OR 1ST FLR;  Service: Ophthalmology;  Laterality: Left;    lateral internal anal sphincterotomy  12/13/13    Dr. root ADRYAN    PERCUTANEOUS TRANSLUMINAL ANGIOPLASTY OF ARTERIOVENOUS FISTULA Left 4/16/2021    Procedure: PTA, AV FISTULA;  Surgeon: Benji Becerril MD;  Location: [facility] CATH LAB;  Service: Peripheral Vascular;  Laterality: Left;    PERCUTANEOUS TRANSLUMINAL ANGIOPLASTY OF ARTERIOVENOUS FISTULA N/A 9/28/2022     Procedure: PTA, AV FISTULA;  Surgeon: Benji Becerril MD;  Location: [facility] CATH LAB;  Service: Cardiology;  Laterality: N/A;    PHACOEMULSIFICATION OF CATARACT Left 8/10/2023    Procedure: PHACOEMULSIFICATION, CATARACT;  Surgeon: Yvonne Nielson MD;  Location: [facility] OR 1ST FLR;  Service: Ophthalmology;  Laterality: Left;    PHACOEMULSIFICATION OF CATARACT Left 2023    Procedure: PHACOEMULSIFICATION, CATARACT;  Surgeon: Yvonne Nielson MD;  Location: [facility] OR 1ST FLR;  Service: Ophthalmology;  Laterality: Left;    PLACEMENT OF ARTERIOVENOUS GRAFT Left 2024    Procedure: INSERTION, GRAFT, ARTERIOVENOUS;  Surgeon: YASIR Borja III, MD;  Location: [facility] OR 2ND FLR;  Service: Vascular;  Laterality: Left;    PLACEMENT OF DUAL-LUMEN VASCULAR CATHETER N/A 2/3/2023    Procedure: EXCHANGE, PERMACATH;  Surgeon: Benji Becerril MD;  Location: [facility] OR 2ND FLR;  Service: Peripheral Vascular;  Laterality: N/A;    URETERAL STENT PLACEMENT           Current Outpatient Medications:     acetaminophen (TYLENOL) 650 MG TbSR, Take 1 tablet (650 mg total) by mouth every 8 (eight) hours., Disp: , Rfl:     blood sugar diagnostic Strp, To check BG 4 times daily, to use with insurance preferred meter, Disp: 450 each, Rfl: 3    blood-glucose meter kit, To check BG 4 times daily, to use with insurance preferred meter, Disp: 1 each, Rfl: 0    calcium acetate,phosphat bind, (PHOSLO) 667 mg capsule, SMARTSI Capsule(s) By Mouth, Disp: , Rfl:     carvediloL (COREG) 12.5 MG tablet, Take 1 tablet (12.5 mg total) by mouth 2 (two) times daily. .5-1 twice daily or as directed, Disp: 180 tablet, Rfl: 3    dorzolamide-timolol 2-0.5% (COSOPT) 22.3-6.8 mg/mL ophthalmic solution, Place 1 drop into both eyes 2 (two) times daily., Disp: 30 mL, Rfl: 3    ergocalciferol (ERGOCALCIFEROL) 50,000 unit Cap, Take 1 capsule (50,000 Units total) by mouth every 7 days., Disp: 12 capsule, Rfl: 0    ezetimibe (ZETIA) 10 mg tablet, Take 1 tablet  "(10 mg total) by mouth once daily. One a day or as directed, Disp: 30 tablet, Rfl: 11    ferrous sulfate (FEOSOL) 325 mg (65 mg iron) Tab tablet, 3 TABS A DAY., Disp: 90 tablet, Rfl: 1    fluticasone propionate (FLONASE) 50 mcg/actuation nasal spray, 2 sprays (100 mcg total) by Each Nostril route nightly as needed for Rhinitis., Disp: 48 g, Rfl: 11    glucagon (BAQSIMI) 3 mg/actuation Spry, 3 mg by Nasal route as needed (Severe Hypoglycemia)., Disp: 1 each, Rfl: 1    HUMALOG KWIKPEN INSULIN 100 unit/mL pen, Inject 14 Units into the skin 3 (three) times daily before meals., Disp: 40 mL, Rfl: 3    hydrALAZINE (APRESOLINE) 100 MG tablet, Take 1 tablet (100 mg total) by mouth 3 (three) times daily., Disp: 270 tablet, Rfl: 3    HYDROcodone-acetaminophen (NORCO) 7.5-325 mg per tablet, Take 1 tablet by mouth every 8 (eight) hours as needed., Disp: , Rfl:     insulin glargine U-100, Lantus, (LANTUS SOLOSTAR U-100 INSULIN) 100 unit/mL (3 mL) InPn pen, Inject 40 Units into the skin every evening. Increase per MD instruction to max daily dose of 70 units, Disp: 40 mL, Rfl: 3    lancets Misc, To check BG 4 times daily, to use with insurance preferred meter, Disp: 450 each, Rfl: 3    NIFEdipine (ADALAT CC) 90 MG TbSR, TAKE 1 TABLET BY MOUTH EVERY DAY, Disp: 90 tablet, Rfl: 1    pantoprazole (PROTONIX) 40 MG tablet, TAKE 1 TABLET(40 MG) BY MOUTH EVERY DAY, Disp: 90 tablet, Rfl: 3    pen needle, diabetic (BD ULTRA-FINE SHORT PEN NEEDLE) 31 gauge x 5/16" Ndle, USE FOUR TIMES DAILY, Disp: 400 each, Rfl: 3    rosuvastatin (CRESTOR) 40 MG Tab, Take 1 tablet (40 mg total) by mouth once daily., Disp: 90 tablet, Rfl: 3    tamsulosin (FLOMAX) 0.4 mg Cap, TAKE 1 CAPSULE(0.4 MG) BY MOUTH EVERY MORNING, Disp: 90 capsule, Rfl: 3    torsemide (DEMADEX) 20 MG Tab, Take 1 tablet (20 mg total) by mouth 2 (two) times daily., Disp: 180 tablet, Rfl: 3    traZODone (DESYREL) 100 MG tablet, Take 100 mg by mouth every evening., Disp: , Rfl:     Review " "of patient's allergies indicates:  No Known Allergies    Family History   Problem Relation Name Age of Onset    Diabetes Brother Cassius         type 1    Hypertension Brother Cassius     Stroke Brother Cassius        Social History     Tobacco Use    Smoking status: Former     Current packs/day: 0.00     Average packs/day: 0.5 packs/day for 45.0 years (22.5 ttl pk-yrs)     Types: Cigarettes     Start date: 1975     Quit date: 2020     Years since quittin.9     Passive exposure: Never    Smokeless tobacco: Former   Substance Use Topics    Alcohol use: Not Currently     Comment: rarely    Drug use: No     PHYSICAL EXAM:   BP (!) 135/55 (BP Location: Right forearm, Patient Position: Sitting)   Pulse (!) 38   Temp 98.7 °F (37.1 °C) (Oral)   Ht 6' 2" (1.88 m)   Wt 124 kg (273 lb 5.9 oz)   BMI 35.10 kg/m²   Constitutional:  Alert,   Well-appearing  In no distress.   Neurological: Normal speech  no focal findings  CN II - XII grossly intact.    Psychiatric: Mood and affect appropriate and symmetric.   HEENT: Normocephalic / atraumatic  PERRLA  Midline trachea  No scars across the neck   Cardiac: Regular rate and rhythm.   Pulmonary: Normal pulmonary effort.   Abdomen: Soft, not distended.     Skin: Warm and well perfused.    Vascular:  Strong 2+ radial and brachial pulses bilaterally.  Stable from prior   Extremities/  Musculoskeletal: Left arm demonstrates moderate pulsatility in the upper arm prosthetic graft with some underlying thrill.  Notably there is a chronically thrombosed brachiocephalic fistula that is medial to the loop     IMAGING:  AV graft duplex was personally interpreted, demonstrates outflow stenosis with a velocity of 526, flow volume of 1.34 L      IMPRESSION:  Left AV graft outflow stenosis, symptomatic.  Intervention is warranted    PLAN:  Left fistulogram and intervention 2025  Cath lab case, lab 3, 8:00 a.m.    I have explained the risks, benefits and alternatives " for this procedure in detail.  The patient voices understanding and all questions have be answered, and agrees to proceed with the procedure.     GLADIS Borja III, MD, FACS  Professor and Chief, Vascular and Endovascular Surgery

## 2025-06-24 ENCOUNTER — NURSE TRIAGE (OUTPATIENT)
Dept: ADMINISTRATIVE | Facility: CLINIC | Age: 80
End: 2025-06-24
Payer: MEDICARE

## 2025-06-24 NOTE — TELEPHONE ENCOUNTER
Pt sched for cath lab procedure tomorrow. He states he was supposed to have gotten a call from a nurse today confirming and giving him instructions/arrival time, but he did not receive any call and he is very upset. Advised pt per chart review, that his procedure is sched for 0735 and he should arrive at 6am. Also advised he should not have anything to eat or drink past 10pm tonight to be safe. He VU.      Reason for Disposition   Question about upcoming scheduled surgery, procedure or test, no triage required, and triager able to answer question    Protocols used: Information Only Call - No Triage-A-

## 2025-06-25 ENCOUNTER — HOSPITAL ENCOUNTER (OUTPATIENT)
Facility: HOSPITAL | Age: 80
Discharge: HOME | End: 2025-06-25
Attending: SURGERY | Admitting: SURGERY
Payer: MEDICARE

## 2025-06-25 VITALS
TEMPERATURE: 97 F | BODY MASS INDEX: 35.04 KG/M2 | SYSTOLIC BLOOD PRESSURE: 125 MMHG | DIASTOLIC BLOOD PRESSURE: 85 MMHG | HEART RATE: 67 BPM | OXYGEN SATURATION: 98 % | RESPIRATION RATE: 18 BRPM | HEIGHT: 74 IN | WEIGHT: 273 LBS

## 2025-06-25 DIAGNOSIS — N18.6 ESRD (END STAGE RENAL DISEASE) ON DIALYSIS: Primary | ICD-10-CM

## 2025-06-25 DIAGNOSIS — T82.858A STENOSIS OF ARTERIOVENOUS DIALYSIS FISTULA, INITIAL ENCOUNTER: ICD-10-CM

## 2025-06-25 DIAGNOSIS — Z99.2 ESRD (END STAGE RENAL DISEASE) ON DIALYSIS: Primary | ICD-10-CM

## 2025-06-25 LAB — POCT GLUCOSE: 205 MG/DL (ref 70–110)

## 2025-06-25 PROCEDURE — C1769 GUIDE WIRE: HCPCS | Performed by: SURGERY

## 2025-06-25 PROCEDURE — 99152 MOD SED SAME PHYS/QHP 5/>YRS: CPT | Mod: ,,, | Performed by: SURGERY

## 2025-06-25 PROCEDURE — 63600175 PHARM REV CODE 636 W HCPCS: Performed by: SURGERY

## 2025-06-25 PROCEDURE — 27201423 OPTIME MED/SURG SUP & DEVICES STERILE SUPPLY: Performed by: SURGERY

## 2025-06-25 PROCEDURE — 36902 INTRO CATH DIALYSIS CIRCUIT: CPT | Mod: ,,, | Performed by: SURGERY

## 2025-06-25 PROCEDURE — 36902 INTRO CATH DIALYSIS CIRCUIT: CPT | Performed by: SURGERY

## 2025-06-25 PROCEDURE — C1725 CATH, TRANSLUMIN NON-LASER: HCPCS | Performed by: SURGERY

## 2025-06-25 PROCEDURE — C1894 INTRO/SHEATH, NON-LASER: HCPCS | Performed by: SURGERY

## 2025-06-25 PROCEDURE — 82962 GLUCOSE BLOOD TEST: CPT | Performed by: SURGERY

## 2025-06-25 RX ORDER — SODIUM CHLORIDE 9 MG/ML
INJECTION, SOLUTION INTRAVENOUS CONTINUOUS
Status: DISCONTINUED | OUTPATIENT
Start: 2025-06-25 | End: 2025-06-25 | Stop reason: HOSPADM

## 2025-06-25 RX ORDER — HEPARIN SOD,PORCINE/0.9 % NACL 1000/500ML
INTRAVENOUS SOLUTION INTRAVENOUS
Status: DISCONTINUED | OUTPATIENT
Start: 2025-06-25 | End: 2025-06-25 | Stop reason: HOSPADM

## 2025-06-25 RX ORDER — FENTANYL CITRATE 50 UG/ML
INJECTION, SOLUTION INTRAMUSCULAR; INTRAVENOUS
Status: DISCONTINUED | OUTPATIENT
Start: 2025-06-25 | End: 2025-06-25 | Stop reason: HOSPADM

## 2025-06-25 RX ORDER — MIDAZOLAM HYDROCHLORIDE 1 MG/ML
INJECTION, SOLUTION INTRAMUSCULAR; INTRAVENOUS
Status: DISCONTINUED | OUTPATIENT
Start: 2025-06-25 | End: 2025-06-25 | Stop reason: HOSPADM

## 2025-06-25 NOTE — Clinical Note
dry, intact, no bleeding and no hematoma. Suture to site, gauze and tegaderm applied, hemostasis 0805.

## 2025-06-25 NOTE — BRIEF OP NOTE
[facility] - Cath Lab  Brief Operative Note    Surgery Date: 6/25/2025     Surgeons and Role:     * YASIR Borja III, MD - Primary    Assisting Surgeon: Alexandra Veloz    Pre-op Diagnosis:  Stenosis of arteriovenous dialysis fistula, initial encounter [T82.858A]    Post-op Diagnosis:  Post-Op Diagnosis Codes:     * Stenosis of arteriovenous dialysis fistula, initial encounter [T82.858A]    PROCEDURES:    PTA, L AVG (8x40 [place])  Conscious Sedation    Anesthesia: RN IV Sedation    Operative Findings: >80% venous anast stenosis, < 20% after    Estimated Blood Loss:<4cc         Specimens:   Specimen (24h ago, onward)      None            * No specimens in log *        Discharge Note    OUTCOME: successful outpatient procedure     DISPOSITION: home    FINAL DIAGNOSIS:  stenosis aVG    FOLLOWUP: 5 days with AVG duplex    DISCHARGE INSTRUCTIONS:  see below

## 2025-06-25 NOTE — NURSING
Pt transferred from cath lab via stretcher.  Vss.  Post op orders and assessment initiated.  Pt aao, tolerating po.  Family called to bs.  Pt in no acute distress and verbalizes no complaints. Call bell within reach.

## 2025-06-25 NOTE — OP NOTE
[facility] - Cath Lab   Operative Note     Surgery Date: 6/25/2025      Surgeons and Role:     * YASIR Borja III, MD - Primary     Assisting Surgeon: Alexandra Veloz     Pre-op Diagnosis:  Stenosis of arteriovenous dialysis fistula, initial encounter [T82.858A]     Post-op Diagnosis:  Post-Op Diagnosis Codes:     * Stenosis of arteriovenous dialysis fistula, initial encounter [T82.858A]     PROCEDURES:     PTA, L AVG (8x40 Taos)  Conscious Sedation     Anesthesia: RN IV Sedation     Operative Findings: >80% venous anast stenosis, < 20% after     Estimated Blood Loss:<4cc    Indications:  79-year-old male with end-stage renal disease with a high-grade venous anastomotic stenosis.      Procedure: Patient was brought in the cath lab placed in supine position.  After sterile prep and drape infiltration with 1% lidocaine, the left AV graft was accessed with a micropuncture set and fistulogram performed.  This demonstrated a discrete 80% venous anastomotic stenosis no central venous stenosis.    Stiff angled Glidewire was placed in a short 6 sheath was placed.  The area was dilated with a 8 x 40 Taos balloon.  There was severe wasting of the fully effaced at 12 atmospheres this was held for 2 minutes.  Follow-up fistulogram revealed less than 20% residual stenosis and brisk flow.    All catheters and guidewires removed hemostasis was achieved with a Monocryl U-stitch.  Sterile dressing was applied the patient was taken to recovery area in stable condition.    MODERATE SEDATION IN CATH LAB   I was present for moderate sedation, and monitored the patients cardio respiratory functions during the moderate sedation   See nurses notes for Intra-service start and end times and for the log of the name of the RN who administered the medicines for the moderate sedation, including their doseage and route.     Time of sedation:  15 minutes     GLADIS Borja III, MD, FACS  Professor and Chief, Vascular and Endovascular  Surgery

## 2025-06-25 NOTE — Clinical Note
The balloon was inserted into the left AV fistula.  Balloon was inflated and removed in following inflation.

## 2025-06-25 NOTE — NURSING
Pt prepped in room 6 on SSCU. Pt is AAOx4 and follows commands appropriately. VS taken and wnl and pt is free from s/s of pain/distress. IVs started and preop questions completed. Consent completed and verified. Safety measures in place. Pt's friend is at bedside.

## 2025-06-26 DIAGNOSIS — Z99.2 ESRD (END STAGE RENAL DISEASE) ON DIALYSIS: Primary | ICD-10-CM

## 2025-06-26 DIAGNOSIS — N18.6 ESRD (END STAGE RENAL DISEASE) ON DIALYSIS: Primary | ICD-10-CM

## 2025-06-27 ENCOUNTER — HOSPITAL ENCOUNTER (OUTPATIENT)
Dept: VASCULAR SURGERY | Facility: CLINIC | Age: 80
Discharge: HOME | End: 2025-06-27
Attending: SURGERY
Payer: MEDICARE

## 2025-06-27 ENCOUNTER — OFFICE VISIT (OUTPATIENT)
Dept: VASCULAR SURGERY | Facility: CLINIC | Age: 80
End: 2025-06-27
Attending: SURGERY
Payer: MEDICARE

## 2025-06-27 VITALS
SYSTOLIC BLOOD PRESSURE: 122 MMHG | HEART RATE: 83 BPM | TEMPERATURE: 98 F | WEIGHT: 271.19 LBS | BODY MASS INDEX: 34.8 KG/M2 | HEIGHT: 74 IN | DIASTOLIC BLOOD PRESSURE: 70 MMHG

## 2025-06-27 DIAGNOSIS — N18.6 ESRD (END STAGE RENAL DISEASE) ON DIALYSIS: Primary | ICD-10-CM

## 2025-06-27 DIAGNOSIS — N18.6 ESRD (END STAGE RENAL DISEASE) ON DIALYSIS: ICD-10-CM

## 2025-06-27 DIAGNOSIS — Z99.2 ESRD (END STAGE RENAL DISEASE) ON DIALYSIS: ICD-10-CM

## 2025-06-27 DIAGNOSIS — Z99.2 ESRD (END STAGE RENAL DISEASE) ON DIALYSIS: Primary | ICD-10-CM

## 2025-06-27 PROCEDURE — 99999 PR PBB SHADOW E&M-EST. PATIENT-LVL IV: CPT | Mod: PBBFAC,,, | Performed by: SURGERY

## 2025-06-27 PROCEDURE — 93990 DOPPLER FLOW TESTING: CPT | Mod: S$GLB,,, | Performed by: STUDENT IN AN ORGANIZED HEALTH CARE EDUCATION/TRAINING PROGRAM

## 2025-06-27 NOTE — PROGRESS NOTES
VASCULAR SURGERY SERVICE    CHIEF COMPLAINT:  End-stage renal disease follow-up    HISTORY OF PRESENT ILLNESS: Vinnie Siegel is a 79 y.o. 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  In the past he had also had a left brachiocephalic fistula.  He is here for new access.  He is right-handed.  He has no history of AICD or pacemaker placement    He is on no antiplatelet or anticoagulation therapy    S/p  PTA, left AV graft 06/25/2025  left upper arm AV graft placement 11/06/2024 12/06/2024:  This is initial postoperative visit after left upper arm AV graft placement 11/06/2024.  He denies hand pain weakness or numbness.  He continues to use his PermCath    12/20/2024: AVG working well    06/20/2025:  This is a six-month follow-up.  He is having now intermittent increased bleeding after dialysis.  He has had no interventions on the left AV graft.  Notably he previously had a left brachiocephalic fistula that had thrombosed in the distant past    06/27/2025:  He now returns after recent angioplasty.  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     Hyperlipidemia     Hypertension     Kidney stone     Seasonal allergies 06/24/2014       Past Surgical History:   Procedure Laterality Date    AV FISTULA PLACEMENT Left 12/8/2020    Procedure: CREATION, AV FISTULA;  Surgeon: Benji Becerril MD;  Location: [facility] OR [address] [place] [address];  Service: Peripheral Vascular;  Laterality: Left;    AV FISTULA PLACEMENT Right 12/7/2022    Procedure: CREATION, AV FISTULA;  Surgeon: Benji Becerril MD;  Location: [facility] OR [address] [place] [address];  Service: Peripheral Vascular;  Laterality: Right;  RUE     CATARACT EXTRACTION W/  INTRAOCULAR LENS IMPLANT Right 04/04/16    Dr Meehan    COLONOSCOPY W/ BIOPSIES      DILATION, AQUEOUS OUTFLOW CANAL, TRANSLUMINAL, WITHOUT DEVICE RETENTION Left 9/21/2023    Procedure: DILATION, AQUEOUS OUTFLOW CANAL, TRANSLUMINAL, WITHOUT DEVICE RETENTION;  Surgeon: Yvonne Nielson MD;  Location: [facility] OR 1ST FLR;  Service: Ophthalmology;  Laterality: Left;  omni    ESOPHAGOGASTRODUODENOSCOPY N/A 6/29/2020    Procedure: EGD (ESOPHAGOGASTRODUODENOSCOPY);  Surgeon: Ravi Leone MD;  Location: [facility];  Service: Endoscopy;  Laterality: N/A;    EYE SURGERY      FISTULOGRAM Left 4/16/2021    Procedure: Fistulogram;  Surgeon: Benji Becerril MD;  Location: [facility] CATH LAB;  Service: Peripheral Vascular;  Laterality: Left;    FISTULOGRAM Left 9/28/2022    Procedure: Fistulogram;  Surgeon: Benji Becerril MD;  Location: [facility] CATH LAB;  Service: Cardiology;  Laterality: Left;    FISTULOGRAM N/A 6/25/2025    Procedure: Fistulogram;  Surgeon: YASIR Borja III, MD;  Location: [facility] CATH LAB;  Service: Peripheral Vascular;  Laterality: N/A;    FISTULOGRAM, WITH PTA Right 2/3/2023    Procedure: FISTULOGRAM, WITH PTA;  Surgeon: Benji Becerril MD;  Location: [facility] 2ND FLR;  Service: Peripheral Vascular;  Laterality: Right;  205.17 mGy  2.3 min    GONIOTOMY Left 8/10/2023    Procedure: GONIOTOMY;  Surgeon: Yvonne Nielson MD;  Location: [facility] OR 1ST FLR;  Service: Ophthalmology;  Laterality: Left;  omni    HEMORRHOID SURGERY      [place]. [place] [facility] [place]    INTRAOCULAR PROSTHESES INSERTION Left 8/10/2023    Procedure: INSERTION, IOL PROSTHESIS;  Surgeon: Yvonne Nielson MD;  Location: [facility] OR 1ST FLR;  Service: Ophthalmology;  Laterality: Left;    INTRAOCULAR PROSTHESES INSERTION Left 9/21/2023    Procedure: INSERTION, IOL PROSTHESIS;  Surgeon: Yvonne Nielson MD;  Location: [facility] OR 1ST FLR;  Service: Ophthalmology;  Laterality: Left;    lateral internal anal sphincterotomy   12/13/13    Dr. root [facility] [place]    PERCUTANEOUS TRANSLUMINAL ANGIOPLASTY OF ARTERIOVENOUS FISTULA Left 4/16/2021    Procedure: PTA, AV FISTULA;  Surgeon: Benji Becerril MD;  Location: [facility] CATH LAB;  Service: Peripheral Vascular;  Laterality: Left;    PERCUTANEOUS TRANSLUMINAL ANGIOPLASTY OF ARTERIOVENOUS FISTULA N/A 9/28/2022    Procedure: PTA, AV FISTULA;  Surgeon: Benji Becerril MD;  Location: [facility] CATH LAB;  Service: Cardiology;  Laterality: N/A;    PERCUTANEOUS TRANSLUMINAL ANGIOPLASTY OF ARTERIOVENOUS FISTULA Left 6/25/2025    Procedure: PTA, AV FISTULA;  Surgeon: YASIR Borja III, MD;  Location: [facility] CATH LAB;  Service: Peripheral Vascular;  Laterality: Left;    PHACOEMULSIFICATION OF CATARACT Left 8/10/2023    Procedure: PHACOEMULSIFICATION, CATARACT;  Surgeon: Yvonne Nielson MD;  Location: [facility] OR 1ST FLR;  Service: Ophthalmology;  Laterality: Left;    PHACOEMULSIFICATION OF CATARACT Left 9/21/2023    Procedure: PHACOEMULSIFICATION, CATARACT;  Surgeon: Yvonne Nielson MD;  Location: [facility] OR 1ST FLR;  Service: Ophthalmology;  Laterality: Left;    PLACEMENT OF ARTERIOVENOUS GRAFT Left 11/6/2024    Procedure: INSERTION, GRAFT, ARTERIOVENOUS;  Surgeon: YASIR Borja III, MD;  Location: [facility] OR 2ND FLR;  Service: Vascular;  Laterality: Left;    PLACEMENT OF DUAL-LUMEN VASCULAR CATHETER N/A 2/3/2023    Procedure: EXCHANGE, PERMACATH;  Surgeon: Benji Becerril MD;  Location: [facility] OR 2ND FLR;  Service: Peripheral Vascular;  Laterality: N/A;    URETERAL STENT PLACEMENT           Current Outpatient Medications:     acetaminophen (TYLENOL) 650 MG TbSR, Take 1 tablet (650 mg total) by mouth every 8 (eight) hours., Disp: , Rfl:     blood sugar diagnostic Strp, To check BG 4 times daily, to use with insurance preferred meter, Disp: 450 each, Rfl: 3    blood-glucose meter kit, To check BG 4 times daily, to use with insurance preferred meter, Disp: 1 each, Rfl: 0    calcium acetate,phosphat bind,  (PHOSLO) 667 mg capsule, SMARTSI Capsule(s) By Mouth, Disp: , Rfl:     carvediloL (COREG) 12.5 MG tablet, Take 1 tablet (12.5 mg total) by mouth 2 (two) times daily. .5-1 twice daily or as directed, Disp: 180 tablet, Rfl: 3    dorzolamide-timolol 2-0.5% (COSOPT) 22.3-6.8 mg/mL ophthalmic solution, Place 1 drop into both eyes 2 (two) times daily., Disp: 30 mL, Rfl: 3    ergocalciferol (ERGOCALCIFEROL) 50,000 unit Cap, Take 1 capsule (50,000 Units total) by mouth every 7 days., Disp: 12 capsule, Rfl: 0    ezetimibe (ZETIA) 10 mg tablet, Take 1 tablet (10 mg total) by mouth once daily. One a day or as directed, Disp: 30 tablet, Rfl: 11    ferrous sulfate (FEOSOL) 325 mg (65 mg iron) Tab tablet, 3 TABS A DAY., Disp: 90 tablet, Rfl: 1    fluticasone propionate (FLONASE) 50 mcg/actuation nasal spray, 2 sprays (100 mcg total) by Each Nostril route nightly as needed for Rhinitis., Disp: 48 g, Rfl: 11    glucagon (BAQSIMI) 3 mg/actuation Spry, 3 mg by Nasal route as needed (Severe Hypoglycemia)., Disp: 1 each, Rfl: 1    HUMALOG KWIKPEN INSULIN 100 unit/mL pen, Inject 14 Units into the skin 3 (three) times daily before meals., Disp: 40 mL, Rfl: 3    hydrALAZINE (APRESOLINE) 100 MG tablet, Take 1 tablet (100 mg total) by mouth 3 (three) times daily., Disp: 270 tablet, Rfl: 3    HYDROcodone-acetaminophen (NORCO) 7.5-325 mg per tablet, Take 1 tablet by mouth every 8 (eight) hours as needed., Disp: , Rfl:     insulin glargine U-100, Lantus, (LANTUS SOLOSTAR U-100 INSULIN) 100 unit/mL (3 mL) InPn pen, Inject 40 Units into the skin every evening. Increase per MD instruction to max daily dose of 70 units, Disp: 40 mL, Rfl: 3    lancets Misc, To check BG 4 times daily, to use with insurance preferred meter, Disp: 450 each, Rfl: 3    NIFEdipine (ADALAT CC) 90 MG TbSR, TAKE 1 TABLET BY MOUTH EVERY DAY, Disp: 90 tablet, Rfl: 1    pantoprazole (PROTONIX) 40 MG tablet, TAKE 1 TABLET(40 MG) BY MOUTH EVERY DAY, Disp: 90 tablet, Rfl: 3     "pen needle, diabetic (BD ULTRA-FINE SHORT PEN NEEDLE) 31 gauge x 5/16" Ndle, USE FOUR TIMES DAILY, Disp: 400 each, Rfl: 3    rosuvastatin (CRESTOR) 40 MG Tab, Take 1 tablet (40 mg total) by mouth once daily., Disp: 90 tablet, Rfl: 3    tamsulosin (FLOMAX) 0.4 mg Cap, TAKE 1 CAPSULE(0.4 MG) BY MOUTH EVERY MORNING, Disp: 90 capsule, Rfl: 3    torsemide (DEMADEX) 20 MG Tab, Take 1 tablet (20 mg total) by mouth 2 (two) times daily., Disp: 180 tablet, Rfl: 3    traZODone (DESYREL) 100 MG tablet, Take 100 mg by mouth every evening., Disp: , Rfl:     Review of patient's allergies indicates:  No Known Allergies    Family History   Problem Relation Name Age of Onset    Diabetes Brother Cassius         type 1    Hypertension Brother Cassius     Stroke Brother Cassius        Social History     Tobacco Use    Smoking status: Former     Current packs/day: 0.00     Average packs/day: 0.5 packs/day for 45.0 years (22.5 ttl pk-yrs)     Types: Cigarettes     Start date: 1975     Quit date: 2020     Years since quittin.0     Passive exposure: Never    Smokeless tobacco: Former   Vaping Use    Vaping status: Never Used   Substance Use Topics    Alcohol use: Not Currently     Comment: rarely    Drug use: No     PHYSICAL EXAM:   /70 (BP Location: Right arm, Patient Position: Sitting)   Pulse 83   Temp 98.1 °F (36.7 °C) (Oral)   Ht 6' 2" (1.88 m)   Wt 123 kg (271 lb 2.7 oz)   BMI 34.82 kg/m²   Constitutional:  Alert,   Well-appearing  In no distress.   Neurological: Normal speech  no focal findings  CN II - XII grossly intact.    Psychiatric: Mood and affect appropriate and symmetric.   HEENT: Normocephalic / atraumatic  PERRLA  Midline trachea  No scars across the neck   Cardiac: Regular rate and rhythm.   Pulmonary: Normal pulmonary effort.   Abdomen: Soft, not distended.     Skin: Warm and well perfused.    Vascular:  Strong 2+ radial and brachial pulses bilaterally.  Stable from prior "   Extremities/  Musculoskeletal: Left arm demonstrates now soft thrill without pulsatility in the graft clinical improvement from prior     IMAGING:  AV graft duplex was personally interpreted, demonstrates resolution of the prior outflow stenosis outflow stenosis with a velocity of 247 (prior 526, flow volume of 1.88 (prior 1.34 L      IMPRESSION:  Successful intervention, left AV graft venous anastomotic stenosis    PLAN:  Follow up in 6 months with AV graft duplex sooner if clinically indicated    GLADIS Borja III, MD, FACS  Professor and Chief, Vascular and Endovascular Surgery

## 2025-08-13 ENCOUNTER — OFFICE VISIT (OUTPATIENT)
Dept: ENDOCRINOLOGY | Facility: CLINIC | Age: 80
End: 2025-08-13
Payer: MEDICARE

## 2025-08-13 VITALS
DIASTOLIC BLOOD PRESSURE: 68 MMHG | SYSTOLIC BLOOD PRESSURE: 130 MMHG | BODY MASS INDEX: 35.9 KG/M2 | HEIGHT: 74 IN | HEART RATE: 82 BPM | OXYGEN SATURATION: 100 % | WEIGHT: 279.69 LBS

## 2025-08-13 DIAGNOSIS — N18.6 ESRD (END STAGE RENAL DISEASE) ON DIALYSIS: ICD-10-CM

## 2025-08-13 DIAGNOSIS — Z99.2 TYPE 2 DIABETES MELLITUS WITH CHRONIC KIDNEY DISEASE ON CHRONIC DIALYSIS, WITH LONG-TERM CURRENT USE OF INSULIN: Primary | ICD-10-CM

## 2025-08-13 DIAGNOSIS — Z79.4 TYPE 2 DIABETES MELLITUS WITH CHRONIC KIDNEY DISEASE ON CHRONIC DIALYSIS, WITH LONG-TERM CURRENT USE OF INSULIN: Primary | ICD-10-CM

## 2025-08-13 DIAGNOSIS — E11.65 TYPE 2 DIABETES MELLITUS WITH HYPERGLYCEMIA, WITHOUT LONG-TERM CURRENT USE OF INSULIN: ICD-10-CM

## 2025-08-13 DIAGNOSIS — N18.6 TYPE 2 DIABETES MELLITUS WITH CHRONIC KIDNEY DISEASE ON CHRONIC DIALYSIS, WITH LONG-TERM CURRENT USE OF INSULIN: Primary | ICD-10-CM

## 2025-08-13 DIAGNOSIS — E11.22 TYPE 2 DIABETES MELLITUS WITH CHRONIC KIDNEY DISEASE ON CHRONIC DIALYSIS, WITH LONG-TERM CURRENT USE OF INSULIN: Primary | ICD-10-CM

## 2025-08-13 DIAGNOSIS — Z99.2 ESRD (END STAGE RENAL DISEASE) ON DIALYSIS: ICD-10-CM

## 2025-08-13 PROCEDURE — 99999 PR PBB SHADOW E&M-EST. PATIENT-LVL V: CPT | Mod: PBBFAC,HCNC,, | Performed by: NURSE PRACTITIONER

## 2025-08-13 RX ORDER — OXYCODONE AND ACETAMINOPHEN 7.5; 325 MG/1; MG/1
1 TABLET ORAL
COMMUNITY
Start: 2025-07-14

## 2025-08-13 RX ORDER — GLUCAGON 3 MG/1
3 POWDER NASAL
Qty: 1 EACH | Refills: 1 | Status: SHIPPED | OUTPATIENT
Start: 2025-08-13

## 2025-08-13 RX ORDER — INSULIN GLARGINE 100 [IU]/ML
36 INJECTION, SOLUTION SUBCUTANEOUS NIGHTLY
Qty: 40 ML | Refills: 3 | Status: SHIPPED | OUTPATIENT
Start: 2025-08-13

## (undated) DEVICE — APPLICATOR CHLORAPREP ORN 26ML

## (undated) DEVICE — SHEILD PLASTIC EYE

## (undated) DEVICE — SEE MEDLINE ITEM 156894

## (undated) DEVICE — PACK CATARACT OPTHALMIC CUSTOM

## (undated) DEVICE — SUT 3-0 12-18IN SILK

## (undated) DEVICE — STOPCOCK 3 WAY MED PRESSURE

## (undated) DEVICE — LOOP VESSEL BLUE MAXI

## (undated) DEVICE — TOWEL OR XRAY WHITE 17X26IN

## (undated) DEVICE — DRESSING TRANS 4X4 TEGADERM

## (undated) DEVICE — INFLATOR ENCORE 26 BLLN INFL

## (undated) DEVICE — SOL 9P NACL IRR PIC IL

## (undated) DEVICE — DRESSING TELFA STRL 4X3 LF

## (undated) DEVICE — HANDPIECE TRANSFORMER I/A

## (undated) DEVICE — OMNIPAQUE CONTRAST 350MG/100ML

## (undated) DEVICE — SHEATH PINNACLE 6FRX6CM

## (undated) DEVICE — CATH DORADO 8X6

## (undated) DEVICE — SOL WATER STRL IRR 1000ML

## (undated) DEVICE — SOL NS 1000CC

## (undated) DEVICE — DRAPE THREE-QTR REINF 53X77IN

## (undated) DEVICE — OMNIPAQUE 350 200ML

## (undated) DEVICE — SET MICROPUNCT 5FRMPIS-501

## (undated) DEVICE — SUT MCRYL PLUS 4-0 PS2 27IN

## (undated) DEVICE — SET DECANTER MEDICHOICE

## (undated) DEVICE — SUT D SPECIAL VICRYL 2-0

## (undated) DEVICE — DRAPE STERI INCISE MED 130X130

## (undated) DEVICE — SEE MEDLINE ITEM 152622

## (undated) DEVICE — SUT 4-0 12-18IN SILK BLACK

## (undated) DEVICE — AV VASCULAR ACCESS PACK

## (undated) DEVICE — KIT PROBE COVER WITH GEL

## (undated) DEVICE — GUIDEWIRE STF .035X180CM ANG

## (undated) DEVICE — SHIELD COLLAGEN 12HR CORNEAL

## (undated) DEVICE — SUT LIGACLIP SMALL XTRA

## (undated) DEVICE — DRAPE OPTIMA MAJOR PEDIATRIC

## (undated) DEVICE — NDL BOX COUNTER

## (undated) DEVICE — KIT MICROINTRO 4F .018X40X7CM

## (undated) DEVICE — GOWN SMART IMP BREATHABLE XXLG

## (undated) DEVICE — STOCKINET 4INX48

## (undated) DEVICE — CLIP SPRING 6MM

## (undated) DEVICE — GLIDE CATH ANGLED 4FR 65CM

## (undated) DEVICE — PACK AV VASCULAR ACCESS OMC

## (undated) DEVICE — SUT VICRYL 3-0 27 SH

## (undated) DEVICE — KIT MEROCEL INSTRUMENT WIPE

## (undated) DEVICE — HEMOSTAT SURGICEL 4X8IN

## (undated) DEVICE — EXPANDER PUPIL 7.0MM

## (undated) DEVICE — SUT PROLENE 6-0 24 BV-1

## (undated) DEVICE — SUT CV-6 30 IN TTC-09NDL

## (undated) DEVICE — DRAPE OPHTHALMIC 48X62 FEN

## (undated) DEVICE — VISE RADIFOCUS MULTI TORQUE

## (undated) DEVICE — COVERS PROBE NR-48 STERILE

## (undated) DEVICE — SUT ETHILON 3-0 PS2 18 BLK

## (undated) DEVICE — KIT GREY EYE

## (undated) DEVICE — ELECTRODE REM PLYHSV RETURN 9

## (undated) DEVICE — SUT SILK 2-0 SH 18IN BLACK

## (undated) DEVICE — CATH DORADO BLLN DIL 6F 8X4X80

## (undated) DEVICE — CLIP MED TICALL

## (undated) DEVICE — SEE MEDLINE ITEM 153688

## (undated) DEVICE — COVER INSTR ELASTIC BAND 40X20

## (undated) DEVICE — SUT VICRYL PLUS 3-0 SH 18IN

## (undated) DEVICE — SUT 2-0 12-18IN SILK

## (undated) DEVICE — SUT CTD VICRYL 7-0 TG1408T

## (undated) DEVICE — SOL BETADINE 5%

## (undated) DEVICE — TOWEL OR DISP STRL BLUE 4/PK

## (undated) DEVICE — ADHESIVE DERMABOND ADVANCED

## (undated) DEVICE — DRAPE PED LAP SURG 108X77IN

## (undated) DEVICE — SOL NACL 0.9% IV INJ 1000ML

## (undated) DEVICE — GAUZE SPONGE PEANUT STRL

## (undated) DEVICE — DEVICE PICC SECURE SORBA VIEW

## (undated) DEVICE — DRAPE STERI INSTRUMENT 1018

## (undated) DEVICE — DRESSING TELFA + BARR 4X6IN

## (undated) DEVICE — GOWN SURGICAL X-LARGE

## (undated) DEVICE — LENS PRISM SURG GONIO

## (undated) DEVICE — LOOP VESSEL BLUE MINI 2/CARD

## (undated) DEVICE — SPONGE WEC CEL SPEARS

## (undated) DEVICE — CATH GLIDE ANGLED 5FR 65CM

## (undated) DEVICE — TRAY CATH LAB OMC

## (undated) DEVICE — KNIFE ANGLE 1MM

## (undated) DEVICE — Device

## (undated) DEVICE — HEMOSTAT SURGICEL 2X14IN

## (undated) DEVICE — SPONGE DERMACEA 4X4IN 12PLY